# Patient Record
Sex: FEMALE | Race: WHITE | NOT HISPANIC OR LATINO
[De-identification: names, ages, dates, MRNs, and addresses within clinical notes are randomized per-mention and may not be internally consistent; named-entity substitution may affect disease eponyms.]

---

## 2018-01-18 ENCOUNTER — RX RENEWAL (OUTPATIENT)
Age: 78
End: 2018-01-18

## 2018-01-18 ENCOUNTER — MOBILE ON CALL (OUTPATIENT)
Age: 78
End: 2018-01-18

## 2018-01-23 ENCOUNTER — RX RENEWAL (OUTPATIENT)
Age: 78
End: 2018-01-23

## 2018-02-16 ENCOUNTER — APPOINTMENT (OUTPATIENT)
Dept: PULMONOLOGY | Facility: CLINIC | Age: 78
End: 2018-02-16
Payer: COMMERCIAL

## 2018-02-16 VITALS
OXYGEN SATURATION: 95 % | DIASTOLIC BLOOD PRESSURE: 70 MMHG | HEART RATE: 58 BPM | SYSTOLIC BLOOD PRESSURE: 130 MMHG | HEIGHT: 66 IN | TEMPERATURE: 99.1 F

## 2018-02-16 VITALS — WEIGHT: 94 LBS | BODY MASS INDEX: 15.17 KG/M2

## 2018-02-16 PROCEDURE — 99214 OFFICE O/P EST MOD 30 MIN: CPT | Mod: 25

## 2018-02-16 PROCEDURE — 94060 EVALUATION OF WHEEZING: CPT

## 2018-02-16 PROCEDURE — 94729 DIFFUSING CAPACITY: CPT

## 2018-02-16 PROCEDURE — 71046 X-RAY EXAM CHEST 2 VIEWS: CPT

## 2018-02-16 PROCEDURE — 94727 GAS DIL/WSHOT DETER LNG VOL: CPT

## 2018-03-05 ENCOUNTER — TRANSCRIPTION ENCOUNTER (OUTPATIENT)
Age: 78
End: 2018-03-05

## 2018-03-08 ENCOUNTER — RX RENEWAL (OUTPATIENT)
Age: 78
End: 2018-03-08

## 2019-01-11 ENCOUNTER — APPOINTMENT (OUTPATIENT)
Dept: PULMONOLOGY | Facility: CLINIC | Age: 79
End: 2019-01-11
Payer: COMMERCIAL

## 2019-01-11 VITALS
OXYGEN SATURATION: 99 % | SYSTOLIC BLOOD PRESSURE: 100 MMHG | HEART RATE: 83 BPM | DIASTOLIC BLOOD PRESSURE: 70 MMHG | HEIGHT: 66 IN | TEMPERATURE: 98.3 F

## 2019-01-11 PROCEDURE — 99214 OFFICE O/P EST MOD 30 MIN: CPT | Mod: 25

## 2019-01-11 PROCEDURE — 94010 BREATHING CAPACITY TEST: CPT

## 2019-01-11 PROCEDURE — 71046 X-RAY EXAM CHEST 2 VIEWS: CPT

## 2019-01-13 NOTE — PHYSICAL EXAM
[General Appearance - Well Developed] : well developed [Normal Appearance] : normal appearance [Well Groomed] : well groomed [General Appearance - Well Nourished] : well nourished [No Deformities] : no deformities [General Appearance - In No Acute Distress] : no acute distress [Normal Conjunctiva] : the conjunctiva exhibited no abnormalities [Eyelids - No Xanthelasma] : the eyelids demonstrated no xanthelasmas [Normal Oropharynx] : normal oropharynx [Neck Appearance] : the appearance of the neck was normal [Neck Cervical Mass (___cm)] : no neck mass was observed [Jugular Venous Distention Increased] : there was no jugular-venous distention [Thyroid Diffuse Enlargement] : the thyroid was not enlarged [Thyroid Nodule] : there were no palpable thyroid nodules [Heart Rate And Rhythm] : heart rate and rhythm were normal [Heart Sounds] : normal S1 and S2 [Murmurs] : no murmurs present [FreeTextEntry1] : few scattered rhonchi but relatively clear [Abdomen Soft] : soft [Abdomen Tenderness] : non-tender [Abdomen Mass (___ Cm)] : no abdominal mass palpated [Abnormal Walk] : normal gait [Gait - Sufficient For Exercise Testing] : the gait was sufficient for exercise testing [Nail Clubbing] : no clubbing of the fingernails [Cyanosis, Localized] : no localized cyanosis [Petechial Hemorrhages (___cm)] : no petechial hemorrhages [Skin Color & Pigmentation] : normal skin color and pigmentation [] : no rash [No Venous Stasis] : no venous stasis [Skin Lesions] : no skin lesions [No Skin Ulcers] : no skin ulcer [No Xanthoma] : no  xanthoma was observed

## 2019-01-13 NOTE — PROCEDURE
[FreeTextEntry1] : spirometry is more obstructed than previously\par \par chest film is unchanged from before with inflammatory changes. bilaterally

## 2019-01-13 NOTE — REVIEW OF SYSTEMS
[Recent Wt Loss (___ Lbs)] : recent [unfilled] ~Ulb weight loss [Cough] : cough [Dyspnea] : dyspnea [Negative] : Pulmonary Hypertension [FreeTextEntry8] : increased cough and dyspnea

## 2019-01-13 NOTE — DISCUSSION/SUMMARY
[FreeTextEntry1] : i need to figure out why she's worsened.  pseudomonas, jocelyn or other bacterial\par \par she gave me a psutum sample but she thinks this may have been nasal.\par \par i will see what the culture reveals and give her a course of prednisone and antibiotics and see if she improves.

## 2019-01-13 NOTE — HISTORY OF PRESENT ILLNESS
[FreeTextEntry1] : this year as opposed to other years has been more cough full, tired and also has lost weight. no other medical , sputum is green and clear up with suri  also with antibitoics allergies that have  been problematic.

## 2019-02-18 LAB — BACTERIA SPT CULT: ABNORMAL

## 2019-03-04 LAB — ACID FAST STN SPT: NORMAL

## 2019-03-24 ENCOUNTER — RX RENEWAL (OUTPATIENT)
Age: 79
End: 2019-03-24

## 2019-05-03 ENCOUNTER — TRANSCRIPTION ENCOUNTER (OUTPATIENT)
Age: 79
End: 2019-05-03

## 2019-05-14 ENCOUNTER — APPOINTMENT (OUTPATIENT)
Dept: PULMONOLOGY | Facility: CLINIC | Age: 79
End: 2019-05-14
Payer: COMMERCIAL

## 2019-05-14 VITALS
HEIGHT: 66 IN | OXYGEN SATURATION: 58 % | TEMPERATURE: 98.8 F | HEART RATE: 58 BPM | SYSTOLIC BLOOD PRESSURE: 112 MMHG | DIASTOLIC BLOOD PRESSURE: 70 MMHG

## 2019-05-14 PROCEDURE — 71046 X-RAY EXAM CHEST 2 VIEWS: CPT

## 2019-05-14 PROCEDURE — 94010 BREATHING CAPACITY TEST: CPT

## 2019-05-14 PROCEDURE — 99214 OFFICE O/P EST MOD 30 MIN: CPT | Mod: 25

## 2019-05-21 ENCOUNTER — RX RENEWAL (OUTPATIENT)
Age: 79
End: 2019-05-21

## 2019-06-11 ENCOUNTER — RX RENEWAL (OUTPATIENT)
Age: 79
End: 2019-06-11

## 2019-11-02 LAB — ACID FAST STN SPT: NORMAL

## 2020-01-07 ENCOUNTER — RX RENEWAL (OUTPATIENT)
Age: 80
End: 2020-01-07

## 2020-01-11 ENCOUNTER — RX RENEWAL (OUTPATIENT)
Age: 80
End: 2020-01-11

## 2020-01-15 ENCOUNTER — RX RENEWAL (OUTPATIENT)
Age: 80
End: 2020-01-15

## 2020-02-07 NOTE — HISTORY OF PRESENT ILLNESS
[FreeTextEntry1] : slowly improved after the levoquin and then slipped back, but not clear how much is allergies and how much is pseudomonas.  usually gains the weight in summer.  is on inhaled tobramycin which is the only thing that prevents her from being on repeated courses of quinolones, or iv antibiotics.

## 2020-02-07 NOTE — PROCEDURE
[FreeTextEntry1] : spirometruy is mildly obstructed/ restrictive\par \par chest film is unchanged with bilateral scattered inflammatoroy findings

## 2020-02-07 NOTE — REVIEW OF SYSTEMS
[Recent Wt Loss (___ Lbs)] : recent [unfilled] ~Ulb weight loss [Cough] : cough [Dyspnea] : dyspnea [Negative] : Endocrine [FreeTextEntry8] : has returned to increased sputum, after peroids after levoquin that rendered her cough free for aperiod of time

## 2020-02-07 NOTE — DISCUSSION/SUMMARY
[FreeTextEntry1] : prescribed another course of levoquin for treatment of her bronchiectasis that last time had stenotrophomonas and psedumononas on it.  she is on suri as well,\par \par jocelyn did not grow out of the last specifimen however,

## 2020-02-07 NOTE — PHYSICAL EXAM
[General Appearance - Well Developed] : well developed [Normal Appearance] : normal appearance [No Deformities] : no deformities [General Appearance - Well Nourished] : well nourished [Well Groomed] : well groomed [General Appearance - In No Acute Distress] : no acute distress [Normal Conjunctiva] : the conjunctiva exhibited no abnormalities [Normal Oropharynx] : normal oropharynx [Neck Appearance] : the appearance of the neck was normal [Eyelids - No Xanthelasma] : the eyelids demonstrated no xanthelasmas [Neck Cervical Mass (___cm)] : no neck mass was observed [Thyroid Diffuse Enlargement] : the thyroid was not enlarged [Jugular Venous Distention Increased] : there was no jugular-venous distention [Murmurs] : no murmurs present [Heart Rate And Rhythm] : heart rate and rhythm were normal [Heart Sounds] : normal S1 and S2 [Thyroid Nodule] : there were no palpable thyroid nodules [Abdomen Soft] : soft [Abdomen Tenderness] : non-tender [Abdomen Mass (___ Cm)] : no abdominal mass palpated [Nail Clubbing] : no clubbing of the fingernails [Gait - Sufficient For Exercise Testing] : the gait was sufficient for exercise testing [Abnormal Walk] : normal gait [Cyanosis, Localized] : no localized cyanosis [Petechial Hemorrhages (___cm)] : no petechial hemorrhages [Skin Color & Pigmentation] : normal skin color and pigmentation [No Venous Stasis] : no venous stasis [Skin Lesions] : no skin lesions [] : no rash [No Skin Ulcers] : no skin ulcer [No Xanthoma] : no  xanthoma was observed [FreeTextEntry1] : few scattered rhonchi but relatively clear

## 2020-02-07 NOTE — REASON FOR VISIT
[Follow-Up] : a follow-up visit [FreeTextEntry1] : did much better when boosted for stenatrophomonas  mal and pseudo with po levoquin, felt better for a month

## 2020-02-28 ENCOUNTER — APPOINTMENT (OUTPATIENT)
Dept: PULMONOLOGY | Facility: CLINIC | Age: 80
End: 2020-02-28

## 2020-04-16 ENCOUNTER — RX RENEWAL (OUTPATIENT)
Age: 80
End: 2020-04-16

## 2022-03-02 ENCOUNTER — RX RENEWAL (OUTPATIENT)
Age: 82
End: 2022-03-02

## 2022-06-21 ENCOUNTER — RX RENEWAL (OUTPATIENT)
Age: 82
End: 2022-06-21

## 2022-06-26 ENCOUNTER — NON-APPOINTMENT (OUTPATIENT)
Age: 82
End: 2022-06-26

## 2022-06-28 ENCOUNTER — APPOINTMENT (OUTPATIENT)
Dept: PULMONOLOGY | Facility: CLINIC | Age: 82
End: 2022-06-28

## 2022-06-28 VITALS
HEART RATE: 62 BPM | OXYGEN SATURATION: 98 % | HEIGHT: 66 IN | BODY MASS INDEX: 15.11 KG/M2 | DIASTOLIC BLOOD PRESSURE: 66 MMHG | WEIGHT: 94 LBS | TEMPERATURE: 98 F | SYSTOLIC BLOOD PRESSURE: 119 MMHG

## 2022-06-28 PROCEDURE — 71046 X-RAY EXAM CHEST 2 VIEWS: CPT

## 2022-06-28 PROCEDURE — 94010 BREATHING CAPACITY TEST: CPT

## 2022-06-28 PROCEDURE — 99215 OFFICE O/P EST HI 40 MIN: CPT | Mod: 25

## 2022-06-30 NOTE — DISCUSSION/SUMMARY
[FreeTextEntry1] : first thing is to get a sputum culture\par \par she claims she cannot get sputum up\par \par gave her aerobika and watched films on its use

## 2022-06-30 NOTE — PHYSICAL EXAM
[Normal Oropharynx] : normal oropharynx [Normal Appearance] : normal appearance [Normal Rate/Rhythm] : normal rate/rhythm [No Resp Distress] : no resp distress [Normal Gait] : normal gait [No Focal Deficits] : no focal deficits [Oriented x3] : oriented x3 [TextBox_2] : very thing [TextBox_68] : quiet lungs

## 2022-06-30 NOTE — PROCEDURE
[FreeTextEntry1] : diffuse but modest inflammatory findigns on tehc chest film\par \par siproemtry with severe obstruction much worse than in the past

## 2022-06-30 NOTE — HISTORY OF PRESENT ILLNESS
[TextBox_4] : patient is rarely seen due to distance\par \par has been taking suri and this has helped prevent exacerbation of bronchiectasis\par \par but no culture has been taken for year and perhaps the sushil has changed\par \par she had sten troph in the past as well that would not be affected by the suri

## 2023-02-14 ENCOUNTER — RX RENEWAL (OUTPATIENT)
Age: 83
End: 2023-02-14

## 2023-02-20 ENCOUNTER — RX RENEWAL (OUTPATIENT)
Age: 83
End: 2023-02-20

## 2023-06-16 ENCOUNTER — RX RENEWAL (OUTPATIENT)
Age: 83
End: 2023-06-16

## 2023-08-04 ENCOUNTER — APPOINTMENT (OUTPATIENT)
Dept: PULMONOLOGY | Facility: CLINIC | Age: 83
End: 2023-08-04
Payer: COMMERCIAL

## 2023-08-04 VITALS
TEMPERATURE: 97.1 F | BODY MASS INDEX: 15.11 KG/M2 | WEIGHT: 94 LBS | SYSTOLIC BLOOD PRESSURE: 120 MMHG | HEART RATE: 66 BPM | OXYGEN SATURATION: 93 % | HEIGHT: 66 IN | DIASTOLIC BLOOD PRESSURE: 78 MMHG

## 2023-08-04 PROCEDURE — 94010 BREATHING CAPACITY TEST: CPT

## 2023-08-05 NOTE — PHYSICAL EXAM
[Normal Oropharynx] : normal oropharynx [Normal Appearance] : normal appearance [Normal Rate/Rhythm] : normal rate/rhythm [TextBox_68] : rhonchi congested [TextBox_2] : emaiated [TextBox_99] : wheelchair boung [TextBox_132] : generalize infirmity [TextBox_140] : judgement and memory is poor

## 2023-08-05 NOTE — REASON FOR VISIT
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

## 2023-08-05 NOTE — HISTORY OF PRESENT ILLNESS
[TextBox_4] : had press in the hospital and exacerbation.  she is here in wheelchair,  emaciated, and with impaired judgement and memory daughter tell me she is sob .,  her cough has liley improved after beiing given cipro for UTI but her dyspnea continues which she denies her bs are usually quiet , but today rhonchus and congestied she is on suri for chronic psedumonas has harbored jocelyn in the past but not seen on previous cultures

## 2023-08-05 NOTE — DISCUSSION/SUMMARY
[FreeTextEntry1] : she refused prednisone vehmently.  took 15 minutes to talk her into one dose IV of 120 medrol will give her levfloxacin.  not clear how this will turn out  will communiiate with her daugther

## 2023-08-05 NOTE — PROCEDURE
[FreeTextEntry1] : spirometry, which had been diminishing is even worse her lungs have been imaged several times lately

## 2023-08-05 NOTE — REVIEW OF SYSTEMS
[Cough] : cough [Dyspnea] : dyspnea [Urgency] : urgency [Memory Loss] : memory loss [Negative] : Endocrine [TextBox_3] : thin [TextBox_94] : wheel chair bound, reason general weekness [TextBox_122] : generalized weakness and balance

## 2023-08-16 ENCOUNTER — INPATIENT (INPATIENT)
Facility: HOSPITAL | Age: 83
LOS: 2 days | Discharge: HOME HEALTH | DRG: 189 | End: 2023-08-19
Attending: STUDENT IN AN ORGANIZED HEALTH CARE EDUCATION/TRAINING PROGRAM | Admitting: STUDENT IN AN ORGANIZED HEALTH CARE EDUCATION/TRAINING PROGRAM
Payer: MEDICARE

## 2023-08-16 VITALS
SYSTOLIC BLOOD PRESSURE: 122 MMHG | RESPIRATION RATE: 20 BRPM | WEIGHT: 76.94 LBS | DIASTOLIC BLOOD PRESSURE: 64 MMHG | HEIGHT: 66 IN | TEMPERATURE: 98 F | HEART RATE: 72 BPM | OXYGEN SATURATION: 90 %

## 2023-08-16 DIAGNOSIS — I10 ESSENTIAL (PRIMARY) HYPERTENSION: ICD-10-CM

## 2023-08-16 DIAGNOSIS — J47.1 BRONCHIECTASIS WITH (ACUTE) EXACERBATION: ICD-10-CM

## 2023-08-16 DIAGNOSIS — I48.91 UNSPECIFIED ATRIAL FIBRILLATION: ICD-10-CM

## 2023-08-16 DIAGNOSIS — Z29.9 ENCOUNTER FOR PROPHYLACTIC MEASURES, UNSPECIFIED: ICD-10-CM

## 2023-08-16 DIAGNOSIS — J96.01 ACUTE RESPIRATORY FAILURE WITH HYPOXIA: ICD-10-CM

## 2023-08-16 LAB
ANION GAP SERPL CALC-SCNC: 5 MMOL/L — SIGNIFICANT CHANGE UP (ref 5–17)
APTT BLD: 26.7 SEC — SIGNIFICANT CHANGE UP (ref 24.5–35.6)
BASE EXCESS BLDV CALC-SCNC: 3.9 MMOL/L — HIGH (ref -2–3)
BASOPHILS # BLD AUTO: 0.06 K/UL — SIGNIFICANT CHANGE UP (ref 0–0.2)
BASOPHILS NFR BLD AUTO: 0.9 % — SIGNIFICANT CHANGE UP (ref 0–2)
BUN SERPL-MCNC: 16 MG/DL — SIGNIFICANT CHANGE UP (ref 7–23)
CALCIUM SERPL-MCNC: 8.9 MG/DL — SIGNIFICANT CHANGE UP (ref 8.4–10.5)
CHLORIDE SERPL-SCNC: 96 MMOL/L — SIGNIFICANT CHANGE UP (ref 96–108)
CO2 BLDV-SCNC: 31.8 MMOL/L — HIGH (ref 22–26)
CO2 SERPL-SCNC: 31 MMOL/L — SIGNIFICANT CHANGE UP (ref 22–31)
CREAT SERPL-MCNC: 0.69 MG/DL — SIGNIFICANT CHANGE UP (ref 0.5–1.3)
EGFR: 88 ML/MIN/1.73M2 — SIGNIFICANT CHANGE UP
EOSINOPHIL # BLD AUTO: 0.16 K/UL — SIGNIFICANT CHANGE UP (ref 0–0.5)
EOSINOPHIL NFR BLD AUTO: 2.5 % — SIGNIFICANT CHANGE UP (ref 0–6)
FLUAV AG NPH QL: SIGNIFICANT CHANGE UP
FLUBV AG NPH QL: SIGNIFICANT CHANGE UP
GLUCOSE SERPL-MCNC: 101 MG/DL — HIGH (ref 70–99)
HCO3 BLDV-SCNC: 30 MMOL/L — HIGH (ref 22–29)
HCT VFR BLD CALC: 36.8 % — SIGNIFICANT CHANGE UP (ref 34.5–45)
HGB BLD-MCNC: 12.7 G/DL — SIGNIFICANT CHANGE UP (ref 11.5–15.5)
IMM GRANULOCYTES NFR BLD AUTO: 0.2 % — SIGNIFICANT CHANGE UP (ref 0–0.9)
INR BLD: 1.11 — SIGNIFICANT CHANGE UP (ref 0.85–1.18)
LACTATE SERPL-SCNC: 0.9 MMOL/L — SIGNIFICANT CHANGE UP (ref 0.5–2)
LYMPHOCYTES # BLD AUTO: 1.21 K/UL — SIGNIFICANT CHANGE UP (ref 1–3.3)
LYMPHOCYTES # BLD AUTO: 19.1 % — SIGNIFICANT CHANGE UP (ref 13–44)
MCHC RBC-ENTMCNC: 34.5 GM/DL — SIGNIFICANT CHANGE UP (ref 32–36)
MCHC RBC-ENTMCNC: 35.2 PG — HIGH (ref 27–34)
MCV RBC AUTO: 101.9 FL — HIGH (ref 80–100)
MONOCYTES # BLD AUTO: 0.6 K/UL — SIGNIFICANT CHANGE UP (ref 0–0.9)
MONOCYTES NFR BLD AUTO: 9.4 % — SIGNIFICANT CHANGE UP (ref 2–14)
NEUTROPHILS # BLD AUTO: 4.31 K/UL — SIGNIFICANT CHANGE UP (ref 1.8–7.4)
NEUTROPHILS NFR BLD AUTO: 67.9 % — SIGNIFICANT CHANGE UP (ref 43–77)
NRBC # BLD: 0 /100 WBCS — SIGNIFICANT CHANGE UP (ref 0–0)
PCO2 BLDV: 51 MMHG — HIGH (ref 39–42)
PH BLDV: 7.38 — SIGNIFICANT CHANGE UP (ref 7.32–7.43)
PLATELET # BLD AUTO: 216 K/UL — SIGNIFICANT CHANGE UP (ref 150–400)
PO2 BLDV: 45 MMHG — SIGNIFICANT CHANGE UP (ref 25–45)
POTASSIUM SERPL-MCNC: 4.2 MMOL/L — SIGNIFICANT CHANGE UP (ref 3.5–5.3)
POTASSIUM SERPL-SCNC: 4.2 MMOL/L — SIGNIFICANT CHANGE UP (ref 3.5–5.3)
PROTHROM AB SERPL-ACNC: 12.6 SEC — SIGNIFICANT CHANGE UP (ref 9.5–13)
RBC # BLD: 3.61 M/UL — LOW (ref 3.8–5.2)
RBC # FLD: 12 % — SIGNIFICANT CHANGE UP (ref 10.3–14.5)
RSV RNA NPH QL NAA+NON-PROBE: SIGNIFICANT CHANGE UP
SAO2 % BLDV: 75.1 % — SIGNIFICANT CHANGE UP (ref 67–88)
SARS-COV-2 RNA SPEC QL NAA+PROBE: SIGNIFICANT CHANGE UP
SODIUM SERPL-SCNC: 132 MMOL/L — LOW (ref 135–145)
WBC # BLD: 6.35 K/UL — SIGNIFICANT CHANGE UP (ref 3.8–10.5)
WBC # FLD AUTO: 6.35 K/UL — SIGNIFICANT CHANGE UP (ref 3.8–10.5)

## 2023-08-16 PROCEDURE — 99285 EMERGENCY DEPT VISIT HI MDM: CPT

## 2023-08-16 PROCEDURE — 99223 1ST HOSP IP/OBS HIGH 75: CPT | Mod: GC

## 2023-08-16 PROCEDURE — 71045 X-RAY EXAM CHEST 1 VIEW: CPT | Mod: 26

## 2023-08-16 RX ORDER — ACETAMINOPHEN 500 MG
650 TABLET ORAL EVERY 6 HOURS
Refills: 0 | Status: DISCONTINUED | OUTPATIENT
Start: 2023-08-16 | End: 2023-08-19

## 2023-08-16 RX ORDER — ENOXAPARIN SODIUM 100 MG/ML
40 INJECTION SUBCUTANEOUS EVERY 24 HOURS
Refills: 0 | Status: DISCONTINUED | OUTPATIENT
Start: 2023-08-16 | End: 2023-08-16

## 2023-08-16 RX ORDER — METOPROLOL TARTRATE 50 MG
25 TABLET ORAL EVERY 12 HOURS
Refills: 0 | Status: DISCONTINUED | OUTPATIENT
Start: 2023-08-16 | End: 2023-08-16

## 2023-08-16 RX ORDER — PIPERACILLIN AND TAZOBACTAM 4; .5 G/20ML; G/20ML
4.5 INJECTION, POWDER, LYOPHILIZED, FOR SOLUTION INTRAVENOUS ONCE
Refills: 0 | Status: COMPLETED | OUTPATIENT
Start: 2023-08-16 | End: 2023-08-16

## 2023-08-16 RX ORDER — MEROPENEM 1 G/30ML
1000 INJECTION INTRAVENOUS ONCE
Refills: 0 | Status: COMPLETED | OUTPATIENT
Start: 2023-08-16 | End: 2023-08-16

## 2023-08-16 RX ORDER — SODIUM CHLORIDE 9 MG/ML
4 INJECTION INTRAMUSCULAR; INTRAVENOUS; SUBCUTANEOUS EVERY 8 HOURS
Refills: 0 | Status: DISCONTINUED | OUTPATIENT
Start: 2023-08-16 | End: 2023-08-19

## 2023-08-16 RX ORDER — METOPROLOL TARTRATE 50 MG
50 TABLET ORAL
Refills: 0 | Status: DISCONTINUED | OUTPATIENT
Start: 2023-08-16 | End: 2023-08-19

## 2023-08-16 RX ORDER — MONTELUKAST 4 MG/1
10 TABLET, CHEWABLE ORAL EVERY 24 HOURS
Refills: 0 | Status: DISCONTINUED | OUTPATIENT
Start: 2023-08-17 | End: 2023-08-19

## 2023-08-16 RX ORDER — CIPROFLOXACIN LACTATE 400MG/40ML
500 VIAL (ML) INTRAVENOUS EVERY 12 HOURS
Refills: 0 | Status: DISCONTINUED | OUTPATIENT
Start: 2023-08-16 | End: 2023-08-19

## 2023-08-16 RX ORDER — ENOXAPARIN SODIUM 100 MG/ML
30 INJECTION SUBCUTANEOUS EVERY 24 HOURS
Refills: 0 | Status: DISCONTINUED | OUTPATIENT
Start: 2023-08-16 | End: 2023-08-19

## 2023-08-16 RX ORDER — IPRATROPIUM/ALBUTEROL SULFATE 18-103MCG
3 AEROSOL WITH ADAPTER (GRAM) INHALATION EVERY 8 HOURS
Refills: 0 | Status: DISCONTINUED | OUTPATIENT
Start: 2023-08-16 | End: 2023-08-19

## 2023-08-16 RX ORDER — PIPERACILLIN AND TAZOBACTAM 4; .5 G/20ML; G/20ML
4.5 INJECTION, POWDER, LYOPHILIZED, FOR SOLUTION INTRAVENOUS EVERY 8 HOURS
Refills: 0 | Status: DISCONTINUED | OUTPATIENT
Start: 2023-08-17 | End: 2023-08-18

## 2023-08-16 RX ORDER — PIPERACILLIN AND TAZOBACTAM 4; .5 G/20ML; G/20ML
4.5 INJECTION, POWDER, LYOPHILIZED, FOR SOLUTION INTRAVENOUS ONCE
Refills: 0 | Status: COMPLETED | OUTPATIENT
Start: 2023-08-17 | End: 2023-08-16

## 2023-08-16 RX ORDER — MEROPENEM 1 G/30ML
1000 INJECTION INTRAVENOUS ONCE
Refills: 0 | Status: DISCONTINUED | OUTPATIENT
Start: 2023-08-16 | End: 2023-08-16

## 2023-08-16 RX ORDER — CIPROFLOXACIN LACTATE 400MG/40ML
400 VIAL (ML) INTRAVENOUS ONCE
Refills: 0 | Status: DISCONTINUED | OUTPATIENT
Start: 2023-08-16 | End: 2023-08-16

## 2023-08-16 RX ADMIN — PIPERACILLIN AND TAZOBACTAM 200 GRAM(S): 4; .5 INJECTION, POWDER, LYOPHILIZED, FOR SOLUTION INTRAVENOUS at 20:30

## 2023-08-16 RX ADMIN — Medication 500 MILLIGRAM(S): at 20:29

## 2023-08-16 RX ADMIN — Medication 125 MILLIGRAM(S): at 16:29

## 2023-08-16 RX ADMIN — PIPERACILLIN AND TAZOBACTAM 25 GRAM(S): 4; .5 INJECTION, POWDER, LYOPHILIZED, FOR SOLUTION INTRAVENOUS at 23:32

## 2023-08-16 RX ADMIN — SODIUM CHLORIDE 4 MILLILITER(S): 9 INJECTION INTRAMUSCULAR; INTRAVENOUS; SUBCUTANEOUS at 22:24

## 2023-08-16 RX ADMIN — Medication 50 MILLIGRAM(S): at 23:25

## 2023-08-16 RX ADMIN — ENOXAPARIN SODIUM 30 MILLIGRAM(S): 100 INJECTION SUBCUTANEOUS at 22:23

## 2023-08-16 RX ADMIN — Medication 3 MILLILITER(S): at 22:23

## 2023-08-16 NOTE — ED ADULT NURSE NOTE - OBJECTIVE STATEMENT
pt w PMH Afib, TIA, encephalopathy s/p right hip replacement/ right knee injury while in rehab presents to the ED due to increased SOB/cough. Pt reports having cough since being discharged from rehab at the end of June. Pt was initially placed Levaquin pt w PMH Afib, TIA, encephalopathy s/p right hip replacement/ right knee injury while in rehab presents to the ED due to increased SOB/cough. Pt reports having cough since being discharged from rehab at the end of June. Pt was initially placed Levaquin, however it was switched to cipro on 8/7 for lung infection. pt reports coming to the ED due to worsening symptoms and severe SOB.

## 2023-08-16 NOTE — ED ADULT NURSE REASSESSMENT NOTE - NS ED NURSE REASSESS COMMENT FT1
pt/daughter refuses abxs because " the doctor said it was cipro no meropenem." Daughter educated about the need to change med/resistant. Daughter requesting to speak w MD. MD Jeremy barnes

## 2023-08-16 NOTE — ED PROVIDER NOTE - NS ED ROS FT
Constitutional: No fever or chills  Eyes: No discharge or drainage  Ears, Nose, Mouth, Throat: No nasal discharge, no sore throat  Cardiovascular: No chest pain, no palpitations  Respiratory: + shortness of breath, + cough  Gastrointestinal: No nausea or vomiting, no abdominal pain, no diarrhea or constipation  Musculoskeletal: No joint pain, no swelling  Skin: No rashes or lesions  Neurological: No numbness, weakness, tingling, no headache

## 2023-08-16 NOTE — H&P ADULT - ATTENDING COMMENTS
#bronchiectasis exacerbation: c/f exacerbation in setting of pna. CXR w/o acute infiltrate. RVP neg. Per pulm c/w zosyn/Cipro for psa coverage, add bactrim for steno coverage and airway clearance: Duonebs followed by 7% hypertonic saline followed by aerobika. Monitor resp symptoms.

## 2023-08-16 NOTE — H&P ADULT - NSHPPHYSICALEXAM_GEN_ALL_CORE
Constitutional: frail, cachectic elderly female  Head: NC/AT  EENT: PERRL, anicteric sclera; oropharynx clear, MMM  Neck: supple, no appreciable JVD  Respiratory: breathing comfortably on 2LPM; bilateral crackles on auscultation  Cardiovascular: +S1/S2, RRR  Gastrointestinal: soft, NT/ND  Extremities: WWP; no edema, clubbing or cyanosis  Vascular: 2+ radial pulses B/L  Neurological: awake and alert; KAHN

## 2023-08-16 NOTE — ED ADULT NURSE NOTE - NSFALLHARMRISKINTERV_ED_ALL_ED
Assistance OOB with selected safe patient handling equipment if applicable/Communicate risk of Fall with Harm to all staff, patient, and family/Monitor gait and stability/Provide patient with walking aids/Provide visual cue: red socks, yellow wristband, yellow gown, etc/Reinforce activity limits and safety measures with patient and family/Bed in lowest position, wheels locked, appropriate side rails in place/Call bell, personal items and telephone in reach/Instruct patient to call for assistance before getting out of bed/chair/stretcher/Non-slip footwear applied when patient is off stretcher/[place] to call system/Physically safe environment - no spills, clutter or unnecessary equipment/Purposeful Proactive Rounding/Room/bathroom lighting operational, light cord in reach

## 2023-08-16 NOTE — H&P ADULT - ASSESSMENT
Sandra Funes is an 82 yF with past history of chronic bronchiectasis with multiple pseudomonal PNAs presenting with bronchiectasis exacerbation with failure of outpatient therapy.

## 2023-08-16 NOTE — ED PROVIDER NOTE - OBJECTIVE STATEMENT
83 yo F with history of chronic pseudomonas pulmonary infections, bronchiectasis not on home 02, HTN, HLD, recent admission at OSH for PRESS c/b afib not on AC as pt fall risk presenting with sob X 1 week. Pt with sob, cough productive of green sputum with associated rhinorrhea. Has had prior pseudomonas pnas in past, pulm Dr. Foster. Pt was started on cipro 1 week prior (allergic reaction to levaquin). States symptoms are not improving, more productive cough with some associated sob. No fever, chills. No cp, no recent travel, no history of dvt or pe, no leg pain or leg swelling. ROS as above.

## 2023-08-16 NOTE — CONSULT NOTE ADULT - SUBJECTIVE AND OBJECTIVE BOX
Patient arrived ambulatory for exam with Dr. Miller  Reports cervical pain which radiates to rt. Arm; Rates #6/10 currently  X-rays done.  Allergies reviewed;vitals obtained.  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 Pt with sob, cough productive of green sputum with associated rhinorrhea. Has had prior pseudomonas pnas in past, pulm Dr. Foster. Pt was started on cipro 1 week prior (allergic reaction to levaquin). States symptoms are not improving, more productive cough with some associated sob. No fever, chills. No cp, no recent travel, no history of dvt or pe, no leg pain or leg swelling. Does not enjoy using inhalers and admits to nonadherence. Does not use aerobika device.      REVIEW OF SYSTEMS:  A 12 point ROS was negative other than noted in HPI above.    PAST MEDICAL & SURGICAL HISTORY:      FAMILY HISTORY:      SOCIAL HISTORY:  Smoking Status: [ ] Current, [ ] Former, [x ] Never  Pack Years:0    MEDICATIONS:  Pulmonary:    Antimicrobials:    Anticoagulants:    Onc:    GI/:    Endocrine:    Cardiac:    Other Medications:      Allergies    Levaquin (Unknown)  Ceclor (Unknown)    Intolerances        Vital Signs Last 24 Hrs  T(C): 36.9 (16 Aug 2023 17:05), Max: 36.9 (16 Aug 2023 14:23)  T(F): 98.5 (16 Aug 2023 17:05), Max: 98.5 (16 Aug 2023 17:05)  HR: 85 (16 Aug 2023 17:05) (72 - 85)  BP: 126/74 (16 Aug 2023 17:05) (122/64 - 126/74)  BP(mean): --  RR: 20 (16 Aug 2023 17:05) (20 - 24)  SpO2: 100% (16 Aug 2023 17:05) (87% - 100%)    Parameters below as of 16 Aug 2023 17:05  Patient On (Oxygen Delivery Method): nasal cannula  O2 Flow (L/min): 4          PHYSICAL EXAM:  Constitutional: frail, cachectic elderly female  Head: NC/AT  EENT: PERRL, anicteric sclera; oropharynx clear, MMM  Neck: supple, no appreciable JVD  Respiratory: breathing comfortably on 2LPM; bilateral crackles on auscultation  Cardiovascular: +S1/S2, RRR  Gastrointestinal: soft, NT/ND  Extremities: WWP; no edema, clubbing or cyanosis  Vascular: 2+ radial pulses B/L  Neurological: awake and alert; KAHN      LABS:      CBC Full  -  ( 16 Aug 2023 15:25 )  WBC Count : 6.35 K/uL  RBC Count : 3.61 M/uL  Hemoglobin : 12.7 g/dL  Hematocrit : 36.8 %  Platelet Count - Automated : 216 K/uL  Mean Cell Volume : 101.9 fl  Mean Cell Hemoglobin : 35.2 pg  Mean Cell Hemoglobin Concentration : 34.5 gm/dL  Auto Neutrophil # : 4.31 K/uL  Auto Lymphocyte # : 1.21 K/uL  Auto Monocyte # : 0.60 K/uL  Auto Eosinophil # : 0.16 K/uL  Auto Basophil # : 0.06 K/uL  Auto Neutrophil % : 67.9 %  Auto Lymphocyte % : 19.1 %  Auto Monocyte % : 9.4 %  Auto Eosinophil % : 2.5 %  Auto Basophil % : 0.9 %    08-16    132<L>  |  96  |  16  ----------------------------<  101<H>  4.2   |  31  |  0.69    Ca    8.9      16 Aug 2023 15:25      PT/INR - ( 16 Aug 2023 15:25 )   PT: 12.6 sec;   INR: 1.11          PTT - ( 16 Aug 2023 15:25 )  PTT:26.7 sec      Urinalysis Basic - ( 16 Aug 2023 15:25 )    Color: x / Appearance: x / SG: x / pH: x  Gluc: 101 mg/dL / Ketone: x  / Bili: x / Urobili: x   Blood: x / Protein: x / Nitrite: x   Leuk Esterase: x / RBC: x / WBC x   Sq Epi: x / Non Sq Epi: x / Bacteria: x                RADIOLOGY & ADDITIONAL STUDIES: PULMONARY SERVICE INITIAL CONSULT NOTE    HPI:  83 yo F with history of CF carrier with bronchiectasis (not on home 02), diagnosed with asthma in adulthood, chronic pseudomonas pulmonary infections, HTN, HLD, recent admission at OSH for PRESS c/b afib not on AC as pt fall risk presenting with sob X 1 week. Pt with sob, cough productive of green sputum with associated rhinorrhea. Has had prior pseudomonas pnas in past. Sees pulm, Dr. Foster. Pt was started on cipro 1 week prior (allergic reaction to levaquin- couldn't specify exact reaction on further questioning). States symptoms are not improving, more productive cough with some associated sob. No fever, chills. No cp, no recent travel, no history of dvt or pe, no leg pain or leg swelling. Never smoker. Does not enjoy using inhalers and admits to nonadherence. Does not use aerobika device.      REVIEW OF SYSTEMS:  A 12 point ROS was negative other than noted in HPI above.    PAST MEDICAL & SURGICAL HISTORY:      FAMILY HISTORY:      SOCIAL HISTORY:  Smoking Status: [ ] Current, [ ] Former, [x ] Never  Pack Years: 0    MEDICATIONS:  Pulmonary:    Antimicrobials:    Anticoagulants:    Onc:    GI/:    Endocrine:    Cardiac:    Other Medications:      Allergies    Levaquin (Unknown)  Ceclor (Unknown)    Intolerances        Vital Signs Last 24 Hrs  T(C): 36.9 (16 Aug 2023 17:05), Max: 36.9 (16 Aug 2023 14:23)  T(F): 98.5 (16 Aug 2023 17:05), Max: 98.5 (16 Aug 2023 17:05)  HR: 85 (16 Aug 2023 17:05) (72 - 85)  BP: 126/74 (16 Aug 2023 17:05) (122/64 - 126/74)  BP(mean): --  RR: 20 (16 Aug 2023 17:05) (20 - 24)  SpO2: 100% (16 Aug 2023 17:05) (87% - 100%)    Parameters below as of 16 Aug 2023 17:05  Patient On (Oxygen Delivery Method): nasal cannula  O2 Flow (L/min): 4          PHYSICAL EXAM:  Constitutional: frail, cachectic elderly female  Head: NC/AT  EENT: PERRL, anicteric sclera; oropharynx clear, MMM  Neck: supple, no appreciable JVD  Respiratory: breathing comfortably on 2LPM; bilateral crackles on auscultation  Cardiovascular: +S1/S2, RRR  Gastrointestinal: soft, NT/ND  Extremities: WWP; no edema, clubbing or cyanosis  Vascular: 2+ radial pulses B/L  Neurological: awake and alert; KAHN      LABS:      CBC Full  -  ( 16 Aug 2023 15:25 )  WBC Count : 6.35 K/uL  RBC Count : 3.61 M/uL  Hemoglobin : 12.7 g/dL  Hematocrit : 36.8 %  Platelet Count - Automated : 216 K/uL  Mean Cell Volume : 101.9 fl  Mean Cell Hemoglobin : 35.2 pg  Mean Cell Hemoglobin Concentration : 34.5 gm/dL  Auto Neutrophil # : 4.31 K/uL  Auto Lymphocyte # : 1.21 K/uL  Auto Monocyte # : 0.60 K/uL  Auto Eosinophil # : 0.16 K/uL  Auto Basophil # : 0.06 K/uL  Auto Neutrophil % : 67.9 %  Auto Lymphocyte % : 19.1 %  Auto Monocyte % : 9.4 %  Auto Eosinophil % : 2.5 %  Auto Basophil % : 0.9 %    08-16    132<L>  |  96  |  16  ----------------------------<  101<H>  4.2   |  31  |  0.69    Ca    8.9      16 Aug 2023 15:25      PT/INR - ( 16 Aug 2023 15:25 )   PT: 12.6 sec;   INR: 1.11          PTT - ( 16 Aug 2023 15:25 )  PTT:26.7 sec      Urinalysis Basic - ( 16 Aug 2023 15:25 )    Color: x / Appearance: x / SG: x / pH: x  Gluc: 101 mg/dL / Ketone: x  / Bili: x / Urobili: x   Blood: x / Protein: x / Nitrite: x   Leuk Esterase: x / RBC: x / WBC x   Sq Epi: x / Non Sq Epi: x / Bacteria: x                RADIOLOGY & ADDITIONAL STUDIES:   PULMONARY SERVICE INITIAL CONSULT NOTE    HPI:  83 yo F with history of CF carrier with bronchiectasis (not on home 02), diagnosed with asthma in adulthood, chronic pseudomonas pulmonary infections, HTN, HLD, recent admission at OSH for PRESS c/b afib not on AC as pt fall risk presenting with sob X 1 week. Pt with sob, cough productive of green sputum with associated rhinorrhea. Has had prior pseudomonas pneumonia in past. Sees pulm, Dr. Foster. Pt was started on cipro 1 week prior (allergic reaction to Levaquin facial swelling). States symptoms are not improving, more productive cough with some associated sob. No fever, chills. No cp, no recent travel, no history of dvt or pe, no leg pain or leg swelling. Never smoker. Does not enjoy using inhalers and admits to nonadherence. Does not use aerobika device.      REVIEW OF SYSTEMS:  A 12 point ROS was negative other than noted in HPI above.    PAST MEDICAL & SURGICAL HISTORY:      FAMILY HISTORY:      SOCIAL HISTORY:  Smoking Status: [ ] Current, [ ] Former, [x ] Never  Pack Years: 0    MEDICATIONS:  Pulmonary:    Antimicrobials:    Anticoagulants:    Onc:    GI/:    Endocrine:    Cardiac:    Other Medications:      Allergies    Levaquin (Unknown)  Ceclor (Unknown)    Intolerances        Vital Signs Last 24 Hrs  T(C): 36.9 (16 Aug 2023 17:05), Max: 36.9 (16 Aug 2023 14:23)  T(F): 98.5 (16 Aug 2023 17:05), Max: 98.5 (16 Aug 2023 17:05)  HR: 85 (16 Aug 2023 17:05) (72 - 85)  BP: 126/74 (16 Aug 2023 17:05) (122/64 - 126/74)  BP(mean): --  RR: 20 (16 Aug 2023 17:05) (20 - 24)  SpO2: 100% (16 Aug 2023 17:05) (87% - 100%)    Parameters below as of 16 Aug 2023 17:05  Patient On (Oxygen Delivery Method): nasal cannula  O2 Flow (L/min): 4          PHYSICAL EXAM:  Constitutional: frail, cachectic elderly female  Head: NC/AT  EENT: PERRL, anicteric sclera; oropharynx clear, MMM  Neck: supple, no appreciable JVD  Respiratory: breathing comfortably on 2LPM; bilateral crackles on auscultation  Cardiovascular: +S1/S2, RRR  Gastrointestinal: soft, NT/ND  Extremities: WWP; no edema, clubbing or cyanosis  Vascular: 2+ radial pulses B/L  Neurological: awake and alert; KAHN      LABS:      CBC Full  -  ( 16 Aug 2023 15:25 )  WBC Count : 6.35 K/uL  RBC Count : 3.61 M/uL  Hemoglobin : 12.7 g/dL  Hematocrit : 36.8 %  Platelet Count - Automated : 216 K/uL  Mean Cell Volume : 101.9 fl  Mean Cell Hemoglobin : 35.2 pg  Mean Cell Hemoglobin Concentration : 34.5 gm/dL  Auto Neutrophil # : 4.31 K/uL  Auto Lymphocyte # : 1.21 K/uL  Auto Monocyte # : 0.60 K/uL  Auto Eosinophil # : 0.16 K/uL  Auto Basophil # : 0.06 K/uL  Auto Neutrophil % : 67.9 %  Auto Lymphocyte % : 19.1 %  Auto Monocyte % : 9.4 %  Auto Eosinophil % : 2.5 %  Auto Basophil % : 0.9 %    08-16    132<L>  |  96  |  16  ----------------------------<  101<H>  4.2   |  31  |  0.69    Ca    8.9      16 Aug 2023 15:25      PT/INR - ( 16 Aug 2023 15:25 )   PT: 12.6 sec;   INR: 1.11          PTT - ( 16 Aug 2023 15:25 )  PTT:26.7 sec      Urinalysis Basic - ( 16 Aug 2023 15:25 )    Color: x / Appearance: x / SG: x / pH: x  Gluc: 101 mg/dL / Ketone: x  / Bili: x / Urobili: x   Blood: x / Protein: x / Nitrite: x   Leuk Esterase: x / RBC: x / WBC x   Sq Epi: x / Non Sq Epi: x / Bacteria: x                RADIOLOGY & ADDITIONAL STUDIES:

## 2023-08-16 NOTE — ED PROVIDER NOTE - CLINICAL SUMMARY MEDICAL DECISION MAKING FREE TEXT BOX
83 yo F presenting with likely pulmonary infection, failed po cipro. discussed with pt's pulmonologist dr. rogers. will give iv solumedrol, cefepime for pseudomonas coverage, labs, cxr. will admit.

## 2023-08-16 NOTE — CONSULT NOTE ADULT - PROBLEM SELECTOR RECOMMENDATION 2
- Bactrim for steno; Zosyn and cipro for pseudomonas  - Duonebs followed by 7%hypertonic saline followed by aerobika  - Sputum culture, legionella and strep ur ags  - Recommend inhaled [place] in the outpatient setting as the patient has resistance to tobramycin - Bactrim for steno; Double pseudomonal coverage with Zosyn and cipro  - Duonebs followed by 7% hypertonic saline followed by aerobika  - Sputum culture, sputum AFB smear with culture, legionella and strep ur ags, RVP and COVID-19 PCR  - Recommend inhaled [place] in the outpatient setting as the patient has resistance to tobramycin

## 2023-08-16 NOTE — H&P ADULT - NSHPLABSRESULTS_GEN_ALL_CORE
LABS:                          12.7   6.35  )-----------( 216      ( 16 Aug 2023 15:25 )             36.8     08-16    132<L>  |  96  |  16  ----------------------------<  101<H>  4.2   |  31  |  0.69    Ca    8.9      16 Aug 2023 15:25        PT/INR - ( 16 Aug 2023 15:25 )   PT: 12.6 sec;   INR: 1.11          PTT - ( 16 Aug 2023 15:25 )  PTT:26.7 sec  Urinalysis Basic - ( 16 Aug 2023 15:25 )    Color: x / Appearance: x / SG: x / pH: x  Gluc: 101 mg/dL / Ketone: x  / Bili: x / Urobili: x   Blood: x / Protein: x / Nitrite: x   Leuk Esterase: x / RBC: x / WBC x   Sq Epi: x / Non Sq Epi: x / Bacteria: x

## 2023-08-16 NOTE — CONSULT NOTE ADULT - ASSESSMENT
Pseudomonas           aeruginosa                                      MDIL        MINT                                    ________    ________       Ceftazidime                  4           S       Levofloxacin                 <=1         S       Amikacin                     >32         R       Aztreonam                    <=4         S       Cefepime                     16          I       Ciprofloxacin                <=0.5       S       Gentamicin                   >8          R       Imipenem                     <=1         S       Meropenem                    <=1         S       Piperacillin/Tazobactam      <=8         S       Tobramycin                   >8          R                                            Stenotrophomonas                                    maltophilia                                    #2                                      MDIL        MINT                                    ________    ________       Ceftazidime                  8           S       Levofloxacin                 <=1         S       Trimethoprim/Sulfa           <=0.5/9.5   S     83 yo F with 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 Pt presented with sob, cough productive of green sputum with associated rhinorrhea. Pulm consulted for bronchiectasis exacerbation.    2019 sputum culture:   Pseudomonas           aeruginosa                                      SHOBHA SCHULTZ                                    ________    ________       Ceftazidime                  4           S       Levofloxacin                 <=1         S       Amikacin                     >32         R       Aztreonam                    <=4         S       Cefepime                     16          I       Ciprofloxacin                <=0.5       S       Gentamicin                   >8          R       Imipenem                     <=1         S       Meropenem                    <=1         S       Piperacillin/Tazobactam      <=8         S       Tobramycin                   >8          R                                            Stenotrophomonas                                    maltophilia                                    #2                                      SHOBHA SCHULTZ                                    ________    ________       Ceftazidime                  8           S       Levofloxacin                 <=1         S       Trimethoprim/Sulfa           <=0.5/9.5   S

## 2023-08-16 NOTE — ED ADULT TRIAGE NOTE - CHIEF COMPLAINT QUOTE
shortness of breath especially on exertion increasing in the last few weeks, "currently on cipro for lung infection," not on home oxygen. O2 sat 90% in triage though speaking in full sentences. Denies CP at this time. EKG in progress.

## 2023-08-16 NOTE — H&P ADULT - PROBLEM SELECTOR PLAN 1
Patient with chronic bronchiectasis. Seen outpatient by Dr. Foster. Pulm   - f/u pulm recs  - Bactrim for stenotrophomonas;   - Zosyn and cipro for pseudomonas  - Duonebs followed by 7%hypertonic saline followed by aerobika  - f/u Sputum culture, legionella and strep ur ags  - Recommend inhaled cayston in the outpatient setting as the patient has resistance to tobramycin. #ARF with hypoxia  Patient with chronic bronchiectasis. Seen outpatient by Dr. Foster. Pulm   Satting 87% on RA on arrival. Satting 100% on 4LNC  - f/u pulm recs  - Bactrim for stenotrophomonas;   - Zosyn and cipro for pseudomonas  - Duonebs followed by 7%hypertonic saline followed by aerobika  - f/u Sputum culture, legionella and strep ur ags  - Pulm recs inhaled cayston in the outpatient setting as the patient has resistance to tobramycin.  - Wean O2 #ARF with hypoxia  Patient with chronic bronchiectasis. Seen outpatient by Dr. Foster. Pulm.   Outpt cultures 2019 with steno and PsA sensitive to bactrim and zosyn.  Satting 87% on RA on arrival. Satting 100% on 4LNC  - f/u pulm recs  - Bactrim for stenotrophomonas;   - Zosyn and cipro for pseudomonas  - Duonebs followed by 7%hypertonic saline followed by aerobika  - f/u Sputum culture, legionella and strep ur ags  - Pulm recs inhaled cayston in the outpatient setting as the patient has resistance to tobramycin.  - Wean O2

## 2023-08-16 NOTE — H&P ADULT - PROBLEM SELECTOR PLAN 4
F: PO  E: replete   N: Regular diet  DVT: heparin subq F: PO  E: replete   N: Regular diet  DVT: lovenox subq

## 2023-08-16 NOTE — ED PROVIDER NOTE - PROGRESS NOTE DETAILS
pt with allergies to penicillins, fluoroquinolones, cephalosporins in past, unsure whether anaphlyaxis but states she did have difficulty breathing for one abx in past. Will give dose of merrem for pseudomonal coverage. failed PO cipro.

## 2023-08-16 NOTE — H&P ADULT - PROBLEM SELECTOR PLAN 3
Kevin with new onset afib during recent hospitalization. No longer in afib on presentation. On rate control but no AC.  - Continue lopressor 25 mg BID Patient with new onset afib during recent hospitalization. No longer in afib on presentation. On rate control but no AC; due to fall risk?   - collateral in am   - Continue lopressor 25 mg BID

## 2023-08-16 NOTE — CONSULT NOTE ADULT - PROBLEM SELECTOR RECOMMENDATION 9
-SpO2 goal >90%  -Micro- strep and legionella ur ag, MRSA nares, RVP and COVID-19 PCR, sputum culture and hold for fungal elements, sputum AFB, blood cultures x2, LDH, BDG, GMN  -Pulm hygiene- OOBTC, PT/OT, IS

## 2023-08-16 NOTE — H&P ADULT - HISTORY OF PRESENT ILLNESS
Sandra Funes is an 82 yF with past history of chronic bronchiectasis with multiple pseudomonal PNAs; HTN; and  recent hospitalization "[facility]" for PRES c/b new onset afib, CVA with residual memory defecit, and R hip fracture s/p pinning. After being discharged from her hospitalization for PRES, she developed a cough and shortness breath while at rehab at the end of June. The cough and SOB continued but became significantly worse three weeks ago and productive of thick, frothy green sputum. On August 4 her outpatient pulmonologist put her on a 10 day course of levofloxacin, which was changed to ciprofloxacin. She completed the antibiotic course without improvement of symptoms, so she came into the hispital. chills, or sick contacts. She recently moved in with her daughter, who has a dog and 2 cats, but her symptoms began before the move, and her daughter reports that her pets are "hypoallergenic." She reports no recent URI symptoms, fevers, hemoptysis, changes in mental status, mold, or pest exposure,           In the ED,   T 98.4 (oral), HR 72, /64, RR 20 87% on RA > 100% on 4LNC  Labs sig for: CBC wnl, CMP wnl, neg RVP  CXR: mild blunting L costophrenic angle  EKG:   Interventions: pt refused meropenem x 1, methylprednisolone 125mg x 1 Sandra Funes is an 82 yF with past history of chronic bronchiectasis with multiple pseudomonal PNAs; HTN; and  recent hospitalization "[facility]" for PRES c/b new onset afib, CVA with residual memory defecit, and R hip fracture s/p pinning. After being discharged from her hospitalization for PRES, she developed a cough and shortness breath while at rehab at the end of June. The cough and SOB continued but became significantly worse three weeks ago and productive of thick, frothy green sputum. On August 4 her outpatient pulmonologist put her on a 10 day course of levofloxacin, which was changed to ciprofloxacin after an allergic reaction. She completed the antibiotic course without improvement of symptoms, so she came into the hospital.  She recently moved in with her daughter, who has a dog and 2 cats, but her symptoms began before the move, and her daughter reports that her pets are "hypoallergenic." She reports no recent URI symptoms, fevers, chills, hemoptysis, changes in mental status, mold, or pest exposure, No history of dvt or pe, no leg pain or leg swelling.    In the ED,   T 98.4 (oral), HR 72, /64, RR 20 87% on RA > 100% on 4LNC  Labs sig for: CBC wnl, CMP wnl, neg RVP  CXR: mild blunting L costophrenic angle  EKG: Interventions: pt refused meropenem x 1 Sandra Funes is an 82 yF with past history of chronic bronchiectasis with multiple pseudomonal PNAs; HTN; and  recent hospitalization "[facility]" for PRES c/b new onset afib, CVA with residual memory defecit, and R hip fracture s/p pinning. After being discharged from her hospitalization for PRES, she developed a cough and shortness breath while at rehab at the end of June. The cough and SOB continued but became significantly worse three weeks ago and productive of thick, frothy green sputum. On August 4 her outpatient pulmonologist put her on a 10 day course of levofloxacin, which was changed to ciprofloxacin after an allergic reaction (facial swelling). She completed the antibiotic course without improvement of symptoms, so she came into the hospital.  She recently moved in with her daughter, who has a dog and 2 cats, but her symptoms began before the move, and her daughter reports that her pets are "hypoallergenic." She reports no recent URI symptoms, fevers, chills, hemoptysis, changes in mental status, mold, or pest exposure, No history of dvt or pe, no leg pain or leg swelling.    In the ED,   T 98.4 (oral), HR 72, /64, RR 20 87% on RA > 100% on 4LNC  Labs sig for: CBC wnl, CMP wnl, neg RVP  CXR: mild blunting L costophrenic angle  EKG: Interventions: pt refused meropenem x 1

## 2023-08-16 NOTE — ED PROVIDER NOTE - PHYSICAL EXAMINATION
general: Well appearing, in no acute distress  HEENT: Normocephalic, atraumatic, extraocular movements intact  CV: Regular rate  Pulm: Mild tachypnea  Abd: Flat, no gross distension  Ext: warm and well perfused, no le edema  Skin: No gross rashes or lesions  Neuro: Alert and oriented, moving all extremities

## 2023-08-16 NOTE — H&P ADULT - NSICDXPASTMEDICALHX_GEN_ALL_CORE_FT
PAST MEDICAL HISTORY:  Bronchiectasis     History of Pseudomonas pneumonia     HTN (hypertension)

## 2023-08-17 LAB
ANION GAP SERPL CALC-SCNC: 5 MMOL/L — SIGNIFICANT CHANGE UP (ref 5–17)
BASOPHILS # BLD AUTO: 0.01 K/UL — SIGNIFICANT CHANGE UP (ref 0–0.2)
BASOPHILS NFR BLD AUTO: 0.2 % — SIGNIFICANT CHANGE UP (ref 0–2)
BUN SERPL-MCNC: 22 MG/DL — SIGNIFICANT CHANGE UP (ref 7–23)
CALCIUM SERPL-MCNC: 8.7 MG/DL — SIGNIFICANT CHANGE UP (ref 8.4–10.5)
CHLORIDE SERPL-SCNC: 98 MMOL/L — SIGNIFICANT CHANGE UP (ref 96–108)
CO2 SERPL-SCNC: 31 MMOL/L — SIGNIFICANT CHANGE UP (ref 22–31)
CREAT SERPL-MCNC: 0.74 MG/DL — SIGNIFICANT CHANGE UP (ref 0.5–1.3)
EGFR: 82 ML/MIN/1.73M2 — SIGNIFICANT CHANGE UP
EOSINOPHIL # BLD AUTO: 0 K/UL — SIGNIFICANT CHANGE UP (ref 0–0.5)
EOSINOPHIL NFR BLD AUTO: 0 % — SIGNIFICANT CHANGE UP (ref 0–6)
GLUCOSE SERPL-MCNC: 192 MG/DL — HIGH (ref 70–99)
HCT VFR BLD CALC: 32 % — LOW (ref 34.5–45)
HGB BLD-MCNC: 10.8 G/DL — LOW (ref 11.5–15.5)
IMM GRANULOCYTES NFR BLD AUTO: 0.4 % — SIGNIFICANT CHANGE UP (ref 0–0.9)
LYMPHOCYTES # BLD AUTO: 0.67 K/UL — LOW (ref 1–3.3)
LYMPHOCYTES # BLD AUTO: 13.7 % — SIGNIFICANT CHANGE UP (ref 13–44)
MAGNESIUM SERPL-MCNC: 1.9 MG/DL — SIGNIFICANT CHANGE UP (ref 1.6–2.6)
MCHC RBC-ENTMCNC: 33.8 GM/DL — SIGNIFICANT CHANGE UP (ref 32–36)
MCHC RBC-ENTMCNC: 34.6 PG — HIGH (ref 27–34)
MCV RBC AUTO: 102.6 FL — HIGH (ref 80–100)
MONOCYTES # BLD AUTO: 0.17 K/UL — SIGNIFICANT CHANGE UP (ref 0–0.9)
MONOCYTES NFR BLD AUTO: 3.5 % — SIGNIFICANT CHANGE UP (ref 2–14)
NEUTROPHILS # BLD AUTO: 4.01 K/UL — SIGNIFICANT CHANGE UP (ref 1.8–7.4)
NEUTROPHILS NFR BLD AUTO: 82.2 % — HIGH (ref 43–77)
NRBC # BLD: 0 /100 WBCS — SIGNIFICANT CHANGE UP (ref 0–0)
PHOSPHATE SERPL-MCNC: 3.5 MG/DL — SIGNIFICANT CHANGE UP (ref 2.5–4.5)
PLATELET # BLD AUTO: 182 K/UL — SIGNIFICANT CHANGE UP (ref 150–400)
POTASSIUM SERPL-MCNC: 4.1 MMOL/L — SIGNIFICANT CHANGE UP (ref 3.5–5.3)
POTASSIUM SERPL-SCNC: 4.1 MMOL/L — SIGNIFICANT CHANGE UP (ref 3.5–5.3)
RBC # BLD: 3.12 M/UL — LOW (ref 3.8–5.2)
RBC # FLD: 12 % — SIGNIFICANT CHANGE UP (ref 10.3–14.5)
SODIUM SERPL-SCNC: 134 MMOL/L — LOW (ref 135–145)
WBC # BLD: 4.88 K/UL — SIGNIFICANT CHANGE UP (ref 3.8–10.5)
WBC # FLD AUTO: 4.88 K/UL — SIGNIFICANT CHANGE UP (ref 3.8–10.5)

## 2023-08-17 PROCEDURE — 99233 SBSQ HOSP IP/OBS HIGH 50: CPT | Mod: GC

## 2023-08-17 PROCEDURE — 99223 1ST HOSP IP/OBS HIGH 75: CPT | Mod: GC

## 2023-08-17 RX ADMIN — MONTELUKAST 10 MILLIGRAM(S): 4 TABLET, CHEWABLE ORAL at 09:24

## 2023-08-17 RX ADMIN — SODIUM CHLORIDE 4 MILLILITER(S): 9 INJECTION INTRAMUSCULAR; INTRAVENOUS; SUBCUTANEOUS at 06:32

## 2023-08-17 RX ADMIN — PIPERACILLIN AND TAZOBACTAM 25 GRAM(S): 4; .5 INJECTION, POWDER, LYOPHILIZED, FOR SOLUTION INTRAVENOUS at 06:32

## 2023-08-17 RX ADMIN — Medication 500 MILLIGRAM(S): at 09:25

## 2023-08-17 RX ADMIN — Medication 1 TABLET(S): at 22:58

## 2023-08-17 RX ADMIN — Medication 650 MILLIGRAM(S): at 23:57

## 2023-08-17 RX ADMIN — Medication 650 MILLIGRAM(S): at 22:57

## 2023-08-17 RX ADMIN — SODIUM CHLORIDE 4 MILLILITER(S): 9 INJECTION INTRAMUSCULAR; INTRAVENOUS; SUBCUTANEOUS at 14:20

## 2023-08-17 RX ADMIN — Medication 3 MILLILITER(S): at 06:32

## 2023-08-17 RX ADMIN — Medication 50 MILLIGRAM(S): at 22:57

## 2023-08-17 RX ADMIN — PIPERACILLIN AND TAZOBACTAM 25 GRAM(S): 4; .5 INJECTION, POWDER, LYOPHILIZED, FOR SOLUTION INTRAVENOUS at 14:20

## 2023-08-17 RX ADMIN — Medication 3 MILLILITER(S): at 22:57

## 2023-08-17 RX ADMIN — SODIUM CHLORIDE 4 MILLILITER(S): 9 INJECTION INTRAMUSCULAR; INTRAVENOUS; SUBCUTANEOUS at 22:57

## 2023-08-17 RX ADMIN — PIPERACILLIN AND TAZOBACTAM 25 GRAM(S): 4; .5 INJECTION, POWDER, LYOPHILIZED, FOR SOLUTION INTRAVENOUS at 23:04

## 2023-08-17 RX ADMIN — Medication 500 MILLIGRAM(S): at 22:58

## 2023-08-17 RX ADMIN — Medication 3 MILLILITER(S): at 14:20

## 2023-08-17 RX ADMIN — Medication 1 TABLET(S): at 09:24

## 2023-08-17 NOTE — SWALLOW BEDSIDE ASSESSMENT ADULT - SWALLOW EVAL: DIAGNOSIS
Although there were no clinical symptoms of airway protection deficits during bedside swallow evaluation, formal swallow assessment is recommended given chronic dysphagic risk factors (including recent CVA and ?hx of dysphagia tx) and subjective dysphagic complaints. Given presumed chronicity of dysphagia and currently stable respiratory status, pt does appear safe to continue oral diet prior to instrumental swallow study. However, would benefit from a solid diet modification to minced and moist solids given specified food textures which exacerbate symptoms.

## 2023-08-17 NOTE — SWALLOW BEDSIDE ASSESSMENT ADULT - SLP PERTINENT HISTORY OF CURRENT PROBLEM
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 Pt presented with sob, cough productive of green sputum with associated rhinorrhea. Pulm following for bronchiectasis exacerbation. Most recent CXR, "Increased reticular markings with no focal opacification. Trace left basilar atelectasis/effusion."

## 2023-08-17 NOTE — PHYSICAL THERAPY INITIAL EVALUATION ADULT - PRECAUTIONS/LIMITATIONS, REHAB EVAL
S/w ANGEL Murray, called to report pt has increase swelling BLL and open wound draining RLE  Denies chest pain, SOB, weight gain  Due to insurance pt is unable to increase torsemide   LOV 7/6/18      Please Advise    Currently taking   Torsemide 40mg AM, 20mg PM  Prazosin 2mg AM, 5mg PM  Amlodipine 10mg QD  Imdur 30mg BID  Amiodarone 200mg QD  Ramipril 10mg BID  Klor-Con 20MEQ QD  Eliquis 5mg BID  Carvelilol 3 125 BID fall precautions

## 2023-08-17 NOTE — PHYSICAL THERAPY INITIAL EVALUATION ADULT - PERTINENT HX OF CURRENT PROBLEM, REHAB EVAL
Sandra Funes is an 82 yF with past history of chronic bronchiectasis with multiple pseudomonal PNAs; HTN; and  recent hospitalization "[facility]" for PRES c/b new onset afib, CVA with residual memory defecit, and R hip fracture s/p pinning. After being discharged from her hospitalization for PRES, she developed a cough and shortness breath while at rehab at the end of June. The cough and SOB continued but became significantly worse three weeks ago and productive of thick, frothy green sputum. On August 4 her outpatient pulmonologist put her on a 10 day course of levofloxacin, which was changed to ciprofloxacin after an allergic reaction (facial swelling). She completed the antibiotic course without improvement of symptoms, so she came into the hospital.  She recently moved in with her daughter, who has a dog and 2 cats, but her symptoms began before the move, and her daughter reports that her pets are "hypoallergenic." She reports no recent URI symptoms, fevers, chills, hemoptysis, changes in mental status, mold, or pest exposure, No history of dvt or pe, no leg pain or leg swelling.

## 2023-08-17 NOTE — DIETITIAN INITIAL EVALUATION ADULT - PERTINENT LABORATORY DATA
08-17    134<L>  |  98  |  22  ----------------------------<  192<H>  4.1   |  31  |  0.74    Ca    8.7      17 Aug 2023 05:30  Phos  3.5     08-17  Mg     1.9     08-17

## 2023-08-17 NOTE — PHYSICAL THERAPY INITIAL EVALUATION ADULT - ADDITIONAL COMMENTS
Pt reports to be living with daughter, son-in law and grandchildren in a private home. Pt reports to ambulate with RW in the home and mostly w/cmobile in the community. Pt has be mostly indpt with all ADLs and IADLs prior to admission.

## 2023-08-17 NOTE — PROGRESS NOTE ADULT - SUBJECTIVE AND OBJECTIVE BOX
OVERNIGHT EVENTS: ALENA    Brief HPI: Sandra Funes is an 82 yF with past history of chronic bronchiectasis with multiple pseudomonal PNAs presenting with bronchiectasis exacerbation with failure of outpatient therapy.    SUBJECTIVE / INTERVAL HPI: Patient seen and examined at bedside.   8/17/23 - The patient was encountered at her baseline AAOx3, with a pleasant demeanor. She reported recent changes to her memory, which she feels to be a significant change from her baseline, and which she attributes to her previous CVA. She also reports right eye pain and irritation. She denies SOB, chest pain, and palpitations. She also denies n/v/d. Vitals stable.  ROS: negative unless otherwise stated above.    Notes:  - Arabica device ordered (pulm)  - RT --> Volera device (pulm)  - Speech/swallow assessment ordered (per pulm)  - PT ordered  - Nutrition consult ordered (aspiration risk) (per pulm)    VITAL SIGNS:  Vital Signs Last 24 Hrs  T(C): 36.2 (17 Aug 2023 09:20), Max: 36.9 (16 Aug 2023 17:05)  T(F): 97.1 (17 Aug 2023 09:20), Max: 98.5 (16 Aug 2023 17:05)  HR: 64 (17 Aug 2023 09:20) (64 - 85)  BP: 94/57 (17 Aug 2023 09:20) (94/57 - 126/74)  BP(mean): --  RR: 18 (17 Aug 2023 09:20) (18 - 20)  SpO2: 100% (17 Aug 2023 09:20) (98% - 100%)    Parameters below as of 17 Aug 2023 09:20  Patient On (Oxygen Delivery Method): nasal cannula  O2 Flow (L/min): 4      PHYSICAL EXAM:    General: In no apparent distress  HEENT: NC/AT; PERRL, anicteric sclera; MMM  Neck: supple  Cardiovascular: +S1/S2; RRR  Respiratory: Mild crackling in right lower lobe; no retractions or rhonchi  Gastrointestinal: soft, NT/ND; +BSx4  Extremities: WWP; no edema, clubbing or cyanosis  Vascular: 2+ radial, DP/PT pulses B/L  Neurological: AAOx3; no focal deficits    MEDICATIONS:  MEDICATIONS  (STANDING):  albuterol/ipratropium for Nebulization 3 milliLiter(s) Nebulizer every 8 hours  ciprofloxacin     Tablet 500 milliGRAM(s) Oral every 12 hours  enoxaparin Injectable 30 milliGRAM(s) SubCutaneous every 24 hours  metoprolol tartrate 50 milliGRAM(s) Oral two times a day  montelukast 10 milliGRAM(s) Oral every 24 hours  piperacillin/tazobactam IVPB.. 4.5 Gram(s) IV Intermittent every 8 hours  sodium chloride 7% Inhalation 4 milliLiter(s) Inhalation every 8 hours  trimethoprim  160 mG/sulfamethoxazole 800 mG 1 Tablet(s) Oral every 12 hours    MEDICATIONS  (PRN):  acetaminophen     Tablet .. 650 milliGRAM(s) Oral every 6 hours PRN Temp greater or equal to 38C (100.4F), Mild Pain (1 - 3)      ALLERGIES:  Allergies    Augmentin (Short breath; Rash)  Levaquin (Swelling)  Ceclor (Rash)    Intolerances        LABS:                        10.8   4.88  )-----------( 182      ( 17 Aug 2023 05:30 )             32.0     08-17    134<L>  |  98  |  22  ----------------------------<  192<H>  4.1   |  31  |  0.74    Ca    8.7      17 Aug 2023 05:30  Phos  3.5     08-17  Mg     1.9     08-17      PT/INR - ( 16 Aug 2023 15:25 )   PT: 12.6 sec;   INR: 1.11          PTT - ( 16 Aug 2023 15:25 )  PTT:26.7 sec  Urinalysis Basic - ( 17 Aug 2023 05:30 )    Color: x / Appearance: x / SG: x / pH: x  Gluc: 192 mg/dL / Ketone: x  / Bili: x / Urobili: x   Blood: x / Protein: x / Nitrite: x   Leuk Esterase: x / RBC: x / WBC x   Sq Epi: x / Non Sq Epi: x / Bacteria: x      CAPILLARY BLOOD GLUCOSE          RADIOLOGY & ADDITIONAL TESTS: Reviewed.

## 2023-08-17 NOTE — PATIENT PROFILE ADULT - NSTRANSFERBELONGINGSRESP_GEN_A_NUR
yes Secondary Intention Text (Leave Blank If You Do Not Want): The defect will heal with secondary intention.

## 2023-08-17 NOTE — DIETITIAN INITIAL EVALUATION ADULT - PERTINENT MEDS FT
MEDICATIONS  (STANDING):  albuterol/ipratropium for Nebulization 3 milliLiter(s) Nebulizer every 8 hours  ciprofloxacin     Tablet 500 milliGRAM(s) Oral every 12 hours  enoxaparin Injectable 30 milliGRAM(s) SubCutaneous every 24 hours  metoprolol tartrate 50 milliGRAM(s) Oral two times a day  montelukast 10 milliGRAM(s) Oral every 24 hours  piperacillin/tazobactam IVPB.. 4.5 Gram(s) IV Intermittent every 8 hours  sodium chloride 7% Inhalation 4 milliLiter(s) Inhalation every 8 hours  trimethoprim  160 mG/sulfamethoxazole 800 mG 1 Tablet(s) Oral every 12 hours    MEDICATIONS  (PRN):  acetaminophen     Tablet .. 650 milliGRAM(s) Oral every 6 hours PRN Temp greater or equal to 38C (100.4F), Mild Pain (1 - 3)

## 2023-08-17 NOTE — PROGRESS NOTE ADULT - PROBLEM SELECTOR PLAN 2
- Bactrim for steno; Double pseudomonal coverage with Zosyn and cipro  - Duonebs followed by 7% hypertonic saline followed by aerobika  - Sputum culture, sputum AFB smear with culture, legionella and strep ur ags, RVP and COVID-19 PCR  - Recommend inhaled cayston in the outpatient setting as the patient has resistance to tobramycin. - Bactrim for steno; Double pseudomonal coverage with Zosyn and cipro  - Duonebs followed by 7% hypertonic saline followed chest vest and aerobika (given to patient)  - F/u sputum culture, sputum AFB smear with culture, legionella and strep ur ags, RVP and COVID-19 PCR (both were negative)  - Recommend inhaled cayston in the outpatient setting as the patient has resistance to tobramycin.

## 2023-08-17 NOTE — PHYSICAL THERAPY INITIAL EVALUATION ADULT - IMPAIRMENTS FOUND, PT EVAL
aerobic capacity/endurance/arousal, attention, and cognition/gait, locomotion, and balance/muscle strength/poor safety awareness/posture/ROM/ventilation and respiration/gas exchange

## 2023-08-17 NOTE — PROGRESS NOTE ADULT - PROBLEM SELECTOR PLAN 1
-Micro- strep and legionella ur ag, MRSA nares, RVP and COVID-19 PCR, sputum culture and hold for fungal elements, sputum AFB, blood cultures x2, LDH, BDG, GMN  -Pulm hygiene- OOBTC, PT/OT, IS. -Pulm hygiene- OOBTC, PT/OT, IS.

## 2023-08-17 NOTE — DIETITIAN INITIAL EVALUATION ADULT - PROBLEM SELECTOR PLAN 1
#ARF with hypoxia  Patient with chronic bronchiectasis. Seen outpatient by Dr. Foster. Pulm.   Outpt cultures 2019 with steno and PsA sensitive to bactrim and zosyn.  Satting 87% on RA on arrival. Satting 100% on 4LNC  - f/u pulm recs  - Bactrim for stenotrophomonas;   - Zosyn and cipro for pseudomonas  - Duonebs followed by 7%hypertonic saline followed by aerobika  - f/u Sputum culture, legionella and strep ur ags  - Pulm recs inhaled cayston in the outpatient setting as the patient has resistance to tobramycin.  - Wean O2

## 2023-08-17 NOTE — DIETITIAN INITIAL EVALUATION ADULT - OTHER INFO
82 yF with past history of chronic bronchiectasis with multiple pseudomonal PNAs; HTN; and  recent hospitalization "[facility]" for PRES c/b new onset afib, CVA with residual memory defecit, and R hip fracture s/p pinning. After being discharged from her hospitalization for PRES, she developed a cough and shortness breath while at rehab at the end of June. The cough and SOB continued but became significantly worse three weeks ago and productive of thick, frothy green sputum. On August 4 her outpatient pulmonologist put her on a 10 day course of levofloxacin, which was changed to ciprofloxacin after an allergic reaction (facial swelling). She completed the antibiotic course without improvement of symptoms, so she came into the hospital.  She recently moved in with her daughter, who has a dog and 2 cats, but her symptoms began before the move, and her daughter reports that her pets are "hypoallergenic."     Patient seen at bedside for nutrition consult. Current diet order: regular. No known food allergies. No difficulty chewing/swallowing reported. Reports good appetite. Reports that all her life, she has had trouble gaining weight and has always been slim. Says she lost 10# after prolonged hospital stay earlier this year and has been unable to gain the weight back despite eating well. Pt consumed 100 % of breakfast which included pancakes and a muffin. Reports that with age, she began eating less meat and fish, but reports she gets most of her protein from dairy, eggs, chicken salad, yogurt, peanut butter. Encouraged patient to order these foods with  staff, however will add yogurt to lunch trays. Reports she ordered a burger for lunch and that she is more amendable to eating meat when she does not have to prepare it herself. Refused oral nutrition supplements. Provided extensive nutrition education discussing importance of adequate protein, high calorie/high protein diet, food sources of protein, healthy fats. Patient verbalized understanding. Dosing weight: 77 pounds, BMI 12.4, reports UBW of 87 pounds x6 months ago. 10lb/11.5% weight loss x6 months - clinically significant. No pressure injuries documented. No edema documented. Denies N/V/D/C. Blood sugar high. Meds: antibiotics. Observed with severe muscle wasting to temples and dorsal hands. Based on ASPEN guidelines, pt meets criteria for severe malnutrition. See nutrition recommendations below.

## 2023-08-17 NOTE — PROGRESS NOTE ADULT - ASSESSMENT
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 Pt presented with sob, cough productive of green sputum with associated rhinorrhea. Pulm consulted for bronchiectasis exacerbation.    2019 sputum culture:   Pseudomonas           aeruginosa                                      SHOBHA SCHULTZ                                    ________    ________       Ceftazidime                  4           S       Levofloxacin                 <=1         S       Amikacin                     >32         R       Aztreonam                    <=4         S       Cefepime                     16          I       Ciprofloxacin                <=0.5       S       Gentamicin                   >8          R       Imipenem                     <=1         S       Meropenem                    <=1         S       Piperacillin/Tazobactam      <=8         S       Tobramycin                   >8          R                                            Stenotrophomonas                                    maltophilia                                    #2                                      SHOBHA SCHULTZ                                    ________    ________       Ceftazidime                  8           S       Levofloxacin                 <=1         S       Trimethoprim/Sulfa           <=0.5/9.5   S

## 2023-08-17 NOTE — DIETITIAN INITIAL EVALUATION ADULT - OTHER CALCULATIONS
Based on Standards of Care pt below % IBW (59%) thus ideal body weight used for all calculations (130#). Needs adjusted for advanced age and malnutrition.

## 2023-08-17 NOTE — DIETITIAN NUTRITION RISK NOTIFICATION - ADDITIONAL COMMENTS/DIETITIAN RECOMMENDATIONS
10lb/11.5% weight loss x6 months - clinically significant.    Continue with regular diet. Will add yogurt to all lunch trays. Provided nutrition education. Add MVI.

## 2023-08-17 NOTE — PATIENT PROFILE ADULT - FALL HARM RISK - HARM RISK INTERVENTIONS

## 2023-08-17 NOTE — PROGRESS NOTE ADULT - PROBLEM SELECTOR PLAN 3
Patient with new onset afib during recent hospitalization. No longer in afib on presentation. On rate control but no AC; due to fall risk?   - collateral in am   - Continue lopressor 25 mg BID

## 2023-08-17 NOTE — SWALLOW BEDSIDE ASSESSMENT ADULT - NS SPL SWALLOW CLINIC TRIAL FT
Oral phase was relatively unremarkable with adequate bolus containment, processing, and clearance. Laryngeal movement palpated. No clinical overt s/s of aspiration appreciated. Vocal quality remained dry. Pt denied globus sensation.

## 2023-08-17 NOTE — SWALLOW BEDSIDE ASSESSMENT ADULT - SLP GENERAL OBSERVATIONS
Pt received awake, in bed, A&Ox3, breathing RA, dtr at bedside. Pt and dtr endorsed hx of solid dysphagia. Dysphagic complaints characterized by occasional choking episodes, usually when swallowing ground meat, which occur as a result of food sticking in phlegm. According to pt, symptoms have been present for "quite a few years" and symptoms "come and go with my lung disease." Most recent choking episode occurred today while eating a burger. However, please note that pt was in a semi-reclined position while eating with HOB upright at only 25 degree angle  Reportedly received dysphagia tx in hospital (mid May-June). Pt and dtr do not believe an instrumental swallow study was completed. Oral diet consists of thin liquids and regular solids. However, pt avoids ground meats. She has lost ~10 lbs in ~6 weeks.

## 2023-08-18 ENCOUNTER — TRANSCRIPTION ENCOUNTER (OUTPATIENT)
Age: 83
End: 2023-08-18

## 2023-08-18 PROBLEM — I10 ESSENTIAL (PRIMARY) HYPERTENSION: Chronic | Status: ACTIVE | Noted: 2023-08-16

## 2023-08-18 PROBLEM — Z87.01 PERSONAL HISTORY OF PNEUMONIA (RECURRENT): Chronic | Status: ACTIVE | Noted: 2023-08-16

## 2023-08-18 PROBLEM — J47.9 BRONCHIECTASIS, UNCOMPLICATED: Chronic | Status: ACTIVE | Noted: 2023-08-16

## 2023-08-18 LAB
ALBUMIN SERPL ELPH-MCNC: 2.9 G/DL — LOW (ref 3.3–5)
ALP SERPL-CCNC: 99 U/L — SIGNIFICANT CHANGE UP (ref 40–120)
ALT FLD-CCNC: 38 U/L — SIGNIFICANT CHANGE UP (ref 10–45)
ANION GAP SERPL CALC-SCNC: 8 MMOL/L — SIGNIFICANT CHANGE UP (ref 5–17)
ANISOCYTOSIS BLD QL: SIGNIFICANT CHANGE UP
AST SERPL-CCNC: 47 U/L — HIGH (ref 10–40)
BASOPHILS # BLD AUTO: 0 K/UL — SIGNIFICANT CHANGE UP (ref 0–0.2)
BASOPHILS NFR BLD AUTO: 0 % — SIGNIFICANT CHANGE UP (ref 0–2)
BILIRUB SERPL-MCNC: 0.2 MG/DL — SIGNIFICANT CHANGE UP (ref 0.2–1.2)
BUN SERPL-MCNC: 28 MG/DL — HIGH (ref 7–23)
CALCIUM SERPL-MCNC: 8.5 MG/DL — SIGNIFICANT CHANGE UP (ref 8.4–10.5)
CHLORIDE SERPL-SCNC: 103 MMOL/L — SIGNIFICANT CHANGE UP (ref 96–108)
CO2 SERPL-SCNC: 27 MMOL/L — SIGNIFICANT CHANGE UP (ref 22–31)
CREAT SERPL-MCNC: 1.01 MG/DL — SIGNIFICANT CHANGE UP (ref 0.5–1.3)
DACRYOCYTES BLD QL SMEAR: SLIGHT — SIGNIFICANT CHANGE UP
EGFR: 56 ML/MIN/1.73M2 — LOW
EOSINOPHIL # BLD AUTO: 0.16 K/UL — SIGNIFICANT CHANGE UP (ref 0–0.5)
EOSINOPHIL NFR BLD AUTO: 1.7 % — SIGNIFICANT CHANGE UP (ref 0–6)
GIANT PLATELETS BLD QL SMEAR: PRESENT — SIGNIFICANT CHANGE UP
GLUCOSE SERPL-MCNC: 89 MG/DL — SIGNIFICANT CHANGE UP (ref 70–99)
HCT VFR BLD CALC: 32.1 % — LOW (ref 34.5–45)
HGB BLD-MCNC: 10.6 G/DL — LOW (ref 11.5–15.5)
LEGIONELLA AG UR QL: NEGATIVE — SIGNIFICANT CHANGE UP
LYMPHOCYTES # BLD AUTO: 1.5 K/UL — SIGNIFICANT CHANGE UP (ref 1–3.3)
LYMPHOCYTES # BLD AUTO: 15.7 % — SIGNIFICANT CHANGE UP (ref 13–44)
MACROCYTES BLD QL: SIGNIFICANT CHANGE UP
MAGNESIUM SERPL-MCNC: 1.8 MG/DL — SIGNIFICANT CHANGE UP (ref 1.6–2.6)
MANUAL SMEAR VERIFICATION: SIGNIFICANT CHANGE UP
MCHC RBC-ENTMCNC: 33 GM/DL — SIGNIFICANT CHANGE UP (ref 32–36)
MCHC RBC-ENTMCNC: 34.8 PG — HIGH (ref 27–34)
MCV RBC AUTO: 105.2 FL — HIGH (ref 80–100)
MICROCYTES BLD QL: SLIGHT — SIGNIFICANT CHANGE UP
MONOCYTES # BLD AUTO: 0.25 K/UL — SIGNIFICANT CHANGE UP (ref 0–0.9)
MONOCYTES NFR BLD AUTO: 2.6 % — SIGNIFICANT CHANGE UP (ref 2–14)
NEUTROPHILS # BLD AUTO: 7.62 K/UL — HIGH (ref 1.8–7.4)
NEUTROPHILS NFR BLD AUTO: 80 % — HIGH (ref 43–77)
OVALOCYTES BLD QL SMEAR: SLIGHT — SIGNIFICANT CHANGE UP
PHOSPHATE SERPL-MCNC: 3.1 MG/DL — SIGNIFICANT CHANGE UP (ref 2.5–4.5)
PLAT MORPH BLD: ABNORMAL
PLATELET # BLD AUTO: 207 K/UL — SIGNIFICANT CHANGE UP (ref 150–400)
POIKILOCYTOSIS BLD QL AUTO: SLIGHT — SIGNIFICANT CHANGE UP
POTASSIUM SERPL-MCNC: 4.1 MMOL/L — SIGNIFICANT CHANGE UP (ref 3.5–5.3)
POTASSIUM SERPL-SCNC: 4.1 MMOL/L — SIGNIFICANT CHANGE UP (ref 3.5–5.3)
PROT SERPL-MCNC: 6.1 G/DL — SIGNIFICANT CHANGE UP (ref 6–8.3)
RBC # BLD: 3.05 M/UL — LOW (ref 3.8–5.2)
RBC # FLD: 12.4 % — SIGNIFICANT CHANGE UP (ref 10.3–14.5)
RBC BLD AUTO: ABNORMAL
SODIUM SERPL-SCNC: 138 MMOL/L — SIGNIFICANT CHANGE UP (ref 135–145)
WBC # BLD: 9.53 K/UL — SIGNIFICANT CHANGE UP (ref 3.8–10.5)
WBC # FLD AUTO: 9.53 K/UL — SIGNIFICANT CHANGE UP (ref 3.8–10.5)

## 2023-08-18 PROCEDURE — 99232 SBSQ HOSP IP/OBS MODERATE 35: CPT | Mod: GC

## 2023-08-18 PROCEDURE — 99233 SBSQ HOSP IP/OBS HIGH 50: CPT | Mod: GC

## 2023-08-18 PROCEDURE — 74230 X-RAY XM SWLNG FUNCJ C+: CPT | Mod: 26

## 2023-08-18 RX ORDER — CIPROFLOXACIN LACTATE 400MG/40ML
1 VIAL (ML) INTRAVENOUS
Qty: 0 | Refills: 0 | DISCHARGE
Start: 2023-08-18

## 2023-08-18 RX ORDER — CIPROFLOXACIN LACTATE 400MG/40ML
1 VIAL (ML) INTRAVENOUS
Qty: 28 | Refills: 0
Start: 2023-08-18 | End: 2023-08-31

## 2023-08-18 RX ORDER — AZTREONAM 2 G
75 VIAL (EA) INJECTION
Qty: 1 | Refills: 0
Start: 2023-08-18 | End: 2023-09-16

## 2023-08-18 RX ORDER — LEVOFLOXACIN 5 MG/ML
1 INJECTION, SOLUTION INTRAVENOUS
Refills: 0 | DISCHARGE

## 2023-08-18 RX ADMIN — MONTELUKAST 10 MILLIGRAM(S): 4 TABLET, CHEWABLE ORAL at 10:05

## 2023-08-18 RX ADMIN — Medication 50 MILLIGRAM(S): at 10:05

## 2023-08-18 RX ADMIN — Medication 500 MILLIGRAM(S): at 10:05

## 2023-08-18 RX ADMIN — Medication 3 MILLILITER(S): at 21:20

## 2023-08-18 RX ADMIN — Medication 1 TABLET(S): at 21:20

## 2023-08-18 RX ADMIN — Medication 650 MILLIGRAM(S): at 18:04

## 2023-08-18 RX ADMIN — Medication 650 MILLIGRAM(S): at 23:57

## 2023-08-18 RX ADMIN — Medication 500 MILLIGRAM(S): at 21:21

## 2023-08-18 RX ADMIN — SODIUM CHLORIDE 4 MILLILITER(S): 9 INJECTION INTRAMUSCULAR; INTRAVENOUS; SUBCUTANEOUS at 11:30

## 2023-08-18 RX ADMIN — ENOXAPARIN SODIUM 30 MILLIGRAM(S): 100 INJECTION SUBCUTANEOUS at 21:20

## 2023-08-18 RX ADMIN — SODIUM CHLORIDE 4 MILLILITER(S): 9 INJECTION INTRAMUSCULAR; INTRAVENOUS; SUBCUTANEOUS at 21:20

## 2023-08-18 RX ADMIN — Medication 3 MILLILITER(S): at 11:30

## 2023-08-18 RX ADMIN — Medication 50 MILLIGRAM(S): at 21:20

## 2023-08-18 RX ADMIN — Medication 1 TABLET(S): at 10:05

## 2023-08-18 RX ADMIN — PIPERACILLIN AND TAZOBACTAM 25 GRAM(S): 4; .5 INJECTION, POWDER, LYOPHILIZED, FOR SOLUTION INTRAVENOUS at 15:30

## 2023-08-18 RX ADMIN — Medication 650 MILLIGRAM(S): at 17:09

## 2023-08-18 NOTE — OCCUPATIONAL THERAPY INITIAL EVALUATION ADULT - MODALITIES TREATMENT COMMENTS
Pt able to ambulate in room/hallway with supervision using RW, demo impaired balance and decreased R step length with impaired ability to weight shift 2/2 impaired R knee strength.

## 2023-08-18 NOTE — SWALLOW VFSS/MBS ASSESSMENT ADULT - ADDITIONAL RECOMMENDATIONS
Dysphagia goals:  Pt will improve pharyngeal wall contraction via Effortful Swallow x50/tx.  Pt will improve tongue base retraction via Linda Maneuver x25/tx

## 2023-08-18 NOTE — PROGRESS NOTE ADULT - NUTRITIONAL ASSESSMENT
This patient has been assessed with a concern for Malnutrition and has been determined to have a diagnosis/diagnoses of Severe protein-calorie malnutrition and Underweight (BMI < 19).    This patient is being managed with:   Diet Regular-  Entered: Aug 16 2023  7:06PM  
This patient has been assessed with a concern for Malnutrition and has been determined to have a diagnosis/diagnoses of Severe protein-calorie malnutrition and Underweight (BMI < 19).    This patient is being managed with:   Diet Regular-  Entered: Aug 16 2023  7:06PM

## 2023-08-18 NOTE — OCCUPATIONAL THERAPY INITIAL EVALUATION ADULT - LIGHT TOUCH SENSATION, LUE, REHAB EVAL
within normal limits
Continue Regimen: Pt is seeing  Dr. Corazon Bazan for further treatment. Spoke with Dr Bazan and we are to continue his Accutane at this point.
Detail Level: Zone

## 2023-08-18 NOTE — PROGRESS NOTE ADULT - PROBLEM/PLAN-4
DISPLAY PLAN FREE TEXT
DISPLAY PLAN FREE TEXT
Isotretinoin Counseling: Patient should get monthly blood tests, not donate blood, not drive at night if vision affected, not share medication, and not undergo elective surgery for 6 months after tx completed. Side effects reviewed, pt to contact office should one occur.

## 2023-08-18 NOTE — SWALLOW VFSS/MBS ASSESSMENT ADULT - DIAGNOSTIC IMPRESSIONS
MBS revealed mild pharyngeal dysphagia with primary impact on efficiency. Post-deglutitive residue increased with denser boli but was reduced with the use of a liquid wash. Airway safety was preserved; no episodes of penetration or aspiration appreciated.     Dysphagia is likely chronic r/t prior CVA. May benefit from short-term exercise-based swallow rehabilitation to improve efficiency.     Of note, In the lateral view, there was prominence of the cricopharyngeus consistent with a (non-obstructive) cricopharyngeus bar. Please see radiologist's report for confirmation.

## 2023-08-18 NOTE — PROGRESS NOTE ADULT - SUBJECTIVE AND OBJECTIVE BOX
PULMONARY CONSULT SERVICE FOLLOW-UP NOTE    INTERVAL HPI:  Reviewed chart and overnight events; patient seen and examined at bedside. No new respiratory complaints today. Still needs to collect sputum culture.    MEDICATIONS:  Pulmonary:  albuterol/ipratropium for Nebulization 3 milliLiter(s) Nebulizer every 8 hours  montelukast 10 milliGRAM(s) Oral every 24 hours  sodium chloride 7% Inhalation 4 milliLiter(s) Inhalation every 8 hours    Antimicrobials:  ciprofloxacin     Tablet 500 milliGRAM(s) Oral every 12 hours  piperacillin/tazobactam IVPB.. 4.5 Gram(s) IV Intermittent every 8 hours  trimethoprim  160 mG/sulfamethoxazole 800 mG 1 Tablet(s) Oral every 12 hours    Anticoagulants:  enoxaparin Injectable 30 milliGRAM(s) SubCutaneous every 24 hours    Cardiac:  metoprolol tartrate 50 milliGRAM(s) Oral two times a day      Allergies    Augmentin (Short breath; Rash)  Levaquin (Swelling)  Ceclor (Rash)    Intolerances        Vital Signs Last 24 Hrs  T(C): 36.5 (18 Aug 2023 05:11), Max: 36.9 (17 Aug 2023 20:45)  T(F): 97.7 (18 Aug 2023 05:11), Max: 98.4 (17 Aug 2023 20:45)  HR: 68 (18 Aug 2023 05:11) (68 - 92)  BP: 114/66 (18 Aug 2023 05:11) (101/64 - 140/61)  BP(mean): --  RR: 18 (18 Aug 2023 05:11) (16 - 18)  SpO2: 91% (18 Aug 2023 05:11) (91% - 96%)    Parameters below as of 18 Aug 2023 05:11  Patient On (Oxygen Delivery Method): room air            PHYSICAL EXAM:  Constitutional: pleasant cachectic female in NAD  HEENT: NC/AT; PERRL, anicteric sclera; MMM  Neck: supple  Cardiovascular: +S1/S2, RRR  Respiratory: mild rhonchi at bases bilaterally; breathing comfortably on RA  Gastrointestinal: soft, NT/ND; +BSx4  Extremities: WWP; no edema, clubbing or cyanosis  Vascular: 2+ radial, DP/PT pulses B/L  Neurological: AAOx3; no focal deficits    LABS:      CBC Full  -  ( 18 Aug 2023 07:26 )  WBC Count : 9.53 K/uL  RBC Count : 3.05 M/uL  Hemoglobin : 10.6 g/dL  Hematocrit : 32.1 %  Platelet Count - Automated : 207 K/uL  Mean Cell Volume : 105.2 fl  Mean Cell Hemoglobin : 34.8 pg  Mean Cell Hemoglobin Concentration : 33.0 gm/dL  Auto Neutrophil # : 7.62 K/uL  Auto Lymphocyte # : 1.50 K/uL  Auto Monocyte # : 0.25 K/uL  Auto Eosinophil # : 0.16 K/uL  Auto Basophil # : 0.00 K/uL  Auto Neutrophil % : 80.0 %  Auto Lymphocyte % : 15.7 %  Auto Monocyte % : 2.6 %  Auto Eosinophil % : 1.7 %  Auto Basophil % : 0.0 %    08-18    138  |  103  |  28<H>  ----------------------------<  89  4.1   |  27  |  1.01    Ca    8.5      18 Aug 2023 07:26  Phos  3.1     08-18  Mg     1.8     08-18    TPro  6.1  /  Alb  2.9<L>  /  TBili  0.2  /  DBili  x   /  AST  47<H>  /  ALT  38  /  AlkPhos  99  08-18    PT/INR - ( 16 Aug 2023 15:25 )   PT: 12.6 sec;   INR: 1.11          PTT - ( 16 Aug 2023 15:25 )  PTT:26.7 sec      Urinalysis Basic - ( 18 Aug 2023 07:26 )    Color: x / Appearance: x / SG: x / pH: x  Gluc: 89 mg/dL / Ketone: x  / Bili: x / Urobili: x   Blood: x / Protein: x / Nitrite: x   Leuk Esterase: x / RBC: x / WBC x   Sq Epi: x / Non Sq Epi: x / Bacteria: x                RADIOLOGY & ADDITIONAL STUDIES: PULMONARY CONSULT SERVICE FOLLOW-UP NOTE    INTERVAL HPI:  Reviewed chart and overnight events; patient seen and examined at bedside. No new respiratory complaints today. Still needs to collect sputum culture.    MEDICATIONS:  Pulmonary:  albuterol/ipratropium for Nebulization 3 milliLiter(s) Nebulizer every 8 hours  montelukast 10 milliGRAM(s) Oral every 24 hours  sodium chloride 7% Inhalation 4 milliLiter(s) Inhalation every 8 hours    Antimicrobials:  ciprofloxacin     Tablet 500 milliGRAM(s) Oral every 12 hours  piperacillin/tazobactam IVPB.. 4.5 Gram(s) IV Intermittent every 8 hours  trimethoprim  160 mG/sulfamethoxazole 800 mG 1 Tablet(s) Oral every 12 hours    Anticoagulants:  enoxaparin Injectable 30 milliGRAM(s) SubCutaneous every 24 hours    Cardiac:  metoprolol tartrate 50 milliGRAM(s) Oral two times a day      Allergies    Augmentin (Short breath; Rash)  Levaquin (Swelling)  Ceclor (Rash)    Intolerances        Vital Signs Last 24 Hrs  T(C): 36.5 (18 Aug 2023 05:11), Max: 36.9 (17 Aug 2023 20:45)  T(F): 97.7 (18 Aug 2023 05:11), Max: 98.4 (17 Aug 2023 20:45)  HR: 68 (18 Aug 2023 05:11) (68 - 92)  BP: 114/66 (18 Aug 2023 05:11) (101/64 - 140/61)  BP(mean): --  RR: 18 (18 Aug 2023 05:11) (16 - 18)  SpO2: 91% (18 Aug 2023 05:11) (91% - 96%)    Parameters below as of 18 Aug 2023 05:11  Patient On (Oxygen Delivery Method): room air            PHYSICAL EXAM:  Constitutional: pleasant cachectic female in NAD  HEENT: NC/AT; PERRL, anicteric sclera; MMM  Neck: supple  Cardiovascular: +S1/S2, RRR  Respiratory: mild rhonchi at bases bilaterally; breathing comfortably on RA  Gastrointestinal: soft, NT/ND; +BSx4  Extremities: WWP; no edema, clubbing or cyanosis  Vascular: 2+ radial, DP/PT pulses B/L  Neurological: AAOx3; no focal deficits    LABS:      CBC Full  -  ( 18 Aug 2023 07:26 )  WBC Count : 9.53 K/uL  RBC Count : 3.05 M/uL  Hemoglobin : 10.6 g/dL  Hematocrit : 32.1 %  Platelet Count - Automated : 207 K/uL  Mean Cell Volume : 105.2 fl  Mean Cell Hemoglobin : 34.8 pg  Mean Cell Hemoglobin Concentration : 33.0 gm/dL  Auto Neutrophil # : 7.62 K/uL  Auto Lymphocyte # : 1.50 K/uL  Auto Monocyte # : 0.25 K/uL  Auto Eosinophil # : 0.16 K/uL  Auto Basophil # : 0.00 K/uL  Auto Neutrophil % : 80.0 %  Auto Lymphocyte % : 15.7 %  Auto Monocyte % : 2.6 %  Auto Eosinophil % : 1.7 %  Auto Basophil % : 0.0 %    08-18    138  |  103  |  28<H>  ----------------------------<  89  4.1   |  27  |  1.01    Ca    8.5      18 Aug 2023 07:26  Phos  3.1     08-18  Mg     1.8     08-18    TPro  6.1  /  Alb  2.9<L>  /  TBili  0.2  /  DBili  x   /  AST  47<H>  /  ALT  38  /  AlkPhos  99  08-18    PT/INR - ( 16 Aug 2023 15:25 )   PT: 12.6 sec;   INR: 1.11          PTT - ( 16 Aug 2023 15:25 )  PTT:26.7 sec      Urinalysis Basic - ( 18 Aug 2023 07:26 )    Color: x / Appearance: x / SG: x / pH: x  Gluc: 89 mg/dL / Ketone: x  / Bili: x / Urobili: x   Blood: x / Protein: x / Nitrite: x   Leuk Esterase: x / RBC: x / WBC x   Sq Epi: x / Non Sq Epi: x / Bacteria: x                RADIOLOGY & ADDITIONAL STUDIES:  2019 sputum culture:   Pseudomonas           aeruginosa                                      SHOBHA SCHULTZ                                    ________    ________       Ceftazidime                  4           S       Levofloxacin                 <=1         S       Amikacin                     >32         R       Aztreonam                    <=4         S       Cefepime                     16          I       Ciprofloxacin                <=0.5       S       Gentamicin                   >8          R       Imipenem                     <=1         S       Meropenem                    <=1         S       Piperacillin/Tazobactam      <=8         S       Tobramycin                   >8          R                                            Stenotrophomonas                                    maltophilia                                    #2                                      MDIL        MINT                                    ________    ________       Ceftazidime                  8           S       Levofloxacin                 <=1         S       Trimethoprim/Sulfa           <=0.5/9.5   S

## 2023-08-18 NOTE — DISCHARGE NOTE PROVIDER - HOSPITAL COURSE
#Discharge: do not delete    Sandra Funes is an 81y/o F with a past history of chronic bronchiectasis with multiple pseudomonal PNAs presenting with bronchiectasis exacerbation with failure of outpatient therapy.    Hospital course (by problem):     #Acute exacerbation of bronchiectasis.   #Acute respiratory failure with hypoxia  Patient with chronic bronchiectasis. Seen outpatient by Dr. Foster. Pulm.   Saturating at 87% on RA on arrival; saturating at 100% on 4LNC  Plan:  - Bactrim for stenotrophomonas;   - Zosyn and cipro for pseudomonas  - Duonebs followed by 7%hypertonic saline followed by aerobika  - f/u Sputum culture, legionella and strep urine antigens  - Pulm recs: inhaled cayston in the outpatient setting as the patient has resistance to tobramycin.    #HTN (hypertension).   Patient on lopressor 25 BID. Had recent hospitalization for PRES.  -continue home meds, Metoprolol 50mg PO BID    #Atrial fibrillation.   Patient with new onset afib during recent hospitalization. No longer in afib on presentation. On rate control but no AC; due to fall risk?   - collateral in am   - Continue lopressor 25 mg BID.    Patient was discharged to: Home    New medications: Bactrim, Zosyn, and ciprofloxacin  Changes to old medications: None  Medications that were stopped: None    Items to follow up as outpatient: F/u with pulmonologist       #Discharge: do not delete    Sandra Funes is an 83y/o F with a past history of chronic bronchiectasis with multiple pseudomonal PNAs presenting with bronchiectasis exacerbation with failure of outpatient therapy.    Hospital course (by problem):     #Acute exacerbation of bronchiectasis.   #Acute respiratory failure with hypoxia  Patient with chronic bronchiectasis. Seen outpatient by Dr. Foster. Pulm.   Saturating at 87% on RA on arrival; saturating at 100% on 4LNC  Plan:  - Bactrim for stenotrophomonas;   - Zosyn and cipro for pseudomonas  - Duonebs followed by 7%hypertonic saline followed by aerobika  - Pulm recs: inhaled cayston in the outpatient setting as the patient has resistance to tobramycin.    #HTN (hypertension).   Patient on lopressor 25 BID. Had recent hospitalization for PRES.  -continue home meds, Metoprolol 50mg PO BID    #Atrial fibrillation.   Patient with new onset afib during recent hospitalization. No longer in afib on presentation. On rate control but no AC; due to fall risk?   - collateral in am   - Continue lopressor 25 mg BID.    Patient was discharged to: Home    New medications: Bactrim, Zosyn, and ciprofloxacin  Changes to old medications: None  Medications that were stopped: None    Items to follow up as outpatient: F/u with pulmonologist

## 2023-08-18 NOTE — PROGRESS NOTE ADULT - PROBLEM SELECTOR PLAN 4
F: PO  E: replete   N: Regular diet  DVT: lovenox subq
F: PO  E: replete   N: Regular diet  DVT: lovenox subq

## 2023-08-18 NOTE — SWALLOW VFSS/MBS ASSESSMENT ADULT - PHARYNGEAL PHASE COMMENTS
Pharyngeal swallow trigger was timely, initiating with the bolus head at the posterior angle of the ramus. Reduced tongue base to posterior pharyngeal wall contact, impaired pharyngeal contraction, specifically of the superior pharyngeal constrictor, and inconsistent epiglottic retroflexion resulted in bolus retention. There was mild residue along the base of tongue, within the vallecula and along the superior posterior pharyngeal wall with purees and solids. Residue accumulated with sequential trials but was reduced with the use of a liquid wash. Airway protection was preserved across the study with no episodes of penetration or aspiration.

## 2023-08-18 NOTE — OCCUPATIONAL THERAPY INITIAL EVALUATION ADULT - GENERAL OBSERVATIONS, REHAB EVAL
OT IE completed. Orders received, chart reviewed, pt cleared for OT by SIVAKUMAR Garcia. Pt received semi supine in bed, NAD, +heplock. Pt A&Ox4, agreeable to OT, and tolerated session well.

## 2023-08-18 NOTE — PROGRESS NOTE ADULT - TIME BILLING
Patient seen and examined with house-staff during bedside rounds.  Resident note read, including vitals, physical findings, laboratory data, and radiological reports.   Revisions included below.  Direct personal management at bed side and extensive interpretation of the data.  Plan was outlined and discussed in details with the housestaff.  Decision making of high complexity  Action taken for acute disease activity to reflect the level of care provided:  - medication reconciliation  - review laboratory data  stable and provide the patikaitlin collier chair
Patient seen and examined with house-staff during bedside rounds.  Resident note read, including vitals, physical findings, laboratory data, and radiological reports.   Revisions included below.  Direct personal management at bed side and extensive interpretation of the data.  Plan was outlined and discussed in details with the housestaff.  Decision making of high complexity  Action taken for acute disease activity to reflect the level of care provided:  - medication reconciliation  - review laboratory data  The patient is admitted with acute exacerbation of bronchiectasis.  The patient's CT scan was reviewed.  Discussed the case with the fellow and her pulmonologist.  Aggressive pulmonary toilet.  Sputum culture.  Continue nebulizer treatment.  Aggressive pulmonary toilet out of bed in chair.  No indication for bronchoscopy at this point.

## 2023-08-18 NOTE — OCCUPATIONAL THERAPY INITIAL EVALUATION ADULT - PLANNED THERAPY INTERVENTIONS, OT EVAL
ADL retraining/balance training/bed mobility training/cognitive, visual perceptual/motor coordination training/neuromuscular re-education/strengthening/stretching/transfer training

## 2023-08-18 NOTE — DISCHARGE NOTE PROVIDER - NSDCMRMEDTOKEN_GEN_ALL_CORE_FT
azelastine 205.5 mcg/inh (0.15%) nasal spray: 2 spray(s) intranasally 2 times a day  ciprofloxacin 500 mg oral tablet: 1 tab(s) orally every 12 hours  Lopressor 50 mg oral tablet: 1 orally 2 times a day  montelukast 10 mg oral tablet: 0.5 tab(s) orally 2 times a day  sulfamethoxazole-trimethoprim 800 mg-160 mg oral tablet: 1 tab(s) orally every 12 hours   azelastine 205.5 mcg/inh (0.15%) nasal spray: 2 spray(s) intranasally 2 times a day  Cayston 75 mg inhalation powder for reconstitution: 75 milligram(s) by hand-bulb nebulizer 3 times a day  ciprofloxacin 500 mg oral tablet: 1 tab(s) orally every 12 hours  Lopressor 50 mg oral tablet: 1 orally 2 times a day  montelukast 10 mg oral tablet: 0.5 tab(s) orally 2 times a day  sulfamethoxazole-trimethoprim 800 mg-160 mg oral tablet: 1 tab(s) orally every 12 hours

## 2023-08-18 NOTE — PROGRESS NOTE ADULT - PROBLEM SELECTOR PLAN 3
Patient with new onset afib during recent hospitalization. No longer in afib on presentation. On rate control but no AC; due to fall risk?   - collateral in am   - Continue lopressor 25 mg BID Patient with new onset afib during recent hospitalization. No longer in afib on presentation. On rate control but no AC; due to fall risk?   - collateral in am   - Start lopressor 50 mg BID

## 2023-08-18 NOTE — PROGRESS NOTE ADULT - ASSESSMENT
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 Pt presented with sob, cough productive of green sputum with associated rhinorrhea. Pulm consulted for bronchiectasis exacerbation.    2019 sputum culture:   Pseudomonas           aeruginosa                                      SHOBHA SCHULTZ                                    ________    ________       Ceftazidime                  4           S       Levofloxacin                 <=1         S       Amikacin                     >32         R       Aztreonam                    <=4         S       Cefepime                     16          I       Ciprofloxacin                <=0.5       S       Gentamicin                   >8          R       Imipenem                     <=1         S       Meropenem                    <=1         S       Piperacillin/Tazobactam      <=8         S       Tobramycin                   >8          R                                            Stenotrophomonas                                    maltophilia                                    #2                                      SHOBHA SCHULTZ                                    ________    ________       Ceftazidime                  8           S       Levofloxacin                 <=1         S       Trimethoprim/Sulfa           <=0.5/9.5   S     81 yo F with 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 Pt presented with sob, cough productive of green sputum with associated rhinorrhea. Pulm consulted for bronchiectasis exacerbation.    #AHRF, resolved  #Bronchiectasis exacerbation, improved      Pulm will sign off.  Patient s/e/d with Dr. French.

## 2023-08-18 NOTE — PROGRESS NOTE ADULT - PROBLEM SELECTOR PROBLEM 1
Acute exacerbation of bronchiectasis
Acute respiratory failure with hypoxia
Acute respiratory failure with hypoxia
Acute exacerbation of bronchiectasis

## 2023-08-18 NOTE — SWALLOW VFSS/MBS ASSESSMENT ADULT - SLP PERTINENT HISTORY OF CURRENT PROBLEM
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 Pt presented with sob, cough productive of green sputum with associated rhinorrhea. Pulm following for bronchiectasis exacerbation.

## 2023-08-18 NOTE — DISCHARGE NOTE PROVIDER - DETAILS OF MALNUTRITION DIAGNOSIS/DIAGNOSES
This patient has been assessed with a concern for Malnutrition and was treated during this hospitalization for the following Nutrition diagnosis/diagnoses:     -  08/17/2023: Severe protein-calorie malnutrition   -  08/17/2023: Underweight (BMI < 19)

## 2023-08-18 NOTE — OCCUPATIONAL THERAPY INITIAL EVALUATION ADULT - DIAGNOSIS, OT EVAL
Pt presenting with impaired cognition, strength, balance, and functional activity tolerance, impacting ability to perform functional mobility/ADLs.

## 2023-08-18 NOTE — SWALLOW VFSS/MBS ASSESSMENT ADULT - RECOMMENDED CONSISTENCY
Continue with thin liquids and minced and moist solids (pt preference given reported food textures which trigger dysphagic complaints)

## 2023-08-18 NOTE — PROGRESS NOTE ADULT - PROBLEM SELECTOR PROBLEM 2
Acute exacerbation of bronchiectasis
HTN (hypertension)
Acute exacerbation of bronchiectasis
HTN (hypertension)

## 2023-08-18 NOTE — DISCHARGE NOTE PROVIDER - PROVIDER TOKENS
FREE:[LAST:[Cristian],FIRST:[Ramón],PHONE:[phone]],FAX:[(   [phone]    -],ADDRESS:[PLASE NOTE TEMPORARY ADDRESS FOR YOUR APPT ON 8/25:  [address] [place] [address] floor.],SCHEDULEDAPPT:[08/25/2023],SCHEDULEDAPPTTIME:[03:00 PM]]

## 2023-08-18 NOTE — DISCHARGE NOTE PROVIDER - CARE PROVIDER_API CALL
Ramón Foster NOTE TEMPORARY ADDRESS FOR YOUR APPT ON 8/25:  [address] [place] [address].  Phone: [phone]  Fax: (   )    -  Scheduled Appointment: 08/25/2023 03:00 PM

## 2023-08-18 NOTE — DISCHARGE NOTE PROVIDER - NSDCCPCAREPLAN_GEN_ALL_CORE_FT
PRINCIPAL DISCHARGE DIAGNOSIS  Diagnosis: PNA (pneumonia)  Assessment and Plan of Treatment: You were diagnosed with pneumonia. Pneumonia is an infection that affects one or both lungs. It causes the air sacs, or alveoli, of the lungs to fill up with fluid or pus. Bacteria, viruses, or fungi may cause pneumonia. You are being discharged on two different antibiotics to be taken by mouth: ciprofloxacin 500mg twice a day nd sulfamethoxazole-trimethoprim 800mg-160mg twice a day. Please take these medications for the full course as outlined by your care provider in the attached documents, and do not end the antibiotics early even if you are feeling better. It is important to take the full course for complete resolution of your illness.      SECONDARY DISCHARGE DIAGNOSES  Diagnosis: Acute exacerbation of bronchiectasis  Assessment and Plan of Treatment: You were diagnosed with an exacerbation of bronchiectasis. Bronchiectasis exacerbations are defined by an increase in daily respiratory symptoms such as cough, sputum production, malaise, fatigue and breathlessness. Symptoms accumulate over several days and can take weeks to resolve, with many patients never fully returning to baseline after therapy.   You have been prescribed the following to resolve your exacerbation: azelastine 205.5 mcg/inh nasal spray, two sprays twice per day; montelukast 10mg oral tablet, 1/2 tablet twice per day; Cayston 75mg inhalation powder, 75mg    Diagnosis: HTN (hypertension)  Assessment and Plan of Treatment:      PRINCIPAL DISCHARGE DIAGNOSIS  Diagnosis: PNA (pneumonia)  Assessment and Plan of Treatment: You were diagnosed with pneumonia. Pneumonia is an infection that affects one or both lungs. It causes the air sacs, or alveoli, of the lungs to fill up with fluid or pus. Bacteria, viruses, or fungi may cause pneumonia. You are being discharged on two different antibiotics to be taken by mouth: ciprofloxacin 500mg twice a day and sulfamethoxazole-trimethoprim 800mg-160mg twice a day. Please take these medications for the full course as outlined by your care provider in the attached documents, and do not end the antibiotics early even if you are feeling better. It is important to take the full course for complete resolution of your illness.      SECONDARY DISCHARGE DIAGNOSES  Diagnosis: Acute exacerbation of bronchiectasis  Assessment and Plan of Treatment: You were diagnosed with an exacerbation of bronchiectasis. Bronchiectasis exacerbations are defined by an increase in daily respiratory symptoms such as cough, sputum production, malaise, fatigue and breathlessness. Symptoms accumulate over several days and can take weeks to resolve, with many patients never fully returning to baseline after therapy.   You have been prescribed the following to resolve your exacerbation: azelastine 205.5 mcg/inh nasal spray, two sprays twice per day; montelukast 10mg oral tablet, 1/2 tablet twice per day; Cayston 75mg inhalation powder, 75mg by nebulizer 3 times daily.    Diagnosis: HTN (hypertension)  Assessment and Plan of Treatment: You were diagnosed with high blood pressure. Hypertension (high blood pressure) is when the pressure in your blood vessels is too high (140/90 mmHg or higher). It is common but can be serious if not treated. People with high blood pressure may not feel symptoms. The only way to know is to get your blood pressure checked.  Take your Lopressor 50mg twice a day, as prescribed, and check your pressures regularly at home. Follow-up with your PCP outpatient provider per your regular schedule.

## 2023-08-18 NOTE — OCCUPATIONAL THERAPY INITIAL EVALUATION ADULT - ADDITIONAL COMMENTS
Prior to admission, pt performing all ADLs independently, with use of RW for ambulation, as well as wheelchair for ambulation 2/2 fear of falling.

## 2023-08-18 NOTE — PROGRESS NOTE ADULT - PROBLEM SELECTOR PLAN 2
- STILL NEED TO COLLECT SPUTUM CULTURE (apparently per patient was collected in the ED, but does not appear this way)  - Bactrim for steno; Double pseudomonal coverage with Zosyn and cipro  - Would need about a 14 day course  - Duonebs followed by 7% hypertonic saline followed chest vest and aerobika (given to patient)  - F/u sputum culture, sputum AFB smear with culture, legionella and strep ur ags, RVP and COVID-19 PCR (both were negative)  - Recommend inhaled cayston in the outpatient setting as the patient has resistance to tobramycin. - Bactrim for steno; Double pseudomonal coverage with Zosyn and cipro  - Would need about a 14 day course total of antibiotics with bactrim and cipro at time of discharge and follow up with Dr. Foster. Recommend inhaled ethelston in the outpatient setting as the patient has resistance to tobramycin.

## 2023-08-18 NOTE — PROGRESS NOTE ADULT - SUBJECTIVE AND OBJECTIVE BOX
OVERNIGHT EVENTS:    Brief HPI: Sandra Funes is an 82 yF with past history of chronic bronchiectasis with multiple pseudomonal PNAs presenting with bronchiectasis exacerbation with failure of outpatient therapy.    SUBJECTIVE / INTERVAL HPI: Patient seen and examined at bedside.   8/18/23 -     ROS: negative unless otherwise stated above.    VITAL SIGNS:  Vital Signs Last 24 Hrs  T(C): 36.5 (18 Aug 2023 05:11), Max: 36.9 (17 Aug 2023 20:45)  T(F): 97.7 (18 Aug 2023 05:11), Max: 98.4 (17 Aug 2023 20:45)  HR: 68 (18 Aug 2023 05:11) (64 - 92)  BP: 114/66 (18 Aug 2023 05:11) (94/57 - 140/61)  BP(mean): --  RR: 18 (18 Aug 2023 05:11) (16 - 19)  SpO2: 91% (18 Aug 2023 05:11) (91% - 100%)    Parameters below as of 18 Aug 2023 05:11  Patient On (Oxygen Delivery Method): room air        PHYSICAL EXAM:    General: In no apparent distress  HEENT: NC/AT; PERRL, anicteric sclera; MMM  Neck: supple  Cardiovascular: +S1/S2; RRR  Respiratory: CTA B/L; no W/R/R  Gastrointestinal: soft, NT/ND; +BSx4  Extremities: WWP; no edema, clubbing or cyanosis  Vascular: 2+ radial, DP/PT pulses B/L  Neurological: AAOx3; no focal deficits    MEDICATIONS:  MEDICATIONS  (STANDING):  albuterol/ipratropium for Nebulization 3 milliLiter(s) Nebulizer every 8 hours  ciprofloxacin     Tablet 500 milliGRAM(s) Oral every 12 hours  enoxaparin Injectable 30 milliGRAM(s) SubCutaneous every 24 hours  metoprolol tartrate 50 milliGRAM(s) Oral two times a day  montelukast 10 milliGRAM(s) Oral every 24 hours  piperacillin/tazobactam IVPB.. 4.5 Gram(s) IV Intermittent every 8 hours  sodium chloride 7% Inhalation 4 milliLiter(s) Inhalation every 8 hours  trimethoprim  160 mG/sulfamethoxazole 800 mG 1 Tablet(s) Oral every 12 hours    MEDICATIONS  (PRN):  acetaminophen     Tablet .. 650 milliGRAM(s) Oral every 6 hours PRN Temp greater or equal to 38C (100.4F), Mild Pain (1 - 3)      ALLERGIES:  Allergies    Augmentin (Short breath; Rash)  Levaquin (Swelling)  Ceclor (Rash)    Intolerances        LABS:                        10.8   4.88  )-----------( 182      ( 17 Aug 2023 05:30 )             32.0     08-17    134<L>  |  98  |  22  ----------------------------<  192<H>  4.1   |  31  |  0.74    Ca    8.7      17 Aug 2023 05:30  Phos  3.5     08-17  Mg     1.9     08-17      PT/INR - ( 16 Aug 2023 15:25 )   PT: 12.6 sec;   INR: 1.11          PTT - ( 16 Aug 2023 15:25 )  PTT:26.7 sec  Urinalysis Basic - ( 17 Aug 2023 05:30 )    Color: x / Appearance: x / SG: x / pH: x  Gluc: 192 mg/dL / Ketone: x  / Bili: x / Urobili: x   Blood: x / Protein: x / Nitrite: x   Leuk Esterase: x / RBC: x / WBC x   Sq Epi: x / Non Sq Epi: x / Bacteria: x      CAPILLARY BLOOD GLUCOSE          RADIOLOGY & ADDITIONAL TESTS: Reviewed. OVERNIGHT EVENTS: ALENA    Brief HPI: Sandra Funes is an 82 yF with past history of chronic bronchiectasis with multiple pseudomonal PNAs presenting with bronchiectasis exacerbation with failure of outpatient therapy.    SUBJECTIVE / INTERVAL HPI: Patient seen and examined at bedside.   8/18/23 - The patient was sitting up in her chair, AAOx3. She reported the desire to go home, and stated that she didn't need to take any more medications. She denied n/v/d, and she denied chest pain and SOB.  ROS: negative unless otherwise stated above.    VITAL SIGNS:  Vital Signs Last 24 Hrs  T(C): 36.5 (18 Aug 2023 05:11), Max: 36.9 (17 Aug 2023 20:45)  T(F): 97.7 (18 Aug 2023 05:11), Max: 98.4 (17 Aug 2023 20:45)  HR: 68 (18 Aug 2023 05:11) (64 - 92)  BP: 114/66 (18 Aug 2023 05:11) (94/57 - 140/61)  BP(mean): --  RR: 18 (18 Aug 2023 05:11) (16 - 19)  SpO2: 91% (18 Aug 2023 05:11) (91% - 100%)    Parameters below as of 18 Aug 2023 05:11  Patient On (Oxygen Delivery Method): room air        PHYSICAL EXAM:    General: In no apparent distress  HEENT: NC/AT; PERRL, anicteric sclera; MMM  Neck: supple  Cardiovascular: +S1/S2; RRR  Respiratory: rhonchi at b/l lung bases. Saturating in the 90s on RA  Gastrointestinal: soft, NT/ND; +BSx4  Extremities: WWP; no edema, clubbing or cyanosis  Vascular: 2+ radial, DP/PT pulses B/L  Neurological: AAOx3; no focal deficits    MEDICATIONS:  MEDICATIONS  (STANDING):  albuterol/ipratropium for Nebulization 3 milliLiter(s) Nebulizer every 8 hours  ciprofloxacin     Tablet 500 milliGRAM(s) Oral every 12 hours  enoxaparin Injectable 30 milliGRAM(s) SubCutaneous every 24 hours  metoprolol tartrate 50 milliGRAM(s) Oral two times a day  montelukast 10 milliGRAM(s) Oral every 24 hours  piperacillin/tazobactam IVPB.. 4.5 Gram(s) IV Intermittent every 8 hours  sodium chloride 7% Inhalation 4 milliLiter(s) Inhalation every 8 hours  trimethoprim  160 mG/sulfamethoxazole 800 mG 1 Tablet(s) Oral every 12 hours    MEDICATIONS  (PRN):  acetaminophen     Tablet .. 650 milliGRAM(s) Oral every 6 hours PRN Temp greater or equal to 38C (100.4F), Mild Pain (1 - 3)      ALLERGIES:  Allergies    Augmentin (Short breath; Rash)  Levaquin (Swelling)  Ceclor (Rash)    Intolerances        LABS:                        10.8   4.88  )-----------( 182      ( 17 Aug 2023 05:30 )             32.0     08-17    134<L>  |  98  |  22  ----------------------------<  192<H>  4.1   |  31  |  0.74    Ca    8.7      17 Aug 2023 05:30  Phos  3.5     08-17  Mg     1.9     08-17      PT/INR - ( 16 Aug 2023 15:25 )   PT: 12.6 sec;   INR: 1.11          PTT - ( 16 Aug 2023 15:25 )  PTT:26.7 sec  Urinalysis Basic - ( 17 Aug 2023 05:30 )    Color: x / Appearance: x / SG: x / pH: x  Gluc: 192 mg/dL / Ketone: x  / Bili: x / Urobili: x   Blood: x / Protein: x / Nitrite: x   Leuk Esterase: x / RBC: x / WBC x   Sq Epi: x / Non Sq Epi: x / Bacteria: x      CAPILLARY BLOOD GLUCOSE          RADIOLOGY & ADDITIONAL TESTS: Reviewed.

## 2023-08-18 NOTE — SWALLOW VFSS/MBS ASSESSMENT ADULT - ESOPHAGEAL STAGE
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

## 2023-08-19 ENCOUNTER — TRANSCRIPTION ENCOUNTER (OUTPATIENT)
Age: 83
End: 2023-08-19

## 2023-08-19 VITALS
HEART RATE: 83 BPM | TEMPERATURE: 98 F | OXYGEN SATURATION: 94 % | SYSTOLIC BLOOD PRESSURE: 116 MMHG | RESPIRATION RATE: 16 BRPM | DIASTOLIC BLOOD PRESSURE: 78 MMHG

## 2023-08-19 LAB
ALBUMIN SERPL ELPH-MCNC: 3.4 G/DL — SIGNIFICANT CHANGE UP (ref 3.3–5)
ALP SERPL-CCNC: 109 U/L — SIGNIFICANT CHANGE UP (ref 40–120)
ALT FLD-CCNC: 37 U/L — SIGNIFICANT CHANGE UP (ref 10–45)
ANION GAP SERPL CALC-SCNC: 10 MMOL/L — SIGNIFICANT CHANGE UP (ref 5–17)
AST SERPL-CCNC: 35 U/L — SIGNIFICANT CHANGE UP (ref 10–40)
BASOPHILS # BLD AUTO: 0.04 K/UL — SIGNIFICANT CHANGE UP (ref 0–0.2)
BASOPHILS NFR BLD AUTO: 0.6 % — SIGNIFICANT CHANGE UP (ref 0–2)
BILIRUB SERPL-MCNC: 0.3 MG/DL — SIGNIFICANT CHANGE UP (ref 0.2–1.2)
BUN SERPL-MCNC: 16 MG/DL — SIGNIFICANT CHANGE UP (ref 7–23)
CALCIUM SERPL-MCNC: 9 MG/DL — SIGNIFICANT CHANGE UP (ref 8.4–10.5)
CHLORIDE SERPL-SCNC: 98 MMOL/L — SIGNIFICANT CHANGE UP (ref 96–108)
CO2 SERPL-SCNC: 27 MMOL/L — SIGNIFICANT CHANGE UP (ref 22–31)
CREAT SERPL-MCNC: 0.82 MG/DL — SIGNIFICANT CHANGE UP (ref 0.5–1.3)
EGFR: 72 ML/MIN/1.73M2 — SIGNIFICANT CHANGE UP
EOSINOPHIL # BLD AUTO: 0.2 K/UL — SIGNIFICANT CHANGE UP (ref 0–0.5)
EOSINOPHIL NFR BLD AUTO: 2.9 % — SIGNIFICANT CHANGE UP (ref 0–6)
FOLATE SERPL-MCNC: 10.2 NG/ML — SIGNIFICANT CHANGE UP
GLUCOSE SERPL-MCNC: 86 MG/DL — SIGNIFICANT CHANGE UP (ref 70–99)
HCT VFR BLD CALC: 36.8 % — SIGNIFICANT CHANGE UP (ref 34.5–45)
HGB BLD-MCNC: 12.2 G/DL — SIGNIFICANT CHANGE UP (ref 11.5–15.5)
IMM GRANULOCYTES NFR BLD AUTO: 0.4 % — SIGNIFICANT CHANGE UP (ref 0–0.9)
LYMPHOCYTES # BLD AUTO: 1.65 K/UL — SIGNIFICANT CHANGE UP (ref 1–3.3)
LYMPHOCYTES # BLD AUTO: 23.8 % — SIGNIFICANT CHANGE UP (ref 13–44)
MAGNESIUM SERPL-MCNC: 1.9 MG/DL — SIGNIFICANT CHANGE UP (ref 1.6–2.6)
MCHC RBC-ENTMCNC: 33.2 GM/DL — SIGNIFICANT CHANGE UP (ref 32–36)
MCHC RBC-ENTMCNC: 34.6 PG — HIGH (ref 27–34)
MCV RBC AUTO: 104.2 FL — HIGH (ref 80–100)
MONOCYTES # BLD AUTO: 0.62 K/UL — SIGNIFICANT CHANGE UP (ref 0–0.9)
MONOCYTES NFR BLD AUTO: 9 % — SIGNIFICANT CHANGE UP (ref 2–14)
NEUTROPHILS # BLD AUTO: 4.38 K/UL — SIGNIFICANT CHANGE UP (ref 1.8–7.4)
NEUTROPHILS NFR BLD AUTO: 63.3 % — SIGNIFICANT CHANGE UP (ref 43–77)
NRBC # BLD: 0 /100 WBCS — SIGNIFICANT CHANGE UP (ref 0–0)
PHOSPHATE SERPL-MCNC: 3.1 MG/DL — SIGNIFICANT CHANGE UP (ref 2.5–4.5)
PLATELET # BLD AUTO: 235 K/UL — SIGNIFICANT CHANGE UP (ref 150–400)
POTASSIUM SERPL-MCNC: 4 MMOL/L — SIGNIFICANT CHANGE UP (ref 3.5–5.3)
POTASSIUM SERPL-SCNC: 4 MMOL/L — SIGNIFICANT CHANGE UP (ref 3.5–5.3)
PROT SERPL-MCNC: 7 G/DL — SIGNIFICANT CHANGE UP (ref 6–8.3)
RBC # BLD: 3.53 M/UL — LOW (ref 3.8–5.2)
RBC # FLD: 12.3 % — SIGNIFICANT CHANGE UP (ref 10.3–14.5)
SODIUM SERPL-SCNC: 135 MMOL/L — SIGNIFICANT CHANGE UP (ref 135–145)
VIT B12 SERPL-MCNC: 605 PG/ML — SIGNIFICANT CHANGE UP (ref 232–1245)
WBC # BLD: 6.92 K/UL — SIGNIFICANT CHANGE UP (ref 3.8–10.5)
WBC # FLD AUTO: 6.92 K/UL — SIGNIFICANT CHANGE UP (ref 3.8–10.5)

## 2023-08-19 PROCEDURE — 99239 HOSP IP/OBS DSCHRG MGMT >30: CPT | Mod: GC

## 2023-08-19 RX ADMIN — Medication 1 TABLET(S): at 09:13

## 2023-08-19 RX ADMIN — MONTELUKAST 10 MILLIGRAM(S): 4 TABLET, CHEWABLE ORAL at 09:13

## 2023-08-19 RX ADMIN — Medication 650 MILLIGRAM(S): at 00:10

## 2023-08-19 RX ADMIN — Medication 500 MILLIGRAM(S): at 09:13

## 2023-08-19 RX ADMIN — SODIUM CHLORIDE 4 MILLILITER(S): 9 INJECTION INTRAMUSCULAR; INTRAVENOUS; SUBCUTANEOUS at 06:18

## 2023-08-19 RX ADMIN — Medication 50 MILLIGRAM(S): at 09:13

## 2023-08-19 RX ADMIN — Medication 3 MILLILITER(S): at 06:18

## 2023-08-19 NOTE — DISCHARGE NOTE NURSING/CASE MANAGEMENT/SOCIAL WORK - NSDCPEFALRISK_GEN_ALL_CORE
For information on Fall & Injury Prevention, visit: [email or web address] OR  [email or web address] OR  https://www.cdc.gov/steadi/patient.html

## 2023-08-19 NOTE — DISCHARGE NOTE NURSING/CASE MANAGEMENT/SOCIAL WORK - NSTRANSFERBELONGINGSRESP_GEN_A_NUR
yes [Mother] : mother [Toothpaste] : Primary Fluoride Source: Toothpaste [Yes] : Patient goes to dentist yearly [Sleep Concerns] : no sleep concerns [Grade: ____] : Grade: [unfilled] [Up to date] : Up to date [Exposure to tobacco] : no exposure to tobacco [Uses tobacco] : does not use tobacco [Uses electronic nicotine delivery system] : does not use electronic nicotine delivery system [Drinks alcohol] : does not drink alcohol [Uses drugs] : does not use drugs  [No] : Patient has not had sexual intercourse [Has thought about hurting self or considered suicide] : has not thought about hurting self or considered suicide [Has problems with sleep] : does not have problems with sleep [Gets depressed, anxious, or irritable/has mood swings] : does not get depressed, anxious, or irritable/has mood swings [With Teen] : teen [FreeTextEntry7] : 16 year well visit. [de-identified] : dx with flu 4 days ago  [de-identified] : Gets , extra help, no longer has an aide [de-identified] : mostly carbs, some protein, no fuits/veggies [de-identified] : bike rides often, stays active, no organized activities [FreeTextEntry1] : Diagnosed with flu 4 days ago.  Has been fever free for > 48 hours.  Cough and congestion still, but getting better  No chest pain/SOB.  Staying well hydrated.  \par \par No asthma symptoms in > 2 years

## 2023-08-19 NOTE — DISCHARGE NOTE NURSING/CASE MANAGEMENT/SOCIAL WORK - PATIENT PORTAL LINK FT
You can access the FollowMyHealth Patient Portal offered by [facility] by registering at the following website: [email or web address]. By joining [email or web address] FollowMyHealth portal, you will also be able to view your health information using other applications (apps) compatible with our system.

## 2023-08-21 ENCOUNTER — NON-APPOINTMENT (OUTPATIENT)
Age: 83
End: 2023-08-21

## 2023-08-21 LAB
CULTURE RESULTS: SIGNIFICANT CHANGE UP
CULTURE RESULTS: SIGNIFICANT CHANGE UP
SPECIMEN SOURCE: SIGNIFICANT CHANGE UP
SPECIMEN SOURCE: SIGNIFICANT CHANGE UP

## 2023-08-31 DIAGNOSIS — Z96.7 PRESENCE OF OTHER BONE AND TENDON IMPLANTS: ICD-10-CM

## 2023-08-31 DIAGNOSIS — Z91.128 PATIENT'S INTENTIONAL UNDERDOSING OF MEDICATION REGIMEN FOR OTHER REASON: ICD-10-CM

## 2023-08-31 DIAGNOSIS — Z88.1 ALLERGY STATUS TO OTHER ANTIBIOTIC AGENTS STATUS: ICD-10-CM

## 2023-08-31 DIAGNOSIS — Z99.3 DEPENDENCE ON WHEELCHAIR: ICD-10-CM

## 2023-08-31 DIAGNOSIS — J47.1 BRONCHIECTASIS WITH (ACUTE) EXACERBATION: ICD-10-CM

## 2023-08-31 DIAGNOSIS — Z88.0 ALLERGY STATUS TO PENICILLIN: ICD-10-CM

## 2023-08-31 DIAGNOSIS — I48.91 UNSPECIFIED ATRIAL FIBRILLATION: ICD-10-CM

## 2023-08-31 DIAGNOSIS — I69.311 MEMORY DEFICIT FOLLOWING CEREBRAL INFARCTION: ICD-10-CM

## 2023-08-31 DIAGNOSIS — E43 UNSPECIFIED SEVERE PROTEIN-CALORIE MALNUTRITION: ICD-10-CM

## 2023-08-31 DIAGNOSIS — I10 ESSENTIAL (PRIMARY) HYPERTENSION: ICD-10-CM

## 2023-08-31 DIAGNOSIS — H57.11 OCULAR PAIN, RIGHT EYE: ICD-10-CM

## 2023-08-31 DIAGNOSIS — Z91.148 PATIENT'S OTHER NONCOMPLIANCE WITH MEDICATION REGIMEN FOR OTHER REASON: ICD-10-CM

## 2023-08-31 DIAGNOSIS — J96.01 ACUTE RESPIRATORY FAILURE WITH HYPOXIA: ICD-10-CM

## 2023-08-31 DIAGNOSIS — Z14.1 CYSTIC FIBROSIS CARRIER: ICD-10-CM

## 2023-08-31 DIAGNOSIS — J45.909 UNSPECIFIED ASTHMA, UNCOMPLICATED: ICD-10-CM

## 2023-08-31 DIAGNOSIS — E78.5 HYPERLIPIDEMIA, UNSPECIFIED: ICD-10-CM

## 2023-08-31 DIAGNOSIS — T50.906A UNDERDOSING OF UNSPECIFIED DRUGS, MEDICAMENTS AND BIOLOGICAL SUBSTANCES, INITIAL ENCOUNTER: ICD-10-CM

## 2023-09-05 ENCOUNTER — APPOINTMENT (OUTPATIENT)
Dept: PULMONOLOGY | Facility: CLINIC | Age: 83
End: 2023-09-05
Payer: COMMERCIAL

## 2023-09-05 VITALS
SYSTOLIC BLOOD PRESSURE: 116 MMHG | DIASTOLIC BLOOD PRESSURE: 58 MMHG | BODY MASS INDEX: 15.11 KG/M2 | OXYGEN SATURATION: 92 % | HEART RATE: 73 BPM | TEMPERATURE: 97.1 F | HEIGHT: 66 IN | WEIGHT: 94 LBS

## 2023-09-05 PROCEDURE — 99214 OFFICE O/P EST MOD 30 MIN: CPT

## 2023-09-07 NOTE — HISTORY OF PRESENT ILLNESS
[TextBox_4] : patient with briefly admitted to hospital and discharged after two days instead of getting a protracted course of antibitoics as i had hoped she is better, but not as good as she could be i'd like to set up with a pic line two weeks of IV antibioitcs  she remains of tobramycin inhalation as she has for years. lives in [place] so treatment is somewhat difficult

## 2023-09-07 NOTE — REVIEW OF SYSTEMS
[Cough] : cough [Sputum] : no sputum [Dyspnea] : dyspnea [Urgency] : urgency [Memory Loss] : memory loss [Negative] : Endocrine [TextBox_3] : thin [TextBox_30] : [facility] [TextBox_94] : wheel chair bound, reason general weekness [TextBox_122] : generalized weakness and balance

## 2023-09-07 NOTE — DISCUSSION/SUMMARY
[FreeTextEntry1] : her daughter will find who the provider for home IV therapy is near her home i';ve drawn blood to see if her electrolytes are ok will try to get her stool checked for c diff will start on flagy empirically  i think this is antibiotics asociated diarrhea

## 2023-09-07 NOTE — PHYSICAL EXAM
[Normal Oropharynx] : normal oropharynx [Normal Appearance] : normal appearance [Normal Rate/Rhythm] : normal rate/rhythm [TextBox_2] : emaiated [TextBox_68] : rhonchi congested is lavinia  [TextBox_99] : wheelchair boung [TextBox_132] : generalize infirmity [TextBox_140] : judgement and memory is poor

## 2023-09-13 ENCOUNTER — RESULT REVIEW (OUTPATIENT)
Age: 83
End: 2023-09-13

## 2023-09-13 ENCOUNTER — OUTPATIENT (OUTPATIENT)
Dept: OUTPATIENT SERVICES | Facility: HOSPITAL | Age: 83
LOS: 1 days | End: 2023-09-13
Payer: COMMERCIAL

## 2023-09-13 ENCOUNTER — APPOINTMENT (OUTPATIENT)
Dept: INTERVENTIONAL RADIOLOGY/VASCULAR | Facility: HOSPITAL | Age: 83
End: 2023-09-13

## 2023-09-13 PROCEDURE — 36573 INSJ PICC RS&I 5 YR+: CPT

## 2023-09-13 PROCEDURE — C1751: CPT

## 2023-09-19 ENCOUNTER — INPATIENT (INPATIENT)
Facility: HOSPITAL | Age: 83
LOS: 20 days | Discharge: NURSING, REHAB OR LONG-TERM CARE | DRG: 64 | End: 2023-10-10
Attending: PSYCHIATRY & NEUROLOGY | Admitting: HOSPITALIST
Payer: MEDICARE

## 2023-09-19 VITALS
TEMPERATURE: 98 F | DIASTOLIC BLOOD PRESSURE: 86 MMHG | OXYGEN SATURATION: 90 % | RESPIRATION RATE: 20 BRPM | HEART RATE: 80 BPM | SYSTOLIC BLOOD PRESSURE: 168 MMHG | HEIGHT: 66 IN

## 2023-09-19 DIAGNOSIS — J47.1 BRONCHIECTASIS WITH (ACUTE) EXACERBATION: ICD-10-CM

## 2023-09-19 DIAGNOSIS — J96.01 ACUTE RESPIRATORY FAILURE WITH HYPOXIA: ICD-10-CM

## 2023-09-19 LAB
ALBUMIN SERPL ELPH-MCNC: 3.9 G/DL — SIGNIFICANT CHANGE UP (ref 3.3–5)
ALP SERPL-CCNC: 137 U/L — HIGH (ref 40–120)
ALT FLD-CCNC: 31 U/L — SIGNIFICANT CHANGE UP (ref 10–45)
ANION GAP SERPL CALC-SCNC: 8 MMOL/L — SIGNIFICANT CHANGE UP (ref 5–17)
APPEARANCE UR: CLEAR — SIGNIFICANT CHANGE UP
APTT BLD: 27 SEC — SIGNIFICANT CHANGE UP (ref 24.5–35.6)
AST SERPL-CCNC: 23 U/L — SIGNIFICANT CHANGE UP (ref 10–40)
BACTERIA # UR AUTO: PRESENT /HPF
BASE EXCESS BLDV CALC-SCNC: 6.4 MMOL/L — HIGH (ref -2–3)
BASOPHILS # BLD AUTO: 0.05 K/UL — SIGNIFICANT CHANGE UP (ref 0–0.2)
BASOPHILS NFR BLD AUTO: 0.7 % — SIGNIFICANT CHANGE UP (ref 0–2)
BILIRUB SERPL-MCNC: 0.3 MG/DL — SIGNIFICANT CHANGE UP (ref 0.2–1.2)
BILIRUB UR-MCNC: NEGATIVE — SIGNIFICANT CHANGE UP
BUN SERPL-MCNC: 16 MG/DL — SIGNIFICANT CHANGE UP (ref 7–23)
CALCIUM SERPL-MCNC: 9.4 MG/DL — SIGNIFICANT CHANGE UP (ref 8.4–10.5)
CHLORIDE SERPL-SCNC: 98 MMOL/L — SIGNIFICANT CHANGE UP (ref 96–108)
CO2 BLDV-SCNC: 33.4 MMOL/L — HIGH (ref 22–26)
CO2 SERPL-SCNC: 29 MMOL/L — SIGNIFICANT CHANGE UP (ref 22–31)
COLOR SPEC: YELLOW — SIGNIFICANT CHANGE UP
COMMENT - URINE: SIGNIFICANT CHANGE UP
CREAT SERPL-MCNC: 0.64 MG/DL — SIGNIFICANT CHANGE UP (ref 0.5–1.3)
DIFF PNL FLD: NEGATIVE — SIGNIFICANT CHANGE UP
EGFR: 89 ML/MIN/1.73M2 — SIGNIFICANT CHANGE UP
EOSINOPHIL # BLD AUTO: 0.22 K/UL — SIGNIFICANT CHANGE UP (ref 0–0.5)
EOSINOPHIL NFR BLD AUTO: 3 % — SIGNIFICANT CHANGE UP (ref 0–6)
EPI CELLS # UR: SIGNIFICANT CHANGE UP /HPF (ref 0–5)
GAS PNL BLDV: SIGNIFICANT CHANGE UP
GLUCOSE SERPL-MCNC: 103 MG/DL — HIGH (ref 70–99)
GLUCOSE UR QL: NEGATIVE — SIGNIFICANT CHANGE UP
HCO3 BLDV-SCNC: 32 MMOL/L — HIGH (ref 22–29)
HCT VFR BLD CALC: 37.8 % — SIGNIFICANT CHANGE UP (ref 34.5–45)
HGB BLD-MCNC: 12.8 G/DL — SIGNIFICANT CHANGE UP (ref 11.5–15.5)
IMM GRANULOCYTES NFR BLD AUTO: 0.4 % — SIGNIFICANT CHANGE UP (ref 0–0.9)
INR BLD: 0.99 — SIGNIFICANT CHANGE UP (ref 0.85–1.18)
KETONES UR-MCNC: NEGATIVE — SIGNIFICANT CHANGE UP
LEUKOCYTE ESTERASE UR-ACNC: NEGATIVE — SIGNIFICANT CHANGE UP
LYMPHOCYTES # BLD AUTO: 2.1 K/UL — SIGNIFICANT CHANGE UP (ref 1–3.3)
LYMPHOCYTES # BLD AUTO: 28.2 % — SIGNIFICANT CHANGE UP (ref 13–44)
MCHC RBC-ENTMCNC: 33.4 PG — SIGNIFICANT CHANGE UP (ref 27–34)
MCHC RBC-ENTMCNC: 33.9 GM/DL — SIGNIFICANT CHANGE UP (ref 32–36)
MCV RBC AUTO: 98.7 FL — SIGNIFICANT CHANGE UP (ref 80–100)
MONOCYTES # BLD AUTO: 0.61 K/UL — SIGNIFICANT CHANGE UP (ref 0–0.9)
MONOCYTES NFR BLD AUTO: 8.2 % — SIGNIFICANT CHANGE UP (ref 2–14)
MRSA PCR RESULT.: NEGATIVE — SIGNIFICANT CHANGE UP
NEUTROPHILS # BLD AUTO: 4.44 K/UL — SIGNIFICANT CHANGE UP (ref 1.8–7.4)
NEUTROPHILS NFR BLD AUTO: 59.5 % — SIGNIFICANT CHANGE UP (ref 43–77)
NITRITE UR-MCNC: NEGATIVE — SIGNIFICANT CHANGE UP
NRBC # BLD: 0 /100 WBCS — SIGNIFICANT CHANGE UP (ref 0–0)
PCO2 BLDV: 48 MMHG — HIGH (ref 39–42)
PH BLDV: 7.43 — SIGNIFICANT CHANGE UP (ref 7.32–7.43)
PH UR: 7 — SIGNIFICANT CHANGE UP (ref 5–8)
PLATELET # BLD AUTO: 230 K/UL — SIGNIFICANT CHANGE UP (ref 150–400)
PO2 BLDV: 52 MMHG — HIGH (ref 25–45)
POTASSIUM SERPL-MCNC: 4.1 MMOL/L — SIGNIFICANT CHANGE UP (ref 3.5–5.3)
POTASSIUM SERPL-SCNC: 4.1 MMOL/L — SIGNIFICANT CHANGE UP (ref 3.5–5.3)
PROT SERPL-MCNC: 7.6 G/DL — SIGNIFICANT CHANGE UP (ref 6–8.3)
PROT UR-MCNC: 30 MG/DL
PROTHROM AB SERPL-ACNC: 11.3 SEC — SIGNIFICANT CHANGE UP (ref 9.5–13)
RAPID RVP RESULT: SIGNIFICANT CHANGE UP
RBC # BLD: 3.83 M/UL — SIGNIFICANT CHANGE UP (ref 3.8–5.2)
RBC # FLD: 12 % — SIGNIFICANT CHANGE UP (ref 10.3–14.5)
RBC CASTS # UR COMP ASSIST: < 5 /HPF — SIGNIFICANT CHANGE UP
S AUREUS DNA NOSE QL NAA+PROBE: NEGATIVE — SIGNIFICANT CHANGE UP
SAO2 % BLDV: 81.5 % — SIGNIFICANT CHANGE UP (ref 67–88)
SARS-COV-2 RNA SPEC QL NAA+PROBE: SIGNIFICANT CHANGE UP
SODIUM SERPL-SCNC: 135 MMOL/L — SIGNIFICANT CHANGE UP (ref 135–145)
SP GR SPEC: 1.01 — SIGNIFICANT CHANGE UP (ref 1–1.03)
TROPONIN T, HIGH SENSITIVITY RESULT: 16 NG/L — SIGNIFICANT CHANGE UP (ref 0–51)
UROBILINOGEN FLD QL: 0.2 E.U./DL — SIGNIFICANT CHANGE UP
WBC # BLD: 7.45 K/UL — SIGNIFICANT CHANGE UP (ref 3.8–10.5)
WBC # FLD AUTO: 7.45 K/UL — SIGNIFICANT CHANGE UP (ref 3.8–10.5)
WBC UR QL: < 5 /HPF — SIGNIFICANT CHANGE UP

## 2023-09-19 PROCEDURE — 70450 CT HEAD/BRAIN W/O DYE: CPT | Mod: 26,MA

## 2023-09-19 PROCEDURE — 99291 CRITICAL CARE FIRST HOUR: CPT

## 2023-09-19 PROCEDURE — 71250 CT THORAX DX C-: CPT | Mod: 26,MA

## 2023-09-19 PROCEDURE — 99232 SBSQ HOSP IP/OBS MODERATE 35: CPT

## 2023-09-19 PROCEDURE — 99222 1ST HOSP IP/OBS MODERATE 55: CPT

## 2023-09-19 PROCEDURE — 99233 SBSQ HOSP IP/OBS HIGH 50: CPT | Mod: GC

## 2023-09-19 PROCEDURE — 93010 ELECTROCARDIOGRAM REPORT: CPT

## 2023-09-19 PROCEDURE — 99221 1ST HOSP IP/OBS SF/LOW 40: CPT

## 2023-09-19 PROCEDURE — 99223 1ST HOSP IP/OBS HIGH 75: CPT

## 2023-09-19 PROCEDURE — 93306 TTE W/DOPPLER COMPLETE: CPT | Mod: 26

## 2023-09-19 RX ORDER — IPRATROPIUM/ALBUTEROL SULFATE 18-103MCG
3 AEROSOL WITH ADAPTER (GRAM) INHALATION EVERY 6 HOURS
Refills: 0 | Status: DISCONTINUED | OUTPATIENT
Start: 2023-09-19 | End: 2023-09-23

## 2023-09-19 RX ORDER — SODIUM CHLORIDE 9 MG/ML
10 INJECTION INTRAMUSCULAR; INTRAVENOUS; SUBCUTANEOUS
Refills: 0 | Status: DISCONTINUED | OUTPATIENT
Start: 2023-09-19 | End: 2023-09-26

## 2023-09-19 RX ORDER — METOPROLOL TARTRATE 50 MG
50 TABLET ORAL
Refills: 0 | Status: DISCONTINUED | OUTPATIENT
Start: 2023-09-19 | End: 2023-09-19

## 2023-09-19 RX ORDER — SODIUM CHLORIDE 9 MG/ML
4 INJECTION INTRAMUSCULAR; INTRAVENOUS; SUBCUTANEOUS EVERY 6 HOURS
Refills: 0 | Status: DISCONTINUED | OUTPATIENT
Start: 2023-09-19 | End: 2023-09-23

## 2023-09-19 RX ORDER — MONTELUKAST 4 MG/1
10 TABLET, CHEWABLE ORAL DAILY
Refills: 0 | Status: DISCONTINUED | OUTPATIENT
Start: 2023-09-19 | End: 2023-10-10

## 2023-09-19 RX ORDER — CHLORHEXIDINE GLUCONATE 213 G/1000ML
1 SOLUTION TOPICAL
Refills: 0 | Status: DISCONTINUED | OUTPATIENT
Start: 2023-09-19 | End: 2023-09-26

## 2023-09-19 RX ORDER — METOPROLOL TARTRATE 50 MG
50 TABLET ORAL
Refills: 0 | Status: DISCONTINUED | OUTPATIENT
Start: 2023-09-19 | End: 2023-09-21

## 2023-09-19 RX ORDER — APIXABAN 2.5 MG/1
2.5 TABLET, FILM COATED ORAL EVERY 12 HOURS
Refills: 0 | Status: DISCONTINUED | OUTPATIENT
Start: 2023-09-19 | End: 2023-09-20

## 2023-09-19 RX ORDER — LANOLIN ALCOHOL/MO/W.PET/CERES
3 CREAM (GRAM) TOPICAL AT BEDTIME
Refills: 0 | Status: DISCONTINUED | OUTPATIENT
Start: 2023-09-19 | End: 2023-09-20

## 2023-09-19 RX ADMIN — Medication 50 MILLIGRAM(S): at 18:10

## 2023-09-19 RX ADMIN — APIXABAN 2.5 MILLIGRAM(S): 2.5 TABLET, FILM COATED ORAL at 08:13

## 2023-09-19 RX ADMIN — Medication 50 MILLIGRAM(S): at 07:04

## 2023-09-19 RX ADMIN — MONTELUKAST 10 MILLIGRAM(S): 4 TABLET, CHEWABLE ORAL at 12:02

## 2023-09-19 RX ADMIN — APIXABAN 2.5 MILLIGRAM(S): 2.5 TABLET, FILM COATED ORAL at 18:10

## 2023-09-19 NOTE — CHART NOTE - NSCHARTNOTEFT_GEN_A_CORE
Called to bedside around 11:15PM at patient agitated and difficult to redirect. Early in the evening when daughter was at bedside, daughter concerned that patient was becoming more confused and agitated, similar to prior hospital presentation with PRES. Daughter noted that confusion would improve at times when patient was on nasal cannula which patient has intermittently taken off. Cause of patient's confusion likely multifactorial possible infarct, O2 needs and hospital acquired delirium. Patient redirected and encouraged to maintain appropriate sleep-wake cycle to lessen hospital acquired delirium. Patient in agreement. However, later in the night, patient refused duonebs and melatonin. When daughter left for the night, patient attempted to climb out of bed, refused to wear hospital gown and tele leads. Patient difficult to redirected, but eventually convinced to return to bed (however, without gown and without being on tele). Reiterated plan for MRI and if nothing significant, possible discharge home which patient is eager to do.     Plan:  - enhanced observation  - will remain off tele leads for the night to lessen agitation and prevent getting out of bed/falls (patient with known afib, so far rate controlled)  - MRI during the day to increase compliance with testing and to avoid sundowning  - maintain appropriate sleep-wake cycle as much as possible  - consider klonopin 0.25mg x1 if patient becomes extremely agitated and not redirectable (has worked previously in prior hospital stay per daughter)  - if MRI imaging unrevealing for new findings, consider ddx Lewy Body Dementia as patient seems to worsen while in hospital, has vivid dreams at home (although patient and daughter does not know if patient acts them out), prior visual hallucinations

## 2023-09-19 NOTE — CONSULT NOTE ADULT - PROBLEM SELECTOR RECOMMENDATION 2
Pt admitted to [facility] in 8/2023 for acute bronchiectasis exacerbation, completed zosyn, ciprofloxacin, and bactrim course. Started on IV cefepime by outpatient pulmonologist, s/p PICC placement on 9/13. Would not recommend cefepime at this time based on 2019 susceptibilities. History of levofloxacin allergy.  - Hold off on abx for now (last sputum cx is from 2019), will restart pending current sputum cx   - Duonebs q6hr STANDING followed by 7% hypertonic saline followed by aerobika  - Chest PT twice daily  - Inhaled calyston not available inpatient, request daughter to bring from home   - Sputum culture, legionella and strep ur ags, MRSA nares, RVP and COVID-19 PCR Pt admitted to [facility] in 8/2023 for acute bronchiectasis exacerbation, completed zosyn, ciprofloxacin, and bactrim course. Started on IV cefepime by outpatient pulmonologist, s/p PICC placement on 9/13.   - Can continue IV abx per outpatient pulm recs  - Duonebs q6hr STANDING followed by 7% hypertonic saline followed by aerobika  - Chest PT twice daily (can dc aerobika if chest vest is available)  - F/u sputum culture, legionella and strep ur ags, MRSA nares, RVP and COVID-19 PCR  - c/w inhaled [facility] outpatient  - encourage patient to use duonebs and chest vest at home

## 2023-09-19 NOTE — H&P ADULT - NSHPSOURCEINFORD_GEN_ALL_CORE
----- Message from Lexi Mitchell MD sent at 6/15/2017 11:27 AM CDT -----  Please inform patient that results are ok/stable cpm keep the fu   Chart(s)/Patient/Child

## 2023-09-19 NOTE — H&P ADULT - ASSESSMENT
82y Female with PMHx of HTN, afib (not on AC due to fall risk), recent hospitalization for PRES (5-6/2023), recently discharged from [facility] 8/2023 with acute exacerbation of severe bronchiectasis and respiratory failure presents with several days of AMS and new diplopia 9/18. NIHSS 1 for bitemporal visual deficit. CTH with no significant findings. CTA and perfusion deferred due to contrast allergy. Admit for MRI r/o infarct     Neuro  #CVA r/o  #hx PRES  - start eliquis BID (patient and daughter agrees to eliquis while in hospital, but would like to re-discuss risk/benefit upon discharge due to fall risk)  - start atorvastatin pending LDL  - q4hr stroke neuro checks and vitals  - obtain MRI Brain without contrast  - Stroke Code HCT Results: chronic b/l BG infarcts  - CTA deferred due to contrast allergy   - Stroke education    Cards  #HTN  - permissive hypertension, Goal -180  - c/w home lopressor 50mg BID  - Stroke Code EKG Results: NSR    #HLD  - LDL results:     #afib  Not on anticoagulation at home due to fall risk (hx of falls at rehab and at home)  - start eliquis  - c/w lopressor 50mg BID     Pulm  #chronic bronchiectasis  Recently discharged 8/2023 with PO abx for PNA, but switched to IV cefepime by outpatient pulmonologist Dr. Foster  - c/w singular 10mg daily  - c/w cefepime 2g BID  - call provider if SPO2 < 94%    GI  #Nutrition/Fluids/Electrolytes   - replete K<4 and Mg <2  - Diet: NPO for now, pending swallow eval      Endocrine  - A1C results:  - TSH results:    DVT Prophylaxis  - lovenox sq for DVT prophylaxis   - SCDs for DVT prophylaxis       IDR Goals: Goals reviewed at interdisciplinary rounds with case management, social work, physical therapy, occupational therapy, and speech language pathology.   Please see specific therapy  notes for in depth goals.  Dispo: pending PT/OT eval     Discussed daily hospital plans and goals with patient and family at bedside.     Discussed with Neurology Attending Dr. Shah   82y Female with PMHx of HTN, afib (not on AC due to fall risk), recent hospitalization for PRES (5-6/2023), recently discharged from [facility] 8/2023 with acute exacerbation of severe bronchiectasis and respiratory failure presents with several days of AMS and new diplopia 9/18. NIHSS 1 for bitemporal visual deficit. CTH with no significant findings. CTA and perfusion deferred due to contrast allergy. Admit for MRI r/o infarct     Neuro  #CVA r/o  #hx PRES  - start eliquis BID (patient and daughter agrees to eliquis while in hospital, but would like to re-discuss risk/benefit upon discharge due to fall risk)  - start atorvastatin pending LDL  - q4hr stroke neuro checks and vitals  - obtain MRI Brain without contrast  - Stroke Code HCT Results: chronic b/l BG infarcts  - CTA deferred due to contrast allergy   - Stroke education    Cards  #HTN  - permissive hypertension, Goal -180  - c/w home lopressor 50mg BID  - Stroke Code EKG Results: NSR    #HLD  - LDL results:     #afib  Not on anticoagulation at home due to fall risk (hx of falls at rehab and at home)  - start eliquis  - c/w lopressor 50mg BID     Pulm  #chronic bronchiectasis  Recently discharged 8/2023 with PO abx for PNA (Bactrim for stenotrophomonas, Zosyn and cipro for pseudomonas), but switched to IV cefepime (cefepime 2g BID x 14 days) by outpatient pulmonologist Dr. Foster  - c/w singular 10mg daily  - pulm aware of patient, f/u recs. If patient is continued on cefepime, will need ID approval  - call provider if SPO2 < 94%    GI  #Nutrition/Fluids/Electrolytes   - replete K<4 and Mg <2  - Diet: Regular    Endocrine  - A1C results:  - TSH results:    DVT Prophylaxis  - eliquis for DVT prophylaxis   - SCDs for DVT prophylaxis       IDR Goals: Goals reviewed at interdisciplinary rounds with case management, social work, physical therapy, occupational therapy, and speech language pathology.   Please see specific therapy  notes for in depth goals.  Dispo: pending PT/OT eval     Discussed daily hospital plans and goals with patient and family at bedside.     Discussed with Neurology Attending Dr. Shah

## 2023-09-19 NOTE — OCCUPATIONAL THERAPY INITIAL EVALUATION ADULT - MODALITIES TREATMENT COMMENTS
Cranial Nerves II - XII: II: PERRLA; visual IMPAIRMENTS NOTED III, IV, VI: EOMI, no nystagmus appreciated V: facial sensation intact to light touch V1-V3 b/l VII: no ptosis, no facial droop, symmetric eyebrow raise and closure VIII: hearing intact to finger rub b/l  XI: head turning and shoulder shrug intact b/l XII: tongue protrusion midline

## 2023-09-19 NOTE — ED ADULT NURSE NOTE - AS PAIN REST
Sore throat- 3 days. . Pt vomited  X 1 today. Pt states she had diarrhea 2 days ago.   Fever- temp 101 last night
0 (no pain/absence of nonverbal indicators of pain)

## 2023-09-19 NOTE — CHART NOTE - NSCHARTNOTEFT_GEN_A_CORE
Attestation of CVC Checklist review      Type of CVC: PICC line  Location of CVC: LUE    This patient was admitted with the above CVC.  I reviewed the CVC checklist and determined that the CVC can be maintained and OK to use*.     *If BCx was obtained upon admission and it becomes positive, then the CVC should be removed promptly.

## 2023-09-19 NOTE — ED PROVIDER NOTE - PHYSICAL EXAMINATION
General: Awake, alert and oriented. No acute distress. Well developed, hydrated and nourished. Appears stated age.  Skin: Skin in warm, dry and intact without rashes or lesions. Appropriate color for ethnicity  HENMT: head normocephalic and atraumatic; bilateral external ears without swelling. no nasal discharge. moist oral mucosa. supple neck, trachea midline  EYES: Conjunctiva clear. nonicteric sclera. EOM intact, Eyelids are normal in appearance without swelling or lesions, no nystagmus, double vision on finger counting   Cardiac: well perfused, s1, s2, rrr  Respiratory: breathing comfortably on room air. no audible wheezing or stridor  Abdominal: nondistended  MSK: Neck and back are without deformity, visible external skin changes, or signs of trauma. Curvature of the cervical, thoracic, and lumbar spine are within normal limits. no external signs of trauma. no apparent deficits in ROM of any extremity  Neurological: The patient is awake, alert and oriented to person, place, and time with normal speech. CN 2-12 intact. following commands, 5/5 strength and intact and equal sensation in all 4 extremities. no obvious focal deficits. occasional struggle with short term memory   Psychiatric: Appropriate mood and affect. Good judgement and insight. No visual or auditory hallucinations.

## 2023-09-19 NOTE — H&P ADULT - HISTORY OF PRESENT ILLNESS
**STROKE HPI***    HPI: 82y Female with PMHx of HTN, afib (not on AC due to fall risk), recent hospitalization for PRES (5-6/2023), recently discharged from [facility] 8/2023 with acute exacerbation of severe bronchiectasis and respiratory failure presents with several days of AMS and new diplopia 9/18.     Daughter noticed since 9/15 patient has been more tired and exhausted with some agitation. It has progressed over the past few days to generalized confusion (being "out of it", not know where certain rooms are in the house, dropping items, not knowing common everyday information) similar to how she initially presented with PRES. Patient told daughter she was seeing double of items in the house around 10pm.     ED: /86. SpO2 84% RA, requiring 3L NC with improvement to 90%. C/o of shortness of breath and difficulty breathing while on supplemental oxygen. Neuro exam significant for decreased peripheral vision bitemporal, but no clear visual field cut, also inconsistent vision exam. Otherwise nonfocal. CT with no acute hemorrhage, with chronic b/l lentiform nuclei and right caudate infarcts. CT angio and perfusion deferred due to contrast allergy received contrast during angiogram in the past with intense flushing of head).    On repeat visual exam, patient notes that she sees double of items, but unable to tell if horizontal or vertical. She is unable to discern at time whether truly double vs blurry, nor transient or consistent diplopia since onset. At times, when testing visual fields, patient struggles to count fingers peripherally and centrally b/l (unclear whether from true field cut vs complete loss of vision vs diplopia causing difficulty counting). Patient also sees items moving around when they are actually stationary. Daughter states that since PRES, patient's vision has not returned to baseline and has been blurry. They have tried to obtain glasses, however on vision testing, Rx was never consistent so was advised to re-test vision after it has "settled".         **STROKE HPI***    HPI: 82y Female with PMHx of HTN, afib (not on AC due to fall risk), recent hospitalization for PRES (5-6/2023), recently discharged from [facility] 8/2023 with acute exacerbation of severe bronchiectasis and respiratory failure presents with several days of AMS and new diplopia 9/18.     Daughter noticed since 9/15 after starting IV abx, patient has been more tired and exhausted with some agitation. It has progressed over the past few days to generalized confusion (being "out of it", not know where certain rooms are in the house, dropping items, not knowing common everyday information) similar to how she initially presented with PRES. Patient told daughter she was seeing double of items in the house around 10pm.     ED: /86. SpO2 84% RA, requiring 3L NC with improvement to 90%. C/o of shortness of breath and difficulty breathing while on supplemental oxygen. Neuro exam significant for decreased peripheral vision bitemporal, but no clear visual field cut, also inconsistent vision exam. Otherwise nonfocal. CT with no acute hemorrhage, with chronic b/l lentiform nuclei and right caudate infarcts. CT angio and perfusion deferred due to contrast allergy received contrast during angiogram in the past with intense flushing of head).    On repeat visual exam, patient notes that she sees double of items, but unable to tell if horizontal or vertical. She is unable to discern at time whether truly double vs blurry, nor transient or consistent diplopia since onset. At times, when testing visual fields, patient struggles to count fingers peripherally and centrally b/l (unclear whether from true field cut vs complete loss of vision vs diplopia causing difficulty counting). Patient also sees items moving around when they are actually stationary. Daughter states that since PRES, patient's vision has not returned to baseline and has been blurry. They have tried to obtain glasses, however on vision testing, Rx was never consistent so was advised to re-test vision after it has "settled".

## 2023-09-19 NOTE — CONSULT NOTE ADULT - SUBJECTIVE AND OBJECTIVE BOX
**STROKE CODE CONSULT NOTE**    Last known well time/Time of onset of symptoms: 9/15/23    HPI: 82y Female with PMHx of HTN, afib (not on AC due to fall risk), recent hospitalization for PRES (5-6/2023), recently discharged from [facility] 8/2023 with acute exacerbation of severe bronchiectasis and respiratory failure presents with several days of AMS and new diplopia 9/18.     Daughter noticed since 9/15 patient has been more tired and exhausted with some agitation. It has progressed over the past few days to generalized confusion (being "out of it", not know where certain rooms are in the house, dropping items, not knowing common everyday information) similar to how she initially presented with PRES. Patient told daughter she was seeing double of items in the house around 10pm.     ED: /86. SpO2 84% RA, requiring 3L NC with improvement to 90%. C/o of shortness of breath and difficulty breathing while on supplemental oxygen. Neuro exam significant for decreased peripheral vision bitemporal, but no clear visual field cut, also inconsistent vision exam. Otherwise nonfocal. CT with no acute hemorrhage, with chronic b/l lentiform nuclei and right caudate infarcts. CT angio and perfusion deferred due to contrast allergy received contrast during angiogram in the past with intense flushing of head).    On repeat visual exam, patient notes that she sees double of items, but unable to tell if horizontal or vertical. She is unable to discern at time whether truly double vs blurry, nor transient or consistent diplopia since onset. At times, when testing visual fields, patient struggles to count fingers peripherally and centrally b/l (unclear whether from true field cut vs complete loss of vision vs diplopia causing difficulty counting). Daughter states that since PRES, patient's vision has not returned to baseline and has been blurry. They have tried to obtain glasses, however on vision testing, Rx was never consistent so was advised to re-test vision after it has "settled".     T(C): 36.4 (09-19-23 @ 02:56), Max: 36.4 (09-19-23 @ 02:56)  HR: 80 (09-19-23 @ 02:56) (80 - 80)  BP: 168/86 (09-19-23 @ 02:56) (168/86 - 168/86)  RR: 18 (09-19-23 @ 03:00) (18 - 20)  SpO2: 95% (09-19-23 @ 03:00) (90% - 95%)    PAST MEDICAL & SURGICAL HISTORY:  Bronchiectasis      History of Pseudomonas pneumonia      HTN (hypertension)      No significant past surgical history          FAMILY HISTORY:      SOCIAL HISTORY:   Patient lives with daughter    ROS:  Constitutional: No fever, weight loss or fatigue  Eyes: diplopia   ENMT:  No difficulty hearing, tinnitus; No sinus or throat pain  Neck: No pain or stiffness  Respiratory: +cough, +shortness of breath  Cardiovascular: No chest pain, palpitations, shortness of breath, or leg swelling  Neurological: As per HPI      MEDICATIONS  (STANDING):    MEDICATIONS  (PRN):    Allergies    Ceclor (Rash)  IV Contrast (Unknown)  Levaquin (Swelling)  Augmentin (Short breath; Rash)    Intolerances      Vital Signs Last 24 Hrs  T(C): 36.4 (19 Sep 2023 02:56), Max: 36.4 (19 Sep 2023 02:56)  T(F): 97.6 (19 Sep 2023 02:56), Max: 97.6 (19 Sep 2023 02:56)  HR: 80 (19 Sep 2023 02:56) (80 - 80)  BP: 168/86 (19 Sep 2023 02:56) (168/86 - 168/86)  BP(mean): --  RR: 18 (19 Sep 2023 03:00) (18 - 20)  SpO2: 95% (19 Sep 2023 03:00) (90% - 95%)    Parameters below as of 19 Sep 2023 03:00  Patient On (Oxygen Delivery Method): nasal cannula  O2 Flow (L/min): 3      Physical exam:  Constitutional: No acute distress, conversant  Eyes: Anicteric sclerae, moist conjunctivae, see below for CNs  Neck: trachea midline, FROM, supple, no thyromegaly or lymphadenopathy  Extremities: no edema    Neurologic:  -Mental status: Awake, alert, oriented to person, place, and time (had to be given choices for year, able to pick correct one). Speech is fluent with intact naming, repetition, and comprehension, no dysarthria. Follows commands.   -Cranial nerves:   II: Decreased peripheral vision bitemporally, however inconsistent. At times, with visual deficit centrally too.   III, IV, VI: Extraocular movements are intact without nystagmus. Pupils equally round and reactive to light  V:  Facial sensation V1-V3 equal and intact   VII: Face is symmetric with normal eye closure and smile  VIII: Hearing is bilaterally intact   Motor: Cachetic. No pronator drift. Strength bilateral upper extremity 5/5, bilateral lower extremities 4/5.  Sensation: Intact to light touch bilaterally. No neglect or extinction on double simultaneous testing.  Coordination: No dysmetria on finger-to-nose bilaterally    NIHSS: 1    Fingerstick Blood Glucose: CAPILLARY BLOOD GLUCOSE      POCT Blood Glucose.: 90 mg/dL (19 Sep 2023 03:23)    LABS:                        12.8   7.45  )-----------( 230      ( 19 Sep 2023 03:25 )             37.8     09-19    135  |  98  |  16  ----------------------------<  103<H>  4.1   |  29  |  0.64    Ca    9.4      19 Sep 2023 03:25    TPro  7.6  /  Alb  3.9  /  TBili  0.3  /  DBili  x   /  AST  23  /  ALT  31  /  AlkPhos  137<H>  09-19    PT/INR - ( 19 Sep 2023 03:25 )   PT: 11.3 sec;   INR: 0.99          PTT - ( 19 Sep 2023 03:25 )  PTT:27.0 sec      Urinalysis Basic - ( 19 Sep 2023 03:25 )    Color: x / Appearance: x / SG: x / pH: x  Gluc: 103 mg/dL / Ketone: x  / Bili: x / Urobili: x   Blood: x / Protein: x / Nitrite: x   Leuk Esterase: x / RBC: x / WBC x   Sq Epi: x / Non Sq Epi: x / Bacteria: x        RADIOLOGY & ADDITIONAL STUDIES:  < from: CT Brain Stroke Protocol (09.19.23 @ 03:42) >  IMPRESSION:    No large transcortical infarct or definite acute intracranial hemorrhage.    < end of copied text >      -----------------------------------------------------------------------------------------------------------------  IV-tenectaplase (Y/N):  no                           Bolus time:    Tenectaplase Dose Verification w/ RN:  Reason IV-tenectaplase not given: out of window

## 2023-09-19 NOTE — H&P ADULT - NS ATTEND AMEND GEN_ALL_CORE FT
The patient is an 82 year old female with HTN, A fib (not on A/C due to frequent falls), bronchiectasis (on abx for recent exacerbation), and PRES in 05/2023 admitted for evaluation after a several day history of worsened confusion (possible started w cefepime use?) and new-onset visual complaints (details vague, unclear).  Plan for MRI/A, TTE; continue Eliquis for a fib- can consider watchman in future. Will check UA and discuss antibiotic plan with pulm.

## 2023-09-19 NOTE — ED PROVIDER NOTE - CLINICAL SUMMARY MEDICAL DECISION MAKING FREE TEXT BOX
diplopia present. stroke code called. patient hypoxic t 90%, eill evlauate for worsening infiltrate/consolidation. diplopia present. stroke code called. patient hypoxic t 90%, eill evlauate for worsening infiltrate/consolidation. possible encephalopathy in setting of known infection.

## 2023-09-19 NOTE — CHART NOTE - NSCHARTNOTEFT_GEN_A_CORE
Collateral obtained from medical notes from [facility] (provided by daughter).     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 Baseline before hospitalization, was independent of ADLs, able to garden, living independently. Daughter feels that symptoms were similar when patient had Lyme disease, but this presentation was more severe. Denies HAs (hx of migraines), vomiting, fevers. Neurological exam significant only for not knowing the place and year, no focal deficits on ED presentation. Labs significant for Na 127. UCx 6/2 with yeast species. CXR with COPD chronic changes. She was started on doxycycline and ceftriaxone empirically to cover for Lyme, which was eventually discontinued per ID. Admitted to medicine where several consulting teams were involved in patient's care. Multiple modalities of imaging done to assess for neurologic vs infectious etiology of AMS. MRI x2, with possible PRES, with multiple areas of infarcts. S/p LP x2 and serum infectious w/u sent which were generally unremarkable. Course complicated by unwitnessed fall with right intertrochanteric hip fx s/p ORIF with gamma nail, new onset afib/aflutter (controlled with toprolol). Ultimately, it appears that possible etiologies of AMS was from combination of metabolic derangements, stroke, and PRES.     Consults:  Palliative consulted: MOLST completed by RICK Chatterjee. DNR/DNI.  Pulmonology consulted: no acute intervention. To continue on tobra neb tx.   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 Likely MGUS.   ID consulted for low grade fevers with persistent AMS: Tc 99 WBC scan.   Neuro consulted: metabolic encephalopathy, r/o lyme  Nephrology: Tea and toast hyponatremia    Pertinent Labs, Imaging:  B/l LE duplex: negative  Tickborne illness PCR including Babesia: negative  Lyme IgG positive, Lyme IgM negative, blood parasite smear negative ; consistent with past infection.   HIV neg, PRP neg, EBV neg,  CMV IgG+, CMV IgM neg,   BCx negative  LP (5/9): +WBC (6), +protein (100), HSV1/2 neg, Lyme neg, few WBC but no organism on gram stain, VDRL neg, elevated IgG index and synthesis rate, meningitis/encephalitis panel neg,  LP (5/18): +protein, autoimmune encephalopathy panel neg, meningitis/encephalitis panel negative,   JCV antibodies positive but no clear sign of PML at that time  HSV IgG+, past exposure  Toxoplasma IgG +, past exposure  CTH w/o 5/29: atrophy, small vessel ischemia. No hemorrhage, acute infarct, or edema.  MRI w/o (5/9): No intracranial hemorrhage, no acute infarct. Mild chronic MVD. PRES should be considered in appropriate clinical setting.   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 B/l cerebellar lacunar acute/subacute infarct. B/l parieto-occipital cortical/subcortical restricted diffusion with right precentral gyrus DWI hyperintense focus reflecting multicentric acute/subacute ischemia.   TTE 5/17: LV normal, normal wall motion. EF 60-65%.   EEG 5/6: artificat due to agitation. Abnormal EEG by excessive amount of theta activity occurring in diffuse manner suggestive of diffuse cerebral dysfunction.   NM Tc99 WBC scan 5/10: abnormal pooling inferior to the inferior border of the right liver can be secondary to colitis. Pooling in the right upper chest posteriorly differential include pulmonary infection vs cellulitis.

## 2023-09-19 NOTE — ED PROVIDER NOTE - OBJECTIVE STATEMENT
81y/o F with a past history of chronic bronchiectasis with multiple pseudomonal PNAs, htn. for the past 2-3 days has been more confused, tonight at 10pm notived double vision. history of PRESS in the past. afib in the past, non anticoagulated. was on oral abx for her pseudomonal pna, started on cefepime via picc line 4 days ago. has continued to have shortness of breath. no fever.

## 2023-09-19 NOTE — H&P ADULT - NSHPLABSRESULTS_GEN_ALL_CORE
Fingerstick Blood Glucose: CAPILLARY BLOOD GLUCOSE      POCT Blood Glucose.: 90 mg/dL (19 Sep 2023 03:23)    LABS:                        12.8   7.45  )-----------( 230      ( 19 Sep 2023 03:25 )             37.8     09-19    135  |  98  |  16  ----------------------------<  103<H>  4.1   |  29  |  0.64    Ca    9.4      19 Sep 2023 03:25    TPro  7.6  /  Alb  3.9  /  TBili  0.3  /  DBili  x   /  AST  23  /  ALT  31  /  AlkPhos  137<H>  09-19    PT/INR - ( 19 Sep 2023 03:25 )   PT: 11.3 sec;   INR: 0.99          PTT - ( 19 Sep 2023 03:25 )  PTT:27.0 sec      Urinalysis Basic - ( 19 Sep 2023 03:25 )    Color: x / Appearance: x / SG: x / pH: x  Gluc: 103 mg/dL / Ketone: x  / Bili: x / Urobili: x   Blood: x / Protein: x / Nitrite: x   Leuk Esterase: x / RBC: x / WBC x   Sq Epi: x / Non Sq Epi: x / Bacteria: x        RADIOLOGY:  < from: CT Brain Stroke Protocol (09.19.23 @ 03:42) >  IMPRESSION:    No large transcortical infarct or definite acute intracranial hemorrhage.    < end of copied text >    < from: CT Chest No Cont (09.19.23 @ 03:46) >    IMPRESSION:  1.  Diffuse bilateral bronchiectasis with multiple areas of distal mucoid   impaction and scattered tree-in-bud opacities. Findings suggest   nonspecific small airways disease.  2.  Small left pleural effusion.  3.  No consolidation.    < end of copied text >

## 2023-09-19 NOTE — H&P ADULT - NSHPPHYSICALEXAM_GEN_ALL_CORE
Constitutional: No acute distress, conversant  Eyes: Anicteric sclerae, moist conjunctivae, see below for CNs  Neck: trachea midline, FROM, supple, no thyromegaly or lymphadenopathy  Extremities: no edema    Neurologic:  -Mental status: Awake, alert, oriented to person, place, and time (had to be given choices for year, able to pick correct one). Speech is fluent with intact naming, repetition, and comprehension, no dysarthria. Follows commands.   -Cranial nerves:   II: Decreased peripheral vision bitemporally, however inconsistent. At times, with visual deficit centrally too.   III, IV, VI: Extraocular movements are intact without nystagmus. Pupils equally round and reactive to light  V:  Facial sensation V1-V3 equal and intact   VII: Face is symmetric with normal eye closure and smile  VIII: Hearing is bilaterally intact   Motor: Cachetic. No pronator drift. Strength bilateral upper extremity 5/5, bilateral lower extremities 4/5.  Sensation: Intact to light touch bilaterally. No neglect or extinction on double simultaneous testing.  Coordination: No dysmetria on finger-to-nose bilaterally    NIHSS: 1

## 2023-09-19 NOTE — OCCUPATIONAL THERAPY INITIAL EVALUATION ADULT - PERTINENT HX OF CURRENT PROBLEM, REHAB EVAL
82y Female with PMHx of HTN, afib (not on AC due to fall risk), recent hospitalization for PRES (5-6/2023), recently discharged from [facility] 8/2023 with acute exacerbation of severe bronchiectasis and respiratory failure presents with several days of AMS and new diplopia 9/18. NIHSS 1 for bitemporal visual deficit. CTH with no significant findings. CTA and perfusion deferred due to contrast allergy. Admit for MRI r/o infarct

## 2023-09-19 NOTE — PHYSICAL THERAPY INITIAL EVALUATION ADULT - MODALITIES TREATMENT COMMENTS
Cranial Nerves II - XII: II: PERRLA; visual fields are full to confrontation III, IV, VI: EOMI, no nystagmus appreciated V: facial sensation intact to light touch V1-V3 b/l VII: no ptosis, no facial droop, symmetric eyebrow raise and closure VIII: hearing intact to finger rub b/l  XI: head turning and shoulder shrug intact b/l XII: tongue protrusion midline  Vision: pt complaining of blurry vision, prior double vision, pt with difficulty with peripheral vision at this time.

## 2023-09-19 NOTE — ED PROVIDER NOTE - PROGRESS NOTE DETAILS
CT negative, will allow for permissive hypertension d/w stroke team, recommending admission for further eval. case d/w pulmonology as well. will follow inpatient.

## 2023-09-19 NOTE — ED ADULT NURSE NOTE - OBJECTIVE STATEMENT
82y presents to ED for AMS and new onset of double vision. As per daughter (at bedside), pt started to have new onset double vision around 2300 and states that pt woke up at 1100 on 9/18 with more confusion (pt daughter states confusion started a few days ago around the same time she began her antibiotic treatment). Pt is alert to self and location but states the year is 2003. Recent knee injury, is able to walk by self but uses wheelchair at the moment. 82y presents to ED for AMS and new onset of double vision. As per daughter (at bedside), pt started to have new onset double vision around 2300 and states that pt woke up at 1100 on 9/18 with more confusion (pt daughter states confusion started a few days ago, 9/15, around the same time she began her antibiotic treatment). Pt is alert to self and location but states the year is 2003. Recent knee injury, is able to walk by self but uses wheelchair at the moment.

## 2023-09-19 NOTE — H&P ADULT - NSHPREVIEWOFSYSTEMS_GEN_ALL_CORE
Constitutional: No fever, weight loss or fatigue  Eyes: diplopia   ENMT:  No difficulty hearing, tinnitus; No sinus or throat pain  Neck: No pain or stiffness  Respiratory: +cough, +shortness of breath  Cardiovascular: No chest pain, palpitations, shortness of breath, or leg swelling  Neurological: As per HPI

## 2023-09-19 NOTE — PHYSICAL THERAPY INITIAL EVALUATION ADULT - PERTINENT HX OF CURRENT PROBLEM, REHAB EVAL
82y Female with PMHx of HTN, afib (not on AC due to fall risk), recent hospitalization for PRES (5-6/2023), recently discharged from [facility] 8/2023 with acute exacerbation of severe bronchiectasis and respiratory failure presents with several days of AMS and new diplopia 9/18.     Daughter noticed since 9/15 after starting IV abx, patient has been more tired and exhausted with some agitation. It has progressed over the past few days to generalized confusion (being "out of it", not know where certain rooms are in the house, dropping items, not knowing common everyday information) similar to how she initially presented with PRES. Patient told daughter she was seeing double of items in the house around 10pm.     ED: /86. SpO2 84% RA, requiring 3L NC with improvement to 90%. C/o of shortness of breath and difficulty breathing while on supplemental oxygen. Neuro exam significant for decreased peripheral vision bitemporal, but no clear visual field cut, also inconsistent vision exam. Otherwise nonfocal. CT with no acute hemorrhage, with chronic b/l lentiform nuclei and right caudate infarcts. CT angio and perfusion deferred due to contrast allergy received contrast during angiogram in the past with intense flushing of head).    On repeat visual exam, patient notes that she sees double of items, but unable to tell if horizontal or vertical. She is unable to discern at time whether truly double vs blurry, nor transient or consistent diplopia since onset. At times, when testing visual fields, patient struggles to count fingers peripherally and centrally b/l (unclear whether from true field cut vs complete loss of vision vs diplopia causing difficulty counting). Patient also sees items moving around when they are actually stationary. Daughter states that since PRES, patient's vision has not returned to baseline and has been blurry. They have tried to obtain glasses, however on vision testing, Rx was never consistent so was advised to re-test vision after it has "settled".

## 2023-09-19 NOTE — CONSULT NOTE ADULT - PROBLEM SELECTOR RECOMMENDATION 9
Pt desatting to 86% in ED, started on 2L NC. Complaining of worsening dyspnea at rest and on exertion x1 week and pogressively worsening nonproductive cough. Now resting comfortably on RA, however per pt had improved breathing on oxygen. Not on O2 at home.  - SpO2 goal >90%  - Please complete infectious workup: strep and legionella urine ag, MRSA nares, RVP and COVID-19 PCR, sputum culture and hold for fungal elements,   - Pulm hygiene: OOBTC, PT/OT, incentive spirometry, chest PT twice daily, airway clearance  - Patient recently started on chest PT at home, need to continue in patient. Will ask RT to start chest vest inpatient depending on availability or ask daughter to bring from home Pt desatting to 86% in ED, started on 2L NC. Complaining of worsening dyspnea at rest and on exertion x1 week and pogressively worsening nonproductive cough. Now resting comfortably on RA, however per pt had improved breathing on oxygen. Not on O2 at home.  - SpO2 goal >90%  - Please complete infectious workup: strep and legionella urine ag, MRSA nares, RVP and COVID-19 PCR, sputum culture  - Pulm hygiene: OOBTC, PT/OT, incentive spirometry, chest PT twice daily, airway clearance  - Patient recently started on chest PT at home, need to continue in patient. RT can start chest vest inpatient depending on availability, can do aerobika instead if chest vest not available

## 2023-09-19 NOTE — OCCUPATIONAL THERAPY INITIAL EVALUATION ADULT - LIVES WITH, PROFILE
Pt lives with her daughter and grandchildren. Pt is poor historian, reports ind for ADLs and uses a RW with recent fall without assistance to get up. As per SW, pt's daughter/ family with her 24/7; unsure if assistance is provided.

## 2023-09-19 NOTE — CONSULT NOTE ADULT - ASSESSMENT
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 8/2023 for acute bronchiectasis exacerbation now presenting with AMS and admitted to stroke service.     2019 sputum culture:   Pseudomonas           aeruginosa                                      SHOBHA        MINGHISLAINE                                    ________    ________       Ceftazidime                  4           S       Levofloxacin                 <=1         S       Amikacin                     >32         R       Aztreonam                    <=4         S       Cefepime                     16          I       Ciprofloxacin                <=0.5       S       Gentamicin                   >8          R       Imipenem                     <=1         S       Meropenem                    <=1         S       Piperacillin/Tazobactam      <=8         S       Tobramycin                   >8          R                                            Stenotrophomonas                                    maltophilia                                    #2                                      SHOBHA        MINGHISLAINE                                    ________    ________       Ceftazidime                  8           S       Levofloxacin                 <=1         S       Trimethoprim/Sulfa           <=0.5/9.5   S   82F PMH 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 8/2023 for acute bronchiectasis exacerbation now presenting with AMS and admitted to stroke service. Pulm consulted for management of possible bronchiectasis exacerbation.    2019 sputum culture:   Pseudomonas           aeruginosa                                      SHOBHA        MINGHISLAINE                                    ________    ________       Ceftazidime                  4           S       Levofloxacin                 <=1         S       Amikacin                     >32         R       Aztreonam                    <=4         S       Cefepime                     16          I       Ciprofloxacin                <=0.5       S       Gentamicin                   >8          R       Imipenem                     <=1         S       Meropenem                    <=1         S       Piperacillin/Tazobactam      <=8         S       Tobramycin                   >8          R                                            Stenotrophomonas                                    maltophilia                                    #2                                      SHOBHA        MINGHISLAINE                                    ________    ________       Ceftazidime                  8           S       Levofloxacin                 <=1         S       Trimethoprim/Sulfa           <=0.5/9.5   S

## 2023-09-19 NOTE — PATIENT PROFILE ADULT - NSPROMEDSADMININFO_GEN_A_NUR
pt states she sometimes takes her pills crushed/crush pills for administration/difficulty swallowing pills

## 2023-09-19 NOTE — ED ADULT NURSE REASSESSMENT NOTE - NS ED NURSE REASSESS COMMENT FT1
Stroke code called at 0315. Pt transported to CT on cardiac monitor with stroke team and RN. PIV placed and labs sent. BGL completed, please see documentation.

## 2023-09-19 NOTE — CONSULT NOTE ADULT - SUBJECTIVE AND OBJECTIVE BOX
HPI  "82y Female with PMHx of HTN, afib (not on AC due to fall risk), recent hospitalization for PRES (5-6/2023), recently discharged from [facility] 8/2023 with acute exacerbation of severe bronchiectasis and respiratory failure presents with several days of AMS and new diplopia 9/18.     Daughter noticed since 9/15 after starting IV abx, patient has been more tired and exhausted with some agitation. It has progressed over the past few days to generalized confusion (being "out of it", not know where certain rooms are in the house, dropping items, not knowing common everyday information) similar to how she initially presented with PRES. Patient told daughter she was seeing double of items in the house around 10pm.     ED: /86. SpO2 84% RA, requiring 3L NC with improvement to 90%. C/o of shortness of breath and difficulty breathing while on supplemental oxygen. Neuro exam significant for decreased peripheral vision bitemporal, but no clear visual field cut, also inconsistent vision exam. Otherwise nonfocal. CT with no acute hemorrhage, with chronic b/l lentiform nuclei and right caudate infarcts. CT angio and perfusion deferred due to contrast allergy received contrast during angiogram in the past with intense flushing of head).    On repeat visual exam, patient notes that she sees double of items, but unable to tell if horizontal or vertical. She is unable to discern at time whether truly double vs blurry, nor transient or consistent diplopia since onset. At times, when testing visual fields, patient struggles to count fingers peripherally and centrally b/l (unclear whether from true field cut vs complete loss of vision vs diplopia causing difficulty counting). Patient also sees items moving around when they are actually stationary. Daughter states that since PRES, patient's vision has not returned to baseline and has been blurry. They have tried to obtain glasses, however on vision testing, Rx was never consistent so was advised to re-test vision after it has "settled".    (19 Sep 2023 05:59)"    82F w HTN, AF not on AC d/t fall risk, hospitalized for PRES 05-06/2023, recent DC 8/2023 for acute hypoxemic respiratory failure - to home on 4L NC w follow-up w Dr. Foster for exacerbation of bronchiectasis, recently started on 2wk course of IV cefepime outpatient by Dr. Foster - unclear timing of initiation, p/w diplopia, encephalopathy    #AFib - lopressor 50 BID  #Asthma - on singular 5mg BID  #Bronchiectasis - seen by pulm. Need to ascertain current regimen of IV Abx and how far along she is in course. Was previously dc w PO bactrim and cipro in 8/202  #HTN      PAST MEDICAL & SURGICAL HISTORY:  Bronchiectasis      History of Pseudomonas pneumonia      HTN (hypertension)      Posterior reversible encephalopathy syndrome      Atrial fibrillation      No significant past surgical history          Home Meds: Home Medications:  azelastine 205.5 mcg/inh (0.15%) nasal spray: 2 spray(s) intranasally 2 times a day (19 Sep 2023 05:56)  cefepime 2 g injection: 2 gram(s) by continuous intravenous infusion 2 times a day (19 Sep 2023 06:02)  Lopressor 50 mg oral tablet: 1 orally 2 times a day (19 Sep 2023 05:56)  montelukast 10 mg oral tablet: 0.5 tab(s) orally 2 times a day (19 Sep 2023 05:56)    Allergies: Allergies    Ceclor (Rash)  IV Contrast (Unknown)  Levaquin (Swelling)  Augmentin (Short breath; Rash)    Intolerances      Soc:   Advanced Directives: Presumed Full Code     CURRENT MEDICATIONS:   --------------------------------------------------------------------------------------  Neurologic Medications    Respiratory Medications  montelukast 10 milliGRAM(s) Oral daily    Cardiovascular Medications  metoprolol tartrate 50 milliGRAM(s) Oral two times a day    Gastrointestinal Medications    Genitourinary Medications    Hematologic/Oncologic Medications  apixaban 2.5 milliGRAM(s) Oral every 12 hours    Antimicrobial/Immunologic Medications    Endocrine/Metabolic Medications    Topical/Other Medications    --------------------------------------------------------------------------------------    VITAL SIGNS, INS/OUTS (last 24 hours):  --------------------------------------------------------------------------------------  ICU Vital Signs Last 24 Hrs  T(C): 36.4 (19 Sep 2023 02:56), Max: 36.4 (19 Sep 2023 02:56)  T(F): 97.6 (19 Sep 2023 02:56), Max: 97.6 (19 Sep 2023 02:56)  HR: 60 (19 Sep 2023 08:20) (60 - 80)  BP: 157/80 (19 Sep 2023 08:20) (156/88 - 168/86)  BP(mean): 111 (19 Sep 2023 08:20) (111 - 115)  ABP: --  ABP(mean): --  RR: 20 (19 Sep 2023 08:20) (18 - 20)  SpO2: 97% (19 Sep 2023 08:20) (90% - 97%)    O2 Parameters below as of 19 Sep 2023 08:20  Patient On (Oxygen Delivery Method): nasal cannula  O2 Flow (L/min): 2        I&O's Summary    --------------------------------------------------------------------------------------    EXAM:      LABS  --------------------------------------------------------------------------------------  Labs:  CAPILLARY BLOOD GLUCOSE      POCT Blood Glucose.: 90 mg/dL (19 Sep 2023 03:23)                          12.8   7.45  )-----------( 230      ( 19 Sep 2023 03:25 )             37.8       Auto Neutrophil %: 59.5 % (09-19-23 @ 03:25)  Auto Immature Granulocyte %: 0.4 % (09-19-23 @ 03:25)    09-19    135  |  98  |  16  ----------------------------<  103<H>  4.1   |  29  |  0.64      Calcium: 9.4 mg/dL (09-19-23 @ 03:25)      LFTs:             7.6  | 0.3  | 23       ------------------[137     ( 19 Sep 2023 03:25 )  3.9  | x    | 31          Lipase:x      Amylase:x             Coags:     11.3   ----< 0.99    ( 19 Sep 2023 03:25 )     27.0                Urinalysis Basic - ( 19 Sep 2023 03:25 )    Color: x / Appearance: x / SG: x / pH: x  Gluc: 103 mg/dL / Ketone: x  / Bili: x / Urobili: x   Blood: x / Protein: x / Nitrite: x   Leuk Esterase: x / RBC: x / WBC x   Sq Epi: x / Non Sq Epi: x / Bacteria: x          --------------------------------------------------------------------------------------    OTHER LABS    IMAGING RESULTS  ****************     HPI  "82y Female with PMHx of HTN, afib (not on AC due to fall risk), recent hospitalization for PRES (5-6/2023), recently discharged from [facility] 8/2023 with acute exacerbation of severe bronchiectasis and respiratory failure presents with several days of AMS and new diplopia 9/18.     Daughter noticed since 9/15 after starting IV abx, patient has been more tired and exhausted with some agitation. It has progressed over the past few days to generalized confusion (being "out of it", not know where certain rooms are in the house, dropping items, not knowing common everyday information) similar to how she initially presented with PRES. Patient told daughter she was seeing double of items in the house around 10pm.     ED: /86. SpO2 84% RA, requiring 3L NC with improvement to 90%. C/o of shortness of breath and difficulty breathing while on supplemental oxygen. Neuro exam significant for decreased peripheral vision bitemporal, but no clear visual field cut, also inconsistent vision exam. Otherwise nonfocal. CT with no acute hemorrhage, with chronic b/l lentiform nuclei and right caudate infarcts. CT angio and perfusion deferred due to contrast allergy received contrast during angiogram in the past with intense flushing of head).    On repeat visual exam, patient notes that she sees double of items, but unable to tell if horizontal or vertical. She is unable to discern at time whether truly double vs blurry, nor transient or consistent diplopia since onset. At times, when testing visual fields, patient struggles to count fingers peripherally and centrally b/l (unclear whether from true field cut vs complete loss of vision vs diplopia causing difficulty counting). Patient also sees items moving around when they are actually stationary. Daughter states that since PRES, patient's vision has not returned to baseline and has been blurry. They have tried to obtain glasses, however on vision testing, Rx was never consistent so was advised to re-test vision after it has "settled".    (19 Sep 2023 05:59)"    82F w HTN, AF not on AC d/t fall risk, hospitalized for PRES 05-06/2023, recent DC 8/2023 for acute hypoxemic respiratory failure - to home on 4L NC w follow-up w Dr. Foster for exacerbation of bronchiectasis, recently started on 2wk course of IV cefepime outpatient by Dr. Foster, p/w diplopia, encephalopathy    Pt p/w recent vision changes but pt unable to specify further specific changes. Sitting upright in bed - reports chronic cough and some chest wall/rib discomfort worsening w cough. No fever, chills, sweats. Satting 98% on Room air. Eating wo N/V/Abd pain.     ROS: 12 point ROS reviewed and otherwise negative as per PHI  FH: No DVT/PE  SH: Non smoker      PAST MEDICAL & SURGICAL HISTORY:  Bronchiectasis      History of Pseudomonas pneumonia      HTN (hypertension)      Posterior reversible encephalopathy syndrome      Atrial fibrillation      No significant past surgical history          Home Meds: Home Medications:  azelastine 205.5 mcg/inh (0.15%) nasal spray: 2 spray(s) intranasally 2 times a day (19 Sep 2023 05:56)  cefepime 2 g injection: 2 gram(s) by continuous intravenous infusion 2 times a day (19 Sep 2023 06:02)  Lopressor 50 mg oral tablet: 1 orally 2 times a day (19 Sep 2023 05:56)  montelukast 10 mg oral tablet: 0.5 tab(s) orally 2 times a day (19 Sep 2023 05:56)    Allergies: Allergies    Ceclor (Rash)  IV Contrast (Unknown)  Levaquin (Swelling)  Augmentin (Short breath; Rash)    Intolerances      Soc:   Advanced Directives: Presumed Full Code     CURRENT MEDICATIONS:   --------------------------------------------------------------------------------------  Neurologic Medications    Respiratory Medications  montelukast 10 milliGRAM(s) Oral daily    Cardiovascular Medications  metoprolol tartrate 50 milliGRAM(s) Oral two times a day    Gastrointestinal Medications    Genitourinary Medications    Hematologic/Oncologic Medications  apixaban 2.5 milliGRAM(s) Oral every 12 hours    Antimicrobial/Immunologic Medications    Endocrine/Metabolic Medications    Topical/Other Medications    --------------------------------------------------------------------------------------    VITAL SIGNS, INS/OUTS (last 24 hours):  --------------------------------------------------------------------------------------  ICU Vital Signs Last 24 Hrs  T(C): 36.4 (19 Sep 2023 02:56), Max: 36.4 (19 Sep 2023 02:56)  T(F): 97.6 (19 Sep 2023 02:56), Max: 97.6 (19 Sep 2023 02:56)  HR: 60 (19 Sep 2023 08:20) (60 - 80)  BP: 157/80 (19 Sep 2023 08:20) (156/88 - 168/86)  BP(mean): 111 (19 Sep 2023 08:20) (111 - 115)  ABP: --  ABP(mean): --  RR: 20 (19 Sep 2023 08:20) (18 - 20)  SpO2: 97% (19 Sep 2023 08:20) (90% - 97%)    O2 Parameters below as of 19 Sep 2023 08:20  Patient On (Oxygen Delivery Method): nasal cannula  O2 Flow (L/min): 2        I&O's Summary    --------------------------------------------------------------------------------------    EXAM:  GEN: thin female in NAD on RA  HEENT: NC/AT, MMM  CV: RRR, nml S1S2, no murmurs  PULM: nml effort, there is some dry rales in R mid-lower lung fields wo wheezing or rhonchi  ABD: Soft, non-distended, NABS, non-tender  NEURO  A/O x self, LHH, but not date.   No Droop, EOMI, 5/5 in BUE and 4+/5 in BLE. Sensation intact  PSYCH: Appropriate      LABS  --------------------------------------------------------------------------------------  Labs:  CAPILLARY BLOOD GLUCOSE      POCT Blood Glucose.: 90 mg/dL (19 Sep 2023 03:23)                          12.8   7.45  )-----------( 230      ( 19 Sep 2023 03:25 )             37.8       Auto Neutrophil %: 59.5 % (09-19-23 @ 03:25)  Auto Immature Granulocyte %: 0.4 % (09-19-23 @ 03:25)    09-19    135  |  98  |  16  ----------------------------<  103<H>  4.1   |  29  |  0.64      Calcium: 9.4 mg/dL (09-19-23 @ 03:25)      LFTs:             7.6  | 0.3  | 23       ------------------[137     ( 19 Sep 2023 03:25 )  3.9  | x    | 31          Lipase:x      Amylase:x             Coags:     11.3   ----< 0.99    ( 19 Sep 2023 03:25 )     27.0                Urinalysis Basic - ( 19 Sep 2023 03:25 )    Color: x / Appearance: x / SG: x / pH: x  Gluc: 103 mg/dL / Ketone: x  / Bili: x / Urobili: x   Blood: x / Protein: x / Nitrite: x   Leuk Esterase: x / RBC: x / WBC x   Sq Epi: x / Non Sq Epi: x / Bacteria: x          --------------------------------------------------------------------------------------    OTHER LABS    IMAGING RESULTS  ****************

## 2023-09-19 NOTE — H&P ADULT - NSICDXPASTMEDICALHX_GEN_ALL_CORE_FT
PAST MEDICAL HISTORY:  Atrial fibrillation     Bronchiectasis     History of Pseudomonas pneumonia     HTN (hypertension)     Posterior reversible encephalopathy syndrome

## 2023-09-19 NOTE — ED ADULT NURSE NOTE - NSFALLHARMRISKINTERV_ED_ALL_ED
Assistance OOB with selected safe patient handling equipment if applicable/Communicate risk of Fall with Harm to all staff, patient, and family/Monitor gait and stability/Provide patient with walking aids/Provide visual cue: red socks, yellow wristband, yellow gown, etc/Reinforce activity limits and safety measures with patient and family/Bed in lowest position, wheels locked, appropriate side rails in place/Call bell, personal items and telephone in reach/Instruct patient to call for assistance before getting out of bed/chair/stretcher/Non-slip footwear applied when patient is off stretcher/[place] to call system/Physically safe environment - no spills, clutter or unnecessary equipment/Purposeful Proactive Rounding/Room/bathroom lighting operational, light cord in reach

## 2023-09-19 NOTE — STROKE CODE NOTE - PATIENT LAST KNOWN
Known Opioid Counseling: I discussed with the patient the potential side effects of opioids including but not limited to addiction, altered mental status, and depression. I stressed avoiding alcohol, benzodiazepines, muscle relaxants and sleep aids unless specifically okayed by a physician. The patient verbalized understanding of the proper use and possible adverse effects of opioids. All of the patient's questions and concerns were addressed. They were instructed to flush the remaining pills down the toilet if they did not need them for pain.

## 2023-09-19 NOTE — ED ADULT NURSE NOTE - NS PRO PASSIVE SMOKE EXP
Unknown normal... Well appearing, well nourished, awake, alert, oriented to person, place, time/situation and in no apparent distress.

## 2023-09-19 NOTE — CONSULT NOTE ADULT - SUBJECTIVE AND OBJECTIVE BOX
*INCOMPLETE     PULMONARY SERVICE INITIAL CONSULT NOTE    HPI:            REVIEW OF SYSTEMS:  All additional ROS negative.    PAST MEDICAL & SURGICAL HISTORY:  Bronchiectasis      History of Pseudomonas pneumonia      HTN (hypertension)      Posterior reversible encephalopathy syndrome      Atrial fibrillation      No significant past surgical history          FAMILY HISTORY:      SOCIAL HISTORY:  Smoking Status: [ ] Current, [ ] Former, [ ] Never  Pack Years:    MEDICATIONS:  Pulmonary:  montelukast 10 milliGRAM(s) Oral daily    Antimicrobials:    Anticoagulants:  apixaban 2.5 milliGRAM(s) Oral every 12 hours    Onc:    GI/:    Endocrine:    Cardiac:  metoprolol tartrate 50 milliGRAM(s) Oral two times a day    Other Medications:      Allergies    Ceclor (Rash)  IV Contrast (Unknown)  Levaquin (Swelling)  Augmentin (Short breath; Rash)    Intolerances        Vital Signs Last 24 Hrs  T(C): 36.9 (19 Sep 2023 09:55), Max: 36.9 (19 Sep 2023 09:55)  T(F): 98.4 (19 Sep 2023 09:55), Max: 98.4 (19 Sep 2023 09:55)  HR: 60 (19 Sep 2023 08:20) (60 - 80)  BP: 157/80 (19 Sep 2023 08:20) (156/88 - 168/86)  BP(mean): 111 (19 Sep 2023 08:20) (111 - 115)  RR: 20 (19 Sep 2023 08:20) (18 - 20)  SpO2: 97% (19 Sep 2023 08:20) (90% - 97%)    Parameters below as of 19 Sep 2023 08:20  Patient On (Oxygen Delivery Method): nasal cannula  O2 Flow (L/min): 2          PHYSICAL EXAM:    Head: NC/AT  EENT: PERRL, anicteric sclera; oropharynx clear, MMM  Neck: supple, no appreciable JVD  Respiratory: CTA B/L; no W/R/R  Cardiovascular: +S1/S2, RRR  Gastrointestinal: soft, NT/ND  Extremities: WWP; no edema, clubbing or cyanosis  Vascular: 2+ radial pulses B/L  Neurological: awake and alert; KAHN    LABS:      CBC Full  -  ( 19 Sep 2023 03:25 )  WBC Count : 7.45 K/uL  RBC Count : 3.83 M/uL  Hemoglobin : 12.8 g/dL  Hematocrit : 37.8 %  Platelet Count - Automated : 230 K/uL  Mean Cell Volume : 98.7 fl  Mean Cell Hemoglobin : 33.4 pg  Mean Cell Hemoglobin Concentration : 33.9 gm/dL  Auto Neutrophil # : 4.44 K/uL  Auto Lymphocyte # : 2.10 K/uL  Auto Monocyte # : 0.61 K/uL  Auto Eosinophil # : 0.22 K/uL  Auto Basophil # : 0.05 K/uL  Auto Neutrophil % : 59.5 %  Auto Lymphocyte % : 28.2 %  Auto Monocyte % : 8.2 %  Auto Eosinophil % : 3.0 %  Auto Basophil % : 0.7 %    09-19    135  |  98  |  16  ----------------------------<  103<H>  4.1   |  29  |  0.64    Ca    9.4      19 Sep 2023 03:25    TPro  7.6  /  Alb  3.9  /  TBili  0.3  /  DBili  x   /  AST  23  /  ALT  31  /  AlkPhos  137<H>  09-19    PT/INR - ( 19 Sep 2023 03:25 )   PT: 11.3 sec;   INR: 0.99          PTT - ( 19 Sep 2023 03:25 )  PTT:27.0 sec      Urinalysis Basic - ( 19 Sep 2023 03:25 )    Color: x / Appearance: x / SG: x / pH: x  Gluc: 103 mg/dL / Ketone: x  / Bili: x / Urobili: x   Blood: x / Protein: x / Nitrite: x   Leuk Esterase: x / RBC: x / WBC x   Sq Epi: x / Non Sq Epi: x / Bacteria: x                RADIOLOGY & ADDITIONAL STUDIES: INCOMPLETE     PULMONARY SERVICE INITIAL CONSULT NOTE    HPI:    82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 Daughter noticed since 9/15 patient has been more tired and exhausted with some agitation. It has progressed over the past few days to generalized confusion similar to how she initially presented with PRES. Now admitted to neurology service to r/o stroke. Pt also reports dyspnea at rest and with exertion and worsening nonproductive cough. After last admission, pt completed PO abx course: bactrim DS for stenotrophomonas and ciprofloxacin 500mg q12 for pseudomonas. Pt also started on inhaled cayston and discharged with airway clearance device. Seen by Dr Foster on 9/5/23, recommended IV cefepime due to persistent symptoms despite completing PO abx. PICC placed on 9/13/23. No fever, chills. No cp, no recent travel, no history of dvt or pe, no leg pain or leg swelling. Never smoker. Does not use inhalers at home.     ED Course  VS HR 80 /86. SpO2 84% RA, requiring 3L NC with improvement to 90%  Labs CBC, CMP unremarkable. HCO3 29. VBG: pH 7.43, pCO2 48, HCO3 32  Imaging CT Chest noncon: diffuse b/l bronchiectasis w/ distal mucoid impaction and scattered tree-in-bud opacities. Small L pleural effusion. CTH: no large transcortical infarct or definite acute intracranial hemorrhage.        REVIEW OF SYSTEMS:  All additional ROS negative.    PAST MEDICAL & SURGICAL HISTORY:  Bronchiectasis      History of Pseudomonas pneumonia      HTN (hypertension)      Posterior reversible encephalopathy syndrome      Atrial fibrillation      No significant past surgical history          FAMILY HISTORY:      SOCIAL HISTORY:  Smoking Status: [ ] Current, [ ] Former, [x] Never  Pack Years:    MEDICATIONS:  Pulmonary:  montelukast 10 milliGRAM(s) Oral daily    Antimicrobials:    Anticoagulants:  apixaban 2.5 milliGRAM(s) Oral every 12 hours    Onc:    GI/:    Endocrine:    Cardiac:  metoprolol tartrate 50 milliGRAM(s) Oral two times a day    Other Medications:      Allergies    Ceclor (Rash)  IV Contrast (Unknown)  Levaquin (Swelling)  Augmentin (Short breath; Rash)    Intolerances    Vital Signs Last 24 Hrs  T(C): 36.9 (19 Sep 2023 09:55), Max: 36.9 (19 Sep 2023 09:55)  T(F): 98.4 (19 Sep 2023 09:55), Max: 98.4 (19 Sep 2023 09:55)  HR: 60 (19 Sep 2023 08:20) (60 - 80)  BP: 157/80 (19 Sep 2023 08:20) (156/88 - 168/86)  BP(mean): 111 (19 Sep 2023 08:20) (111 - 115)  RR: 20 (19 Sep 2023 08:20) (18 - 20)  SpO2: 97% (19 Sep 2023 08:20) (90% - 97%)    Parameters below as of 19 Sep 2023 08:20  Patient On (Oxygen Delivery Method): nasal cannula  O2 Flow (L/min): 2      PHYSICAL EXAM:    Head: NC/AT  EENT: Pupils reactive, anicteric sclera  Neck: supple, no appreciable JVD  Respiratory: b/l crackles  Cardiovascular: +S1/S2, RRR  Gastrointestinal: soft, NT/ND  Extremities: WWP  Vascular: 2+ radial pulses B/L  Neurological: awake and alert    LABS:      CBC Full  -  ( 19 Sep 2023 03:25 )  WBC Count : 7.45 K/uL  RBC Count : 3.83 M/uL  Hemoglobin : 12.8 g/dL  Hematocrit : 37.8 %  Platelet Count - Automated : 230 K/uL  Mean Cell Volume : 98.7 fl  Mean Cell Hemoglobin : 33.4 pg  Mean Cell Hemoglobin Concentration : 33.9 gm/dL  Auto Neutrophil # : 4.44 K/uL  Auto Lymphocyte # : 2.10 K/uL  Auto Monocyte # : 0.61 K/uL  Auto Eosinophil # : 0.22 K/uL  Auto Basophil # : 0.05 K/uL  Auto Neutrophil % : 59.5 %  Auto Lymphocyte % : 28.2 %  Auto Monocyte % : 8.2 %  Auto Eosinophil % : 3.0 %  Auto Basophil % : 0.7 %    09-19    135  |  98  |  16  ----------------------------<  103<H>  4.1   |  29  |  0.64    Ca    9.4      19 Sep 2023 03:25    TPro  7.6  /  Alb  3.9  /  TBili  0.3  /  DBili  x   /  AST  23  /  ALT  31  /  AlkPhos  137<H>  09-19    PT/INR - ( 19 Sep 2023 03:25 )   PT: 11.3 sec;   INR: 0.99          PTT - ( 19 Sep 2023 03:25 )  PTT:27.0 sec      Urinalysis Basic - ( 19 Sep 2023 03:25 )    Color: x / Appearance: x / SG: x / pH: x  Gluc: 103 mg/dL / Ketone: x  / Bili: x / Urobili: x   Blood: x / Protein: x / Nitrite: x   Leuk Esterase: x / RBC: x / WBC x   Sq Epi: x / Non Sq Epi: x / Bacteria: x                RADIOLOGY & ADDITIONAL STUDIES: INCOMPLETE     PULMONARY SERVICE INITIAL CONSULT NOTE    HPI:    82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 Daughter noticed since 9/15 patient has been more tired and exhausted with some agitation. It has progressed over the past few days to generalized confusion similar to how she initially presented with PRES. Now admitted to neurology service to r/o stroke. Pt also reports dyspnea at rest and with exertion and worsening nonproductive cough. Pt discharged from [facility] with inhaled cayston, airway clearance device, and PO abx course: bactrim 800-160mg  for stenotrophomonas and ciprofloxacin 500mg q12 for pseudomonas. Seen by Dr Foster on 9/5/23, recommended IV cefepime due to persistent symptoms despite completing PO abx. PICC placed on 9/13/23. No fever, chills,. No cp, no recent travel, no history of dvt or pe, no leg pain or leg swelling. Never smoker. Does not use inhalers at home.     ED Course  VS HR 80 /86. SpO2 84% RA, requiring 3L NC with improvement to 90%  Labs CBC, CMP unremarkable. HCO3 29. VBG: pH 7.43, pCO2 48, HCO3 32  Imaging CT Chest noncon: diffuse b/l bronchiectasis w/ distal mucoid impaction and scattered tree-in-bud opacities. Small L pleural effusion. CTH: no large transcortical infarct or definite acute intracranial hemorrhage.        REVIEW OF SYSTEMS:  All additional ROS negative.    PAST MEDICAL & SURGICAL HISTORY:  Bronchiectasis      History of Pseudomonas pneumonia      HTN (hypertension)      Posterior reversible encephalopathy syndrome      Atrial fibrillation      No significant past surgical history          FAMILY HISTORY:      SOCIAL HISTORY:  Smoking Status: [ ] Current, [ ] Former, [x] Never  Pack Years:    MEDICATIONS:  Pulmonary:  montelukast 10 milliGRAM(s) Oral daily    Antimicrobials:    Anticoagulants:  apixaban 2.5 milliGRAM(s) Oral every 12 hours    Onc:    GI/:    Endocrine:    Cardiac:  metoprolol tartrate 50 milliGRAM(s) Oral two times a day    Other Medications:      Allergies    Ceclor (Rash)  IV Contrast (Unknown)  Levaquin (Swelling)  Augmentin (Short breath; Rash)    Intolerances    Vital Signs Last 24 Hrs  T(C): 36.9 (19 Sep 2023 09:55), Max: 36.9 (19 Sep 2023 09:55)  T(F): 98.4 (19 Sep 2023 09:55), Max: 98.4 (19 Sep 2023 09:55)  HR: 60 (19 Sep 2023 08:20) (60 - 80)  BP: 157/80 (19 Sep 2023 08:20) (156/88 - 168/86)  BP(mean): 111 (19 Sep 2023 08:20) (111 - 115)  RR: 20 (19 Sep 2023 08:20) (18 - 20)  SpO2: 97% (19 Sep 2023 08:20) (90% - 97%)    Parameters below as of 19 Sep 2023 08:20  Patient On (Oxygen Delivery Method): nasal cannula  O2 Flow (L/min): 2      PHYSICAL EXAM:    Head: NC/AT  EENT: Pupils reactive, anicteric sclera  Neck: supple, no appreciable JVD  Respiratory: b/l crackles  Cardiovascular: +S1/S2, RRR  Gastrointestinal: soft, NT/ND  Extremities: WWP  Vascular: 2+ radial pulses B/L  Neurological: awake and alert    LABS:      CBC Full  -  ( 19 Sep 2023 03:25 )  WBC Count : 7.45 K/uL  RBC Count : 3.83 M/uL  Hemoglobin : 12.8 g/dL  Hematocrit : 37.8 %  Platelet Count - Automated : 230 K/uL  Mean Cell Volume : 98.7 fl  Mean Cell Hemoglobin : 33.4 pg  Mean Cell Hemoglobin Concentration : 33.9 gm/dL  Auto Neutrophil # : 4.44 K/uL  Auto Lymphocyte # : 2.10 K/uL  Auto Monocyte # : 0.61 K/uL  Auto Eosinophil # : 0.22 K/uL  Auto Basophil # : 0.05 K/uL  Auto Neutrophil % : 59.5 %  Auto Lymphocyte % : 28.2 %  Auto Monocyte % : 8.2 %  Auto Eosinophil % : 3.0 %  Auto Basophil % : 0.7 %    09-19    135  |  98  |  16  ----------------------------<  103<H>  4.1   |  29  |  0.64    Ca    9.4      19 Sep 2023 03:25    TPro  7.6  /  Alb  3.9  /  TBili  0.3  /  DBili  x   /  AST  23  /  ALT  31  /  AlkPhos  137<H>  09-19    PT/INR - ( 19 Sep 2023 03:25 )   PT: 11.3 sec;   INR: 0.99          PTT - ( 19 Sep 2023 03:25 )  PTT:27.0 sec      Urinalysis Basic - ( 19 Sep 2023 03:25 )    Color: x / Appearance: x / SG: x / pH: x  Gluc: 103 mg/dL / Ketone: x  / Bili: x / Urobili: x   Blood: x / Protein: x / Nitrite: x   Leuk Esterase: x / RBC: x / WBC x   Sq Epi: x / Non Sq Epi: x / Bacteria: x                RADIOLOGY & ADDITIONAL STUDIES: HPI:    82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 8/2023 with acute exacerbation of severe bronchiectasis and respiratory failure presents with several days of AMS and new diplopia 9/18. Daughter noticed since 9/15 patient has been more tired and exhausted with some agitation. It has progressed over the past few days to generalized confusion similar to how she initially presented with PRES. Now admitted to neurology service to r/o stroke. Pt also reports dyspnea at rest and with exertion and worsening nonproductive cough. Pt discharged from [facility] with inhaled cayston, airway clearance device, and PO abx course: bactrim 800-160mg  for stenotrophomonas and ciprofloxacin 500mg q12 for pseudomonas. Seen by Dr Foster on 9/5/23, recommended IV cefepime due to persistent symptoms despite completing PO abx. PICC placed on 9/13/23. No fever, chills,. No cp, no recent travel, no history of dvt or pe, no leg pain or leg swelling. Never smoker. Does not use inhalers at home.     ED Course  VS HR 80 /86. SpO2 84% RA, requiring 3L NC with improvement to 90%  Labs CBC, CMP unremarkable. HCO3 29. VBG: pH 7.43, pCO2 48, HCO3 32  Imaging CT Chest noncon: diffuse b/l bronchiectasis w/ distal mucoid impaction and scattered tree-in-bud opacities. Small L pleural effusion. CTH: no large transcortical infarct or definite acute intracranial hemorrhage.    REVIEW OF SYSTEMS:  All additional ROS negative.    PAST MEDICAL & SURGICAL HISTORY:  Bronchiectasis      History of Pseudomonas pneumonia      HTN (hypertension)      Posterior reversible encephalopathy syndrome      Atrial fibrillation      No significant past surgical history          FAMILY HISTORY:      SOCIAL HISTORY:  Smoking Status: [ ] Current, [ ] Former, [x] Never  Pack Years:    MEDICATIONS:  Pulmonary:  montelukast 10 milliGRAM(s) Oral daily    Antimicrobials:    Anticoagulants:  apixaban 2.5 milliGRAM(s) Oral every 12 hours    Onc:    GI/:    Endocrine:    Cardiac:  metoprolol tartrate 50 milliGRAM(s) Oral two times a day    Other Medications:      Allergies    Ceclor (Rash)  IV Contrast (Unknown)  Levaquin (Swelling)  Augmentin (Short breath; Rash)    Intolerances    Vital Signs Last 24 Hrs  T(C): 36.9 (19 Sep 2023 09:55), Max: 36.9 (19 Sep 2023 09:55)  T(F): 98.4 (19 Sep 2023 09:55), Max: 98.4 (19 Sep 2023 09:55)  HR: 60 (19 Sep 2023 08:20) (60 - 80)  BP: 157/80 (19 Sep 2023 08:20) (156/88 - 168/86)  BP(mean): 111 (19 Sep 2023 08:20) (111 - 115)  RR: 20 (19 Sep 2023 08:20) (18 - 20)  SpO2: 97% (19 Sep 2023 08:20) (90% - 97%)    Parameters below as of 19 Sep 2023 08:20  Patient On (Oxygen Delivery Method): nasal cannula  O2 Flow (L/min): 2      PHYSICAL EXAM:    Head: NC/AT  EENT: Pupils reactive, anicteric sclera  Neck: supple, no appreciable JVD  Respiratory: b/l crackles  Cardiovascular: +S1/S2, RRR  Gastrointestinal: soft, NT/ND  Extremities: WWP  Vascular: 2+ radial pulses B/L  Neurological: awake and alert    LABS:      CBC Full  -  ( 19 Sep 2023 03:25 )  WBC Count : 7.45 K/uL  RBC Count : 3.83 M/uL  Hemoglobin : 12.8 g/dL  Hematocrit : 37.8 %  Platelet Count - Automated : 230 K/uL  Mean Cell Volume : 98.7 fl  Mean Cell Hemoglobin : 33.4 pg  Mean Cell Hemoglobin Concentration : 33.9 gm/dL  Auto Neutrophil # : 4.44 K/uL  Auto Lymphocyte # : 2.10 K/uL  Auto Monocyte # : 0.61 K/uL  Auto Eosinophil # : 0.22 K/uL  Auto Basophil # : 0.05 K/uL  Auto Neutrophil % : 59.5 %  Auto Lymphocyte % : 28.2 %  Auto Monocyte % : 8.2 %  Auto Eosinophil % : 3.0 %  Auto Basophil % : 0.7 %    09-19    135  |  98  |  16  ----------------------------<  103<H>  4.1   |  29  |  0.64    Ca    9.4      19 Sep 2023 03:25    TPro  7.6  /  Alb  3.9  /  TBili  0.3  /  DBili  x   /  AST  23  /  ALT  31  /  AlkPhos  137<H>  09-19    PT/INR - ( 19 Sep 2023 03:25 )   PT: 11.3 sec;   INR: 0.99          PTT - ( 19 Sep 2023 03:25 )  PTT:27.0 sec      Urinalysis Basic - ( 19 Sep 2023 03:25 )    Color: x / Appearance: x / SG: x / pH: x  Gluc: 103 mg/dL / Ketone: x  / Bili: x / Urobili: x   Blood: x / Protein: x / Nitrite: x   Leuk Esterase: x / RBC: x / WBC x   Sq Epi: x / Non Sq Epi: x / Bacteria: x                RADIOLOGY & ADDITIONAL STUDIES: HPI:    82F 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 Daughter noticed since 9/15 patient has been more tired and exhausted with some agitation. It has progressed over the past few days to generalized confusion similar to how she initially presented with PRES. Now admitted to neurology service to r/o stroke. Pt also reports dyspnea at rest and with exertion and chronic nonproductive cough. Pt discharged from [facility] with inhaled cayston, airway clearance device, and PO abx course: bactrim 800-160mg  for stenotrophomonas and ciprofloxacin 500mg q12 for pseudomonas. Seen by Dr Foster on 9/5/23, recommended IV cefepime due to persistent symptoms despite completing PO abx. PICC placed on 9/13/23. No fever, chills, cp, hemoptysis, productive cough. Never smoker. Takes tobramycin inhaler at home. Reports noncompliance to duonebs inhaler.    ED Course  VS HR 80 /86. SpO2 84% RA, requiring 3L NC with improvement to 90%  Labs CBC, CMP unremarkable. HCO3 29. VBG: pH 7.43, pCO2 48, HCO3 32  Imaging CT Chest noncon: diffuse b/l bronchiectasis w/ distal mucoid impaction and scattered tree-in-bud opacities. Small L pleural effusion. CTH: no large transcortical infarct or definite acute intracranial hemorrhage.    REVIEW OF SYSTEMS:  All additional ROS negative.    PAST MEDICAL & SURGICAL HISTORY:  Bronchiectasis      History of Pseudomonas pneumonia      HTN (hypertension)      Posterior reversible encephalopathy syndrome      Atrial fibrillation      No significant past surgical history          FAMILY HISTORY:      SOCIAL HISTORY:  Smoking Status: [ ] Current, [ ] Former, [x] Never  Pack Years:    MEDICATIONS:  Pulmonary:  montelukast 10 milliGRAM(s) Oral daily    Antimicrobials:    Anticoagulants:  apixaban 2.5 milliGRAM(s) Oral every 12 hours    Onc:    GI/:    Endocrine:    Cardiac:  metoprolol tartrate 50 milliGRAM(s) Oral two times a day    Other Medications:      Allergies    Ceclor (Rash)  IV Contrast (Unknown)  Levaquin (Swelling)  Augmentin (Short breath; Rash)    Intolerances    Vital Signs Last 24 Hrs  T(C): 36.9 (19 Sep 2023 09:55), Max: 36.9 (19 Sep 2023 09:55)  T(F): 98.4 (19 Sep 2023 09:55), Max: 98.4 (19 Sep 2023 09:55)  HR: 60 (19 Sep 2023 08:20) (60 - 80)  BP: 157/80 (19 Sep 2023 08:20) (156/88 - 168/86)  BP(mean): 111 (19 Sep 2023 08:20) (111 - 115)  RR: 20 (19 Sep 2023 08:20) (18 - 20)  SpO2: 97% (19 Sep 2023 08:20) (90% - 97%)    Parameters below as of 19 Sep 2023 08:20  Patient On (Oxygen Delivery Method): nasal cannula  O2 Flow (L/min): 2      PHYSICAL EXAM:    Head: NC/AT  EENT: Pupils reactive, anicteric sclera  Neck: supple, no appreciable JVD  Respiratory: b/l crackles  Cardiovascular: +S1/S2, RRR  Gastrointestinal: soft, NT/ND  Extremities: WWP  Vascular: 2+ radial pulses B/L  Neurological: awake and alert    LABS:      CBC Full  -  ( 19 Sep 2023 03:25 )  WBC Count : 7.45 K/uL  RBC Count : 3.83 M/uL  Hemoglobin : 12.8 g/dL  Hematocrit : 37.8 %  Platelet Count - Automated : 230 K/uL  Mean Cell Volume : 98.7 fl  Mean Cell Hemoglobin : 33.4 pg  Mean Cell Hemoglobin Concentration : 33.9 gm/dL  Auto Neutrophil # : 4.44 K/uL  Auto Lymphocyte # : 2.10 K/uL  Auto Monocyte # : 0.61 K/uL  Auto Eosinophil # : 0.22 K/uL  Auto Basophil # : 0.05 K/uL  Auto Neutrophil % : 59.5 %  Auto Lymphocyte % : 28.2 %  Auto Monocyte % : 8.2 %  Auto Eosinophil % : 3.0 %  Auto Basophil % : 0.7 %    09-19    135  |  98  |  16  ----------------------------<  103<H>  4.1   |  29  |  0.64    Ca    9.4      19 Sep 2023 03:25    TPro  7.6  /  Alb  3.9  /  TBili  0.3  /  DBili  x   /  AST  23  /  ALT  31  /  AlkPhos  137<H>  09-19    PT/INR - ( 19 Sep 2023 03:25 )   PT: 11.3 sec;   INR: 0.99          PTT - ( 19 Sep 2023 03:25 )  PTT:27.0 sec      Urinalysis Basic - ( 19 Sep 2023 03:25 )    Color: x / Appearance: x / SG: x / pH: x  Gluc: 103 mg/dL / Ketone: x  / Bili: x / Urobili: x   Blood: x / Protein: x / Nitrite: x   Leuk Esterase: x / RBC: x / WBC x   Sq Epi: x / Non Sq Epi: x / Bacteria: x                RADIOLOGY & ADDITIONAL STUDIES:

## 2023-09-19 NOTE — OCCUPATIONAL THERAPY INITIAL EVALUATION ADULT - DIAGNOSIS, OT EVAL
Pt p/w confusion (reports happens at baseline) now with new onset of double vision/blurry vision affecting ADLs and IADLs

## 2023-09-19 NOTE — CONSULT NOTE ADULT - ASSESSMENT
82y Female with PMHx of HTN, afib (not on AC due to fall risk), recent hospitalization for PRES (5-6/2023), recently discharged from [facility] 8/2023 with acute exacerbation of severe bronchiectasis and respiratory failure presents with several days of AMS and new diplopia 9/18.  82y Female with PMHx of HTN, afib (not on AC due to fall risk), recent hospitalization for PRES (5-6/2023), recently discharged from [facility] 8/2023 with acute exacerbation of severe bronchiectasis and respiratory failure presents with several days of AMS and new diplopia 9/18. NIHSS 1 for bitemporal visual deficit. CTH with no significant findings. CTA and perfusion deferred due to contrast allergy.     - admit to stroke neurology for stroke r/o i/s/o afib not on AC  - will start eliquis (patient and daughter agrees with AC while patient in the hospital. Will want to readdress during discharge due to fall risk at home)  - MRI    Case discussed with Neurology Attending Dr. Shah

## 2023-09-20 LAB
A1C WITH ESTIMATED AVERAGE GLUCOSE RESULT: 5.5 % — SIGNIFICANT CHANGE UP (ref 4–5.6)
ANION GAP SERPL CALC-SCNC: 7 MMOL/L — SIGNIFICANT CHANGE UP (ref 5–17)
APTT BLD: 30.8 SEC — SIGNIFICANT CHANGE UP (ref 24.5–35.6)
BASE EXCESS BLDA CALC-SCNC: 4.8 MMOL/L — HIGH (ref -2–3)
BASOPHILS # BLD AUTO: 0.07 K/UL — SIGNIFICANT CHANGE UP (ref 0–0.2)
BASOPHILS NFR BLD AUTO: 1.3 % — SIGNIFICANT CHANGE UP (ref 0–2)
BUN SERPL-MCNC: 17 MG/DL — SIGNIFICANT CHANGE UP (ref 7–23)
CALCIUM SERPL-MCNC: 8.7 MG/DL — SIGNIFICANT CHANGE UP (ref 8.4–10.5)
CHLORIDE SERPL-SCNC: 102 MMOL/L — SIGNIFICANT CHANGE UP (ref 96–108)
CHOLEST SERPL-MCNC: 209 MG/DL — HIGH
CO2 BLDA-SCNC: 32 MMOL/L — HIGH (ref 19–24)
CO2 SERPL-SCNC: 29 MMOL/L — SIGNIFICANT CHANGE UP (ref 22–31)
CREAT SERPL-MCNC: 0.64 MG/DL — SIGNIFICANT CHANGE UP (ref 0.5–1.3)
EGFR: 89 ML/MIN/1.73M2 — SIGNIFICANT CHANGE UP
EOSINOPHIL # BLD AUTO: 0.11 K/UL — SIGNIFICANT CHANGE UP (ref 0–0.5)
EOSINOPHIL NFR BLD AUTO: 2.1 % — SIGNIFICANT CHANGE UP (ref 0–6)
ESTIMATED AVERAGE GLUCOSE: 111 MG/DL — SIGNIFICANT CHANGE UP (ref 68–114)
FOLATE SERPL-MCNC: 10.3 NG/ML — SIGNIFICANT CHANGE UP
GLUCOSE SERPL-MCNC: 98 MG/DL — SIGNIFICANT CHANGE UP (ref 70–99)
HCO3 BLDA-SCNC: 30 MMOL/L — HIGH (ref 21–28)
HCT VFR BLD CALC: 36.1 % — SIGNIFICANT CHANGE UP (ref 34.5–45)
HDLC SERPL-MCNC: 66 MG/DL — SIGNIFICANT CHANGE UP
HGB BLD-MCNC: 12 G/DL — SIGNIFICANT CHANGE UP (ref 11.5–15.5)
IMM GRANULOCYTES NFR BLD AUTO: 0.2 % — SIGNIFICANT CHANGE UP (ref 0–0.9)
INR BLD: 1.06 — SIGNIFICANT CHANGE UP (ref 0.85–1.18)
LEGIONELLA AG UR QL: NEGATIVE — SIGNIFICANT CHANGE UP
LIPID PNL WITH DIRECT LDL SERPL: 129 MG/DL — HIGH
LYMPHOCYTES # BLD AUTO: 1.38 K/UL — SIGNIFICANT CHANGE UP (ref 1–3.3)
LYMPHOCYTES # BLD AUTO: 25.7 % — SIGNIFICANT CHANGE UP (ref 13–44)
MAGNESIUM SERPL-MCNC: 1.8 MG/DL — SIGNIFICANT CHANGE UP (ref 1.6–2.6)
MCHC RBC-ENTMCNC: 32.8 PG — SIGNIFICANT CHANGE UP (ref 27–34)
MCHC RBC-ENTMCNC: 33.2 GM/DL — SIGNIFICANT CHANGE UP (ref 32–36)
MCV RBC AUTO: 98.6 FL — SIGNIFICANT CHANGE UP (ref 80–100)
MONOCYTES # BLD AUTO: 0.49 K/UL — SIGNIFICANT CHANGE UP (ref 0–0.9)
MONOCYTES NFR BLD AUTO: 9.1 % — SIGNIFICANT CHANGE UP (ref 2–14)
NEUTROPHILS # BLD AUTO: 3.3 K/UL — SIGNIFICANT CHANGE UP (ref 1.8–7.4)
NEUTROPHILS NFR BLD AUTO: 61.6 % — SIGNIFICANT CHANGE UP (ref 43–77)
NON HDL CHOLESTEROL: 143 MG/DL — HIGH
NRBC # BLD: 0 /100 WBCS — SIGNIFICANT CHANGE UP (ref 0–0)
PCO2 BLDA: 48 MMHG — HIGH (ref 32–45)
PH BLDA: 7.41 — SIGNIFICANT CHANGE UP (ref 7.35–7.45)
PHOSPHATE SERPL-MCNC: 3.2 MG/DL — SIGNIFICANT CHANGE UP (ref 2.5–4.5)
PLATELET # BLD AUTO: 204 K/UL — SIGNIFICANT CHANGE UP (ref 150–400)
PO2 BLDA: 89 MMHG — SIGNIFICANT CHANGE UP (ref 83–108)
POTASSIUM SERPL-MCNC: 4 MMOL/L — SIGNIFICANT CHANGE UP (ref 3.5–5.3)
POTASSIUM SERPL-SCNC: 4 MMOL/L — SIGNIFICANT CHANGE UP (ref 3.5–5.3)
PROTHROM AB SERPL-ACNC: 12.1 SEC — SIGNIFICANT CHANGE UP (ref 9.5–13)
RBC # BLD: 3.66 M/UL — LOW (ref 3.8–5.2)
RBC # FLD: 11.9 % — SIGNIFICANT CHANGE UP (ref 10.3–14.5)
S PNEUM AG UR QL: NEGATIVE — SIGNIFICANT CHANGE UP
SAO2 % BLDA: 98 % — SIGNIFICANT CHANGE UP (ref 94–98)
SODIUM SERPL-SCNC: 138 MMOL/L — SIGNIFICANT CHANGE UP (ref 135–145)
TRIGL SERPL-MCNC: 71 MG/DL — SIGNIFICANT CHANGE UP
TSH SERPL-MCNC: 3.87 UIU/ML — SIGNIFICANT CHANGE UP (ref 0.27–4.2)
VIT B12 SERPL-MCNC: 623 PG/ML — SIGNIFICANT CHANGE UP (ref 232–1245)
WBC # BLD: 5.36 K/UL — SIGNIFICANT CHANGE UP (ref 3.8–10.5)
WBC # FLD AUTO: 5.36 K/UL — SIGNIFICANT CHANGE UP (ref 3.8–10.5)

## 2023-09-20 PROCEDURE — 70450 CT HEAD/BRAIN W/O DYE: CPT | Mod: 26

## 2023-09-20 PROCEDURE — 70551 MRI BRAIN STEM W/O DYE: CPT | Mod: 26

## 2023-09-20 PROCEDURE — 99233 SBSQ HOSP IP/OBS HIGH 50: CPT

## 2023-09-20 PROCEDURE — 99232 SBSQ HOSP IP/OBS MODERATE 35: CPT | Mod: GC

## 2023-09-20 RX ORDER — LABETALOL HCL 100 MG
5 TABLET ORAL ONCE
Refills: 0 | Status: COMPLETED | OUTPATIENT
Start: 2023-09-20 | End: 2023-09-20

## 2023-09-20 RX ORDER — CEFEPIME 1 G/1
INJECTION, POWDER, FOR SOLUTION INTRAMUSCULAR; INTRAVENOUS
Refills: 0 | Status: DISCONTINUED | OUTPATIENT
Start: 2023-09-20 | End: 2023-09-20

## 2023-09-20 RX ORDER — CEFEPIME 1 G/1
2000 INJECTION, POWDER, FOR SOLUTION INTRAMUSCULAR; INTRAVENOUS ONCE
Refills: 0 | Status: DISCONTINUED | OUTPATIENT
Start: 2023-09-20 | End: 2023-09-20

## 2023-09-20 RX ORDER — MAGNESIUM SULFATE 500 MG/ML
1 VIAL (ML) INJECTION ONCE
Refills: 0 | Status: COMPLETED | OUTPATIENT
Start: 2023-09-20 | End: 2023-09-20

## 2023-09-20 RX ORDER — CLONAZEPAM 1 MG
0.25 TABLET ORAL ONCE
Refills: 0 | Status: DISCONTINUED | OUTPATIENT
Start: 2023-09-20 | End: 2023-09-20

## 2023-09-20 RX ORDER — ACETAMINOPHEN 500 MG
650 TABLET ORAL ONCE
Refills: 0 | Status: COMPLETED | OUTPATIENT
Start: 2023-09-20 | End: 2023-09-20

## 2023-09-20 RX ORDER — LANOLIN ALCOHOL/MO/W.PET/CERES
5 CREAM (GRAM) TOPICAL AT BEDTIME
Refills: 0 | Status: ACTIVE | OUTPATIENT
Start: 2023-09-20 | End: 2024-08-18

## 2023-09-20 RX ORDER — CEFEPIME 1 G/1
2000 INJECTION, POWDER, FOR SOLUTION INTRAMUSCULAR; INTRAVENOUS EVERY 12 HOURS
Refills: 0 | Status: DISCONTINUED | OUTPATIENT
Start: 2023-09-20 | End: 2023-09-22

## 2023-09-20 RX ORDER — ATORVASTATIN CALCIUM 80 MG/1
40 TABLET, FILM COATED ORAL AT BEDTIME
Refills: 0 | Status: DISCONTINUED | OUTPATIENT
Start: 2023-09-20 | End: 2023-10-10

## 2023-09-20 RX ORDER — THIAMINE MONONITRATE (VIT B1) 100 MG
100 TABLET ORAL DAILY
Refills: 0 | Status: DISCONTINUED | OUTPATIENT
Start: 2023-09-20 | End: 2023-10-10

## 2023-09-20 RX ADMIN — Medication 0.25 MILLIGRAM(S): at 22:22

## 2023-09-20 RX ADMIN — Medication 5 MILLIGRAM(S): at 23:30

## 2023-09-20 RX ADMIN — SODIUM CHLORIDE 4 MILLILITER(S): 9 INJECTION INTRAMUSCULAR; INTRAVENOUS; SUBCUTANEOUS at 22:23

## 2023-09-20 RX ADMIN — Medication 100 MILLIGRAM(S): at 19:50

## 2023-09-20 RX ADMIN — Medication 50 MILLIGRAM(S): at 10:07

## 2023-09-20 RX ADMIN — Medication 650 MILLIGRAM(S): at 19:50

## 2023-09-20 RX ADMIN — Medication 650 MILLIGRAM(S): at 20:05

## 2023-09-20 RX ADMIN — Medication 5 MILLIGRAM(S): at 22:22

## 2023-09-20 RX ADMIN — Medication 50 MILLIGRAM(S): at 22:22

## 2023-09-20 RX ADMIN — APIXABAN 2.5 MILLIGRAM(S): 2.5 TABLET, FILM COATED ORAL at 10:07

## 2023-09-20 RX ADMIN — Medication 0.25 MILLIGRAM(S): at 09:43

## 2023-09-20 RX ADMIN — ATORVASTATIN CALCIUM 40 MILLIGRAM(S): 80 TABLET, FILM COATED ORAL at 22:21

## 2023-09-20 RX ADMIN — CEFEPIME 100 MILLIGRAM(S): 1 INJECTION, POWDER, FOR SOLUTION INTRAMUSCULAR; INTRAVENOUS at 18:34

## 2023-09-20 RX ADMIN — MONTELUKAST 10 MILLIGRAM(S): 4 TABLET, CHEWABLE ORAL at 12:03

## 2023-09-20 RX ADMIN — Medication 100 GRAM(S): at 11:59

## 2023-09-20 NOTE — DIETITIAN INITIAL EVALUATION ADULT - OTHER CALCULATIONS
Current body wt [32.5kg] used for calorie calculations as pt <100% IBW; IBW of 56.6kg used for protein calculations. Needs estimated for age and adjusted for current clinical status, increased needs for repletion.

## 2023-09-20 NOTE — CONSULT NOTE ADULT - SUBJECTIVE AND OBJECTIVE BOX
Patient is a 82y old  Female who presents with a chief complaint of diplopia (19 Sep 2023 10:18)      HPI:   **STROKE HPI***    HPI: 82y Female with PMHx of HTN, afib (not on AC due to fall risk), recent hospitalization for PRES (5-6/2023), recently discharged from [facility] 8/2023 with acute exacerbation of severe bronchiectasis and respiratory failure presents with several days of AMS and new diplopia 9/18.     Daughter noticed since 9/15 after starting IV abx, patient has been more tired and exhausted with some agitation. It has progressed over the past few days to generalized confusion (being "out of it", not know where certain rooms are in the house, dropping items, not knowing common everyday information) similar to how she initially presented with PRES. Patient told daughter she was seeing double of items in the house around 10pm.     ED: /86. SpO2 84% RA, requiring 3L NC with improvement to 90%. C/o of shortness of breath and difficulty breathing while on supplemental oxygen. Neuro exam significant for decreased peripheral vision bitemporal, but no clear visual field cut, also inconsistent vision exam. Otherwise nonfocal. CT with no acute hemorrhage, with chronic b/l lentiform nuclei and right caudate infarcts. CT angio and perfusion deferred due to contrast allergy received contrast during angiogram in the past with intense flushing of head).    On repeat visual exam, patient notes that she sees double of items, but unable to tell if horizontal or vertical. She is unable to discern at time whether truly double vs blurry, nor transient or consistent diplopia since onset. At times, when testing visual fields, patient struggles to count fingers peripherally and centrally b/l (unclear whether from true field cut vs complete loss of vision vs diplopia causing difficulty counting). Patient also sees items moving around when they are actually stationary. Daughter states that since PRES, patient's vision has not returned to baseline and has been blurry. They have tried to obtain glasses, however on vision testing, Rx was never consistent so was advised to re-test vision after it has "settled".        (19 Sep 2023 05:59)    PAST MEDICAL & SURGICAL HISTORY:  Bronchiectasis      History of Pseudomonas pneumonia      HTN (hypertension)      Posterior reversible encephalopathy syndrome      Atrial fibrillation      No significant past surgical history        MEDICATIONS  (STANDING):  albuterol/ipratropium for Nebulization 3 milliLiter(s) Nebulizer every 6 hours  apixaban 2.5 milliGRAM(s) Oral every 12 hours  chlorhexidine 4% Liquid 1 Application(s) Topical <User Schedule>  magnesium sulfate  IVPB 1 Gram(s) IV Intermittent once  metoprolol tartrate 50 milliGRAM(s) Oral two times a day  montelukast 10 milliGRAM(s) Oral daily  sodium chloride 7% Inhalation 4 milliLiter(s) Inhalation every 6 hours    MEDICATIONS  (PRN):  melatonin 3 milliGRAM(s) Oral at bedtime PRN Insomnia  sodium chloride 0.9% lock flush 10 milliLiter(s) IV Push every 1 hour PRN Pre/post blood products, medications, blood draw, and to maintain line patency            FAMILY HISTORY:      CBC Full  -  ( 20 Sep 2023 06:09 )  WBC Count : 5.36 K/uL  RBC Count : 3.66 M/uL  Hemoglobin : 12.0 g/dL  Hematocrit : 36.1 %  Platelet Count - Automated : 204 K/uL  Mean Cell Volume : 98.6 fl  Mean Cell Hemoglobin : 32.8 pg  Mean Cell Hemoglobin Concentration : 33.2 gm/dL  Auto Neutrophil # : 3.30 K/uL  Auto Lymphocyte # : 1.38 K/uL  Auto Monocyte # : 0.49 K/uL  Auto Eosinophil # : 0.11 K/uL  Auto Basophil # : 0.07 K/uL  Auto Neutrophil % : 61.6 %  Auto Lymphocyte % : 25.7 %  Auto Monocyte % : 9.1 %  Auto Eosinophil % : 2.1 %  Auto Basophil % : 1.3 %      09-20    138  |  102  |  17  ----------------------------<  98  4.0   |  29  |  0.64    Ca    8.7      20 Sep 2023 06:09  Phos  3.2     09-20  Mg     1.8     09-20    TPro  7.6  /  Alb  3.9  /  TBili  0.3  /  DBili  x   /  AST  23  /  ALT  31  /  AlkPhos  137<H>  09-19      Urinalysis Basic - ( 20 Sep 2023 06:09 )    Color: x / Appearance: x / SG: x / pH: x  Gluc: 98 mg/dL / Ketone: x  / Bili: x / Urobili: x   Blood: x / Protein: x / Nitrite: x   Leuk Esterase: x / RBC: x / WBC x   Sq Epi: x / Non Sq Epi: x / Bacteria: x        Radiology :     < from: CT Brain Stroke Protocol (09.19.23 @ 03:42) >  ACC: [id number] EXAM:  CT BRAIN STROKE PROTOCOL   ORDERED BY: AYOKARINE LEOS     PROCEDURE DATE:  09/19/2023          INTERPRETATION:  INDICATIONS: Diplopia. Stroke code.    TECHNIQUE: Serial axial images were obtained from the skull base to the   vertex without the use of intravenous contrast. Sagittal and coronal   images were reformatted.  RAPID artificial intelligence was used for preliminary evaluation of   intracranial hemorrhage.    COMPARISON EXAMINATION: None.    FINDINGS:  Limited evaluation of the posterior fossa and occipital lobes due to   streak artifact from dental hardware.  VENTRICLES AND SULCI: Parenchymal volume is consistent with patient age.  INTRA-AXIAL: Apparent gyral hyperdensities within the right occipital   lobe are likely artifactual. No intracranial mass, acute hemorrhage or   acute transcortical infarct is seen. Chronic lacunar infarcts within the   bilateral lentiform nuclei and right caudate nucleus. Patchy   periventricular hypodensities, which may be nonspecific, but may   represent chronic microvascular ischemic changes.  EXTRA-AXIAL: No fluid collection is present.  VISUALIZED SINUSES: No air-fluid levels are identified.  VISUALIZED MASTOIDS: Clear.  CALVARIUM: No fracture.  MISCELLANEOUS: Right native lens replacement.    IMPRESSION:    No large transcortical infarct or definite acute intracranial hemorrhage.    < from: CT Chest No Cont (09.19.23 @ 03:46) >  ACC: [id number] EXAM:  CT CHEST   ORDERED BY: AYO LEOS     PROCEDURE DATE:  09/19/2023          INTERPRETATION:  CLINICAL INFORMATION: Hypoxia    COMPARISON: None.    CONTRAST/COMPLICATIONS:  IV Contrast: None  Oral Contrast: None  Complications: N/A    PROCEDURE:  CT of the Chest was performed.  Sagittal and coronal reformats were performed.    FINDINGS:    LUNGS AND AIRWAYS: Diffuse bilateral cylindrical/varicose bronchiectasis   with multiple areas of distal mucoid impaction. Scattered tree-in-bud   opacities. Mild bilateral lung mosaic attenuation. Scattered parenchymal   scarring, greater in the apices. Nodular atelectasis versus scarring in   the anterior left lung base. Several subcentimeter subcentimeter   calcifications in the right upper lobe may reflect calcified granulomas.   Trace mucus debris within the dependent trachea.  PLEURA: Small left pleural effusion.  MEDIASTINUM AND YUNIEL: Evaluation for thoracic lymphadenopathy is limited   without IV contrast. Within these limitations, no hilar/mediastinal   lymphadenopathy.  VESSELS: No aortic aneurysm. Atherosclerotic changes.  HEART: Heart size is normal. Coronary calcifications. No pericardial   effusion.  CHEST WALL AND LOWER NECK: Left PICC with tip terminating within the   lower SVC.  VISUALIZED UPPER ABDOMEN: Within normal limits.  BONES: Degenerative changes.    IMPRESSION:  1.  Diffuse bilateral bronchiectasis with multiple areas of distal mucoid   impaction and scattered tree-in-bud opacities. Findings suggest   nonspecific small airways disease.  2.  Small left pleural effusion.  3.  No consolidation.         Review of Systems : per HPI         Vital Signs Last 24 Hrs  T(C): 37.2 (19 Sep 2023 22:10), Max: 37.2 (19 Sep 2023 22:10)  T(F): 99 (19 Sep 2023 22:10), Max: 99 (19 Sep 2023 22:10)  HR: 65 (20 Sep 2023 06:10) (60 - 75)  BP: 174/91 (20 Sep 2023 06:10) (157/80 - 179/86)  BP(mean): 126 (20 Sep 2023 06:10) (111 - 126)  RR: 20 (20 Sep 2023 06:10) (20 - 20)  SpO2: 94% (20 Sep 2023 06:10) (93% - 99%)    Parameters below as of 20 Sep 2023 06:10  Patient On (Oxygen Delivery Method): room air            Physical Exam :   cachectic 82 y o woman  lying comfortably in semi Parra's position , awake , alert , no acute complaints     Head : normocephalic , atraumatic    Eyes : PERRLA , EOMI , no nystagmus , sclera anicteric    ENT / FACE : neg nasal discharge , uvula midline , no oropharyngeal erythema / exudate    Neck : supple , negative JVD , negative carotid bruits , no thyromegaly    Chest : CTA bilaterally , neg wheeze / rhonchi / rales / crackles / egophany    Cardiovascular : regular rate and rhythm , neg murmurs / rubs / gallops    Abdomen : soft , non distended , non tender to palpation in all 4 quadrants ,  normal bowel sounds     Extremities : WWP , neg cyanosis /clubbing / edema     Neurologic Exam :     Alert and oriented to person , place ,  speech fluent w/o dysarthria , follows commands , recent and remote memory intact , repetition intact , comprehension intact ,  attention/concentration intact , fund of knowledge appropriate    Cranial Nerves:     II :                         pupils equal , round and reactive to light , no clear filed cuts , c/o dec peripheral vision   III/ IV/VI :              extraocular movements intact , neg nystagmus , neg ptosis  V :                        facial sensation intact , V1-3 normal  VII :                      face symmetric , no droop , normal eye closure and smile  VIII :                     hearing intact to finger rub bilaterally  IX and X :             no hoarseness , gag intact , palate/ uvula rise symmetrically  XI :                       SCM / trapezius strength intact bilateral  XII :                      no tongue deviation    Motor Exam:          Right UE:               no focal weakness ,  > 3+/5 throughout  , no pronator drift     Left UE:                 no focal weakness ,  > 3+/5 throughout  , no pronator drift         Right LE:    no focal weakness ,  > 3+/5 throughout        Left LE:    no focal weakness ,  > 3+/5 throughout         Sensation :         intact to light touch x 4 extremities                            no neglect or extinction on double simultaneous testing      DTR :     biceps/brachioradialis : equal                      patella/ankle : equal     neg Babinski       Coordination :      Finger to Nose :  neg dysmetria bilaterally       Gait :  not tested          PM&R Impression :     admitted for AMS and new diplopia 9/18    - no acute pathology on CT brain imaging , MRI pending           Recommendations / Plan :       1) Physical / Occupational therapy focusing on therapeutic exercises , equipment evaluation , bed mobility/transfer out of bed evaluation , progressive ambulation with assistive devices prn .    2) Current disposition plan recommendation  :    d/c home with home physical and occupational therapy , 24/7 home family supervision

## 2023-09-20 NOTE — DIETITIAN INITIAL EVALUATION ADULT - OTHER INFO
82y Female with PMHx of HTN, afib (not on AC due to fall risk), recent hospitalization for PRES (5-6/2023), recently discharged from [facility] 8/2023 with acute exacerbation of severe bronchiectasis and respiratory failure presents with several days of AMS and new diplopia 9/18. NIHSS 1 for bitemporal visual deficit. CTH with no significant findings. CTA and perfusion deferred due to contrast allergy. Admitted for further stroke w/u.    Chart reviewed. Pt seen at bedside on 5 LA, agitated at time of assessment- will reattempt at later time. Currently ordered for Regular PO diet. MBS yesterday- revealed mild pharyngeal dysphagia with primary impact on efficiency per SLP. Airway safety preserved, no episodes of penetration or aspiration appreciated per SLP. Recommendations made for minced and moist diet/thin liquids with aspiration precautions & additional safe swallow strategies. NFPE notable for severe muscle wasting & subcutaneous losses. BMI 11.9. Noted weight loss of 2.4kg / 6.9% x 1 month per EMR, significant. Pt meets criteria for severe malnutrition per ASPEN guidelines. NKFA. Labs: cholesterol 209 <H>,  <H>. Meds reviewed. Pt may benefit from oral nutrition supplement to assist in meeting needs, curb further weight loss. RDN will continue to monitor, reassess, and intervene as appropriate.     Pain: no pain/discomfort noted  Skin: blanchable redness to sacrum. Nasim score 15  GI: BM noted 9/19

## 2023-09-20 NOTE — CONSULT NOTE ADULT - ASSESSMENT
Neurology    82 y o Female with PMHx of HTN, afib (not on AC due to fall risk), recent hospitalization for PRES (5-6/2023), recently discharged from [facility] 8/2023 with acute exacerbation of severe bronchiectasis and respiratory failure presents with several days of AMS and new diplopia 9/18. NIHSS 1 for bitemporal visual deficit. CTH with no significant findings. CTA and perfusion deferred due to contrast allergy.     - admit to stroke neurology for stroke r/o i/s/o afib not on AC  - will start eliquis (patient and daughter agrees with AC while patient in the hospital. Will want to readdress during discharge due to fall risk at home)  - MRI

## 2023-09-20 NOTE — DIETITIAN INITIAL EVALUATION ADULT - PERTINENT MEDS FT
MEDICATIONS  (STANDING):  albuterol/ipratropium for Nebulization 3 milliLiter(s) Nebulizer every 6 hours  apixaban 2.5 milliGRAM(s) Oral every 12 hours  atorvastatin 40 milliGRAM(s) Oral at bedtime  cefepime   IVPB      chlorhexidine 4% Liquid 1 Application(s) Topical <User Schedule>  melatonin 5 milliGRAM(s) Oral at bedtime  metoprolol tartrate 50 milliGRAM(s) Oral two times a day  montelukast 10 milliGRAM(s) Oral daily  sodium chloride 7% Inhalation 4 milliLiter(s) Inhalation every 6 hours    MEDICATIONS  (PRN):  sodium chloride 0.9% lock flush 10 milliLiter(s) IV Push every 1 hour PRN Pre/post blood products, medications, blood draw, and to maintain line patency

## 2023-09-20 NOTE — CHART NOTE - NSCHARTNOTEFT_GEN_A_CORE
Called to bedside at approx 1700 as patient appeared to be increasingly more altered with decreased fluency in her speech. Daughter at bedside states that she had a similar presentation during her last hospital admission but had improvement in her mental status when she was placed on O2. Placed back on 2 L/min NC and ABG obtained. Unable to obtain STAT MRI therefore repeat CTH performed which showed evidence of "curvilinear densities within the right parietal and occipital sulci suspicious for subarachnoid hemorrhage. No mass effect or midline shift." Daughter states that the patient's first fall was when she was in the hospital. Per outpatient medical records out of [facility] on 5/21/23 patient had an unwitnessed fall as she got out of bed unsupervised and hit her head on the wall. CTH was negative for any hemorrhage at that time. Daughter states that patient had an additional fall approx 1.5-2 weeks ago while she was at home. Reports that she was getting up from the chair when she tripped, lowered herself to the ground. States that she did not hit her head at that time.     Updated plan as below:   - Eliquis discontinued   - Check coags   - SBP < 160, continue home Metoprolol   - MRI brain w/ and w/o contrast, MRA H/N, and MRV ordered   - EEG after MRI   - Curbside neurosurgery to see if any further intervention is required Called to bedside at approx 1700 as patient appeared to be increasingly more altered with decreased fluency in her speech. Daughter at bedside states that she had a similar presentation during her last hospital admission but had improvement in her mental status when she was placed on O2. Placed back on 2 L/min NC and ABG obtained. Unable to obtain STAT MRI therefore repeat CTH performed which showed evidence of "curvilinear densities within the right parietal and occipital sulci suspicious for subarachnoid hemorrhage. No mass effect or midline shift." Daughter states that the patient's first fall was when she was in the hospital. Per outpatient medical records out of [facility] on 5/21/23 patient had an unwitnessed fall as she got out of bed unsupervised and hit her head on the wall. CTH was negative for any hemorrhage at that time. Daughter states that patient had an additional fall approx 1.5-2 weeks ago while she was at home. Reports that she was getting up from the chair when she tripped, lowered herself to the ground. States that she did not hit her head at that time.     Updated plan as below:   - Eliquis discontinued   - Check coags   - SBP < 160, continue home Metoprolol   - MRI brain with GRE and SWI (unable to get contrast 2/2 to allergy), MRA H/N, and MRV ordered   - EEG after MRI   - Curbside neurosurgery to see if any further intervention is required

## 2023-09-20 NOTE — DIETITIAN INITIAL EVALUATION ADULT - PERTINENT LABORATORY DATA
09-20    138  |  102  |  17  ----------------------------<  98  4.0   |  29  |  0.64    Ca    8.7      20 Sep 2023 06:09  Phos  3.2     09-20  Mg     1.8     09-20    TPro  7.6  /  Alb  3.9  /  TBili  0.3  /  DBili  x   /  AST  23  /  ALT  31  /  AlkPhos  137<H>  09-19  A1C with Estimated Average Glucose Result: 5.5 % (09-20-23 @ 06:09)

## 2023-09-20 NOTE — DIETITIAN INITIAL EVALUATION ADULT - ADD RECOMMEND
1. Recommend Minced and moist diet  - appreciate SLP recs  - maintain aspiration precautions  - honor food preferences as able  - monitor PO intake, consider supplemental EN if unable to meet needs via PO due to severe malnutrition  2. Recommend Ensure Max BID; provides 150 kcals, 30g protein each  3. Recommend MVI, thiamine  4. Monitor lytes & replenish prn  - potential for refeeding risk given severe malnutrition  5. Hydration per team  6. Monitor chemistry, GI function, skin integrity  7. Pain & bowel regimen per team

## 2023-09-20 NOTE — PROGRESS NOTE ADULT - ASSESSMENT
82y Female with PMHx of HTN, afib (not on AC due to fall risk), recent hospitalization for PRES (5-6/2023), recently discharged from [facility] 8/2023 with acute exacerbation of severe bronchiectasis and respiratory failure presents with several days of AMS and new diplopia 9/18. NIHSS 1 for bitemporal visual deficit. CTH with no significant findings. CTA and perfusion deferred due to contrast allergy. Admitted for further stroke w/u.    Neuro  #CVA r/o  - continue Eliquis BID (patient and daughter agrees to Eliquis while in hospital, but would like to re-discuss risk/benefit upon discharge due to fall risk)  - start Atorvastatin 40 mg QD   - q4hr stroke neuro checks and vitals  - obtain MRI Brain and MRA H/N without contrast  - PET scan ordered i/s/o decline in mental status, agitation, and hx of visual hallucinations during prior admission  - f/u B12 and folate  - Stroke Code HCT Results: chronic b/l BG infarcts  - CTA deferred due to contrast allergy   - Stroke education    #hx PRES   - admitted from 05-06/2023 after presenting with AMS and word finding difficulties, found to have low grade temps of unknown origin  - had full metabolic, infectious, and neurologic w/u including the following:   ·	lyme titers consistent with previous infection   ·	HIV neg, PRP neg, EBV neg  ·	CMV IgG+, CMV IgM neg  ·	BCx negative  ·	JCV antibodies positive but no clear sign of PML at that time  ·	HSV IgG+, past exposure  ·	Toxoplasma IgG +, past exposure  ·	LP (5/9): +WBC (6), +protein (100), HSV1/2 neg, Lyme neg, few WBC but no organism on gram stain, VDRL neg, elevated IgG index and synthesis rate, meningitis/encephalitis panel neg,  ·	LP (5/18): +protein, autoimmune encephalopathy panel neg, meningitis/encephalitis panel negative,   ·	MRI w/o (5/9): No intracranial hemorrhage, no acute infarct. Mild chronic MVD. PRES should be considered in appropriate clinical setting.   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 B/l cerebellar lacunar acute/subacute infarct. B/l parieto-occipital cortical/subcortical restricted diffusion with right precentral gyrus DWI hyperintense focus reflecting multicentric acute/subacute ischemia.  ·	NM Tc99 WBC scan 5/10: abnormal pooling inferior to the inferior border of the right liver can be secondary to colitis. Pooling in the right upper chest posteriorly differential include pulmonary infection vs cellulitis.     #agitation vs hospital acquired delirium   - QTc on admission 579, repeat _  -     Cards  #HTN  - permissive hypertension, Goal -180  - c/w home lopressor 50mg BID  - Stroke Code EKG Results: NSR    #HLD  - statin as above  - LDL results: 129    #afib  Not on anticoagulation at home due to fall risk (hx of falls at rehab and at home)  - start eliquis  - c/w lopressor 50mg BID     Pulm  #chronic bronchiectasis  Recently discharged 8/2023 with PO abx for PNA (Bactrim for Stenotrophomonas Zosyn and cipro for pseudomonas), but switched to IV cefepime (cefepime 2g BID x 14 days) by outpatient pulmonologist Dr. Foster  - c/w singular 10mg daily  - legionella and streptococcus urine, RVP, COVID, MRSA/MSSA PCR negative   - f/u sputum cx  - plan to continue outpatient IV Cefepime, pending ID clearance (7 doses thus far - first dose 9/22 AM, last dose 9/25 AM)  - pulm hygiene: OOBTC, incentive spirometry, chest PT twice daily   - appreciate pulm recs  - call provider if SPO2 < 94%    GI  #Nutrition/Fluids/Electrolytes   - replete K<4 and Mg <2  - Diet: Regular  - nutrition consulted for low BMI, f/u recs    Endocrine  - A1C results: 5.5   - TSH results: 3.87    DVT Prophylaxis  - Eliquis for DVT prophylaxis     Dispo: Home PT if patient with 24/7 care     Discussed daily hospital plans and goals with patient and family at bedside.     Discussed with Neurology Attending, Dr. Vallecillo   82y Female with PMHx of HTN, afib (not on AC due to fall risk), recent hospitalization for PRES (5-6/2023), recently discharged from [facility] 8/2023 with acute exacerbation of severe bronchiectasis and respiratory failure presents with several days of AMS and new diplopia 9/18. NIHSS 1 for bitemporal visual deficit. CTH with no significant findings. CTA and perfusion deferred due to contrast allergy. Admitted for further stroke w/u.    Neuro  #CVA r/o  - continue Eliquis BID (patient and daughter agrees to Eliquis while in hospital, but would like to re-discuss risk/benefit upon discharge due to fall risk)  - start Atorvastatin 40 mg QD   - q4hr stroke neuro checks and vitals  - obtain MRI Brain and MRA H/N without contrast  - PET scan ordered i/s/o decline in mental status, agitation, and hx of visual hallucinations during prior admission  - f/u B12 and folate  - Stroke Code HCT Results: chronic b/l BG infarcts  - CTA deferred due to contrast allergy   - Stroke education    #hx PRES   - admitted from 05-06/2023 after presenting with AMS and word finding difficulties, found to have low grade temps of unknown origin  - had full metabolic, infectious, and neurologic w/u including the following:   ·	lyme titers consistent with previous infection   ·	HIV neg, PRP neg, EBV neg  ·	CMV IgG+, CMV IgM neg  ·	BCx negative  ·	JCV antibodies positive but no clear sign of PML at that time  ·	HSV IgG+, past exposure  ·	Toxoplasma IgG +, past exposure  ·	LP (5/9): +WBC (6), +protein (100), HSV1/2 neg, Lyme neg, few WBC but no organism on gram stain, VDRL neg, elevated IgG index and synthesis rate, meningitis/encephalitis panel neg,  ·	LP (5/18): +protein, autoimmune encephalopathy panel neg, meningitis/encephalitis panel negative,   ·	MRI w/o (5/9): No intracranial hemorrhage, no acute infarct. Mild chronic MVD. PRES should be considered in appropriate clinical setting.   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 B/l cerebellar lacunar acute/subacute infarct. B/l parieto-occipital cortical/subcortical restricted diffusion with right precentral gyrus DWI hyperintense focus reflecting multicentric acute/subacute ischemia.  ·	NM Tc99 WBC scan 5/10: abnormal pooling inferior to the inferior border of the right liver can be secondary to colitis. Pooling in the right upper chest posteriorly differential include pulmonary infection vs cellulitis.     #agitation vs hospital acquired delirium   - QTc on admission 579, repeat _  -     Cards  #HTN  - permissive hypertension, Goal -180  - c/w home lopressor 50mg BID  - Stroke Code EKG Results: NSR    #HLD  - statin as above  - LDL results: 129    #afib  Not on anticoagulation at home due to fall risk (hx of falls at rehab and at home)  - start eliquis  - c/w lopressor 50mg BID     Pulm  #chronic bronchiectasis  Recently discharged 8/2023 with PO abx for PNA (Bactrim for Stenotrophomonas Zosyn and cipro for pseudomonas), but switched to IV cefepime (cefepime 2g BID x 14 days) by outpatient pulmonologist Dr. Foster  - c/w singular 10mg daily  - legionella and streptococcus urine, RVP, COVID, MRSA/MSSA PCR negative   - f/u sputum cx  - plan to continue outpatient IV Cefepime, pending ID clearance (7 doses thus far - first dose 9/15 AM, last dose 9/18 AM)  - pulm hygiene: OOBTC, incentive spirometry, chest PT twice daily   - appreciate pulm recs  - call provider if SPO2 < 94%    GI  #Nutrition/Fluids/Electrolytes   - replete K<4 and Mg <2  - Diet: Regular  - nutrition consulted for low BMI, f/u recs    Endocrine  - A1C results: 5.5   - TSH results: 3.87    DVT Prophylaxis  - Eliquis for DVT prophylaxis     Dispo: Home PT if patient with 24/7 care     Discussed daily hospital plans and goals with patient and family at bedside.     Discussed with Neurology Attending, Dr. Vallecillo   82y Female with PMHx of HTN, afib (not on AC due to fall risk), recent hospitalization for PRES (5-6/2023), recently discharged from [facility] 8/2023 with acute exacerbation of severe bronchiectasis and respiratory failure presents with several days of AMS and new diplopia 9/18. NIHSS 1 for bitemporal visual deficit. CTH with no significant findings. CTA and perfusion deferred due to contrast allergy. Admitted for further stroke w/u.    Neuro  #CVA r/o  - continue Eliquis BID (patient and daughter agrees to Eliquis while in hospital, but would like to re-discuss risk/benefit upon discharge due to fall risk)  - start Atorvastatin 40 mg QD   - q4hr stroke neuro checks and vitals  - obtain MRI Brain and MRA H/N without contrast  - PET scan ordered i/s/o decline in mental status, agitation, and hx of visual hallucinations during prior admission  - f/u B12 and folate  - Stroke Code HCT Results: chronic b/l BG infarcts  - CTA deferred due to contrast allergy   - Stroke education    #hx PRES   - admitted from 05-06/2023 after presenting with AMS and word finding difficulties, found to have low grade temps of unknown origin  - had full metabolic, infectious, and neurologic w/u including the following:   lyme titers consistent with previous infection   HIV neg, PRP neg, EBV neg  CMV IgG+, CMV IgM neg  BCx negative  JCV antibodies positive but no clear sign of PML at that time  HSV IgG+, past exposure  Toxoplasma IgG +, past exposure  LP (5/9): +WBC (6), +protein (100), HSV1/2 neg, Lyme neg, few WBC but no organism on gram stain, VDRL neg, elevated IgG index and synthesis rate, meningitis/encephalitis panel neg,  LP (5/18): +protein, autoimmune encephalopathy panel neg, meningitis/encephalitis panel negative,   MRI w/o (5/9): No intracranial hemorrhage, no acute infarct. Mild chronic MVD. PRES should be considered in appropriate clinical setting.   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 B/l cerebellar lacunar acute/subacute infarct. B/l parieto-occipital cortical/subcortical restricted diffusion with right precentral gyrus DWI hyperintense focus reflecting multicentric acute/subacute ischemia.  NM Tc99 WBC scan 5/10: abnormal pooling inferior to the inferior border of the right liver can be secondary to colitis. Pooling in the right upper chest posteriorly differential include pulmonary infection vs cellulitis.     #agitation vs hospital acquired delirium   - QTc on admission 579, repeat 458  - if redirection fails and patient remains agitated, can given 12.5 mg Seroquel  - continue to monitor QTc    Cards  #HTN  - permissive hypertension, Goal -180  - c/w home lopressor 50mg BID  - Stroke Code EKG Results: NSR    #HLD  - statin as above  - LDL results: 129    #afib  Not on anticoagulation at home due to fall risk (hx of falls at rehab and at home)  - start eliquis  - c/w lopressor 50mg BID   - continue tele monitoring    Pulm  #chronic bronchiectasis  Recently discharged 8/2023 with PO abx for PNA (Bactrim for Stenotrophomonas Zosyn and cipro for pseudomonas), but switched to IV cefepime (cefepime 2g BID x 14 days) by outpatient pulmonologist Dr. Foster  - c/w singular 10mg daily  - legionella and streptococcus urine, RVP, COVID, MRSA/MSSA PCR negative   - f/u sputum cx  - DuoNebs and hypertonic saline 7% Q6hrs per pulm  - ID approval for outpatient Cefepime obtained, restarted @ renally dosed rate on 9/19 (7 doses thus far - first dose 9/15 AM, last dose 9/18 AM)  - pulm hygiene: OOBTC, incentive spirometry, chest PT twice daily   - appreciate pulm recs  - call provider if SPO2 < 94%    GI  #Nutrition/Fluids/Electrolytes   - replete K<4 and Mg <2  - Diet: Regular  - nutrition consulted for low BMI, f/u recs    Endocrine  - A1C results: 5.5   - TSH results: 3.87    DVT Prophylaxis  - Eliquis for DVT prophylaxis     Dispo: Home PT if patient with 24/7 care     Discussed daily hospital plans and goals with patient and family at bedside.     Discussed with Neurology Attending, Dr. Vallecillo   82y Female with PMHx of HTN, afib (not on AC due to fall risk), recent hospitalization for PRES (5-6/2023), recently discharged from [facility] 8/2023 with acute exacerbation of severe bronchiectasis and respiratory failure presents with several days of AMS and new diplopia 9/18. NIHSS 1 for bitemporal visual deficit. CTH with no significant findings. CTA and perfusion deferred due to contrast allergy. Admitted for further stroke w/u.    Neuro  #CVA r/o  - continue Eliquis BID (patient and daughter agrees to Eliquis while in hospital, but would like to re-discuss risk/benefit upon discharge due to fall risk)  - start Atorvastatin 40 mg QD   - q4hr stroke neuro checks and vitals  - obtain MRI Brain and MRA H/N without contrast  - PET scan ordered i/s/o decline in mental status, agitation, and hx of visual hallucinations during prior admission  - f/u B12 and folate  - Stroke Code HCT Results: chronic b/l BG infarcts  - CTA deferred due to contrast allergy   - Stroke education    #hx PRES   - admitted from 05-06/2023 after presenting with AMS and word finding difficulties, found to have low grade temps of unknown origin  - had full metabolic, infectious, and neurologic w/u including the following:   lyme titers consistent with previous infection   HIV neg, PRP neg, EBV neg  CMV IgG+, CMV IgM neg  BCx negative  JCV antibodies positive but no clear sign of PML at that time  HSV IgG+, past exposure  Toxoplasma IgG +, past exposure  LP (5/9): +WBC (6), +protein (100), HSV1/2 neg, Lyme neg, few WBC but no organism on gram stain, VDRL neg, elevated IgG index and synthesis rate, meningitis/encephalitis panel neg,  LP (5/18): +protein, autoimmune encephalopathy panel neg, meningitis/encephalitis panel negative,   MRI w/o (5/9): No intracranial hemorrhage, no acute infarct. Mild chronic MVD. PRES should be considered in appropriate clinical setting.   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 B/l cerebellar lacunar acute/subacute infarct. B/l parieto-occipital cortical/subcortical restricted diffusion with right precentral gyrus DWI hyperintense focus reflecting multicentric acute/subacute ischemia.  NM Tc99 WBC scan 5/10: abnormal pooling inferior to the inferior border of the right liver can be secondary to colitis. Pooling in the right upper chest posteriorly differential include pulmonary infection vs cellulitis.     #agitation vs hospital acquired delirium   - QTc on admission 579, repeat 458  - if redirection fails and patient remains agitated, can given 12.5 mg Seroquel  - continue to monitor QTc    Cards  #HTN  - permissive hypertension, Goal -180  - c/w home lopressor 50mg BID  - Stroke Code EKG Results: NSR    #HLD  - statin as above  - LDL results: 129    #afib  Not on anticoagulation at home due to fall risk (hx of falls at rehab and at home)  - start eliquis  - c/w lopressor 50mg BID   - continue tele monitoring    Pulm  #chronic bronchiectasis  Recently discharged 8/2023 with PO abx for PNA (Bactrim for Stenotrophomonas Zosyn and cipro for pseudomonas), but switched to IV cefepime (cefepime 2g BID x 14 days) by outpatient pulmonologist Dr. Foster  - c/w singular 10mg daily  - legionella and streptococcus urine, RVP, COVID, MRSA/MSSA PCR negative   - f/u sputum cx  - DuoNebs and hypertonic saline 7% Q6hrs per pulm  - ID approval for outpatient Cefepime obtained, restarted on 9/19 (7 doses thus far - first dose 9/15 AM, last dose 9/18 AM)  - pulm hygiene: OOBTC, incentive spirometry, chest PT twice daily   - appreciate pulm recs  - call provider if SPO2 < 94%    GI  #Nutrition/Fluids/Electrolytes   - replete K<4 and Mg <2  - Diet: changed to minced and moist per speech   - started ensure max twice daily, multivitamin and thiamine per nutrition recs    Endocrine  - A1C results: 5.5   - TSH results: 3.87    DVT Prophylaxis  - Eliquis for DVT prophylaxis     Dispo: Home PT if patient with 24/7 care     Discussed daily hospital plans and goals with patient and family at bedside.     Discussed with Neurology Attending, Dr. Vallecillo   82y Female with PMHx of HTN, afib (not on AC due to fall risk), recent hospitalization for PRES (5-6/2023), recently discharged from [facility] 8/2023 with acute exacerbation of severe bronchiectasis and respiratory failure presents with several days of AMS and new diplopia 9/18. NIHSS 1 for bitemporal visual deficit. CTH with no significant findings. CTA and perfusion deferred due to contrast allergy. Admitted for further stroke w/u.    Neuro  #CVA r/o  - continue Eliquis BID (patient and daughter agrees to Eliquis while in hospital, but would like to re-discuss risk/benefit upon discharge due to fall risk)  - start Atorvastatin 40 mg QD   - q4hr stroke neuro checks and vitals  - obtain MRI Brain and MRA H/N without contrast  - PET scan ordered i/s/o decline in mental status, agitation, and hx of visual hallucinations during prior admission  - f/u B12 and folate  - Stroke Code HCT Results: chronic b/l BG infarcts  - CTA deferred due to contrast allergy   - Stroke education    #hx PRES   - admitted from 05-06/2023 after presenting with AMS and word finding difficulties, found to have low grade temps of unknown origin  - had full metabolic, infectious, and neurologic w/u including the following:   lyme titers consistent with previous infection   HIV neg, PRP neg, EBV neg  CMV IgG+, CMV IgM neg  BCx negative  JCV antibodies positive but no clear sign of PML at that time  HSV IgG+, past exposure  Toxoplasma IgG +, past exposure  LP (5/9): +WBC (6), +protein (100), HSV1/2 neg, Lyme neg, few WBC but no organism on gram stain, VDRL neg, elevated IgG index and synthesis rate, meningitis/encephalitis panel neg,  LP (5/18): +protein, autoimmune encephalopathy panel neg, meningitis/encephalitis panel negative,   MRI w/o (5/9): No intracranial hemorrhage, no acute infarct. Mild chronic MVD. PRES should be considered in appropriate clinical setting.   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 B/l cerebellar lacunar acute/subacute infarct. B/l parieto-occipital cortical/subcortical restricted diffusion with right precentral gyrus DWI hyperintense focus reflecting multicentric acute/subacute ischemia.  NM Tc99 WBC scan 5/10: abnormal pooling inferior to the inferior border of the right liver can be secondary to colitis. Pooling in the right upper chest posteriorly differential include pulmonary infection vs cellulitis.     #agitation vs hospital acquired delirium   - QTc on admission 579, repeat 458  - if redirection fails and patient remains agitated, can given Klonopin 0.25 (tolerated during last hospital admission)  - continue to monitor QTc    Cards  #HTN  - permissive hypertension, Goal -180  - c/w home lopressor 50mg BID  - Stroke Code EKG Results: NSR    #HLD  - statin as above  - LDL results: 129    #afib  Not on anticoagulation at home due to fall risk (hx of falls at rehab and at home)  - start eliquis  - c/w lopressor 50mg BID   - continue tele monitoring    Pulm  #chronic bronchiectasis  Recently discharged 8/2023 with PO abx for PNA (Bactrim for Stenotrophomonas Zosyn and cipro for pseudomonas), but switched to IV cefepime (cefepime 2g BID x 14 days) by outpatient pulmonologist Dr. Foster  - c/w singular 10mg daily  - legionella and streptococcus urine, RVP, COVID, MRSA/MSSA PCR negative   - f/u sputum cx  - DuoNebs and hypertonic saline 7% Q6hrs per pulm  - ID approval for outpatient Cefepime obtained, restarted on 9/19 (7 doses thus far - first dose 9/15 AM, last dose 9/18 AM)  - pulm hygiene: OOBTC, incentive spirometry, chest PT twice daily   - appreciate pulm recs  - call provider if SPO2 < 94%    GI  #Nutrition/Fluids/Electrolytes   - replete K<4 and Mg <2  - Diet: changed to minced and moist per speech   - started ensure max twice daily, multivitamin and thiamine per nutrition recs    Endocrine  - A1C results: 5.5   - TSH results: 3.87    DVT Prophylaxis  - Eliquis for DVT prophylaxis     Dispo: Home PT if patient with 24/7 care     Discussed daily hospital plans and goals with patient and family at bedside.     Discussed with Neurology Attending, Dr. Vallecillo

## 2023-09-20 NOTE — DIETITIAN NUTRITION RISK NOTIFICATION - ADDITIONAL COMMENTS/DIETITIAN RECOMMENDATIONS
1. Recommend Minced and moist diet  - appreciate SLP recs  - maintain aspiration precautions  - honor food preferences as able  - monitor PO intake, consider supplemental EN if unable to meet needs via PO due to severe malnutrition  2. Recommend Ensure Max BID; provides 150 kcals, 30g protein each  3. Recommend MVI, thiamine  4. Monitor lytes & replenish prn  - potential for refeeding risk given severe malnutrition  5. Hydration per team  6. Monitor chemistry, GI function, skin integrity  7. Pain & bowel regimen per team    High Nutrition risk. RDN to follow per nutrition protocol, reconsult prn.

## 2023-09-20 NOTE — PROGRESS NOTE ADULT - ASSESSMENT
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 8/2023 for acute bronchiectasis exacerbation now presenting with AMS and admitted to stroke service. Pulm consulted for management of possible bronchiectasis exacerbation.    2019 sputum culture:   Pseudomonas           aeruginosa                                      MDIL        MINT                                    ________    ________       Ceftazidime                  4           S       Levofloxacin                 <=1         S       Amikacin                     >32         R       Aztreonam                    <=4         S       Cefepime                     16          I       Ciprofloxacin                <=0.5       S       Gentamicin                   >8          R       Imipenem                     <=1         S       Meropenem                    <=1         S       Piperacillin/Tazobactam      <=8         S       Tobramycin                   >8          R                                            Stenotrophomonas                                    maltophilia                                    #2                                      MDIL        MINT                                    ________    ________       Ceftazidime                  8           S       Levofloxacin                 <=1         S       Trimethoprim/Sulfa           <=0.5/9.5   S    #Acute Respiratory Failure with Hypoxia    Recommendation: Pt desatting to 86% in ED, started on 2L NC. Complaining of worsening dyspnea at rest and on exertion x1 week and pogressively worsening nonproductive cough. Now resting comfortably on RA, however per pt had improved breathing on oxygen. Not on O2 at home.  - SpO2 goal >90%  - Please complete infectious workup: strep and legionella urine ag, MRSA nares, RVP and COVID-19 PCR, sputum culture  - Pulm hygiene: OOBTC, PT/OT, incentive spirometry, chest PT twice daily, airway clearance  - Patient recently started on chest PT at home, need to continue in patient. RT can start chest vest inpatient depending on availability, can do aerobika instead if chest vest not available.    #Acute Exacerbation of Bronchiectasis     Recommendation: Pt admitted to [facility] in 8/2023 for acute bronchiectasis exacerbation, completed zosyn, ciprofloxacin, and bactrim course. Started on IV cefepime by outpatient pulmonologist, s/p PICC placement on 9/13.   - Can continue IV abx per outpatient pulm recs  - Duonebs q6hr STANDING followed by 7% hypertonic saline followed by aerobika  - Chest PT twice daily (can dc aerobika if chest vest is available)  - F/u sputum culture, legionella and strep ur ags, MRSA nares, RVP and COVID-19 PCR  - c/w inhaled [facility] outpatient  - encourage patient to use duonebs and chest vest at home.         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 Pulm consulted for management of possible bronchiectasis exacerbation.    2019 sputum culture:   Pseudomonas           aeruginosa                                      MDIL        MINT                                    ________    ________       Ceftazidime                  4           S       Levofloxacin                 <=1         S       Amikacin                     >32         R       Aztreonam                    <=4         S       Cefepime                     16          I       Ciprofloxacin                <=0.5       S       Gentamicin                   >8          R       Imipenem                     <=1         S       Meropenem                    <=1         S       Piperacillin/Tazobactam      <=8         S       Tobramycin                   >8          R                                            Stenotrophomonas                                    maltophilia                                    #2                                      MDIL        MINT                                    ________    ________       Ceftazidime                  8           S       Levofloxacin                 <=1         S       Trimethoprim/Sulfa           <=0.5/9.5   S    #Acute Respiratory Failure with Hypoxia    Recommendation: Pt desatting to 86% in ED, started on 2L NC. Complaining of worsening dyspnea at rest and on exertion x1 week and pogressively worsening nonproductive cough. Now resting comfortably on RA, however per pt had improved breathing on oxygen. Not on O2 at home.  - SpO2 goal >90%  - Please complete infectious workup: strep and legionella urine ag, MRSA nares, RVP and COVID-19 PCR, sputum culture  - Pulm hygiene: OOBTC, PT/OT, incentive spirometry, chest PT twice daily, airway clearance  - Patient recently started on chest PT at home, need to continue in patient. RT can start chest vest inpatient depending on availability, can do aerobika instead if chest vest not available.    #Acute Exacerbation of Bronchiectasis     Recommendation: Pt admitted to [facility] in 8/2023 for acute bronchiectasis exacerbation, completed zosyn, ciprofloxacin, and bactrim course. Started on IV cefepime by outpatient pulmonologist, s/p PICC placement on 9/13.   - Can continue IV abx per outpatient pulm recs  - Please continue Cefepime per her outpatient regimen from Dr. Cristian Jordan q6hr STANDING followed by 7% hypertonic saline followed by aerobika  - Chest PT twice daily (can dc aerobika if chest vest is available, PLEASE call RT to have this for patient as she states she has not been getting it done  - F/u sputum culture  - RVP, COVID, MRSA nares, and legionella negative   - c/w inhaled [place] outpatient  - encourage patient to use duonebs and chest vest at home.    Case s/e/d with Dr. 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 Pulm consulted for management of possible bronchiectasis exacerbation.    2019 sputum culture:   Pseudomonas           aeruginosa                                      MDIL        MINT                                    ________    ________       Ceftazidime                  4           S       Levofloxacin                 <=1         S       Amikacin                     >32         R       Aztreonam                    <=4         S       Cefepime                     16          I       Ciprofloxacin                <=0.5       S       Gentamicin                   >8          R       Imipenem                     <=1         S       Meropenem                    <=1         S       Piperacillin/Tazobactam      <=8         S       Tobramycin                   >8          R                                            Stenotrophomonas                                    maltophilia                                    #2                                      MDIL        MINT                                    ________    ________       Ceftazidime                  8           S       Levofloxacin                 <=1         S       Trimethoprim/Sulfa           <=0.5/9.5   S    #Acute Respiratory Failure with Hypoxia    Recommendation: Pt desatting to 86% in ED, started on 2L NC. Complaining of worsening dyspnea at rest and on exertion x1 week and pogressively worsening nonproductive cough. Now resting comfortably on RA, however per pt had improved breathing on oxygen. Not on O2 at home.  - SpO2 goal >90%  - Please complete infectious workup: strep and legionella urine ag, MRSA nares, RVP and COVID-19 PCR, sputum culture  - Pulm hygiene: OOBTC, PT/OT, incentive spirometry, chest PT twice daily, airway clearance  - Patient recently started on chest PT at home, need to continue in patient. RT can start chest vest inpatient depending on availability, can do aerobika instead if chest vest not available.    #Acute Exacerbation of Bronchiectasis     Recommendation: Pt admitted to [facility] in 8/2023 for acute bronchiectasis exacerbation, completed zosyn, ciprofloxacin, and bactrim course. Started on IV cefepime by outpatient pulmonologist, s/p PICC placement on 9/13.   - Can continue IV abx per outpatient pulm recs  - Please continue Cefepime per her outpatient regimen from Dr. Cristian Jordan q6hr STANDING followed by 7% hypertonic saline followed by aerobika  - Chest PT twice daily (can dc aerobika if chest vest is available, PLEASE call RT to have this for patient as she states she has not been getting it done  - F/u sputum culture  - RVP, COVID, MRSA nares, and legionella negative   - c/w inhaled [place] outpatient  - encourage patient to use duonebs and chest vest at home.    Case s/e/d with Dr. Lo

## 2023-09-20 NOTE — PROGRESS NOTE ADULT - SUBJECTIVE AND OBJECTIVE BOX
Neurology Stroke Progress Note    INTERVAL HPI/OVERNIGHT EVENTS:  Patient seen and examined. O/N patient agitated, attempting to get out of bed, refusing standing DuoNebs, melatonin, and tele leads. Was able to be redirected back to bed. Attempted to see patient early this AM however patient was asleep, telling provider to go away, was noncooperative with exam. Reevaluated patient shortly after and noted to be awake, attempting to climb out of bed once again, screaming. Multiple attempts made by provider and RNs to redirect patient however were unsuccessful, given a low dose of Ativan. Patient is now sitting up in bed, eating breakfast, answering all questions appropriately.     MEDICATIONS  (STANDING):  albuterol/ipratropium for Nebulization 3 milliLiter(s) Nebulizer every 6 hours  apixaban 2.5 milliGRAM(s) Oral every 12 hours  atorvastatin 40 milliGRAM(s) Oral at bedtime  chlorhexidine 4% Liquid 1 Application(s) Topical <User Schedule>  melatonin 5 milliGRAM(s) Oral at bedtime  metoprolol tartrate 50 milliGRAM(s) Oral two times a day  montelukast 10 milliGRAM(s) Oral daily  sodium chloride 7% Inhalation 4 milliLiter(s) Inhalation every 6 hours    MEDICATIONS  (PRN):  sodium chloride 0.9% lock flush 10 milliLiter(s) IV Push every 1 hour PRN Pre/post blood products, medications, blood draw, and to maintain line patency      Allergies    Ceclor (Rash)  IV Contrast (Unknown)  Levaquin (Swelling)  Augmentin (Short breath; Rash)    Intolerances        Vital Signs Last 24 Hrs  T(C): 36.6 (20 Sep 2023 13:50), Max: 37.2 (19 Sep 2023 22:10)  T(F): 97.9 (20 Sep 2023 13:50), Max: 99 (19 Sep 2023 22:10)  HR: 59 (20 Sep 2023 12:00) (59 - 75)  BP: 143/70 (20 Sep 2023 12:00) (143/70 - 178/88)  BP(mean): 100 (20 Sep 2023 12:00) (100 - 126)  RR: 20 (20 Sep 2023 12:00) (20 - 20)  SpO2: 94% (20 Sep 2023 12:00) (93% - 94%)    Parameters below as of 20 Sep 2023 12:00  Patient On (Oxygen Delivery Method): room air        Physical exam:  General: Cachetic appearing sitting up and eating breakfast, in no apparent distress  Eyes: Anicteric sclerae, moist conjunctivae, see below for CNs  Extremities: No edema    Neurologic:  -Mental status: Awake, alert, oriented to person, place, and time. Noted to have tangential speech, difficulty with responding to directly to questions however speech is fluent. Intact naming and comprehension, no dysarthria. Follows simple commands. Remote memory diminished, difficulty with remembering why she came to the hospital. Attention/concentration intact. Fund of knowledge appropriate.  -Cranial nerves:   II: Poor visual fields exam, appears to have decreased visual acuity bitemporally. Is able to see provider's fingers wiggling however will intermittently answer incorrectly to number of fingers provider is holding up.   III, IV, VI: Extraocular movements are intact without nystagmus. Pupils equally round and reactive to light  V:  Facial sensation V1-V3 equal and intact   VII: Face is symmetric with normal eye closure and smile  XII: Tongue protrudes midline  Motor: Diminished bulk throughout. Normal tone. B/l UEs at least a 4/5 without drift (deltoids/biceps/ 4+/5, triceps 4-/5). B/l LEs at least a 4/5 without drift.   Sensation: Intact to light touch bilaterally. No neglect or extinction on double simultaneous testing.  Coordination: No dysmetria on finger-to-nose and heel-to-shin bilaterally  Reflexes: Downgoing toes bilaterally     LABS:                        12.0   5.36  )-----------( 204      ( 20 Sep 2023 06:09 )             36.1     09-20    138  |  102  |  17  ----------------------------<  98  4.0   |  29  |  0.64    Ca    8.7      20 Sep 2023 06:09  Phos  3.2     09-20  Mg     1.8     09-20    TPro  7.6  /  Alb  3.9  /  TBili  0.3  /  DBili  x   /  AST  23  /  ALT  31  /  AlkPhos  137<H>  09-19    PT/INR - ( 19 Sep 2023 03:25 )   PT: 11.3 sec;   INR: 0.99          PTT - ( 19 Sep 2023 03:25 )  PTT:27.0 sec  Urinalysis Basic - ( 20 Sep 2023 06:09 )    Color: x / Appearance: x / SG: x / pH: x  Gluc: 98 mg/dL / Ketone: x  / Bili: x / Urobili: x   Blood: x / Protein: x / Nitrite: x   Leuk Esterase: x / RBC: x / WBC x   Sq Epi: x / Non Sq Epi: x / Bacteria: x        RADIOLOGY & ADDITIONAL TESTS:    < from: Echocardiogram w/ Bubble and Doppler (09.19.23 @ 13:18) >  -----  CONCLUSIONS:     1. Technically difficult study.   2. Normal left ventricular size and systolic function.   3. Mildly dilated right ventricular size. Normal right ventricular   systolic function.   4. Dilated right atrium.   5. Aortic sclerosis without significant stenosis.   6. Mild-to-moderate aortic regurgitation.   7. Mild mitral regurgitation.   8. Moderate-to-severe tricuspid regurgitation.   9. Severe pulmonary hypertension present, pulmonary artery systolic   pressure is 73 mmHg.  10. No pericardial effusion.  11. No prior echo is available for comparison.    < end of copied text >

## 2023-09-20 NOTE — CHART NOTE - NSCHARTNOTEFT_GEN_A_CORE
Infectious Diseases Anti-infective Approval Note    Medication: cefepime  Dose*: 2g  Route: IV  Frequency: q12h  Duration**: 7d    *Dose may be adjusted as needed for alterations in renal function.    **Reflects duration of approval and not necessarily duration of therapy     THIS IS NOT AN INFECTIOUS DISEASES CONSULTATION

## 2023-09-20 NOTE — PROGRESS NOTE ADULT - NS ATTEND AMEND GEN_ALL_CORE FT
The patient is an 82 year old female with HTN, A fib (not on A/C due to frequent falls), bronchiectasis (on abx for recent exacerbation), and PRES in 05/2023 admitted for evaluation after a several day history of worsened confusion and visual complaints (details vague, unclear).  Collateral information reviewed in prior note. Plan for MRI/A. Can consider opthalmology eval. PET brain to eval for DLB. TTE unremarkable apart from TR/pulm HTN. Will continue Eliquis for a fib- can consider watchman in future. Delirium precautions. Melatonin, PRN Zyprexa (low-dose benzo also ok if needed given prior tolerance/improvement) at night.

## 2023-09-20 NOTE — PROGRESS NOTE ADULT - SUBJECTIVE AND OBJECTIVE BOX
PULMONARY CONSULT SERVICE FOLLOW-UP NOTE    INTERVAL HPI:  Reviewed chart and overnight events; patient seen and examined at bedside.    MEDICATIONS:  Pulmonary:  albuterol/ipratropium for Nebulization 3 milliLiter(s) Nebulizer every 6 hours  montelukast 10 milliGRAM(s) Oral daily  sodium chloride 7% Inhalation 4 milliLiter(s) Inhalation every 6 hours    Antimicrobials:    Anticoagulants:  apixaban 2.5 milliGRAM(s) Oral every 12 hours    Cardiac:  metoprolol tartrate 50 milliGRAM(s) Oral two times a day      Allergies    Ceclor (Rash)  IV Contrast (Unknown)  Levaquin (Swelling)  Augmentin (Short breath; Rash)    Intolerances        Vital Signs Last 24 Hrs  T(C): 37.2 (19 Sep 2023 22:10), Max: 37.2 (19 Sep 2023 22:10)  T(F): 99 (19 Sep 2023 22:10), Max: 99 (19 Sep 2023 22:10)  HR: 68 (20 Sep 2023 09:08) (62 - 75)  BP: 178/88 (20 Sep 2023 09:08) (174/83 - 179/86)  BP(mean): 126 (20 Sep 2023 09:08) (114 - 126)  RR: 20 (20 Sep 2023 09:08) (20 - 20)  SpO2: 94% (20 Sep 2023 09:08) (93% - 99%)    Parameters below as of 20 Sep 2023 09:08  Patient On (Oxygen Delivery Method): room air        09-19 @ 07:01  -  09-20 @ 07:00  --------------------------------------------------------  IN: 0 mL / OUT: 1 mL / NET: -1 mL          PHYSICAL EXAM:  Constitutional: WD  HEENT: NC/AT; PERRL, anicteric sclera; MMM  Neck: supple  Lymph: No supraclavical LAD or cervical chain LAD  Cardiovascular: +S1/S2, RRR  Respiratory: CTA B/L; no W/R/R  Gastrointestinal: soft, NT/ND  Extremities: WWP; no edema, clubbing or cyanosis  Vascular: 2+ radial and pedal pulses  Neurological: AAOx3; no focal deficits    LABS:      CBC Full  -  ( 20 Sep 2023 06:09 )  WBC Count : 5.36 K/uL  RBC Count : 3.66 M/uL  Hemoglobin : 12.0 g/dL  Hematocrit : 36.1 %  Platelet Count - Automated : 204 K/uL  Mean Cell Volume : 98.6 fl  Mean Cell Hemoglobin : 32.8 pg  Mean Cell Hemoglobin Concentration : 33.2 gm/dL  Auto Neutrophil # : 3.30 K/uL  Auto Lymphocyte # : 1.38 K/uL  Auto Monocyte # : 0.49 K/uL  Auto Eosinophil # : 0.11 K/uL  Auto Basophil # : 0.07 K/uL  Auto Neutrophil % : 61.6 %  Auto Lymphocyte % : 25.7 %  Auto Monocyte % : 9.1 %  Auto Eosinophil % : 2.1 %  Auto Basophil % : 1.3 %    09-20    138  |  102  |  17  ----------------------------<  98  4.0   |  29  |  0.64    Ca    8.7      20 Sep 2023 06:09  Phos  3.2     09-20  Mg     1.8     09-20    TPro  7.6  /  Alb  3.9  /  TBili  0.3  /  DBili  x   /  AST  23  /  ALT  31  /  AlkPhos  137<H>  09-19    PT/INR - ( 19 Sep 2023 03:25 )   PT: 11.3 sec;   INR: 0.99          PTT - ( 19 Sep 2023 03:25 )  PTT:27.0 sec      Urinalysis Basic - ( 20 Sep 2023 06:09 )    Color: x / Appearance: x / SG: x / pH: x  Gluc: 98 mg/dL / Ketone: x  / Bili: x / Urobili: x   Blood: x / Protein: x / Nitrite: x   Leuk Esterase: x / RBC: x / WBC x   Sq Epi: x / Non Sq Epi: x / Bacteria: x                RADIOLOGY & ADDITIONAL STUDIES: PULMONARY CONSULT SERVICE FOLLOW-UP NOTE    INTERVAL HPI:  Reviewed chart and overnight events; patient seen and examined at bedside. She feels well this morning. She feels like she can finally bring up sputum.     MEDICATIONS:  Pulmonary:  albuterol/ipratropium for Nebulization 3 milliLiter(s) Nebulizer every 6 hours  montelukast 10 milliGRAM(s) Oral daily  sodium chloride 7% Inhalation 4 milliLiter(s) Inhalation every 6 hours    Antimicrobials:    Anticoagulants:  apixaban 2.5 milliGRAM(s) Oral every 12 hours    Cardiac:  metoprolol tartrate 50 milliGRAM(s) Oral two times a day      Allergies    Ceclor (Rash)  IV Contrast (Unknown)  Levaquin (Swelling)  Augmentin (Short breath; Rash)    Intolerances        Vital Signs Last 24 Hrs  T(C): 37.2 (19 Sep 2023 22:10), Max: 37.2 (19 Sep 2023 22:10)  T(F): 99 (19 Sep 2023 22:10), Max: 99 (19 Sep 2023 22:10)  HR: 68 (20 Sep 2023 09:08) (62 - 75)  BP: 178/88 (20 Sep 2023 09:08) (174/83 - 179/86)  BP(mean): 126 (20 Sep 2023 09:08) (114 - 126)  RR: 20 (20 Sep 2023 09:08) (20 - 20)  SpO2: 94% (20 Sep 2023 09:08) (93% - 99%)    Parameters below as of 20 Sep 2023 09:08  Patient On (Oxygen Delivery Method): room air        09-19 @ 07:01  -  09-20 @ 07:00  --------------------------------------------------------  IN: 0 mL / OUT: 1 mL / NET: -1 mL          PHYSICAL EXAM:  Constitutional: WD  HEENT: NC/AT; PERRL, anicteric sclera; MMM  Neck: supple  Lymph: No supraclavical LAD or cervical chain LAD  Cardiovascular: +S1/S2, RRR  Respiratory: CTA B/L; no W/R/R  Gastrointestinal: soft, NT/ND  Extremities: WWP; no edema, clubbing or cyanosis  Vascular: 2+ radial and pedal pulses  Neurological: AAOx3; no focal deficits    LABS:      CBC Full  -  ( 20 Sep 2023 06:09 )  WBC Count : 5.36 K/uL  RBC Count : 3.66 M/uL  Hemoglobin : 12.0 g/dL  Hematocrit : 36.1 %  Platelet Count - Automated : 204 K/uL  Mean Cell Volume : 98.6 fl  Mean Cell Hemoglobin : 32.8 pg  Mean Cell Hemoglobin Concentration : 33.2 gm/dL  Auto Neutrophil # : 3.30 K/uL  Auto Lymphocyte # : 1.38 K/uL  Auto Monocyte # : 0.49 K/uL  Auto Eosinophil # : 0.11 K/uL  Auto Basophil # : 0.07 K/uL  Auto Neutrophil % : 61.6 %  Auto Lymphocyte % : 25.7 %  Auto Monocyte % : 9.1 %  Auto Eosinophil % : 2.1 %  Auto Basophil % : 1.3 %    09-20    138  |  102  |  17  ----------------------------<  98  4.0   |  29  |  0.64    Ca    8.7      20 Sep 2023 06:09  Phos  3.2     09-20  Mg     1.8     09-20    TPro  7.6  /  Alb  3.9  /  TBili  0.3  /  DBili  x   /  AST  23  /  ALT  31  /  AlkPhos  137<H>  09-19    PT/INR - ( 19 Sep 2023 03:25 )   PT: 11.3 sec;   INR: 0.99          PTT - ( 19 Sep 2023 03:25 )  PTT:27.0 sec      Urinalysis Basic - ( 20 Sep 2023 06:09 )    Color: x / Appearance: x / SG: x / pH: x  Gluc: 98 mg/dL / Ketone: x  / Bili: x / Urobili: x   Blood: x / Protein: x / Nitrite: x   Leuk Esterase: x / RBC: x / WBC x   Sq Epi: x / Non Sq Epi: x / Bacteria: x    RADIOLOGY & ADDITIONAL STUDIES:    < from: CT Chest No Cont (09.19.23 @ 03:46) >    ACC: [id number] EXAM:  CT CHEST   ORDERED BY: AYO LEOS     PROCEDURE DATE:  09/19/2023          INTERPRETATION:  CLINICAL INFORMATION: Hypoxia    COMPARISON: None.    CONTRAST/COMPLICATIONS:  IV Contrast: None  Oral Contrast: None  Complications: N/A    PROCEDURE:  CT of the Chest was performed.  Sagittal and coronal reformats were performed.    FINDINGS:    LUNGS AND AIRWAYS: Diffuse bilateral cylindrical/varicose bronchiectasis   with multiple areas of distal mucoid impaction. Scattered tree-in-bud   opacities. Mild bilateral lung mosaic attenuation. Scattered parenchymal   scarring, greater in the apices. Nodular atelectasis versus scarring in   the anterior left lung base. Several subcentimeter subcentimeter   calcifications in the right upper lobe may reflect calcified granulomas.   Trace mucus debris within the dependent trachea.  PLEURA: Small left pleural effusion.  MEDIASTINUM AND YUNIEL: Evaluation for thoracic lymphadenopathy is limited   without IV contrast. Within these limitations, no hilar/mediastinal   lymphadenopathy.  VESSELS: No aortic aneurysm. Atherosclerotic changes.  HEART: Heart size is normal. Coronary calcifications. No pericardial   effusion.  CHEST WALL AND LOWER NECK: Left PICC with tip terminating within the   lower SVC.  VISUALIZED UPPER ABDOMEN: Within normal limits.  BONES: Degenerative changes.    IMPRESSION:  1.  Diffuse bilateral bronchiectasis with multiple areas of distal mucoid   impaction and scattered tree-in-bud opacities. Findings suggest   nonspecific small airways disease.  2.  Small left pleural effusion.  3.  No consolidation.    --- End of Report ---          < end of copied text >

## 2023-09-21 LAB
ALBUMIN SERPL ELPH-MCNC: 3.3 G/DL — SIGNIFICANT CHANGE UP (ref 3.3–5)
ALP SERPL-CCNC: 116 U/L — SIGNIFICANT CHANGE UP (ref 40–120)
ALT FLD-CCNC: 27 U/L — SIGNIFICANT CHANGE UP (ref 10–45)
AMYLASE P1 CFR SERPL: 73 U/L — SIGNIFICANT CHANGE UP (ref 25–125)
ANION GAP SERPL CALC-SCNC: 7 MMOL/L — SIGNIFICANT CHANGE UP (ref 5–17)
ANION GAP SERPL CALC-SCNC: 8 MMOL/L — SIGNIFICANT CHANGE UP (ref 5–17)
APTT BLD: 31.3 SEC — SIGNIFICANT CHANGE UP (ref 24.5–35.6)
AST SERPL-CCNC: 23 U/L — SIGNIFICANT CHANGE UP (ref 10–40)
BASOPHILS # BLD AUTO: 0.04 K/UL — SIGNIFICANT CHANGE UP (ref 0–0.2)
BASOPHILS NFR BLD AUTO: 0.4 % — SIGNIFICANT CHANGE UP (ref 0–2)
BILIRUB SERPL-MCNC: 0.5 MG/DL — SIGNIFICANT CHANGE UP (ref 0.2–1.2)
BUN SERPL-MCNC: 22 MG/DL — SIGNIFICANT CHANGE UP (ref 7–23)
BUN SERPL-MCNC: 22 MG/DL — SIGNIFICANT CHANGE UP (ref 7–23)
CALCIUM SERPL-MCNC: 8.4 MG/DL — SIGNIFICANT CHANGE UP (ref 8.4–10.5)
CALCIUM SERPL-MCNC: 8.7 MG/DL — SIGNIFICANT CHANGE UP (ref 8.4–10.5)
CHLORIDE SERPL-SCNC: 101 MMOL/L — SIGNIFICANT CHANGE UP (ref 96–108)
CHLORIDE SERPL-SCNC: 102 MMOL/L — SIGNIFICANT CHANGE UP (ref 96–108)
CO2 SERPL-SCNC: 25 MMOL/L — SIGNIFICANT CHANGE UP (ref 22–31)
CO2 SERPL-SCNC: 27 MMOL/L — SIGNIFICANT CHANGE UP (ref 22–31)
CREAT SERPL-MCNC: 0.73 MG/DL — SIGNIFICANT CHANGE UP (ref 0.5–1.3)
CREAT SERPL-MCNC: 0.73 MG/DL — SIGNIFICANT CHANGE UP (ref 0.5–1.3)
EGFR: 83 ML/MIN/1.73M2 — SIGNIFICANT CHANGE UP
EGFR: 83 ML/MIN/1.73M2 — SIGNIFICANT CHANGE UP
EOSINOPHIL # BLD AUTO: 0.03 K/UL — SIGNIFICANT CHANGE UP (ref 0–0.5)
EOSINOPHIL NFR BLD AUTO: 0.3 % — SIGNIFICANT CHANGE UP (ref 0–6)
GLUCOSE BLDC GLUCOMTR-MCNC: 118 MG/DL — HIGH (ref 70–99)
GLUCOSE BLDC GLUCOMTR-MCNC: 127 MG/DL — HIGH (ref 70–99)
GLUCOSE BLDC GLUCOMTR-MCNC: 132 MG/DL — HIGH (ref 70–99)
GLUCOSE BLDC GLUCOMTR-MCNC: 132 MG/DL — HIGH (ref 70–99)
GLUCOSE BLDC GLUCOMTR-MCNC: 140 MG/DL — HIGH (ref 70–99)
GLUCOSE BLDC GLUCOMTR-MCNC: 176 MG/DL — HIGH (ref 70–99)
GLUCOSE BLDC GLUCOMTR-MCNC: 187 MG/DL — HIGH (ref 70–99)
GLUCOSE BLDC GLUCOMTR-MCNC: 229 MG/DL — HIGH (ref 70–99)
GLUCOSE BLDC GLUCOMTR-MCNC: 258 MG/DL — HIGH (ref 70–99)
GLUCOSE BLDC GLUCOMTR-MCNC: 267 MG/DL — HIGH (ref 70–99)
GLUCOSE BLDC GLUCOMTR-MCNC: 27 MG/DL — CRITICAL LOW (ref 70–99)
GLUCOSE BLDC GLUCOMTR-MCNC: 32 MG/DL — CRITICAL LOW (ref 70–99)
GLUCOSE BLDC GLUCOMTR-MCNC: 33 MG/DL — CRITICAL LOW (ref 70–99)
GLUCOSE BLDC GLUCOMTR-MCNC: 35 MG/DL — CRITICAL LOW (ref 70–99)
GLUCOSE BLDC GLUCOMTR-MCNC: 391 MG/DL — HIGH (ref 70–99)
GLUCOSE BLDC GLUCOMTR-MCNC: 401 MG/DL — HIGH (ref 70–99)
GLUCOSE BLDC GLUCOMTR-MCNC: 53 MG/DL — CRITICAL LOW (ref 70–99)
GLUCOSE BLDC GLUCOMTR-MCNC: 54 MG/DL — CRITICAL LOW (ref 70–99)
GLUCOSE BLDC GLUCOMTR-MCNC: 82 MG/DL — SIGNIFICANT CHANGE UP (ref 70–99)
GLUCOSE BLDC GLUCOMTR-MCNC: 92 MG/DL — SIGNIFICANT CHANGE UP (ref 70–99)
GLUCOSE SERPL-MCNC: 102 MG/DL — HIGH (ref 70–99)
GLUCOSE SERPL-MCNC: 285 MG/DL — HIGH (ref 70–99)
HCT VFR BLD CALC: 33.8 % — LOW (ref 34.5–45)
HCT VFR BLD CALC: 34.8 % — SIGNIFICANT CHANGE UP (ref 34.5–45)
HGB BLD-MCNC: 11.3 G/DL — LOW (ref 11.5–15.5)
HGB BLD-MCNC: 11.6 G/DL — SIGNIFICANT CHANGE UP (ref 11.5–15.5)
IMM GRANULOCYTES NFR BLD AUTO: 0.4 % — SIGNIFICANT CHANGE UP (ref 0–0.9)
INR BLD: 1.03 — SIGNIFICANT CHANGE UP (ref 0.85–1.18)
LACTATE SERPL-SCNC: 2.9 MMOL/L — HIGH (ref 0.5–2)
LYMPHOCYTES # BLD AUTO: 1.9 K/UL — SIGNIFICANT CHANGE UP (ref 1–3.3)
LYMPHOCYTES # BLD AUTO: 16.7 % — SIGNIFICANT CHANGE UP (ref 13–44)
MAGNESIUM SERPL-MCNC: 1.9 MG/DL — SIGNIFICANT CHANGE UP (ref 1.6–2.6)
MAGNESIUM SERPL-MCNC: 2.1 MG/DL — SIGNIFICANT CHANGE UP (ref 1.6–2.6)
MCHC RBC-ENTMCNC: 32.9 PG — SIGNIFICANT CHANGE UP (ref 27–34)
MCHC RBC-ENTMCNC: 33 PG — SIGNIFICANT CHANGE UP (ref 27–34)
MCHC RBC-ENTMCNC: 33.3 GM/DL — SIGNIFICANT CHANGE UP (ref 32–36)
MCHC RBC-ENTMCNC: 33.4 GM/DL — SIGNIFICANT CHANGE UP (ref 32–36)
MCV RBC AUTO: 98.6 FL — SIGNIFICANT CHANGE UP (ref 80–100)
MCV RBC AUTO: 98.8 FL — SIGNIFICANT CHANGE UP (ref 80–100)
MONOCYTES # BLD AUTO: 0.55 K/UL — SIGNIFICANT CHANGE UP (ref 0–0.9)
MONOCYTES NFR BLD AUTO: 4.8 % — SIGNIFICANT CHANGE UP (ref 2–14)
NEUTROPHILS # BLD AUTO: 8.81 K/UL — HIGH (ref 1.8–7.4)
NEUTROPHILS NFR BLD AUTO: 77.4 % — HIGH (ref 43–77)
NRBC # BLD: 0 /100 WBCS — SIGNIFICANT CHANGE UP (ref 0–0)
NRBC # BLD: 0 /100 WBCS — SIGNIFICANT CHANGE UP (ref 0–0)
PHOSPHATE SERPL-MCNC: 2.4 MG/DL — LOW (ref 2.5–4.5)
PHOSPHATE SERPL-MCNC: 3.9 MG/DL — SIGNIFICANT CHANGE UP (ref 2.5–4.5)
PLATELET # BLD AUTO: 208 K/UL — SIGNIFICANT CHANGE UP (ref 150–400)
PLATELET # BLD AUTO: 216 K/UL — SIGNIFICANT CHANGE UP (ref 150–400)
POTASSIUM SERPL-MCNC: 3.3 MMOL/L — LOW (ref 3.5–5.3)
POTASSIUM SERPL-MCNC: 3.6 MMOL/L — SIGNIFICANT CHANGE UP (ref 3.5–5.3)
POTASSIUM SERPL-SCNC: 3.3 MMOL/L — LOW (ref 3.5–5.3)
POTASSIUM SERPL-SCNC: 3.6 MMOL/L — SIGNIFICANT CHANGE UP (ref 3.5–5.3)
PROCALCITONIN SERPL-MCNC: 0.06 NG/ML — SIGNIFICANT CHANGE UP (ref 0.02–0.1)
PROT SERPL-MCNC: 6.8 G/DL — SIGNIFICANT CHANGE UP (ref 6–8.3)
PROTHROM AB SERPL-ACNC: 11.7 SEC — SIGNIFICANT CHANGE UP (ref 9.5–13)
RAPID RVP RESULT: SIGNIFICANT CHANGE UP
RBC # BLD: 3.42 M/UL — LOW (ref 3.8–5.2)
RBC # BLD: 3.53 M/UL — LOW (ref 3.8–5.2)
RBC # FLD: 12 % — SIGNIFICANT CHANGE UP (ref 10.3–14.5)
RBC # FLD: 12.1 % — SIGNIFICANT CHANGE UP (ref 10.3–14.5)
SARS-COV-2 RNA SPEC QL NAA+PROBE: SIGNIFICANT CHANGE UP
SODIUM SERPL-SCNC: 134 MMOL/L — LOW (ref 135–145)
SODIUM SERPL-SCNC: 136 MMOL/L — SIGNIFICANT CHANGE UP (ref 135–145)
T4 AB SER-ACNC: 6.47 UG/DL — SIGNIFICANT CHANGE UP (ref 4.5–11.7)
T4 FREE SERPL-MCNC: 0.86 NG/DL — LOW (ref 0.93–1.7)
WBC # BLD: 11.41 K/UL — HIGH (ref 3.8–10.5)
WBC # BLD: 5.86 K/UL — SIGNIFICANT CHANGE UP (ref 3.8–10.5)
WBC # FLD AUTO: 11.41 K/UL — HIGH (ref 3.8–10.5)
WBC # FLD AUTO: 5.86 K/UL — SIGNIFICANT CHANGE UP (ref 3.8–10.5)

## 2023-09-21 PROCEDURE — 99233 SBSQ HOSP IP/OBS HIGH 50: CPT

## 2023-09-21 PROCEDURE — 95718 EEG PHYS/QHP 2-12 HR W/VEEG: CPT

## 2023-09-21 PROCEDURE — 99222 1ST HOSP IP/OBS MODERATE 55: CPT

## 2023-09-21 PROCEDURE — 71045 X-RAY EXAM CHEST 1 VIEW: CPT | Mod: 26

## 2023-09-21 PROCEDURE — 99232 SBSQ HOSP IP/OBS MODERATE 35: CPT | Mod: GC

## 2023-09-21 RX ORDER — DEXTROSE 50 % IN WATER 50 %
50 SYRINGE (ML) INTRAVENOUS ONCE
Refills: 0 | Status: COMPLETED | OUTPATIENT
Start: 2023-09-21 | End: 2023-09-21

## 2023-09-21 RX ORDER — POTASSIUM PHOSPHATE, MONOBASIC POTASSIUM PHOSPHATE, DIBASIC 236; 224 MG/ML; MG/ML
15 INJECTION, SOLUTION INTRAVENOUS ONCE
Refills: 0 | Status: COMPLETED | OUTPATIENT
Start: 2023-09-21 | End: 2023-09-22

## 2023-09-21 RX ORDER — SODIUM CHLORIDE 9 MG/ML
1000 INJECTION, SOLUTION INTRAVENOUS
Refills: 0 | Status: DISCONTINUED | OUTPATIENT
Start: 2023-09-21 | End: 2023-09-21

## 2023-09-21 RX ORDER — OLANZAPINE 15 MG/1
7.5 TABLET, FILM COATED ORAL ONCE
Refills: 0 | Status: COMPLETED | OUTPATIENT
Start: 2023-09-21 | End: 2023-09-21

## 2023-09-21 RX ORDER — POTASSIUM CHLORIDE 20 MEQ
10 PACKET (EA) ORAL
Refills: 0 | Status: COMPLETED | OUTPATIENT
Start: 2023-09-21 | End: 2023-09-21

## 2023-09-21 RX ORDER — POTASSIUM CHLORIDE 20 MEQ
40 PACKET (EA) ORAL ONCE
Refills: 0 | Status: COMPLETED | OUTPATIENT
Start: 2023-09-21 | End: 2023-09-21

## 2023-09-21 RX ORDER — DEXMEDETOMIDINE HYDROCHLORIDE IN 0.9% SODIUM CHLORIDE 4 UG/ML
0.2 INJECTION INTRAVENOUS
Qty: 400 | Refills: 0 | Status: DISCONTINUED | OUTPATIENT
Start: 2023-09-21 | End: 2023-09-23

## 2023-09-21 RX ORDER — CHLORHEXIDINE GLUCONATE 213 G/1000ML
1 SOLUTION TOPICAL
Refills: 0 | Status: DISCONTINUED | OUTPATIENT
Start: 2023-09-21 | End: 2023-09-26

## 2023-09-21 RX ORDER — VANCOMYCIN HCL 1 G
500 VIAL (EA) INTRAVENOUS ONCE
Refills: 0 | Status: COMPLETED | OUTPATIENT
Start: 2023-09-21 | End: 2023-09-21

## 2023-09-21 RX ORDER — ONDANSETRON 8 MG/1
0.4 TABLET, FILM COATED ORAL ONCE
Refills: 0 | Status: DISCONTINUED | OUTPATIENT
Start: 2023-09-21 | End: 2023-09-21

## 2023-09-21 RX ORDER — DEXTROSE 10 % IN WATER 10 %
1000 INTRAVENOUS SOLUTION INTRAVENOUS
Refills: 0 | Status: DISCONTINUED | OUTPATIENT
Start: 2023-09-21 | End: 2023-09-22

## 2023-09-21 RX ORDER — HALOPERIDOL DECANOATE 100 MG/ML
2 INJECTION INTRAMUSCULAR ONCE
Refills: 0 | Status: COMPLETED | OUTPATIENT
Start: 2023-09-21 | End: 2023-09-22

## 2023-09-21 RX ORDER — ONDANSETRON 8 MG/1
4 TABLET, FILM COATED ORAL ONCE
Refills: 0 | Status: COMPLETED | OUTPATIENT
Start: 2023-09-21 | End: 2023-09-21

## 2023-09-21 RX ORDER — DEXTROSE 10 % IN WATER 10 %
1000 INTRAVENOUS SOLUTION INTRAVENOUS
Refills: 0 | Status: DISCONTINUED | OUTPATIENT
Start: 2023-09-21 | End: 2023-09-21

## 2023-09-21 RX ORDER — CARVEDILOL PHOSPHATE 80 MG/1
3.12 CAPSULE, EXTENDED RELEASE ORAL EVERY 12 HOURS
Refills: 0 | Status: DISCONTINUED | OUTPATIENT
Start: 2023-09-21 | End: 2023-09-21

## 2023-09-21 RX ORDER — HYDROCORTISONE 20 MG
40 TABLET ORAL ONCE
Refills: 0 | Status: COMPLETED | OUTPATIENT
Start: 2023-09-21 | End: 2023-09-21

## 2023-09-21 RX ADMIN — Medication 50 MILLILITER(S): at 17:39

## 2023-09-21 RX ADMIN — Medication 50 MILLILITER(S): at 14:07

## 2023-09-21 RX ADMIN — SODIUM CHLORIDE 4 MILLILITER(S): 9 INJECTION INTRAMUSCULAR; INTRAVENOUS; SUBCUTANEOUS at 02:55

## 2023-09-21 RX ADMIN — Medication 50 MILLIGRAM(S): at 06:53

## 2023-09-21 RX ADMIN — CEFEPIME 100 MILLIGRAM(S): 1 INJECTION, POWDER, FOR SOLUTION INTRAMUSCULAR; INTRAVENOUS at 19:05

## 2023-09-21 RX ADMIN — SODIUM CHLORIDE 4 MILLILITER(S): 9 INJECTION INTRAMUSCULAR; INTRAVENOUS; SUBCUTANEOUS at 22:11

## 2023-09-21 RX ADMIN — Medication 100 MILLIGRAM(S): at 12:57

## 2023-09-21 RX ADMIN — Medication 50 MILLILITER(S): at 23:00

## 2023-09-21 RX ADMIN — Medication 3 MILLILITER(S): at 22:11

## 2023-09-21 RX ADMIN — CEFEPIME 100 MILLIGRAM(S): 1 INJECTION, POWDER, FOR SOLUTION INTRAMUSCULAR; INTRAVENOUS at 06:53

## 2023-09-21 RX ADMIN — Medication 50 MILLILITER(S): at 16:18

## 2023-09-21 RX ADMIN — Medication 3 MILLILITER(S): at 02:55

## 2023-09-21 RX ADMIN — Medication 100 MILLIEQUIVALENT(S): at 20:03

## 2023-09-21 RX ADMIN — OLANZAPINE 7.5 MILLIGRAM(S): 15 TABLET, FILM COATED ORAL at 20:56

## 2023-09-21 RX ADMIN — Medication 100 MILLIEQUIVALENT(S): at 22:05

## 2023-09-21 RX ADMIN — Medication 80 MILLILITER(S): at 19:06

## 2023-09-21 RX ADMIN — Medication 50 MILLILITER(S): at 20:45

## 2023-09-21 RX ADMIN — Medication 100 MILLIGRAM(S): at 19:05

## 2023-09-21 RX ADMIN — Medication 1 TABLET(S): at 12:56

## 2023-09-21 RX ADMIN — CHLORHEXIDINE GLUCONATE 1 APPLICATION(S): 213 SOLUTION TOPICAL at 06:48

## 2023-09-21 NOTE — CHART NOTE - NSCHARTNOTEFT_GEN_A_CORE
***Rapid Response Clinical Impact Nurse Practitioner Note***    Patient   **STROKE HPI***    HPI: 82y Female with PMHx of HTN, afib (not on AC due to fall risk), recent hospitalization for PRES (5-6/2023), recently discharged from [facility] 8/2023 with acute exacerbation of severe bronchiectasis and respiratory failure who was admitted on 9/19 iso AMS and diplopia, found to have SAH within R pariatel and occipital sulci  Daughter noticed since 9/15 after starting IV abx, patient has been more tired and exhausted with some agitation. It has progressed over the past few days to generalized confusion (being "out of it", not know where certain rooms are in the house, dropping items, not knowing common everyday information) similar to how she initially presented with PRES. Patient told daughter she was seeing double of items in the house around 10pm.   ED: /86. SpO2 84% RA, requiring 3L NC with improvement to 90%. C/o of shortness of breath and difficulty breathing while on supplemental oxygen. Neuro exam significant for decreased peripheral vision bitemporal, but no clear visual field cut, also inconsistent vision exam. Otherwise nonfocal. CT with no acute hemorrhage, with chronic b/l lentiform nuclei and right caudate infarcts. CT angio and perfusion deferred due to contrast allergy received contrast during angiogram in the past with intense flushing of head).  On repeat visual exam, patient notes that she sees double of items, but unable to tell if horizontal or vertical. She is unable to discern at time whether truly double vs blurry, nor transient or consistent diplopia since onset. At times, when testing visual fields, patient struggles to count fingers peripherally and centrally b/l (unclear whether from true field cut vs complete loss of vision vs diplopia causing difficulty counting). Patient also sees items moving around when they are actually stationary. Daughter states that since PRES, patient's vision has not returned to baseline and has been blurry. They have tried to obtain glasses, however on vision testing, Rx was never consistent so was advised to re-test vision after it has "settled".        (19 Sep 2023 05:59)    On 9/21/23 at 4 pm, rapid response was called iso hypoglycemia and altered mental status. As per Neuro fellow, blood glucose level 27 in which patient received 1 amp; patient has not received insulin during hospitalization, did not have breakfast, however had lunch, unsure of what she ate. According to family member (daughter), patient with limited diet at home. Patient was alert but minimally responsive to questioning. Patients hands were warm prior to FSG level  FSG 3:50 pm 27> 2 amps glucose> 4:04 pm 391>4:24 pm 285>4:39 pm 229> 5 pm 140 d10 at 50 cc per hr started  VS: /61, hr 69, rr: 24, rectal temp 93 (bernardo hugger placed)  Labs: K: 3.3 (10 mEq ordered), lactate 2.9  CXR: stable from prior  EKG: stable from prior  After IV dextrose, patient returned to baseline mental status  At 6:04 pm while on D10 at 50 cc/hr, patients FSG 35- prior to 1 amp dextrose, c-peptide, insulin and pro insulin levels were drawn    Vital Signs Last 24 Hrs  T(C): 36.8 (21 Sep 2023 14:36), Max: 36.9 (21 Sep 2023 09:10)  T(F): 98.2 (21 Sep 2023 14:36), Max: 98.4 (21 Sep 2023 09:10)  HR: 63 (21 Sep 2023 12:02) (63 - 81)  BP: 124/76 (21 Sep 2023 12:02) (124/76 - 170/84)  BP(mean): 92 (21 Sep 2023 12:02) (92 - 120)  RR: 20 (21 Sep 2023 12:02) (17 - 20)  SpO2: 98% (21 Sep 2023 12:02) (93% - 100%)    Parameters below as of 21 Sep 2023 12:02  Patient On (Oxygen Delivery Method): room air      09-20 @ 07:01  -  09-21 @ 07:00  --------------------------------------------------------  IN: 240 mL / OUT: 0 mL / NET: 240 mL    09-21 @ 07:01  -  09-21 @ 18:26  --------------------------------------------------------  IN: 520 mL / OUT: 0 mL / NET: 520 mL                              11.3   11.41 )-----------( 208      ( 21 Sep 2023 16:24 )             33.8     09-21    134<L>  |  101  |  22  ----------------------------<  285<H>  3.3<L>   |  25  |  0.73    Ca    8.4      21 Sep 2023 16:24  Phos  2.4     09-21  Mg     1.9     09-21    TPro  6.8  /  Alb  3.3  /  TBili  0.5  /  DBili  x   /  AST  23  /  ALT  27  /  AlkPhos  116  09-21    ABG - ( 20 Sep 2023 18:12 )  pH, Arterial: 7.41  pH, Blood: x     /  pCO2: 48    /  pO2: 89    / HCO3: 30    / Base Excess: 4.8   /  SaO2: 98.0                 LIVER FUNCTIONS - ( 21 Sep 2023 16:24 )  Alb: 3.3 g/dL / Pro: 6.8 g/dL / ALK PHOS: 116 U/L / ALT: 27 U/L / AST: 23 U/L / GGT: x         Urinalysis Basic - ( 21 Sep 2023 16:24 )    Color: x / Appearance: x / SG: x / pH: x  Gluc: 285 mg/dL / Ketone: x  / Bili: x / Urobili: x   Blood: x / Protein: x / Nitrite: x   Leuk Esterase: x / RBC: x / WBC x   Sq Epi: x / Non Sq Epi: x / Bacteria: x         PT/INR - ( 21 Sep 2023 16:56 )   PT: 11.7 sec;   INR: 1.03          PTT - ( 21 Sep 2023 16:56 )  PTT:31.3 sec    MEDICATIONS  (STANDING):  albuterol/ipratropium for Nebulization 3 milliLiter(s) Nebulizer every 6 hours  atorvastatin 40 milliGRAM(s) Oral at bedtime  carvedilol 3.125 milliGRAM(s) Oral every 12 hours  cefepime   IVPB 2000 milliGRAM(s) IV Intermittent every 12 hours  chlorhexidine 4% Liquid 1 Application(s) Topical <User Schedule>  dextrose 10%. 1000 milliLiter(s) (80 mL/Hr) IV Continuous <Continuous>  dextrose 50% Injectable 50 milliLiter(s) IV Push once  melatonin 5 milliGRAM(s) Oral at bedtime  montelukast 10 milliGRAM(s) Oral daily  multivitamin  Chewable 1 Tablet(s) Oral daily  ondansetron Injectable 4 milliGRAM(s) IV Push once  potassium chloride  10 mEq/100 mL IVPB 10 milliEquivalent(s) IV Intermittent every 1 hour  potassium phosphate IVPB 15 milliMole(s) IV Intermittent once  sodium chloride 7% Inhalation 4 milliLiter(s) Inhalation every 6 hours  thiamine 100 milliGRAM(s) Oral daily  vancomycin  IVPB 500 milliGRAM(s) IV Intermittent once    MEDICATIONS  (PRN):  sodium chloride 0.9% lock flush 10 milliLiter(s) IV Push every 1 hour PRN Pre/post blood products, medications, blood draw, and to maintain line patency       82y Female with PMHx of HTN, afib (not on AC due to fall risk), recent hospitalization for PRES (5-6/2023), recently discharged from [facility] 8/2023 with acute exacerbation of severe bronchiectasis and respiratory failure who was admitted on 9/19 iso AMS and diplopia, found to have SAH within R parietal and occipital sulci  Rapid response called iso hypoglycemia     Plan    #hypoglycemia  FSG 3:50 pm 27> 2 amps glucose> 4:04 pm 391>4:24 pm 285>4:39 pm 229> 5 pm 140 d10 at 50 cc per hr started  At 6:04 pm while on D10 at 50 cc/hr, patients FSG 35- prior to 1 amp dextrose, c-peptide, insulin and pro insulin levels were drawn  Patient with BMI 11.9. According to daughter, was not present during mealtime today. According to nursing team, patient did not have breakfast, ate lunch, however not able to comment on what she ate/how much  Patient not on antidiabetic medications, no cancer diagnoses to suspect insulinoma, or DM2  Differential at this time includes high metabolic demand iso sepsis and body habitus not able to compensate (BMI 11.9) vs insulinoma vs falsely low 2/2 poor perfusion iso cold extremities vs adrenal insufficiency (daughter mentioned she has gotten steroids in past, not recent; no hypotension and electrolytes wnl)  Endocrinology made aware of patient    Plan  - c/w D10 at 80 cc/hr  - FSG q1 hrs  - f/up c-peptide, proinsulin, insulin level, and sulfonylurea  - if hypoglycemic episode occurs again, BMP to correlate  - ensure eating at meal times  - f/up Endo recs  - repeat CTH to ensure SAH is not expanding  - vEEG iso worsening mental status compared to baseline as per neuro team    #severe sepsis   Patient meeting 2/4 SIRS criteria (hypothermic and tachypneic) w possible source of bacteremia from PICC vs pna (currently receiving Cefepime) w lactate 2.9. Patient with lactate, however BPs have been stable, most likely 2/2 to starvation ketosis  Pt discharged from [facility] on PO abx: bactrim 800-160mg  for stenotrophomonas and ciprofloxacin 500mg q12 for pseudomonas. Seen by Dr Foster on 9/5/23, recommended IV cefepime due to persistent symptoms despite completing PO abx. PICC placed on 9/13/23 and started on Cefepime on 9/15.   Differential includes pna vs bacteremia from PICC line infection  CXR without focal consolidations  Blood cultures drawn 9/21 AM  RVP and COVID negative, procal 9/21 wnl    Plan  - remove PICC once access for d10 is obtained  - f/up UA (assess for ketones)  - continue Cefepime + start vancomycin  - f/up vanc level in AM- to be dosed by level iso decreased CrCl  - Monitor WBC  - Monitor VS  - ensure patient is eating meals appropriately  - f/up lactate  - f/u BCx ***Rapid Response Clinical Impact Nurse Practitioner Note***    Patient   **STROKE HPI***    HPI: 82y Female with PMHx of HTN, afib (not on AC due to fall risk), recent hospitalization for PRES (5-6/2023), recently discharged from [facility] 8/2023 with acute exacerbation of severe bronchiectasis and respiratory failure who was admitted on 9/19 iso AMS and diplopia, found to have SAH within R parietal and occipital sulci  Daughter noticed since 9/15 after starting IV abx, patient has been more tired and exhausted with some agitation. It has progressed over the past few days to generalized confusion (being "out of it", not know where certain rooms are in the house, dropping items, not knowing common everyday information) similar to how she initially presented with PRES. Patient told daughter she was seeing double of items in the house around 10pm.   ED: /86. SpO2 84% RA, requiring 3L NC with improvement to 90%. C/o of shortness of breath and difficulty breathing while on supplemental oxygen. Neuro exam significant for decreased peripheral vision bitemporal, but no clear visual field cut, also inconsistent vision exam. Otherwise nonfocal. CT with no acute hemorrhage, with chronic b/l lentiform nuclei and right caudate infarcts. CT angio and perfusion deferred due to contrast allergy received contrast during angiogram in the past with intense flushing of head).  On repeat visual exam, patient notes that she sees double of items, but unable to tell if horizontal or vertical. She is unable to discern at time whether truly double vs blurry, nor transient or consistent diplopia since onset. At times, when testing visual fields, patient struggles to count fingers peripherally and centrally b/l (unclear whether from true field cut vs complete loss of vision vs diplopia causing difficulty counting). Patient also sees items moving around when they are actually stationary. Daughter states that since PRES, patient's vision has not returned to baseline and has been blurry. They have tried to obtain glasses, however on vision testing, Rx was never consistent so was advised to re-test vision after it has "settled".        (19 Sep 2023 05:59)    On 9/21/23 at 4 pm, rapid response was called iso hypoglycemia and altered mental status. As per Neuro fellow, blood glucose level 27 in which patient received 1 amp; patient has not received insulin during hospitalization, did not have breakfast, however had lunch, unsure of what she ate. According to family member (daughter), patient with limited diet at home. Patient was alert but minimally responsive to questioning. Patients hands were warm prior to FSG level  FSG 3:50 pm 27> 2 amps glucose> 4:04 pm 391>4:24 pm 285>4:39 pm 229> 5 pm 140 d10 at 50 cc per hr started  VS: /61, hr 69, rr: 24, rectal temp 93 (bernardo hugger placed)  Labs: K: 3.3 (10 mEq ordered), lactate 2.9  CXR: stable from prior  EKG: stable from prior  After IV dextrose, patient returned to baseline mental status  At 6:04 pm while on D10 at 50 cc/hr, patients FSG 35- prior to 1 amp dextrose, c-peptide, insulin and pro insulin levels were drawn    Vital Signs Last 24 Hrs  T(C): 36.8 (21 Sep 2023 14:36), Max: 36.9 (21 Sep 2023 09:10)  T(F): 98.2 (21 Sep 2023 14:36), Max: 98.4 (21 Sep 2023 09:10)  HR: 63 (21 Sep 2023 12:02) (63 - 81)  BP: 124/76 (21 Sep 2023 12:02) (124/76 - 170/84)  BP(mean): 92 (21 Sep 2023 12:02) (92 - 120)  RR: 20 (21 Sep 2023 12:02) (17 - 20)  SpO2: 98% (21 Sep 2023 12:02) (93% - 100%)    Parameters below as of 21 Sep 2023 12:02  Patient On (Oxygen Delivery Method): room air      09-20 @ 07:01  -  09-21 @ 07:00  --------------------------------------------------------  IN: 240 mL / OUT: 0 mL / NET: 240 mL    09-21 @ 07:01  -  09-21 @ 18:26  --------------------------------------------------------  IN: 520 mL / OUT: 0 mL / NET: 520 mL                              11.3   11.41 )-----------( 208      ( 21 Sep 2023 16:24 )             33.8     09-21    134<L>  |  101  |  22  ----------------------------<  285<H>  3.3<L>   |  25  |  0.73    Ca    8.4      21 Sep 2023 16:24  Phos  2.4     09-21  Mg     1.9     09-21    TPro  6.8  /  Alb  3.3  /  TBili  0.5  /  DBili  x   /  AST  23  /  ALT  27  /  AlkPhos  116  09-21    ABG - ( 20 Sep 2023 18:12 )  pH, Arterial: 7.41  pH, Blood: x     /  pCO2: 48    /  pO2: 89    / HCO3: 30    / Base Excess: 4.8   /  SaO2: 98.0                 LIVER FUNCTIONS - ( 21 Sep 2023 16:24 )  Alb: 3.3 g/dL / Pro: 6.8 g/dL / ALK PHOS: 116 U/L / ALT: 27 U/L / AST: 23 U/L / GGT: x         Urinalysis Basic - ( 21 Sep 2023 16:24 )    Color: x / Appearance: x / SG: x / pH: x  Gluc: 285 mg/dL / Ketone: x  / Bili: x / Urobili: x   Blood: x / Protein: x / Nitrite: x   Leuk Esterase: x / RBC: x / WBC x   Sq Epi: x / Non Sq Epi: x / Bacteria: x         PT/INR - ( 21 Sep 2023 16:56 )   PT: 11.7 sec;   INR: 1.03          PTT - ( 21 Sep 2023 16:56 )  PTT:31.3 sec    MEDICATIONS  (STANDING):  albuterol/ipratropium for Nebulization 3 milliLiter(s) Nebulizer every 6 hours  atorvastatin 40 milliGRAM(s) Oral at bedtime  carvedilol 3.125 milliGRAM(s) Oral every 12 hours  cefepime   IVPB 2000 milliGRAM(s) IV Intermittent every 12 hours  chlorhexidine 4% Liquid 1 Application(s) Topical <User Schedule>  dextrose 10%. 1000 milliLiter(s) (80 mL/Hr) IV Continuous <Continuous>  dextrose 50% Injectable 50 milliLiter(s) IV Push once  melatonin 5 milliGRAM(s) Oral at bedtime  montelukast 10 milliGRAM(s) Oral daily  multivitamin  Chewable 1 Tablet(s) Oral daily  ondansetron Injectable 4 milliGRAM(s) IV Push once  potassium chloride  10 mEq/100 mL IVPB 10 milliEquivalent(s) IV Intermittent every 1 hour  potassium phosphate IVPB 15 milliMole(s) IV Intermittent once  sodium chloride 7% Inhalation 4 milliLiter(s) Inhalation every 6 hours  thiamine 100 milliGRAM(s) Oral daily  vancomycin  IVPB 500 milliGRAM(s) IV Intermittent once    MEDICATIONS  (PRN):  sodium chloride 0.9% lock flush 10 milliLiter(s) IV Push every 1 hour PRN Pre/post blood products, medications, blood draw, and to maintain line patency       82y Female with PMHx of HTN, afib (not on AC due to fall risk), recent hospitalization for PRES (5-6/2023), recently discharged from [facility] 8/2023 with acute exacerbation of severe bronchiectasis and respiratory failure who was admitted on 9/19 iso AMS and diplopia, found to have SAH within R parietal and occipital sulci  Rapid response called iso hypoglycemia     Plan    #hypoglycemia  FSG 3:50 pm 27> 2 amps glucose> 4:04 pm 391>4:24 pm 285>4:39 pm 229> 5 pm 140 d10 at 50 cc per hr started  At 6:04 pm while on D10 at 50 cc/hr, patients FSG 35- prior to 1 amp dextrose, c-peptide, insulin and pro insulin levels were drawn  Patient with BMI 11.9. According to daughter, was not present during mealtime today. According to nursing team, patient did not have breakfast, ate lunch, however not able to comment on what she ate/how much  Patient not on antidiabetic medications, no cancer diagnoses to suspect insulinoma, or DM2  Differential at this time includes high metabolic demand iso sepsis and body habitus not able to compensate (BMI 11.9) vs insulinoma vs falsely low 2/2 poor perfusion iso cold extremities vs adrenal insufficiency (daughter mentioned she has gotten steroids in past, not recent; no hypotension and electrolytes wnl)  Endocrinology made aware of patient    Plan  - c/w D10 at 80 cc/hr  - FSG q1 hrs  - f/up c-peptide, proinsulin, insulin level, and sulfonylurea  - AM cortisol  - if hypoglycemic episode occurs again, BMP to correlate  - ensure eating at meal times  - f/up Endo recs  - repeat CTH to ensure SAH is not expanding  - vEEG iso worsening mental status compared to baseline as per neuro team    #severe sepsis   Patient meeting 2/4 SIRS criteria (hypothermic and tachypneic) w possible source of bacteremia from PICC vs pna (currently receiving Cefepime) w lactate 2.9. Patient with lactate, however BPs have been stable, most likely 2/2 to starvation ketosis  Pt discharged from [facility] on PO abx: bactrim 800-160mg  for stenotrophomonas and ciprofloxacin 500mg q12 for pseudomonas. Seen by Dr Foster on 9/5/23, recommended IV cefepime due to persistent symptoms despite completing PO abx. PICC placed on 9/13/23 and started on Cefepime on 9/15.   Differential includes pna vs bacteremia from PICC line infection  CXR without focal consolidations  Blood cultures drawn 9/21 AM  RVP and COVID negative, procal 9/21 wnl    Plan  - remove PICC once access for d10 is obtained  - f/up UA (assess for ketones)  - continue Cefepime + start vancomycin  - f/up vanc level in AM- to be dosed by level iso decreased CrCl  - Monitor WBC  - Monitor VS  - ensure patient is eating meals appropriately  - f/up lactate  - f/u BCx ***Rapid Response Clinical Impact micu consult resident note***    HPI: 82y Female with PMHx of HTN, afib (not on AC due to fall risk), recent hospitalization for PRES (5-6/2023), recently discharged from [facility] 8/2023 with acute exacerbation of severe bronchiectasis and respiratory failure who was admitted on 9/19 iso AMS and diplopia, found to have SAH within R parietal and occipital sulci  Daughter noticed since 9/15 after starting IV abx, patient has been more tired and exhausted with some agitation. It has progressed over the past few days to generalized confusion (being "out of it", not know where certain rooms are in the house, dropping items, not knowing common everyday information) similar to how she initially presented with PRES. Patient told daughter she was seeing double of items in the house around 10pm.   ED: /86. SpO2 84% RA, requiring 3L NC with improvement to 90%. C/o of shortness of breath and difficulty breathing while on supplemental oxygen. Neuro exam significant for decreased peripheral vision bitemporal, but no clear visual field cut, also inconsistent vision exam. Otherwise nonfocal. CT with no acute hemorrhage, with chronic b/l lentiform nuclei and right caudate infarcts. CT angio and perfusion deferred due to contrast allergy received contrast during angiogram in the past with intense flushing of head).  On repeat visual exam, patient notes that she sees double of items, but unable to tell if horizontal or vertical. She is unable to discern at time whether truly double vs blurry, nor transient or consistent diplopia since onset. At times, when testing visual fields, patient struggles to count fingers peripherally and centrally b/l (unclear whether from true field cut vs complete loss of vision vs diplopia causing difficulty counting). Patient also sees items moving around when they are actually stationary. Daughter states that since PRES, patient's vision has not returned to baseline and has been blurry. They have tried to obtain glasses, however on vision testing, Rx was never consistent so was advised to re-test vision after it has "settled".        (19 Sep 2023 05:59)    On 9/21/23 at 4 pm, rapid response was called iso hypoglycemia and altered mental status. As per Neuro fellow, blood glucose level 27 in which patient received 1 amp; patient has not received insulin during hospitalization, did not have breakfast, however had lunch, unsure of what she ate. According to family member (daughter), patient with limited diet at home. Patient was alert but minimally responsive to questioning. Patients hands were warm prior to FSG level  FSG 3:50 pm 27> 2 amps glucose> 4:04 pm 391>4:24 pm 285>4:39 pm 229> 5 pm 140 d10 at 50 cc per hr started  VS: /61, hr 69, rr: 24, rectal temp 93 (bernardo hugger placed)  Labs: K: 3.3 (10 mEq ordered), lactate 2.9  CXR: stable from prior  EKG: stable from prior  After IV dextrose, patient returned to baseline mental status  At 6:04 pm while on D10 at 50 cc/hr, patients FSG 35- prior to 1 amp dextrose, c-peptide, insulin and pro insulin levels were drawn    Vital Signs Last 24 Hrs  T(C): 36.8 (21 Sep 2023 14:36), Max: 36.9 (21 Sep 2023 09:10)  T(F): 98.2 (21 Sep 2023 14:36), Max: 98.4 (21 Sep 2023 09:10)  HR: 63 (21 Sep 2023 12:02) (63 - 81)  BP: 124/76 (21 Sep 2023 12:02) (124/76 - 170/84)  BP(mean): 92 (21 Sep 2023 12:02) (92 - 120)  RR: 20 (21 Sep 2023 12:02) (17 - 20)  SpO2: 98% (21 Sep 2023 12:02) (93% - 100%)    Parameters below as of 21 Sep 2023 12:02  Patient On (Oxygen Delivery Method): room air      09-20 @ 07:01  -  09-21 @ 07:00  --------------------------------------------------------  IN: 240 mL / OUT: 0 mL / NET: 240 mL    09-21 @ 07:01  -  09-21 @ 18:26  --------------------------------------------------------  IN: 520 mL / OUT: 0 mL / NET: 520 mL                              11.3   11.41 )-----------( 208      ( 21 Sep 2023 16:24 )             33.8     09-21    134<L>  |  101  |  22  ----------------------------<  285<H>  3.3<L>   |  25  |  0.73    Ca    8.4      21 Sep 2023 16:24  Phos  2.4     09-21  Mg     1.9     09-21    TPro  6.8  /  Alb  3.3  /  TBili  0.5  /  DBili  x   /  AST  23  /  ALT  27  /  AlkPhos  116  09-21    ABG - ( 20 Sep 2023 18:12 )  pH, Arterial: 7.41  pH, Blood: x     /  pCO2: 48    /  pO2: 89    / HCO3: 30    / Base Excess: 4.8   /  SaO2: 98.0                 LIVER FUNCTIONS - ( 21 Sep 2023 16:24 )  Alb: 3.3 g/dL / Pro: 6.8 g/dL / ALK PHOS: 116 U/L / ALT: 27 U/L / AST: 23 U/L / GGT: x         Urinalysis Basic - ( 21 Sep 2023 16:24 )    Color: x / Appearance: x / SG: x / pH: x  Gluc: 285 mg/dL / Ketone: x  / Bili: x / Urobili: x   Blood: x / Protein: x / Nitrite: x   Leuk Esterase: x / RBC: x / WBC x   Sq Epi: x / Non Sq Epi: x / Bacteria: x         PT/INR - ( 21 Sep 2023 16:56 )   PT: 11.7 sec;   INR: 1.03          PTT - ( 21 Sep 2023 16:56 )  PTT:31.3 sec    MEDICATIONS  (STANDING):  albuterol/ipratropium for Nebulization 3 milliLiter(s) Nebulizer every 6 hours  atorvastatin 40 milliGRAM(s) Oral at bedtime  carvedilol 3.125 milliGRAM(s) Oral every 12 hours  cefepime   IVPB 2000 milliGRAM(s) IV Intermittent every 12 hours  chlorhexidine 4% Liquid 1 Application(s) Topical <User Schedule>  dextrose 10%. 1000 milliLiter(s) (80 mL/Hr) IV Continuous <Continuous>  dextrose 50% Injectable 50 milliLiter(s) IV Push once  melatonin 5 milliGRAM(s) Oral at bedtime  montelukast 10 milliGRAM(s) Oral daily  multivitamin  Chewable 1 Tablet(s) Oral daily  ondansetron Injectable 4 milliGRAM(s) IV Push once  potassium chloride  10 mEq/100 mL IVPB 10 milliEquivalent(s) IV Intermittent every 1 hour  potassium phosphate IVPB 15 milliMole(s) IV Intermittent once  sodium chloride 7% Inhalation 4 milliLiter(s) Inhalation every 6 hours  thiamine 100 milliGRAM(s) Oral daily  vancomycin  IVPB 500 milliGRAM(s) IV Intermittent once    MEDICATIONS  (PRN):  sodium chloride 0.9% lock flush 10 milliLiter(s) IV Push every 1 hour PRN Pre/post blood products, medications, blood draw, and to maintain line patency       82y Female with PMHx of HTN, afib (not on AC due to fall risk), recent hospitalization for PRES (5-6/2023), recently discharged from [facility] 8/2023 with acute exacerbation of severe bronchiectasis and respiratory failure who was admitted on 9/19 iso AMS and diplopia, found to have SAH within R parietal and occipital sulci  Rapid response called iso hypoglycemia     Plan    #hypoglycemia  FSG 3:50 pm 27> 2 amps glucose> 4:04 pm 391>4:24 pm 285>4:39 pm 229> 5 pm 140 d10 at 50 cc per hr started  At 6:04 pm while on D10 at 50 cc/hr, patients FSG 35- prior to 1 amp dextrose, c-peptide, insulin and pro insulin levels were drawn  Patient with BMI 11.9. According to daughter, was not present during mealtime today. According to nursing team, patient did not have breakfast, ate lunch, however not able to comment on what she ate/how much  Patient not on antidiabetic medications, no cancer diagnoses to suspect insulinoma, or DM2  Differential at this time includes high metabolic demand iso sepsis and body habitus not able to compensate (BMI 11.9) vs insulinoma vs falsely low 2/2 poor perfusion iso cold extremities vs adrenal insufficiency (daughter mentioned she has gotten steroids in past, not recent; no hypotension and electrolytes wnl)  Endocrinology made aware of patient    Plan  - c/w D10 at 80 cc/hr  - FSG q1 hrs  - f/up c-peptide, proinsulin, insulin level, and sulfonylurea  - AM cortisol  - if hypoglycemic episode occurs again, BMP to correlate  - ensure eating at meal times  - f/up Endo recs  - repeat CTH to ensure SAH is not expanding  - vEEG iso worsening mental status compared to baseline as per neuro team    #severe sepsis   Patient meeting 2/4 SIRS criteria (hypothermic and tachypneic) w possible source of bacteremia from PICC vs pna (currently receiving Cefepime) w lactate 2.9. Patient with lactate, however BPs have been stable, most likely 2/2 to starvation ketosis  Pt discharged from [facility] on PO abx: bactrim 800-160mg  for stenotrophomonas and ciprofloxacin 500mg q12 for pseudomonas. Seen by Dr Foster on 9/5/23, recommended IV cefepime due to persistent symptoms despite completing PO abx. PICC placed on 9/13/23 and started on Cefepime on 9/15.   Differential includes pna vs bacteremia from PICC line infection  CXR without focal consolidations  Blood cultures drawn 9/21 AM  RVP and COVID negative, procal 9/21 wnl    Plan  - remove PICC once access for d10 is obtained  - f/up UA (assess for ketones)  - continue Cefepime + start vancomycin  - f/up vanc level in AM- to be dosed by level iso decreased CrCl  - Monitor WBC  - Monitor VS  - ensure patient is eating meals appropriately  - f/up lactate  - f/u BCx

## 2023-09-21 NOTE — PROGRESS NOTE ADULT - SUBJECTIVE AND OBJECTIVE BOX
INTERVAL HPI/OVERNIGHT EVENTS: steph o/n    SUBJECTIVE: Patient seen and examined at bedside.   pt seated in chair - 1:1 at bedside. States she is not sure if she is living or dead. Acknowledges she is in the hospital. Denies any pain, dyspnea. Does report intermittent chest wall and rib pain. No N/V/abd pain.     OBJECTIVE:    VITAL SIGNS:  ICU Vital Signs Last 24 Hrs  T(C): 36.8 (21 Sep 2023 14:36), Max: 36.9 (21 Sep 2023 09:10)  T(F): 98.2 (21 Sep 2023 14:36), Max: 98.4 (21 Sep 2023 09:10)  HR: 63 (21 Sep 2023 12:02) (63 - 81)  BP: 124/76 (21 Sep 2023 12:02) (124/76 - 170/84)  BP(mean): 92 (21 Sep 2023 12:02) (92 - 120)  ABP: --  ABP(mean): --  RR: 20 (21 Sep 2023 12:02) (17 - 20)  SpO2: 98% (21 Sep 2023 12:02) (93% - 100%)    O2 Parameters below as of 21 Sep 2023 12:02  Patient On (Oxygen Delivery Method): room air              09-20 @ 07:01  -  09-21 @ 07:00  --------------------------------------------------------  IN: 240 mL / OUT: 0 mL / NET: 240 mL    09-21 @ 07:01  -  09-21 @ 17:59  --------------------------------------------------------  IN: 520 mL / OUT: 0 mL / NET: 520 mL      CAPILLARY BLOOD GLUCOSE      POCT Blood Glucose.: 92 mg/dL (21 Sep 2023 17:23)      PHYSICAL EXAM:  GEN: thin female in NAD on RA  HEENT: NC/AT, MMM  CV: RRR, nml S1S2, no murmurs  PULM: nml effort, dry rales on R mid and lower fields wo wheezing or rhonchi.   ABD: Soft, non-distended, NABS, non-tender  NEURO  A/O x self, hospital, but not time. moving all extremities, Sensation intact  PSYCH: Appropriate      MEDICATIONS:  MEDICATIONS  (STANDING):  albuterol/ipratropium for Nebulization 3 milliLiter(s) Nebulizer every 6 hours  atorvastatin 40 milliGRAM(s) Oral at bedtime  carvedilol 3.125 milliGRAM(s) Oral every 12 hours  cefepime   IVPB 2000 milliGRAM(s) IV Intermittent every 12 hours  chlorhexidine 4% Liquid 1 Application(s) Topical <User Schedule>  dextrose 10% + sodium chloride 0.9%. 1000 milliLiter(s) (50 mL/Hr) IV Continuous <Continuous>  melatonin 5 milliGRAM(s) Oral at bedtime  montelukast 10 milliGRAM(s) Oral daily  multivitamin  Chewable 1 Tablet(s) Oral daily  ondansetron Injectable 4 milliGRAM(s) IV Push once  potassium chloride  10 mEq/100 mL IVPB 10 milliEquivalent(s) IV Intermittent every 1 hour  potassium phosphate IVPB 15 milliMole(s) IV Intermittent once  sodium chloride 7% Inhalation 4 milliLiter(s) Inhalation every 6 hours  thiamine 100 milliGRAM(s) Oral daily    MEDICATIONS  (PRN):  sodium chloride 0.9% lock flush 10 milliLiter(s) IV Push every 1 hour PRN Pre/post blood products, medications, blood draw, and to maintain line patency      ALLERGIES:  Allergies    Ceclor (Rash)  IV Contrast (Unknown)  Levaquin (Swelling)  Augmentin (Short breath; Rash)    Intolerances        LABS:                        11.3   11.41 )-----------( 208      ( 21 Sep 2023 16:24 )             33.8     09-21    134<L>  |  101  |  22  ----------------------------<  285<H>  3.3<L>   |  25  |  0.73    Ca    8.4      21 Sep 2023 16:24  Phos  2.4     09-21  Mg     1.9     09-21    TPro  6.8  /  Alb  3.3  /  TBili  0.5  /  DBili  x   /  AST  23  /  ALT  27  /  AlkPhos  116  09-21    PT/INR - ( 21 Sep 2023 16:56 )   PT: 11.7 sec;   INR: 1.03          PTT - ( 21 Sep 2023 16:56 )  PTT:31.3 sec  Urinalysis Basic - ( 21 Sep 2023 16:24 )    Color: x / Appearance: x / SG: x / pH: x  Gluc: 285 mg/dL / Ketone: x  / Bili: x / Urobili: x   Blood: x / Protein: x / Nitrite: x   Leuk Esterase: x / RBC: x / WBC x   Sq Epi: x / Non Sq Epi: x / Bacteria: x        RADIOLOGY & ADDITIONAL TESTS: Reviewed.

## 2023-09-21 NOTE — PROGRESS NOTE ADULT - ASSESSMENT
82F, PMH of HTN, Afib (not on AC d/t fall risk), recent hospitalization for PRES (not at [facility], May-June '23), recent discharge for acute exacerbation of severe bronchiectasis (at [facility], Aug '23), presented w/ several days of AMS & new-onset diplopia since 9/15.   Pt started IV abx on 9/15 (2wk course of cefepime 2g IV BID for bronchiectasis exacerbation after concern from Dr. Foster that pt failed PO abx, - was dc'd in Aug on bactrim and cipro- got outpt PICC placement on 9/13, 1st dose 9/15, got 7 doses total before presentation to hospital).    NEURO  #PRES  #Seizure  Pt hospitalized 05-06/2023 for PRES. At the time presented similar symptoms: confusion, AMS, forgetfulness, dropping things. Now presented w/ diplopia and bitemporal visual field anomalies, all findings consistent w/ PRES. Now with tangential, nonsensical speech, as well as some seizure-like activity. Twitching/seizures possibly 2/2 hypoglycemia  - consider repeat CT head  - repeat vEEG (initial no epileptiform activity, however pt ripped off after 8hrs)  - q1hr neuro checks  - monitor fingersticks q1hr    #R subarachnoid hemorrhage  #L ischemic occipital stroke  Pt w/ bitemporal visual field deficits, worsened mental status throughout clinical course.       CARDIO    PULM    ENDO    GI    RENAL/     82F, PMH of HTN, Afib (not on AC d/t fall risk), recent hospitalization for PRES (not at [facility], May-June '23), recent discharge for acute exacerbation of severe bronchiectasis (at [facility], Aug '23), presented w/ several days of AMS & new-onset diplopia since 9/15.   Pt started IV abx on 9/15 (2wk course of cefepime 2g IV BID for bronchiectasis exacerbation after concern from Dr. Foster that pt failed PO abx, - was dc'd in Aug on bactrim and cipro- got outpt PICC placement on 9/13, 1st dose 9/15, got 7 doses total before presentation to hospital).    NEURO  #PRES  #Seizure  Pt hospitalized 05-06/2023 for PRES. At the time presented similar symptoms: confusion, AMS, forgetfulness, dropping things. Now presented w/ diplopia and bitemporal visual field anomalies, all findings consistent w/ PRES. Now with tangential, nonsensical speech, as well as some seizure-like activity. Twitching/seizures possibly 2/2 hypoglycemia  - consider repeat CT head  - repeat vEEG (initial no epileptiform activity, however pt ripped off after 8hrs)  - q1hr neuro checks  - delirum precautions  - monitor fingersticks q1hr    #R subarachnoid hemorrhage  #L ischemic occipital stroke  Pt w/ bitemporal visual field deficits, worsened mental status throughout clinical course. Initially w/o findings on CT head, later found to have SAH, and then found to have Ischemic stroke on MRI brain.   - repeat CT head  - consider MRV (r/o sinus thrombosis)  - per neuro, also consider MRA head & neck  - q1hr neuro checks    CARDIO  #Afib  Hx of Afib. Not on a/c chronically d/t fall risk. Started on Eliquis presentation after discussion w/ family about 2ndary stroke prevention per neuro. Eliquis now dc'd i/s/o findings of SAH.  - hold all AC  - not in Afib currently  - frequent EKGs  - consider TFT panel     #HTN  Hx of HTN. Home med = Lopressor 50 BID. Goal SBP per neuro <160.   - dc'd coreg i/s/o BPs occassionally in the 90s systolic  - hold home med.  - ctm.     PULM  #Bronchiectasis   Chronic bronchiectasis. Recent admission for it in Aug, was dc'd on Bactrim & Cipro PO, however recently started on Cefepime 2g IV BID after pt thought to have failed PO.   - consult ID/Pulm about switch cefepime to another abx i/s/o severe AMS    ENDO  #Hypoglycemia  Pt with recalcitrant hypoglycemia, unresponsive to multiple amps of dextrose, and repeat episodes of hypoglycemia to 20s and 30s. After given D10 pt Gluc went to 410, and dropped to 140 in less than 1hr.   - f/u cortisol levels in AM  - f/u Sulfonylurea panel  - f/u Proinsulin  - c/w dextrose 10 drip  - c/w q1hr fingersticks  - consider Endo consult    GI  BEN.    RENAL/  BEN.     Prophylactic Measures  DVT Prophylaxis: SCDs  GI Prophylaxis: Start PPIs     82F, PMH of HTN, Afib (not on AC d/t fall risk), recent hospitalization for PRES (not at [facility], May-June '23), recent discharge for acute exacerbation of severe bronchiectasis (at [facility], Aug '23), presented w/ several days of AMS & new-onset diplopia since 9/15.   Pt started IV abx on 9/15 (2wk course of cefepime 2g IV BID for bronchiectasis exacerbation after concern from Dr. Foster that pt failed PO abx, - was dc'd in Aug on bactrim and cipro- got outpt PICC placement on 9/13, 1st dose 9/15, got 7 doses total before presentation to hospital).    NEURO  #PRES  #Seizure  Pt hospitalized 05-06/2023 for PRES. At the time presented similar symptoms: confusion, AMS, forgetfulness, dropping things. Now presented w/ diplopia and bitemporal visual field anomalies, all findings consistent w/ PRES. Now with tangential, nonsensical speech, as well as some seizure-like activity. Twitching/seizures possibly 2/2 hypoglycemia  - consider repeat CT head  - repeat vEEG (initial no epileptiform activity, however pt ripped off after 8hrs)  - q1hr neuro checks  - c/w thiamine 100mg qd  - delirum precautions  - monitor fingersticks q1hr    #R subarachnoid hemorrhage  #L ischemic occipital stroke  Pt w/ bitemporal visual field deficits, worsened mental status throughout clinical course. Initially w/o findings on CT head, later found to have SAH, and then found to have Ischemic stroke on MRI brain.   - repeat CT head  - consider MRV (r/o sinus thrombosis)  - per neuro, also consider MRA head & neck  - q1hr neuro checks    CARDIO  #Afib  Hx of Afib. Not on a/c chronically d/t fall risk. Started on Eliquis presentation after discussion w/ family about 2ndary stroke prevention per neuro. Eliquis now dc'd i/s/o findings of SAH.  - hold all AC  - not in Afib currently  - frequent EKGs  - consider TFT panel     #HTN  Hx of HTN. Home med = Lopressor 50 BID. Goal SBP per neuro <160.   - dc'd coreg i/s/o BPs occasionally in the 90s systolic  - hold home med.  - ctm     PULM  #Bronchiectasis   Chronic bronchiectasis. Recent admission for it in Aug, was dc'd on Bactrim & Cipro PO, however recently started on Cefepime 2g IV BID after pt thought to have failed PO.   - consult ID/Pulm about switch cefepime to another abx i/s/o severe AMS    ENDO  #Hypoglycemia  Pt with recalcitrant hypoglycemia, unresponsive to multiple amps of dextrose, and repeat episodes of hypoglycemia to 20s and 30s. After given D10 pt Gluc went to 410, and dropped to 140 in less than 1hr.   - f/u cortisol levels in AM  - f/u Sulfonylurea panel  - f/u Proinsulin  - c/w dextrose 10 drip  - c/w q1hr fingersticks  - consider Endo consult    GI  BEN.    RENAL/  BEN.     Prophylactic Measures  Diet: Minced and Moist  DVT Prophylaxis: SCDs  GI Prophylaxis: Start PPIs

## 2023-09-21 NOTE — PROGRESS NOTE ADULT - SUBJECTIVE AND OBJECTIVE BOX
PULMONARY CONSULT SERVICE FOLLOW-UP NOTE    INTERVAL HPI:  Reviewed chart and overnight events; patient seen and examined at bedside. She has no respiratory issues. She is upset as she feels like she can't remember things anymore like she used to.     MEDICATIONS:  Pulmonary:  albuterol/ipratropium for Nebulization 3 milliLiter(s) Nebulizer every 6 hours  montelukast 10 milliGRAM(s) Oral daily  sodium chloride 7% Inhalation 4 milliLiter(s) Inhalation every 6 hours    Antimicrobials:  cefepime   IVPB 2000 milliGRAM(s) IV Intermittent every 12 hours    Anticoagulants:    Cardiac:  carvedilol 3.125 milliGRAM(s) Oral every 12 hours      Allergies    Ceclor (Rash)  IV Contrast (Unknown)  Levaquin (Swelling)  Augmentin (Short breath; Rash)    Intolerances        Vital Signs Last 24 Hrs  T(C): 36.9 (21 Sep 2023 09:10), Max: 36.9 (21 Sep 2023 09:10)  T(F): 98.4 (21 Sep 2023 09:10), Max: 98.4 (21 Sep 2023 09:10)  HR: 63 (21 Sep 2023 12:02) (63 - 81)  BP: 124/76 (21 Sep 2023 12:02) (124/76 - 170/84)  BP(mean): 92 (21 Sep 2023 12:02) (92 - 120)  RR: 20 (21 Sep 2023 12:02) (17 - 20)  SpO2: 98% (21 Sep 2023 12:02) (92% - 100%)    Parameters below as of 21 Sep 2023 12:02  Patient On (Oxygen Delivery Method): room air        09-20 @ 07:01  -  09-21 @ 07:00  --------------------------------------------------------  IN: 240 mL / OUT: 0 mL / NET: 240 mL          PHYSICAL EXAM:  Constitutional: WD  HEENT: NC/AT; PERRL, anicteric sclera; MMM  Neck: supple  Lymph: No supraclavical LAD or cervical chain LAD  Cardiovascular: +S1/S2, RRR  Respiratory: CTA B/L; no W/R/R  Gastrointestinal: soft, NT/ND  Extremities: WWP; no edema, clubbing or cyanosis  Vascular: 2+ radial and pedal pulses  Neurological: AAOx3; no focal deficits    LABS:  ABG - ( 20 Sep 2023 18:12 )  pH, Arterial: 7.41  pH, Blood: x     /  pCO2: 48    /  pO2: 89    / HCO3: 30    / Base Excess: 4.8   /  SaO2: 98.0          CBC Full  -  ( 21 Sep 2023 06:07 )  WBC Count : 5.86 K/uL  RBC Count : 3.53 M/uL  Hemoglobin : 11.6 g/dL  Hematocrit : 34.8 %  Platelet Count - Automated : 216 K/uL  Mean Cell Volume : 98.6 fl  Mean Cell Hemoglobin : 32.9 pg  Mean Cell Hemoglobin Concentration : 33.3 gm/dL  Auto Neutrophil # : x  Auto Lymphocyte # : x  Auto Monocyte # : x  Auto Eosinophil # : x  Auto Basophil # : x  Auto Neutrophil % : x  Auto Lymphocyte % : x  Auto Monocyte % : x  Auto Eosinophil % : x  Auto Basophil % : x    09-21    136  |  102  |  22  ----------------------------<  102<H>  3.6   |  27  |  0.73    Ca    8.7      21 Sep 2023 06:07  Phos  3.9     09-21  Mg     2.1     09-21      PT/INR - ( 20 Sep 2023 18:17 )   PT: 12.1 sec;   INR: 1.06          PTT - ( 20 Sep 2023 18:17 )  PTT:30.8 sec      Urinalysis Basic - ( 21 Sep 2023 06:07 )    Color: x / Appearance: x / SG: x / pH: x  Gluc: 102 mg/dL / Ketone: x  / Bili: x / Urobili: x   Blood: x / Protein: x / Nitrite: x   Leuk Esterase: x / RBC: x / WBC x   Sq Epi: x / Non Sq Epi: x / Bacteria: x    RADIOLOGY & ADDITIONAL STUDIES:    < from: CT Chest No Cont (09.19.23 @ 03:46) >    ACC: [id number] EXAM:  CT CHEST   ORDERED BY: AYO LEOS     PROCEDURE DATE:  09/19/2023          INTERPRETATION:  CLINICAL INFORMATION: Hypoxia    COMPARISON: None.    CONTRAST/COMPLICATIONS:  IV Contrast: None  Oral Contrast: None  Complications: N/A    PROCEDURE:  CT of the Chest was performed.  Sagittal and coronal reformats were performed.    FINDINGS:    LUNGS AND AIRWAYS: Diffuse bilateral cylindrical/varicose bronchiectasis   with multiple areas of distal mucoid impaction. Scattered tree-in-bud   opacities. Mild bilateral lung mosaic attenuation. Scattered parenchymal   scarring, greater in the apices. Nodular atelectasis versus scarring in   the anterior left lung base. Several subcentimeter subcentimeter   calcifications in the right upper lobe may reflect calcified granulomas.   Trace mucus debris within the dependent trachea.  PLEURA: Small left pleural effusion.  MEDIASTINUM AND YUNIEL: Evaluation for thoracic lymphadenopathy is limited   without IV contrast. Within these limitations, no hilar/mediastinal   lymphadenopathy.  VESSELS: No aortic aneurysm. Atherosclerotic changes.  HEART: Heart size is normal. Coronary calcifications. No pericardial   effusion.  CHEST WALL AND LOWER NECK: Left PICC with tip terminating within the   lower SVC.  VISUALIZED UPPER ABDOMEN: Within normal limits.  BONES: Degenerative changes.    IMPRESSION:  1.  Diffuse bilateral bronchiectasis with multiple areas of distal mucoid   impaction and scattered tree-in-bud opacities. Findings suggest   nonspecific small airways disease.  2.  Small left pleural effusion.  3.  No consolidation.    --- End of Report ---      < end of copied text >

## 2023-09-21 NOTE — PROGRESS NOTE ADULT - SUBJECTIVE AND OBJECTIVE BOX
***TRANSFER FROM STROKE NEURO TO ICU****  Hospital Course:  82y Female with PMHx of HTN, afib (not on AC due to fall risk), recent hospitalization for PRES (5-6/2023), recently discharged from [facility] 8/2023 with acute exacerbation of severe bronchiectasis and respiratory failure presents with several days of AMS and new diplopia 9/18. Per outpatient records, patient was started on IV antibiotics by her outpatient pulmonologist, Dr. Foster, as there was a concern for failure of PO antibiotics (discharged on Bactrim and Cipro in 08/2023). PICC was placed on 09/13, first dose of antibiotics was AM of 09/15. Daughter noticed that after the first dose of her antibiotics she became altered. Did not know where certain rooms are in the house, dropping items, not knowing common everyday information) similar to how she initially presented with PRES. Prior to PRES diagnosis, patient completely independent, living on her own. Daughter notes that after her discharge in 06/2023, she moved in with her and had a gradual decline in memory. On 09/19, patient started to complain of new onset diplopia, prompting daughter to bring the patient to the ED for further evaluation. Daughter notes that patient had intermittent blurry vision since PRES diagnosis. Stroke code was called in the ED, NIHSS of 1 for bitemporal peripheral visual field disturbances. 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 CTA H/N and CTP not obtained as patient with allergy to contrast (anaphylaxis). CT chest also obtained as patient was hypoxic in the ED to 84%, just with evidence of bronchiectasis and mucus plugging, no PNA. Was admitted to stroke for r/o stroke vs possible exacerbation of PRES. Started on Eliquis for secondary stroke prevention. Pulmonology consulted, recommended continuation of IV antibiotics and diagnostic testing including legionella and streptococcus urine, MRSA/MSSA swab, RVP, and COVID, all of which have been negative thus far. Pending sputum culture. On 9/20, patient with an acute change in mental status while with daughter. Noted to have ? aphasia - making paraphrasic errors however with intermittent periods of fluency. Repeat CTH obtained which showed evidence of new R parietal and occipital SAH. Eliquis discontinued, coags WNL. Neurosurgery consulted, no acute intervention. Went for MRI brain and patient was only able to tolerate a portion of the study 2/2 to agitation, found to have a new L occipital ischemic infarct. SAH was stable in size. VEEG placed with no evidence of epileptiform activity. Received call from RN that patient had worsening mental status. Evaluated patient at bedside. Speech was nonsensical and was moderately dysarthric. Noted to have intermittent LUE/LLE jerking movements with 3-4 seconds of stereotypic features along with R gaze preference concerning for possible seizure. FS was checked, noted to be 34. Repeat 33. Given 1 AMP with improvement to 187. Approx 1 hour later patient sating 85% on RA and tachypneic. Started on NC with improvement of O2 saturation, repeat fingerstick noted to be 27. Rapid response called. 2 additional AMPs given, started on D10. Rectal temp 94 degrees F, bear hugger placed. Noted to have an acute drop in glucose in < 1 hr from 401 to 140, switched to D5. PICC line removed due to concern of possible infection. Patient transferred to ICU for further management. Of note, patient with tentative plan for MRA H/N, MRV, and MR brain portion with contrast tomorrow with anesthesia @ 1530.    Neurology Stroke Progress Note    INTERVAL HPI/OVERNIGHT EVENTS:  Patient seen and examined. O/N - went for MRI, only tolerated portion of the study as she became severely agitated. Received 0.25 of Klonopin. Placed on VEEG however only tolerated for a couple of hours. During AM rounds, patient sitting up in the chair, eating breakfast. Still with nonsensical/tangenital speech however otherwise nonfocal exam. Noted to have significantly impaired memory, did not remember getting any studies yesterday and continually asking for her daughter.     MEDICATIONS  (STANDING):  albuterol/ipratropium for Nebulization 3 milliLiter(s) Nebulizer every 6 hours  atorvastatin 40 milliGRAM(s) Oral at bedtime  carvedilol 3.125 milliGRAM(s) Oral every 12 hours  cefepime   IVPB 2000 milliGRAM(s) IV Intermittent every 12 hours  chlorhexidine 4% Liquid 1 Application(s) Topical <User Schedule>  dextrose 10% + sodium chloride 0.9%. 1000 milliLiter(s) (50 mL/Hr) IV Continuous <Continuous>  dextrose 50% Injectable 50 milliLiter(s) IV Push once  melatonin 5 milliGRAM(s) Oral at bedtime  montelukast 10 milliGRAM(s) Oral daily  multivitamin  Chewable 1 Tablet(s) Oral daily  ondansetron Injectable 4 milliGRAM(s) IV Push once  potassium chloride  10 mEq/100 mL IVPB 10 milliEquivalent(s) IV Intermittent every 1 hour  potassium phosphate IVPB 15 milliMole(s) IV Intermittent once  sodium chloride 7% Inhalation 4 milliLiter(s) Inhalation every 6 hours  thiamine 100 milliGRAM(s) Oral daily  vancomycin  IVPB 500 milliGRAM(s) IV Intermittent once    MEDICATIONS  (PRN):  sodium chloride 0.9% lock flush 10 milliLiter(s) IV Push every 1 hour PRN Pre/post blood products, medications, blood draw, and to maintain line patency      Allergies    Ceclor (Rash)  IV Contrast (Unknown)  Levaquin (Swelling)  Augmentin (Short breath; Rash)    Intolerances        Vital Signs Last 24 Hrs  T(C): 36.8 (21 Sep 2023 14:36), Max: 36.9 (21 Sep 2023 09:10)  T(F): 98.2 (21 Sep 2023 14:36), Max: 98.4 (21 Sep 2023 09:10)  HR: 63 (21 Sep 2023 12:02) (63 - 81)  BP: 124/76 (21 Sep 2023 12:02) (124/76 - 170/84)  BP(mean): 92 (21 Sep 2023 12:02) (92 - 120)  RR: 20 (21 Sep 2023 12:02) (17 - 20)  SpO2: 98% (21 Sep 2023 12:02) (93% - 100%)    Parameters below as of 21 Sep 2023 12:02  Patient On (Oxygen Delivery Method): room air        Physical exam (DURING AM ROUNDS):  General: Sitting comfortably in the chair, eating eggs, in no apparent distress.   Eyes: Anicteric sclerae, moist conjunctivae, see below for CNs  Extremities: No edema    Neurologic:  Mental status: Awake, alert, oriented to person, place, and time. Noted to have disorganized speech with intermittent periods of fluency, difficulty with describing her symptoms. Intact naming and comprehension, no dysarthria. Follows simple commands. Remote memory severely diminished, unable to remember what testing she got done yesterday or why she came to the hospital. Attention/concentration intact. Fund of knowledge appropriate.  -Cranial nerves:   II: Poor visual fields exam, appears to have decreased visual acuity bitemporally. Appears to be guessing number of fingers provider is holding up at times.  III, IV, VI: Extraocular movements are intact without nystagmus. Pupils equally round and reactive to light  V:  Facial sensation V1-V3 equal and intact   VII: Face is symmetric with normal eye closure and smile  XII: Tongue protrudes midline  Motor: Diminished bulk throughout. Normal tone. B/l UEs at least a 4/5 without drift (deltoids/biceps/ 4+/5, triceps 4-/5). B/l LEs at least a 4/5 without drift.   Sensation: Intact to light touch bilaterally. No neglect or extinction on double simultaneous testing.  Coordination: No dysmetria on finger-to-nose       LABS:                        11.3   11.41 )-----------( 208      ( 21 Sep 2023 16:24 )             33.8     09-21    134<L>  |  101  |  22  ----------------------------<  285<H>  3.3<L>   |  25  |  0.73    Ca    8.4      21 Sep 2023 16:24  Phos  2.4     09-21  Mg     1.9     09-21    TPro  6.8  /  Alb  3.3  /  TBili  0.5  /  DBili  x   /  AST  23  /  ALT  27  /  AlkPhos  116  09-21    PT/INR - ( 21 Sep 2023 16:56 )   PT: 11.7 sec;   INR: 1.03          PTT - ( 21 Sep 2023 16:56 )  PTT:31.3 sec  Urinalysis Basic - ( 21 Sep 2023 16:24 )    Color: x / Appearance: x / SG: x / pH: x  Gluc: 285 mg/dL / Ketone: x  / Bili: x / Urobili: x   Blood: x / Protein: x / Nitrite: x   Leuk Esterase: x / RBC: x / WBC x   Sq Epi: x / Non Sq Epi: x / Bacteria: x        RADIOLOGY & ADDITIONAL TESTS:     ***TRANSFER FROM STROKE NEURO TO ICU****  Hospital Course:  82y Female with PMHx of HTN, afib (not on AC due to fall risk), recent hospitalization for PRES (5-6/2023), recently discharged from [facility] 8/2023 with acute exacerbation of severe bronchiectasis and respiratory failure presents with several days of AMS and new diplopia 9/18. Per outpatient records, patient was started on IV antibiotics by her outpatient pulmonologist, Dr. Foster, as there was a concern for failure of PO antibiotics (discharged on Bactrim and Cipro in 08/2023). PICC was placed on 09/13, first dose of antibiotics was AM of 09/15. Daughter noticed that after the first dose of her antibiotics she became altered. Did not know where certain rooms are in the house, dropping items, not knowing common everyday information) similar to how she initially presented with PRES. Prior to PRES diagnosis, patient completely independent, living on her own. Daughter notes that after her discharge in 06/2023, she moved in with her and had a gradual decline in memory. On 09/19, patient started to complain of new onset diplopia, prompting daughter to bring the patient to the ED for further evaluation. Daughter notes that patient had intermittent blurry vision since PRES diagnosis. Stroke code was called in the ED, NIHSS of 1 for bitemporal peripheral visual field disturbances. 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 CTA H/N and CTP not obtained as patient with allergy to contrast (anaphylaxis). CT chest also obtained as patient was hypoxic in the ED to 84%, just with evidence of bronchiectasis and mucus plugging, no PNA. Was admitted to stroke for r/o stroke vs possible exacerbation of PRES. Started on Eliquis for secondary stroke prevention. Pulmonology consulted, recommended continuation of IV antibiotics and diagnostic testing including legionella and streptococcus urine, MRSA/MSSA swab, RVP, and COVID, all of which have been negative thus far. Pending sputum culture. Started on standing DuoNebs and 7% hypertonic saline. On 9/20, patient with an acute change in mental status while with daughter. Noted to have ? aphasia - making paraphrasic errors however with intermittent periods of fluency. Repeat CTH obtained which showed evidence of new R parietal and occipital SAH. Eliquis discontinued, coags WNL. Neurosurgery consulted, no acute intervention. Went for MRI brain and patient was only able to tolerate a portion of the study 2/2 to agitation, found to have a new L occipital ischemic infarct. SAH was stable in size. VEEG placed with no evidence of epileptiform activity. Received call from RN that patient had worsening mental status. Evaluated patient at bedside. Speech was nonsensical and was moderately dysarthric. Noted to have intermittent LUE/LLE jerking movements with 3-4 seconds of stereotypic features along with R gaze preference concerning for possible seizure. FS was checked, noted to be 34. Repeat 33. Given 1 AMP with improvement to 187. Approx 1 hour later patient sating 85% on RA and tachypneic. Started on NC with improvement of O2 saturation, repeat fingerstick noted to be 27. Rapid response called. 2 additional AMPs given, started on D10. Rectal temp 94 degrees F, bear hugger placed. Noted to have an acute drop in glucose in < 1 hr from 401 to 140, switched to D5. PICC line removed due to concern of possible infection. Patient transferred to ICU for further management. Of note, patient with tentative plan for MRA H/N, MRV, and MR brain portion with contrast tomorrow with anesthesia @ 1530.    Neurology Stroke Progress Note    INTERVAL HPI/OVERNIGHT EVENTS:  Patient seen and examined. O/N - went for MRI, only tolerated portion of the study as she became severely agitated. Received 0.25 of Klonopin. Placed on VEEG however only tolerated for a couple of hours. During AM rounds, patient sitting up in the chair, eating breakfast. Still with nonsensical/tangenital speech however otherwise nonfocal exam. Noted to have significantly impaired memory, did not remember getting any studies yesterday and continually asking for her daughter.     MEDICATIONS  (STANDING):  albuterol/ipratropium for Nebulization 3 milliLiter(s) Nebulizer every 6 hours  atorvastatin 40 milliGRAM(s) Oral at bedtime  carvedilol 3.125 milliGRAM(s) Oral every 12 hours  cefepime   IVPB 2000 milliGRAM(s) IV Intermittent every 12 hours  chlorhexidine 4% Liquid 1 Application(s) Topical <User Schedule>  dextrose 10% + sodium chloride 0.9%. 1000 milliLiter(s) (50 mL/Hr) IV Continuous <Continuous>  dextrose 50% Injectable 50 milliLiter(s) IV Push once  melatonin 5 milliGRAM(s) Oral at bedtime  montelukast 10 milliGRAM(s) Oral daily  multivitamin  Chewable 1 Tablet(s) Oral daily  ondansetron Injectable 4 milliGRAM(s) IV Push once  potassium chloride  10 mEq/100 mL IVPB 10 milliEquivalent(s) IV Intermittent every 1 hour  potassium phosphate IVPB 15 milliMole(s) IV Intermittent once  sodium chloride 7% Inhalation 4 milliLiter(s) Inhalation every 6 hours  thiamine 100 milliGRAM(s) Oral daily  vancomycin  IVPB 500 milliGRAM(s) IV Intermittent once    MEDICATIONS  (PRN):  sodium chloride 0.9% lock flush 10 milliLiter(s) IV Push every 1 hour PRN Pre/post blood products, medications, blood draw, and to maintain line patency      Allergies    Ceclor (Rash)  IV Contrast (Unknown)  Levaquin (Swelling)  Augmentin (Short breath; Rash)    Intolerances        Vital Signs Last 24 Hrs  T(C): 36.8 (21 Sep 2023 14:36), Max: 36.9 (21 Sep 2023 09:10)  T(F): 98.2 (21 Sep 2023 14:36), Max: 98.4 (21 Sep 2023 09:10)  HR: 63 (21 Sep 2023 12:02) (63 - 81)  BP: 124/76 (21 Sep 2023 12:02) (124/76 - 170/84)  BP(mean): 92 (21 Sep 2023 12:02) (92 - 120)  RR: 20 (21 Sep 2023 12:02) (17 - 20)  SpO2: 98% (21 Sep 2023 12:02) (93% - 100%)    Parameters below as of 21 Sep 2023 12:02  Patient On (Oxygen Delivery Method): room air        Physical exam (DURING AM ROUNDS):  General: Sitting comfortably in the chair, eating eggs, in no apparent distress.   Eyes: Anicteric sclerae, moist conjunctivae, see below for CNs  Extremities: No edema    Neurologic:  Mental status: Awake, alert, oriented to person, place, and time. Noted to have disorganized speech with intermittent periods of fluency, difficulty with describing her symptoms. Intact naming and comprehension, no dysarthria. Follows simple commands. Remote memory severely diminished, unable to remember what testing she got done yesterday or why she came to the hospital. Attention/concentration intact. Fund of knowledge appropriate.  -Cranial nerves:   II: Poor visual fields exam, appears to have decreased visual acuity bitemporally. Appears to be guessing number of fingers provider is holding up at times.  III, IV, VI: Extraocular movements are intact without nystagmus. Pupils equally round and reactive to light  V:  Facial sensation V1-V3 equal and intact   VII: Face is symmetric with normal eye closure and smile  XII: Tongue protrudes midline  Motor: Diminished bulk throughout. Normal tone. B/l UEs at least a 4/5 without drift. B/l LEs at least a 3/5 without drift.   Sensation: Intact to light touch bilaterally. No neglect or extinction on double simultaneous testing.  Coordination: No dysmetria on finger-to-nose       LABS:                        11.3   11.41 )-----------( 208      ( 21 Sep 2023 16:24 )             33.8     09-21    134<L>  |  101  |  22  ----------------------------<  285<H>  3.3<L>   |  25  |  0.73    Ca    8.4      21 Sep 2023 16:24  Phos  2.4     09-21  Mg     1.9     09-21    TPro  6.8  /  Alb  3.3  /  TBili  0.5  /  DBili  x   /  AST  23  /  ALT  27  /  AlkPhos  116  09-21    PT/INR - ( 21 Sep 2023 16:56 )   PT: 11.7 sec;   INR: 1.03          PTT - ( 21 Sep 2023 16:56 )  PTT:31.3 sec  Urinalysis Basic - ( 21 Sep 2023 16:24 )    Color: x / Appearance: x / SG: x / pH: x  Gluc: 285 mg/dL / Ketone: x  / Bili: x / Urobili: x   Blood: x / Protein: x / Nitrite: x   Leuk Esterase: x / RBC: x / WBC x   Sq Epi: x / Non Sq Epi: x / Bacteria: x        RADIOLOGY & ADDITIONAL TESTS:    < from: MR Head No Cont (09.20.23 @ 21:52) >  IMPRESSION:  Incomplete and limited exam    Stable small subarachnoid hemorrhage in the right parietal-occipital   region given differences in technique.    Subcentimeter acute/subacute infarct in the left occipital lobe.    < end of copied text >      EEG:  Daily Summary:    1)	There was mild excess intermittent slow wave activity diffuse and non-lateralized.  2)	There were no findings of active epilepsy, however this alone does not rule out the diagnosis.

## 2023-09-21 NOTE — CONSULT NOTE ADULT - SUBJECTIVE AND OBJECTIVE BOX
NEUROSURGERY CONSULT NOTE    HISTORY OF PRESENT ILLNESS:   Neurology HPI:   82y Female with PMHx of HTN, afib (not on AC due to fall risk), recent hospitalization for PRES (5-6/2023), recently discharged from [facility] 8/2023 with acute exacerbation of severe bronchiectasis and respiratory failure presents with several days of AMS and new diplopia 9/18.     Daughter noticed since 9/15 after starting IV abx, patient has been more tired and exhausted with some agitation. It has progressed over the past few days to generalized confusion (being "out of it", not know where certain rooms are in the house, dropping items, not knowing common everyday information) similar to how she initially presented with PRES. Patient told daughter she was seeing double of items in the house around 10pm.     ED: /86. SpO2 84% RA, requiring 3L NC with improvement to 90%. C/o of shortness of breath and difficulty breathing while on supplemental oxygen. Neuro exam significant for decreased peripheral vision bitemporal, but no clear visual field cut, also inconsistent vision exam. Otherwise nonfocal. CT with no acute hemorrhage, with chronic b/l lentiform nuclei and right caudate infarcts. CT angio and perfusion deferred due to contrast allergy received contrast during angiogram in the past with intense flushing of head).    On repeat visual exam, patient notes that she sees double of items, but unable to tell if horizontal or vertical. She is unable to discern at time whether truly double vs blurry, nor transient or consistent diplopia since onset. At times, when testing visual fields, patient struggles to count fingers peripherally and centrally b/l (unclear whether from true field cut vs complete loss of vision vs diplopia causing difficulty counting). Patient also sees items moving around when they are actually stationary. Daughter states that since PRES, patient's vision has not returned to baseline and has been blurry. They have tried to obtain glasses, however on vision testing, Rx was never consistent so was advised to re-test vision after it has "settled".       Neurosurgery consulted for small SAH, R parietal/occipital sulci    PAST MEDICAL & SURGICAL HISTORY:  Bronchiectasis      History of Pseudomonas pneumonia      HTN (hypertension)      Posterior reversible encephalopathy syndrome      Atrial fibrillation      No significant past surgical history        FAMILY HISTORY:      SOCIAL HISTORY:  Tobacco Use:  EtOH use:   Substance:    Allergies    Ceclor (Rash)  IV Contrast (Unknown)  Levaquin (Swelling)  Augmentin (Short breath; Rash)    Intolerances        REVIEW OF SYSTEMS  General:	  Skin/Breast:  Ophthalmologic: +double vision  ENMT:	  Respiratory and Thorax:  Cardiovascular:	  Gastrointestinal:	  Genitourinary:	  Musculoskeletal:	  Neurological:	  Psychiatric:	  Hematology/Lymphatics:	  Endocrine:	  Allergic/Immunologic:	      MEDICATIONS:  Antibiotics:  cefepime   IVPB 2000 milliGRAM(s) IV Intermittent every 12 hours    Neuro:  melatonin 5 milliGRAM(s) Oral at bedtime    Anticoagulation:    OTHER:  albuterol/ipratropium for Nebulization 3 milliLiter(s) Nebulizer every 6 hours  atorvastatin 40 milliGRAM(s) Oral at bedtime  carvedilol 3.125 milliGRAM(s) Oral every 12 hours  chlorhexidine 4% Liquid 1 Application(s) Topical <User Schedule>  montelukast 10 milliGRAM(s) Oral daily  sodium chloride 7% Inhalation 4 milliLiter(s) Inhalation every 6 hours    IVF:  multivitamin  Chewable 1 Tablet(s) Oral daily  sodium chloride 0.9% lock flush 10 milliLiter(s) IV Push every 1 hour PRN  thiamine 100 milliGRAM(s) Oral daily      Vital Signs Last 24 Hrs  T(C): 36.9 (21 Sep 2023 09:10), Max: 36.9 (21 Sep 2023 09:10)  T(F): 98.4 (21 Sep 2023 09:10), Max: 98.4 (21 Sep 2023 09:10)  HR: 81 (21 Sep 2023 09:40) (59 - 81)  BP: 169/81 (21 Sep 2023 09:40) (143/70 - 170/84)  BP(mean): 116 (21 Sep 2023 09:40) (98 - 120)  RR: 18 (21 Sep 2023 09:40) (17 - 20)  SpO2: 98% (21 Sep 2023 09:40) (92% - 100%)    Parameters below as of 21 Sep 2023 09:40  Patient On (Oxygen Delivery Method): room air        PHYSICAL EXAM:  Constitutional: NAD, well groomed, well nourished, conversive  Neurological:  AAOX2 (name and place, not year - says "1963". Face symmetric, Verbal function intact, speech clear,   VA decreased peripheral bi-temporal region, PERRL   Motor: 5/5 power in b/l upper extremities 4+/5 b/l LE  Sensation: intact to light touch in all extremities  Pronator Drift: negative  Dysmetria: negative  Extremities: distal pulses 2+ x4      LABS:                        11.6   5.86  )-----------( 216      ( 21 Sep 2023 06:07 )             34.8     09-21    136  |  102  |  22  ----------------------------<  102<H>  3.6   |  27  |  0.73    Ca    8.7      21 Sep 2023 06:07  Phos  3.9     09-21  Mg     2.1     09-21      PT/INR - ( 20 Sep 2023 18:17 )   PT: 12.1 sec;   INR: 1.06          PTT - ( 20 Sep 2023 18:17 )  PTT:30.8 sec  Urinalysis Basic - ( 21 Sep 2023 06:07 )    Color: x / Appearance: x / SG: x / pH: x  Gluc: 102 mg/dL / Ketone: x  / Bili: x / Urobili: x   Blood: x / Protein: x / Nitrite: x   Leuk Esterase: x / RBC: x / WBC x   Sq Epi: x / Non Sq Epi: x / Bacteria: x      CULTURES:      RADIOLOGY & ADDITIONAL STUDIES:  < from: CT Head No Cont (09.20.23 @ 17:06) >  Impression:    Curvilinear densities within the right parietal and occipital sulci   suspicious for subarachnoid hemorrhage. No mass effect or midline shift.   No hydrocephalus.    No interval demarcated territorial infarction.    Findings were discussed with BENITO Mcfarlane by Dr. Ankit Hill on   9/20/2023 5:14 PM    --- End of Report ---    < end of copied text >    < from: MR Head No Cont (09.20.23 @ 21:52) >  IMPRESSION:  Incomplete and limited exam    Stable small subarachnoid hemorrhage in the right parietal-occipital   region given differences in technique.    Subcentimeter acute/subacute infarct in the left occipital lobe.    --- End of Report ---    EEG:  Findings:  Day 1:  9/21/2023, 1:31:30 AM to same day at 07:00 AM   Background:  continuous, with predominantly theta > alpha frequencies.  Generalized Slowing:  Intermittent frequent sporadic polymorhpic delta  Symmetry/Focality: No persistent asymmetries of voltage or frequency.     Voltage:  Normal (20+ uV)  Organization:  Rudimentary  Posterior Dominant Rhythm:  8-8.5 Hz symmetric, well-organized, and well-modulated  Sleep:  Symmetric, synchronous spindles and K complexes.  Variability:   Yes		Reactivity:  Yes    Spontaneous Activity:  No epileptiform discharges   Events:  1)	No electrographic seizures or significant clinical events occurred during this study.  Provocations:  •	Hyperventilation: was not performed.  •	Photic stimulation: was not performed.  Daily Summary:    1)	There was mild excess intermittent slow wave activity diffuse and non-lateralized.  2)	There were no findings of active epilepsy, however this alone does not rule out the diagnosis.       Cyrus Winston MD  Attending Neurologist, [facility]      PLAN:  -No acute neurosurgical intervention  -Hold Eliquis   -Repeat Non-con CT Head in 1 week    d/w Dr. Lio MD

## 2023-09-21 NOTE — EEG REPORT - NS EEG TEXT BOX
[facility] Department of Neurology  Inpatient Continuous video-Electroencephalogram      Patient Name:	SAIDA BATES    :	1940  MRN:	[id number]    Study Start Date/Time:	2023, 1:31:30 AM  Study End Date/Time:    Referred by:  Janet Vallecillo MD    Brief Clinical History:  SAIDA BATES is a 82 year old Female; study performed to investigate for seizures or markers of epilepsy.   Technologist notes: -  Diagnosis Code:  R56.9 convulsions/seizure    Pertinent Medication:  n/a    Acquisition Details:  Electroencephalography was acquired using a minimum of 21 channels on an [employer] system v 9.3.1 with electrode placement according to the standard International 10-20 system following ACNS (American Clinical Neurophysiology Society) guidelines.  Anterior temporal T1 and T2 electrodes were utilized whenever possible.  The [employer] automated spike & seizure detections were all reviewed in detail, in addition to the entire raw EEG.    Findings:  Day 1:  2023, 1:31:30 AM to same day at 07:00 AM   Background:  continuous, with predominantly theta > alpha frequencies.  Generalized Slowing:  Intermittent frequent sporadic polymorhpic delta  Symmetry/Focality: No persistent asymmetries of voltage or frequency.     Voltage:  Normal (20+ uV)  Organization:  Rudimentary  Posterior Dominant Rhythm:  8-8.5 Hz symmetric, well-organized, and well-modulated  Sleep:  Symmetric, synchronous spindles and K complexes.  Variability:   Yes		Reactivity:  Yes    Spontaneous Activity:  No epileptiform discharges   Events:  1)	No electrographic seizures or significant clinical events occurred during this study.  Provocations:  •	Hyperventilation: was not performed.  •	Photic stimulation: was not performed.  Daily Summary:    1)	There was mild excess intermittent slow wave activity diffuse and non-lateralized.  2)	There were no findings of active epilepsy, however this alone does not rule out the diagnosis.       Cyrus Winston MD  Attending Neurologist, [facility]

## 2023-09-21 NOTE — PROGRESS NOTE ADULT - SUBJECTIVE AND OBJECTIVE BOX
***TRANSFER ACCEPTED FROM [facility] TO MICU***  HOSPITAL COURSE:  82F, PMH: HTN, Afib (not on AC d/t fall risk), recent hospitalization for PRES (not at [facility], May-June '23), recent discharge for acute exacerbation of severe bronchiectasis (at [facility], Aug '23), presented w/ several days of AMS & new-onset diplopia since 9/15.   Pt started IV abx on 9/15 (2wk course cefepime for bronchiectasis exacerbation) and demonstrated progressive exhaustion and generalized confusion w/ some agitation (similar to when she initially had PRES). She forgot rooms in her house, confused common everyday information, and dropped items randomly. Pt also reported seeing double. Pt was admitted to neuro for r/o CVA     INTERVAL HPI/OVERNIGHT EVENTS:   No overnight events   Afebrile, hemodynamically stable     ICU Vital Signs Last 24 Hrs  T(C): 36.8 (21 Sep 2023 14:36), Max: 36.9 (21 Sep 2023 09:10)  T(F): 98.2 (21 Sep 2023 14:36), Max: 98.4 (21 Sep 2023 09:10)  HR: 63 (21 Sep 2023 12:02) (63 - 81)  BP: 124/76 (21 Sep 2023 12:02) (124/76 - 170/84)  BP(mean): 92 (21 Sep 2023 12:02) (92 - 120)  ABP: --  ABP(mean): --  RR: 20 (21 Sep 2023 12:02) (17 - 20)  SpO2: 98% (21 Sep 2023 12:02) (93% - 100%)    O2 Parameters below as of 21 Sep 2023 12:02  Patient On (Oxygen Delivery Method): room air          I&O's Summary    20 Sep 2023 07:01  -  21 Sep 2023 07:00  --------------------------------------------------------  IN: 240 mL / OUT: 0 mL / NET: 240 mL    21 Sep 2023 07:01  -  21 Sep 2023 18:33  --------------------------------------------------------  IN: 520 mL / OUT: 0 mL / NET: 520 mL          LABS:                        11.3   11.41 )-----------( 208      ( 21 Sep 2023 16:24 )             33.8     09-21    134<L>  |  101  |  22  ----------------------------<  285<H>  3.3<L>   |  25  |  0.73    Ca    8.4      21 Sep 2023 16:24  Phos  2.4     09-21  Mg     1.9     09-21    TPro  6.8  /  Alb  3.3  /  TBili  0.5  /  DBili  x   /  AST  23  /  ALT  27  /  AlkPhos  116  09-21    PT/INR - ( 21 Sep 2023 16:56 )   PT: 11.7 sec;   INR: 1.03          PTT - ( 21 Sep 2023 16:56 )  PTT:31.3 sec  Urinalysis Basic - ( 21 Sep 2023 16:24 )    Color: x / Appearance: x / SG: x / pH: x  Gluc: 285 mg/dL / Ketone: x  / Bili: x / Urobili: x   Blood: x / Protein: x / Nitrite: x   Leuk Esterase: x / RBC: x / WBC x   Sq Epi: x / Non Sq Epi: x / Bacteria: x      CAPILLARY BLOOD GLUCOSE      POCT Blood Glucose.: 267 mg/dL (21 Sep 2023 18:20)  POCT Blood Glucose.: 35 mg/dL (21 Sep 2023 18:04)  POCT Blood Glucose.: 92 mg/dL (21 Sep 2023 17:23)  POCT Blood Glucose.: 140 mg/dL (21 Sep 2023 17:01)  POCT Blood Glucose.: 229 mg/dL (21 Sep 2023 16:39)  POCT Blood Glucose.: 258 mg/dL (21 Sep 2023 16:27)  POCT Blood Glucose.: 391 mg/dL (21 Sep 2023 16:04)  POCT Blood Glucose.: 401 mg/dL (21 Sep 2023 16:01)  POCT Blood Glucose.: 27 mg/dL (21 Sep 2023 15:52)  POCT Blood Glucose.: 187 mg/dL (21 Sep 2023 14:16)  POCT Blood Glucose.: 33 mg/dL (21 Sep 2023 13:53)  POCT Blood Glucose.: 32 mg/dL (21 Sep 2023 13:50)    ABG - ( 20 Sep 2023 18:12 )  pH, Arterial: 7.41  pH, Blood: x     /  pCO2: 48    /  pO2: 89    / HCO3: 30    / Base Excess: 4.8   /  SaO2: 98.0                RADIOLOGY & ADDITIONAL TESTS:    Consultant(s) Notes Reviewed:  [x ] YES  [ ] NO    MEDICATIONS  (STANDING):  albuterol/ipratropium for Nebulization 3 milliLiter(s) Nebulizer every 6 hours  atorvastatin 40 milliGRAM(s) Oral at bedtime  carvedilol 3.125 milliGRAM(s) Oral every 12 hours  cefepime   IVPB 2000 milliGRAM(s) IV Intermittent every 12 hours  chlorhexidine 4% Liquid 1 Application(s) Topical <User Schedule>  dextrose 10%. 1000 milliLiter(s) (80 mL/Hr) IV Continuous <Continuous>  dextrose 50% Injectable 50 milliLiter(s) IV Push once  melatonin 5 milliGRAM(s) Oral at bedtime  montelukast 10 milliGRAM(s) Oral daily  multivitamin  Chewable 1 Tablet(s) Oral daily  ondansetron Injectable 4 milliGRAM(s) IV Push once  potassium chloride  10 mEq/100 mL IVPB 10 milliEquivalent(s) IV Intermittent every 1 hour  potassium phosphate IVPB 15 milliMole(s) IV Intermittent once  sodium chloride 7% Inhalation 4 milliLiter(s) Inhalation every 6 hours  thiamine 100 milliGRAM(s) Oral daily  vancomycin  IVPB 500 milliGRAM(s) IV Intermittent once    MEDICATIONS  (PRN):  sodium chloride 0.9% lock flush 10 milliLiter(s) IV Push every 1 hour PRN Pre/post blood products, medications, blood draw, and to maintain line patency      PHYSICAL EXAM:  GENERAL:   HEAD:  Atraumatic, Normocephalic  EYES: EOMI, PERRLA, conjunctiva and sclera clear  NECK: Supple, No JVD, Normal thyroid, no enlarged nodes  NERVOUS SYSTEM:  Alert & Awake.   CHEST/LUNG: B/L good air entry; No rales, rhonchi, or wheezing  HEART: S1S2 normal, no S3, Regular rate and rhythm; No murmurs  ABDOMEN: Soft, Nontender, Nondistended; Bowel sounds present  EXTREMITIES:  2+ Peripheral Pulses, No clubbing, cyanosis, or edema  LYMPH: No lymphadenopathy noted  SKIN: No rashes or lesions    Care Discussed with Consultants/Other Providers [ x] YES  [ ] NO ***TRANSFER ACCEPTED FROM [facility] TO MICU***  HOSPITAL COURSE:  82F, PMH: HTN, Afib (not on AC d/t fall risk), recent hospitalization for PRES (not at [facility], May-June '23), recent discharge for acute exacerbation of severe bronchiectasis (at [facility], Aug '23), presented w/ several days of AMS & new-onset diplopia since 9/15.   Pt started IV abx on 9/15 (2wk course of cefepime 2g IV BID for bronchiectasis exacerbation after concern from Dr. Foster that pt failed PO abx - was dc'd in Aug on bactrim and cipro) and per daughter, after starting abx demonstrated progressive exhaustion and generalized confusion w/ some agitation (similar to when she presented w/ PRES). Pt forgot rooms in her house, confused common everyday information, and dropped items randomly. Pt also reported seeing double. Pt came to ED, where she became hypoxic, was started on 3L NC. CT chest showed findings concurrent w/ bronchiectasis. Pulm saw pt in ED, and full respi workup negative for any acute infectious findings. CT head in ED significant only for chronic basal ganglia infarct, and neuro deficits notable only for bitemporal peripheral field disturbance. Pt was admitted to neuro for r/o CVA vs recurrence of PRES, and pts cefepime             INTERVAL HPI/OVERNIGHT EVENTS:   No overnight events   Afebrile, hemodynamically stable     ICU Vital Signs Last 24 Hrs  T(C): 36.8 (21 Sep 2023 14:36), Max: 36.9 (21 Sep 2023 09:10)  T(F): 98.2 (21 Sep 2023 14:36), Max: 98.4 (21 Sep 2023 09:10)  HR: 63 (21 Sep 2023 12:02) (63 - 81)  BP: 124/76 (21 Sep 2023 12:02) (124/76 - 170/84)  BP(mean): 92 (21 Sep 2023 12:02) (92 - 120)  ABP: --  ABP(mean): --  RR: 20 (21 Sep 2023 12:02) (17 - 20)  SpO2: 98% (21 Sep 2023 12:02) (93% - 100%)    O2 Parameters below as of 21 Sep 2023 12:02  Patient On (Oxygen Delivery Method): room air          I&O's Summary    20 Sep 2023 07:01  -  21 Sep 2023 07:00  --------------------------------------------------------  IN: 240 mL / OUT: 0 mL / NET: 240 mL    21 Sep 2023 07:01  -  21 Sep 2023 18:33  --------------------------------------------------------  IN: 520 mL / OUT: 0 mL / NET: 520 mL          LABS:                        11.3   11.41 )-----------( 208      ( 21 Sep 2023 16:24 )             33.8     09-21    134<L>  |  101  |  22  ----------------------------<  285<H>  3.3<L>   |  25  |  0.73    Ca    8.4      21 Sep 2023 16:24  Phos  2.4     09-21  Mg     1.9     09-21    TPro  6.8  /  Alb  3.3  /  TBili  0.5  /  DBili  x   /  AST  23  /  ALT  27  /  AlkPhos  116  09-21    PT/INR - ( 21 Sep 2023 16:56 )   PT: 11.7 sec;   INR: 1.03          PTT - ( 21 Sep 2023 16:56 )  PTT:31.3 sec  Urinalysis Basic - ( 21 Sep 2023 16:24 )    Color: x / Appearance: x / SG: x / pH: x  Gluc: 285 mg/dL / Ketone: x  / Bili: x / Urobili: x   Blood: x / Protein: x / Nitrite: x   Leuk Esterase: x / RBC: x / WBC x   Sq Epi: x / Non Sq Epi: x / Bacteria: x      CAPILLARY BLOOD GLUCOSE      POCT Blood Glucose.: 267 mg/dL (21 Sep 2023 18:20)  POCT Blood Glucose.: 35 mg/dL (21 Sep 2023 18:04)  POCT Blood Glucose.: 92 mg/dL (21 Sep 2023 17:23)  POCT Blood Glucose.: 140 mg/dL (21 Sep 2023 17:01)  POCT Blood Glucose.: 229 mg/dL (21 Sep 2023 16:39)  POCT Blood Glucose.: 258 mg/dL (21 Sep 2023 16:27)  POCT Blood Glucose.: 391 mg/dL (21 Sep 2023 16:04)  POCT Blood Glucose.: 401 mg/dL (21 Sep 2023 16:01)  POCT Blood Glucose.: 27 mg/dL (21 Sep 2023 15:52)  POCT Blood Glucose.: 187 mg/dL (21 Sep 2023 14:16)  POCT Blood Glucose.: 33 mg/dL (21 Sep 2023 13:53)  POCT Blood Glucose.: 32 mg/dL (21 Sep 2023 13:50)    ABG - ( 20 Sep 2023 18:12 )  pH, Arterial: 7.41  pH, Blood: x     /  pCO2: 48    /  pO2: 89    / HCO3: 30    / Base Excess: 4.8   /  SaO2: 98.0                RADIOLOGY & ADDITIONAL TESTS:    Consultant(s) Notes Reviewed:  [x ] YES  [ ] NO    MEDICATIONS  (STANDING):  albuterol/ipratropium for Nebulization 3 milliLiter(s) Nebulizer every 6 hours  atorvastatin 40 milliGRAM(s) Oral at bedtime  carvedilol 3.125 milliGRAM(s) Oral every 12 hours  cefepime   IVPB 2000 milliGRAM(s) IV Intermittent every 12 hours  chlorhexidine 4% Liquid 1 Application(s) Topical <User Schedule>  dextrose 10%. 1000 milliLiter(s) (80 mL/Hr) IV Continuous <Continuous>  dextrose 50% Injectable 50 milliLiter(s) IV Push once  melatonin 5 milliGRAM(s) Oral at bedtime  montelukast 10 milliGRAM(s) Oral daily  multivitamin  Chewable 1 Tablet(s) Oral daily  ondansetron Injectable 4 milliGRAM(s) IV Push once  potassium chloride  10 mEq/100 mL IVPB 10 milliEquivalent(s) IV Intermittent every 1 hour  potassium phosphate IVPB 15 milliMole(s) IV Intermittent once  sodium chloride 7% Inhalation 4 milliLiter(s) Inhalation every 6 hours  thiamine 100 milliGRAM(s) Oral daily  vancomycin  IVPB 500 milliGRAM(s) IV Intermittent once    MEDICATIONS  (PRN):  sodium chloride 0.9% lock flush 10 milliLiter(s) IV Push every 1 hour PRN Pre/post blood products, medications, blood draw, and to maintain line patency      PHYSICAL EXAM:  GENERAL:   HEAD:  Atraumatic, Normocephalic  EYES: EOMI, PERRLA, conjunctiva and sclera clear  NECK: Supple, No JVD, Normal thyroid, no enlarged nodes  NERVOUS SYSTEM:  Alert & Awake.   CHEST/LUNG: B/L good air entry; No rales, rhonchi, or wheezing  HEART: S1S2 normal, no S3, Regular rate and rhythm; No murmurs  ABDOMEN: Soft, Nontender, Nondistended; Bowel sounds present  EXTREMITIES:  2+ Peripheral Pulses, No clubbing, cyanosis, or edema  LYMPH: No lymphadenopathy noted  SKIN: No rashes or lesions    Care Discussed with Consultants/Other Providers [ x] YES  [ ] NO ***TRANSFER ACCEPTED FROM [facility] TO MICU***  HOSPITAL COURSE:  82F, PMH: HTN, Afib (not on AC d/t fall risk), recent hospitalization for PRES (not at [facility], May-June '23), recent discharge for acute exacerbation of severe bronchiectasis (at [facility], Aug '23), presented w/ several days of AMS & new-onset diplopia since 9/15.   Pt started IV abx on 9/15 (2wk course of cefepime 2g IV BID for bronchiectasis exacerbation after concern from Dr. Foster that pt failed PO abx, - was dc'd in Aug on bactrim and cipro- got outpt PICC placement on 9/13, 1st dose 9/15, got 7 doses total before presentation to hospital) and per daughter, after starting abx demonstrated progressive exhaustion and generalized confusion w/ some agitation (similar to when she presented w/ PRES). Pt forgot rooms in her house, confused common everyday information, and dropped items randomly. Pt also reported seeing double. Pt came to ED, where she became hypoxic, was started on 3L NC. CT chest showed findings concurrent w/ bronchiectasis. Pulm saw pt in ED, and full respi workup negative for any acute infectious findings. CT head in ED significant only for chronic basal ganglia infarct, and neuro deficits notable only for bitemporal peripheral field disturbance. Pt was admitted to neuro for r/o CVA vs recurrence of PRES, and pt's abx restarted per pulm.     Subsequently, pt had notable change in mental status; specifically nonsensical tangential speech. No focal deficits on neuro exam, however paraphrasic errors were concerning so pt taken for repeat CT head -->showed R subarachnoid hemorrhage in parietal lobe. Pt had been started on Eliquis as secondary stroke prevention, this was dc'd after SAH findings. Neurosurgery consulted --> BEN   Pt then had MRI of brain ---> showed new ischemic L occipital stroke. Pt was put on EEG d/t AMS and CVAs, but did not tolerate and ripped off after ~8hrs. No epileptiform activity was seen.     Today (9/21) pt manifested new aberrant behavior: Jerking movements, even more agitation, mental status further reduced. Speech remained nonsensical and tangential. Pt demonstrated intermittent L upper and L lower extremity jerking movements for about 3-4 secs, w/ gaze preference. Found to be hypoglycemic to the 30s, got an amp of dextrose and agitation resolved.    About 1hr later, pt was tachypneic. Repeat finger stick 27. Pt was AMS and hypoglycemia not relieved by multiple amps of dextrose. Pt was started on D10 drip, rectal temp 94, w/ multiple electrolyte disturbances. Pt was put on Damaris Hugger. PICC was removed given concern for possible infxn. Sugar 401, less than 1hr dropped to 140. switched to D5. Now           INTERVAL HPI/OVERNIGHT EVENTS:   No overnight events   Afebrile, hemodynamically stable     ICU Vital Signs Last 24 Hrs  T(C): 36.8 (21 Sep 2023 14:36), Max: 36.9 (21 Sep 2023 09:10)  T(F): 98.2 (21 Sep 2023 14:36), Max: 98.4 (21 Sep 2023 09:10)  HR: 63 (21 Sep 2023 12:02) (63 - 81)  BP: 124/76 (21 Sep 2023 12:02) (124/76 - 170/84)  BP(mean): 92 (21 Sep 2023 12:02) (92 - 120)  ABP: --  ABP(mean): --  RR: 20 (21 Sep 2023 12:02) (17 - 20)  SpO2: 98% (21 Sep 2023 12:02) (93% - 100%)    O2 Parameters below as of 21 Sep 2023 12:02  Patient On (Oxygen Delivery Method): room air          I&O's Summary    20 Sep 2023 07:01  -  21 Sep 2023 07:00  --------------------------------------------------------  IN: 240 mL / OUT: 0 mL / NET: 240 mL    21 Sep 2023 07:01  -  21 Sep 2023 18:33  --------------------------------------------------------  IN: 520 mL / OUT: 0 mL / NET: 520 mL          LABS:                        11.3   11.41 )-----------( 208      ( 21 Sep 2023 16:24 )             33.8     09-21    134<L>  |  101  |  22  ----------------------------<  285<H>  3.3<L>   |  25  |  0.73    Ca    8.4      21 Sep 2023 16:24  Phos  2.4     09-21  Mg     1.9     09-21    TPro  6.8  /  Alb  3.3  /  TBili  0.5  /  DBili  x   /  AST  23  /  ALT  27  /  AlkPhos  116  09-21    PT/INR - ( 21 Sep 2023 16:56 )   PT: 11.7 sec;   INR: 1.03          PTT - ( 21 Sep 2023 16:56 )  PTT:31.3 sec  Urinalysis Basic - ( 21 Sep 2023 16:24 )    Color: x / Appearance: x / SG: x / pH: x  Gluc: 285 mg/dL / Ketone: x  / Bili: x / Urobili: x   Blood: x / Protein: x / Nitrite: x   Leuk Esterase: x / RBC: x / WBC x   Sq Epi: x / Non Sq Epi: x / Bacteria: x      CAPILLARY BLOOD GLUCOSE      POCT Blood Glucose.: 267 mg/dL (21 Sep 2023 18:20)  POCT Blood Glucose.: 35 mg/dL (21 Sep 2023 18:04)  POCT Blood Glucose.: 92 mg/dL (21 Sep 2023 17:23)  POCT Blood Glucose.: 140 mg/dL (21 Sep 2023 17:01)  POCT Blood Glucose.: 229 mg/dL (21 Sep 2023 16:39)  POCT Blood Glucose.: 258 mg/dL (21 Sep 2023 16:27)  POCT Blood Glucose.: 391 mg/dL (21 Sep 2023 16:04)  POCT Blood Glucose.: 401 mg/dL (21 Sep 2023 16:01)  POCT Blood Glucose.: 27 mg/dL (21 Sep 2023 15:52)  POCT Blood Glucose.: 187 mg/dL (21 Sep 2023 14:16)  POCT Blood Glucose.: 33 mg/dL (21 Sep 2023 13:53)  POCT Blood Glucose.: 32 mg/dL (21 Sep 2023 13:50)    ABG - ( 20 Sep 2023 18:12 )  pH, Arterial: 7.41  pH, Blood: x     /  pCO2: 48    /  pO2: 89    / HCO3: 30    / Base Excess: 4.8   /  SaO2: 98.0                RADIOLOGY & ADDITIONAL TESTS:    Consultant(s) Notes Reviewed:  [x ] YES  [ ] NO    MEDICATIONS  (STANDING):  albuterol/ipratropium for Nebulization 3 milliLiter(s) Nebulizer every 6 hours  atorvastatin 40 milliGRAM(s) Oral at bedtime  carvedilol 3.125 milliGRAM(s) Oral every 12 hours  cefepime   IVPB 2000 milliGRAM(s) IV Intermittent every 12 hours  chlorhexidine 4% Liquid 1 Application(s) Topical <User Schedule>  dextrose 10%. 1000 milliLiter(s) (80 mL/Hr) IV Continuous <Continuous>  dextrose 50% Injectable 50 milliLiter(s) IV Push once  melatonin 5 milliGRAM(s) Oral at bedtime  montelukast 10 milliGRAM(s) Oral daily  multivitamin  Chewable 1 Tablet(s) Oral daily  ondansetron Injectable 4 milliGRAM(s) IV Push once  potassium chloride  10 mEq/100 mL IVPB 10 milliEquivalent(s) IV Intermittent every 1 hour  potassium phosphate IVPB 15 milliMole(s) IV Intermittent once  sodium chloride 7% Inhalation 4 milliLiter(s) Inhalation every 6 hours  thiamine 100 milliGRAM(s) Oral daily  vancomycin  IVPB 500 milliGRAM(s) IV Intermittent once    MEDICATIONS  (PRN):  sodium chloride 0.9% lock flush 10 milliLiter(s) IV Push every 1 hour PRN Pre/post blood products, medications, blood draw, and to maintain line patency      PHYSICAL EXAM:  GENERAL:   HEAD:  Atraumatic, Normocephalic  EYES: EOMI, PERRLA, conjunctiva and sclera clear  NECK: Supple, No JVD, Normal thyroid, no enlarged nodes  NERVOUS SYSTEM:  Alert & Awake.   CHEST/LUNG: B/L good air entry; No rales, rhonchi, or wheezing  HEART: S1S2 normal, no S3, Regular rate and rhythm; No murmurs  ABDOMEN: Soft, Nontender, Nondistended; Bowel sounds present  EXTREMITIES:  2+ Peripheral Pulses, No clubbing, cyanosis, or edema  LYMPH: No lymphadenopathy noted  SKIN: No rashes or lesions    Care Discussed with Consultants/Other Providers [ x] YES  [ ] NO ***TRANSFER ACCEPTED FROM [facility] TO MICU***  HOSPITAL COURSE:  82F, PMH: HTN, Afib (not on AC d/t fall risk), recent hospitalization for PRES (not at [facility], May-June '23), recent discharge for acute exacerbation of severe bronchiectasis (at [facility], Aug '23), presented w/ several days of AMS & new-onset diplopia since 9/15.   Pt started IV abx on 9/15 (2wk course of cefepime 2g IV BID for bronchiectasis exacerbation after concern from Dr. Foster that pt failed PO abx, - was dc'd in Aug on bactrim and cipro- got outpt PICC placement on 9/13, 1st dose 9/15, got 7 doses total before presentation to hospital) and per daughter, after starting abx demonstrated progressive exhaustion and generalized confusion w/ some agitation (similar to when she presented w/ PRES). Pt forgot rooms in her house, confused common everyday information, and dropped items randomly. Pt also reported seeing double. Pt came to ED, where she became hypoxic, was started on 3L NC. CT chest showed findings concurrent w/ bronchiectasis. Pulm saw pt in ED, and full respi workup negative for any acute infectious findings. CT head in ED significant only for chronic basal ganglia infarct, and neuro deficits notable only for bitemporal peripheral field disturbance. Pt was admitted to neuro for r/o CVA vs recurrence of PRES, and pt's abx restarted per pulm.     Subsequently, pt had notable change in mental status; specifically nonsensical tangential speech. No focal deficits on neuro exam, however paraphrasic errors were concerning so pt taken for repeat CT head -->showed R subarachnoid hemorrhage in parietal lobe. Pt had been started on Eliquis as secondary stroke prevention, this was dc'd after SAH findings. Neurosurgery consulted --> BEN   Pt then had MRI of brain ---> showed new ischemic L occipital stroke. Pt was put on EEG d/t AMS and CVAs, but did not tolerate and ripped off after ~8hrs. No epileptiform activity was seen.     Today (9/21) pt manifested new aberrant behavior: Jerking movements, even more agitation, mental status further reduced. Speech remained nonsensical and tangential. Pt demonstrated intermittent L upper and L lower extremity jerking movements for about 3-4 secs, w/ gaze preference. Found to be hypoglycemic to the 30s, got an amp of dextrose and agitation resolved.    About 1hr later, pt was tachypneic. Repeat finger stick 27. Pt was AMS and hypoglycemia not relieved by multiple amps of dextrose. Pt was started on D10 drip, f/t/h rectal temp 94, w/ multiple electrolyte disturbances. Pt was put on Damaris Hugger and PICC was removed given concern for possible infxn. After d10 drip, pt blood gluc went to 401, less than 1hr blood glucose dropped to 140. Pt was switched to D5.     INTERVAL HPI/OVERNIGHT EVENTS:   Pt switched back to D10. Got 1x dose vanc 500mg IV.     ICU Vital Signs Last 24 Hrs  T(C): 36.8 (21 Sep 2023 14:36), Max: 36.9 (21 Sep 2023 09:10)  T(F): 98.2 (21 Sep 2023 14:36), Max: 98.4 (21 Sep 2023 09:10)  HR: 63 (21 Sep 2023 12:02) (63 - 81)  BP: 124/76 (21 Sep 2023 12:02) (124/76 - 170/84)  BP(mean): 92 (21 Sep 2023 12:02) (92 - 120)    RR: 20 (21 Sep 2023 12:02) (17 - 20)  SpO2: 98% (21 Sep 2023 12:02) (93% - 100%)    O2 Parameters below as of 21 Sep 2023 12:02  Patient On (Oxygen Delivery Method): room air    I&O's Summary    20 Sep 2023 07:01  -  21 Sep 2023 07:00  --------------------------------------------------------  IN: 240 mL / OUT: 0 mL / NET: 240 mL    21 Sep 2023 07:01  -  21 Sep 2023 18:33  --------------------------------------------------------  IN: 520 mL / OUT: 0 mL / NET: 520 mL      LABS:                        11.3   11.41 )-----------( 208      ( 21 Sep 2023 16:24 )             33.8     09-21    134<L>  |  101  |  22  ----------------------------<  285<H>  3.3<L>   |  25  |  0.73    Ca    8.4      21 Sep 2023 16:24  Phos  2.4     09-21  Mg     1.9     09-21    TPro  6.8  /  Alb  3.3  /  TBili  0.5  /  DBili  x   /  AST  23  /  ALT  27  /  AlkPhos  116  09-21    PT/INR - ( 21 Sep 2023 16:56 )   PT: 11.7 sec;   INR: 1.03          PTT - ( 21 Sep 2023 16:56 )  PTT:31.3 sec    Urinalysis Basic - ( 21 Sep 2023 16:24 )  Color: x / Appearance: x / SG: x / pH: x  Gluc: 285 mg/dL / Ketone: x  / Bili: x / Urobili: x   Blood: x / Protein: x / Nitrite: x   Leuk Esterase: x / RBC: x / WBC x   Sq Epi: x / Non Sq Epi: x / Bacteria: x      CAPILLARY BLOOD GLUCOSE  POCT Blood Glucose.: 267 mg/dL (21 Sep 2023 18:20)  POCT Blood Glucose.: 35 mg/dL (21 Sep 2023 18:04)  POCT Blood Glucose.: 92 mg/dL (21 Sep 2023 17:23)  POCT Blood Glucose.: 140 mg/dL (21 Sep 2023 17:01)  POCT Blood Glucose.: 229 mg/dL (21 Sep 2023 16:39)  POCT Blood Glucose.: 258 mg/dL (21 Sep 2023 16:27)  POCT Blood Glucose.: 391 mg/dL (21 Sep 2023 16:04)  POCT Blood Glucose.: 401 mg/dL (21 Sep 2023 16:01)  POCT Blood Glucose.: 27 mg/dL (21 Sep 2023 15:52)  POCT Blood Glucose.: 187 mg/dL (21 Sep 2023 14:16)  POCT Blood Glucose.: 33 mg/dL (21 Sep 2023 13:53)  POCT Blood Glucose.: 32 mg/dL (21 Sep 2023 13:50)    ABG - ( 20 Sep 2023 18:12 )  pH, Arterial: 7.41  pH, Blood: x     /  pCO2: 48    /  pO2: 89    / HCO3: 30    / Base Excess: 4.8   /  SaO2: 98.0            RADIOLOGY & ADDITIONAL TESTS:  Consultant(s) Notes Reviewed:  [x ] YES  [ ] NO    MEDICATIONS  (STANDING):  albuterol/ipratropium for Nebulization 3 milliLiter(s) Nebulizer every 6 hours  atorvastatin 40 milliGRAM(s) Oral at bedtime  carvedilol 3.125 milliGRAM(s) Oral every 12 hours  cefepime   IVPB 2000 milliGRAM(s) IV Intermittent every 12 hours  chlorhexidine 4% Liquid 1 Application(s) Topical <User Schedule>  dextrose 10%. 1000 milliLiter(s) (80 mL/Hr) IV Continuous <Continuous>  dextrose 50% Injectable 50 milliLiter(s) IV Push once  melatonin 5 milliGRAM(s) Oral at bedtime  montelukast 10 milliGRAM(s) Oral daily  multivitamin  Chewable 1 Tablet(s) Oral daily  ondansetron Injectable 4 milliGRAM(s) IV Push once  potassium chloride  10 mEq/100 mL IVPB 10 milliEquivalent(s) IV Intermittent every 1 hour  potassium phosphate IVPB 15 milliMole(s) IV Intermittent once  sodium chloride 7% Inhalation 4 milliLiter(s) Inhalation every 6 hours  thiamine 100 milliGRAM(s) Oral daily  vancomycin  IVPB 500 milliGRAM(s) IV Intermittent once    MEDICATIONS  (PRN):  sodium chloride 0.9% lock flush 10 milliLiter(s) IV Push every 1 hour PRN Pre/post blood products, medications, blood draw, and to maintain line patency      PHYSICAL EXAM:  GENERAL:   HEAD:  Atraumatic, Normocephalic  EYES: EOMI, PERRLA, conjunctiva and sclera clear  NECK: Supple, No JVD, Normal thyroid, no enlarged nodes  NERVOUS SYSTEM:  Alert & Awake.   CHEST/LUNG: B/L good air entry; No rales, rhonchi, or wheezing  HEART: S1S2 normal, no S3, Regular rate and rhythm; No murmurs  ABDOMEN: Soft, Nontender, Nondistended; Bowel sounds present  EXTREMITIES:  2+ Peripheral Pulses, No clubbing, cyanosis, or edema  LYMPH: No lymphadenopathy noted  SKIN: No rashes or lesions    Care Discussed with Consultants/Other Providers [ x] YES  [ ] NO ***TRANSFER ACCEPTED FROM [facility] TO MICU***  HOSPITAL COURSE:  82F, PMH: HTN, Afib (not on AC d/t fall risk), recent hospitalization for PRES (not at [facility], May-June '23), recent discharge for acute exacerbation of severe bronchiectasis (at [facility], Aug '23), presented w/ several days of AMS & new-onset diplopia since 9/15.   Pt started IV abx on 9/15 (2wk course of cefepime 2g IV BID for bronchiectasis exacerbation after concern from Dr. Foster that pt failed PO abx, - was dc'd in Aug on bactrim and cipro- got outpt PICC placement on 9/13, 1st dose 9/15, got 7 doses total before presentation to hospital) and per daughter, after starting abx demonstrated progressive exhaustion and generalized confusion w/ some agitation (similar to when she presented w/ PRES). Pt forgot rooms in her house, confused common everyday information, and dropped items randomly. Pt also reported seeing double. Pt came to ED, where she became hypoxic, was started on 3L NC. CT chest showed findings concurrent w/ bronchiectasis. Pulm saw pt in ED, and full respi workup negative for any acute infectious findings. CT head in ED significant only for chronic basal ganglia infarct, and neuro deficits notable only for bitemporal peripheral field disturbance. Pt was admitted to neuro for r/o CVA vs recurrence of PRES, and pt's abx restarted per pulm.     Subsequently, pt had notable change in mental status; specifically nonsensical tangential speech. No focal deficits on neuro exam, however paraphrasic errors were concerning so pt taken for repeat CT head -->showed R subarachnoid hemorrhage in parietal lobe. Pt had been started on Eliquis as secondary stroke prevention, this was dc'd after SAH findings. Neurosurgery consulted --> BEN. Pt was started on coreg given hemorrhagic stroke and BPs above goal.   Pt then had MRI of brain ---> showed new ischemic L occipital stroke. Pt was put on EEG d/t AMS and CVAs, but did not tolerate and ripped off after ~8hrs. No epileptiform activity was seen.     Today (9/21) pt manifested new aberrant behavior: Jerking movements, even more agitation, mental status further reduced. Speech remained nonsensical and tangential. Pt demonstrated intermittent L upper and L lower extremity jerking movements for about 3-4 secs, w/ gaze preference. Found to be hypoglycemic to the 30s, got an amp of dextrose and agitation resolved.    About 1hr later, pt was tachypneic. Repeat finger stick 27. Pt was AMS and hypoglycemia not relieved by multiple amps of dextrose. Pt was started on D10 drip, f/t/h rectal temp 94, w/ multiple electrolyte disturbances. Pt was put on Damaris Hugger and PICC was removed given concern for possible infxn. After d10 drip, pt blood gluc went to 401, less than 1hr blood glucose dropped to 140. Pt was switched to D5.     INTERVAL HPI/OVERNIGHT EVENTS:   Pt switched back to D10. Got 1x dose vanc 500mg IV. Coreg stopped    ICU Vital Signs Last 24 Hrs  T(C): 36.8 (21 Sep 2023 14:36), Max: 36.9 (21 Sep 2023 09:10)  T(F): 98.2 (21 Sep 2023 14:36), Max: 98.4 (21 Sep 2023 09:10)  HR: 63 (21 Sep 2023 12:02) (63 - 81)  BP: 124/76 (21 Sep 2023 12:02) (124/76 - 170/84)  BP(mean): 92 (21 Sep 2023 12:02) (92 - 120)    RR: 20 (21 Sep 2023 12:02) (17 - 20)  SpO2: 98% (21 Sep 2023 12:02) (93% - 100%)    O2 Parameters below as of 21 Sep 2023 12:02  Patient On (Oxygen Delivery Method): room air    I&O's Summary    20 Sep 2023 07:01  -  21 Sep 2023 07:00  --------------------------------------------------------  IN: 240 mL / OUT: 0 mL / NET: 240 mL    21 Sep 2023 07:01  -  21 Sep 2023 18:33  --------------------------------------------------------  IN: 520 mL / OUT: 0 mL / NET: 520 mL      LABS:                        11.3   11.41 )-----------( 208      ( 21 Sep 2023 16:24 )             33.8     09-21    134<L>  |  101  |  22  ----------------------------<  285<H>  3.3<L>   |  25  |  0.73    Ca    8.4      21 Sep 2023 16:24  Phos  2.4     09-21  Mg     1.9     09-21    TPro  6.8  /  Alb  3.3  /  TBili  0.5  /  DBili  x   /  AST  23  /  ALT  27  /  AlkPhos  116  09-21    PT/INR - ( 21 Sep 2023 16:56 )   PT: 11.7 sec;   INR: 1.03          PTT - ( 21 Sep 2023 16:56 )  PTT:31.3 sec    Urinalysis Basic - ( 21 Sep 2023 16:24 )  Color: x / Appearance: x / SG: x / pH: x  Gluc: 285 mg/dL / Ketone: x  / Bili: x / Urobili: x   Blood: x / Protein: x / Nitrite: x   Leuk Esterase: x / RBC: x / WBC x   Sq Epi: x / Non Sq Epi: x / Bacteria: x      CAPILLARY BLOOD GLUCOSE  POCT Blood Glucose.: 267 mg/dL (21 Sep 2023 18:20)  POCT Blood Glucose.: 35 mg/dL (21 Sep 2023 18:04)  POCT Blood Glucose.: 92 mg/dL (21 Sep 2023 17:23)  POCT Blood Glucose.: 140 mg/dL (21 Sep 2023 17:01)  POCT Blood Glucose.: 229 mg/dL (21 Sep 2023 16:39)  POCT Blood Glucose.: 258 mg/dL (21 Sep 2023 16:27)  POCT Blood Glucose.: 391 mg/dL (21 Sep 2023 16:04)  POCT Blood Glucose.: 401 mg/dL (21 Sep 2023 16:01)  POCT Blood Glucose.: 27 mg/dL (21 Sep 2023 15:52)  POCT Blood Glucose.: 187 mg/dL (21 Sep 2023 14:16)  POCT Blood Glucose.: 33 mg/dL (21 Sep 2023 13:53)  POCT Blood Glucose.: 32 mg/dL (21 Sep 2023 13:50)    ABG - ( 20 Sep 2023 18:12 )  pH, Arterial: 7.41  pH, Blood: x     /  pCO2: 48    /  pO2: 89    / HCO3: 30    / Base Excess: 4.8   /  SaO2: 98.0            RADIOLOGY & ADDITIONAL TESTS:  Consultant(s) Notes Reviewed:  [x ] YES  [ ] NO    MEDICATIONS  (STANDING):  albuterol/ipratropium for Nebulization 3 milliLiter(s) Nebulizer every 6 hours  atorvastatin 40 milliGRAM(s) Oral at bedtime  carvedilol 3.125 milliGRAM(s) Oral every 12 hours  cefepime   IVPB 2000 milliGRAM(s) IV Intermittent every 12 hours  chlorhexidine 4% Liquid 1 Application(s) Topical <User Schedule>  dextrose 10%. 1000 milliLiter(s) (80 mL/Hr) IV Continuous <Continuous>  dextrose 50% Injectable 50 milliLiter(s) IV Push once  melatonin 5 milliGRAM(s) Oral at bedtime  montelukast 10 milliGRAM(s) Oral daily  multivitamin  Chewable 1 Tablet(s) Oral daily  ondansetron Injectable 4 milliGRAM(s) IV Push once  potassium chloride  10 mEq/100 mL IVPB 10 milliEquivalent(s) IV Intermittent every 1 hour  potassium phosphate IVPB 15 milliMole(s) IV Intermittent once  sodium chloride 7% Inhalation 4 milliLiter(s) Inhalation every 6 hours  thiamine 100 milliGRAM(s) Oral daily  vancomycin  IVPB 500 milliGRAM(s) IV Intermittent once    MEDICATIONS  (PRN):  sodium chloride 0.9% lock flush 10 milliLiter(s) IV Push every 1 hour PRN Pre/post blood products, medications, blood draw, and to maintain line patency      PHYSICAL EXAM:  GENERAL:   HEAD:  Atraumatic, Normocephalic  EYES: EOMI, PERRLA, conjunctiva and sclera clear  NECK: Supple, No JVD, Normal thyroid, no enlarged nodes  NERVOUS SYSTEM:  Alert & Awake.   CHEST/LUNG: B/L good air entry; No rales, rhonchi, or wheezing  HEART: S1S2 normal, no S3, Regular rate and rhythm; No murmurs  ABDOMEN: Soft, Nontender, Nondistended; Bowel sounds present  EXTREMITIES:  2+ Peripheral Pulses, No clubbing, cyanosis, or edema  LYMPH: No lymphadenopathy noted  SKIN: No rashes or lesions    Care Discussed with Consultants/Other Providers [ x] YES  [ ] NO ***TRANSFER ACCEPTED FROM [facility] TO MICU***  HOSPITAL COURSE:  82F, PMH: HTN, Afib (not on AC d/t fall risk), recent hospitalization for PRES (not at [facility], May-June '23), recent discharge for acute exacerbation of severe bronchiectasis (at [facility], Aug '23), presented w/ several days of AMS & new-onset diplopia since 9/15.   Pt started IV abx on 9/15 (2wk course of cefepime 2g IV BID for bronchiectasis exacerbation after concern from Dr. Foster that pt failed PO abx, - was dc'd in Aug on bactrim and cipro- got outpt PICC placement on 9/13, 1st dose 9/15, got 7 doses total before presentation to hospital).    Per pt daughter, after starting abx demonstrated progressive exhaustion and generalized confusion w/ some agitation (similar to when she presented w/ PRES). Pt forgot rooms in her house, confused common everyday information, and dropped items randomly. Pt also reported seeing double. Pt came to ED, where she became hypoxic, was started on 3L NC. CT chest showed findings consistent w/ bronchiectasis. Pulmonology saw pt in ED, and respi workup negative for any acute infectious findings. CT head in ED significant only for chronic basal ganglia infarct, and neuro deficits notable only for bitemporal peripheral field disturbance. Pt was admitted to neuro for r/o CVA vs recurrence of PRES, and pt's abx restarted per pulm.     Subsequently, pt had notable change in mental status - nonsensical tangential speech. No focal deficits on neuro exam, however paraphrasic errors were concerning so pt taken for repeat CT head -->showed R subarachnoid hemorrhage in parietal lobe. Pt had been started on Eliquis as secondary stroke prevention, this was dc'd after SAH findings. Neurosurgery consulted --> BEN. Pt was started on coreg given hemorrhagic stroke and BPs above goal.   Pt then had MRI of brain ---> showed new ischemic L occipital stroke. Pt was put on EEG d/t AMS and CVAs, but did not tolerate and ripped off after ~8hrs. No epileptiform activity was seen.     Today (9/21) new aberrant behavior: Jerking movements, even more agitation, mental status further reduced. Speech remained nonsensical and tangential. Pt demonstrated intermittent L upper and L lower extremity jerking movements for about 3-4 secs, w/ gaze preference. Found to be hypoglycemic to the 30s, got an amp of dextrose and agitation resolved.    About 1hr later, pt was tachypneic. Repeat finger stick 27. Pt was AMS and hypoglycemia not relieved by multiple amps of dextrose. Pt was started on D10 drip, f/t/h rectal temp 94, w/ multiple electrolyte disturbances. Pt was put on Damaris Hugger and PICC was removed given concern for possible infxn. After D10 drip, pt blood gluc went to 401, less than 1hr later blood glucose dropped to 140. Pt was switched to D5.     INTERVAL HPI/OVERNIGHT EVENTS:   Pt switched back to D10. Got 1x dose vanc 500mg IV. Coreg stopped. Attempting repeat vEEG.     ICU Vital Signs Last 24 Hrs  T(C): 36.8 (21 Sep 2023 14:36), Max: 36.9 (21 Sep 2023 09:10)  T(F): 98.2 (21 Sep 2023 14:36), Max: 98.4 (21 Sep 2023 09:10)  HR: 63 (21 Sep 2023 12:02) (63 - 81)  BP: 124/76 (21 Sep 2023 12:02) (124/76 - 170/84)  BP(mean): 92 (21 Sep 2023 12:02) (92 - 120)    RR: 20 (21 Sep 2023 12:02) (17 - 20)  SpO2: 98% (21 Sep 2023 12:02) (93% - 100%)    O2 Parameters below as of 21 Sep 2023 12:02  Patient On (Oxygen Delivery Method): room air    I&O's Summary    20 Sep 2023 07:01  -  21 Sep 2023 07:00  --------------------------------------------------------  IN: 240 mL / OUT: 0 mL / NET: 240 mL    21 Sep 2023 07:01  -  21 Sep 2023 18:33  --------------------------------------------------------  IN: 520 mL / OUT: 0 mL / NET: 520 mL      LABS:                        11.3   11.41 )-----------( 208      ( 21 Sep 2023 16:24 )             33.8     09-21    134<L>  |  101  |  22  ----------------------------<  285<H>  3.3<L>   |  25  |  0.73    Ca    8.4      21 Sep 2023 16:24  Phos  2.4     09-21  Mg     1.9     09-21    TPro  6.8  /  Alb  3.3  /  TBili  0.5  /  DBili  x   /  AST  23  /  ALT  27  /  AlkPhos  116  09-21    PT/INR - ( 21 Sep 2023 16:56 )   PT: 11.7 sec;   INR: 1.03     PTT - ( 21 Sep 2023 16:56 )  PTT:31.3 sec    Urinalysis Basic - ( 21 Sep 2023 16:24 )  Color: x / Appearance: x / SG: x / pH: x  Gluc: 285 mg/dL / Ketone: x  / Bili: x / Urobili: x   Blood: x / Protein: x / Nitrite: x   Leuk Esterase: x / RBC: x / WBC x   Sq Epi: x / Non Sq Epi: x / Bacteria: x      CAPILLARY BLOOD GLUCOSE  POCT Blood Glucose.: 267 mg/dL (21 Sep 2023 18:20)  POCT Blood Glucose.: 35 mg/dL (21 Sep 2023 18:04)  POCT Blood Glucose.: 92 mg/dL (21 Sep 2023 17:23)  POCT Blood Glucose.: 140 mg/dL (21 Sep 2023 17:01)  POCT Blood Glucose.: 229 mg/dL (21 Sep 2023 16:39)  POCT Blood Glucose.: 258 mg/dL (21 Sep 2023 16:27)  POCT Blood Glucose.: 391 mg/dL (21 Sep 2023 16:04)  POCT Blood Glucose.: 401 mg/dL (21 Sep 2023 16:01)  POCT Blood Glucose.: 27 mg/dL (21 Sep 2023 15:52)  POCT Blood Glucose.: 187 mg/dL (21 Sep 2023 14:16)  POCT Blood Glucose.: 33 mg/dL (21 Sep 2023 13:53)  POCT Blood Glucose.: 32 mg/dL (21 Sep 2023 13:50)    ABG - ( 20 Sep 2023 18:12 )  pH, Arterial: 7.41  pH, Blood: x     /  pCO2: 48    /  pO2: 89    / HCO3: 30    / Base Excess: 4.8   /  SaO2: 98.0            RADIOLOGY & ADDITIONAL TESTS:  Consultant(s) Notes Reviewed:  [x ] YES  [ ] NO    MEDICATIONS  (STANDING):  albuterol/ipratropium for Nebulization 3 milliLiter(s) Nebulizer every 6 hours  atorvastatin 40 milliGRAM(s) Oral at bedtime  carvedilol 3.125 milliGRAM(s) Oral every 12 hours  cefepime   IVPB 2000 milliGRAM(s) IV Intermittent every 12 hours  chlorhexidine 4% Liquid 1 Application(s) Topical <User Schedule>  dextrose 10%. 1000 milliLiter(s) (80 mL/Hr) IV Continuous <Continuous>  dextrose 50% Injectable 50 milliLiter(s) IV Push once  melatonin 5 milliGRAM(s) Oral at bedtime  montelukast 10 milliGRAM(s) Oral daily  multivitamin  Chewable 1 Tablet(s) Oral daily  ondansetron Injectable 4 milliGRAM(s) IV Push once  potassium chloride  10 mEq/100 mL IVPB 10 milliEquivalent(s) IV Intermittent every 1 hour  potassium phosphate IVPB 15 milliMole(s) IV Intermittent once  sodium chloride 7% Inhalation 4 milliLiter(s) Inhalation every 6 hours  thiamine 100 milliGRAM(s) Oral daily  vancomycin  IVPB 500 milliGRAM(s) IV Intermittent once    MEDICATIONS  (PRN):  sodium chloride 0.9% lock flush 10 milliLiter(s) IV Push every 1 hour PRN Pre/post blood products, medications, blood draw, and to maintain line patency      PHYSICAL EXAM:  GENERAL:   HEAD:  Atraumatic, Normocephalic  EYES: EOMI, PERRLA, conjunctiva and sclera clear  NECK: Supple, No JVD, Normal thyroid, no enlarged nodes  NERVOUS SYSTEM:  Alert & Awake.   CHEST/LUNG: B/L good air entry; No rales, rhonchi, or wheezing  HEART: S1S2 normal, no S3, Regular rate and rhythm; No murmurs  ABDOMEN: Soft, Nontender, Nondistended; Bowel sounds present  EXTREMITIES:  2+ Peripheral Pulses, No clubbing, cyanosis, or edema  LYMPH: No lymphadenopathy noted  SKIN: No rashes or lesions    Care Discussed with Consultants/Other Providers [ x] YES  [ ] NO ***TRANSFER ACCEPTED FROM [facility] TO MICU***  HOSPITAL COURSE:  82F, PMH: HTN, Afib (not on AC d/t fall risk), recent hospitalization for PRES (not at [facility], May-June '23), recent discharge for acute exacerbation of severe bronchiectasis (at [facility], Aug '23), presented w/ several days of AMS & new-onset diplopia since 9/15.   Pt started IV abx on 9/15 (2wk course of cefepime 2g IV BID for bronchiectasis exacerbation after concern from Dr. Foster that pt failed PO abx, - was dc'd in Aug on bactrim and cipro- got outpt PICC placement on 9/13, 1st dose 9/15, got 7 doses total before presentation to hospital).    Per pt daughter, after starting abx demonstrated progressive exhaustion and generalized confusion w/ some agitation (similar to when she presented w/ PRES). Pt forgot rooms in her house, confused common everyday information, and dropped items randomly. Pt also reported seeing double. Pt came to ED, where she became hypoxic, was started on 3L NC. CT chest showed findings consistent w/ bronchiectasis. Pulmonology saw pt in ED, and respi workup negative for any acute infectious findings. CT head in ED significant only for chronic basal ganglia infarct, and neuro deficits notable only for bitemporal peripheral field disturbance. Pt was admitted to neuro for r/o CVA vs recurrence of PRES, and pt's abx restarted per pulm.     Subsequently, pt had notable change in mental status - nonsensical tangential speech. No focal deficits on neuro exam, however paraphrasic errors were concerning so pt taken for repeat CT head -->showed R subarachnoid hemorrhage in parietal lobe. Pt had been started on Eliquis as secondary stroke prevention, this was dc'd after SAH findings. Neurosurgery consulted --> BEN. Pt was started on coreg given hemorrhagic stroke and BPs above goal.   Pt then had MRI of brain ---> showed new ischemic L occipital stroke. Pt was put on EEG d/t AMS and CVAs, but did not tolerate and ripped off after ~8hrs. No epileptiform activity was seen.     Today (9/21) new aberrant behavior: Jerking movements, even more agitation, mental status further reduced. Speech remained nonsensical and tangential. Pt demonstrated intermittent L upper and L lower extremity jerking movements for about 3-4 secs, w/ gaze preference. Found to be hypoglycemic to the 30s, got an amp of dextrose and agitation resolved.    About 1hr later, pt was tachypneic. Repeat finger stick 27. Pt was AMS and hypoglycemia not relieved by multiple amps of dextrose. Pt was started on D10 drip, f/t/h rectal temp 94, w/ multiple electrolyte disturbances. Pt was put on Damaris Hugger and PICC was removed given concern for possible infxn. After D10 drip, pt blood gluc went to 401, less than 1hr later blood glucose dropped to 140. Pt was switched to D5.     INTERVAL HPI/OVERNIGHT EVENTS:   Pt switched back to D10. Got 1x dose vanc 500mg IV. Coreg stopped. Attempting repeat vEEG.     ICU Vital Signs Last 24 Hrs  T(C): 36.8 (21 Sep 2023 14:36), Max: 36.9 (21 Sep 2023 09:10)  T(F): 98.2 (21 Sep 2023 14:36), Max: 98.4 (21 Sep 2023 09:10)  HR: 63 (21 Sep 2023 12:02) (63 - 81)  BP: 124/76 (21 Sep 2023 12:02) (124/76 - 170/84)  BP(mean): 92 (21 Sep 2023 12:02) (92 - 120)    RR: 20 (21 Sep 2023 12:02) (17 - 20)  SpO2: 98% (21 Sep 2023 12:02) (93% - 100%)    O2 Parameters below as of 21 Sep 2023 12:02  Patient On (Oxygen Delivery Method): room air    I&O's Summary    20 Sep 2023 07:01  -  21 Sep 2023 07:00  --------------------------------------------------------  IN: 240 mL / OUT: 0 mL / NET: 240 mL    21 Sep 2023 07:01  -  21 Sep 2023 18:33  --------------------------------------------------------  IN: 520 mL / OUT: 0 mL / NET: 520 mL      LABS:                        11.3   11.41 )-----------( 208      ( 21 Sep 2023 16:24 )             33.8     09-21    134<L>  |  101  |  22  ----------------------------<  285<H>  3.3<L>   |  25  |  0.73    Ca    8.4      21 Sep 2023 16:24  Phos  2.4     09-21  Mg     1.9     09-21    TPro  6.8  /  Alb  3.3  /  TBili  0.5  /  DBili  x   /  AST  23  /  ALT  27  /  AlkPhos  116  09-21    PT/INR - ( 21 Sep 2023 16:56 )   PT: 11.7 sec;   INR: 1.03     PTT - ( 21 Sep 2023 16:56 )  PTT:31.3 sec    Urinalysis Basic - ( 21 Sep 2023 16:24 )  Color: x / Appearance: x / SG: x / pH: x  Gluc: 285 mg/dL / Ketone: x  / Bili: x / Urobili: x   Blood: x / Protein: x / Nitrite: x   Leuk Esterase: x / RBC: x / WBC x   Sq Epi: x / Non Sq Epi: x / Bacteria: x      CAPILLARY BLOOD GLUCOSE  POCT Blood Glucose.: 267 mg/dL (21 Sep 2023 18:20)  POCT Blood Glucose.: 35 mg/dL (21 Sep 2023 18:04)  POCT Blood Glucose.: 92 mg/dL (21 Sep 2023 17:23)  POCT Blood Glucose.: 140 mg/dL (21 Sep 2023 17:01)  POCT Blood Glucose.: 229 mg/dL (21 Sep 2023 16:39)  POCT Blood Glucose.: 258 mg/dL (21 Sep 2023 16:27)  POCT Blood Glucose.: 391 mg/dL (21 Sep 2023 16:04)  POCT Blood Glucose.: 401 mg/dL (21 Sep 2023 16:01)  POCT Blood Glucose.: 27 mg/dL (21 Sep 2023 15:52)  POCT Blood Glucose.: 187 mg/dL (21 Sep 2023 14:16)  POCT Blood Glucose.: 33 mg/dL (21 Sep 2023 13:53)  POCT Blood Glucose.: 32 mg/dL (21 Sep 2023 13:50)    ABG - ( 20 Sep 2023 18:12 )  pH, Arterial: 7.41  pH, Blood: x     /  pCO2: 48    /  pO2: 89    / HCO3: 30    / Base Excess: 4.8   /  SaO2: 98.0          RADIOLOGY & ADDITIONAL TESTS:  Consultant(s) Notes Reviewed:  [x ] YES  [ ] NO    MEDICATIONS  (STANDING):  albuterol/ipratropium for Nebulization 3 milliLiter(s) Nebulizer every 6 hours  atorvastatin 40 milliGRAM(s) Oral at bedtime  carvedilol 3.125 milliGRAM(s) Oral every 12 hours  cefepime   IVPB 2000 milliGRAM(s) IV Intermittent every 12 hours  chlorhexidine 4% Liquid 1 Application(s) Topical <User Schedule>  dextrose 10%. 1000 milliLiter(s) (80 mL/Hr) IV Continuous <Continuous>  dextrose 50% Injectable 50 milliLiter(s) IV Push once  melatonin 5 milliGRAM(s) Oral at bedtime  montelukast 10 milliGRAM(s) Oral daily  multivitamin  Chewable 1 Tablet(s) Oral daily  ondansetron Injectable 4 milliGRAM(s) IV Push once  potassium chloride  10 mEq/100 mL IVPB 10 milliEquivalent(s) IV Intermittent every 1 hour  potassium phosphate IVPB 15 milliMole(s) IV Intermittent once  sodium chloride 7% Inhalation 4 milliLiter(s) Inhalation every 6 hours  thiamine 100 milliGRAM(s) Oral daily  vancomycin  IVPB 500 milliGRAM(s) IV Intermittent once    MEDICATIONS  (PRN):  sodium chloride 0.9% lock flush 10 milliLiter(s) IV Push every 1 hour PRN Pre/post blood products, medications, blood draw, and to maintain line patency      PHYSICAL EXAM  GENERAL: cachetic, elderly woman, curled up and largely unresponsive  HEAD:  Atraumatic, Normocephalic  EYES: EOMI, PERRLA, conjunctiva and sclera clear  NECK: Supple, No JVD, Normal thyroid, no enlarged nodes  NERVOUS SYSTEM:  Alert & Awake.   CHEST/LUNG: B/L good air entry; No rales, rhonchi, or wheezing  HEART: S1S2 normal, no S3, Regular rate and rhythm; No murmurs  ABDOMEN: Soft, Nontender, Nondistended; Bowel sounds present  EXTREMITIES:  2+ Peripheral Pulses, No clubbing, cyanosis, or edema  LYMPH: No lymphadenopathy noted  SKIN: No rashes or lesions    Care Discussed with Consultants/Other Providers [ x] YES  [ ] NO

## 2023-09-21 NOTE — PROGRESS NOTE ADULT - NUTRITIONAL ASSESSMENT
This patient has been assessed with a concern for Malnutrition and has been determined to have a diagnosis/diagnoses of Severe protein-calorie malnutrition and Underweight (BMI < 19).    This patient is being managed with:   Diet Minced and Moist-  Supplement Feeding Modality:  Oral  Ensure Max Cans or Servings Per Day:  1       Frequency:  Two Times a day  Entered: Sep 20 2023  4:15PM

## 2023-09-21 NOTE — PROGRESS NOTE ADULT - ASSESSMENT
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 Pulm consulted for management of possible bronchiectasis exacerbation.    2019 sputum culture:   Pseudomonas           aeruginosa                                      MDIL        MINT                                    ________    ________       Ceftazidime                  4           S       Levofloxacin                 <=1         S       Amikacin                     >32         R       Aztreonam                    <=4         S       Cefepime                     16          I       Ciprofloxacin                <=0.5       S       Gentamicin                   >8          R       Imipenem                     <=1         S       Meropenem                    <=1         S       Piperacillin/Tazobactam      <=8         S       Tobramycin                   >8          R                                            Stenotrophomonas                                    maltophilia                                    #2                                      MDIL        MINT                                    ________    ________       Ceftazidime                  8           S       Levofloxacin                 <=1         S       Trimethoprim/Sulfa           <=0.5/9.5   S    #Acute Respiratory Failure with Hypoxia    Recommendation: Pt desatting to 86% in ED, started on 2L NC. Complaining of worsening dyspnea at rest and on exertion x1 week and pogressively worsening nonproductive cough. Now resting comfortably on RA, however per pt had improved breathing on oxygen. Not on O2 at home.  - SpO2 goal >90%  - Pulm hygiene: OOBTC, PT/OT, incentive spirometry, chest PT twice daily, airway clearance  - Patient recently started on chest PT at home, need to continue in patient. RT can start chest vest inpatient depending on availability, can do aerobika instead if chest vest not available.    #Acute Exacerbation of Bronchiectasis     Recommendation: Pt admitted to [facility] in 8/2023 for acute bronchiectasis exacerbation, completed zosyn, ciprofloxacin, and bactrim course. Started on IV cefepime by outpatient pulmonologist, s/p PICC placement on 9/13.   - Can continue IV abx per outpatient pulm recs, this is not a barrier to discharge  - Please continue Cefepime per her outpatient regimen from Dr. Cristian Jordan q6hr STANDING followed by 7% hypertonic saline followed by aerobika  - Chest PT twice daily (can dc aerobika if chest vest is available, PLEASE call RT to have this for patient as she states she has not been getting it done  - RVP, COVID, MRSA nares, and legionella negative   - c/w inhaled claston outpatient  - encourage patient to use duonebs and chest vest at home.  - She is back at her baseline from a respiratory standpoint  - Please ensure patient has follow up with Dr. Foster as outpatient prior to discharge  - Please call back with any questions    Case s/e/d with Dr. Lo

## 2023-09-21 NOTE — EEG REPORT - NS EEG TEXT BOX
[facility] Department of Neurology  Inpatient Continuous video-Electroencephalogram      Patient Name:	SAIDA BATES    :	1940  MRN:	[id number]    Study Start Date/Time:	2023, 1:31:30 AM  Study End Date/Time:	2023, 10:41 AM    Referred by:  Janet Vallecillo MD    Brief Clinical History:  SAIDA BATES is a 82 year old Female; study performed to investigate for seizures or markers of epilepsy.   Technologist notes: -  Diagnosis Code:  R56.9 convulsions/seizure    Pertinent Medication:  n/a    Acquisition Details:  Electroencephalography was acquired using a minimum of 21 channels on an [employer] system v 9.3.1 with electrode placement according to the standard International 10-20 system following ACNS (American Clinical Neurophysiology Society) guidelines.  Anterior temporal T1 and T2 electrodes were utilized whenever possible.  The [employer] automated spike & seizure detections were all reviewed in detail, in addition to the entire raw EEG.    Findings:  Day 1:  2023, 1:31:30 AM to 10:41 AM   Background:  continuous, with predominantly theta > alpha frequencies.  Generalized Slowing:  Intermittent frequent sporadic polymorhpic delta  Symmetry/Focality: No persistent asymmetries of voltage or frequency.     Voltage:  Normal (20+ uV)  Organization:  Rudimentary  Posterior Dominant Rhythm:  8-8.5 Hz symmetric, well-organized, and well-modulated  Sleep:  Symmetric, synchronous spindles and K complexes.  Variability:   Yes		Reactivity:  Yes    Spontaneous Activity:  No epileptiform discharges   Events:  1)	No electrographic seizures or significant clinical events occurred during this study.  Provocations:  •	Hyperventilation: was not performed.  •	Photic stimulation: was not performed.  FINAL Impression:  Abnormal Continuous/long-term video-EEG  1)	There was mild excess intermittent slow wave activity diffuse and non-lateralized.  2)	There were no findings of active epilepsy, however this alone does not rule out the diagnosis.     Final Clinical Correlation:  1)	There were indicators of mild diffuse or multi-focal cerebral dysfunction  2)	There were no findings of active epilepsy, however this alone does not rule out the diagnosis.      Cyrus Winston MD  Attending Neurologist, [facility]

## 2023-09-21 NOTE — PROGRESS NOTE ADULT - ASSESSMENT
82F w HTN, AF not on AC d/t fall risk, hospitalized for PRES 05-06/2023, recent DC 8/2023 for acute hypoxemic respiratory failure - to home on 4L NC w follow-up w Dr. Foster for exacerbation of bronchiectasis, recently started on 2wk course of IV cefepime outpatient by Dr. Foster - starting timing of initiation, p/w diplopia, encephalopathy    #CVA - SAH  #Encephalopathy    #Bronchiectasis - restarted on IV cefepime per pulmonary. Currently satting well on RA. On sinulair 10  #AFib - on lopressor 50 bid.   #Elevated BP    Plan  Overall, concern remains for Lewy Body dementia. Worsened paranoia today. Per pt's daughter Patience - visual changes and worsening paranoia were symptoms that prompted her to present. Pt would likely need sedation/anesthesia in order to tolerate study i/s/o mental status changes.  - in setting of elevated BPs - can switch from lopressor 50 BID to coreg 3.125 bid for more anti-hypertensive effect    Update: hypoglycemic this afternoon - BG in 30ss accompanied by worsening mental status changes. Rapid called later this afternoon when pt unresponsive and BG again was low. Found to have rectal temp 93F and shivering. Damaris hugger placed. HR in 60s and SBP 130s. CXR obtained - unchanged per my read. Lactate elevated 2. WBC increased 11.5. Seen and discussed w stroke team, RRT team, Dr. Saavedra - overall suspect possible underlying infection leading to hypoglycemia, leukocytosis vs decreased PO intake in setting of poor nutrition reserve leading to presentation. pt to be transferred to medicine telemetry for closer monitoring. If persistent fingerstick hypoglycemia noted, would obtain glucose by serum and consider insulin and c peptide testing

## 2023-09-21 NOTE — PROGRESS NOTE ADULT - ASSESSMENT
82y Female with PMHx of HTN, afib (not on AC due to fall risk), recent hospitalization for PRES (5-6/2023), recently discharged from [facility] 8/2023 with acute exacerbation of severe bronchiectasis and respiratory failure presents with several days of AMS and new diplopia 9/18. NIHSS 1 for bitemporal visual deficit. CTH with no significant findings. CTA and perfusion deferred due to contrast allergy. Admitted for further stroke w/u, started on Eliquis for secondary stroke prevention. Pulm following for patient's extensive respiratory history. Repeat CTH obtained for episode of worsening AMS on 09/20, found to have new R parietal and occipital SAH. Eliquis discontinued. Only tolerated noncontrast portion of MRI, found to have new L occipital ischemic stroke. Placed on VEEG for a short period of time, no epileptiform activity seen. Rapid response called on 9/21 for persistent hypoglycemic events despite adequate treatment and hypoxia. Found to be hypothermic, concern for active infection. Transferred to MICU for further management.    1)Secondary stroke prevention  - holding AC i/s/o SAH  - continue Atorvastatin 40 mg QD    2) Stroke risk factors  - A1C: 5.5  - LDL: 129   - TSH: 3.87   - SAH  - HTN  - history of PRES     3) Further management  - Recommend repeat CTH i/s/o declining mental status to r/o bleed vs stroke expansion when medically stable   - Recommend repeat VEEG as patient with episode concerning for possible seizure  - Highly consider VANNESSA if patient can tolerate to r/o possible endocarditis   - tentative plan (pending clinical stability) for patient to get MRA H/N, MRI brain w/ contrast, and MRV tomorrow with anesthesia at 1530  - MRI brain without: Stable small subarachnoid hemorrhage in the right parietal-occipital region. Subcentimeter acute/subacute infarct in the left occipital lobe.  - Initial VEEG: no epileptiform activity  - recommend SBP goal <160   - recommend q1hr stroke neuro checks  - may need outpt neurology follow up  - provide stroke education    DVT prophylaxis   - SCDs    Discussed with Neurology Fellow, Dr. Shi, and Neurology Attending, Dr. Vallecillo

## 2023-09-21 NOTE — PROGRESS NOTE ADULT - NS ATTEND AMEND GEN_ALL_CORE FT
The patient is an 82 year old female with HTN, A fib (not on A/C due to frequent falls), bronchiectasis (on abx for recent exacerbation, on IV cefepime), and PRES in 05/2023 admitted for evaluation after a several day history of worsened confusion and visual complaints (details vague/unclear). Repeat HCT/MRI with small R occipital SAH and small L ischemic stroke. Acutely declined this PM in s/o hypothermia and recurrent hypoglycemia. ?Infectious vs metabolic cause vs other.  Transferring to MICU. Will remove PICC line and broaden abx. Blood cultures pending. Appreciate MICU team.  From new standpoint, needs VANNESSA and thorough eval with MRA/MRV, MRI+C (would need w anesthesia). Can repeat HCT in interim but expansion of SAH/stroke unlikely cause of hypoglycemia/hypothermia. Can repeat EEG though EEG initially negative for cause of AMS which was in part related to imaging findings but suspect additional confounders. All a/c held given SAH; can consider aspirin pending medical stability. Consider PET brain to eval for DLB when stable.

## 2023-09-21 NOTE — CHART NOTE - NSCHARTNOTEFT_GEN_A_CORE
Received a call from RN at approx 1330 that patient appeared to be more altered then baseline with shaking movements, blinking more frequently, trying to get out of bed. Evaluated patient at bedside, noted to be agitated. Speech was nonsensical and was moderately dysarthric. Noted to have intermittent LUE/LLE jerking movements with 3-4 seconds of stereotypic features along with R gaze preference concerning for possible seizure. FS was checked, noted to be 34. Repeat 33. Given 1 AMP with improvement to 187. After AMP was given, patient became more relaxed, sleeping. Q2 hr finger sticks ordered. Vital signs remained stable throughout entire event, SBP in the 130s, HR in the 70s. Decision was made to hold off on AEDs at this time as patient with negative EEG overnight. Plan to repeat VEEG. Received additional call from RN that patient is hypoxic, sating 85% on RA, and tachypneic. Placed on 2 L/min NC with improvement to the mid 90s. Reevaluated patient at bedside with stroke fellow, Dr. Shi, and repeat fingerstick noted to be 27. Patient lethargic, difficult to arouse however appeared to have no focal findings on neurologic assessment. Noted to have an acute change compared to this morning as patient was sitting in the chair, eating her breakfast, responding to questions despite speech being tangential at times. Decision was made to call a rapid in the setting of persistent hypoglycemia, hypoxia, and worsening mental status. Rapid response team came to bedside. Additional 2 AMPs of D50 given with repeat FS to 401. Started on D10 @ 50 cc/hr. Repeat labs drawn. Became bradycardic to the 50s, EKG obtained. BP remained stable in the 120-130s. Noted to have an acute drop in glucose in < 1 hr from 401 to 140. D10 switched Received a call from RN at approx 1330 that patient appeared to be more altered then baseline with shaking movements, blinking more frequently, trying to get out of bed. Evaluated patient at bedside, noted to be agitated. Speech was nonsensical and was moderately dysarthric. Noted to have intermittent LUE/LLE jerking movements with 3-4 seconds of stereotypic features along with R gaze preference concerning for possible seizure. FS was checked, noted to be 34. Repeat 33. Given 1 AMP with improvement to 187. After AMP was given, patient became more relaxed, sleeping. Q2 hr finger sticks ordered. Vital signs remained stable throughout entire event, SBP in the 130s, HR in the 70s. Decision was made to hold off on AEDs at this time as patient with negative EEG overnight. Plan to repeat VEEG. Received additional call from RN that patient is hypoxic, sating 85% on RA, and tachypneic. Placed on 2 L/min NC with improvement to the mid 90s. Reevaluated patient at bedside with stroke fellow, Dr. Shi, and repeat fingerstick noted to be 27. Patient lethargic, difficult to arouse however appeared to have no focal findings on neurologic assessment. Noted to have an acute change compared to this morning as patient was sitting in the chair, eating her breakfast, responding to questions despite speech being tangential at times. Decision was made to call a rapid in the setting of persistent hypoglycemia, hypoxia, and worsening mental status. Rapid response team came to bedside. Additional 2 AMPs of D50 given with repeat FS to 401. Started on D10 @ 50 cc/hr. Repeat labs drawn. Became bradycardic to the 50s, EKG obtained. BP remained stable in the 120-130s. Rectal temperature noted to be 94 F, bear hugger placed. Noted to have an acute drop in glucose in < 1 hr from 401 to 140. D10 switched to D5. Labs significant for leukocytosis, elevated lactate, and electrolyte disturbances. CXR with no obvious consolidation. Pending urine and blood cultures. PICC line removed due to concern of possible infection. Patient transferred to [facility] for further management. Stroke will continue to follow.

## 2023-09-22 LAB
ALBUMIN SERPL ELPH-MCNC: 3.4 G/DL — SIGNIFICANT CHANGE UP (ref 3.3–5)
ALP SERPL-CCNC: 114 U/L — SIGNIFICANT CHANGE UP (ref 40–120)
ALT FLD-CCNC: 28 U/L — SIGNIFICANT CHANGE UP (ref 10–45)
ANION GAP SERPL CALC-SCNC: 6 MMOL/L — SIGNIFICANT CHANGE UP (ref 5–17)
ANION GAP SERPL CALC-SCNC: 7 MMOL/L — SIGNIFICANT CHANGE UP (ref 5–17)
ANION GAP SERPL CALC-SCNC: 7 MMOL/L — SIGNIFICANT CHANGE UP (ref 5–17)
ANION GAP SERPL CALC-SCNC: 8 MMOL/L — SIGNIFICANT CHANGE UP (ref 5–17)
AST SERPL-CCNC: 26 U/L — SIGNIFICANT CHANGE UP (ref 10–40)
B-OH-BUTYR SERPL-SCNC: 0 MMOL/L — SIGNIFICANT CHANGE UP
BASOPHILS # BLD AUTO: 0.02 K/UL — SIGNIFICANT CHANGE UP (ref 0–0.2)
BASOPHILS NFR BLD AUTO: 0.2 % — SIGNIFICANT CHANGE UP (ref 0–2)
BILIRUB SERPL-MCNC: 0.7 MG/DL — SIGNIFICANT CHANGE UP (ref 0.2–1.2)
BUN SERPL-MCNC: 10 MG/DL — SIGNIFICANT CHANGE UP (ref 7–23)
BUN SERPL-MCNC: 12 MG/DL — SIGNIFICANT CHANGE UP (ref 7–23)
BUN SERPL-MCNC: 17 MG/DL — SIGNIFICANT CHANGE UP (ref 7–23)
BUN SERPL-MCNC: 8 MG/DL — SIGNIFICANT CHANGE UP (ref 7–23)
C PEPTIDE SERPL-MCNC: 0.9 NG/ML — LOW (ref 1.1–4.4)
CALCIUM SERPL-MCNC: 8.7 MG/DL — SIGNIFICANT CHANGE UP (ref 8.4–10.5)
CALCIUM SERPL-MCNC: 8.7 MG/DL — SIGNIFICANT CHANGE UP (ref 8.4–10.5)
CALCIUM SERPL-MCNC: 8.9 MG/DL — SIGNIFICANT CHANGE UP (ref 8.4–10.5)
CALCIUM SERPL-MCNC: 9 MG/DL — SIGNIFICANT CHANGE UP (ref 8.4–10.5)
CHLORIDE SERPL-SCNC: 101 MMOL/L — SIGNIFICANT CHANGE UP (ref 96–108)
CHLORIDE SERPL-SCNC: 98 MMOL/L — SIGNIFICANT CHANGE UP (ref 96–108)
CO2 SERPL-SCNC: 25 MMOL/L — SIGNIFICANT CHANGE UP (ref 22–31)
CO2 SERPL-SCNC: 26 MMOL/L — SIGNIFICANT CHANGE UP (ref 22–31)
CO2 SERPL-SCNC: 26 MMOL/L — SIGNIFICANT CHANGE UP (ref 22–31)
CO2 SERPL-SCNC: 27 MMOL/L — SIGNIFICANT CHANGE UP (ref 22–31)
CREAT SERPL-MCNC: 0.57 MG/DL — SIGNIFICANT CHANGE UP (ref 0.5–1.3)
CREAT SERPL-MCNC: 0.59 MG/DL — SIGNIFICANT CHANGE UP (ref 0.5–1.3)
CREAT SERPL-MCNC: 0.6 MG/DL — SIGNIFICANT CHANGE UP (ref 0.5–1.3)
CREAT SERPL-MCNC: 0.68 MG/DL — SIGNIFICANT CHANGE UP (ref 0.5–1.3)
EGFR: 88 ML/MIN/1.73M2 — SIGNIFICANT CHANGE UP
EGFR: 91 ML/MIN/1.73M2 — SIGNIFICANT CHANGE UP
EGFR: 91 ML/MIN/1.73M2 — SIGNIFICANT CHANGE UP
EGFR: 92 ML/MIN/1.73M2 — SIGNIFICANT CHANGE UP
EOSINOPHIL # BLD AUTO: 0 K/UL — SIGNIFICANT CHANGE UP (ref 0–0.5)
EOSINOPHIL NFR BLD AUTO: 0 % — SIGNIFICANT CHANGE UP (ref 0–6)
GAS PNL BLDA: SIGNIFICANT CHANGE UP
GLUCOSE BLDC GLUCOMTR-MCNC: 104 MG/DL — HIGH (ref 70–99)
GLUCOSE BLDC GLUCOMTR-MCNC: 122 MG/DL — HIGH (ref 70–99)
GLUCOSE BLDC GLUCOMTR-MCNC: 122 MG/DL — HIGH (ref 70–99)
GLUCOSE BLDC GLUCOMTR-MCNC: 138 MG/DL — HIGH (ref 70–99)
GLUCOSE BLDC GLUCOMTR-MCNC: 139 MG/DL — HIGH (ref 70–99)
GLUCOSE BLDC GLUCOMTR-MCNC: 145 MG/DL — HIGH (ref 70–99)
GLUCOSE BLDC GLUCOMTR-MCNC: 149 MG/DL — HIGH (ref 70–99)
GLUCOSE BLDC GLUCOMTR-MCNC: 153 MG/DL — HIGH (ref 70–99)
GLUCOSE BLDC GLUCOMTR-MCNC: 158 MG/DL — HIGH (ref 70–99)
GLUCOSE BLDC GLUCOMTR-MCNC: 165 MG/DL — HIGH (ref 70–99)
GLUCOSE BLDC GLUCOMTR-MCNC: 408 MG/DL — HIGH (ref 70–99)
GLUCOSE SERPL-MCNC: 126 MG/DL — HIGH (ref 70–99)
GLUCOSE SERPL-MCNC: 144 MG/DL — HIGH (ref 70–99)
GLUCOSE SERPL-MCNC: 148 MG/DL — HIGH (ref 70–99)
GLUCOSE SERPL-MCNC: 174 MG/DL — HIGH (ref 70–99)
HCT VFR BLD CALC: 33.2 % — LOW (ref 34.5–45)
HGB BLD-MCNC: 11.3 G/DL — LOW (ref 11.5–15.5)
IMM GRANULOCYTES NFR BLD AUTO: 0.2 % — SIGNIFICANT CHANGE UP (ref 0–0.9)
INSULIN SERPL-MCNC: 989 UU/ML — HIGH (ref 2.6–24.9)
LACTATE SERPL-SCNC: 2.2 MMOL/L — HIGH (ref 0.5–2)
LIDOCAIN IGE QN: 16 U/L — SIGNIFICANT CHANGE UP (ref 7–60)
LYMPHOCYTES # BLD AUTO: 0.53 K/UL — LOW (ref 1–3.3)
LYMPHOCYTES # BLD AUTO: 4.1 % — LOW (ref 13–44)
MAGNESIUM SERPL-MCNC: 1.7 MG/DL — SIGNIFICANT CHANGE UP (ref 1.6–2.6)
MCHC RBC-ENTMCNC: 33.5 PG — SIGNIFICANT CHANGE UP (ref 27–34)
MCHC RBC-ENTMCNC: 34 GM/DL — SIGNIFICANT CHANGE UP (ref 32–36)
MCV RBC AUTO: 98.5 FL — SIGNIFICANT CHANGE UP (ref 80–100)
MONOCYTES # BLD AUTO: 0.38 K/UL — SIGNIFICANT CHANGE UP (ref 0–0.9)
MONOCYTES NFR BLD AUTO: 2.9 % — SIGNIFICANT CHANGE UP (ref 2–14)
NEUTROPHILS # BLD AUTO: 12.02 K/UL — HIGH (ref 1.8–7.4)
NEUTROPHILS NFR BLD AUTO: 92.6 % — HIGH (ref 43–77)
NRBC # BLD: 0 /100 WBCS — SIGNIFICANT CHANGE UP (ref 0–0)
PHOSPHATE SERPL-MCNC: 3.8 MG/DL — SIGNIFICANT CHANGE UP (ref 2.5–4.5)
PLATELET # BLD AUTO: 196 K/UL — SIGNIFICANT CHANGE UP (ref 150–400)
POTASSIUM SERPL-MCNC: 3.9 MMOL/L — SIGNIFICANT CHANGE UP (ref 3.5–5.3)
POTASSIUM SERPL-MCNC: 3.9 MMOL/L — SIGNIFICANT CHANGE UP (ref 3.5–5.3)
POTASSIUM SERPL-MCNC: 4 MMOL/L — SIGNIFICANT CHANGE UP (ref 3.5–5.3)
POTASSIUM SERPL-MCNC: 4 MMOL/L — SIGNIFICANT CHANGE UP (ref 3.5–5.3)
POTASSIUM SERPL-SCNC: 3.9 MMOL/L — SIGNIFICANT CHANGE UP (ref 3.5–5.3)
POTASSIUM SERPL-SCNC: 3.9 MMOL/L — SIGNIFICANT CHANGE UP (ref 3.5–5.3)
POTASSIUM SERPL-SCNC: 4 MMOL/L — SIGNIFICANT CHANGE UP (ref 3.5–5.3)
POTASSIUM SERPL-SCNC: 4 MMOL/L — SIGNIFICANT CHANGE UP (ref 3.5–5.3)
PROT SERPL-MCNC: 6.9 G/DL — SIGNIFICANT CHANGE UP (ref 6–8.3)
RBC # BLD: 3.37 M/UL — LOW (ref 3.8–5.2)
RBC # FLD: 12 % — SIGNIFICANT CHANGE UP (ref 10.3–14.5)
SODIUM SERPL-SCNC: 130 MMOL/L — LOW (ref 135–145)
SODIUM SERPL-SCNC: 131 MMOL/L — LOW (ref 135–145)
SODIUM SERPL-SCNC: 132 MMOL/L — LOW (ref 135–145)
SODIUM SERPL-SCNC: 134 MMOL/L — LOW (ref 135–145)
T3 SERPL-MCNC: 140 NG/DL — SIGNIFICANT CHANGE UP (ref 80–200)
WBC # BLD: 12.98 K/UL — HIGH (ref 3.8–10.5)
WBC # FLD AUTO: 12.98 K/UL — HIGH (ref 3.8–10.5)

## 2023-09-22 PROCEDURE — 70545 MR ANGIOGRAPHY HEAD W/DYE: CPT | Mod: 26

## 2023-09-22 PROCEDURE — 99497 ADVNCD CARE PLAN 30 MIN: CPT | Mod: 25

## 2023-09-22 PROCEDURE — 99232 SBSQ HOSP IP/OBS MODERATE 35: CPT | Mod: GC

## 2023-09-22 PROCEDURE — 99233 SBSQ HOSP IP/OBS HIGH 50: CPT

## 2023-09-22 PROCEDURE — 99223 1ST HOSP IP/OBS HIGH 75: CPT

## 2023-09-22 PROCEDURE — 70544 MR ANGIOGRAPHY HEAD W/O DYE: CPT | Mod: 26,59

## 2023-09-22 PROCEDURE — 70547 MR ANGIOGRAPHY NECK W/O DYE: CPT | Mod: 26

## 2023-09-22 PROCEDURE — 99222 1ST HOSP IP/OBS MODERATE 55: CPT | Mod: GC

## 2023-09-22 RX ORDER — PIPERACILLIN AND TAZOBACTAM 4; .5 G/20ML; G/20ML
4.5 INJECTION, POWDER, LYOPHILIZED, FOR SOLUTION INTRAVENOUS ONCE
Refills: 0 | Status: COMPLETED | OUTPATIENT
Start: 2023-09-22 | End: 2023-09-23

## 2023-09-22 RX ORDER — SODIUM CHLORIDE 9 MG/ML
1000 INJECTION, SOLUTION INTRAVENOUS
Refills: 0 | Status: DISCONTINUED | OUTPATIENT
Start: 2023-09-22 | End: 2023-09-22

## 2023-09-22 RX ORDER — ONDANSETRON 8 MG/1
4 TABLET, FILM COATED ORAL ONCE
Refills: 0 | Status: COMPLETED | OUTPATIENT
Start: 2023-09-22 | End: 2023-09-22

## 2023-09-22 RX ORDER — MAGNESIUM SULFATE 500 MG/ML
2 VIAL (ML) INJECTION ONCE
Refills: 0 | Status: COMPLETED | OUTPATIENT
Start: 2023-09-22 | End: 2023-09-22

## 2023-09-22 RX ORDER — SODIUM CHLORIDE 9 MG/ML
1000 INJECTION, SOLUTION INTRAVENOUS
Refills: 0 | Status: DISCONTINUED | OUTPATIENT
Start: 2023-09-22 | End: 2023-09-23

## 2023-09-22 RX ORDER — DEXTROSE 10 % IN WATER 10 %
1000 INTRAVENOUS SOLUTION INTRAVENOUS
Refills: 0 | Status: DISCONTINUED | OUTPATIENT
Start: 2023-09-22 | End: 2023-09-22

## 2023-09-22 RX ORDER — PIPERACILLIN AND TAZOBACTAM 4; .5 G/20ML; G/20ML
4.5 INJECTION, POWDER, LYOPHILIZED, FOR SOLUTION INTRAVENOUS ONCE
Refills: 0 | Status: COMPLETED | OUTPATIENT
Start: 2023-09-22 | End: 2023-09-22

## 2023-09-22 RX ORDER — HALOPERIDOL DECANOATE 100 MG/ML
2 INJECTION INTRAMUSCULAR ONCE
Refills: 0 | Status: COMPLETED | OUTPATIENT
Start: 2023-09-22 | End: 2023-09-22

## 2023-09-22 RX ORDER — PIPERACILLIN AND TAZOBACTAM 4; .5 G/20ML; G/20ML
4.5 INJECTION, POWDER, LYOPHILIZED, FOR SOLUTION INTRAVENOUS EVERY 8 HOURS
Refills: 0 | Status: DISCONTINUED | OUTPATIENT
Start: 2023-09-22 | End: 2023-09-29

## 2023-09-22 RX ORDER — POTASSIUM CHLORIDE 20 MEQ
40 PACKET (EA) ORAL
Refills: 0 | Status: DISCONTINUED | OUTPATIENT
Start: 2023-09-22 | End: 2023-09-22

## 2023-09-22 RX ORDER — HALOPERIDOL DECANOATE 100 MG/ML
2 INJECTION INTRAMUSCULAR ONCE
Refills: 0 | Status: DISCONTINUED | OUTPATIENT
Start: 2023-09-22 | End: 2023-09-23

## 2023-09-22 RX ORDER — GLUCAGON INJECTION, SOLUTION 0.5 MG/.1ML
1 INJECTION, SOLUTION SUBCUTANEOUS ONCE
Refills: 0 | Status: COMPLETED | OUTPATIENT
Start: 2023-09-22 | End: 2023-09-22

## 2023-09-22 RX ORDER — VANCOMYCIN HCL 1 G
500 VIAL (EA) INTRAVENOUS EVERY 24 HOURS
Refills: 0 | Status: DISCONTINUED | OUTPATIENT
Start: 2023-09-22 | End: 2023-09-23

## 2023-09-22 RX ADMIN — ONDANSETRON 4 MILLIGRAM(S): 8 TABLET, FILM COATED ORAL at 02:33

## 2023-09-22 RX ADMIN — GLUCAGON INJECTION, SOLUTION 1 MILLIGRAM(S): 0.5 INJECTION, SOLUTION SUBCUTANEOUS at 02:24

## 2023-09-22 RX ADMIN — Medication 3 MILLILITER(S): at 21:21

## 2023-09-22 RX ADMIN — SODIUM CHLORIDE 90 MILLILITER(S): 9 INJECTION, SOLUTION INTRAVENOUS at 19:47

## 2023-09-22 RX ADMIN — Medication 50 MILLILITER(S): at 03:28

## 2023-09-22 RX ADMIN — HALOPERIDOL DECANOATE 2 MILLIGRAM(S): 100 INJECTION INTRAMUSCULAR at 23:45

## 2023-09-22 RX ADMIN — Medication 25 GRAM(S): at 06:32

## 2023-09-22 RX ADMIN — Medication 100 MILLIEQUIVALENT(S): at 00:39

## 2023-09-22 RX ADMIN — PIPERACILLIN AND TAZOBACTAM 200 GRAM(S): 4; .5 INJECTION, POWDER, LYOPHILIZED, FOR SOLUTION INTRAVENOUS at 18:35

## 2023-09-22 RX ADMIN — SODIUM CHLORIDE 4 MILLILITER(S): 9 INJECTION INTRAMUSCULAR; INTRAVENOUS; SUBCUTANEOUS at 21:21

## 2023-09-22 RX ADMIN — Medication 40 MILLIGRAM(S): at 02:15

## 2023-09-22 RX ADMIN — CHLORHEXIDINE GLUCONATE 1 APPLICATION(S): 213 SOLUTION TOPICAL at 06:23

## 2023-09-22 RX ADMIN — HALOPERIDOL DECANOATE 2 MILLIGRAM(S): 100 INJECTION INTRAMUSCULAR at 00:16

## 2023-09-22 RX ADMIN — Medication 3 MILLILITER(S): at 10:00

## 2023-09-22 RX ADMIN — Medication 100 MILLIGRAM(S): at 19:47

## 2023-09-22 RX ADMIN — CEFEPIME 100 MILLIGRAM(S): 1 INJECTION, POWDER, FOR SOLUTION INTRAMUSCULAR; INTRAVENOUS at 06:24

## 2023-09-22 RX ADMIN — SODIUM CHLORIDE 4 MILLILITER(S): 9 INJECTION INTRAMUSCULAR; INTRAVENOUS; SUBCUTANEOUS at 10:00

## 2023-09-22 RX ADMIN — POTASSIUM PHOSPHATE, MONOBASIC POTASSIUM PHOSPHATE, DIBASIC 62.5 MILLIMOLE(S): 236; 224 INJECTION, SOLUTION INTRAVENOUS at 02:50

## 2023-09-22 NOTE — CONSULT NOTE ADULT - ASSESSMENT
·	introduced role of Flower/Pall for symptom management and advanced care planning  ·	family reaffirms DNR/DNI, accepting of trial of current level of care to see if patient will recover  ·	if patient is unable to recover neurologically in a few days, daughter will be open to discussing transition to symptom-directed care  ·	depending on clinical context, daughter may be amenable to trial of NGT but would not want long-term feeding tube if indicated  ·	if patient remains encephalopathic and bedbound, then she will be eligible for hospice 83yo F with PMH of HTN, AFib, PRES, and CVA p/w encephalopathy and found to have new CVA. Palliative consulted for complex medical decision making in the setting of metabolic/neurologic encephalopathy.    ·	introduced role of Flower/Pall for symptom management and advanced care planning  ·	family reaffirms DNR/DNI, accepting of trial of current level of care to see if patient will recover  ·	if patient is unable to recover neurologically in a few days, daughter will be open to discussing transition to symptom-directed care  ·	depending on clinical context, daughter may be amenable to trial of NGT but would not want long-term feeding tube if indicated  ·	if patient remains encephalopathic and bedbound, then she will be eligible for hospice

## 2023-09-22 NOTE — PROGRESS NOTE ADULT - ASSESSMENT
82F, PMH of HTN, Afib (not on AC d/t fall risk), recent hospitalization for PRES (not at [facility], May-June '23), recent discharge for acute exacerbation of severe bronchiectasis (at [facility], Aug '23), presented w/ several days of AMS & new-onset diplopia since 9/15.   Pt started IV abx on 9/15 (2wk course of cefepime 2g IV BID for bronchiectasis exacerbation after concern from Dr. Foster that pt failed PO abx, - was dc'd in Aug on bactrim and cipro, got bactrim, cipro and zosyn inpatient- got outpt PICC placement on 9/13, 1st dose 9/15, got 7 doses total before presentation to hospital).    NEURO  #PRES  #Seizure  Pt hospitalized 05-06/2023 for PRES. At the time presented similar symptoms: confusion, AMS, forgetfulness, dropping things. Now presented w/ diplopia and bitemporal visual field anomalies, all findings consistent w/ PRES. Now with tangential, nonsensical speech, as well as some seizure-like activity. Twitching/seizures possibly 2/2 hypoglycemia  - consider repeat CT head  - repeat vEEG (initial no epileptiform activity, however pt ripped off after 8hrs)  - q1hr neuro checks  - c/w thiamine 100mg qd  - delirum precautions  - monitor fingersticks q1hr    #R subarachnoid hemorrhage  #L ischemic occipital stroke  Pt w/ bitemporal visual field deficits, worsened mental status throughout clinical course. Initially w/o findings on CT head, later found to have SAH, and then found to have Ischemic stroke on MRI brain. CAUTION: pt allergic to CT contrast, has received MRI w/ contrast w/o issues thus far.   - repeat CT head   - f/u w/ anesthesiology regarding pt sedation (will be required for imaging as pt cannot tolerate in this state of agitation)  - consider MRV (r/o sinus thrombosis)  - per neuro, also consider MRA head & neck  - q1hr neuro checks    CARDIO  #Afib  Hx of Afib. Not on a/c chronically d/t fall risk. Started on Eliquis presentation after discussion w/ family about 2ndary stroke prevention per neuro. Eliquis now dc'd i/s/o findings of SAH.  - hold all AC  - not in Afib currently  - frequent EKGs  - consider TFT panel     #HTN  Hx of HTN. Home med = Lopressor 50 BID. Goal SBP per neuro <160.   - dc'd coreg i/s/o BPs occasionally in the 90s systolic  - hold home med.  - ctm     PULM  #Bronchiectasis   Chronic bronchiectasis. Recent admission for it in Aug, was dc'd on Bactrim & Cipro PO, however recently started on Cefepime 2g IV BID after pt thought to have failed PO.   - dc'd cefepime i/s/o AMS  -     ENDO  #Hypoglycemia  Pt with recalcitrant hypoglycemia, unresponsive to multiple amps of dextrose, and repeat episodes of hypoglycemia to 20s and 30s. After given D10 pt Gluc went to 410, and dropped to 140 in less than 1hr.   - f/u cortisol levels in AM  - f/u Sulfonylurea panel  - f/u Proinsulin  - f/u on BHB, per Endo  - c/w dextrose 10 drip  - c/w q1hr fingersticks  - Endo consulted, f/u recs    GI  BEN.    RENAL/  BEN.     Prophylactic Measures  Diet: Minced and Moist  DVT Prophylaxis: SCDs  GI Prophylaxis: Start PPIs     82F, PMH of HTN, Afib (not on AC d/t fall risk), recent hospitalization for PRES (not at [facility], May-June '23), recent discharge for acute exacerbation of severe bronchiectasis (at [facility], Aug '23), presented w/ several days of AMS & new-onset diplopia since 9/15.   Pt started IV abx on 9/15 (2wk course of cefepime 2g IV BID for bronchiectasis exacerbation after concern from Dr. Foster that pt failed PO abx, - was dc'd in Aug on bactrim and cipro, got bactrim, cipro and zosyn inpatient- got outpt PICC placement on 9/13, 1st dose 9/15, got 7 doses total before presentation to hospital).    NEURO  #PRES  #Seizure  Pt hospitalized 05-06/2023 for PRES. At the time presented similar symptoms: confusion, AMS, forgetfulness, dropping things. Now presented w/ diplopia and bitemporal visual field anomalies, all findings consistent w/ PRES. Now with tangential, nonsensical speech, as well as some seizure-like activity. Twitching/seizures possibly 2/2 hypoglycemia  - consider repeat CT head  - repeat vEEG (initial no epileptiform activity, however pt ripped off after 8hrs)  - q1hr neuro checks  - c/w thiamine 100mg qd  - delirium precautions  - monitor fingersticks q1hr    #R subarachnoid hemorrhage  #L ischemic occipital stroke  Pt w/ bitemporal visual field deficits, worsened mental status throughout clinical course. Initially w/o findings on CT head, later found to have SAH, and then found to have Ischemic stroke on MRI brain. CAUTION: pt allergic to CT contrast, has received MRI w/ contrast w/o issues thus far.   - repeat CT head   - f/u w/ anesthesiology regarding pt sedation (will be required for imaging as pt cannot tolerate in this state of agitation)  - consider MRV (r/o sinus thrombosis)  - per neuro, also consider MRA head & neck  - q1hr neuro checks    CARDIO  #Afib  Hx of Afib. Not on a/c chronically d/t fall risk. Started on Eliquis presentation after discussion w/ family about 2ndary stroke prevention per neuro. Eliquis now dc'd i/s/o findings of SAH.  - hold all AC  - not in Afib currently  - frequent EKGs  - consider TFT panel     #HTN  Hx of HTN. Home med = Lopressor 50 BID. Goal SBP per neuro <160.   - dc'd coreg i/s/o BPs occasionally in the 90s systolic  - hold home med.  - ctm     PULM  #Bronchiectasis   Chronic bronchiectasis. Recent admission for it in Aug, was dc'd on Bactrim & Cipro PO, however recently started on Cefepime 2g IV BID after pt thought to have failed PO.   - dc'd cefepime i/s/o AMS and unclear improvement on cefepime  - c/w vanc 500mg qd (today is D2 of abx)  - c/w zosyn 4.5g TID    ENDO  #Hypoglycemia  Pt with recalcitrant hypoglycemia, unresponsive to multiple amps of dextrose, and repeat episodes of hypoglycemia to 20s and 30s. After given D10 pt Gluc went to 410, and dropped to 140 in less than 1hr. Per Endo: pt goal Glucose is >90.   - f/u cortisol levels in AM  - f/u Sulfonylurea panel  - f/u Proinsulin  - f/u on BHB, per Endo  - can go down on D10/NS rate every 4hrs as long as glucose >90, per Endo  - switching drip to D10/NS   - c/w BMP q4hrs   - c/w q1hr fingersticks  - Endo consulted, f/u recs    GI  BEN.    RENAL/  BEN.     Prophylactic Measures  Diet: Minced and Moist  DVT Prophylaxis: SCDs  GI Prophylaxis: Start PPIs     82F, PMH of HTN, Afib (not on AC d/t fall risk), recent hospitalization for PRES (not at [facility], May-June '23), recent discharge for acute exacerbation of severe bronchiectasis (at [facility], Aug '23), presented w/ several days of AMS & new-onset diplopia since 9/15.   Pt started IV abx on 9/15 (2wk course of cefepime 2g IV BID for bronchiectasis exacerbation after concern from Dr. Foster that pt failed PO abx, - was dc'd in Aug on bactrim and cipro, got bactrim, cipro and zosyn inpatient- got outpt PICC placement on 9/13, 1st dose 9/15, got 7 doses total before presentation to hospital).    NEURO  #PRES  #Seizure  Pt hospitalized 05-06/2023 for PRES. At the time presented similar symptoms: confusion, AMS, forgetfulness, dropping things. Now presented w/ diplopia and bitemporal visual field anomalies, all findings consistent w/ PRES. Now with tangential, nonsensical speech, as well as some seizure-like activity. Twitching/seizures possibly 2/2 hypoglycemia  - repeat CT head  - repeat vEEG (initial no epileptiform activity, however pt ripped off after 8hrs)  - q1hr neuro checks  - c/w thiamine 100mg qd  - delirium precautions  - monitor fingersticks q1hr    #R subarachnoid hemorrhage  #L ischemic occipital stroke  Pt w/ bitemporal visual field deficits, worsened mental status throughout clinical course. Initially w/o findings on CT head, later found to have SAH, and then found to have Ischemic stroke on MRI brain. CAUTION: pt allergic to CT contrast, has received MRI w/ contrast w/o issues thus far.   - repeat CT head   - f/u w/ anesthesiology regarding pt sedation (will be required for imaging as pt cannot tolerate in this state of agitation)  - consider MRV (r/o sinus thrombosis)  - per neuro, also consider MRA head & neck  - q1hr neuro checks    CARDIO  #Afib  Hx of Afib. Not on a/c chronically d/t fall risk. Started on Eliquis presentation after discussion w/ family about 2ndary stroke prevention per neuro. Eliquis now dc'd i/s/o findings of SAH.  - hold all AC  - not in Afib currently  - frequent EKGs  - consider TFT panel     #HTN  Hx of HTN. Home med = Lopressor 50 BID. Goal SBP per neuro <160.   - dc'd coreg i/s/o BPs occasionally in the 90s systolic  - hold home med.  - ctm     PULM  #Bronchiectasis   Chronic bronchiectasis. Recent admission for it in Aug, was dc'd on Bactrim & Cipro PO, however recently started on Cefepime 2g IV BID after pt thought to have failed PO.   - dc'd cefepime i/s/o AMS and unclear improvement on cefepime  - c/w vanc 500mg qd (today is D2 of abx)  - c/w zosyn 4.5g TID    ENDO  #Hypoglycemia  Pt with recalcitrant hypoglycemia, unresponsive to multiple amps of dextrose, and repeat episodes of hypoglycemia to 20s and 30s. After given D10 pt Gluc went to 410, and dropped to 140 in less than 1hr. Per Endo: pt goal Glucose is >90.   - f/u cortisol levels in AM  - f/u Sulfonylurea panel  - f/u Proinsulin  - f/u on BHB, per Endo  - switch from D10 to D10/NS, needs isotonic fluid otherwise goes hyponatremic  - Per Endo, can go down on D10/NS rate every 4hrs as long as glucose >90  - switching drip to D10/NS   - c/w BMP q4hrs   - c/w q1hr fingersticks  - Endo consulted, f/u recs    GI  BEN.    RENAL/  BEN.     Prophylactic Measures  Diet: Minced and Moist  DVT Prophylaxis: SCDs  GI Prophylaxis: Start PPIs     82F, PMH of HTN, Afib (not on AC d/t fall risk), recent hospitalization for PRES (not at [facility], May-June '23), recent discharge for acute exacerbation of severe bronchiectasis (at [facility], Aug '23), presented w/ several days of AMS & new-onset diplopia since 9/15.   Pt started IV abx on 9/15 (2wk course of cefepime 2g IV BID for bronchiectasis exacerbation after concern from Dr. Foster that pt failed PO abx, - was dc'd in Aug on bactrim and cipro, got bactrim, cipro and zosyn inpatient- got outpt PICC placement on 9/13, 1st dose 9/15, got 7 doses total before presentation to hospital).    NEURO  #PRES  #Seizure  Pt hospitalized 05-06/2023 for PRES. At the time presented similar symptoms: confusion, AMS, forgetfulness, dropping things. Now presented w/ diplopia and bitemporal visual field anomalies, all findings consistent w/ PRES. Now with tangential, nonsensical speech, as well as some seizure-like activity. Twitching/seizures possibly 2/2 hypoglycemia  - repeat CT head  - repeat vEEG (initial no epileptiform activity, however pt ripped off after 8hrs)  - q1hr neuro checks  - c/w thiamine 100mg qd  - delirium precautions  - monitor fingersticks q1hr    #R subarachnoid hemorrhage  #L ischemic occipital stroke  Pt w/ bitemporal visual field deficits, worsened mental status throughout clinical course. Initially w/o findings on CT head, later found to have SAH, and then found to have Ischemic stroke on MRI brain. CAUTION: pt reports allergy to contrast, (anaphylaxis vs red face). Has not received any contrast thus far at this hospitalization   - repeat CT head   - f/u w/ anesthesiology regarding pt sedation (will be required for imaging as pt cannot tolerate in this state of agitation)  - consider MRV (r/o sinus thrombosis)  - per neuro, also consider MRA head & neck  - need to premedicate before any contrast imaging, also obtain collateral as to previous experiences w/ contrast in the past from note & pt daughter  - q1hr neuro checks    CARDIO  #Afib  Hx of Afib. Not on a/c chronically d/t fall risk. Started on Eliquis presentation after discussion w/ family about 2ndary stroke prevention per neuro. Eliquis now dc'd i/s/o findings of SAH.  - hold all AC  - not in Afib currently  - frequent EKGs  - consider TFT panel     #HTN  Hx of HTN. Home med = Lopressor 50 BID. Goal SBP per neuro <160.   - dc'd coreg i/s/o BPs occasionally in the 90s systolic  - hold home med.  - ctm     PULM  #Bronchiectasis   Chronic bronchiectasis. Recent admission for it in Aug, was dc'd on Bactrim & Cipro PO, however recently started on Cefepime 2g IV BID after pt thought to have failed PO.   - dc'd cefepime i/s/o AMS and unclear improvement on cefepime  - c/w vanc 500mg qd (today is D2 of abx)  - c/w zosyn 4.5g TID    ENDO  #Hypoglycemia  Pt with recalcitrant hypoglycemia, unresponsive to multiple amps of dextrose, and repeat episodes of hypoglycemia to 20s and 30s. After given D10 pt Gluc went to 410, and dropped to 140 in less than 1hr. Per Endo: pt goal Glucose is >90.   - f/u cortisol levels in AM  - f/u Sulfonylurea panel  - f/u Proinsulin  - f/u on BHB, per Endo  - switch from D10 to D10/NS, needs isotonic fluid otherwise goes hyponatremic  - Per Endo, can go down on D10/NS rate every 4hrs as long as glucose >90  - switching drip to D10/NS   - c/w BMP q4hrs   - c/w q1hr fingersticks  - Endo consulted, f/u recs    GI  BEN.    RENAL/  BEN.     Prophylactic Measures  Diet: Minced and Moist  DVT Prophylaxis: SCDs  GI Prophylaxis: Start PPIs

## 2023-09-22 NOTE — CHART NOTE - NSCHARTNOTEFT_GEN_A_CORE
spoke to patients daughter alex williamson in regards to patients worsening hypercarbia and ongoing mental status worsening  confirmed DNR DNI status with her daughter as previously discussed today with palliative team  patient on one to one - no n/v, will trial bipap at this time with risk of aspiration discussed and monitor for improvement

## 2023-09-22 NOTE — PROGRESS NOTE ADULT - ASSESSMENT
82y Female with PMHx of HTN, afib (not on AC due to fall risk), recent hospitalization for PRES (5-6/2023), recently discharged from [facility] 8/2023 with acute exacerbation of severe bronchiectasis and respiratory failure presents with several days of AMS and new diplopia 9/18. NIHSS 1 for bitemporal visual deficit. CTH with no significant findings. CTA and perfusion deferred due to contrast allergy. Admitted for further stroke w/u, started on Eliquis for secondary stroke prevention. Pulm following for patient's extensive respiratory history. Repeat CTH obtained for episode of worsening AMS on 09/20, found to have new R parietal and occipital SAH. Eliquis discontinued. Only tolerated noncontrast portion of MRI, found to have new L occipital ischemic stroke. Placed on VEEG for a short period of time, no epileptiform activity seen. Rapid response called on 9/21 for persistent hypoglycemic events despite adequate treatment and hypoxia. Found to be hypothermic, concern for active infection. Transferred to MICU for further management.    1)Secondary stroke prevention  - holding AC i/s/o SAH  - continue Atorvastatin 40 mg QD    2) Stroke risk factors  - A1C: 5.5  - LDL: 129   - TSH: 3.87   - SAH  - HTN  - history of PRES     3) Further management  - Recommend repeat CTH i/s/o declining mental status to r/o bleed vs stroke expansion when medically stable   - Recommend repeat VEEG as patient with episode concerning for possible seizure after MRI scans completed  - Highly consider VANNESSA if patient can tolerate to r/o possible endocarditis   - tentative plan (pending clinical stability) for patient to get MRA H/N, MRI brain w/ contrast, and MRV today with anesthesia at 1530; would reach out to confirm with anesthesia team  - MRI brain without: Stable small subarachnoid hemorrhage in the right parietal-occipital region. Subcentimeter acute/subacute infarct in the left occipital lobe.  - Initial VEEG: no epileptiform activity  - recommend SBP goal <160   - recommend q1hr stroke neuro checks  - may need outpt neurology follow up  - provide stroke education    DVT prophylaxis   - SCDs    Discussed with Neurology Attending, Dr. Vallecillo

## 2023-09-22 NOTE — CONSULT NOTE ADULT - PROBLEM SELECTOR RECOMMENDATION 4
.  Pending further work-up, h/o PRES  -difficult to prognosticate, patient previously recovered from a similar presentation  -pending further characterization with MRI, EEG, +/- LP or PET  -if patient remains encephalopathic and bedbound, then she will be eligible for hospice

## 2023-09-22 NOTE — CONSULT NOTE ADULT - SUBJECTIVE AND OBJECTIVE BOX
[facility] and Palliative Care  Abad Buenrostro, Palliative Care Attending  Contact Info: Call [phone] (HEAL Line) or message on Microsoft Teams (Abad Buenrostro)    HPI:  82y Female with PMHx of HTN, afib (not on AC due to fall risk), recent hospitalization for PRES (5-6/2023), recently discharged from [facility] 8/2023 with acute exacerbation of severe bronchiectasis and respiratory failure presents with several days of AMS and new diplopia 9/18.   Daughter noticed since 9/15 after starting IV abx, patient has been more tired and exhausted with some agitation. It has progressed over the past few days to generalized confusion (being "out of it", not know where certain rooms are in the house, dropping items, not knowing common everyday information) similar to how she initially presented with PRES. Patient told daughter she was seeing double of items in the house around 10pm.   ED: /86. SpO2 84% RA, requiring 3L NC with improvement to 90%. C/o of shortness of breath and difficulty breathing while on supplemental oxygen. Neuro exam significant for decreased peripheral vision bitemporal, but no clear visual field cut, also inconsistent vision exam. Otherwise nonfocal. CT with no acute hemorrhage, with chronic b/l lentiform nuclei and right caudate infarcts. CT angio and perfusion deferred due to contrast allergy received contrast during angiogram in the past with intense flushing of head).  On repeat visual exam, patient notes that she sees double of items, but unable to tell if horizontal or vertical. She is unable to discern at time whether truly double vs blurry, nor transient or consistent diplopia since onset. At times, when testing visual fields, patient struggles to count fingers peripherally and centrally b/l (unclear whether from true field cut vs complete loss of vision vs diplopia causing difficulty counting). Patient also sees items moving around when they are actually stationary. Daughter states that since PRES, patient's vision has not returned to baseline and has been blurry. They have tried to obtain glasses, however on vision testing, Rx was never consistent so was advised to re-test vision after it has "settled".     Patient seen and examined at bedside. Appears overtly comfortable. Denies any significant complaint. Comprehensive symptom assessment and GOC exploration as noted below. Further collateral obtained from primary team, chart, and family.    PERTINENT PM/SXH:   Bronchiectasis  History of Pseudomonas pneumonia  HTN (hypertension)  Posterior reversible encephalopathy syndrome  Atrial fibrillation  No significant past surgical history    FAMILY HISTORY:  No history of CVA in first degree relatives  Unable to obtain due to encephalopathy    ITEMS NOT CHECKED ARE NOT PRESENT  SOCIAL HISTORY:   Significant other/partner:  []  Children:  []  Substance hx:  []   Tobacco hx:  []   Alcohol hx: []   Home Opioid hx:  [] I-Stop Reference No:  - no active Rx's / see chart note  Living Situation: []Home  []Long term care  []Rehab []Other  Yarsani/Spiritual practice: ; Role of organized Yarsani [] important [] some [] unable to assess  Coping: [] well [] with difficulty [] poor coping [] unable to assess  Support system: [] strong [] adequate [] inadequate    ADVANCE DIRECTIVES:    []MOLST  []Living Will  DECISION MAKER(s):  [] Health Care Proxy(s)  [] Surrogate(s)  [] Guardian           Name(s)/Phone Number(s):     BASELINE (I)ADLs (prior to admission):  [place]: []Total  [] Moderate []Dependent    ALLERGIES:  Ceclor (Rash)  IV Contrast (Unknown)  Levaquin (Swelling)  Augmentin (Short breath; Rash)    MEDICATIONS  (STANDING):  albuterol/ipratropium for Nebulization 3 milliLiter(s) Nebulizer every 6 hours  atorvastatin 40 milliGRAM(s) Oral at bedtime  chlorhexidine 2% Cloths 1 Application(s) Topical <User Schedule>  chlorhexidine 4% Liquid 1 Application(s) Topical <User Schedule>  dexMEDEtomidine Infusion 0.2 MICROgram(s)/kG/Hr (1.63 mL/Hr) IV Continuous <Continuous>  dextrose 10%. 1000 milliLiter(s) (120 mL/Hr) IV Continuous <Continuous>  melatonin 5 milliGRAM(s) Oral at bedtime  montelukast 10 milliGRAM(s) Oral daily  multivitamin  Chewable 1 Tablet(s) Oral daily  piperacillin/tazobactam IVPB. 4.5 Gram(s) IV Intermittent once  piperacillin/tazobactam IVPB.- 4.5 Gram(s) IV Intermittent once  piperacillin/tazobactam IVPB.. 4.5 Gram(s) IV Intermittent every 8 hours  sodium chloride 7% Inhalation 4 milliLiter(s) Inhalation every 6 hours  thiamine 100 milliGRAM(s) Oral daily  vancomycin  IVPB 500 milliGRAM(s) IV Intermittent every 24 hours    MEDICATIONS  (PRN):  sodium chloride 0.9% lock flush 10 milliLiter(s) IV Push every 1 hour PRN Pre/post blood products, medications, blood draw, and to maintain line patency    Analgesic Use (Scheduled and PRNs) for past 24 hours:  haloperidol    Injectable   2 milliGRAM(s) IntraMuscular (09-22-23 @ 00:16)  OLANZapine Injectable   7.5 milliGRAM(s) IntraMuscular (09-21-23 @ 20:56)  ondansetron Injectable   4 milliGRAM(s) IV Push (09-22-23 @ 02:33)    PRESENT SYMPTOMS: []Unable to obtain due to poor mentation/encephalopathy  Source if other than patient:  []Family   []Team     Pain: [] yes [] no  QOL impact -   Location -                    Aggravating Factors -  Quality -  Radiation -  Timing -  Severity (0-10 scale) -   Minimal Acceptable Level (0-10 scale) -    CPOT Score:  (Critical Care Pain Assessment)    PAIN AD Score:  (Nonverbal Pain Assessment)    Dyspnea:                           []Mild  []Moderate []Severe  Anxiety:                             []Mild []Moderate []Severe  Fatigue:                             []Mild []Moderate []Severe  Nausea:                             []Mild []Moderate []Severe  Loss of Appetite:              []Mild []Moderate []Severe  Constipation:                    []Mild []Moderate []Severe    Other Symptoms:  [x]All other review of systems negative     Palliative Performance Status Version 2:  %  (Functional Assessment Tool)    GENERAL:  [] NAD []Alert []Lethargic  []Cachexia  []Unarousable  []Verbal  []Non-Verbal  BEHAVIORAL:   []Anxiety  []Delirium []Agitation []Cooperative []Oriented x  HEENT:  []Normal  [] Moist Mucous Membranes []Dry mouth   []ET Tube/Trach  []Oral lesions  PULMONARY:   []Clear []Tachypnea  []Audible excessive secretions  []Normal Work of Breathing []Labored Breathing  []Rhonchi []Crackles []Wheezing  CARDIOVASCULAR:    []Regular Rate []Regular Rhythm []Irregular []Tachy  []Hemanth  GASTROINTESTINAL:  []Soft  []Distended   []+BS  []Non tender []Tender  []PEG []OGT/ NGT  Last BM:  GENITOURINARY:  []Normal [] Incontinent   []Oliguria/Anuria   []Richards  MUSCULOSKELETAL:   []Normal Extremities  []Weakness  []Bed/Wheelchair bound []Edema  NEUROLOGIC:   []No focal deficits  []Cognitive impairment  []Dysphagia []Dysarthria []Paresis []Encephalopathic  SKIN:   []Normal   []Pressure ulcer(s)  []Rash    Vital Signs Last 24 Hrs  T(C): 36.5 (22 Sep 2023 10:05), Max: 36.8 (21 Sep 2023 14:36)  T(F): 97.7 (22 Sep 2023 10:05), Max: 98.2 (21 Sep 2023 14:36)  HR: 90 (22 Sep 2023 11:00) (58 - 145)  BP: 140/98 (22 Sep 2023 11:00) (91/48 - 185/97)  BP(mean): 114 (22 Sep 2023 11:00) (66 - 133)  RR: 18 (22 Sep 2023 11:00) (10 - 73)  SpO2: 100% (22 Sep 2023 11:00) (87% - 100%)    Parameters below as of 22 Sep 2023 11:00  Patient On (Oxygen Delivery Method): nasal cannula  O2 Flow (L/min): 2     LABS: Personally reviewed and interpreted                      11.3   12.98 )-----------( 196      ( 22 Sep 2023 04:54 )             33.2   09-22    132<L>  |  98  |  12  ----------------------------<  174<H>  3.9   |  27  |  0.57    Ca    9.0      22 Sep 2023 12:26  Phos  3.8     09-22  Mg     1.7     09-22  TPro  6.9  /  Alb  3.4  /  TBili  0.7  /  DBili  x   /  AST  26  /  ALT  28  /  AlkPhos  114  09-22    RADIOLOGY & ADDITIONAL STUDIES: Personally reviewed and interpreted  < from: MR Head No Cont (09.20.23 @ 21:52) >  There are T2 prolongation signal abnormalities in the periventricular subcortical white matter likely related to severe chronic microvascular ischemic changes. There is no mass effect or midline shift. There is no hydrocephalus. Subcentimeter acute/subacute infarct noted in the left occipital lobe. Tiny chronic infarcts bilateral cerebellum noted. Few scatteredgradient susceptibility affects noted most compatible with chronic microhemorrhages. There is additional gradient susceptibility affects in the right parieto-occipital region with sulcal high T2 FLAIR signal. This is compatible with known small subarachnoid hemorrhage seen on the prior CT exam.    IMPRESSION:  Stable small subarachnoid hemorrhage in the right parietal-occipital region given differences in technique.  Subcentimeter acute/subacute infarct in the left occipital lobe.    REFERRALS:  [x]Social Work/Case management []PT/OT []Chaplaincy  []Hospice  []Patient/Family Support []Massage Therapy []Music Therapy []Holistic RN []Ethics    DISCUSSION OF CASE: Family - to provide updates and emotional support; Primary Team/RN - to discuss plan of care [facility] and Palliative [facility]  Abad Buenrostro, Palliative Care Attending  Contact Info: Call [phone] (HEAL Line) or message on Microsoft Teams (Abad Buenrostro)    HPI:  82y Female with PMHx of HTN, afib (not on AC due to fall risk), recent hospitalization for PRES (5-6/2023), recently discharged from [facility] 8/2023 with acute exacerbation of severe bronchiectasis and respiratory failure presents with several days of AMS and new diplopia 9/18.   Daughter noticed since 9/15 after starting IV abx, patient has been more tired and exhausted with some agitation. It has progressed over the past few days to generalized confusion (being "out of it", not know where certain rooms are in the house, dropping items, not knowing common everyday information) similar to how she initially presented with PRES. Patient told daughter she was seeing double of items in the house around 10pm.   ED: /86. SpO2 84% RA, requiring 3L NC with improvement to 90%. C/o of shortness of breath and difficulty breathing while on supplemental oxygen. Neuro exam significant for decreased peripheral vision bitemporal, but no clear visual field cut, also inconsistent vision exam. Otherwise nonfocal. CT with no acute hemorrhage, with chronic b/l lentiform nuclei and right caudate infarcts. CT angio and perfusion deferred due to contrast allergy received contrast during angiogram in the past with intense flushing of head).  On repeat visual exam, patient notes that she sees double of items, but unable to tell if horizontal or vertical. She is unable to discern at time whether truly double vs blurry, nor transient or consistent diplopia since onset. At times, when testing visual fields, patient struggles to count fingers peripherally and centrally b/l (unclear whether from true field cut vs complete loss of vision vs diplopia causing difficulty counting). Patient also sees items moving around when they are actually stationary. Daughter states that since PRES, patient's vision has not returned to baseline and has been blurry. They have tried to obtain glasses, however on vision testing, Rx was never consistent so was advised to re-test vision after it has "settled".     Patient seen and examined at bedside. Unable to participate in interview. Comfortable at rest but agitated with routine nursing care. Comprehensive symptom assessment and GOC exploration as noted below. Further collateral obtained from primary team, chart, and family.    PERTINENT PM/SXH:   Bronchiectasis  History of Pseudomonas pneumonia  HTN (hypertension)  Posterior reversible encephalopathy syndrome  Atrial fibrillation  No significant past surgical history    FAMILY HISTORY:  No history of CVA in first degree relatives  Unable to obtain due to encephalopathy    ITEMS NOT CHECKED ARE NOT PRESENT  SOCIAL HISTORY:   Significant other/partner:  []  Children:  [x]  Substance hx:  []   Tobacco hx:  []   Alcohol hx: []   Home Opioid hx:  [] I-Stop Reference No:  - no active Rx's  Living Situation: [x]Home  []Long term care  []Rehab []Other  Latter-day/Spiritual practice: ; Role of organized Tenriism [] important [] some [] unable to assess  Coping: [] well [] with difficulty [] poor coping [x] unable to assess  Support system: [] strong [x] adequate [] inadequate    ADVANCE DIRECTIVES:    [x]MOLST  []Living Will  DECISION MAKER(s):  [] Health Care Proxy(s)  [x] Surrogate(s)  [] Guardian           Name(s)/Phone Number(s): Segundo Funes, [phone]    BASELINE (I)ADLs (prior to admission):  [place]: []Total  [x] Moderate []Dependent    ALLERGIES:  Ceclor (Rash)  IV Contrast (Unknown)  Levaquin (Swelling)  Augmentin (Short breath; Rash)    MEDICATIONS  (STANDING):  albuterol/ipratropium for Nebulization 3 milliLiter(s) Nebulizer every 6 hours  atorvastatin 40 milliGRAM(s) Oral at bedtime  chlorhexidine 2% Cloths 1 Application(s) Topical <User Schedule>  chlorhexidine 4% Liquid 1 Application(s) Topical <User Schedule>  dexMEDEtomidine Infusion 0.2 MICROgram(s)/kG/Hr (1.63 mL/Hr) IV Continuous <Continuous>  dextrose 10%. 1000 milliLiter(s) (120 mL/Hr) IV Continuous <Continuous>  melatonin 5 milliGRAM(s) Oral at bedtime  montelukast 10 milliGRAM(s) Oral daily  multivitamin  Chewable 1 Tablet(s) Oral daily  piperacillin/tazobactam IVPB. 4.5 Gram(s) IV Intermittent once  piperacillin/tazobactam IVPB.- 4.5 Gram(s) IV Intermittent once  piperacillin/tazobactam IVPB.. 4.5 Gram(s) IV Intermittent every 8 hours  sodium chloride 7% Inhalation 4 milliLiter(s) Inhalation every 6 hours  thiamine 100 milliGRAM(s) Oral daily  vancomycin  IVPB 500 milliGRAM(s) IV Intermittent every 24 hours    MEDICATIONS  (PRN):  sodium chloride 0.9% lock flush 10 milliLiter(s) IV Push every 1 hour PRN Pre/post blood products, medications, blood draw, and to maintain line patency    Analgesic Use (Scheduled and PRNs) for past 24 hours:  haloperidol    Injectable   2 milliGRAM(s) IntraMuscular (09-22-23 @ 00:16)  OLANZapine Injectable   7.5 milliGRAM(s) IntraMuscular (09-21-23 @ 20:56)  ondansetron Injectable   4 milliGRAM(s) IV Push (09-22-23 @ 02:33)    PRESENT SYMPTOMS: [x]Unable to obtain due to poor mentation/encephalopathy  Source if other than patient:  [x]Family   []Team     Pain: [] yes [x] no  QOL impact -   Location -                    Aggravating Factors -  Quality -  Radiation -  Timing -  Severity (0-10 scale) -   Minimal Acceptable Level (0-10 scale) -    CPOT Score: 1  (Critical Care Pain Assessment)    PAIN AD Score: 3  (Nonverbal Pain Assessment)    Dyspnea:                           []Mild  []Moderate []Severe  Anxiety:                             []Mild [x]Moderate []Severe  Fatigue:                             []Mild []Moderate []Severe  Nausea:                             []Mild []Moderate []Severe  Loss of Appetite:              []Mild []Moderate []Severe  Constipation:                    []Mild []Moderate []Severe    Other Symptoms:  [x]All other review of systems negative     Palliative Performance Status Version 2:  40%  (Functional Assessment Tool)    GENERAL:  [] NAD []Alert [x]Lethargic  []Cachexia  []Unarousable  [x]Minimally Verbal  []Non-Verbal  BEHAVIORAL:   []Anxiety  [x]Delirium [x]Agitation []Cooperative []Oriented x  HEENT:  [x]Normal  [x] Moist Mucous Membranes []Dry mouth   []ET Tube/Trach  []Oral lesions  PULMONARY:   [x]Clear []Tachypnea  []Audible excessive secretions  [x]Normal Work of Breathing []Labored Breathing  []Rhonchi []Crackles []Wheezing  CARDIOVASCULAR:    [x]Regular Rate [x]Regular Rhythm []Irregular []Tachy  []Hemanth  GASTROINTESTINAL:  [x]Soft  []Distended   []+BS  [x]Non tender []Tender  []PEG []OGT/ NGT  Last BM:  GENITOURINARY:  []Normal [x] Incontinent   []Oliguria/Anuria   []Richards  MUSCULOSKELETAL:   []Normal Extremities  [x]Weakness  [x]Bed/Wheelchair bound []Edema  NEUROLOGIC:   []No focal deficits  []Cognitive impairment  [x]Dysphagia []Dysarthria []Paresis [x]Encephalopathic  SKIN:   [x]Normal   []Pressure ulcer(s)  []Rash    Vital Signs Last 24 Hrs  T(C): 36.5 (22 Sep 2023 10:05), Max: 36.8 (21 Sep 2023 14:36)  T(F): 97.7 (22 Sep 2023 10:05), Max: 98.2 (21 Sep 2023 14:36)  HR: 90 (22 Sep 2023 11:00) (58 - 145)  BP: 140/98 (22 Sep 2023 11:00) (91/48 - 185/97)  BP(mean): 114 (22 Sep 2023 11:00) (66 - 133)  RR: 18 (22 Sep 2023 11:00) (10 - 73)  SpO2: 100% (22 Sep 2023 11:00) (87% - 100%)    Parameters below as of 22 Sep 2023 11:00  Patient On (Oxygen Delivery Method): nasal cannula  O2 Flow (L/min): 2     LABS: Personally reviewed and interpreted                      11.3   12.98 )-----------( 196      ( 22 Sep 2023 04:54 )             33.2   09-22    132<L>  |  98  |  12  ----------------------------<  174<H>  3.9   |  27  |  0.57    Ca    9.0      22 Sep 2023 12:26  Phos  3.8     09-22  Mg     1.7     09-22  TPro  6.9  /  Alb  3.4  /  TBili  0.7  /  DBili  x   /  AST  26  /  ALT  28  /  AlkPhos  114  09-22    RADIOLOGY & ADDITIONAL STUDIES: Personally reviewed and interpreted  < from: MR Head No Cont (09.20.23 @ 21:52) >  There are T2 prolongation signal abnormalities in the periventricular subcortical white matter likely related to severe chronic microvascular ischemic changes. There is no mass effect or midline shift. There is no hydrocephalus. Subcentimeter acute/subacute infarct noted in the left occipital lobe. Tiny chronic infarcts bilateral cerebellum noted. Few scatteredgradient susceptibility affects noted most compatible with chronic microhemorrhages. There is additional gradient susceptibility affects in the right parieto-occipital region with sulcal high T2 FLAIR signal. This is compatible with known small subarachnoid hemorrhage seen on the prior CT exam.  IMPRESSION:  Stable small subarachnoid hemorrhage in the right parietal-occipital region given differences in technique.  Subcentimeter acute/subacute infarct in the left occipital lobe.    REFERRALS:  [x]Social Work/Case management [x]PT/OT []Chaplaincy  []Hospice  []Patient/Family Support []Massage Therapy []Music Therapy []Holistic RN []Ethics    DISCUSSION OF CASE: Daughter - to provide updates and emotional support; Primary Team/RN - to discuss plan of care

## 2023-09-22 NOTE — CONSULT NOTE ADULT - PROBLEM SELECTOR RECOMMENDATION 5
.  Patient is DNR/DNI, MOLST in chart  -family reaffirms DNR/DNI, accepting of trial of current level of care to see if patient will recover  -if patient is unable to recover neurologically in a few days, daughter will be open to discussing transition to symptom-directed care  -depending on clinical context, daughter may be amenable to trial of NGT but would not want long-term feeding tube if indicated  -will follow-up on Monday for further discussion pending clinical course    In addition to the E/M visit, an advance care planning meeting was performed. Start time: 2:00PM; End time: 2:16PM; Total time: 16min. A face to face meeting to discuss advance care planning was held today regarding: SAIDA BATES. Primary decision maker: Patient is unable to participate in decision making; Alternate/surrogate: Daughter. Discussed advance directives including, but not limited to, healthcare proxy and code status. Decision regarding code status: DNR/DNI; Documentation completed today: [facility] note

## 2023-09-22 NOTE — CONSULT NOTE ADULT - PROBLEM SELECTOR RECOMMENDATION 6
.  Complex medical decision making / symptom management in the setting of critical illness.    Will continue to follow for ongoing GOC discussion / titration of palliative regimen. Emotional support provided, questions answered.  Active Psychosocial Referrals:  [x]Social Work/Case management []PT/OT [x]Chaplaincy []Hospice  []Patient/Family Support []Holistic RN []Massage Therapy []Music Therapy []Ethics  Coping: [] well [] with difficulty [] poor coping [x] unable to assess  Support system: [] strong [x] adequate [] inadequate    For new or uncontrolled symptoms, please call Palliative Care at [phone]). The service is available 24/7 (including nights & weekends) to provide symptom management recommendations over the phone as appropriate

## 2023-09-22 NOTE — CONSULT NOTE ADULT - ATTENDING COMMENTS
Pt seen on rounds this afternoon.  82-yo woman with a history of HTN, AF (not on Eliquis) and bronchiectasis who was admitted for altered MS and visual disturbances (mostly diplopia).  CTH negative for obvious infarct.  Was continuing to undergo work-up when developed acute hypoglycemia late yesterday afternoon.  Was given D50, but had recurrence of hypoglycemia an hour later in association with acute hypoxemia, tachypnea and hypothermia.  Was started on D10W, now up to 120 cc/hr with glucoses in the mid-100 range.    Pt is somnolent, not arousable to verbal stimuli, and cannot give a history  Daughter was at the bedside and was also apparently in the room at the time of the initial hypoglycemia.  Described what appeared to her to be seizure-like activity in terms of the pt's extremities "shaking," but also describes pt's teeth chattering.  Unfortunately, no venous blood for glucose, insulin or C-peptide levels were drawn before dextrose was given  --Although the daughter's observation of the pt's "shaking" might have indicated seizure activity (certainly possible from hypoglycemia or her CNS abnormality), the teeth chattering strongly suggests that she was having rigors.  Given this, plus the acute hypoxemia and tachypnea noted with the recurrence of hypoglycemia after the initial dextrose was given, strongly suggest that the hypoglycemia was due to sepsis--either from the PICC line (which was then removed) or from PNA (given her bronchiectasis).  It does not appear that blood cultures were drawn.  The hypothermia would also be consistent with sepsis, but could also have been caused by the hypoglycemia.    --At this point, would continue the D10 but begin to taper.  Also, given the serum Na of 131 need to change from plain D10W to D10NS.  Would decrease to 90 cc/hr now, then taper by 10-20 cc/hr every 4 hours as long as fingersticks are above 85-90 mg%  --There are no diagnostic studies relevant to the hypoglycemia which would be useful at this point
81 yo F with h/o bronchiectasis, severe obstructive lung disease, admitted for ams undergoing cva work up. she is on cefepime as initiated by Dr. Foster outpatient for pseudomonas and recent bronchiectasis along with inhaled tobramycin. would c/w cefepime, however if unknown cause of ams and delirium may reconsider alternative antibiotic after discussion with ID. she needs hypertonic saline, duonebs and chest PT Q8H. would send sputum cultures for bacterial, afb and fungal.

## 2023-09-22 NOTE — PROGRESS NOTE ADULT - SUBJECTIVE AND OBJECTIVE BOX
HISTORY OF PRESENT ILLNESS  SAIDA BATES is a 82y Female with a past medical history of     Endocrinology has been consulted for Hypoglycemia.        FAMILY HISTORY  - Diabetes:  - Thyroid:  - Autoimmune:  - Other:    SOCIAL HISTORY  - Work:  - Alcohol:  - Smoking:  - Recreational Drugs:    ALLERGIES  Ceclor (Rash)  IV Contrast (Unknown)  Levaquin (Swelling)  Augmentin (Short breath; Rash)      CURRENT MEDICATIONS  albuterol/ipratropium for Nebulization 3 milliLiter(s) Nebulizer every 6 hours  atorvastatin 40 milliGRAM(s) Oral at bedtime  cefepime   IVPB 2000 milliGRAM(s) IV Intermittent every 12 hours  chlorhexidine 2% Cloths 1 Application(s) Topical <User Schedule>  chlorhexidine 4% Liquid 1 Application(s) Topical <User Schedule>  dexMEDEtomidine Infusion 0.2 MICROgram(s)/kG/Hr IV Continuous <Continuous>  dextrose 10%. 1000 milliLiter(s) IV Continuous <Continuous>  melatonin 5 milliGRAM(s) Oral at bedtime  montelukast 10 milliGRAM(s) Oral daily  multivitamin  Chewable 1 Tablet(s) Oral daily  sodium chloride 0.9% lock flush 10 milliLiter(s) IV Push every 1 hour PRN  sodium chloride 7% Inhalation 4 milliLiter(s) Inhalation every 6 hours  thiamine 100 milliGRAM(s) Oral daily      REVIEW OF SYSTEMS  Constitutional:  Negative fever, chills or loss of appetite.  Eyes:  Negative blurry vision or double vision.  Cardiovascular:  Negative for chest pain or palpitations.  Respiratory:  Negative for cough, wheezing, or shortness of breath.   Gastrointestinal:  Negative for nausea, vomiting, diarrhea, constipation, or abdominal pain.  Genitourinary:  Negative frequency, urgency or dysuria.  Neurologic:  No headache, confusion, dizziness, lightheadedness.    PHYSICAL EXAM  Vital Signs Last 24 Hrs  T(C): 36.5 (22 Sep 2023 10:05), Max: 36.8 (21 Sep 2023 14:36)  T(F): 97.7 (22 Sep 2023 10:05), Max: 98.2 (21 Sep 2023 14:36)  HR: 93 (22 Sep 2023 10:00) (58 - 145)  BP: 153/73 (22 Sep 2023 10:00) (91/48 - 185/97)  BP(mean): 105 (22 Sep 2023 10:00) (66 - 133)  RR: 10 (22 Sep 2023 10:00) (10 - 73)  SpO2: 100% (22 Sep 2023 10:00) (87% - 100%)    Parameters below as of 22 Sep 2023 10:00  Patient On (Oxygen Delivery Method): nasal cannula  O2 Flow (L/min): 2  Constitutional: Awake, alert, in no acute distress.   HEENT: Normocephalic, atraumatic, SANDRA, no proptosis or lid retraction.   Neck: supple, no acanthosis, no thyromegaly or palpable thyroid nodules.  Respiratory: Lungs clear to ausculation bilaterally.   Cardiovascular: regular rhythm, normal S1 and S2, no audible murmurs.   GI: soft, non-tender, non-distended, bowel sounds present, no masses appreciated.  Extremities: No lower extremity edema, peripheral pulses present.   Skin: no rashes.   Psychiatric: AAO x 3. Normal affect/mood.     LABS  CBC - WBC/HGB/HTC/PLT: 12.98/11.3/33.2/196 (09-22-23)  BMP: Na/K/Cl/Bicarb/BUN/Cr/Gluc: 134/3.9/101/25/17/0.59/144 (09-22-23)  Anion Gap: 8 (09-22-23)  eGFR: 91 (09-22-23)  Calcium: 8.9 (09-22-23)  Phosphorus: 3.8 (09-22-23)  Magnesium: 1.7 (09-22-23)  LFT - Alb/Tprot/Tbili/Dbili/AlkPhos/ALT/AST: 3.4/--/0.7/--/114/28/26 (09-22-23)  PT/aPTT/INR: 11.7/31.3/1.03 (09-21-23)  Thyroid Stimulating Hormone, Serum: 3.870 (09-20-23)  Total T4/Free T4: 6.47/0.856 (09-21-23)  CBC - WBC/HGB/HTC/PLT: 12.98/11.3/33.2/196 (09-22-23)BMP: Na/K/Cl/Bicarb/BUN/Cr/Gluc: 134/3.9/101/25/17/0.59/144 (09-22-23)  Anion Gap: 8 (09-22-23)  eGFR: 91 (09-22-23)  Calcium: 8.9 (09-22-23)  Phosphorus: 3.8 (09-22-23)  Magnesium: 1.7 (09-22-23)    CAPILLARY BLOOD GLUCOSE & INSULIN RECEIVED  32 mg/dL (09-21 @ 13:50)  33 mg/dL (09-21 @ 13:53)  187 mg/dL (09-21 @ 14:16)  27 mg/dL (09-21 @ 15:52)  401 mg/dL (09-21 @ 16:01)  391 mg/dL (09-21 @ 16:04)  258 mg/dL (09-21 @ 16:27)  229 mg/dL (09-21 @ 16:39)  140 mg/dL (09-21 @ 17:01)  92 mg/dL (09-21 @ 17:23)  35 mg/dL (09-21 @ 18:04)  267 mg/dL (09-21 @ 18:20)  127 mg/dL (09-21 @ 19:08)  82 mg/dL (09-21 @ 19:58)  53 mg/dL (09-21 @ 20:37)  176 mg/dL (09-21 @ 21:07)  132 mg/dL (09-21 @ 21:42)  118 mg/dL (09-21 @ 22:29)  54 mg/dL (09-21 @ 23:20)  132 mg/dL (09-21 @ 23:42)  408 mg/dL (09-22 @ 00:09)  158 mg/dL (09-22 @ 01:04)  138 mg/dL (09-22 @ 01:33)  145 mg/dL (09-22 @ 02:29)  122 mg/dL (09-22 @ 06:16)  153 mg/dL (09-22 @ 07:58)  165 mg/dL (09-22 @ 09:40)        09-21-23 @ 07:01  -  09-22-23 @ 07:00  --------------------------------------------------------  IN: 1880 mL / OUT: 150 mL / NET: 1730 mL    09-22-23 @ 07:01  -  09-22-23 @ 10:14  --------------------------------------------------------  IN: 120 mL / OUT: 0 mL / NET: 120 mL        ASSESSMENT / RECOMMENDATIONS    A1C: 5.5 %  BUN: 17  Creatinine: 0.59  GFR: 91  Weight: 32.5  BMI: 11.9  EF:     # Hypoglycemia    - Continue lispro *** dose sliding scale before meals and at bedtime.  - Patient's fingerstick glucose goal is 100-180 mg/dL.    - Discharge recommendations to be discussed.   - Patient can follow up at discharge with [facility] by calling [phone] to make an appointment.      Case discussed with Dr. Hill. Primary team updated.       Michell Borja  Endocrinology Fellow    Service Pager: [phone]

## 2023-09-22 NOTE — PROGRESS NOTE ADULT - NS ATTEND AMEND GEN_ALL_CORE FT
The patient is an 82 year old female with HTN, A fib (not on A/C due to frequent falls), bronchiectasis (on abx for recent exacerbation, IV cefepime), and outside diagnosis of PRES in 05/2023 admitted for evaluation after a several day history of worsened confusion and visual complaints (details vague/unclear). Repeat HCT/MRI with small R occipital SAH and small L ischemic stroke. Since admission, she has worsened neurologically in terms of her mental status which be in part related to ongoing medical issues (?infection, hypoglycemia, hypothermia).    Etiology of SAH/ischemic stroke and overall neurological presentation is unclear. Initial ? DLB + acute decompensation from imaging findings and hospitalization. Seizures in s/o imaging findings possible. In addition, endocarditis with active systemic infection may also be a cause of imaging findings/presentation. Cannot r/o less common causes like vasculitis/encephalitis or PRES though initial MRI is entirely classic for this.      From neuro perspective, will need more objective data to better understand presentation/etiology of imaging findings. Rec VANNESSA, repeat MRI w contrast and MRA/V (will need anesthesia), prolonged EEG, and +/- LP may be warranted pending clinical course. Can consider PET brain when able.  Continue hold a/c given SAH; can consider starting ASA.  Appreciate MICU assistance with complex medical issues.

## 2023-09-22 NOTE — CONSULT NOTE ADULT - PROBLEM SELECTOR RECOMMENDATION 9
.  Unclear etiology: CVA, ICU Delirium, PRES, etc  -frequent reorientation, minimize unnecessary interventions/interruptions  -Precedex for agitaiton

## 2023-09-22 NOTE — PROGRESS NOTE ADULT - SUBJECTIVE AND OBJECTIVE BOX
Neurology Stroke Progress Note    INTERVAL HPI/OVERNIGHT EVENTS:  Patient seen and examined. Required zyprexa overnight for agitation.     MEDICATIONS  (STANDING):  albuterol/ipratropium for Nebulization 3 milliLiter(s) Nebulizer every 6 hours  atorvastatin 40 milliGRAM(s) Oral at bedtime  chlorhexidine 2% Cloths 1 Application(s) Topical <User Schedule>  chlorhexidine 4% Liquid 1 Application(s) Topical <User Schedule>  dexMEDEtomidine Infusion 0.2 MICROgram(s)/kG/Hr (1.63 mL/Hr) IV Continuous <Continuous>  dextrose 10%. 1000 milliLiter(s) (120 mL/Hr) IV Continuous <Continuous>  melatonin 5 milliGRAM(s) Oral at bedtime  montelukast 10 milliGRAM(s) Oral daily  multivitamin  Chewable 1 Tablet(s) Oral daily  piperacillin/tazobactam IVPB. 4.5 Gram(s) IV Intermittent once  piperacillin/tazobactam IVPB.- 4.5 Gram(s) IV Intermittent once  piperacillin/tazobactam IVPB.. 4.5 Gram(s) IV Intermittent every 8 hours  sodium chloride 7% Inhalation 4 milliLiter(s) Inhalation every 6 hours  thiamine 100 milliGRAM(s) Oral daily  vancomycin  IVPB 500 milliGRAM(s) IV Intermittent every 24 hours    MEDICATIONS  (PRN):  sodium chloride 0.9% lock flush 10 milliLiter(s) IV Push every 1 hour PRN Pre/post blood products, medications, blood draw, and to maintain line patency      Allergies    Ceclor (Rash)  IV Contrast (Unknown)  Levaquin (Swelling)  Augmentin (Short breath; Rash)    Intolerances        Vital Signs Last 24 Hrs  T(C): 36.5 (22 Sep 2023 10:05), Max: 36.8 (21 Sep 2023 14:36)  T(F): 97.7 (22 Sep 2023 10:05), Max: 98.2 (21 Sep 2023 14:36)  HR: 90 (22 Sep 2023 11:00) (58 - 145)  BP: 140/98 (22 Sep 2023 11:00) (91/48 - 185/97)  BP(mean): 114 (22 Sep 2023 11:00) (66 - 133)  RR: 18 (22 Sep 2023 11:00) (10 - 73)  SpO2: 100% (22 Sep 2023 11:00) (87% - 100%)    Parameters below as of 22 Sep 2023 11:00  Patient On (Oxygen Delivery Method): nasal cannula  O2 Flow (L/min): 2      General: Sedated, restless while laying in bed   Eyes: Anicteric sclerae, moist conjunctivae, see below for CNs  Extremities: No edema    Neurologic:  Mental status: Sedated but able to occasionally say "what" and will shake head no when asked if she is in pain. Cannot follow commands at this time  -Cranial nerves:   II: Difficult to test given AMS and inability to follow commands  III, IV, VI: Pupils equally round and reactive to light  VII: Face appears symmetrical  XII: Tongue protrudes midline  Motor: Bilateral upper extremities at least 3/5, bilateral lower extremities at least 2/5   Sensation: grossly intact     LABS:                        11.3   12.98 )-----------( 196      ( 22 Sep 2023 04:54 )             33.2     09-22    134<L>  |  101  |  17  ----------------------------<  144<H>  3.9   |  25  |  0.59    Ca    8.9      22 Sep 2023 04:54  Phos  3.8     09-22  Mg     1.7     09-22    TPro  6.9  /  Alb  3.4  /  TBili  0.7  /  DBili  x   /  AST  26  /  ALT  28  /  AlkPhos  114  09-22    PT/INR - ( 21 Sep 2023 16:56 )   PT: 11.7 sec;   INR: 1.03          PTT - ( 21 Sep 2023 16:56 )  PTT:31.3 sec  Urinalysis Basic - ( 22 Sep 2023 04:54 )    Color: x / Appearance: x / SG: x / pH: x  Gluc: 144 mg/dL / Ketone: x  / Bili: x / Urobili: x   Blood: x / Protein: x / Nitrite: x   Leuk Esterase: x / RBC: x / WBC x   Sq Epi: x / Non Sq Epi: x / Bacteria: x        RADIOLOGY & ADDITIONAL TESTS:

## 2023-09-22 NOTE — PROGRESS NOTE ADULT - SUBJECTIVE AND OBJECTIVE BOX
Patient is a 82y old  Female who presents with a chief complaint of diplopia (22 Sep 2023 13:10)      INTERVAL HPI/OVERNIGHT EVENTS:   Overnight pt mentation did not improve. Discontinuing cefepime in [facility]     ICU Vital Signs Last 24 Hrs  T(C): 36.5 (22 Sep 2023 14:15), Max: 36.8 (21 Sep 2023 14:36)  T(F): 97.7 (22 Sep 2023 14:15), Max: 98.2 (21 Sep 2023 14:36)  HR: 90 (22 Sep 2023 11:00) (58 - 145)  BP: 140/98 (22 Sep 2023 11:00) (91/48 - 185/97)  BP(mean): 114 (22 Sep 2023 11:00) (66 - 133)  ABP: --  ABP(mean): --  RR: 18 (22 Sep 2023 11:00) (10 - 73)  SpO2: 100% (22 Sep 2023 11:00) (87% - 100%)    O2 Parameters below as of 22 Sep 2023 11:00  Patient On (Oxygen Delivery Method): nasal cannula  O2 Flow (L/min): 2        I&O's Summary    21 Sep 2023 07:01  -  22 Sep 2023 07:00  --------------------------------------------------------  IN: 1880 mL / OUT: 150 mL / NET: 1730 mL    22 Sep 2023 07:01  -  22 Sep 2023 14:25  --------------------------------------------------------  IN: 480 mL / OUT: 0 mL / NET: 480 mL          LABS:                        11.3   12.98 )-----------( 196      ( 22 Sep 2023 04:54 )             33.2     09-22    132<L>  |  98  |  12  ----------------------------<  174<H>  3.9   |  27  |  0.57    Ca    9.0      22 Sep 2023 12:26  Phos  3.8     09-22  Mg     1.7     09-22    TPro  6.9  /  Alb  3.4  /  TBili  0.7  /  DBili  x   /  AST  26  /  ALT  28  /  AlkPhos  114  09-22    PT/INR - ( 21 Sep 2023 16:56 )   PT: 11.7 sec;   INR: 1.03     PTT - ( 21 Sep 2023 16:56 )  PTT:31.3 sec    Urinalysis Basic - ( 22 Sep 2023 12:26 )  Color: x / Appearance: x / SG: x / pH: x  Gluc: 174 mg/dL / Ketone: x  / Bili: x / Urobili: x   Blood: x / Protein: x / Nitrite: x   Leuk Esterase: x / RBC: x / WBC x   Sq Epi: x / Non Sq Epi: x / Bacteria: x      CAPILLARY BLOOD GLUCOSE  POCT Blood Glucose.: 165 mg/dL (22 Sep 2023 09:40)  POCT Blood Glucose.: 153 mg/dL (22 Sep 2023 07:58)  POCT Blood Glucose.: 122 mg/dL (22 Sep 2023 06:16)  POCT Blood Glucose.: 145 mg/dL (22 Sep 2023 02:29)  POCT Blood Glucose.: 138 mg/dL (22 Sep 2023 01:33)  POCT Blood Glucose.: 158 mg/dL (22 Sep 2023 01:04)  POCT Blood Glucose.: 408 mg/dL (22 Sep 2023 00:09)  POCT Blood Glucose.: 132 mg/dL (21 Sep 2023 23:42)  POCT Blood Glucose.: 54 mg/dL (21 Sep 2023 23:20)  POCT Blood Glucose.: 118 mg/dL (21 Sep 2023 22:29)  POCT Blood Glucose.: 132 mg/dL (21 Sep 2023 21:42)  POCT Blood Glucose.: 176 mg/dL (21 Sep 2023 21:07)  POCT Blood Glucose.: 53 mg/dL (21 Sep 2023 20:37)  POCT Blood Glucose.: 82 mg/dL (21 Sep 2023 19:58)  POCT Blood Glucose.: 127 mg/dL (21 Sep 2023 19:08)  POCT Blood Glucose.: 267 mg/dL (21 Sep 2023 18:20)  POCT Blood Glucose.: 35 mg/dL (21 Sep 2023 18:04)  POCT Blood Glucose.: 92 mg/dL (21 Sep 2023 17:23)  POCT Blood Glucose.: 140 mg/dL (21 Sep 2023 17:01)  POCT Blood Glucose.: 229 mg/dL (21 Sep 2023 16:39)  POCT Blood Glucose.: 258 mg/dL (21 Sep 2023 16:27)  POCT Blood Glucose.: 391 mg/dL (21 Sep 2023 16:04)  POCT Blood Glucose.: 401 mg/dL (21 Sep 2023 16:01)  POCT Blood Glucose.: 27 mg/dL (21 Sep 2023 15:52)    ABG - ( 20 Sep 2023 18:12 )  pH, Arterial: 7.41  pH, Blood: x     /  pCO2: 48    /  pO2: 89    / HCO3: 30    / Base Excess: 4.8   /  SaO2: 98.0                RADIOLOGY & ADDITIONAL TESTS:  Consultant(s) Notes Reviewed:  [x ] YES  [ ] NO    MEDICATIONS  (STANDING):  albuterol/ipratropium for Nebulization 3 milliLiter(s) Nebulizer every 6 hours  atorvastatin 40 milliGRAM(s) Oral at bedtime  chlorhexidine 2% Cloths 1 Application(s) Topical <User Schedule>  chlorhexidine 4% Liquid 1 Application(s) Topical <User Schedule>  dexMEDEtomidine Infusion 0.2 MICROgram(s)/kG/Hr (1.63 mL/Hr) IV Continuous <Continuous>  dextrose 10%. 1000 milliLiter(s) (120 mL/Hr) IV Continuous <Continuous>  melatonin 5 milliGRAM(s) Oral at bedtime  montelukast 10 milliGRAM(s) Oral daily  multivitamin  Chewable 1 Tablet(s) Oral daily  piperacillin/tazobactam IVPB. 4.5 Gram(s) IV Intermittent once  piperacillin/tazobactam IVPB.- 4.5 Gram(s) IV Intermittent once  piperacillin/tazobactam IVPB.. 4.5 Gram(s) IV Intermittent every 8 hours  sodium chloride 7% Inhalation 4 milliLiter(s) Inhalation every 6 hours  thiamine 100 milliGRAM(s) Oral daily  vancomycin  IVPB 500 milliGRAM(s) IV Intermittent every 24 hours    MEDICATIONS  (PRN):  sodium chloride 0.9% lock flush 10 milliLiter(s) IV Push every 1 hour PRN Pre/post blood products, medications, blood draw, and to maintain line patency      PHYSICAL EXAM:  GENERAL:   HEAD:  Atraumatic, Normocephalic  EYES: EOMI, PERRLA, conjunctiva and sclera clear  NECK: Supple, No JVD, Normal thyroid, no enlarged nodes  NERVOUS SYSTEM:  Alert & Awake.   CHEST/LUNG: B/L good air entry; No rales, rhonchi, or wheezing  HEART: S1S2 normal, no S3, Regular rate and rhythm; No murmurs  ABDOMEN: Soft, Nontender, Nondistended; Bowel sounds present  EXTREMITIES:  2+ Peripheral Pulses, No clubbing, cyanosis, or edema  LYMPH: No lymphadenopathy noted  SKIN: No rashes or lesions    Care Discussed with Consultants/Other Providers [ x] YES  [ ] NO Patient is a 82y old  Female who presents with a chief complaint of diplopia (22 Sep 2023 13:10)      INTERVAL HPI/OVERNIGHT EVENTS:   Overnight pt mentation did not improve. Discontinuing cefepime in setting of AMS, also per pulm note is not most sensitive antibiotic.     ICU Vital Signs Last 24 Hrs  T(C): 36.5 (22 Sep 2023 14:15), Max: 36.8 (21 Sep 2023 14:36)  T(F): 97.7 (22 Sep 2023 14:15), Max: 98.2 (21 Sep 2023 14:36)  HR: 90 (22 Sep 2023 11:00) (58 - 145)  BP: 140/98 (22 Sep 2023 11:00) (91/48 - 185/97)  BP(mean): 114 (22 Sep 2023 11:00) (66 - 133)    RR: 18 (22 Sep 2023 11:00) (10 - 73)  SpO2: 100% (22 Sep 2023 11:00) (87% - 100%)    O2 Parameters below as of 22 Sep 2023 11:00  Patient On (Oxygen Delivery Method): nasal cannula  O2 Flow (L/min): 2      I&O's Summary    21 Sep 2023 07:01  -  22 Sep 2023 07:00  --------------------------------------------------------  IN: 1880 mL / OUT: 150 mL / NET: 1730 mL    22 Sep 2023 07:01  -  22 Sep 2023 14:25  --------------------------------------------------------  IN: 480 mL / OUT: 0 mL / NET: 480 mL        LABS:                        11.3   12.98 )-----------( 196      ( 22 Sep 2023 04:54 )             33.2     09-22    132<L>  |  98  |  12  ----------------------------<  174<H>  3.9   |  27  |  0.57    Ca    9.0      22 Sep 2023 12:26  Phos  3.8     09-22  Mg     1.7     09-22    TPro  6.9  /  Alb  3.4  /  TBili  0.7  /  DBili  x   /  AST  26  /  ALT  28  /  AlkPhos  114  09-22    PT/INR - ( 21 Sep 2023 16:56 )   PT: 11.7 sec;   INR: 1.03     PTT - ( 21 Sep 2023 16:56 )  PTT:31.3 sec    Urinalysis Basic - ( 22 Sep 2023 12:26 )  Color: x / Appearance: x / SG: x / pH: x  Gluc: 174 mg/dL / Ketone: x  / Bili: x / Urobili: x   Blood: x / Protein: x / Nitrite: x   Leuk Esterase: x / RBC: x / WBC x   Sq Epi: x / Non Sq Epi: x / Bacteria: x      CAPILLARY BLOOD GLUCOSE  POCT Blood Glucose.: 165 mg/dL (22 Sep 2023 09:40)  POCT Blood Glucose.: 153 mg/dL (22 Sep 2023 07:58)  POCT Blood Glucose.: 122 mg/dL (22 Sep 2023 06:16)  POCT Blood Glucose.: 145 mg/dL (22 Sep 2023 02:29)  POCT Blood Glucose.: 138 mg/dL (22 Sep 2023 01:33)  POCT Blood Glucose.: 158 mg/dL (22 Sep 2023 01:04)  POCT Blood Glucose.: 408 mg/dL (22 Sep 2023 00:09)  POCT Blood Glucose.: 132 mg/dL (21 Sep 2023 23:42)  POCT Blood Glucose.: 54 mg/dL (21 Sep 2023 23:20)  POCT Blood Glucose.: 118 mg/dL (21 Sep 2023 22:29)  POCT Blood Glucose.: 132 mg/dL (21 Sep 2023 21:42)  POCT Blood Glucose.: 176 mg/dL (21 Sep 2023 21:07)  POCT Blood Glucose.: 53 mg/dL (21 Sep 2023 20:37)  POCT Blood Glucose.: 82 mg/dL (21 Sep 2023 19:58)  POCT Blood Glucose.: 127 mg/dL (21 Sep 2023 19:08)  POCT Blood Glucose.: 267 mg/dL (21 Sep 2023 18:20)  POCT Blood Glucose.: 35 mg/dL (21 Sep 2023 18:04)  POCT Blood Glucose.: 92 mg/dL (21 Sep 2023 17:23)  POCT Blood Glucose.: 140 mg/dL (21 Sep 2023 17:01)  POCT Blood Glucose.: 229 mg/dL (21 Sep 2023 16:39)  POCT Blood Glucose.: 258 mg/dL (21 Sep 2023 16:27)  POCT Blood Glucose.: 391 mg/dL (21 Sep 2023 16:04)  POCT Blood Glucose.: 401 mg/dL (21 Sep 2023 16:01)  POCT Blood Glucose.: 27 mg/dL (21 Sep 2023 15:52)    ABG - ( 20 Sep 2023 18:12 )  pH, Arterial: 7.41  pH, Blood: x     /  pCO2: 48    /  pO2: 89    / HCO3: 30    / Base Excess: 4.8   /  SaO2: 98.0          RADIOLOGY & ADDITIONAL TESTS:  Consultant(s) Notes Reviewed:  [x ] YES  [ ] NO    MEDICATIONS  (STANDING):  albuterol/ipratropium for Nebulization 3 milliLiter(s) Nebulizer every 6 hours  atorvastatin 40 milliGRAM(s) Oral at bedtime  chlorhexidine 2% Cloths 1 Application(s) Topical <User Schedule>  chlorhexidine 4% Liquid 1 Application(s) Topical <User Schedule>  dexMEDEtomidine Infusion 0.2 MICROgram(s)/kG/Hr (1.63 mL/Hr) IV Continuous <Continuous>  dextrose 10%. 1000 milliLiter(s) (120 mL/Hr) IV Continuous <Continuous>  melatonin 5 milliGRAM(s) Oral at bedtime  montelukast 10 milliGRAM(s) Oral daily  multivitamin  Chewable 1 Tablet(s) Oral daily  piperacillin/tazobactam IVPB. 4.5 Gram(s) IV Intermittent once  piperacillin/tazobactam IVPB.- 4.5 Gram(s) IV Intermittent once  piperacillin/tazobactam IVPB.. 4.5 Gram(s) IV Intermittent every 8 hours  sodium chloride 7% Inhalation 4 milliLiter(s) Inhalation every 6 hours  thiamine 100 milliGRAM(s) Oral daily  vancomycin  IVPB 500 milliGRAM(s) IV Intermittent every 24 hours    MEDICATIONS  (PRN):  sodium chloride 0.9% lock flush 10 milliLiter(s) IV Push every 1 hour PRN Pre/post blood products, medications, blood draw, and to maintain line patency      PHYSICAL EXAM:  exam limited by patient agitation and refusal to cooperate  GENERAL: cachetic, elderly woman, agitated and moving around  HEAD: normocephalic  EYES: eyes closed, would not allow examination of pupils  NERVOUS SYSTEM: A&Ox0  CHEST/LUNG: crackles in BL lungs, some rhonchi present  HEART: +S1/S2, holosystolic murmur present  ABDOMEN: unable to assess  EXTREMITIES: +1 dorsal pulses, cool LE, no edema      Care Discussed with Consultants/Other Providers [ x] YES  [ ] NO Patient is a 82y old  Female who presents with a chief complaint of diplopia (22 Sep 2023 13:10)      INTERVAL HPI/OVERNIGHT EVENTS:   Overnight pt mentation did not improve. Discontinuing cefepime in setting of AMS, also per pulm note is not most sensitive antibiotic. Patient recurrently hypoglycemic so     ICU Vital Signs Last 24 Hrs  T(C): 36.5 (22 Sep 2023 14:15), Max: 36.8 (21 Sep 2023 14:36)  T(F): 97.7 (22 Sep 2023 14:15), Max: 98.2 (21 Sep 2023 14:36)  HR: 90 (22 Sep 2023 11:00) (58 - 145)  BP: 140/98 (22 Sep 2023 11:00) (91/48 - 185/97)  BP(mean): 114 (22 Sep 2023 11:00) (66 - 133)    RR: 18 (22 Sep 2023 11:00) (10 - 73)  SpO2: 100% (22 Sep 2023 11:00) (87% - 100%)    O2 Parameters below as of 22 Sep 2023 11:00  Patient On (Oxygen Delivery Method): nasal cannula  O2 Flow (L/min): 2      I&O's Summary    21 Sep 2023 07:01  -  22 Sep 2023 07:00  --------------------------------------------------------  IN: 1880 mL / OUT: 150 mL / NET: 1730 mL    22 Sep 2023 07:01  -  22 Sep 2023 14:25  --------------------------------------------------------  IN: 480 mL / OUT: 0 mL / NET: 480 mL        LABS:                        11.3   12.98 )-----------( 196      ( 22 Sep 2023 04:54 )             33.2     09-22    132<L>  |  98  |  12  ----------------------------<  174<H>  3.9   |  27  |  0.57    Ca    9.0      22 Sep 2023 12:26  Phos  3.8     09-22  Mg     1.7     09-22    TPro  6.9  /  Alb  3.4  /  TBili  0.7  /  DBili  x   /  AST  26  /  ALT  28  /  AlkPhos  114  09-22    PT/INR - ( 21 Sep 2023 16:56 )   PT: 11.7 sec;   INR: 1.03     PTT - ( 21 Sep 2023 16:56 )  PTT:31.3 sec    Urinalysis Basic - ( 22 Sep 2023 12:26 )  Color: x / Appearance: x / SG: x / pH: x  Gluc: 174 mg/dL / Ketone: x  / Bili: x / Urobili: x   Blood: x / Protein: x / Nitrite: x   Leuk Esterase: x / RBC: x / WBC x   Sq Epi: x / Non Sq Epi: x / Bacteria: x      CAPILLARY BLOOD GLUCOSE  POCT Blood Glucose.: 165 mg/dL (22 Sep 2023 09:40)  POCT Blood Glucose.: 153 mg/dL (22 Sep 2023 07:58)  POCT Blood Glucose.: 122 mg/dL (22 Sep 2023 06:16)  POCT Blood Glucose.: 145 mg/dL (22 Sep 2023 02:29)  POCT Blood Glucose.: 138 mg/dL (22 Sep 2023 01:33)  POCT Blood Glucose.: 158 mg/dL (22 Sep 2023 01:04)  POCT Blood Glucose.: 408 mg/dL (22 Sep 2023 00:09)  POCT Blood Glucose.: 132 mg/dL (21 Sep 2023 23:42)  POCT Blood Glucose.: 54 mg/dL (21 Sep 2023 23:20)  POCT Blood Glucose.: 118 mg/dL (21 Sep 2023 22:29)  POCT Blood Glucose.: 132 mg/dL (21 Sep 2023 21:42)  POCT Blood Glucose.: 176 mg/dL (21 Sep 2023 21:07)  POCT Blood Glucose.: 53 mg/dL (21 Sep 2023 20:37)  POCT Blood Glucose.: 82 mg/dL (21 Sep 2023 19:58)  POCT Blood Glucose.: 127 mg/dL (21 Sep 2023 19:08)  POCT Blood Glucose.: 267 mg/dL (21 Sep 2023 18:20)  POCT Blood Glucose.: 35 mg/dL (21 Sep 2023 18:04)  POCT Blood Glucose.: 92 mg/dL (21 Sep 2023 17:23)  POCT Blood Glucose.: 140 mg/dL (21 Sep 2023 17:01)  POCT Blood Glucose.: 229 mg/dL (21 Sep 2023 16:39)  POCT Blood Glucose.: 258 mg/dL (21 Sep 2023 16:27)  POCT Blood Glucose.: 391 mg/dL (21 Sep 2023 16:04)  POCT Blood Glucose.: 401 mg/dL (21 Sep 2023 16:01)  POCT Blood Glucose.: 27 mg/dL (21 Sep 2023 15:52)    ABG - ( 20 Sep 2023 18:12 )  pH, Arterial: 7.41  pH, Blood: x     /  pCO2: 48    /  pO2: 89    / HCO3: 30    / Base Excess: 4.8   /  SaO2: 98.0          RADIOLOGY & ADDITIONAL TESTS:  Consultant(s) Notes Reviewed:  [x ] YES  [ ] NO    MEDICATIONS  (STANDING):  albuterol/ipratropium for Nebulization 3 milliLiter(s) Nebulizer every 6 hours  atorvastatin 40 milliGRAM(s) Oral at bedtime  chlorhexidine 2% Cloths 1 Application(s) Topical <User Schedule>  chlorhexidine 4% Liquid 1 Application(s) Topical <User Schedule>  dexMEDEtomidine Infusion 0.2 MICROgram(s)/kG/Hr (1.63 mL/Hr) IV Continuous <Continuous>  dextrose 10%. 1000 milliLiter(s) (120 mL/Hr) IV Continuous <Continuous>  melatonin 5 milliGRAM(s) Oral at bedtime  montelukast 10 milliGRAM(s) Oral daily  multivitamin  Chewable 1 Tablet(s) Oral daily  piperacillin/tazobactam IVPB. 4.5 Gram(s) IV Intermittent once  piperacillin/tazobactam IVPB.- 4.5 Gram(s) IV Intermittent once  piperacillin/tazobactam IVPB.. 4.5 Gram(s) IV Intermittent every 8 hours  sodium chloride 7% Inhalation 4 milliLiter(s) Inhalation every 6 hours  thiamine 100 milliGRAM(s) Oral daily  vancomycin  IVPB 500 milliGRAM(s) IV Intermittent every 24 hours    MEDICATIONS  (PRN):  sodium chloride 0.9% lock flush 10 milliLiter(s) IV Push every 1 hour PRN Pre/post blood products, medications, blood draw, and to maintain line patency      PHYSICAL EXAM:  exam limited by patient agitation and refusal to cooperate  GENERAL: cachetic, elderly woman, agitated and moving around  HEAD: normocephalic  EYES: eyes closed, would not allow examination of pupils  NERVOUS SYSTEM: A&Ox0  CHEST/LUNG: crackles in BL lungs, some rhonchi present  HEART: +S1/S2, holosystolic murmur present  ABDOMEN: unable to assess  EXTREMITIES: +1 dorsal pulses, cool LE, no edema      Care Discussed with Consultants/Other Providers [ x] YES  [ ] NO Patient is a 82y old  Female who presents with a chief complaint of diplopia (22 Sep 2023 13:10)      INTERVAL HPI/OVERNIGHT EVENTS:   Mentation did not improve. Discontinuing cefepime in setting of AMS, also per pulm note is not most sensitive antibiotic.   Patient recurrently hypoglycemic so D10 increased to 125cc/hr overnight. Patient also given steroid (hydrocortisone 40mg) and glucagon, after which sugars stabilized in 100s.     ICU Vital Signs Last 24 Hrs  T(C): 36.5 (22 Sep 2023 14:15), Max: 36.8 (21 Sep 2023 14:36)  T(F): 97.7 (22 Sep 2023 14:15), Max: 98.2 (21 Sep 2023 14:36)  HR: 90 (22 Sep 2023 11:00) (58 - 145)  BP: 140/98 (22 Sep 2023 11:00) (91/48 - 185/97)  BP(mean): 114 (22 Sep 2023 11:00) (66 - 133)    RR: 18 (22 Sep 2023 11:00) (10 - 73)  SpO2: 100% (22 Sep 2023 11:00) (87% - 100%)    O2 Parameters below as of 22 Sep 2023 11:00  Patient On (Oxygen Delivery Method): nasal cannula  O2 Flow (L/min): 2      I&O's Summary    21 Sep 2023 07:01  -  22 Sep 2023 07:00  --------------------------------------------------------  IN: 1880 mL / OUT: 150 mL / NET: 1730 mL    22 Sep 2023 07:01  -  22 Sep 2023 14:25  --------------------------------------------------------  IN: 480 mL / OUT: 0 mL / NET: 480 mL        LABS:                        11.3   12.98 )-----------( 196      ( 22 Sep 2023 04:54 )             33.2     09-22    132<L>  |  98  |  12  ----------------------------<  174<H>  3.9   |  27  |  0.57    Ca    9.0      22 Sep 2023 12:26  Phos  3.8     09-22  Mg     1.7     09-22    TPro  6.9  /  Alb  3.4  /  TBili  0.7  /  DBili  x   /  AST  26  /  ALT  28  /  AlkPhos  114  09-22    PT/INR - ( 21 Sep 2023 16:56 )   PT: 11.7 sec;   INR: 1.03     PTT - ( 21 Sep 2023 16:56 )  PTT:31.3 sec    Urinalysis Basic - ( 22 Sep 2023 12:26 )  Color: x / Appearance: x / SG: x / pH: x  Gluc: 174 mg/dL / Ketone: x  / Bili: x / Urobili: x   Blood: x / Protein: x / Nitrite: x   Leuk Esterase: x / RBC: x / WBC x   Sq Epi: x / Non Sq Epi: x / Bacteria: x      CAPILLARY BLOOD GLUCOSE  POCT Blood Glucose.: 165 mg/dL (22 Sep 2023 09:40)  POCT Blood Glucose.: 153 mg/dL (22 Sep 2023 07:58)  POCT Blood Glucose.: 122 mg/dL (22 Sep 2023 06:16)  POCT Blood Glucose.: 145 mg/dL (22 Sep 2023 02:29)  POCT Blood Glucose.: 138 mg/dL (22 Sep 2023 01:33)  POCT Blood Glucose.: 158 mg/dL (22 Sep 2023 01:04)  POCT Blood Glucose.: 408 mg/dL (22 Sep 2023 00:09)  POCT Blood Glucose.: 132 mg/dL (21 Sep 2023 23:42)  POCT Blood Glucose.: 54 mg/dL (21 Sep 2023 23:20)  POCT Blood Glucose.: 118 mg/dL (21 Sep 2023 22:29)  POCT Blood Glucose.: 132 mg/dL (21 Sep 2023 21:42)  POCT Blood Glucose.: 176 mg/dL (21 Sep 2023 21:07)  POCT Blood Glucose.: 53 mg/dL (21 Sep 2023 20:37)  POCT Blood Glucose.: 82 mg/dL (21 Sep 2023 19:58)  POCT Blood Glucose.: 127 mg/dL (21 Sep 2023 19:08)  POCT Blood Glucose.: 267 mg/dL (21 Sep 2023 18:20)  POCT Blood Glucose.: 35 mg/dL (21 Sep 2023 18:04)  POCT Blood Glucose.: 92 mg/dL (21 Sep 2023 17:23)  POCT Blood Glucose.: 140 mg/dL (21 Sep 2023 17:01)  POCT Blood Glucose.: 229 mg/dL (21 Sep 2023 16:39)  POCT Blood Glucose.: 258 mg/dL (21 Sep 2023 16:27)  POCT Blood Glucose.: 391 mg/dL (21 Sep 2023 16:04)  POCT Blood Glucose.: 401 mg/dL (21 Sep 2023 16:01)  POCT Blood Glucose.: 27 mg/dL (21 Sep 2023 15:52)    ABG - ( 20 Sep 2023 18:12 )  pH, Arterial: 7.41  pH, Blood: x     /  pCO2: 48    /  pO2: 89    / HCO3: 30    / Base Excess: 4.8   /  SaO2: 98.0          RADIOLOGY & ADDITIONAL TESTS:  Consultant(s) Notes Reviewed:  [x ] YES  [ ] NO    MEDICATIONS  (STANDING):  albuterol/ipratropium for Nebulization 3 milliLiter(s) Nebulizer every 6 hours  atorvastatin 40 milliGRAM(s) Oral at bedtime  chlorhexidine 2% Cloths 1 Application(s) Topical <User Schedule>  chlorhexidine 4% Liquid 1 Application(s) Topical <User Schedule>  dexMEDEtomidine Infusion 0.2 MICROgram(s)/kG/Hr (1.63 mL/Hr) IV Continuous <Continuous>  dextrose 10%. 1000 milliLiter(s) (120 mL/Hr) IV Continuous <Continuous>  melatonin 5 milliGRAM(s) Oral at bedtime  montelukast 10 milliGRAM(s) Oral daily  multivitamin  Chewable 1 Tablet(s) Oral daily  piperacillin/tazobactam IVPB. 4.5 Gram(s) IV Intermittent once  piperacillin/tazobactam IVPB.- 4.5 Gram(s) IV Intermittent once  piperacillin/tazobactam IVPB.. 4.5 Gram(s) IV Intermittent every 8 hours  sodium chloride 7% Inhalation 4 milliLiter(s) Inhalation every 6 hours  thiamine 100 milliGRAM(s) Oral daily  vancomycin  IVPB 500 milliGRAM(s) IV Intermittent every 24 hours    MEDICATIONS  (PRN):  sodium chloride 0.9% lock flush 10 milliLiter(s) IV Push every 1 hour PRN Pre/post blood products, medications, blood draw, and to maintain line patency      PHYSICAL EXAM:  exam limited by patient agitation and refusal to cooperate  GENERAL: cachetic, elderly woman, agitated and moving around  HEAD: normocephalic  EYES: eyes closed, would not allow examination of pupils  NERVOUS SYSTEM: A&Ox0  CHEST/LUNG: crackles in BL lungs, some rhonchi present  HEART: +S1/S2, holosystolic murmur present  ABDOMEN: unable to assess  EXTREMITIES: +1 dorsal pulses, cool LE, no edema      Care Discussed with Consultants/Other Providers [ x] YES  [ ] NO

## 2023-09-22 NOTE — CONSULT NOTE ADULT - NS ATTEST RISK PROBLEM GEN_ALL_CORE FT
Severe, acute hypoglycdmia
Abrupt Change In Neurological Status  Chronic Illness With Severe Exacerbation/Progression  Decision Made To Not Resuscitate (DNR)

## 2023-09-22 NOTE — CONSULT NOTE ADULT - PROBLEM SELECTOR RECOMMENDATION 3
.  History of recurrent respiratory failure  -no acute exacerbation currently  -would not want intubation if indicated

## 2023-09-22 NOTE — CONSULT NOTE ADULT - SUBJECTIVE AND OBJECTIVE BOX
HISTORY OF PRESENT ILLNESS  SAIDA BATES is a 82y Female with a past medical history of     Endocrinology has been consulted for Hypoglycemia.          FAMILY HISTORY  - Diabetes:  - Thyroid:  - Autoimmune:  - Other:    SOCIAL HISTORY  - Work:  - Alcohol:  - Smoking:  - Recreational Drugs:    ALLERGIES  Ceclor (Rash)  IV Contrast (Unknown)  Levaquin (Swelling)  Augmentin (Short breath; Rash)      CURRENT MEDICATIONS  albuterol/ipratropium for Nebulization 3 milliLiter(s) Nebulizer every 6 hours  atorvastatin 40 milliGRAM(s) Oral at bedtime  cefepime   IVPB 2000 milliGRAM(s) IV Intermittent every 12 hours  chlorhexidine 2% Cloths 1 Application(s) Topical <User Schedule>  chlorhexidine 4% Liquid 1 Application(s) Topical <User Schedule>  dexMEDEtomidine Infusion 0.2 MICROgram(s)/kG/Hr IV Continuous <Continuous>  dextrose 10%. 1000 milliLiter(s) IV Continuous <Continuous>  melatonin 5 milliGRAM(s) Oral at bedtime  montelukast 10 milliGRAM(s) Oral daily  multivitamin  Chewable 1 Tablet(s) Oral daily  sodium chloride 0.9% lock flush 10 milliLiter(s) IV Push every 1 hour PRN  sodium chloride 7% Inhalation 4 milliLiter(s) Inhalation every 6 hours  thiamine 100 milliGRAM(s) Oral daily      REVIEW OF SYSTEMS  Constitutional:  Negative fever, chills or loss of appetite.  Eyes:  Negative blurry vision or double vision.  Cardiovascular:  Negative for chest pain or palpitations.  Respiratory:  Negative for cough, wheezing, or shortness of breath.   Gastrointestinal:  Negative for nausea, vomiting, diarrhea, constipation, or abdominal pain.  Genitourinary:  Negative frequency, urgency or dysuria.  Neurologic:  No headache, confusion, dizziness, lightheadedness.    PHYSICAL EXAM  Vital Signs Last 24 Hrs  T(C): 36.5 (22 Sep 2023 10:05), Max: 36.8 (21 Sep 2023 14:36)  T(F): 97.7 (22 Sep 2023 10:05), Max: 98.2 (21 Sep 2023 14:36)  HR: 93 (22 Sep 2023 10:00) (58 - 145)  BP: 153/73 (22 Sep 2023 10:00) (91/48 - 185/97)  BP(mean): 105 (22 Sep 2023 10:00) (66 - 133)  RR: 10 (22 Sep 2023 10:00) (10 - 73)  SpO2: 100% (22 Sep 2023 10:00) (87% - 100%)    Parameters below as of 22 Sep 2023 10:00  Patient On (Oxygen Delivery Method): nasal cannula  O2 Flow (L/min): 2  Constitutional: Awake, alert, in no acute distress.   HEENT: Normocephalic, atraumatic, SANDRA, no proptosis or lid retraction.   Neck: supple, no acanthosis, no thyromegaly or palpable thyroid nodules.  Respiratory: Lungs clear to ausculation bilaterally.   Cardiovascular: regular rhythm, normal S1 and S2, no audible murmurs.   GI: soft, non-tender, non-distended, bowel sounds present, no masses appreciated.  Extremities: No lower extremity edema, peripheral pulses present.   Skin: no rashes.   Psychiatric: AAO x 3. Normal affect/mood.     LABS  CBC - WBC/HGB/HTC/PLT: 12.98/11.3/33.2/196 (09-22-23)  BMP: Na/K/Cl/Bicarb/BUN/Cr/Gluc: 134/3.9/101/25/17/0.59/144 (09-22-23)  Anion Gap: 8 (09-22-23)  eGFR: 91 (09-22-23)  Calcium: 8.9 (09-22-23)  Phosphorus: 3.8 (09-22-23)  Magnesium: 1.7 (09-22-23)  LFT - Alb/Tprot/Tbili/Dbili/AlkPhos/ALT/AST: 3.4/--/0.7/--/114/28/26 (09-22-23)  PT/aPTT/INR: 11.7/31.3/1.03 (09-21-23)  Thyroid Stimulating Hormone, Serum: 3.870 (09-20-23)  Total T4/Free T4: 6.47/0.856 (09-21-23)  CBC - WBC/HGB/HTC/PLT: 12.98/11.3/33.2/196 (09-22-23)BMP: Na/K/Cl/Bicarb/BUN/Cr/Gluc: 134/3.9/101/25/17/0.59/144 (09-22-23)  Anion Gap: 8 (09-22-23)  eGFR: 91 (09-22-23)  Calcium: 8.9 (09-22-23)  Phosphorus: 3.8 (09-22-23)  Magnesium: 1.7 (09-22-23)    CAPILLARY BLOOD GLUCOSE & INSULIN RECEIVED  32 mg/dL (09-21 @ 13:50)  33 mg/dL (09-21 @ 13:53)  187 mg/dL (09-21 @ 14:16)  27 mg/dL (09-21 @ 15:52)  401 mg/dL (09-21 @ 16:01)  391 mg/dL (09-21 @ 16:04)  258 mg/dL (09-21 @ 16:27)  229 mg/dL (09-21 @ 16:39)  140 mg/dL (09-21 @ 17:01)  92 mg/dL (09-21 @ 17:23)  35 mg/dL (09-21 @ 18:04)  267 mg/dL (09-21 @ 18:20)  127 mg/dL (09-21 @ 19:08)  82 mg/dL (09-21 @ 19:58)  53 mg/dL (09-21 @ 20:37)  176 mg/dL (09-21 @ 21:07)  132 mg/dL (09-21 @ 21:42)  118 mg/dL (09-21 @ 22:29)  54 mg/dL (09-21 @ 23:20)  132 mg/dL (09-21 @ 23:42)  408 mg/dL (09-22 @ 00:09)  158 mg/dL (09-22 @ 01:04)  138 mg/dL (09-22 @ 01:33)  145 mg/dL (09-22 @ 02:29)  122 mg/dL (09-22 @ 06:16)  153 mg/dL (09-22 @ 07:58)  165 mg/dL (09-22 @ 09:40)        09-21-23 @ 07:01  -  09-22-23 @ 07:00  --------------------------------------------------------  IN: 1880 mL / OUT: 150 mL / NET: 1730 mL    09-22-23 @ 07:01  -  09-22-23 @ 10:16  --------------------------------------------------------  IN: 120 mL / OUT: 0 mL / NET: 120 mL        ASSESSMENT / RECOMMENDATIONS    A1C: 5.5 %  BUN: 17  Creatinine: 0.59  GFR: 91  Weight: 32.5  BMI: 11.9  EF:     # Hypoglycemia    - Patient's fingerstick glucose goal is 100-180 mg/dL.    - Discharge recommendations to be discussed.   - Patient can follow up at discharge with [facility] by calling [phone] to make an appointment.      Case discussed with Dr. Hill. Primary team updated.       Michell Borja  Endocrinology Fellow    Service Pager: [phone]  HISTORY OF PRESENT ILLNESS  SAIDA BATES is a 82y Female with a past medical history of HTN, atrial fibrillation, chronic bronchiectasis, recent hospitalization for PRES (5-6/2023), recent hospitalization in 08/23 acute bronchiectasis exacerbation came to [facility] on 9/19 with altered menmtal status and diplopia.  Upon presentation, patient was hypoxic    During 08/23 hospital course, pt discharged from [facility] with inhaled cayston, airway clearance device, and PO abx course: bactrim 800-160mg  for stenotrophomonas and ciprofloxacin 500mg q12 for pseudomonas. Seen by Dr Foster on 9/5/23, recommended IV cefepime due to persistent symptoms despite completing PO abx. PICC placed on 9/13/23.     Imaging CT Chest noncon: diffuse b/l bronchiectasis w/ distal mucoid impaction and scattered tree-in-bud opacities. Small L pleural effusion. CTH: no large transcortical infarct or definite acute intracranial hemorrhage.      Endocrinology has been consulted for Hypoglycemia.    Neurosurgery evaluated no aucte intervention  monitored with VEEG    Rapid response on 9/21     agitated. Speech was nonsensical and was moderately dysarthric. Noted to have intermittent LUE/LLE jerking movements with 3-4 seconds of stereotypic features along with R gaze preference concerning for possible seizure. FS was checked, noted to be 34. Repeat 33. Given 1 AMP with improvement to 187. After AMP was given, patient became more relaxed, sleeping. Q2 hr finger sticks ordered.   Vital signs remained stable throughout entire event, SBP in the 130s, HR in the 70s. Decision was made to hold off on AEDs at this time as patient with negative EEG overnight. Plan to repeat VEEG. Received additional call from RN that patient is hypoxic, sating 85% on RA, and tachypneic. Placed on 2 L/min NC with improvement to the mid 90s. Reevaluated patient at bedside with stroke fellow, Dr. Shi, and repeat fingerstick noted to be 27. Patient lethargic, difficult to arouse however appeared to have no focal findings on neurologic assessment. Noted to have an acute change compared to this morning as patient was sitting in the chair, eating her breakfast, responding to questions despite speech being tangential at times. Decision was made to call a rapid in the setting of persistent hypoglycemia, hypoxia, and worsening mental status. Rapid response team came to bedside. Additional 2 AMPs of D50 given with repeat FS to 401. Started on D10 @ 50 cc/hr. Repeat labs drawn. Became bradycardic to the 50s, EKG obtained. BP remained stable in the 120-130s. Rectal temperature noted to be 94 F, bear hugger placed. Noted to have an acute drop in glucose in < 1 hr from 401 to 140. D10 switched to D5. Labs significant for leukocytosis, elevated lactate, and electrolyte disturbances. CXR with no obvious consolidation. Pending urine and blood cultures. PICC line removed due to concern of possible infection. Patient transferred to medicine [facility] for further management. Stroke will continue to follow.    MRI pending    FAMILY HISTORY  - Diabetes:  - Thyroid:  - Autoimmune:  - Other:    SOCIAL HISTORY  - Work:  - Alcohol:  - Smoking:  - Recreational Drugs:    ALLERGIES  Ceclor (Rash)  IV Contrast (Unknown)  Levaquin (Swelling)  Augmentin (Short breath; Rash)      CURRENT MEDICATIONS  albuterol/ipratropium for Nebulization 3 milliLiter(s) Nebulizer every 6 hours  atorvastatin 40 milliGRAM(s) Oral at bedtime  cefepime   IVPB 2000 milliGRAM(s) IV Intermittent every 12 hours  chlorhexidine 2% Cloths 1 Application(s) Topical <User Schedule>  chlorhexidine 4% Liquid 1 Application(s) Topical <User Schedule>  dexMEDEtomidine Infusion 0.2 MICROgram(s)/kG/Hr IV Continuous <Continuous>  dextrose 10%. 1000 milliLiter(s) IV Continuous <Continuous>  melatonin 5 milliGRAM(s) Oral at bedtime  montelukast 10 milliGRAM(s) Oral daily  multivitamin  Chewable 1 Tablet(s) Oral daily  sodium chloride 0.9% lock flush 10 milliLiter(s) IV Push every 1 hour PRN  sodium chloride 7% Inhalation 4 milliLiter(s) Inhalation every 6 hours  thiamine 100 milliGRAM(s) Oral daily      REVIEW OF SYSTEMS  Constitutional:  Negative fever, chills or loss of appetite.  Eyes:  Negative blurry vision or double vision.  Cardiovascular:  Negative for chest pain or palpitations.  Respiratory:  Negative for cough, wheezing, or shortness of breath.   Gastrointestinal:  Negative for nausea, vomiting, diarrhea, constipation, or abdominal pain.  Genitourinary:  Negative frequency, urgency or dysuria.  Neurologic:  No headache, confusion, dizziness, lightheadedness.    PHYSICAL EXAM  Vital Signs Last 24 Hrs  T(C): 36.5 (22 Sep 2023 10:05), Max: 36.8 (21 Sep 2023 14:36)  T(F): 97.7 (22 Sep 2023 10:05), Max: 98.2 (21 Sep 2023 14:36)  HR: 93 (22 Sep 2023 10:00) (58 - 145)  BP: 153/73 (22 Sep 2023 10:00) (91/48 - 185/97)  BP(mean): 105 (22 Sep 2023 10:00) (66 - 133)  RR: 10 (22 Sep 2023 10:00) (10 - 73)  SpO2: 100% (22 Sep 2023 10:00) (87% - 100%)    Parameters below as of 22 Sep 2023 10:00  Patient On (Oxygen Delivery Method): nasal cannula  O2 Flow (L/min): 2  Constitutional: Awake, alert, in no acute distress.   HEENT: Normocephalic, atraumatic, SANDRA, no proptosis or lid retraction.   Neck: supple, no acanthosis, no thyromegaly or palpable thyroid nodules.  Respiratory: Lungs clear to ausculation bilaterally.   Cardiovascular: regular rhythm, normal S1 and S2, no audible murmurs.   GI: soft, non-tender, non-distended, bowel sounds present, no masses appreciated.  Extremities: No lower extremity edema, peripheral pulses present.   Skin: no rashes.   Psychiatric: AAO x 3. Normal affect/mood.     LABS  CBC - WBC/HGB/HTC/PLT: 12.98/11.3/33.2/196 (09-22-23)  BMP: Na/K/Cl/Bicarb/BUN/Cr/Gluc: 134/3.9/101/25/17/0.59/144 (09-22-23)  Anion Gap: 8 (09-22-23)  eGFR: 91 (09-22-23)  Calcium: 8.9 (09-22-23)  Phosphorus: 3.8 (09-22-23)  Magnesium: 1.7 (09-22-23)  LFT - Alb/Tprot/Tbili/Dbili/AlkPhos/ALT/AST: 3.4/--/0.7/--/114/28/26 (09-22-23)  PT/aPTT/INR: 11.7/31.3/1.03 (09-21-23)  Thyroid Stimulating Hormone, Serum: 3.870 (09-20-23)  Total T4/Free T4: 6.47/0.856 (09-21-23)  CBC - WBC/HGB/HTC/PLT: 12.98/11.3/33.2/196 (09-22-23)BMP: Na/K/Cl/Bicarb/BUN/Cr/Gluc: 134/3.9/101/25/17/0.59/144 (09-22-23)  Anion Gap: 8 (09-22-23)  eGFR: 91 (09-22-23)  Calcium: 8.9 (09-22-23)  Phosphorus: 3.8 (09-22-23)  Magnesium: 1.7 (09-22-23)    CAPILLARY BLOOD GLUCOSE & INSULIN RECEIVED  32 mg/dL (09-21 @ 13:50)  33 mg/dL (09-21 @ 13:53)  187 mg/dL (09-21 @ 14:16)  27 mg/dL (09-21 @ 15:52)  401 mg/dL (09-21 @ 16:01)  391 mg/dL (09-21 @ 16:04)  258 mg/dL (09-21 @ 16:27)  229 mg/dL (09-21 @ 16:39)  140 mg/dL (09-21 @ 17:01)  92 mg/dL (09-21 @ 17:23)  35 mg/dL (09-21 @ 18:04)  267 mg/dL (09-21 @ 18:20)  127 mg/dL (09-21 @ 19:08)  82 mg/dL (09-21 @ 19:58)  53 mg/dL (09-21 @ 20:37)  176 mg/dL (09-21 @ 21:07)  132 mg/dL (09-21 @ 21:42)  118 mg/dL (09-21 @ 22:29)  54 mg/dL (09-21 @ 23:20)  132 mg/dL (09-21 @ 23:42)  408 mg/dL (09-22 @ 00:09)  158 mg/dL (09-22 @ 01:04)  138 mg/dL (09-22 @ 01:33)  145 mg/dL (09-22 @ 02:29)  122 mg/dL (09-22 @ 06:16)  153 mg/dL (09-22 @ 07:58)  165 mg/dL (09-22 @ 09:40)        09-21-23 @ 07:01  -  09-22-23 @ 07:00  --------------------------------------------------------  IN: 1880 mL / OUT: 150 mL / NET: 1730 mL    09-22-23 @ 07:01  -  09-22-23 @ 10:16  --------------------------------------------------------  IN: 120 mL / OUT: 0 mL / NET: 120 mL        ASSESSMENT / RECOMMENDATIONS    A1C: 5.5 %  BUN: 17  Creatinine: 0.59  GFR: 91  Weight: 32.5  BMI: 11.9  EF:     # Hypoglycemia    - Patient's fingerstick glucose goal is 100-180 mg/dL.    - Discharge recommendations to be discussed.   - Patient can follow up at discharge with [facility] by calling [phone] to make an appointment.      Case discussed with Dr. Hill. Primary team updated.       Michell Borja  Endocrinology Fellow    Service Pager: [phone]  HISTORY OF PRESENT ILLNESS  SAIDA BATES is a 82y Female with a past medical history of HTN, atrial fibrillation, chronic bronchiectasis, recent hospitalization for PRES (5-6/2023), recent hospitalization in 08/23 acute bronchiectasis exacerbation came to [facility] on 9/19 with altered mental status, visual disturbances and diplopia.  Pt was initially admitted under neuro service for encephalopathy and r/o stroke.  CT head unremarkable for large transcortical infarct or definite acute intracranial hemorrhage.   CT chest shows diffuse bilateral bronchiectasis with multiple areas of distal mucoid impaction and scattered tree-in-bud opacities.  On 9/21, pt had a rapid response for nonsensical and dysarthric speech and noted to have intermitted LE jerking movements.  Pt was found to have glucose of 34. improved with one amp of glucose.  Pt later become hypoxic, tachypeneic and dyspneic and desaturated.  Repeat glucose at that time was 27, pt remained lethargic and received additional 2 amps of glucose which climbed to 401 and D10 was initiated and pt was subsequently transferred to ICU.  Endocrinology has been consulted for Hypoglycemia.    Note from rapid response:  FSG 3:50 pm 27> 2 amps glucose> 4:04 pm 391>4:24 pm 285>4:39 pm 229> 5 pm 140 d10 at 50 cc per hr started    During rapid response, labs are notable for WBC 11.4, Na 134, K 3.3, lactate 2.9, Free T4 0.856, TSH 3.87, Cr 0.59, AST 26, ALT 28.  Pt remained in normotensive to hypertensive ranges.    Of note, during 08/23 hospital course, pt discharged from [facility] with inhaled cayston, airway clearance device, and PO abx course: bactrim 800-160mg  for stenotrophomonas and ciprofloxacin 500mg q12 for pseudomonas. Seen by Dr Foster on 9/5/23, recommended IV cefepime due to persistent symptoms despite completing PO abx. PICC placed on 9/13/23.         ALLERGIES  Ceclor (Rash)  IV Contrast (Unknown)  Levaquin (Swelling)  Augmentin (Short breath; Rash)      CURRENT MEDICATIONS  albuterol/ipratropium for Nebulization 3 milliLiter(s) Nebulizer every 6 hours  atorvastatin 40 milliGRAM(s) Oral at bedtime  cefepime   IVPB 2000 milliGRAM(s) IV Intermittent every 12 hours  chlorhexidine 2% Cloths 1 Application(s) Topical <User Schedule>  chlorhexidine 4% Liquid 1 Application(s) Topical <User Schedule>  dexMEDEtomidine Infusion 0.2 MICROgram(s)/kG/Hr IV Continuous <Continuous>  dextrose 10%. 1000 milliLiter(s) IV Continuous <Continuous>  melatonin 5 milliGRAM(s) Oral at bedtime  montelukast 10 milliGRAM(s) Oral daily  multivitamin  Chewable 1 Tablet(s) Oral daily  sodium chloride 0.9% lock flush 10 milliLiter(s) IV Push every 1 hour PRN  sodium chloride 7% Inhalation 4 milliLiter(s) Inhalation every 6 hours  thiamine 100 milliGRAM(s) Oral daily      REVIEW OF SYSTEMS  Constitutional:  Negative fever, chills or loss of appetite.  Eyes:  Negative blurry vision or double vision.  Cardiovascular:  Negative for chest pain or palpitations.  Respiratory:  Negative for cough, wheezing, or shortness of breath.   Gastrointestinal:  Negative for nausea, vomiting, diarrhea, constipation, or abdominal pain.  Genitourinary:  Negative frequency, urgency or dysuria.  Neurologic:  No headache, confusion, dizziness, lightheadedness.    PHYSICAL EXAM  Vital Signs Last 24 Hrs  T(C): 36.5 (22 Sep 2023 10:05), Max: 36.8 (21 Sep 2023 14:36)  T(F): 97.7 (22 Sep 2023 10:05), Max: 98.2 (21 Sep 2023 14:36)  HR: 93 (22 Sep 2023 10:00) (58 - 145)  BP: 153/73 (22 Sep 2023 10:00) (91/48 - 185/97)  BP(mean): 105 (22 Sep 2023 10:00) (66 - 133)  RR: 10 (22 Sep 2023 10:00) (10 - 73)  SpO2: 100% (22 Sep 2023 10:00) (87% - 100%)    Parameters below as of 22 Sep 2023 10:00  Patient On (Oxygen Delivery Method): nasal cannula  O2 Flow (L/min): 2  Constitutional: Awake, alert, in no acute distress.   HEENT: Normocephalic, atraumatic, SANDRA, no proptosis or lid retraction.   Neck: supple, no acanthosis, no thyromegaly or palpable thyroid nodules.  Respiratory: Lungs clear to ausculation bilaterally.   Cardiovascular: regular rhythm, normal S1 and S2, no audible murmurs.   GI: soft, non-tender, non-distended, bowel sounds present, no masses appreciated.  Extremities: No lower extremity edema, peripheral pulses present.   Skin: no rashes.   Psychiatric: AAO x 3. Normal affect/mood.     LABS  CBC - WBC/HGB/HTC/PLT: 12.98/11.3/33.2/196 (09-22-23)  BMP: Na/K/Cl/Bicarb/BUN/Cr/Gluc: 134/3.9/101/25/17/0.59/144 (09-22-23)  Anion Gap: 8 (09-22-23)  eGFR: 91 (09-22-23)  Calcium: 8.9 (09-22-23)  Phosphorus: 3.8 (09-22-23)  Magnesium: 1.7 (09-22-23)  LFT - Alb/Tprot/Tbili/Dbili/AlkPhos/ALT/AST: 3.4/--/0.7/--/114/28/26 (09-22-23)  PT/aPTT/INR: 11.7/31.3/1.03 (09-21-23)  Thyroid Stimulating Hormone, Serum: 3.870 (09-20-23)  Total T4/Free T4: 6.47/0.856 (09-21-23)  CBC - WBC/HGB/HTC/PLT: 12.98/11.3/33.2/196 (09-22-23)BMP: Na/K/Cl/Bicarb/BUN/Cr/Gluc: 134/3.9/101/25/17/0.59/144 (09-22-23)  Anion Gap: 8 (09-22-23)  eGFR: 91 (09-22-23)  Calcium: 8.9 (09-22-23)  Phosphorus: 3.8 (09-22-23)  Magnesium: 1.7 (09-22-23)    CAPILLARY BLOOD GLUCOSE & INSULIN RECEIVED  32 mg/dL (09-21 @ 13:50)  33 mg/dL (09-21 @ 13:53)  187 mg/dL (09-21 @ 14:16)  27 mg/dL (09-21 @ 15:52)  401 mg/dL (09-21 @ 16:01)  391 mg/dL (09-21 @ 16:04)  258 mg/dL (09-21 @ 16:27)  229 mg/dL (09-21 @ 16:39)  140 mg/dL (09-21 @ 17:01)  92 mg/dL (09-21 @ 17:23)  35 mg/dL (09-21 @ 18:04)  267 mg/dL (09-21 @ 18:20)  127 mg/dL (09-21 @ 19:08)  82 mg/dL (09-21 @ 19:58)  53 mg/dL (09-21 @ 20:37)  176 mg/dL (09-21 @ 21:07)  132 mg/dL (09-21 @ 21:42)  118 mg/dL (09-21 @ 22:29)  54 mg/dL (09-21 @ 23:20)  132 mg/dL (09-21 @ 23:42)  408 mg/dL (09-22 @ 00:09)  158 mg/dL (09-22 @ 01:04)  138 mg/dL (09-22 @ 01:33)  145 mg/dL (09-22 @ 02:29)  122 mg/dL (09-22 @ 06:16)  153 mg/dL (09-22 @ 07:58)  165 mg/dL (09-22 @ 09:40)        09-21-23 @ 07:01  -  09-22-23 @ 07:00  --------------------------------------------------------  IN: 1880 mL / OUT: 150 mL / NET: 1730 mL    09-22-23 @ 07:01  -  09-22-23 @ 10:16  --------------------------------------------------------  IN: 120 mL / OUT: 0 mL / NET: 120 mL        ASSESSMENT / RECOMMENDATIONS   82y Female with a past medical history of HTN, atrial fibrillation, chronic bronchiectasis, recent hospitalization for PRES (5-6/2023), recent hospitalization in 08/23 acute bronchiectasis exacerbation came to [facility] on 9/19 with altered mental status, visual disturbances and diplopia.  Pt was initially admitted under neuro service for encephalopathy and r/o stroke.  CT head unremarkable for large transcortical infarct or definite acute intracranial hemorrhage.   CT chest shows diffuse bilateral bronchiectasis with multiple areas of distal mucoid impaction and scattered tree-in-bud opacities.  On 9/21, pt had a rapid response for nonsensical and dysarthric speech and noted to have intermitted LE jerking movements.  Pt was found to have glucose of 34. improved with one amp of glucose.  Pt later become hypoxic, tachypeneic and dyspneic and desaturated.  Repeat glucose at that time was 27, pt remained lethargic and received additional 2 amps of glucose which climbed to 401 and D10 was initiated and pt was subsequently transferred to ICU.  Endocrinology has been consulted for Hypoglycemia.      A1C: 5.5 %  BUN: 17  Creatinine: 0.59  GFR: 91  Weight: 32.5  BMI: 11.9      # Hypoglycemia  - pt did not receive any insulin products or agents that have high evidence to induce hypoglycemia during hospital course  - Pt was on Bactrim, ciprofloxacin and cefepime earlier in the month for outpatient abx, there are some low quality evidence of association between certain class of abx causing hypoglycemia - however these drugs should be cleared by now  - some possible etiologies: 2/2 sepsis vs hormone deficiency vs endogenous production of insulin  - next time patient become hypoglycemic, please send the following labs right away: c-peptide, proinsulin, beta-hydroxybutyrate and BMP  - Free T4 0.856, TSH 3.87,        Case discussed with Dr. Hill. Primary team updated.       Michell Borja  Endocrinology Fellow    Service Pager: [phone]  HISTORY OF PRESENT ILLNESS  SAIDA BATES is a 82y Female with a past medical history of HTN, atrial fibrillation, chronic bronchiectasis, recent hospitalization for PRES (5-6/2023) at hospital in [place], [place], recent hospitalization in 08/23 acute bronchiectasis exacerbation in [facility] came to [facility] on 9/19 with altered mental status, confusion, visual disturbances and diplopia.  Pt was initially admitted under neuro service for encephalopathy and r/o stroke.  CT head unremarkable for large transcortical infarct or definite acute intracranial hemorrhage.   CT chest shows diffuse bilateral bronchiectasis with multiple areas of distal mucoid impaction and scattered tree-in-bud opacities.  On 9/21, pt had a rapid response for nonsensical and dysarthric speech and noted to have intermitted LE jerking movements.  Pt was found to have glucose of 34. improved with one amp of glucose.  Pt later become hypoxic, tachypeneic and dyspneic and desaturated.  Repeat glucose at that time was 27, pt remained lethargic and received additional 2 amps of glucose which climbed to 401 and D10 was initiated and pt was subsequently transferred to ICU.  Endocrinology has been consulted for Hypoglycemia.    Note from rapid response:  FSG 3:50 pm 27> 2 amps glucose> 4:04 pm 391>4:24 pm 285>4:39 pm 229> 5 pm 140 d10 at 50 cc per hr started. (received total 5 amps of glucose)    During rapid response, labs are notable for WBC 11.4, Na 134, K 3.3, lactate 2.9, Free T4 0.856, TSH 3.87, Cr 0.59, AST 26, ALT 28.  Pt remained in normotensive to hypertensive ranges.    Of note, daughter endorses pt was hospitalized with encephalopathy in [facility] in May for 4 weeks, the final dx was PRES.  Pt was discharged to rehab.   Prior to 05/23 hospital course, pt was alert and oriented with minimal confusion.   After discharge, pt was regaining the cognitive function back.   During 08/23 hospital course, pt discharged from [facility] with inhaled cayston, airway clearance device, and PO abx course: bactrim 800-160mg  for stenotrophomonas and ciprofloxacin 500mg q12 for pseudomonas. Seen by Dr Foster on 9/5/23, recommended IV cefepime due to persistent symptoms despite completing PO abx. PICC placed on 9/13/23.   Daughter states pt eats regular diet at home.  Endorses weight loss over the few years, attributing to underlying bronchiectasis.  Denies history of prior hypoglycemic events and history of diabetes.            ALLERGIES  Ceclor (Rash)  IV Contrast (Unknown)  Levaquin (Swelling)  Augmentin (Short breath; Rash)      CURRENT MEDICATIONS  albuterol/ipratropium for Nebulization 3 milliLiter(s) Nebulizer every 6 hours  atorvastatin 40 milliGRAM(s) Oral at bedtime  cefepime   IVPB 2000 milliGRAM(s) IV Intermittent every 12 hours  chlorhexidine 2% Cloths 1 Application(s) Topical <User Schedule>  chlorhexidine 4% Liquid 1 Application(s) Topical <User Schedule>  dexMEDEtomidine Infusion 0.2 MICROgram(s)/kG/Hr IV Continuous <Continuous>  dextrose 10%. 1000 milliLiter(s) IV Continuous <Continuous>  melatonin 5 milliGRAM(s) Oral at bedtime  montelukast 10 milliGRAM(s) Oral daily  multivitamin  Chewable 1 Tablet(s) Oral daily  sodium chloride 0.9% lock flush 10 milliLiter(s) IV Push every 1 hour PRN  sodium chloride 7% Inhalation 4 milliLiter(s) Inhalation every 6 hours  thiamine 100 milliGRAM(s) Oral daily      REVIEW OF SYSTEMS  Constitutional:  Negative fever, chills or loss of appetite.  Eyes:  Negative blurry vision or double vision.  Cardiovascular:  Negative for chest pain or palpitations.  Respiratory:  Negative for cough, wheezing, or shortness of breath.   Gastrointestinal:  Negative for nausea, vomiting, diarrhea, constipation, or abdominal pain.  Genitourinary:  Negative frequency, urgency or dysuria.  Neurologic:  No headache, confusion, dizziness, lightheadedness.    PHYSICAL EXAM  Vital Signs Last 24 Hrs  T(C): 36.5 (22 Sep 2023 10:05), Max: 36.8 (21 Sep 2023 14:36)  T(F): 97.7 (22 Sep 2023 10:05), Max: 98.2 (21 Sep 2023 14:36)  HR: 93 (22 Sep 2023 10:00) (58 - 145)  BP: 153/73 (22 Sep 2023 10:00) (91/48 - 185/97)  BP(mean): 105 (22 Sep 2023 10:00) (66 - 133)  RR: 10 (22 Sep 2023 10:00) (10 - 73)  SpO2: 100% (22 Sep 2023 10:00) (87% - 100%)    Parameters below as of 22 Sep 2023 10:00  Patient On (Oxygen Delivery Method): nasal cannula  O2 Flow (L/min): 2  Constitutional: Awake, alert, in no acute distress.   HEENT: Normocephalic, atraumatic, SANDRA, no proptosis or lid retraction.   Neck: supple, no acanthosis, no thyromegaly or palpable thyroid nodules.  Respiratory: Lungs clear to ausculation bilaterally.   Cardiovascular: regular rhythm, normal S1 and S2, no audible murmurs.   GI: soft, non-tender, non-distended, bowel sounds present, no masses appreciated.  Extremities: No lower extremity edema, peripheral pulses present.   Skin: no rashes.   Psychiatric: AAO x 3. Normal affect/mood.     LABS  CBC - WBC/HGB/HTC/PLT: 12.98/11.3/33.2/196 (09-22-23)  BMP: Na/K/Cl/Bicarb/BUN/Cr/Gluc: 134/3.9/101/25/17/0.59/144 (09-22-23)  Anion Gap: 8 (09-22-23)  eGFR: 91 (09-22-23)  Calcium: 8.9 (09-22-23)  Phosphorus: 3.8 (09-22-23)  Magnesium: 1.7 (09-22-23)  LFT - Alb/Tprot/Tbili/Dbili/AlkPhos/ALT/AST: 3.4/--/0.7/--/114/28/26 (09-22-23)  PT/aPTT/INR: 11.7/31.3/1.03 (09-21-23)  Thyroid Stimulating Hormone, Serum: 3.870 (09-20-23)  Total T4/Free T4: 6.47/0.856 (09-21-23)  CBC - WBC/HGB/HTC/PLT: 12.98/11.3/33.2/196 (09-22-23)BMP: Na/K/Cl/Bicarb/BUN/Cr/Gluc: 134/3.9/101/25/17/0.59/144 (09-22-23)  Anion Gap: 8 (09-22-23)  eGFR: 91 (09-22-23)  Calcium: 8.9 (09-22-23)  Phosphorus: 3.8 (09-22-23)  Magnesium: 1.7 (09-22-23)    CAPILLARY BLOOD GLUCOSE & INSULIN RECEIVED  32 mg/dL (09-21 @ 13:50)  33 mg/dL (09-21 @ 13:53)  187 mg/dL (09-21 @ 14:16)  27 mg/dL (09-21 @ 15:52)  401 mg/dL (09-21 @ 16:01)  391 mg/dL (09-21 @ 16:04)  258 mg/dL (09-21 @ 16:27)  229 mg/dL (09-21 @ 16:39)  140 mg/dL (09-21 @ 17:01)  92 mg/dL (09-21 @ 17:23)  35 mg/dL (09-21 @ 18:04)  267 mg/dL (09-21 @ 18:20)  127 mg/dL (09-21 @ 19:08)  82 mg/dL (09-21 @ 19:58)  53 mg/dL (09-21 @ 20:37)  176 mg/dL (09-21 @ 21:07)  132 mg/dL (09-21 @ 21:42)  118 mg/dL (09-21 @ 22:29)  54 mg/dL (09-21 @ 23:20) - received 1mg of glucagon   132 mg/dL (09-21 @ 23:42)  408 mg/dL (09-22 @ 00:09)  158 mg/dL (09-22 @ 01:04)  138 mg/dL (09-22 @ 01:33)  145 mg/dL (09-22 @ 02:29)  122 mg/dL (09-22 @ 06:16)  153 mg/dL (09-22 @ 07:58)  165 mg/dL (09-22 @ 09:40)    cpeptide 09/21: 0.9  pending proinsulin      09-21-23 @ 07:01  -  09-22-23 @ 07:00  --------------------------------------------------------  IN: 1880 mL / OUT: 150 mL / NET: 1730 mL    09-22-23 @ 07:01  -  09-22-23 @ 10:16  --------------------------------------------------------  IN: 120 mL / OUT: 0 mL / NET: 120 mL        ASSESSMENT / RECOMMENDATIONS   82y Female with a past medical history of HTN, atrial fibrillation, chronic bronchiectasis, recent hospitalization for PRES (5-6/2023), recent hospitalization in 08/23 acute bronchiectasis exacerbation came to [facility] on 9/19 with altered mental status, visual disturbances and diplopia.  Pt was initially admitted under neuro service for encephalopathy and r/o stroke.  CT head unremarkable for large transcortical infarct or definite acute intracranial hemorrhage.   CT chest shows diffuse bilateral bronchiectasis with multiple areas of distal mucoid impaction and scattered tree-in-bud opacities.  On 9/21, pt had a rapid response for nonsensical and dysarthric speech and noted to have intermitted LE jerking movements.  Pt was found to have glucose of 34. improved with one amp of glucose.  Pt later become hypoxic, tachypeneic and dyspneic and desaturated.  Repeat glucose at that time was 27, pt remained lethargic and received additional 2 amps of glucose which climbed to 401 and D10 was initiated and pt was subsequently transferred to ICU.  Endocrinology has been consulted for Hypoglycemia.      A1C: 5.5 %  BUN: 17  Creatinine: 0.59  GFR: 91  Weight: 32.5  BMI: 11.9      # Hypoglycemia  - pt did not receive any insulin products or agents that have high evidence to induce hypoglycemia during hospital course  - did receive last lopressor at 6am on 9/19  - Pt was on Bactrim, ciprofloxacin and cefepime earlier in the month for outpatient abx, there are some low quality evidence of association between certain class of abx causing hypoglycemia - however these drugs should be cleared by now  - some possible etiologies: 2/2 sepsis vs hormone deficiency vs endogenous production of insulin  - next time patient become hypoglycemic, please send the following labs right away: c-peptide, proinsulin, beta-hydroxybutyrate and BMP  - Free T4 0.856, TSH 3.87,        Case discussed with Dr. Hill. Primary team updated.       Michell Borja  Endocrinology Fellow    Service Pager: [phone]  HISTORY OF PRESENT ILLNESS  SAIDA BATES is a 82y Female with a past medical history of HTN, atrial fibrillation, chronic bronchiectasis, recent hospitalization for PRES (5-6/2023) at hospital in [place], [place], recent hospitalization in 08/23 acute bronchiectasis exacerbation in [facility] came to [facility] on 9/19 with altered mental status, confusion, visual disturbances and diplopia.  Pt was initially admitted under neuro service for encephalopathy and r/o stroke.  CT head unremarkable for large transcortical infarct or definite acute intracranial hemorrhage.   CT chest shows diffuse bilateral bronchiectasis with multiple areas of distal mucoid impaction and scattered tree-in-bud opacities.  On 9/21, pt had a rapid response for nonsensical and dysarthric speech and noted to have intermitted LE jerking movements.  Pt was found to have glucose of 34. improved with one amp of glucose.  Pt later become hypoxic, tachypeneic and dyspneic and desaturated.  Repeat glucose at that time was 27, pt remained lethargic and received additional 2 amps of glucose which climbed to 401 and D10 was initiated and pt was subsequently transferred to ICU.  Endocrinology has been consulted for Hypoglycemia.    Note from rapid response:  FSG 3:50 pm 27> 2 amps glucose> 4:04 pm 391>4:24 pm 285>4:39 pm 229> 5 pm 140 d10 at 50 cc per hr started. (received total 5 amps of glucose)    During rapid response, labs are notable for WBC 11.4, Na 134, K 3.3, lactate 2.9, Free T4 0.856, TSH 3.87, Cr 0.59, AST 26, ALT 28.  Pt remained in normotensive to hypertensive ranges.    Of note, daughter endorses pt was hospitalized with encephalopathy in [facility] in May for 4 weeks, the final dx was PRES.  Pt was discharged to rehab.   Prior to 05/23 hospital course, pt was alert and oriented with minimal confusion.   After discharge, pt was regaining the cognitive function back.   During 08/23 hospital course, pt discharged from [facility] with inhaled cayston, airway clearance device, and PO abx course: bactrim 800-160mg  for stenotrophomonas and ciprofloxacin 500mg q12 for pseudomonas. Seen by Dr Foster on 9/5/23, recommended IV cefepime due to persistent symptoms despite completing PO abx. PICC placed on 9/13/23.   Daughter states pt eats regular diet at home.  Endorses weight loss over the few years, attributing to underlying bronchiectasis.  Denies history of prior hypoglycemic events and history of diabetes.      ALLERGIES  Ceclor (Rash)  IV Contrast (Unknown)  Levaquin (Swelling)  Augmentin (Short breath; Rash)      CURRENT MEDICATIONS  albuterol/ipratropium for Nebulization 3 milliLiter(s) Nebulizer every 6 hours  atorvastatin 40 milliGRAM(s) Oral at bedtime  cefepime   IVPB 2000 milliGRAM(s) IV Intermittent every 12 hours  chlorhexidine 2% Cloths 1 Application(s) Topical <User Schedule>  chlorhexidine 4% Liquid 1 Application(s) Topical <User Schedule>  dexMEDEtomidine Infusion 0.2 MICROgram(s)/kG/Hr IV Continuous <Continuous>  dextrose 10%. 1000 milliLiter(s) IV Continuous <Continuous>  melatonin 5 milliGRAM(s) Oral at bedtime  montelukast 10 milliGRAM(s) Oral daily  multivitamin  Chewable 1 Tablet(s) Oral daily  sodium chloride 0.9% lock flush 10 milliLiter(s) IV Push every 1 hour PRN  sodium chloride 7% Inhalation 4 milliLiter(s) Inhalation every 6 hours  thiamine 100 milliGRAM(s) Oral daily      REVIEW OF SYSTEMS  Constitutional:  Negative fever, chills or loss of appetite.  Eyes:  Negative blurry vision or double vision.  Cardiovascular:  Negative for chest pain or palpitations.  Respiratory:  Negative for cough, wheezing, or shortness of breath.   Gastrointestinal:  Negative for nausea, vomiting, diarrhea, constipation, or abdominal pain.  Genitourinary:  Negative frequency, urgency or dysuria.  Neurologic:  No headache, confusion, dizziness, lightheadedness.    PHYSICAL EXAM  Vital Signs Last 24 Hrs  T(C): 36.5 (22 Sep 2023 10:05), Max: 36.8 (21 Sep 2023 14:36)  T(F): 97.7 (22 Sep 2023 10:05), Max: 98.2 (21 Sep 2023 14:36)  HR: 93 (22 Sep 2023 10:00) (58 - 145)  BP: 153/73 (22 Sep 2023 10:00) (91/48 - 185/97)  BP(mean): 105 (22 Sep 2023 10:00) (66 - 133)  RR: 10 (22 Sep 2023 10:00) (10 - 73)  SpO2: 100% (22 Sep 2023 10:00) (87% - 100%)    Parameters below as of 22 Sep 2023 10:00  Patient On (Oxygen Delivery Method): nasal cannula  O2 Flow (L/min): 2  Constitutional: Awake, alert, in no acute distress.   HEENT: Normocephalic, atraumatic, SANDRA, no proptosis or lid retraction.   Neck: supple, no acanthosis, no thyromegaly or palpable thyroid nodules.  Respiratory: Lungs clear to ausculation bilaterally.   Cardiovascular: regular rhythm, normal S1 and S2, no audible murmurs.   GI: soft, non-tender, non-distended, bowel sounds present, no masses appreciated.  Extremities: No lower extremity edema, peripheral pulses present.   Skin: no rashes.   Psychiatric: AAO x 3. Normal affect/mood.     LABS  CBC - WBC/HGB/HTC/PLT: 12.98/11.3/33.2/196 (09-22-23)  BMP: Na/K/Cl/Bicarb/BUN/Cr/Gluc: 134/3.9/101/25/17/0.59/144 (09-22-23)  Anion Gap: 8 (09-22-23)  eGFR: 91 (09-22-23)  Calcium: 8.9 (09-22-23)  Phosphorus: 3.8 (09-22-23)  Magnesium: 1.7 (09-22-23)  LFT - Alb/Tprot/Tbili/Dbili/AlkPhos/ALT/AST: 3.4/--/0.7/--/114/28/26 (09-22-23)  PT/aPTT/INR: 11.7/31.3/1.03 (09-21-23)  Thyroid Stimulating Hormone, Serum: 3.870 (09-20-23)  Total T4/Free T4: 6.47/0.856 (09-21-23)  CBC - WBC/HGB/HTC/PLT: 12.98/11.3/33.2/196 (09-22-23)BMP: Na/K/Cl/Bicarb/BUN/Cr/Gluc: 134/3.9/101/25/17/0.59/144 (09-22-23)  Anion Gap: 8 (09-22-23)  eGFR: 91 (09-22-23)  Calcium: 8.9 (09-22-23)  Phosphorus: 3.8 (09-22-23)  Magnesium: 1.7 (09-22-23)    CAPILLARY BLOOD GLUCOSE & INSULIN RECEIVED  32 mg/dL (09-21 @ 13:50)  33 mg/dL (09-21 @ 13:53)  187 mg/dL (09-21 @ 14:16)  27 mg/dL (09-21 @ 15:52)  401 mg/dL (09-21 @ 16:01)  391 mg/dL (09-21 @ 16:04)  258 mg/dL (09-21 @ 16:27)  229 mg/dL (09-21 @ 16:39)  140 mg/dL (09-21 @ 17:01)  92 mg/dL (09-21 @ 17:23)  35 mg/dL (09-21 @ 18:04)  267 mg/dL (09-21 @ 18:20)  127 mg/dL (09-21 @ 19:08)  82 mg/dL (09-21 @ 19:58)  53 mg/dL (09-21 @ 20:37)  176 mg/dL (09-21 @ 21:07)  132 mg/dL (09-21 @ 21:42)  118 mg/dL (09-21 @ 22:29)  54 mg/dL (09-21 @ 23:20) - received 1mg of glucagon   132 mg/dL (09-21 @ 23:42)  408 mg/dL (09-22 @ 00:09)  158 mg/dL (09-22 @ 01:04)  138 mg/dL (09-22 @ 01:33)  145 mg/dL (09-22 @ 02:29)  122 mg/dL (09-22 @ 06:16)  153 mg/dL (09-22 @ 07:58)  165 mg/dL (09-22 @ 09:40)        09-21-23 @ 07:01  -  09-22-23 @ 07:00  --------------------------------------------------------  IN: 1880 mL / OUT: 150 mL / NET: 1730 mL    09-22-23 @ 07:01  -  09-22-23 @ 10:16  --------------------------------------------------------  IN: 120 mL / OUT: 0 mL / NET: 120 mL        ASSESSMENT / RECOMMENDATIONS   82y Female with a past medical history of HTN, atrial fibrillation, chronic bronchiectasis, recent hospitalization for PRES (5-6/2023), recent hospitalization in 08/23 acute bronchiectasis exacerbation came to [facility] on 9/19 with altered mental status, visual disturbances and diplopia.  Pt was initially admitted under neuro service for encephalopathy and r/o stroke.  CT head unremarkable for large transcortical infarct or definite acute intracranial hemorrhage.   CT chest shows diffuse bilateral bronchiectasis with multiple areas of distal mucoid impaction and scattered tree-in-bud opacities.  On 9/21, pt had a rapid response for nonsensical and dysarthric speech and noted to have intermitted LE jerking movements.  Pt was found to have glucose of 34. improved with one amp of glucose.  Pt later become hypoxic, tachypeneic and dyspneic and desaturated.  Repeat glucose at that time was 27, pt remained lethargic and received additional 2 amps of glucose which climbed to 401 and D10 was initiated and pt was subsequently transferred to ICU.  Endocrinology has been consulted for Hypoglycemia.      A1C: 5.5 %  BUN: 17  Creatinine: 0.59  GFR: 91  Weight: 32.5  BMI: 11.9      # Hypoglycemia  - cpeptide 09/21: 0.9, pending proinsulin, serum insulin level, betahydroxybutyrate  - pt did not receive any insulin products or agents that have high evidence to induce hypoglycemia during hospital course  - did receive last lopressor at 6am on 9/19  - Pt was on Bactrim, ciprofloxacin and cefepime earlier in the month for outpatient abx, there are some low quality evidence of association between certain class of abx causing hypoglycemia - however these drugs should be cleared by now  - some possible etiologies: 2/2 sepsis vs hormone deficiency vs endogenous production of insulin  - next time patient become hypoglycemic, please send the following labs right away: c-peptide, proinsulin, beta-hydroxybutyrate and BMP  - Free T4 0.856, TSH 3.87,        Case discussed with Dr. Hill. Primary team updated.       Michell Borja  Endocrinology Fellow    Service Pager: [phone]  HISTORY OF PRESENT ILLNESS  SAIDA BATES is a 82y Female with a past medical history of HTN, atrial fibrillation, chronic bronchiectasis, recent hospitalization for PRES (5-6/2023) at hospital in [place], [place], recent hospitalization in 08/23 acute bronchiectasis exacerbation in [facility] came to [facility] on 9/19 with altered mental status, confusion, visual disturbances and diplopia.  Pt was initially admitted under neuro service for encephalopathy and r/o stroke.  CT head unremarkable for large transcortical infarct or definite acute intracranial hemorrhage.   CT chest shows diffuse bilateral bronchiectasis with multiple areas of distal mucoid impaction and scattered tree-in-bud opacities.  On 9/21, pt had a rapid response for nonsensical and dysarthric speech and noted to have intermitted LE jerking movements.  Pt was found to have glucose of 34. improved with one amp of glucose.  Pt later become hypoxic, tachypeneic, hypothermic and dyspneic and desaturated.  Pt was also noted to have teeth clenching.  Repeat glucose at that time was 27, pt remained lethargic and received additional 2 amps of glucose which climbed to 401 and D10 was initiated and pt was subsequently transferred to ICU.  Endocrinology has been consulted for Hypoglycemia.    Note from rapid response:  FSG 3:50 pm 27> 2 amps glucose> 4:04 pm 391>4:24 pm 285>4:39 pm 229> 5 pm 140 d10 at 50 cc per hr started. (received total 5 amps of glucose)    During rapid response, labs are notable for WBC 11.4, Na 134, K 3.3, lactate 2.9, Free T4 0.856, TSH 3.87, Cr 0.59, AST 26, ALT 28.  Pt remained in normotensive to hypertensive ranges.    Of note, daughter endorses pt was hospitalized with encephalopathy in [facility] in May for 4 weeks, the final dx was PRES.  Pt was discharged to rehab.   Prior to 05/23 hospital course, pt was alert and oriented with minimal confusion.   After discharge, pt was regaining the cognitive function back.   During 08/23 hospital course, pt discharged from [facility] with inhaled cayston, airway clearance device, and PO abx course: bactrim 800-160mg  for stenotrophomonas and ciprofloxacin 500mg q12 for pseudomonas. Seen by Dr Foster on 9/5/23, recommended IV cefepime due to persistent symptoms despite completing PO abx. PICC placed on 9/13/23.   Daughter states pt eats regular diet at home.  Endorses weight loss over the few years, attributing to underlying bronchiectasis.  Denies history of prior hypoglycemic events and history of diabetes.      ALLERGIES  Ceclor (Rash)  IV Contrast (Unknown)  Levaquin (Swelling)  Augmentin (Short breath; Rash)      CURRENT MEDICATIONS  albuterol/ipratropium for Nebulization 3 milliLiter(s) Nebulizer every 6 hours  atorvastatin 40 milliGRAM(s) Oral at bedtime  cefepime   IVPB 2000 milliGRAM(s) IV Intermittent every 12 hours  chlorhexidine 2% Cloths 1 Application(s) Topical <User Schedule>  chlorhexidine 4% Liquid 1 Application(s) Topical <User Schedule>  dexMEDEtomidine Infusion 0.2 MICROgram(s)/kG/Hr IV Continuous <Continuous>  dextrose 10%. 1000 milliLiter(s) IV Continuous <Continuous>  melatonin 5 milliGRAM(s) Oral at bedtime  montelukast 10 milliGRAM(s) Oral daily  multivitamin  Chewable 1 Tablet(s) Oral daily  sodium chloride 0.9% lock flush 10 milliLiter(s) IV Push every 1 hour PRN  sodium chloride 7% Inhalation 4 milliLiter(s) Inhalation every 6 hours  thiamine 100 milliGRAM(s) Oral daily      REVIEW OF SYSTEMS  Constitutional:  Negative fever, chills or loss of appetite.  Eyes:  Negative blurry vision or double vision.  Cardiovascular:  Negative for chest pain or palpitations.  Respiratory:  Negative for cough, wheezing, or shortness of breath.   Gastrointestinal:  Negative for nausea, vomiting, diarrhea, constipation, or abdominal pain.  Genitourinary:  Negative frequency, urgency or dysuria.  Neurologic:  No headache, confusion, dizziness, lightheadedness.    PHYSICAL EXAM  Vital Signs Last 24 Hrs  T(C): 36.5 (22 Sep 2023 10:05), Max: 36.8 (21 Sep 2023 14:36)  T(F): 97.7 (22 Sep 2023 10:05), Max: 98.2 (21 Sep 2023 14:36)  HR: 93 (22 Sep 2023 10:00) (58 - 145)  BP: 153/73 (22 Sep 2023 10:00) (91/48 - 185/97)  BP(mean): 105 (22 Sep 2023 10:00) (66 - 133)  RR: 10 (22 Sep 2023 10:00) (10 - 73)  SpO2: 100% (22 Sep 2023 10:00) (87% - 100%)    Parameters below as of 22 Sep 2023 10:00  Patient On (Oxygen Delivery Method): nasal cannula  O2 Flow (L/min): 2  Constitutional: Awake, alert, in no acute distress.   HEENT: Normocephalic, atraumatic, SANDRA, no proptosis or lid retraction.   Neck: supple, no acanthosis, no thyromegaly or palpable thyroid nodules.  Respiratory: Lungs clear to ausculation bilaterally.   Cardiovascular: regular rhythm, normal S1 and S2, no audible murmurs.   GI: soft, non-tender, non-distended, bowel sounds present, no masses appreciated.  Extremities: No lower extremity edema, peripheral pulses present.   Skin: no rashes.   Psychiatric: AAO x 3. Normal affect/mood.     LABS  CBC - WBC/HGB/HTC/PLT: 12.98/11.3/33.2/196 (09-22-23)  BMP: Na/K/Cl/Bicarb/BUN/Cr/Gluc: 134/3.9/101/25/17/0.59/144 (09-22-23)  Anion Gap: 8 (09-22-23)  eGFR: 91 (09-22-23)  Calcium: 8.9 (09-22-23)  Phosphorus: 3.8 (09-22-23)  Magnesium: 1.7 (09-22-23)  LFT - Alb/Tprot/Tbili/Dbili/AlkPhos/ALT/AST: 3.4/--/0.7/--/114/28/26 (09-22-23)  PT/aPTT/INR: 11.7/31.3/1.03 (09-21-23)  Thyroid Stimulating Hormone, Serum: 3.870 (09-20-23)  Total T4/Free T4: 6.47/0.856 (09-21-23)  CBC - WBC/HGB/HTC/PLT: 12.98/11.3/33.2/196 (09-22-23)BMP: Na/K/Cl/Bicarb/BUN/Cr/Gluc: 134/3.9/101/25/17/0.59/144 (09-22-23)  Anion Gap: 8 (09-22-23)  eGFR: 91 (09-22-23)  Calcium: 8.9 (09-22-23)  Phosphorus: 3.8 (09-22-23)  Magnesium: 1.7 (09-22-23)    CAPILLARY BLOOD GLUCOSE & INSULIN RECEIVED  32 mg/dL (09-21 @ 13:50)  33 mg/dL (09-21 @ 13:53)  187 mg/dL (09-21 @ 14:16)  27 mg/dL (09-21 @ 15:52)  401 mg/dL (09-21 @ 16:01)  391 mg/dL (09-21 @ 16:04)  258 mg/dL (09-21 @ 16:27)  229 mg/dL (09-21 @ 16:39)  140 mg/dL (09-21 @ 17:01)  92 mg/dL (09-21 @ 17:23)  35 mg/dL (09-21 @ 18:04)  267 mg/dL (09-21 @ 18:20)  127 mg/dL (09-21 @ 19:08)  82 mg/dL (09-21 @ 19:58)  53 mg/dL (09-21 @ 20:37)  176 mg/dL (09-21 @ 21:07)  132 mg/dL (09-21 @ 21:42)  118 mg/dL (09-21 @ 22:29)  54 mg/dL (09-21 @ 23:20) - received 1mg of glucagon   132 mg/dL (09-21 @ 23:42)  408 mg/dL (09-22 @ 00:09)  158 mg/dL (09-22 @ 01:04)  138 mg/dL (09-22 @ 01:33)  145 mg/dL (09-22 @ 02:29)  122 mg/dL (09-22 @ 06:16)  153 mg/dL (09-22 @ 07:58)  165 mg/dL (09-22 @ 09:40)        09-21-23 @ 07:01  -  09-22-23 @ 07:00  --------------------------------------------------------  IN: 1880 mL / OUT: 150 mL / NET: 1730 mL    09-22-23 @ 07:01  -  09-22-23 @ 10:16  --------------------------------------------------------  IN: 120 mL / OUT: 0 mL / NET: 120 mL        ASSESSMENT / RECOMMENDATIONS   82y Female with a past medical history of HTN, atrial fibrillation, chronic bronchiectasis, recent hospitalization for PRES (5-6/2023), recent hospitalization in 08/23 acute bronchiectasis exacerbation came to [facility] on 9/19 with altered mental status, visual disturbances and diplopia.  Pt was initially admitted under neuro service for encephalopathy and r/o stroke.  CT head unremarkable for large transcortical infarct or definite acute intracranial hemorrhage.   CT chest shows diffuse bilateral bronchiectasis with multiple areas of distal mucoid impaction and scattered tree-in-bud opacities.  On 9/21, pt had a rapid response for nonsensical and dysarthric speech and noted to have intermitted LE jerking movements.  Pt was found to have glucose of 34. improved with one amp of glucose.  Pt later become hypoxic, tachypeneic and dyspneic and desaturated.  Repeat glucose at that time was 27, pt remained lethargic and received additional 2 amps of glucose which climbed to 401 and D10 was initiated and pt was subsequently transferred to ICU.  Endocrinology has been consulted for Hypoglycemia.      A1C: 5.5 %  BUN: 17  Creatinine: 0.59  GFR: 91  Weight: 32.5  BMI: 11.9  Free T4 0.856, TSH 3.87,      # Hypoglycemia  - cpeptide 09/21: 0.9, pending proinsulin, serum insulin level, betahydroxybutyrate  - pt did not receive any insulin products or agents that have high evidence to induce hypoglycemia during hospital course  - did receive last lopressor at 6am on 9/19  - Pt was on Bactrim, ciprofloxacin and cefepime earlier in the month for outpatient abx, there are some low quality evidence of association between certain class of abx causing hypoglycemia - however these drugs should be cleared by now  - some possible etiologies: 2/2 sepsis vs hormone deficiency vs endogenous production of insulin  - next time patient become hypoglycemic, please send the following labs right away: serum cortisol, c-peptide, proinsulin, beta-hydroxybutyrate and BMP  - pt currently on D10 @ 120ml/hr, sodium has decreased to 132, please change fluids to D10NS @ 90ml/hr, check bmp q4, every 4 hours if the glucose remains above 90, can decrease the fluids by 10ml/hr        Case discussed with Dr. Hill. Primary team updated.       Michell Borja  Endocrinology Fellow    Service Pager: [phone]

## 2023-09-22 NOTE — CONSULT NOTE ADULT - CONVERSATION DETAILS
Reviewed patient's hospital course and interval developments. Discussed advanced directives including code status, HCP completion, and hospice eligibility. Daughter is struggling to see patient with her current debility and encephalopathy. Patient had previously stated that she would want to be given an opportunity to recover but intact mental status was a priority. Reaffirmed that CPR and Intubation would not be in line with patient's wishes for good quality of life. Discussed that a trial of critical care for the weekend seemed appropriate but if patient is not neurologically recovering in a few days then daughter would be open to a discussion regarding transition to symptom-directed care.

## 2023-09-23 DIAGNOSIS — Z71.89 OTHER SPECIFIED COUNSELING: ICD-10-CM

## 2023-09-23 DIAGNOSIS — R53.81 OTHER MALAISE: ICD-10-CM

## 2023-09-23 DIAGNOSIS — I63.9 CEREBRAL INFARCTION, UNSPECIFIED: ICD-10-CM

## 2023-09-23 DIAGNOSIS — Z51.5 ENCOUNTER FOR PALLIATIVE CARE: ICD-10-CM

## 2023-09-23 DIAGNOSIS — J47.9 BRONCHIECTASIS, UNCOMPLICATED: ICD-10-CM

## 2023-09-23 DIAGNOSIS — G93.40 ENCEPHALOPATHY, UNSPECIFIED: ICD-10-CM

## 2023-09-23 LAB
ALBUMIN SERPL ELPH-MCNC: 3.3 G/DL — SIGNIFICANT CHANGE UP (ref 3.3–5)
ALP SERPL-CCNC: 97 U/L — SIGNIFICANT CHANGE UP (ref 40–120)
ALT FLD-CCNC: 21 U/L — SIGNIFICANT CHANGE UP (ref 10–45)
ANION GAP SERPL CALC-SCNC: 4 MMOL/L — LOW (ref 5–17)
ANION GAP SERPL CALC-SCNC: 5 MMOL/L — SIGNIFICANT CHANGE UP (ref 5–17)
ANION GAP SERPL CALC-SCNC: 5 MMOL/L — SIGNIFICANT CHANGE UP (ref 5–17)
ANION GAP SERPL CALC-SCNC: 7 MMOL/L — SIGNIFICANT CHANGE UP (ref 5–17)
AST SERPL-CCNC: 21 U/L — SIGNIFICANT CHANGE UP (ref 10–40)
BASOPHILS # BLD AUTO: 0.05 K/UL — SIGNIFICANT CHANGE UP (ref 0–0.2)
BASOPHILS NFR BLD AUTO: 0.5 % — SIGNIFICANT CHANGE UP (ref 0–2)
BILIRUB SERPL-MCNC: 0.5 MG/DL — SIGNIFICANT CHANGE UP (ref 0.2–1.2)
BUN SERPL-MCNC: 6 MG/DL — LOW (ref 7–23)
BUN SERPL-MCNC: 6 MG/DL — LOW (ref 7–23)
BUN SERPL-MCNC: 7 MG/DL — SIGNIFICANT CHANGE UP (ref 7–23)
BUN SERPL-MCNC: 8 MG/DL — SIGNIFICANT CHANGE UP (ref 7–23)
CALCIUM SERPL-MCNC: 8.4 MG/DL — SIGNIFICANT CHANGE UP (ref 8.4–10.5)
CALCIUM SERPL-MCNC: 8.4 MG/DL — SIGNIFICANT CHANGE UP (ref 8.4–10.5)
CALCIUM SERPL-MCNC: 8.7 MG/DL — SIGNIFICANT CHANGE UP (ref 8.4–10.5)
CALCIUM SERPL-MCNC: 8.8 MG/DL — SIGNIFICANT CHANGE UP (ref 8.4–10.5)
CHLORIDE SERPL-SCNC: 100 MMOL/L — SIGNIFICANT CHANGE UP (ref 96–108)
CHLORIDE SERPL-SCNC: 101 MMOL/L — SIGNIFICANT CHANGE UP (ref 96–108)
CHLORIDE SERPL-SCNC: 103 MMOL/L — SIGNIFICANT CHANGE UP (ref 96–108)
CHLORIDE SERPL-SCNC: 103 MMOL/L — SIGNIFICANT CHANGE UP (ref 96–108)
CO2 SERPL-SCNC: 27 MMOL/L — SIGNIFICANT CHANGE UP (ref 22–31)
CO2 SERPL-SCNC: 28 MMOL/L — SIGNIFICANT CHANGE UP (ref 22–31)
CO2 SERPL-SCNC: 28 MMOL/L — SIGNIFICANT CHANGE UP (ref 22–31)
CO2 SERPL-SCNC: 29 MMOL/L — SIGNIFICANT CHANGE UP (ref 22–31)
CORTIS AM PEAK SERPL-MCNC: 20.86 UG/DL — HIGH (ref 6.02–18.4)
CREAT SERPL-MCNC: 0.7 MG/DL — SIGNIFICANT CHANGE UP (ref 0.5–1.3)
CREAT SERPL-MCNC: 0.71 MG/DL — SIGNIFICANT CHANGE UP (ref 0.5–1.3)
CREAT SERPL-MCNC: 0.72 MG/DL — SIGNIFICANT CHANGE UP (ref 0.5–1.3)
CREAT SERPL-MCNC: 0.74 MG/DL — SIGNIFICANT CHANGE UP (ref 0.5–1.3)
EGFR: 82 ML/MIN/1.73M2 — SIGNIFICANT CHANGE UP
EGFR: 84 ML/MIN/1.73M2 — SIGNIFICANT CHANGE UP
EGFR: 86 ML/MIN/1.73M2 — SIGNIFICANT CHANGE UP
EGFR: 87 ML/MIN/1.73M2 — SIGNIFICANT CHANGE UP
EOSINOPHIL # BLD AUTO: 0.04 K/UL — SIGNIFICANT CHANGE UP (ref 0–0.5)
EOSINOPHIL NFR BLD AUTO: 0.4 % — SIGNIFICANT CHANGE UP (ref 0–6)
GLUCOSE BLDC GLUCOMTR-MCNC: 108 MG/DL — HIGH (ref 70–99)
GLUCOSE BLDC GLUCOMTR-MCNC: 117 MG/DL — HIGH (ref 70–99)
GLUCOSE BLDC GLUCOMTR-MCNC: 147 MG/DL — HIGH (ref 70–99)
GLUCOSE SERPL-MCNC: 112 MG/DL — HIGH (ref 70–99)
GLUCOSE SERPL-MCNC: 113 MG/DL — HIGH (ref 70–99)
GLUCOSE SERPL-MCNC: 124 MG/DL — HIGH (ref 70–99)
GLUCOSE SERPL-MCNC: 149 MG/DL — HIGH (ref 70–99)
HCT VFR BLD CALC: 30.8 % — LOW (ref 34.5–45)
HGB BLD-MCNC: 10.3 G/DL — LOW (ref 11.5–15.5)
IMM GRANULOCYTES NFR BLD AUTO: 0.2 % — SIGNIFICANT CHANGE UP (ref 0–0.9)
LACTATE SERPL-SCNC: 1.6 MMOL/L — SIGNIFICANT CHANGE UP (ref 0.5–2)
LYMPHOCYTES # BLD AUTO: 1.15 K/UL — SIGNIFICANT CHANGE UP (ref 1–3.3)
LYMPHOCYTES # BLD AUTO: 12.5 % — LOW (ref 13–44)
MAGNESIUM SERPL-MCNC: 1.7 MG/DL — SIGNIFICANT CHANGE UP (ref 1.6–2.6)
MAGNESIUM SERPL-MCNC: 1.8 MG/DL — SIGNIFICANT CHANGE UP (ref 1.6–2.6)
MCHC RBC-ENTMCNC: 32.9 PG — SIGNIFICANT CHANGE UP (ref 27–34)
MCHC RBC-ENTMCNC: 33.4 GM/DL — SIGNIFICANT CHANGE UP (ref 32–36)
MCV RBC AUTO: 98.4 FL — SIGNIFICANT CHANGE UP (ref 80–100)
MONOCYTES # BLD AUTO: 0.8 K/UL — SIGNIFICANT CHANGE UP (ref 0–0.9)
MONOCYTES NFR BLD AUTO: 8.7 % — SIGNIFICANT CHANGE UP (ref 2–14)
MRSA PCR RESULT.: NEGATIVE — SIGNIFICANT CHANGE UP
NEUTROPHILS # BLD AUTO: 7.12 K/UL — SIGNIFICANT CHANGE UP (ref 1.8–7.4)
NEUTROPHILS NFR BLD AUTO: 77.7 % — HIGH (ref 43–77)
NRBC # BLD: 0 /100 WBCS — SIGNIFICANT CHANGE UP (ref 0–0)
PHOSPHATE SERPL-MCNC: 2.9 MG/DL — SIGNIFICANT CHANGE UP (ref 2.5–4.5)
PHOSPHATE SERPL-MCNC: 3.4 MG/DL — SIGNIFICANT CHANGE UP (ref 2.5–4.5)
PLATELET # BLD AUTO: 184 K/UL — SIGNIFICANT CHANGE UP (ref 150–400)
POTASSIUM SERPL-MCNC: 3.7 MMOL/L — SIGNIFICANT CHANGE UP (ref 3.5–5.3)
POTASSIUM SERPL-MCNC: 3.8 MMOL/L — SIGNIFICANT CHANGE UP (ref 3.5–5.3)
POTASSIUM SERPL-MCNC: 3.8 MMOL/L — SIGNIFICANT CHANGE UP (ref 3.5–5.3)
POTASSIUM SERPL-MCNC: 3.9 MMOL/L — SIGNIFICANT CHANGE UP (ref 3.5–5.3)
POTASSIUM SERPL-SCNC: 3.7 MMOL/L — SIGNIFICANT CHANGE UP (ref 3.5–5.3)
POTASSIUM SERPL-SCNC: 3.8 MMOL/L — SIGNIFICANT CHANGE UP (ref 3.5–5.3)
POTASSIUM SERPL-SCNC: 3.8 MMOL/L — SIGNIFICANT CHANGE UP (ref 3.5–5.3)
POTASSIUM SERPL-SCNC: 3.9 MMOL/L — SIGNIFICANT CHANGE UP (ref 3.5–5.3)
PROT SERPL-MCNC: 6.3 G/DL — SIGNIFICANT CHANGE UP (ref 6–8.3)
RBC # BLD: 3.13 M/UL — LOW (ref 3.8–5.2)
RBC # FLD: 12.3 % — SIGNIFICANT CHANGE UP (ref 10.3–14.5)
S AUREUS DNA NOSE QL NAA+PROBE: NEGATIVE — SIGNIFICANT CHANGE UP
SODIUM SERPL-SCNC: 133 MMOL/L — LOW (ref 135–145)
SODIUM SERPL-SCNC: 135 MMOL/L — SIGNIFICANT CHANGE UP (ref 135–145)
SODIUM SERPL-SCNC: 135 MMOL/L — SIGNIFICANT CHANGE UP (ref 135–145)
SODIUM SERPL-SCNC: 137 MMOL/L — SIGNIFICANT CHANGE UP (ref 135–145)
WBC # BLD: 9.18 K/UL — SIGNIFICANT CHANGE UP (ref 3.8–10.5)
WBC # FLD AUTO: 9.18 K/UL — SIGNIFICANT CHANGE UP (ref 3.8–10.5)

## 2023-09-23 PROCEDURE — 99233 SBSQ HOSP IP/OBS HIGH 50: CPT

## 2023-09-23 PROCEDURE — 99232 SBSQ HOSP IP/OBS MODERATE 35: CPT | Mod: GC

## 2023-09-23 RX ORDER — ENOXAPARIN SODIUM 100 MG/ML
30 INJECTION SUBCUTANEOUS EVERY 24 HOURS
Refills: 0 | Status: DISCONTINUED | OUTPATIENT
Start: 2023-09-23 | End: 2023-10-10

## 2023-09-23 RX ORDER — QUETIAPINE FUMARATE 200 MG/1
12.5 TABLET, FILM COATED ORAL ONCE
Refills: 0 | Status: COMPLETED | OUTPATIENT
Start: 2023-09-23 | End: 2023-09-26

## 2023-09-23 RX ORDER — DEXMEDETOMIDINE HYDROCHLORIDE IN 0.9% SODIUM CHLORIDE 4 UG/ML
0.2 INJECTION INTRAVENOUS
Qty: 400 | Refills: 0 | Status: DISCONTINUED | OUTPATIENT
Start: 2023-09-23 | End: 2023-09-25

## 2023-09-23 RX ORDER — HALOPERIDOL DECANOATE 100 MG/ML
4 INJECTION INTRAMUSCULAR ONCE
Refills: 0 | Status: DISCONTINUED | OUTPATIENT
Start: 2023-09-23 | End: 2023-09-23

## 2023-09-23 RX ORDER — QUETIAPINE FUMARATE 200 MG/1
12.5 TABLET, FILM COATED ORAL ONCE
Refills: 0 | Status: DISCONTINUED | OUTPATIENT
Start: 2023-09-23 | End: 2023-09-23

## 2023-09-23 RX ORDER — MAGNESIUM SULFATE 500 MG/ML
2 VIAL (ML) INJECTION ONCE
Refills: 0 | Status: DISCONTINUED | OUTPATIENT
Start: 2023-09-23 | End: 2023-09-23

## 2023-09-23 RX ORDER — SODIUM CHLORIDE 9 MG/ML
1000 INJECTION, SOLUTION INTRAVENOUS
Refills: 0 | Status: DISCONTINUED | OUTPATIENT
Start: 2023-09-23 | End: 2023-09-23

## 2023-09-23 RX ORDER — HALOPERIDOL DECANOATE 100 MG/ML
2 INJECTION INTRAMUSCULAR ONCE
Refills: 0 | Status: COMPLETED | OUTPATIENT
Start: 2023-09-23 | End: 2023-09-23

## 2023-09-23 RX ORDER — ACETAMINOPHEN 500 MG
650 TABLET ORAL ONCE
Refills: 0 | Status: COMPLETED | OUTPATIENT
Start: 2023-09-23 | End: 2023-09-23

## 2023-09-23 RX ORDER — MAGNESIUM SULFATE 500 MG/ML
2 VIAL (ML) INJECTION ONCE
Refills: 0 | Status: COMPLETED | OUTPATIENT
Start: 2023-09-23 | End: 2023-09-23

## 2023-09-23 RX ORDER — METOPROLOL TARTRATE 50 MG
2.5 TABLET ORAL EVERY 6 HOURS
Refills: 0 | Status: DISCONTINUED | OUTPATIENT
Start: 2023-09-23 | End: 2023-09-24

## 2023-09-23 RX ORDER — SODIUM CHLORIDE 9 MG/ML
1000 INJECTION, SOLUTION INTRAVENOUS
Refills: 0 | Status: DISCONTINUED | OUTPATIENT
Start: 2023-09-23 | End: 2023-09-24

## 2023-09-23 RX ORDER — HALOPERIDOL DECANOATE 100 MG/ML
4 INJECTION INTRAMUSCULAR ONCE
Refills: 0 | Status: COMPLETED | OUTPATIENT
Start: 2023-09-23 | End: 2023-09-23

## 2023-09-23 RX ADMIN — Medication 2.5 MILLIGRAM(S): at 16:34

## 2023-09-23 RX ADMIN — Medication 2.5 MILLIGRAM(S): at 22:11

## 2023-09-23 RX ADMIN — ENOXAPARIN SODIUM 30 MILLIGRAM(S): 100 INJECTION SUBCUTANEOUS at 16:36

## 2023-09-23 RX ADMIN — PIPERACILLIN AND TAZOBACTAM 25 GRAM(S): 4; .5 INJECTION, POWDER, LYOPHILIZED, FOR SOLUTION INTRAVENOUS at 07:48

## 2023-09-23 RX ADMIN — PIPERACILLIN AND TAZOBACTAM 25 GRAM(S): 4; .5 INJECTION, POWDER, LYOPHILIZED, FOR SOLUTION INTRAVENOUS at 16:34

## 2023-09-23 RX ADMIN — SODIUM CHLORIDE 4 MILLILITER(S): 9 INJECTION INTRAMUSCULAR; INTRAVENOUS; SUBCUTANEOUS at 09:57

## 2023-09-23 RX ADMIN — CHLORHEXIDINE GLUCONATE 1 APPLICATION(S): 213 SOLUTION TOPICAL at 06:00

## 2023-09-23 RX ADMIN — PIPERACILLIN AND TAZOBACTAM 25 GRAM(S): 4; .5 INJECTION, POWDER, LYOPHILIZED, FOR SOLUTION INTRAVENOUS at 00:00

## 2023-09-23 RX ADMIN — HALOPERIDOL DECANOATE 2 MILLIGRAM(S): 100 INJECTION INTRAMUSCULAR at 00:15

## 2023-09-23 RX ADMIN — Medication 3 MILLILITER(S): at 03:02

## 2023-09-23 RX ADMIN — Medication 650 MILLIGRAM(S): at 17:51

## 2023-09-23 RX ADMIN — Medication 650 MILLIGRAM(S): at 18:16

## 2023-09-23 RX ADMIN — Medication 25 GRAM(S): at 21:20

## 2023-09-23 RX ADMIN — HALOPERIDOL DECANOATE 4 MILLIGRAM(S): 100 INJECTION INTRAMUSCULAR at 19:28

## 2023-09-23 RX ADMIN — DEXMEDETOMIDINE HYDROCHLORIDE IN 0.9% SODIUM CHLORIDE 1.63 MICROGRAM(S)/KG/HR: 4 INJECTION INTRAVENOUS at 11:15

## 2023-09-23 RX ADMIN — SODIUM CHLORIDE 4 MILLILITER(S): 9 INJECTION INTRAMUSCULAR; INTRAVENOUS; SUBCUTANEOUS at 03:02

## 2023-09-23 RX ADMIN — Medication 2.5 MILLIGRAM(S): at 10:15

## 2023-09-23 NOTE — PROGRESS NOTE ADULT - SUBJECTIVE AND OBJECTIVE BOX
Neurology Stroke Progress Note    INTERVAL HPI/OVERNIGHT EVENTS:  Patient seen and examined.  number ______ used.    MEDICATIONS  (STANDING):  albuterol/ipratropium for Nebulization 3 milliLiter(s) Nebulizer every 6 hours  atorvastatin 40 milliGRAM(s) Oral at bedtime  chlorhexidine 2% Cloths 1 Application(s) Topical <User Schedule>  chlorhexidine 4% Liquid 1 Application(s) Topical <User Schedule>  dexMEDEtomidine Infusion 0.2 MICROgram(s)/kG/Hr (1.63 mL/Hr) IV Continuous <Continuous>  dextrose 10% + sodium chloride 0.9%. 1000 milliLiter(s) (60 mL/Hr) IV Continuous <Continuous>  enoxaparin Injectable 40 milliGRAM(s) SubCutaneous every 24 hours  melatonin 5 milliGRAM(s) Oral at bedtime  metoprolol tartrate Injectable 2.5 milliGRAM(s) IV Push every 6 hours  montelukast 10 milliGRAM(s) Oral daily  multivitamin  Chewable 1 Tablet(s) Oral daily  piperacillin/tazobactam IVPB.. 4.5 Gram(s) IV Intermittent every 8 hours  sodium chloride 7% Inhalation 4 milliLiter(s) Inhalation every 6 hours  thiamine 100 milliGRAM(s) Oral daily  vancomycin  IVPB 500 milliGRAM(s) IV Intermittent every 24 hours    MEDICATIONS  (PRN):  sodium chloride 0.9% lock flush 10 milliLiter(s) IV Push every 1 hour PRN Pre/post blood products, medications, blood draw, and to maintain line patency      Allergies    Ceclor (Rash)  IV Contrast (Unknown)  Levaquin (Swelling)  Augmentin (Short breath; Rash)    Intolerances        Vital Signs Last 24 Hrs  T(C): 37.5 (23 Sep 2023 01:01), Max: 37.5 (23 Sep 2023 01:01)  T(F): 99.5 (23 Sep 2023 01:01), Max: 99.5 (23 Sep 2023 01:01)  HR: 79 (23 Sep 2023 12:00) (73 - 140)  BP: 124/61 (23 Sep 2023 12:00) (61/31 - 190/116)  BP(mean): 83 (23 Sep 2023 12:00) (41 - 143)  RR: 32 (23 Sep 2023 12:00) (14 - 76)  SpO2: 95% (23 Sep 2023 12:00) (91% - 100%)    Parameters below as of 23 Sep 2023 12:00  Patient On (Oxygen Delivery Method): room air        Physical exam:  General: No acute distress, awake and alert  Eyes: Anicteric sclerae, moist conjunctivae, see below for CNs  Neck: trachea midline, FROM, supple, no thyromegaly or lymphadenopathy  Cardiovascular: Regular rate and rhythm, no murmurs, rubs, or gallops. No carotid bruits.   Pulmonary: Anterior breath sounds clear bilaterally, no crackles or wheezing. No use of accessory muscles  GI: Abdomen soft, non-distended, non-tender  Extremities: Noted with R well-demarcated erythematous     Neurologic:  -Mental status: Awake, alert, oriented to person but not place ( states flower pot), and time. Noted to have disorganized speech with intermittent periods of fluency. Speech is fluent with intact naming, repetition, and comprehension, no dysarthria. Able to follow simple commands with repetition with some errors.  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 Pupils equally round and reactive to light  V:  Facial sensation V1-V3 equal and intact   VII: Face is symmetric with normal eye closure and smile  VIII: Hearing is bilaterally intact to voice  XII: Tongue protrudes midline  Motor: Motor: Diminished bulk throughout. Normal tone. B/l UEs at least a 4/5 without drift. B/l LEs at least a 3/5 without drift.   Sensation: Intact to light touch bilaterally  Coordination: No dysmetria on finger-to-nose and heel-to-shin bilaterally  Reflexes: Downgoing toes bilaterally   Gait: Narrow gait and steady    LABS:                        10.3   9.18  )-----------( 184      ( 23 Sep 2023 05:56 )             30.8     09-23    137  |  103  |  6<L>  ----------------------------<  113<H>  3.9   |  29  |  0.72    Ca    8.7      23 Sep 2023 11:59  Phos  3.4     09-23  Mg     1.8     09-23    TPro  6.3  /  Alb  3.3  /  TBili  0.5  /  DBili  x   /  AST  21  /  ALT  21  /  AlkPhos  97  09-23    PT/INR - ( 21 Sep 2023 16:56 )   PT: 11.7 sec;   INR: 1.03          PTT - ( 21 Sep 2023 16:56 )  PTT:31.3 sec  Urinalysis Basic - ( 23 Sep 2023 11:59 )    Color: x / Appearance: x / SG: x / pH: x  Gluc: 113 mg/dL / Ketone: x  / Bili: x / Urobili: x   Blood: x / Protein: x / Nitrite: x   Leuk Esterase: x / RBC: x / WBC x   Sq Epi: x / Non Sq Epi: x / Bacteria: x        RADIOLOGY & ADDITIONAL TESTS:     Neurology Stroke Progress Note    INTERVAL HPI/OVERNIGHT EVENTS:  Patient seen and examined at bedside. Pt with agitation overnight, given zyprexa. Sitting calmly in bed now without agitation. On vancomycin and zosyn. BG stable.     MEDICATIONS  (STANDING):  albuterol/ipratropium for Nebulization 3 milliLiter(s) Nebulizer every 6 hours  atorvastatin 40 milliGRAM(s) Oral at bedtime  chlorhexidine 2% Cloths 1 Application(s) Topical <User Schedule>  chlorhexidine 4% Liquid 1 Application(s) Topical <User Schedule>  dexMEDEtomidine Infusion 0.2 MICROgram(s)/kG/Hr (1.63 mL/Hr) IV Continuous <Continuous>  dextrose 10% + sodium chloride 0.9%. 1000 milliLiter(s) (60 mL/Hr) IV Continuous <Continuous>  enoxaparin Injectable 40 milliGRAM(s) SubCutaneous every 24 hours  melatonin 5 milliGRAM(s) Oral at bedtime  metoprolol tartrate Injectable 2.5 milliGRAM(s) IV Push every 6 hours  montelukast 10 milliGRAM(s) Oral daily  multivitamin  Chewable 1 Tablet(s) Oral daily  piperacillin/tazobactam IVPB.. 4.5 Gram(s) IV Intermittent every 8 hours  sodium chloride 7% Inhalation 4 milliLiter(s) Inhalation every 6 hours  thiamine 100 milliGRAM(s) Oral daily  vancomycin  IVPB 500 milliGRAM(s) IV Intermittent every 24 hours    MEDICATIONS  (PRN):  sodium chloride 0.9% lock flush 10 milliLiter(s) IV Push every 1 hour PRN Pre/post blood products, medications, blood draw, and to maintain line patency      Allergies    Ceclor (Rash)  IV Contrast (Unknown)  Levaquin (Swelling)  Augmentin (Short breath; Rash)    Intolerances        Vital Signs Last 24 Hrs  T(C): 37.5 (23 Sep 2023 01:01), Max: 37.5 (23 Sep 2023 01:01)  T(F): 99.5 (23 Sep 2023 01:01), Max: 99.5 (23 Sep 2023 01:01)  HR: 79 (23 Sep 2023 12:00) (73 - 140)  BP: 124/61 (23 Sep 2023 12:00) (61/31 - 190/116)  BP(mean): 83 (23 Sep 2023 12:00) (41 - 143)  RR: 32 (23 Sep 2023 12:00) (14 - 76)  SpO2: 95% (23 Sep 2023 12:00) (91% - 100%)    Parameters below as of 23 Sep 2023 12:00  Patient On (Oxygen Delivery Method): room air        Physical exam:  General: No acute distress, awake and alert sitting up in bed with eyes wide and mouth open.   Eyes: Anicteric sclerae, moist conjunctivae, see below for CNs  Neck: trachea midline  Cardiovascular: Regular rate and rhythm on monitor  Pulmonary: No use of accessory muscles  Extremities: Noted R arm is noted to be dark pink (well-demarcated from right above R elbow to hand), warm to touch     Neurologic:  -Mental status: Awake, alert, oriented to person but not place ( states flower pot), and time. Noted to have disorganized speech with intermittent periods of fluency. Speech is fluent with intact naming, repetition, and comprehension, no dysarthria. Able to follow simple commands with repetition with some errors.  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 Pupils equally round and reactive to light  V:  Facial sensation V1-V3 equal and intact   VII: Face is symmetric with normal eye closure and smile  VIII: Hearing is bilaterally intact to voice  XII: Tongue protrudes midline  Motor: Motor: Diminished bulk throughout. Normal tone. B/l UEs at least a 4/5 without drift. B/l LEs at least a 3/5 without drift.   Sensation: Intact to light touch bilaterally  Gait: deferred    LABS:                        10.3   9.18  )-----------( 184      ( 23 Sep 2023 05:56 )             30.8     09-23    137  |  103  |  6<L>  ----------------------------<  113<H>  3.9   |  29  |  0.72    Ca    8.7      23 Sep 2023 11:59  Phos  3.4     09-23  Mg     1.8     09-23    TPro  6.3  /  Alb  3.3  /  TBili  0.5  /  DBili  x   /  AST  21  /  ALT  21  /  AlkPhos  97  09-23    PT/INR - ( 21 Sep 2023 16:56 )   PT: 11.7 sec;   INR: 1.03          PTT - ( 21 Sep 2023 16:56 )  PTT:31.3 sec  Urinalysis Basic - ( 23 Sep 2023 11:59 )    Color: x / Appearance: x / SG: x / pH: x  Gluc: 113 mg/dL / Ketone: x  / Bili: x / Urobili: x   Blood: x / Protein: x / Nitrite: x   Leuk Esterase: x / RBC: x / WBC x   Sq Epi: x / Non Sq Epi: x / Bacteria: x        RADIOLOGY & ADDITIONAL TESTS:    CT Head No Cont (09.20.23 @ 17:06) >  Impression:    Curvilinear densities within the right parietal and occipital sulci   suspicious for subarachnoid hemorrhage. No mass effect or midline shift.   No hydrocephalus.        MR Head No Cont (09.20.23 @ 21:52) >  IMPRESSION:  Incomplete and limited exam    Stable small subarachnoid hemorrhage in the right parietal-occipital   region given differences in technique.    Subcentimeter acute/subacute infarct in the left occipital lobe.        MR Angio Head No Cont (09.22.23 @ 18:53) >    MRA brain is negative for steno-occlusive disease, aneurysm or high flow   vascular malformation. No new/interval infarct on DWI since 9/20/23,   though mild cerebral edema has developed. Query amyloid related disease.    MRA neck is unremarkable allowing for mild motion artifact.    MRV brain is negative for thrombosis.

## 2023-09-23 NOTE — PROGRESS NOTE ADULT - ASSESSMENT
82y Female with PMHx of HTN, afib (not on AC due to fall risk), recent hospitalization for PRES (5-6/2023), recently discharged from [facility] 8/2023 with acute exacerbation of severe bronchiectasis and respiratory failure presents with several days of AMS and new diplopia 9/18. NIHSS 1 for bitemporal visual deficit. CTH with no significant findings. CTA and perfusion deferred due to contrast allergy. Admitted for further stroke w/u, started on Eliquis for secondary stroke prevention. Pulm following for patient's extensive respiratory history. Repeat CTH obtained for episode of worsening AMS on 09/20, found to have new R parietal and occipital SAH. Eliquis discontinued. Only tolerated noncontrast portion of MRI, found to have new L occipital ischemic stroke. Placed on VEEG for a short period of time, no epileptiform activity seen. Rapid response called on 9/21 for persistent hypoglycemic events despite adequate treatment and hypoxia. Found to be hypothermic, concern for active infection. Transferred to MICU for further management. Pt with worsening AMS yesterday with inability to follow simple commands, exam with much improvement today. Pt can states her name and follow all commands, still with some nonsensical dialogue. Pt had MRA head/neck and MRV done yesterday with anesthesia, unable to get MRI with contrast due to miscommunication of needing premedication for MRI contrast, pt with iodine contrast allergy.  MRA H/N: negative for steno-occlusive disease, aneurysm or high flow vascular malformation. No new/interval infarct on DWI since 9/20/23, though mild cerebral edema has developed. Query amyloid related disease. MRV: negative for thrombus. Plan to get MRI with contrast today, EEG placement and then VANNESSA when able. Possible LP may be warranted pending clinical course.    1)Secondary stroke prevention  - holding AC i/s/o SAH  - continue Atorvastatin 40 mg QD    2) Stroke risk factors  - A1C: 5.5  - LDL: 129   - TSH: 3.87   - SAH  - HTN  - history of PRES     3) Further management  - Please obtain MRI brain w/ contrast today  - Recommend prolonged VEEG as patient with episode concerning for possible seizure, also with waxing and waning; to be placed after MRI scan completed  - Recommend repeat CTH i/s/o declining mental status to r/o bleed vs stroke expansion when medically stable   - VANNESSA if patient can tolerate to r/o possible endocarditis   - recommend SBP goal <160   - recommend q1hr stroke neuro checks  - may need outpt neurology follow up  - provide stroke education    DVT prophylaxis   - SCDs    Discussed with Neurology Fellow Dr. Shi and Attending, Dr. Vallecillo   82y Female with PMHx of HTN, afib (not on AC due to fall risk), recent hospitalization for PRES (5-6/2023), recently discharged from [facility] 8/2023 with acute exacerbation of severe bronchiectasis and respiratory failure presents with several days of AMS and new diplopia 9/18. NIHSS 1 for bitemporal visual deficit. CTH with no significant findings. CTA and perfusion deferred due to contrast allergy. Admitted for further stroke w/u, started on Eliquis for secondary stroke prevention. Pulm following for patient's extensive respiratory history. Repeat CTH obtained for episode of worsening AMS on 09/20, found to have new R parietal and occipital SAH. Eliquis discontinued. Only tolerated noncontrast portion of MRI, found to have new L occipital ischemic stroke. Placed on VEEG for a short period of time, no epileptiform activity seen.   9/21-22 Rapid response called for persistent hypoglycemic events despite adequate treatment and hypoxia. Found to be hypothermic, concern for active infection. Transferred to MICU for further management. worsening AMS  9/23 Much improved exam  today. Pt can states her name and follow all commands, still with some nonsensical dialogue. Pt had MRA head/neck and MRV done yesterday with anesthesia, unable to get MRI with contrast due to miscommunication of needing premedication for MRI contrast. Pt has CT iodine contrast allergy.  MRA H/N: negative for steno-occlusive disease, aneurysm or high flow vascular malformation. No new/interval infarct on DWI since 9/20/23, though mild cerebral edema has developed. Query amyloid related disease. MRV: negative for thrombus. Plan to get MRI with contrast today, EEG placement and then VANNESSA when able. Possible LP may be warranted pending clinical course.    1)Secondary stroke prevention  - holding AC i/s/o SAH  - continue Atorvastatin 40 mg QD    2) Stroke risk factors  - A1C: 5.5  - LDL: 129   - TSH: 3.87   - SAH  - HTN  - history of PRES     3) Further management  - Please obtain MRI brain w/ contrast today  - Recommend prolonged VEEG as patient with episode concerning for possible seizure, also with waxing and waning; to be placed after MRI scan completed  - Recommend repeat CTH i/s/o declining mental status to r/o bleed vs stroke expansion when medically stable   - VANNESSA if patient can tolerate to r/o possible endocarditis   - recommend SBP goal <160   - recommend q2hr stroke neuro checks  - may need outpt neurology follow up  - provide stroke education    DVT prophylaxis   - SCDs    Discussed with Neurology Fellow Dr. Shi and Attending, Dr. Vallecillo  ---------  9/23 Improved mentation, returning to the recent baseline of confusion, demented, but conversational, intact motorx4, neuro exam, on Abx vanc/zosyn. Pmproved BG stability. Pending MRI w/ w/o C and PET brain for possible LBD (ok putpt).     Recommendation see above: reviewed and edited as needed.    Wan Shi MD PHD  Vascular neurology fellow

## 2023-09-23 NOTE — PROGRESS NOTE ADULT - NS ATTEND AMEND GEN_ALL_CORE FT
The patient is an 82 year old female with HTN, A fib (not on A/C due to frequent falls), bronchiectasis (on abx for recent exacerbation, previously on IV cefepime), and outside diagnosis of PRES in 05/2023 admitted for evaluation after a several day history of worsened confusion and visual complaints (details vague/unclear). Repeat HCT/MRI with small R occipital SAH and small L ischemic stroke. Acute worsening in mental status appears to be improving since the correction of hypoglycemia and/or treatment with broad spectrum abx. However, patient is not still at baseline.    Etiology of SAH/ischemic stroke and overall neurological presentation is unclear. Initial ? DLB + acute decompensation from imaging findings and hospitalization. Endocarditis with active systemic infection may also be a cause of imaging findings/presentation; blood cultures NTD. Repeat MRI does not show [place] evidence of large vessel vasculitis or CVST; cannot r/o inflammatory causes like ISMA/vasculitis/encephalitis.    Unfortunately, MRI brain w, w/o contrast not performed with vessel imaging. Will attempt to repeat again to r/o potential causes like ISMA. Rec VANNESSA and LP may be warranted pending clinical course. Can consider PET brain when able to eval for underlying DLB.  Resume DVT PPx; can consider aspirin.

## 2023-09-23 NOTE — PROGRESS NOTE ADULT - ASSESSMENT
82F, PMH of HTN, Afib (not on AC d/t fall risk), recent hospitalization for PRES (not at [facility], May-June '23), recent discharge for acute exacerbation of severe bronchiectasis (at [facility], Aug '23), presented w/ several days of AMS & new-onset diplopia since 9/15.   Pt started IV abx on 9/15 (2wk course of cefepime 2g IV BID for bronchiectasis exacerbation after concern from Dr. Foster that pt failed PO abx, - was dc'd in Aug on bactrim and cipro, got bactrim, cipro and zosyn inpatient- got outpt PICC placement on 9/13, 1st dose 9/15, got 7 doses total before presentation to hospital).    NEURO  #PRES  #Seizure  Pt hospitalized 05-06/2023 for PRES. At the time presented similar symptoms: confusion, AMS, forgetfulness, dropping things. Now presented w/ diplopia and bitemporal visual field anomalies, all findings consistent w/ PRES. Now with tangential, nonsensical speech, as well as some seizure-like activity. Twitching/seizures possibly 2/2 hypoglycemia  - repeat CT head  - repeat vEEG (initial no epileptiform activity, however pt ripped off after 8hrs)  - q1hr neuro checks  - c/w thiamine 100mg qd  - delirium precautions  - monitor fingersticks q1hr    #R subarachnoid hemorrhage  #L ischemic occipital stroke  Pt w/ bitemporal visual field deficits, worsened mental status throughout clinical course. Initially w/o findings on CT head, later found to have SAH, and then found to have Ischemic stroke on MRI brain.   - repeat CT head   - f/u w/ anesthesiology regarding pt sedation (will be required for imaging as pt cannot tolerate in this state of agitation)  - no findings on 9/22 MRA & MRV  - plan for MRI head w/ contrast today (per neuro pt allergic to CT contrast)   - q1hr neuro checks    CARDIO  #Afib  Hx of Afib. Not on a/c chronically d/t fall risk. Started on Eliquis presentation after discussion w/ family about 2ndary stroke prevention per neuro. Eliquis now dc'd i/s/o findings of SAH.  - hold all AC  - not in Afib currently  - frequent EKGs  - consider TFT panel     #HTN  Hx of HTN. Home med = Lopressor 50 BID. Goal SBP per neuro <160. BPs varied, soft when hypoglycemic. After blood glucose stable on D10, more hypertensive  - not tolerating PO, starting lopressor 2.5 IV BID  - hold home med.    PULM  #Bronchiectasis   Chronic bronchiectasis. Recent admission for it in Aug, was dc'd on Bactrim & Cipro PO, however recently started on Cefepime 2g IV BID after pt thought to have failed PO.   - dc'd cefepime i/s/o AMS and unclear improvement on cefepime  - c/w vanc 500mg qd (today is D2 of abx)  - c/w zosyn 4.5g TID    ENDO  #Hypoglycemia  Pt with recalcitrant hypoglycemia, unresponsive to multiple amps of dextrose, and repeat episodes of hypoglycemia to 20s and 30s. After given D10 pt Gluc went to 410, and dropped to 140 in less than 1hr. Per Endo: pt goal Glucose is >90.   - f/u cortisol levels in AM  - f/u Sulfonylurea panel  - f/u Proinsulin  - f/u on BHB, per Endo  - switch from D10 to D10/NS, needs isotonic fluid otherwise goes hyponatremic  - Per Endo, can go down on D10/NS rate every 4hrs as long as glucose >90  - switching drip to D10/NS   - c/w BMP q4hrs   - consider CT body i/s/o very high insulin levels (however low C-peptide so insulinoma less likely)  - c/w q1hr fingersticks  - Endo consulted, f/u recs    GI  BEN.    RENAL/  BEN.     Prophylactic Measures  Diet: Minced and Moist  DVT Prophylaxis: SCDs  GI Prophylaxis: Start PPIs     82F, PMH of HTN, Afib (not on AC d/t fall risk), recent hospitalization for PRES (not at [facility], May-June '23), recent discharge for acute exacerbation of severe bronchiectasis (at [facility], Aug '23), presented w/ several days of AMS & new-onset diplopia since 9/15.   Pt started IV abx on 9/15 (2wk course of cefepime 2g IV BID for bronchiectasis exacerbation after concern from Dr. Foster that pt failed PO abx, - was dc'd in Aug on bactrim and cipro, got bactrim, cipro and zosyn inpatient- got outpt PICC placement on 9/13, 1st dose 9/15, got 7 doses total before presentation to hospital).    NEURO  #PRES  #Seizure  Pt hospitalized 05-06/2023 for PRES. At the time presented similar symptoms: confusion, AMS, forgetfulness, dropping things. Now presented w/ diplopia and bitemporal visual field anomalies, all findings consistent w/ PRES. Now with tangential, nonsensical speech, as well as some seizure-like activity. Twitching/seizures possibly 2/2 hypoglycemia  - repeat CT head  - repeat vEEG (initial no epileptiform activity, however pt ripped off after 8hrs)  - q1hr neuro checks  - c/w thiamine 100mg qd  - delirium precautions  - monitor fingersticks q1hr    #R subarachnoid hemorrhage  #L ischemic occipital stroke  Pt w/ bitemporal visual field deficits, worsened mental status throughout clinical course. Initially w/o findings on CT head, later found to have SAH, and then found to have Ischemic stroke on MRI brain.   - repeat CT head   - f/u w/ anesthesiology regarding pt sedation (will be required for imaging as pt cannot tolerate in this state of agitation)  - no findings on 9/22 MRA & MRV  - plan for MRI head w/ contrast today (per neuro pt allergic to CT contrast)   - q1hr neuro checks    CARDIO  #Afib  Hx of Afib. Not on a/c chronically d/t fall risk. Started on Eliquis presentation after discussion w/ family about 2ndary stroke prevention per neuro. Eliquis now dc'd i/s/o findings of SAH.  - hold all AC  - not in Afib currently  - frequent EKGs  - consider TFT panel     #HTN  Hx of HTN. Home med = Lopressor 50 BID. Goal SBP per neuro <160. BPs varied, soft when hypoglycemic. After blood glucose stable on D10, more hypertensive  - not tolerating PO, starting lopressor 2.5 IV BID  - hold home med.    PULM  #Bronchiectasis   Chronic bronchiectasis. Recent admission for it in Aug, was dc'd on Bactrim & Cipro PO, however recently started on Cefepime 2g IV BID after pt thought to have failed PO.   - dc'd cefepime i/s/o AMS and unclear improvement on cefepime  - c/w vanc 500mg qd  - c/w zosyn 4.5g TID  - wean supplemental oxygen as tolerated    ENDO  #Hypoglycemia  Pt with recalcitrant hypoglycemia, unresponsive to multiple amps of dextrose, and repeat episodes of hypoglycemia to 20s and 30s. After given D10 pt Gluc went to 410, and dropped to 140 in less than 1hr. Per Endo: pt goal Glucose is >90.   - cortisol levels in AM --> elevated  - Proinsulin --> elevated >900  - BHB --> not elevated  - f/u Sulfonylurea panel  - c/w D10/NS, needs isotonic fluid otherwise goes hyponatremic  - Per Endo, can go down on D10/NS rate every 4hrs as long as glucose >90  - switching drip to D10/NS   - c/w BMP q4hrs   - consider CT body i/s/o very high insulin levels (however low C-peptide so insulinoma less likely)  - c/w q1hr fingersticks  - Endo consulted, f/u recs    GI  BEN.    RENAL/  BEN.     Prophylactic Measures  Diet: Minced and Moist  DVT Prophylaxis: SCDs  GI Prophylaxis: Start PPIs     82F, PMH of HTN, Afib (not on AC d/t fall risk), recent hospitalization for PRES (not at [facility], May-June '23), recent discharge for acute exacerbation of severe bronchiectasis (at [facility], Aug '23), presented w/ several days of AMS & new-onset diplopia since 9/15.   Pt started IV abx on 9/15 (2wk course of cefepime 2g IV BID for bronchiectasis exacerbation after concern from Dr. Foster that pt failed PO abx, - was dc'd in Aug on bactrim and cipro, got bactrim, cipro and zosyn inpatient- got outpt PICC placement on 9/13, 1st dose 9/15, got 7 doses total before presentation to hospital).    NEURO  #PRES  #Seizure  Pt hospitalized 05-06/2023 for PRES. At the time presented similar symptoms: confusion, AMS, forgetfulness, dropping things. Now presented w/ diplopia and bitemporal visual field anomalies, all findings consistent w/ PRES. Now with tangential, nonsensical speech, as well as some seizure-like activity. Twitching/seizures possibly 2/2 hypoglycemia  - repeat CT head  - repeat vEEG (initial no epileptiform activity, however pt ripped off after 8hrs)  - q1hr neuro checks  - c/w thiamine 100mg qd  - delirium precautions  - monitor fingersticks q1hr    #R subarachnoid hemorrhage  #L ischemic occipital stroke  Pt w/ bitemporal visual field deficits, worsened mental status throughout clinical course. Initially w/o findings on CT head, later found to have SAH, and then found to have Ischemic stroke on MRI brain.   - repeat CT head   - plan for MRI head w/ contrast today (per neuro pt allergic to CT contrast)   - s/p MRA & MRV 9/22---> overall unremarkable, mild cerebral edema, correlate w/ amyloid disease per radiology  - q1hr neuro checks    CARDIO  #Afib  Hx of Afib. Not on a/c chronically d/t fall risk. Started on Eliquis presentation after discussion w/ family about 2ndary stroke prevention per neuro. Eliquis now dc'd i/s/o findings of SAH.  - hold all AC  - not in Afib currently  - frequent EKGs  - consider TFT panel     #HTN  Hx of HTN. Home med = Lopressor 50 BID. Goal SBP per neuro <160. BPs varied, soft when hypoglycemic. After blood glucose stable on D10, more hypertensive  - not tolerating PO, starting lopressor 2.5 IV BID  - hold home med.    PULM  #Bronchiectasis   Chronic bronchiectasis. Recent admission for it in Aug, was dc'd on Bactrim & Cipro PO, however recently started on Cefepime 2g IV BID after pt thought to have failed PO.   - dc'd cefepime i/s/o AMS and unclear improvement on cefepime  - c/w vanc 500mg qd  - c/w zosyn 4.5g TID  - wean supplemental oxygen as tolerated    ENDO  #Hypoglycemia  Pt with recalcitrant hypoglycemia, unresponsive to multiple amps of dextrose, and repeat episodes of hypoglycemia to 20s and 30s. After given D10 pt Gluc went to 410, and dropped to 140 in less than 1hr. Per Endo: pt goal Glucose is >90.   - cortisol levels in AM --> elevated  - Proinsulin --> elevated >900  - BHB --> not elevated  - f/u Sulfonylurea panel  - c/w D10/NS, needs isotonic fluid otherwise goes hyponatremic  - Per Endo, can go down on D10/NS rate every 4hrs as long as glucose >90  - switching drip to D10/NS   - c/w frequent BMP  - consider CT body i/s/o very high insulin levels (however low C-peptide so insulinoma less likely)  - c/w frequent fingersticks  - Endo consulted, f/u recs    GI  BEN.    RENAL/  BEN.     Prophylactic Measures  Diet: Minced and Moist  DVT Prophylaxis: SCDs  GI Prophylaxis: Start PPIs     82F, PMH of HTN, Afib (not on AC d/t fall risk), recent hospitalization for PRES (not at [facility], May-June '23), recent discharge for acute exacerbation of severe bronchiectasis (at [facility], Aug '23), presented w/ several days of AMS & new-onset diplopia since 9/15.   Pt started IV abx on 9/15 (2wk course of cefepime 2g IV BID for bronchiectasis exacerbation after concern from Dr. Foster that pt failed PO abx, - was dc'd in Aug on bactrim and cipro, got bactrim, cipro and zosyn inpatient- got outpt PICC placement on 9/13, 1st dose 9/15, got 7 doses total before presentation to hospital).    NEURO  #PRES  #Seizure  Pt hospitalized 05-06/2023 for PRES. At the time presented similar symptoms: confusion, AMS, forgetfulness, dropping things. Now presented w/ diplopia and bitemporal visual field anomalies, all findings consistent w/ PRES. Now with tangential, nonsensical speech, as well as some seizure-like activity. Twitching/seizures possibly 2/2 hypoglycemia  - repeat CT head  - repeat vEEG (initial no epileptiform activity, however pt ripped off after 8hrs)  - q1hr neuro checks  - c/w thiamine 100mg qd  - delirium precautions  - monitor fingersticks q1hr    #R subarachnoid hemorrhage  #L ischemic occipital stroke  Pt w/ bitemporal visual field deficits, worsened mental status throughout clinical course. Initially w/o findings on CT head, later found to have SAH, and then found to have Ischemic stroke on MRI brain.   - repeat CT head   - plan for MRI head w/ contrast today (per neuro pt allergic to CT contrast)   - s/p MRA & MRV 9/22---> overall unremarkable, mild cerebral edema, correlate w/ amyloid disease per radiology  - q1hr neuro checks    CARDIO  #Afib  Hx of Afib. Not on a/c chronically d/t fall risk. Started on Eliquis presentation after discussion w/ family about 2ndary stroke prevention per neuro. Eliquis now dc'd i/s/o findings of SAH.  - hold all AC  - not in Afib currently  - frequent EKGs  - consider TFT panel     #HTN  Hx of HTN. Home med = Lopressor 50 BID. Goal SBP per neuro <160. BPs varied, soft when hypoglycemic. After blood glucose stable on D10, more hypertensive  - not tolerating PO, starting lopressor 2.5 IV BID  - hold home med.    PULM  #Bronchiectasis   Chronic bronchiectasis. Recent admission for it in Aug, was dc'd on Bactrim & Cipro PO, however recently started on Cefepime 2g IV BID after pt thought to have failed PO.   - dc'd cefepime i/s/o AMS and unclear improvement on cefepime  - c/w vanc 500mg qd  - c/w zosyn 4.5g TID  - wean supplemental oxygen as tolerated    ENDO  #Hypoglycemia  Pt with recalcitrant hypoglycemia, unresponsive to multiple amps of dextrose, and repeat episodes of hypoglycemia to 20s and 30s. After given D10 pt Gluc went to 410, and dropped to 140 in less than 1hr. Per Endo: pt goal Glucose is >90.   - cortisol levels in AM --> elevated  - Proinsulin --> elevated >900  - BHB --> not elevated  - f/u Sulfonylurea panel  - c/w D10/NS, needs isotonic fluid otherwise goes hyponatremic  - Per Endo, can go down on D10/NS rate every 4hrs as long as glucose >90  - switching drip to D10/NS   - c/w frequent BMP  - consider CT body i/s/o very high insulin levels (however low C-peptide so insulinoma less likely)  - c/w frequent fingersticks  - Endo consulted, f/u recs    GI  BEN.    RENAL/  BEN.     Prophylactic Measures  Diet: Minced and Moist  DVT Prophylaxis: SCDs  GI Prophylaxis: Start PPIs

## 2023-09-23 NOTE — PROGRESS NOTE ADULT - SUBJECTIVE AND OBJECTIVE BOX
Patient is a 82y old Female who presents with a chief complaint of diplopia (22 Sep 2023 14:25)    INTERVAL HPI/OVERNIGHT EVENTS:   Yesterday pt went for MRA & MRV w/o contrast, no acute findings from either. Pt received 50 of fentanyl for sedation during the scans as she could not tolerate d/t prominent agitation.     SUBJECTIVE/AT BEDSIDE:   At bedside pt is still very agitated, removing her HFNC. She is able to be put back on High flow, and subsequently relaxes somewhat.   ICU Vital Signs Last 24 Hrs  T(C): 37.5 (23 Sep 2023 01:01), Max: 37.5 (23 Sep 2023 01:01)  T(F): 99.5 (23 Sep 2023 01:01), Max: 99.5 (23 Sep 2023 01:01)  HR: 115 (23 Sep 2023 08:00) (73 - 140)  BP: 157/103 (23 Sep 2023 08:00) (61/31 - 190/116)  BP(mean): 125 (23 Sep 2023 08:00) (41 - 143)  ABP: --  ABP(mean): --  RR: 36 (23 Sep 2023 08:00) (10 - 76)  SpO2: 95% (23 Sep 2023 08:00) (91% - 100%)    O2 Parameters below as of 23 Sep 2023 08:00  Patient On (Oxygen Delivery Method): nasal cannula, high flow  O2 Flow (L/min): 50  O2 Concentration (%): 50      I&O's Summary    22 Sep 2023 07:01  -  23 Sep 2023 07:00  --------------------------------------------------------  IN: 1250 mL / OUT: 0 mL / NET: 1250 mL    23 Sep 2023 07:01  -  23 Sep 2023 09:22  --------------------------------------------------------  IN: 160 mL / OUT: 0 mL / NET: 160 mL          LABS:                        10.3   9.18  )-----------( 184      ( 23 Sep 2023 05:56 )             30.8     09-23    135  |  101  |  7   ----------------------------<  124<H>  3.8   |  27  |  0.74    Ca    8.4      23 Sep 2023 05:56  Phos  3.4     09-23  Mg     1.8     09-23    TPro  6.3  /  Alb  3.3  /  TBili  0.5  /  DBili  x   /  AST  21  /  ALT  21  /  AlkPhos  97  09-23    PT/INR - ( 21 Sep 2023 16:56 )   PT: 11.7 sec;   INR: 1.03          PTT - ( 21 Sep 2023 16:56 )  PTT:31.3 sec  Urinalysis Basic - ( 23 Sep 2023 05:56 )    Color: x / Appearance: x / SG: x / pH: x  Gluc: 124 mg/dL / Ketone: x  / Bili: x / Urobili: x   Blood: x / Protein: x / Nitrite: x   Leuk Esterase: x / RBC: x / WBC x   Sq Epi: x / Non Sq Epi: x / Bacteria: x      CAPILLARY BLOOD GLUCOSE      POCT Blood Glucose.: 117 mg/dL (23 Sep 2023 05:55)  POCT Blood Glucose.: 147 mg/dL (23 Sep 2023 02:11)  POCT Blood Glucose.: 122 mg/dL (22 Sep 2023 23:23)  POCT Blood Glucose.: 104 mg/dL (22 Sep 2023 22:08)  POCT Blood Glucose.: 139 mg/dL (22 Sep 2023 16:34)  POCT Blood Glucose.: 149 mg/dL (22 Sep 2023 14:37)  POCT Blood Glucose.: 165 mg/dL (22 Sep 2023 09:40)    ABG - ( 22 Sep 2023 21:46 )  pH, Arterial: 7.24  pH, Blood: x     /  pCO2: 75    /  pO2: 52    / HCO3: 32    / Base Excess: 2.4   /  SaO2: 69.8                RADIOLOGY & ADDITIONAL TESTS:    Consultant(s) Notes Reviewed:  [x ] YES  [ ] NO    MEDICATIONS  (STANDING):  albuterol/ipratropium for Nebulization 3 milliLiter(s) Nebulizer every 6 hours  atorvastatin 40 milliGRAM(s) Oral at bedtime  chlorhexidine 2% Cloths 1 Application(s) Topical <User Schedule>  chlorhexidine 4% Liquid 1 Application(s) Topical <User Schedule>  dexMEDEtomidine Infusion 0.2 MICROgram(s)/kG/Hr (1.63 mL/Hr) IV Continuous <Continuous>  dextrose 10% + sodium chloride 0.9%. 1000 milliLiter(s) (60 mL/Hr) IV Continuous <Continuous>  melatonin 5 milliGRAM(s) Oral at bedtime  montelukast 10 milliGRAM(s) Oral daily  multivitamin  Chewable 1 Tablet(s) Oral daily  piperacillin/tazobactam IVPB.. 4.5 Gram(s) IV Intermittent every 8 hours  sodium chloride 7% Inhalation 4 milliLiter(s) Inhalation every 6 hours  thiamine 100 milliGRAM(s) Oral daily  vancomycin  IVPB 500 milliGRAM(s) IV Intermittent every 24 hours    MEDICATIONS  (PRN):  sodium chloride 0.9% lock flush 10 milliLiter(s) IV Push every 1 hour PRN Pre/post blood products, medications, blood draw, and to maintain line patency      PHYSICAL EXAM:  GENERAL:   HEAD:  Atraumatic, Normocephalic  EYES: EOMI, PERRLA, conjunctiva and sclera clear  NECK: Supple, No JVD, Normal thyroid, no enlarged nodes  NERVOUS SYSTEM:  Alert & Awake.   CHEST/LUNG: B/L good air entry; No rales, rhonchi, or wheezing  HEART: S1S2 normal, no S3, Regular rate and rhythm; No murmurs  ABDOMEN: Soft, Nontender, Nondistended; Bowel sounds present  EXTREMITIES:  2+ Peripheral Pulses, No clubbing, cyanosis, or edema  LYMPH: No lymphadenopathy noted  SKIN: No rashes or lesions    Care Discussed with Consultants/Other Providers [ x] YES  [ ] NO Patient is a 82y old Female who presents with a chief complaint of diplopia (22 Sep 2023 14:25)    INTERVAL HPI/OVERNIGHT EVENTS:   Yesterday pt went for MRA & MRV w/o contrast, no acute findings from either. Pt received 50 of fentanyl and 1.5 of midazolam for sedation during the scans as she could not tolerate d/t prominent agitation. O/n pt weaned off the sedation subsequently became agitated and thus received 2mg Haldol x2. Pt required HFNC.     SUBJECTIVE/AT BEDSIDE:   At bedside pt is still very agitated, removing her HFNC. She is saturating well w/o HF, plan for it to be removed.    ICU Vital Signs Last 24 Hrs  T(C): 37.5 (23 Sep 2023 01:01), Max: 37.5 (23 Sep 2023 01:01)  T(F): 99.5 (23 Sep 2023 01:01), Max: 99.5 (23 Sep 2023 01:01)  HR: 115 (23 Sep 2023 08:00) (73 - 140)  BP: 157/103 (23 Sep 2023 08:00) (61/31 - 190/116)  BP(mean): 125 (23 Sep 2023 08:00) (41 - 143)  ABP: --  ABP(mean): --  RR: 36 (23 Sep 2023 08:00) (10 - 76)  SpO2: 95% (23 Sep 2023 08:00) (91% - 100%)    O2 Parameters below as of 23 Sep 2023 08:00  Patient On (Oxygen Delivery Method): nasal cannula, high flow  O2 Flow (L/min): 50  O2 Concentration (%): 50      I&O's Summary    22 Sep 2023 07:01  -  23 Sep 2023 07:00  --------------------------------------------------------  IN: 1250 mL / OUT: 0 mL / NET: 1250 mL    23 Sep 2023 07:01  -  23 Sep 2023 09:22  --------------------------------------------------------  IN: 160 mL / OUT: 0 mL / NET: 160 mL          LABS:                        10.3   9.18  )-----------( 184      ( 23 Sep 2023 05:56 )             30.8     09-23    135  |  101  |  7   ----------------------------<  124<H>  3.8   |  27  |  0.74    Ca    8.4      23 Sep 2023 05:56  Phos  3.4     09-23  Mg     1.8     09-23    TPro  6.3  /  Alb  3.3  /  TBili  0.5  /  DBili  x   /  AST  21  /  ALT  21  /  AlkPhos  97  09-23    PT/INR - ( 21 Sep 2023 16:56 )   PT: 11.7 sec;   INR: 1.03          PTT - ( 21 Sep 2023 16:56 )  PTT:31.3 sec  Urinalysis Basic - ( 23 Sep 2023 05:56 )    Color: x / Appearance: x / SG: x / pH: x  Gluc: 124 mg/dL / Ketone: x  / Bili: x / Urobili: x   Blood: x / Protein: x / Nitrite: x   Leuk Esterase: x / RBC: x / WBC x   Sq Epi: x / Non Sq Epi: x / Bacteria: x      CAPILLARY BLOOD GLUCOSE      POCT Blood Glucose.: 117 mg/dL (23 Sep 2023 05:55)  POCT Blood Glucose.: 147 mg/dL (23 Sep 2023 02:11)  POCT Blood Glucose.: 122 mg/dL (22 Sep 2023 23:23)  POCT Blood Glucose.: 104 mg/dL (22 Sep 2023 22:08)  POCT Blood Glucose.: 139 mg/dL (22 Sep 2023 16:34)  POCT Blood Glucose.: 149 mg/dL (22 Sep 2023 14:37)  POCT Blood Glucose.: 165 mg/dL (22 Sep 2023 09:40)    ABG - ( 22 Sep 2023 21:46 )  pH, Arterial: 7.24  pH, Blood: x     /  pCO2: 75    /  pO2: 52    / HCO3: 32    / Base Excess: 2.4   /  SaO2: 69.8                RADIOLOGY & ADDITIONAL TESTS:    Consultant(s) Notes Reviewed:  [x ] YES  [ ] NO    MEDICATIONS  (STANDING):  albuterol/ipratropium for Nebulization 3 milliLiter(s) Nebulizer every 6 hours  atorvastatin 40 milliGRAM(s) Oral at bedtime  chlorhexidine 2% Cloths 1 Application(s) Topical <User Schedule>  chlorhexidine 4% Liquid 1 Application(s) Topical <User Schedule>  dexMEDEtomidine Infusion 0.2 MICROgram(s)/kG/Hr (1.63 mL/Hr) IV Continuous <Continuous>  dextrose 10% + sodium chloride 0.9%. 1000 milliLiter(s) (60 mL/Hr) IV Continuous <Continuous>  melatonin 5 milliGRAM(s) Oral at bedtime  montelukast 10 milliGRAM(s) Oral daily  multivitamin  Chewable 1 Tablet(s) Oral daily  piperacillin/tazobactam IVPB.. 4.5 Gram(s) IV Intermittent every 8 hours  sodium chloride 7% Inhalation 4 milliLiter(s) Inhalation every 6 hours  thiamine 100 milliGRAM(s) Oral daily  vancomycin  IVPB 500 milliGRAM(s) IV Intermittent every 24 hours    MEDICATIONS  (PRN):  sodium chloride 0.9% lock flush 10 milliLiter(s) IV Push every 1 hour PRN Pre/post blood products, medications, blood draw, and to maintain line patency      PHYSICAL EXAM:  GENERAL:   HEAD:  Atraumatic, Normocephalic  EYES: EOMI, PERRLA, conjunctiva and sclera clear  NECK: Supple, No JVD, Normal thyroid, no enlarged nodes  NERVOUS SYSTEM:  Alert & Awake.   CHEST/LUNG: B/L good air entry; No rales, rhonchi, or wheezing  HEART: S1S2 normal, no S3, Regular rate and rhythm; No murmurs  ABDOMEN: Soft, Nontender, Nondistended; Bowel sounds present  EXTREMITIES:  2+ Peripheral Pulses, No clubbing, cyanosis, or edema  LYMPH: No lymphadenopathy noted  SKIN: No rashes or lesions    Care Discussed with Consultants/Other Providers [ x] YES  [ ] NO Patient is a 82y old Female who presents with a chief complaint of diplopia (22 Sep 2023 14:25)    INTERVAL HPI/OVERNIGHT EVENTS:   Yesterday pt went for MRA & MRV w/o contrast, no acute findings from either. Pt received 50 of fentanyl and 1.5 of midazolam for sedation during the scans as she could not tolerate d/t prominent agitation. O/n pt weaned off the sedation subsequently became agitated and thus received 2mg Haldol x2. Pt required HFNC.     SUBJECTIVE/AT BEDSIDE:   At bedside pt is still very agitated, removing her HFNC. She is saturating well w/o HF, plan for it to be removed.    ICU Vital Signs Last 24 Hrs  T(C): 37.5 (23 Sep 2023 01:01), Max: 37.5 (23 Sep 2023 01:01)  T(F): 99.5 (23 Sep 2023 01:01), Max: 99.5 (23 Sep 2023 01:01)  HR: 115 (23 Sep 2023 08:00) (73 - 140)  BP: 157/103 (23 Sep 2023 08:00) (61/31 - 190/116)  BP(mean): 125 (23 Sep 2023 08:00) (41 - 143)  ABP: --  ABP(mean): --  RR: 36 (23 Sep 2023 08:00) (10 - 76)  SpO2: 95% (23 Sep 2023 08:00) (91% - 100%)    O2 Parameters below as of 23 Sep 2023 08:00  Patient On (Oxygen Delivery Method): nasal cannula, high flow  O2 Flow (L/min): 50  O2 Concentration (%): 50      I&O's Summary    22 Sep 2023 07:01  -  23 Sep 2023 07:00  --------------------------------------------------------  IN: 1250 mL / OUT: 0 mL / NET: 1250 mL    23 Sep 2023 07:01  -  23 Sep 2023 09:22  --------------------------------------------------------  IN: 160 mL / OUT: 0 mL / NET: 160 mL          LABS:                        10.3   9.18  )-----------( 184      ( 23 Sep 2023 05:56 )             30.8     09-23    135  |  101  |  7   ----------------------------<  124<H>  3.8   |  27  |  0.74    Ca    8.4      23 Sep 2023 05:56  Phos  3.4     09-23  Mg     1.8     09-23    TPro  6.3  /  Alb  3.3  /  TBili  0.5  /  DBili  x   /  AST  21  /  ALT  21  /  AlkPhos  97  09-23    PT/INR - ( 21 Sep 2023 16:56 )   PT: 11.7 sec;   INR: 1.03          PTT - ( 21 Sep 2023 16:56 )  PTT:31.3 sec  Urinalysis Basic - ( 23 Sep 2023 05:56 )    Color: x / Appearance: x / SG: x / pH: x  Gluc: 124 mg/dL / Ketone: x  / Bili: x / Urobili: x   Blood: x / Protein: x / Nitrite: x   Leuk Esterase: x / RBC: x / WBC x   Sq Epi: x / Non Sq Epi: x / Bacteria: x      CAPILLARY BLOOD GLUCOSE      POCT Blood Glucose.: 117 mg/dL (23 Sep 2023 05:55)  POCT Blood Glucose.: 147 mg/dL (23 Sep 2023 02:11)  POCT Blood Glucose.: 122 mg/dL (22 Sep 2023 23:23)  POCT Blood Glucose.: 104 mg/dL (22 Sep 2023 22:08)  POCT Blood Glucose.: 139 mg/dL (22 Sep 2023 16:34)  POCT Blood Glucose.: 149 mg/dL (22 Sep 2023 14:37)  POCT Blood Glucose.: 165 mg/dL (22 Sep 2023 09:40)    ABG - ( 22 Sep 2023 21:46 )  pH, Arterial: 7.24  pH, Blood: x     /  pCO2: 75    /  pO2: 52    / HCO3: 32    / Base Excess: 2.4   /  SaO2: 69.8                RADIOLOGY & ADDITIONAL TESTS:    Consultant(s) Notes Reviewed:  [x ] YES  [ ] NO    MEDICATIONS  (STANDING):  albuterol/ipratropium for Nebulization 3 milliLiter(s) Nebulizer every 6 hours  atorvastatin 40 milliGRAM(s) Oral at bedtime  chlorhexidine 2% Cloths 1 Application(s) Topical <User Schedule>  chlorhexidine 4% Liquid 1 Application(s) Topical <User Schedule>  dexMEDEtomidine Infusion 0.2 MICROgram(s)/kG/Hr (1.63 mL/Hr) IV Continuous <Continuous>  dextrose 10% + sodium chloride 0.9%. 1000 milliLiter(s) (60 mL/Hr) IV Continuous <Continuous>  melatonin 5 milliGRAM(s) Oral at bedtime  montelukast 10 milliGRAM(s) Oral daily  multivitamin  Chewable 1 Tablet(s) Oral daily  piperacillin/tazobactam IVPB.. 4.5 Gram(s) IV Intermittent every 8 hours  sodium chloride 7% Inhalation 4 milliLiter(s) Inhalation every 6 hours  thiamine 100 milliGRAM(s) Oral daily  vancomycin  IVPB 500 milliGRAM(s) IV Intermittent every 24 hours    MEDICATIONS  (PRN):  sodium chloride 0.9% lock flush 10 milliLiter(s) IV Push every 1 hour PRN Pre/post blood products, medications, blood draw, and to maintain line patency      PHYSICAL EXAM:  exam limited by patient agitation   GENERAL: cachetic, elderly woman, agitated and moving around  HEAD: normocephalic  EYES: eyes closed, would not allow examination of pupils  NERVOUS SYSTEM: A&Ox0  CHEST/LUNG: crackles in BL lungs, some rhonchi present  HEART: +S1/S2, holosystolic murmur present  ABDOMEN: unable to assess  EXTREMITIES: +1 dorsal pulses, cool LE, no edema    Care Discussed with Consultants/Other Providers [ x] YES  [ ] NO Patient is a 82y old Female who presents with a chief complaint of diplopia (22 Sep 2023 14:25)    INTERVAL HPI/OVERNIGHT EVENTS:   Yesterday pt went for MRA & MRV w/o contrast, no acute findings from either. Pt received 50 of fentanyl and 1.5 of midazolam for sedation during the scans as she could not tolerate d/t prominent agitation. O/n pt weaned off the sedation subsequently became agitated and thus received 2mg Haldol x2. Pt required HFNC.     SUBJECTIVE/AT BEDSIDE:   At bedside pt is still very agitated, removing her HFNC. She is saturating well w/o HF, plan for it to be removed.    ICU Vital Signs Last 24 Hrs  T(C): 37.5 (23 Sep 2023 01:01), Max: 37.5 (23 Sep 2023 01:01)  T(F): 99.5 (23 Sep 2023 01:01), Max: 99.5 (23 Sep 2023 01:01)  HR: 115 (23 Sep 2023 08:00) (73 - 140)  BP: 157/103 (23 Sep 2023 08:00) (61/31 - 190/116)  BP(mean): 125 (23 Sep 2023 08:00) (41 - 143)  ABP: --  ABP(mean): --  RR: 36 (23 Sep 2023 08:00) (10 - 76)  SpO2: 95% (23 Sep 2023 08:00) (91% - 100%)    O2 Parameters below as of 23 Sep 2023 08:00  Patient On (Oxygen Delivery Method): nasal cannula, high flow  O2 Flow (L/min): 50  O2 Concentration (%): 50      I&O's Summary    22 Sep 2023 07:01  -  23 Sep 2023 07:00  --------------------------------------------------------  IN: 1250 mL / OUT: 0 mL / NET: 1250 mL    23 Sep 2023 07:01  -  23 Sep 2023 09:22  --------------------------------------------------------  IN: 160 mL / OUT: 0 mL / NET: 160 mL          LABS:                        10.3   9.18  )-----------( 184      ( 23 Sep 2023 05:56 )             30.8     09-23    135  |  101  |  7   ----------------------------<  124<H>  3.8   |  27  |  0.74    Ca    8.4      23 Sep 2023 05:56  Phos  3.4     09-23  Mg     1.8     09-23    TPro  6.3  /  Alb  3.3  /  TBili  0.5  /  DBili  x   /  AST  21  /  ALT  21  /  AlkPhos  97  09-23    PT/INR - ( 21 Sep 2023 16:56 )   PT: 11.7 sec;   INR: 1.03          PTT - ( 21 Sep 2023 16:56 )  PTT:31.3 sec  Urinalysis Basic - ( 23 Sep 2023 05:56 )    Color: x / Appearance: x / SG: x / pH: x  Gluc: 124 mg/dL / Ketone: x  / Bili: x / Urobili: x   Blood: x / Protein: x / Nitrite: x   Leuk Esterase: x / RBC: x / WBC x   Sq Epi: x / Non Sq Epi: x / Bacteria: x      CAPILLARY BLOOD GLUCOSE      POCT Blood Glucose.: 117 mg/dL (23 Sep 2023 05:55)  POCT Blood Glucose.: 147 mg/dL (23 Sep 2023 02:11)  POCT Blood Glucose.: 122 mg/dL (22 Sep 2023 23:23)  POCT Blood Glucose.: 104 mg/dL (22 Sep 2023 22:08)  POCT Blood Glucose.: 139 mg/dL (22 Sep 2023 16:34)  POCT Blood Glucose.: 149 mg/dL (22 Sep 2023 14:37)  POCT Blood Glucose.: 165 mg/dL (22 Sep 2023 09:40)    ABG - ( 22 Sep 2023 21:46 )  pH, Arterial: 7.24  pH, Blood: x     /  pCO2: 75    /  pO2: 52    / HCO3: 32    / Base Excess: 2.4   /  SaO2: 69.8          RADIOLOGY & ADDITIONAL TESTS:  Consultant(s) Notes Reviewed:  [x ] YES  [ ] NO    MEDICATIONS  (STANDING):  albuterol/ipratropium for Nebulization 3 milliLiter(s) Nebulizer every 6 hours  atorvastatin 40 milliGRAM(s) Oral at bedtime  chlorhexidine 2% Cloths 1 Application(s) Topical <User Schedule>  chlorhexidine 4% Liquid 1 Application(s) Topical <User Schedule>  dexMEDEtomidine Infusion 0.2 MICROgram(s)/kG/Hr (1.63 mL/Hr) IV Continuous <Continuous>  dextrose 10% + sodium chloride 0.9%. 1000 milliLiter(s) (60 mL/Hr) IV Continuous <Continuous>  melatonin 5 milliGRAM(s) Oral at bedtime  montelukast 10 milliGRAM(s) Oral daily  multivitamin  Chewable 1 Tablet(s) Oral daily  piperacillin/tazobactam IVPB.. 4.5 Gram(s) IV Intermittent every 8 hours  sodium chloride 7% Inhalation 4 milliLiter(s) Inhalation every 6 hours  thiamine 100 milliGRAM(s) Oral daily  vancomycin  IVPB 500 milliGRAM(s) IV Intermittent every 24 hours    MEDICATIONS  (PRN):  sodium chloride 0.9% lock flush 10 milliLiter(s) IV Push every 1 hour PRN Pre/post blood products, medications, blood draw, and to maintain line patency      PHYSICAL EXAM:  exam limited by patient agitation   GENERAL: cachetic, elderly woman, agitated and moving around  HEAD: normocephalic  EYES: would not allow examination of pupils  NERVOUS SYSTEM: A&Ox0 but hyperalert, fidgeting in bed  CHEST/LUNG: crackles in BL lungs, some rhonchi present  HEART: +S1/S2, holosystolic murmur present  ABDOMEN: ND, unable to appreciate tenderness  EXTREMITIES: +1 dorsal pedal pulses, cool LE, no edema    Care Discussed with Consultants/Other Providers [ x] YES  [ ] NO

## 2023-09-24 LAB
ALBUMIN SERPL ELPH-MCNC: 3.5 G/DL — SIGNIFICANT CHANGE UP (ref 3.3–5)
ALBUMIN SERPL ELPH-MCNC: SIGNIFICANT CHANGE UP (ref 3.3–5)
ALP SERPL-CCNC: 108 U/L — SIGNIFICANT CHANGE UP (ref 40–120)
ALP SERPL-CCNC: SIGNIFICANT CHANGE UP (ref 40–120)
ALT FLD-CCNC: 24 U/L — SIGNIFICANT CHANGE UP (ref 10–45)
ALT FLD-CCNC: SIGNIFICANT CHANGE UP (ref 10–45)
ANION GAP SERPL CALC-SCNC: 3 MMOL/L — LOW (ref 5–17)
ANION GAP SERPL CALC-SCNC: 4 MMOL/L — LOW (ref 5–17)
ANION GAP SERPL CALC-SCNC: 6 MMOL/L — SIGNIFICANT CHANGE UP (ref 5–17)
ANION GAP SERPL CALC-SCNC: SIGNIFICANT CHANGE UP MMOL/L (ref 5–17)
AST SERPL-CCNC: 26 U/L — SIGNIFICANT CHANGE UP (ref 10–40)
AST SERPL-CCNC: SIGNIFICANT CHANGE UP (ref 10–40)
BASOPHILS # BLD AUTO: 0.07 K/UL — SIGNIFICANT CHANGE UP (ref 0–0.2)
BASOPHILS NFR BLD AUTO: 1 % — SIGNIFICANT CHANGE UP (ref 0–2)
BILIRUB SERPL-MCNC: 0.7 MG/DL — SIGNIFICANT CHANGE UP (ref 0.2–1.2)
BILIRUB SERPL-MCNC: SIGNIFICANT CHANGE UP (ref 0.2–1.2)
BUN SERPL-MCNC: 10 MG/DL — SIGNIFICANT CHANGE UP (ref 7–23)
BUN SERPL-MCNC: 8 MG/DL — SIGNIFICANT CHANGE UP (ref 7–23)
BUN SERPL-MCNC: 8 MG/DL — SIGNIFICANT CHANGE UP (ref 7–23)
BUN SERPL-MCNC: SIGNIFICANT CHANGE UP (ref 7–23)
CALCIUM SERPL-MCNC: 8.3 MG/DL — LOW (ref 8.4–10.5)
CALCIUM SERPL-MCNC: 9 MG/DL — SIGNIFICANT CHANGE UP (ref 8.4–10.5)
CALCIUM SERPL-MCNC: 9.1 MG/DL — SIGNIFICANT CHANGE UP (ref 8.4–10.5)
CALCIUM SERPL-MCNC: SIGNIFICANT CHANGE UP (ref 8.4–10.5)
CHLORIDE SERPL-SCNC: 100 MMOL/L — SIGNIFICANT CHANGE UP (ref 96–108)
CHLORIDE SERPL-SCNC: 101 MMOL/L — SIGNIFICANT CHANGE UP (ref 96–108)
CHLORIDE SERPL-SCNC: 102 MMOL/L — SIGNIFICANT CHANGE UP (ref 96–108)
CHLORIDE SERPL-SCNC: 107 MMOL/L — SIGNIFICANT CHANGE UP (ref 96–108)
CO2 SERPL-SCNC: 29 MMOL/L — SIGNIFICANT CHANGE UP (ref 22–31)
CO2 SERPL-SCNC: SIGNIFICANT CHANGE UP (ref 22–31)
CREAT SERPL-MCNC: 0.72 MG/DL — SIGNIFICANT CHANGE UP (ref 0.5–1.3)
CREAT SERPL-MCNC: 0.73 MG/DL — SIGNIFICANT CHANGE UP (ref 0.5–1.3)
CREAT SERPL-MCNC: 0.77 MG/DL — SIGNIFICANT CHANGE UP (ref 0.5–1.3)
CREAT SERPL-MCNC: SIGNIFICANT CHANGE UP MG/DL (ref 0.5–1.3)
EGFR: 78 ML/MIN/1.73M2 — SIGNIFICANT CHANGE UP
EGFR: 83 ML/MIN/1.73M2 — SIGNIFICANT CHANGE UP
EGFR: 84 ML/MIN/1.73M2 — SIGNIFICANT CHANGE UP
EOSINOPHIL # BLD AUTO: 0.11 K/UL — SIGNIFICANT CHANGE UP (ref 0–0.5)
EOSINOPHIL NFR BLD AUTO: 1.5 % — SIGNIFICANT CHANGE UP (ref 0–6)
GLUCOSE BLDC GLUCOMTR-MCNC: 103 MG/DL — HIGH (ref 70–99)
GLUCOSE BLDC GLUCOMTR-MCNC: 87 MG/DL — SIGNIFICANT CHANGE UP (ref 70–99)
GLUCOSE BLDC GLUCOMTR-MCNC: 89 MG/DL — SIGNIFICANT CHANGE UP (ref 70–99)
GLUCOSE BLDC GLUCOMTR-MCNC: 96 MG/DL — SIGNIFICANT CHANGE UP (ref 70–99)
GLUCOSE BLDC GLUCOMTR-MCNC: 97 MG/DL — SIGNIFICANT CHANGE UP (ref 70–99)
GLUCOSE SERPL-MCNC: 101 MG/DL — HIGH (ref 70–99)
GLUCOSE SERPL-MCNC: 115 MG/DL — HIGH (ref 70–99)
GLUCOSE SERPL-MCNC: 140 MG/DL — HIGH (ref 70–99)
GLUCOSE SERPL-MCNC: SIGNIFICANT CHANGE UP (ref 70–99)
HCT VFR BLD CALC: 30.4 % — LOW (ref 34.5–45)
HCT VFR BLD CALC: 31.1 % — LOW (ref 34.5–45)
HGB BLD-MCNC: 10.3 G/DL — LOW (ref 11.5–15.5)
HGB BLD-MCNC: 10.4 G/DL — LOW (ref 11.5–15.5)
IMM GRANULOCYTES NFR BLD AUTO: 0.4 % — SIGNIFICANT CHANGE UP (ref 0–0.9)
LYMPHOCYTES # BLD AUTO: 0.91 K/UL — LOW (ref 1–3.3)
LYMPHOCYTES # BLD AUTO: 12.5 % — LOW (ref 13–44)
MAGNESIUM SERPL-MCNC: 1.9 MG/DL — SIGNIFICANT CHANGE UP (ref 1.6–2.6)
MAGNESIUM SERPL-MCNC: 2 MG/DL — SIGNIFICANT CHANGE UP (ref 1.6–2.6)
MCHC RBC-ENTMCNC: 32.8 PG — SIGNIFICANT CHANGE UP (ref 27–34)
MCHC RBC-ENTMCNC: 33 PG — SIGNIFICANT CHANGE UP (ref 27–34)
MCHC RBC-ENTMCNC: 33.1 GM/DL — SIGNIFICANT CHANGE UP (ref 32–36)
MCHC RBC-ENTMCNC: 34.2 GM/DL — SIGNIFICANT CHANGE UP (ref 32–36)
MCV RBC AUTO: 96.5 FL — SIGNIFICANT CHANGE UP (ref 80–100)
MCV RBC AUTO: 99 FL — SIGNIFICANT CHANGE UP (ref 80–100)
MONOCYTES # BLD AUTO: 0.61 K/UL — SIGNIFICANT CHANGE UP (ref 0–0.9)
MONOCYTES NFR BLD AUTO: 8.4 % — SIGNIFICANT CHANGE UP (ref 2–14)
NEUTROPHILS # BLD AUTO: 5.54 K/UL — SIGNIFICANT CHANGE UP (ref 1.8–7.4)
NEUTROPHILS NFR BLD AUTO: 76.2 % — SIGNIFICANT CHANGE UP (ref 43–77)
NRBC # BLD: 0 /100 WBCS — SIGNIFICANT CHANGE UP (ref 0–0)
NRBC # BLD: 0 /100 WBCS — SIGNIFICANT CHANGE UP (ref 0–0)
PHOSPHATE SERPL-MCNC: 2.6 MG/DL — SIGNIFICANT CHANGE UP (ref 2.5–4.5)
PLATELET # BLD AUTO: 174 K/UL — SIGNIFICANT CHANGE UP (ref 150–400)
PLATELET # BLD AUTO: 174 K/UL — SIGNIFICANT CHANGE UP (ref 150–400)
POTASSIUM SERPL-MCNC: 3.5 MMOL/L — SIGNIFICANT CHANGE UP (ref 3.5–5.3)
POTASSIUM SERPL-MCNC: 3.8 MMOL/L — SIGNIFICANT CHANGE UP (ref 3.5–5.3)
POTASSIUM SERPL-MCNC: 3.9 MMOL/L — SIGNIFICANT CHANGE UP (ref 3.5–5.3)
POTASSIUM SERPL-MCNC: 4.3 MMOL/L — SIGNIFICANT CHANGE UP (ref 3.5–5.3)
POTASSIUM SERPL-SCNC: 3.5 MMOL/L — SIGNIFICANT CHANGE UP (ref 3.5–5.3)
POTASSIUM SERPL-SCNC: 3.8 MMOL/L — SIGNIFICANT CHANGE UP (ref 3.5–5.3)
POTASSIUM SERPL-SCNC: 3.9 MMOL/L — SIGNIFICANT CHANGE UP (ref 3.5–5.3)
POTASSIUM SERPL-SCNC: 4.3 MMOL/L — SIGNIFICANT CHANGE UP (ref 3.5–5.3)
PROT SERPL-MCNC: 7.2 G/DL — SIGNIFICANT CHANGE UP (ref 6–8.3)
PROT SERPL-MCNC: SIGNIFICANT CHANGE UP (ref 6–8.3)
RBC # BLD: 3.14 M/UL — LOW (ref 3.8–5.2)
RBC # BLD: 3.15 M/UL — LOW (ref 3.8–5.2)
RBC # FLD: 12.1 % — SIGNIFICANT CHANGE UP (ref 10.3–14.5)
RBC # FLD: 12.4 % — SIGNIFICANT CHANGE UP (ref 10.3–14.5)
SODIUM SERPL-SCNC: 134 MMOL/L — LOW (ref 135–145)
SODIUM SERPL-SCNC: 134 MMOL/L — LOW (ref 135–145)
SODIUM SERPL-SCNC: 135 MMOL/L — SIGNIFICANT CHANGE UP (ref 135–145)
SODIUM SERPL-SCNC: 136 MMOL/L — SIGNIFICANT CHANGE UP (ref 135–145)
WBC # BLD: 7.27 K/UL — SIGNIFICANT CHANGE UP (ref 3.8–10.5)
WBC # BLD: 7.63 K/UL — SIGNIFICANT CHANGE UP (ref 3.8–10.5)
WBC # FLD AUTO: 7.27 K/UL — SIGNIFICANT CHANGE UP (ref 3.8–10.5)
WBC # FLD AUTO: 7.63 K/UL — SIGNIFICANT CHANGE UP (ref 3.8–10.5)

## 2023-09-24 PROCEDURE — 99232 SBSQ HOSP IP/OBS MODERATE 35: CPT

## 2023-09-24 PROCEDURE — 99233 SBSQ HOSP IP/OBS HIGH 50: CPT

## 2023-09-24 PROCEDURE — 99232 SBSQ HOSP IP/OBS MODERATE 35: CPT | Mod: GC

## 2023-09-24 RX ORDER — POTASSIUM CHLORIDE 20 MEQ
10 PACKET (EA) ORAL
Refills: 0 | Status: COMPLETED | OUTPATIENT
Start: 2023-09-24 | End: 2023-09-24

## 2023-09-24 RX ORDER — MAGNESIUM SULFATE 500 MG/ML
2 VIAL (ML) INJECTION ONCE
Refills: 0 | Status: COMPLETED | OUTPATIENT
Start: 2023-09-24 | End: 2023-09-24

## 2023-09-24 RX ORDER — METOPROLOL TARTRATE 50 MG
5 TABLET ORAL EVERY 6 HOURS
Refills: 0 | Status: DISCONTINUED | OUTPATIENT
Start: 2023-09-24 | End: 2023-09-25

## 2023-09-24 RX ORDER — METOPROLOL TARTRATE 50 MG
2.5 TABLET ORAL ONCE
Refills: 0 | Status: COMPLETED | OUTPATIENT
Start: 2023-09-24 | End: 2023-09-24

## 2023-09-24 RX ORDER — SODIUM CHLORIDE 9 MG/ML
1000 INJECTION, SOLUTION INTRAVENOUS
Refills: 0 | Status: DISCONTINUED | OUTPATIENT
Start: 2023-09-24 | End: 2023-09-24

## 2023-09-24 RX ORDER — LABETALOL HCL 100 MG
10 TABLET ORAL ONCE
Refills: 0 | Status: COMPLETED | OUTPATIENT
Start: 2023-09-24 | End: 2023-09-24

## 2023-09-24 RX ORDER — LABETALOL HCL 100 MG
10 TABLET ORAL ONCE
Refills: 0 | Status: DISCONTINUED | OUTPATIENT
Start: 2023-09-24 | End: 2023-09-24

## 2023-09-24 RX ORDER — DEXTROSE 10 % IN WATER 10 %
1000 INTRAVENOUS SOLUTION INTRAVENOUS
Refills: 0 | Status: DISCONTINUED | OUTPATIENT
Start: 2023-09-24 | End: 2023-09-25

## 2023-09-24 RX ADMIN — Medication 2.5 MILLIGRAM(S): at 11:09

## 2023-09-24 RX ADMIN — PIPERACILLIN AND TAZOBACTAM 25 GRAM(S): 4; .5 INJECTION, POWDER, LYOPHILIZED, FOR SOLUTION INTRAVENOUS at 07:43

## 2023-09-24 RX ADMIN — Medication 100 MILLIGRAM(S): at 11:50

## 2023-09-24 RX ADMIN — Medication 1 TABLET(S): at 11:49

## 2023-09-24 RX ADMIN — Medication 2.5 MILLIGRAM(S): at 15:30

## 2023-09-24 RX ADMIN — ENOXAPARIN SODIUM 30 MILLIGRAM(S): 100 INJECTION SUBCUTANEOUS at 15:30

## 2023-09-24 RX ADMIN — DEXMEDETOMIDINE HYDROCHLORIDE IN 0.9% SODIUM CHLORIDE 1.63 MICROGRAM(S)/KG/HR: 4 INJECTION INTRAVENOUS at 00:03

## 2023-09-24 RX ADMIN — PIPERACILLIN AND TAZOBACTAM 25 GRAM(S): 4; .5 INJECTION, POWDER, LYOPHILIZED, FOR SOLUTION INTRAVENOUS at 15:30

## 2023-09-24 RX ADMIN — Medication 100 MILLIEQUIVALENT(S): at 09:16

## 2023-09-24 RX ADMIN — Medication 2.5 MILLIGRAM(S): at 19:13

## 2023-09-24 RX ADMIN — Medication 5 MILLIGRAM(S): at 17:56

## 2023-09-24 RX ADMIN — Medication 100 MILLIEQUIVALENT(S): at 07:25

## 2023-09-24 RX ADMIN — Medication 10 MILLIGRAM(S): at 21:16

## 2023-09-24 RX ADMIN — PIPERACILLIN AND TAZOBACTAM 25 GRAM(S): 4; .5 INJECTION, POWDER, LYOPHILIZED, FOR SOLUTION INTRAVENOUS at 00:02

## 2023-09-24 RX ADMIN — CHLORHEXIDINE GLUCONATE 1 APPLICATION(S): 213 SOLUTION TOPICAL at 06:33

## 2023-09-24 RX ADMIN — Medication 80 MILLILITER(S): at 20:31

## 2023-09-24 RX ADMIN — Medication 5 MILLIGRAM(S): at 21:51

## 2023-09-24 RX ADMIN — ATORVASTATIN CALCIUM 40 MILLIGRAM(S): 80 TABLET, FILM COATED ORAL at 21:51

## 2023-09-24 RX ADMIN — MONTELUKAST 10 MILLIGRAM(S): 4 TABLET, CHEWABLE ORAL at 11:50

## 2023-09-24 NOTE — PROGRESS NOTE ADULT - ASSESSMENT
82y Female with PMHx of HTN, afib (not on AC due to fall risk), recent hospitalization for PRES (5-6/2023), recently discharged from [facility] 8/2023 with acute exacerbation of severe bronchiectasis and respiratory failure presents with several days of AMS and new diplopia 9/18. NIHSS 1 on admission for bitemporal visual deficit. CTH with no significant findings. CTA and perfusion deferred due to contrast allergy. Admitted for further stroke w/u, started on Eliquis for secondary stroke prevention. Repeat HCT/MRI with small R occipital SAH and small L ischemic stroke. Acute worsening in mental status appears to be improving since the correction of hypoglycemia and/or treatment with broad spectrum abx. However, patient is not still at baseline. MRA H/N: negative for steno-occlusive disease, aneurysm or high flow vascular malformation. No new/interval infarct on DWI since 9/20/23, though mild cerebral edema has developed. Query amyloid related disease. MRV: negative for thrombus. Repeat MRI does not show [place] evidence of large vessel vasculitis or CVST; cannot r/o inflammatory causes like ISMA/vasculitis/encephalitis. Unfortunately, MRI brain w, w/o contrast not performed with vessel imaging. Will attempt to repeat again to r/o potential causes like ISMA. Etiology of SAH/ischemic stroke and overall neurological presentation is unclear. Initial ? DLB + acute decompensation from imaging findings and hospitalization.     1)Secondary stroke prevention  - holding AC i/s/o SAH  - can consider aspirin pending repeat HCT or MRI  - continue Atorvastatin 40 mg QD    2) Stroke risk factors  - A1C: 5.5  - LDL: 129   - TSH: 3.87   - SAH  - HTN  - history of PRES     3) Further management  - Please obtain MRI brain w/ contrast tomorrow with anesthesia   - Recommend prolonged VEEG as patient with episode concerning for possible seizure, also with waxing and waning; to be placed after MRI scan completed  - Recommend repeat CTH i/s/o declining mental status to r/o bleed vs stroke expansion when medically stable   - VANNESSA if patient can tolerate to r/o possible endocarditis   - LP may be warranted pending clinical course  - Can consider PET brain when able to eval for underlying DLB  - recommend SBP goal <160   - recommend q2hr stroke neuro checks  - may need outpt neurology follow up  - provide stroke education    DVT prophylaxis   - Lovenox   - SCDs    Discussed with Neurology Fellow Dr. Shi and Attending, Dr. Vallecillo

## 2023-09-24 NOTE — PROGRESS NOTE ADULT - ASSESSMENT
82F, PMH of HTN, Afib (not on AC d/t fall risk), recent hospitalization for PRES (not at [facility], May-June '23), recent discharge for acute exacerbation of severe bronchiectasis (at [facility], Aug '23), presented w/ several days of AMS & new-onset diplopia since 9/15.   Pt started IV abx on 9/15 (2wk course of cefepime 2g IV BID for bronchiectasis exacerbation after concern from Dr. Foster that pt failed PO abx, - was dc'd in Aug on bactrim and cipro, got bactrim, cipro and zosyn inpatient- got outpt PICC placement on 9/13, 1st dose 9/15, got 7 doses total before presentation to hospital).    NEURO  #PRES  #Seizure  Pt hospitalized 05-06/2023 for PRES. At the time presented similar symptoms: confusion, AMS, forgetfulness, dropping things. Now presented w/ diplopia and bitemporal visual field anomalies, all findings consistent w/ PRES. Now with tangential, nonsensical speech, as well as some seizure-like activity. Twitching/seizures possibly 2/2 hypoglycemia  - repeat CT head  - repeat vEEG (initial no epileptiform activity, however pt ripped off after 8hrs)  - q1hr neuro checks  - c/w thiamine 100mg qd  - delirium precautions  - monitor fingersticks q1hr    #R subarachnoid hemorrhage  #L ischemic occipital stroke  Pt w/ bitemporal visual field deficits, worsened mental status throughout clinical course. Initially w/o findings on CT head, later found to have SAH, and then found to have Ischemic stroke on MRI brain.   - repeat CT head   - plan for MRI head w/ contrast today (per neuro pt allergic to CT contrast)   - s/p MRA & MRV 9/22---> overall unremarkable, mild cerebral edema, correlate w/ amyloid disease per radiology  - q1hr neuro checks    CARDIO  #Afib  Hx of Afib. Not on a/c chronically d/t fall risk. Started on Eliquis presentation after discussion w/ family about 2ndary stroke prevention per neuro. Eliquis now dc'd i/s/o findings of SAH.  - hold all AC  - not in Afib currently  - frequent EKGs  - consider TFT panel     #HTN  Hx of HTN. Home med = Lopressor 50 BID. Goal SBP per neuro <160. BPs varied, soft when hypoglycemic. After blood glucose stable on D10, more hypertensive  - not tolerating PO, starting lopressor 2.5 IV BID  - hold home med.    PULM  #Bronchiectasis   Chronic bronchiectasis. Recent admission for it in Aug, was dc'd on Bactrim & Cipro PO, however recently started on Cefepime 2g IV BID after pt thought to have failed PO.   - dc'd cefepime i/s/o AMS and unclear improvement on cefepime  - c/w vanc 500mg qd  - c/w zosyn 4.5g TID  - wean supplemental oxygen as tolerated    ENDO  #Hypoglycemia  Pt with recalcitrant hypoglycemia, unresponsive to multiple amps of dextrose, and repeat episodes of hypoglycemia to 20s and 30s. After given D10 pt Gluc went to 410, and dropped to 140 in less than 1hr. Per Endo: pt goal Glucose is >90.   - cortisol levels in AM --> elevated  - Proinsulin --> elevated >900  - BHB --> not elevated  - f/u Sulfonylurea panel  - c/w D10/NS, needs isotonic fluid otherwise goes hyponatremic  - Per Endo, can go down on D10/NS rate every 4hrs as long as glucose >90  - switching drip to D10/NS   - c/w frequent BMP  - consider CT body i/s/o very high insulin levels (however low C-peptide so insulinoma less likely)  - c/w frequent fingersticks  - Endo consulted, f/u recs    GI  BEN.    RENAL/  BEN.     Prophylactic Measures  Diet: Minced and Moist  DVT Prophylaxis: SCDs  GI Prophylaxis: Start PPIs     82F, PMH of HTN, Afib (not on AC d/t fall risk), recent hospitalization for PRES (not at [facility], May-June '23), recent discharge for acute exacerbation of severe bronchiectasis (at [facility], Aug '23), presented w/ several days of AMS & new-onset diplopia since 9/15.   Pt started IV abx on 9/15 (2wk course of cefepime 2g IV BID for bronchiectasis exacerbation after concern from Dr. Foster that pt failed PO abx, - was dc'd in Aug on bactrim and cipro, got bactrim, cipro and zosyn inpatient- got outpt PICC placement on 9/13, 1st dose 9/15, got 7 doses total before presentation to hospital).    NEURO  #PRES  #Seizure  Pt hospitalized 05-06/2023 for PRES. At the time presented similar symptoms: confusion, AMS, forgetfulness, dropping things. Now presented w/ diplopia and bitemporal visual field anomalies, all findings consistent w/ PRES. Now with tangential, nonsensical speech, as well as some seizure-like activity. Twitching/seizures possibly 2/2 hypoglycemia  - Plan for MRI Brain +contrast on Monday- will require pretreatment 12 hrs before   - repeat CT head  - repeat vEEG (initial no epileptiform activity, however pt ripped off after 8hrs) after MRI obtained   - q1hr neuro checks  - c/w thiamine 100mg qd  - delirium precautions  - monitor fingersticks q1hr      #R subarachnoid hemorrhage  #L ischemic occipital stroke  Pt w/ bitemporal visual field deficits, worsened mental status throughout clinical course. Initially w/o findings on CT head, later found to have SAH, and then found to have Ischemic stroke on MRI brain. S/p MRA & MRV 9/22, overall unremarkable, mild cerebral edema, correlate w/amyloid disease per radiology.   - repeat CT head   - plan for MRI head w/ contrast on Monday 09/25 (per neuro pt allergic to CT contrast)   - q1hr neuro checks    CARDIO  #Afib  Hx of Afib. Not on a/c chronically d/t fall risk. Started on Eliquis presentation after discussion w/ family about 2ndary stroke prevention per neuro. Eliquis now dc'd i/s/o findings of SAH.  - hold all AC  - not in Afib currently  - frequent EKGs  - consider TFT panel     #HTN  Hx of HTN. Home med = Lopressor 50 BID. Goal SBP per neuro <160. BPs varied, soft when hypoglycemic. After blood glucose stable on D10, more hypertensive  - not tolerating PO, starting lopressor 2.5 IV BID  - hold home med.    PULM  #Bronchiectasis   Chronic bronchiectasis. Recent admission for it in Aug, was dc'd on Bactrim & Cipro PO, however recently started on Cefepime 2g IV BID after pt thought to have failed PO. MRSA negative.   - dc'd cefepime i/s/o AMS and unclear improvement on cefepime  - Discontinue vancomycin  - Continue zosyn 4.5g TID  - wean supplemental oxygen as tolerated    ENDO  #Hypoglycemia  Pt with recalcitrant hypoglycemia, unresponsive to multiple amps of dextrose, and repeat episodes of hypoglycemia to 20s and 30s. After given D10 pt Gluc went to 410, and dropped to 140 in less than 1hr. Per Endo: pt goal Glucose is >90. AM cortisol elevated. Proinsulin 900. BHB WNL.   - f/u Sulfonylurea panel  - Discontinue D10 fluids  - Monitor FS q1h  - Continue frequent BMP  - Consider CT body i/s/o very high insulin levels (however low C-peptide so insulinoma less likely)  - Follow endocrine recs     GI  - Obtain speech & swallow evaluation to evaluate diet status     RENAL/  BEN.     Prophylactic Measures  Diet: Minced and Moist  DVT Prophylaxis: SCDs  GI Prophylaxis: Start PPIs

## 2023-09-24 NOTE — SWALLOW BEDSIDE ASSESSMENT ADULT - PHARYNGEAL PHASE
Laryngeal movement appeared severely sluggish/uncoordinated per palpation. AUDIBLE swallow with thin and mildly thick liquids. Coughing with thin liquids, indicative of aspiration. Fluctuating O2 during PO trials, dropping as low as 83%, with quick return to ~96 %. Increased WOB during mastication of chewable solids and post swallow across consistencies. Laryngeal movement appeared severely sluggish/uncoordinated per palpation. AUDIBLE swallow with thin and mildly thick liquids. Coughing with thin liquids, indicative of aspiration. Fluctuating O2 during PO trials, dropping as low as 83%, with quick return to ~96 %.

## 2023-09-24 NOTE — PROGRESS NOTE ADULT - SUBJECTIVE AND OBJECTIVE BOX
Patient is a 82y old Female who presents with a chief complaint of diplopia (22 Sep 2023 14:25)    INTERVAL HPI/OVERNIGHT EVENTS:   Yesterday pt went for MRA & MRV w/o contrast, no acute findings from either. Pt received 50 of fentanyl and 1.5 of midazolam for sedation during the scans as she could not tolerate d/t prominent agitation. O/n pt weaned off the sedation subsequently became agitated and thus received 2mg Haldol x2. Pt required HFNC.     SUBJECTIVE/AT BEDSIDE:   At bedside pt is still very agitated, removing her HFNC. She is saturating well w/o HF, plan for it to be removed.    ICU Vital Signs Last 24 Hrs  T(C): 37.5 (23 Sep 2023 01:01), Max: 37.5 (23 Sep 2023 01:01)  T(F): 99.5 (23 Sep 2023 01:01), Max: 99.5 (23 Sep 2023 01:01)  HR: 115 (23 Sep 2023 08:00) (73 - 140)  BP: 157/103 (23 Sep 2023 08:00) (61/31 - 190/116)  BP(mean): 125 (23 Sep 2023 08:00) (41 - 143)  ABP: --  ABP(mean): --  RR: 36 (23 Sep 2023 08:00) (10 - 76)  SpO2: 95% (23 Sep 2023 08:00) (91% - 100%)    O2 Parameters below as of 23 Sep 2023 08:00  Patient On (Oxygen Delivery Method): nasal cannula, high flow  O2 Flow (L/min): 50  O2 Concentration (%): 50      I&O's Summary    22 Sep 2023 07:01  -  23 Sep 2023 07:00  --------------------------------------------------------  IN: 1250 mL / OUT: 0 mL / NET: 1250 mL    23 Sep 2023 07:01  -  23 Sep 2023 09:22  --------------------------------------------------------  IN: 160 mL / OUT: 0 mL / NET: 160 mL          LABS:                        10.3   9.18  )-----------( 184      ( 23 Sep 2023 05:56 )             30.8     09-23    135  |  101  |  7   ----------------------------<  124<H>  3.8   |  27  |  0.74    Ca    8.4      23 Sep 2023 05:56  Phos  3.4     09-23  Mg     1.8     09-23    TPro  6.3  /  Alb  3.3  /  TBili  0.5  /  DBili  x   /  AST  21  /  ALT  21  /  AlkPhos  97  09-23    PT/INR - ( 21 Sep 2023 16:56 )   PT: 11.7 sec;   INR: 1.03          PTT - ( 21 Sep 2023 16:56 )  PTT:31.3 sec  Urinalysis Basic - ( 23 Sep 2023 05:56 )    Color: x / Appearance: x / SG: x / pH: x  Gluc: 124 mg/dL / Ketone: x  / Bili: x / Urobili: x   Blood: x / Protein: x / Nitrite: x   Leuk Esterase: x / RBC: x / WBC x   Sq Epi: x / Non Sq Epi: x / Bacteria: x      CAPILLARY BLOOD GLUCOSE      POCT Blood Glucose.: 117 mg/dL (23 Sep 2023 05:55)  POCT Blood Glucose.: 147 mg/dL (23 Sep 2023 02:11)  POCT Blood Glucose.: 122 mg/dL (22 Sep 2023 23:23)  POCT Blood Glucose.: 104 mg/dL (22 Sep 2023 22:08)  POCT Blood Glucose.: 139 mg/dL (22 Sep 2023 16:34)  POCT Blood Glucose.: 149 mg/dL (22 Sep 2023 14:37)  POCT Blood Glucose.: 165 mg/dL (22 Sep 2023 09:40)    ABG - ( 22 Sep 2023 21:46 )  pH, Arterial: 7.24  pH, Blood: x     /  pCO2: 75    /  pO2: 52    / HCO3: 32    / Base Excess: 2.4   /  SaO2: 69.8          RADIOLOGY & ADDITIONAL TESTS:  Consultant(s) Notes Reviewed:  [x ] YES  [ ] NO    MEDICATIONS  (STANDING):  albuterol/ipratropium for Nebulization 3 milliLiter(s) Nebulizer every 6 hours  atorvastatin 40 milliGRAM(s) Oral at bedtime  chlorhexidine 2% Cloths 1 Application(s) Topical <User Schedule>  chlorhexidine 4% Liquid 1 Application(s) Topical <User Schedule>  dexMEDEtomidine Infusion 0.2 MICROgram(s)/kG/Hr (1.63 mL/Hr) IV Continuous <Continuous>  dextrose 10% + sodium chloride 0.9%. 1000 milliLiter(s) (60 mL/Hr) IV Continuous <Continuous>  melatonin 5 milliGRAM(s) Oral at bedtime  montelukast 10 milliGRAM(s) Oral daily  multivitamin  Chewable 1 Tablet(s) Oral daily  piperacillin/tazobactam IVPB.. 4.5 Gram(s) IV Intermittent every 8 hours  sodium chloride 7% Inhalation 4 milliLiter(s) Inhalation every 6 hours  thiamine 100 milliGRAM(s) Oral daily  vancomycin  IVPB 500 milliGRAM(s) IV Intermittent every 24 hours    MEDICATIONS  (PRN):  sodium chloride 0.9% lock flush 10 milliLiter(s) IV Push every 1 hour PRN Pre/post blood products, medications, blood draw, and to maintain line patency      PHYSICAL EXAM:  exam limited by patient agitation   GENERAL: cachetic, elderly woman, agitated and moving around  HEAD: normocephalic  EYES: would not allow examination of pupils  NERVOUS SYSTEM: A&Ox0 but hyperalert, fidgeting in bed  CHEST/LUNG: crackles in BL lungs, some rhonchi present  HEART: +S1/S2, holosystolic murmur present  ABDOMEN: ND, unable to appreciate tenderness  EXTREMITIES: +1 dorsal pedal pulses, cool LE, no edema    Care Discussed with Consultants/Other Providers [ x] YES  [ ] NO Patient is a 82y old Female who presents with a chief complaint of diplopia (22 Sep 2023 14:25)    INTERVAL HPI/OVERNIGHT EVENTS:   Mg repleted overnight. @10:11 pm, pt received lopressor dose. @10:20, had an episode of bradycardia to the 40s, remaining vital signs were stable. Received seroquel 12.5mg x1 (recent EKG QTc is 432 ms). Bladder scan revealed 101 cc. D10 rate decreased to 40 cc/hr.     SUBJECTIVE/AT BEDSIDE:   Patient seen and examined at bedside. Drowsy, did not want to engage in interview.     ICU Vital Signs Last 24 Hrs  T(C): 36.2 (24 Sep 2023 13:02), Max: 37.1 (23 Sep 2023 17:26)  T(F): 97.2 (24 Sep 2023 13:02), Max: 98.7 (23 Sep 2023 17:26)  HR: 95 (24 Sep 2023 15:00) (48 - 95)  BP: 180/78 (24 Sep 2023 15:00) (99/54 - 180/78)  BP(mean): 118 (24 Sep 2023 15:00) (74 - 118)  ABP: --  ABP(mean): --  RR: 26 (24 Sep 2023 15:00) (15 - 35)  SpO2: 96% (24 Sep 2023 15:00) (91% - 100%)    O2 Parameters below as of 24 Sep 2023 15:00  Patient On (Oxygen Delivery Method): room air      I&O's Summary    22 Sep 2023 07:01  -  23 Sep 2023 07:00  --------------------------------------------------------  IN: 1250 mL / OUT: 0 mL / NET: 1250 mL    23 Sep 2023 07:01  -  23 Sep 2023 09:22  --------------------------------------------------------  IN: 160 mL / OUT: 0 mL / NET: 160 mL          LABS:                        10.3   9.18  )-----------( 184      ( 23 Sep 2023 05:56 )             30.8     09-23    135  |  101  |  7   ----------------------------<  124<H>  3.8   |  27  |  0.74    Ca    8.4      23 Sep 2023 05:56  Phos  3.4     09-23  Mg     1.8     09-23    TPro  6.3  /  Alb  3.3  /  TBili  0.5  /  DBili  x   /  AST  21  /  ALT  21  /  AlkPhos  97  09-23    PT/INR - ( 21 Sep 2023 16:56 )   PT: 11.7 sec;   INR: 1.03          PTT - ( 21 Sep 2023 16:56 )  PTT:31.3 sec  Urinalysis Basic - ( 23 Sep 2023 05:56 )    Color: x / Appearance: x / SG: x / pH: x  Gluc: 124 mg/dL / Ketone: x  / Bili: x / Urobili: x   Blood: x / Protein: x / Nitrite: x   Leuk Esterase: x / RBC: x / WBC x   Sq Epi: x / Non Sq Epi: x / Bacteria: x      CAPILLARY BLOOD GLUCOSE      POCT Blood Glucose.: 117 mg/dL (23 Sep 2023 05:55)  POCT Blood Glucose.: 147 mg/dL (23 Sep 2023 02:11)  POCT Blood Glucose.: 122 mg/dL (22 Sep 2023 23:23)  POCT Blood Glucose.: 104 mg/dL (22 Sep 2023 22:08)  POCT Blood Glucose.: 139 mg/dL (22 Sep 2023 16:34)  POCT Blood Glucose.: 149 mg/dL (22 Sep 2023 14:37)  POCT Blood Glucose.: 165 mg/dL (22 Sep 2023 09:40)    ABG - ( 22 Sep 2023 21:46 )  pH, Arterial: 7.24  pH, Blood: x     /  pCO2: 75    /  pO2: 52    / HCO3: 32    / Base Excess: 2.4   /  SaO2: 69.8          RADIOLOGY & ADDITIONAL TESTS:  Consultant(s) Notes Reviewed:  [x ] YES  [ ] NO    MEDICATIONS  (STANDING):  albuterol/ipratropium for Nebulization 3 milliLiter(s) Nebulizer every 6 hours  atorvastatin 40 milliGRAM(s) Oral at bedtime  chlorhexidine 2% Cloths 1 Application(s) Topical <User Schedule>  chlorhexidine 4% Liquid 1 Application(s) Topical <User Schedule>  dexMEDEtomidine Infusion 0.2 MICROgram(s)/kG/Hr (1.63 mL/Hr) IV Continuous <Continuous>  dextrose 10% + sodium chloride 0.9%. 1000 milliLiter(s) (60 mL/Hr) IV Continuous <Continuous>  melatonin 5 milliGRAM(s) Oral at bedtime  montelukast 10 milliGRAM(s) Oral daily  multivitamin  Chewable 1 Tablet(s) Oral daily  piperacillin/tazobactam IVPB.. 4.5 Gram(s) IV Intermittent every 8 hours  sodium chloride 7% Inhalation 4 milliLiter(s) Inhalation every 6 hours  thiamine 100 milliGRAM(s) Oral daily  vancomycin  IVPB 500 milliGRAM(s) IV Intermittent every 24 hours    MEDICATIONS  (PRN):  sodium chloride 0.9% lock flush 10 milliLiter(s) IV Push every 1 hour PRN Pre/post blood products, medications, blood draw, and to maintain line patency      PHYSICAL EXAM:  exam limited by patient agitation   GENERAL: cachetic, elderly woman, agitated and moving around  HEAD: normocephalic  EYES: would not allow examination of pupils  NERVOUS SYSTEM: A&Ox0 but hyperalert, fidgeting in bed  CHEST/LUNG: crackles in BL lungs, some rhonchi present  HEART: +S1/S2, holosystolic murmur present  ABDOMEN: ND, unable to appreciate tenderness  EXTREMITIES: +1 dorsal pedal pulses, cool LE, no edema    Care Discussed with Consultants/Other Providers [ x] YES  [ ] NO Patient is a 82y old Female who presents with a chief complaint of diplopia (22 Sep 2023 14:25)    INTERVAL HPI/OVERNIGHT EVENTS:   Mg repleted overnight. @10:11 pm, pt received lopressor dose. @10:20, had an episode of bradycardia to the 40s, remaining vital signs were stable. Received seroquel 12.5mg x1 (recent EKG QTc is 432 ms). Bladder scan revealed 101 cc. D10 rate decreased to 40 cc/hr.     SUBJECTIVE/AT BEDSIDE:   Patient seen and examined at bedside. Drowsy, did not want to engage in interview.     ICU Vital Signs Last 24 Hrs  T(C): 36.2 (24 Sep 2023 13:02), Max: 37.1 (23 Sep 2023 17:26)  T(F): 97.2 (24 Sep 2023 13:02), Max: 98.7 (23 Sep 2023 17:26)  HR: 95 (24 Sep 2023 15:00) (48 - 95)  BP: 180/78 (24 Sep 2023 15:00) (99/54 - 180/78)  BP(mean): 118 (24 Sep 2023 15:00) (74 - 118)  ABP: --  ABP(mean): --  RR: 26 (24 Sep 2023 15:00) (15 - 35)  SpO2: 96% (24 Sep 2023 15:00) (91% - 100%)    O2 Parameters below as of 24 Sep 2023 15:00  Patient On (Oxygen Delivery Method): room air    I&O's Detail    23 Sep 2023 07:01  -  24 Sep 2023 07:00  --------------------------------------------------------  IN:    Dexmedetomidine: 124.9 mL    dextrose 10% + sodium chloride 0.9%: 420 mL    dextrose 10% + sodium chloride 0.9%: 240 mL    dextrose 10% + sodium chloride 0.9%: 240 mL    dextrose 10% + sodium chloride 0.9%: 180 mL    IV PiggyBack: 400 mL  Total IN: 1604.9 mL    OUT:    Intermittent Catheterization - Urethral (mL): 1170 mL  Total OUT: 1170 mL    Total NET: 434.9 mL      24 Sep 2023 07:01  -  24 Sep 2023 16:48  --------------------------------------------------------  IN:    Dexmedetomidine: 3 mL    dextrose 10% + sodium chloride 0.9%: 60 mL  Total IN: 63 mL    OUT:    Intermittent Catheterization - Urethral (mL): 600 mL  Total OUT: 600 mL    Total NET: -537 mL    PHYSICAL EXAM:  exam limited by patient agitation   GENERAL: cachetic, elderly woman, agitated and moving around  HEAD: normocephalic  EYES: equal and reactive to light bilaterally   NERVOUS SYSTEM: A&Ox0 but arousable, fidgeting in bed  CHEST/LUNG: crackles in BL lungs, some rhonchi present  HEART: +S1/S2, holosystolic murmur present  ABDOMEN: ND, unable to appreciate tenderness  EXTREMITIES: +1 dorsal pedal pulses, cool LE, no edema    Care Discussed with Consultants/Other Providers [ x] YES  [ ] NO    LABS:                         10.4   7.63  )-----------( 174      ( 24 Sep 2023 09:02 )             30.4     09-24    134<L>  |  102  |  8   ----------------------------<  115<H>  3.9   |  29  |  0.72    Ca    8.3<L>      24 Sep 2023 09:02  Phos  2.9     09-23  Mg     See Note     09-24    TPro  See Note  /  Alb  See Note  /  TBili  See Note  /  DBili  x   /  AST  See Note  /  ALT  See Note  /  AlkPhos  See Note  09-24      Urinalysis Basic - ( 24 Sep 2023 09:02 )    Color: x / Appearance: x / SG: x / pH: x  Gluc: 115 mg/dL / Ketone: x  / Bili: x / Urobili: x   Blood: x / Protein: x / Nitrite: x   Leuk Esterase: x / RBC: x / WBC x   Sq Epi: x / Non Sq Epi: x / Bacteria: x      RADIOLOGY, EKG & ADDITIONAL TESTS: Reviewed.         MEDICATIONS  (STANDING):  albuterol/ipratropium for Nebulization 3 milliLiter(s) Nebulizer every 6 hours  atorvastatin 40 milliGRAM(s) Oral at bedtime  chlorhexidine 2% Cloths 1 Application(s) Topical <User Schedule>  chlorhexidine 4% Liquid 1 Application(s) Topical <User Schedule>  dexMEDEtomidine Infusion 0.2 MICROgram(s)/kG/Hr (1.63 mL/Hr) IV Continuous <Continuous>  dextrose 10% + sodium chloride 0.9%. 1000 milliLiter(s) (60 mL/Hr) IV Continuous <Continuous>  melatonin 5 milliGRAM(s) Oral at bedtime  montelukast 10 milliGRAM(s) Oral daily  multivitamin  Chewable 1 Tablet(s) Oral daily  piperacillin/tazobactam IVPB.. 4.5 Gram(s) IV Intermittent every 8 hours  sodium chloride 7% Inhalation 4 milliLiter(s) Inhalation every 6 hours  thiamine 100 milliGRAM(s) Oral daily  vancomycin  IVPB 500 milliGRAM(s) IV Intermittent every 24 hours    MEDICATIONS  (PRN):  sodium chloride 0.9% lock flush 10 milliLiter(s) IV Push every 1 hour PRN Pre/post blood products, medications, blood draw, and to maintain line patency

## 2023-09-24 NOTE — PROGRESS NOTE ADULT - SUBJECTIVE AND OBJECTIVE BOX
Neurology Stroke Progress Note    INTERVAL HPI/OVERNIGHT EVENTS:  Patient seen and examined at bedside. On precedex. No acute events overnight. Pt evaluated by SLP today and made NPO, alternative nutrition route needed.    MEDICATIONS  (STANDING):  atorvastatin 40 milliGRAM(s) Oral at bedtime  chlorhexidine 2% Cloths 1 Application(s) Topical <User Schedule>  chlorhexidine 4% Liquid 1 Application(s) Topical <User Schedule>  dexMEDEtomidine Infusion 0.2 MICROgram(s)/kG/Hr (1.63 mL/Hr) IV Continuous <Continuous>  dextrose 10%. 1000 milliLiter(s) (80 mL/Hr) IV Continuous <Continuous>  enoxaparin Injectable 30 milliGRAM(s) SubCutaneous every 24 hours  melatonin 5 milliGRAM(s) Oral at bedtime  metoprolol tartrate Injectable 5 milliGRAM(s) IV Push every 6 hours  montelukast 10 milliGRAM(s) Oral daily  multivitamin  Chewable 1 Tablet(s) Oral daily  piperacillin/tazobactam IVPB.. 4.5 Gram(s) IV Intermittent every 8 hours  thiamine 100 milliGRAM(s) Oral daily    MEDICATIONS  (PRN):  QUEtiapine 12.5 milliGRAM(s) Oral once PRN agitation  sodium chloride 0.9% lock flush 10 milliLiter(s) IV Push every 1 hour PRN Pre/post blood products, medications, blood draw, and to maintain line patency      Allergies    Ceclor (Rash)  IV Contrast (Unknown)  Levaquin (Swelling)  Augmentin (Short breath; Rash)    Intolerances        Vital Signs Last 24 Hrs  T(C): 37.9 (24 Sep 2023 17:47), Max: 37.9 (24 Sep 2023 17:47)  T(F): 100.2 (24 Sep 2023 17:47), Max: 100.2 (24 Sep 2023 17:47)  HR: 85 (24 Sep 2023 21:00) (48 - 100)  BP: 178/74 (24 Sep 2023 21:00) (99/54 - 196/94)  BP(mean): 106 (24 Sep 2023 21:00) (74 - 139)  RR: 33 (24 Sep 2023 21:00) (15 - 44)  SpO2: 93% (24 Sep 2023 21:00) (91% - 100%)    Parameters below as of 24 Sep 2023 21:00  Patient On (Oxygen Delivery Method): room air        Physical exam: examined on precedex, still alert and awake with minimal verbal stimulation to engage in exam  General: No acute distress, resting with eyes closed and mouth open.   Eyes: Anicteric sclerae, moist conjunctivae, see below for CNs  Neck: trachea midline  Cardiovascular: Regular rate and rhythm on monitor  Pulmonary: No use of accessory muscles  Extremities: Noted L arm ecchymoses with skin tear    Neurologic:  -Mental status: Pt resting with eyes closed and mouth open, awakes with minimal verbal stimulation to engage in exam. Oriented to person and place but not time. not place Speech is fluent with intact naming. No dysarthria. Able to follow simple commands with repetition with some errors.  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 Pupils equally round and reactive to light  V:  Facial sensation V1-V3 equal and intact   VII: Face is symmetric with normal eye closure and smile  VIII: Hearing is bilaterally intact to voice  XII: Tongue protrudes midline  Motor: Motor: Diminished bulk throughout. Normal tone. B/l UEs at least a 4/5 without drift. B/l LEs at least a 3/5 without drift.   Sensation: Intact to light touch bilaterally  Gait: deferred    LABS:                        10.4   7.63  )-----------( 174      ( 24 Sep 2023 09:02 )             30.4     09-24    134<L>  |  102  |  8   ----------------------------<  115<H>  3.9   |  29  |  0.72    Ca    8.3<L>      24 Sep 2023 09:02  Phos  2.9     09-23  Mg     See Note     09-24    TPro  See Note  /  Alb  See Note  /  TBili  See Note  /  DBili  x   /  AST  See Note  /  ALT  See Note  /  AlkPhos  See Note  09-24      Urinalysis Basic - ( 24 Sep 2023 09:02 )    Color: x / Appearance: x / SG: x / pH: x  Gluc: 115 mg/dL / Ketone: x  / Bili: x / Urobili: x   Blood: x / Protein: x / Nitrite: x   Leuk Esterase: x / RBC: x / WBC x   Sq Epi: x / Non Sq Epi: x / Bacteria: x        RADIOLOGY & ADDITIONAL TESTS:    CT Head No Cont (09.20.23 @ 17:06) >  Impression:    Curvilinear densities within the right parietal and occipital sulci   suspicious for subarachnoid hemorrhage. No mass effect or midline shift.   No hydrocephalus.        MR Head No Cont (09.20.23 @ 21:52) >  IMPRESSION:  Incomplete and limited exam    Stable small subarachnoid hemorrhage in the right parietal-occipital   region given differences in technique.    Subcentimeter acute/subacute infarct in the left occipital lobe.    MR Angio Head No Cont (09.22.23 @ 18:53) >    MRA brain is negative for steno-occlusive disease, aneurysm or high flow   vascular malformation. No new/interval infarct on DWI since 9/20/23,   though mild cerebral edema has developed. Query amyloid related disease.    MRA neck is unremarkable allowing for mild motion artifact.    MRV brain is negative for thrombosis.

## 2023-09-24 NOTE — PROGRESS NOTE ADULT - NS ATTEND AMEND GEN_ALL_CORE FT
The patient is an 82 year old female with HTN, A fib (not on A/C due to frequent falls), bronchiectasis (on abx for recent exacerbation, previously on IV cefepime), and outside diagnosis of PRES in 05/2023 admitted for evaluation after a several day history of worsened confusion and visual complaints (details vague/unclear). Repeat HCT/MRI with small R occipital SAH and small L ischemic stroke. Acute worsening in mental status appears to be improving since the correction of hypoglycemia and/or treatment with broad spectrum abx. However, patient is not still at baseline.    Etiology of SAH/ischemic stroke and overall neurological presentation is unclear. Initial ? DLB + acute decompensation from imaging findings and hospitalization. Endocarditis with active systemic infection may also be a cause of imaging findings/presentation; blood cultures NTD. Repeat MRI does not show phoenix evidence of large vessel vasculitis or CVST; cannot r/o inflammatory causes like ISMA/vasculitis/encephalitis.    Unfortunately, MRI brain w, w/o contrast not performed with vessel imaging. Will attempt to repeat again to r/o potential causes like ISMA. Rec to correlate with sedation needed for VANNESSA. LP may be warranted pending clinical course. Can consider PET brain when able to eval for underlying DLB.  Can consider aspirin pending repeat HCT

## 2023-09-24 NOTE — PROGRESS NOTE ADULT - SUBJECTIVE AND OBJECTIVE BOX
Weekend PN  Overnight w. sedation after MRI, weaned off and subsequently developed agitated state. No recurrence of hypoglycemia over the weekend.    CAPILLARY BLOOD GLUCOSE      POCT Blood Glucose.: 97 mg/dL (24 Sep 2023 13:42)  POCT Blood Glucose.: 96 mg/dL (24 Sep 2023 12:23)  POCT Blood Glucose.: 89 mg/dL (24 Sep 2023 10:53)        ALLERGIES  Ceclor (Rash)  IV Contrast (Unknown)  Levaquin (Swelling)  Augmentin (Short breath; Rash)      MEDICATIONS  (STANDING):  atorvastatin 40 milliGRAM(s) Oral at bedtime  chlorhexidine 2% Cloths 1 Application(s) Topical <User Schedule>  chlorhexidine 4% Liquid 1 Application(s) Topical <User Schedule>  dexMEDEtomidine Infusion 0.2 MICROgram(s)/kG/Hr (1.63 mL/Hr) IV Continuous <Continuous>  enoxaparin Injectable 30 milliGRAM(s) SubCutaneous every 24 hours  melatonin 5 milliGRAM(s) Oral at bedtime  metoprolol tartrate Injectable 2.5 milliGRAM(s) IV Push every 6 hours  montelukast 10 milliGRAM(s) Oral daily  multivitamin  Chewable 1 Tablet(s) Oral daily  piperacillin/tazobactam IVPB.. 4.5 Gram(s) IV Intermittent every 8 hours  thiamine 100 milliGRAM(s) Oral daily        REVIEW OF SYSTEMS  Constitutional:  Negative fever, chills or loss of appetite.  Eyes:  Negative blurry vision or double vision.  Cardiovascular:  Negative for chest pain or palpitations.  Respiratory:  Negative for cough, wheezing, or shortness of breath.   Gastrointestinal:  Negative for nausea, vomiting, diarrhea, constipation, or abdominal pain.  Genitourinary:  Negative frequency, urgency or dysuria.  Neurologic:  No headache, confusion, dizziness, lightheadedness.    PHYSICAL EXAM  Vital Signs Last 24 Hrs  T(C): 36.2 (24 Sep 2023 13:02), Max: 37.1 (23 Sep 2023 17:26)  T(F): 97.2 (24 Sep 2023 13:02), Max: 98.7 (23 Sep 2023 17:26)  HR: 89 (24 Sep 2023 13:00) (48 - 91)  BP: 165/74 (24 Sep 2023 13:00) (99/54 - 180/74)  BP(mean): 107 (24 Sep 2023 13:00) (74 - 115)  RR: 22 (24 Sep 2023 13:00) (15 - 43)  SpO2: 93% (24 Sep 2023 13:00) (91% - 100%)    Parameters below as of 24 Sep 2023 13:00  Patient On (Oxygen Delivery Method): room air        Parameters below as of 22 Sep 2023 10:00  Patient On (Oxygen Delivery Method): nasal cannula  O2 Flow (L/min): 2  Constitutional: Awake, alert, in no acute distress.   HEENT: Normocephalic, atraumatic, SANDRA, no proptosis or lid retraction.   Neck: supple, no acanthosis, no thyromegaly or palpable thyroid nodules.  Respiratory: Lungs clear to ausculation bilaterally.   Cardiovascular: regular rhythm, normal S1 and S2, no audible murmurs.   GI: soft, non-tender, non-distended, bowel sounds present, no masses appreciated.  Extremities: No lower extremity edema, peripheral pulses present.   Skin: no rashes.   Psychiatric: AAO x 3. Normal affect/mood.     LABS  CBC - WBC/HGB/HTC/PLT: 12.98/11.3/33.2/196 (09-22-23)  BMP: Na/K/Cl/Bicarb/BUN/Cr/Gluc: 134/3.9/101/25/17/0.59/144 (09-22-23)  Anion Gap: 8 (09-22-23)  eGFR: 91 (09-22-23)  Calcium: 8.9 (09-22-23)  Phosphorus: 3.8 (09-22-23)  Magnesium: 1.7 (09-22-23)  LFT - Alb/Tprot/Tbili/Dbili/AlkPhos/ALT/AST: 3.4/--/0.7/--/114/28/26 (09-22-23)  PT/aPTT/INR: 11.7/31.3/1.03 (09-21-23)  Thyroid Stimulating Hormone, Serum: 3.870 (09-20-23)  Total T4/Free T4: 6.47/0.856 (09-21-23)  CBC - WBC/HGB/HTC/PLT: 12.98/11.3/33.2/196 (09-22-23)BMP: Na/K/Cl/Bicarb/BUN/Cr/Gluc: 134/3.9/101/25/17/0.59/144 (09-22-23)  Anion Gap: 8 (09-22-23)  eGFR: 91 (09-22-23)  Calcium: 8.9 (09-22-23)  Phosphorus: 3.8 (09-22-23)  Magnesium: 1.7 (09-22-23)    CAPILLARY BLOOD GLUCOSE & INSULIN RECEIVED  32 mg/dL (09-21 @ 13:50)  33 mg/dL (09-21 @ 13:53)  187 mg/dL (09-21 @ 14:16)  27 mg/dL (09-21 @ 15:52)  401 mg/dL (09-21 @ 16:01)  391 mg/dL (09-21 @ 16:04)  258 mg/dL (09-21 @ 16:27)  229 mg/dL (09-21 @ 16:39)  140 mg/dL (09-21 @ 17:01)  92 mg/dL (09-21 @ 17:23)  35 mg/dL (09-21 @ 18:04)  267 mg/dL (09-21 @ 18:20)  127 mg/dL (09-21 @ 19:08)  82 mg/dL (09-21 @ 19:58)  53 mg/dL (09-21 @ 20:37)  176 mg/dL (09-21 @ 21:07)  132 mg/dL (09-21 @ 21:42)  118 mg/dL (09-21 @ 22:29)  54 mg/dL (09-21 @ 23:20) - received 1mg of glucagon   132 mg/dL (09-21 @ 23:42)  408 mg/dL (09-22 @ 00:09)  158 mg/dL (09-22 @ 01:04)  138 mg/dL (09-22 @ 01:33)  145 mg/dL (09-22 @ 02:29)  122 mg/dL (09-22 @ 06:16)  153 mg/dL (09-22 @ 07:58)  165 mg/dL (09-22 @ 09:40)        09-21-23 @ 07:01  -  09-22-23 @ 07:00  --------------------------------------------------------  IN: 1880 mL / OUT: 150 mL / NET: 1730 mL    09-22-23 @ 07:01  -  09-22-23 @ 10:16  --------------------------------------------------------  IN: 120 mL / OUT: 0 mL / NET: 120 mL        ASSESSMENT / RECOMMENDATIONS   82y Female with a past medical history of HTN, atrial fibrillation, chronic bronchiectasis, recent hospitalization for PRES (5-6/2023), recent hospitalization in 08/23 acute bronchiectasis exacerbation came to [facility] on 9/19 with altered mental status, visual disturbances and diplopia.  Pt was initially admitted under neuro service for encephalopathy and r/o stroke.  CT head unremarkable for large transcortical infarct or definite acute intracranial hemorrhage.   CT chest shows diffuse bilateral bronchiectasis with multiple areas of distal mucoid impaction and scattered tree-in-bud opacities.  On 9/21, pt had a rapid response for nonsensical and dysarthric speech and noted to have intermitted LE jerking movements.  Pt was found to have glucose of 34. improved with one amp of glucose.  Pt later become hypoxic, tachypeneic and dyspneic and desaturated.  Repeat glucose at that time was 27, pt remained lethargic and received additional 2 amps of glucose which climbed to 401 and D10 was initiated and pt was subsequently transferred to ICU.  Endocrinology has been consulted for Hypoglycemia.      A1C: 5.5 %  BUN: 17  Creatinine: 0.59  GFR: 91  Weight: 32.5  BMI: 11.9  Free T4 0.856, TSH 3.87,      # Hypoglycemia  - cpeptide 09/21: 0.9, pending proinsulin, serum insulin level, betahydroxybutyrate  - pt did not receive any insulin products or agents that have high evidence to induce hypoglycemia during hospital course  - did receive last lopressor at 6am on 9/19  - Pt was on Bactrim, ciprofloxacin and cefepime earlier in the month for outpatient abx, there are some low quality evidence of association between certain class of abx causing hypoglycemia - however these drugs should be cleared by now  - some possible etiologies: 2/2 sepsis vs hormone deficiency vs endogenous production of insulin  - next time patient become hypoglycemic, please send the following labs right away: serum cortisol, c-peptide, proinsulin, beta-hydroxybutyrate and BMP  - pt currently off of D10NS

## 2023-09-25 DIAGNOSIS — R13.10 DYSPHAGIA, UNSPECIFIED: ICD-10-CM

## 2023-09-25 LAB
ALBUMIN SERPL ELPH-MCNC: 3.1 G/DL — LOW (ref 3.3–5)
ALP SERPL-CCNC: 102 U/L — SIGNIFICANT CHANGE UP (ref 40–120)
ALT FLD-CCNC: 39 U/L — SIGNIFICANT CHANGE UP (ref 10–45)
ANION GAP SERPL CALC-SCNC: 7 MMOL/L — SIGNIFICANT CHANGE UP (ref 5–17)
ANION GAP SERPL CALC-SCNC: 7 MMOL/L — SIGNIFICANT CHANGE UP (ref 5–17)
AST SERPL-CCNC: 35 U/L — SIGNIFICANT CHANGE UP (ref 10–40)
BASOPHILS # BLD AUTO: 0.05 K/UL — SIGNIFICANT CHANGE UP (ref 0–0.2)
BASOPHILS NFR BLD AUTO: 0.6 % — SIGNIFICANT CHANGE UP (ref 0–2)
BILIRUB SERPL-MCNC: 0.9 MG/DL — SIGNIFICANT CHANGE UP (ref 0.2–1.2)
BUN SERPL-MCNC: 6 MG/DL — LOW (ref 7–23)
BUN SERPL-MCNC: 8 MG/DL — SIGNIFICANT CHANGE UP (ref 7–23)
CALCIUM SERPL-MCNC: 7.9 MG/DL — LOW (ref 8.4–10.5)
CALCIUM SERPL-MCNC: 8.5 MG/DL — SIGNIFICANT CHANGE UP (ref 8.4–10.5)
CHLORIDE SERPL-SCNC: 93 MMOL/L — LOW (ref 96–108)
CHLORIDE SERPL-SCNC: 97 MMOL/L — SIGNIFICANT CHANGE UP (ref 96–108)
CO2 SERPL-SCNC: 28 MMOL/L — SIGNIFICANT CHANGE UP (ref 22–31)
CO2 SERPL-SCNC: 30 MMOL/L — SIGNIFICANT CHANGE UP (ref 22–31)
CREAT SERPL-MCNC: 0.63 MG/DL — SIGNIFICANT CHANGE UP (ref 0.5–1.3)
CREAT SERPL-MCNC: 0.71 MG/DL — SIGNIFICANT CHANGE UP (ref 0.5–1.3)
EGFR: 86 ML/MIN/1.73M2 — SIGNIFICANT CHANGE UP
EGFR: 90 ML/MIN/1.73M2 — SIGNIFICANT CHANGE UP
EOSINOPHIL # BLD AUTO: 0.05 K/UL — SIGNIFICANT CHANGE UP (ref 0–0.5)
EOSINOPHIL NFR BLD AUTO: 0.6 % — SIGNIFICANT CHANGE UP (ref 0–6)
GLUCOSE BLDC GLUCOMTR-MCNC: 102 MG/DL — HIGH (ref 70–99)
GLUCOSE BLDC GLUCOMTR-MCNC: 107 MG/DL — HIGH (ref 70–99)
GLUCOSE BLDC GLUCOMTR-MCNC: 113 MG/DL — HIGH (ref 70–99)
GLUCOSE BLDC GLUCOMTR-MCNC: 115 MG/DL — HIGH (ref 70–99)
GLUCOSE BLDC GLUCOMTR-MCNC: 139 MG/DL — HIGH (ref 70–99)
GLUCOSE BLDC GLUCOMTR-MCNC: 160 MG/DL — HIGH (ref 70–99)
GLUCOSE SERPL-MCNC: 116 MG/DL — HIGH (ref 70–99)
GLUCOSE SERPL-MCNC: 143 MG/DL — HIGH (ref 70–99)
HCT VFR BLD CALC: 29.6 % — LOW (ref 34.5–45)
HGB BLD-MCNC: 10.4 G/DL — LOW (ref 11.5–15.5)
IMM GRANULOCYTES NFR BLD AUTO: 0.4 % — SIGNIFICANT CHANGE UP (ref 0–0.9)
LYMPHOCYTES # BLD AUTO: 0.92 K/UL — LOW (ref 1–3.3)
LYMPHOCYTES # BLD AUTO: 10.9 % — LOW (ref 13–44)
MAGNESIUM SERPL-MCNC: 2.3 MG/DL — SIGNIFICANT CHANGE UP (ref 1.6–2.6)
MCHC RBC-ENTMCNC: 33.3 PG — SIGNIFICANT CHANGE UP (ref 27–34)
MCHC RBC-ENTMCNC: 35.1 GM/DL — SIGNIFICANT CHANGE UP (ref 32–36)
MCV RBC AUTO: 94.9 FL — SIGNIFICANT CHANGE UP (ref 80–100)
MONOCYTES # BLD AUTO: 0.61 K/UL — SIGNIFICANT CHANGE UP (ref 0–0.9)
MONOCYTES NFR BLD AUTO: 7.2 % — SIGNIFICANT CHANGE UP (ref 2–14)
NEUTROPHILS # BLD AUTO: 6.8 K/UL — SIGNIFICANT CHANGE UP (ref 1.8–7.4)
NEUTROPHILS NFR BLD AUTO: 80.3 % — HIGH (ref 43–77)
NRBC # BLD: 0 /100 WBCS — SIGNIFICANT CHANGE UP (ref 0–0)
PHOSPHATE SERPL-MCNC: 4.1 MG/DL — SIGNIFICANT CHANGE UP (ref 2.5–4.5)
PLATELET # BLD AUTO: 176 K/UL — SIGNIFICANT CHANGE UP (ref 150–400)
POTASSIUM SERPL-MCNC: 3.2 MMOL/L — LOW (ref 3.5–5.3)
POTASSIUM SERPL-MCNC: 3.6 MMOL/L — SIGNIFICANT CHANGE UP (ref 3.5–5.3)
POTASSIUM SERPL-SCNC: 3.2 MMOL/L — LOW (ref 3.5–5.3)
POTASSIUM SERPL-SCNC: 3.6 MMOL/L — SIGNIFICANT CHANGE UP (ref 3.5–5.3)
PROT SERPL-MCNC: 6.2 G/DL — SIGNIFICANT CHANGE UP (ref 6–8.3)
RBC # BLD: 3.12 M/UL — LOW (ref 3.8–5.2)
RBC # FLD: 12.3 % — SIGNIFICANT CHANGE UP (ref 10.3–14.5)
SODIUM SERPL-SCNC: 130 MMOL/L — LOW (ref 135–145)
SODIUM SERPL-SCNC: 132 MMOL/L — LOW (ref 135–145)
WBC # BLD: 8.46 K/UL — SIGNIFICANT CHANGE UP (ref 3.8–10.5)
WBC # FLD AUTO: 8.46 K/UL — SIGNIFICANT CHANGE UP (ref 3.8–10.5)

## 2023-09-25 PROCEDURE — 70553 MRI BRAIN STEM W/O & W/DYE: CPT | Mod: 26

## 2023-09-25 PROCEDURE — 99232 SBSQ HOSP IP/OBS MODERATE 35: CPT | Mod: GC

## 2023-09-25 PROCEDURE — 99233 SBSQ HOSP IP/OBS HIGH 50: CPT

## 2023-09-25 PROCEDURE — 99232 SBSQ HOSP IP/OBS MODERATE 35: CPT

## 2023-09-25 RX ORDER — SODIUM CHLORIDE 9 MG/ML
1000 INJECTION, SOLUTION INTRAVENOUS
Refills: 0 | Status: DISCONTINUED | OUTPATIENT
Start: 2023-09-25 | End: 2023-09-26

## 2023-09-25 RX ORDER — HALOPERIDOL DECANOATE 100 MG/ML
1 INJECTION INTRAMUSCULAR ONCE
Refills: 0 | Status: DISCONTINUED | OUTPATIENT
Start: 2023-09-25 | End: 2023-09-25

## 2023-09-25 RX ORDER — SODIUM CHLORIDE 9 MG/ML
1000 INJECTION, SOLUTION INTRAVENOUS
Refills: 0 | Status: DISCONTINUED | OUTPATIENT
Start: 2023-09-25 | End: 2023-09-25

## 2023-09-25 RX ORDER — HALOPERIDOL DECANOATE 100 MG/ML
1 INJECTION INTRAMUSCULAR ONCE
Refills: 0 | Status: COMPLETED | OUTPATIENT
Start: 2023-09-25 | End: 2023-09-25

## 2023-09-25 RX ORDER — METOPROLOL TARTRATE 50 MG
2.5 TABLET ORAL EVERY 6 HOURS
Refills: 0 | Status: DISCONTINUED | OUTPATIENT
Start: 2023-09-25 | End: 2023-09-26

## 2023-09-25 RX ORDER — POTASSIUM CHLORIDE 20 MEQ
40 PACKET (EA) ORAL ONCE
Refills: 0 | Status: COMPLETED | OUTPATIENT
Start: 2023-09-25 | End: 2023-09-25

## 2023-09-25 RX ORDER — IPRATROPIUM/ALBUTEROL SULFATE 18-103MCG
3 AEROSOL WITH ADAPTER (GRAM) INHALATION EVERY 6 HOURS
Refills: 0 | Status: DISCONTINUED | OUTPATIENT
Start: 2023-09-25 | End: 2023-09-29

## 2023-09-25 RX ORDER — LABETALOL HCL 100 MG
10 TABLET ORAL ONCE
Refills: 0 | Status: COMPLETED | OUTPATIENT
Start: 2023-09-25 | End: 2023-09-25

## 2023-09-25 RX ORDER — POTASSIUM CHLORIDE 20 MEQ
10 PACKET (EA) ORAL
Refills: 0 | Status: COMPLETED | OUTPATIENT
Start: 2023-09-25 | End: 2023-09-25

## 2023-09-25 RX ADMIN — Medication 2.5 MILLIGRAM(S): at 12:59

## 2023-09-25 RX ADMIN — Medication 100 MILLIEQUIVALENT(S): at 09:08

## 2023-09-25 RX ADMIN — Medication 1 TABLET(S): at 15:17

## 2023-09-25 RX ADMIN — HALOPERIDOL DECANOATE 1 MILLIGRAM(S): 100 INJECTION INTRAMUSCULAR at 21:26

## 2023-09-25 RX ADMIN — ATORVASTATIN CALCIUM 40 MILLIGRAM(S): 80 TABLET, FILM COATED ORAL at 21:26

## 2023-09-25 RX ADMIN — Medication 100 MILLIEQUIVALENT(S): at 11:07

## 2023-09-25 RX ADMIN — Medication 3 MILLILITER(S): at 17:52

## 2023-09-25 RX ADMIN — Medication 100 MILLIEQUIVALENT(S): at 07:19

## 2023-09-25 RX ADMIN — Medication 25 GRAM(S): at 00:09

## 2023-09-25 RX ADMIN — Medication 2.5 MILLIGRAM(S): at 18:09

## 2023-09-25 RX ADMIN — Medication 3 MILLILITER(S): at 22:29

## 2023-09-25 RX ADMIN — HALOPERIDOL DECANOATE 1 MILLIGRAM(S): 100 INJECTION INTRAMUSCULAR at 22:49

## 2023-09-25 RX ADMIN — PIPERACILLIN AND TAZOBACTAM 25 GRAM(S): 4; .5 INJECTION, POWDER, LYOPHILIZED, FOR SOLUTION INTRAVENOUS at 07:19

## 2023-09-25 RX ADMIN — DEXMEDETOMIDINE HYDROCHLORIDE IN 0.9% SODIUM CHLORIDE 1.63 MICROGRAM(S)/KG/HR: 4 INJECTION INTRAVENOUS at 01:31

## 2023-09-25 RX ADMIN — ENOXAPARIN SODIUM 30 MILLIGRAM(S): 100 INJECTION SUBCUTANEOUS at 15:17

## 2023-09-25 RX ADMIN — SODIUM CHLORIDE 50 MILLILITER(S): 9 INJECTION, SOLUTION INTRAVENOUS at 11:21

## 2023-09-25 RX ADMIN — Medication 5 MILLIGRAM(S): at 21:26

## 2023-09-25 RX ADMIN — Medication 10 MILLIGRAM(S): at 17:42

## 2023-09-25 RX ADMIN — Medication 62.5 MILLIMOLE(S): at 01:21

## 2023-09-25 RX ADMIN — Medication 40 MILLIEQUIVALENT(S): at 21:45

## 2023-09-25 RX ADMIN — MONTELUKAST 10 MILLIGRAM(S): 4 TABLET, CHEWABLE ORAL at 15:17

## 2023-09-25 RX ADMIN — PIPERACILLIN AND TAZOBACTAM 25 GRAM(S): 4; .5 INJECTION, POWDER, LYOPHILIZED, FOR SOLUTION INTRAVENOUS at 17:52

## 2023-09-25 RX ADMIN — PIPERACILLIN AND TAZOBACTAM 25 GRAM(S): 4; .5 INJECTION, POWDER, LYOPHILIZED, FOR SOLUTION INTRAVENOUS at 01:45

## 2023-09-25 RX ADMIN — Medication 10 MILLIGRAM(S): at 22:29

## 2023-09-25 RX ADMIN — Medication 100 MILLIGRAM(S): at 15:17

## 2023-09-25 NOTE — PROGRESS NOTE ADULT - NS ATTEND AMEND GEN_ALL_CORE FT
82 year old woman w/ PMH of Afib (not on AC due to frequent falls), HTN, bronchiectasis, recent diagnosis of PRES at outside hospital presented w/ several day history of confusion and visual complaints. Mental status with slight improvement after correction of hypoglycemia and antibiotics.     On exam, patient oriented to self, not oriented to place, oriented to September 2023, believes [employer]am[employer] Knowles president; Fluent; Following simple but not complex crossed commands; Moving all extremeties at least antigravity.     CTH 9.19.23 negative  MR brain 9.20.23 demonstrates small diffusion restricting lesion in the L. Occipital lobe w/ ADC and FLAIR correlate; +SWI in the R. Parietooccipital region w/ sulcal T2 flair signal.   MRV negative  MRA h/n w/ repeat MRI brain w/o contrast 9.22.23 demonstrates no stenoocclusive disease; No new DWI abnormalities.   Blood cultures NGTD  EEG neg    Impression: Acute versus ?subacute onset confusion and visual complaints with radiographic evidence of possible L. 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 Etiology remains uncertain, possibilities include CNS inflammatory process (i.e amyloid-beta related angitis, CNS angitis, etc), PRES/RCVS, CNS lymphoma, underlying neurodegenerative process with superimposed ischemic stroke and convexal SAH, endocarditis (although cultures negative).     Plan:   Repeat MR brain w/w/o contrast  May need PET to evaluate for neurodegenerative process  May need LP (to evaluate for CNS inflammatory or neoplastic process) if in line with GOC   IF amyloid-beta related angitis is suspected, cerebral biopsy gold standard, however, may consider empiric treatment with steroids if in line with GOC   Continue holding Eliquis given unclear etiology and possibility of amyloid   Continue Lovenox for DVT ppx    50 minutes spent on total encounter. The necessity of the time spent during the encounter on this date of service was due to:     Review of imaging and chart; obtaining history; examination of pt; discussion and coordination of care, and discussion of lifestyle modification and risk factor control.

## 2023-09-25 NOTE — CHART NOTE - NSCHARTNOTEFT_GEN_A_CORE
Admitting Diagnosis:   Patient is a 82y old  Female who presents with a chief complaint of diplopia (25 Sep 2023 10:17)      PAST MEDICAL & SURGICAL HISTORY:  Bronchiectasis      History of Pseudomonas pneumonia      HTN (hypertension)      Posterior reversible encephalopathy syndrome      Atrial fibrillation      No significant past surgical history    Current Nutrition Order:   Diet, NPO:   Except Medications  With Ice Chips/Sips of Water (09-25-23 @ 11:00)  Diet, NPO:   Except Medications (09-24-23 @ 15:51)    PO Intake: N/A    GI Issues: Abdomen non-distended/non-tender, +BS x,4 last bowel movement 9/24 -diarrhea     Pain: 0 per chart review     Skin Integrity: Warm/Dry/Intact, no edema noted     Labs:   09-25    130<L>  |  93<L>  |  8   ----------------------------<  143<H>  3.2<L>   |  30  |  0.71    Ca    7.9<L>      25 Sep 2023 06:21  Phos  4.1     09-25  Mg     2.3     09-25    TPro  6.2  /  Alb  3.1<L>  /  TBili  0.9  /  DBili  x   /  AST  35  /  ALT  39  /  AlkPhos  102  09-25    CAPILLARY BLOOD GLUCOSE      POCT Blood Glucose.: 113 mg/dL (25 Sep 2023 11:49)  POCT Blood Glucose.: 139 mg/dL (25 Sep 2023 09:12)  POCT Blood Glucose.: 160 mg/dL (25 Sep 2023 02:09)  POCT Blood Glucose.: 87 mg/dL (24 Sep 2023 17:25)  POCT Blood Glucose.: 103 mg/dL (24 Sep 2023 15:20)  POCT Blood Glucose.: 97 mg/dL (24 Sep 2023 13:42)      Medications:  MEDICATIONS  (STANDING):  albuterol/ipratropium for Nebulization 3 milliLiter(s) Nebulizer every 6 hours  atorvastatin 40 milliGRAM(s) Oral at bedtime  chlorhexidine 2% Cloths 1 Application(s) Topical <User Schedule>  chlorhexidine 4% Liquid 1 Application(s) Topical <User Schedule>  dextrose 10% + sodium chloride 0.9%. 1000 milliLiter(s) (25 mL/Hr) IV Continuous <Continuous>  enoxaparin Injectable 30 milliGRAM(s) SubCutaneous every 24 hours  melatonin 5 milliGRAM(s) Oral at bedtime  metoprolol tartrate Injectable 2.5 milliGRAM(s) IV Push every 6 hours  montelukast 10 milliGRAM(s) Oral daily  multivitamin  Chewable 1 Tablet(s) Oral daily  piperacillin/tazobactam IVPB.. 4.5 Gram(s) IV Intermittent every 8 hours  thiamine 100 milliGRAM(s) Oral daily    MEDICATIONS  (PRN):  QUEtiapine 12.5 milliGRAM(s) Oral once PRN agitation  sodium chloride 0.9% lock flush 10 milliLiter(s) IV Push every 1 hour PRN Pre/post blood products, medications, blood draw, and to maintain line patency    Height for BMI (FEET)	5 Feet  Height for BMI (INCHES)	5 Inch(s)  Height for BMI (CENTIMETERS)	165.1 Centimeter(s)  Weight for BMI (lbs)	71.7 lb  Weight for BMI (kg)	32.5 kg  Body Mass Index	11.9    Weight Change: No new wt since admit.     Nutrition Focused Physical Exam: See Dietitian Nutrition Risk Notification on 9/20.    Estimated energy needs: Needs updated 9/25 to reflect protein-calorie malnutrition using current body weight 32.5 kg.   EEN: 975-1137 calories (30-35 kcal/kg)  EPN: 39-65 gProt. (1.2-2.0 gProt./kg)  EFN: 975-1137 ml (30-35 ml/kg)    Subjective: 82y Female with PMHx of HTN, afib (not on AC due to fall risk), recent hospitalization for PRES (5-6/2023), recently discharged from [facility] 8/2023 with acute exacerbation of severe bronchiectasis and respiratory failure presents with several days of AMS and new diplopia 9/18. NIHSS 1 for bitemporal visual deficit. CTH with no significant findings. CTA and perfusion deferred due to contrast allergy. Admitted for further stroke w/u.     Pt care discussed in interdisciplinary care team rounds. Rx and labs reviewed. Pt previously on a minced/moist diet with Ensure Max BID. Pt with reportedly limited PO intake. Further SLP evaluation on 9/24 who recommends NPO with alternative means of nutrition; see recs below. SLP continues to follow; monitor for ability to tolerate PO diet. No other reports GI distress or further nutritional concerns at this time. RDN will continue to reassess, intervene, and monitor as appropriate.     Nutrition Diagnosis: Malnutrition... Severe inadequate PO intake Significant weight loss, NFPE findings     Active [ x ]  Resolved [   ]    Goal: Pt will meet 75% or more of protein/energy needs via most appropriate route for nutrition and reduce/resolve s/sx protein-calorie malnutrition.     Recommendations:   -If unable to advance to PO diet, recommend establish route and initiate enteral nutrition support    *Recommend Jevity 1.2 @42 ml/hr with LPS x1/day from [id number] to provide 756 ml tube feed, 1007 calories, 57 gProt., and 610 ml free water. This is 24 nonprotein calories and 1.75 gProt. per kg current body weight 32.5 kg.   -If able to tolerate PO, recommend regular diet with appropriate textures/consistencies per SLP    *Consider addition of Ensure Plus High Protein TID   -Encourage good PO intake as appropriate   -Monitor phos, K, and Mg for s/sx refeeding syndrome  -Align nutrition with goals of care at all times  -Monitor chemistry, GI function, and skin integrity     Risk Level: High [ x ] Moderate [   ] Low [   ]

## 2023-09-25 NOTE — PROGRESS NOTE ADULT - ASSESSMENT
83yo F with PMH of HTN, AFib, PRES, and CVA p/w encephalopathy and found to have new CVA. Palliative consulted for complex medical decision making in the setting of metabolic/neurologic encephalopathy.

## 2023-09-25 NOTE — PROGRESS NOTE ADULT - SUBJECTIVE AND OBJECTIVE BOX
incomplete Neurology Stroke Progress Note    INTERVAL HPI/OVERNIGHT EVENTS:  Patient seen and examined at bedside. Pt made NPO yesterday and reevaluated today and continued NPO with need for alternative nutrition source. Went to MRI w/wo with anesthesia today pending read.    MEDICATIONS  (STANDING):  albuterol/ipratropium for Nebulization 3 milliLiter(s) Nebulizer every 6 hours  atorvastatin 40 milliGRAM(s) Oral at bedtime  chlorhexidine 2% Cloths 1 Application(s) Topical <User Schedule>  chlorhexidine 4% Liquid 1 Application(s) Topical <User Schedule>  dextrose 10% + sodium chloride 0.9%. 1000 milliLiter(s) (25 mL/Hr) IV Continuous <Continuous>  enoxaparin Injectable 30 milliGRAM(s) SubCutaneous every 24 hours  melatonin 5 milliGRAM(s) Oral at bedtime  metoprolol tartrate Injectable 2.5 milliGRAM(s) IV Push every 6 hours  montelukast 10 milliGRAM(s) Oral daily  multivitamin  Chewable 1 Tablet(s) Oral daily  piperacillin/tazobactam IVPB.. 4.5 Gram(s) IV Intermittent every 8 hours  thiamine 100 milliGRAM(s) Oral daily    MEDICATIONS  (PRN):  QUEtiapine 12.5 milliGRAM(s) Oral once PRN agitation  sodium chloride 0.9% lock flush 10 milliLiter(s) IV Push every 1 hour PRN Pre/post blood products, medications, blood draw, and to maintain line patency      Allergies    Ceclor (Rash)  IV Contrast (Unknown)  Levaquin (Swelling)  Augmentin (Short breath; Rash)    Intolerances        Vital Signs Last 24 Hrs  T(C): 36.6 (25 Sep 2023 17:45), Max: 37.6 (24 Sep 2023 21:57)  T(F): 97.9 (25 Sep 2023 17:45), Max: 99.7 (24 Sep 2023 21:57)  HR: 83 (25 Sep 2023 15:00) (53 - 98)  BP: 156/80 (25 Sep 2023 15:00) (116/58 - 196/94)  BP(mean): 111 (25 Sep 2023 15:00) (83 - 139)  RR: 20 (25 Sep 2023 15:00) (20 - 44)  SpO2: 99% (25 Sep 2023 15:00) (90% - 100%)    Parameters below as of 25 Sep 2023 15:00  Patient On (Oxygen Delivery Method): room air    Physical exam: examined on precedex, still alert and awake with minimal verbal stimulation to engage in exam  General: No acute distress, resting with eyes closed and mouth open.   Eyes: Anicteric sclerae, moist conjunctivae, see below for CNs  Neck: trachea midline  Cardiovascular: Regular rate and rhythm on monitor  Pulmonary: No use of accessory muscles  Extremities: Noted L arm ecchymoses with skin tear    Neurologic:  -Mental status: Pt resting with eyes closed and mouth open, awakes with minimal verbal stimulation to engage in exam. Oriented to person, place and time. Speech is fluent with intact naming. No dysarthria. Able to follow simple commands with repetition with some errors.  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 Pupils equally round and reactive to light  V:  Facial sensation V1-V3 equal and intact   VII: Face is symmetric with normal eye closure and smile  VIII: Hearing is bilaterally intact to voice  XII: Tongue protrudes midline  Motor: Motor: Diminished bulk throughout. Normal tone. B/l UEs at least a 4/5 without drift. B/l LEs at least a 3/5 without drift.   Sensation: Intact to light touch bilaterally  Gait: deferred      LABS:                        10.4   8.46  )-----------( 176      ( 25 Sep 2023 06:21 )             29.6     09-25    130<L>  |  93<L>  |  8   ----------------------------<  143<H>  3.2<L>   |  30  |  0.71    Ca    7.9<L>      25 Sep 2023 06:21  Phos  4.1     09-25  Mg     2.3     09-25    TPro  6.2  /  Alb  3.1<L>  /  TBili  0.9  /  DBili  x   /  AST  35  /  ALT  39  /  AlkPhos  102  09-25      Urinalysis Basic - ( 25 Sep 2023 06:21 )    Color: x / Appearance: x / SG: x / pH: x  Gluc: 143 mg/dL / Ketone: x  / Bili: x / Urobili: x   Blood: x / Protein: x / Nitrite: x   Leuk Esterase: x / RBC: x / WBC x   Sq Epi: x / Non Sq Epi: x / Bacteria: x        RADIOLOGY & ADDITIONAL TESTS:    CT Head No Cont (09.20.23 @ 17:06) >  Impression:    Curvilinear densities within the right parietal and occipital sulci   suspicious for subarachnoid hemorrhage. No mass effect or midline shift.   No hydrocephalus.        MR Head No Cont (09.20.23 @ 21:52) >  IMPRESSION:  Incomplete and limited exam    Stable small subarachnoid hemorrhage in the right parietal-occipital   region given differences in technique.    Subcentimeter acute/subacute infarct in the left occipital lobe.    MR Angio Head No Cont (09.22.23 @ 18:53) >    MRA brain is negative for steno-occlusive disease, aneurysm or high flow   vascular malformation. No new/interval infarct on DWI since 9/20/23,   though mild cerebral edema has developed. Query amyloid related disease.    MRA neck is unremarkable allowing for mild motion artifact.    MRV brain is negative for thrombosis.

## 2023-09-25 NOTE — PROGRESS NOTE ADULT - SUBJECTIVE AND OBJECTIVE BOX
Patient is a 82y old Female who presents with a chief complaint of diplopia (22 Sep 2023 14:25)    INTERVAL HPI/OVERNIGHT EVENTS:   Mg repleted overnight. @10:11 pm, pt received lopressor dose. @10:20, had an episode of bradycardia to the 40s, remaining vital signs were stable. Received seroquel 12.5mg x1 (recent EKG QTc is 432 ms). Bladder scan revealed 101 cc. D10 rate decreased to 40 cc/hr.     SUBJECTIVE/AT BEDSIDE:   Patient seen and examined at bedside. Drowsy, did not want to engage in interview.     PHYSICAL EXAM:  exam limited by patient agitation   GENERAL: cachetic, elderly woman, agitated and moving around  HEAD: normocephalic  EYES: equal and reactive to light bilaterally   NERVOUS SYSTEM: A&Ox0 but arousable, fidgeting in bed  CHEST/LUNG: crackles in BL lungs, some rhonchi present  HEART: +S1/S2, holosystolic murmur present  ABDOMEN: ND, unable to appreciate tenderness  EXTREMITIES: +1 dorsal pedal pulses, cool LE, no edema      ICU Vital Signs Last 24 Hrs  T(C): 36.4 (25 Sep 2023 01:03), Max: 37.9 (24 Sep 2023 17:47)  T(F): 97.6 (25 Sep 2023 01:03), Max: 100.2 (24 Sep 2023 17:47)  HR: 57 (25 Sep 2023 06:00) (54 - 100)  BP: 129/60 (25 Sep 2023 06:00) (116/58 - 196/94)  BP(mean): 86 (25 Sep 2023 06:00) (83 - 139)  ABP: --  ABP(mean): --  RR: 25 (25 Sep 2023 06:00) (17 - 44)  SpO2: 100% (25 Sep 2023 06:00) (90% - 100%)    O2 Parameters below as of 25 Sep 2023 06:00  Patient On (Oxygen Delivery Method): room air        I&O's Summary    23 Sep 2023 07:01  -  24 Sep 2023 07:00  --------------------------------------------------------  IN: 1604.9 mL / OUT: 1170 mL / NET: 434.9 mL    24 Sep 2023 07:01  -  25 Sep 2023 06:39  --------------------------------------------------------  IN: 1346.2 mL / OUT: 1415 mL / NET: -68.8 mL        LABS:                        10.4   8.46  )-----------( 176      ( 25 Sep 2023 06:21 )             29.6     09-24    135  |  100  |  10  ----------------------------<  140<H>  3.8   |  29  |  0.73    Ca    9.0      24 Sep 2023 21:46  Phos  2.6     09-24  Mg     1.9     09-24    TPro  See Note  /  Alb  See Note  /  TBili  See Note  /  DBili  x   /  AST  See Note  /  ALT  See Note  /  AlkPhos  See Note  09-24      Urinalysis Basic - ( 24 Sep 2023 21:46 )  Color: x / Appearance: x / SG: x / pH: x  Gluc: 140 mg/dL / Ketone: x  / Bili: x / Urobili: x   Blood: x / Protein: x / Nitrite: x   Leuk Esterase: x / RBC: x / WBC x   Sq Epi: x / Non Sq Epi: x / Bacteria: x      CAPILLARY BLOOD GLUCOSE  POCT Blood Glucose.: 160 mg/dL (25 Sep 2023 02:09)  POCT Blood Glucose.: 87 mg/dL (24 Sep 2023 17:25)  POCT Blood Glucose.: 103 mg/dL (24 Sep 2023 15:20)  POCT Blood Glucose.: 97 mg/dL (24 Sep 2023 13:42)  POCT Blood Glucose.: 96 mg/dL (24 Sep 2023 12:23)  POCT Blood Glucose.: 89 mg/dL (24 Sep 2023 10:53)      MEDICATIONS  (STANDING):  atorvastatin 40 milliGRAM(s) Oral at bedtime  chlorhexidine 2% Cloths 1 Application(s) Topical <User Schedule>  chlorhexidine 4% Liquid 1 Application(s) Topical <User Schedule>  dexMEDEtomidine Infusion 0.2 MICROgram(s)/kG/Hr (1.63 mL/Hr) IV Continuous <Continuous>  dextrose 10%. 1000 milliLiter(s) (80 mL/Hr) IV Continuous <Continuous>  enoxaparin Injectable 30 milliGRAM(s) SubCutaneous every 24 hours  melatonin 5 milliGRAM(s) Oral at bedtime  metoprolol tartrate Injectable 5 milliGRAM(s) IV Push every 6 hours  montelukast 10 milliGRAM(s) Oral daily  multivitamin  Chewable 1 Tablet(s) Oral daily  piperacillin/tazobactam IVPB.. 4.5 Gram(s) IV Intermittent every 8 hours  thiamine 100 milliGRAM(s) Oral daily    MEDICATIONS  (PRN):  QUEtiapine 12.5 milliGRAM(s) Oral once PRN agitation  sodium chloride 0.9% lock flush 10 milliLiter(s) IV Push every 1 hour PRN Pre/post blood products, medications, blood draw, and to maintain line patency      RADIOLOGY & IMAGING: REVIEWED.  Care Discussed with Consultants/Other Providers [ x] YES  [ ] NO   Patient is a 82y old Female who presents with a chief complaint of diplopia (22 Sep 2023 14:25)    INTERVAL HPI/OVERNIGHT EVENTS:   Had episode of bradycardia overnight but had received labetalol earlier in the day.    SUBJECTIVE/AT BEDSIDE:   Patient seen and examined at bedside. Sleeping, with mitts on, unresponsive to her name.    PHYSICAL EXAM:  exam limited by patient agitation   GENERAL: cachetic, elderly woman, agitated and moving around  HEAD: normocephalic  EYES: equal and reactive to light bilaterally   NERVOUS SYSTEM: A&Ox0 but arousable, fidgeting in bed  CHEST/LUNG: crackles in BL lungs, some rhonchi present  HEART: +S1/S2, holosystolic murmur present  ABDOMEN: ND, unable to appreciate tenderness  EXTREMITIES: +1 dorsal pedal pulses, cool LE, no edema      ICU Vital Signs Last 24 Hrs  T(C): 36.4 (25 Sep 2023 01:03), Max: 37.9 (24 Sep 2023 17:47)  T(F): 97.6 (25 Sep 2023 01:03), Max: 100.2 (24 Sep 2023 17:47)  HR: 57 (25 Sep 2023 06:00) (54 - 100)  BP: 129/60 (25 Sep 2023 06:00) (116/58 - 196/94)  BP(mean): 86 (25 Sep 2023 06:00) (83 - 139)  ABP: --  ABP(mean): --  RR: 25 (25 Sep 2023 06:00) (17 - 44)  SpO2: 100% (25 Sep 2023 06:00) (90% - 100%)    O2 Parameters below as of 25 Sep 2023 06:00  Patient On (Oxygen Delivery Method): room air        I&O's Summary    23 Sep 2023 07:01  -  24 Sep 2023 07:00  --------------------------------------------------------  IN: 1604.9 mL / OUT: 1170 mL / NET: 434.9 mL    24 Sep 2023 07:01  -  25 Sep 2023 06:39  --------------------------------------------------------  IN: 1346.2 mL / OUT: 1415 mL / NET: -68.8 mL        LABS:                        10.4   8.46  )-----------( 176      ( 25 Sep 2023 06:21 )             29.6     09-24    135  |  100  |  10  ----------------------------<  140<H>  3.8   |  29  |  0.73    Ca    9.0      24 Sep 2023 21:46  Phos  2.6     09-24  Mg     1.9     09-24    TPro  See Note  /  Alb  See Note  /  TBili  See Note  /  DBili  x   /  AST  See Note  /  ALT  See Note  /  AlkPhos  See Note  09-24      Urinalysis Basic - ( 24 Sep 2023 21:46 )  Color: x / Appearance: x / SG: x / pH: x  Gluc: 140 mg/dL / Ketone: x  / Bili: x / Urobili: x   Blood: x / Protein: x / Nitrite: x   Leuk Esterase: x / RBC: x / WBC x   Sq Epi: x / Non Sq Epi: x / Bacteria: x      CAPILLARY BLOOD GLUCOSE  POCT Blood Glucose.: 160 mg/dL (25 Sep 2023 02:09)  POCT Blood Glucose.: 87 mg/dL (24 Sep 2023 17:25)  POCT Blood Glucose.: 103 mg/dL (24 Sep 2023 15:20)  POCT Blood Glucose.: 97 mg/dL (24 Sep 2023 13:42)  POCT Blood Glucose.: 96 mg/dL (24 Sep 2023 12:23)  POCT Blood Glucose.: 89 mg/dL (24 Sep 2023 10:53)      MEDICATIONS  (STANDING):  atorvastatin 40 milliGRAM(s) Oral at bedtime  chlorhexidine 2% Cloths 1 Application(s) Topical <User Schedule>  chlorhexidine 4% Liquid 1 Application(s) Topical <User Schedule>  dexMEDEtomidine Infusion 0.2 MICROgram(s)/kG/Hr (1.63 mL/Hr) IV Continuous <Continuous>  dextrose 10%. 1000 milliLiter(s) (80 mL/Hr) IV Continuous <Continuous>  enoxaparin Injectable 30 milliGRAM(s) SubCutaneous every 24 hours  melatonin 5 milliGRAM(s) Oral at bedtime  metoprolol tartrate Injectable 5 milliGRAM(s) IV Push every 6 hours  montelukast 10 milliGRAM(s) Oral daily  multivitamin  Chewable 1 Tablet(s) Oral daily  piperacillin/tazobactam IVPB.. 4.5 Gram(s) IV Intermittent every 8 hours  thiamine 100 milliGRAM(s) Oral daily    MEDICATIONS  (PRN):  QUEtiapine 12.5 milliGRAM(s) Oral once PRN agitation  sodium chloride 0.9% lock flush 10 milliLiter(s) IV Push every 1 hour PRN Pre/post blood products, medications, blood draw, and to maintain line patency      RADIOLOGY & IMAGING: REVIEWED.  Care Discussed with Consultants/Other Providers [ x] YES  [ ] NO   Patient is a 82y old Female who presents with a chief complaint of diplopia (22 Sep 2023 14:25)    INTERVAL HPI/OVERNIGHT EVENTS:   Bradycardic overnight to the 50s, but had received labetalol earlier in the day. HR recovered to 60s-70s.     SUBJECTIVE/AT BEDSIDE:   Patient seen and examined at bedside. Sleeping, with mitts on, unresponsive to her name.    PHYSICAL EXAM:  exam limited by patient sedation  GENERAL: cachetic, elderly woman, sleeping in bed  HEAD: normocephalic  EYES: pupils equal, round, and reactive to light bilaterally   NERVOUS SYSTEM: A&Ox0 but arousable, lying in bed  CHEST/LUNG: crackles in BL lungs, some rhonchi present  HEART: +S1/S2, irregularly irreg, holosystolic murmur present  ABDOMEN: ND, unable to appreciate tenderness  EXTREMITIES: +1 dorsal pedal pulses, cool LE, no edema      ICU Vital Signs Last 24 Hrs  T(C): 36.4 (25 Sep 2023 01:03), Max: 37.9 (24 Sep 2023 17:47)  T(F): 97.6 (25 Sep 2023 01:03), Max: 100.2 (24 Sep 2023 17:47)  HR: 57 (25 Sep 2023 06:00) (54 - 100)  BP: 129/60 (25 Sep 2023 06:00) (116/58 - 196/94)  BP(mean): 86 (25 Sep 2023 06:00) (83 - 139)  ABP: --  ABP(mean): --  RR: 25 (25 Sep 2023 06:00) (17 - 44)  SpO2: 100% (25 Sep 2023 06:00) (90% - 100%)    O2 Parameters below as of 25 Sep 2023 06:00  Patient On (Oxygen Delivery Method): room air        I&O's Summary    23 Sep 2023 07:01  -  24 Sep 2023 07:00  --------------------------------------------------------  IN: 1604.9 mL / OUT: 1170 mL / NET: 434.9 mL    24 Sep 2023 07:01  -  25 Sep 2023 06:39  --------------------------------------------------------  IN: 1346.2 mL / OUT: 1415 mL / NET: -68.8 mL        LABS:                        10.4   8.46  )-----------( 176      ( 25 Sep 2023 06:21 )             29.6     09-24    135  |  100  |  10  ----------------------------<  140<H>  3.8   |  29  |  0.73    Ca    9.0      24 Sep 2023 21:46  Phos  2.6     09-24  Mg     1.9     09-24    TPro  See Note  /  Alb  See Note  /  TBili  See Note  /  DBili  x   /  AST  See Note  /  ALT  See Note  /  AlkPhos  See Note  09-24      Urinalysis Basic - ( 24 Sep 2023 21:46 )  Color: x / Appearance: x / SG: x / pH: x  Gluc: 140 mg/dL / Ketone: x  / Bili: x / Urobili: x   Blood: x / Protein: x / Nitrite: x   Leuk Esterase: x / RBC: x / WBC x   Sq Epi: x / Non Sq Epi: x / Bacteria: x      CAPILLARY BLOOD GLUCOSE  POCT Blood Glucose.: 160 mg/dL (25 Sep 2023 02:09)  POCT Blood Glucose.: 87 mg/dL (24 Sep 2023 17:25)  POCT Blood Glucose.: 103 mg/dL (24 Sep 2023 15:20)  POCT Blood Glucose.: 97 mg/dL (24 Sep 2023 13:42)  POCT Blood Glucose.: 96 mg/dL (24 Sep 2023 12:23)  POCT Blood Glucose.: 89 mg/dL (24 Sep 2023 10:53)      MEDICATIONS  (STANDING):  atorvastatin 40 milliGRAM(s) Oral at bedtime  chlorhexidine 2% Cloths 1 Application(s) Topical <User Schedule>  chlorhexidine 4% Liquid 1 Application(s) Topical <User Schedule>  dexMEDEtomidine Infusion 0.2 MICROgram(s)/kG/Hr (1.63 mL/Hr) IV Continuous <Continuous>  dextrose 10%. 1000 milliLiter(s) (80 mL/Hr) IV Continuous <Continuous>  enoxaparin Injectable 30 milliGRAM(s) SubCutaneous every 24 hours  melatonin 5 milliGRAM(s) Oral at bedtime  metoprolol tartrate Injectable 5 milliGRAM(s) IV Push every 6 hours  montelukast 10 milliGRAM(s) Oral daily  multivitamin  Chewable 1 Tablet(s) Oral daily  piperacillin/tazobactam IVPB.. 4.5 Gram(s) IV Intermittent every 8 hours  thiamine 100 milliGRAM(s) Oral daily    MEDICATIONS  (PRN):  QUEtiapine 12.5 milliGRAM(s) Oral once PRN agitation  sodium chloride 0.9% lock flush 10 milliLiter(s) IV Push every 1 hour PRN Pre/post blood products, medications, blood draw, and to maintain line patency      RADIOLOGY & IMAGING: REVIEWED.  Care Discussed with Consultants/Other Providers [ x] YES  [ ] NO   Patient is a 82y old Female who presents with a chief complaint of diplopia (22 Sep 2023 14:25)    INTERVAL HPI/OVERNIGHT EVENTS:   Bradycardic overnight to the 50s, but had received labetalol earlier in the day. Pt also received precedex overnight which per nursing causes her to be bradycardic as well.     SUBJECTIVE/AT BEDSIDE:   Patient seen and examined at bedside. Sleeping, with mitts on, unresponsive to her name. HR in high 50s.     PHYSICAL EXAM:  exam limited by patient sedation  GENERAL: cachetic, elderly woman, sleeping in bed  HEAD: normocephalic  EYES: pupils equal, round, and reactive to light bilaterally   NERVOUS SYSTEM: A&Ox0 but arousable, lying in bed  CHEST/LUNG: crackles in BL lungs, some rhonchi present  HEART: +S1/S2, irregularly irreg, holosystolic murmur present  ABDOMEN: ND, unable to appreciate tenderness  EXTREMITIES: +1 dorsal pedal pulses, cool LE, no edema      ICU Vital Signs Last 24 Hrs  T(C): 36.4 (25 Sep 2023 01:03), Max: 37.9 (24 Sep 2023 17:47)  T(F): 97.6 (25 Sep 2023 01:03), Max: 100.2 (24 Sep 2023 17:47)  HR: 57 (25 Sep 2023 06:00) (54 - 100)  BP: 129/60 (25 Sep 2023 06:00) (116/58 - 196/94)  BP(mean): 86 (25 Sep 2023 06:00) (83 - 139)  ABP: --  ABP(mean): --  RR: 25 (25 Sep 2023 06:00) (17 - 44)  SpO2: 100% (25 Sep 2023 06:00) (90% - 100%)    O2 Parameters below as of 25 Sep 2023 06:00  Patient On (Oxygen Delivery Method): room air        I&O's Summary    23 Sep 2023 07:01  -  24 Sep 2023 07:00  --------------------------------------------------------  IN: 1604.9 mL / OUT: 1170 mL / NET: 434.9 mL    24 Sep 2023 07:01  -  25 Sep 2023 06:39  --------------------------------------------------------  IN: 1346.2 mL / OUT: 1415 mL / NET: -68.8 mL        LABS:                        10.4   8.46  )-----------( 176      ( 25 Sep 2023 06:21 )             29.6     09-24    135  |  100  |  10  ----------------------------<  140<H>  3.8   |  29  |  0.73    Ca    9.0      24 Sep 2023 21:46  Phos  2.6     09-24  Mg     1.9     09-24    TPro  See Note  /  Alb  See Note  /  TBili  See Note  /  DBili  x   /  AST  See Note  /  ALT  See Note  /  AlkPhos  See Note  09-24      Urinalysis Basic - ( 24 Sep 2023 21:46 )  Color: x / Appearance: x / SG: x / pH: x  Gluc: 140 mg/dL / Ketone: x  / Bili: x / Urobili: x   Blood: x / Protein: x / Nitrite: x   Leuk Esterase: x / RBC: x / WBC x   Sq Epi: x / Non Sq Epi: x / Bacteria: x      CAPILLARY BLOOD GLUCOSE  POCT Blood Glucose.: 160 mg/dL (25 Sep 2023 02:09)  POCT Blood Glucose.: 87 mg/dL (24 Sep 2023 17:25)  POCT Blood Glucose.: 103 mg/dL (24 Sep 2023 15:20)  POCT Blood Glucose.: 97 mg/dL (24 Sep 2023 13:42)  POCT Blood Glucose.: 96 mg/dL (24 Sep 2023 12:23)  POCT Blood Glucose.: 89 mg/dL (24 Sep 2023 10:53)      MEDICATIONS  (STANDING):  atorvastatin 40 milliGRAM(s) Oral at bedtime  chlorhexidine 2% Cloths 1 Application(s) Topical <User Schedule>  chlorhexidine 4% Liquid 1 Application(s) Topical <User Schedule>  dexMEDEtomidine Infusion 0.2 MICROgram(s)/kG/Hr (1.63 mL/Hr) IV Continuous <Continuous>  dextrose 10%. 1000 milliLiter(s) (80 mL/Hr) IV Continuous <Continuous>  enoxaparin Injectable 30 milliGRAM(s) SubCutaneous every 24 hours  melatonin 5 milliGRAM(s) Oral at bedtime  metoprolol tartrate Injectable 5 milliGRAM(s) IV Push every 6 hours  montelukast 10 milliGRAM(s) Oral daily  multivitamin  Chewable 1 Tablet(s) Oral daily  piperacillin/tazobactam IVPB.. 4.5 Gram(s) IV Intermittent every 8 hours  thiamine 100 milliGRAM(s) Oral daily    MEDICATIONS  (PRN):  QUEtiapine 12.5 milliGRAM(s) Oral once PRN agitation  sodium chloride 0.9% lock flush 10 milliLiter(s) IV Push every 1 hour PRN Pre/post blood products, medications, blood draw, and to maintain line patency      RADIOLOGY & IMAGING: REVIEWED.  Care Discussed with Consultants/Other Providers [ x] YES  [ ] NO   Patient is a 82y old Female who presents with a chief complaint of diplopia (22 Sep 2023 14:25)    HOSPITAL COURSE:  82F, PMH: HTN, Afib (not on AC d/t fall risk), recent hospitalization for PRES (not at [facility], May-June '23), recent discharge for acute exacerbation of severe bronchiectasis (at [facility], Aug '23), presented w/ several days of AMS & new-onset diplopia since 9/15.   Pt started IV abx on 9/15 (2wk course of cefepime 2g IV BID for bronchiectasis exacerbation after concern from Dr. Foster that pt failed PO abx, - was dc'd in Aug on bactrim and cipro- got outpt PICC placement on 9/13, 1st dose 9/15, got 7 doses total before presentation to hospital).  Per pt daughter, after starting abx demonstrated progressive exhaustion and generalized confusion w/ some agitation (similar to when she presented w/ PRES). Pt forgot rooms in her house, confused common everyday information, and dropped items randomly. Pt also reported seeing double. Pt came to ED, where she became hypoxic, was started on 3L NC. CT chest showed findings consistent w/ bronchiectasis. Pulmonology saw pt in ED, and respi workup negative for any acute infectious findings. CT head in ED significant only for chronic basal ganglia infarct, and neuro deficits notable only for bitemporal peripheral field disturbance. Pt was admitted to neuro for r/o CVA vs recurrence of PRES, and pt's abx restarted per pulm.   Subsequently, pt had notable change in mental status - nonsensical tangential speech. No focal deficits on neuro exam, however paraphrasic errors were concerning so pt taken for repeat CT head -->showed R subarachnoid hemorrhage in parietal lobe. Pt had been started on Eliquis as secondary stroke prevention, this was dc'd after SAH findings. Neurosurgery consulted --> BEN. Pt was started on coreg given hemorrhagic stroke and BPs above goal.   Pt then had MRI of brain ---> showed new ischemic L occipital stroke. Pt was put on EEG d/t AMS and CVAs, but did not tolerate and ripped off after ~8hrs. No epileptiform activity was seen.   On 9/21 new aberrant behavior: Jerking movements, even more agitation, mental status further reduced. Speech remained nonsensical and tangential. Pt demonstrated intermittent L upper and L lower extremity jerking movements for about 3-4 sec., w/ gaze preference. Found to be hypoglycemic to the 30s, got an amp of dextrose and agitation resolved.  About 1hr later, pt was tachypneic. Repeat finger stick 27. Pt was AMS and hypoglycemia not relieved by multiple amps of dextrose. Pt was started on D10 drip, f/t/h rectal temp 94, w/ multiple electrolyte disturbances. Pt was put on Damaris Hugger and PICC was removed given concern for possible infxn. After D10 drip, pt blood gluc went to 401, less than 1hr later blood glucose dropped to 140. Pt transferred to the MICU for recalcitrant symptomatic hypoglycemia.     In MICU pt managed w/ q1hr fingersticks and frequent BMPs. Blood sugar managed w/D10 drip and then D10-NS after to avoid hyponatremia. An extensive hypoglycemia work up was ordered (A1c 5.5, Beta-hydroxybutyrate low, C-peptide low, Insulin high - 989, with sulfonylurea labs ordered but not resulted). Endocrinology was consulted, and D10/NS drip was weaned off on 9/24. However, restarted D10/NS for patient NPO who has not passed speech & swallow (NPO except meds & icechips as of 9/25).     Pt went for MR-A & MR-V non-con with no acute findings, and went for MRI brain w/contrast on 9/25. Pts blood glucose no longer labile, Blood glucose >100, pt stable for stepdown to Neuro.     INTERVAL HPI/OVERNIGHT EVENTS:   Bradycardic overnight to the 50s, but had received labetalol earlier in the day. Pt also received precedex overnight which per nursing causes her to be bradycardic as well.     SUBJECTIVE/AT BEDSIDE:   Patient seen and examined at bedside. Sleeping, with mitts on, unresponsive to her name. HR in high 50s.     PHYSICAL EXAM:  exam limited by patient sedation  GENERAL: cachetic, elderly woman, sleeping in bed  HEAD: normocephalic  EYES: pupils equal, round, and reactive to light bilaterally   NERVOUS SYSTEM: A&Ox0 but arousable, lying in bed  CHEST/LUNG: crackles in BL lungs, some rhonchi present  HEART: +S1/S2, irregularly irreg, holosystolic murmur present  ABDOMEN: ND, unable to appreciate tenderness  EXTREMITIES: +1 dorsal pedal pulses, cool LE, no edema      ICU Vital Signs Last 24 Hrs  T(C): 36.4 (25 Sep 2023 01:03), Max: 37.9 (24 Sep 2023 17:47)  T(F): 97.6 (25 Sep 2023 01:03), Max: 100.2 (24 Sep 2023 17:47)  HR: 57 (25 Sep 2023 06:00) (54 - 100)  BP: 129/60 (25 Sep 2023 06:00) (116/58 - 196/94)  BP(mean): 86 (25 Sep 2023 06:00) (83 - 139)  ABP: --  ABP(mean): --  RR: 25 (25 Sep 2023 06:00) (17 - 44)  SpO2: 100% (25 Sep 2023 06:00) (90% - 100%)    O2 Parameters below as of 25 Sep 2023 06:00  Patient On (Oxygen Delivery Method): room air        I&O's Summary    23 Sep 2023 07:01  -  24 Sep 2023 07:00  --------------------------------------------------------  IN: 1604.9 mL / OUT: 1170 mL / NET: 434.9 mL    24 Sep 2023 07:01  -  25 Sep 2023 06:39  --------------------------------------------------------  IN: 1346.2 mL / OUT: 1415 mL / NET: -68.8 mL        LABS:                        10.4   8.46  )-----------( 176      ( 25 Sep 2023 06:21 )             29.6     09-24    135  |  100  |  10  ----------------------------<  140<H>  3.8   |  29  |  0.73    Ca    9.0      24 Sep 2023 21:46  Phos  2.6     09-24  Mg     1.9     09-24    TPro  See Note  /  Alb  See Note  /  TBili  See Note  /  DBili  x   /  AST  See Note  /  ALT  See Note  /  AlkPhos  See Note  09-24      Urinalysis Basic - ( 24 Sep 2023 21:46 )  Color: x / Appearance: x / SG: x / pH: x  Gluc: 140 mg/dL / Ketone: x  / Bili: x / Urobili: x   Blood: x / Protein: x / Nitrite: x   Leuk Esterase: x / RBC: x / WBC x   Sq Epi: x / Non Sq Epi: x / Bacteria: x      CAPILLARY BLOOD GLUCOSE  POCT Blood Glucose.: 160 mg/dL (25 Sep 2023 02:09)  POCT Blood Glucose.: 87 mg/dL (24 Sep 2023 17:25)  POCT Blood Glucose.: 103 mg/dL (24 Sep 2023 15:20)  POCT Blood Glucose.: 97 mg/dL (24 Sep 2023 13:42)  POCT Blood Glucose.: 96 mg/dL (24 Sep 2023 12:23)  POCT Blood Glucose.: 89 mg/dL (24 Sep 2023 10:53)      MEDICATIONS  (STANDING):  atorvastatin 40 milliGRAM(s) Oral at bedtime  chlorhexidine 2% Cloths 1 Application(s) Topical <User Schedule>  chlorhexidine 4% Liquid 1 Application(s) Topical <User Schedule>  dexMEDEtomidine Infusion 0.2 MICROgram(s)/kG/Hr (1.63 mL/Hr) IV Continuous <Continuous>  dextrose 10%. 1000 milliLiter(s) (80 mL/Hr) IV Continuous <Continuous>  enoxaparin Injectable 30 milliGRAM(s) SubCutaneous every 24 hours  melatonin 5 milliGRAM(s) Oral at bedtime  metoprolol tartrate Injectable 5 milliGRAM(s) IV Push every 6 hours  montelukast 10 milliGRAM(s) Oral daily  multivitamin  Chewable 1 Tablet(s) Oral daily  piperacillin/tazobactam IVPB.. 4.5 Gram(s) IV Intermittent every 8 hours  thiamine 100 milliGRAM(s) Oral daily    MEDICATIONS  (PRN):  QUEtiapine 12.5 milliGRAM(s) Oral once PRN agitation  sodium chloride 0.9% lock flush 10 milliLiter(s) IV Push every 1 hour PRN Pre/post blood products, medications, blood draw, and to maintain line patency      RADIOLOGY & IMAGING: REVIEWED.  Care Discussed with Consultants/Other Providers [ x] YES  [ ] NO   ***TRANSFER FROM MICU TO NEURO***  Patient is a 82y old Female who presents with a chief complaint of diplopia (22 Sep 2023 14:25)    HOSPITAL COURSE:  82F, PMH: HTN, Afib (not on AC d/t fall risk), recent hospitalization for PRES (not at [facility], May-June '23), recent discharge for acute exacerbation of severe bronchiectasis (at [facility], Aug '23), presented w/ several days of AMS & new-onset diplopia since 9/15.   Pt started IV abx on 9/15 (2wk course of cefepime 2g IV BID for bronchiectasis exacerbation after concern from Dr. Foster that pt failed PO abx, - was dc'd in Aug on bactrim and cipro- got outpt PICC placement on 9/13, 1st dose 9/15, got 7 doses total before presentation to hospital).  Per pt daughter, after starting abx demonstrated progressive exhaustion and generalized confusion w/ some agitation (similar to when she presented w/ PRES). Pt forgot rooms in her house, confused common everyday information, and dropped items randomly. Pt also reported seeing double. Pt came to ED, where she became hypoxic, was started on 3L NC. CT chest showed findings consistent w/ bronchiectasis. Pulmonology saw pt in ED, and respi workup negative for any acute infectious findings. CT head in ED significant only for chronic basal ganglia infarct, and neuro deficits notable only for bitemporal peripheral field disturbance. Pt was admitted to neuro for r/o CVA vs recurrence of PRES, and pt's abx restarted per pulm.   Subsequently, pt had notable change in mental status - nonsensical tangential speech. No focal deficits on neuro exam, however paraphrasic errors were concerning so pt taken for repeat CT head -->showed R subarachnoid hemorrhage in parietal lobe. Pt had been started on Eliquis as secondary stroke prevention, this was dc'd after SAH findings. Neurosurgery consulted --> BEN. Pt was started on coreg given hemorrhagic stroke and BPs above goal.   Pt then had MRI of brain ---> showed new ischemic L occipital stroke. Pt was put on EEG d/t AMS and CVAs, but did not tolerate and ripped off after ~8hrs. No epileptiform activity was seen.   On 9/21 new aberrant behavior: Jerking movements, even more agitation, mental status further reduced. Speech remained nonsensical and tangential. Pt demonstrated intermittent L upper and L lower extremity jerking movements for about 3-4 sec., w/ gaze preference. Found to be hypoglycemic to the 30s, got an amp of dextrose and agitation resolved.  About 1hr later, pt was tachypneic. Repeat finger stick 27. Pt was AMS and hypoglycemia not relieved by multiple amps of dextrose. Pt was started on D10 drip, f/t/h rectal temp 94, w/ multiple electrolyte disturbances. Pt was put on Damaris Hugger and PICC was removed given concern for possible infxn. After D10 drip, pt blood gluc went to 401, less than 1hr later blood glucose dropped to 140. Pt transferred to the MICU for recalcitrant symptomatic hypoglycemia.     In MICU pt managed w/ q1hr fingersticks and frequent BMPs. Blood sugar managed w/D10 drip and then D10-NS after to avoid hyponatremia. An extensive hypoglycemia work up was ordered (A1c 5.5, Beta-hydroxybutyrate low, C-peptide low, Insulin high - 989, with sulfonylurea labs ordered but not resulted). Endocrinology was consulted, and D10/NS drip was weaned off on 9/24. However, restarted D10/NS for patient NPO who has not passed speech & swallow (NPO except meds & icechips as of 9/25).     Pt went for MR-A & MR-V non-con with no acute findings, and went for MRI brain w/contrast on 9/25. Pts blood glucose no longer labile, Blood glucose >100, pt stable for stepdown to Neuro.     INTERVAL HPI/OVERNIGHT EVENTS:   Bradycardic overnight to the 50s, but had received labetalol earlier in the day. Pt also received precedex overnight which per nursing causes her to be bradycardic as well.     SUBJECTIVE/AT BEDSIDE:   Patient seen and examined at bedside. Sleeping, with mitts on, unresponsive to her name. HR in high 50s.     PHYSICAL EXAM:  exam limited by patient sedation  GENERAL: cachetic, elderly woman, sleeping in bed  HEAD: normocephalic  EYES: pupils equal, round, and reactive to light bilaterally   NERVOUS SYSTEM: A&Ox0 but arousable, lying in bed  CHEST/LUNG: crackles in BL lungs, some rhonchi present  HEART: +S1/S2, irregularly irreg, holosystolic murmur present  ABDOMEN: ND, unable to appreciate tenderness  EXTREMITIES: +1 dorsal pedal pulses, cool LE, no edema      ICU Vital Signs Last 24 Hrs  T(C): 36.4 (25 Sep 2023 01:03), Max: 37.9 (24 Sep 2023 17:47)  T(F): 97.6 (25 Sep 2023 01:03), Max: 100.2 (24 Sep 2023 17:47)  HR: 57 (25 Sep 2023 06:00) (54 - 100)  BP: 129/60 (25 Sep 2023 06:00) (116/58 - 196/94)  BP(mean): 86 (25 Sep 2023 06:00) (83 - 139)  ABP: --  ABP(mean): --  RR: 25 (25 Sep 2023 06:00) (17 - 44)  SpO2: 100% (25 Sep 2023 06:00) (90% - 100%)    O2 Parameters below as of 25 Sep 2023 06:00  Patient On (Oxygen Delivery Method): room air        I&O's Summary    23 Sep 2023 07:01  -  24 Sep 2023 07:00  --------------------------------------------------------  IN: 1604.9 mL / OUT: 1170 mL / NET: 434.9 mL    24 Sep 2023 07:01  -  25 Sep 2023 06:39  --------------------------------------------------------  IN: 1346.2 mL / OUT: 1415 mL / NET: -68.8 mL        LABS:                        10.4   8.46  )-----------( 176      ( 25 Sep 2023 06:21 )             29.6     09-24    135  |  100  |  10  ----------------------------<  140<H>  3.8   |  29  |  0.73    Ca    9.0      24 Sep 2023 21:46  Phos  2.6     09-24  Mg     1.9     09-24    TPro  See Note  /  Alb  See Note  /  TBili  See Note  /  DBili  x   /  AST  See Note  /  ALT  See Note  /  AlkPhos  See Note  09-24      Urinalysis Basic - ( 24 Sep 2023 21:46 )  Color: x / Appearance: x / SG: x / pH: x  Gluc: 140 mg/dL / Ketone: x  / Bili: x / Urobili: x   Blood: x / Protein: x / Nitrite: x   Leuk Esterase: x / RBC: x / WBC x   Sq Epi: x / Non Sq Epi: x / Bacteria: x      CAPILLARY BLOOD GLUCOSE  POCT Blood Glucose.: 160 mg/dL (25 Sep 2023 02:09)  POCT Blood Glucose.: 87 mg/dL (24 Sep 2023 17:25)  POCT Blood Glucose.: 103 mg/dL (24 Sep 2023 15:20)  POCT Blood Glucose.: 97 mg/dL (24 Sep 2023 13:42)  POCT Blood Glucose.: 96 mg/dL (24 Sep 2023 12:23)  POCT Blood Glucose.: 89 mg/dL (24 Sep 2023 10:53)      MEDICATIONS  (STANDING):  atorvastatin 40 milliGRAM(s) Oral at bedtime  chlorhexidine 2% Cloths 1 Application(s) Topical <User Schedule>  chlorhexidine 4% Liquid 1 Application(s) Topical <User Schedule>  dexMEDEtomidine Infusion 0.2 MICROgram(s)/kG/Hr (1.63 mL/Hr) IV Continuous <Continuous>  dextrose 10%. 1000 milliLiter(s) (80 mL/Hr) IV Continuous <Continuous>  enoxaparin Injectable 30 milliGRAM(s) SubCutaneous every 24 hours  melatonin 5 milliGRAM(s) Oral at bedtime  metoprolol tartrate Injectable 5 milliGRAM(s) IV Push every 6 hours  montelukast 10 milliGRAM(s) Oral daily  multivitamin  Chewable 1 Tablet(s) Oral daily  piperacillin/tazobactam IVPB.. 4.5 Gram(s) IV Intermittent every 8 hours  thiamine 100 milliGRAM(s) Oral daily    MEDICATIONS  (PRN):  QUEtiapine 12.5 milliGRAM(s) Oral once PRN agitation  sodium chloride 0.9% lock flush 10 milliLiter(s) IV Push every 1 hour PRN Pre/post blood products, medications, blood draw, and to maintain line patency      RADIOLOGY & IMAGING: REVIEWED.  Care Discussed with Consultants/Other Providers [ x] YES  [ ] NO     Patient is a 82y old Female who presents with a chief complaint of diplopia (22 Sep 2023 14:25)    INTERVAL HPI/OVERNIGHT EVENTS:   Bradycardic overnight to the 50s, but had received labetalol earlier in the day. Pt also received precedex overnight which per nursing causes her to be bradycardic as well.     SUBJECTIVE/AT BEDSIDE:   Patient seen and examined at bedside. Sleeping, with mitts on, unresponsive to her name. HR in high 50s.     PHYSICAL EXAM:  exam limited by patient sedation  GENERAL: cachetic, elderly woman, sleeping in bed  HEAD: normocephalic  EYES: pupils equal, round, and reactive to light bilaterally   NERVOUS SYSTEM: A&Ox0 but arousable, lying in bed  CHEST/LUNG: crackles in BL lungs, some rhonchi present  HEART: +S1/S2, irregularly irreg, holosystolic murmur present  ABDOMEN: ND, unable to appreciate tenderness  EXTREMITIES: +1 dorsal pedal pulses, cool LE, no edema      ICU Vital Signs Last 24 Hrs  T(C): 36.4 (25 Sep 2023 01:03), Max: 37.9 (24 Sep 2023 17:47)  T(F): 97.6 (25 Sep 2023 01:03), Max: 100.2 (24 Sep 2023 17:47)  HR: 57 (25 Sep 2023 06:00) (54 - 100)  BP: 129/60 (25 Sep 2023 06:00) (116/58 - 196/94)  BP(mean): 86 (25 Sep 2023 06:00) (83 - 139)  ABP: --  ABP(mean): --  RR: 25 (25 Sep 2023 06:00) (17 - 44)  SpO2: 100% (25 Sep 2023 06:00) (90% - 100%)    O2 Parameters below as of 25 Sep 2023 06:00  Patient On (Oxygen Delivery Method): room air        I&O's Summary    23 Sep 2023 07:01  -  24 Sep 2023 07:00  --------------------------------------------------------  IN: 1604.9 mL / OUT: 1170 mL / NET: 434.9 mL    24 Sep 2023 07:01  -  25 Sep 2023 06:39  --------------------------------------------------------  IN: 1346.2 mL / OUT: 1415 mL / NET: -68.8 mL        LABS:                        10.4   8.46  )-----------( 176      ( 25 Sep 2023 06:21 )             29.6     09-24    135  |  100  |  10  ----------------------------<  140<H>  3.8   |  29  |  0.73    Ca    9.0      24 Sep 2023 21:46  Phos  2.6     09-24  Mg     1.9     09-24    TPro  See Note  /  Alb  See Note  /  TBili  See Note  /  DBili  x   /  AST  See Note  /  ALT  See Note  /  AlkPhos  See Note  09-24      Urinalysis Basic - ( 24 Sep 2023 21:46 )  Color: x / Appearance: x / SG: x / pH: x  Gluc: 140 mg/dL / Ketone: x  / Bili: x / Urobili: x   Blood: x / Protein: x / Nitrite: x   Leuk Esterase: x / RBC: x / WBC x   Sq Epi: x / Non Sq Epi: x / Bacteria: x      CAPILLARY BLOOD GLUCOSE  POCT Blood Glucose.: 160 mg/dL (25 Sep 2023 02:09)  POCT Blood Glucose.: 87 mg/dL (24 Sep 2023 17:25)  POCT Blood Glucose.: 103 mg/dL (24 Sep 2023 15:20)  POCT Blood Glucose.: 97 mg/dL (24 Sep 2023 13:42)  POCT Blood Glucose.: 96 mg/dL (24 Sep 2023 12:23)  POCT Blood Glucose.: 89 mg/dL (24 Sep 2023 10:53)      MEDICATIONS  (STANDING):  atorvastatin 40 milliGRAM(s) Oral at bedtime  chlorhexidine 2% Cloths 1 Application(s) Topical <User Schedule>  chlorhexidine 4% Liquid 1 Application(s) Topical <User Schedule>  dexMEDEtomidine Infusion 0.2 MICROgram(s)/kG/Hr (1.63 mL/Hr) IV Continuous <Continuous>  dextrose 10%. 1000 milliLiter(s) (80 mL/Hr) IV Continuous <Continuous>  enoxaparin Injectable 30 milliGRAM(s) SubCutaneous every 24 hours  melatonin 5 milliGRAM(s) Oral at bedtime  metoprolol tartrate Injectable 5 milliGRAM(s) IV Push every 6 hours  montelukast 10 milliGRAM(s) Oral daily  multivitamin  Chewable 1 Tablet(s) Oral daily  piperacillin/tazobactam IVPB.. 4.5 Gram(s) IV Intermittent every 8 hours  thiamine 100 milliGRAM(s) Oral daily    MEDICATIONS  (PRN):  QUEtiapine 12.5 milliGRAM(s) Oral once PRN agitation  sodium chloride 0.9% lock flush 10 milliLiter(s) IV Push every 1 hour PRN Pre/post blood products, medications, blood draw, and to maintain line patency      RADIOLOGY & IMAGING: REVIEWED.  Care Discussed with Consultants/Other Providers [ x] YES  [ ] NO

## 2023-09-25 NOTE — PROGRESS NOTE ADULT - SUBJECTIVE AND OBJECTIVE BOX
SUBJECTIVE / INTERVAL HPI: Patient was seen and examined this morning.     Overnight events:    Endocrine course  9/21: transfer to ICU on D10  9/22: Na 132, advised to switched to D10 NS @ 90ml/hr - to be decreased by 10ml/hr if the glucose remains above 90 q4  9/23-9/24: remained on D10NS @ 50ml/hr    CAPILLARY BLOOD GLUCOSE & INSULIN RECEIVED  89 mg/dL (09-24 @ 10:53)  96 mg/dL (09-24 @ 12:23)  97 mg/dL (09-24 @ 13:42)  103 mg/dL (09-24 @ 15:20)  87 mg/dL (09-24 @ 17:25)  160 mg/dL (09-25 @ 02:09)  139 mg/dL (09-25 @ 09:12)      REVIEW OF SYSTEMS  Constitutional:  Negative fever, chills or loss of appetite.  Eyes:  Negative blurry vision or double vision.  Cardiovascular:  Negative for chest pain or palpitations.  Respiratory:  Negative for cough, wheezing, or shortness of breath.   Gastrointestinal:  Negative for nausea, vomiting, diarrhea, constipation, or abdominal pain.  Genitourinary:  Negative frequency, urgency or dysuria.  Neurologic:  No headache, confusion, dizziness, lightheadedness.    PHYSICAL EXAM  Vital Signs Last 24 Hrs  T(C): 36.3 (25 Sep 2023 06:55), Max: 37.9 (24 Sep 2023 17:47)  T(F): 97.4 (25 Sep 2023 06:55), Max: 100.2 (24 Sep 2023 17:47)  HR: 54 (25 Sep 2023 09:00) (53 - 100)  BP: 136/65 (25 Sep 2023 09:00) (116/58 - 196/94)  BP(mean): 93 (25 Sep 2023 09:00) (83 - 139)  RR: 21 (25 Sep 2023 09:00) (20 - 44)  SpO2: 98% (25 Sep 2023 09:00) (90% - 100%)    Parameters below as of 25 Sep 2023 09:00  Patient On (Oxygen Delivery Method): room air      HEENT: Normocephalic, atraumatic, SANDRA, no proptosis or lid retraction.   Neck: supple, no acanthosis, no thyromegaly or palpable thyroid nodules.  Respiratory: Lungs clear to ausculation bilaterally.   Cardiovascular: regular rhythm, normal S1 and S2, no audible murmurs.   GI: soft, non-tender, non-distended, bowel sounds present, no masses appreciated.  Extremities: No lower extremity edema, peripheral pulses present.   Skin: no rashes.   Psychiatric: AAO x 3. Normal affect/mood.       LABS  CBC - WBC/HGB/HTC/PLT: 8.46/10.4/29.6/176 (09-25-23)  BMP - Na/K/Cl/Bicarb/BUN/Cr/Gluc/AG/eGFR: 130/3.2/93/30/8/0.71/143/7/86 (09-25-23)  Ca - 7.9 (09-25-23)  Phos - 4.1 (09-25-23)  Mg - 2.3 (09-25-23)  LFT - Alb/Tprot/Tbili/Dbili/AlkPhos/ALT/AST: 3.1/--/0.9/--/102/39/35 (09-25-23)  PT/aPTT/INR: 11.7/31.3/1.03 (09-21-23)   Thyroid Stimulating Hormone, Serum: 3.870 (09-20-23)  Total T4/Free T4: 6.47/0.856 (09-21-23)      09-24-23 @ 07:01  -  09-25-23 @ 07:00  --------------------------------------------------------  IN: 1430.2 mL / OUT: 1525 mL / NET: -94.8 mL    09-25-23 @ 07:01  -  09-25-23 @ 10:18  --------------------------------------------------------  IN: 285.6 mL / OUT: 30 mL / NET: 255.6 mL        MEDICATIONS  MEDICATIONS  (STANDING):  atorvastatin 40 milliGRAM(s) Oral at bedtime  chlorhexidine 2% Cloths 1 Application(s) Topical <User Schedule>  chlorhexidine 4% Liquid 1 Application(s) Topical <User Schedule>  dexMEDEtomidine Infusion 0.2 MICROgram(s)/kG/Hr (1.63 mL/Hr) IV Continuous <Continuous>  dextrose 10% + sodium chloride 0.9%. 1000 milliLiter(s) (50 mL/Hr) IV Continuous <Continuous>  enoxaparin Injectable 30 milliGRAM(s) SubCutaneous every 24 hours  melatonin 5 milliGRAM(s) Oral at bedtime  metoprolol tartrate Injectable 5 milliGRAM(s) IV Push every 6 hours  montelukast 10 milliGRAM(s) Oral daily  multivitamin  Chewable 1 Tablet(s) Oral daily  piperacillin/tazobactam IVPB.. 4.5 Gram(s) IV Intermittent every 8 hours  potassium chloride  10 mEq/100 mL IVPB 10 milliEquivalent(s) IV Intermittent every 1 hour  thiamine 100 milliGRAM(s) Oral daily    MEDICATIONS  (PRN):  QUEtiapine 12.5 milliGRAM(s) Oral once PRN agitation  sodium chloride 0.9% lock flush 10 milliLiter(s) IV Push every 1 hour PRN Pre/post blood products, medications, blood draw, and to maintain line patency    ASSESSMENT / RECOMMENDATIONS    82y Female with a past medical history of HTN, atrial fibrillation, chronic bronchiectasis, recent hospitalization for PRES (5-6/2023), recent hospitalization in 08/23 acute bronchiectasis exacerbation came to [facility] on 9/19 with altered mental status, visual disturbances and diplopia.  Pt was initially admitted under neuro service for encephalopathy and r/o stroke.  CT head unremarkable for large transcortical infarct or definite acute intracranial hemorrhage.   CT chest shows diffuse bilateral bronchiectasis with multiple areas of distal mucoid impaction and scattered tree-in-bud opacities.  On 9/21, pt had a rapid response for nonsensical and dysarthric speech and noted to have intermitted LE jerking movements.  Pt was found to have glucose of 34. improved with one amp of glucose.  Pt later become hypoxic, tachypeneic and dyspneic and desaturated.  Repeat glucose at that time was 27, pt remained lethargic and received additional 2 amps of glucose which climbed to 401 and D10 was initiated and pt was subsequently transferred to ICU.  Endocrinology has been consulted for Hypoglycemia.    A1C: 5.5 %  BUN: 8  Creatinine: 0.71  GFR: 86  Weight: 32.5  BMI: 11.9  Free T4 0.856, TSH 3.87    # Hypoglycemia  - cpeptide 09/21: 0.9, serum insulin 989, beta-hydroxybutyrate 0.0  - remained on D10NS    - pt did not receive any insulin products or agents that have high evidence to induce hypoglycemia during hospital course  - did receive last lopressor at 6am on 9/19  - Pt was on Bactrim, ciprofloxacin and cefepime earlier in the month for outpatient abx, there are some low quality evidence of association between certain class of abx causing hypoglycemia - however these drugs should be cleared by now  - some possible etiologies: 2/2 sepsis vs hormone deficiency vs endogenous production of insulin  - next time patient become hypoglycemic, please send the following labs right away: serum cortisol, c-peptide, proinsulin, beta-hydroxybutyrate and BMP  - pt currently off of D10NS      Case discussed with Dr. Hill. Primary team updated.       Michell Borja  Endocrinology Fellow    Service Pager: [phone]    SUBJECTIVE / INTERVAL HPI: Patient was seen and examined this morning.     Overnight events:  pt appeared confused  remained on D10 NS with NPO    Endocrine course  9/21: transfer to ICU on D10  9/22: Na 132, advised to switched to D10 NS @ 90ml/hr - to be decreased by 10ml/hr if the glucose remains above 90 q4  9/23-9/24: remained on D10NS @ 50ml/hr  9/25: D10 NS @ 75ml/hr    CAPILLARY BLOOD GLUCOSE & INSULIN RECEIVED  89 mg/dL (09-24 @ 10:53)  96 mg/dL (09-24 @ 12:23)  97 mg/dL (09-24 @ 13:42)  103 mg/dL (09-24 @ 15:20)  87 mg/dL (09-24 @ 17:25)  160 mg/dL (09-25 @ 02:09)  139 mg/dL (09-25 @ 09:12)      REVIEW OF SYSTEMS  altered mental status    PHYSICAL EXAM  Vital Signs Last 24 Hrs  T(C): 36.3 (25 Sep 2023 06:55), Max: 37.9 (24 Sep 2023 17:47)  T(F): 97.4 (25 Sep 2023 06:55), Max: 100.2 (24 Sep 2023 17:47)  HR: 54 (25 Sep 2023 09:00) (53 - 100)  BP: 136/65 (25 Sep 2023 09:00) (116/58 - 196/94)  BP(mean): 93 (25 Sep 2023 09:00) (83 - 139)  RR: 21 (25 Sep 2023 09:00) (20 - 44)  SpO2: 98% (25 Sep 2023 09:00) (90% - 100%)    Parameters below as of 25 Sep 2023 09:00  Patient On (Oxygen Delivery Method): room air      HEENT: Normocephalic, atraumatic, SANDRA, no proptosis or lid retraction.   Neck: supple, no acanthosis, no thyromegaly or palpable thyroid nodules.  Respiratory: Lungs clear to ausculation bilaterally.   Cardiovascular: regular rhythm, normal S1 and S2, no audible murmurs.   GI: soft, non-tender, non-distended, bowel sounds present, no masses appreciated.  Extremities: No lower extremity edema, peripheral pulses present.       LABS  CBC - WBC/HGB/HTC/PLT: 8.46/10.4/29.6/176 (09-25-23)  BMP - Na/K/Cl/Bicarb/BUN/Cr/Gluc/AG/eGFR: 130/3.2/93/30/8/0.71/143/7/86 (09-25-23)  Ca - 7.9 (09-25-23)  Phos - 4.1 (09-25-23)  Mg - 2.3 (09-25-23)  LFT - Alb/Tprot/Tbili/Dbili/AlkPhos/ALT/AST: 3.1/--/0.9/--/102/39/35 (09-25-23)  PT/aPTT/INR: 11.7/31.3/1.03 (09-21-23)   Thyroid Stimulating Hormone, Serum: 3.870 (09-20-23)  Total T4/Free T4: 6.47/0.856 (09-21-23)      09-24-23 @ 07:01  -  09-25-23 @ 07:00  --------------------------------------------------------  IN: 1430.2 mL / OUT: 1525 mL / NET: -94.8 mL    09-25-23 @ 07:01  -  09-25-23 @ 10:18  --------------------------------------------------------  IN: 285.6 mL / OUT: 30 mL / NET: 255.6 mL        MEDICATIONS  MEDICATIONS  (STANDING):  atorvastatin 40 milliGRAM(s) Oral at bedtime  chlorhexidine 2% Cloths 1 Application(s) Topical <User Schedule>  chlorhexidine 4% Liquid 1 Application(s) Topical <User Schedule>  dexMEDEtomidine Infusion 0.2 MICROgram(s)/kG/Hr (1.63 mL/Hr) IV Continuous <Continuous>  dextrose 10% + sodium chloride 0.9%. 1000 milliLiter(s) (50 mL/Hr) IV Continuous <Continuous>  enoxaparin Injectable 30 milliGRAM(s) SubCutaneous every 24 hours  melatonin 5 milliGRAM(s) Oral at bedtime  metoprolol tartrate Injectable 5 milliGRAM(s) IV Push every 6 hours  montelukast 10 milliGRAM(s) Oral daily  multivitamin  Chewable 1 Tablet(s) Oral daily  piperacillin/tazobactam IVPB.. 4.5 Gram(s) IV Intermittent every 8 hours  potassium chloride  10 mEq/100 mL IVPB 10 milliEquivalent(s) IV Intermittent every 1 hour  thiamine 100 milliGRAM(s) Oral daily    MEDICATIONS  (PRN):  QUEtiapine 12.5 milliGRAM(s) Oral once PRN agitation  sodium chloride 0.9% lock flush 10 milliLiter(s) IV Push every 1 hour PRN Pre/post blood products, medications, blood draw, and to maintain line patency    ASSESSMENT / RECOMMENDATIONS    82y Female with a past medical history of HTN, atrial fibrillation, chronic bronchiectasis, recent hospitalization for PRES (5-6/2023), recent hospitalization in 08/23 acute bronchiectasis exacerbation came [facility] on 9/19 with altered mental status, visual disturbances and diplopia.  Pt was initially admitted under neuro service for encephalopathy and r/o stroke.  CT head unremarkable for large transcortical infarct or definite acute intracranial hemorrhage.   CT chest shows diffuse bilateral bronchiectasis with multiple areas of distal mucoid impaction and scattered tree-in-bud opacities.  On 9/21, pt had a rapid response for nonsensical and dysarthric speech and noted to have intermitted LE jerking movements.  Pt was found to have glucose of 34. improved with one amp of glucose.  Pt later become hypoxic, tachypeneic and dyspneic and desaturated.  Repeat glucose at that time was 27, pt remained lethargic and received additional 2 amps of glucose which climbed to 401 and D10 was initiated and pt was subsequently transferred to ICU.  Endocrinology has been consulted for Hypoglycemia.    A1C: 5.5 %  BUN: 8  Creatinine: 0.71  GFR: 86  Weight: 32.5  BMI: 11.9  Free T4 0.856, TSH 3.87    # Hypoglycemia  - c-peptide 09/21: 0.9, serum insulin 989, beta-hydroxybutyrate 0.0  - remained on D10NS    - pt did not receive any insulin products or agents that have high evidence to induce hypoglycemia during hospital course  - did receive last lopressor at 6am on 9/19  - Pt was on Bactrim, ciprofloxacin and cefepime earlier in the month for outpatient abx, there are some low quality evidence of association between certain class of abx causing hypoglycemia - however these drugs should be cleared by now  - some possible etiologies: 2/2 sepsis vs hormone deficiency vs endogenous production of insulin  - next time patient become hypoglycemic, please send the following labs right away: serum cortisol, c-peptide, proinsulin, beta-hydroxybutyrate and BMP    Case discussed with Dr. Hill. Primary team updated.       Michell Borja  Endocrinology Fellow    Service Pager: [phone]    SUBJECTIVE / INTERVAL HPI: Patient was seen and examined this morning.     Overnight events:  pt appeared confused  remained on D10 NS with NPO    Endocrine course  9/21: transfer to ICU on D10  9/22: Na 132, advised to switched to D10 NS @ 90ml/hr - to be decreased by 10ml/hr if the glucose remains above 90 q4  9/23-9/24: remained on D10NS @ 50ml/hr  9/25: D10 NS @ 75ml/hr    CAPILLARY BLOOD GLUCOSE & INSULIN RECEIVED  89 mg/dL (09-24 @ 10:53)  96 mg/dL (09-24 @ 12:23)  97 mg/dL (09-24 @ 13:42)  103 mg/dL (09-24 @ 15:20)  87 mg/dL (09-24 @ 17:25)  160 mg/dL (09-25 @ 02:09)  139 mg/dL (09-25 @ 09:12)      REVIEW OF SYSTEMS  altered mental status    PHYSICAL EXAM  Vital Signs Last 24 Hrs  T(C): 36.3 (25 Sep 2023 06:55), Max: 37.9 (24 Sep 2023 17:47)  T(F): 97.4 (25 Sep 2023 06:55), Max: 100.2 (24 Sep 2023 17:47)  HR: 54 (25 Sep 2023 09:00) (53 - 100)  BP: 136/65 (25 Sep 2023 09:00) (116/58 - 196/94)  BP(mean): 93 (25 Sep 2023 09:00) (83 - 139)  RR: 21 (25 Sep 2023 09:00) (20 - 44)  SpO2: 98% (25 Sep 2023 09:00) (90% - 100%)    Parameters below as of 25 Sep 2023 09:00  Patient On (Oxygen Delivery Method): room air      HEENT: Normocephalic, atraumatic, SANDRA, no proptosis or lid retraction.   Neck: supple, no acanthosis, no thyromegaly or palpable thyroid nodules.  Respiratory: Lungs clear to ausculation bilaterally.   Cardiovascular: regular rhythm, normal S1 and S2, no audible murmurs.   GI: soft, non-tender, non-distended, bowel sounds present, no masses appreciated.  Extremities: No lower extremity edema, peripheral pulses present.       LABS  CBC - WBC/HGB/HTC/PLT: 8.46/10.4/29.6/176 (09-25-23)  BMP - Na/K/Cl/Bicarb/BUN/Cr/Gluc/AG/eGFR: 130/3.2/93/30/8/0.71/143/7/86 (09-25-23)  Ca - 7.9 (09-25-23)  Phos - 4.1 (09-25-23)  Mg - 2.3 (09-25-23)  LFT - Alb/Tprot/Tbili/Dbili/AlkPhos/ALT/AST: 3.1/--/0.9/--/102/39/35 (09-25-23)  PT/aPTT/INR: 11.7/31.3/1.03 (09-21-23)   Thyroid Stimulating Hormone, Serum: 3.870 (09-20-23)  Total T4/Free T4: 6.47/0.856 (09-21-23)      09-24-23 @ 07:01  -  09-25-23 @ 07:00  --------------------------------------------------------  IN: 1430.2 mL / OUT: 1525 mL / NET: -94.8 mL    09-25-23 @ 07:01  -  09-25-23 @ 10:18  --------------------------------------------------------  IN: 285.6 mL / OUT: 30 mL / NET: 255.6 mL        MEDICATIONS  MEDICATIONS  (STANDING):  atorvastatin 40 milliGRAM(s) Oral at bedtime  chlorhexidine 2% Cloths 1 Application(s) Topical <User Schedule>  chlorhexidine 4% Liquid 1 Application(s) Topical <User Schedule>  dexMEDEtomidine Infusion 0.2 MICROgram(s)/kG/Hr (1.63 mL/Hr) IV Continuous <Continuous>  dextrose 10% + sodium chloride 0.9%. 1000 milliLiter(s) (50 mL/Hr) IV Continuous <Continuous>  enoxaparin Injectable 30 milliGRAM(s) SubCutaneous every 24 hours  melatonin 5 milliGRAM(s) Oral at bedtime  metoprolol tartrate Injectable 5 milliGRAM(s) IV Push every 6 hours  montelukast 10 milliGRAM(s) Oral daily  multivitamin  Chewable 1 Tablet(s) Oral daily  piperacillin/tazobactam IVPB.. 4.5 Gram(s) IV Intermittent every 8 hours  potassium chloride  10 mEq/100 mL IVPB 10 milliEquivalent(s) IV Intermittent every 1 hour  thiamine 100 milliGRAM(s) Oral daily    MEDICATIONS  (PRN):  QUEtiapine 12.5 milliGRAM(s) Oral once PRN agitation  sodium chloride 0.9% lock flush 10 milliLiter(s) IV Push every 1 hour PRN Pre/post blood products, medications, blood draw, and to maintain line patency    ASSESSMENT / RECOMMENDATIONS    82y Female with a past medical history of HTN, atrial fibrillation, chronic bronchiectasis, recent hospitalization for PRES (5-6/2023), recent hospitalization in 08/23 acute bronchiectasis exacerbation came to [facility] on 9/19 with altered mental status, visual disturbances and diplopia.  Pt was initially admitted under neuro service for encephalopathy and r/o stroke.  CT head unremarkable for large transcortical infarct or definite acute intracranial hemorrhage.   CT chest shows diffuse bilateral bronchiectasis with multiple areas of distal mucoid impaction and scattered tree-in-bud opacities.  On 9/21, pt had a rapid response for nonsensical and dysarthric speech and noted to have intermitted LE jerking movements.  Pt was found to have glucose of 34. improved with one amp of glucose.  Pt later become hypoxic, tachypeneic and dyspneic and desaturated.  Repeat glucose at that time was 27, pt remained lethargic and received additional 2 amps of glucose which climbed to 401 and D10 was initiated and pt was subsequently transferred to ICU.  Endocrinology has been consulted for Hypoglycemia.    A1C: 5.5 %  BUN: 8  Creatinine: 0.71  GFR: 86  Weight: 32.5  BMI: 11.9  Free T4 0.856, TSH 3.87    # Hypoglycemia  - c-peptide 09/21: 0.9, serum insulin 989, beta-hydroxybutyrate 0.0  - remained on D10NS @ 25ml/hr  - elevated serum insulin level raises suspicion for endogenous production in response to dextrose vs exogenous adminstration, doubt it is insulinoma due to the rapid fashion of drop in glucose vs autoimmune causes - ie presence of insulin antibodies  - from the record, pt did not receive any insulin products or agents that have high evidence to induce hypoglycemia during hospital course  - did receive last lopressor at 6am on 9/19  - Pt was on Bactrim, ciprofloxacin and cefepime earlier in the month for outpatient abx, there are some low quality evidence of association between certain class of abx causing hypoglycemia - however these drugs should be cleared by now  - please check the following labs now and whenever patient becomes hypoglycemic: bmp, c-peptide, insulin level, beta-hydroxybutyrate, insulin antibody and BMP    Case discussed with Dr. Hill. Primary team updated.       Michell Borja  Endocrinology Fellow    Service Pager: [phone]

## 2023-09-25 NOTE — PROGRESS NOTE ADULT - ASSESSMENT
82F, PMH of HTN, Afib (not on AC d/t fall risk), recent hospitalization for PRES (not at [facility], May-June '23), recent discharge for acute exacerbation of severe bronchiectasis (at [facility], Aug '23), presented w/ several days of AMS & new-onset diplopia since 9/15.   Pt started IV abx on 9/15 (2wk course of cefepime 2g IV BID for bronchiectasis exacerbation after concern from Dr. Foster that pt failed PO abx, - was dc'd in Aug on bactrim and cipro, got bactrim, cipro and zosyn inpatient- got outpt PICC placement on 9/13, 1st dose 9/15, got 7 doses total before presentation to hospital).    NEURO  #PRES  #Seizure  Pt hospitalized 05-06/2023 for PRES. At the time presented similar symptoms: confusion, AMS, forgetfulness, dropping things. Now presented w/ diplopia and bitemporal visual field anomalies, all findings consistent w/ PRES. Now with tangential, nonsensical speech, as well as some seizure-like activity. Twitching/seizures possibly 2/2 hypoglycemia  - Plan for MRI Brain +contrast on Monday- will require pretreatment 12 hrs before   - repeat CT head  - repeat vEEG (initial no epileptiform activity, however pt ripped off after 8hrs) after MRI obtained   - q1hr neuro checks  - c/w thiamine 100mg qd  - delirium precautions  - monitor fingersticks q1hr      #R subarachnoid hemorrhage  #L ischemic occipital stroke  Pt w/ bitemporal visual field deficits, worsened mental status throughout clinical course. Initially w/o findings on CT head, later found to have SAH, and then found to have Ischemic stroke on MRI brain. S/p MRA & MRV 9/22, overall unremarkable, mild cerebral edema, correlate w/amyloid disease per radiology.   - repeat CT head   - plan for MRI head w/ contrast on Monday 09/25 (per neuro pt allergic to CT contrast)   - q1hr neuro checks    CARDIO  #Afib  Hx of Afib. Not on a/c chronically d/t fall risk. Started on Eliquis presentation after discussion w/ family about 2ndary stroke prevention per neuro. Eliquis now dc'd i/s/o findings of SAH.  - hold all AC  - not in Afib currently  - frequent EKGs  - consider TFT panel     #HTN  Hx of HTN. Home med = Lopressor 50 BID. Goal SBP per neuro <160. BPs varied, soft when hypoglycemic. After blood glucose stable on D10, more hypertensive  - not tolerating PO, starting lopressor 2.5 IV BID  - hold home med.    PULM  #Bronchiectasis   Chronic bronchiectasis. Recent admission for it in Aug, was dc'd on Bactrim & Cipro PO, however recently started on Cefepime 2g IV BID after pt thought to have failed PO. MRSA negative.   - dc'd cefepime i/s/o AMS and unclear improvement on cefepime  - Discontinue vancomycin  - Continue zosyn 4.5g TID  - wean supplemental oxygen as tolerated    ENDO  #Hypoglycemia  Pt with recalcitrant hypoglycemia, unresponsive to multiple amps of dextrose, and repeat episodes of hypoglycemia to 20s and 30s. After given D10 pt Gluc went to 410, and dropped to 140 in less than 1hr. Per Endo: pt goal Glucose is >90. AM cortisol elevated. Proinsulin 900. BHB WNL.   - f/u Sulfonylurea panel  - Discontinue D10 fluids  - Monitor FS q1h  - Continue frequent BMP  - Consider CT body i/s/o very high insulin levels (however low C-peptide so insulinoma less likely)  - Follow endocrine recs     GI  - Obtain speech & swallow evaluation to evaluate diet status     RENAL/  BEN.     Prophylactic Measures  Diet: Minced and Moist  DVT Prophylaxis: SCDs  GI Prophylaxis: Start PPIs     82F, PMH of HTN, Afib (not on AC d/t fall risk), recent hospitalization for PRES (not at [facility], May-June '23), recent discharge for acute exacerbation of severe bronchiectasis (at [facility], Aug '23), presented w/ several days of AMS & new-onset diplopia since 9/15.   Pt started IV abx on 9/15 (2wk course of cefepime 2g IV BID for bronchiectasis exacerbation after concern from Dr. Foster that pt failed PO abx, - was dc'd in Aug on bactrim and cipro, got bactrim, cipro and zosyn inpatient- got outpt PICC placement on 9/13, 1st dose 9/15, got 7 doses total before presentation to hospital).    NEURO  #PRES  #Seizure  Pt hospitalized 05-06/2023 for PRES. At the time presented similar symptoms: confusion, AMS, forgetfulness, dropping things. Now presented w/ diplopia and bitemporal visual field anomalies, all findings consistent w/ PRES. Now with tangential, nonsensical speech, as well as some seizure-like activity. Twitching/seizures possibly 2/2 hypoglycemia  - Plan for MRI Brain +contrast on Monday- will require pretreatment 12 hrs before   - repeat CT head  - repeat vEEG (initial no epileptiform activity, however pt ripped off after 8hrs) after MRI obtained   - q1hr neuro checks  - c/w thiamine 100mg qd  - delirium precautions  - monitor fingersticks q1hr      #R subarachnoid hemorrhage  #L ischemic occipital stroke  Pt w/ bitemporal visual field deficits, worsened mental status throughout clinical course. Initially w/o findings on CT head, later found to have SAH, and then found to have Ischemic stroke on MRI brain. S/p MRA & MRV 9/22, overall unremarkable, mild cerebral edema, correlate w/amyloid disease per radiology.   - repeat CT head   - plan for MRI head w/ contrast on Monday 09/25 (per neuro pt allergic to CT contrast)   - q1hr neuro checks    CARDIO  #Afib  Hx of Afib. Not on a/c chronically d/t fall risk. Started on Eliquis presentation after discussion w/ family about 2ndary stroke prevention per neuro. Eliquis now dc'd i/s/o findings of SAH.  - hold all AC  - not in Afib currently  - frequent EKGs  - consider TFT panel     #HTN  Hx of HTN. Home med = Lopressor 50 BID. Goal SBP per neuro <160. BPs varied, soft when hypoglycemic. After blood glucose stable on D10, more hypertensive  - not tolerating PO, starting lopressor 2.5 IV BID  - can give labetalol for HTNsive urgency/emergency, but caution as pt easily bradycardic  - hold home med.    PULM  #Bronchiectasis   Chronic bronchiectasis. Recent admission for it in Aug, was dc'd on Bactrim & Cipro PO, however recently started on Cefepime 2g IV BID after pt thought to have failed PO. MRSA swab negative so vanc dc'd.    - dc'd cefepime i/s/o AMS and unclear improvement on cefepime  - Discontinue vancomycin  - Continue zosyn 4.5g TID, f/u on date to complete course  - wean supplemental oxygen as tolerated    ENDO  #Hypoglycemia  Pt with recalcitrant hypoglycemia, unresponsive to multiple amps of dextrose, and repeat episodes of hypoglycemia to 20s and 30s. After given D10 pt Gluc went to 410, and dropped to 140 in less than 1hr. Per Endo: pt goal Glucose is >90. AM cortisol elevated. Proinsulin 900. BHB WNL.   - f/u Sulfonylurea panel  - D10/NS continued, at 80  - Monitor FS q1h  - Continue frequent BMP  - Consider CT body i/s/o very high insulin levels (however low C-peptide so insulinoma less likely)  - Follow endocrine recs     GI  - Obtain speech & swallow evaluation to evaluate diet status     RENAL/  BEN.     Prophylactic Measures  Diet: Minced and Moist  DVT Prophylaxis: SCDs  GI Prophylaxis: Start PPIs     82F, PMH of HTN, Afib (not on AC d/t fall risk), recent hospitalization for PRES (not at [facility], May-June '23), recent discharge for acute exacerbation of severe bronchiectasis (at [facility], Aug '23), presented w/ several days of AMS & new-onset diplopia since 9/15.   Pt started IV abx on 9/15 (2wk course of cefepime 2g IV BID for bronchiectasis exacerbation after concern from Dr. Foster that pt failed PO abx, - was dc'd in Aug on bactrim and cipro, got bactrim, cipro and zosyn inpatient- got outpt PICC placement on 9/13, 1st dose 9/15, got 7 doses total before presentation to hospital).    NEURO  #PRES  #Seizure  Pt hospitalized 05-06/2023 for PRES. At the time presented similar symptoms: confusion, AMS, forgetfulness, dropping things. Now presented w/ diplopia and bitemporal visual field anomalies, all findings consistent w/ PRES. Now with tangential, nonsensical speech, as well as some seizure-like activity. Twitching/seizures possibly 2/2 hypoglycemia  - Plan for MRI Brain +contrast on Monday- will require pretreatment 12 hrs before   - repeat CT head  - repeat vEEG (initial no epileptiform activity, however pt ripped off after 8hrs) after MRI obtained   - q1hr neuro checks  - c/w thiamine 100mg qd  - delirium precautions  - monitor fingersticks q1hr      #R subarachnoid hemorrhage  #L ischemic occipital stroke  Pt w/ bitemporal visual field deficits, worsened mental status throughout clinical course. Initially w/o findings on CT head, later found to have SAH, and then found to have Ischemic stroke on MRI brain. S/p MRA & MRV 9/22, overall unremarkable, mild cerebral edema, correlate w/amyloid disease per radiology.   - repeat CT head   - plan for MRI head w/ contrast on Monday 09/25 (per neuro pt allergic to CT contrast)   - q1hr neuro checks    CARDIO  #Afib  Hx of Afib. Not on a/c chronically d/t fall risk. Started on Eliquis presentation after discussion w/ family about 2ndary stroke prevention per neuro. Eliquis now dc'd i/s/o findings of SAH.  - hold all AC  - not in Afib currently  - frequent EKGs  - consider TFT panel     #HTN  Hx of HTN. Home med = Lopressor 50 BID. Goal SBP per neuro <160. BPs varied, soft when hypoglycemic. After blood glucose stable on D10, more hypertensive  - lopressor dose up to 5mg IV q6  - can give labetalol for HTNsive urgency/emergency, but caution w/ two beta blockers as pt easily bradycardic  - hold home med.    PULM  #Bronchiectasis   Chronic bronchiectasis. Recent admission for it in Aug, was dc'd on Bactrim & Cipro PO, however recently started on Cefepime 2g IV BID after pt thought to have failed PO. MRSA swab negative so vanc dc'd.    - dc'd cefepime i/s/o AMS and unclear improvement on cefepime  - dc'd vancomycin i/s/o MRSA negative  - Continue zosyn 4.5g TID, f/u on date to complete course  - wean supplemental oxygen as tolerated    ENDO  #Hypoglycemia  Pt with recalcitrant hypoglycemia, unresponsive to multiple amps of dextrose, and repeat episodes of hypoglycemia to 20s and 30s. After given D10 pt Gluc went to 410, and dropped to 140 in less than 1hr. Per Endo: pt goal Glucose is >90. AM cortisol elevated. Proinsulin 900. BHB WNL.   - f/u Sulfonylurea panel  - switch D10 back to D10/NS (pt hyponatremic), currently at 80mL/hr, downtitrate as tolerated  - Monitor FS q1h  - Continue frequent BMP  - Consider CT body i/s/o very high insulin levels (however low C-peptide so insulinoma less likely)  - Follow endocrine recs     GI  - f/u speech & swallow evaluation (9/24) to evaluate diet status     RENAL/  BEN.     Prophylactic Measures  Diet: NPO until dysphagia screen passed  DVT Prophylaxis: SCDs  GI Prophylaxis: Start PPIs     82F, PMH of HTN, Afib (not on AC d/t fall risk), recent hospitalization for PRES (not at [facility], May-June '23), recent discharge for acute exacerbation of severe bronchiectasis (at [facility], Aug '23), presented w/ several days of AMS & new-onset diplopia since 9/15.   Pt started IV abx on 9/15 (2wk course of cefepime 2g IV BID for bronchiectasis exacerbation after concern from Dr. Foster that pt failed PO abx, - was dc'd in Aug on bactrim and cipro, got bactrim, cipro and zosyn inpatient- got outpt PICC placement on 9/13, 1st dose 9/15, got 7 doses total before presentation to hospital).    NEURO  #PRES  #Seizure  Pt hospitalized 05-06/2023 for PRES. At the time presented similar symptoms: confusion, AMS, forgetfulness, dropping things. Now presented w/ diplopia and bitemporal visual field anomalies, all findings consistent w/ PRES. Now with tangential, nonsensical speech, as well as some seizure-like activity. Twitching/seizures possibly 2/2 hypoglycemia  - Plan for MRI Brain +contrast on Monday  - repeat CT head  - repeat vEEG (initial no epileptiform activity, however pt ripped off after 8hrs) after MRI obtained   - q1hr neuro checks  - c/w thiamine 100mg qd  - delirium precautions  - monitor fingersticks q1hr      #R subarachnoid hemorrhage  #L ischemic occipital stroke  Pt w/ bitemporal visual field deficits, worsened mental status throughout clinical course. Initially w/o findings on CT head, later found to have SAH, and then found to have Ischemic stroke on MRI brain. S/p MRA & MRV 9/22, overall unremarkable, mild cerebral edema, correlate w/amyloid disease per radiology.   - repeat CT head   - plan for MRI head w/ contrast on Monday 09/25   - q1hr neuro checks    CARDIO  #Afib  Hx of Afib. Not on a/c chronically d/t fall risk. Started on Eliquis presentation after discussion w/ family about 2ndary stroke prevention per neuro. Eliquis now dc'd i/s/o findings of SAH.  - hold all AC  - not in Afib currently  - frequent EKGs  - consider TFT panel     #HTN  Hx of HTN. Home med = Lopressor 50 BID. Goal SBP per neuro <160. BPs varied, soft when hypoglycemic. After blood glucose stable on D10, more hypertensive  - passed speech and swallow, can tolerate PO meds --> start Lopressor 2.5 PO q6  - can give labetalol for HTNsive urgency/emergency, but caution w/ two beta blockers as pt easily bradycardic  - hold home med.    PULM  #Bronchiectasis   Chronic bronchiectasis. Recent admission for it in Aug, was dc'd on Bactrim & Cipro PO, however recently started on Cefepime 2g IV BID after pt thought to have failed PO. MRSA swab negative so vanc dc'd.    - dc'd cefepime i/s/o AMS and unclear improvement on cefepime  - dc'd vancomycin i/s/o MRSA negative  - Continue zosyn 4.5g TID, f/u on date to complete course (started on 9/22, s/p cefepime from 9/15 - 9/22)  - wean supplemental oxygen as tolerated    ENDO  #Hypoglycemia  Pt with recalcitrant hypoglycemia, unresponsive to multiple amps of dextrose, and repeat episodes of hypoglycemia to 20s and 30s. After given D10 pt Gluc went to 410, and dropped to 140 in less than 1hr. Per Endo: pt goal Glucose is >90. AM cortisol elevated. Proinsulin 900. BHB WNL.   - f/u Sulfonylurea panel  - plan to wean then discontinue D10/NS, currently on 25mL/hr  - Monitor FS q4hrs  - Consider CT body i/s/o very high insulin levels (however low C-peptide so insulinoma less likely)  - Follow endocrine recs     GI  - f/u speech & swallow evaluation (9/24) to evaluate diet status --> NPO w/ ice chips and can have apple sauce with meds  - f/u repeat speech & swallow eval (9/26) for potential to liberalize diet    RENAL/  BEN.     Prophylactic Measures  Diet: NPO until dysphagia screen passed  DVT Prophylaxis: SCDs  GI Prophylaxis: Start PPIs     82F, PMH of HTN, Afib (not on AC d/t fall risk), recent hospitalization for PRES (not at [facility], May-June '23), recent discharge for acute exacerbation of severe bronchiectasis (at [facility], Aug '23), presented w/ several days of AMS & new-onset diplopia since 9/15.   Pt started IV abx on 9/15 (2wk course of cefepime 2g IV BID for bronchiectasis exacerbation after concern from Dr. Foster that pt failed PO abx, - was dc'd in Aug on bactrim and cipro, got bactrim, cipro and zosyn inpatient- got outpt PICC placement on 9/13, 1st dose 9/15, got 7 doses total before presentation to hospital).    NEURO  #PRES  #Seizure  Pt hospitalized 05-06/2023 for PRES. At the time presented similar symptoms: confusion, AMS, forgetfulness, dropping things. Now presented w/ diplopia and bitemporal visual field anomalies, all findings consistent w/ PRES. Now with tangential, nonsensical speech, as well as some seizure-like activity. Twitching/seizures possibly 2/2 hypoglycemia  - Plan for MRI Brain w&w/o contrast today  - repeat CT head  - repeat vEEG (initial no epileptiform activity, however pt ripped off after 8hrs) after MRI obtained   - q1hr neuro checks  - c/w thiamine 100mg qd  - delirium precautions  - monitor fingersticks q1hr      #R subarachnoid hemorrhage  #L ischemic occipital stroke  Pt w/ bitemporal visual field deficits, worsened mental status throughout clinical course. Initially w/o findings on CT head, later found to have SAH, and then found to have Ischemic stroke on MRI brain. S/p MRA & MRV 9/22, overall unremarkable, mild cerebral edema, correlate w/amyloid disease per radiology.   - repeat CT head   - plan for MRI head w/ contrast on 09/25   - q1hr neuro checks    CARDIO  #Afib  Hx of Afib. Not on a/c chronically d/t fall risk. Started on Eliquis presentation after discussion w/ family about 2ndary stroke prevention per neuro. Eliquis now dc'd i/s/o findings of SAH.  - hold all AC  - in Afib but not RVR currently  - frequent EKGs  - c/w lopressor 2.5 PO q6hrs     #HTN  Hx of HTN. Home med = Lopressor 50 BID. Goal SBP per neuro <160. BPs varied, soft when hypoglycemic. After blood glucose stable on D10, more hypertensive  - passed speech and swallow, can tolerate PO meds --> start Lopressor 2.5 PO q6  - can give labetalol for HTNsive urgency/emergency, but caution w/ two beta blockers as pt easily bradycardic  - hold home med.    PULM  #Bronchiectasis   Chronic bronchiectasis. Recent admission for it in Aug, was dc'd on Bactrim & Cipro PO, however recently started on Cefepime 2g IV BID after pt thought to have failed PO. MRSA swab negative so vanc dc'd.    - dc'd cefepime i/s/o AMS and unclear improvement on cefepime  - dc'd vancomycin i/s/o MRSA negative  - Continue zosyn 4.5g TID, f/u on date to complete course (started on 9/22, s/p cefepime from 9/15 - 9/22)  - wean supplemental oxygen as tolerated    ENDO  #Hypoglycemia  Pt with recalcitrant hypoglycemia, unresponsive to multiple amps of dextrose, and repeat episodes of hypoglycemia to 20s and 30s. After given D10 pt Gluc went to 410, and dropped to 140 in less than 1hr. Per Endo: pt goal Glucose is >90. AM cortisol elevated. Insulin 989.0. BHB WNL.   - f/u Sulfonylurea panel  - plan to wean then discontinue D10/NS, currently on 25mL/hr  - Workup findings: f/u sulfonylurea results, Insulin 990, Betahydroxybutyrate WNL,   - Monitor FS q4hrs  - Consider CT body i/s/o very high insulin levels (however low C-peptide so insulinoma less likely)  - Follow endocrine recs     GI  - f/u speech & swallow evaluation (9/24) to evaluate diet status --> NPO w/ ice chips and can have apple sauce with meds  - f/u repeat speech & swallow eval (9/26) for potential to liberalize diet    RENAL/  BEN.     Prophylactic Measures  Diet: NPO until dysphagia screen passed  DVT Prophylaxis: SCDs  GI Prophylaxis: Start PPIs     82F, PMH of HTN, Afib (not on AC d/t fall risk), recent hospitalization for PRES (not at [facility], May-June '23), recent discharge for acute exacerbation of severe bronchiectasis (at [facility], Aug '23), presented w/ several days of AMS & new-onset diplopia since 9/15.   Pt started IV abx on 9/15 (2wk course of cefepime 2g IV BID for bronchiectasis exacerbation after concern from Dr. Foster that pt failed PO abx, - was dc'd in Aug on bactrim and cipro, got bactrim, cipro and zosyn inpatient- got outpt PICC placement on 9/13, 1st dose 9/15, got 7 doses total before presentation to hospital).    NEURO  #PRES  #Seizure  Pt hospitalized 05-06/2023 for PRES. At the time presented similar symptoms: confusion, AMS, forgetfulness, dropping things. Now presented w/ diplopia and bitemporal visual field anomalies, all findings consistent w/ PRES. Now with tangential, nonsensical speech, as well as some seizure-like activity. Twitching/seizures possibly 2/2 hypoglycemia  - Plan for MRI Brain w&w/o contrast today  - repeat CT head  - repeat vEEG (initial no epileptiform activity, however pt ripped off after 8hrs) after MRI obtained   - q1hr neuro checks  - c/w thiamine 100mg qd  - delirium precautions  - monitor fingersticks q1hr      #R subarachnoid hemorrhage  #L ischemic occipital stroke  Pt w/ bitemporal visual field deficits, worsened mental status throughout clinical course. Initially w/o findings on CT head, later found to have SAH, and then found to have Ischemic stroke on MRI brain. S/p MRA & MRV 9/22, overall unremarkable, mild cerebral edema, correlate w/amyloid disease per radiology.   - repeat CT head   - plan for MRI head w/ contrast on 09/25   - q1hr neuro checks    CARDIO  #Afib  Hx of Afib. Not on a/c chronically d/t fall risk. Started on Eliquis presentation after discussion w/ family about 2ndary stroke prevention per neuro. Eliquis now dc'd i/s/o findings of SAH.  - hold all AC  - in Afib but not RVR currently  - frequent EKGs  - c/w lopressor 2.5 PO q6hrs     #HTN  Hx of HTN. Home med = Lopressor 50 BID. Goal SBP per neuro <160. BPs varied, soft when hypoglycemic. After blood glucose stable on D10, more hypertensive  - passed speech and swallow, can tolerate PO meds --> start Lopressor 2.5 PO q6  - can give labetalol for HTNsive urgency/emergency, but caution w/ two beta blockers as pt easily bradycardic  - hold home med.    PULM  #Bronchiectasis   Chronic bronchiectasis. Recent admission for it in Aug, was dc'd on Bactrim & Cipro PO, however recently started on Cefepime 2g IV BID after pt thought to have failed PO. MRSA swab negative so vanc dc'd.    - dc'd cefepime i/s/o AMS and unclear improvement on cefepime  - dc'd vancomycin i/s/o MRSA negative  - Continue zosyn 4.5g TID, f/u on date to complete course (started on 9/22, s/p cefepime from 9/15 - 9/22)  - wean supplemental oxygen as tolerated    ENDO  #Hypoglycemia  Pt with recalcitrant hypoglycemia, unresponsive to multiple amps of dextrose, and repeat episodes of hypoglycemia to 20s and 30s. After given D10 pt Gluc went to 410, and dropped to 140 in less than 1hr. Per Endo: pt goal Glucose is >90. AM cortisol elevated. Insulin 989.0. BHB WNL.   - f/u Sulfonylurea panel  - plan to wean then discontinue D10/NS, currently on 25mL/hr  - Workup findings: f/u sulfonylurea results, Insulin 990, Betahydroxybutyrate WNL, C peptide 0.9 (low)  - Monitor FS q4hrs  - Consider CT body i/s/o very high insulin levels (however low C-peptide so insulinoma less likely)  - Follow endocrine recs     GI  - f/u speech & swallow evaluation (9/24) to evaluate diet status --> NPO w/ ice chips and can have apple sauce with meds  - f/u repeat speech & swallow eval (9/26) for potential to liberalize diet    RENAL/  BEN.     Prophylactic Measures  Diet: NPO until dysphagia screen passed  DVT Prophylaxis: SCDs  GI Prophylaxis: Start PPIs     82F, PMH of HTN, Afib (not on AC d/t fall risk), recent hospitalization for PRES (not at [facility], May-June '23), recent discharge for acute exacerbation of severe bronchiectasis (at [facility], Aug '23), presented w/ several days of AMS & new-onset diplopia since 9/15.   Pt started IV abx on 9/15 (2wk course of cefepime 2g IV BID for bronchiectasis exacerbation after concern from Dr. Foster that pt failed PO abx, - was dc'd in Aug on bactrim and cipro, got bactrim, cipro and zosyn inpatient- got outpt PICC placement on 9/13, 1st dose 9/15, got 7 doses total before presentation to hospital).    NEURO  #PRES  #Seizure  Pt hospitalized 05-06/2023 for PRES. At the time presented similar symptoms: confusion, AMS, forgetfulness, dropping things. Now presented w/ diplopia and bitemporal visual field anomalies, all findings consistent w/ PRES. Now with tangential, nonsensical speech, as well as some seizure-like activity. Twitching/seizures possibly 2/2 hypoglycemia  - Plan for MRI Brain w&w/o contrast today  - repeat CT head  - repeat vEEG (initial no epileptiform activity, however pt ripped off after 8hrs) after MRI obtained   - q1hr neuro checks  - c/w thiamine 100mg qd  - delirium precautions  - monitor fingersticks q1hr      #R subarachnoid hemorrhage  #L ischemic occipital stroke  Pt w/ bitemporal visual field deficits, worsened mental status throughout clinical course. Initially w/o findings on CT head, later found to have SAH, and then found to have Ischemic stroke on MRI brain. S/p MRA & MRV 9/22, overall unremarkable, mild cerebral edema, correlate w/amyloid disease per radiology.   - repeat CT head   - plan for MRI head w/ contrast on 09/25   - q1hr neuro checks    CARDIO  #Afib  Hx of Afib. Not on a/c chronically d/t fall risk. Started on Eliquis presentation after discussion w/ family about 2ndary stroke prevention per neuro. Eliquis now dc'd i/s/o findings of SAH.  - hold all AC  - in Afib but not RVR currently  - frequent EKGs  - c/w lopressor 2.5 PO q6hrs     #HTN  Hx of HTN. Home med = Lopressor 50 BID. Goal SBP per neuro <160. BPs varied, soft when hypoglycemic. After blood glucose stable on D10, more hypertensive  - passed speech and swallow, can tolerate PO meds --> start Lopressor 2.5 PO q6  - can give labetalol for HTNsive urgency/emergency, but caution w/ two beta blockers as pt easily bradycardic  - hold home med.    PULM  #Bronchiectasis   Chronic bronchiectasis. Recent admission for it in Aug, was dc'd on Bactrim & Cipro PO, however recently started on Cefepime 2g IV BID after pt thought to have failed PO. MRSA swab negative so vanc dc'd.    - dc'd cefepime i/s/o AMS and unclear improvement on cefepime  - dc'd vancomycin i/s/o MRSA negative  - Continue zosyn 4.5g TID, f/u on date to complete course (started on 9/22, s/p cefepime from 9/15 - 9/22)  - wean supplemental oxygen as tolerated    ENDO  #Hypoglycemia  Pt with recalcitrant hypoglycemia, unresponsive to multiple amps of dextrose, and repeat episodes of hypoglycemia to 20s and 30s. After given D10 pt Gluc went to 410, and dropped to 140 in less than 1hr. Per Endo: pt goal Glucose is >90. AM cortisol elevated. Insulin 989.0. BHB WNL.   - f/u Sulfonylurea panel  - patient on D10/NS i/s/o NPO, not because of hypoglycemia, although can cover both  - Workup findings: f/u sulfonylurea results, Insulin 990, Betahydroxybutyrate WNL, C peptide 0.9 (low)  - Monitor FS q6hrs  - Consider CT body i/s/o very high insulin levels (however low C-peptide so insulinoma less likely)  - Follow endocrine recs     GI  - f/u speech & swallow evaluation (9/24) to evaluate diet status --> NPO w/ ice chips and can have apple sauce with meds  - D10/NS at 25mL/hr, wean as tolerated, especially when pt cleared for diet by speech & swallow  - f/u repeat speech & swallow eval (9/26) for potential to liberalize diet    RENAL/  BEN.     Prophylactic Measures  Diet: NPO until dysphagia screen passed  DVT Prophylaxis: SCDs  GI Prophylaxis: Start PPIs

## 2023-09-25 NOTE — PROGRESS NOTE ADULT - ASSESSMENT
incomplete 82y Female with PMHx of HTN, afib (not on AC due to fall risk), recent hospitalization for PRES (5-6/2023), recently discharged from [facility] 8/2023 with acute exacerbation of severe bronchiectasis and respiratory failure presents with several days of AMS and new diplopia 9/18. NIHSS 1 on admission for bitemporal visual deficit. CTH with no significant findings. CTA and perfusion deferred due to contrast allergy. Admitted for further stroke w/u, started on Eliquis for secondary stroke prevention. Repeat HCT/MRI with small R occipital SAH and small L ischemic stroke. Acute worsening in mental status appears to be improving since the correction of hypoglycemia and/or treatment with broad spectrum abx. However, patient is not still at baseline. MRA H/N: negative for steno-occlusive disease, aneurysm or high flow vascular malformation. No new/interval infarct on DWI since 9/20/23, though mild cerebral edema has developed. Query amyloid related disease. MRV: negative for thrombus. Repeat MRI does not show phoenix evidence of large vessel vasculitis or CVST; cannot r/o inflammatory causes like ISMA/vasculitis/encephalitis. Unfortunately, MRI brain w, w/o contrast not performed with vessel imaging. Will attempt to repeat again to r/o potential causes like ISMA. Etiology of SAH/ischemic stroke and overall neurological presentation is unclear. Initial ? DLB + acute decompensation from imaging findings and hospitalization. Pt made NPO yesterday and reevaluated today and continued NPO with need for alternative nutrition source. Went to MRI w/wo with anesthesia today pending read. Pt to stay in MICU overnight given anesthesia and precedex given for the past 3 days. Possible step-down to telemetry stroke team if pt able to IV medication for agitation.     1)Secondary stroke prevention  - holding AC i/s/o SAH  - can consider aspirin pending repeat HCT or MRI  - continue Atorvastatin 40 mg QD    2) Stroke risk factors  - A1C: 5.5  - LDL: 129   - TSH: 3.87   - SAH  - HTN  - history of PRES     3) Further management  - Pending read of MRI brain w/ contrast tomorrow with anesthesia   - Recommend prolonged VEEG as patient with episode concerning for possible seizure, also with waxing and waning now that MRI has been done  - Recommend repeat CTH i/s/o declining mental status to r/o bleed vs stroke expansion when medically stable   - VANNESSA if patient can tolerate to r/o possible endocarditis   - LP may be warranted pending clinical course  - Can consider PET brain when able to eval for underlying DLB  - recommend SBP goal <160   - recommend q2hr stroke neuro checks  - may need outpt neurology follow up  - provide stroke education    DVT prophylaxis   - Lovenox   - SCDs    Discussed with Attending Dr. Garcia

## 2023-09-25 NOTE — SWALLOW BEDSIDE ASSESSMENT ADULT - PHARYNGEAL PHASE
Hyolaryngeal excursion palpated during swallow trigger, suspect delay and/or mistimed airway closure, and pharyngeal clearance deficits AEB +cough on cup sip thin suggestive of aspiration, as well as multiple swallows per sip.

## 2023-09-25 NOTE — PROGRESS NOTE ADULT - SUBJECTIVE AND OBJECTIVE BOX
[facility]  Abad Buenrostro, Palliative Care Attending  Contact Info: Call [phone] (HEAL Line) or message on [employer] Teams (Abad Buenrostro)    SUBJECTIVE AND OBJECTIVE:  INTERVAL HPI/OVERNIGHT EVENTS: Interval events noted. More awake and calm, offered no complaints. See patient's PRN use for the past 24hrs noted below. Comprehensive symptom assessment and GOC exploration as noted below. Extensive time spent discussing plan of care with daughter.    ALLERGIES:  Ceclor (Rash)  IV Contrast (Unknown)  Levaquin (Swelling)  Augmentin (Short breath; Rash)    MEDICATIONS  (STANDING):  albuterol/ipratropium for Nebulization 3 milliLiter(s) Nebulizer every 6 hours  atorvastatin 40 milliGRAM(s) Oral at bedtime  chlorhexidine 2% Cloths 1 Application(s) Topical <User Schedule>  chlorhexidine 4% Liquid 1 Application(s) Topical <User Schedule>  dextrose 10% + sodium chloride 0.9%. 1000 milliLiter(s) (25 mL/Hr) IV Continuous <Continuous>  enoxaparin Injectable 30 milliGRAM(s) SubCutaneous every 24 hours  melatonin 5 milliGRAM(s) Oral at bedtime  metoprolol tartrate Injectable 2.5 milliGRAM(s) IV Push every 6 hours  montelukast 10 milliGRAM(s) Oral daily  multivitamin  Chewable 1 Tablet(s) Oral daily  piperacillin/tazobactam IVPB.. 4.5 Gram(s) IV Intermittent every 8 hours  thiamine 100 milliGRAM(s) Oral daily    MEDICATIONS  (PRN):  QUEtiapine 12.5 milliGRAM(s) Oral once PRN agitation  sodium chloride 0.9% lock flush 10 milliLiter(s) IV Push every 1 hour PRN Pre/post blood products, medications, blood draw, and to maintain line patency    Analgesic Use (Scheduled and PRNs) for past 24 hours:  melatonin   5 milliGRAM(s) Oral (09-24-23 @ 21:51)    ITEMS UNCHECKED ARE NOT PRESENT  PRESENT SYMPTOMS/REVIEW OF SYSTEMS: []Unable to obtain due to poor mentation   Source if other than patient:  []Family   []Team     Pain: [] yes [x] no  QOL impact -  Location -  Aggravating factors -  Quality -  Radiation -  Timing -  Severity (0-10 scale) -  Minimal acceptable level (0-10 scale) -    PAINAD Score: 0  CPOT Score: 0    Dyspnea:                           []Mild  []Moderate []Severe  Anxiety:                             []Mild []Moderate []Severe  Fatigue:                             []Mild []Moderate []Severe  Nausea:                             []Mild []Moderate []Severe  Loss of appetite:              []Mild []Moderate []Severe  Constipation:                    []Mild []Moderate []Severe    Other Symptoms:  [x]All other review of systems negative     Palliative Performance Status Version 2: 40%    Vital Signs Last 24 Hrs  T(C): 36.8 (25 Sep 2023 11:42), Max: 37.9 (24 Sep 2023 17:47)  T(F): 98.2 (25 Sep 2023 11:42), Max: 100.2 (24 Sep 2023 17:47)  HR: 60 (25 Sep 2023 14:00) (53 - 100)  BP: 156/77 (25 Sep 2023 14:00) (116/58 - 196/94)  BP(mean): 110 (25 Sep 2023 14:00) (83 - 139)  RR: 35 (25 Sep 2023 14:00) (20 - 44)  SpO2: 98% (25 Sep 2023 14:00) (90% - 100%)    Parameters below as of 25 Sep 2023 14:00  Patient On (Oxygen Delivery Method): room air    LABS: Personally reviewed and interpreted                      10.4   8.46  )-----------( 176      ( 25 Sep 2023 06:21 )             29.6   130<L>  |  93<L>  |  8   ----------------------------<  143<H>  3.2<L>   |  30  |  0.71    Ca    7.9<L>      25 Sep 2023 06:21  Phos  4.1     09-25  Mg     2.3     09-25  TPro  6.2  /  Alb  3.1<L>  /  TBili  0.9  /  DBili  x   /  AST  35  /  ALT  39  /  AlkPhos  102  09-25    RADIOLOGY & ADDITIONAL STUDIES: None new    DISCUSSION OF CASE: Daughter - to provide updates and emotional support; Primary Team/RN - to discuss plan of care [facility]  Abad Buenrostro, Palliative Care Attending  Contact Info: Call [phone] (HEAL Line) or message on Microsoft Teams (Abad Buenrostro)    SUBJECTIVE AND OBJECTIVE:  INTERVAL HPI/OVERNIGHT EVENTS: Interval events noted. More awake and calm, offered no complaints. See patient's PRN use for the past 24hrs noted below. Comprehensive symptom assessment and GOC exploration as noted below. Extensive time spent discussing plan of care with daughter.    ALLERGIES:  Ceclor (Rash)  IV Contrast (Unknown)  Levaquin (Swelling)  Augmentin (Short breath; Rash)    MEDICATIONS  (STANDING):  albuterol/ipratropium for Nebulization 3 milliLiter(s) Nebulizer every 6 hours  atorvastatin 40 milliGRAM(s) Oral at bedtime  chlorhexidine 2% Cloths 1 Application(s) Topical <User Schedule>  chlorhexidine 4% Liquid 1 Application(s) Topical <User Schedule>  dextrose 10% + sodium chloride 0.9%. 1000 milliLiter(s) (25 mL/Hr) IV Continuous <Continuous>  enoxaparin Injectable 30 milliGRAM(s) SubCutaneous every 24 hours  melatonin 5 milliGRAM(s) Oral at bedtime  metoprolol tartrate Injectable 2.5 milliGRAM(s) IV Push every 6 hours  montelukast 10 milliGRAM(s) Oral daily  multivitamin  Chewable 1 Tablet(s) Oral daily  piperacillin/tazobactam IVPB.. 4.5 Gram(s) IV Intermittent every 8 hours  thiamine 100 milliGRAM(s) Oral daily    MEDICATIONS  (PRN):  QUEtiapine 12.5 milliGRAM(s) Oral once PRN agitation  sodium chloride 0.9% lock flush 10 milliLiter(s) IV Push every 1 hour PRN Pre/post blood products, medications, blood draw, and to maintain line patency    Analgesic Use (Scheduled and PRNs) for past 24 hours:  melatonin   5 milliGRAM(s) Oral (09-24-23 @ 21:51)    ITEMS UNCHECKED ARE NOT PRESENT  PRESENT SYMPTOMS/REVIEW OF SYSTEMS: []Unable to obtain due to poor mentation   Source if other than patient:  []Family   []Team     Pain: [] yes [x] no  QOL impact -  Location -  Aggravating factors -  Quality -  Radiation -  Timing -  Severity (0-10 scale) -  Minimal acceptable level (0-10 scale) -    PAINAD Score: 0  CPOT Score: 0    Dyspnea:                           []Mild  []Moderate []Severe  Anxiety:                             []Mild []Moderate []Severe  Fatigue:                             []Mild []Moderate []Severe  Nausea:                             []Mild []Moderate []Severe  Loss of appetite:              []Mild []Moderate []Severe  Constipation:                    []Mild []Moderate []Severe    Other Symptoms:  [x]All other review of systems negative     Palliative Performance Status Version 2: 40%    GENERAL:  [x] NAD [x]Alert []Lethargic  []Cachexia  []Unarousable  [x]Verbal  []Non-Verbal  BEHAVIORAL:   []Anxiety  []Delirium []Agitation [x]Cooperative [x]Oriented x3  HEENT:  [x]Normal  [x] Moist Mucous Membranes []Dry mouth   []ET Tube/Trach  []Oral lesions  PULMONARY:   [x]Clear []Tachypnea  []Audible excessive secretions  [x]Normal Work of Breathing []Labored Breathing  []Rhonchi []Crackles []Wheezing  CARDIOVASCULAR:    [x]Regular Rate [x]Regular Rhythm []Irregular []Tachy  []Hemanth  GASTROINTESTINAL:  [x]Soft  []Distended   []+BS  [x]Non tender []Tender  []PEG []OGT/ NGT  Last BM:  GENITOURINARY:  []Normal [x] Incontinent   []Oliguria/Anuria   []Richards  MUSCULOSKELETAL:   [x]Normal Extremities  [x]Weakness  [x]Bed/Wheelchair bound []Edema  NEUROLOGIC:   [x]No focal deficits  []Cognitive impairment  []Dysphagia []Dysarthria []Paresis []Encephalopathic  SKIN:   [x]Normal   []Pressure ulcer(s)  []Rash    Vital Signs Last 24 Hrs  T(C): 36.8 (25 Sep 2023 11:42), Max: 37.9 (24 Sep 2023 17:47)  T(F): 98.2 (25 Sep 2023 11:42), Max: 100.2 (24 Sep 2023 17:47)  HR: 60 (25 Sep 2023 14:00) (53 - 100)  BP: 156/77 (25 Sep 2023 14:00) (116/58 - 196/94)  BP(mean): 110 (25 Sep 2023 14:00) (83 - 139)  RR: 35 (25 Sep 2023 14:00) (20 - 44)  SpO2: 98% (25 Sep 2023 14:00) (90% - 100%)    Parameters below as of 25 Sep 2023 14:00  Patient On (Oxygen Delivery Method): room air    LABS: Personally reviewed and interpreted                      10.4   8.46  )-----------( 176      ( 25 Sep 2023 06:21 )             29.6   130<L>  |  93<L>  |  8   ----------------------------<  143<H>  3.2<L>   |  30  |  0.71    Ca    7.9<L>      25 Sep 2023 06:21  Phos  4.1     09-25  Mg     2.3     09-25  TPro  6.2  /  Alb  3.1<L>  /  TBili  0.9  /  DBili  x   /  AST  35  /  ALT  39  /  AlkPhos  102  09-25    RADIOLOGY & ADDITIONAL STUDIES: None new    DISCUSSION OF CASE: Daughter - to provide updates and emotional support; Primary Team/RN - to discuss plan of care

## 2023-09-25 NOTE — PROGRESS NOTE ADULT - NUTRITIONAL ASSESSMENT
This patient has been assessed with a concern for Malnutrition and has been determined to have a diagnosis/diagnoses of Underweight (BMI < 19) and Severe protein-calorie malnutrition.    This patient is being managed with:   Diet NPO-  Except Medications  Entered: Sep 24 2023  3:51PM

## 2023-09-26 LAB
ALBUMIN SERPL ELPH-MCNC: 3 G/DL — LOW (ref 3.3–5)
ALP SERPL-CCNC: 97 U/L — SIGNIFICANT CHANGE UP (ref 40–120)
ALT FLD-CCNC: 30 U/L — SIGNIFICANT CHANGE UP (ref 10–45)
ANION GAP SERPL CALC-SCNC: 6 MMOL/L — SIGNIFICANT CHANGE UP (ref 5–17)
AST SERPL-CCNC: 22 U/L — SIGNIFICANT CHANGE UP (ref 10–40)
BILIRUB SERPL-MCNC: 0.8 MG/DL — SIGNIFICANT CHANGE UP (ref 0.2–1.2)
BLD GP AB SCN SERPL QL: NEGATIVE — SIGNIFICANT CHANGE UP
BLD GP AB SCN SERPL QL: NEGATIVE — SIGNIFICANT CHANGE UP
BUN SERPL-MCNC: 5 MG/DL — LOW (ref 7–23)
C PEPTIDE SERPL-MCNC: 2.3 NG/ML — SIGNIFICANT CHANGE UP (ref 1.1–4.4)
CALCIUM SERPL-MCNC: 8.4 MG/DL — SIGNIFICANT CHANGE UP (ref 8.4–10.5)
CHLORIDE SERPL-SCNC: 99 MMOL/L — SIGNIFICANT CHANGE UP (ref 96–108)
CO2 SERPL-SCNC: 29 MMOL/L — SIGNIFICANT CHANGE UP (ref 22–31)
CREAT SERPL-MCNC: 0.69 MG/DL — SIGNIFICANT CHANGE UP (ref 0.5–1.3)
CULTURE RESULTS: SIGNIFICANT CHANGE UP
CULTURE RESULTS: SIGNIFICANT CHANGE UP
EGFR: 88 ML/MIN/1.73M2 — SIGNIFICANT CHANGE UP
GLUCOSE BLDC GLUCOMTR-MCNC: 114 MG/DL — HIGH (ref 70–99)
GLUCOSE BLDC GLUCOMTR-MCNC: 116 MG/DL — HIGH (ref 70–99)
GLUCOSE BLDC GLUCOMTR-MCNC: 120 MG/DL — HIGH (ref 70–99)
GLUCOSE BLDC GLUCOMTR-MCNC: 127 MG/DL — HIGH (ref 70–99)
GLUCOSE BLDC GLUCOMTR-MCNC: 93 MG/DL — SIGNIFICANT CHANGE UP (ref 70–99)
GLUCOSE SERPL-MCNC: 99 MG/DL — SIGNIFICANT CHANGE UP (ref 70–99)
HCT VFR BLD CALC: 30.8 % — LOW (ref 34.5–45)
HGB BLD-MCNC: 10.6 G/DL — LOW (ref 11.5–15.5)
INSULIN SERPL-MCNC: 3.4 UU/ML — SIGNIFICANT CHANGE UP (ref 2.6–24.9)
MAGNESIUM SERPL-MCNC: 2 MG/DL — SIGNIFICANT CHANGE UP (ref 1.6–2.6)
MCHC RBC-ENTMCNC: 32.8 PG — SIGNIFICANT CHANGE UP (ref 27–34)
MCHC RBC-ENTMCNC: 34.4 GM/DL — SIGNIFICANT CHANGE UP (ref 32–36)
MCV RBC AUTO: 95.4 FL — SIGNIFICANT CHANGE UP (ref 80–100)
NRBC # BLD: 0 /100 WBCS — SIGNIFICANT CHANGE UP (ref 0–0)
PHOSPHATE SERPL-MCNC: 2.8 MG/DL — SIGNIFICANT CHANGE UP (ref 2.5–4.5)
PLATELET # BLD AUTO: 196 K/UL — SIGNIFICANT CHANGE UP (ref 150–400)
POTASSIUM SERPL-MCNC: 3.6 MMOL/L — SIGNIFICANT CHANGE UP (ref 3.5–5.3)
POTASSIUM SERPL-SCNC: 3.6 MMOL/L — SIGNIFICANT CHANGE UP (ref 3.5–5.3)
PROT SERPL-MCNC: 6.5 G/DL — SIGNIFICANT CHANGE UP (ref 6–8.3)
RBC # BLD: 3.23 M/UL — LOW (ref 3.8–5.2)
RBC # FLD: 12.4 % — SIGNIFICANT CHANGE UP (ref 10.3–14.5)
RH IG SCN BLD-IMP: POSITIVE — SIGNIFICANT CHANGE UP
RH IG SCN BLD-IMP: POSITIVE — SIGNIFICANT CHANGE UP
SODIUM SERPL-SCNC: 134 MMOL/L — LOW (ref 135–145)
SPECIMEN SOURCE: SIGNIFICANT CHANGE UP
SPECIMEN SOURCE: SIGNIFICANT CHANGE UP
VIT B1 SERPL-MCNC: 109.4 NMOL/L — SIGNIFICANT CHANGE UP (ref 66.5–200)
WBC # BLD: 7.69 K/UL — SIGNIFICANT CHANGE UP (ref 3.8–10.5)
WBC # FLD AUTO: 7.69 K/UL — SIGNIFICANT CHANGE UP (ref 3.8–10.5)

## 2023-09-26 PROCEDURE — 99232 SBSQ HOSP IP/OBS MODERATE 35: CPT | Mod: GC

## 2023-09-26 PROCEDURE — 99233 SBSQ HOSP IP/OBS HIGH 50: CPT

## 2023-09-26 PROCEDURE — 99232 SBSQ HOSP IP/OBS MODERATE 35: CPT

## 2023-09-26 RX ORDER — HALOPERIDOL DECANOATE 100 MG/ML
1 INJECTION INTRAMUSCULAR ONCE
Refills: 0 | Status: COMPLETED | OUTPATIENT
Start: 2023-09-26 | End: 2023-09-26

## 2023-09-26 RX ORDER — POTASSIUM CHLORIDE 20 MEQ
20 PACKET (EA) ORAL ONCE
Refills: 0 | Status: COMPLETED | OUTPATIENT
Start: 2023-09-26 | End: 2023-09-26

## 2023-09-26 RX ORDER — METOPROLOL TARTRATE 50 MG
25 TABLET ORAL
Refills: 0 | Status: DISCONTINUED | OUTPATIENT
Start: 2023-09-26 | End: 2023-09-30

## 2023-09-26 RX ORDER — ASPIRIN/CALCIUM CARB/MAGNESIUM 324 MG
81 TABLET ORAL DAILY
Refills: 0 | Status: DISCONTINUED | OUTPATIENT
Start: 2023-09-26 | End: 2023-10-10

## 2023-09-26 RX ORDER — LABETALOL HCL 100 MG
10 TABLET ORAL ONCE
Refills: 0 | Status: COMPLETED | OUTPATIENT
Start: 2023-09-26 | End: 2023-09-26

## 2023-09-26 RX ORDER — AMLODIPINE BESYLATE 2.5 MG/1
5 TABLET ORAL DAILY
Refills: 0 | Status: DISCONTINUED | OUTPATIENT
Start: 2023-09-26 | End: 2023-09-30

## 2023-09-26 RX ORDER — METOPROLOL TARTRATE 50 MG
5 TABLET ORAL ONCE
Refills: 0 | Status: COMPLETED | OUTPATIENT
Start: 2023-09-26 | End: 2023-09-26

## 2023-09-26 RX ORDER — ACETAMINOPHEN 500 MG
650 TABLET ORAL ONCE
Refills: 0 | Status: COMPLETED | OUTPATIENT
Start: 2023-09-26 | End: 2023-09-26

## 2023-09-26 RX ORDER — SODIUM CHLORIDE 9 MG/ML
1000 INJECTION, SOLUTION INTRAVENOUS
Refills: 0 | Status: DISCONTINUED | OUTPATIENT
Start: 2023-09-26 | End: 2023-09-26

## 2023-09-26 RX ORDER — NIFEDIPINE 30 MG
30 TABLET, EXTENDED RELEASE 24 HR ORAL DAILY
Refills: 0 | Status: DISCONTINUED | OUTPATIENT
Start: 2023-09-26 | End: 2023-09-26

## 2023-09-26 RX ADMIN — Medication 650 MILLIGRAM(S): at 16:00

## 2023-09-26 RX ADMIN — QUETIAPINE FUMARATE 12.5 MILLIGRAM(S): 200 TABLET, FILM COATED ORAL at 01:22

## 2023-09-26 RX ADMIN — Medication 2.5 MILLIGRAM(S): at 11:21

## 2023-09-26 RX ADMIN — ATORVASTATIN CALCIUM 40 MILLIGRAM(S): 80 TABLET, FILM COATED ORAL at 22:18

## 2023-09-26 RX ADMIN — Medication 650 MILLIGRAM(S): at 15:13

## 2023-09-26 RX ADMIN — Medication 5 MILLIGRAM(S): at 13:23

## 2023-09-26 RX ADMIN — Medication 5 MILLIGRAM(S): at 22:18

## 2023-09-26 RX ADMIN — ENOXAPARIN SODIUM 30 MILLIGRAM(S): 100 INJECTION SUBCUTANEOUS at 15:13

## 2023-09-26 RX ADMIN — MONTELUKAST 10 MILLIGRAM(S): 4 TABLET, CHEWABLE ORAL at 11:21

## 2023-09-26 RX ADMIN — Medication 100 MILLIGRAM(S): at 11:21

## 2023-09-26 RX ADMIN — Medication 3 MILLILITER(S): at 23:18

## 2023-09-26 RX ADMIN — PIPERACILLIN AND TAZOBACTAM 25 GRAM(S): 4; .5 INJECTION, POWDER, LYOPHILIZED, FOR SOLUTION INTRAVENOUS at 07:44

## 2023-09-26 RX ADMIN — Medication 3 MILLILITER(S): at 05:18

## 2023-09-26 RX ADMIN — Medication 10 MILLIGRAM(S): at 22:49

## 2023-09-26 RX ADMIN — Medication 5 MILLIGRAM(S): at 15:13

## 2023-09-26 RX ADMIN — Medication 10 MILLIGRAM(S): at 09:19

## 2023-09-26 RX ADMIN — Medication 2.5 MILLIGRAM(S): at 05:03

## 2023-09-26 RX ADMIN — Medication 81 MILLIGRAM(S): at 14:53

## 2023-09-26 RX ADMIN — HALOPERIDOL DECANOATE 1 MILLIGRAM(S): 100 INJECTION INTRAMUSCULAR at 21:38

## 2023-09-26 RX ADMIN — Medication 50 MILLIEQUIVALENT(S): at 07:44

## 2023-09-26 RX ADMIN — CHLORHEXIDINE GLUCONATE 1 APPLICATION(S): 213 SOLUTION TOPICAL at 07:45

## 2023-09-26 RX ADMIN — Medication 25 MILLIGRAM(S): at 14:53

## 2023-09-26 RX ADMIN — Medication 2.5 MILLIGRAM(S): at 00:02

## 2023-09-26 RX ADMIN — AMLODIPINE BESYLATE 5 MILLIGRAM(S): 2.5 TABLET ORAL at 09:53

## 2023-09-26 RX ADMIN — PIPERACILLIN AND TAZOBACTAM 25 GRAM(S): 4; .5 INJECTION, POWDER, LYOPHILIZED, FOR SOLUTION INTRAVENOUS at 15:13

## 2023-09-26 RX ADMIN — PIPERACILLIN AND TAZOBACTAM 25 GRAM(S): 4; .5 INJECTION, POWDER, LYOPHILIZED, FOR SOLUTION INTRAVENOUS at 00:02

## 2023-09-26 RX ADMIN — HALOPERIDOL DECANOATE 1 MILLIGRAM(S): 100 INJECTION INTRAMUSCULAR at 03:02

## 2023-09-26 RX ADMIN — Medication 1 TABLET(S): at 11:22

## 2023-09-26 NOTE — SWALLOW FEES ASSESSMENT ADULT - COMMENTS
Soft tissue fullness which caused narrowing of pharyngeal space.   Baseline pooling in the pyriform sinuses and penetration of white, creamy secretions. Risks/benefits/alternatives of FEES were explained to the patient and medical team who provided verbal consent to participate. She tolerated the passing and presence of the scope without difficulty. This exam was performed in coordination with Itz Jackson, LETICIA, who provided feeding assistance. Soft tissue fullness of the posterior pharyngeal wall which caused narrowing of pharyngeal space.   Baseline pooling in the pyriform sinuses and penetration of white, creamy secretions which reduced with cued throat clear/reswallow.  Left TVF mildly edematous compared to right TVF.  TVF mobility bilaterally intact

## 2023-09-26 NOTE — SWALLOW FEES ASSESSMENT ADULT - SLP GENERAL OBSERVATIONS
Pt awake and alert on RA. Pt confused, raheem by irrelevant and tangential comments, however able to follow directions and participate in simple conversation. Pt awake and alert on RA. Pt confused, raheem by irrelevant and tangential comments, however able to follow directions and participate in simple conversation. Intermittent strained and wet vocal quality noted at baseline.

## 2023-09-26 NOTE — PROGRESS NOTE ADULT - SUBJECTIVE AND OBJECTIVE BOX
SUBJECTIVE / INTERVAL HPI: Patient was seen and examined this morning.     Overnight events:  Remain on D10 NS @ 25 ml/hr    CAPILLARY BLOOD GLUCOSE & INSULIN RECEIVED  160 mg/dL (09-25 @ 02:09)  139 mg/dL (09-25 @ 09:12)  113 mg/dL (09-25 @ 11:49)  102 mg/dL (09-25 @ 13:46)  107 mg/dL (09-25 @ 17:51)  115 mg/dL (09-25 @ 23:41)  93 mg/dL (09-26 @ 06:11)      REVIEW OF SYSTEMS  altered    PHYSICAL EXAM  Vital Signs Last 24 Hrs  T(C): 37.1 (26 Sep 2023 09:02), Max: 37.1 (26 Sep 2023 09:02)  T(F): 98.7 (26 Sep 2023 09:02), Max: 98.7 (26 Sep 2023 09:02)  HR: 72 (26 Sep 2023 09:00) (58 - 88)  BP: 181/81 (26 Sep 2023 09:00) (135/71 - 204/86)  BP(mean): 116 (26 Sep 2023 09:00) (97 - 139)  RR: 24 (26 Sep 2023 09:00) (19 - 45)  SpO2: 97% (26 Sep 2023 09:00) (94% - 100%)    Parameters below as of 26 Sep 2023 09:00  Patient On (Oxygen Delivery Method): room air        Constitutional: Awake, alert, in no acute distress.   HEENT: Normocephalic, atraumatic, SANDRA.  Respiratory: Lungs clear to ausculation bilaterally.   Cardiovascular: regular rhythm, normal S1 and S2, no audible murmurs.   GI: soft, non-tender, non-distended, bowel sounds present.  Extremities: No lower extremity edema.  Psychiatric: AAO x 3. Normal affect/mood.     LABS  CBC - WBC/HGB/HTC/PLT: 7.69/10.6/30.8/196 (09-26-23)  BMP - Na/K/Cl/Bicarb/BUN/Cr/Gluc/AG/eGFR: 134/3.6/99/29/5/0.69/99/6/88 (09-26-23)  Ca - 8.4 (09-26-23)  Phos - 2.8 (09-26-23)  Mg - 2.0 (09-26-23)  LFT - Alb/Tprot/Tbili/Dbili/AlkPhos/ALT/AST: 3.0/--/0.8/--/97/30/22 (09-26-23)  PT/aPTT/INR: 11.7/31.3/1.03 (09-21-23)   Thyroid Stimulating Hormone, Serum: 3.870 (09-20-23)  Total T4/Free T4: 6.47/0.856 (09-21-23)      09-25-23 @ 07:01  -  09-26-23 @ 07:00  --------------------------------------------------------  IN: 1363.8 mL / OUT: 1965 mL / NET: -601.2 mL    09-26-23 @ 07:01  -  09-26-23 @ 11:14  --------------------------------------------------------  IN: 100 mL / OUT: 225 mL / NET: -125 mL        MEDICATIONS  MEDICATIONS  (STANDING):  albuterol/ipratropium for Nebulization 3 milliLiter(s) Nebulizer every 6 hours  amLODIPine   Tablet 5 milliGRAM(s) Oral daily  aspirin  chewable 81 milliGRAM(s) Oral daily  atorvastatin 40 milliGRAM(s) Oral at bedtime  dextrose 10% + sodium chloride 0.9%. 1000 milliLiter(s) (25 mL/Hr) IV Continuous <Continuous>  enoxaparin Injectable 30 milliGRAM(s) SubCutaneous every 24 hours  melatonin 5 milliGRAM(s) Oral at bedtime  metoprolol tartrate Injectable 2.5 milliGRAM(s) IV Push every 6 hours  montelukast 10 milliGRAM(s) Oral daily  multivitamin  Chewable 1 Tablet(s) Oral daily  piperacillin/tazobactam IVPB.. 4.5 Gram(s) IV Intermittent every 8 hours  thiamine 100 milliGRAM(s) Oral daily    MEDICATIONS  (PRN):    ASSESSMENT / RECOMMENDATIONS    82y Female with a past medical history of HTN, atrial fibrillation, chronic bronchiectasis, recent hospitalization for PRES (5-6/2023), recent hospitalization in 08/23 acute bronchiectasis exacerbation came to [facility] on 9/19 with altered mental status, visual disturbances and diplopia.  Pt was initially admitted under neuro service for encephalopathy and r/o stroke.  CT head unremarkable for large transcortical infarct or definite acute intracranial hemorrhage.   CT chest shows diffuse bilateral bronchiectasis with multiple areas of distal mucoid impaction and scattered tree-in-bud opacities.  On 9/21, pt had a rapid response for nonsensical and dysarthric speech and noted to have intermitted LE jerking movements.  Pt was found to have glucose of 34. improved with one amp of glucose.  Pt later become hypoxic, tachypeneic and dyspneic and desaturated.  Repeat glucose at that time was 27, pt remained lethargic and received additional 2 amps of glucose which climbed to 401 and D10 was initiated and pt was subsequently transferred to ICU.  Endocrinology has been consulted for Hypoglycemia.    A1C: 5.5 %  BUN: 5  Creatinine: 0.69  GFR: 88  Weight: 32.5  BMI: 11.9  Free T4 0.856, TSH 3.87    # Hypoglycemia  - c-peptide 09/21: 0.9, serum insulin 989, beta-hydroxybutyrate 0.0  - remained on D10NS @ 25ml/hr  - elevated serum insulin level raises suspicion for endogenous production in response to dextrose vs exogenous adminstration, doubt it is insulinoma due to the rapid fashion of drop in glucose vs autoimmune causes - ie presence of insulin antibodies  - pending repeat serum insulin level, insulin antibodies and c-peptide level  - from the record, pt did not receive any insulin products or agents that have high evidence to induce hypoglycemia during hospital course  - did receive last lopressor at 6am on 9/19  - Pt was on Bactrim, ciprofloxacin and cefepime earlier in the month for outpatient abx, there are some low quality evidence of association between certain class of abx causing hypoglycemia - however these drugs should be cleared by now  - please check the following labs whenever patient becomes hypoglycemic: bmp, c-peptide, insulin level, beta-hydroxybutyrate and pro-insulin       Case discussed with Dr. Hill. Primary team updated.       Michell Borja  Endocrinology Fellow    Service Pager: [phone]    SUBJECTIVE / INTERVAL HPI: Patient was seen and examined this morning.     Overnight events:  Remain on D10 NS @ 25 ml/hr  Pt's mental status is improving  pt tolerated diet well  pt was enjoying the music, therese played for her    Endocrine course  9/21: transfer to ICU on D10  9/22: Na 132, advised to switched to D10 NS @ 90ml/hr - to be decreased by 10ml/hr if the glucose remains above 90 q4  9/23-9/24: remained on D10NS @ 50ml/hr  9/25: D10 NS @ 75ml/hr    CAPILLARY BLOOD GLUCOSE & INSULIN RECEIVED  160 mg/dL (09-25 @ 02:09)  139 mg/dL (09-25 @ 09:12)  113 mg/dL (09-25 @ 11:49)  102 mg/dL (09-25 @ 13:46)  107 mg/dL (09-25 @ 17:51)  115 mg/dL (09-25 @ 23:41)  93 mg/dL (09-26 @ 06:11)          PHYSICAL EXAM  Vital Signs Last 24 Hrs  T(C): 37.1 (26 Sep 2023 09:02), Max: 37.1 (26 Sep 2023 09:02)  T(F): 98.7 (26 Sep 2023 09:02), Max: 98.7 (26 Sep 2023 09:02)  HR: 72 (26 Sep 2023 09:00) (58 - 88)  BP: 181/81 (26 Sep 2023 09:00) (135/71 - 204/86)  BP(mean): 116 (26 Sep 2023 09:00) (97 - 139)  RR: 24 (26 Sep 2023 09:00) (19 - 45)  SpO2: 97% (26 Sep 2023 09:00) (94% - 100%)    Parameters below as of 26 Sep 2023 09:00  Patient On (Oxygen Delivery Method): room air        Constitutional: Awake, alert, in no acute distress.   HEENT: Normocephalic, atraumatic, SANDRA.  Respiratory: Lungs clear to ausculation bilaterally.   Cardiovascular: regular rhythm, normal S1 and S2, no audible murmurs.   GI: soft, non-tender, non-distended, bowel sounds present.  Extremities: No lower extremity edema.  Psychiatric: AAO x 3. Normal affect/mood.     LABS  CBC - WBC/HGB/HTC/PLT: 7.69/10.6/30.8/196 (09-26-23)  BMP - Na/K/Cl/Bicarb/BUN/Cr/Gluc/AG/eGFR: 134/3.6/99/29/5/0.69/99/6/88 (09-26-23)  Ca - 8.4 (09-26-23)  Phos - 2.8 (09-26-23)  Mg - 2.0 (09-26-23)  LFT - Alb/Tprot/Tbili/Dbili/AlkPhos/ALT/AST: 3.0/--/0.8/--/97/30/22 (09-26-23)  PT/aPTT/INR: 11.7/31.3/1.03 (09-21-23)   Thyroid Stimulating Hormone, Serum: 3.870 (09-20-23)  Total T4/Free T4: 6.47/0.856 (09-21-23)      09-25-23 @ 07:01  -  09-26-23 @ 07:00  --------------------------------------------------------  IN: 1363.8 mL / OUT: 1965 mL / NET: -601.2 mL    09-26-23 @ 07:01  -  09-26-23 @ 11:14  --------------------------------------------------------  IN: 100 mL / OUT: 225 mL / NET: -125 mL        MEDICATIONS  MEDICATIONS  (STANDING):  albuterol/ipratropium for Nebulization 3 milliLiter(s) Nebulizer every 6 hours  amLODIPine   Tablet 5 milliGRAM(s) Oral daily  aspirin  chewable 81 milliGRAM(s) Oral daily  atorvastatin 40 milliGRAM(s) Oral at bedtime  dextrose 10% + sodium chloride 0.9%. 1000 milliLiter(s) (25 mL/Hr) IV Continuous <Continuous>  enoxaparin Injectable 30 milliGRAM(s) SubCutaneous every 24 hours  melatonin 5 milliGRAM(s) Oral at bedtime  metoprolol tartrate Injectable 2.5 milliGRAM(s) IV Push every 6 hours  montelukast 10 milliGRAM(s) Oral daily  multivitamin  Chewable 1 Tablet(s) Oral daily  piperacillin/tazobactam IVPB.. 4.5 Gram(s) IV Intermittent every 8 hours  thiamine 100 milliGRAM(s) Oral daily    MEDICATIONS  (PRN):    ASSESSMENT / RECOMMENDATIONS    82y Female with a past medical history of HTN, atrial fibrillation, chronic bronchiectasis, recent hospitalization for PRES (5-6/2023), recent hospitalization in 08/23 acute bronchiectasis exacerbation came to [facility] on 9/19 with altered mental status, visual disturbances and diplopia.  Pt was initially admitted under neuro service for encephalopathy and r/o stroke.  CT head unremarkable for large transcortical infarct or definite acute intracranial hemorrhage.   CT chest shows diffuse bilateral bronchiectasis with multiple areas of distal mucoid impaction and scattered tree-in-bud opacities.  On 9/21, pt had a rapid response for nonsensical and dysarthric speech and noted to have intermitted LE jerking movements.  Pt was found to have glucose of 34. improved with one amp of glucose.  Pt later become hypoxic, tachypeneic and dyspneic and desaturated.  Repeat glucose at that time was 27, pt remained lethargic and received additional 2 amps of glucose which climbed to 401 and D10 was initiated and pt was subsequently transferred to ICU.  Endocrinology has been consulted for Hypoglycemia.    A1C: 5.5 %  BUN: 5  Creatinine: 0.69  GFR: 88  Weight: 32.5  BMI: 11.9  Free T4 0.856, TSH 3.87    # Hypoglycemia  - c-peptide 09/21: 0.9, serum insulin 989, beta-hydroxybutyrate 0.0  - remained on D10NS @ 25ml/hr  - elevated serum insulin level raises suspicion for endogenous production in response to dextrose vs exogenous adminstration, doubt it is insulinoma due to the rapid fashion of drop in glucose vs autoimmune causes - ie presence of insulin antibodies  - pending repeat serum insulin level, insulin antibodies and c-peptide level  - from the record, pt did not receive any insulin products or agents that have high evidence to induce hypoglycemia during hospital course  - did receive last lopressor at 6am on 9/19  - Pt was on Bactrim, ciprofloxacin and cefepime earlier in the month for outpatient abx, there are some low quality evidence of association between certain class of abx causing hypoglycemia - however these drugs should be cleared by now  - please check the following labs whenever patient becomes hypoglycemic: bmp, c-peptide, insulin level, beta-hydroxybutyrate and pro-insulin       Case discussed with Dr. Hill. Primary team updated.       Michell Borja  Endocrinology Fellow    Service Pager: [phone]    SUBJECTIVE / INTERVAL HPI: Patient was seen and examined this morning.     Overnight events:  Remain on D10 NS @ 25 ml/hr  Pt's mental status is improving  pt tolerated diet well  pt was enjoying the music, therese played for her    Endocrine course  9/21: transfer to ICU on D10  9/22: Na 132, advised to switched to D10 NS @ 90ml/hr - to be decreased by 10ml/hr if the glucose remains above 90 q4  9/23-9/24: remained on D10NS @ 50ml/hr  9/25: D10 NS @ 75ml/hr    CAPILLARY BLOOD GLUCOSE & INSULIN RECEIVED  160 mg/dL (09-25 @ 02:09)  139 mg/dL (09-25 @ 09:12)  113 mg/dL (09-25 @ 11:49)  102 mg/dL (09-25 @ 13:46)  107 mg/dL (09-25 @ 17:51)  115 mg/dL (09-25 @ 23:41)  93 mg/dL (09-26 @ 06:11)          PHYSICAL EXAM  Vital Signs Last 24 Hrs  T(C): 37.1 (26 Sep 2023 09:02), Max: 37.1 (26 Sep 2023 09:02)  T(F): 98.7 (26 Sep 2023 09:02), Max: 98.7 (26 Sep 2023 09:02)  HR: 72 (26 Sep 2023 09:00) (58 - 88)  BP: 181/81 (26 Sep 2023 09:00) (135/71 - 204/86)  BP(mean): 116 (26 Sep 2023 09:00) (97 - 139)  RR: 24 (26 Sep 2023 09:00) (19 - 45)  SpO2: 97% (26 Sep 2023 09:00) (94% - 100%)    Parameters below as of 26 Sep 2023 09:00  Patient On (Oxygen Delivery Method): room air        Constitutional: Awake, alert, in no acute distress.   HEENT: Normocephalic, atraumatic, SANDRA.  Respiratory: Lungs clear to ausculation bilaterally.   Cardiovascular: regular rhythm, normal S1 and S2, no audible murmurs.   GI: soft, non-tender, non-distended, bowel sounds present.  Extremities: No lower extremity edema.  Psychiatric: AAO x 3. Normal affect/mood.     LABS  CBC - WBC/HGB/HTC/PLT: 7.69/10.6/30.8/196 (09-26-23)  BMP - Na/K/Cl/Bicarb/BUN/Cr/Gluc/AG/eGFR: 134/3.6/99/29/5/0.69/99/6/88 (09-26-23)  Ca - 8.4 (09-26-23)  Phos - 2.8 (09-26-23)  Mg - 2.0 (09-26-23)  LFT - Alb/Tprot/Tbili/Dbili/AlkPhos/ALT/AST: 3.0/--/0.8/--/97/30/22 (09-26-23)  PT/aPTT/INR: 11.7/31.3/1.03 (09-21-23)   Thyroid Stimulating Hormone, Serum: 3.870 (09-20-23)  Total T4/Free T4: 6.47/0.856 (09-21-23)      09-25-23 @ 07:01  -  09-26-23 @ 07:00  --------------------------------------------------------  IN: 1363.8 mL / OUT: 1965 mL / NET: -601.2 mL    09-26-23 @ 07:01  -  09-26-23 @ 11:14  --------------------------------------------------------  IN: 100 mL / OUT: 225 mL / NET: -125 mL        MEDICATIONS  MEDICATIONS  (STANDING):  albuterol/ipratropium for Nebulization 3 milliLiter(s) Nebulizer every 6 hours  amLODIPine   Tablet 5 milliGRAM(s) Oral daily  aspirin  chewable 81 milliGRAM(s) Oral daily  atorvastatin 40 milliGRAM(s) Oral at bedtime  dextrose 10% + sodium chloride 0.9%. 1000 milliLiter(s) (25 mL/Hr) IV Continuous <Continuous>  enoxaparin Injectable 30 milliGRAM(s) SubCutaneous every 24 hours  melatonin 5 milliGRAM(s) Oral at bedtime  metoprolol tartrate Injectable 2.5 milliGRAM(s) IV Push every 6 hours  montelukast 10 milliGRAM(s) Oral daily  multivitamin  Chewable 1 Tablet(s) Oral daily  piperacillin/tazobactam IVPB.. 4.5 Gram(s) IV Intermittent every 8 hours  thiamine 100 milliGRAM(s) Oral daily    MEDICATIONS  (PRN):    ASSESSMENT / RECOMMENDATIONS    82y Female with a past medical history of HTN, atrial fibrillation, chronic bronchiectasis, recent hospitalization for PRES (5-6/2023), recent hospitalization in 08/23 acute bronchiectasis exacerbation came to [facility] on 9/19 with altered mental status, visual disturbances and diplopia.  Pt was initially admitted under neuro service for encephalopathy and r/o stroke.  CT head unremarkable for large transcortical infarct or definite acute intracranial hemorrhage.   CT chest shows diffuse bilateral bronchiectasis with multiple areas of distal mucoid impaction and scattered tree-in-bud opacities.  On 9/21, pt had a rapid response for nonsensical and dysarthric speech and noted to have intermitted LE jerking movements.  Pt was found to have glucose of 34. improved with one amp of glucose.  Pt later become hypoxic, tachypeneic and dyspneic and desaturated.  Repeat glucose at that time was 27, pt remained lethargic and received additional 2 amps of glucose which climbed to 401 and D10 was initiated and pt was subsequently transferred to ICU.  Endocrinology has been consulted for Hypoglycemia.    A1C: 5.5 %  BUN: 5  Creatinine: 0.69  GFR: 88  Weight: 32.5  BMI: 11.9  Free T4 0.856, TSH 3.87    # Hypoglycemia  - c-peptide 09/21: 0.9, serum insulin 989, beta-hydroxybutyrate 0.0  - pt tolerating diet now, please discontinue fluids  - elevated serum insulin level raises suspicion for endogenous production in response to dextrose vs exogenous adminstration, doubt it is insulinoma due to the rapid fashion of drop in glucose vs autoimmune causes - ie presence of insulin antibodies  - pending repeat serum insulin level, insulin antibodies and c-peptide level from 9/25  - from the record, pt did not receive any insulin products or agents that have high evidence to induce hypoglycemia during hospital course  - did receive last lopressor at 6am on 9/19  - Pt was on Bactrim, ciprofloxacin and cefepime earlier in the month for outpatient abx, there are some low quality evidence of association between certain class of abx causing hypoglycemia - however these drugs should be cleared by now  - please check the following labs whenever patient becomes hypoglycemic: bmp, c-peptide, insulin level, beta-hydroxybutyrate and pro-insulin       Case discussed with Dr. Hill. Primary team updated.       Michell Borja  Endocrinology Fellow    Service Pager: [phone]

## 2023-09-26 NOTE — PROGRESS NOTE ADULT - NS ATTEND AMEND GEN_ALL_CORE FT
82 year old woman w/ PMH of Afib (not on AC due to frequent falls), HTN, bronchiectasis, recent diagnosis of PRES at outside hospital presented w/ several day history of confusion and visual complaints. Mental status with slight improvement after correction of hypoglycemia and antibiotics.     On exam, patient awake, alert, and tracks examiner. Oriented to self, hospital, September 2024, president susie renee, [place]. Serial 7 x1. Fluent. Following crossed commands. ?L. superior quadrantopia. EOMI; Face symmetric. Moving all extremities spontaneously at least antigravity.     CTH 9.19.23 negative  MR brain 9.20.23 demonstrates small diffusion restricting lesion in the L. Occipital lobe w/ ADC and FLAIR correlate; +SWI in the R. Parietooccipital region w/ sulcal T2 flair signal.   MRV negative  MRA h/n w/ repeat MRI brain w/o contrast 9.22.23 demonstrates no stenoocclusive disease; No new DWI abnormalities.   Blood cultures NGTD  EEG neg  Repeat MR brain 9.25.23 w/w/o contrast demonstrates questionable, vague, L. pontine diffusion restriction with ADC correlate. No contrast enhancement.     9/26 - significant improvement in examination.     Impression: Acute versus ?subacute onset confusion and visual complaints with radiographic evidence of possible L. 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 Etiology remains uncertain, possibilities include ischemic stroke due to atrial fibrillation, CNS inflammatory process (i.e amyloid-beta related angitis, CNS angitis, etc), PRES/RCVS, CNS lymphoma, underlying neurodegenerative process with superimposed ischemic stroke and convexal SAH, endocarditis (although cultures negative). The significant clinical improvement in her neurological status appears to be inconsistent with the natural history of a CNS inflammatory of vasculitic process and the diagnosis of such would require invasive testing, such as a lumbar puncture, and a cerebral biopsy (to evaluate for CNS angitis or ISMA). Given the significant clinical improvement, will hold off invasive testing and steroid treatment. Will evaluate for underlying neurodegenerative process, such as a LBD, although this should not hold up her discharge.     Plan:   PET to evaluate for neurodegenerative process   Hold off LP and cerebral biopsy for now given clinical improvement.   Continue ASA 81mg; Will hold off AC given possibility of amyloid angiopathy.   Continue Lovenox for DVT ppx  f/u with neurovascular clinical (Janet Vallecillo) at discharge  F/u with dementia clinic at discharge   PT/OT     50 minutes spent on total encounter. The necessity of the time spent during the encounter on this date of service was due to:     Review of imaging and chart; obtaining history; examination of pt; discussion and coordination of care, and discussion of lifestyle modification and risk factor control.

## 2023-09-26 NOTE — SWALLOW FEES ASSESSMENT ADULT - PHARYNGEAL PHASE COMMENTS
Initiation of the pharyngeal swallow imaged with the bolus head in the valleculae with thin liquids and puree and in the lateral channels with solids. Inferred intact hyolaryngeal complex with complete epiglottic inversion. Mildly delayed/incomplete supraglottic closure resulted in mild, shallow penetration with purees and solids during the swallow. Residue cleared with a cued secondary swallow and liquid wash. No aspiration was visualized during exam. Mildly reduced base of tongue retraction and pharyngeal squeeze resulted in trace-mild vallecular and pyriform sinus residue with thin liquids, and mild diffuse pharyngeal residue with purees, which reduced with secondary swallows and a liquid wash. Initiation of the pharyngeal swallow imaged with the bolus head in the valleculae with thin liquids and puree and in the lateral channels with solids. Mildly delayed/incomplete laryngeal vestibule closure resulted in mild, shallow penetration with purees and solids during the swallow. Residue cleared with a cued secondary swallow and liquid wash. No aspiration was visualized during exam. Pharyngeal swallow efficiency was reduced resulting in trace-mild vallecular and pyriform sinus residue with thin liquids, and mild diffuse pharyngeal residue with purees and solids, which reduced with secondary swallows and a liquid wash.

## 2023-09-26 NOTE — PROGRESS NOTE ADULT - ASSESSMENT
82F, PMH of HTN, Afib (not on AC d/t fall risk), recent hospitalization for PRES (not at [facility], May-June '23), recent discharge for acute exacerbation of severe bronchiectasis (at [facility], Aug '23), presented w/ several days of AMS & new-onset diplopia since 9/15.   Pt started IV abx on 9/15 (2wk course of cefepime 2g IV BID for bronchiectasis exacerbation after concern from Dr. Foster that pt failed PO abx, - was dc'd in Aug on bactrim and cipro, got bactrim, cipro and zosyn inpatient- got outpt PICC placement on 9/13, 1st dose 9/15, got 7 doses total before presentation to hospital).    NEURO  #PRES  #Seizure  Pt hospitalized 05-06/2023 for PRES. At the time presented similar symptoms: confusion, AMS, forgetfulness, dropping things. Now presented w/ diplopia and bitemporal visual field anomalies, all findings consistent w/ PRES. Now with tangential, nonsensical speech, as well as some seizure-like activity. Twitching/seizures possibly 2/2 hypoglycemia  - Plan for MRI Brain w&w/o contrast today  - repeat CT head  - repeat vEEG (initial no epileptiform activity, however pt ripped off after 8hrs) after MRI obtained   - q1hr neuro checks  - c/w thiamine 100mg qd  - delirium precautions  - monitor fingersticks q1hr      #R subarachnoid hemorrhage  #L ischemic occipital stroke  Pt w/ bitemporal visual field deficits, worsened mental status throughout clinical course. Initially w/o findings on CT head, later found to have SAH, and then found to have Ischemic stroke on MRI brain. S/p MRA & MRV 9/22, overall unremarkable, mild cerebral edema, correlate w/amyloid disease per radiology.   - repeat CT head   - plan for MRI head w/ contrast on 09/25   - q1hr neuro checks    CARDIO  #Afib  Hx of Afib. Not on a/c chronically d/t fall risk. Started on Eliquis presentation after discussion w/ family about 2ndary stroke prevention per neuro. Eliquis now dc'd i/s/o findings of SAH.  - hold all AC  - in Afib but not RVR currently  - frequent EKGs  - c/w lopressor 2.5 PO q6hrs     #HTN  Hx of HTN. Home med = Lopressor 50 BID. Goal SBP per neuro <160. BPs varied, soft when hypoglycemic. After blood glucose stable on D10, more hypertensive  - passed speech and swallow, can tolerate PO meds --> start Lopressor 2.5 PO q6  - can give labetalol for HTNsive urgency/emergency, but caution w/ two beta blockers as pt easily bradycardic  - hold home med.    PULM  #Bronchiectasis   Chronic bronchiectasis. Recent admission for it in Aug, was dc'd on Bactrim & Cipro PO, however recently started on Cefepime 2g IV BID after pt thought to have failed PO. MRSA swab negative so vanc dc'd.    - dc'd cefepime i/s/o AMS and unclear improvement on cefepime  - dc'd vancomycin i/s/o MRSA negative  - Continue zosyn 4.5g TID, f/u on date to complete course (started on 9/22, s/p cefepime from 9/15 - 9/22)  - wean supplemental oxygen as tolerated    ENDO  #Hypoglycemia  Pt with recalcitrant hypoglycemia, unresponsive to multiple amps of dextrose, and repeat episodes of hypoglycemia to 20s and 30s. After given D10 pt Gluc went to 410, and dropped to 140 in less than 1hr. Per Endo: pt goal Glucose is >90. AM cortisol elevated. Insulin 989.0. BHB WNL.   - f/u Sulfonylurea panel  - patient on D10/NS i/s/o NPO, not because of hypoglycemia, although can cover both  - Workup findings: f/u sulfonylurea results, Insulin 990, Betahydroxybutyrate WNL, C peptide 0.9 (low)  - Monitor FS q6hrs  - Consider CT body i/s/o very high insulin levels (however low C-peptide so insulinoma less likely)  - Follow endocrine recs     GI  - f/u speech & swallow evaluation (9/24) to evaluate diet status --> NPO w/ ice chips and can have apple sauce with meds  - D10/NS at 25mL/hr, wean as tolerated, especially when pt cleared for diet by speech & swallow  - f/u repeat speech & swallow eval (9/26) for potential to liberalize diet    RENAL/  BEN.     Prophylactic Measures  Diet: NPO until dysphagia screen passed  DVT Prophylaxis: SCDs  GI Prophylaxis: Start PPIs     82F, PMH of HTN, Afib (not on AC d/t fall risk), recent hospitalization for PRES (not at [facility], May-June '23), recent discharge for acute exacerbation of severe bronchiectasis (at [facility], Aug '23), presented w/ several days of AMS & new-onset diplopia since 9/15.   Pt started IV abx on 9/15 (2wk course of cefepime 2g IV BID for bronchiectasis exacerbation after concern from Dr. Foster that pt failed PO abx, - was dc'd in Aug on bactrim and cipro, got bactrim, cipro and zosyn inpatient- got outpt PICC placement on 9/13, 1st dose 9/15, got 7 doses total before presentation to hospital).    NEURO  #PRES  #Seizure  Pt hospitalized 05-06/2023 for PRES. At the time presented similar symptoms: confusion, AMS, forgetfulness, dropping things. Now presented w/ diplopia and bitemporal visual field anomalies, all findings consistent w/ PRES. Now with tangential, nonsensical speech, as well as some seizure-like activity. Twitching/seizures possibly 2/2 hypoglycemia  - s/p MRI Brain (9/25) w&w/o contrast --> two new pontine infarcts found  - consider repeat CT head  - consider repeat vEEG (initial no epileptiform activity, however pt ripped off after 8hrs) after MRI obtained   - q2-4hr neuro checks  - c/w thiamine 100mg qd  - delirium precautions  - pt responds well to haldol for confusion/delirium at night.      #R subarachnoid hemorrhage  #L ischemic occipital stroke  #L pontine infarcts  Pt w/ bitemporal visual field deficits, worsened mental status throughout clinical course. Initially w/o findings on CT head, later found to have SAH, and then found to have Ischemic stroke on MRI brain. S/p MRA & MRV 9/22, overall unremarkable, mild cerebral edema, correlate w/amyloid disease per radiology.   - repeat CT head   - s/p MRI head w/ contrast on 09/25   - q2-4hr neuro checks  - starting aspirin 81mg qd, per neuro    CARDIO  #Afib  Hx of Afib. Not on a/c chronically d/t fall risk. Started on Eliquis presentation after discussion w/ family about 2ndary stroke prevention per neuro. Eliquis now dc'd i/s/o findings of SAH. Home dose Lopressor 50 BID (both for HTN & Afib)  - hold all AC  - in Afib but not RVR currently  - frequent EKGs  - c/w lopressor 2.5 PO q6hrs     #HTN  Hx of HTN. Home med = Lopressor 50 BID. Goal SBP per neuro <160. BPs varied, soft when hypoglycemic. After blood glucose stable on D10, more frequently hypertensive.   - passed speech and swallow, can tolerate PO meds --> c/w Lopressor 2.5 PO q6  - can give labetalol for HTNsive urgency/emergency, but caution w/ two beta blockers as pt easily bradycardic  - starting amlodipine 5mg qd (prefer nifedipine 30, however pt cannot tolerate whole pills and nifedipine cannot be crushed per nursing)  - hold home dose, uptitrate as indicated    PULM  #Bronchiectasis   Chronic bronchiectasis. Recent admission for it in Aug, was dc'd on Bactrim & Cipro PO, however recently started on Cefepime 2g IV BID after pt thought to have failed PO. MRSA swab negative so vanc dc'd.    - dc'd cefepime i/s/o AMS and unclear improvement on cefepime  - dc'd vancomycin i/s/o MRSA negative  - Continue zosyn 4.5g TID, f/u on date to complete course (started on 9/22, s/p cefepime from 9/15 - 9/22)  - Pulm continuing to follow after stepdown d/t chronic bronchiectasis now on Zosyn    ENDO  #Hypoglycemia  Pt with recalcitrant hypoglycemia, unresponsive to multiple amps of dextrose, and repeat episodes of hypoglycemia to 20s and 30s. After given D10 pt Gluc went to 410, and dropped to 140 in less than 1hr. Per Endo: pt goal Glucose is >90. AM cortisol elevated. Insulin 989.0. BHB WNL.   - f/u Sulfonylurea panel  - patient on D10/NS i/s/o NPO, not because of hypoglycemia, although can cover both  - Workup findings: f/u sulfonylurea results, Insulin 990, Betahydroxybutyrate WNL, C peptide 0.9 (low)  - Per Endo; repeat the following labs: BMP, C-Peptide, Insulin level, BetaHydroxyButyrate, Insulin antibody both now (specimen received) and when the pt is hypoglycemic.  - Monitor FS q6hrs  - Consider CT body i/s/o very high insulin levels (however low C-peptide so insulinoma less likely)  - Endocrine following, appreciate recs    GI  - f/u speech & swallow evaluation (9/24) to evaluate diet status --> NPO w/ ice chips and can have apple sauce with meds  - D10/NS at 25mL/hr, wean as tolerated, especially when pt cleared for diet by speech & swallow  - f/u repeat speech & swallow FEES eval (9/26) for potential to liberalize diet    RENAL/  BEN.     Prophylactic Measures  Diet: NPO until dysphagia screen passed  DVT Prophylaxis: SCDs  GI Prophylaxis: Start PPIs     82F, PMH of HTN, Afib (not on AC d/t fall risk), recent hospitalization for PRES (not at [facility], May-June '23), recent discharge for acute exacerbation of severe bronchiectasis (at [facility], Aug '23), presented w/ several days of AMS & new-onset diplopia since 9/15.   Pt started IV abx on 9/15 (2wk course of cefepime 2g IV BID for bronchiectasis exacerbation after concern from Dr. Foster that pt failed PO abx, - was dc'd in Aug on bactrim and cipro, got bactrim, cipro and zosyn inpatient- got outpt PICC placement on 9/13, 1st dose 9/15, got 7 doses total before presentation to hospital).    NEURO  #PRES  #Seizure  Pt hospitalized 05-06/2023 for PRES. At the time presented similar symptoms: confusion, AMS, forgetfulness, dropping things. Now presented w/ diplopia and bitemporal visual field anomalies, all findings consistent w/ PRES. Now with tangential, nonsensical speech, as well as some seizure-like activity. Twitching/seizures possibly 2/2 hypoglycemia  - s/p MRI Brain (9/25) w&w/o contrast --> two new pontine infarcts found  - consider repeat CT head  - consider repeat vEEG (initial no epileptiform activity, however pt ripped off after 8hrs) after MRI obtained   - q2-4hr neuro checks  - c/w thiamine 100mg qd  - delirium precautions  - pt responds well to haldol for confusion/delirium at night.      #R subarachnoid hemorrhage  #L ischemic occipital stroke  #L pontine infarcts  Pt w/ bitemporal visual field deficits, worsened mental status throughout clinical course. Initially w/o findings on CT head, later found to have SAH, and then found to have Ischemic stroke on MRI brain. S/p MRA & MRV 9/22, overall unremarkable, mild cerebral edema, correlate w/amyloid disease per radiology.   - repeat CT head   - s/p MRI head w/ contrast on 09/25   - q2-4hr neuro checks  - starting aspirin 81mg qd, per neuro    CARDIO  #Afib  Hx of Afib. Not on a/c chronically d/t fall risk. Started on Eliquis presentation after discussion w/ family about 2ndary stroke prevention per neuro. Eliquis now dc'd i/s/o findings of SAH. Home dose Lopressor 50 BID (both for HTN & Afib)  - hold all AC  - in Afib but not RVR currently  - frequent EKGs  - c/w lopressor 2.5 PO q6hrs     #HTN  Hx of HTN. Home med = Lopressor 50 BID. Goal SBP per neuro <160. BPs varied, soft when hypoglycemic. After blood glucose stable on D10, more frequently hypertensive.   - passed speech and swallow, can tolerate PO meds --> c/w Lopressor 2.5 PO q6  - can give labetalol for HTNsive urgency/emergency, but caution w/ two beta blockers as pt easily bradycardic  - starting amlodipine 5mg qd (prefer nifedipine 30, however pt cannot tolerate whole pills and nifedipine cannot be crushed per nursing)  - restarting metoprolol 25mg BID  - hold home dose, uptitrate as indicated    PULM  #Bronchiectasis   Chronic bronchiectasis. Recent admission for it in Aug, was dc'd on Bactrim & Cipro PO, however recently started on Cefepime 2g IV BID after pt thought to have failed PO. MRSA swab negative so vanc dc'd.    - dc'd cefepime i/s/o AMS and unclear improvement on cefepime  - dc'd vancomycin i/s/o MRSA negative  - Continue zosyn 4.5g TID, f/u on date to complete course (started on 9/22, s/p cefepime from 9/15 - 9/22)  - Pulm continuing to follow after stepdown d/t chronic bronchiectasis now on Zosyn    ENDO  #Hypoglycemia  Pt with recalcitrant hypoglycemia, unresponsive to multiple amps of dextrose, and repeat episodes of hypoglycemia to 20s and 30s. After given D10 pt Gluc went to 410, and dropped to 140 in less than 1hr. Per Endo: pt goal Glucose is >90. AM cortisol elevated. Insulin 989.0. BHB WNL.   - f/u Sulfonylurea panel  - patient on D10/NS i/s/o NPO, not because of hypoglycemia, although can cover both  - Workup findings: f/u sulfonylurea results, Insulin 990, Betahydroxybutyrate WNL, C peptide 0.9 (low)  - Per Endo; repeat the following labs: BMP, C-Peptide, Insulin level, BetaHydroxyButyrate, Insulin antibody both now (specimen received) and when the pt is hypoglycemic  - Monitor FS q6hrs  - Consider CT body i/s/o very high insulin levels (however low C-peptide so insulinoma less likely)  - Endocrine following, appreciate recs    GI  - f/u speech & swallow evaluation (9/24) to evaluate diet status --> NPO w/ ice chips and can have apple sauce with meds  - D10/NS at 25mL/hr, wean as tolerated, especially when pt cleared for diet by speech & swallow  - f/u repeat speech & swallow FEES eval (9/26) for potential to liberalize diet    RENAL/  BEN.     Prophylactic Measures  Diet: Minced and moist  DVT Prophylaxis: SCDs  GI Prophylaxis: Start PPIs

## 2023-09-26 NOTE — PROGRESS NOTE ADULT - ASSESSMENT
81yo F with PMH of HTN, AFib, PRES, and CVA p/w encephalopathy and found to have new CVA. Palliative consulted for complex medical decision making in the setting of metabolic/neurologic encephalopathy.

## 2023-09-26 NOTE — PROGRESS NOTE ADULT - SUBJECTIVE AND OBJECTIVE BOX
---------------------TRANSFER RECEIVING NOTE FROM MICU TO STROKE/TELE---------------------  HPI: 82y Female with PMHx of HTN, afib (not on AC due to fall risk), recent hospitalization for PRES (5-6/2023), recently discharged from [facility] 8/2023 with acute exacerbation of severe bronchiectasis and respiratory failure presents with several days of AMS and new diplopia 9/18. Per outpatient records, patient was started on IV antibiotics by her outpatient pulmonologist, Dr. Foster, as there was a concern for failure of PO antibiotics (discharged on Bactrim and Cipro in 08/2023). PICC was placed on 09/13, first dose of antibiotics was AM of 09/15. Daughter noticed that after the first dose of her antibiotics she became altered. Did not know where certain rooms are in the house, dropping items, not knowing common everyday information) similar to how she initially presented with PRES. Prior to PRES diagnosis, patient completely independent, living on her own. Daughter notes that after her discharge in 06/2023, she moved in with her and had a gradual decline in memory. On 09/19, patient started to complain of new onset diplopia, prompting daughter to bring the patient to the ED for further evaluation. Daughter notes that patient had intermittent blurry vision since PRES diagnosis. Stroke code was called in the ED, NIHSS of 1 for bitemporal peripheral visual field disturbances. 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 CTA H/N and CTP not obtained as patient with allergy to contrast (anaphylaxis). CT chest also obtained as patient was hypoxic in the ED to 84%, just with evidence of bronchiectasis and mucus plugging, no PNA. Was admitted to stroke for r/o stroke vs possible exacerbation of PRES. Started on Eliquis for secondary stroke prevention. Pulmonology consulted, recommended continuation of IV antibiotics and diagnostic testing including legionella and streptococcus urine, MRSA/MSSA swab, RVP, and COVID, all of which have been negative thus far. Pending sputum culture. Started on standing DuoNebs and 7% hypertonic saline. On 9/20, patient with an acute change in mental status while with daughter. Noted to have ? aphasia - making paraphrasic errors however with intermittent periods of fluency. Repeat CTH obtained which showed evidence of new R parietal and occipital SAH. Eliquis discontinued, coags WNL. Neurosurgery consulted, no acute intervention. Went for MRI brain and patient was only able to tolerate a portion of the study 2/2 to agitation, found to have a new L occipital ischemic infarct. SAH was stable in size. VEEG placed with no evidence of epileptiform activity. Received call from RN that patient had worsening mental status. Evaluated patient at bedside. Speech was nonsensical and was moderately dysarthric. Noted to have intermittent LUE/LLE jerking movements with 3-4 seconds of stereotypic features along with R gaze preference concerning for possible seizure. FS was checked, noted to be 34. Repeat 33. Given 1 AMP with improvement to 187. Approx 1 hour later patient sating 85% on RA and tachypneic. Started on NC with improvement of O2 saturation, repeat fingerstick noted to be 27. Rapid response called. 2 additional AMPs given, started on D10. Rectal temp 94 degrees F, bear hugger placed. Noted to have an acute drop in glucose in < 1 hr from 401 to 140, switched to D5. PICC line removed due to concern of possible infection. Endocrinology is on board in regards to her persistently low fingersticks despite administration of dextrose.   Patient transferred to ICU for further management. MRA head and neck as well as MRV obtained with anesthesia on 9/22, unremarkable. MRI brain with and without contrast completed on 9/25 and demonstrated new left pontine punctate acute infarctions and improved SAH, now started back on ASA 81mg daily. Patient was requiring precedex drip for agitation throughout her ICU stay, however, her mental status has continued to improve throughout the week and she is now stable to be stepped down to stroke/tele for further work up.   Neurology Stroke Progress Note    INTERVAL HPI/OVERNIGHT EVENTS:  Patient seen and examined.   MEDICATIONS  (STANDING):  albuterol/ipratropium for Nebulization 3 milliLiter(s) Nebulizer every 6 hours  amLODIPine   Tablet 5 milliGRAM(s) Oral daily  aspirin  chewable 81 milliGRAM(s) Oral daily  atorvastatin 40 milliGRAM(s) Oral at bedtime  dextrose 10% + sodium chloride 0.9%. 1000 milliLiter(s) (25 mL/Hr) IV Continuous <Continuous>  enoxaparin Injectable 30 milliGRAM(s) SubCutaneous every 24 hours  melatonin 5 milliGRAM(s) Oral at bedtime  metoprolol tartrate Injectable 2.5 milliGRAM(s) IV Push every 6 hours  montelukast 10 milliGRAM(s) Oral daily  multivitamin  Chewable 1 Tablet(s) Oral daily  piperacillin/tazobactam IVPB.. 4.5 Gram(s) IV Intermittent every 8 hours  thiamine 100 milliGRAM(s) Oral daily    MEDICATIONS  (PRN):      Allergies    Ceclor (Rash)  IV Contrast (Unknown)  Levaquin (Swelling)  Augmentin (Short breath; Rash)    Intolerances        Vital Signs Last 24 Hrs  T(C): 37.1 (26 Sep 2023 09:02), Max: 37.1 (26 Sep 2023 09:02)  T(F): 98.7 (26 Sep 2023 09:02), Max: 98.7 (26 Sep 2023 09:02)  HR: 72 (26 Sep 2023 09:00) (55 - 88)  BP: 181/81 (26 Sep 2023 09:00) (132/60 - 204/86)  BP(mean): 116 (26 Sep 2023 09:00) (87 - 139)  RR: 24 (26 Sep 2023 09:00) (19 - 45)  SpO2: 97% (26 Sep 2023 09:00) (94% - 100%)    Parameters below as of 26 Sep 2023 09:00  Patient On (Oxygen Delivery Method): room air      Physical exam: examined on precedex, still alert and awake with minimal verbal stimulation to engage in exam  General: No acute distress  Eyes: Anicteric sclerae, moist conjunctivae, see below for CNs  Neck: trachea midline  Cardiovascular: Regular rate on monitor  Pulmonary: No use of accessory muscles  Extremities: Noted L arm ecchymoses with skin tear    Neurologic:  -Mental status: Resting comfortably in bed, able to state her full name, that she is in [facility]. Initially states the year is 2003 but when given choices, states it is 2023. Follows simple commands but has some periods of inattention.   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 Pupils equally round and reactive to light  V:  Facial sensation V1-V3 equal and intact   VII: Left facial droop  VIII: Hearing is bilaterally intact to voice  XII: Tongue protrudes midline  Motor: Motor: Diminished bulk throughout. Normal tone. B/l UEs at least a 4/5 without drift. B/l LEs at least a 3/5 without drift.   Sensation: Intact to light touch bilaterally  Gait: deferred    LABS:                        10.6   7.69  )-----------( 196      ( 26 Sep 2023 06:21 )             30.8     09-26    134<L>  |  99  |  5<L>  ----------------------------<  99  3.6   |  29  |  0.69    Ca    8.4      26 Sep 2023 05:54  Phos  2.8     09-26  Mg     2.0     09-26    TPro  6.5  /  Alb  3.0<L>  /  TBili  0.8  /  DBili  x   /  AST  22  /  ALT  30  /  AlkPhos  97  09-26      Urinalysis Basic - ( 26 Sep 2023 05:54 )    Color: x / Appearance: x / SG: x / pH: x  Gluc: 99 mg/dL / Ketone: x  / Bili: x / Urobili: x   Blood: x / Protein: x / Nitrite: x   Leuk Esterase: x / RBC: x / WBC x   Sq Epi: x / Non Sq Epi: x / Bacteria: x        RADIOLOGY & ADDITIONAL TESTS:  < from: MR Head w/wo IV Cont (09.25.23 @ 17:30) >  IMPRESSION:    1. Left pontine punctate acute infarctions have developed since 9/20/2023    2. Subarachnoid hemorrhage has diminished since 9/20/2023    3. ischemic white matter disease, atrophy typical for age and remote   petechial hemorrhages are stable findings since 9/20/2023      Findings discussed with Dr. Pauline LANIERU resident on 9/25/2023 at 7:44PM.    < end of copied text >

## 2023-09-26 NOTE — SWALLOW FEES ASSESSMENT ADULT - SPECIFY REASON(S)
Instrumental eval indicated due to overt s/s of aspiration per bedside eval Instrumental eval indicated due to overt s/s of aspiration per bedside evals 9/24-9/25

## 2023-09-26 NOTE — SWALLOW FEES ASSESSMENT ADULT - ORAL PHASE COMMENTS
Adequate labial seal and bolus containment. Mild prolonged but effective mastication of regular cracker. Mild oral residue with puree and solids, requiring two swallows to clear oral cavity. Adequate labial seal and bolus containment. Mildly prolonged mastication of regular cracker. Inferred mild oral residue with puree and solids, with patient exhibiting double swallows per bolus.

## 2023-09-26 NOTE — SWALLOW FEES ASSESSMENT ADULT - DIAGNOSTIC IMPRESSIONS
Pt presents with a mild oropharyngeal dysphagia with impact to efficiency. Swallow safety is intact with no aspiration across consistencies. Oral deficits result in mild oral residue requiring multiple swallows to clear bolus. Mildly delayed and incomplete laryngeal vestibule closure resulted in trace, high penetration with puree/solids. Reduced pharyngeal strength resulted in trace-mild pharyngeal retention with liquids and mild with puree and solids. A secondary swallow and liquid wash were effective in reducing residue.  Compared to MBSS completed last month, swallow function appears close to baseline. Suspect that acute onset of dysphagia was r/t AMS i/s/o hypoglycemia, however improvement is c/w management of condition.  Patient is safe to initiate PO diet. Recommend thin liquids and minced/moist solids, per patient preference. Should pt prefer advanced solids, recommend allow soft and bite sized. Maintain aspiration precautions.   D/C diet and reconsult this svc should mental status decline. Pt presents with a mild oropharyngeal dysphagia with impact to efficiency. Swallow safety is intact with no aspiration across consistencies. Oral deficits resulted in (inferred) mild oral residue requiring multiple swallows per bolus. Mildly delayed and incomplete laryngeal vestibule closure resulted in trace, high penetration with puree/solids. Partially reduced pharyngeal swallow efficiency resulted in trace-mild retention with liquids and mild retention with puree and solids. A secondary swallow and liquid wash were effective in reducing residue.  Acute exacerbation of dysphagia appears multiple factorial related to oropharyngeal dryness, acute neurological events, and AMS. Today's presentation was similar to the results of the MBSS completed 8/2023.   Patient is safe to initiate PO diet. Recommend thin liquids and minced/moist solids, per patient preference. Should pt prefer advanced solids, recommend allow soft and bite sized. Maintain aspiration precautions.   D/C diet and reconsult this svc should mental status decline.

## 2023-09-26 NOTE — SWALLOW FEES ASSESSMENT ADULT - SLP PERTINENT HISTORY OF CURRENT PROBLEM
81 yo W recently d/c from [facility] in Aug 2023, known to this svc from that adm for MBS on 8/18 with rec for minced & moist/thin liq diet, now readm w new Rparietal & occipital SAH and new L occipital ischemic stroke.

## 2023-09-26 NOTE — PROGRESS NOTE ADULT - SUBJECTIVE AND OBJECTIVE BOX
[facility]  Abad Buenrostro, Palliative Care Attending  Contact Info: Call [phone] (HEAL Line) or message on Microsoft Teams (Abad Buenrostro)    SUBJECTIVE AND OBJECTIVE:  INTERVAL HPI/OVERNIGHT EVENTS: Interval events noted. See patient's PRN use for the past 24hrs noted below. Comprehensive symptom assessment and GOC exploration as noted below. Extensive time spent discussing plan of care with patient/family.     ALLERGIES:  Ceclor (Rash)  IV Contrast (Unknown)  Levaquin (Swelling)  Augmentin (Short breath; Rash)    MEDICATIONS  (STANDING):  albuterol/ipratropium for Nebulization 3 milliLiter(s) Nebulizer every 6 hours  amLODIPine   Tablet 5 milliGRAM(s) Oral daily  aspirin  chewable 81 milliGRAM(s) Oral daily  atorvastatin 40 milliGRAM(s) Oral at bedtime  dextrose 10% + sodium chloride 0.9%. 1000 milliLiter(s) (25 mL/Hr) IV Continuous <Continuous>  enoxaparin Injectable 30 milliGRAM(s) SubCutaneous every 24 hours  melatonin 5 milliGRAM(s) Oral at bedtime  metoprolol tartrate 25 milliGRAM(s) Oral two times a day  montelukast 10 milliGRAM(s) Oral daily  multivitamin  Chewable 1 Tablet(s) Oral daily  piperacillin/tazobactam IVPB.. 4.5 Gram(s) IV Intermittent every 8 hours  thiamine 100 milliGRAM(s) Oral daily    MEDICATIONS  (PRN):      Analgesic Use (Scheduled and PRNs) for past 24 hours:    haloperidol    Injectable   1 milliGRAM(s) IV Push (09-25-23 @ 22:49)    haloperidol    Injectable   1 milliGRAM(s) IV Push (09-26-23 @ 03:02)    haloperidol    Injectable   1 milliGRAM(s) IV Push (09-25-23 @ 21:26)    melatonin   5 milliGRAM(s) Oral (09-25-23 @ 21:26)    QUEtiapine   12.5 milliGRAM(s) Oral (09-26-23 @ 01:22)      ITEMS UNCHECKED ARE NOT PRESENT  PRESENT SYMPTOMS/REVIEW OF SYSTEMS: []Unable to obtain due to poor mentation   Source if other than patient:  []Family   []Team         Vital Signs Last 24 Hrs  T(C): 37.1 (26 Sep 2023 09:02), Max: 37.1 (26 Sep 2023 09:02)  T(F): 98.7 (26 Sep 2023 09:02), Max: 98.7 (26 Sep 2023 09:02)  HR: 65 (26 Sep 2023 13:30) (63 - 135)  BP: 148/104 (26 Sep 2023 13:30) (135/71 - 204/86)  BP(mean): 117 (26 Sep 2023 13:30) (97 - 139)  RR: 17 (26 Sep 2023 13:30) (17 - 45)  SpO2: 93% (26 Sep 2023 13:30) (92% - 100%)    Parameters below as of 26 Sep 2023 13:30  Patient On (Oxygen Delivery Method): room air        LABS: Personally reviewed and interpreted                        10.6   7.69  )-----------( 196      ( 26 Sep 2023 06:21 )             30.8   09-26    134<L>  |  99  |  5<L>  ----------------------------<  99  3.6   |  29  |  0.69    Ca    8.4      26 Sep 2023 05:54  Phos  2.8     09-26  Mg     2.0     09-26    TPro  6.5  /  Alb  3.0<L>  /  TBili  0.8  /  DBili  x   /  AST  22  /  ALT  30  /  AlkPhos  97  09-26      RADIOLOGY & ADDITIONAL STUDIES: None new    DISCUSSION OF CASE: Family - to provide updates and emotional support; Primary Team/RN - to discuss plan of care [facility]  Abad Buenrostro, Palliative Care Attending  Contact Info: Call [phone] (HEAL Line) or message on Microsoft Teams (Abad Buenrostro)    SUBJECTIVE AND OBJECTIVE:  INTERVAL HPI/OVERNIGHT EVENTS: Interval events noted. See patient's PRN use for the past 24hrs noted below. Comprehensive symptom assessment and GOC exploration as noted below. Extensive time spent discussing plan of care with patient/family. Mental status improving but still prone to episodes of delirium. Denies any pain or SOB.    ALLERGIES:  Ceclor (Rash)  IV Contrast (Unknown)  Levaquin (Swelling)  Augmentin (Short breath; Rash)    MEDICATIONS  (STANDING):  albuterol/ipratropium for Nebulization 3 milliLiter(s) Nebulizer every 6 hours  amLODIPine   Tablet 5 milliGRAM(s) Oral daily  aspirin  chewable 81 milliGRAM(s) Oral daily  atorvastatin 40 milliGRAM(s) Oral at bedtime  dextrose 10% + sodium chloride 0.9%. 1000 milliLiter(s) (25 mL/Hr) IV Continuous <Continuous>  enoxaparin Injectable 30 milliGRAM(s) SubCutaneous every 24 hours  melatonin 5 milliGRAM(s) Oral at bedtime  metoprolol tartrate 25 milliGRAM(s) Oral two times a day  montelukast 10 milliGRAM(s) Oral daily  multivitamin  Chewable 1 Tablet(s) Oral daily  piperacillin/tazobactam IVPB.. 4.5 Gram(s) IV Intermittent every 8 hours  thiamine 100 milliGRAM(s) Oral daily    MEDICATIONS  (PRN):    Analgesic Use (Scheduled and PRNs) for past 24 hours:  haloperidol    Injectable   1 milliGRAM(s) IV Push (09-25-23 @ 22:49)   1 milliGRAM(s) IV Push (09-26-23 @ 03:02)   1 milliGRAM(s) IV Push (09-25-23 @ 21:26)  melatonin   5 milliGRAM(s) Oral (09-25-23 @ 21:26)  QUEtiapine   12.5 milliGRAM(s) Oral (09-26-23 @ 01:22)    ITEMS UNCHECKED ARE NOT PRESENT  PRESENT SYMPTOMS/REVIEW OF SYSTEMS: []Unable to obtain due to poor mentation   Source if other than patient:  []Family   []Team     Pain: [] yes [x] no  QOL impact -  Location -  Aggravating factors -  Quality -  Radiation -  Timing -  Severity (0-10 scale) -  Minimal acceptable level (0-10 scale) -    PAINAD Score: 0  CPOT Score: 0    Dyspnea:                           []Mild  []Moderate []Severe  Anxiety:                             []Mild []Moderate []Severe  Fatigue:                             []Mild []Moderate []Severe  Nausea:                             []Mild []Moderate []Severe  Loss of appetite:              []Mild []Moderate []Severe  Constipation:                    []Mild []Moderate []Severe    Other Symptoms:  [x]All other review of systems negative     Palliative Performance Status Version 2: 40%    GENERAL:  [x] NAD [x]Alert []Lethargic  []Cachexia  []Unarousable  [x]Verbal  []Non-Verbal  BEHAVIORAL:   []Anxiety  []Delirium []Agitation [x]Cooperative [x]Oriented x2  HEENT:  [x]Normal  [x] Moist Mucous Membranes []Dry mouth   []ET Tube/Trach  []Oral lesions  PULMONARY:   [x]Clear []Tachypnea  []Audible excessive secretions  [x]Normal Work of Breathing []Labored Breathing  []Rhonchi []Crackles []Wheezing  CARDIOVASCULAR:    [x]Regular Rate [x]Regular Rhythm []Irregular []Tachy  []Hemanth  GASTROINTESTINAL:  [x]Soft  []Distended   []+BS  [x]Non tender []Tender  []PEG []OGT/ NGT  Last BM:  GENITOURINARY:  []Normal [x] Incontinent   []Oliguria/Anuria   []Richards  MUSCULOSKELETAL:   [x]Normal Extremities  [x]Weakness  [x]Bed/Wheelchair bound []Edema  NEUROLOGIC:   [x]No focal deficits  []Cognitive impairment  []Dysphagia []Dysarthria []Paresis []Encephalopathic  SKIN:   [x]Normal   []Pressure ulcer(s)  []Rash    Vital Signs Last 24 Hrs  T(C): 37.1 (26 Sep 2023 09:02), Max: 37.1 (26 Sep 2023 09:02)  T(F): 98.7 (26 Sep 2023 09:02), Max: 98.7 (26 Sep 2023 09:02)  HR: 65 (26 Sep 2023 13:30) (63 - 135)  BP: 148/104 (26 Sep 2023 13:30) (135/71 - 204/86)  BP(mean): 117 (26 Sep 2023 13:30) (97 - 139)  RR: 17 (26 Sep 2023 13:30) (17 - 45)  SpO2: 93% (26 Sep 2023 13:30) (92% - 100%)    Parameters below as of 26 Sep 2023 13:30  Patient On (Oxygen Delivery Method): room air    LABS: Personally reviewed and interpreted                      10.6   7.69  )-----------( 196      ( 26 Sep 2023 06:21 )             30.8   09-26    134<L>  |  99  |  5<L>  ----------------------------<  99  3.6   |  29  |  0.69    Ca    8.4      26 Sep 2023 05:54  Phos  2.8     09-26  Mg     2.0     09-26  TPro  6.5  /  Alb  3.0<L>  /  TBili  0.8  /  DBili  x   /  AST  22  /  ALT  30  /  AlkPhos  97  09-26    RADIOLOGY & ADDITIONAL STUDIES:  < from: MR Head w/wo IV Cont (09.25.23 @ 17:30) >  1. Left pontine punctate acute infarctions have developed since 9/20/2023  2. Subarachnoid hemorrhage has diminished since 9/20/2023  3. ischemic white matter disease, atrophy typical for age and remote petechial hemorrhages are stable findings since 9/20/2023    DISCUSSION OF CASE: Daughter - to provide updates and emotional support; Primary Team/RN - to discuss plan of care

## 2023-09-26 NOTE — PROGRESS NOTE ADULT - NUTRITIONAL ASSESSMENT
This patient has been assessed with a concern for Malnutrition and has been determined to have a diagnosis/diagnoses of Severe protein-calorie malnutrition and Underweight (BMI < 19).    This patient is being managed with:   Diet NPO-  Except Medications  With Ice Chips/Sips of Water  Entered: Sep 25 2023 11:00AM   This patient has been assessed with a concern for Malnutrition and has been determined to have a diagnosis/diagnoses of Severe protein-calorie malnutrition and Underweight (BMI < 19).    This patient is being managed with:   Diet Minced & Moist  Entered: Sep 26 2023 16:15PM

## 2023-09-26 NOTE — PROGRESS NOTE ADULT - ASSESSMENT
82y Female with PMHx of HTN, afib (not on AC due to fall risk), recent hospitalization for PRES (5-6/2023), recently discharged from [facility] 8/2023 with acute exacerbation of severe bronchiectasis and respiratory failure presents with several days of AMS and new diplopia 9/18. NIHSS 1 on admission for bitemporal visual deficit. Course complicated by agitation and severe hypoglycemia, requiring ICU admission. Brain imaging significant for L pontine and left occipital infarct as well as SAH of the right parieto-occipital region. Currently, cannot r/o inflammatory causes like ISMA/vasculitis/encephalitis. Awaiting PET brain.     Neuro  #CVA workup  - started on ASA 81mg daily today; holding AC i/s/o SAH and possible CAA  - continue atorvastatin 40mg daily  - q4hr stroke neuro checks and vitals  - PET brain ordered  - LP may be warranted pending clinical course  - MRI brain with and without contrast from 9/25: Left pontine punctate acute infarctions have developed since 9/20/2023. Subarachnoid hemorrhage has diminished since 9/20/2023. Ischemic white matter disease, atrophy typical for age and remote petechial hemorrhages are stable findings since 9/20/2023  - MRA brain 9/22: negative for steno-occlusive disease, aneurysm or high flow   vascular malformation. No new/interval infarct on DWI since 9/20/23,   though mild cerebral edema has developed. Query amyloid related disease.  - MRA neck: negative  - MRV brain: negative for thrombus  - Stroke education    Cards  #HTN  - permissive hypertension, Goal -160  - TTE from 9/19: mildly dilated RV, dilated RA, severe pulmonary hypertension  - VANNESSA if patient can tolerate to r/o possible endocarditis give infarcts as well as SAH      #HLD  - high dose statin as above in CVA  - LDL results: 129    Pulm  - call provider if SPO2 < 94%    #bronchiectasis  - pulmonology was consulted  - chest PT twice daily  - continue duonebs  - continue singular  - will need to follow up with Dr. Foster as outpatient      GI  #Nutrition/Fluids/Electrolytes   - replete K<4 and Mg <2  - Diet: NPO, pending FEES  - IVF: D10    Renal  - daily BMP    Infectious Disease  - holding Cefepime  - continue Zosyn, 9/22- ?     Endocrine  - A1C results: 5.5%    - TSH results: 3.870    DVT Prophylaxis  - lovenox sq for DVT prophylaxis   - SCDs for DVT prophylaxis     Dispo: pending clinical course     Discussed daily hospital plans and goals with patient.    Discussed with Dr. Garcia

## 2023-09-26 NOTE — PROGRESS NOTE ADULT - SUBJECTIVE AND OBJECTIVE BOX
Patient is a 82y old Female who presents with a chief complaint of diplopia (22 Sep 2023 14:25)    INTERVAL HPI/OVERNIGHT EVENTS:   Yesterday pt completed MRI brain w/ contrast; was f/t/h two new L pontine infarcts     SUBJECTIVE/AT BEDSIDE:   Patient seen and examined at bedside. Sleeping, with mitts on, unresponsive to her name. HR in high 50s.     PHYSICAL EXAM:  exam limited by patient sedation  GENERAL: cachetic, elderly woman, sleeping in bed  HEAD: normocephalic  EYES: pupils equal, round, and reactive to light bilaterally   NERVOUS SYSTEM: A&Ox0 but arousable, lying in bed  CHEST/LUNG: crackles in BL lungs, some rhonchi present  HEART: +S1/S2, irregularly irreg, holosystolic murmur present  ABDOMEN: ND, unable to appreciate tenderness  EXTREMITIES: +1 dorsal pedal pulses, cool LE, no edema      ICU Vital Signs Last 24 Hrs  T(C): 36.4 (25 Sep 2023 01:03), Max: 37.9 (24 Sep 2023 17:47)  T(F): 97.6 (25 Sep 2023 01:03), Max: 100.2 (24 Sep 2023 17:47)  HR: 57 (25 Sep 2023 06:00) (54 - 100)  BP: 129/60 (25 Sep 2023 06:00) (116/58 - 196/94)  BP(mean): 86 (25 Sep 2023 06:00) (83 - 139)  ABP: --  ABP(mean): --  RR: 25 (25 Sep 2023 06:00) (17 - 44)  SpO2: 100% (25 Sep 2023 06:00) (90% - 100%)    O2 Parameters below as of 25 Sep 2023 06:00  Patient On (Oxygen Delivery Method): room air        I&O's Summary    23 Sep 2023 07:01  -  24 Sep 2023 07:00  --------------------------------------------------------  IN: 1604.9 mL / OUT: 1170 mL / NET: 434.9 mL    24 Sep 2023 07:01  -  25 Sep 2023 06:39  --------------------------------------------------------  IN: 1346.2 mL / OUT: 1415 mL / NET: -68.8 mL        LABS:                        10.4   8.46  )-----------( 176      ( 25 Sep 2023 06:21 )             29.6     09-24    135  |  100  |  10  ----------------------------<  140<H>  3.8   |  29  |  0.73    Ca    9.0      24 Sep 2023 21:46  Phos  2.6     09-24  Mg     1.9     09-24    TPro  See Note  /  Alb  See Note  /  TBili  See Note  /  DBili  x   /  AST  See Note  /  ALT  See Note  /  AlkPhos  See Note  09-24      Urinalysis Basic - ( 24 Sep 2023 21:46 )  Color: x / Appearance: x / SG: x / pH: x  Gluc: 140 mg/dL / Ketone: x  / Bili: x / Urobili: x   Blood: x / Protein: x / Nitrite: x   Leuk Esterase: x / RBC: x / WBC x   Sq Epi: x / Non Sq Epi: x / Bacteria: x      CAPILLARY BLOOD GLUCOSE  POCT Blood Glucose.: 160 mg/dL (25 Sep 2023 02:09)  POCT Blood Glucose.: 87 mg/dL (24 Sep 2023 17:25)  POCT Blood Glucose.: 103 mg/dL (24 Sep 2023 15:20)  POCT Blood Glucose.: 97 mg/dL (24 Sep 2023 13:42)  POCT Blood Glucose.: 96 mg/dL (24 Sep 2023 12:23)  POCT Blood Glucose.: 89 mg/dL (24 Sep 2023 10:53)      MEDICATIONS  (STANDING):  atorvastatin 40 milliGRAM(s) Oral at bedtime  chlorhexidine 2% Cloths 1 Application(s) Topical <User Schedule>  chlorhexidine 4% Liquid 1 Application(s) Topical <User Schedule>  dexMEDEtomidine Infusion 0.2 MICROgram(s)/kG/Hr (1.63 mL/Hr) IV Continuous <Continuous>  dextrose 10%. 1000 milliLiter(s) (80 mL/Hr) IV Continuous <Continuous>  enoxaparin Injectable 30 milliGRAM(s) SubCutaneous every 24 hours  melatonin 5 milliGRAM(s) Oral at bedtime  metoprolol tartrate Injectable 5 milliGRAM(s) IV Push every 6 hours  montelukast 10 milliGRAM(s) Oral daily  multivitamin  Chewable 1 Tablet(s) Oral daily  piperacillin/tazobactam IVPB.. 4.5 Gram(s) IV Intermittent every 8 hours  thiamine 100 milliGRAM(s) Oral daily    MEDICATIONS  (PRN):  QUEtiapine 12.5 milliGRAM(s) Oral once PRN agitation  sodium chloride 0.9% lock flush 10 milliLiter(s) IV Push every 1 hour PRN Pre/post blood products, medications, blood draw, and to maintain line patency      RADIOLOGY & IMAGING: REVIEWED.  Care Discussed with Consultants/Other Providers [ x] YES  [ ] NO   Patient is a 82y old Female who presents with a chief complaint of diplopia (22 Sep 2023 14:25)    INTERVAL HPI/OVERNIGHT EVENTS:   Yesterday pt completed MRI brain w/ contrast; was f/t/h two new L pontine infarcts     SUBJECTIVE/AT BEDSIDE:   Patient seen and examined at bedside. Sleeping, with mitts on, unresponsive to her name. HR in high 50s.     ICU Vital Signs Last 24 Hrs  T(C): 37.1 (26 Sep 2023 09:02), Max: 37.1 (26 Sep 2023 09:02)  T(F): 98.7 (26 Sep 2023 09:02), Max: 98.7 (26 Sep 2023 09:02)  HR: 72 (26 Sep 2023 09:00) (55 - 88)  BP: 181/81 (26 Sep 2023 09:00) (132/60 - 204/86)  BP(mean): 116 (26 Sep 2023 09:00) (87 - 139)  ABP: --  ABP(mean): --  RR: 24 (26 Sep 2023 09:00) (19 - 45)  SpO2: 97% (26 Sep 2023 09:00) (94% - 100%)    O2 Parameters below as of 26 Sep 2023 09:00  Patient On (Oxygen Delivery Method): room air      I&O's Summary    25 Sep 2023 07:01  -  26 Sep 2023 07:00  --------------------------------------------------------  IN: 1363.8 mL / OUT: 1965 mL / NET: -601.2 mL    26 Sep 2023 07:01  -  26 Sep 2023 09:49  --------------------------------------------------------  IN: 100 mL / OUT: 225 mL / NET: -125 mL      PHYSICAL EXAM:  GENERAL: very thin, elderly woman, sleeping in bed  HEAD: normocephalic  EYES: pupils equal, round, and reactive to light bilaterally   NERVOUS SYSTEM: A&Ox2-3, lying in bed  CHEST/LUNG: crackles in BL lung, occasional productive cough, some rhonchi present  HEART: +S1/S2, irregularly irreg, holosystolic murmur present  ABDOMEN: NTND  EXTREMITIES: +1 dorsal pedal pulses, no edema    LABS:                        10.6   7.69  )-----------( 196      ( 26 Sep 2023 06:21 )             30.8     09-26    134<L>  |  99  |  5<L>  ----------------------------<  99  3.6   |  29  |  0.69    Ca    8.4      26 Sep 2023 05:54  Phos  2.8     09-26  Mg     2.0     09-26    TPro  6.5  /  Alb  3.0<L>  /  TBili  0.8  /  DBili  x   /  AST  22  /  ALT  30  /  AlkPhos  97  09-26      Urinalysis Basic - ( 26 Sep 2023 05:54 )    Color: x / Appearance: x / SG: x / pH: x  Gluc: 99 mg/dL / Ketone: x  / Bili: x / Urobili: x   Blood: x / Protein: x / Nitrite: x   Leuk Esterase: x / RBC: x / WBC x   Sq Epi: x / Non Sq Epi: x / Bacteria: x      CAPILLARY BLOOD GLUCOSE  POCT Blood Glucose.: 93 mg/dL (26 Sep 2023 06:11)  POCT Blood Glucose.: 115 mg/dL (25 Sep 2023 23:41)  POCT Blood Glucose.: 107 mg/dL (25 Sep 2023 17:51)  POCT Blood Glucose.: 102 mg/dL (25 Sep 2023 13:46)  POCT Blood Glucose.: 113 mg/dL (25 Sep 2023 11:49)    MEDICATIONS  (STANDING):  albuterol/ipratropium for Nebulization 3 milliLiter(s) Nebulizer every 6 hours  amLODIPine   Tablet 5 milliGRAM(s) Oral daily  aspirin  chewable 81 milliGRAM(s) Oral daily  atorvastatin 40 milliGRAM(s) Oral at bedtime  chlorhexidine 2% Cloths 1 Application(s) Topical <User Schedule>  chlorhexidine 4% Liquid 1 Application(s) Topical <User Schedule>  dextrose 10% + sodium chloride 0.9%. 1000 milliLiter(s) (25 mL/Hr) IV Continuous <Continuous>  enoxaparin Injectable 30 milliGRAM(s) SubCutaneous every 24 hours  melatonin 5 milliGRAM(s) Oral at bedtime  metoprolol tartrate Injectable 2.5 milliGRAM(s) IV Push every 6 hours  montelukast 10 milliGRAM(s) Oral daily  multivitamin  Chewable 1 Tablet(s) Oral daily  piperacillin/tazobactam IVPB.. 4.5 Gram(s) IV Intermittent every 8 hours  thiamine 100 milliGRAM(s) Oral daily    MEDICATIONS  (PRN):  sodium chloride 0.9% lock flush 10 milliLiter(s) IV Push every 1 hour PRN Pre/post blood products, medications, blood draw, and to maintain line patency    RADIOLOGY & ADDITIONAL TESTS:  Consultant(s) Notes Reviewed:  [x ] YES  [ ] NO         Patient is a 82y old Female who presents with a chief complaint of diplopia (22 Sep 2023 14:25)    INTERVAL HPI/OVERNIGHT EVENTS:   Yesterday pt completed MRI brain w/ contrast; was f/t/h two new L pontine infarcts. Mentation continues to improve.    SUBJECTIVE/AT BEDSIDE:   Patient seen and examined at bedside. Awake, talking in full and comprehensible sentences.    ICU Vital Signs Last 24 Hrs  T(C): 37.1 (26 Sep 2023 09:02), Max: 37.1 (26 Sep 2023 09:02)  T(F): 98.7 (26 Sep 2023 09:02), Max: 98.7 (26 Sep 2023 09:02)  HR: 72 (26 Sep 2023 09:00) (55 - 88)  BP: 181/81 (26 Sep 2023 09:00) (132/60 - 204/86)  BP(mean): 116 (26 Sep 2023 09:00) (87 - 139)  ABP: --  ABP(mean): --  RR: 24 (26 Sep 2023 09:00) (19 - 45)  SpO2: 97% (26 Sep 2023 09:00) (94% - 100%)    O2 Parameters below as of 26 Sep 2023 09:00  Patient On (Oxygen Delivery Method): room air      I&O's Summary    25 Sep 2023 07:01  -  26 Sep 2023 07:00  --------------------------------------------------------  IN: 1363.8 mL / OUT: 1965 mL / NET: -601.2 mL    26 Sep 2023 07:01  -  26 Sep 2023 09:49  --------------------------------------------------------  IN: 100 mL / OUT: 225 mL / NET: -125 mL      PHYSICAL EXAM:  GENERAL: very thin, elderly woman, sleeping in bed  HEAD: normocephalic  EYES: pupils equal, round, and reactive to light bilaterally   NERVOUS SYSTEM: A&Ox2-3, lying in bed  CHEST/LUNG: crackles in BL lung, occasional productive cough, some rhonchi present  HEART: +S1/S2, irregularly irreg, holosystolic murmur present  ABDOMEN: NTND  EXTREMITIES: +1 dorsal pedal pulses, no edema    LABS:                        10.6   7.69  )-----------( 196      ( 26 Sep 2023 06:21 )             30.8     09-26    134<L>  |  99  |  5<L>  ----------------------------<  99  3.6   |  29  |  0.69    Ca    8.4      26 Sep 2023 05:54  Phos  2.8     09-26  Mg     2.0     09-26    TPro  6.5  /  Alb  3.0<L>  /  TBili  0.8  /  DBili  x   /  AST  22  /  ALT  30  /  AlkPhos  97  09-26      Urinalysis Basic - ( 26 Sep 2023 05:54 )    Color: x / Appearance: x / SG: x / pH: x  Gluc: 99 mg/dL / Ketone: x  / Bili: x / Urobili: x   Blood: x / Protein: x / Nitrite: x   Leuk Esterase: x / RBC: x / WBC x   Sq Epi: x / Non Sq Epi: x / Bacteria: x      CAPILLARY BLOOD GLUCOSE  POCT Blood Glucose.: 93 mg/dL (26 Sep 2023 06:11)  POCT Blood Glucose.: 115 mg/dL (25 Sep 2023 23:41)  POCT Blood Glucose.: 107 mg/dL (25 Sep 2023 17:51)  POCT Blood Glucose.: 102 mg/dL (25 Sep 2023 13:46)  POCT Blood Glucose.: 113 mg/dL (25 Sep 2023 11:49)    MEDICATIONS  (STANDING):  albuterol/ipratropium for Nebulization 3 milliLiter(s) Nebulizer every 6 hours  amLODIPine   Tablet 5 milliGRAM(s) Oral daily  aspirin  chewable 81 milliGRAM(s) Oral daily  atorvastatin 40 milliGRAM(s) Oral at bedtime  chlorhexidine 2% Cloths 1 Application(s) Topical <User Schedule>  chlorhexidine 4% Liquid 1 Application(s) Topical <User Schedule>  dextrose 10% + sodium chloride 0.9%. 1000 milliLiter(s) (25 mL/Hr) IV Continuous <Continuous>  enoxaparin Injectable 30 milliGRAM(s) SubCutaneous every 24 hours  melatonin 5 milliGRAM(s) Oral at bedtime  metoprolol tartrate Injectable 2.5 milliGRAM(s) IV Push every 6 hours  montelukast 10 milliGRAM(s) Oral daily  multivitamin  Chewable 1 Tablet(s) Oral daily  piperacillin/tazobactam IVPB.. 4.5 Gram(s) IV Intermittent every 8 hours  thiamine 100 milliGRAM(s) Oral daily    MEDICATIONS  (PRN):  sodium chloride 0.9% lock flush 10 milliLiter(s) IV Push every 1 hour PRN Pre/post blood products, medications, blood draw, and to maintain line patency    RADIOLOGY & ADDITIONAL TESTS:  Consultant(s) Notes Reviewed:  [x ] YES  [ ] NO         Patient is a 82y old Female who presents with a chief complaint of diplopia (22 Sep 2023 14:25)    HOSPITAL COURSE:  82F, PMH: HTN, Afib (not on AC d/t fall risk), recent hospitalization for PRES (not at [facility], May-June '23), recent discharge for acute exacerbation of severe bronchiectasis (at [facility], Aug '23), presented w/ several days of AMS & new-onset diplopia since 9/15.   Pt started IV abx on 9/15 (2wk course of cefepime 2g IV BID for bronchiectasis exacerbation after concern from Dr. Foster that pt failed PO abx, - was dc'd in Aug on bactrim and cipro- got outpt PICC placement on 9/13, 1st dose 9/15, got 7 doses total before presentation to hospital).  Per pt daughter, after starting abx demonstrated progressive exhaustion and generalized confusion w/ some agitation (similar to when she presented w/ PRES). Pt forgot rooms in her house, confused common everyday information, and dropped items randomly. Pt also reported seeing double. Pt came to ED, where she became hypoxic, was started on 3L NC. CT chest showed findings consistent w/ bronchiectasis. Pulmonology saw pt in ED, and respi workup negative for any acute infectious findings. CT head in ED significant only for chronic basal ganglia infarct, and neuro deficits notable only for bitemporal peripheral field disturbance. Pt was admitted to neuro for r/o CVA vs recurrence of PRES, and pt's abx restarted per pulm.   Subsequently, pt had notable change in mental status - nonsensical tangential speech. No focal deficits on neuro exam, however paraphrasic errors were concerning so pt taken for repeat CT head -->showed R subarachnoid hemorrhage in parietal lobe. Pt had been started on Eliquis as secondary stroke prevention, this was dc'd after SAH findings. Neurosurgery consulted --> BEN. Pt was started on coreg given hemorrhagic stroke and BPs above goal.   Pt then had MRI of brain ---> showed new ischemic L occipital stroke. Pt was put on EEG d/t AMS and CVAs, but did not tolerate and ripped off after ~8hrs. No epileptiform activity was seen.   On 9/21 new aberrant behavior: Jerking movements, even more agitation, mental status further reduced. Speech remained nonsensical and tangential. Pt demonstrated intermittent L upper and L lower extremity jerking movements for about 3-4 sec., w/ gaze preference. Found to be hypoglycemic to the 30s, got an amp of dextrose and agitation resolved.  About 1hr later, pt was tachypneic. Repeat finger stick 27. Pt was AMS and hypoglycemia not relieved by multiple amps of dextrose. Pt was started on D10 drip, f/t/h rectal temp 94, w/ multiple electrolyte disturbances. Pt was put on Damaris Hugger and PICC was removed given concern for possible infxn. After D10 drip, pt blood gluc went to 401, less than 1hr later blood glucose dropped to 140. Pt transferred to the MICU for recalcitrant symptomatic hypoglycemia.     In MICU pt managed w/ q1hr fingersticks and frequent BMPs. Blood sugar managed w/D10 drip and then D10-NS after to avoid hyponatremia. An extensive hypoglycemia work up was ordered (A1c 5.5, Beta-hydroxybutyrate low, C-peptide low, Insulin high - 989, with sulfonylurea labs ordered but not resulted). Endocrinology was consulted, and D10/NS drip was weaned off on 9/24. However, restarted D10/NS for patient NPO who has not passed speech & swallow (NPO except meds & icechips as of 9/25).     Pt went for MR-A & MR-V non-con with no acute findings, and went for MRI brain w/contrast on 9/25. Findings from MRI brain w/ contrast were two, likely new pontine infarcts. Pt started on aspirin per neuro recs. Pts blood glucose no longer labile, Blood glucose >90 (per endo recs), pt stable for stepdown to Neuro.       INTERVAL HPI/OVERNIGHT EVENTS:   Yesterday pt completed MRI brain w/ contrast; was f/t/h two new L pontine infarcts. Mentation continues to improve.    SUBJECTIVE/AT BEDSIDE:   Patient seen and examined at bedside. Awake, talking in full and comprehensible sentences.    ICU Vital Signs Last 24 Hrs  T(C): 37.1 (26 Sep 2023 09:02), Max: 37.1 (26 Sep 2023 09:02)  T(F): 98.7 (26 Sep 2023 09:02), Max: 98.7 (26 Sep 2023 09:02)  HR: 72 (26 Sep 2023 09:00) (55 - 88)  BP: 181/81 (26 Sep 2023 09:00) (132/60 - 204/86)  BP(mean): 116 (26 Sep 2023 09:00) (87 - 139)  ABP: --  ABP(mean): --  RR: 24 (26 Sep 2023 09:00) (19 - 45)  SpO2: 97% (26 Sep 2023 09:00) (94% - 100%)    O2 Parameters below as of 26 Sep 2023 09:00  Patient On (Oxygen Delivery Method): room air      I&O's Summary    25 Sep 2023 07:01  -  26 Sep 2023 07:00  --------------------------------------------------------  IN: 1363.8 mL / OUT: 1965 mL / NET: -601.2 mL    26 Sep 2023 07:01  -  26 Sep 2023 09:49  --------------------------------------------------------  IN: 100 mL / OUT: 225 mL / NET: -125 mL      PHYSICAL EXAM:  GENERAL: very thin, elderly woman, sleeping in bed  HEAD: normocephalic  EYES: pupils equal, round, and reactive to light bilaterally   NERVOUS SYSTEM: A&Ox2-3, lying in bed  CHEST/LUNG: crackles in BL lung, occasional productive cough, some rhonchi present  HEART: +S1/S2, irregularly irreg, holosystolic murmur present  ABDOMEN: NTND  EXTREMITIES: +1 dorsal pedal pulses, no edema    LABS:                        10.6   7.69  )-----------( 196      ( 26 Sep 2023 06:21 )             30.8     09-26    134<L>  |  99  |  5<L>  ----------------------------<  99  3.6   |  29  |  0.69    Ca    8.4      26 Sep 2023 05:54  Phos  2.8     09-26  Mg     2.0     09-26    TPro  6.5  /  Alb  3.0<L>  /  TBili  0.8  /  DBili  x   /  AST  22  /  ALT  30  /  AlkPhos  97  09-26      Urinalysis Basic - ( 26 Sep 2023 05:54 )    Color: x / Appearance: x / SG: x / pH: x  Gluc: 99 mg/dL / Ketone: x  / Bili: x / Urobili: x   Blood: x / Protein: x / Nitrite: x   Leuk Esterase: x / RBC: x / WBC x   Sq Epi: x / Non Sq Epi: x / Bacteria: x      CAPILLARY BLOOD GLUCOSE  POCT Blood Glucose.: 93 mg/dL (26 Sep 2023 06:11)  POCT Blood Glucose.: 115 mg/dL (25 Sep 2023 23:41)  POCT Blood Glucose.: 107 mg/dL (25 Sep 2023 17:51)  POCT Blood Glucose.: 102 mg/dL (25 Sep 2023 13:46)  POCT Blood Glucose.: 113 mg/dL (25 Sep 2023 11:49)    MEDICATIONS  (STANDING):  albuterol/ipratropium for Nebulization 3 milliLiter(s) Nebulizer every 6 hours  amLODIPine   Tablet 5 milliGRAM(s) Oral daily  aspirin  chewable 81 milliGRAM(s) Oral daily  atorvastatin 40 milliGRAM(s) Oral at bedtime  chlorhexidine 2% Cloths 1 Application(s) Topical <User Schedule>  chlorhexidine 4% Liquid 1 Application(s) Topical <User Schedule>  dextrose 10% + sodium chloride 0.9%. 1000 milliLiter(s) (25 mL/Hr) IV Continuous <Continuous>  enoxaparin Injectable 30 milliGRAM(s) SubCutaneous every 24 hours  melatonin 5 milliGRAM(s) Oral at bedtime  metoprolol tartrate Injectable 2.5 milliGRAM(s) IV Push every 6 hours  montelukast 10 milliGRAM(s) Oral daily  multivitamin  Chewable 1 Tablet(s) Oral daily  piperacillin/tazobactam IVPB.. 4.5 Gram(s) IV Intermittent every 8 hours  thiamine 100 milliGRAM(s) Oral daily    MEDICATIONS  (PRN):  sodium chloride 0.9% lock flush 10 milliLiter(s) IV Push every 1 hour PRN Pre/post blood products, medications, blood draw, and to maintain line patency    RADIOLOGY & ADDITIONAL TESTS:  Consultant(s) Notes Reviewed:  [x ] YES  [ ] NO

## 2023-09-26 NOTE — SWALLOW FEES ASSESSMENT ADULT - CONSISTENCIES ADMINISTERED
Thin liquid via tsp x1, cup sip x5, puree x2, regular cracker x1/thin liquid/pureed/regular solid Thin liquid via tsp x1, self fed cup sip x5, puree x2, regular cracker x1/thin liquid/pureed/regular solid

## 2023-09-27 LAB
ANION GAP SERPL CALC-SCNC: 6 MMOL/L — SIGNIFICANT CHANGE UP (ref 5–17)
BUN SERPL-MCNC: 11 MG/DL — SIGNIFICANT CHANGE UP (ref 7–23)
CALCIUM SERPL-MCNC: 8.4 MG/DL — SIGNIFICANT CHANGE UP (ref 8.4–10.5)
CHLORIDE SERPL-SCNC: 100 MMOL/L — SIGNIFICANT CHANGE UP (ref 96–108)
CO2 SERPL-SCNC: 26 MMOL/L — SIGNIFICANT CHANGE UP (ref 22–31)
CREAT SERPL-MCNC: 0.7 MG/DL — SIGNIFICANT CHANGE UP (ref 0.5–1.3)
EGFR: 87 ML/MIN/1.73M2 — SIGNIFICANT CHANGE UP
GLUCOSE BLDC GLUCOMTR-MCNC: 101 MG/DL — HIGH (ref 70–99)
GLUCOSE BLDC GLUCOMTR-MCNC: 235 MG/DL — HIGH (ref 70–99)
GLUCOSE BLDC GLUCOMTR-MCNC: 82 MG/DL — SIGNIFICANT CHANGE UP (ref 70–99)
GLUCOSE BLDC GLUCOMTR-MCNC: 87 MG/DL — SIGNIFICANT CHANGE UP (ref 70–99)
GLUCOSE SERPL-MCNC: 94 MG/DL — SIGNIFICANT CHANGE UP (ref 70–99)
HCT VFR BLD CALC: 31.4 % — LOW (ref 34.5–45)
HGB BLD-MCNC: 10.4 G/DL — LOW (ref 11.5–15.5)
MAGNESIUM SERPL-MCNC: 1.8 MG/DL — SIGNIFICANT CHANGE UP (ref 1.6–2.6)
MCHC RBC-ENTMCNC: 32.9 PG — SIGNIFICANT CHANGE UP (ref 27–34)
MCHC RBC-ENTMCNC: 33.1 GM/DL — SIGNIFICANT CHANGE UP (ref 32–36)
MCV RBC AUTO: 99.4 FL — SIGNIFICANT CHANGE UP (ref 80–100)
NRBC # BLD: 0 /100 WBCS — SIGNIFICANT CHANGE UP (ref 0–0)
PHOSPHATE SERPL-MCNC: 3.1 MG/DL — SIGNIFICANT CHANGE UP (ref 2.5–4.5)
PLATELET # BLD AUTO: 194 K/UL — SIGNIFICANT CHANGE UP (ref 150–400)
POTASSIUM SERPL-MCNC: 3.7 MMOL/L — SIGNIFICANT CHANGE UP (ref 3.5–5.3)
POTASSIUM SERPL-SCNC: 3.7 MMOL/L — SIGNIFICANT CHANGE UP (ref 3.5–5.3)
PROINSULIN SERPL-MCNC: 3.9 PMOL/L — SIGNIFICANT CHANGE UP (ref 0–10)
RBC # BLD: 3.16 M/UL — LOW (ref 3.8–5.2)
RBC # FLD: 12.3 % — SIGNIFICANT CHANGE UP (ref 10.3–14.5)
SODIUM SERPL-SCNC: 132 MMOL/L — LOW (ref 135–145)
WBC # BLD: 6.17 K/UL — SIGNIFICANT CHANGE UP (ref 3.8–10.5)
WBC # FLD AUTO: 6.17 K/UL — SIGNIFICANT CHANGE UP (ref 3.8–10.5)

## 2023-09-27 PROCEDURE — 99231 SBSQ HOSP IP/OBS SF/LOW 25: CPT | Mod: GC

## 2023-09-27 PROCEDURE — 78999 UNLISTED MISC PX DX NUC MED: CPT | Mod: 26

## 2023-09-27 PROCEDURE — 78608 BRAIN IMAGING (PET): CPT | Mod: 26

## 2023-09-27 PROCEDURE — 99232 SBSQ HOSP IP/OBS MODERATE 35: CPT

## 2023-09-27 PROCEDURE — 99233 SBSQ HOSP IP/OBS HIGH 50: CPT

## 2023-09-27 RX ADMIN — PIPERACILLIN AND TAZOBACTAM 25 GRAM(S): 4; .5 INJECTION, POWDER, LYOPHILIZED, FOR SOLUTION INTRAVENOUS at 17:42

## 2023-09-27 RX ADMIN — AMLODIPINE BESYLATE 5 MILLIGRAM(S): 2.5 TABLET ORAL at 05:55

## 2023-09-27 RX ADMIN — PIPERACILLIN AND TAZOBACTAM 25 GRAM(S): 4; .5 INJECTION, POWDER, LYOPHILIZED, FOR SOLUTION INTRAVENOUS at 00:43

## 2023-09-27 RX ADMIN — Medication 3 MILLILITER(S): at 09:59

## 2023-09-27 RX ADMIN — Medication 25 MILLIGRAM(S): at 05:55

## 2023-09-27 RX ADMIN — Medication 25 MILLIGRAM(S): at 17:43

## 2023-09-27 RX ADMIN — PIPERACILLIN AND TAZOBACTAM 25 GRAM(S): 4; .5 INJECTION, POWDER, LYOPHILIZED, FOR SOLUTION INTRAVENOUS at 07:52

## 2023-09-27 RX ADMIN — Medication 81 MILLIGRAM(S): at 11:34

## 2023-09-27 RX ADMIN — Medication 100 MILLIGRAM(S): at 11:33

## 2023-09-27 RX ADMIN — Medication 3 MILLILITER(S): at 17:43

## 2023-09-27 RX ADMIN — ENOXAPARIN SODIUM 30 MILLIGRAM(S): 100 INJECTION SUBCUTANEOUS at 17:43

## 2023-09-27 RX ADMIN — Medication 3 MILLILITER(S): at 03:30

## 2023-09-27 RX ADMIN — Medication 1 TABLET(S): at 13:16

## 2023-09-27 RX ADMIN — MONTELUKAST 10 MILLIGRAM(S): 4 TABLET, CHEWABLE ORAL at 11:34

## 2023-09-27 NOTE — PROGRESS NOTE ADULT - ATTENDING COMMENTS
Events of the weekend reviewed.  She remains on D10/NS, with the rate now tapered down to 25 cc/hr.  Glucoses have remained > 90, with today's values actually up to 139/102  No meaningful improvement in mental status  Of note is the extremely elevated insulin level of 989 uU/ml, with extremely low C-peptide of 0.9 ng/ml.  These were not drawn prior to pt's treatment with IV dextrose, but I do not believe that she could have mounted an insulin response of this magnitude in response to the dextrose, and would of course have also mounted a C-peptide response  This would therefore leave only two possibilities:  --She has endogenous insulin-binding antibodies, which can give very high levels because the circulating insulin is protein-bound and inactive--BUT, in these spontaneously occurring syndromes, pts can become acutely hypoglycemic from sudden release of bound insulin.  --The insulin level in this range would also be consistent with exogenous insulin--i.e. insulin mistakenly given or surreptitiously given.  At this point would draw a repeat BMP, insulin, C-peptide level and insulin-binding antibodies.  If the insulin level is now markedly lower, and especially if the antibodies are negative, would argue for exogenous insulin.  If the insulin level is still extremely elevated, with normal blood glucoses on minimal IV dextrose, argues for insulin antibodies with bound insulin.  In the latter case, she would be at risk for recurrence of hypoglycemia.
ct head and MRI done yesterday showed subarachanoid hemorrhage. management per neurology re: same. pulmonary status stable. patient distressed with memory loss and confusion. rest as above.
no acute respiratory complains. plan as above. please have rt bring chest vest at bedside.
respiratory status at [facility]. c/w cefepime. hospital coure complicated by delirium. she is producing more phelgm, encourage chest PT/chest vest therapy. please end sputum culture for fungal, mycobacterial and bacterial cultures.
FSGs have been stable  Currently on stable/decreasing rate of D10 due to being NPO  Off precedex  Stable for downgrade from MICU.
MRSA negative, DC vanco  Plan for MRI brain Monday  Wean D10 and precedex, if off D10 and precedex and stable FSG can downgrade to neuro service
82 year old female with a hx of AF not on prior AC, prior admission elsewhere for AMS attributed to PRES, bronchiectasis on regimen of cefepime admitted here with AMS and vision changes currently undergoing neurologic workup. During admission found to have right parietal and occipital SAH and left occipital ischemic stroke.     Had an episode of hypoglycemia requiring D10 gtt and hypothermia. Sending septic work up. Broadened abx to vanco/zosyn. Sent C-peptide, pro-insulin etc. consulted endocrine. Planned for MRI brain today with anesthesia.
Elevated insulin.  Weaning D10.  Send MRSA swab
Pt seen on rounds this afternoon.  Glucoses have been stable in the low 100 range with the pt still on D10/NS at 25 cc/hr  She is more alert today and somewhat communicative, but disoriented and responding inappropriately to questions.    On exam has "Velcro"-type crackles at both bases, worse on the right  Results of MRI noted--acute left pontine infarcts (two lesions) plus significant small vessel ischemic disease  Repeat C-peptide level normal but higher at 2.3 ng/ml.  Insulin level and insulin-binding antibodies still pending  --Pt will almost certainly be able to maintain normoglycemia without the IV dextrose, and can discontinue this.  (Need for maintenance IV fluids if PO intake is insufficient would be a separate issues)  --Although antibody result will not likely be back quickly, the C-peptide results suggests appropriate endogenous insulin secretion in response to the IV dextrose.  If the insulin level has decreased to the normal range, it would argue against a large amount of antibody-bound insulin (since this would not change acutely) and point toward exogenous insulin administration  (I have not discussed this with the family)
82 year old female with a hx of AF not on prior AC, prior admission elsewhere for AMS attributed to PRES, bronchiectasis on regimen of cefepime admitted here with AMS and vision changes currently undergoing neurologic workup. During admission found to have right parietal and occipital SAH and left occipital ischemic stroke.     Had an episode of hypoglycemia requiring D10 gtt and hypothermia. admitted to MICU. Will continue with D10 and monitor FSG. Sending septic work up. Broadened abx to vanco/zosyn. Sent C-peptide, pro-insulin etc. consulted endocrine.
FSGs have been stable  Currently on stable/decreasing rate of D10 due to being NPO  Off precedex for agitation  Stable for downgrade from MICU

## 2023-09-27 NOTE — PROGRESS NOTE ADULT - ASSESSMENT
82y Female with PMHx of HTN, afib (not on AC due to fall risk), recent hospitalization for PRES (5-6/2023), recently discharged from [facility] 8/2023 with acute exacerbation of severe bronchiectasis and respiratory failure presents with several days of AMS and new diplopia 9/18. NIHSS 1 on admission for bitemporal visual deficit. Course complicated by agitation and severe hypoglycemia, requiring ICU admission. Brain imaging significant for L pontine and left occipital infarct as well as SAH of the right parieto-occipital region. Currently, cannot r/o inflammatory causes like ISMA/vasculitis/encephalitis. PET brain obtained, will f/u results     Neuro  #CVA workup  - continue ASA 81mg daily today; holding AC i/s/o SAH and possible CAA  - continue atorvastatin 40mg daily  - q4hr stroke neuro checks and vitals  - f/u PET brain results  - holding off LP due to clinical improvement   - MRI brain with and without contrast from 9/25: Left pontine punctate acute infarctions have developed since 9/20/2023. Subarachnoid hemorrhage has diminished since 9/20/2023. Ischemic white matter disease, atrophy typical for age and remote petechial hemorrhages are stable findings since 9/20/2023  - MRA brain 9/22: negative for steno-occlusive disease, aneurysm or high flow   vascular malformation. No new/interval infarct on DWI since 9/20/23,   though mild cerebral edema has developed. Query amyloid related disease.  - MRA neck: negative  - MRV brain: negative for thrombus  - Stroke education    Cards  #HTN  - Goal -160  - TTE from 9/19: mildly dilated RV, dilated RA, severe pulmonary hypertension    #HLD  - high dose statin as above in CVA  - LDL results: 129    Pulm  - call provider if SPO2 < 94%    #bronchiectasis  - pulmonology consulted, appreciate recs   - was started on cefepime outpatient, switched to Zosyn inpatient to r/o cefepime induced AMS. Discussed with outpatient pulm Dr. Foster whom recommends patient continue IV abx for 1 more week.  - obtain PICC line prior to discharge   - chest PT twice daily  - continue duonebs  - continue singular  - will need to follow up with Dr. Foster as outpatient    GI  #Nutrition/Fluids/Electrolytes   - replete K<4 and Mg <2  - Diet: minced and moist diet with ensure protein plus   - IVF: none    Renal  - daily BMP    #urinary retention  - indwelling catheter placed  - dc'ed saravia 9/27 for TOV today   - q6h bladder scans     Infectious Disease  - continue Zosyn x 1 week as per above     Endocrine  - A1C results: 5.5%    - TSH results: 3.870    Psych  #concern for SI - was placed on CO  - patient denies SI while alert and oriented, CO dc'ed  - with sitter due to fall risk     DVT Prophylaxis  - lovenox sq for DVT prophylaxis   - SCDs for DVT prophylaxis     Dispo: rec'ed for AR.      Discussed daily hospital plans and goals with patient.    Discussed with Dr. Garcia

## 2023-09-27 NOTE — PROGRESS NOTE ADULT - ASSESSMENT
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 8/2023 for acute bronchiectasis exacerbation now presenting with AMS and admitted to stroke service. Pulm consulted for management of possible bronchiectasis exacerbation.    2019 sputum culture:   Pseudomonas           aeruginosa                                      MDIL        MINT                                    ________    ________       Ceftazidime                  4           S       Levofloxacin                 <=1         S       Amikacin                     >32         R       Aztreonam                    <=4         S       Cefepime                     16          I       Ciprofloxacin                <=0.5       S       Gentamicin                   >8          R       Imipenem                     <=1         S       Meropenem                    <=1         S       Piperacillin/Tazobactam      <=8         S       Tobramycin                   >8          R                                            Stenotrophomonas                                    maltophilia                                    #2                                      MDIL        MINT                                    ________    ________       Ceftazidime                  8           S       Levofloxacin                 <=1         S       Trimethoprim/Sulfa           <=0.5/9.5   S    #Acute Respiratory Failure with Hypoxia    Recommendation: Pt desatting to 86% in ED, started on 2L NC. Complaining of worsening dyspnea at rest and on exertion x1 week and pogressively worsening nonproductive cough. Now resting comfortably on RA, however per pt had improved breathing on oxygen. Not on O2 at home.  - SpO2 goal >90%  - Pulm hygiene: OOBTC, PT/OT, incentive spirometry, chest PT twice daily, airway clearance  - Patient recently started on chest PT at home, need to continue in patient. RT can start chest vest inpatient depending on availability, can do aerobika instead if chest vest not available.    #Acute Exacerbation of Bronchiectasis     Recommendation: Pt admitted to [facility] in 8/2023 for acute bronchiectasis exacerbation, completed zosyn, ciprofloxacin, and bactrim course. Started on IV cefepime by outpatient pulmonologist, s/p PICC placement on 9/13.   - Can continue IV abx per outpatient pulm recs, this is not a barrier to discharge  - Please continue Cefepime per her outpatient regimen from Dr. Cristian Jordan q6hr STANDING followed by 7% hypertonic saline followed by aerobika  - Chest PT twice daily (can dc aerobika if chest vest is available, PLEASE call RT to have this for patient as she states she has not been getting it done at home)  - c/w inhaled kayston outpatient  - encourage patient to use duonebs and chest vest at home  - She is back at her baseline from a respiratory standpoint  - Please ensure patient has follow up with Dr. Foster as outpatient prior to discharge  - Please call back with any questions    Case s/e/d with Dr. Lo

## 2023-09-27 NOTE — PROGRESS NOTE ADULT - NUTRITIONAL ASSESSMENT
This patient has been assessed with a concern for Malnutrition and has been determined to have a diagnosis/diagnoses of Severe protein-calorie malnutrition and Underweight (BMI < 19).    This patient is being managed with:   Diet NPO-  NPO for Procedure/Test     NPO Start Date: 27-Sep-2023   NPO Start Time: 11:04  Entered: Sep 27 2023 11:04AM    Diet Minced and Moist-  Entered: Sep 26 2023 11:03AM

## 2023-09-27 NOTE — PROGRESS NOTE ADULT - SUBJECTIVE AND OBJECTIVE BOX
INTERVAL HPI/OVERNIGHT EVENTS: steph o/n    SUBJECTIVE: Patient seen and examined at bedside.   Sitter at bedside. Pt satting well 96% on room air. Denies any chest pain, dyspnea, numbness, nausea. States she still has intermittent cough. Knows she is in [facility] and daughter's name but not much else regarding rest of admission.   Per sitter - pt eating wo issues this morning.     OBJECTIVE:    VITAL SIGNS:  ICU Vital Signs Last 24 Hrs  T(C): 36.9 (27 Sep 2023 14:00), Max: 36.9 (27 Sep 2023 14:00)  T(F): 98.4 (27 Sep 2023 14:00), Max: 98.4 (27 Sep 2023 14:00)  HR: 82 (27 Sep 2023 17:41) (60 - 83)  BP: 152/77 (27 Sep 2023 17:41) (123/59 - 216/159)  BP(mean): 109 (27 Sep 2023 17:41) (85 - 178)  ABP: --  ABP(mean): --  RR: 16 (27 Sep 2023 17:41) (16 - 25)  SpO2: 95% (27 Sep 2023 17:41) (94% - 98%)    O2 Parameters below as of 27 Sep 2023 17:41  Patient On (Oxygen Delivery Method): room air              09-26 @ 07:01  -  09-27 @ 07:00  --------------------------------------------------------  IN: 425 mL / OUT: 1920 mL / NET: -1495 mL    09-27 @ 07:01  -  09-27 @ 18:06  --------------------------------------------------------  IN: 0 mL / OUT: 200 mL / NET: -200 mL      CAPILLARY BLOOD GLUCOSE      POCT Blood Glucose.: 82 mg/dL (27 Sep 2023 17:01)      PHYSICAL EXAM:  GEN: thin female in NAD on RA  HEENT: NC/AT, MMM  CV: RRR, nml S1S2, no murmurs  PULM: nml effort, dry rales on R mid and lower fields wo wheezing or rhonchi.   ABD: Soft, non-distended, NABS, non-tender  NEURO  A/O x self, hospital, but not time. moving all extremities, Sensation intact  PSYCH: Appropriate    MEDICATIONS:  MEDICATIONS  (STANDING):  albuterol/ipratropium for Nebulization 3 milliLiter(s) Nebulizer every 6 hours  amLODIPine   Tablet 5 milliGRAM(s) Oral daily  aspirin  chewable 81 milliGRAM(s) Oral daily  atorvastatin 40 milliGRAM(s) Oral at bedtime  enoxaparin Injectable 30 milliGRAM(s) SubCutaneous every 24 hours  melatonin 5 milliGRAM(s) Oral at bedtime  metoprolol tartrate 25 milliGRAM(s) Oral two times a day  montelukast 10 milliGRAM(s) Oral daily  multivitamin  Chewable 1 Tablet(s) Oral daily  piperacillin/tazobactam IVPB.. 4.5 Gram(s) IV Intermittent every 8 hours  thiamine 100 milliGRAM(s) Oral daily    MEDICATIONS  (PRN):      ALLERGIES:  Allergies    Ceclor (Rash)  IV Contrast (Unknown)  Levaquin (Swelling)  Augmentin (Short breath; Rash)    Intolerances        LABS:                        10.4   6.17  )-----------( 194      ( 27 Sep 2023 06:12 )             31.4     09-27    132<L>  |  100  |  11  ----------------------------<  94  3.7   |  26  |  0.70    Ca    8.4      27 Sep 2023 06:12  Phos  3.1     09-27  Mg     1.8     09-27    TPro  6.5  /  Alb  3.0<L>  /  TBili  0.8  /  DBili  x   /  AST  22  /  ALT  30  /  AlkPhos  97  09-26      Urinalysis Basic - ( 27 Sep 2023 06:12 )    Color: x / Appearance: x / SG: x / pH: x  Gluc: 94 mg/dL / Ketone: x  / Bili: x / Urobili: x   Blood: x / Protein: x / Nitrite: x   Leuk Esterase: x / RBC: x / WBC x   Sq Epi: x / Non Sq Epi: x / Bacteria: x        RADIOLOGY & ADDITIONAL TESTS: Reviewed.

## 2023-09-27 NOTE — PROGRESS NOTE ADULT - SUBJECTIVE AND OBJECTIVE BOX
Neurology Stroke Progress Note    INTERVAL HPI/OVERNIGHT EVENTS:  Patient downgraded from MICU to stroke tele overnight. Received 1mg haldol overnight for agitated. This AM patient resting comfortably in bed. Received PET brain, will follow up results. Denies headache, dizziness, new weakness/numbness of extremities.    MEDICATIONS  (STANDING):  albuterol/ipratropium for Nebulization 3 milliLiter(s) Nebulizer every 6 hours  amLODIPine   Tablet 5 milliGRAM(s) Oral daily  aspirin  chewable 81 milliGRAM(s) Oral daily  atorvastatin 40 milliGRAM(s) Oral at bedtime  enoxaparin Injectable 30 milliGRAM(s) SubCutaneous every 24 hours  melatonin 5 milliGRAM(s) Oral at bedtime  metoprolol tartrate 25 milliGRAM(s) Oral two times a day  montelukast 10 milliGRAM(s) Oral daily  multivitamin  Chewable 1 Tablet(s) Oral daily  piperacillin/tazobactam IVPB.. 4.5 Gram(s) IV Intermittent every 8 hours  thiamine 100 milliGRAM(s) Oral daily    MEDICATIONS  (PRN):      Allergies    Ceclor (Rash)  IV Contrast (Unknown)  Levaquin (Swelling)  Augmentin (Short breath; Rash)    Intolerances        Vital Signs Last 24 Hrs  T(C): 36.9 (27 Sep 2023 14:00), Max: 36.9 (27 Sep 2023 14:00)  T(F): 98.4 (27 Sep 2023 14:00), Max: 98.4 (27 Sep 2023 14:00)  HR: 70 (27 Sep 2023 20:15) (60 - 83)  BP: 135/58 (27 Sep 2023 20:15) (123/59 - 216/159)  BP(mean): 84 (27 Sep 2023 20:15) (84 - 178)  RR: 16 (27 Sep 2023 20:15) (16 - 24)  SpO2: 93% (27 Sep 2023 20:15) (93% - 98%)    Parameters below as of 27 Sep 2023 20:15  Patient On (Oxygen Delivery Method): room air        Physical exam:  General: No acute distress, awake and alert  Eyes: Anicteric sclerae, moist conjunctivae, see below for CNs  Neck: trachea midline, FROM, supple, no thyromegaly or lymphadenopathy  Cardiovascular: Regular rate and rhythm, no murmurs, rubs, or gallops. No carotid bruits.   Pulmonary: Anterior breath sounds clear bilaterally, no crackles or wheezing. No use of accessory muscles  GI: Abdomen soft, non-distended, non-tender  Extremities: + left arm ecchymosis with skin tears     Neurologic:  -Mental status: Awake, eyes open, oriented to person, place (hospital, believes she is at [facility]), and to year. Follows simple commands but has some periods of inattention. Repetition intact. Concentration intact   -Cranial nerves:   II: Poor visual fields exam, appears to have bitemporally quadrantopia with BTT however exam varies   III, IV, VI: Extraocular movements are intact without nystagmus. Pupils equally round and reactive to light  V:  Facial sensation V1-V3 equal and intact   VII: Face appears symmetric today   VIII: Hearing is bilaterally intact to voice  XII: Tongue protrudes midline  Motor: Motor: Diminished bulk throughout. Normal tone. B/l UEs at least a 4/5 without drift. B/l LEs at least a 3/5 without drift.   Sensation: Intact to light touch bilaterally  Gait: deferred    LABS:                        10.4   6.17  )-----------( 194      ( 27 Sep 2023 06:12 )             31.4     09-27    132<L>  |  100  |  11  ----------------------------<  94  3.7   |  26  |  0.70    Ca    8.4      27 Sep 2023 06:12  Phos  3.1     09-27  Mg     1.8     09-27    TPro  6.5  /  Alb  3.0<L>  /  TBili  0.8  /  DBili  x   /  AST  22  /  ALT  30  /  AlkPhos  97  09-26      Urinalysis Basic - ( 27 Sep 2023 06:12 )    Color: x / Appearance: x / SG: x / pH: x  Gluc: 94 mg/dL / Ketone: x  / Bili: x / Urobili: x   Blood: x / Protein: x / Nitrite: x   Leuk Esterase: x / RBC: x / WBC x   Sq Epi: x / Non Sq Epi: x / Bacteria: x    RADIOLOGY & ADDITIONAL TESTS:  reviewed

## 2023-09-27 NOTE — PROGRESS NOTE ADULT - SUBJECTIVE AND OBJECTIVE BOX
PULMONARY CONSULT SERVICE FOLLOW-UP NOTE    INTERVAL HPI:  Reviewed chart and overnight events; patient seen and examined at bedside.    MEDICATIONS:  Pulmonary:  albuterol/ipratropium for Nebulization 3 milliLiter(s) Nebulizer every 6 hours  montelukast 10 milliGRAM(s) Oral daily    Antimicrobials:  piperacillin/tazobactam IVPB.. 4.5 Gram(s) IV Intermittent every 8 hours    Anticoagulants:  aspirin  chewable 81 milliGRAM(s) Oral daily  enoxaparin Injectable 30 milliGRAM(s) SubCutaneous every 24 hours    Cardiac:  amLODIPine   Tablet 5 milliGRAM(s) Oral daily  metoprolol tartrate 25 milliGRAM(s) Oral two times a day      Allergies    Ceclor (Rash)  IV Contrast (Unknown)  Levaquin (Swelling)  Augmentin (Short breath; Rash)    Intolerances        Vital Signs Last 24 Hrs  T(C): 36.9 (27 Sep 2023 14:00), Max: 36.9 (27 Sep 2023 14:00)  T(F): 98.4 (27 Sep 2023 14:00), Max: 98.4 (27 Sep 2023 14:00)  HR: 66 (27 Sep 2023 11:43) (60 - 131)  BP: 137/74 (27 Sep 2023 11:43) (123/59 - 216/159)  BP(mean): 97 (27 Sep 2023 11:43) (85 - 178)  RR: 16 (27 Sep 2023 11:43) (12 - 27)  SpO2: 95% (27 Sep 2023 10:32) (94% - 98%)    Parameters below as of 27 Sep 2023 10:32  Patient On (Oxygen Delivery Method): room air        09-26 @ 07:01 - 09-27 @ 07:00  --------------------------------------------------------  IN: 425 mL / OUT: 1920 mL / NET: -1495 mL    09-27 @ 07:01 - 09-27 @ 14:54  --------------------------------------------------------  IN: 0 mL / OUT: 200 mL / NET: -200 mL      PHYSICAL EXAM:  Constitutional: WD  HEENT: NC/AT; PERRL, anicteric sclera; MMM  Neck: supple  Lymph: No supraclavical LAD or cervical chain LAD  Cardiovascular: +S1/S2, RRR  Respiratory: CTA B/L; no W/R/R  Gastrointestinal: soft, NT/ND  Extremities: WWP; no edema, clubbing or cyanosis  Vascular: 2+ radial and pedal pulses  Neurological: AAOx3; no focal deficits    LABS:      CBC Full  -  ( 27 Sep 2023 06:12 )  WBC Count : 6.17 K/uL  RBC Count : 3.16 M/uL  Hemoglobin : 10.4 g/dL  Hematocrit : 31.4 %  Platelet Count - Automated : 194 K/uL  Mean Cell Volume : 99.4 fl  Mean Cell Hemoglobin : 32.9 pg  Mean Cell Hemoglobin Concentration : 33.1 gm/dL  Auto Neutrophil # : x  Auto Lymphocyte # : x  Auto Monocyte # : x  Auto Eosinophil # : x  Auto Basophil # : x  Auto Neutrophil % : x  Auto Lymphocyte % : x  Auto Monocyte % : x  Auto Eosinophil % : x  Auto Basophil % : x    09-27    132<L>  |  100  |  11  ----------------------------<  94  3.7   |  26  |  0.70    Ca    8.4      27 Sep 2023 06:12  Phos  3.1     09-27  Mg     1.8     09-27    TPro  6.5  /  Alb  3.0<L>  /  TBili  0.8  /  DBili  x   /  AST  22  /  ALT  30  /  AlkPhos  97  09-26          Urinalysis Basic - ( 27 Sep 2023 06:12 )    Color: x / Appearance: x / SG: x / pH: x  Gluc: 94 mg/dL / Ketone: x  / Bili: x / Urobili: x   Blood: x / Protein: x / Nitrite: x   Leuk Esterase: x / RBC: x / WBC x   Sq Epi: x / Non Sq Epi: x / Bacteria: x    RADIOLOGY & ADDITIONAL STUDIES:    < from: Xray Chest 1 View- PORTABLE-Urgent (Xray Chest 1 View- PORTABLE-Urgent .) (09.21.23 @ 17:10) >  ACC: [id number] EXAM:  XR CHEST PORTABLE URGENT 1V   ORDERED BY: JEFF ANSARI     PROCEDURE DATE:  09/21/2023          INTERPRETATION:  HISTORY: AMS, r/o consolidation    TECHNIQUE: One view of the chest    COMPARISON: Chest one view 8/16/2023.    FINDINGS: Patient rotated to the right.    Lines/devices: Overlying ECG wires.. Left-sided PICC, with tip overlying   the SVC.    Lungs/pleura: No pneumothorax. No pleural effusion. No focal   consolidation. The lungs appear hyperinflated. Scattered small right lung   calcified granulomata. Chronic volume loss right upper lobe. Possible   mild large airways disease/bronchiectasis in the right upper lobe. No   lung consolidation.    Cardiomediastinal silhouette: Within normal limits.    Other: Levocurvature of the spine. The bones appear demineralized.    IMPRESSION:  No lung consolidation.    --- End of Report ---    < end of copied text >

## 2023-09-27 NOTE — PROGRESS NOTE ADULT - SUBJECTIVE AND OBJECTIVE BOX
SUBJECTIVE / INTERVAL HPI: Patient was seen and examined this morning.     Overnight events:    CAPILLARY BLOOD GLUCOSE & INSULIN RECEIVED  115 mg/dL (09-25 @ 23:41)  93 mg/dL (09-26 @ 06:11)  114 mg/dL (09-26 @ 11:43)  120 mg/dL (09-26 @ 14:33)  116 mg/dL (09-26 @ 16:21)  127 mg/dL (09-26 @ 19:26)  235 mg/dL (09-27 @ 00:30)  87 mg/dL (09-27 @ 06:21)      REVIEW OF SYSTEMS  Constitutional:  Negative fever, chills or loss of appetite.  Eyes:  Negative blurry vision or double vision.  Cardiovascular:  Negative for chest pain or palpitations.  Respiratory:  Negative for cough, wheezing, or shortness of breath.    Gastrointestinal:  Negative for nausea, vomiting, diarrhea, constipation, or abdominal pain.  Genitourinary:  Negative frequency, urgency or dysuria.  Neurologic:  No headache, confusion, dizziness, lightheadedness.    PHYSICAL EXAM  Vital Signs Last 24 Hrs  T(C): 36.8 (27 Sep 2023 01:00), Max: 38.2 (26 Sep 2023 14:00)  T(F): 98.3 (27 Sep 2023 01:00), Max: 100.7 (26 Sep 2023 14:00)  HR: 78 (27 Sep 2023 08:27) (60 - 137)  BP: 160/68 (27 Sep 2023 08:27) (126/67 - 216/159)  BP(mean): 98 (27 Sep 2023 08:27) (88 - 178)  RR: 16 (27 Sep 2023 08:27) (12 - 41)  SpO2: 96% (27 Sep 2023 08:27) (93% - 99%)    Parameters below as of 27 Sep 2023 08:27  Patient On (Oxygen Delivery Method): room air        Constitutional: Awake, alert, in no acute distress.   HEENT: Normocephalic, atraumatic, SANDRA.  Respiratory: Lungs clear to ausculation bilaterally.   Cardiovascular: regular rhythm, normal S1 and S2, no audible murmurs.   GI: soft, non-tender, non-distended, bowel sounds present.  Extremities: No lower extremity edema.  Psychiatric: AAO x 3. Normal affect/mood.     LABS  CBC - WBC/HGB/HTC/PLT: 6.17/10.4/31.4/194 (09-27-23)  BMP - Na/K/Cl/Bicarb/BUN/Cr/Gluc/AG/eGFR: 132/3.7/100/26/11/0.70/94/6/87 (09-27-23)  Ca - 8.4 (09-27-23)  Phos - 3.1 (09-27-23)  Mg - 1.8 (09-27-23)  LFT - Alb/Tprot/Tbili/Dbili/AlkPhos/ALT/AST: 3.0/--/0.8/--/97/30/22 (09-26-23)  PT/aPTT/INR: 11.7/31.3/1.03 (09-21-23)   Thyroid Stimulating Hormone, Serum: 3.870 (09-20-23)  Total T4/Free T4: 6.47/0.856 (09-21-23)      09-26-23 @ 07:01  -  09-27-23 @ 07:00  --------------------------------------------------------  IN: 425 mL / OUT: 1920 mL / NET: -1495 mL        MEDICATIONS  MEDICATIONS  (STANDING):  albuterol/ipratropium for Nebulization 3 milliLiter(s) Nebulizer every 6 hours  amLODIPine   Tablet 5 milliGRAM(s) Oral daily  aspirin  chewable 81 milliGRAM(s) Oral daily  atorvastatin 40 milliGRAM(s) Oral at bedtime  enoxaparin Injectable 30 milliGRAM(s) SubCutaneous every 24 hours  melatonin 5 milliGRAM(s) Oral at bedtime  metoprolol tartrate 25 milliGRAM(s) Oral two times a day  montelukast 10 milliGRAM(s) Oral daily  multivitamin  Chewable 1 Tablet(s) Oral daily  piperacillin/tazobactam IVPB.. 4.5 Gram(s) IV Intermittent every 8 hours  thiamine 100 milliGRAM(s) Oral daily    MEDICATIONS  (PRN):    ASSESSMENT / RECOMMENDATIONS    82y Female with a past medical history of HTN, atrial fibrillation, chronic bronchiectasis, recent hospitalization for PRES (5-6/2023), recent hospitalization in 08/23 acute bronchiectasis exacerbation came to [facility] on 9/19 with altered mental status, visual disturbances and diplopia.  Pt was initially admitted under neuro service for encephalopathy and r/o stroke.  CT head unremarkable for large transcortical infarct or definite acute intracranial hemorrhage.   CT chest shows diffuse bilateral bronchiectasis with multiple areas of distal mucoid impaction and scattered tree-in-bud opacities.  On 9/21, pt had a rapid response for nonsensical and dysarthric speech and noted to have intermitted LE jerking movements.  Pt was found to have glucose of 34. improved with one amp of glucose.  Pt later become hypoxic, tachypeneic and dyspneic and desaturated.  Repeat glucose at that time was 27, pt remained lethargic and received additional 2 amps of glucose which climbed to 401 and D10 was initiated and pt was subsequently transferred to ICU.  Endocrinology has been consulted for Hypoglycemia.    A1C: 5.5 %  BUN: 11  Creatinine: 0.70  GFR: 87  Weight: 32.5  BMI: 11.9  Free T4 0.856, TSH 3.87  cortison AM 09/23: 20.86    # Hypoglycemia  - c-peptide 09/21: 0.9, serum insulin 989, beta-hydroxybutyrate 0.0  - c-peptide 09/25: 2.3, serum insulin 3.4, pending insulin antibodies  - pt tolerating diet now with excellent mental status  - transient elevated serum insulin level on 09/21 raises suspicion for endogenous production in response to IV dextrose (although should not have been elevated to the magnitude of insulin level > 900 vs exogenous insulin administration)  - Doubt it is insulinoma due to the rapid fashion of drop in glucose and normalization of the level and it is autoimmune causes - although insulin antibodies are still pending  - from the record, pt did not receive any insulin products or agents that have high evidence to induce hypoglycemia during hospital course  - please check the following labs whenever patient becomes hypoglycemic: bmp, c-peptide, insulin level, beta-hydroxybutyrate and pro-insulin      Case discussed with Dr. Hill. Primary team updated.       Micehll Borja  Endocrinology Fellow    Service Pager: [phone]    SUBJECTIVE / INTERVAL HPI: Patient was seen and examined this morning.     Overnight events:    CAPILLARY BLOOD GLUCOSE & INSULIN RECEIVED  115 mg/dL (09-25 @ 23:41)  93 mg/dL (09-26 @ 06:11)  114 mg/dL (09-26 @ 11:43)  120 mg/dL (09-26 @ 14:33)  116 mg/dL (09-26 @ 16:21)  127 mg/dL (09-26 @ 19:26)  235 mg/dL (09-27 @ 00:30)  87 mg/dL (09-27 @ 06:21)      REVIEW OF SYSTEMS  Constitutional:  Negative fever, chills or loss of appetite.  Eyes:  Negative blurry vision or double vision.  Cardiovascular:  Negative for chest pain or palpitations.  Respiratory:  Negative for cough, wheezing, or shortness of breath.    Gastrointestinal:  Negative for nausea, vomiting, diarrhea, constipation, or abdominal pain.  Genitourinary:  Negative frequency, urgency or dysuria.  Neurologic:  No headache, confusion, dizziness, lightheadedness.    PHYSICAL EXAM  Vital Signs Last 24 Hrs  T(C): 36.8 (27 Sep 2023 01:00), Max: 38.2 (26 Sep 2023 14:00)  T(F): 98.3 (27 Sep 2023 01:00), Max: 100.7 (26 Sep 2023 14:00)  HR: 78 (27 Sep 2023 08:27) (60 - 137)  BP: 160/68 (27 Sep 2023 08:27) (126/67 - 216/159)  BP(mean): 98 (27 Sep 2023 08:27) (88 - 178)  RR: 16 (27 Sep 2023 08:27) (12 - 41)  SpO2: 96% (27 Sep 2023 08:27) (93% - 99%)    Parameters below as of 27 Sep 2023 08:27  Patient On (Oxygen Delivery Method): room air        Constitutional: Awake, alert, in no acute distress.   HEENT: Normocephalic, atraumatic, SANDRA.  Respiratory: Lungs clear to ausculation bilaterally.   Cardiovascular: regular rhythm, normal S1 and S2, no audible murmurs.   GI: soft, non-tender, non-distended, bowel sounds present.  Extremities: No lower extremity edema.  Psychiatric: AAO x 3. Normal affect/mood.     LABS  CBC - WBC/HGB/HTC/PLT: 6.17/10.4/31.4/194 (09-27-23)  BMP - Na/K/Cl/Bicarb/BUN/Cr/Gluc/AG/eGFR: 132/3.7/100/26/11/0.70/94/6/87 (09-27-23)  Ca - 8.4 (09-27-23)  Phos - 3.1 (09-27-23)  Mg - 1.8 (09-27-23)  LFT - Alb/Tprot/Tbili/Dbili/AlkPhos/ALT/AST: 3.0/--/0.8/--/97/30/22 (09-26-23)  PT/aPTT/INR: 11.7/31.3/1.03 (09-21-23)   Thyroid Stimulating Hormone, Serum: 3.870 (09-20-23)  Total T4/Free T4: 6.47/0.856 (09-21-23)      09-26-23 @ 07:01  -  09-27-23 @ 07:00  --------------------------------------------------------  IN: 425 mL / OUT: 1920 mL / NET: -1495 mL        MEDICATIONS  MEDICATIONS  (STANDING):  albuterol/ipratropium for Nebulization 3 milliLiter(s) Nebulizer every 6 hours  amLODIPine   Tablet 5 milliGRAM(s) Oral daily  aspirin  chewable 81 milliGRAM(s) Oral daily  atorvastatin 40 milliGRAM(s) Oral at bedtime  enoxaparin Injectable 30 milliGRAM(s) SubCutaneous every 24 hours  melatonin 5 milliGRAM(s) Oral at bedtime  metoprolol tartrate 25 milliGRAM(s) Oral two times a day  montelukast 10 milliGRAM(s) Oral daily  multivitamin  Chewable 1 Tablet(s) Oral daily  piperacillin/tazobactam IVPB.. 4.5 Gram(s) IV Intermittent every 8 hours  thiamine 100 milliGRAM(s) Oral daily    MEDICATIONS  (PRN):    ASSESSMENT / RECOMMENDATIONS    82y Female with a past medical history of HTN, atrial fibrillation, chronic bronchiectasis, recent hospitalization for PRES (5-6/2023), recent hospitalization in 08/23 acute bronchiectasis exacerbation came to [facility] on 9/19 with altered mental status, visual disturbances and diplopia.  Pt was initially admitted under neuro service for encephalopathy and r/o stroke.  CT head unremarkable for large transcortical infarct or definite acute intracranial hemorrhage.   CT chest shows diffuse bilateral bronchiectasis with multiple areas of distal mucoid impaction and scattered tree-in-bud opacities.  On 9/21, pt had a rapid response for nonsensical and dysarthric speech and noted to have intermitted LE jerking movements.  Pt was found to have glucose of 34. improved with one amp of glucose.  Pt later become hypoxic, tachypeneic and dyspneic and desaturated.  Repeat glucose at that time was 27, pt remained lethargic and received additional 2 amps of glucose which climbed to 401 and D10 was initiated and pt was subsequently transferred to ICU.  Endocrinology has been consulted for Hypoglycemia.    A1C: 5.5 %  BUN: 11  Creatinine: 0.70  GFR: 87  Weight: 32.5  BMI: 11.9  Free T4 0.856, TSH 3.87  cortison AM 09/23: 20.86    # Hypoglycemia  - c-peptide 09/21: 0.9, serum insulin 989, beta-hydroxybutyrate 0.0  - c-peptide 09/25: 2.3, serum insulin 3.4, pending insulin antibodies  - pt tolerating diet now   - transient elevated serum insulin level on 09/21 raises suspicion for endogenous production in response to IV dextrose (although should not have been elevated to the magnitude of insulin level > 900 vs exogenous insulin administration)  - Doubt it is insulinoma due to the rapid fashion of drop in glucose and normalization of the level and it is autoimmune causes - although insulin antibodies are still pending  - from the record, pt did not receive any insulin products or agents that have high evidence to induce hypoglycemia during hospital course  - please check the following labs whenever patient becomes hypoglycemic: bmp, c-peptide, insulin level, beta-hydroxybutyrate and pro-insulin      Case discussed with Dr. Hill. Primary team updated.       Michell Borja  Endocrinology Fellow    Service Pager: [phone]    SUBJECTIVE / INTERVAL HPI: Patient was seen and examined this morning.     Overnight events:  Pt reports feeling well  denies confusion, headaches and dizzines  tolerating PO diet well  pt states she ate eggs and oatmeal in the morning    CAPILLARY BLOOD GLUCOSE & INSULIN RECEIVED  115 mg/dL (09-25 @ 23:41)  93 mg/dL (09-26 @ 06:11)  114 mg/dL (09-26 @ 11:43)  120 mg/dL (09-26 @ 14:33)  116 mg/dL (09-26 @ 16:21)  127 mg/dL (09-26 @ 19:26)  235 mg/dL (09-27 @ 00:30)  87 mg/dL (09-27 @ 06:21)      REVIEW OF SYSTEMS  Constitutional:  Negative fever, chills or loss of appetite.  Eyes:  Negative blurry vision or double vision.  Cardiovascular:  Negative for chest pain or palpitations.  Respiratory:  Negative for cough, wheezing, or shortness of breath.    Gastrointestinal:  Negative for nausea, vomiting, diarrhea, constipation, or abdominal pain.  Genitourinary:  Negative frequency, urgency or dysuria.  Neurologic:  No headache, confusion, dizziness, lightheadedness.    PHYSICAL EXAM  Vital Signs Last 24 Hrs  T(C): 36.8 (27 Sep 2023 01:00), Max: 38.2 (26 Sep 2023 14:00)  T(F): 98.3 (27 Sep 2023 01:00), Max: 100.7 (26 Sep 2023 14:00)  HR: 78 (27 Sep 2023 08:27) (60 - 137)  BP: 160/68 (27 Sep 2023 08:27) (126/67 - 216/159)  BP(mean): 98 (27 Sep 2023 08:27) (88 - 178)  RR: 16 (27 Sep 2023 08:27) (12 - 41)  SpO2: 96% (27 Sep 2023 08:27) (93% - 99%)    Parameters below as of 27 Sep 2023 08:27  Patient On (Oxygen Delivery Method): room air        Constitutional: Awake, alert, in no acute distress.   HEENT: Normocephalic, atraumatic, SANDRA.  Respiratory: Lungs clear to ausculation bilaterally.   Cardiovascular: regular rhythm, normal S1 and S2, no audible murmurs.   GI: soft, non-tender, non-distended, bowel sounds present.  Extremities: No lower extremity edema.  Psychiatric: AAO x 3. Normal affect/mood.     LABS  CBC - WBC/HGB/HTC/PLT: 6.17/10.4/31.4/194 (09-27-23)  BMP - Na/K/Cl/Bicarb/BUN/Cr/Gluc/AG/eGFR: 132/3.7/100/26/11/0.70/94/6/87 (09-27-23)  Ca - 8.4 (09-27-23)  Phos - 3.1 (09-27-23)  Mg - 1.8 (09-27-23)  LFT - Alb/Tprot/Tbili/Dbili/AlkPhos/ALT/AST: 3.0/--/0.8/--/97/30/22 (09-26-23)  PT/aPTT/INR: 11.7/31.3/1.03 (09-21-23)   Thyroid Stimulating Hormone, Serum: 3.870 (09-20-23)  Total T4/Free T4: 6.47/0.856 (09-21-23)      09-26-23 @ 07:01  -  09-27-23 @ 07:00  --------------------------------------------------------  IN: 425 mL / OUT: 1920 mL / NET: -1495 mL        MEDICATIONS  MEDICATIONS  (STANDING):  albuterol/ipratropium for Nebulization 3 milliLiter(s) Nebulizer every 6 hours  amLODIPine   Tablet 5 milliGRAM(s) Oral daily  aspirin  chewable 81 milliGRAM(s) Oral daily  atorvastatin 40 milliGRAM(s) Oral at bedtime  enoxaparin Injectable 30 milliGRAM(s) SubCutaneous every 24 hours  melatonin 5 milliGRAM(s) Oral at bedtime  metoprolol tartrate 25 milliGRAM(s) Oral two times a day  montelukast 10 milliGRAM(s) Oral daily  multivitamin  Chewable 1 Tablet(s) Oral daily  piperacillin/tazobactam IVPB.. 4.5 Gram(s) IV Intermittent every 8 hours  thiamine 100 milliGRAM(s) Oral daily    MEDICATIONS  (PRN):    ASSESSMENT / RECOMMENDATIONS    82y Female with a past medical history of HTN, atrial fibrillation, chronic bronchiectasis, recent hospitalization for PRES (5-6/2023), recent hospitalization in 08/23 acute bronchiectasis exacerbation came to [facility] on 9/19 with altered mental status, visual disturbances and diplopia.  Pt was initially admitted under neuro service for encephalopathy and r/o stroke.  CT head unremarkable for large transcortical infarct or definite acute intracranial hemorrhage.   CT chest shows diffuse bilateral bronchiectasis with multiple areas of distal mucoid impaction and scattered tree-in-bud opacities.  On 9/21, pt had a rapid response for nonsensical and dysarthric speech and noted to have intermitted LE jerking movements.  Pt was found to have glucose of 34. improved with one amp of glucose.  Pt later become hypoxic, tachypeneic and dyspneic and desaturated.  Repeat glucose at that time was 27, pt remained lethargic and received additional 2 amps of glucose which climbed to 401 and D10 was initiated and pt was subsequently transferred to ICU.  Endocrinology has been consulted for Hypoglycemia.    A1C: 5.5 %  BUN: 11  Creatinine: 0.70  GFR: 87  Weight: 32.5  BMI: 11.9  Free T4 0.856, TSH 3.87  cortison AM 09/23: 20.86    # Hypoglycemia  - c-peptide 09/21: 0.9, serum insulin 989, beta-hydroxybutyrate 0.0  - c-peptide 09/25: 2.3, serum insulin 3.4, pending insulin antibodies  - pt tolerating diet now   - transient elevated serum insulin level on 09/21 raises suspicion for endogenous production in response to IV dextrose (although should not have been elevated to the magnitude of insulin level > 900 vs exogenous insulin administration)  - Doubt it is insulinoma due to the rapid fashion of drop in glucose and normalization of the level and it is autoimmune causes - although insulin antibodies are still pending  - from the record, pt did not receive any insulin products or agents that have high evidence to induce hypoglycemia during hospital course  - please check the following labs whenever patient becomes hypoglycemic: bmp, c-peptide, insulin level, beta-hydroxybutyrate and pro-insulin      Case discussed with Dr. Hill. Primary team updated.       Michell Borja  Endocrinology Fellow    Service Pager: [phone]    SUBJECTIVE / INTERVAL HPI: Patient was seen and examined this morning.     Overnight events:  Pt reports feeling well  denies confusion, headaches and dizzines  tolerating PO diet well  pt states she ate eggs and oatmeal in the morning    CAPILLARY BLOOD GLUCOSE & INSULIN RECEIVED  115 mg/dL (09-25 @ 23:41)  93 mg/dL (09-26 @ 06:11)  114 mg/dL (09-26 @ 11:43)  120 mg/dL (09-26 @ 14:33)  116 mg/dL (09-26 @ 16:21)  127 mg/dL (09-26 @ 19:26)  235 mg/dL (09-27 @ 00:30)  87 mg/dL (09-27 @ 06:21)      REVIEW OF SYSTEMS  Constitutional:  Negative fever, chills or loss of appetite.  Eyes:  Negative blurry vision or double vision.  Cardiovascular:  Negative for chest pain or palpitations.  Respiratory:  Negative for cough, wheezing, or shortness of breath.    Gastrointestinal:  Negative for nausea, vomiting, diarrhea, constipation, or abdominal pain.  Genitourinary:  Negative frequency, urgency or dysuria.  Neurologic:  No headache, confusion, dizziness, lightheadedness.    PHYSICAL EXAM  Vital Signs Last 24 Hrs  T(C): 36.8 (27 Sep 2023 01:00), Max: 38.2 (26 Sep 2023 14:00)  T(F): 98.3 (27 Sep 2023 01:00), Max: 100.7 (26 Sep 2023 14:00)  HR: 78 (27 Sep 2023 08:27) (60 - 137)  BP: 160/68 (27 Sep 2023 08:27) (126/67 - 216/159)  BP(mean): 98 (27 Sep 2023 08:27) (88 - 178)  RR: 16 (27 Sep 2023 08:27) (12 - 41)  SpO2: 96% (27 Sep 2023 08:27) (93% - 99%)    Parameters below as of 27 Sep 2023 08:27  Patient On (Oxygen Delivery Method): room air        Constitutional: Awake, alert, in no acute distress.   HEENT: Normocephalic, atraumatic, SANDRA.  Respiratory: Lungs clear to ausculation bilaterally.   Cardiovascular: regular rhythm, normal S1 and S2, no audible murmurs.   GI: soft, non-tender, non-distended, bowel sounds present.  Extremities: No lower extremity edema.  Psychiatric: AAO x 3. Normal affect/mood.     LABS  CBC - WBC/HGB/HTC/PLT: 6.17/10.4/31.4/194 (09-27-23)  BMP - Na/K/Cl/Bicarb/BUN/Cr/Gluc/AG/eGFR: 132/3.7/100/26/11/0.70/94/6/87 (09-27-23)  Ca - 8.4 (09-27-23)  Phos - 3.1 (09-27-23)  Mg - 1.8 (09-27-23)  LFT - Alb/Tprot/Tbili/Dbili/AlkPhos/ALT/AST: 3.0/--/0.8/--/97/30/22 (09-26-23)  PT/aPTT/INR: 11.7/31.3/1.03 (09-21-23)   Thyroid Stimulating Hormone, Serum: 3.870 (09-20-23)  Total T4/Free T4: 6.47/0.856 (09-21-23)      09-26-23 @ 07:01  -  09-27-23 @ 07:00  --------------------------------------------------------  IN: 425 mL / OUT: 1920 mL / NET: -1495 mL        MEDICATIONS  MEDICATIONS  (STANDING):  albuterol/ipratropium for Nebulization 3 milliLiter(s) Nebulizer every 6 hours  amLODIPine   Tablet 5 milliGRAM(s) Oral daily  aspirin  chewable 81 milliGRAM(s) Oral daily  atorvastatin 40 milliGRAM(s) Oral at bedtime  enoxaparin Injectable 30 milliGRAM(s) SubCutaneous every 24 hours  melatonin 5 milliGRAM(s) Oral at bedtime  metoprolol tartrate 25 milliGRAM(s) Oral two times a day  montelukast 10 milliGRAM(s) Oral daily  multivitamin  Chewable 1 Tablet(s) Oral daily  piperacillin/tazobactam IVPB.. 4.5 Gram(s) IV Intermittent every 8 hours  thiamine 100 milliGRAM(s) Oral daily    MEDICATIONS  (PRN):    ASSESSMENT / RECOMMENDATIONS    82y Female with a past medical history of HTN, atrial fibrillation, chronic bronchiectasis, recent hospitalization for PRES (5-6/2023), recent hospitalization in 08/23 acute bronchiectasis exacerbation came to [facility] on 9/19 with altered mental status, visual disturbances and diplopia.  Pt was initially admitted under neuro service for encephalopathy and r/o stroke.  CT head unremarkable for large transcortical infarct or definite acute intracranial hemorrhage.   CT chest shows diffuse bilateral bronchiectasis with multiple areas of distal mucoid impaction and scattered tree-in-bud opacities.  On 9/21, pt had a rapid response for nonsensical and dysarthric speech and noted to have intermitted LE jerking movements.  Pt was found to have glucose of 34. improved with one amp of glucose.  Pt later become hypoxic, tachypeneic and dyspneic and desaturated.  Repeat glucose at that time was 27, pt remained lethargic and received additional 2 amps of glucose which climbed to 401 and D10 was initiated and pt was subsequently transferred to ICU.  Endocrinology has been consulted for Hypoglycemia.    A1C: 5.5 %  BUN: 11  Creatinine: 0.70  GFR: 87  Weight: 32.5  BMI: 11.9  Free T4 0.856, TSH 3.87  cortison AM 09/23: 20.86    # Hypoglycemia  - c-peptide 09/21: 0.9, serum insulin 989, beta-hydroxybutyrate 0.0  - c-peptide 09/25: 2.3, serum insulin 3.4, pending insulin antibodies  -  - from the record, pt did not receive any insulin products or agents that have high evidence to induce hypoglycemia during hospital course  - please check the following labs whenever patient becomes hypoglycemic: bmp, c-peptide, insulin level, beta-hydroxybutyrate and pro-insulin      Case discussed with Dr. Hill. Primary team updated.       Michell Borja  Endocrinology Fellow    Service Pager: [phone]

## 2023-09-27 NOTE — PROGRESS NOTE ADULT - ASSESSMENT
Pulm: Dr. Foster  82F w HTN, AF not on AC d/t fall risk, hospitalized for PRES 05-06/2023, recent DC 8/2023 for acute hypoxemic respiratory failure - to home on 4L NC w follow-up w Dr. Foster for exacerbation of bronchiectasis, recently started course of IV cefepime outpatient by Dr. Foster 9/13, p/w diplopia, encephalopathy admitted for stroke work-up, cefepime continued inpatient, repeat head CT found small SAH, c/b hypoglycemia, encephalopathy - BG 30 and temp 93F during RRT on 9/21, transferred to MICU for persistent hypoglycemia needing D10W >> D10 NS, slow improvement w nml insulin and C peptide, stepped down to 7L and stroke service, now on IV Zosyn for bronchiectasis    #CVA - SAH  #Encephalopathy - improving. still w concern for Lewy Body Dementia pending PET   - ASA 81 daily, Atorva 40    #Bronchiectasis - On IV Cefepime from 9/13. Has been on IV Zosyn in MICU per pulmonary. Currently satting well on RA. On singulair 10  #AFib - on lopressor 25 bid.   #HTN - on amlodipine 5    #Normocytic anemia - 10.4 today.  #Hyponatremia - 132 from 134. Suspect d/t poor PO intake    Plan  Would d/w Pulm regarding end date of IV Zosyn  Nutrition consult; encourage PO/Fluid intake  f/u PET for LBD eval    Pt is DNR/DNI  DISPO: Acute rehab - baseline lives w daughter

## 2023-09-27 NOTE — PROGRESS NOTE ADULT - NS ATTEND AMEND GEN_ALL_CORE FT
82 year old woman w/ PMH of Afib (not on AC due to frequent falls), HTN, bronchiectasis, recent diagnosis of PRES at outside hospital presented w/ several day history of confusion and visual complaints. Mental status with slight improvement after correction of hypoglycemia and antibiotics.     On exam, patient awake, alert, and tracks examiner. Oriented to self, hospital, September 2023, president susie renee[place] Serial 7 x1. Fluent. Following crossed commands. ?L. superior quadrantopia. EOMI; Face symmetric. Moving all extremities spontaneously at least antigravity.     CTH 9.19.23 negative  MR brain 9.20.23 demonstrates small diffusion restricting lesion in the L. Occipital lobe w/ ADC and FLAIR correlate; +SWI in the R. Parietooccipital region w/ sulcal T2 flair signal.   MRV negative  MRA h/n w/ repeat MRI brain w/o contrast 9.22.23 demonstrates no stenoocclusive disease; No new DWI abnormalities.   Blood cultures NGTD  EEG neg  Repeat MR brain 9.25.23 w/w/o contrast demonstrates questionable, vague, L. pontine diffusion restriction with ADC correlate. No contrast enhancement.     9/26 - significant improvement in examination.     Impression: Acute versus ?subacute onset confusion and visual complaints with radiographic evidence of possible L. 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 Etiology remains uncertain, possibilities include ischemic stroke due to atrial fibrillation, CNS inflammatory process (i.e amyloid-beta related angitis, CNS angitis, etc), PRES/RCVS, CNS lymphoma, underlying neurodegenerative process with superimposed ischemic stroke and convexal SAH, endocarditis (although cultures negative). The significant clinical improvement in her neurological status appears to be inconsistent with the natural history of a CNS inflammatory of vasculitic process and the diagnosis of such would require invasive testing, such as a lumbar puncture, and a cerebral biopsy (to evaluate for CNS angitis or ISMA). Given the significant clinical improvement, will hold off invasive testing and steroid treatment. Will evaluate for underlying neurodegenerative process, such as a LBD, although this should not hold up her discharge.     Plan:   PET to evaluate for neurodegenerative process   Hold off LP and cerebral biopsy for now given clinical improvement.   Continue ASA 81mg; Will hold off AC given possibility of amyloid angiopathy.   Continue Lovenox for DVT ppx  f/u with neurovascular clinical (Janet Vallecillo) at discharge  F/u with dementia clinic at discharge   PT/OT     40 minutes spent on total encounter. The necessity of the time spent during the encounter on this date of service was due to:     Review of imaging and chart; obtaining history; examination of pt; discussion and coordination of care, and discussion of lifestyle modification and risk factor control.

## 2023-09-27 NOTE — PROGRESS NOTE ADULT - SUBJECTIVE AND OBJECTIVE BOX
[facility]  Abad Buenrostro, Palliative Care Attending  Contact Info: Call [phone] (HEAL Line) or message on Microsoft Teams (Abad Buenrostro)    SUBJECTIVE AND OBJECTIVE:  INTERVAL HPI/OVERNIGHT EVENTS: Interval events noted. See patient's PRN use for the past 24hrs noted below. Comprehensive symptom assessment and GOC exploration as noted below. Extensive time spent discussing plan of care with patient/family.    ALLERGIES:  Ceclor (Rash)  IV Contrast (Unknown)  Levaquin (Swelling)  Augmentin (Short breath; Rash)    MEDICATIONS  (STANDING):  albuterol/ipratropium for Nebulization 3 milliLiter(s) Nebulizer every 6 hours  amLODIPine   Tablet 5 milliGRAM(s) Oral daily  aspirin  chewable 81 milliGRAM(s) Oral daily  atorvastatin 40 milliGRAM(s) Oral at bedtime  enoxaparin Injectable 30 milliGRAM(s) SubCutaneous every 24 hours  melatonin 5 milliGRAM(s) Oral at bedtime  metoprolol tartrate 25 milliGRAM(s) Oral two times a day  montelukast 10 milliGRAM(s) Oral daily  multivitamin  Chewable 1 Tablet(s) Oral daily  piperacillin/tazobactam IVPB.. 4.5 Gram(s) IV Intermittent every 8 hours  thiamine 100 milliGRAM(s) Oral daily    MEDICATIONS  (PRN):    Analgesic Use (Scheduled and PRNs) for past 24 hours:  haloperidol    Injectable   1 milliGRAM(s) IV Push (09-26-23 @ 21:38)  melatonin   5 milliGRAM(s) Oral (09-26-23 @ 22:18)    ITEMS UNCHECKED ARE NOT PRESENT  PRESENT SYMPTOMS/REVIEW OF SYSTEMS: []Unable to obtain due to poor mentation   Source if other than patient:  []Family   []Team     Pain: [] yes [x] no  QOL impact -  Location -  Aggravating factors -  Quality -  Radiation -  Timing -  Severity (0-10 scale) -  Minimal acceptable level (0-10 scale) -    PAINAD Score: 0  CPOT Score: 0    Dyspnea:                           []Mild  []Moderate []Severe  Anxiety:                             []Mild []Moderate []Severe  Fatigue:                             []Mild []Moderate []Severe  Nausea:                             []Mild []Moderate []Severe  Loss of appetite:              []Mild []Moderate []Severe  Constipation:                    []Mild []Moderate []Severe    Other Symptoms:  [x]All other review of systems negative     Palliative Performance Status Version 2: 40%    GENERAL:  [x] NAD [x]Alert []Lethargic  []Cachexia  []Unarousable  [x]Verbal  []Non-Verbal  BEHAVIORAL:   []Anxiety  []Delirium []Agitation [x]Cooperative [x]Oriented x3  HEENT:  [x]Normal  [x] Moist Mucous Membranes []Dry mouth   []ET Tube/Trach  []Oral lesions  PULMONARY:   [x]Clear []Tachypnea  []Audible excessive secretions  [x]Normal Work of Breathing []Labored Breathing  []Rhonchi []Crackles []Wheezing  CARDIOVASCULAR:    [x]Regular Rate [x]Regular Rhythm []Irregular []Tachy  []Hemanth  GASTROINTESTINAL:  [x]Soft  []Distended   []+BS  [x]Non tender []Tender  []PEG []OGT/ NGT  Last BM:  GENITOURINARY:  []Normal [x] Incontinent   []Oliguria/Anuria   []Richards  MUSCULOSKELETAL:   [x]Normal Extremities  [x]Weakness  [x]Bed/Wheelchair bound []Edema  NEUROLOGIC:   [x]No focal deficits  []Cognitive impairment  []Dysphagia []Dysarthria []Paresis []Encephalopathic  SKIN:   [x]Normal   []Pressure ulcer(s)  []Rash    Vital Signs Last 24 Hrs  T(C): 36.9 (27 Sep 2023 14:00), Max: 36.9 (27 Sep 2023 14:00)  T(F): 98.4 (27 Sep 2023 14:00), Max: 98.4 (27 Sep 2023 14:00)  HR: 66 (27 Sep 2023 11:43) (60 - 83)  BP: 137/74 (27 Sep 2023 11:43) (123/59 - 216/159)  BP(mean): 97 (27 Sep 2023 11:43) (85 - 178)  RR: 16 (27 Sep 2023 11:43) (12 - 26)  SpO2: 95% (27 Sep 2023 10:32) (94% - 98%)    Parameters below as of 27 Sep 2023 10:32  Patient On (Oxygen Delivery Method): room air    LABS: Personally reviewed and interpreted                      10.4   6.17  )-----------( 194      ( 27 Sep 2023 06:12 )             31.4   09-27    132<L>  |  100  |  11  ----------------------------<  94  3.7   |  26  |  0.70    Ca    8.4      27 Sep 2023 06:12  Phos  3.1     09-27  Mg     1.8     09-27  TPro  6.5  /  Alb  3.0<L>  /  TBili  0.8  /  DBili  x   /  AST  22  /  ALT  30  /  AlkPhos  97  09-26    RADIOLOGY & ADDITIONAL STUDIES: None new    DISCUSSION OF CASE: Daughter - to provide updates and emotional support; Primary Team/RN - to discuss plan of care [facility]  Abad Buenrostro, Palliative Care Attending  Contact Info: Call [phone] (HEAL Line) or message on Microsoft Teams (Abad Buenrostro)    SUBJECTIVE AND OBJECTIVE:  INTERVAL HPI/OVERNIGHT EVENTS: Interval events noted. See patient's PRN use for the past 24hrs noted below. Comprehensive symptom assessment and GOC exploration as noted below. Extensive time spent discussing plan of care with patient/family. Mental status continues to improve.    ALLERGIES:  Ceclor (Rash)  IV Contrast (Unknown)  Levaquin (Swelling)  Augmentin (Short breath; Rash)    MEDICATIONS  (STANDING):  albuterol/ipratropium for Nebulization 3 milliLiter(s) Nebulizer every 6 hours  amLODIPine   Tablet 5 milliGRAM(s) Oral daily  aspirin  chewable 81 milliGRAM(s) Oral daily  atorvastatin 40 milliGRAM(s) Oral at bedtime  enoxaparin Injectable 30 milliGRAM(s) SubCutaneous every 24 hours  melatonin 5 milliGRAM(s) Oral at bedtime  metoprolol tartrate 25 milliGRAM(s) Oral two times a day  montelukast 10 milliGRAM(s) Oral daily  multivitamin  Chewable 1 Tablet(s) Oral daily  piperacillin/tazobactam IVPB.. 4.5 Gram(s) IV Intermittent every 8 hours  thiamine 100 milliGRAM(s) Oral daily    MEDICATIONS  (PRN):    Analgesic Use (Scheduled and PRNs) for past 24 hours:  haloperidol    Injectable   1 milliGRAM(s) IV Push (09-26-23 @ 21:38)  melatonin   5 milliGRAM(s) Oral (09-26-23 @ 22:18)    ITEMS UNCHECKED ARE NOT PRESENT  PRESENT SYMPTOMS/REVIEW OF SYSTEMS: []Unable to obtain due to poor mentation   Source if other than patient:  []Family   []Team     Pain: [] yes [x] no  QOL impact -  Location -  Aggravating factors -  Quality -  Radiation -  Timing -  Severity (0-10 scale) -  Minimal acceptable level (0-10 scale) -    PAINAD Score: 0  CPOT Score: 0    Dyspnea:                           []Mild  []Moderate []Severe  Anxiety:                             []Mild []Moderate []Severe  Fatigue:                             []Mild []Moderate []Severe  Nausea:                             []Mild []Moderate []Severe  Loss of appetite:              []Mild []Moderate []Severe  Constipation:                    []Mild []Moderate []Severe    Other Symptoms:  [x]All other review of systems negative     Palliative Performance Status Version 2: 40%    GENERAL:  [x] NAD [x]Alert []Lethargic  []Cachexia  []Unarousable  [x]Verbal  []Non-Verbal  BEHAVIORAL:   []Anxiety  []Delirium []Agitation [x]Cooperative [x]Oriented x3  HEENT:  [x]Normal  [x] Moist Mucous Membranes []Dry mouth   []ET Tube/Trach  []Oral lesions  PULMONARY:   [x]Clear []Tachypnea  []Audible excessive secretions  [x]Normal Work of Breathing []Labored Breathing  []Rhonchi []Crackles []Wheezing  CARDIOVASCULAR:    [x]Regular Rate [x]Regular Rhythm []Irregular []Tachy  []Hemanth  GASTROINTESTINAL:  [x]Soft  []Distended   []+BS  [x]Non tender []Tender  []PEG []OGT/ NGT  Last BM:  GENITOURINARY:  []Normal [x] Incontinent   []Oliguria/Anuria   []Richards  MUSCULOSKELETAL:   [x]Normal Extremities  [x]Weakness  [x]Bed/Wheelchair bound []Edema  NEUROLOGIC:   [x]No focal deficits  []Cognitive impairment  []Dysphagia []Dysarthria []Paresis []Encephalopathic  SKIN:   [x]Normal   []Pressure ulcer(s)  []Rash    Vital Signs Last 24 Hrs  T(C): 36.9 (27 Sep 2023 14:00), Max: 36.9 (27 Sep 2023 14:00)  T(F): 98.4 (27 Sep 2023 14:00), Max: 98.4 (27 Sep 2023 14:00)  HR: 66 (27 Sep 2023 11:43) (60 - 83)  BP: 137/74 (27 Sep 2023 11:43) (123/59 - 216/159)  BP(mean): 97 (27 Sep 2023 11:43) (85 - 178)  RR: 16 (27 Sep 2023 11:43) (12 - 26)  SpO2: 95% (27 Sep 2023 10:32) (94% - 98%)    Parameters below as of 27 Sep 2023 10:32  Patient On (Oxygen Delivery Method): room air    LABS: Personally reviewed and interpreted                      10.4   6.17  )-----------( 194      ( 27 Sep 2023 06:12 )             31.4   09-27    132<L>  |  100  |  11  ----------------------------<  94  3.7   |  26  |  0.70    Ca    8.4      27 Sep 2023 06:12  Phos  3.1     09-27  Mg     1.8     09-27  TPro  6.5  /  Alb  3.0<L>  /  TBili  0.8  /  DBili  x   /  AST  22  /  ALT  30  /  AlkPhos  97  09-26    RADIOLOGY & ADDITIONAL STUDIES: None new    DISCUSSION OF CASE: Daughter - to provide updates and emotional support; Primary Team/RN - to discuss plan of care

## 2023-09-28 ENCOUNTER — TRANSCRIPTION ENCOUNTER (OUTPATIENT)
Age: 83
End: 2023-09-28

## 2023-09-28 LAB
ANION GAP SERPL CALC-SCNC: 9 MMOL/L — SIGNIFICANT CHANGE UP (ref 5–17)
BUN SERPL-MCNC: 16 MG/DL — SIGNIFICANT CHANGE UP (ref 7–23)
CALCIUM SERPL-MCNC: 8.4 MG/DL — SIGNIFICANT CHANGE UP (ref 8.4–10.5)
CHLORIDE SERPL-SCNC: 101 MMOL/L — SIGNIFICANT CHANGE UP (ref 96–108)
CO2 SERPL-SCNC: 25 MMOL/L — SIGNIFICANT CHANGE UP (ref 22–31)
CREAT SERPL-MCNC: 0.77 MG/DL — SIGNIFICANT CHANGE UP (ref 0.5–1.3)
EGFR: 78 ML/MIN/1.73M2 — SIGNIFICANT CHANGE UP
GLUCOSE BLDC GLUCOMTR-MCNC: 115 MG/DL — HIGH (ref 70–99)
GLUCOSE BLDC GLUCOMTR-MCNC: 139 MG/DL — HIGH (ref 70–99)
GLUCOSE SERPL-MCNC: 93 MG/DL — SIGNIFICANT CHANGE UP (ref 70–99)
HCT VFR BLD CALC: 30.4 % — LOW (ref 34.5–45)
HGB BLD-MCNC: 10.2 G/DL — LOW (ref 11.5–15.5)
MAGNESIUM SERPL-MCNC: 1.8 MG/DL — SIGNIFICANT CHANGE UP (ref 1.6–2.6)
MCHC RBC-ENTMCNC: 33.2 PG — SIGNIFICANT CHANGE UP (ref 27–34)
MCHC RBC-ENTMCNC: 33.6 GM/DL — SIGNIFICANT CHANGE UP (ref 32–36)
MCV RBC AUTO: 99 FL — SIGNIFICANT CHANGE UP (ref 80–100)
NRBC # BLD: 0 /100 WBCS — SIGNIFICANT CHANGE UP (ref 0–0)
PHOSPHATE SERPL-MCNC: 3 MG/DL — SIGNIFICANT CHANGE UP (ref 2.5–4.5)
PLATELET # BLD AUTO: 219 K/UL — SIGNIFICANT CHANGE UP (ref 150–400)
POTASSIUM SERPL-MCNC: 3.7 MMOL/L — SIGNIFICANT CHANGE UP (ref 3.5–5.3)
POTASSIUM SERPL-SCNC: 3.7 MMOL/L — SIGNIFICANT CHANGE UP (ref 3.5–5.3)
RBC # BLD: 3.07 M/UL — LOW (ref 3.8–5.2)
RBC # FLD: 12.5 % — SIGNIFICANT CHANGE UP (ref 10.3–14.5)
SODIUM SERPL-SCNC: 135 MMOL/L — SIGNIFICANT CHANGE UP (ref 135–145)
WBC # BLD: 5.92 K/UL — SIGNIFICANT CHANGE UP (ref 3.8–10.5)
WBC # FLD AUTO: 5.92 K/UL — SIGNIFICANT CHANGE UP (ref 3.8–10.5)

## 2023-09-28 PROCEDURE — 99233 SBSQ HOSP IP/OBS HIGH 50: CPT

## 2023-09-28 PROCEDURE — 99239 HOSP IP/OBS DSCHRG MGMT >30: CPT

## 2023-09-28 PROCEDURE — 99232 SBSQ HOSP IP/OBS MODERATE 35: CPT

## 2023-09-28 RX ORDER — AMLODIPINE BESYLATE 2.5 MG/1
5 TABLET ORAL ONCE
Refills: 0 | Status: COMPLETED | OUTPATIENT
Start: 2023-09-28 | End: 2023-09-28

## 2023-09-28 RX ORDER — MAGNESIUM SULFATE 500 MG/ML
2 VIAL (ML) INJECTION ONCE
Refills: 0 | Status: COMPLETED | OUTPATIENT
Start: 2023-09-28 | End: 2023-09-28

## 2023-09-28 RX ORDER — MIRTAZAPINE 45 MG/1
7.5 TABLET, ORALLY DISINTEGRATING ORAL ONCE
Refills: 0 | Status: COMPLETED | OUTPATIENT
Start: 2023-09-28 | End: 2023-09-29

## 2023-09-28 RX ORDER — SODIUM CHLORIDE 9 MG/ML
1000 INJECTION INTRAMUSCULAR; INTRAVENOUS; SUBCUTANEOUS
Refills: 0 | Status: DISCONTINUED | OUTPATIENT
Start: 2023-09-28 | End: 2023-09-29

## 2023-09-28 RX ORDER — CLONAZEPAM 1 MG
0.25 TABLET ORAL ONCE
Refills: 0 | Status: DISCONTINUED | OUTPATIENT
Start: 2023-09-28 | End: 2023-09-28

## 2023-09-28 RX ORDER — DONEPEZIL HYDROCHLORIDE 10 MG/1
5 TABLET, FILM COATED ORAL AT BEDTIME
Refills: 0 | Status: DISCONTINUED | OUTPATIENT
Start: 2023-09-28 | End: 2023-10-01

## 2023-09-28 RX ORDER — SODIUM CHLORIDE 9 MG/ML
4 INJECTION INTRAMUSCULAR; INTRAVENOUS; SUBCUTANEOUS EVERY 12 HOURS
Refills: 0 | Status: DISCONTINUED | OUTPATIENT
Start: 2023-09-28 | End: 2023-10-10

## 2023-09-28 RX ADMIN — MONTELUKAST 10 MILLIGRAM(S): 4 TABLET, CHEWABLE ORAL at 11:12

## 2023-09-28 RX ADMIN — Medication 100 MILLIGRAM(S): at 11:12

## 2023-09-28 RX ADMIN — Medication 25 MILLIGRAM(S): at 06:58

## 2023-09-28 RX ADMIN — Medication 1 TABLET(S): at 11:13

## 2023-09-28 RX ADMIN — ENOXAPARIN SODIUM 30 MILLIGRAM(S): 100 INJECTION SUBCUTANEOUS at 17:01

## 2023-09-28 RX ADMIN — Medication 25 GRAM(S): at 10:06

## 2023-09-28 RX ADMIN — AMLODIPINE BESYLATE 5 MILLIGRAM(S): 2.5 TABLET ORAL at 09:50

## 2023-09-28 RX ADMIN — SODIUM CHLORIDE 60 MILLILITER(S): 9 INJECTION INTRAMUSCULAR; INTRAVENOUS; SUBCUTANEOUS at 12:49

## 2023-09-28 RX ADMIN — Medication 25 MILLIGRAM(S): at 17:01

## 2023-09-28 RX ADMIN — Medication 0.25 MILLIGRAM(S): at 20:41

## 2023-09-28 RX ADMIN — DONEPEZIL HYDROCHLORIDE 5 MILLIGRAM(S): 10 TABLET, FILM COATED ORAL at 21:12

## 2023-09-28 RX ADMIN — Medication 81 MILLIGRAM(S): at 11:12

## 2023-09-28 RX ADMIN — PIPERACILLIN AND TAZOBACTAM 25 GRAM(S): 4; .5 INJECTION, POWDER, LYOPHILIZED, FOR SOLUTION INTRAVENOUS at 07:05

## 2023-09-28 RX ADMIN — PIPERACILLIN AND TAZOBACTAM 25 GRAM(S): 4; .5 INJECTION, POWDER, LYOPHILIZED, FOR SOLUTION INTRAVENOUS at 17:01

## 2023-09-28 RX ADMIN — PIPERACILLIN AND TAZOBACTAM 25 GRAM(S): 4; .5 INJECTION, POWDER, LYOPHILIZED, FOR SOLUTION INTRAVENOUS at 00:05

## 2023-09-28 RX ADMIN — AMLODIPINE BESYLATE 5 MILLIGRAM(S): 2.5 TABLET ORAL at 06:58

## 2023-09-28 NOTE — DISCHARGE NOTE PROVIDER - NSDCCPCAREPLAN_GEN_ALL_CORE_FT
PRINCIPAL DISCHARGE DIAGNOSIS  Diagnosis: CVA (cerebrovascular accident)  Assessment and Plan of Treatment: You were noted to have an infarction, also known as a cerebrovascular accident (CVA). This was found based on your symptom profile, and findings noted on computed tomographical imaging (CT imaging) of your brain. Please continue to take aspirin and statin as prescribed. Please follow-up with your primary care physician, and with your neurologist.  Please continue to work with physical therapy if needed. Symptom improvement varies greatly, varying from minimal improvement to even possibly returning back to baseline with rehabilitation therapy. Should you start to experience symptoms such as but not limited to: changes in vision, changes in hearing, severe weakness/dizziness, fainting spells, or difficulties moving your eyelids or mouth, please return to the emergency department immediately for interval evaluation.

## 2023-09-28 NOTE — PROGRESS NOTE ADULT - SUBJECTIVE AND OBJECTIVE BOX
Neurology Stroke Progress Note    INTERVAL HPI/OVERNIGHT EVENTS:  Patient seen and examined at bedside. No acute events overnight. PET scan results, indicative of Lewy Body dementia. MRI consistent with CAA. Discussed diagnoses with daughter, referral was placed with general neurology for more information about prognosis. They stated they would come by tomorrow to speak with daughter. Remeron 7.5 added for appetite and klonipin .25mg (home medication) prn for anxiety/aggitation added,     MEDICATIONS  (STANDING):  albuterol/ipratropium for Nebulization 3 milliLiter(s) Nebulizer every 6 hours  amLODIPine   Tablet 5 milliGRAM(s) Oral daily  aspirin  chewable 81 milliGRAM(s) Oral daily  atorvastatin 40 milliGRAM(s) Oral at bedtime  donepezil 5 milliGRAM(s) Oral at bedtime  enoxaparin Injectable 30 milliGRAM(s) SubCutaneous every 24 hours  melatonin 5 milliGRAM(s) Oral at bedtime  metoprolol tartrate 25 milliGRAM(s) Oral two times a day  mirtazapine 7.5 milliGRAM(s) Oral once  montelukast 10 milliGRAM(s) Oral daily  multivitamin  Chewable 1 Tablet(s) Oral daily  piperacillin/tazobactam IVPB.. 4.5 Gram(s) IV Intermittent every 8 hours  sodium chloride 0.9%. 1000 milliLiter(s) (60 mL/Hr) IV Continuous <Continuous>  sodium chloride 7% Inhalation 4 milliLiter(s) Inhalation every 12 hours  thiamine 100 milliGRAM(s) Oral daily    MEDICATIONS  (PRN):  clonazePAM  Tablet 0.25 milliGRAM(s) Oral once PRN anxiety, agitation      Allergies    Ceclor (Rash)  IV Contrast (Unknown)  Levaquin (Swelling)  Augmentin (Short breath; Rash)    Intolerances        Vital Signs Last 24 Hrs  T(C): 36.6 (28 Sep 2023 13:42), Max: 36.8 (27 Sep 2023 21:00)  T(F): 97.8 (28 Sep 2023 13:42), Max: 98.2 (27 Sep 2023 21:00)  HR: 80 (28 Sep 2023 15:50) (64 - 88)  BP: 139/71 (28 Sep 2023 15:50) (115/65 - 168/77)  BP(mean): 97 (28 Sep 2023 15:50) (84 - 121)  RR: 18 (28 Sep 2023 15:50) (16 - 18)  SpO2: 94% (28 Sep 2023 15:50) (91% - 96%)    Parameters below as of 28 Sep 2023 15:50  Patient On (Oxygen Delivery Method): room air        Physical exam:  General: No acute distress, awake and alert  Eyes: Anicteric sclerae, moist conjunctivae, see below for CNs  Neck: trachea midline, FROM, supple, no thyromegaly or lymphadenopathy  Cardiovascular: Regular rate and rhythm, no murmurs, rubs, or gallops. No carotid bruits.   Pulmonary: Anterior breath sounds clear bilaterally, no crackles or wheezing. No use of accessory muscles  GI: Abdomen soft, non-distended, non-tender  Extremities: + left arm ecchymosis with skin tears     Neurologic:  -Mental status: Awake, eyes open, oriented to person, place (hospital says "they tell me [place] but I don't believe them"), and to year. Follows simple commands but has some periods of inattention. Repetition intact. Concentration intact   -Cranial nerves:   II: Poor visual fields exam, appears to have bitemporally quadrantopia with BTT however exam varies   III, IV, VI: Extraocular movements are intact without nystagmus. Pupils equally round and reactive to light  V:  Facial sensation V1-V3 equal and intact   VII: Face appears symmetric today   VIII: Hearing is bilaterally intact to voice  XII: Tongue protrudes midline  Motor: Motor: Diminished bulk throughout. Normal tone. B/l UEs at least a 4/5 without drift. B/l LEs at least a 3/5 without drift.   Sensation: Intact to light touch bilaterally  Gait: Walks with walker and assistance  LABS:                        10.2   5.92  )-----------( 219      ( 28 Sep 2023 06:22 )             30.4     09-28    135  |  101  |  16  ----------------------------<  93  3.7   |  25  |  0.77    Ca    8.4      28 Sep 2023 06:22  Phos  3.0     09-28  Mg     1.8     09-28        Urinalysis Basic - ( 28 Sep 2023 06:22 )    Color: x / Appearance: x / SG: x / pH: x  Gluc: 93 mg/dL / Ketone: x  / Bili: x / Urobili: x   Blood: x / Protein: x / Nitrite: x   Leuk Esterase: x / RBC: x / WBC x   Sq Epi: x / Non Sq Epi: x / Bacteria: x        RADIOLOGY & ADDITIONAL TESTS:     Neurology Stroke Progress Note    INTERVAL HPI/OVERNIGHT EVENTS:  Patient seen and examined at bedside. No acute events overnight. PET scan results, indicative of Lewy Body dementia. MRI consistent with CAA. Discussed diagnoses with daughter, referral was placed with general neurology for more information about prognosis. They stated they would come by tomorrow to speak with daughter. Remeron 7.5 added for appetite and klonipin .25mg (home medication) prn for anxiety/aggitation added,     MEDICATIONS  (STANDING):  albuterol/ipratropium for Nebulization 3 milliLiter(s) Nebulizer every 6 hours  amLODIPine   Tablet 5 milliGRAM(s) Oral daily  aspirin  chewable 81 milliGRAM(s) Oral daily  atorvastatin 40 milliGRAM(s) Oral at bedtime  donepezil 5 milliGRAM(s) Oral at bedtime  enoxaparin Injectable 30 milliGRAM(s) SubCutaneous every 24 hours  melatonin 5 milliGRAM(s) Oral at bedtime  metoprolol tartrate 25 milliGRAM(s) Oral two times a day  mirtazapine 7.5 milliGRAM(s) Oral once  montelukast 10 milliGRAM(s) Oral daily  multivitamin  Chewable 1 Tablet(s) Oral daily  piperacillin/tazobactam IVPB.. 4.5 Gram(s) IV Intermittent every 8 hours  sodium chloride 0.9%. 1000 milliLiter(s) (60 mL/Hr) IV Continuous <Continuous>  sodium chloride 7% Inhalation 4 milliLiter(s) Inhalation every 12 hours  thiamine 100 milliGRAM(s) Oral daily    MEDICATIONS  (PRN):  clonazePAM  Tablet 0.25 milliGRAM(s) Oral once PRN anxiety, agitation      Allergies    Ceclor (Rash)  IV Contrast (Unknown)  Levaquin (Swelling)  Augmentin (Short breath; Rash)    Intolerances        Vital Signs Last 24 Hrs  T(C): 36.6 (28 Sep 2023 13:42), Max: 36.8 (27 Sep 2023 21:00)  T(F): 97.8 (28 Sep 2023 13:42), Max: 98.2 (27 Sep 2023 21:00)  HR: 80 (28 Sep 2023 15:50) (64 - 88)  BP: 139/71 (28 Sep 2023 15:50) (115/65 - 168/77)  BP(mean): 97 (28 Sep 2023 15:50) (84 - 121)  RR: 18 (28 Sep 2023 15:50) (16 - 18)  SpO2: 94% (28 Sep 2023 15:50) (91% - 96%)    Parameters below as of 28 Sep 2023 15:50  Patient On (Oxygen Delivery Method): room air        Physical exam:  General: No acute distress, awake and alert  Eyes: Anicteric sclerae, moist conjunctivae, see below for CNs  Neck: trachea midline, FROM, supple, no thyromegaly or lymphadenopathy  Cardiovascular: Regular rate and rhythm, no murmurs, rubs, or gallops. No carotid bruits.   Pulmonary: Anterior breath sounds clear bilaterally, no crackles or wheezing. No use of accessory muscles  GI: Abdomen soft, non-distended, non-tender  Extremities: + left arm ecchymosis with skin tears     Neurologic:  -Mental status: Awake, eyes open, oriented to person, place (hospital says "they tell me [place] but I don't believe them"), and to year. Follows simple commands but has some periods of inattention. Repetition intact. Concentration intact   -Cranial nerves:   II: Poor visual fields exam, appears to have bitemporally quadrantopia with BTT however exam varies   III, IV, VI: Extraocular movements are intact without nystagmus. Pupils equally round and reactive to light  V:  Facial sensation V1-V3 equal and intact   VII: Face appears symmetric today   VIII: Hearing is bilaterally intact to voice  XII: Tongue protrudes midline  Motor: Motor: Diminished bulk throughout. Normal tone. B/l UEs at least a 4/5 without drift. B/l LEs at least a 3/5 without drift.   Sensation: Intact to light touch bilaterally  Gait: Walks with walker and assistance  LABS:                        10.2   5.92  )-----------( 219      ( 28 Sep 2023 06:22 )             30.4     09-28    135  |  101  |  16  ----------------------------<  93  3.7   |  25  |  0.77    Ca    8.4      28 Sep 2023 06:22  Phos  3.0     09-28  Mg     1.8     09-28        Urinalysis Basic - ( 28 Sep 2023 06:22 )    Color: x / Appearance: x / SG: x / pH: x  Gluc: 93 mg/dL / Ketone: x  / Bili: x / Urobili: x   Blood: x / Protein: x / Nitrite: x   Leuk Esterase: x / RBC: x / WBC x   Sq Epi: x / Non Sq Epi: x / Bacteria: x        RADIOLOGY & ADDITIONAL TESTS:     NM PET Brain Study Alzheimer's vs Frontotemporal Dementia (FTD) Study (09.27.23 @ 17:59) >    IMPRESSION:    Abnormal hypometabolism particularly pronounced in the parieto-occipital   and temporal regions, right greater than left, with accompanying   hypometabolism in the right cuneus and primary visual cortex and   suggestion of associated cingulate island sign, findings consistent with   underlying neurodegenerative disease, pattern most suggestive of dementia   with Lewy bodies.    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    Correlation with clinical exam and neuropsychological assessment is   advised.

## 2023-09-28 NOTE — DISCHARGE NOTE PROVIDER - NSDCFUADDAPPT_GEN_ALL_CORE_FT
Please follow up with your provider Lilibeth Toribio scheduled for 10/23 at 9:45 am.     Please be sure to follow up with your Neurologist Dr. Lomax 1-2 weeks after discharge.  Located: [address] [place] [address], [address] [place] [address]  Phone: [phone]  Fax: [phone]

## 2023-09-28 NOTE — DISCHARGE NOTE PROVIDER - HOSPITAL COURSE
82y Female with PMH HTN, afib (not on AC due to fall risk), recent hospitalization for PRES (5-6/2023), recently discharged from [facility] 8/2023 with acute exacerbation of severe bronchiectasis and respiratory failure presents with several days of AMS and new diplopia 9/18. NIHSS 1 on admission for bitemporal visual deficit. Course complicated by agitation and severe hypoglycemia, requiring ICU admission. Brain imaging significant for L pontine and left occipital infarct as well as SAH of the right parieto-occipital region, possible cerebral amyloid angiopathy. Currently, cannot r/o inflammatory causes like ISMA/vasculitis/encephalitis. PET brain ****      During this hospital course, patient had an ischemic infarct in the left pontine and left occipital lobe, SAH in the parieto-occipital lobe, possible CAD as seen on MRI.  The stroke etiology is likely secondary to:  []atrial fibrillation  []small vessel disease from atherosclerotic risk factors  []other:  []etiology workup still in progress    Patient had the following workup done in house:  CT Head:   MR Head Non Contrast:  CT Angio Head:  CT Angio Neck:  Echo  Labs  other    Physical exam at discharge:  NIHSS at discharge:     New medications on discharge:  Labs to be followed up:  Imaging to be done as outpatient:  Further outpatient workup:   81 yo F w/ HTN, AF (not on AC due to fall risk and CAA), skin to bone, previously hospitalization for PRES (5-6/2023), dc from [facility] 8/2023 with acute exacerbation of severe bronchiectasis and respiratory failure, returned to [facility] with AMS and diplopia. NIHSS 1 on admission for bitemporal visual deficit. Course complicated by agitation and severe hypoglycemia, requiring ICU admission. Brain imaging significant for left pontine and left occipital infarct as well as SAH of the right parieto-occipital region. MRA head and Neck were negative for vascular malformation or significant stenosis. MRV was negative for thrombus. 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  Had urinary retention, now Richards independent. Had leg cramp, on Robaxin 500 TID, sxm improving. Afib rate previously 100-120, now rate controlled with metoprolol to 50-80. On ASA 81mg qDay, Lipitor 40mg qDay. Pt is medically stable to return to [place] with neurology f/u with Dr. Starkey for dementia. Structural heart consulted for watchman consult, case discussed with Dr. Rivas, will follow up outpatient. CT heart for LA structure and LA thrombus performed, no thrombus noted.  D/C to [place].      During this hospital course, patient had an ischemic infarct in the left pontine and left occipital infarct as well as SAH of the right parieto-occipital region as seen on MRI.  The stroke etiology is likely secondary to:  [x]atrial fibrillation      Patient had the following workup done in house:  CT Head: neg  MR Head Non Contrast: Left pontine punctate acute infarctions have developed since 9/20/2023. Subarachnoid hemorrhage has diminished since 9/20/2023. ischemic white matter disease, atrophy typical for age and remote petechial hemorrhages are stable findings since 9/20/2023  MR angio head and neck/MRV: MRA brain is negative for steno-occlusive disease, aneurysm or high flow vascular malformation. No new/interval infarct on DWI since 9/20/23, though mild cerebral edema has developed. Query amyloid related disease. MRA neck is unremarkable allowing for mild motion artifact. MRV brain is negative for thrombosis.  Echo:    1. Technically difficult study.   2. Normal left ventricular size and systolic function.   3. Mildly dilated right ventricular size. Normal right ventricular   systolic function.   4. Dilated right atrium.   5. Aortic sclerosis without significant stenosis.   6. Mild-to-moderate aortic regurgitation.   7. Mild mitral regurgitation.   8. Moderate-to-severe tricuspid regurgitation.   9. Severe pulmonary hypertension present, pulmonary artery systolic   pressure is 73 mmHg.  10. No pericardial effusion.  11. No prior echo is available for comparison.  CT heart for LA structure and LA thrombus performed, no thrombus noted   LE dopplers negative    a1c: 5.5  TSH: 3.870  LDL: 129      Physical exam at discharge:  Neurologic:  -Mental status: Awake, alert, oriented to person, place, and time. Speech is fluent with intact naming, repetition, and comprehension, no dysarthria. Recent and remote memory intact. Follows commands. Attention/concentration intact.   -Cranial nerves:   II:  bitemporally quadrantopia with BTT   III, IV, VI: Extraocular movements are intact without nystagmus. Pupils equally round and reactive to light  V:  Facial sensation V1-V3 equal and intact   VII: Face is symmetric with normal eye closure and smile  VIII: Hearing is bilaterally intact to finger rub  IX, X: Uvula is midline and soft palate rises symmetrically  XI: Head turning and shoulder shrug are intact.  XII: Tongue protrudes midline  Motor: Normal bulk and tone. No pronator drift. Strength bilateral upper extremity 5/5, bilateral lower extremities 5/5.  Sensation: Intact to light touch bilaterally  Coordination: No dysmetria on finger-to-nose   NIHSS at discharge: 2    New medications on discharge: donepezil 5mg, remeron 7.5mg HS, atorvastatin 40mg, amlodipine 5mg, aspirin 81mg  Labs to be followed up:  Imaging to be done as outpatient:  Further outpatient workup: f/u with Dr. Starkey for dementia. Structural heart consulted for watchman consult, case discussed with Dr. Rivas, will follow up outpatient.   82F w HTN, AF not on AC d/t fall risk, hospitalized for PRES 05-06/2023, recent DC 8/2023 for acute hypoxemic respiratory failure - to home on 4L NC w follow-up w Dr. Foster for exacerbation of bronchiectasis, recently started course of IV cefepime outpatient by Dr. Foster 9/13, p/w diplopia, encephalopathy admitted for stroke work-up, cefepime continued inpatient, repeat head CT found small SAH, c/b hypoglycemia, encephalopathy - BG 30 and temp 93F during RRT on 9/21, transferred to MICU for persistent hypoglycemia needing D10W >> D10 NS, slow improvement w nml insulin and C peptide, stepped down to 7L and stroke service, MR brain showing L pontine infarction, PET brain w signal c/f Dementia w Lewy Bodies, for acute rehab    #Acute urinary retention - resolved and passed TOV 10/2    #CVA - L pontine infarction on MR 9/25 that also showed SAH diminishing.  #Encephalopathy - improving. There is signal on PET c/f Dementia w Lewy Bodies  -c/w ASA 81 daily, Atorvastatin 40  -c/w Started on donepezil 5 HS    #Bronchiectasis - On IV Cefepime from 9/13 w PICC placed outpatient (PICC discontinued 9/21). Has been on IV Zosyn in MICU per pulmonary thru 9/29. Currently satting well on RA. On singulair 10    #AFib - on lopressor 50 bid. AC currently held d/t SAH  #HTN - on amlodipine 5    #macrocytic anemia - 9.8, B12/folate normal, outpt f/u  #Hyponatremia - resolved    #Anxiety  -Psych rec, Mirtazapine 7.5 PO qhs    Patient was discharged to: Home     Items to follow up as outpatient: Follow up with PCP    Physical exam at the time of discharge:     GEN: thin female in NAD on RA  HEENT: NC/AT, MMM  CV: increased rate - irregularly irregular, nml S1S2, no murmurs  PULM: nml effort, dry rales on R mid and lower fields wo wheezing or rhonchi.   ABD: Soft, non-distended, NABS, non-tender  NEURO  A/O x self, hospital, month and year but not date. moving all extremities, Sensation intact  PSYCH: Appropriate       82F w HTN, AF not on AC d/t fall risk, hospitalized for PRES 05-06/2023, recent DC 8/2023 for acute hypoxemic respiratory failure - to home on 4L NC w follow-up w Dr. Foster for exacerbation of bronchiectasis, recently started course of IV cefepime outpatient by Dr. Foster 9/13, p/w diplopia, encephalopathy admitted for stroke work-up, cefepime continued inpatient, repeat head CT found small SAH, c/b hypoglycemia, encephalopathy - BG 30 and temp 93F during RRT on 9/21, transferred to MICU for persistent hypoglycemia needing D10W >> D10 NS, slow improvement w nml insulin and C peptide, stepped down to 7L and stroke service, MR brain showing L pontine infarction, PET brain w signal c/f Dementia w Lewy Bodies, for acute rehab    Hospital Course (by problem list):     #Acute urinary retention - resolved and passed TOV 10/2    #CVA - L pontine infarction on MR 9/25 that also showed SAH diminishing.  #Encephalopathy - improving. There is signal on PET c/f Dementia w Lewy Bodies  -c/w ASA 81 daily, Atorvastatin 40  -c/w Started on donepezil 5 HS    #Bronchiectasis - On IV Cefepime from 9/13 w PICC placed outpatient (PICC discontinued 9/21). Has been on IV Zosyn in MICU per pulmonary thru 9/29. Currently satting well on RA. On singulair 10    #AFib - on lopressor 50 bid. AC currently held d/t SAH  #HTN - on amlodipine 5. c/w med    #macrocytic anemia - 9.8, B12/folate normal, outpt f/u  #Hyponatremia - resolved    #Anxiety  -Psych rec, Mirtazapine 7.5 PO qhs    Patient was discharged to: Home     Items to follow up as outpatient: Follow up with PCP    Physical exam at the time of discharge:     GEN: thin female in NAD on RA  HEENT: NC/AT, MMM  CV: increased rate - irregularly irregular, nml S1S2, no murmurs  PULM: nml effort, dry rales on R mid and lower fields wo wheezing or rhonchi.   ABD: Soft, non-distended, NABS, non-tender  NEURO  A/O x self, hospital, month and year but not date. moving all extremities, Sensation intact  PSYCH: Appropriate       82F w HTN, AF not on AC d/t fall risk, hospitalized for PRES 05-06/2023, recent DC 8/2023 for acute hypoxemic respiratory failure - to home on 4L NC w follow-up w Dr. Foster for exacerbation of bronchiectasis, recently started course of IV cefepime outpatient by Dr. Foster 9/13, p/w diplopia, encephalopathy admitted for stroke work-up, cefepime continued inpatient, repeat head CT found small SAH, c/b hypoglycemia, encephalopathy - BG 30 and temp 93F during RRT on 9/21, transferred to MICU for persistent hypoglycemia needing D10W >> D10 NS, slow improvement w nml insulin and C peptide, stepped down to 7L and stroke service. Brain imaging significant for left pontine and left occipital infarct as well as SAH of the right parieto-occipital region. MRA head and Neck were negative for vascular malformation or significant stenosis. MRV was negative for thrombus. PET brain obtained, findings consistent with underlying neurodegenerative disease, pattern most suggestive of dementia. 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          Hospital Course (by problem list):     #Acute urinary retention - resolved and passed TOV 10/2    #CVA - L pontine infarction on MR 9/25 that also showed SAH diminishing.  #Encephalopathy - improving. There is signal on PET c/f Dementia w Lewy Bodies  -c/w ASA 81 daily, Atorvastatin 40  -c/w Started on donepezil 5 HS    #Bronchiectasis - On IV Cefepime from 9/13 w PICC placed outpatient (PICC discontinued 9/21). Has been on IV Zosyn in MICU per pulmonary thru 9/29. Currently satting well on RA. On singulair 10    #AFib - on lopressor 50 bid. AC currently held d/t SAH  #HTN - on amlodipine 5. c/w med    #macrocytic anemia - 9.8, B12/folate normal, outpt f/u  #Hyponatremia - resolved    #Anxiety  -Psych rec, Mirtazapine 7.5 PO qhs    Patient was discharged to: Acute Rehab    Items to follow up as outpatient: Follow up with PCP, Stroke neurology, Gen Neuro    Physical exam at the time of discharge:     GEN: thin female in NAD on RA  HEENT: NC/AT, MMM  CV: increased rate - irregularly irregular, nml S1S2, no murmurs  PULM: nml effort, dry rales on R mid and lower fields wo wheezing or rhonchi.   ABD: Soft, non-distended, NABS, non-tender  NEURO  See below	  PSYCH: Appropriate    Neurologic:  -Mental status: Awake, alert, oriented to person, place, and time. Speech is fluent with intact naming, repetition, and comprehension, no dysarthria. Recent and remote memory intact. Follows commands. Attention/concentration intact. Fund of knowledge appropriate.  -Cranial nerves:   II:  bitemporally quadrantopia with BTT   III, IV, VI: Extraocular movements are intact without nystagmus. Pupils equally round and reactive to light  V:  Facial sensation V1-V3 equal and intact   VII: Face is symmetric with normal eye closure and smile  VIII: Hearing is bilaterally intact to finger rub  IX, X: Uvula is midline and soft palate rises symmetrically  XI: Head turning and shoulder shrug are intact.  XII: Tongue protrudes midline  Motor: Normal bulk and tone. No pronator drift. Strength bilateral upper extremity 5/5, bilateral lower extremities 5/5.  Sensation: Intact to light touch bilaterally  Coordination: No dysmetria on finger-to-nose   NIHSS: 1       82F w HTN, AF not on AC d/t fall risk, hospitalized for PRES 05-06/2023, recent DC 8/2023 for acute hypoxemic respiratory failure - to home on 4L NC w follow-up w Dr. Foster for exacerbation of bronchiectasis, recently started course of IV cefepime outpatient by Dr. Foster 9/13, p/w diplopia, encephalopathy admitted for stroke work-up, cefepime continued inpatient, repeat head CT found small SAH, c/b hypoglycemia, encephalopathy - BG 30 and temp 93F during RRT on 9/21, transferred to MICU for persistent hypoglycemia needing D10W >> D10 NS, slow improvement w nml insulin and C peptide, stepped down to 7L and stroke service. Brain imaging significant for left pontine and left occipital infarct as well as SAH of the right parieto-occipital region. MRA head and Neck were negative for vascular malformation or significant stenosis. MRV was negative for thrombus. PET brain obtained, findings consistent with underlying neurodegenerative disease, pattern most suggestive of dementia. 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 Plan to continue ASA 81mg and Atorvastatin 40mg for stroke prevention. OK to continue DVT ppx with subq lovenox while at inpatient rehab.           Hospital Course (by problem list):     #Acute urinary retention - resolved and passed TOV 10/2    #CVA - L pontine infarction on MR 9/25 that also showed SAH diminishing.  #Encephalopathy - improving. There is signal on PET c/f Dementia w Lewy Bodies  -c/w ASA 81 daily, Atorvastatin 40  -c/w Started on donepezil 5 HS    #Bronchiectasis - On IV Cefepime from 9/13 w PICC placed outpatient (PICC discontinued 9/21). Has been on IV Zosyn in MICU per pulmonary thru 9/29. Currently satting well on RA. On singulair 10    #AFib - on lopressor 50 bid. AC currently held d/t SAH  #HTN - on amlodipine 5. c/w med    #macrocytic anemia - 9.8, B12/folate normal, outpt f/u  #Hyponatremia - resolved    #Anxiety  -Psych rec, Mirtazapine 7.5 PO qhs    Patient was discharged to: Acute Rehab    Items to follow up as outpatient: Follow up with PCP, Stroke neurology, Gen Neuro    Physical exam at the time of discharge:     GEN: thin female in NAD on RA  HEENT: NC/AT, MMM  CV: increased rate - irregularly irregular, nml S1S2, no murmurs  PULM: nml effort, dry rales on R mid and lower fields wo wheezing or rhonchi.   ABD: Soft, non-distended, NABS, non-tender  NEURO  See below	  PSYCH: Appropriate    Neurologic:  -Mental status: Awake, alert, oriented to person, place, and time. Speech is fluent with intact naming, repetition, and comprehension, no dysarthria. Recent and remote memory intact. Follows commands. Attention/concentration intact. Fund of knowledge appropriate.  -Cranial nerves:   II:  bitemporally quadrantopia with BTT   III, IV, VI: Extraocular movements are intact without nystagmus. Pupils equally round and reactive to light  V:  Facial sensation V1-V3 equal and intact   VII: Face is symmetric with normal eye closure and smile  VIII: Hearing is bilaterally intact to finger rub  IX, X: Uvula is midline and soft palate rises symmetrically  XI: Head turning and shoulder shrug are intact.  XII: Tongue protrudes midline  Motor: Normal bulk and tone. No pronator drift. Strength bilateral upper extremity 5/5, bilateral lower extremities 5/5.  Sensation: Intact to light touch bilaterally  Coordination: No dysmetria on finger-to-nose   NIHSS: 1       82F w HTN, AF not on AC d/t fall risk, hospitalized for PRES 05-06/2023, recent DC 8/2023 for acute hypoxemic respiratory failure - to home on 4L NC w follow-up w Dr. Foster for exacerbation of bronchiectasis, recently started course of IV cefepime outpatient by Dr. Foster 9/13, p/w diplopia, encephalopathy admitted for stroke work-up, cefepime continued inpatient, repeat head CT found small SAH, c/b hypoglycemia, encephalopathy - BG 30 and temp 93F during RRT on 9/21, transferred to MICU for persistent hypoglycemia needing D10W >> D10 NS, slow improvement w nml insulin and C peptide, stepped down to 7L and stroke service. Brain imaging significant for left pontine and left occipital infarct as well as SAH of the right parieto-occipital region. MRA head and Neck were negative for vascular malformation or significant stenosis. MRV was negative for thrombus. PET brain obtained, findings consistent with underlying neurodegenerative disease, pattern most suggestive of dementia. 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 Plan to continue ASA 81mg and Atorvastatin 40mg for stroke prevention. OK to continue DVT ppx with subq lovenox while at inpatient rehab.       Hospital Course (by problem list):     #Acute urinary retention - resolved and passed TOV 10/2    #CVA - L pontine infarction on MR 9/25 that also showed SAH diminishing.  #Encephalopathy - improving. There is signal on PET c/f Dementia w Lewy Bodies  -c/w ASA 81 daily, Atorvastatin 40  -c/w Started on donepezil 5 HS    #Bronchiectasis - On IV Cefepime from 9/13 w PICC placed outpatient (PICC discontinued 9/21). Has been on IV Zosyn in MICU per pulmonary thru 9/29. Currently satting well on RA. On singulair 10    #AFib - on lopressor 50 bid. AC currently held d/t SAH  #HTN - on amlodipine 5. c/w med    #macrocytic anemia - 9.8, B12/folate normal, outpt f/u  #Hyponatremia - resolved    #Anxiety  -Psych rec, Mirtazapine 7.5 PO qhs    Patient was discharged to: Acute Rehab    Items to follow up as outpatient: Follow up with PCP, Stroke neurology, Gen Neuro    Physical exam at the time of discharge:     GEN: thin female in NAD on RA  HEENT: NC/AT, MMM  CV: increased rate - irregularly irregular, nml S1S2, no murmurs  PULM: nml effort, dry rales on R mid and lower fields wo wheezing or rhonchi.   ABD: Soft, non-distended, NABS, non-tender  NEURO  See below	  PSYCH: Appropriate    Neurologic:  -Mental status: Awake, alert, oriented to person, place, and time. Speech is fluent with intact naming, repetition, and comprehension, no dysarthria. Recent and remote memory intact. Follows commands. Attention/concentration intact. Fund of knowledge appropriate.  -Cranial nerves:   II:  bitemporally quadrantopia with BTT   III, IV, VI: Extraocular movements are intact without nystagmus. Pupils equally round and reactive to light  V:  Facial sensation V1-V3 equal and intact   VII: Face is symmetric with normal eye closure and smile  VIII: Hearing is bilaterally intact to finger rub  IX, X: Uvula is midline and soft palate rises symmetrically  XI: Head turning and shoulder shrug are intact.  XII: Tongue protrudes midline  Motor: Normal bulk and tone. No pronator drift. Strength bilateral upper extremity 5/5, bilateral lower extremities 5/5.  Sensation: Intact to light touch bilaterally  Coordination: No dysmetria on finger-to-nose   NIHSS: 1       82F w HTN, AF not on AC d/t fall risk, hospitalized for PRES 05-06/2023, recent DC 8/2023 for acute hypoxemic respiratory failure - to home on 4L NC w follow-up w Dr. Foster for exacerbation of bronchiectasis, recently started course of IV cefepime outpatient by Dr. Foster 9/13, p/w diplopia, encephalopathy admitted for stroke work-up, cefepime continued inpatient, repeat head CT found small SAH, c/b hypoglycemia, encephalopathy - BG 30 and temp 93F during RRT on 9/21, transferred to MICU for persistent hypoglycemia needing D10W >> D10 NS, slow improvement w nml insulin and C peptide, stepped down to 7L and stroke service. Brain imaging significant for left pontine and left occipital infarct as well as SAH of the right parieto-occipital region. MRA head and Neck were negative for vascular malformation or significant stenosis. MRV was negative for thrombus. PET brain obtained, findings consistent with underlying neurodegenerative disease, pattern most suggestive of dementia. 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 Plan to continue ASA 81mg and Atorvastatin 40mg for stroke prevention. OK to continue DVT ppx with subq lovenox while at inpatient rehab.     Hospital Course (by problem list):     #Acute urinary retention - resolved and passed TOV 10/2    #CVA - L pontine infarction on MR 9/25 that also showed SAH diminishing.  #Encephalopathy - improving. There is signal on PET c/f Dementia w Lewy Bodies  -c/w ASA 81 daily, Atorvastatin 40  -c/w Started on donepezil 5 HS    #Bronchiectasis - On IV Cefepime from 9/13 w PICC placed outpatient (PICC discontinued 9/21). Has been on IV Zosyn in MICU per pulmonary thru 9/29. Currently satting well on RA. On singulair 10    #AFib - on lopressor 50 bid. AC currently held d/t SAH  #HTN - on amlodipine 5. c/w med    #macrocytic anemia - 9.8, B12/folate normal, outpt f/u  #Hyponatremia - resolved    #Anxiety  -Psych rec, Mirtazapine 7.5 PO qhs    Patient was discharged to: Acute Rehab    Items to follow up as outpatient: Follow up with PCP, Stroke neurology, Gen Neuro    Physical exam at the time of discharge:     GEN: thin female in NAD on RA  HEENT: NC/AT, MMM  CV: increased rate - irregularly irregular, nml S1S2, no murmurs  PULM: nml effort, dry rales on R mid and lower fields wo wheezing or rhonchi.   ABD: Soft, non-distended, NABS, non-tender  NEURO  See below	  PSYCH: Appropriate    Neurologic:  -Mental status: Awake, alert, oriented to person, place, and time. Speech is fluent with intact naming, repetition, and comprehension, no dysarthria. Recent and remote memory intact. Follows commands. Attention/concentration intact. Fund of knowledge appropriate.  -Cranial nerves:   II:  bitemporally quadrantopia with BTT   III, IV, VI: Extraocular movements are intact without nystagmus. Pupils equally round and reactive to light  V:  Facial sensation V1-V3 equal and intact   VII: Face is symmetric with normal eye closure and smile  VIII: Hearing is bilaterally intact to finger rub  IX, X: Uvula is midline and soft palate rises symmetrically  XI: Head turning and shoulder shrug are intact.  XII: Tongue protrudes midline  Motor: Normal bulk and tone. No pronator drift. Strength bilateral upper extremity 5/5, bilateral lower extremities 5/5.  Sensation: Intact to light touch bilaterally  Coordination: No dysmetria on finger-to-nose   NIHSS: 1

## 2023-09-28 NOTE — CHART NOTE - NSCHARTNOTEFT_GEN_A_CORE
Have reviewed the data on pt's insulin and C-peptide levels.  The repeat insulin level on 9/25/23 was down to 3.4 uU/ml.  (C-peptide level was also normal and commented on earlier, and both the insulin and C-peptide would be appropriate for a glucose of 116 mg% on that specimen)  Although the insulin-binding antibodies are still pending, it is highly unlikely that a markedly elevated insulin level secondary to high titers of insulin-binding antibodies would decrease to normal in the space of a few days.  The data therefore points strongly to administration of exogenous insulin.  Will discuss this with administration.  Have not discussed this with pt's daughter.

## 2023-09-28 NOTE — PROGRESS NOTE ADULT - NS ATTEND AMEND GEN_ALL_CORE FT
82 year old woman w/ PMH of Afib (not on AC due to frequent falls), HTN, bronchiectasis, recent diagnosis of PRES at outside hospital presented w/ several day history of confusion and visual complaints. Mental status with slight improvement after correction of hypoglycemia and antibiotics.     On exam, patient awake, alert, and tracks examiner. Oriented to self, hospital, September 2023, president susie renee, [place]. Serial 7 x1. Fluent. Following crossed commands. ?L. superior quadrantopia. EOMI; Face symmetric. Moving all extremities spontaneously at least antigravity.     CTH 9.19.23 negative  MR brain 9.20.23 demonstrates small diffusion restricting lesion in the L. Occipital lobe w/ ADC and FLAIR correlate; +SWI in the R. Parietooccipital region w/ sulcal T2 flair signal.   MRV negative  MRA h/n w/ repeat MRI brain w/o contrast 9.22.23 demonstrates no stenoocclusive disease; No new DWI abnormalities.   Blood cultures NGTD  EEG neg  Repeat MR brain 9.25.23 w/w/o contrast demonstrates questionable, vague, L. pontine diffusion restriction with ADC correlate. No contrast enhancement.   PET abnormal hyopmetabolism in the parietooccipital and temporal regions, R > L, hypometabolism in the R. cuneus and primary visual cortex of assicated [place] sing, suggestive of LBD.     9/26 - significant improvement in examination.     Impression: Acute versus ?subacute onset confusion and visual complaints with radiographic evidence of possible L. 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 Etiology likely multifactorial, including ishcemic stroke due to Afib, CAA related SAH/superficial siderosis, and underlying neurodegenerative process (ie LBD). Lower suspicion for CNS vasculitis/ISMA.     Plan:   Hold off LP and cerebral biopsy for now given clinical improvement.   Continue ASA 81mg; Will hold off AC given possibility of amyloid angiopathy.   Structural heart consult for possible watchman   Continue Lovenox for DVT ppx  f/u with neurovascular clinical (Janet Vallecillo) at discharge  F/u with dementia/movement disorder clinic at discharge   PT/OT    40 minutes spent on total encounter. The necessity of the time spent during the encounter on this date of service was due to:     Review of imaging and chart; obtaining history; examination of pt; discussion and coordination of care, and discussion of lifestyle modification and risk factor control.

## 2023-09-28 NOTE — PROGRESS NOTE ADULT - ASSESSMENT
82y Female with PMHx of HTN, afib (not on AC due to fall risk), recent hospitalization for PRES (5-6/2023), recently discharged from [facility] 8/2023 with acute exacerbation of severe bronchiectasis and respiratory failure presents with several days of AMS and new diplopia 9/18. NIHSS 1 on admission for bitemporal visual deficit. Course complicated by agitation and severe hypoglycemia, requiring ICU admission. Brain imaging significant for L pontine and left occipital infarct as well as SAH of the right parieto-occipital region. Currently, cannot r/o inflammatory causes like ISMA/vasculitis/encephalitis. PET brain obtained,     Neuro  #CVA workup  - continue ASA 81mg daily today; holding AC i/s/o SAH and possible CAA  - continue atorvastatin 40mg daily  - q4hr stroke neuro checks and vitals  - f/u PET brain results  - holding off LP due to clinical improvement   - MRI brain with and without contrast from 9/25: Left pontine punctate acute infarctions have developed since 9/20/2023. Subarachnoid hemorrhage has diminished since 9/20/2023. Ischemic white matter disease, atrophy typical for age and remote petechial hemorrhages are stable findings since 9/20/2023  - MRA brain 9/22: negative for steno-occlusive disease, aneurysm or high flow   vascular malformation. No new/interval infarct on DWI since 9/20/23,   though mild cerebral edema has developed. Query amyloid related disease.  - MRA neck: negative  - MRV brain: negative for thrombus  - Stroke education    Cards  #HTN  - Goal -160  - TTE from 9/19: mildly dilated RV, dilated RA, severe pulmonary hypertension    #HLD  - high dose statin as above in CVA  - LDL results: 129    Pulm  - call provider if SPO2 < 94%    #bronchiectasis  - pulmonology consulted, appreciate recs   - was started on cefepime outpatient, switched to Zosyn inpatient to r/o cefepime induced AMS. Discussed with outpatient pulm Dr. Foster whom recommends patient continue IV abx for 1 more week.  - obtain PICC line prior to discharge   - chest PT twice daily  - continue duonebs  - continue hypertonic 7% inhalation saline   - continue singular  - will need to follow up with Dr. Foster as outpatient  - Chest PT twice daily (can dc aerobika if chest vest is available, PLEASE call RT to have this for patient as she states she has not been getting it done)    GI  #Nutrition/Fluids/Electrolytes   - replete K<4 and Mg <2  - Diet: minced and moist diet; (ensure d/c due to pt did not like it)  - IVF: 1L NS given today due to minimal urine made overnight/morning     Renal  - daily BMP    #urinary retention  - indwelling catheter placed  - dc'ed saravia 9/27 for TOV today   - q6h bladder scans; straight cath'd twice today 9/28    Infectious Disease  - continue Zosyn x 1 week as per above     Endocrine  - A1C results: 5.5%    - TSH results: 3.870    Psych  #concern for SI - was placed on CO  - patient denies SI while alert and oriented, CO dc'ed  - with sitter due to fall risk     DVT Prophylaxis  - lovenox sq for DVT prophylaxis   - SCDs for DVT prophylaxis     Dispo: AR      Discussed daily hospital plans and goals with patient and patient daughter at beside.    Discussed with Dr. Garcia     82y Female with PMHx of HTN, afib (not on AC due to fall risk), recent hospitalization for PRES (5-6/2023), recently discharged from [facility] 8/2023 with acute exacerbation of severe bronchiectasis and respiratory failure presents with several days of AMS and new diplopia 9/18. NIHSS 1 on admission for bitemporal visual deficit. Course complicated by agitation and severe hypoglycemia, requiring ICU admission. Brain imaging significant for L pontine and left occipital infarct as well as SAH of the right parieto-occipital region. Currently, cannot r/o inflammatory causes like ISMA/vasculitis/encephalitis. PET brain obtained, findings consistent with underlying neurodegenerative disease, pattern most suggestive of dementia with Lewy bodies. 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 Discussed with daughter.    Neuro  #CVA workup  - continue ASA 81mg daily today; holding AC i/s/o SAH and possible CAA  - continue atorvastatin 40mg daily  - q4hr stroke neuro checks and vitals  - f/u PET brain results  - holding off LP due to clinical improvement   - MRI brain with and without contrast from 9/25: Left pontine punctate acute infarctions have developed since 9/20/2023. Subarachnoid hemorrhage has diminished since 9/20/2023. Ischemic white matter disease, atrophy typical for age and remote petechial hemorrhages are stable findings since 9/20/2023  - MRA brain 9/22: negative for steno-occlusive disease, aneurysm or high flow   vascular malformation. No new/interval infarct on DWI since 9/20/23,   though mild cerebral edema has developed. Query amyloid related disease.  - MRA neck: negative  - MRV brain: negative for thrombus  - Stroke education    #Lewybodydementia  - Aricept 5mg daily started  - general neurology consulted for patient family education on prognosis  - will need neurocognitive outpatient set up     Cards  #HTN  - Goal -160  - Amlodipine increased from 5mg to 10mg  - TTE from 9/19: mildly dilated RV, dilated RA, severe pulmonary hypertension    #HLD  - high dose statin as above in CVA  - LDL results: 129    #Afib  - Structural heart needs ot be consulted for evaluation for a watchman device before 4pm tomorrow     Pulm  - call provider if SPO2 < 94%    #bronchiectasis  - pulmonology consulted, appreciate recs   - Ceftriaxone last day 9/29, reach out to pulm for abx renewal  - obtain PICC line prior to discharge   - chest PT twice daily  - continue duonebs  - continue hypertonic 7% inhalation saline   - continue singular  - will need to follow up with Dr. Foster as outpatient  - Chest PT twice daily (can dc aerobika if chest vest is available, PLEASE call RT to have this for patient as she states she has not been getting it done)    GI  #Nutrition/Fluids/Electrolytes   - replete K<4 and Mg <2  - Diet: minced and moist diet; (ensure d/c due to pt did not like it)  - IVF: 1L NS given today due to minimal urine made overnight/morning     Renal  - daily BMP    #urinary retention  - indwelling catheter placed  - dc'ed saravia 9/27 for TOV today   - q6h bladder scans; straight cath'd twice today 9/28    Infectious Disease  - continue Zosyn x 1 week as per above     Endocrine  - A1C results: 5.5%  - TSH results: 3.870    Psych  #concern for SI - was placed on CO  - patient denies SI while alert and oriented, CO dc'ed  - with sitter due to fall risk     #anxiety  - Pt home medication of .25mg klonipin added on per request of daughter    Palliative  - Pt daughter states that her mothers wishes were to "try for a little while to fix something if it was reversible but if my brain is gone, forget about it". Ongoing support given to daughter with diagnoses and prognosis. Pt was living semi-independent at family home prior to admission     DVT Prophylaxis  - lovenox sq for DVT prophylaxis   - SCDs for DVT prophylaxis     Dispo: AR      Discussed daily hospital plans and goals with patient and patient daughter at beside.    Discussed with Dr. Garcia     82y Female with PMHx of HTN, afib (not on AC due to fall risk), recent hospitalization for PRES (5-6/2023), recently discharged from [facility] 8/2023 with acute exacerbation of severe bronchiectasis and respiratory failure presents with several days of AMS and new diplopia 9/18. NIHSS 1 on admission for bitemporal visual deficit. Course complicated by agitation and severe hypoglycemia, requiring ICU admission. Brain imaging significant for L pontine and left occipital infarct as well as SAH of the right parieto-occipital region. Currently, cannot r/o inflammatory causes like ISMA/vasculitis/encephalitis. PET brain obtained, findings consistent with underlying neurodegenerative disease, pattern most suggestive of dementia with Lewy bodies. 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 Discussed with daughter.    Neuro  #CVA workup  - continue ASA 81mg daily today; holding AC i/s/o SAH and possible CAA  - continue atorvastatin 40mg daily  - q4hr stroke neuro checks and vitals  - f/u PET brain results  - holding off LP due to clinical improvement   - MRI brain with and without contrast from 9/25: Left pontine punctate acute infarctions have developed since 9/20/2023. Subarachnoid hemorrhage has diminished since 9/20/2023. Ischemic white matter disease, atrophy typical for age and remote petechial hemorrhages are stable findings since 9/20/2023  - MRA brain 9/22: negative for steno-occlusive disease, aneurysm or high flow   vascular malformation. No new/interval infarct on DWI since 9/20/23,   though mild cerebral edema has developed. Query amyloid related disease.  - MRA neck: negative  - MRV brain: negative for thrombus  - Stroke education    #Lewybodydementia  - Aricept 5mg daily started  - general neurology consulted for patient family education on prognosis  - will need neurocognitive outpatient set up     Cards  #HTN  - Goal -160  - Amlodipine increased from 5mg to 10mg  - TTE from 9/19: mildly dilated RV, dilated RA, severe pulmonary hypertension    #HLD  - high dose statin as above in CVA  - LDL results: 129    #Afib  - Structural heart needs ot be consulted for evaluation for a watchman device before 4pm tomorrow     Pulm  - call provider if SPO2 < 94%    #bronchiectasis  - pulmonology consulted, appreciate recs   - Ceftriaxone last day 9/29, reach out to pulm for abx renewal  - obtain PICC line prior to discharge   - chest PT twice daily  - continue duonebs  - continue hypertonic 7% inhalation saline   - continue singular  - will need to follow up with Dr. Foster as outpatient  - Chest PT twice daily (can dc aerobika if chest vest is available, PLEASE call RT to have this for patient as she states she has not been getting it done)    GI  #Nutrition/Fluids/Electrolytes   - replete K<4 and Mg <2  - Diet: minced and moist diet; (ensure d/c due to pt did not like it)  - IVF: 1L NS given today due to minimal urine made overnight/morning     #malnutrition  - Remeron 7.5mg  HS started   - Pt does not like ensure, d/c    Renal  - daily BMP    #urinary retention  - indwelling catheter placed  - dc'ed saravia 9/27 for TOV today   - q6h bladder scans; straight cath'd twice today 9/28    Infectious Disease  - continue Zosyn x 1 week as per above     Endocrine  - A1C results: 5.5%  - TSH results: 3.870    Psych  #concern for SI - was placed on CO  - patient denies SI while alert and oriented, CO dc'ed  - with sitter due to fall risk     #anxiety  - Pt home medication of .25mg klonipin added on per request of daughter    Palliative  - Pt daughter states that her mothers wishes were to "try for a little while to fix something if it was reversible but if my brain is gone, forget about it". Ongoing support given to daughter with diagnoses and prognosis. Pt was living semi-independent at family home prior to admission     DVT Prophylaxis  - lovenox sq for DVT prophylaxis   - SCDs for DVT prophylaxis     Dispo: AR      Discussed daily hospital plans and goals with patient and patient daughter at beside.    Discussed with Dr. Garcia

## 2023-09-28 NOTE — DISCHARGE NOTE PROVIDER - NSDCMRMEDTOKEN_GEN_ALL_CORE_FT
azelastine 205.5 mcg/inh (0.15%) nasal spray: 2 spray(s) intranasally 2 times a day  cefepime 2 g injection: 2 gram(s) by continuous intravenous infusion 2 times a day  Lopressor 50 mg oral tablet: 1 orally 2 times a day  montelukast 10 mg oral tablet: 0.5 tab(s) orally 2 times a day   azelastine 205.5 mcg/inh (0.15%) nasal spray: 2 spray(s) intranasally 2 times a day  cefepime 2 g injection: 2 gram(s) by continuous intravenous infusion 2 times a day  Lopressor 50 mg oral tablet: 1 orally 2 times a day  mirtazapine 7.5 mg oral tablet: 1 tab(s) orally once a day (at bedtime)  montelukast 10 mg oral tablet: 0.5 tab(s) orally 2 times a day   amLODIPine 5 mg oral tablet: 1 tab(s) orally once a day  aspirin 81 mg oral tablet, chewable: 1 tab(s) orally once a day  atorvastatin 40 mg oral tablet: 1 tab(s) orally once a day (at bedtime)  azelastine 205.5 mcg/inh (0.15%) nasal spray: 2 spray(s) intranasally 2 times a day  donepezil 5 mg oral tablet: 1 tab(s) orally every 24 hours  Lopressor 50 mg oral tablet: 1 orally 2 times a day  mirtazapine 7.5 mg oral tablet: 1 tab(s) orally once a day (at bedtime)  montelukast 10 mg oral tablet: 0.5 tab(s) orally 2 times a day  Multiple Vitamins oral tablet, chewable: 1 tab(s) orally once a day  sertraline 25 mg oral tablet: 0.5 tab(s) orally once a day  thiamine 100 mg oral tablet: 1 tab(s) orally once a day   amLODIPine 5 mg oral tablet: 1 tab(s) orally once a day  aspirin 81 mg oral tablet, chewable: 1 tab(s) orally once a day  atorvastatin 40 mg oral tablet: 1 tab(s) orally once a day (at bedtime)  azelastine 205.5 mcg/inh (0.15%) nasal spray: 2 spray(s) intranasally 2 times a day  donepezil 5 mg oral tablet: 1 tab(s) orally every 24 hours  Lopressor 50 mg oral tablet: 1 orally 2 times a day  mirtazapine 7.5 mg oral tablet: 1 tab(s) orally once a day (at bedtime)  montelukast 10 mg oral tablet: 0.5 tab(s) orally 2 times a day  Multiple Vitamins oral tablet, chewable: 1 tab(s) orally once a day  sertraline: 25 milligram(s) once a day Take one daily  sertraline 25 mg oral tablet: 0.5 tab(s) orally once a day  thiamine 100 mg oral tablet: 1 tab(s) orally once a day   amLODIPine 5 mg oral tablet: 1 tab(s) orally once a day  aspirin 81 mg oral tablet, chewable: 1 tab(s) orally once a day  atorvastatin 40 mg oral tablet: 1 tab(s) orally once a day (at bedtime)  azelastine 205.5 mcg/inh (0.15%) nasal spray: 2 spray(s) intranasally 2 times a day  donepezil 5 mg oral tablet: 1 tab(s) orally every 24 hours  Lopressor 50 mg oral tablet: 1 orally 2 times a day  melatonin 5 mg oral tablet: 1 tab(s) orally once a day (at bedtime) Please give at 2100 nightly  mirtazapine 7.5 mg oral tablet: 1 tab(s) orally once a day (at bedtime)  montelukast 10 mg oral tablet: 0.5 tab(s) orally 2 times a day  Multiple Vitamins oral tablet, chewable: 1 tab(s) orally once a day  sertraline: 12.5 milligram(s) once a day Take one daily  sertraline 25 mg oral tablet: 0.5 tab(s) orally once a day  thiamine 100 mg oral tablet: 1 tab(s) orally once a day   amLODIPine 5 mg oral tablet: 1 tab(s) orally once a day  aspirin 81 mg oral tablet, chewable: 1 tab(s) orally once a day  atorvastatin 40 mg oral tablet: 1 tab(s) orally once a day (at bedtime)  azelastine 205.5 mcg/inh (0.15%) nasal spray: 2 spray(s) intranasally 2 times a day  donepezil 5 mg oral tablet: 1 tab(s) orally every 24 hours  enoxaparin: 30 mg subQ every 24 hrs  Lopressor 50 mg oral tablet: 1 orally 2 times a day  melatonin 5 mg oral tablet: 1 tab(s) orally once a day (at bedtime) Please give at 2100 nightly  mirtazapine 7.5 mg oral tablet: 1 tab(s) orally once a day (at bedtime)  montelukast 10 mg oral tablet: 0.5 tab(s) orally 2 times a day  Multiple Vitamins oral tablet, chewable: 1 tab(s) orally once a day  sertraline: 12.5 milligram(s) once a day Take one daily  sertraline 25 mg oral tablet: 0.5 tab(s) orally once a day  thiamine 100 mg oral tablet: 1 tab(s) orally once a day   amLODIPine 5 mg oral tablet: 1 tab(s) orally once a day  aspirin 81 mg oral tablet, chewable: 1 tab(s) orally once a day  atorvastatin 40 mg oral tablet: 1 tab(s) orally once a day (at bedtime)  azelastine 205.5 mcg/inh (0.15%) nasal spray: 2 spray(s) intranasally 2 times a day  donepezil 5 mg oral tablet: 1 tab(s) orally every 24 hours  enoxaparin: 30 mg SubQ every 24 hrs  Lopressor 50 mg oral tablet: 1 orally 2 times a day  melatonin 5 mg oral tablet: 1 tab(s) orally once a day (at bedtime) Please give at 2100 nightly  mirtazapine 7.5 mg oral tablet: 1 tab(s) orally once a day (at bedtime)  montelukast 10 mg oral tablet: 0.5 tab(s) orally 2 times a day  Multiple Vitamins oral tablet, chewable: 1 tab(s) orally once a day  sertraline: 12.5 milligram(s) once a day Take one daily  sertraline 25 mg oral tablet: 0.5 tab(s) orally once a day  thiamine 100 mg oral tablet: 1 tab(s) orally once a day   amLODIPine 5 mg oral tablet: 1 tab(s) orally once a day  aspirin 81 mg oral tablet, chewable: 1 tab(s) orally once a day  atorvastatin 40 mg oral tablet: 1 tab(s) orally once a day (at bedtime)  azelastine 205.5 mcg/inh (0.15%) nasal spray: 2 spray(s) intranasally 2 times a day  donepezil 5 mg oral tablet: 1 tab(s) orally every 24 hours  enoxaparin: 30 mg SubQ every 24 hrs  Lopressor 50 mg oral tablet: 1 orally 2 times a day  melatonin 5 mg oral tablet: 1 tab(s) orally once a day (at bedtime) Please give at 2100 nightly  montelukast 10 mg oral tablet: 0.5 tab(s) orally 2 times a day  Multiple Vitamins oral tablet, chewable: 1 tab(s) orally once a day  sertraline: 12.5 milligram(s) once a day Take one daily  sodium chloride 7% inhalation solution: 4 milliliter(s) inhaled every 12 hours  thiamine 100 mg oral tablet: 1 tab(s) orally once a day   amLODIPine 5 mg oral tablet: 1 tab(s) orally once a day  aspirin 81 mg oral tablet, chewable: 1 tab(s) orally once a day  atorvastatin 40 mg oral tablet: 1 tab(s) orally once a day (at bedtime)  azelastine 205.5 mcg/inh (0.15%) nasal spray: 2 spray(s) intranasally 2 times a day  donepezil 5 mg oral tablet: 1 tab(s) orally every 24 hours  Lopressor 50 mg oral tablet: 1 orally 2 times a day  melatonin 5 mg oral tablet: 1 tab(s) orally once a day (at bedtime) Please give at 2100 nightly  montelukast 10 mg oral tablet: 0.5 tab(s) orally 2 times a day  Multiple Vitamins oral tablet, chewable: 1 tab(s) orally once a day  sertraline: 12.5 milligram(s) once a day Take one daily  sodium chloride 7% inhalation solution: 4 milliliter(s) inhaled every 12 hours  thiamine 100 mg oral tablet: 1 tab(s) orally once a day

## 2023-09-28 NOTE — DISCHARGE NOTE PROVIDER - CARE PROVIDER_API CALL
Lilibeth Toribio  NP in Family Health  [address]  [place] [address]  Phone: [phone]  Fax: [phone]  Follow Up Time:     Abel Lomax  Neurology  [address] [place] [address]  Phone: [phone]  Fax: [phone]  Follow Up Time: 2 weeks   Lilibeth Toribio  NP in Family Health  [address]  [place] [address]  Phone: [phone]  Fax: [phone]  Follow Up Time:     Abel Lomax  Neurology  [address] [place] [address]  Phone: [phone]  Fax: [phone]  Scheduled Appointment: 10/23/2023 09:45 AM

## 2023-09-28 NOTE — PROGRESS NOTE ADULT - SUBJECTIVE AND OBJECTIVE BOX
[facility] Geriatrics and Palliative Care  Abad Buenrostro, Palliative Care Attending  Contact Info: Call [phone] (HEAL Line) or message on Microsoft Teams (Abad Buenrostro)    SUBJECTIVE AND OBJECTIVE:  INTERVAL HPI/OVERNIGHT EVENTS: Interval events noted. See patient's PRN use for the past 24hrs noted below. Comprehensive symptom assessment and GOC exploration as noted below. Extensive time spent discussing plan of care with patient/family.     ALLERGIES:  Ceclor (Rash)  IV Contrast (Unknown)  Levaquin (Swelling)  Augmentin (Short breath; Rash)    MEDICATIONS  (STANDING):  albuterol/ipratropium for Nebulization 3 milliLiter(s) Nebulizer every 6 hours  amLODIPine   Tablet 5 milliGRAM(s) Oral daily  aspirin  chewable 81 milliGRAM(s) Oral daily  atorvastatin 40 milliGRAM(s) Oral at bedtime  enoxaparin Injectable 30 milliGRAM(s) SubCutaneous every 24 hours  melatonin 5 milliGRAM(s) Oral at bedtime  metoprolol tartrate 25 milliGRAM(s) Oral two times a day  montelukast 10 milliGRAM(s) Oral daily  multivitamin  Chewable 1 Tablet(s) Oral daily  piperacillin/tazobactam IVPB.. 4.5 Gram(s) IV Intermittent every 8 hours  sodium chloride 0.9%. 1000 milliLiter(s) (60 mL/Hr) IV Continuous <Continuous>  thiamine 100 milliGRAM(s) Oral daily    MEDICATIONS  (PRN):      Analgesic Use (Scheduled and PRNs) for past 24 hours:      ITEMS UNCHECKED ARE NOT PRESENT  PRESENT SYMPTOMS/REVIEW OF SYSTEMS: []Unable to obtain due to poor mentation   Source if other than patient:  []Family   []Team         Vital Signs Last 24 Hrs  T(C): 36.6 (28 Sep 2023 10:17), Max: 36.9 (27 Sep 2023 14:00)  T(F): 97.8 (28 Sep 2023 10:17), Max: 98.4 (27 Sep 2023 14:00)  HR: 80 (28 Sep 2023 12:06) (64 - 86)  BP: 145/66 (28 Sep 2023 12:06) (131/78 - 184/85)  BP(mean): 98 (28 Sep 2023 12:06) (84 - 122)  RR: 18 (28 Sep 2023 12:06) (16 - 18)  SpO2: 95% (28 Sep 2023 12:06) (91% - 96%)    Parameters below as of 28 Sep 2023 12:06  Patient On (Oxygen Delivery Method): room air        LABS: Personally reviewed and interpreted                        10.2   5.92  )-----------( 219      ( 28 Sep 2023 06:22 )             30.4   09-28    135  |  101  |  16  ----------------------------<  93  3.7   |  25  |  0.77    Ca    8.4      28 Sep 2023 06:22  Phos  3.0     09-28  Mg     1.8     09-28        RADIOLOGY & ADDITIONAL STUDIES: None new    DISCUSSION OF CASE: Family - to provide updates and emotional support; Primary Team/RN - to discuss plan of care [facility] Geriatrics and Palliative Care  Abad Buenrostro, Palliative Care Attending  Contact Info: Call [phone] (HEAL Line) or message on Microsoft Teams (Abad Buenrostro)    SUBJECTIVE AND OBJECTIVE:  INTERVAL HPI/OVERNIGHT EVENTS: Interval events noted. See patient's PRN use for the past 24hrs noted below. Comprehensive symptom assessment and GOC exploration as noted below. Extensive time spent discussing plan of care with patient/family.     ALLERGIES:  Ceclor (Rash)  IV Contrast (Unknown)  Levaquin (Swelling)  Augmentin (Short breath; Rash)    MEDICATIONS  (STANDING):  albuterol/ipratropium for Nebulization 3 milliLiter(s) Nebulizer every 6 hours  amLODIPine   Tablet 5 milliGRAM(s) Oral daily  aspirin  chewable 81 milliGRAM(s) Oral daily  atorvastatin 40 milliGRAM(s) Oral at bedtime  enoxaparin Injectable 30 milliGRAM(s) SubCutaneous every 24 hours  melatonin 5 milliGRAM(s) Oral at bedtime  metoprolol tartrate 25 milliGRAM(s) Oral two times a day  montelukast 10 milliGRAM(s) Oral daily  multivitamin  Chewable 1 Tablet(s) Oral daily  piperacillin/tazobactam IVPB.. 4.5 Gram(s) IV Intermittent every 8 hours  sodium chloride 0.9%. 1000 milliLiter(s) (60 mL/Hr) IV Continuous <Continuous>  thiamine 100 milliGRAM(s) Oral daily    MEDICATIONS  (PRN):      Analgesic Use (Scheduled and PRNs) for past 24 hours:  - none    ITEMS UNCHECKED ARE NOT PRESENT  PRESENT SYMPTOMS/REVIEW OF SYSTEMS: []Unable to obtain due to poor mentation   Source if other than patient:  []Family   []Team     Pain: [] yes [x] no  QOL impact -  Location -  Aggravating factors -  Quality -  Radiation -  Timing -  Severity (0-10 scale) -  Minimal acceptable level (0-10 scale) -    PAINAD Score: 0  CPOT Score: 0    Dyspnea:                           []Mild  []Moderate []Severe  Anxiety:                             []Mild []Moderate []Severe  Fatigue:                             []Mild []Moderate []Severe  Nausea:                             []Mild []Moderate []Severe  Loss of appetite:              []Mild []Moderate []Severe  Constipation:                    []Mild []Moderate []Severe    Other Symptoms:  [x]All other review of systems negative     Palliative Performance Status Version 2: 40%    GENERAL:  [x] NAD [x]Alert []Lethargic  []Cachexia  []Unarousable  [x]Verbal  []Non-Verbal  BEHAVIORAL:   []Anxiety  []Delirium []Agitation [x]Cooperative [x]Oriented x3  HEENT:  [x]Normal  [x] Moist Mucous Membranes []Dry mouth   []ET Tube/Trach  []Oral lesions  PULMONARY:   [x]Clear []Tachypnea  []Audible excessive secretions  [x]Normal Work of Breathing []Labored Breathing  []Rhonchi []Crackles []Wheezing  CARDIOVASCULAR:    [x]Regular Rate [x]Regular Rhythm []Irregular []Tachy  []Hemanth  GASTROINTESTINAL:  [x]Soft  []Distended   []+BS  [x]Non tender []Tender  []PEG []OGT/ NGT  Last BM:  GENITOURINARY:  []Normal [x] Incontinent   []Oliguria/Anuria   []Richards  MUSCULOSKELETAL:   [x]Normal Extremities  [x]Weakness  [x]Bed/Wheelchair bound []Edema  NEUROLOGIC:   [x]No focal deficits  []Cognitive impairment  []Dysphagia []Dysarthria []Paresis []Encephalopathic  SKIN:   [x]Normal   []Pressure ulcer(s)  []Rash    Vital Signs Last 24 Hrs  T(C): 36.6 (28 Sep 2023 10:17), Max: 36.9 (27 Sep 2023 14:00)  T(F): 97.8 (28 Sep 2023 10:17), Max: 98.4 (27 Sep 2023 14:00)  HR: 80 (28 Sep 2023 12:06) (64 - 86)  BP: 145/66 (28 Sep 2023 12:06) (131/78 - 184/85)  BP(mean): 98 (28 Sep 2023 12:06) (84 - 122)  RR: 18 (28 Sep 2023 12:06) (16 - 18)  SpO2: 95% (28 Sep 2023 12:06) (91% - 96%)    Parameters below as of 28 Sep 2023 12:06  Patient On (Oxygen Delivery Method): room air    LABS: Personally reviewed and interpreted                        10.2   5.92  )-----------( 219      ( 28 Sep 2023 06:22 )             30.4   09-28    135  |  101  |  16  ----------------------------<  93  3.7   |  25  |  0.77    Ca    8.4      28 Sep 2023 06:22  Phos  3.0     09-28  Mg     1.8     09-28    RADIOLOGY & ADDITIONAL STUDIES: None new    DISCUSSION OF CASE: Family - to provide updates and emotional support; Primary Team/RN - to discuss plan of care

## 2023-09-28 NOTE — CHART NOTE - NSCHARTNOTEFT_GEN_A_CORE
Admitting Diagnosis:   Patient is a 82y old  Female who presents with a chief complaint of diplopia (28 Sep 2023 13:13)      PAST MEDICAL & SURGICAL HISTORY:  Bronchiectasis      History of Pseudomonas pneumonia      HTN (hypertension)      Posterior reversible encephalopathy syndrome      Atrial fibrillation      No significant past surgical history    Current Nutrition Order: Minced/moist ; Ensure TID     PO Intake: Good (%) [ x ]  Fair (50-75%) [   ] Poor (<25%) [   ]    GI Issues: Abdomen non-distended/non-tender, +BS x4, last bowel movement 9/26    Pain: 0 per chart review     Skin Integrity: pressure injury stage I L lateral knee, +2 R arm     Labs:   09-28    135  |  101  |  16  ----------------------------<  93  3.7   |  25  |  0.77    Ca    8.4      28 Sep 2023 06:22  Phos  3.0     09-28  Mg     1.8     09-28      CAPILLARY BLOOD GLUCOSE      POCT Blood Glucose.: 115 mg/dL (28 Sep 2023 11:41)  POCT Blood Glucose.: 82 mg/dL (27 Sep 2023 17:01)      Medications:  MEDICATIONS  (STANDING):  albuterol/ipratropium for Nebulization 3 milliLiter(s) Nebulizer every 6 hours  amLODIPine   Tablet 5 milliGRAM(s) Oral daily  aspirin  chewable 81 milliGRAM(s) Oral daily  atorvastatin 40 milliGRAM(s) Oral at bedtime  donepezil 5 milliGRAM(s) Oral at bedtime  enoxaparin Injectable 30 milliGRAM(s) SubCutaneous every 24 hours  melatonin 5 milliGRAM(s) Oral at bedtime  metoprolol tartrate 25 milliGRAM(s) Oral two times a day  montelukast 10 milliGRAM(s) Oral daily  multivitamin  Chewable 1 Tablet(s) Oral daily  piperacillin/tazobactam IVPB.. 4.5 Gram(s) IV Intermittent every 8 hours  sodium chloride 0.9%. 1000 milliLiter(s) (60 mL/Hr) IV Continuous <Continuous>  sodium chloride 7% Inhalation 4 milliLiter(s) Inhalation every 12 hours  thiamine 100 milliGRAM(s) Oral daily    MEDICATIONS  (PRN):    Height for BMI (FEET)	5 Feet  Height for BMI (INCHES)	5 Inch(s)  Height for BMI (CENTIMETERS)	165.1 Centimeter(s)  Weight for BMI (lbs)	71.7 lb  Weight for BMI (kg)	32.5 kg  Body Mass Index	11.9    Weight Change: No new wt since admit.     Nutrition Focused Physical Exam: See Dietitian Nutrition Risk Notification on 9/20.    Estimated energy needs: Needs updated 9/25 to reflect protein-calorie malnutrition using current body weight 32.5 kg.   EEN: 975-1137 calories (30-35 kcal/kg)  EPN: 39-65 gProt. (1.2-2.0 gProt./kg)  EFN: 975-1137 ml (30-35 ml/kg)    Subjective: 82y Female with PMHx of HTN, afib (not on AC due to fall risk), recent hospitalization for PRES (5-6/2023), recently discharged from [facility] 8/2023 with acute exacerbation of severe bronchiectasis and respiratory failure presents with several days of AMS and new diplopia 9/18. NIHSS 1 for bitemporal visual deficit. CTH with no significant findings. CTA and perfusion deferred due to contrast allergy. Admitted for further stroke w/u. 9/27: Passed FEES.     Pt assessed at bedside on 7LA. Rx and labs reviewed. Spoke with pt and daughter at bedside observed eating snack. Pt reports a very good appetite; states she is eating well which is abnormal for her. Encouraged her to continue to eat well while appetite is strong to promote desired wt gain. Expressed great dislike for oral nutrition supplement regimen; agreeable to Magic Cup. Pt endorsed some abdominal pain secondary to abx therapy; encouraged adequate hydration symptom monitoring. Pt daughter inquiring about pro/prebiotic therapy; encouraged discussion with MD and reviewed foods/supplements available for pro/prebiotic therapy. No other reports GI distress or further nutritional concerns at this time. No complaints of difficult chew/swallow with current therapeutic textures/consistencies. RDN will continue to reassess, intervene, and monitor as appropriate.     Nutrition Diagnosis: Malnutrition... Severe inadequate PO intake Significant weight loss, NFPE findings     Active [ x ]  Resolved [   ]    Goal: Pt will meet 75% or more of protein/energy needs via most appropriate route for nutrition and reduce/resolve s/sx protein-calorie malnutrition.     Recommendations:  -Continue current diet with appropriate textures/consistencies    *D/c Ensure TID   *Add Magic Cup BIDLD   -Encourage good PO intake  -Honor food preferences as able  -Monitor chemistry, GI function, and skin integrity     **Communicated with Stroke ACP**    Risk Level: High [ x ] Moderate [   ] Low [   ]

## 2023-09-28 NOTE — PROGRESS NOTE ADULT - ASSESSMENT
Pulm: Dr. Foster  82F w HTN, AF not on AC d/t fall risk, hospitalized for PRES 05-06/2023, recent DC 8/2023 for acute hypoxemic respiratory failure - to home on 4L NC w follow-up w Dr. Foster for exacerbation of bronchiectasis, recently started course of IV cefepime outpatient by Dr. Foster 9/13, p/w diplopia, encephalopathy admitted for stroke work-up, cefepime continued inpatient, repeat head CT found small SAH, c/b hypoglycemia, encephalopathy - BG 30 and temp 93F during RRT on 9/21, transferred to MICU for persistent hypoglycemia needing D10W >> D10 NS, slow improvement w nml insulin and C peptide, stepped down to 7L and stroke service, MR brain showing L pontine infarction, PET brain w signal c/f Dementia w Lewy Bodies, for acute rehab    #Acute urinary retention - TOV started 9/27    #CVA - L pontine infarction on MR 9/25 that also showed SAH diminishing.  #Encephalopathy - improving. There is signal on PET c/f Dementia w Lewy Bodies   - ASA 81 daily, Atorva 40   - Can start remiron 7.5 daily, donepezil 5 HS    #Bronchiectasis - On IV Cefepime from 9/13 w PICC placed outpatient (PICC discontinued 9/21). Has been on IV Zosyn in MICU per pulmonary. Currently satting well on RA. On singulair 10  #AFib - on lopressor 25 bid.   #HTN - on amlodipine 5    #Normocytic anemia - 10.2 today.  #Hyponatremia - 135 today    Plan  Obtain UA - continue bladder scan q6h. Pt had small BM today.   Would d/w Pulm regarding end date of IV Zosyn - pt started cefepime on 9/13  Nutrition consult; encourage PO/Fluid intake    Pt is DNR/DNI  DISPO: Acute rehab - baseline lives w daughter

## 2023-09-28 NOTE — DISCHARGE NOTE PROVIDER - NSDCFUSCHEDAPPT_GEN_ALL_CORE_FT
Artur Rivas  [facility] Physician [facility]  CTS[facility] [address]  Scheduled Appointment: 11/03/2023

## 2023-09-28 NOTE — DISCHARGE NOTE PROVIDER - NSDCCPTREATMENT_GEN_ALL_CORE_FT
PRINCIPAL PROCEDURE  Procedure: MRI head  Findings and Treatment: 1. Left pontine punctate acute infarctions have developed since 9/20/2023  2. Subarachnoid hemorrhage has diminished since 9/20/2023  3. ischemic white matter disease, atrophy typical for age and remote   petechial hemorrhages are stable findings since 9/20/2023

## 2023-09-28 NOTE — DISCHARGE NOTE PROVIDER - PROVIDER TOKENS
PROVIDER:[TOKEN:[[id number]]],PROVIDER:[TOKEN:[[id number]],FOLLOWUP:[2 weeks]] PROVIDER:[TOKEN:[[id number]]],PROVIDER:[TOKEN:[[id number]],SCHEDULEDAPPT:[10/23/2023],SCHEDULEDAPPTTIME:[09:45 AM]]

## 2023-09-29 LAB
ANION GAP SERPL CALC-SCNC: 7 MMOL/L — SIGNIFICANT CHANGE UP (ref 5–17)
APPEARANCE UR: CLEAR — SIGNIFICANT CHANGE UP
BACTERIA # UR AUTO: PRESENT /HPF
BILIRUB UR-MCNC: NEGATIVE — SIGNIFICANT CHANGE UP
BUN SERPL-MCNC: 16 MG/DL — SIGNIFICANT CHANGE UP (ref 7–23)
CALCIUM SERPL-MCNC: 8.1 MG/DL — LOW (ref 8.4–10.5)
CHLORIDE SERPL-SCNC: 101 MMOL/L — SIGNIFICANT CHANGE UP (ref 96–108)
CO2 SERPL-SCNC: 26 MMOL/L — SIGNIFICANT CHANGE UP (ref 22–31)
COLOR SPEC: YELLOW — SIGNIFICANT CHANGE UP
COMMENT - URINE: SIGNIFICANT CHANGE UP
CREAT SERPL-MCNC: 0.76 MG/DL — SIGNIFICANT CHANGE UP (ref 0.5–1.3)
DIFF PNL FLD: ABNORMAL
EGFR: 79 ML/MIN/1.73M2 — SIGNIFICANT CHANGE UP
EPI CELLS # UR: SIGNIFICANT CHANGE UP /HPF (ref 0–5)
GLUCOSE SERPL-MCNC: 104 MG/DL — HIGH (ref 70–99)
GLUCOSE UR QL: NEGATIVE — SIGNIFICANT CHANGE UP
HCT VFR BLD CALC: 32.5 % — LOW (ref 34.5–45)
HGB BLD-MCNC: 10.9 G/DL — LOW (ref 11.5–15.5)
KETONES UR-MCNC: NEGATIVE — SIGNIFICANT CHANGE UP
LEUKOCYTE ESTERASE UR-ACNC: ABNORMAL
MAGNESIUM SERPL-MCNC: 1.9 MG/DL — SIGNIFICANT CHANGE UP (ref 1.6–2.6)
MCHC RBC-ENTMCNC: 32.9 PG — SIGNIFICANT CHANGE UP (ref 27–34)
MCHC RBC-ENTMCNC: 33.5 GM/DL — SIGNIFICANT CHANGE UP (ref 32–36)
MCV RBC AUTO: 98.2 FL — SIGNIFICANT CHANGE UP (ref 80–100)
NITRITE UR-MCNC: NEGATIVE — SIGNIFICANT CHANGE UP
NRBC # BLD: 0 /100 WBCS — SIGNIFICANT CHANGE UP (ref 0–0)
PH UR: 6.5 — SIGNIFICANT CHANGE UP (ref 5–8)
PHOSPHATE SERPL-MCNC: 2.6 MG/DL — SIGNIFICANT CHANGE UP (ref 2.5–4.5)
PLATELET # BLD AUTO: 236 K/UL — SIGNIFICANT CHANGE UP (ref 150–400)
POTASSIUM SERPL-MCNC: 3.7 MMOL/L — SIGNIFICANT CHANGE UP (ref 3.5–5.3)
POTASSIUM SERPL-SCNC: 3.7 MMOL/L — SIGNIFICANT CHANGE UP (ref 3.5–5.3)
PROT UR-MCNC: NEGATIVE MG/DL — SIGNIFICANT CHANGE UP
RBC # BLD: 3.31 M/UL — LOW (ref 3.8–5.2)
RBC # FLD: 12.5 % — SIGNIFICANT CHANGE UP (ref 10.3–14.5)
RBC CASTS # UR COMP ASSIST: ABNORMAL /HPF
SODIUM SERPL-SCNC: 134 MMOL/L — LOW (ref 135–145)
SP GR SPEC: 1.01 — SIGNIFICANT CHANGE UP (ref 1–1.03)
UROBILINOGEN FLD QL: 0.2 E.U./DL — SIGNIFICANT CHANGE UP
WBC # BLD: 4.83 K/UL — SIGNIFICANT CHANGE UP (ref 3.8–10.5)
WBC # FLD AUTO: 4.83 K/UL — SIGNIFICANT CHANGE UP (ref 3.8–10.5)
WBC UR QL: < 5 /HPF — SIGNIFICANT CHANGE UP

## 2023-09-29 PROCEDURE — 99232 SBSQ HOSP IP/OBS MODERATE 35: CPT

## 2023-09-29 PROCEDURE — 99222 1ST HOSP IP/OBS MODERATE 55: CPT

## 2023-09-29 PROCEDURE — 99233 SBSQ HOSP IP/OBS HIGH 50: CPT

## 2023-09-29 RX ORDER — CEFEPIME 1 G/1
1000 INJECTION, POWDER, FOR SOLUTION INTRAMUSCULAR; INTRAVENOUS EVERY 24 HOURS
Refills: 0 | Status: DISCONTINUED | OUTPATIENT
Start: 2023-09-29 | End: 2023-09-29

## 2023-09-29 RX ORDER — CEFEPIME 1 G/1
1000 INJECTION, POWDER, FOR SOLUTION INTRAMUSCULAR; INTRAVENOUS ONCE
Refills: 0 | Status: COMPLETED | OUTPATIENT
Start: 2023-09-29 | End: 2023-09-29

## 2023-09-29 RX ORDER — MIRTAZAPINE 45 MG/1
7.5 TABLET, ORALLY DISINTEGRATING ORAL ONCE
Refills: 0 | Status: DISCONTINUED | OUTPATIENT
Start: 2023-09-29 | End: 2023-10-06

## 2023-09-29 RX ORDER — ACETAMINOPHEN 500 MG
650 TABLET ORAL EVERY 6 HOURS
Refills: 0 | Status: DISCONTINUED | OUTPATIENT
Start: 2023-09-29 | End: 2023-10-10

## 2023-09-29 RX ORDER — ACETAMINOPHEN 500 MG
650 TABLET ORAL ONCE
Refills: 0 | Status: COMPLETED | OUTPATIENT
Start: 2023-09-29 | End: 2023-09-29

## 2023-09-29 RX ADMIN — Medication 650 MILLIGRAM(S): at 22:23

## 2023-09-29 RX ADMIN — Medication 25 MILLIGRAM(S): at 17:55

## 2023-09-29 RX ADMIN — CEFEPIME 100 MILLIGRAM(S): 1 INJECTION, POWDER, FOR SOLUTION INTRAMUSCULAR; INTRAVENOUS at 11:26

## 2023-09-29 RX ADMIN — PIPERACILLIN AND TAZOBACTAM 25 GRAM(S): 4; .5 INJECTION, POWDER, LYOPHILIZED, FOR SOLUTION INTRAVENOUS at 07:00

## 2023-09-29 RX ADMIN — MONTELUKAST 10 MILLIGRAM(S): 4 TABLET, CHEWABLE ORAL at 11:25

## 2023-09-29 RX ADMIN — Medication 5 MILLIGRAM(S): at 21:36

## 2023-09-29 RX ADMIN — PIPERACILLIN AND TAZOBACTAM 25 GRAM(S): 4; .5 INJECTION, POWDER, LYOPHILIZED, FOR SOLUTION INTRAVENOUS at 00:01

## 2023-09-29 RX ADMIN — Medication 1 TABLET(S): at 11:28

## 2023-09-29 RX ADMIN — Medication 650 MILLIGRAM(S): at 23:00

## 2023-09-29 RX ADMIN — AMLODIPINE BESYLATE 5 MILLIGRAM(S): 2.5 TABLET ORAL at 06:29

## 2023-09-29 RX ADMIN — SODIUM CHLORIDE 4 MILLILITER(S): 9 INJECTION INTRAMUSCULAR; INTRAVENOUS; SUBCUTANEOUS at 09:37

## 2023-09-29 RX ADMIN — Medication 25 MILLIGRAM(S): at 06:29

## 2023-09-29 RX ADMIN — Medication 650 MILLIGRAM(S): at 19:32

## 2023-09-29 RX ADMIN — Medication 81 MILLIGRAM(S): at 11:25

## 2023-09-29 RX ADMIN — Medication 3 MILLILITER(S): at 09:37

## 2023-09-29 RX ADMIN — Medication 100 MILLIGRAM(S): at 11:26

## 2023-09-29 RX ADMIN — Medication 650 MILLIGRAM(S): at 19:10

## 2023-09-29 NOTE — PROCEDURE NOTE - GENERAL PROCEDURE DETAILS
Patient placed in lithotomy position, then using sterile technique and sterilizing the  area, 14 fr saravia catheter placed.

## 2023-09-29 NOTE — CONSULT NOTE ADULT - ASSESSMENT
Patient is a 82F w/ sudden onset urinary retention patient was voiding prior to admission, and has not needed to follow up w/ a Urologist.  Patient has approx 700cc in bladder, she will need saravia catheter placement vs CIC since there was some urethral trauma w/ straight catheterization.     Recs:  -Saravia catheter placement  -Pending final recs. 81 y/o female with PMHx of HTN, afib (not on AC due to fall risk), recent hospitalization for PRES (5-6/2023), admitted to stroke for altered mental status and diplopia, found to have L pontine and left occipital infarct as well as SAH of the right parieto-occipital region. Urology was consulted for urinary retention. Patient has required straight caths x4 for bladder scans 300s-700. Saravia placed 9/29.     Recommendations:  -Continue saravia for at least 2-3 days for bladder rest  -Trial of void per primary team when patient is optimized from mobility/ambulatory perspective, with regular bowel movements  -Appreciate excellent care per primary team  -Discussed with attending

## 2023-09-29 NOTE — CONSULT NOTE ADULT - CONSULT REASON
Urinary retention
Pain/Symptom Management and Complex Medical Decision Making/GOC in the setting of Critical Illness
co-management
Bronchiectasis
diplopia
Atrial fibrillation
Hypoglycemia
PM&R consult
Small SAH, R parietal/occipital sulci

## 2023-09-29 NOTE — CONSULT NOTE ADULT - CONSULT REQUESTED DATE/TIME
19-Sep-2023
19-Sep-2023 10:06
21-Sep-2023 11:36
29-Sep-2023 12:52
19-Sep-2023 03:16
20-Sep-2023 07:35
29-Sep-2023 13:09
22-Sep-2023 10:15
22-Sep-2023 13:10

## 2023-09-29 NOTE — CONSULT NOTE ADULT - SUBJECTIVE AND OBJECTIVE BOX
Patient is a 82y old  Female who presents with a chief complaint of diplopia (29 Sep 2023 10:49)      HPI:   **STROKE HPI***    HPI: 82y Female with PMHx of HTN, afib (not on AC due to fall risk), recent hospitalization for PRES (5-6/2023), recently discharged from [facility] 8/2023 with acute exacerbation of severe bronchiectasis and respiratory failure presents with several days of AMS and new diplopia 9/18.     Daughter noticed since 9/15 after starting IV abx, patient has been more tired and exhausted with some agitation. It has progressed over the past few days to generalized confusion (being "out of it", not know where certain rooms are in the house, dropping items, not knowing common everyday information) similar to how she initially presented with PRES. Patient told daughter she was seeing double of items in the house around 10pm.     ED: /86. SpO2 84% RA, requiring 3L NC with improvement to 90%. C/o of shortness of breath and difficulty breathing while on supplemental oxygen. Neuro exam significant for decreased peripheral vision bitemporal, but no clear visual field cut, also inconsistent vision exam. Otherwise nonfocal. CT with no acute hemorrhage, with chronic b/l lentiform nuclei and right caudate infarcts. CT angio and perfusion deferred due to contrast allergy received contrast during angiogram in the past with intense flushing of head).    On repeat visual exam, patient notes that she sees double of items, but unable to tell if horizontal or vertical. She is unable to discern at time whether truly double vs blurry, nor transient or consistent diplopia since onset. At times, when testing visual fields, patient struggles to count fingers peripherally and centrally b/l (unclear whether from true field cut vs complete loss of vision vs diplopia causing difficulty counting). Patient also sees items moving around when they are actually stationary. Daughter states that since PRES, patient's vision has not returned to baseline and has been blurry. They have tried to obtain glasses, however on vision testing, Rx was never consistent so was advised to re-test vision after it has "settled".        (19 Sep 2023 05:59)     <<<<<< NOTE>>>>>     Patient is as stated above,  team consulted for urinary retention.  Patient states that she has not needed to follow up with a Urologist, since she voids well, and has not had any issues with he  tract.  Patient states prior to admission that she was ambulatory, and that she gardens and is always moving.  Patient at bedside states she has been unable to ambulate these past 3 months and that she has been in bed most of the time.  She is having fecal incontinence, but unable to void.  Patient had Bladder scan done at bedside resulting approx 700cc, though patient does not feel the urge to go or able to void.  Patient denies n/v/f/c, dysuria, hematuria, LUT's,  abdominal pain, chest pain, SOB.  Vital Signs Last 24 Hrs  T(C): 36.4 (29 Sep 2023 09:15), Max: 36.6 (28 Sep 2023 13:42)  T(F): 97.6 (29 Sep 2023 09:15), Max: 97.9 (28 Sep 2023 21:43)  HR: 78 (29 Sep 2023 12:25) (62 - 80)  BP: 134/65 (29 Sep 2023 12:25) (134/65 - 157/71)  BP(mean): 92 (29 Sep 2023 12:25) (92 - 115)  RR: 17 (29 Sep 2023 12:25) (14 - 18)  SpO2: 93% (29 Sep 2023 12:25) (93% - 100%)    Parameters below as of 29 Sep 2023 12:25  Patient On (Oxygen Delivery Method): room air      I&O's Summary    28 Sep 2023 07:01  -  29 Sep 2023 07:00  --------------------------------------------------------  IN: 1260 mL / OUT: 2165 mL / NET: -905 mL        PE:  Gen: NAD, alert and oriented   Abd: soft nt/nd. Negative CVAT B/L  : Negative SP tenderness, but feels bladder feels full.  Bladder scan by RN >600cc  MAY: Deferred.     LABS:                        10.9   4.83  )-----------( 236      ( 29 Sep 2023 07:47 )             32.5     09-29    134<L>  |  101  |  16  ----------------------------<  104<H>  3.7   |  26  |  0.76    Ca    8.1<L>      29 Sep 2023 07:47  Phos  2.6     09-29  Mg     1.9     09-29        Cultures      A/P

## 2023-09-29 NOTE — PROGRESS NOTE ADULT - SUBJECTIVE AND OBJECTIVE BOX
Neurology Stroke Progress Note    INTERVAL HPI/OVERNIGHT EVENTS:  Patient seen and examined at bedside. TOV failed with 4 straight caths, saravia catheter placed today by urology due to urethral trauma. Overnight pt required 1:1 for delirium.    MEDICATIONS  (STANDING):  albuterol/ipratropium for Nebulization 3 milliLiter(s) Nebulizer every 6 hours  amLODIPine   Tablet 5 milliGRAM(s) Oral daily  aspirin  chewable 81 milliGRAM(s) Oral daily  atorvastatin 40 milliGRAM(s) Oral at bedtime  cefepime   IVPB 1000 milliGRAM(s) IV Intermittent every 24 hours  donepezil 5 milliGRAM(s) Oral at bedtime  enoxaparin Injectable 30 milliGRAM(s) SubCutaneous every 24 hours  melatonin 5 milliGRAM(s) Oral at bedtime  metoprolol tartrate 25 milliGRAM(s) Oral two times a day  mirtazapine 7.5 milliGRAM(s) Oral once  montelukast 10 milliGRAM(s) Oral daily  multivitamin  Chewable 1 Tablet(s) Oral daily  piperacillin/tazobactam IVPB.. 4.5 Gram(s) IV Intermittent every 8 hours  sodium chloride 7% Inhalation 4 milliLiter(s) Inhalation every 12 hours  thiamine 100 milliGRAM(s) Oral daily    MEDICATIONS  (PRN):      Allergies    Ceclor (Rash)  IV Contrast (Unknown)  Levaquin (Swelling)  Augmentin (Short breath; Rash)    Intolerances        Vital Signs Last 24 Hrs  T(C): 36.4 (29 Sep 2023 09:15), Max: 36.6 (28 Sep 2023 21:43)  T(F): 97.6 (29 Sep 2023 09:15), Max: 97.9 (28 Sep 2023 21:43)  HR: 78 (29 Sep 2023 12:25) (62 - 80)  BP: 134/65 (29 Sep 2023 12:25) (134/65 - 157/71)  BP(mean): 92 (29 Sep 2023 12:25) (92 - 115)  RR: 17 (29 Sep 2023 12:25) (14 - 18)  SpO2: 93% (29 Sep 2023 12:25) (93% - 100%)    Parameters below as of 29 Sep 2023 12:25  Patient On (Oxygen Delivery Method): room air        Physical exam:  General: No acute distress, awake and alert  Eyes: Anicteric sclerae, moist conjunctivae, see below for CNs  Neck: trachea midline, FROM, supple, no thyromegaly or lymphadenopathy  Cardiovascular: Regular rate and rhythm, no murmurs, rubs, or gallops. No carotid bruits.   Pulmonary: Anterior breath sounds clear bilaterally, no crackles or wheezing. No use of accessory muscles  GI: Abdomen soft, non-distended, non-tender  Extremities: + left arm ecchymosis with skin tears     Neurologic:  -Mental status: Awake, eyes open, oriented to person, place, and to year. Follows simple commands but has some periods of inattention. Repetition intact. Concentration intact. Remote memory intact. Recent memory varies, able to recall some of hospital course but not events the day prior.   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 Pupils equally round and reactive to light  V:  Facial sensation V1-V3 equal and intact   VII: Face appears symmetric today   VIII: Hearing is bilaterally intact to voice  XII: Tongue protrudes midline  Motor: Motor: Diminished bulk throughout. Normal tone. B/l UEs at least a 4/5 without drift. B/l LEs at least a 3/5 without drift.   Sensation: Intact to light touch bilaterally  Gait: deferred    LABS:                        10.9   4.83  )-----------( 236      ( 29 Sep 2023 07:47 )             32.5     09-29    134<L>  |  101  |  16  ----------------------------<  104<H>  3.7   |  26  |  0.76    Ca    8.1<L>      29 Sep 2023 07:47  Phos  2.6     09-29  Mg     1.9     09-29        Urinalysis Basic - ( 29 Sep 2023 07:47 )    Color: x / Appearance: x / SG: x / pH: x  Gluc: 104 mg/dL / Ketone: x  / Bili: x / Urobili: x   Blood: x / Protein: x / Nitrite: x   Leuk Esterase: x / RBC: x / WBC x   Sq Epi: x / Non Sq Epi: x / Bacteria: x        RADIOLOGY & ADDITIONAL TESTS:

## 2023-09-29 NOTE — PROGRESS NOTE ADULT - ASSESSMENT
Pulm: Dr. Foster  82F w HTN, AF not on AC d/t fall risk, hospitalized for PRES 05-06/2023, recent DC 8/2023 for acute hypoxemic respiratory failure - to home on 4L NC w follow-up w Dr. Foster for exacerbation of bronchiectasis, recently started course of IV cefepime outpatient by Dr. Foster 9/13, p/w diplopia, encephalopathy admitted for stroke work-up, cefepime continued inpatient, repeat head CT found small SAH, c/b hypoglycemia, encephalopathy - BG 30 and temp 93F during RRT on 9/21, transferred to MICU for persistent hypoglycemia needing D10W >> D10 NS, slow improvement w nml insulin and C peptide, stepped down to 7L and stroke service, MR brain showing L pontine infarction, PET brain w signal c/f Dementia w Lewy Bodies, for acute rehab    #Acute urinary retention - TOV started 9/27    #CVA - L pontine infarction on MR 9/25 that also showed SAH diminishing.  #Encephalopathy - improving. There is signal on PET c/f Dementia w Lewy Bodies   - ASA 81 daily, Atorva 40   - Started on remiron 7.5 daily, donepezil 5 HS    #Bronchiectasis - On IV Cefepime from 9/13 w PICC placed outpatient (PICC discontinued 9/21). Has been on IV Zosyn in MICU per pulmonary. Currently satting well on RA. On singulair 10  #AFib - on lopressor 25 bid.   #HTN - on amlodipine 5    #Normocytic anemia - 10.9 today.  #Hyponatremia - 134 today    Plan  UA since still retaining urine. Pt having BMs. If need another straight cath - will need saravia replaced   - UA wo pyuria. Infection less likely.  Would d/w Pulm regarding end date of IV Zosyn - pt started cefepime on 9/13  Nutrition consult; encourage PO/Fluid intake    Pt is DNR/DNI  DISPO: Acute rehab - baseline lives w daughter

## 2023-09-29 NOTE — PROGRESS NOTE ADULT - ASSESSMENT
Neurology    82 y o Female with PMHx of HTN, afib (not on AC due to fall risk), recent hospitalization for PRES (5-6/2023), recently discharged from [facility] 8/2023 with acute exacerbation of severe bronchiectasis and respiratory failure presents with several days of AMS and new diplopia 9/18. NIHSS 1 on admission for bitemporal visual deficit. Course complicated by agitation and severe hypoglycemia, requiring ICU admission. Brain imaging significant for L pontine and left occipital infarct as well as SAH of the right parieto-occipital region. Currently, cannot r/o inflammatory causes like ISMA/vasculitis/encephalitis. PET brain obtained, findings consistent with underlying neurodegenerative disease, pattern most suggestive of dementia with Lewy bodies. 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 Discussed with daughter.    Neuro  #CVA workup  - continue ASA 81mg daily today; holding AC i/s/o SAH and possible CAA  - continue atorvastatin 40mg daily  - q4hr stroke neuro checks and vitals  - f/u PET brain results  - holding off LP due to clinical improvement   - MRI brain with and without contrast from 9/25: Left pontine punctate acute infarctions have developed since 9/20/2023. Subarachnoid hemorrhage has diminished since 9/20/2023. Ischemic white matter disease, atrophy typical for age and remote petechial hemorrhages are stable findings since 9/20/2023  - MRA brain 9/22: negative for steno-occlusive disease, aneurysm or high flow   vascular malformation. No new/interval infarct on DWI since 9/20/23,   though mild cerebral edema has developed. Query amyloid related disease.  - MRA neck: negative  - MRV brain: negative for thrombus  - Stroke education    #Lewybodydementia  - Aricept 5mg daily started  - general neurology consulted for patient family education on prognosis  - will need neurocognitive outpatient set up     Cards  #HTN  - Goal -160  - Amlodipine increased from 5mg to 10mg  - TTE from 9/19: mildly dilated RV, dilated RA, severe pulmonary hypertension    #HLD  - high dose statin as above in CVA  - LDL results: 129    #Afib  - Structural heart needs ot be consulted for evaluation for a watchman device before 4pm tomorrow     Pulm  - call provider if SPO2 < 94%    #bronchiectasis  - pulmonology consulted, appreciate recs   - Ceftriaxone last day 9/29, reach out to pulm for abx renewal  - obtain PICC line prior to discharge   - chest PT twice daily  - continue duonebs  - continue hypertonic 7% inhalation saline   - continue singular  - will need to follow up with Dr. Foster as outpatient  - Chest PT twice daily (can dc aerobika if chest vest is available, PLEASE call RT to have this for patient as she states she has not been getting it done)    GI  #Nutrition/Fluids/Electrolytes   - replete K<4 and Mg <2  - Diet: minced and moist diet; (ensure d/c due to pt did not like it)  - IVF: 1L NS given today due to minimal urine made overnight/morning     #malnutrition  - Remeron 7.5mg  HS started   - Pt does not like ensure, d/c    Renal  - daily BMP    #urinary retention  - indwelling catheter placed  - dc'ed saravia 9/27 for TOV today   - q6h bladder scans; straight cath'd twice today 9/28    Infectious Disease  - continue Zosyn x 1 week as per above     Endocrine  - A1C results: 5.5%  - TSH results: 3.870    Psych  #concern for SI - was placed on CO  - patient denies SI while alert and oriented, CO dc'ed  - with sitter due to fall risk     #anxiety  - Pt home medication of .25mg klonipin added on per request of daughter    Palliative  - Pt daughter states that her mothers wishes were to "try for a little while to fix something if it was reversible but if my brain is gone, forget about it". Ongoing support given to daughter with diagnoses and prognosis. Pt was living semi-independent at family home prior to admission     DVT Prophylaxis  - lovenox sq for DVT prophylaxis   - SCDs for DVT prophylaxis     Dispo: AR      Discussed daily hospital plans and goals with patient and patient daughter at beside.    Discussed with Dr. Garcia

## 2023-09-29 NOTE — PROGRESS NOTE ADULT - NS ATTEND AMEND GEN_ALL_CORE FT
82 year old woman w/ PMH of Afib (not on AC due to frequent falls), HTN, bronchiectasis, recent diagnosis of PRES at outside hospital presented w/ several day history of confusion and visual complaints. Mental status with slight improvement after correction of hypoglycemia and antibiotics.     On exam, patient awake, alert, and tracks examiner. Oriented to self, hospital, September 2023, president susie renee, states obLawley. Serial 7 x1. Fluent. Following crossed commands. ?L. superior quadrantopia. EOMI; Face symmetric. Moving all extremities spontaneously at least antigravity.     CTH 9.19.23 negative  MR brain 9.20.23 demonstrates small diffusion restricting lesion in the L. Occipital lobe w/ ADC and FLAIR correlate; +SWI in the R. Parietooccipital region w/ sulcal T2 flair signal.   MRV negative  MRA h/n w/ repeat MRI brain w/o contrast 9.22.23 demonstrates no stenoocclusive disease; No new DWI abnormalities.   Blood cultures NGTD  EEG neg  Repeat MR brain 9.25.23 w/w/o contrast demonstrates questionable, vague, L. pontine diffusion restriction with ADC correlate. No contrast enhancement.   PET abnormal hyopmetabolism in the parietooccipital and temporal regions, R > L, hypometabolism in the R. cuneus and primary visual cortex of assicated cingulate island sing, suggestive of LBD.     9/26 - significant improvement in examination.     Impression: Acute versus ?subacute onset confusion and visual complaints with radiographic evidence of possible L. 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 Etiology likely multifactorial, including ishcemic stroke due to Afib, CAA related SAH/superficial siderosis, and underlying neurodegenerative process (ie LBD). Lower suspicion for CNS vasculitis/ISMA.     9/29 - Had extensive discussion with daughter, Segundo, regarding LBD diagnosis and plan for possible watchman device in the future.     Plan:   Hold off LP and cerebral biopsy for now given clinical improvement.   Continue ASA 81mg; Will hold off AC given possibility of amyloid angiopathy.   Structural heart consult for possible watchman   Continue Lovenox for DVT ppx  Failed TOV - urology consult for possible saravia   f/u with neurovascular clinical (Janet Vallecillo) at discharge  F/u with dementia/movement disorder clinic (Rebecca) at discharge   PT - AR     40 minutes spent on total encounter. The necessity of the time spent during the encounter on this date of service was due to:     Review of imaging and chart; obtaining history; examination of pt; discussion and coordination of care, and discussion of lifestyle modification and risk factor control.

## 2023-09-29 NOTE — CONSULT NOTE ADULT - ASSESSMENT
82y Female with PMHx of HTN, afib (not on AC due to fall risk), recent hospitalization for PRES (5-6/2023), recently discharged from [facility] 8/2023 with acute exacerbation of severe bronchiectasis and respiratory failure presents with several days of AMS and new diplopia 9/18. NIHSS 1 on admission for bitemporal visual deficit. Course complicated by agitation and severe hypoglycemia, requiring ICU admission. Brain imaging significant for L pontine and left occipital infarct as well as SAH of the right parieto-occipital region. Currently, cannot r/o inflammatory causes like ISMA/vasculitis/encephalitis. PET brain obtained, findings consistent with underlying neurodegenerative disease, pattern most suggestive of dementia with Lewy bodies. 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 Structural heart consulted for possible MITZI occlusion device.       Plan:  Problem 1: Atrial fibrillation   - Presently in NSR; afib uncovered in May 2023  - Never on AC 2/2 frequent falls  - Continue metoprolol   - To review with Dr. Rivas     Problem 2: AMS  - Care per primary neurology team  - PET brain suggestive of lewy body dementia   - MRA brain consistent with CAA  - Palliative care following       Problem 3: Weakness/Falls  - Recommend PT consult      Problem 4: Bronchiectasis   - care per primary team     I have reviewed clinical labs tests and reports, radiology tests and reports, as well as old patient medical records, and discussed with the referring physician.     82y Female with PMHx of HTN, afib (not on AC due to fall risk), recent hospitalization for PRES (5-6/2023), recently discharged from [facility] 8/2023 with acute exacerbation of severe bronchiectasis and respiratory failure presents with several days of AMS and new diplopia 9/18. NIHSS 1 on admission for bitemporal visual deficit. Course complicated by agitation and severe hypoglycemia, requiring ICU admission. Brain imaging significant for L pontine and left occipital infarct as well as SAH of the right parieto-occipital region. Currently, cannot r/o inflammatory causes like ISMA/vasculitis/encephalitis. PET brain obtained, findings consistent with underlying neurodegenerative disease, pattern most suggestive of dementia with Lewy bodies. 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 Structural heart consulted for possible MITZI occlusion device.       Plan:  Problem 1: Paroxsymal Atrial fibrillation   - Presently in NSR; afib uncovered in May 2023  - Never on AC 2/2 frequent falls  - Continue metoprolol   - Case discussed wit Dr. Rivas: Recommend VANNESSA to evaluated for clot     Problem 2: AMS  - Care per primary neurology team  - PET brain suggestive of lewy body dementia   - MRA brain consistent with CAA  - Palliative care following       Problem 3: Weakness/Falls  - Recommend PT consult      Problem 4: Bronchiectasis   - care per primary team     I have reviewed clinical labs tests and reports, radiology tests and reports, as well as old patient medical records, and discussed with the referring physician.

## 2023-09-29 NOTE — CONSULT NOTE ADULT - PROVIDER SPECIALTY LIST ADULT
Endocrinology
Hospitalist
Palliative Care
Structural Heart
Neurology
Neurosurgery
Pulmonology
Urology
Rehab Medicine

## 2023-09-29 NOTE — PROGRESS NOTE ADULT - SUBJECTIVE AND OBJECTIVE BOX
INTERVAL HPI/OVERNIGHT EVENTS: steph o/n    SUBJECTIVE: Patient seen and examined at bedside.   Pt comfortable on room air. Denies any cough, dyspnea, chest pain.   No fever. Ate eggs this AM - no N/V/Abd pain.   Needed multiple straight caths overnight.    OBJECTIVE:    VITAL SIGNS:  ICU Vital Signs Last 24 Hrs  T(C): 36.6 (29 Sep 2023 14:00), Max: 36.6 (28 Sep 2023 21:43)  T(F): 97.8 (29 Sep 2023 14:00), Max: 97.9 (28 Sep 2023 21:43)  HR: 74 (29 Sep 2023 16:05) (62 - 78)  BP: 148/70 (29 Sep 2023 16:05) (134/65 - 157/71)  BP(mean): 100 (29 Sep 2023 16:05) (92 - 115)  ABP: --  ABP(mean): --  RR: 16 (29 Sep 2023 16:05) (14 - 18)  SpO2: 96% (29 Sep 2023 16:05) (93% - 100%)    O2 Parameters below as of 29 Sep 2023 16:05  Patient On (Oxygen Delivery Method): room air               @ 07:  -   @ 07:00  --------------------------------------------------------  IN: 1260 mL / OUT: 2165 mL / NET: -905 mL     @ 07:  -   @ 16:30  --------------------------------------------------------  IN: 0 mL / OUT: 800 mL / NET: -800 mL      CAPILLARY BLOOD GLUCOSE      POCT Blood Glucose.: 139 mg/dL (28 Sep 2023 18:02)      PHYSICAL EXAM:  GEN: thin female in NAD on RA  HEENT: NC/AT, MMM  CV: RRR, nml S1S2, no murmurs  PULM: nml effort, dry rales on R mid and lower fields wo wheezing or rhonchi.   ABD: Soft, non-distended, NABS, non-tender  NEURO  A/O x self, hospital, but not time. moving all extremities, Sensation intact  PSYCH: Appropriate    MEDICATIONS:  MEDICATIONS  (STANDING):  amLODIPine   Tablet 5 milliGRAM(s) Oral daily  aspirin  chewable 81 milliGRAM(s) Oral daily  atorvastatin 40 milliGRAM(s) Oral at bedtime  cefepime   IVPB 1000 milliGRAM(s) IV Intermittent every 24 hours  donepezil 5 milliGRAM(s) Oral at bedtime  enoxaparin Injectable 30 milliGRAM(s) SubCutaneous every 24 hours  melatonin 5 milliGRAM(s) Oral at bedtime  metoprolol tartrate 25 milliGRAM(s) Oral two times a day  mirtazapine 7.5 milliGRAM(s) Oral once  montelukast 10 milliGRAM(s) Oral daily  multivitamin  Chewable 1 Tablet(s) Oral daily  sodium chloride 7% Inhalation 4 milliLiter(s) Inhalation every 12 hours  thiamine 100 milliGRAM(s) Oral daily    MEDICATIONS  (PRN):      ALLERGIES:  Allergies    Ceclor (Rash)  IV Contrast (Unknown)  Levaquin (Swelling)  Augmentin (Short breath; Rash)    Intolerances        LABS:                        10.9   4.83  )-----------( 236      ( 29 Sep 2023 07:47 )             32.5     -    134<L>  |  101  |  16  ----------------------------<  104<H>  3.7   |  26  |  0.76    Ca    8.1<L>      29 Sep 2023 07:47  Phos  2.6       Mg     1.9             Urinalysis Basic - ( 29 Sep 2023 14:05 )    Color: Yellow / Appearance: Clear / S.010 / pH: x  Gluc: x / Ketone: NEGATIVE  / Bili: Negative / Urobili: 0.2 E.U./dL   Blood: x / Protein: NEGATIVE mg/dL / Nitrite: NEGATIVE   Leuk Esterase: Trace / RBC: Many /HPF / WBC < 5 /HPF   Sq Epi: x / Non Sq Epi: x / Bacteria: Present /HPF        RADIOLOGY & ADDITIONAL TESTS: Reviewed.

## 2023-09-29 NOTE — CONSULT NOTE ADULT - SUBJECTIVE AND OBJECTIVE BOX
Surgeon: Dr. Rivas    Requesting Physician: Dr. Garcia     HISTORY OF PRESENT ILLNESS:  82y Female with PMHx of HTN, afib (not on AC due to fall risk), recent hospitalization for PRES (-2023), recently discharged from [facility] 2023 with acute exacerbation of severe bronchiectasis and respiratory failure presents with several days of AMS and new diplopia . NIHSS 1 on admission for bitemporal visual deficit. Course complicated by agitation and severe hypoglycemia, requiring ICU admission. Brain imaging significant for L pontine and left occipital infarct as well as SAH of the right parieto-occipital region. Currently, cannot r/o inflammatory causes like ISMA/vasculitis/encephalitis. PET brain obtained, findings consistent with underlying neurodegenerative disease, pattern most suggestive of dementia with Lewy bodies. 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 Structural heart consulted for possible MITZI occlusion device.     PAST MEDICAL & SURGICAL HISTORY:  Bronchiectasis      History of Pseudomonas pneumonia      HTN (hypertension)      Posterior reversible encephalopathy syndrome      Atrial fibrillation      No significant past surgical history          MEDICATIONS  (STANDING):  amLODIPine   Tablet 5 milliGRAM(s) Oral daily  aspirin  chewable 81 milliGRAM(s) Oral daily  atorvastatin 40 milliGRAM(s) Oral at bedtime  cefepime   IVPB 1000 milliGRAM(s) IV Intermittent every 24 hours  donepezil 5 milliGRAM(s) Oral at bedtime  enoxaparin Injectable 30 milliGRAM(s) SubCutaneous every 24 hours  melatonin 5 milliGRAM(s) Oral at bedtime  metoprolol tartrate 25 milliGRAM(s) Oral two times a day  mirtazapine 7.5 milliGRAM(s) Oral once  montelukast 10 milliGRAM(s) Oral daily  multivitamin  Chewable 1 Tablet(s) Oral daily  sodium chloride 7% Inhalation 4 milliLiter(s) Inhalation every 12 hours  thiamine 100 milliGRAM(s) Oral daily    MEDICATIONS  (PRN):      Allergies    Ceclor (Rash)  IV Contrast (Unknown)  Levaquin (Swelling)  Augmentin (Short breath; Rash)    Intolerances        SOCIAL HISTORY:  Smoker:  No  ETOH use:  No  Ilicit Drug use: No  Assisted device use - walker  Live with: chris      Review of Systems:  CONSTITUTIONAL: + weight loss + fatigue. Denies fevers / chills, sweats,  weight gain                                       NEURO: + confusion + forgetfulness  Denies parathesias, seizures                                                                                  EYES:  Denies blurry vision, discharge, pain, loss of vision                                                                                    ENMT:  Denies difficulty hearing, vertigo, dysphagia, epistaxis, recent dental work                                       CV:  Denies chest pain, palpitations, REID, orthopnea                                                                                           RESPIRATORY:  Denies Wheezing, SOB, cough / sputum, hemoptysis                                                               GI:  Denies nausea, vomiting, diarrhea, constipation, melena                                                                          : Denies hematuria, dysuria, urgency, incontinence                                                                                          MUSKULOSKELETAL:  + knee pain + weakness                                                          SKIN/BREAST:  Denies rash, itching, hair loss, masses                                                                                              PSYCH:  Denies depression, anxiety, suicidal ideation                                                                                                HEME/LYMPH:  Denies bruises easily, enlarged lymph nodes, tender lymph nodes                                          ENDOCRINE:  Denies cold intolerance, heat intolerance, polydipsia                                                                      Vital Signs Last 24 Hrs  T(C): 36.6 (29 Sep 2023 14:00), Max: 36.6 (28 Sep 2023 21:43)  T(F): 97.8 (29 Sep 2023 14:00), Max: 97.9 (28 Sep 2023 21:43)  HR: 78 (29 Sep 2023 12:25) (62 - 78)  BP: 134/65 (29 Sep 2023 12:25) (134/65 - 157/71)  BP(mean): 92 (29 Sep 2023 12:25) (92 - 115)  RR: 17 (29 Sep 2023 12:25) (14 - 18)  SpO2: 93% (29 Sep 2023 12:25) (93% - 100%)    Parameters below as of 29 Sep 2023 12:25  Patient On (Oxygen Delivery Method): room air        Physical Exam:  CONSTITUTIONAL: no acute distress.   NEURO: AAOx3, no AMS or focal deficits.                       EYES: EOMI b/l, PEERLA b/l. Exhibits appropriate visual acuity.   ENMT: Hearing in tact.  MMM.  No palpable neck masses or hoarseness.   CV: RRR, S1/S2, no m/r/g.   RESPIRATORY:  No acute distress.  CTA b/l, no w/r/r.   GI: ND, NBS, non-TTP.  : + saravia   MUSKULOSKELETAL: No obvious malformations.   SKIN / BREAST:  No obvious lesions/abrasions noted.  Normal skin color and turgor.                                                           LABS:                        10.9   4.83  )-----------( 236      ( 29 Sep 2023 07:47 )             32.5         134<L>  |  101  |  16  ----------------------------<  104<H>  3.7   |  26  |  0.76    Ca    8.1<L>      29 Sep 2023 07:47  Phos  2.6       Mg     1.9             Urinalysis Basic - ( 29 Sep 2023 14:05 )    Color: Yellow / Appearance: Clear / S.010 / pH: x  Gluc: x / Ketone: NEGATIVE  / Bili: Negative / Urobili: 0.2 E.U./dL   Blood: x / Protein: NEGATIVE mg/dL / Nitrite: NEGATIVE   Leuk Esterase: Trace / RBC: Many /HPF / WBC < 5 /HPF   Sq Epi: x / Non Sq Epi: x / Bacteria: Present /HPF              RADIOLOGY & ADDITIONAL STUDIES:  < from: NM Brain Fusion w/ MR (23 @ 17:59) >    IMPRESSION:    Abnormal hypometabolism particularly pronounced in the parieto-occipital   and temporal regions, right greater than left, with accompanying   hypometabolism in the right cuneus and primary visual cortex and   suggestion of associated cingulate island sign, findings consistent with   underlying neurodegenerative disease, pattern most suggestive of dementia   with Lewy bodies.    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    Correlation with clinical exam and neuropsychological assessment is   advised.    < end of copied text >  < from: Echocardiogram w/ Bubble and Doppler (23 @ 13:18) >  CONCLUSIONS:     1. Technically difficult study.   2. Normal left ventricular size and systolic function.   3. Mildly dilated right ventricular size. Normal right ventricular   systolic function.   4. Dilated right atrium.   5. Aortic sclerosis without significant stenosis.   6. Mild-to-moderate aortic regurgitation.   7. Mild mitral regurgitation.   8. Moderate-to-severe tricuspid regurgitation.   9. Severe pulmonary hypertension present, pulmonary artery systolic   pressure is 73 mmHg.  10. No pericardial effusion.  11. No prior echo is available for comparison.    < end of copied text >

## 2023-09-29 NOTE — PROGRESS NOTE ADULT - ASSESSMENT
82y Female with PMHx of HTN, afib (not on AC due to fall risk), recent hospitalization for PRES (5-6/2023), recently discharged from [facility] 8/2023 with acute exacerbation of severe bronchiectasis and respiratory failure presents with several days of AMS and new diplopia 9/18. NIHSS 1 on admission for bitemporal visual deficit. Course complicated by agitation and severe hypoglycemia, requiring ICU admission. Brain imaging significant for L pontine and left occipital infarct as well as SAH of the right parieto-occipital region. Currently, cannot r/o inflammatory causes like ISMA/vasculitis/encephalitis. PET brain obtained, findings consistent with underlying neurodegenerative disease, pattern most suggestive of dementia with Lewy bodies. 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 Discussed with daughter.    Neuro  #CVA workup  - continue ASA 81mg daily today; holding AC i/s/o SAH and possible CAA  - continue atorvastatin 40mg daily  - q4hr stroke neuro checks and vitals  - f/u PET brain results  - holding off LP due to clinical improvement   - MRI brain with and without contrast from 9/25: Left pontine punctate acute infarctions have developed since 9/20/2023. Subarachnoid hemorrhage has diminished since 9/20/2023. Ischemic white matter disease, atrophy typical for age and remote petechial hemorrhages are stable findings since 9/20/2023  - MRA brain 9/22: negative for steno-occlusive disease, aneurysm or high flow   vascular malformation. No new/interval infarct on DWI since 9/20/23,   though mild cerebral edema has developed. Query amyloid related disease.  - MRA neck: negative  - MRV brain: negative for thrombus  - Stroke education    #Lewybodydementia  - Continue Aricept 5mg daily   - general neurology consulted for patient family education on prognosis, recommenced Dr. Lomax and ISAEL henao  - Will need neurocognitive outpatient set up; reached out to Dr. Lomax and ISAEL henao for appt and for patient daughter education on LBD prognosis.     Cards  #HTN  - Goal -160  - Amlodipine increased from 5mg to 10mg  - TTE from 9/19: mildly dilated RV, dilated RA, severe pulmonary hypertension    #HLD  - high dose statin as above in CVA  - LDL results: 129    #Afib  - Structural heart needs ot be consulted for evaluation for a watchman device before 4pm tomorrow     Pulm  - call provider if SPO2 < 94%    #bronchiectasis  - pulmonology consulted, appreciate recs   - Zosyn last day 9/29, no need to start cefepime or continue zosyn for 4 more days per ID and inpatient pulmonology  - chest PT twice daily  -  Duonebs discontinued; Pt daughter does not want pt to receive Duoneb as they make her mother jittery, this was discussed with palliative and determined within her GOC. If pt has SOB or desaturated, trial of O2 can be given  - continue hypertonic 7% inhalation saline   - continue singular  - will need to follow up with Dr. Foster as outpatient  - Chest PT twice daily    GI  #Nutrition/Fluids/Electrolytes   - replete K<4 and Mg <2  - Diet: minced and moist diet; (ensure d/c due to pt did not like it)  - IVF: none    #malnutrition  - Continue Remeron 7.5mg  - Pt does not like ensure, d/c    Renal  - daily BMP    #urinary retention  - Richards removed 9/27, failed TOV with 4 straight caths  - Richards placed by urology due to trauma at urethra    Infectious Disease  - Zosyn course ends today 9/29, no need to start cefepime or continue zosyn for 4 more days per ID and inpatient pulmonology  - Pt with 3 episodes of loose stool today, likely antibiotic associated diarrhea, c.diff culture not needed at this time. If pt continues to have multiple loose stools without abx, culture can be sent    Endocrine  - A1C results: 5.5%  - TSH results: 3.870    Psych  #concern for SI - was placed on CO  - patient denies SI while alert and oriented, CO dc'ed    #delirium- placed on CO  - with sitter due to fall risk     #anxiety  - Pt home medication of .25mg klonopin added on per request of daughter    Palliative  - Pt daughter states that her mothers wishes were to "try for a little while to fix something if it was reversible but if my brain is gone, forget about it". Ongoing support given to daughter with diagnoses and prognosis. Pt was living semi-independent at family home prior to admission  - Pt daughter does not want pt to receive Duoneb as they make her mother jittery, this was discussed with palliative and determined within her GOC. If pt has SOB or desaturated, trial of O2 can be given.    DVT Prophylaxis  - lovenox sq for DVT prophylaxis   - SCDs for DVT prophylaxis     Dispo: [place]      Discussed daily hospital plans and goals with patient and patient daughter at beside.    Discussed with Dr. Garcia   82y Female with PMHx of HTN, afib (not on AC due to fall risk), recent hospitalization for PRES (5-6/2023), recently discharged from [facility] 8/2023 with acute exacerbation of severe bronchiectasis and respiratory failure presents with several days of AMS and new diplopia 9/18. NIHSS 1 on admission for bitemporal visual deficit. Course complicated by agitation and severe hypoglycemia, requiring ICU admission. Brain imaging significant for L pontine and left occipital infarct as well as SAH of the right parieto-occipital region. Currently, cannot r/o inflammatory causes like ISMA/vasculitis/encephalitis. PET brain obtained, findings consistent with underlying neurodegenerative disease, pattern most suggestive of dementia with Lewy bodies. 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 Discussed with daughter.    Neuro  #CVA workup  - continue ASA 81mg daily today; holding AC i/s/o SAH and possible CAA  - continue atorvastatin 40mg daily  - q4hr stroke neuro checks and vitals  - f/u PET brain results  - holding off LP due to clinical improvement   - MRI brain with and without contrast from 9/25: Left pontine punctate acute infarctions have developed since 9/20/2023. Subarachnoid hemorrhage has diminished since 9/20/2023. Ischemic white matter disease, atrophy typical for age and remote petechial hemorrhages are stable findings since 9/20/2023  - MRA brain 9/22: negative for steno-occlusive disease, aneurysm or high flow   vascular malformation. No new/interval infarct on DWI since 9/20/23,   though mild cerebral edema has developed. Query amyloid related disease.  - MRA neck: negative  - MRV brain: negative for thrombus  - Stroke education    #Lewybodydementia  - Continue Aricept 5mg daily   - general neurology consulted for patient family education on prognosis, recommenced Dr. Lomax and ISAEL henao  - Will need neurocognitive outpatient set up; reached out to Dr. Lomax and ISAEL henao for appt and for patient daughter education on LBD prognosis.     Cards  #HTN  - Goal -160  - Amlodipine increased from 5mg to 10mg  - TTE from 9/19: mildly dilated RV, dilated RA, severe pulmonary hypertension    #HLD  - high dose statin as above in CVA  - LDL results: 129    #Afib  - Structural heart consulted for watchman consult, case to be discussed with Dr. Rivas      Pulm  - call provider if SPO2 < 94%    #bronchiectasis  - pulmonology consulted, appreciate recs   - Zosyn last day 9/29, no need to start cefepime or continue zosyn for 4 more days per ID and inpatient pulmonology  - chest PT twice daily  -  Duonebs discontinued; Pt daughter does not want pt to receive Duoneb as they make her mother jittery, this was discussed with palliative and determined within her GOC. If pt has SOB or desaturated, trial of O2 can be given  - continue hypertonic 7% inhalation saline   - continue singular  - will need to follow up with Dr. Foster as outpatient  - Chest PT twice daily    GI  #Nutrition/Fluids/Electrolytes   - replete K<4 and Mg <2  - Diet: minced and moist diet; (ensure d/c due to pt did not like it)  - IVF: none    #malnutrition  - Continue Remeron 7.5mg  - Pt does not like ensure, d/c    #diarrhea  - Pt with 3 episodes of loose stool today, likely antibiotic associated diarrhea, c.diff culture not needed at this time. If pt continues to have multiple loose stools without abx, culture can be sent    Renal  - daily BMP    #urinary retention  - Richards removed 9/27, failed TOV with 4 straight caths  - Richards placed by urology due to trauma at urethra    Infectious Disease  - Zosyn course ends today 9/29, no need to start cefepime or continue zosyn for 4 more days per ID and inpatient pulmonology  - Pt with 3 episodes of loose stool today, likely antibiotic associated diarrhea, c.diff culture not needed at this time. If pt continues to have multiple loose stools without abx, culture can be sent    Endocrine  - A1C results: 5.5%  - TSH results: 3.870    Psych  #concern for SI - was placed on CO  - patient denies SI while alert and oriented, CO dc'ed    #delirium- placed on CO  - with sitter due to fall risk     #anxiety  - Pt home medication of .25mg klonopin added on per request of daughter    Palliative  - Pt daughter states that her mothers wishes were to "try for a little while to fix something if it was reversible but if my brain is gone, forget about it". Ongoing support given to daughter with diagnoses and prognosis. Pt was living semi-independent at family home prior to admission  - Pt daughter does not want pt to receive Duoneb as they make her mother jittery, this was discussed with palliative and determined within her GOC. If pt has SOB or desaturated, trial of O2 can be given.    DVT Prophylaxis  - lovenox sq for DVT prophylaxis   - SCDs for DVT prophylaxis     Dispo: [place]      Discussed daily hospital plans and goals with patient and patient daughter at beside.    Discussed with Dr. Garcia

## 2023-09-29 NOTE — PROGRESS NOTE ADULT - ASSESSMENT
82y Female with PMHx of HTN, afib (not on AC due to fall risk), recent hospitalization for PRES (5-6/2023), recently discharged from [facility] 8/2023 with acute exacerbation of severe bronchiectasis and respiratory failure presents with several days of AMS and new diplopia 9/18. NIHSS 1 on admission for bitemporal visual deficit. Course complicated by agitation and severe hypoglycemia, requiring ICU admission. Brain imaging significant for L pontine and left occipital infarct as well as SAH of the right parieto-occipital region. Currently, cannot r/o inflammatory causes like ISMA/vasculitis/encephalitis. PET brain obtained, findings consistent with underlying neurodegenerative disease, pattern most suggestive of dementia with Lewy bodies. 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 Discussed with daughter.    Neuro  #CVA workup  - continue ASA 81mg daily today; holding AC i/s/o SAH and possible CAA  - continue atorvastatin 40mg daily  - q4hr stroke neuro checks and vitals  - f/u PET brain results  - holding off LP due to clinical improvement   - MRI brain with and without contrast from 9/25: Left pontine punctate acute infarctions have developed since 9/20/2023. Subarachnoid hemorrhage has diminished since 9/20/2023. Ischemic white matter disease, atrophy typical for age and remote petechial hemorrhages are stable findings since 9/20/2023  - MRA brain 9/22: negative for steno-occlusive disease, aneurysm or high flow   vascular malformation. No new/interval infarct on DWI since 9/20/23,   though mild cerebral edema has developed. Query amyloid related disease.  - MRA neck: negative  - MRV brain: negative for thrombus  - Stroke education    #Lewybodydementia  - Aricept 5mg daily started  - general neurology consulted for patient family education on prognosis  - will need neurocognitive outpatient set up     Cards  #HTN  - Goal -160  - Amlodipine increased from 5mg to 10mg  - TTE from 9/19: mildly dilated RV, dilated RA, severe pulmonary hypertension    #HLD  - high dose statin as above in CVA  - LDL results: 129    #Afib  - Structural heart needs ot be consulted for evaluation for a watchman device before 4pm tomorrow     Pulm  - call provider if SPO2 < 94%    #bronchiectasis  - pulmonology consulted, appreciate recs   - Ceftriaxone last day 9/29, reach out to pulm for abx renewal  - obtain PICC line prior to discharge   - chest PT twice daily  - continue duonebs  - continue hypertonic 7% inhalation saline   - continue singular  - will need to follow up with Dr. Foster as outpatient  - Chest PT twice daily (can dc aerobika if chest vest is available, PLEASE call RT to have this for patient as she states she has not been getting it done)    GI  #Nutrition/Fluids/Electrolytes   - replete K<4 and Mg <2  - Diet: minced and moist diet; (ensure d/c due to pt did not like it)  - IVF: none    #malnutrition  - Remeron 7.5mg  HS started   - Pt does not like ensure, d/c    Renal  - daily BMP    #urinary retention  - Richards d/c on 9/27, failed TOV, 4 straight caths  - Richards placed on 9/29 by urology due to urethral trauma noted on insertion by floor RN    Infectious Disease  - continue Zosyn x 1 week as per above     Endocrine  - A1C results: 5.5%  - TSH results: 3.870    Psych  #concern for SI - was placed on CO  - patient denies SI while alert and oriented, CO dc'ed  - with sitter due to fall risk     #anxiety  - Pt home medication of .25mg klonopin added on per request of daughter    Palliative  - Pt daughter states that her mothers wishes were to "try for a little while to fix something if it was reversible but if my brain is gone, forget about it". Ongoing support given to daughter with diagnoses and prognosis. Pt was living semi-independent at family home prior to admission.   - General neurology recommends consultation with Dr. Lomax or his NP Janet for prognosis conversation with daughter     DVT Prophylaxis  - lovenox sq for DVT prophylaxis   - SCDs for DVT prophylaxis     Dispo: [place]      Discussed daily hospital plans and goals with patient and patient daughter at beside.    Discussed with Dr. Garcia

## 2023-09-29 NOTE — PROGRESS NOTE ADULT - SUBJECTIVE AND OBJECTIVE BOX
Neurology Stroke Progress Note    INTERVAL HPI/OVERNIGHT EVENTS:  Patient seen and examined.     MEDICATIONS  (STANDING):  albuterol/ipratropium for Nebulization 3 milliLiter(s) Nebulizer every 6 hours  amLODIPine   Tablet 5 milliGRAM(s) Oral daily  aspirin  chewable 81 milliGRAM(s) Oral daily  atorvastatin 40 milliGRAM(s) Oral at bedtime  cefepime   IVPB 1000 milliGRAM(s) IV Intermittent every 24 hours  donepezil 5 milliGRAM(s) Oral at bedtime  enoxaparin Injectable 30 milliGRAM(s) SubCutaneous every 24 hours  melatonin 5 milliGRAM(s) Oral at bedtime  metoprolol tartrate 25 milliGRAM(s) Oral two times a day  mirtazapine 7.5 milliGRAM(s) Oral once  montelukast 10 milliGRAM(s) Oral daily  multivitamin  Chewable 1 Tablet(s) Oral daily  piperacillin/tazobactam IVPB.. 4.5 Gram(s) IV Intermittent every 8 hours  sodium chloride 7% Inhalation 4 milliLiter(s) Inhalation every 12 hours  thiamine 100 milliGRAM(s) Oral daily    MEDICATIONS  (PRN):      Allergies    Ceclor (Rash)  IV Contrast (Unknown)  Levaquin (Swelling)  Augmentin (Short breath; Rash)    Intolerances        Vital Signs Last 24 Hrs  T(C): 36.6 (29 Sep 2023 14:00), Max: 36.6 (28 Sep 2023 21:43)  T(F): 97.8 (29 Sep 2023 14:00), Max: 97.9 (28 Sep 2023 21:43)  HR: 78 (29 Sep 2023 12:25) (62 - 80)  BP: 134/65 (29 Sep 2023 12:25) (134/65 - 157/71)  BP(mean): 92 (29 Sep 2023 12:25) (92 - 115)  RR: 17 (29 Sep 2023 12:25) (14 - 18)  SpO2: 93% (29 Sep 2023 12:25) (93% - 100%)    Parameters below as of 29 Sep 2023 12:25  Patient On (Oxygen Delivery Method): room air      Physical exam:  General: No acute distress, awake and alert  Eyes: Anicteric sclerae, moist conjunctivae, see below for CNs  Neck: trachea midline, FROM, supple, no thyromegaly or lymphadenopathy  Cardiovascular: Regular rate and rhythm, no murmurs, rubs, or gallops. No carotid bruits.   Pulmonary: Anterior breath sounds clear bilaterally, no crackles or wheezing. No use of accessory muscles  GI: Abdomen soft, non-distended, non-tender  Extremities: + left arm ecchymosis with skin tears     Neurologic:  -Mental status: Awake, eyes open, oriented to person, place, and to year. Follows simple commands but has some periods of inattention. Repetition intact. Concentration intact Remote memory intact.  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 Pupils equally round and reactive to light  V:  Facial sensation V1-V3 equal and intact   VII: Face appears symmetric today   VIII: Hearing is bilaterally intact to voice  XII: Tongue protrudes midline  Motor: Motor: Diminished bulk throughout. Normal tone. B/l UEs at least a 4/5 without drift. B/l LEs at least a 3/5 without drift.   Sensation: Intact to light touch bilaterally  Gait: Deferred    LABS:                        10.9   4.83  )-----------( 236      ( 29 Sep 2023 07:47 )             32.5     -    134<L>  |  101  |  16  ----------------------------<  104<H>  3.7   |  26  |  0.76    Ca    8.1<L>      29 Sep 2023 07:47  Phos  2.6       Mg     1.9             Urinalysis Basic - ( 29 Sep 2023 14:05 )    Color: Yellow / Appearance: Clear / S.010 / pH: x  Gluc: x / Ketone: NEGATIVE  / Bili: Negative / Urobili: 0.2 E.U./dL   Blood: x / Protein: NEGATIVE mg/dL / Nitrite: NEGATIVE   Leuk Esterase: Trace / RBC: Many /HPF / WBC < 5 /HPF   Sq Epi: x / Non Sq Epi: x / Bacteria: Present /HPF        RADIOLOGY & ADDITIONAL TESTS:     Neurology Stroke Progress Note    INTERVAL HPI/OVERNIGHT EVENTS:  Patient seen and examined at bedside.     MEDICATIONS  (STANDING):  albuterol/ipratropium for Nebulization 3 milliLiter(s) Nebulizer every 6 hours  amLODIPine   Tablet 5 milliGRAM(s) Oral daily  aspirin  chewable 81 milliGRAM(s) Oral daily  atorvastatin 40 milliGRAM(s) Oral at bedtime  cefepime   IVPB 1000 milliGRAM(s) IV Intermittent every 24 hours  donepezil 5 milliGRAM(s) Oral at bedtime  enoxaparin Injectable 30 milliGRAM(s) SubCutaneous every 24 hours  melatonin 5 milliGRAM(s) Oral at bedtime  metoprolol tartrate 25 milliGRAM(s) Oral two times a day  mirtazapine 7.5 milliGRAM(s) Oral once  montelukast 10 milliGRAM(s) Oral daily  multivitamin  Chewable 1 Tablet(s) Oral daily  piperacillin/tazobactam IVPB.. 4.5 Gram(s) IV Intermittent every 8 hours  sodium chloride 7% Inhalation 4 milliLiter(s) Inhalation every 12 hours  thiamine 100 milliGRAM(s) Oral daily    MEDICATIONS  (PRN):      Allergies    Ceclor (Rash)  IV Contrast (Unknown)  Levaquin (Swelling)  Augmentin (Short breath; Rash)    Intolerances        Vital Signs Last 24 Hrs  T(C): 36.6 (29 Sep 2023 14:00), Max: 36.6 (28 Sep 2023 21:43)  T(F): 97.8 (29 Sep 2023 14:00), Max: 97.9 (28 Sep 2023 21:43)  HR: 78 (29 Sep 2023 12:25) (62 - 80)  BP: 134/65 (29 Sep 2023 12:25) (134/65 - 157/71)  BP(mean): 92 (29 Sep 2023 12:25) (92 - 115)  RR: 17 (29 Sep 2023 12:25) (14 - 18)  SpO2: 93% (29 Sep 2023 12:25) (93% - 100%)    Parameters below as of 29 Sep 2023 12:25  Patient On (Oxygen Delivery Method): room air      Physical exam:  General: No acute distress, awake and alert  Eyes: Anicteric sclerae, moist conjunctivae, see below for CNs  Neck: trachea midline, FROM, supple, no thyromegaly or lymphadenopathy  Cardiovascular: Regular rate and rhythm, no murmurs, rubs, or gallops. No carotid bruits.   Pulmonary: Anterior breath sounds clear bilaterally, no crackles or wheezing. No use of accessory muscles  GI: Abdomen soft, non-distended, non-tender  Extremities: + left arm ecchymosis with skin tears     Neurologic:  -Mental status: Awake, eyes open, oriented to person, place, and to year. Follows simple commands but has some periods of inattention. Repetition intact. Concentration intact Remote memory intact.  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 Pupils equally round and reactive to light  V:  Facial sensation V1-V3 equal and intact   VII: Face appears symmetric today   VIII: Hearing is bilaterally intact to voice  XII: Tongue protrudes midline  Motor: Motor: Diminished bulk throughout. Normal tone. B/l UEs at least a 4/5 without drift. B/l LEs at least a 3/5 without drift.   Sensation: Intact to light touch bilaterally  Gait: Deferred    LABS:                        10.9   4.83  )-----------( 236      ( 29 Sep 2023 07:47 )             32.5     09-    134<L>  |  101  |  16  ----------------------------<  104<H>  3.7   |  26  |  0.76    Ca    8.1<L>      29 Sep 2023 07:47  Phos  2.6       Mg     1.9             Urinalysis Basic - ( 29 Sep 2023 14:05 )    Color: Yellow / Appearance: Clear / S.010 / pH: x  Gluc: x / Ketone: NEGATIVE  / Bili: Negative / Urobili: 0.2 E.U./dL   Blood: x / Protein: NEGATIVE mg/dL / Nitrite: NEGATIVE   Leuk Esterase: Trace / RBC: Many /HPF / WBC < 5 /HPF   Sq Epi: x / Non Sq Epi: x / Bacteria: Present /HPF        RADIOLOGY & ADDITIONAL TESTS:     Neurology Stroke Progress Note    INTERVAL HPI/OVERNIGHT EVENTS:  Patient seen and examined at bedside. Required redirection and 1:1 overnight. Dr. Lomax and NP earlene reached out to for patient daughter education on LBD prognosis. Richards removed , failed TOV with 4 straight caths. Richards placed by urology due to trauma at urethra. Pt zosyn course ends today, no need to start cefepime or continue zosyn for 4 more days per ID and pulmonology. Pt with 3 episodes of loose stool today, likely antibiotic associated diarrhea.     MEDICATIONS  (STANDING):  albuterol/ipratropium for Nebulization 3 milliLiter(s) Nebulizer every 6 hours  amLODIPine   Tablet 5 milliGRAM(s) Oral daily  aspirin  chewable 81 milliGRAM(s) Oral daily  atorvastatin 40 milliGRAM(s) Oral at bedtime  donepezil 5 milliGRAM(s) Oral at bedtime  enoxaparin Injectable 30 milliGRAM(s) SubCutaneous every 24 hours  melatonin 5 milliGRAM(s) Oral at bedtime  metoprolol tartrate 25 milliGRAM(s) Oral two times a day  mirtazapine 7.5 milliGRAM(s) Oral once  montelukast 10 milliGRAM(s) Oral daily  multivitamin  Chewable 1 Tablet(s) Oral daily  piperacillin/tazobactam IVPB.. 4.5 Gram(s) IV Intermittent every 8 hours  sodium chloride 7% Inhalation 4 milliLiter(s) Inhalation every 12 hours  thiamine 100 milliGRAM(s) Oral daily    MEDICATIONS  (PRN):      Allergies    Ceclor (Rash)  IV Contrast (Unknown)  Levaquin (Swelling)  Augmentin (Short breath; Rash)    Intolerances        Vital Signs Last 24 Hrs  T(C): 36.6 (29 Sep 2023 14:00), Max: 36.6 (28 Sep 2023 21:43)  T(F): 97.8 (29 Sep 2023 14:00), Max: 97.9 (28 Sep 2023 21:43)  HR: 78 (29 Sep 2023 12:25) (62 - 80)  BP: 134/65 (29 Sep 2023 12:25) (134/65 - 157/71)  BP(mean): 92 (29 Sep 2023 12:25) (92 - 115)  RR: 17 (29 Sep 2023 12:25) (14 - 18)  SpO2: 93% (29 Sep 2023 12:25) (93% - 100%)    Parameters below as of 29 Sep 2023 12:25  Patient On (Oxygen Delivery Method): room air      Physical exam:  General: No acute distress, awake and alert  Eyes: Anicteric sclerae, moist conjunctivae, see below for CNs  Neck: trachea midline, FROM, supple, no thyromegaly or lymphadenopathy  Cardiovascular: Regular rate and rhythm, no murmurs, rubs, or gallops. No carotid bruits.   Pulmonary: Anterior breath sounds clear bilaterally, no crackles or wheezing. No use of accessory muscles  GI: Abdomen soft, non-distended, non-tender  Extremities: + left arm ecchymosis with skin tears     Neurologic:  -Mental status: Awake, eyes open, oriented to person, place, and to year. Follows simple commands but has some periods of inattention. Repetition intact. Concentration intact Remote memory intact. Recent memory varies, can recall certain parts of why she is here but not the day before or string sequential events in correct order.  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 Pupils equally round and reactive to light  V:  Facial sensation V1-V3 equal and intact   VII: Face appears symmetric today   VIII: Hearing is bilaterally intact to voice  XII: Tongue protrudes midline  Motor: Motor: Diminished bulk throughout. Normal tone. B/l UEs at least a 4/5 without drift. B/l LEs at least a 3/5 without drift.   Sensation: Intact to light touch bilaterally  Gait: Deferred    LABS:                        10.9   4.83  )-----------( 236      ( 29 Sep 2023 07:47 )             32.5     09-    134<L>  |  101  |  16  ----------------------------<  104<H>  3.7   |  26  |  0.76    Ca    8.1<L>      29 Sep 2023 07:47  Phos  2.6     -  Mg     1.9     -        Urinalysis Basic - ( 29 Sep 2023 14:05 )    Color: Yellow / Appearance: Clear / S.010 / pH: x  Gluc: x / Ketone: NEGATIVE  / Bili: Negative / Urobili: 0.2 E.U./dL   Blood: x / Protein: NEGATIVE mg/dL / Nitrite: NEGATIVE   Leuk Esterase: Trace / RBC: Many /HPF / WBC < 5 /HPF   Sq Epi: x / Non Sq Epi: x / Bacteria: Present /HPF        RADIOLOGY & ADDITIONAL TESTS:

## 2023-09-29 NOTE — CONSULT NOTE ADULT - CONSULT REQUESTED BY NAME
Dr. Garcia
[facility]
Stephanie
[facility]
Stroke/Neurology
Dr. Vallecillo
ED
Glenny Toribio P.A.
Dr. Garcia

## 2023-09-29 NOTE — PROGRESS NOTE ADULT - SUBJECTIVE AND OBJECTIVE BOX
Physical Medicine and Rehabilitation Progress Note :       Patient is a 82y old  Female who presents with a chief complaint of diplopia (28 Sep 2023 18:38)      HPI:   **STROKE HPI***    HPI: 82y Female with PMHx of HTN, afib (not on AC due to fall risk), recent hospitalization for PRES (5-6/2023), recently discharged from [facility] 8/2023 with acute exacerbation of severe bronchiectasis and respiratory failure presents with several days of AMS and new diplopia 9/18.     Daughter noticed since 9/15 after starting IV abx, patient has been more tired and exhausted with some agitation. It has progressed over the past few days to generalized confusion (being "out of it", not know where certain rooms are in the house, dropping items, not knowing common everyday information) similar to how she initially presented with PRES. Patient told daughter she was seeing double of items in the house around 10pm.     ED: /86. SpO2 84% RA, requiring 3L NC with improvement to 90%. C/o of shortness of breath and difficulty breathing while on supplemental oxygen. Neuro exam significant for decreased peripheral vision bitemporal, but no clear visual field cut, also inconsistent vision exam. Otherwise nonfocal. CT with no acute hemorrhage, with chronic b/l lentiform nuclei and right caudate infarcts. CT angio and perfusion deferred due to contrast allergy received contrast during angiogram in the past with intense flushing of head).    On repeat visual exam, patient notes that she sees double of items, but unable to tell if horizontal or vertical. She is unable to discern at time whether truly double vs blurry, nor transient or consistent diplopia since onset. At times, when testing visual fields, patient struggles to count fingers peripherally and centrally b/l (unclear whether from true field cut vs complete loss of vision vs diplopia causing difficulty counting). Patient also sees items moving around when they are actually stationary. Daughter states that since PRES, patient's vision has not returned to baseline and has been blurry. They have tried to obtain glasses, however on vision testing, Rx was never consistent so was advised to re-test vision after it has "settled".        (19 Sep 2023 05:59)                            10.9   4.83  )-----------( 236      ( 29 Sep 2023 07:47 )             32.5       09-29    134<L>  |  101  |  16  ----------------------------<  104<H>  3.7   |  26  |  0.76    Ca    8.1<L>      29 Sep 2023 07:47  Phos  2.6     09-29  Mg     1.9     09-29      Vital Signs Last 24 Hrs  T(C): 36.4 (29 Sep 2023 09:15), Max: 36.6 (28 Sep 2023 13:42)  T(F): 97.6 (29 Sep 2023 09:15), Max: 97.9 (28 Sep 2023 21:43)  HR: 62 (29 Sep 2023 08:45) (62 - 88)  BP: 155/88 (29 Sep 2023 08:45) (115/65 - 157/71)  BP(mean): 115 (29 Sep 2023 08:45) (95 - 115)  RR: 14 (29 Sep 2023 08:45) (14 - 18)  SpO2: 96% (29 Sep 2023 08:45) (94% - 100%)    Parameters below as of 29 Sep 2023 08:51  Patient On (Oxygen Delivery Method): room air        MEDICATIONS  (STANDING):  albuterol/ipratropium for Nebulization 3 milliLiter(s) Nebulizer every 6 hours  amLODIPine   Tablet 5 milliGRAM(s) Oral daily  aspirin  chewable 81 milliGRAM(s) Oral daily  atorvastatin 40 milliGRAM(s) Oral at bedtime  cefepime   IVPB 1000 milliGRAM(s) IV Intermittent every 24 hours  cefepime   IVPB 1000 milliGRAM(s) IV Intermittent once  donepezil 5 milliGRAM(s) Oral at bedtime  enoxaparin Injectable 30 milliGRAM(s) SubCutaneous every 24 hours  melatonin 5 milliGRAM(s) Oral at bedtime  metoprolol tartrate 25 milliGRAM(s) Oral two times a day  mirtazapine 7.5 milliGRAM(s) Oral once  montelukast 10 milliGRAM(s) Oral daily  multivitamin  Chewable 1 Tablet(s) Oral daily  piperacillin/tazobactam IVPB.. 4.5 Gram(s) IV Intermittent every 8 hours  sodium chloride 7% Inhalation 4 milliLiter(s) Inhalation every 12 hours  thiamine 100 milliGRAM(s) Oral daily    MEDICATIONS  (PRN):        9/28/2023  Functional Status Assessment :       Pain Assessment/Number Scale (0-10) Adult  Presence of Pain: denies pain/discomfort (Rating = 0)  Pain Rating (0-10): Rest: 0 (no pain/absence of nonverbal indicators of pain)  Pain Rating (0-10): Activity: 0 (no pain/absence of nonverbal indicators of pain)    Re-assessment (Number Scale)  Pain Rating: Rest (Reassessment) Pain Rating: Rest: 0 (no pain/absence of nonverbal indicators of pain)  Pain Rating: Activity (Reassessment) Pain Rating: Activity: 0 (no pain/absence of nonverbal indicators of pain)  Pain Response to Interventions: no change in pain;  during/post PT    Safety      AM-PAC Functional Assessment: Basic Mobility  Type of Assessment: Daily assessment  Turning from your back to your side while in a flat bed without using bedrails?: 4 = No assist / stand by assistance  Moving from lying on your back to sitting on the flat side of a flat bed without using bedrails?: 3 = A little assistance  Moving to and from a bed to a chair (including a wheelchair)?: 3 = A little assistance  Standing up from a chair using your arms (e.g. wheelchair or bedside chair)?: 3 = A little assistance  Walking in hospital room?: 2 = A lot of assistance  Climbing 3-5 steps with a railing?: 3-calculated by average   Score: 18   Row Comment: Ask the patient "How much help from another person do you currently need? (If the patient hasn't done an activity recently, how much help from another person do you think he/she needs if he/she tried?)    Coping/Psychosocial      Coping  Observed Emotional State: calm;  cooperative    Cognitive/Neuro      Cognitive/Neuro/Behavioral  Orientation: disoriented to;  situation;  partially oriented to time  Speech: clear;  spontaneous  Mood/Behavior: cooperative;  behavior appropriate to situation    Language Assistance  Preferred Language to Address Healthcare Preferred Language to Address Healthcare: English    Therapeutic Interventions      Bed Mobility  Bed Mobility Training Rehab Potential: good, to achieve stated therapy goals  Bed Mobility Training Rolling/Turning: contact guard;  verbal cues  Bed Mobility Training Sit-to-Supine: contact guard;  1 person assist;  verbal cues  Bed Mobility Training Supine-to-Sit: minimum assist (75% patient effort);  1 person assist;  verbal cues  Bed Mobility Training Limitations: decreased strength    Sit-Stand Transfer Training  Sit-to-Stand Transfer Training Rehab Potential: good, to achieve stated therapy goals  Transfer Training Sit-to-Stand Transfer: minimum assist (75% patient effort);  1 person + 1 person to manage equipment;  weight-bearing as tolerated   rolling walker  Transfer Training Stand-to-Sit Transfer: minimum assist (75% patient effort);  1 person + 1 person to manage equipment;  weight-bearing as tolerated   rolling walker  Sit-to-Stand Transfer Training Transfer Safety Analysis: decreased weight-shifting ability;  impaired balance;  decreased strength;  cognitive, decreased safety awareness    Gait Training  Gait Training Rehab Potential: good, to achieve stated therapy goals  Gait Training Symptoms Noted During/After Treatment: fatigue;  shortness of breath;  with ambulatory SpO2= 91-93% on RA  Gait Training: minimum assist (75% patient effort);  1 person + 1 person to manage equipment;  verbal cues;  weight-bearing as tolerated   rolling walker;  35'x2  Gait Analysis: increased time in double stance;  decreased karla;  narrow JESUS, mildly ataxuc gait, increased difficulty negotiating turns/obstacles;  impaired balance;  decreased strength;  impaired coordination  Type of Rest Type of Rest: standing  Duration of Rest Duration of Rest: 2min     Therapeutic Exercise  Therapeutic Exercise Detail: LAQ, hip flexion x10, IS x7 to 500cc       AM-PAC Functional Assessment: Daily Activity  Type of Assessment: Daily assessment  Putting on and taking off regular lower body clothing?: 3 = A little assistance  Bathing (including washing, rinsing, drying)?: 3 = A little assistance  Toileting, which includes using toilet, bedpan or urinal?: 3 = A little assistance  Putting on and taking off regular upper body clothing?: 4 = No assist / stand by assistance  Take care of personal grooming such as brushing teeth?: 4 = No assist / stand by assistance  Eating meals?: 4 = No assist / stand by assistance  Score: 21   Row Comment: Ask the patient "How much help from another person do you currently need? (If the patient hasn't done an activity recently, how much help from another person do you think he/she needs if he/she tried?)    Cognitive/Neuro      Cognitive/Neuro/Behavioral  Level of Consciousness: confused;  alert  Arousal Level: arouses to voice  Orientation: disoriented to;  situation  Speech: clear  Mood/Behavior: calm;  cooperative    Language Assistance  Preferred Language to Address Healthcare Preferred Language to Address Healthcare: English    Therapeutic Interventions      Bed Mobility  Bed Mobility Training Sit-to-Supine: supervsion  Bed Mobility Training Supine-to-Sit: contact guard  Bed Mobility Training Limitations: impaired balance;  decreased strength    Sit-Stand Transfer Training  Transfer Training Sit-to-Stand Transfer: minimum assist (75% patient effort);  1 person assist;  verbal cues;  rolling walker  Transfer Training Stand-to-Sit Transfer: minimum assist (75% patient effort);  1 person assist;  verbal cues;  rolling walker  Sit-to-Stand Transfer Training Transfer Safety Analysis: decreased balance;  decreased weight-shifting ability;  impaired balance;  decreased strength    Therapeutic Exercise  Therapeutic Exercise Detail: Pt tolerated sitting EOB ~10 miins for ADL tasks + functional mobility ~35ftx2 with Min Ax1 with 1-2 standing breaks and assist to navigate with RW     Lower Body Dressing Training  Lower Body Dressing Training Assistance: minimum assist (75% patient effort);  1 person assist;  verbal cues;  decreased ROM;  decreased flexibility    Grooming Training  Grooming Training Assistance: supervsion;  to perform hair care while sitting EOB              PM&R Impression : as above    Current disposition plan recommendation :     acute rehab placement

## 2023-09-30 LAB
ANION GAP SERPL CALC-SCNC: 8 MMOL/L — SIGNIFICANT CHANGE UP (ref 5–17)
BUN SERPL-MCNC: 18 MG/DL — SIGNIFICANT CHANGE UP (ref 7–23)
CALCIUM SERPL-MCNC: 8.4 MG/DL — SIGNIFICANT CHANGE UP (ref 8.4–10.5)
CHLORIDE SERPL-SCNC: 99 MMOL/L — SIGNIFICANT CHANGE UP (ref 96–108)
CO2 SERPL-SCNC: 25 MMOL/L — SIGNIFICANT CHANGE UP (ref 22–31)
CREAT SERPL-MCNC: 0.63 MG/DL — SIGNIFICANT CHANGE UP (ref 0.5–1.3)
EGFR: 90 ML/MIN/1.73M2 — SIGNIFICANT CHANGE UP
GLUCOSE BLDC GLUCOMTR-MCNC: 104 MG/DL — HIGH (ref 70–99)
GLUCOSE SERPL-MCNC: 106 MG/DL — HIGH (ref 70–99)
HCT VFR BLD CALC: 35.5 % — SIGNIFICANT CHANGE UP (ref 34.5–45)
HGB BLD-MCNC: 11.9 G/DL — SIGNIFICANT CHANGE UP (ref 11.5–15.5)
MCHC RBC-ENTMCNC: 32.7 PG — SIGNIFICANT CHANGE UP (ref 27–34)
MCHC RBC-ENTMCNC: 33.5 GM/DL — SIGNIFICANT CHANGE UP (ref 32–36)
MCV RBC AUTO: 97.5 FL — SIGNIFICANT CHANGE UP (ref 80–100)
NRBC # BLD: 0 /100 WBCS — SIGNIFICANT CHANGE UP (ref 0–0)
PLATELET # BLD AUTO: 305 K/UL — SIGNIFICANT CHANGE UP (ref 150–400)
POTASSIUM SERPL-MCNC: 3.9 MMOL/L — SIGNIFICANT CHANGE UP (ref 3.5–5.3)
POTASSIUM SERPL-SCNC: 3.9 MMOL/L — SIGNIFICANT CHANGE UP (ref 3.5–5.3)
RBC # BLD: 3.64 M/UL — LOW (ref 3.8–5.2)
RBC # FLD: 12.7 % — SIGNIFICANT CHANGE UP (ref 10.3–14.5)
SODIUM SERPL-SCNC: 132 MMOL/L — LOW (ref 135–145)
WBC # BLD: 6.01 K/UL — SIGNIFICANT CHANGE UP (ref 3.8–10.5)
WBC # FLD AUTO: 6.01 K/UL — SIGNIFICANT CHANGE UP (ref 3.8–10.5)

## 2023-09-30 PROCEDURE — 99232 SBSQ HOSP IP/OBS MODERATE 35: CPT

## 2023-09-30 RX ORDER — METOPROLOL TARTRATE 50 MG
50 TABLET ORAL
Refills: 0 | Status: DISCONTINUED | OUTPATIENT
Start: 2023-09-30 | End: 2023-10-10

## 2023-09-30 RX ORDER — AMLODIPINE BESYLATE 2.5 MG/1
5 TABLET ORAL DAILY
Refills: 0 | Status: DISCONTINUED | OUTPATIENT
Start: 2023-10-01 | End: 2023-10-10

## 2023-09-30 RX ORDER — METOPROLOL TARTRATE 50 MG
5 TABLET ORAL ONCE
Refills: 0 | Status: COMPLETED | OUTPATIENT
Start: 2023-09-30 | End: 2023-09-30

## 2023-09-30 RX ORDER — AMLODIPINE BESYLATE 2.5 MG/1
5 TABLET ORAL ONCE
Refills: 0 | Status: DISCONTINUED | OUTPATIENT
Start: 2023-09-30 | End: 2023-09-30

## 2023-09-30 RX ORDER — AZELASTINE 137 UG/1
1 SPRAY, METERED NASAL
Refills: 0 | Status: DISCONTINUED | OUTPATIENT
Start: 2023-09-30 | End: 2023-10-02

## 2023-09-30 RX ORDER — LIDOCAINE HCL 20 MG/ML
5 VIAL (ML) INJECTION ONCE
Refills: 0 | Status: DISCONTINUED | OUTPATIENT
Start: 2023-09-30 | End: 2023-10-10

## 2023-09-30 RX ADMIN — Medication 50 MILLIGRAM(S): at 16:41

## 2023-09-30 RX ADMIN — Medication 25 MILLIGRAM(S): at 07:42

## 2023-09-30 RX ADMIN — Medication 100 MILLIGRAM(S): at 12:38

## 2023-09-30 RX ADMIN — Medication 5 MILLIGRAM(S): at 22:41

## 2023-09-30 RX ADMIN — Medication 650 MILLIGRAM(S): at 17:22

## 2023-09-30 RX ADMIN — MONTELUKAST 10 MILLIGRAM(S): 4 TABLET, CHEWABLE ORAL at 12:38

## 2023-09-30 RX ADMIN — Medication 650 MILLIGRAM(S): at 17:53

## 2023-09-30 RX ADMIN — ENOXAPARIN SODIUM 30 MILLIGRAM(S): 100 INJECTION SUBCUTANEOUS at 16:40

## 2023-09-30 RX ADMIN — Medication 5 MILLIGRAM(S): at 01:15

## 2023-09-30 RX ADMIN — Medication 81 MILLIGRAM(S): at 12:38

## 2023-09-30 RX ADMIN — AMLODIPINE BESYLATE 5 MILLIGRAM(S): 2.5 TABLET ORAL at 07:43

## 2023-09-30 NOTE — PROVIDER CONTACT NOTE (OTHER) - ASSESSMENT
NIH 0, oriented to self, place and month. disoriented to situation.   Pt unable to verbalize year but is able to verbalize correct month & her age.   VSS: refer to flowsheet of vitals.
pt awake, A&O x 1, aggressive and pulling lines. pulled primafit off, BP cuff and cardiac monitor off. when asked where she is pt said I don't know. reoriented. does not believe she is in the hospital. constantly trying to get OOB saying she needs to get out of here.
NIH 1 for unable to verbalize correct month. no other changes on exam VSS.

## 2023-09-30 NOTE — PROGRESS NOTE ADULT - NS ATTEND AMEND GEN_ALL_CORE FT
82 year old woman w/ PMH of Afib (not on AC due to frequent falls), HTN, bronchiectasis, recent diagnosis of PRES at outside hospital presented w/ several day history of confusion and visual complaints. Mental status with slight improvement after correction of hypoglycemia and antibiotics.     On exam, patient awake, alert, and tracks examiner. Oriented to self, hospital, September 2023, president susie renee, states obCheraw. Serial 7 x1. Fluent. Following crossed commands. ?L. superior quadrantopia. EOMI; Face symmetric. Moving all extremities spontaneously at least antigravity.     CTH 9.19.23 negative  MR brain 9.20.23 demonstrates small diffusion restricting lesion in the L. Occipital lobe w/ ADC and FLAIR correlate; +SWI in the R. Parietooccipital region w/ sulcal T2 flair signal.   MRV negative  MRA h/n w/ repeat MRI brain w/o contrast 9.22.23 demonstrates no stenoocclusive disease; No new DWI abnormalities.   Blood cultures NGTD  EEG neg  Repeat MR brain 9.25.23 w/w/o contrast demonstrates questionable, vague, L. pontine diffusion restriction with ADC correlate. No contrast enhancement.   PET abnormal hyopmetabolism in the parietooccipital and temporal regions, R > L, hypometabolism in the R. cuneus and primary visual cortex of assicated cingulate island sing, suggestive of LBD.     9/26 - significant improvement in examination.     Impression: Acute versus ?subacute onset confusion and visual complaints with radiographic evidence of possible L. 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 Etiology likely multifactorial, including ishcemic stroke due to Afib, CAA related SAH/superficial siderosis, and underlying neurodegenerative process (ie LBD). Lower suspicion for CNS vasculitis/ISMA.     9/29 - Had extensive discussion with daughter, Segundo, regarding LBD diagnosis and plan for possible watchman device in the future.     Plan:   Hold off LP and cerebral biopsy for now given clinical improvement.   Continue ASA 81mg; Will hold off AC given possibility of amyloid angiopathy.   Structural heart consult for possible watchman - wants VANNESSA; Can be done as outpatient?   Continue Lovenox for DVT ppx  Failed TOV - urology consult for possible saravia   f/u with neurovascular clinical (Janet Vallecillo) at discharge  F/u with dementia/movement disorder clinic (DiRocco) at discharge   PT - [place]     40 minutes spent on total encounter. The necessity of the time spent during the encounter on this date of service was due to:     Review of imaging and chart; obtaining history; examination of pt; discussion and coordination of care, and discussion of lifestyle modification and risk factor control. 82 year old woman w/ PMH of Afib (not on AC due to frequent falls), HTN, bronchiectasis, recent diagnosis of PRES at outside hospital presented w/ several day history of confusion and visual complaints. Mental status with slight improvement after correction of hypoglycemia and antibiotics.     CTH 9.19.23 negative  MR brain 9.20.23 demonstrates small diffusion restricting lesion in the L. Occipital lobe w/ ADC and FLAIR correlate; +SWI in the R. Parietooccipital region w/ sulcal T2 flair signal.   MRV negative  MRA h/n w/ repeat MRI brain w/o contrast 9.22.23 demonstrates no stenoocclusive disease; No new DWI abnormalities.   Blood cultures NGTD  EEG neg  Repeat MR brain 9.25.23 w/w/o contrast demonstrates questionable, vague, L. pontine diffusion restriction with ADC correlate. No contrast enhancement.   PET abnormal hyopmetabolism in the parietooccipital and temporal regions, R > L, hypometabolism in the R. cuneus and primary visual cortex of assicated cingulate island sing, suggestive of LBD.     Impression: Acute versus ?subacute onset confusion and visual complaints with radiographic evidence of possible L. 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 Etiology likely multifactorial, including ishcemic stroke due to Afib, CAA related SAH/superficial siderosis, and underlying neurodegenerative process (ie LBD). Lower suspicion for CNS vasculitis/ISMA. On 9/26, patient with significant improvement in exam and has remained well.     9/29 - Had extensive discussion with daughter, Segundo, regarding LBD diagnosis and plan for possible watchman device in the future.     Plan:   Hold off LP and cerebral biopsy for now given clinical improvement.   Continue ASA 81mg; Will hold off AC given possibility of amyloid angiopathy.   Structural heart consult for possible watchman - wants VANNESSA; Can be done as outpatient?   Continue Lovenox for DVT ppx  Failed TOV - urology consult for possible saravia   f/u with neurovascular clinical (Janet Vallecillo) at discharge  F/u with dementia/movement disorder clinic (Rebecca) at discharge   PT - AR     40 minutes spent on total encounter. The necessity of the time spent during the encounter on this date of service was due to:     Review of imaging and chart; obtaining history; examination of pt; discussion and coordination of care, and discussion of lifestyle modification and risk factor control. 82 year old woman w/ PMH of Afib (not on AC due to frequent falls), HTN, bronchiectasis, recent diagnosis of PRES at outside hospital presented w/ several day history of confusion and visual complaints. Mental status with slight improvement after correction of hypoglycemia and antibiotics.     CTH 9.19.23 negative  MR brain 9.20.23 demonstrates small diffusion restricting lesion in the L. Occipital lobe w/ ADC and FLAIR correlate; +SWI in the R. Parietooccipital region w/ sulcal T2 flair signal.   MRV negative  MRA h/n w/ repeat MRI brain w/o contrast 9.22.23 demonstrates no stenoocclusive disease; No new DWI abnormalities.   Blood cultures NGTD  EEG neg  Repeat MR brain 9.25.23 w/w/o contrast demonstrates questionable, vague, L. pontine diffusion restriction with ADC correlate. No contrast enhancement.   PET abnormal hyopmetabolism in the parietooccipital and temporal regions, R > L, hypometabolism in the R. cuneus and primary visual cortex of assicated cingulate island sing, suggestive of LBD.     Impression: Acute versus ?subacute onset confusion and visual complaints with radiographic evidence of possible L. 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 Etiology likely multifactorial, including ishcemic stroke due to Afib, CAA related SAH/superficial siderosis, and underlying neurodegenerative process (ie LBD). Lower suspicion for CNS vasculitis/ISMA. On 9/26, patient with significant improvement in exam and has remained well.     Plan:   Hold off LP and cerebral biopsy for now given clinical improvement.   Continue ASA 81mg; Will hold off AC given possibility of amyloid angiopathy.   Structural heart consult for possible watchman - wants VANNESSA; Can be done as outpatient?   Continue Lovenox for DVT ppx  Failed TOV - urology following. Rec saravia for 2-3 days   f/u with neurovascular clinical (Janet Vallecillo) at discharge  F/u with dementia/movement disorder clinic (Rebecca) at discharge   PT - AR     40 minutes spent on total encounter. The necessity of the time spent during the encounter on this date of service was due to:     Review of imaging and chart; obtaining history; examination of pt; discussion and coordination of care, and discussion of lifestyle modification and risk factor control.

## 2023-09-30 NOTE — PROVIDER CONTACT NOTE (OTHER) - ACTION/TREATMENT ORDERED:
stroke ACP notified and will place orders for pain management.
Provider will assess the patient. No further interventions ordered at this moment.
Yohana at bedside. attempted to reorient pt. refused to put on tele leads. pt told RN and PA to get out of the room. refused to put gown back on. RN covered pt with blankets. placed on enhanced

## 2023-09-30 NOTE — PROGRESS NOTE ADULT - SUBJECTIVE AND OBJECTIVE BOX
Neurology Stroke Progress Note    INTERVAL HPI/OVERNIGHT EVENTS:  Patient seen and examined at bedside. Patient was confused at night. Afib with rate 110s today, BP stable. She is AO to person and place, pleasant and follows directions.    MEDICATIONS  (STANDING):  aspirin  chewable 81 milliGRAM(s) Oral daily  atorvastatin 40 milliGRAM(s) Oral at bedtime  donepezil 5 milliGRAM(s) Oral at bedtime  enoxaparin Injectable 30 milliGRAM(s) SubCutaneous every 24 hours  melatonin 5 milliGRAM(s) Oral at bedtime  metoprolol tartrate 50 milliGRAM(s) Oral two times a day  mirtazapine 7.5 milliGRAM(s) Oral once  montelukast 10 milliGRAM(s) Oral daily  multivitamin  Chewable 1 Tablet(s) Oral daily  sodium chloride 7% Inhalation 4 milliLiter(s) Inhalation every 12 hours  thiamine 100 milliGRAM(s) Oral daily    MEDICATIONS  (PRN):  acetaminophen     Tablet .. 650 milliGRAM(s) Oral every 6 hours PRN Temp greater or equal to 38C (100.4F), Mild Pain (1 - 3), Moderate Pain (4 - 6), Severe Pain (7 - 10)      Allergies    Ceclor (Rash)  IV Contrast (Unknown)  Levaquin (Swelling)  Augmentin (Short breath; Rash)    Intolerances      Vital Signs Last 24 Hrs  T(C): 36.9 (23 @ 10:00), Max: 36.9 (23 @ 10:00)  T(F): 98.4 (23 @ 10:00), Max: 98.4 (23 @ 10:00)  HR: 102 (23 @ 12:10) (74 - 118)  BP: 112/60 (23 @ 12:10) (112/60 - 163/73)  RR: 16 (23 @ 12:10) (16 - 20)  SpO2: 95% (23 @ 12:10) (93% - 96%)  Wt(kg): --      Physical exam:  General: No acute distress, awake and alert  Eyes: Anicteric sclerae, moist conjunctivae, see below for CNs  Neck: trachea midline, FROM, supple, no thyromegaly or lymphadenopathy  Cardiovascular: Regular rate and rhythm, no murmurs, rubs, or gallops. No carotid bruits.   Pulmonary: Anterior breath sounds clear bilaterally, no crackles or wheezing. No use of accessory muscles  GI: Abdomen soft, non-distended, non-tender  Extremities: + left arm ecchymosis with skin tears     Neurologic:  -Mental status: Awake, eyes open, oriented to person, place, and to year. Follows simple commands but has some periods of inattention. Repetition intact. Concentration intact Remote memory intact. Recent memory varies, knows the name of current president  -Cranial nerves:   II: fluctuating visual fields exam prior ( previously bitemporally quadrantopia), today VFF  III, IV, VI: Extraocular movements are intact without nystagmus. Pupils equally round and reactive to light  V:  Facial sensation V1-V3 equal and intact   VII: Face appears symmetric today   VIII: Hearing is bilaterally intact to voice  XII: Tongue protrudes midline  Motor: Motor: Diminished bulk throughout. Normal tone. B/l UE 5/5, B/l LEs at least a 4/5 without drift.   Sensation: Intact to light touch bilaterally  Gait: Not assessed due to fall risk      LABS:  cret                        10.9   4.83  )-----------( 236      ( 29 Sep 2023 07:47 )             32.5     -    134<L>  |  101  |  16  ----------------------------<  104<H>  3.7   |  26  |  0.76    Ca    8.1<L>      29 Sep 2023 07:47  Phos  2.6       Mg     1.9                 Urinalysis Basic - ( 29 Sep 2023 14:05 )    Color: Yellow / Appearance: Clear / S.010 / pH: x  Gluc: x / Ketone: NEGATIVE  / Bili: Negative / Urobili: 0.2 E.U./dL   Blood: x / Protein: NEGATIVE mg/dL / Nitrite: NEGATIVE   Leuk Esterase: Trace / RBC: Many /HPF / WBC < 5 /HPF   Sq Epi: x / Non Sq Epi: x / Bacteria: Present /HPF        RADIOLOGY & ADDITIONAL TESTS:    < from: CT Head No Cont (23 @ 17:06) >  Impression:    Curvilinear densities within the right parietal and occipital sulci   suspicious for subarachnoid hemorrhage. No mass effect or midline shift.   No hydrocephalus.    No interval demarcated territorial infarction.    < end of copied text >    < from: MR Head No Cont (23 @ 21:52) >  IMPRESSION:  Incomplete and limited exam    Stable small subarachnoid hemorrhage in the right parietal-occipital   region given differences in technique.    Subcentimeter acute/subacute infarct in the left occipital lobe.    < end of copied text >  < from: MR Head w/wo IV Cont (23 @ 17:30) >  IMPRESSION:    1. Left pontine punctate acute infarctions have developed since 2023    2. Subarachnoid hemorrhage has diminished since 2023    3. ischemic white matter disease, atrophy typical for age and remote   petechial hemorrhages are stable findings since 2023    < end of copied text >  < from: MR Angio Neck No Cont (23 @ 18:53) >  IMPRESSION:    MRA brain is negative for steno-occlusive disease, aneurysm or high flow   vascular malformation. No new/interval infarct on DWI since 23,   though mild cerebral edema has developed. Query amyloid related disease.    MRA neck is unremarkable allowing for mild motion artifact.    MRV brain is negative for thrombosis.    < end of copied text >    < from: NM Brain Fusion w/ MR (23 @ 17:59) >  IMPRESSION:    Abnormal hypometabolism particularly pronounced in the parieto-occipital   and temporal regions, right greater than left, with accompanying   hypometabolism in the right cuneus and primary visual cortex and   suggestion of associated cingulate island sign, findings consistent with   underlying neurodegenerative disease, pattern most suggestive of dementia   with Lewy bodies.    < end of copied text >

## 2023-09-30 NOTE — PROGRESS NOTE ADULT - SUBJECTIVE AND OBJECTIVE BOX
INTERVAL HPI/OVERNIGHT EVENTS: steph o/n    SUBJECTIVE: Patient seen and examined at bedside.   HR overnight 90-110s. In AFib on telemetry. Richards placed yesterday  Pt feels well. ate breakfast wo issues. Denies palpitations, LH/dizziness, chest pain, dyspnea. Denies fever.     OBJECTIVE:    VITAL SIGNS:  ICU Vital Signs Last 24 Hrs  T(C): 36.7 (30 Sep 2023 14:11), Max: 36.9 (30 Sep 2023 10:00)  T(F): 98.1 (30 Sep 2023 14:11), Max: 98.4 (30 Sep 2023 10:00)  HR: 116 (30 Sep 2023 16:00) (80 - 118)  BP: 145/95 (30 Sep 2023 16:00) (112/60 - 163/73)  BP(mean): 113 (30 Sep 2023 16:00) (79 - 113)  ABP: --  ABP(mean): --  RR: 18 (30 Sep 2023 16:00) (16 - 20)  SpO2: 95% (30 Sep 2023 16:00) (93% - 95%)    O2 Parameters below as of 30 Sep 2023 16:00  Patient On (Oxygen Delivery Method): room air              09-29 @ 07:01  -  09-30 @ 07:00  --------------------------------------------------------  IN: 50 mL / OUT: 1370 mL / NET: -1320 mL    09-30 @ 07:01  -  09-30 @ 17:29  --------------------------------------------------------  IN: 0 mL / OUT: 350 mL / NET: -350 mL      CAPILLARY BLOOD GLUCOSE      POCT Blood Glucose.: 104 mg/dL (30 Sep 2023 07:51)      PHYSICAL EXAM:  GEN: thin female in NAD on RA  HEENT: NC/AT, MMM  CV: increased rate - irregularly irregular, nml S1S2, no murmurs  PULM: nml effort, dry rales on R mid and lower fields wo wheezing or rhonchi.   ABD: Soft, non-distended, NABS, non-tender  NEURO  A/O x self, hospital, but not time. moving all extremities, Sensation intact  PSYCH: Appropriate    MEDICATIONS:  MEDICATIONS  (STANDING):  aspirin  chewable 81 milliGRAM(s) Oral daily  atorvastatin 40 milliGRAM(s) Oral at bedtime  donepezil 5 milliGRAM(s) Oral at bedtime  enoxaparin Injectable 30 milliGRAM(s) SubCutaneous every 24 hours  lidocaine 2% Jelly 5 milliLiter(s) IntraUrethral once  melatonin 5 milliGRAM(s) Oral at bedtime  metoprolol tartrate 50 milliGRAM(s) Oral two times a day  mirtazapine 7.5 milliGRAM(s) Oral once  montelukast 10 milliGRAM(s) Oral daily  multivitamin  Chewable 1 Tablet(s) Oral daily  sodium chloride 7% Inhalation 4 milliLiter(s) Inhalation every 12 hours  thiamine 100 milliGRAM(s) Oral daily    MEDICATIONS  (PRN):  acetaminophen     Tablet .. 650 milliGRAM(s) Oral every 6 hours PRN Temp greater or equal to 38C (100.4F), Mild Pain (1 - 3), Moderate Pain (4 - 6), Severe Pain (7 - 10)      ALLERGIES:  Allergies    Ceclor (Rash)  IV Contrast (Unknown)  Levaquin (Swelling)  Augmentin (Short breath; Rash)    Intolerances        LABS:                        11.9   6.01  )-----------( 305      ( 30 Sep 2023 14:07 )             35.5     09-30    132<L>  |  99  |  18  ----------------------------<  106<H>  3.9   |  25  |  0.63    Ca    8.4      30 Sep 2023 14:06  Phos  2.6     09-29  Mg     1.9     09-29        Urinalysis Basic - ( 30 Sep 2023 14:06 )    Color: x / Appearance: x / SG: x / pH: x  Gluc: 106 mg/dL / Ketone: x  / Bili: x / Urobili: x   Blood: x / Protein: x / Nitrite: x   Leuk Esterase: x / RBC: x / WBC x   Sq Epi: x / Non Sq Epi: x / Bacteria: x        RADIOLOGY & ADDITIONAL TESTS: Reviewed.

## 2023-09-30 NOTE — PROVIDER CONTACT NOTE (OTHER) - SITUATION
Pt c/o having horizontal double vision.
pt started getting OOB around 23:00, stating she wanted to pee. pt has primafit but did not want to use it. offered bedside commode. pt went to lie down after and got up again and became agrresive
C/O headache, rates 7to8 out of 10. states it feels hot and with pressure pointing at her temples.

## 2023-09-30 NOTE — PROGRESS NOTE ADULT - NUTRITIONAL ASSESSMENT
This patient has been assessed with a concern for Malnutrition and has been determined to have a diagnosis/diagnoses of Severe protein-calorie malnutrition and Underweight (BMI < 19).    This patient is being managed with:   Diet Minced and Moist-  Supplement Feeding Modality:  Oral  Ensure Plus High Protein Cans or Servings Per Day:  1       Frequency:  Three Times a day  Entered: Sep 27 2023  4:12PM

## 2023-09-30 NOTE — PROGRESS NOTE ADULT - ASSESSMENT
Pulm: Dr. Foster  82F w HTN, AF not on AC d/t fall risk, hospitalized for PRES 05-06/2023, recent DC 8/2023 for acute hypoxemic respiratory failure - to home on 4L NC w follow-up w Dr. Foster for exacerbation of bronchiectasis, recently started course of IV cefepime outpatient by Dr. Foster 9/13, p/w diplopia, encephalopathy admitted for stroke work-up, cefepime continued inpatient, repeat head CT found small SAH, c/b hypoglycemia, encephalopathy - BG 30 and temp 93F during RRT on 9/21, transferred to MICU for persistent hypoglycemia needing D10W >> D10 NS, slow improvement w nml insulin and C peptide, stepped down to 7L and stroke service, MR brain showing L pontine infarction, PET brain w signal c/f Dementia w Lewy Bodies, for acute rehab    #Acute urinary retention - saravia replaced 9/29    #CVA - L pontine infarction on MR 9/25 that also showed SAH diminishing.  #Encephalopathy - improving. There is signal on PET c/f Dementia w Lewy Bodies   - ASA 81 daily, Atorva 40   - Started on remiron 7.5 daily, donepezil 5 HS    #Bronchiectasis - On IV Cefepime from 9/13 w PICC placed outpatient (PICC discontinued 9/21). Has been on IV Zosyn in MICU per pulmonary thru 9/29. Currently satting well on RA. On singulair 10  #AFib - on lopressor 25 bid.   #HTN - on amlodipine 5    #Normocytic anemia - 11.9 today.  #Hyponatremia - 132 today    Plan  Would keep saravia in at this time.   EKG 12 lead showing AFib. Does not appear in pain or acutely infected. Can increase lopressor to 50mg bid (home dose) w hold parameters (SBP < 110; HR < 50)  Advise re-adding albuterol nebulizer q4h PRN for wheezing    Pt is DNR/DNI  DISPO: Acute rehab - baseline lives w daughter

## 2023-09-30 NOTE — PROGRESS NOTE ADULT - ASSESSMENT
82y Female with PMHx of HTN, afib (not on AC due to fall risk), recent hospitalization for PRES (5-6/2023), recently discharged from [facility] 8/2023 with acute exacerbation of severe bronchiectasis and respiratory failure presents with several days of AMS and new diplopia 9/18. NIHSS 1 on admission for bitemporal visual deficit. Course complicated by agitation and severe hypoglycemia, requiring ICU admission. Brain imaging significant for L pontine and left occipital infarct as well as SAH of the right parieto-occipital region. Currently, cannot r/o inflammatory causes like ISMA/vasculitis/encephalitis. PET brain obtained, findings consistent with underlying neurodegenerative disease, pattern most suggestive of dementia with Lewy bodies. 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 Discussed with daughter.    Neuro  #CVA workup  - continue ASA 81mg daily today; holding AC i/s/o SAH and possible CAA  - continue atorvastatin 40mg daily  - q4hr stroke neuro checks and vitals  - f/u PET brain results  - holding off LP due to clinical improvement   - MRI brain with and without contrast from 9/25: Left pontine punctate acute infarctions have developed since 9/20/2023. Subarachnoid hemorrhage has diminished since 9/20/2023. Ischemic white matter disease, atrophy typical for age and remote petechial hemorrhages are stable findings since 9/20/2023  - MRA brain 9/22: negative for steno-occlusive disease, aneurysm or high flow   vascular malformation. No new/interval infarct on DWI since 9/20/23,   though mild cerebral edema has developed. Query amyloid related disease.  - MRA neck: negative  - MRV brain: negative for thrombus  - Stroke education    #Lewybodydementia  - Continue Aricept 5mg daily   - general neurology consulted for patient family education on prognosis, recommenced Dr. Lomax and ISAEL henao  - Will need neurocognitive outpatient set up; reached out to Dr. Lomax and ISAEL henao for appt and for patient daughter education on LBD prognosis.     Cards  #HTN  - Goal -160  - Amlodipine 5mg daily  - TTE from 9/19: mildly dilated RV, dilated RA, severe pulmonary hypertension  - EKG 9/30: afib with rate 110-120s, restarted home medication metoprolol 50mg BID    #HLD  - high dose statin as above in CVA  - LDL results: 129    #Afib  - Structural heart consulted for watchman consult, silva discussed with Dr. Rivas: Recommend VANNESSA to evaluated for clot       Pulm  - call provider if SPO2 < 94%    #bronchiectasis  - pulmonology consulted, appreciate recs   - Zosyn last day 9/29, no need to start cefepime or continue zosyn for 4 more days per ID and inpatient pulmonology  - chest PT twice daily  -  Duonebs discontinued; Pt daughter does not want pt to receive Duoneb as they make her mother jittery, this was discussed with palliative and determined within her GOC. If pt has SOB or desaturated, trial of O2 can be given  - continue hypertonic 7% inhalation saline   - continue singular  - will need to follow up with Dr. Foster as outpatient  - Chest PT twice daily    GI  #Nutrition/Fluids/Electrolytes   - replete K<4 and Mg <2  - Diet: minced and moist diet; (ensure d/c due to pt did not like it)  - IVF: none    #malnutrition  - Continue Remeron 7.5mg  - Pt does not like ensure, d/c    #diarrhea  - Pt with 3 episodes of loose stool yesterday, likely antibiotic associated diarrhea, c.diff culture not needed at this time. No reported diarrhea today. If pt continues to have multiple loose stools without abx, culture can be sent    Renal  - daily BMP    #urinary retention  - Richards removed 9/27, failed TOV with 4 straight caths  - Richards placed by urology due to trauma at urethra    Infectious Disease  - Zosyn course finished on 9/29, no need to start cefepime or continue zosyn for 4 more days per ID and inpatient pulmonology  - Pt with 3 episodes of loose stool yesterday, likely antibiotic associated diarrhea, c.diff culture not needed at this time. If pt continues to have multiple loose stools without abx, culture can be sent    Endocrine  - A1C results: 5.5%  - TSH results: 3.870    Psych  #concern for SI - was placed on CO  - patient denies SI while alert and oriented, CO dc'ed    #delirium- placed on CO  - with sitter due to fall risk     #anxiety  - Pt home medication of .25mg klonopin added on per request of daughter    Palliative  - Pt daughter states that her mothers wishes were to "try for a little while to fix something if it was reversible but if my brain is gone, forget about it". Ongoing support given to daughter with diagnoses and prognosis. Pt was living semi-independent at family home prior to admission  - Pt daughter does not want pt to receive Duoneb as they make her mother jittery, this was discussed with palliative and determined within her GOC. If pt has SOB or desaturated, trial of O2 can be given.    DVT Prophylaxis  - lovenox sq for DVT prophylaxis   - SCDs for DVT prophylaxis     Dispo: AR      Discussed daily hospital plans and goals with patient and patient daughter at beside.    Discussed with Dr. Garcia

## 2023-10-01 LAB
ANION GAP SERPL CALC-SCNC: 7 MMOL/L — SIGNIFICANT CHANGE UP (ref 5–17)
BUN SERPL-MCNC: 24 MG/DL — HIGH (ref 7–23)
CALCIUM SERPL-MCNC: 8.4 MG/DL — SIGNIFICANT CHANGE UP (ref 8.4–10.5)
CHLORIDE SERPL-SCNC: 103 MMOL/L — SIGNIFICANT CHANGE UP (ref 96–108)
CO2 SERPL-SCNC: 23 MMOL/L — SIGNIFICANT CHANGE UP (ref 22–31)
CREAT SERPL-MCNC: 0.6 MG/DL — SIGNIFICANT CHANGE UP (ref 0.5–1.3)
EGFR: 91 ML/MIN/1.73M2 — SIGNIFICANT CHANGE UP
GLUCOSE SERPL-MCNC: 90 MG/DL — SIGNIFICANT CHANGE UP (ref 70–99)
HCT VFR BLD CALC: 33.7 % — LOW (ref 34.5–45)
HGB BLD-MCNC: 11.1 G/DL — LOW (ref 11.5–15.5)
MAGNESIUM SERPL-MCNC: 1.7 MG/DL — SIGNIFICANT CHANGE UP (ref 1.6–2.6)
MCHC RBC-ENTMCNC: 32.7 PG — SIGNIFICANT CHANGE UP (ref 27–34)
MCHC RBC-ENTMCNC: 32.9 GM/DL — SIGNIFICANT CHANGE UP (ref 32–36)
MCV RBC AUTO: 99.4 FL — SIGNIFICANT CHANGE UP (ref 80–100)
NRBC # BLD: 0 /100 WBCS — SIGNIFICANT CHANGE UP (ref 0–0)
PHOSPHATE SERPL-MCNC: 2.8 MG/DL — SIGNIFICANT CHANGE UP (ref 2.5–4.5)
PLATELET # BLD AUTO: 291 K/UL — SIGNIFICANT CHANGE UP (ref 150–400)
POTASSIUM SERPL-MCNC: 4.1 MMOL/L — SIGNIFICANT CHANGE UP (ref 3.5–5.3)
POTASSIUM SERPL-SCNC: 4.1 MMOL/L — SIGNIFICANT CHANGE UP (ref 3.5–5.3)
RBC # BLD: 3.39 M/UL — LOW (ref 3.8–5.2)
RBC # FLD: 12.9 % — SIGNIFICANT CHANGE UP (ref 10.3–14.5)
SODIUM SERPL-SCNC: 133 MMOL/L — LOW (ref 135–145)
WBC # BLD: 6.22 K/UL — SIGNIFICANT CHANGE UP (ref 3.8–10.5)
WBC # FLD AUTO: 6.22 K/UL — SIGNIFICANT CHANGE UP (ref 3.8–10.5)

## 2023-10-01 PROCEDURE — 99232 SBSQ HOSP IP/OBS MODERATE 35: CPT

## 2023-10-01 RX ORDER — CLONAZEPAM 1 MG
0.25 TABLET ORAL ONCE
Refills: 0 | Status: DISCONTINUED | OUTPATIENT
Start: 2023-10-01 | End: 2023-10-01

## 2023-10-01 RX ORDER — DONEPEZIL HYDROCHLORIDE 10 MG/1
5 TABLET, FILM COATED ORAL EVERY 24 HOURS
Refills: 0 | Status: DISCONTINUED | OUTPATIENT
Start: 2023-10-01 | End: 2023-10-10

## 2023-10-01 RX ADMIN — Medication 0.25 MILLIGRAM(S): at 20:56

## 2023-10-01 RX ADMIN — Medication 50 MILLIGRAM(S): at 05:01

## 2023-10-01 RX ADMIN — DONEPEZIL HYDROCHLORIDE 5 MILLIGRAM(S): 10 TABLET, FILM COATED ORAL at 18:52

## 2023-10-01 RX ADMIN — Medication 81 MILLIGRAM(S): at 11:46

## 2023-10-01 RX ADMIN — Medication 1 TABLET(S): at 11:47

## 2023-10-01 RX ADMIN — MONTELUKAST 10 MILLIGRAM(S): 4 TABLET, CHEWABLE ORAL at 11:47

## 2023-10-01 RX ADMIN — SODIUM CHLORIDE 4 MILLILITER(S): 9 INJECTION INTRAMUSCULAR; INTRAVENOUS; SUBCUTANEOUS at 21:15

## 2023-10-01 RX ADMIN — Medication 650 MILLIGRAM(S): at 05:04

## 2023-10-01 RX ADMIN — Medication 100 MILLIGRAM(S): at 11:46

## 2023-10-01 RX ADMIN — ENOXAPARIN SODIUM 30 MILLIGRAM(S): 100 INJECTION SUBCUTANEOUS at 18:52

## 2023-10-01 RX ADMIN — Medication 650 MILLIGRAM(S): at 06:08

## 2023-10-01 RX ADMIN — AMLODIPINE BESYLATE 5 MILLIGRAM(S): 2.5 TABLET ORAL at 05:01

## 2023-10-01 RX ADMIN — Medication 5 MILLIGRAM(S): at 22:24

## 2023-10-01 RX ADMIN — Medication 50 MILLIGRAM(S): at 18:52

## 2023-10-01 NOTE — PROGRESS NOTE ADULT - ASSESSMENT
82y Female with PMHx of HTN, afib (not on AC due to fall risk), recent hospitalization for PRES (5-6/2023), recently discharged from [facility] 8/2023 with acute exacerbation of severe bronchiectasis and respiratory failure presents with several days of AMS and new diplopia 9/18. NIHSS 1 on admission for bitemporal visual deficit. Course complicated by agitation and severe hypoglycemia, requiring ICU admission. Brain imaging significant for L pontine and left occipital infarct as well as SAH of the right parieto-occipital region. Currently, cannot r/o inflammatory causes like ISMA/vasculitis/encephalitis. PET brain obtained, findings consistent with underlying neurodegenerative disease, pattern most suggestive of dementia with Lewy bodies. 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     Neuro  #CVA workup  - continue ASA 81mg daily ; holding AC i/s/o SAH and possible CAA  - continue atorvastatin 40mg daily  - q4hr stroke neuro checks and vitals  - PET brain suggestive of lewy body dementia  - holding off LP due to clinical improvement   - MRI brain with and without contrast from 9/25: Left pontine punctate acute infarctions have developed since 9/20/2023. Subarachnoid hemorrhage has diminished since 9/20/2023. Ischemic white matter disease, atrophy typical for age and remote petechial hemorrhages are stable findings since 9/20/2023  - MRA brain 9/22: negative for steno-occlusive disease, aneurysm or high flow   vascular malformation. No new/interval infarct on DWI since 9/20/23,   though mild cerebral edema has developed. Query amyloid related disease.  - MRA neck: negative  - MRV brain: negative for thrombus  - Stroke education    #Lewybodydementia  - Continue Aricept 5mg daily -retimed to 7pm  - general neurology consulted for patient family education on prognosis, recommenced Dr. Lomax and ISAEL henao  - Will need neurocognitive outpatient set up; reached out to Dr. Lomax and ISAEL henao for appt and for patient daughter education on LBD prognosis.     Cards  #HTN  - Goal -160  - Amlodipine 5mg daily, metoprolol tartrate 50mg BID  - TTE from 9/19: mildly dilated RV, dilated RA, severe pulmonary hypertension  - EKG 9/30: afib with rate 110-120s, restarted home medication metoprolol 50mg BID    #HLD  - high dose statin as above in CVA  - LDL results: 129    #Afib  - Structural heart consulted for watchman consult, silva discussed with Dr. Rivas: Recommend VANNESSA to evaluated for clot     Pulm  - call provider if SPO2 < 94%    #bronchiectasis  - pulmonology consulted, appreciate recs   - Zosyn last day 9/29, no need to start cefepime or continue zosyn for 4 more days per ID and inpatient pulmonology  - chest PT twice daily  -  Duonebs discontinued; Pt daughter does not want pt to receive Duoneb as they make her mother jittery, this was discussed with palliative and determined within her GOC. If pt has SOB or desaturated, trial of O2 can be given  - continue hypertonic 7% inhalation saline   - continue singular  - will need to follow up with Dr. Foster as outpatient  - Chest PT twice daily    GI  #Nutrition/Fluids/Electrolytes   - replete K<4 and Mg <2  - Diet: minced and moist diet; (ensure d/c due to pt did not like it)  - IVF: none    #malnutrition  - Continue Remeron 7.5mg  - Pt does not like ensure, d/c    #diarrhea  - Pt with 3 episodes of loose stools, likely antibiotic associated diarrhea, now resolved    Renal  - daily BMP    #urinary retention  - Richards removed 9/27, failed TOV with 4 straight caths  - Richards placed by urology 9/29 due to trauma at urethra    Infectious Disease  - Zosyn course finished on 9/29, no need to start cefepime or continue zosyn for 4 more days per ID and inpatient pulmonology  - Pt with 3 episodes of loose stool yesterday, likely antibiotic associated diarrhea, c.diff culture not needed at this time. If pt continues to have multiple loose stools without abx, culture can be sent    Endocrine  - A1C results: 5.5%  - TSH results: 3.870    Psych  #concern for SI - was placed on CO  - patient denies SI while alert and oriented, CO dc'ed    #delirium- placed on CO  - with sitter due to fall risk     #anxiety  - Pt home medication of .25mg klonopin, not on for now    Palliative  - Pt daughter states that her mothers wishes were to "try for a little while to fix something if it was reversible but if my brain is gone, forget about it". Ongoing support given to daughter with diagnoses and prognosis. Pt was living semi-independent at family home prior to admission  - Pt daughter does not want pt to receive Duoneb as they make her mother jittery, this was discussed with palliative and determined within her GOC. If pt has SOB or desaturated, trial of O2 can be given.    DVT Prophylaxis  - lovenox sq for DVT prophylaxis   - SCDs for DVT prophylaxis     Dispo: AR      Discussed daily hospital plans and goals with patient    Discussed with Dr. Garcia

## 2023-10-01 NOTE — PROGRESS NOTE ADULT - SUBJECTIVE AND OBJECTIVE BOX
INTERVAL HPI/OVERNIGHT EVENTS: steph o/n    SUBJECTIVE: Patient seen and examined at bedside.   Pt resting comfortably in bed on RA. Finished some of lunch - denies N/V/Abd pain. Denies dyspnea. Feels she still has intermittent coughing and chest discomfort w that.     OBJECTIVE:    VITAL SIGNS:  ICU Vital Signs Last 24 Hrs  T(C): 36.1 (01 Oct 2023 10:00), Max: 36.9 (01 Oct 2023 05:54)  T(F): 97 (01 Oct 2023 10:00), Max: 98.4 (01 Oct 2023 05:54)  HR: 102 (01 Oct 2023 08:40) (100 - 128)  BP: 136/89 (01 Oct 2023 08:40) (134/75 - 145/95)  BP(mean): 106 (01 Oct 2023 08:40) (95 - 113)  ABP: --  ABP(mean): --  RR: 17 (01 Oct 2023 08:40) (16 - 18)  SpO2: 98% (01 Oct 2023 08:40) (89% - 98%)    O2 Parameters below as of 01 Oct 2023 08:40  Patient On (Oxygen Delivery Method): room air              09-30 @ 07:01  -  10-01 @ 07:00  --------------------------------------------------------  IN: 0 mL / OUT: 850 mL / NET: -850 mL    10-01 @ 07:01  -  10-01 @ 13:42  --------------------------------------------------------  IN: 0 mL / OUT: 275 mL / NET: -275 mL      CAPILLARY BLOOD GLUCOSE      POCT Blood Glucose.: 104 mg/dL (30 Sep 2023 07:51)      PHYSICAL EXAM:  GEN: thin female in NAD on RA  HEENT: NC/AT, MMM  CV: increased rate - irregularly irregular, nml S1S2, no murmurs  PULM: nml effort, dry rales on R mid and lower fields wo wheezing or rhonchi.   ABD: Soft, non-distended, NABS, non-tender  NEURO  A/O x self, hospital, but not time. moving all extremities, Sensation intact  PSYCH: Appropriate    MEDICATIONS:  MEDICATIONS  (STANDING):  amLODIPine   Tablet 5 milliGRAM(s) Oral daily  aspirin  chewable 81 milliGRAM(s) Oral daily  atorvastatin 40 milliGRAM(s) Oral at bedtime  donepezil 5 milliGRAM(s) Oral every 24 hours  enoxaparin Injectable 30 milliGRAM(s) SubCutaneous every 24 hours  lidocaine 2% Jelly 5 milliLiter(s) IntraUrethral once  melatonin 5 milliGRAM(s) Oral at bedtime  metoprolol tartrate 50 milliGRAM(s) Oral two times a day  mirtazapine 7.5 milliGRAM(s) Oral once  montelukast 10 milliGRAM(s) Oral daily  multivitamin  Chewable 1 Tablet(s) Oral daily  sodium chloride 7% Inhalation 4 milliLiter(s) Inhalation every 12 hours  thiamine 100 milliGRAM(s) Oral daily    MEDICATIONS  (PRN):  acetaminophen     Tablet .. 650 milliGRAM(s) Oral every 6 hours PRN Temp greater or equal to 38C (100.4F), Mild Pain (1 - 3), Moderate Pain (4 - 6), Severe Pain (7 - 10)  azelastine 0.15% Nasal Spray 1 Spray(s) Both Nostrils two times a day PRN nasal congestion      ALLERGIES:  Allergies    Ceclor (Rash)  IV Contrast (Unknown)  Levaquin (Swelling)  Augmentin (Short breath; Rash)    Intolerances        LABS:                        11.1   6.22  )-----------( 291      ( 01 Oct 2023 05:30 )             33.7     10-01    133<L>  |  103  |  24<H>  ----------------------------<  90  4.1   |  23  |  0.60    Ca    8.4      01 Oct 2023 05:30  Phos  2.8     10-01  Mg     1.7     10-01        Urinalysis Basic - ( 01 Oct 2023 05:30 )    Color: x / Appearance: x / SG: x / pH: x  Gluc: 90 mg/dL / Ketone: x  / Bili: x / Urobili: x   Blood: x / Protein: x / Nitrite: x   Leuk Esterase: x / RBC: x / WBC x   Sq Epi: x / Non Sq Epi: x / Bacteria: x        RADIOLOGY & ADDITIONAL TESTS: Reviewed.

## 2023-10-01 NOTE — PROGRESS NOTE ADULT - NS ATTEND AMEND GEN_ALL_CORE FT
82 year old woman w/ PMH of Afib (not on AC due to frequent falls), HTN, bronchiectasis, recent diagnosis of PRES at outside hospital presented w/ several day history of confusion and visual complaints. Mental status with slight improvement after correction of hypoglycemia and antibiotics.     CTH 9.19.23 negative  MR brain 9.20.23 demonstrates small diffusion restricting lesion in the L. Occipital lobe w/ ADC and FLAIR correlate; +SWI in the R. Parietooccipital region w/ sulcal T2 flair signal.   MRV negative  MRA h/n w/ repeat MRI brain w/o contrast 9.22.23 demonstrates no stenoocclusive disease; No new DWI abnormalities.   Blood cultures NGTD  EEG neg  Repeat MR brain 9.25.23 w/w/o contrast demonstrates questionable, vague, L. pontine diffusion restriction with ADC correlate. No contrast enhancement.   PET abnormal hyopmetabolism in the parietooccipital and temporal regions, R > L, hypometabolism in the R. cuneus and primary visual cortex of assicated cingulate [place] sing, suggestive of LBD.     Impression: Acute versus ?subacute onset confusion and visual complaints with radiographic evidence of possible L. 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 Etiology likely multifactorial, including ishcemic stroke due to Afib, CAA related SAH/superficial siderosis, and underlying neurodegenerative process (ie LBD). Lower suspicion for CNS vasculitis/ISMA. On 9/26, patient with significant improvement in exam and has remained well.     Plan:   Hold off LP and cerebral biopsy for now given clinical improvement.   Continue ASA 81mg; Will hold off AC given possibility of amyloid angiopathy.   Structural heart consult for possible watchman - wants VANNESSA; Can be done as outpatient?   Continue Lovenox for DVT ppx  Failed TOV - urology following. Rec saravia for 2-3 days   f/u with neurovascular clinical (Janet Vallecillo) at discharge  F/u with dementia/movement disorder clinic (Rebecca) at discharge   PT - [facility]     40 minutes spent on total encounter. The necessity of the time spent during the encounter on this date of service was due to:     Review of imaging and chart; obtaining history; examination of pt; discussion and coordination of care, and discussion of lifestyle modification and risk factor control.

## 2023-10-01 NOTE — PROGRESS NOTE ADULT - ASSESSMENT
Pulm: Dr. Foster  82F w HTN, AF not on AC d/t fall risk, hospitalized for PRES 05-06/2023, recent DC 8/2023 for acute hypoxemic respiratory failure - to home on 4L NC w follow-up w Dr. Foster for exacerbation of bronchiectasis, recently started course of IV cefepime outpatient by Dr. Foster 9/13, p/w diplopia, encephalopathy admitted for stroke work-up, cefepime continued inpatient, repeat head CT found small SAH, c/b hypoglycemia, encephalopathy - BG 30 and temp 93F during RRT on 9/21, transferred to MICU for persistent hypoglycemia needing D10W >> D10 NS, slow improvement w nml insulin and C peptide, stepped down to 7L and stroke service, MR brain showing L pontine infarction, PET brain w signal c/f Dementia w Lewy Bodies, for acute rehab    #Acute urinary retention - saravia replaced 9/29    #CVA - L pontine infarction on MR 9/25 that also showed SAH diminishing.  #Encephalopathy - improving. There is signal on PET c/f Dementia w Lewy Bodies   - ASA 81 daily, Atorva 40   - Started on remiron 7.5 daily, donepezil 5 HS    #Bronchiectasis - On IV Cefepime from 9/13 w PICC placed outpatient (PICC discontinued 9/21). Has been on IV Zosyn in MICU per pulmonary thru 9/29. Currently satting well on RA. On singulair 10  #AFib - on lopressor 50 bid.   #HTN - on amlodipine 5    #Normocytic anemia - 11.1 today.  #Hyponatremia - 133 today    Plan  Would keep saravia in at this time. Per urology consider bladder rest for 2-3d  Advise re-adding albuterol nebulizer q4h PRN for wheezing    Pt is DNR/DNI  DISPO: Acute rehab - baseline lives w daughter

## 2023-10-01 NOTE — PROGRESS NOTE ADULT - SUBJECTIVE AND OBJECTIVE BOX
Neurology Stroke Progress Note    INTERVAL HPI/OVERNIGHT EVENTS:  Overnight refused meds except melatonin.  Patient seen and examined. Pt eating breakfast, no acute complaints.  Notified that pt's family member who had been visiting her was just diagnosed with mono.    MEDICATIONS  (STANDING):  amLODIPine   Tablet 5 milliGRAM(s) Oral daily  aspirin  chewable 81 milliGRAM(s) Oral daily  atorvastatin 40 milliGRAM(s) Oral at bedtime  donepezil 5 milliGRAM(s) Oral every 24 hours  enoxaparin Injectable 30 milliGRAM(s) SubCutaneous every 24 hours  lidocaine 2% Jelly 5 milliLiter(s) IntraUrethral once  melatonin 5 milliGRAM(s) Oral at bedtime  metoprolol tartrate 50 milliGRAM(s) Oral two times a day  mirtazapine 7.5 milliGRAM(s) Oral once  montelukast 10 milliGRAM(s) Oral daily  multivitamin  Chewable 1 Tablet(s) Oral daily  sodium chloride 7% Inhalation 4 milliLiter(s) Inhalation every 12 hours  thiamine 100 milliGRAM(s) Oral daily    MEDICATIONS  (PRN):  acetaminophen     Tablet .. 650 milliGRAM(s) Oral every 6 hours PRN Temp greater or equal to 38C (100.4F), Mild Pain (1 - 3), Moderate Pain (4 - 6), Severe Pain (7 - 10)  azelastine 0.15% Nasal Spray 1 Spray(s) Both Nostrils two times a day PRN nasal congestion      Allergies    Ceclor (Rash)  IV Contrast (Unknown)  Levaquin (Swelling)  Augmentin (Short breath; Rash)    Intolerances        Vital Signs Last 24 Hrs  T(C): 36.7 (01 Oct 2023 14:00), Max: 36.9 (01 Oct 2023 05:54)  T(F): 98.1 (01 Oct 2023 14:00), Max: 98.4 (01 Oct 2023 05:54)  HR: 108 (01 Oct 2023 12:00) (100 - 128)  BP: 125/89 (01 Oct 2023 12:00) (125/89 - 145/95)  BP(mean): 99 (01 Oct 2023 12:00) (95 - 113)  RR: 17 (01 Oct 2023 12:00) (16 - 18)  SpO2: 96% (01 Oct 2023 12:00) (89% - 98%)    Parameters below as of 01 Oct 2023 12:00  Patient On (Oxygen Delivery Method): room air        Physical exam:  General: No acute distress, awake and alert  Eyes: Anicteric sclerae, moist conjunctivae, see below for CNs  Neck: trachea midline,  Cardiovascular: Irregularly irregular  Neurologic:  -Mental status: Awake, eyes open, oriented to person, place, and to year. Follows simple commands but has some periods of inattention. Repetition intact. Concentration intact Remote memory intact. Recent memory variable and does not want to answer many questions  -Cranial nerves:   II: Poor visual fields exam, appears to have bitemporally quadrantopia with BTT however exam varies   III, IV, VI: Extraocular movements are intact without nystagmus. Pupils equally round and reactive to light  V:  Facial sensation V1-V3 equal and intact   VII: Face appears symmetric   VIII: Hearing is bilaterally intact to voice  XII: Tongue protrudes midline  Motor: Motor: Diminished bulk throughout. Normal tone. B/l UEs and LE without drift.   Sensation: Intact to light touch bilaterally  Gait: Deferred      LABS:                        11.1   6.22  )-----------( 291      ( 01 Oct 2023 05:30 )             33.7     10-01    133<L>  |  103  |  24<H>  ----------------------------<  90  4.1   |  23  |  0.60    Ca    8.4      01 Oct 2023 05:30  Phos  2.8     10-01  Mg     1.7     10-01        Urinalysis Basic - ( 01 Oct 2023 05:30 )    Color: x / Appearance: x / SG: x / pH: x  Gluc: 90 mg/dL / Ketone: x  / Bili: x / Urobili: x   Blood: x / Protein: x / Nitrite: x   Leuk Esterase: x / RBC: x / WBC x   Sq Epi: x / Non Sq Epi: x / Bacteria: x        RADIOLOGY & ADDITIONAL TESTS:

## 2023-10-02 LAB
ANION GAP SERPL CALC-SCNC: 8 MMOL/L — SIGNIFICANT CHANGE UP (ref 5–17)
BUN SERPL-MCNC: 20 MG/DL — SIGNIFICANT CHANGE UP (ref 7–23)
CALCIUM SERPL-MCNC: 8.6 MG/DL — SIGNIFICANT CHANGE UP (ref 8.4–10.5)
CHLORIDE SERPL-SCNC: 101 MMOL/L — SIGNIFICANT CHANGE UP (ref 96–108)
CO2 SERPL-SCNC: 25 MMOL/L — SIGNIFICANT CHANGE UP (ref 22–31)
CREAT SERPL-MCNC: 0.61 MG/DL — SIGNIFICANT CHANGE UP (ref 0.5–1.3)
EGFR: 90 ML/MIN/1.73M2 — SIGNIFICANT CHANGE UP
GLUCOSE BLDC GLUCOMTR-MCNC: 122 MG/DL — HIGH (ref 70–99)
GLUCOSE SERPL-MCNC: 98 MG/DL — SIGNIFICANT CHANGE UP (ref 70–99)
HCT VFR BLD CALC: 33.1 % — LOW (ref 34.5–45)
HGB BLD-MCNC: 11.1 G/DL — LOW (ref 11.5–15.5)
MAGNESIUM SERPL-MCNC: 1.6 MG/DL — SIGNIFICANT CHANGE UP (ref 1.6–2.6)
MCHC RBC-ENTMCNC: 33.2 PG — SIGNIFICANT CHANGE UP (ref 27–34)
MCHC RBC-ENTMCNC: 33.5 GM/DL — SIGNIFICANT CHANGE UP (ref 32–36)
MCV RBC AUTO: 99.1 FL — SIGNIFICANT CHANGE UP (ref 80–100)
NRBC # BLD: 0 /100 WBCS — SIGNIFICANT CHANGE UP (ref 0–0)
PHOSPHATE SERPL-MCNC: 3 MG/DL — SIGNIFICANT CHANGE UP (ref 2.5–4.5)
PLATELET # BLD AUTO: 294 K/UL — SIGNIFICANT CHANGE UP (ref 150–400)
POTASSIUM SERPL-MCNC: 3.9 MMOL/L — SIGNIFICANT CHANGE UP (ref 3.5–5.3)
POTASSIUM SERPL-SCNC: 3.9 MMOL/L — SIGNIFICANT CHANGE UP (ref 3.5–5.3)
RBC # BLD: 3.34 M/UL — LOW (ref 3.8–5.2)
RBC # FLD: 12.9 % — SIGNIFICANT CHANGE UP (ref 10.3–14.5)
SODIUM SERPL-SCNC: 134 MMOL/L — LOW (ref 135–145)
WBC # BLD: 6.84 K/UL — SIGNIFICANT CHANGE UP (ref 3.8–10.5)
WBC # FLD AUTO: 6.84 K/UL — SIGNIFICANT CHANGE UP (ref 3.8–10.5)

## 2023-10-02 PROCEDURE — 99232 SBSQ HOSP IP/OBS MODERATE 35: CPT

## 2023-10-02 PROCEDURE — 99497 ADVNCD CARE PLAN 30 MIN: CPT | Mod: 25

## 2023-10-02 PROCEDURE — 99233 SBSQ HOSP IP/OBS HIGH 50: CPT

## 2023-10-02 RX ORDER — ALBUTEROL 90 UG/1
2 AEROSOL, METERED ORAL EVERY 6 HOURS
Refills: 0 | Status: DISCONTINUED | OUTPATIENT
Start: 2023-10-02 | End: 2023-10-02

## 2023-10-02 RX ADMIN — Medication 50 MILLIGRAM(S): at 18:00

## 2023-10-02 RX ADMIN — DONEPEZIL HYDROCHLORIDE 5 MILLIGRAM(S): 10 TABLET, FILM COATED ORAL at 18:01

## 2023-10-02 RX ADMIN — AMLODIPINE BESYLATE 5 MILLIGRAM(S): 2.5 TABLET ORAL at 10:03

## 2023-10-02 RX ADMIN — Medication 50 MILLIGRAM(S): at 10:03

## 2023-10-02 RX ADMIN — MONTELUKAST 10 MILLIGRAM(S): 4 TABLET, CHEWABLE ORAL at 11:09

## 2023-10-02 RX ADMIN — Medication 1 TABLET(S): at 11:09

## 2023-10-02 RX ADMIN — ENOXAPARIN SODIUM 30 MILLIGRAM(S): 100 INJECTION SUBCUTANEOUS at 17:59

## 2023-10-02 RX ADMIN — Medication 100 MILLIGRAM(S): at 11:09

## 2023-10-02 RX ADMIN — Medication 81 MILLIGRAM(S): at 11:09

## 2023-10-02 RX ADMIN — Medication 5 MILLIGRAM(S): at 22:20

## 2023-10-02 NOTE — PROGRESS NOTE ADULT - NUTRITIONAL ASSESSMENT
This patient has been assessed with a concern for Malnutrition and has been determined to have a diagnosis/diagnoses of Severe protein-calorie malnutrition and Underweight (BMI < 19).    This patient is being managed with:   Diet NPO after Midnight-     NPO Start Date: 02-Oct-2023   NPO Start Time: 23:59  Entered: Oct  2 2023 10:40AM    Diet Minced and Moist-  Supplement Feeding Modality:  Oral  Ensure Plus High Protein Cans or Servings Per Day:  1       Frequency:  Three Times a day  Entered: Sep 27 2023  4:12PM

## 2023-10-02 NOTE — PROGRESS NOTE ADULT - SUBJECTIVE AND OBJECTIVE BOX
[facility] Geriatrics and Palliative Care  Abad Buenrostro, Palliative Care Attending  Contact Info: Call [phone] (HEAL Line) or message on [employer] Teams (Abad Buenrostro)    SUBJECTIVE AND OBJECTIVE:  INTERVAL HPI/OVERNIGHT EVENTS: Interval events noted. See patient's PRN use for the past 24hrs noted below. Comprehensive symptom assessment and GOC exploration as noted below. Extensive time spent discussing plan of care with patient/family. No unexpected adverse effects of opiates noted: no sedation/dizziness/nausea.    ALLERGIES:  Ceclor (Rash)  IV Contrast (Unknown)  Levaquin (Swelling)  Augmentin (Short breath; Rash)    MEDICATIONS  (STANDING):  amLODIPine   Tablet 5 milliGRAM(s) Oral daily  aspirin  chewable 81 milliGRAM(s) Oral daily  atorvastatin 40 milliGRAM(s) Oral at bedtime  donepezil 5 milliGRAM(s) Oral every 24 hours  enoxaparin Injectable 30 milliGRAM(s) SubCutaneous every 24 hours  lidocaine 2% Jelly 5 milliLiter(s) IntraUrethral once  melatonin 5 milliGRAM(s) Oral at bedtime  metoprolol tartrate 50 milliGRAM(s) Oral two times a day  mirtazapine 7.5 milliGRAM(s) Oral once  montelukast 10 milliGRAM(s) Oral daily  multivitamin  Chewable 1 Tablet(s) Oral daily  sodium chloride 7% Inhalation 4 milliLiter(s) Inhalation every 12 hours  thiamine 100 milliGRAM(s) Oral daily    MEDICATIONS  (PRN):  acetaminophen     Tablet .. 650 milliGRAM(s) Oral every 6 hours PRN Temp greater or equal to 38C (100.4F), Mild Pain (1 - 3), Moderate Pain (4 - 6), Severe Pain (7 - 10)      Analgesic Use (Scheduled and PRNs) for past 24 hours:    clonazePAM  Tablet   0.25 milliGRAM(s) Oral (10-01-23 @ 20:56)    donepezil   5 milliGRAM(s) Oral (10-01-23 @ 18:52)    melatonin   5 milliGRAM(s) Oral (10-01-23 @ 22:24)      ITEMS UNCHECKED ARE NOT PRESENT  PRESENT SYMPTOMS/REVIEW OF SYSTEMS: []Unable to obtain due to poor mentation   Source if other than patient:  []Family   []Team         Vital Signs Last 24 Hrs  T(C): 36.7 (02 Oct 2023 14:09), Max: 36.9 (02 Oct 2023 10:22)  T(F): 98.1 (02 Oct 2023 14:09), Max: 98.5 (02 Oct 2023 10:22)  HR: 86 (02 Oct 2023 16:54) (86 - 116)  BP: 136/76 (02 Oct 2023 16:54) (128/58 - 152/78)  BP(mean): 95 (02 Oct 2023 16:54) (83 - 107)  RR: 18 (02 Oct 2023 16:54) (18 - 18)  SpO2: 96% (02 Oct 2023 16:54) (92% - 96%)    Parameters below as of 02 Oct 2023 16:54  Patient On (Oxygen Delivery Method): room air        LABS: Personally reviewed and interpreted                        11.1   6.84  )-----------( 294      ( 02 Oct 2023 06:59 )             33.1   10-02    134<L>  |  101  |  20  ----------------------------<  98  3.9   |  25  |  0.61    Ca    8.6      02 Oct 2023 06:59  Phos  3.0     10-02  Mg     1.6     10-02        RADIOLOGY & ADDITIONAL STUDIES: None new    DISCUSSION OF CASE: Family - to provide updates and emotional support; Primary Team/RN - to discuss plan of care [facility] Geriatrics and Palliative Care  Abad Buenrostro, Palliative Care Attending  Contact Info: Call [phone] (HEAL Line) or message on [employer] Teams (Abad Buenrostro)    SUBJECTIVE AND OBJECTIVE:  INTERVAL HPI/OVERNIGHT EVENTS: Interval events noted. See patient's PRN use for the past 24hrs noted below. Comprehensive symptom assessment and GOC exploration as noted below. Extensive time spent discussing plan of care with patient/family. No new complaints from patient. Trending towards baseline. Tolerating diet and working with PT.    ALLERGIES:  Ceclor (Rash)  IV Contrast (Unknown)  Levaquin (Swelling)  Augmentin (Short breath; Rash)    MEDICATIONS  (STANDING):  amLODIPine   Tablet 5 milliGRAM(s) Oral daily  aspirin  chewable 81 milliGRAM(s) Oral daily  atorvastatin 40 milliGRAM(s) Oral at bedtime  donepezil 5 milliGRAM(s) Oral every 24 hours  enoxaparin Injectable 30 milliGRAM(s) SubCutaneous every 24 hours  lidocaine 2% Jelly 5 milliLiter(s) IntraUrethral once  melatonin 5 milliGRAM(s) Oral at bedtime  metoprolol tartrate 50 milliGRAM(s) Oral two times a day  mirtazapine 7.5 milliGRAM(s) Oral once  montelukast 10 milliGRAM(s) Oral daily  multivitamin  Chewable 1 Tablet(s) Oral daily  sodium chloride 7% Inhalation 4 milliLiter(s) Inhalation every 12 hours  thiamine 100 milliGRAM(s) Oral daily    MEDICATIONS  (PRN):  acetaminophen     Tablet .. 650 milliGRAM(s) Oral every 6 hours PRN Temp greater or equal to 38C (100.4F), Mild Pain (1 - 3), Moderate Pain (4 - 6), Severe Pain (7 - 10)    Analgesic Use (Scheduled and PRNs) for past 24 hours:  clonazePAM  Tablet   0.25 milliGRAM(s) Oral (10-01-23 @ 20:56)  donepezil   5 milliGRAM(s) Oral (10-01-23 @ 18:52)  melatonin   5 milliGRAM(s) Oral (10-01-23 @ 22:24)    ITEMS UNCHECKED ARE NOT PRESENT  PRESENT SYMPTOMS/REVIEW OF SYSTEMS: []Unable to obtain due to poor mentation   Source if other than patient:  []Family   []Team     Pain: [] yes [x] no  QOL impact -  Location -  Aggravating factors -  Quality -  Radiation -  Timing -  Severity (0-10 scale) -  Minimal acceptable level (0-10 scale) -    PAINAD Score: 0  CPOT Score: 0    Dyspnea:                           []Mild  []Moderate []Severe  Anxiety:                             []Mild []Moderate []Severe  Fatigue:                             []Mild []Moderate []Severe  Nausea:                             []Mild []Moderate []Severe  Loss of appetite:              []Mild []Moderate []Severe  Constipation:                    []Mild []Moderate []Severe    Other Symptoms:  [x]All other review of systems negative     Palliative Performance Status Version 2: 40%    GENERAL:  [x] NAD [x]Alert []Lethargic  []Cachexia  []Unarousable  [x]Verbal  []Non-Verbal  BEHAVIORAL:   []Anxiety  []Delirium []Agitation [x]Cooperative [x]Oriented x3  HEENT:  [x]Normal  [x] Moist Mucous Membranes []Dry mouth   []ET Tube/Trach  []Oral lesions  PULMONARY:   [x]Clear []Tachypnea  []Audible excessive secretions  [x]Normal Work of Breathing []Labored Breathing  []Rhonchi []Crackles []Wheezing  CARDIOVASCULAR:    [x]Regular Rate [x]Regular Rhythm []Irregular []Tachy  []Hemanth  GASTROINTESTINAL:  [x]Soft  []Distended   []+BS  [x]Non tender []Tender  []PEG []OGT/ NGT  Last BM:  GENITOURINARY:  []Normal [x] Incontinent   []Oliguria/Anuria   []Richards  MUSCULOSKELETAL:   [x]Normal Extremities  [x]Weakness  [x]Bed/Wheelchair bound []Edema  NEUROLOGIC:   [x]No focal deficits  []Cognitive impairment  []Dysphagia []Dysarthria []Paresis []Encephalopathic  SKIN:   [x]Normal   []Pressure ulcer(s)  []Rash  Vital Signs Last 24 Hrs  T(C): 36.7 (02 Oct 2023 14:09), Max: 36.9 (02 Oct 2023 10:22)  T(F): 98.1 (02 Oct 2023 14:09), Max: 98.5 (02 Oct 2023 10:22)  HR: 86 (02 Oct 2023 16:54) (86 - 116)  BP: 136/76 (02 Oct 2023 16:54) (128/58 - 152/78)  BP(mean): 95 (02 Oct 2023 16:54) (83 - 107)  RR: 18 (02 Oct 2023 16:54) (18 - 18)  SpO2: 96% (02 Oct 2023 16:54) (92% - 96%)    Parameters below as of 02 Oct 2023 16:54  Patient On (Oxygen Delivery Method): room air    LABS: Personally reviewed and interpreted                      11.1   6.84  )-----------( 294      ( 02 Oct 2023 06:59 )             33.1   10-02    134<L>  |  101  |  20  ----------------------------<  98  3.9   |  25  |  0.61    Ca    8.6      02 Oct 2023 06:59  Phos  3.0     10-02  Mg     1.6     10-02    RADIOLOGY & ADDITIONAL STUDIES: None new    DISCUSSION OF CASE: Daughter - to provide updates and emotional support; Primary Team/RN - to discuss plan of care

## 2023-10-02 NOTE — PROGRESS NOTE ADULT - ASSESSMENT
Assesment:    82y Female with PMHx of HTN, afib (not on AC due to fall risk), recent hospitalization for PRES (5-6/2023), recently discharged from [facility] 8/2023 with acute exacerbation of severe bronchiectasis and respiratory failure presents with several days of AMS and new diplopia 9/18. NIHSS 1 on admission for bitemporal visual deficit. Course complicated by agitation and severe hypoglycemia, requiring ICU admission. Brain imaging significant for L pontine and left occipital infarct as well as SAH of the right parieto-occipital region. Currently, cannot r/o inflammatory causes like ISMA/vasculitis/encephalitis. PET brain obtained, findings consistent with underlying neurodegenerative disease, pattern most suggestive of dementia with Lewy bodies. 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 Structural heart consulted for possible MITZI occlusion device.     Plan:  Problem 1: Paroxsymal Atrial fibrillation (uncovered 5/2023)  - EKG 9/30: '120's   - No OAC 2/2 frequent falls  - Continue BB therapy   - pending VANNESSA to evaluate for thrombus, study likely tomorrow per primary team   - structural heart will continue to follow; likely outpatient f/up to discuss MITZI occlusion      Problem 2: CVA w/up   - neuro following   - PET brain suggestive of lewy body dementia   - MRA brain consistent with CAA  - Palliative care following     Problem 3: Weakness/Falls  - PT following     Problem 4: Bronchiectasis   - care per primary team     I have reviewed clinical labs tests and reports, radiology tests and reports, as well as old patient medical records, and discussed with the referring physician.

## 2023-10-02 NOTE — PROGRESS NOTE ADULT - SUBJECTIVE AND OBJECTIVE BOX
Neurology Stroke Progress Note    INTERVAL HPI/OVERNIGHT EVENTS:  No acute overnight events. Patient seen and examined, pt sitting in bed comfortably in no acute distress.     MEDICATIONS  (STANDING):  amLODIPine   Tablet 5 milliGRAM(s) Oral daily  aspirin  chewable 81 milliGRAM(s) Oral daily  atorvastatin 40 milliGRAM(s) Oral at bedtime  donepezil 5 milliGRAM(s) Oral every 24 hours  enoxaparin Injectable 30 milliGRAM(s) SubCutaneous every 24 hours  lidocaine 2% Jelly 5 milliLiter(s) IntraUrethral once  melatonin 5 milliGRAM(s) Oral at bedtime  metoprolol tartrate 50 milliGRAM(s) Oral two times a day  mirtazapine 7.5 milliGRAM(s) Oral once  montelukast 10 milliGRAM(s) Oral daily  multivitamin  Chewable 1 Tablet(s) Oral daily  sodium chloride 7% Inhalation 4 milliLiter(s) Inhalation every 12 hours  thiamine 100 milliGRAM(s) Oral daily    MEDICATIONS  (PRN):  acetaminophen     Tablet .. 650 milliGRAM(s) Oral every 6 hours PRN Temp greater or equal to 38C (100.4F), Mild Pain (1 - 3), Moderate Pain (4 - 6), Severe Pain (7 - 10)    Allergies    Ceclor (Rash)  IV Contrast (Unknown)  Levaquin (Swelling)  Augmentin (Short breath; Rash)    Intolerances    Vital Signs Last 24 Hrs  T(C): 36.7 (02 Oct 2023 18:00), Max: 36.9 (02 Oct 2023 10:22)  T(F): 98 (02 Oct 2023 18:00), Max: 98.5 (02 Oct 2023 10:22)  HR: 86 (02 Oct 2023 16:54) (86 - 116)  BP: 136/76 (02 Oct 2023 16:54) (128/58 - 152/78)  BP(mean): 95 (02 Oct 2023 16:54) (83 - 107)  RR: 18 (02 Oct 2023 16:54) (18 - 18)  SpO2: 96% (02 Oct 2023 16:54) (92% - 96%)    Parameters below as of 02 Oct 2023 16:54  Patient On (Oxygen Delivery Method): room air    Physical exam:  General: No acute distress, awake and alert  Eyes: Anicteric sclerae, moist conjunctivae, see below for CNs  Neck: trachea midline  Cardiovascular: Pt in afib on tele   Pulmonary: No use of accessory muscles    Neurologic:  -Mental status: Awake, eyes open, oriented to person, place, and to year. Follows simple commands but has some periods of inattention. Repetition intact. Recent memory variable and does not want to answer many questions  -Cranial nerves:   II: Poor visual fields exam, appears to have bitemporally quadrantopia with BTT however exam varies   III, IV, VI: Extraocular movements are intact without nystagmus. Pupils equally round and reactive to light  V:  Facial sensation V1-V3 equal and intact   VII: Face appears symmetric   VIII: Hearing is bilaterally intact to voice  XII: Tongue protrudes midline  Motor: Diminished bulk throughout. Normal tone. B/l UEs and LE without drift.   Sensation: Intact to light touch bilaterally  Gait: Deferred    LABS:                        11.1   6.84  )-----------( 294      ( 02 Oct 2023 06:59 )             33.1     10-02    134<L>  |  101  |  20  ----------------------------<  98  3.9   |  25  |  0.61    Ca    8.6      02 Oct 2023 06:59  Phos  3.0     10-02  Mg     1.6     10-02        Urinalysis Basic - ( 02 Oct 2023 06:59 )    Color: x / Appearance: x / SG: x / pH: x  Gluc: 98 mg/dL / Ketone: x  / Bili: x / Urobili: x   Blood: x / Protein: x / Nitrite: x   Leuk Esterase: x / RBC: x / WBC x   Sq Epi: x / Non Sq Epi: x / Bacteria: x    RADIOLOGY & ADDITIONAL TESTS:  reviewed

## 2023-10-02 NOTE — CHART NOTE - NSCHARTNOTEFT_GEN_A_CORE
Discussed with surrogate (daughter) Segundo Funes regarding code status in anticipation for VANNESSA via telephone call. Daughter rescinded DNR/DNI status for VANNESSA in the event procedure is complicated and requires further intervention.

## 2023-10-02 NOTE — PROGRESS NOTE ADULT - NS ATTEST RISK PROBLEM GEN_ALL_CORE FT
Analysis and treatment of severe hypoglycemia
Acute unexplained hypoglycemia
Abrupt Change In Neurological Status  Chronic Illness With Severe Exacerbation/Progression

## 2023-10-02 NOTE — PROGRESS NOTE ADULT - SUBJECTIVE AND OBJECTIVE BOX
INTERVAL HPI/OVERNIGHT EVENTS: steph o/n    SUBJECTIVE: Patient seen and examined at bedside.   Pt resting in bed. States she is coughing intermittently but denies any chest pain or dyspnea. Denies fever. Eating wo N/V/Abd pain. Last BM yesterday.  Maurice remains in place      OBJECTIVE:    VITAL SIGNS:  ICU Vital Signs Last 24 Hrs  T(C): 36.7 (02 Oct 2023 14:09), Max: 36.9 (02 Oct 2023 10:22)  T(F): 98.1 (02 Oct 2023 14:09), Max: 98.5 (02 Oct 2023 10:22)  HR: 102 (02 Oct 2023 12:00) (100 - 116)  BP: 136/82 (02 Oct 2023 12:00) (128/58 - 152/78)  BP(mean): 104 (02 Oct 2023 12:00) (83 - 107)  ABP: --  ABP(mean): --  RR: 18 (02 Oct 2023 12:00) (18 - 19)  SpO2: 92% (02 Oct 2023 12:00) (92% - 96%)    O2 Parameters below as of 02 Oct 2023 12:00  Patient On (Oxygen Delivery Method): room air              10-01 @ 07:01  -  10-02 @ 07:00  --------------------------------------------------------  IN: 0 mL / OUT: 1375 mL / NET: -1375 mL    10-02 @ 07:01  -  10-02 @ 14:22  --------------------------------------------------------  IN: 0 mL / OUT: 350 mL / NET: -350 mL      CAPILLARY BLOOD GLUCOSE          PHYSICAL EXAM:  GEN: thin female in NAD on RA  HEENT: NC/AT, MMM  CV: increased rate - irregularly irregular, nml S1S2, no murmurs  PULM: nml effort, dry rales on R mid and lower fields wo wheezing or rhonchi.   ABD: Soft, non-distended, NABS, non-tender  : Richards in place  NEURO  A/O x self, hospital, month and year but not date. moving all extremities, Sensation intact  PSYCH: Appropriate    MEDICATIONS:  MEDICATIONS  (STANDING):  amLODIPine   Tablet 5 milliGRAM(s) Oral daily  aspirin  chewable 81 milliGRAM(s) Oral daily  atorvastatin 40 milliGRAM(s) Oral at bedtime  donepezil 5 milliGRAM(s) Oral every 24 hours  enoxaparin Injectable 30 milliGRAM(s) SubCutaneous every 24 hours  lidocaine 2% Jelly 5 milliLiter(s) IntraUrethral once  melatonin 5 milliGRAM(s) Oral at bedtime  metoprolol tartrate 50 milliGRAM(s) Oral two times a day  mirtazapine 7.5 milliGRAM(s) Oral once  montelukast 10 milliGRAM(s) Oral daily  multivitamin  Chewable 1 Tablet(s) Oral daily  sodium chloride 7% Inhalation 4 milliLiter(s) Inhalation every 12 hours  thiamine 100 milliGRAM(s) Oral daily    MEDICATIONS  (PRN):  acetaminophen     Tablet .. 650 milliGRAM(s) Oral every 6 hours PRN Temp greater or equal to 38C (100.4F), Mild Pain (1 - 3), Moderate Pain (4 - 6), Severe Pain (7 - 10)  albuterol    90 MICROgram(s) HFA Inhaler 2 Puff(s) Inhalation every 6 hours PRN Shortness of Breath and/or Wheezing      ALLERGIES:  Allergies    Ceclor (Rash)  IV Contrast (Unknown)  Levaquin (Swelling)  Augmentin (Short breath; Rash)    Intolerances        LABS:                        11.1   6.84  )-----------( 294      ( 02 Oct 2023 06:59 )             33.1     10-02    134<L>  |  101  |  20  ----------------------------<  98  3.9   |  25  |  0.61    Ca    8.6      02 Oct 2023 06:59  Phos  3.0     10-02  Mg     1.6     10-02        Urinalysis Basic - ( 02 Oct 2023 06:59 )    Color: x / Appearance: x / SG: x / pH: x  Gluc: 98 mg/dL / Ketone: x  / Bili: x / Urobili: x   Blood: x / Protein: x / Nitrite: x   Leuk Esterase: x / RBC: x / WBC x   Sq Epi: x / Non Sq Epi: x / Bacteria: x        RADIOLOGY & ADDITIONAL TESTS: Reviewed.

## 2023-10-02 NOTE — PROGRESS NOTE ADULT - ASSESSMENT
81yo F with PMH of HTN, AFib, PRES, and CVA p/w encephalopathy and found to have new CVA. Palliative consulted for complex medical decision making in the setting of metabolic/neurologic encephalopathy.    ·	new MOLST completed with DNR and Trial of Intubation  ·	does not meet hospice criteria at this time    In addition to the E/M visit, an advance care planning meeting was performed. Start time: 3:45PM; End time: 4:01PM; Total time: 16min. A face to face meeting to discuss advance care planning was held today regarding: SAIDA BATES. Primary decision maker: Patient is unable to participate in decision making; Alternate/surrogate: Daughter. Discussed advance directives including, but not limited to, healthcare proxy and code status. Decision regarding code status: DNR; Documentation completed today: MOLST [facility] note

## 2023-10-02 NOTE — PROGRESS NOTE ADULT - CONVERSATION DETAILS
Reviewed patient's hospital course and interval developments. Discussed advanced directives including code status, HCP completion, and hospice eligibility. Clarified code status: DNR remains in place but requesting a trial of intubation if indicated. Patient does not meet hospice given functional improvement.

## 2023-10-02 NOTE — PROGRESS NOTE ADULT - SUBJECTIVE AND OBJECTIVE BOX
Patient discussed on morning rounds with       OPERATION & DATE:    SUBJECTIVE ASSESSMENT:    VITAL SIGNS:  Vital Signs Last 24 Hrs  T(C): 36.9 (02 Oct 2023 10:22), Max: 36.9 (02 Oct 2023 10:22)  T(F): 98.5 (02 Oct 2023 10:22), Max: 98.5 (02 Oct 2023 10:22)  HR: 102 (02 Oct 2023 12:00) (100 - 116)  BP: 136/82 (02 Oct 2023 12:00) (128/58 - 152/78)  BP(mean): 104 (02 Oct 2023 12:00) (83 - 107)  RR: 18 (02 Oct 2023 12:00) (18 - 19)  SpO2: 92% (02 Oct 2023 12:00) (92% - 96%)    Parameters below as of 02 Oct 2023 12:00  Patient On (Oxygen Delivery Method): room air      I&O's Detail    01 Oct 2023 07:01  -  02 Oct 2023 07:00  --------------------------------------------------------  IN:  Total IN: 0 mL    OUT:    Indwelling Catheter - Urethral (mL): 1375 mL  Total OUT: 1375 mL    Total NET: -1375 mL      02 Oct 2023 07:01  -  02 Oct 2023 13:35  --------------------------------------------------------  IN:  Total IN: 0 mL    OUT:    Indwelling Catheter - Urethral (mL): 350 mL  Total OUT: 350 mL    Total NET: -350 mL        CHEST TUBE:    ARTIS DRAIN:    EPICARDIAL WIRES:   STITCHES:  STAPLES:  DO:   CENTRAL LINE:  MIDLINE/PICC:  WOUND VAC:    PHYSICAL EXAM:  General:  HEENT:  Cardio:  Pulm:  GI:  Extremities:  Vascular:  Incisions:    LABS:                        11.1   6.84  )-----------( 294      ( 02 Oct 2023 06:59 )             33.1       10-02    134<L>  |  101  |  20  ----------------------------<  98  3.9   |  25  |  0.61    Ca    8.6      02 Oct 2023 06:59  Phos  3.0     10-02  Mg     1.6     10-02      Urinalysis Basic - ( 02 Oct 2023 06:59 )    Color: x / Appearance: x / SG: x / pH: x  Gluc: 98 mg/dL / Ketone: x  / Bili: x / Urobili: x   Blood: x / Protein: x / Nitrite: x   Leuk Esterase: x / RBC: x / WBC x   Sq Epi: x / Non Sq Epi: x / Bacteria: x      MEDICATIONS  (STANDING):  amLODIPine   Tablet 5 milliGRAM(s) Oral daily  aspirin  chewable 81 milliGRAM(s) Oral daily  atorvastatin 40 milliGRAM(s) Oral at bedtime  donepezil 5 milliGRAM(s) Oral every 24 hours  enoxaparin Injectable 30 milliGRAM(s) SubCutaneous every 24 hours  lidocaine 2% Jelly 5 milliLiter(s) IntraUrethral once  melatonin 5 milliGRAM(s) Oral at bedtime  metoprolol tartrate 50 milliGRAM(s) Oral two times a day  mirtazapine 7.5 milliGRAM(s) Oral once  montelukast 10 milliGRAM(s) Oral daily  multivitamin  Chewable 1 Tablet(s) Oral daily  sodium chloride 7% Inhalation 4 milliLiter(s) Inhalation every 12 hours  thiamine 100 milliGRAM(s) Oral daily    MEDICATIONS  (PRN):  acetaminophen     Tablet .. 650 milliGRAM(s) Oral every 6 hours PRN Temp greater or equal to 38C (100.4F), Mild Pain (1 - 3), Moderate Pain (4 - 6), Severe Pain (7 - 10)  albuterol    90 MICROgram(s) HFA Inhaler 2 Puff(s) Inhalation every 6 hours PRN Shortness of Breath and/or Wheezing    RADIOLOGY & ADDITIONAL TESTS:     Patient discussed with Dr. Rivas     SUBJECTIVE ASSESSMENT: Seen resting in bed in NAD. Pleasantly confused. Says she has memory issues and any discussions of care should go through her daughter.     VITAL SIGNS:  Vital Signs Last 24 Hrs  T(C): 36.9 (02 Oct 2023 10:22), Max: 36.9 (02 Oct 2023 10:22)  T(F): 98.5 (02 Oct 2023 10:22), Max: 98.5 (02 Oct 2023 10:22)  HR: 102 (02 Oct 2023 12:00) (100 - 116)  BP: 136/82 (02 Oct 2023 12:00) (128/58 - 152/78)  BP(mean): 104 (02 Oct 2023 12:00) (83 - 107)  RR: 18 (02 Oct 2023 12:00) (18 - 19)  SpO2: 92% (02 Oct 2023 12:00) (92% - 96%)    Parameters below as of 02 Oct 2023 12:00  Patient On (Oxygen Delivery Method): room air    I&O's Detail    01 Oct 2023 07:01  -  02 Oct 2023 07:00  --------------------------------------------------------  IN:  Total IN: 0 mL    OUT:    Indwelling Catheter - Urethral (mL): 1375 mL  Total OUT: 1375 mL    Total NET: -1375 mL      02 Oct 2023 07:01  -  02 Oct 2023 13:35  --------------------------------------------------------  IN:  Total IN: 0 mL    OUT:    Indwelling Catheter - Urethral (mL): 350 mL  Total OUT: 350 mL    Total NET: -350 mL    CHEST TUBE: n   ARTIS DRAIN:  n  EPICARDIAL WIRES: n  STITCHES:n  STAPLES:n  DO: n  CENTRAL LINE:n  MIDLINE/PICC:n  WOUND VAC:n    PHYSICAL EXAM:  General: NAD, resting in bed   Neurological: alert and oriented  Cardiovascular: irregularly irregular, Clear S1 and S2, no murmurs appreciated  Respiratory: chest expansion symmetrical, CTA b/l, no wheezing noted  Gastrointestinal: +BS, soft, NT, ND  Extremities: moving spontaneously, no calf tenderness or edema.  Vascular: warm, well perfused. DP/PT pulses palpable b/l.    LABS:                        11.1   6.84  )-----------( 294      ( 02 Oct 2023 06:59 )             33.1       10-02    134<L>  |  101  |  20  ----------------------------<  98  3.9   |  25  |  0.61    Ca    8.6      02 Oct 2023 06:59  Phos  3.0     10-02  Mg     1.6     10-02    Urinalysis Basic - ( 02 Oct 2023 06:59 )    Color: x / Appearance: x / SG: x / pH: x  Gluc: 98 mg/dL / Ketone: x  / Bili: x / Urobili: x   Blood: x / Protein: x / Nitrite: x   Leuk Esterase: x / RBC: x / WBC x   Sq Epi: x / Non Sq Epi: x / Bacteria: x    MEDICATIONS  (STANDING):  amLODIPine   Tablet 5 milliGRAM(s) Oral daily  aspirin  chewable 81 milliGRAM(s) Oral daily  atorvastatin 40 milliGRAM(s) Oral at bedtime  donepezil 5 milliGRAM(s) Oral every 24 hours  enoxaparin Injectable 30 milliGRAM(s) SubCutaneous every 24 hours  lidocaine 2% Jelly 5 milliLiter(s) IntraUrethral once  melatonin 5 milliGRAM(s) Oral at bedtime  metoprolol tartrate 50 milliGRAM(s) Oral two times a day  mirtazapine 7.5 milliGRAM(s) Oral once  montelukast 10 milliGRAM(s) Oral daily  multivitamin  Chewable 1 Tablet(s) Oral daily  sodium chloride 7% Inhalation 4 milliLiter(s) Inhalation every 12 hours  thiamine 100 milliGRAM(s) Oral daily    MEDICATIONS  (PRN):  acetaminophen     Tablet .. 650 milliGRAM(s) Oral every 6 hours PRN Temp greater or equal to 38C (100.4F), Mild Pain (1 - 3), Moderate Pain (4 - 6), Severe Pain (7 - 10)  albuterol    90 MICROgram(s) HFA Inhaler 2 Puff(s) Inhalation every 6 hours PRN Shortness of Breath and/or Wheezing    RADIOLOGY & ADDITIONAL TESTS:  < from: NM Brain Fusion w/ MR (09.27.23 @ 17:59) >    IMPRESSION:    Abnormal hypometabolism particularly pronounced in the parieto-occipital   and temporal regions, right greater than left, with accompanying   hypometabolism in the right cuneus and primary visual cortex and   suggestion of associated cingulate island sign, findings consistent with   underlying neurodegenerative disease, pattern most suggestive of dementia   with Lewy bodies.    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    Correlation with clinical exam and neuropsychological assessment is   advised.    < end of copied text >  < from: Echocardiogram w/ Bubble and Doppler (09.19.23 @ 13:18) >  CONCLUSIONS:     1. Technically difficult study.   2. Normal left ventricular size and systolic function.   3. Mildly dilated right ventricular size. Normal right ventricular   systolic function.   4. Dilated right atrium.   5. Aortic sclerosis without significant stenosis.   6. Mild-to-moderate aortic regurgitation.   7. Mild mitral regurgitation.   8. Moderate-to-severe tricuspid regurgitation.   9. Severe pulmonary hypertension present, pulmonary artery systolic   pressure is 73 mmHg.  10. No pericardial effusion.  11. No prior echo is available for comparison.

## 2023-10-02 NOTE — PROGRESS NOTE ADULT - ASSESSMENT
82y Female with PMHx of HTN, afib (not on AC due to fall risk), recent hospitalization for PRES (5-6/2023), recently discharged from [facility] 8/2023 with acute exacerbation of severe bronchiectasis and respiratory failure presents with several days of AMS and new diplopia 9/18. NIHSS 1 on admission for bitemporal visual deficit. Course complicated by agitation and severe hypoglycemia, requiring ICU admission. Brain imaging significant for L pontine and left occipital infarct as well as SAH of the right parieto-occipital region. Currently, cannot r/o inflammatory causes like ISMA/vasculitis/encephalitis. PET brain obtained, findings consistent with underlying neurodegenerative disease, pattern most suggestive of dementia with Lewy bodies. 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     Neuro  #CVA workup  - continue ASA 81mg daily ; holding AC i/s/o SAH and possible CAA  - continue atorvastatin 40mg daily  - q4hr stroke neuro checks and vitals  - PET brain suggestive of lewy body dementia  - holding off LP due to clinical improvement   - MRI brain with and without contrast from 9/25: Left pontine punctate acute infarctions have developed since 9/20/2023. Subarachnoid hemorrhage has diminished since 9/20/2023. Ischemic white matter disease, atrophy typical for age and remote petechial hemorrhages are stable findings since 9/20/2023  - MRA brain 9/22: negative for steno-occlusive disease, aneurysm or high flow   vascular malformation. No new/interval infarct on DWI since 9/20/23,   though mild cerebral edema has developed. Query amyloid related disease.  - MRA neck: negative  - MRV brain: negative for thrombus  - Stroke education    #Lewybodydementia  - Continue Aricept 5mg daily -retimed to 7pm  - general neurology consulted for patient family education on prognosis, recommenced Dr. Lomax and ISAEL henao  - Will need neurocognitive outpatient set up; reached out to Dr. Lomax and ISAEL henao for appt and for patient daughter education on LBD prognosis.     Cards  #HTN  - Goal -160  - Amlodipine 5mg daily, metoprolol tartrate 50mg BID  - TTE from 9/19: mildly dilated RV, dilated RA, severe pulmonary hypertension  - EKG 9/30: afib with rate 110-120s, restarted home medication metoprolol 50mg BID    #HLD  - high dose statin as above in CVA  - LDL results: 129    #Afib  - Structural heart consulted for watchman consult, silva discussed with Dr. Rivas: Plan for VANNESSA on 10/3    Pulm  - call provider if SPO2 < 94%    #bronchiectasis  - pulmonology consulted, appreciate recs   - Zosyn last day 9/29, no need to start cefepime or continue zosyn for 4 more days per ID and inpatient pulmonology  - chest PT twice daily  -  Duonebs discontinued; Pt daughter does not want pt to receive Duoneb as they make her mother jittery, this was discussed with palliative and determined within her GOC. If pt has SOB or desaturated, trial of O2 can be given  - continue hypertonic 7% inhalation saline   - continue singular  - will need to follow up with Dr. Foster as outpatient  - Chest PT twice daily    GI  #Nutrition/Fluids/Electrolytes   - replete K<4 and Mg <2  - Diet: minced and moist diet; (ensure d/c due to pt did not like it)  - IVF: none    #malnutrition  - Continue Remeron 7.5mg  - Pt does not like ensure, d/c    #diarrhea  - Pt with 3 episodes of loose stools, likely antibiotic associated diarrhea, now resolved    Renal  - daily BMP    #urinary retention  - Saravia removed 9/27, failed TOV with 4 straight caths  - Saravia placed by urology 9/29 due to trauma at urethra  - saravia dc'ed 10/2, will initiate TOV with q6h bladder scans     Infectious Disease  - Zosyn course finished on 9/29, no need to start cefepime or continue zosyn for 4 more days per ID and inpatient pulmonology  - Pt with 3 episodes of loose stool yesterday, likely antibiotic associated diarrhea, c.diff culture not needed at this time. If pt continues to have multiple loose stools without abx, culture can be sent    Endocrine  - A1C results: 5.5%  - TSH results: 3.870    Psych  #concern for SI - was placed on CO  - patient denies SI while alert and oriented, CO dc'ed    #delirium- placed on CO  - with sitter due to fall risk     #anxiety  - Pt home medication of .25mg klonopin, not on for now    Palliative  - Pt daughter states that her mothers wishes were to "try for a little while to fix something if it was reversible but if my brain is gone, forget about it". Ongoing support given to daughter with diagnoses and prognosis. Pt was living semi-independent at family home prior to admission  - Pt daughter does not want pt to receive Duoneb as they make her mother jittery, this was discussed with palliative and determined within her GOC. If pt has SOB or desaturated, trial of O2 can be given.    DVT Prophylaxis  - lovenox sq for DVT prophylaxis   - SCDs for DVT prophylaxis     Dispo: [place], patient able to tolerate 3 hours of intensive therapy      Discussed daily hospital plans and goals with patient    Discussed with Dr. Hansen

## 2023-10-02 NOTE — PROGRESS NOTE ADULT - ASSESSMENT
Pulm: Dr. Foster  82F w HTN, AF not on AC d/t fall risk, hospitalized for PRES 05-06/2023, recent DC 8/2023 for acute hypoxemic respiratory failure - to home on 4L NC w follow-up w Dr. Foster for exacerbation of bronchiectasis, recently started course of IV cefepime outpatient by Dr. Foster 9/13, p/w diplopia, encephalopathy admitted for stroke work-up, cefepime continued inpatient, repeat head CT found small SAH, c/b hypoglycemia, encephalopathy - BG 30 and temp 93F during RRT on 9/21, transferred to MICU for persistent hypoglycemia needing D10W >> D10 NS, slow improvement w nml insulin and C peptide, stepped down to 7L and stroke service, MR brain showing L pontine infarction, PET brain w signal c/f Dementia w Lewy Bodies, for acute rehab    #Acute urinary retention - saravia replaced 9/29    #CVA - L pontine infarction on MR 9/25 that also showed SAH diminishing.  #Encephalopathy - improving. There is signal on PET c/f Dementia w Lewy Bodies   - ASA 81 daily, Atorva 40   - Started on remeron 7.5 daily, donepezil 5 HS    #Bronchiectasis - On IV Cefepime from 9/13 w PICC placed outpatient (PICC discontinued 9/21). Has been on IV Zosyn in MICU per pulmonary thru 9/29. Currently satting well on RA. On singulair 10  #AFib - on lopressor 50 bid. AC currently held d/t SAH  #HTN - on amlodipine 5    #Normocytic anemia - 11.1 today.  #Hyponatremia - 134 today    Plan  Consider TOV today - saravia placed on 9/29. Pt passing BM. F/U Urology recs.   VANNESSA per structural heart to evaluate for Watchman procedure    Pt is DNR/DNI  DISPO: Acute rehab - baseline lives w daughter

## 2023-10-03 LAB
ANION GAP SERPL CALC-SCNC: 7 MMOL/L — SIGNIFICANT CHANGE UP (ref 5–17)
BUN SERPL-MCNC: 25 MG/DL — HIGH (ref 7–23)
CALCIUM SERPL-MCNC: 8.7 MG/DL — SIGNIFICANT CHANGE UP (ref 8.4–10.5)
CHLORIDE SERPL-SCNC: 101 MMOL/L — SIGNIFICANT CHANGE UP (ref 96–108)
CO2 SERPL-SCNC: 26 MMOL/L — SIGNIFICANT CHANGE UP (ref 22–31)
CREAT SERPL-MCNC: 0.61 MG/DL — SIGNIFICANT CHANGE UP (ref 0.5–1.3)
EGFR: 90 ML/MIN/1.73M2 — SIGNIFICANT CHANGE UP
GLUCOSE BLDC GLUCOMTR-MCNC: 94 MG/DL — SIGNIFICANT CHANGE UP (ref 70–99)
GLUCOSE SERPL-MCNC: 96 MG/DL — SIGNIFICANT CHANGE UP (ref 70–99)
HCT VFR BLD CALC: 34.6 % — SIGNIFICANT CHANGE UP (ref 34.5–45)
HGB BLD-MCNC: 11.2 G/DL — LOW (ref 11.5–15.5)
MAGNESIUM SERPL-MCNC: 1.8 MG/DL — SIGNIFICANT CHANGE UP (ref 1.6–2.6)
MCHC RBC-ENTMCNC: 32.4 GM/DL — SIGNIFICANT CHANGE UP (ref 32–36)
MCHC RBC-ENTMCNC: 32.9 PG — SIGNIFICANT CHANGE UP (ref 27–34)
MCV RBC AUTO: 101.8 FL — HIGH (ref 80–100)
NRBC # BLD: 0 /100 WBCS — SIGNIFICANT CHANGE UP (ref 0–0)
PHOSPHATE SERPL-MCNC: 3.1 MG/DL — SIGNIFICANT CHANGE UP (ref 2.5–4.5)
PLATELET # BLD AUTO: 279 K/UL — SIGNIFICANT CHANGE UP (ref 150–400)
POTASSIUM SERPL-MCNC: 4.1 MMOL/L — SIGNIFICANT CHANGE UP (ref 3.5–5.3)
POTASSIUM SERPL-SCNC: 4.1 MMOL/L — SIGNIFICANT CHANGE UP (ref 3.5–5.3)
RBC # BLD: 3.4 M/UL — LOW (ref 3.8–5.2)
RBC # FLD: 13 % — SIGNIFICANT CHANGE UP (ref 10.3–14.5)
SODIUM SERPL-SCNC: 134 MMOL/L — LOW (ref 135–145)
WBC # BLD: 6.21 K/UL — SIGNIFICANT CHANGE UP (ref 3.8–10.5)
WBC # FLD AUTO: 6.21 K/UL — SIGNIFICANT CHANGE UP (ref 3.8–10.5)

## 2023-10-03 PROCEDURE — 75572 CT HRT W/3D IMAGE: CPT | Mod: 26

## 2023-10-03 PROCEDURE — 70450 CT HEAD/BRAIN W/O DYE: CPT | Mod: 26

## 2023-10-03 PROCEDURE — 99232 SBSQ HOSP IP/OBS MODERATE 35: CPT

## 2023-10-03 RX ORDER — DIPHENHYDRAMINE HCL 50 MG
25 CAPSULE ORAL ONCE
Refills: 0 | Status: COMPLETED | OUTPATIENT
Start: 2023-10-03 | End: 2023-10-03

## 2023-10-03 RX ORDER — MAGNESIUM SULFATE 500 MG/ML
2 VIAL (ML) INJECTION ONCE
Refills: 0 | Status: COMPLETED | OUTPATIENT
Start: 2023-10-03 | End: 2023-10-03

## 2023-10-03 RX ORDER — CLONAZEPAM 1 MG
0.25 TABLET ORAL AT BEDTIME
Refills: 0 | Status: DISCONTINUED | OUTPATIENT
Start: 2023-10-03 | End: 2023-10-06

## 2023-10-03 RX ORDER — SODIUM CHLORIDE 9 MG/ML
250 INJECTION INTRAMUSCULAR; INTRAVENOUS; SUBCUTANEOUS ONCE
Refills: 0 | Status: COMPLETED | OUTPATIENT
Start: 2023-10-03 | End: 2023-10-03

## 2023-10-03 RX ORDER — AZELASTINE 137 UG/1
1 SPRAY, METERED NASAL
Refills: 0 | Status: ACTIVE | OUTPATIENT
Start: 2023-10-03 | End: 2024-08-31

## 2023-10-03 RX ADMIN — DONEPEZIL HYDROCHLORIDE 5 MILLIGRAM(S): 10 TABLET, FILM COATED ORAL at 18:00

## 2023-10-03 RX ADMIN — Medication 5 MILLIGRAM(S): at 21:54

## 2023-10-03 RX ADMIN — ATORVASTATIN CALCIUM 40 MILLIGRAM(S): 80 TABLET, FILM COATED ORAL at 21:54

## 2023-10-03 RX ADMIN — Medication 650 MILLIGRAM(S): at 22:01

## 2023-10-03 RX ADMIN — Medication 25 GRAM(S): at 09:13

## 2023-10-03 RX ADMIN — AMLODIPINE BESYLATE 5 MILLIGRAM(S): 2.5 TABLET ORAL at 07:57

## 2023-10-03 RX ADMIN — Medication 81 MILLIGRAM(S): at 11:52

## 2023-10-03 RX ADMIN — Medication 50 MILLIGRAM(S): at 18:00

## 2023-10-03 RX ADMIN — MONTELUKAST 10 MILLIGRAM(S): 4 TABLET, CHEWABLE ORAL at 11:52

## 2023-10-03 RX ADMIN — Medication 1 TABLET(S): at 11:52

## 2023-10-03 RX ADMIN — Medication 650 MILLIGRAM(S): at 09:00

## 2023-10-03 RX ADMIN — SODIUM CHLORIDE 4 MILLILITER(S): 9 INJECTION INTRAMUSCULAR; INTRAVENOUS; SUBCUTANEOUS at 09:15

## 2023-10-03 RX ADMIN — AZELASTINE 1 SPRAY(S): 137 SPRAY, METERED NASAL at 17:50

## 2023-10-03 RX ADMIN — Medication 50 MILLIGRAM(S): at 07:56

## 2023-10-03 RX ADMIN — SODIUM CHLORIDE 4 MILLILITER(S): 9 INJECTION INTRAMUSCULAR; INTRAVENOUS; SUBCUTANEOUS at 21:53

## 2023-10-03 RX ADMIN — SODIUM CHLORIDE 500 MILLILITER(S): 9 INJECTION INTRAMUSCULAR; INTRAVENOUS; SUBCUTANEOUS at 12:40

## 2023-10-03 RX ADMIN — Medication 25 MILLIGRAM(S): at 16:55

## 2023-10-03 RX ADMIN — Medication 0.25 MILLIGRAM(S): at 21:54

## 2023-10-03 RX ADMIN — Medication 100 MILLIGRAM(S): at 11:52

## 2023-10-03 RX ADMIN — Medication 650 MILLIGRAM(S): at 07:57

## 2023-10-03 RX ADMIN — ENOXAPARIN SODIUM 30 MILLIGRAM(S): 100 INJECTION SUBCUTANEOUS at 18:00

## 2023-10-03 NOTE — PROGRESS NOTE ADULT - NUTRITIONAL ASSESSMENT
This patient has been assessed with a concern for Malnutrition and has been determined to have a diagnosis/diagnoses of Severe protein-calorie malnutrition and Underweight (BMI < 19).    This patient is being managed with:   Diet NPO-  NPO for Procedure/Test     NPO Start Date: 03-Oct-2023   NPO Start Time: 12:00  Except Medications  Entered: Oct  3 2023 11:44AM    Diet Minced and Moist-  Supplement Feeding Modality:  Oral  Ensure Plus High Protein Cans or Servings Per Day:  1       Frequency:  Three Times a day  Entered: Sep 27 2023  4:12PM

## 2023-10-03 NOTE — PROGRESS NOTE ADULT - ASSESSMENT
Neurology    82 y o Female with PMHx of HTN, afib (not on AC due to fall risk), recent hospitalization for PRES (5-6/2023), recently discharged from [facility] 8/2023 with acute exacerbation of severe bronchiectasis and respiratory failure presents with several days of AMS and new diplopia 9/18. NIHSS 1 on admission for bitemporal visual deficit. Course complicated by agitation and severe hypoglycemia, requiring ICU admission. Brain imaging significant for L pontine and left occipital infarct as well as SAH of the right parieto-occipital region. Currently, cannot r/o inflammatory causes like ISMA/vasculitis/encephalitis. PET brain obtained, findings consistent with underlying neurodegenerative disease, pattern most suggestive of dementia with Lewy bodies. 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     Neuro  #CVA workup  - continue ASA 81mg daily ; holding AC i/s/o SAH and possible CAA  - continue atorvastatin 40mg daily  - q4hr stroke neuro checks and vitals  - PET brain suggestive of lewy body dementia  - holding off LP due to clinical improvement   - MRI brain with and without contrast from 9/25: Left pontine punctate acute infarctions have developed since 9/20/2023. Subarachnoid hemorrhage has diminished since 9/20/2023. Ischemic white matter disease, atrophy typical for age and remote petechial hemorrhages are stable findings since 9/20/2023  - MRA brain 9/22: negative for steno-occlusive disease, aneurysm or high flow   vascular malformation. No new/interval infarct on DWI since 9/20/23,   though mild cerebral edema has developed. Query amyloid related disease.  - MRA neck: negative  - MRV brain: negative for thrombus  - Stroke education    #Lewybodydementia  - Continue Aricept 5mg daily -retimed to 7pm  - general neurology consulted for patient family education on prognosis, recommenced Dr. Lomax and ISAEL henao  - Will need neurocognitive outpatient set up; reached out to Dr. Lomax and ISAEL henao for appt and for patient daughter education on LBD prognosis.     Cards  #HTN  - Goal -160  - Amlodipine 5mg daily, metoprolol tartrate 50mg BID  - TTE from 9/19: mildly dilated RV, dilated RA, severe pulmonary hypertension  - EKG 9/30: afib with rate 110-120s, restarted home medication metoprolol 50mg BID    #HLD  - high dose statin as above in CVA  - LDL results: 129    #Afib  - Structural heart consulted for watchman consult, silva discussed with Dr. Rivas: Plan for VANNESSA on 10/3    Pulm  - call provider if SPO2 < 94%    #bronchiectasis  - pulmonology consulted, appreciate recs   - Zosyn last day 9/29, no need to start cefepime or continue zosyn for 4 more days per ID and inpatient pulmonology  - chest PT twice daily  -  Duonebs discontinued; Pt daughter does not want pt to receive Duoneb as they make her mother jittery, this was discussed with palliative and determined within her GOC. If pt has SOB or desaturated, trial of O2 can be given  - continue hypertonic 7% inhalation saline   - continue singular  - will need to follow up with Dr. Foster as outpatient  - Chest PT twice daily    GI  #Nutrition/Fluids/Electrolytes   - replete K<4 and Mg <2  - Diet: minced and moist diet; (ensure d/c due to pt did not like it)  - IVF: none    #malnutrition  - Continue Remeron 7.5mg  - Pt does not like ensure, d/c    #diarrhea  - Pt with 3 episodes of loose stools, likely antibiotic associated diarrhea, now resolved    Renal  - daily BMP    #urinary retention  - Saravia removed 9/27, failed TOV with 4 straight caths  - Saravia placed by urology 9/29 due to trauma at urethra  - saravia dc'ed 10/2, will initiate TOV with q6h bladder scans     Infectious Disease  - Zosyn course finished on 9/29, no need to start cefepime or continue zosyn for 4 more days per ID and inpatient pulmonology  - Pt with 3 episodes of loose stool yesterday, likely antibiotic associated diarrhea, c.diff culture not needed at this time. If pt continues to have multiple loose stools without abx, culture can be sent    Endocrine  - A1C results: 5.5%  - TSH results: 3.870    Psych  #concern for SI - was placed on CO  - patient denies SI while alert and oriented, CO dc'ed    #delirium- placed on CO  - with sitter due to fall risk     #anxiety  - Pt home medication of .25mg klonopin, not on for now    Palliative  - Pt daughter states that her mothers wishes were to "try for a little while to fix something if it was reversible but if my brain is gone, forget about it". Ongoing support given to daughter with diagnoses and prognosis. Pt was living semi-independent at family home prior to admission  - Pt daughter does not want pt to receive Duoneb as they make her mother jittery, this was discussed with palliative and determined within her GOC. If pt has SOB or desaturated, trial of O2 can be given.    DVT Prophylaxis  - lovenox sq for DVT prophylaxis   - SCDs for DVT prophylaxis     Dispo: AR, patient able to tolerate 3 hours of intensive therapy

## 2023-10-03 NOTE — PROGRESS NOTE ADULT - ASSESSMENT
Assesment:    82y Female with PMHx of HTN, AF (not on AC due to fall risk), recent hospitalization for PRES (5-6/2023), recently discharged from [facility] 8/2023 with acute exacerbation of severe bronchiectasis and respiratory failure presents with several days of AMS and new diplopia 9/18. NIHSS 1 on admission for bitemporal visual deficit. Course complicated by agitation and severe hypoglycemia, requiring ICU admission. Brain imaging significant for L pontine and left occipital infarct as well as SAH of the right parieto-occipital region. PET brain obtained, findings consistent with underlying neurodegenerative disease, pattern most suggestive of dementia with Lewy bodies. 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 Structural heart consulted for possible MITZI occlusion device.     Plan:  Problem 1: Paroxsymal Atrial fibrillation (uncovered 5/2023)  - currently AF on telemetry    - No OAC 2/2 frequent falls  - Continue BB therapy   - spoke with primary team, will defer VANNESSA/sedation and obtain structural CT to evaluate for thrombus   - structural heart will continue to follow      Problem 2: CVA w/up   - MRA brain consistent with CAA  - continue ASA, statin  - defer mgmt to primary team      Problem 3: Lewy Body Dementia   - PET brain suggestive of lewy body dementia   - on aricept 5mg daily per neuro     Problem 4: Bronchiectasis   - care per primary team     I have reviewed clinical labs tests and reports, radiology tests and reports, as well as old patient medical records, and discussed with the referring physician.

## 2023-10-03 NOTE — PROGRESS NOTE ADULT - SUBJECTIVE AND OBJECTIVE BOX
Patient discussed on morning rounds with Dr. Rivas    SUBJECTIVE ASSESSMENT: Seen resting in bed in NAD. Says she is feeling a bit fatigued today because her sleeping schedule has been off. Denies chest pain or SOB. No significant events reported overnight.     VITAL SIGNS:  Vital Signs Last 24 Hrs  T(C): 35.8 (03 Oct 2023 10:18), Max: 36.7 (02 Oct 2023 14:09)  T(F): 96.5 (03 Oct 2023 10:18), Max: 98.1 (02 Oct 2023 14:09)  HR: 62 (03 Oct 2023 11:59) (56 - 86)  BP: 134/63 (03 Oct 2023 11:59) (118/56 - 146/65)  BP(mean): 91 (03 Oct 2023 11:59) (80 - 101)  RR: 16 (03 Oct 2023 11:59) (16 - 18)  SpO2: 94% (03 Oct 2023 11:59) (94% - 98%)    Parameters below as of 03 Oct 2023 11:59  Patient On (Oxygen Delivery Method): room air    I&O's Detail    02 Oct 2023 07:01  -  03 Oct 2023 07:00  --------------------------------------------------------  IN:  Total IN: 0 mL    OUT:    Indwelling Catheter - Urethral (mL): 1190 mL    Voided (mL): 500 mL  Total OUT: 1690 mL    Total NET: -1690 mL    03 Oct 2023 07:01  -  03 Oct 2023 12:50  --------------------------------------------------------  IN:  Total IN: 0 mL    OUT:    Voided (mL): 250 mL  Total OUT: 250 mL    Total NET: -250 mL    CHEST TUBE:n  ARTIS DRAIN:n    EPICARDIAL WIRES: n  STITCHES:n  STAPLES:n  DO: n  CENTRAL LINE:n  MIDLINE/PICC:n  WOUND VAC:n    PHYSICAL EXAM:  General: NAD, resting in bed   Neurological:  alert and oriented   Cardiovascular: irregularly irregular, Clear S1 and S2, no murmurs appreciated  Respiratory: chest expansion symmetrical, CTA b/l, no wheezing noted  Gastrointestinal: +BS, soft, NT, ND  Extremities: moving spontaneously, no calf tenderness or edema.  Vascular: warm, well perfused. DP/PT pulses palpable b/l.  Incisions: none     LABS:                        11.2   6.21  )-----------( 279      ( 03 Oct 2023 05:30 )             34.6       10-03    134<L>  |  101  |  25<H>  ----------------------------<  96  4.1   |  26  |  0.61    Ca    8.7      03 Oct 2023 05:30  Phos  3.1     10-03  Mg     1.8     10-03    Urinalysis Basic - ( 03 Oct 2023 05:30 )    Color: x / Appearance: x / SG: x / pH: x  Gluc: 96 mg/dL / Ketone: x  / Bili: x / Urobili: x   Blood: x / Protein: x / Nitrite: x   Leuk Esterase: x / RBC: x / WBC x   Sq Epi: x / Non Sq Epi: x / Bacteria: x    MEDICATIONS  (STANDING):  amLODIPine   Tablet 5 milliGRAM(s) Oral daily  aspirin  chewable 81 milliGRAM(s) Oral daily  atorvastatin 40 milliGRAM(s) Oral at bedtime  diphenhydrAMINE Injectable 25 milliGRAM(s) IV Push once  donepezil 5 milliGRAM(s) Oral every 24 hours  enoxaparin Injectable 30 milliGRAM(s) SubCutaneous every 24 hours  lidocaine 2% Jelly 5 milliLiter(s) IntraUrethral once  melatonin 5 milliGRAM(s) Oral at bedtime  metoprolol tartrate 50 milliGRAM(s) Oral two times a day  mirtazapine 7.5 milliGRAM(s) Oral once  montelukast 10 milliGRAM(s) Oral daily  multivitamin  Chewable 1 Tablet(s) Oral daily  sodium chloride 0.9% Bolus 250 milliLiter(s) IV Bolus once  sodium chloride 7% Inhalation 4 milliLiter(s) Inhalation every 12 hours  thiamine 100 milliGRAM(s) Oral daily    MEDICATIONS  (PRN):  acetaminophen     Tablet .. 650 milliGRAM(s) Oral every 6 hours PRN Temp greater or equal to 38C (100.4F), Mild Pain (1 - 3), Moderate Pain (4 - 6), Severe Pain (7 - 10)    RADIOLOGY & ADDITIONAL TESTS:  < from: NM Brain Fusion w/ MR (09.27.23 @ 17:59) >    IMPRESSION:    Abnormal hypometabolism particularly pronounced in the parieto-occipital   and temporal regions, right greater than left, with accompanying   hypometabolism in the right cuneus and primary visual cortex and   suggestion of associated cingulate island sign, findings consistent with   underlying neurodegenerative disease, pattern most suggestive of dementia   with Lewy bodies.    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    Correlation with clinical exam and neuropsychological assessment is   advised.    < end of copied text >  < from: Echocardiogram w/ Bubble and Doppler (09.19.23 @ 13:18) >  CONCLUSIONS:     1. Technically difficult study.   2. Normal left ventricular size and systolic function.   3. Mildly dilated right ventricular size. Normal right ventricular   systolic function.   4. Dilated right atrium.   5. Aortic sclerosis without significant stenosis.   6. Mild-to-moderate aortic regurgitation.   7. Mild mitral regurgitation.   8. Moderate-to-severe tricuspid regurgitation.   9. Severe pulmonary hypertension present, pulmonary artery systolic   pressure is 73 mmHg.  10. No pericardial effusion.  11. No prior echo is available for comparison.

## 2023-10-03 NOTE — PROGRESS NOTE ADULT - ASSESSMENT
Pulm: Dr. Foster  82F w HTN, AF not on AC d/t fall risk, hospitalized for PRES 05-06/2023, recent DC 8/2023 for acute hypoxemic respiratory failure - to home on 4L NC w follow-up w Dr. Foster for exacerbation of bronchiectasis, recently started course of IV cefepime outpatient by Dr. Foster 9/13, p/w diplopia, encephalopathy admitted for stroke work-up, cefepime continued inpatient, repeat head CT found small SAH, c/b hypoglycemia, encephalopathy - BG 30 and temp 93F during RRT on 9/21, transferred to MICU for persistent hypoglycemia needing D10W >> D10 NS, slow improvement w nml insulin and C peptide, stepped down to 7L and stroke service, MR brain showing L pontine infarction, PET brain w signal c/f Dementia w Lewy Bodies, for acute rehab    #Acute urinary retention - resolved and passed TOV 10/2    #CVA - L pontine infarction on MR 9/25 that also showed SAH diminishing.  #Encephalopathy - improving. There is signal on PET c/f Dementia w Lewy Bodies   - ASA 81 daily, Atorva 40   - Started on remeron 7.5 daily, donepezil 5 HS    #Bronchiectasis - On IV Cefepime from 9/13 w PICC placed outpatient (PICC discontinued 9/21). Has been on IV Zosyn in MICU per pulmonary thru 9/29. Currently satting well on RA. On singulair 10  #AFib - on lopressor 50 bid. AC currently held d/t SAH  #HTN - on amlodipine 5    #Normocytic anemia - 11.2 today.  #Hyponatremia - 134 today    Plan  VANNESSA vs cardiac CT to eval for watchman procedure per structural heart.   Pt reports flushing as response to IV Contrast - denies lip/throat/tongue swelling. Can give benadryl 25mg     Pt is DNR/DNI  DISPO: Acute rehab - baseline lives w daughter

## 2023-10-03 NOTE — PROGRESS NOTE ADULT - SUBJECTIVE AND OBJECTIVE BOX
Neurology Stroke Progress Note    INTERVAL HPI/OVERNIGHT EVENTS:  Patient seen and examined.  number ______ used.    MEDICATIONS  (STANDING):  amLODIPine   Tablet 5 milliGRAM(s) Oral daily  aspirin  chewable 81 milliGRAM(s) Oral daily  atorvastatin 40 milliGRAM(s) Oral at bedtime  azelastine 0.15% Nasal Spray 1 Spray(s) Both Nostrils two times a day  clonazePAM  Tablet 0.25 milliGRAM(s) Oral at bedtime  donepezil 5 milliGRAM(s) Oral every 24 hours  enoxaparin Injectable 30 milliGRAM(s) SubCutaneous every 24 hours  lidocaine 2% Jelly 5 milliLiter(s) IntraUrethral once  melatonin 5 milliGRAM(s) Oral at bedtime  metoprolol tartrate 50 milliGRAM(s) Oral two times a day  mirtazapine 7.5 milliGRAM(s) Oral once  montelukast 10 milliGRAM(s) Oral daily  multivitamin  Chewable 1 Tablet(s) Oral daily  sodium chloride 7% Inhalation 4 milliLiter(s) Inhalation every 12 hours  thiamine 100 milliGRAM(s) Oral daily    MEDICATIONS  (PRN):  acetaminophen     Tablet .. 650 milliGRAM(s) Oral every 6 hours PRN Temp greater or equal to 38C (100.4F), Mild Pain (1 - 3), Moderate Pain (4 - 6), Severe Pain (7 - 10)      Allergies    Ceclor (Rash)  IV Contrast (Unknown)  Levaquin (Swelling)  Augmentin (Short breath; Rash)    Intolerances        Vital Signs Last 24 Hrs  T(C): 36.4 (03 Oct 2023 18:00), Max: 36.6 (03 Oct 2023 01:11)  T(F): 97.5 (03 Oct 2023 18:00), Max: 97.9 (03 Oct 2023 01:11)  HR: 66 (03 Oct 2023 20:05) (56 - 78)  BP: 126/59 (03 Oct 2023 20:05) (124/65 - 146/66)  BP(mean): 85 (03 Oct 2023 20:05) (85 - 101)  RR: 18 (03 Oct 2023 20:05) (16 - 18)  SpO2: 95% (03 Oct 2023 17:51) (94% - 98%)    Parameters below as of 03 Oct 2023 20:05  Patient On (Oxygen Delivery Method): room air        Physical exam:  General: No acute distress, awake and alert  Eyes: Anicteric sclerae, moist conjunctivae, see below for CNs  Neck: trachea midline, FROM, supple, no thyromegaly or lymphadenopathy  Cardiovascular: Regular rate and rhythm, no murmurs, rubs, or gallops. No carotid bruits.   Pulmonary: Anterior breath sounds clear bilaterally, no crackles or wheezing. No use of accessory muscles  GI: Abdomen soft, non-distended, non-tender  Extremities: Radial and DP pulses +2, no edema    Neurologic:  -Mental status: Awake, alert, oriented to person, place, and time. Speech is fluent with intact naming, repetition, and comprehension, no dysarthria. Recent and remote memory intact. Follows commands. Attention/concentration intact. Fund of knowledge appropriate.  -Cranial nerves:   II: Visual fields are full to confrontation.  III, IV, VI: Extraocular movements are intact without nystagmus. Pupils equally round and reactive to light  V:  Facial sensation V1-V3 equal and intact   VII: Face is symmetric with normal eye closure and smile  VIII: Hearing is bilaterally intact to finger rub  IX, X: Uvula is midline and soft palate rises symmetrically  XI: Head turning and shoulder shrug are intact.  XII: Tongue protrudes midline  Motor: Normal bulk and tone. No pronator drift. Strength bilateral upper extremity 5/5, bilateral lower extremities 5/5.  Rapid alternating movements intact and symmetric  Sensation: Intact to light touch bilaterally. No neglect or extinction on double simultaneous testing.  Coordination: No dysmetria on finger-to-nose and heel-to-shin bilaterally  Reflexes: Downgoing toes bilaterally   Gait: Narrow gait and steady    LABS:                        11.2   6.21  )-----------( 279      ( 03 Oct 2023 05:30 )             34.6     10-03    134<L>  |  101  |  25<H>  ----------------------------<  96  4.1   |  26  |  0.61    Ca    8.7      03 Oct 2023 05:30  Phos  3.1     10-03  Mg     1.8     10-03        Urinalysis Basic - ( 03 Oct 2023 05:30 )    Color: x / Appearance: x / SG: x / pH: x  Gluc: 96 mg/dL / Ketone: x  / Bili: x / Urobili: x   Blood: x / Protein: x / Nitrite: x   Leuk Esterase: x / RBC: x / WBC x   Sq Epi: x / Non Sq Epi: x / Bacteria: x        RADIOLOGY & ADDITIONAL TESTS:     Neurology Stroke Progress Note    INTERVAL HPI/OVERNIGHT EVENTS:  Passed TOV overnight. Patient seen and examined. Pt c/o low grade headache and increased fatigue today. Plan to obtain repeat HCT to rule out bleed.     MEDICATIONS  (STANDING):  amLODIPine   Tablet 5 milliGRAM(s) Oral daily  aspirin  chewable 81 milliGRAM(s) Oral daily  atorvastatin 40 milliGRAM(s) Oral at bedtime  azelastine 0.15% Nasal Spray 1 Spray(s) Both Nostrils two times a day  clonazePAM  Tablet 0.25 milliGRAM(s) Oral at bedtime  donepezil 5 milliGRAM(s) Oral every 24 hours  enoxaparin Injectable 30 milliGRAM(s) SubCutaneous every 24 hours  lidocaine 2% Jelly 5 milliLiter(s) IntraUrethral once  melatonin 5 milliGRAM(s) Oral at bedtime  metoprolol tartrate 50 milliGRAM(s) Oral two times a day  mirtazapine 7.5 milliGRAM(s) Oral once  montelukast 10 milliGRAM(s) Oral daily  multivitamin  Chewable 1 Tablet(s) Oral daily  sodium chloride 7% Inhalation 4 milliLiter(s) Inhalation every 12 hours  thiamine 100 milliGRAM(s) Oral daily    MEDICATIONS  (PRN):  acetaminophen     Tablet .. 650 milliGRAM(s) Oral every 6 hours PRN Temp greater or equal to 38C (100.4F), Mild Pain (1 - 3), Moderate Pain (4 - 6), Severe Pain (7 - 10)      Allergies    Ceclor (Rash)  IV Contrast (Unknown)  Levaquin (Swelling)  Augmentin (Short breath; Rash)    Intolerances        Vital Signs Last 24 Hrs  T(C): 36.4 (03 Oct 2023 18:00), Max: 36.6 (03 Oct 2023 01:11)  T(F): 97.5 (03 Oct 2023 18:00), Max: 97.9 (03 Oct 2023 01:11)  HR: 66 (03 Oct 2023 20:05) (56 - 78)  BP: 126/59 (03 Oct 2023 20:05) (124/65 - 146/66)  BP(mean): 85 (03 Oct 2023 20:05) (85 - 101)  RR: 18 (03 Oct 2023 20:05) (16 - 18)  SpO2: 95% (03 Oct 2023 17:51) (94% - 98%)    Parameters below as of 03 Oct 2023 20:05  Patient On (Oxygen Delivery Method): room air      Physical exam:  General: No acute distress, awake and alert  Eyes: Anicteric sclerae, moist conjunctivae, see below for CNs  Neck: trachea midline  Cardiovascular: Pt in afib on tele   Pulmonary: No use of accessory muscles    Neurologic:  -Mental status: Awake, eyes open, oriented to person, place, and to year. Follows simple commands but has some periods of inattention. Repetition intact. Recent memory variable and does not want to answer many questions  -Cranial nerves:   II: Poor visual fields exam, appears to have bitemporally quadrantopia with BTT however exam varies   III, IV, VI: Extraocular movements are intact without nystagmus. Pupils equally round and reactive to light  V:  Facial sensation V1-V3 equal and intact   VII: Face appears symmetric   VIII: Hearing is bilaterally intact to voice  XII: Tongue protrudes midline  Motor: Diminished bulk throughout. Normal tone. B/l UEs and LE without drift.   Sensation: Intact to light touch bilaterally  Gait: Deferred    LABS:                        11.2   6.21  )-----------( 279      ( 03 Oct 2023 05:30 )             34.6     10-03    134<L>  |  101  |  25<H>  ----------------------------<  96  4.1   |  26  |  0.61    Ca    8.7      03 Oct 2023 05:30  Phos  3.1     10-03  Mg     1.8     10-03        Urinalysis Basic - ( 03 Oct 2023 05:30 )    Color: x / Appearance: x / SG: x / pH: x  Gluc: 96 mg/dL / Ketone: x  / Bili: x / Urobili: x   Blood: x / Protein: x / Nitrite: x   Leuk Esterase: x / RBC: x / WBC x   Sq Epi: x / Non Sq Epi: x / Bacteria: x    RADIOLOGY & ADDITIONAL TESTS:    < from: CT Heart without Coronaries w/ IV Cont (10.03.23 @ 18:33) >  IMPRESSION:    Cardiac:  1.  There are 4 pulmonary veins; 2 on the left and 3 on the right.  2.  Left atrial appendage measures 23.3 x 20.5 mm at the ostium  3.  Left atrial appendage measures 21 mm in depth from the ostium.  4.  No left atrial appendage or left atrial thrombus.    Non cardiac:    Lungs: Cystic bronchiectasis with bronchial wall thickening. Most severe   in the right upper lobe but present in all lobes with areas of mucus   plugging, indicating a chronic inflammatory process.. Bilateral apical   scarring.    < end of copied text >    < from: CT Head No Cont (10.03.23 @ 17:44) >  IMPRESSION:    No acute intracranial findings    < end of copied text >

## 2023-10-03 NOTE — PROGRESS NOTE ADULT - ASSESSMENT
82y Female with PMHx of HTN, afib (not on AC due to fall risk), recent hospitalization for PRES (5-6/2023), recently discharged from [facility] 8/2023 with acute exacerbation of severe bronchiectasis and respiratory failure presents with several days of AMS and new diplopia 9/18. NIHSS 1 on admission for bitemporal visual deficit. Course complicated by agitation and severe hypoglycemia, requiring ICU admission. Brain imaging significant for L pontine and left occipital infarct as well as SAH of the right parieto-occipital region. Currently, cannot r/o inflammatory causes like ISMA/vasculitis/encephalitis. PET brain obtained, findings consistent with underlying neurodegenerative disease, pattern most suggestive of dementia with Lewy bodies. 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     Neuro  #CVA workup  - continue ASA 81mg daily ; holding AC i/s/o SAH and possible CAA  - continue atorvastatin 40mg daily  - q4hr stroke neuro checks and vitals  - PET brain suggestive of lewy body dementia  - holding off LP due to clinical improvement   - repeat HCT today negative for acute hemorrhage   - MRI brain with and without contrast from 9/25: Left pontine punctate acute infarctions have developed since 9/20/2023. Subarachnoid hemorrhage has diminished since 9/20/2023. Ischemic white matter disease, atrophy typical for age and remote petechial hemorrhages are stable findings since 9/20/2023  - MRA brain 9/22: negative for steno-occlusive disease, aneurysm or high flow   vascular malformation. No new/interval infarct on DWI since 9/20/23,   though mild cerebral edema has developed. Query amyloid related disease.  - MRA neck: negative  - MRV brain: negative for thrombus  - Stroke education    #Lewybodydementia  - Continue Aricept 5mg daily -retimed to 7pm  - general neurology consulted for patient family education on prognosis, recommenced Dr. Lomax and ISAEL henao  - Will need neurocognitive outpatient set up; reached out to Dr. Lomax and ISAEL henao for appt and for patient daughter education on LBD prognosis.     Cards  #HTN  - Goal -160  - Amlodipine 5mg daily, metoprolol tartrate 50mg BID  - TTE from 9/19: mildly dilated RV, dilated RA, severe pulmonary hypertension  - EKG 9/30: afib with rate 110-120s, restarted home medication metoprolol 50mg BID    #HLD  - high dose statin as above in CVA  - LDL results: 129    #Afib  - Structural heart consulted for watchman consult, silva discussed with Dr. Rivas:  - CT heart for LA structure and LA thrombus performed, no thrombus noted     Pulm  - call provider if SPO2 < 94%    #bronchiectasis  - pulmonology consulted, appreciate recs   - Zosyn last day 9/29, no need to start cefepime or continue zosyn for 4 more days per ID and inpatient pulmonology  - chest PT twice daily  -  Duonebs discontinued; Pt daughter does not want pt to receive Duoneb as they make her mother jittery, this was discussed with palliative and determined within her GOC. If pt has SOB or desaturated, trial of O2 can be given  - continue hypertonic 7% inhalation saline   - continue singular  - will need to follow up with Dr. Foster as outpatient  - Chest PT twice daily    GI  #Nutrition/Fluids/Electrolytes   - replete K<4 and Mg <2  - Diet: minced and moist diet; (ensure d/c due to pt did not like it)  - IVF: none    #malnutrition  - Continue Remeron 7.5mg  - Pt does not like ensure, d/c    #diarrhea  - Pt with 3 episodes of loose stools, likely antibiotic associated diarrhea, now resolved    Renal  - daily BMP    #urinary retention  - Saravia removed 9/27, failed TOV with 4 straight caths  - Saravia placed by urology 9/29 due to trauma at urethra  - saravia dc'ed 10/2, passed TOV    Infectious Disease  - Zosyn course finished on 9/29, no need to start cefepime or continue zosyn for 4 more days per ID and inpatient pulmonology  - Pt with 3 episodes of loose stool yesterday, likely antibiotic associated diarrhea, c.diff culture not needed at this time. If pt continues to have multiple loose stools without abx, culture can be sent    Endocrine  - A1C results: 5.5%  - TSH results: 3.870    Psych  #concern for SI - was placed on CO  - patient denies SI while alert and oriented, CO dc'ed    #delirium- placed on CO  - with sitter due to fall risk     #anxiety  - Pt home medication of .25mg klonopin qhs     Palliative  - Pt daughter states that her mothers wishes were to "try for a little while to fix something if it was reversible but if my brain is gone, forget about it". Ongoing support given to daughter with diagnoses and prognosis. Pt was living semi-independent at family home prior to admission  - Pt daughter does not want pt to receive Duoneb as they make her mother jittery, this was discussed with palliative and determined within her GOC. If pt has SOB or desaturated, trial of O2 can be given.    DVT Prophylaxis  - lovenox sq for DVT prophylaxis   - SCDs for DVT prophylaxis     Dispo: AR, patient able to tolerate 3 hours of intensive therapy      Discussed daily hospital plans and goals with patient    Discussed with Dr. Hansen

## 2023-10-03 NOTE — CHART NOTE - NSCHARTNOTEFT_GEN_A_CORE
Admitting Diagnosis:   Patient is a 82y old  Female who presents with a chief complaint of diplopia (03 Oct 2023 13:15)      PAST MEDICAL & SURGICAL HISTORY:  Bronchiectasis      History of Pseudomonas pneumonia      HTN (hypertension)      Posterior reversible encephalopathy syndrome      Atrial fibrillation      No significant past surgical history    Current Nutrition Order:   Diet, NPO:   NPO for Procedure/Test     NPO Start Date: 03-Oct-2023,   NPO Start Time: 12:00  Except Medications (10-03-23 @ 11:45)    PO Intake: N/A ; previously eating well     GI Issues: Abdomen non-distended/non-tender, +BS x4, last bowel movement 10/2    Pain: 3/10, head    Skin Integrity: pressure injury d/c'd in RN flowsheets, +2 R arm     Labs:   10-03    134<L>  |  101  |  25<H>  ----------------------------<  96  4.1   |  26  |  0.61    Ca    8.7      03 Oct 2023 05:30  Phos  3.1     10-03  Mg     1.8     10-03      CAPILLARY BLOOD GLUCOSE      POCT Blood Glucose.: 94 mg/dL (03 Oct 2023 05:52)  POCT Blood Glucose.: 122 mg/dL (02 Oct 2023 23:56)      Medications:  MEDICATIONS  (STANDING):  amLODIPine   Tablet 5 milliGRAM(s) Oral daily  aspirin  chewable 81 milliGRAM(s) Oral daily  atorvastatin 40 milliGRAM(s) Oral at bedtime  diphenhydrAMINE Injectable 25 milliGRAM(s) IV Push once  donepezil 5 milliGRAM(s) Oral every 24 hours  enoxaparin Injectable 30 milliGRAM(s) SubCutaneous every 24 hours  lidocaine 2% Jelly 5 milliLiter(s) IntraUrethral once  melatonin 5 milliGRAM(s) Oral at bedtime  metoprolol tartrate 50 milliGRAM(s) Oral two times a day  mirtazapine 7.5 milliGRAM(s) Oral once  montelukast 10 milliGRAM(s) Oral daily  multivitamin  Chewable 1 Tablet(s) Oral daily  sodium chloride 7% Inhalation 4 milliLiter(s) Inhalation every 12 hours  thiamine 100 milliGRAM(s) Oral daily    MEDICATIONS  (PRN):  acetaminophen     Tablet .. 650 milliGRAM(s) Oral every 6 hours PRN Temp greater or equal to 38C (100.4F), Mild Pain (1 - 3), Moderate Pain (4 - 6), Severe Pain (7 - 10)    Height for BMI (FEET)	5 Feet  Height for BMI (INCHES)	5 Inch(s)  Height for BMI (CENTIMETERS)	165.1 Centimeter(s)  Weight for BMI (lbs)	71.7 lb  Weight for BMI (kg)	32.5 kg  Body Mass Index	11.9    Weight Change: No new wt since admit.     Nutrition Focused Physical Exam: See Dietitian Nutrition Risk Notification on 9/20.    Estimated energy needs: Needs updated 9/25 to reflect protein-calorie malnutrition using current body weight 32.5 kg.   EEN: 975-1137 calories (30-35 kcal/kg)  EPN: 39-65 gProt. (1.2-2.0 gProt./kg)  EFN: 975-1137 ml (30-35 ml/kg)    Subjective: 82y Female with PMHx of HTN, afib (not on AC due to fall risk), recent hospitalization for PRES (5-6/2023), recently discharged from [facility] 8/2023 with acute exacerbation of severe bronchiectasis and respiratory failure presents with several days of AMS and new diplopia 9/18. NIHSS 1 for bitemporal visual deficit. CTH with no significant findings. CTA and perfusion deferred due to contrast allergy. Admitted for further stroke w/u. 9/27: Passed FEES. 9/28: Started Remeron.    Pt assessed at bedside on 7LA. Rx and labs reviewed. Pt in with RN and reporting significant head pain; inappropriate for nutrition reassessment at this time. Pt reportedly eating well; dislikes Ensure. Spoke previously with stroke team, plan to d/c. Pt agreeable to Magic Cup with lunch and dinner; pt reports she enjoys them at last visit. NPO today for planned VANNESSA. No new complaints of nausea/vomiting/constipation/diarrhea or difficult chew/swallow. RDN will continue to reassess, intervene, and monitor as appropriate.     Nutrition Diagnosis: Malnutrition... Severe inadequate PO intake Significant weight loss, NFPE findings     Active [ x ]  Resolved [   ]    Goal: Pt will meet 75% or more of protein/energy needs via most appropriate route for nutrition and reduce/resolve s/sx protein-calorie malnutrition.     Recommendations:  -Continue current diet with appropriate textures/consistencies    *D/c Ensure TID   *Continue Magic Cup BIDLD   -Encourage good PO intake  -Honor food preferences as able  -Monitor chemistry, GI function, and skin integrity     **Communicated with Stroke ACP**    Risk Level: High [ x ] Moderate [   ] Low [   ].

## 2023-10-03 NOTE — PROGRESS NOTE ADULT - SUBJECTIVE AND OBJECTIVE BOX
Physical Medicine and Rehabilitation Progress Note :       Patient is a 82y old  Female who presents with a chief complaint of diplopia (03 Oct 2023 12:48)      HPI:   **STROKE HPI***    HPI: 82y Female with PMHx of HTN, afib (not on AC due to fall risk), recent hospitalization for PRES (5-6/2023), recently discharged from [facility] 8/2023 with acute exacerbation of severe bronchiectasis and respiratory failure presents with several days of AMS and new diplopia 9/18.     Daughter noticed since 9/15 after starting IV abx, patient has been more tired and exhausted with some agitation. It has progressed over the past few days to generalized confusion (being "out of it", not know where certain rooms are in the house, dropping items, not knowing common everyday information) similar to how she initially presented with PRES. Patient told daughter she was seeing double of items in the house around 10pm.     ED: /86. SpO2 84% RA, requiring 3L NC with improvement to 90%. C/o of shortness of breath and difficulty breathing while on supplemental oxygen. Neuro exam significant for decreased peripheral vision bitemporal, but no clear visual field cut, also inconsistent vision exam. Otherwise nonfocal. CT with no acute hemorrhage, with chronic b/l lentiform nuclei and right caudate infarcts. CT angio and perfusion deferred due to contrast allergy received contrast during angiogram in the past with intense flushing of head).    On repeat visual exam, patient notes that she sees double of items, but unable to tell if horizontal or vertical. She is unable to discern at time whether truly double vs blurry, nor transient or consistent diplopia since onset. At times, when testing visual fields, patient struggles to count fingers peripherally and centrally b/l (unclear whether from true field cut vs complete loss of vision vs diplopia causing difficulty counting). Patient also sees items moving around when they are actually stationary. Daughter states that since PRES, patient's vision has not returned to baseline and has been blurry. They have tried to obtain glasses, however on vision testing, Rx was never consistent so was advised to re-test vision after it has "settled".        (19 Sep 2023 05:59)                            11.2   6.21  )-----------( 279      ( 03 Oct 2023 05:30 )             34.6       10-03    134<L>  |  101  |  25<H>  ----------------------------<  96  4.1   |  26  |  0.61    Ca    8.7      03 Oct 2023 05:30  Phos  3.1     10-03  Mg     1.8     10-03      Vital Signs Last 24 Hrs  T(C): 35.8 (03 Oct 2023 10:18), Max: 36.7 (02 Oct 2023 14:09)  T(F): 96.5 (03 Oct 2023 10:18), Max: 98.1 (02 Oct 2023 14:09)  HR: 62 (03 Oct 2023 11:59) (56 - 86)  BP: 134/63 (03 Oct 2023 11:59) (118/56 - 146/65)  BP(mean): 91 (03 Oct 2023 11:59) (80 - 101)  RR: 16 (03 Oct 2023 11:59) (16 - 18)  SpO2: 94% (03 Oct 2023 11:59) (94% - 98%)    Parameters below as of 03 Oct 2023 11:59  Patient On (Oxygen Delivery Method): room air        MEDICATIONS  (STANDING):  amLODIPine   Tablet 5 milliGRAM(s) Oral daily  aspirin  chewable 81 milliGRAM(s) Oral daily  atorvastatin 40 milliGRAM(s) Oral at bedtime  diphenhydrAMINE Injectable 25 milliGRAM(s) IV Push once  donepezil 5 milliGRAM(s) Oral every 24 hours  enoxaparin Injectable 30 milliGRAM(s) SubCutaneous every 24 hours  lidocaine 2% Jelly 5 milliLiter(s) IntraUrethral once  melatonin 5 milliGRAM(s) Oral at bedtime  metoprolol tartrate 50 milliGRAM(s) Oral two times a day  mirtazapine 7.5 milliGRAM(s) Oral once  montelukast 10 milliGRAM(s) Oral daily  multivitamin  Chewable 1 Tablet(s) Oral daily  sodium chloride 7% Inhalation 4 milliLiter(s) Inhalation every 12 hours  thiamine 100 milliGRAM(s) Oral daily    MEDICATIONS  (PRN):  acetaminophen     Tablet .. 650 milliGRAM(s) Oral every 6 hours PRN Temp greater or equal to 38C (100.4F), Mild Pain (1 - 3), Moderate Pain (4 - 6), Severe Pain (7 - 10)        Functional Status Assessment :       Pain Assessment/Number Scale (0-10) Adult  Presence of Pain: denies pain/discomfort (Rating = 0)  Pain Rating (0-10): Rest: 2 (mild pain)  Pain Rating (0-10): Activity: 2 (mild pain)  Pain Precipitating/Aggravating Factors: general discomfort     Safety      AM-PAC Functional Assessment: Daily Activity  Type of Assessment: Daily assessment  Putting on and taking off regular lower body clothing?: 3 = A little assistance  Bathing (including washing, rinsing, drying)?: 3 = A little assistance  Toileting, which includes using toilet, bedpan or urinal?: 2 = A lot of assistance  Putting on and taking off regular upper body clothing?: 3 = A little assistance  Take care of personal grooming such as brushing teeth?: 4 = No assist / stand by assistance  Eating meals?: 4 = No assist / stand by assistance  Score: 19   Row Comment: Ask the patient "How much help from another person do you currently need? (If the patient hasn't done an activity recently, how much help from another person do you think he/she needs if he/she tried?)    Cognitive/Neuro      Cognitive/Neuro/Behavioral  Cognitive/Neuro/Behavioral [WDL Definition: Alert; opens eyes spontaneously; arouses to voice or touch; oriented x 4; follows commands; speech spontaneous, logical; purposeful motor response; behavior appropriate to situation]: WDL except  Level of Consciousness: alert  Arousal Level: arouses to voice  Orientation: disoriented to;  time  Speech: clear;  spontaneous  Mood/Behavior: calm;  cooperative;  lacks insight into deficits    Language Assistance  Preferred Language to Address Healthcare Preferred Language to Address Healthcare: English    Therapeutic Interventions      Bed Mobility  Bed Mobility Training Scooting: contact guard;  verbal cues;  nonverbal cues (demo/gestures)  Bed Mobility Training Sit-to-Supine: contact guard;  verbal cues;  nonverbal cues (demo/gestures)  Bed Mobility Training Limitations: impaired balance;  decreased strength    Sit-Stand Transfer Training  Transfer Training Sit-to-Stand Transfer: minimum assist (75% patient effort);  1 person assist;  nonverbal cues (demo/gestures);  verbal cues;  hand held  Transfer Training Stand-to-Sit Transfer: minimum assist (75% patient effort);  1 person assist;  nonverbal cues (demo/gestures);  verbal cues;  hand held   Sit-to-Stand Transfer Training Transfer Safety Analysis: decreased balance;  decreased weight-shifting ability;  decreased step length;  impaired postural control;  decreased strength;  impaired balance    Toilet Transfer Training  Transfer Training Toilet Transfer: minimum assist (75% patient effort);  1 person assist;  verbal cues;  nonverbal cues (demo/gestures);  grab bars  Toilet Transfer Training Transfer Safety Analysis: decreased weight-shifting ability;  decreased step length;  cognitive, decreased safety awareness;  impaired balance    Therapeutic Exercise  Therapeutic Exercise Charges: pt. performed functional mob from bed<->BR with Min Ax1 and hand held, noted unsteady however no large LOB     Lower Body Dressing Training  Lower Body Dressing Training Charges: don b/l socks at EOB   Lower Body Dressing Training Assistance: contact guard;  verbal cues;  impaired postural control;  decreased strength;  impaired balance    Grooming Training  Grooming Training Charges: standing hand hygiene   Grooming Training Assistance: contact guard;  verbal cues;  impaired postural control;  impaired balance    Toilet Training  Toilet Training Charges: standing pericare (+BM)   Toilet Training Assistance: moderate assist (50% patient effort);  1 person assist;  verbal cues;  nonverbal cues (demo/gestures);  decreased strength;  impaired balance;  cognitive, decreased safety awareness            PM&R Impression : as above    Current disposition plan recommendation :    acute rehab placement

## 2023-10-03 NOTE — PROGRESS NOTE ADULT - SUBJECTIVE AND OBJECTIVE BOX
INTERVAL HPI/OVERNIGHT EVENTS: steph o/n    SUBJECTIVE: Patient seen and examined at bedside.   Pt reports slight headache and feeling hungry (NPO this morning). Otherwise feels she's breathing ok w intermittent cough. No fever. No N/V/Abd pain.  Passed TOV    OBJECTIVE:    VITAL SIGNS:  ICU Vital Signs Last 24 Hrs  T(C): 36.5 (03 Oct 2023 14:35), Max: 36.7 (02 Oct 2023 18:00)  T(F): 97.7 (03 Oct 2023 14:35), Max: 98 (02 Oct 2023 18:00)  HR: 62 (03 Oct 2023 11:59) (56 - 86)  BP: 134/63 (03 Oct 2023 11:59) (118/56 - 146/65)  BP(mean): 91 (03 Oct 2023 11:59) (80 - 101)  ABP: --  ABP(mean): --  RR: 16 (03 Oct 2023 11:59) (16 - 18)  SpO2: 94% (03 Oct 2023 11:59) (94% - 98%)    O2 Parameters below as of 03 Oct 2023 11:59  Patient On (Oxygen Delivery Method): room air              10-02 @ 07:01  -  10-03 @ 07:00  --------------------------------------------------------  IN: 0 mL / OUT: 1690 mL / NET: -1690 mL    10-03 @ 07:01  -  10-03 @ 15:44  --------------------------------------------------------  IN: 250 mL / OUT: 250 mL / NET: 0 mL      CAPILLARY BLOOD GLUCOSE      POCT Blood Glucose.: 94 mg/dL (03 Oct 2023 05:52)      PHYSICAL EXAM:  GEN: thin female in NAD on RA  HEENT: NC/AT, MMM  CV: increased rate - irregularly irregular, nml S1S2, no murmurs  PULM: nml effort, dry rales on R mid and lower fields wo wheezing or rhonchi.   ABD: Soft, non-distended, NABS, non-tender  NEURO  A/O x self, hospital, month and year but not date. moving all extremities, Sensation intact  PSYCH: Appropriate      MEDICATIONS:  MEDICATIONS  (STANDING):  amLODIPine   Tablet 5 milliGRAM(s) Oral daily  aspirin  chewable 81 milliGRAM(s) Oral daily  atorvastatin 40 milliGRAM(s) Oral at bedtime  diphenhydrAMINE Injectable 25 milliGRAM(s) IV Push once  donepezil 5 milliGRAM(s) Oral every 24 hours  enoxaparin Injectable 30 milliGRAM(s) SubCutaneous every 24 hours  lidocaine 2% Jelly 5 milliLiter(s) IntraUrethral once  melatonin 5 milliGRAM(s) Oral at bedtime  metoprolol tartrate 50 milliGRAM(s) Oral two times a day  mirtazapine 7.5 milliGRAM(s) Oral once  montelukast 10 milliGRAM(s) Oral daily  multivitamin  Chewable 1 Tablet(s) Oral daily  sodium chloride 7% Inhalation 4 milliLiter(s) Inhalation every 12 hours  thiamine 100 milliGRAM(s) Oral daily    MEDICATIONS  (PRN):  acetaminophen     Tablet .. 650 milliGRAM(s) Oral every 6 hours PRN Temp greater or equal to 38C (100.4F), Mild Pain (1 - 3), Moderate Pain (4 - 6), Severe Pain (7 - 10)      ALLERGIES:  Allergies    Ceclor (Rash)  IV Contrast (Unknown)  Levaquin (Swelling)  Augmentin (Short breath; Rash)    Intolerances        LABS:                        11.2   6.21  )-----------( 279      ( 03 Oct 2023 05:30 )             34.6     10-03    134<L>  |  101  |  25<H>  ----------------------------<  96  4.1   |  26  |  0.61    Ca    8.7      03 Oct 2023 05:30  Phos  3.1     10-03  Mg     1.8     10-03        Urinalysis Basic - ( 03 Oct 2023 05:30 )    Color: x / Appearance: x / SG: x / pH: x  Gluc: 96 mg/dL / Ketone: x  / Bili: x / Urobili: x   Blood: x / Protein: x / Nitrite: x   Leuk Esterase: x / RBC: x / WBC x   Sq Epi: x / Non Sq Epi: x / Bacteria: x        RADIOLOGY & ADDITIONAL TESTS: Reviewed.

## 2023-10-04 LAB
ANION GAP SERPL CALC-SCNC: 5 MMOL/L — SIGNIFICANT CHANGE UP (ref 5–17)
BUN SERPL-MCNC: 22 MG/DL — SIGNIFICANT CHANGE UP (ref 7–23)
CALCIUM SERPL-MCNC: 8.5 MG/DL — SIGNIFICANT CHANGE UP (ref 8.4–10.5)
CHLORIDE SERPL-SCNC: 104 MMOL/L — SIGNIFICANT CHANGE UP (ref 96–108)
CHLORPROPAMIDE RESULT: SIGNIFICANT CHANGE UP MCG/ML
CO2 SERPL-SCNC: 28 MMOL/L — SIGNIFICANT CHANGE UP (ref 22–31)
CREAT SERPL-MCNC: 0.84 MG/DL — SIGNIFICANT CHANGE UP (ref 0.5–1.3)
EGFR: 70 ML/MIN/1.73M2 — SIGNIFICANT CHANGE UP
GLIMEPIRIDE RESULT: SIGNIFICANT CHANGE UP NG/ML
GLIPIZIDE RESULT: SIGNIFICANT CHANGE UP NG/ML
GLUCOSE SERPL-MCNC: 106 MG/DL — HIGH (ref 70–99)
GLYBURIDE RESULT: SIGNIFICANT CHANGE UP NG/ML
HCT VFR BLD CALC: 30 % — LOW (ref 34.5–45)
HGB BLD-MCNC: 9.7 G/DL — LOW (ref 11.5–15.5)
MAGNESIUM SERPL-MCNC: 1.9 MG/DL — SIGNIFICANT CHANGE UP (ref 1.6–2.6)
MCHC RBC-ENTMCNC: 32.3 GM/DL — SIGNIFICANT CHANGE UP (ref 32–36)
MCHC RBC-ENTMCNC: 33.3 PG — SIGNIFICANT CHANGE UP (ref 27–34)
MCV RBC AUTO: 103.1 FL — HIGH (ref 80–100)
NATEGLINIDE RESULT: SIGNIFICANT CHANGE UP MCG/ML
NRBC # BLD: 0 /100 WBCS — SIGNIFICANT CHANGE UP (ref 0–0)
PHOSPHATE SERPL-MCNC: 3.5 MG/DL — SIGNIFICANT CHANGE UP (ref 2.5–4.5)
PIOGLITAZONE RESULT: SIGNIFICANT CHANGE UP NG/ML
PLATELET # BLD AUTO: 259 K/UL — SIGNIFICANT CHANGE UP (ref 150–400)
POTASSIUM SERPL-MCNC: 4.1 MMOL/L — SIGNIFICANT CHANGE UP (ref 3.5–5.3)
POTASSIUM SERPL-SCNC: 4.1 MMOL/L — SIGNIFICANT CHANGE UP (ref 3.5–5.3)
RBC # BLD: 2.91 M/UL — LOW (ref 3.8–5.2)
RBC # FLD: 13.1 % — SIGNIFICANT CHANGE UP (ref 10.3–14.5)
REPAGLINIDE RESULT: SIGNIFICANT CHANGE UP NG/ML
ROSIGLITAZONE RESULT: SIGNIFICANT CHANGE UP NG/ML
SODIUM SERPL-SCNC: 137 MMOL/L — SIGNIFICANT CHANGE UP (ref 135–145)
TOLAZAMIDE RESULT: SIGNIFICANT CHANGE UP MCG/ML
TOLBUTAMIDE RESULT: SIGNIFICANT CHANGE UP MCG/ML
WBC # BLD: 5.66 K/UL — SIGNIFICANT CHANGE UP (ref 3.8–10.5)
WBC # FLD AUTO: 5.66 K/UL — SIGNIFICANT CHANGE UP (ref 3.8–10.5)

## 2023-10-04 PROCEDURE — 99233 SBSQ HOSP IP/OBS HIGH 50: CPT

## 2023-10-04 RX ORDER — SODIUM CHLORIDE 9 MG/ML
1000 INJECTION INTRAMUSCULAR; INTRAVENOUS; SUBCUTANEOUS
Refills: 0 | Status: DISCONTINUED | OUTPATIENT
Start: 2023-10-04 | End: 2023-10-05

## 2023-10-04 RX ORDER — METHOCARBAMOL 500 MG/1
500 TABLET, FILM COATED ORAL THREE TIMES A DAY
Refills: 0 | Status: DISCONTINUED | OUTPATIENT
Start: 2023-10-04 | End: 2023-10-06

## 2023-10-04 RX ORDER — LOPERAMIDE HCL 2 MG
2 TABLET ORAL ONCE
Refills: 0 | Status: COMPLETED | OUTPATIENT
Start: 2023-10-04 | End: 2023-10-04

## 2023-10-04 RX ORDER — CLONAZEPAM 1 MG
0.5 TABLET ORAL ONCE
Refills: 0 | Status: DISCONTINUED | OUTPATIENT
Start: 2023-10-04 | End: 2023-10-04

## 2023-10-04 RX ADMIN — AZELASTINE 1 SPRAY(S): 137 SPRAY, METERED NASAL at 06:57

## 2023-10-04 RX ADMIN — Medication 0.25 MILLIGRAM(S): at 22:15

## 2023-10-04 RX ADMIN — Medication 81 MILLIGRAM(S): at 12:05

## 2023-10-04 RX ADMIN — AMLODIPINE BESYLATE 5 MILLIGRAM(S): 2.5 TABLET ORAL at 06:31

## 2023-10-04 RX ADMIN — Medication 650 MILLIGRAM(S): at 00:01

## 2023-10-04 RX ADMIN — SODIUM CHLORIDE 70 MILLILITER(S): 9 INJECTION INTRAMUSCULAR; INTRAVENOUS; SUBCUTANEOUS at 12:45

## 2023-10-04 RX ADMIN — METHOCARBAMOL 500 MILLIGRAM(S): 500 TABLET, FILM COATED ORAL at 22:14

## 2023-10-04 RX ADMIN — ATORVASTATIN CALCIUM 40 MILLIGRAM(S): 80 TABLET, FILM COATED ORAL at 22:14

## 2023-10-04 RX ADMIN — Medication 5 MILLIGRAM(S): at 22:14

## 2023-10-04 RX ADMIN — MONTELUKAST 10 MILLIGRAM(S): 4 TABLET, CHEWABLE ORAL at 12:05

## 2023-10-04 RX ADMIN — AZELASTINE 1 SPRAY(S): 137 SPRAY, METERED NASAL at 18:26

## 2023-10-04 RX ADMIN — DONEPEZIL HYDROCHLORIDE 5 MILLIGRAM(S): 10 TABLET, FILM COATED ORAL at 18:25

## 2023-10-04 RX ADMIN — ENOXAPARIN SODIUM 30 MILLIGRAM(S): 100 INJECTION SUBCUTANEOUS at 16:57

## 2023-10-04 RX ADMIN — Medication 650 MILLIGRAM(S): at 09:35

## 2023-10-04 RX ADMIN — Medication 50 MILLIGRAM(S): at 06:31

## 2023-10-04 RX ADMIN — Medication 1 TABLET(S): at 12:05

## 2023-10-04 RX ADMIN — SODIUM CHLORIDE 4 MILLILITER(S): 9 INJECTION INTRAMUSCULAR; INTRAVENOUS; SUBCUTANEOUS at 22:33

## 2023-10-04 RX ADMIN — Medication 50 MILLIGRAM(S): at 18:25

## 2023-10-04 RX ADMIN — Medication 100 MILLIGRAM(S): at 12:06

## 2023-10-04 RX ADMIN — Medication 0.5 MILLIGRAM(S): at 16:56

## 2023-10-04 NOTE — PROGRESS NOTE ADULT - ASSESSMENT
82y Female with PMHx of HTN, afib (not on AC due to fall risk), recent hospitalization for PRES (5-6/2023), recently discharged from [facility] 8/2023 with acute exacerbation of severe bronchiectasis and respiratory failure presents with several days of AMS and new diplopia 9/18. NIHSS 1 on admission for bitemporal visual deficit. Course complicated by agitation and severe hypoglycemia, requiring ICU admission. Brain imaging significant for L pontine and left occipital infarct as well as SAH of the right parieto-occipital region. Currently, cannot r/o inflammatory causes like ISMA/vasculitis/encephalitis. PET brain obtained, findings consistent with underlying neurodegenerative disease, pattern most suggestive of dementia with Lewy bodies. 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     Neuro  #CVA workup  - continue ASA 81mg daily ; holding AC i/s/o SAH and possible CAA  - continue atorvastatin 40mg daily  - q4hr stroke neuro checks and vitals  - PET brain suggestive of lewy body dementia  - holding off LP due to clinical improvement   - repeat HCT today negative for acute hemorrhage   - MRI brain with and without contrast from 9/25: Left pontine punctate acute infarctions have developed since 9/20/2023. Subarachnoid hemorrhage has diminished since 9/20/2023. Ischemic white matter disease, atrophy typical for age and remote petechial hemorrhages are stable findings since 9/20/2023  - MRA brain 9/22: negative for steno-occlusive disease, aneurysm or high flow   vascular malformation. No new/interval infarct on DWI since 9/20/23,   though mild cerebral edema has developed. Query amyloid related disease.  - MRA neck: negative  - MRV brain: negative for thrombus  - Stroke education    #Lewy body dementia  - Continue Aricept 5mg daily -retimed to 7pm  - general neurology consulted for patient family education on prognosis, recommenced Dr. Lomax and ISAEL henao  - Will need neurocognitive outpatient set up; reached out to Dr. Lomax and ISAEL henao for appt and for patient daughter education on LBD prognosis.     Cards  #HTN  - Goal -160  - Amlodipine 5mg daily, metoprolol tartrate 50mg BID  - TTE from 9/19: mildly dilated RV, dilated RA, severe pulmonary hypertension  - EKG 9/30: afib with rate 110-120s, restarted home medication metoprolol 50mg BID    #HLD  - high dose statin as above in CVA  - LDL results: 129    #Afib  - Structural heart consulted for watchman consult, case discussed with Dr. Rivas, will follow up outpatient   - CT heart for LA structure and LA thrombus performed, no thrombus noted     Pulm  - call provider if SPO2 < 94%    #bronchiectasis  - pulmonology consulted, appreciate recs   - Zosyn last day 9/29, no need to start cefepime or continue zosyn for 4 more days per ID and inpatient pulmonology  - chest PT twice daily  - AVOID duonebs and albuterol inhaler as patient becomes jittery and does not tolerate medication well - this was discussed with palliative and determined within her GOC. If pt has SOB or desaturated, trial of O2 can be given  - continue hypertonic 7% inhalation saline   - continue singular  - will need to follow up with Dr. Foster as outpatient    - Chest PT twice daily    GI  #Nutrition/Fluids/Electrolytes   - replete K<4 and Mg <2  - Diet: minced and moist diet; (ensure d/c due to pt did not like it)  - IVF: none    #malnutrition  - Continue Remeron 7.5mg  - Pt does not like ensure, d/c    #diarrhea  - Pt with 3 episodes of loose stools, likely antibiotic associated diarrhea, now resolved    Renal  - daily BMP    #urinary retention  - Saravia removed 9/27, failed TOV with 4 straight caths  - Saravia placed by urology 9/29 due to trauma at urethra  - saravia dc'ed 10/2, passed TOV    Infectious Disease  - Zosyn course finished on 9/29, no need to start cefepime or continue zosyn for 4 more days per ID and inpatient pulmonology  - Pt with 3 episodes of loose stool yesterday, likely antibiotic associated diarrhea, c.diff culture not needed at this time. If pt continues to have multiple loose stools without abx, culture can be sent    Endocrine  - A1C results: 5.5%  - TSH results: 3.870    MSK  #leg cramps - started on robaxin 500mg TID    Psych  #concern for SI - was placed on CO  - patient denies SI while alert and oriented, CO dc'ed    #delirium- placed on CO  - with sitter due to fall risk     #anxiety  - Pt home medication of .25mg klonopin qhs PRN up to 1.5mg MDD     Palliative  - Pt daughter states that her mothers wishes were to "try for a little while to fix something if it was reversible but if my brain is gone, forget about it". Ongoing support given to daughter with diagnoses and prognosis. Pt was living semi-independent at family home prior to admission  - Pt daughter does not want pt to receive Duoneb as they make her mother jittery, this was discussed with palliative and determined within her GOC. If pt has SOB or desaturated, trial of O2 can be given.    DVT Prophylaxis  - lovenox sq for DVT prophylaxis   - SCDs for DVT prophylaxis     Dispo: AR, patient able to tolerate 3 hours of intensive therapy      Discussed daily hospital plans and goals with patient    Discussed with Dr. Hansen

## 2023-10-04 NOTE — PROGRESS NOTE ADULT - SUBJECTIVE AND OBJECTIVE BOX
***STEPDOWN NOTE FROM STROKE TELE TO REGIONAL***    82y Female with PMHx of HTN, afib (not on AC due to fall risk), recent hospitalization for PRES (5-6/2023), recently discharged from [facility] 8/2023 with acute exacerbation of severe bronchiectasis and respiratory failure presents with several days of AMS and new diplopia 9/18. NIHSS 1 on admission for bitemporal visual deficit. Course complicated by agitation and severe hypoglycemia, requiring ICU admission. Pt was on home cefepime for bronchiectasis per outpatient pulm Dr. Foster, was transitioned to Zosyn after AMS episode in MICU and continued, now completed course. Brain imaging significant for L pontine and left occipital infarct as well as SAH of the right parieto-occipital region. Currently, cannot r/o inflammatory causes like ISMA/vasculitis/encephalitis. PET brain obtained, findings consistent with underlying neurodegenerative disease, pattern most suggestive of dementia with Lewy bodies. 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 Patient had saravia for acute urinary retention, dc'ed on 10/2 with successful TOV. CT heart performed for Watchman procedure, will f/u with Dr. Rivas outpatient for further evaluation. Pt with intermittent anxiety, takes klonopin .25 PRN up to 1.5 MDD. C/o leg cramps, started on robaxin 500mg TID. Pt pending AR bed and auth for [facility]. Pt is stable for stepdown to regional floor.     Neurology Stroke Progress Note    INTERVAL HPI/OVERNIGHT EVENTS:  Pt with anxiety overnight, given home klonopin. Patient seen and examined. States she is feeling well, c/o b/l leg pain more behind calf, likely cramps/spasm.     MEDICATIONS  (STANDING):  amLODIPine   Tablet 5 milliGRAM(s) Oral daily  aspirin  chewable 81 milliGRAM(s) Oral daily  atorvastatin 40 milliGRAM(s) Oral at bedtime  azelastine 0.15% Nasal Spray 1 Spray(s) Both Nostrils two times a day  clonazePAM  Tablet 0.25 milliGRAM(s) Oral at bedtime  donepezil 5 milliGRAM(s) Oral every 24 hours  enoxaparin Injectable 30 milliGRAM(s) SubCutaneous every 24 hours  lidocaine 2% Jelly 5 milliLiter(s) IntraUrethral once  melatonin 5 milliGRAM(s) Oral at bedtime  methocarbamol 500 milliGRAM(s) Oral three times a day  metoprolol tartrate 50 milliGRAM(s) Oral two times a day  mirtazapine 7.5 milliGRAM(s) Oral once  montelukast 10 milliGRAM(s) Oral daily  multivitamin  Chewable 1 Tablet(s) Oral daily  sodium chloride 0.9%. 1000 milliLiter(s) (70 mL/Hr) IV Continuous <Continuous>  sodium chloride 7% Inhalation 4 milliLiter(s) Inhalation every 12 hours  thiamine 100 milliGRAM(s) Oral daily    MEDICATIONS  (PRN):  acetaminophen     Tablet .. 650 milliGRAM(s) Oral every 6 hours PRN Temp greater or equal to 38C (100.4F), Mild Pain (1 - 3), Moderate Pain (4 - 6), Severe Pain (7 - 10)      Allergies    Ceclor (Rash)  IV Contrast (Unknown)  Levaquin (Swelling)  Augmentin (Short breath; Rash)    Intolerances        Vital Signs Last 24 Hrs  T(C): 36.3 (04 Oct 2023 21:37), Max: 36.9 (04 Oct 2023 01:00)  T(F): 97.3 (04 Oct 2023 21:37), Max: 98.4 (04 Oct 2023 01:00)  HR: 64 (04 Oct 2023 20:15) (54 - 70)  BP: 129/63 (04 Oct 2023 20:15) (107/52 - 145/65)  BP(mean): 88 (04 Oct 2023 20:15) (75 - 97)  RR: 18 (04 Oct 2023 20:15) (16 - 19)  SpO2: 93% (04 Oct 2023 20:15) (93% - 96%)    Parameters below as of 04 Oct 2023 20:15  Patient On (Oxygen Delivery Method): room air    Physical exam:  General: No acute distress, awake and alert  Eyes: Anicteric sclerae, moist conjunctivae, see below for CNs  Neck: trachea midline  Cardiovascular: Pt in afib on tele   Pulmonary: No use of accessory muscles    Neurologic:  -Mental status: Awake, eyes open, oriented to person, place, and to year. Follows simple commands but has some periods of inattention. Repetition intact. Recent memory variable and does not want to answer many questions  -Cranial nerves:   II: Poor visual fields exam, appears to have bitemporally quadrantopia with BTT however exam varies   III, IV, VI: Extraocular movements are intact without nystagmus. Pupils equally round and reactive to light  V:  Facial sensation V1-V3 equal and intact   VII: Face appears symmetric   VIII: Hearing is bilaterally intact to voice  XII: Tongue protrudes midline  Motor: Diminished bulk throughout. Normal tone. B/l UEs and LE without drift.   Sensation: Intact to light touch bilaterally  Gait: Deferred    LABS:                        9.7    5.66  )-----------( 259      ( 04 Oct 2023 06:22 )             30.0     10-04    137  |  104  |  22  ----------------------------<  106<H>  4.1   |  28  |  0.84    Ca    8.5      04 Oct 2023 06:22  Phos  3.5     10-04  Mg     1.9     10-04        Urinalysis Basic - ( 04 Oct 2023 06:22 )    Color: x / Appearance: x / SG: x / pH: x  Gluc: 106 mg/dL / Ketone: x  / Bili: x / Urobili: x   Blood: x / Protein: x / Nitrite: x   Leuk Esterase: x / RBC: x / WBC x   Sq Epi: x / Non Sq Epi: x / Bacteria: x    RADIOLOGY & ADDITIONAL TESTS:    < from: CT Heart without Coronaries w/ IV Cont (10.03.23 @ 18:33) >  IMPRESSION:    Cardiac:  1.  There are 4 pulmonary veins; 2 on the left and 3 on the right.  2.  Left atrial appendage measures 23.3 x 20.5 mm at the ostium  3.  Left atrial appendage measures 21 mm in depth from the ostium.  4.  No left atrial appendage or left atrial thrombus.    Non cardiac:    Lungs: Cystic bronchiectasis with bronchial wall thickening. Most severe   in the right upper lobe but present in all lobes with areas of mucus   plugging, indicating a chronic inflammatory process.. Bilateral apical   scarring.      < end of copied text >

## 2023-10-04 NOTE — PROGRESS NOTE ADULT - SUBJECTIVE AND OBJECTIVE BOX
Patient is a 82y old  Female who presents with a chief complaint of diplopia (04 Oct 2023 13:53)    Subjective: complained of leg discomfort from being in bed    Medication:  acetaminophen     Tablet .. 650 milliGRAM(s) Oral every 6 hours PRN  amLODIPine   Tablet 5 milliGRAM(s) Oral daily  aspirin  chewable 81 milliGRAM(s) Oral daily  atorvastatin 40 milliGRAM(s) Oral at bedtime  azelastine 0.15% Nasal Spray 1 Spray(s) Both Nostrils two times a day  clonazePAM  Tablet 0.25 milliGRAM(s) Oral at bedtime  clonazePAM  Tablet 0.5 milliGRAM(s) Oral once  donepezil 5 milliGRAM(s) Oral every 24 hours  enoxaparin Injectable 30 milliGRAM(s) SubCutaneous every 24 hours  lidocaine 2% Jelly 5 milliLiter(s) IntraUrethral once  melatonin 5 milliGRAM(s) Oral at bedtime  methocarbamol 500 milliGRAM(s) Oral three times a day  metoprolol tartrate 50 milliGRAM(s) Oral two times a day  mirtazapine 7.5 milliGRAM(s) Oral once  montelukast 10 milliGRAM(s) Oral daily  multivitamin  Chewable 1 Tablet(s) Oral daily  sodium chloride 0.9%. 1000 milliLiter(s) IV Continuous <Continuous>  sodium chloride 7% Inhalation 4 milliLiter(s) Inhalation every 12 hours  thiamine 100 milliGRAM(s) Oral daily      T(C): 36.3; HR: 68; BP: 141/67; RR: 18; SpO2: 96%  PE: thin lady in no apparent distress, no peripheral edema noted    Labs:                        9.7    5.66  )-----------( 259      ( 04 Oct 2023 06:22 )             30.0     10-04    137  |  104  |  22  ----------------------------<  106<H>  4.1   |  28  |  0.84    Ca    8.5      04 Oct 2023 06:22  Phos  3.5     10-04  Mg     1.9     10-04

## 2023-10-04 NOTE — PROGRESS NOTE ADULT - ASSESSMENT
Assessment:  81 YO Female with PMHx of HTN, A-Fib (not on AC due to fall risk), recent hospitalization for PRES (5-6/2023), recently discharged from [facility] 8/2023 with acute exacerbation of severe bronchiectasis and respiratory failure who presented to [facility] with several days of AMS and new diplopia on 9/18. NIHSS 1 on admission for bitemporal visual deficit. Course c/b agitation and severe hypoglycemia, requiring ICU admission. Brain imaging significant for L pontine and left occipital infarct as well as SAH of the right parieto-occipital region. Currently, cannot r/o inflammatory causes like ISMA/vasculitis/encephalitis. PET brain obtained, findings consistent with underlying neurodegenerative disease, pattern most suggestive of dementia with Lewy bodies. 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 Structural heart consulted for possible MITZI occlusion device.     Plan:  Problem 1: Paroxsymal Atrial fibrillation (uncovered 5/2023)  -A-Fib noted on telemetry    -No OAC 2/2 frequent falls  -Continue BB   -Deferred VANNESSA/sedation, CT Heart completed 10/3, will review results with Dr. Rivas  -Structural heart will continue to follow      Problem 2: CVA w/up   -MRA brain consistent with CAA  -Continue ASA, statin  -Care per primary team     Problem 3: Lewy Body Dementia   -PET brain suggestive of lewy body dementia   -Cont Aricept 5mg daily per neuro     Problem 4: Bronchiectasis   -Care per primary team     I have reviewed clinical labs tests and reports, radiology tests and reports, as well as old patient medical records, and discussed with the refering physician.     Assessment:  83 YO Female with PMHx of HTN, A-Fib (not on AC due to fall risk), recent hospitalization for PRES (5-6/2023), recently discharged from [facility] 8/2023 with acute exacerbation of severe bronchiectasis and respiratory failure who presented to [facility] with several days of AMS and new diplopia on 9/18. NIHSS 1 on admission for bitemporal visual deficit. Course c/b agitation and severe hypoglycemia, requiring ICU admission. Brain imaging significant for L pontine and left occipital infarct as well as SAH of the right parieto-occipital region. Currently, cannot r/o inflammatory causes like ISMA/vasculitis/encephalitis. PET brain obtained, findings consistent with underlying neurodegenerative disease, pattern most suggestive of dementia with Lewy bodies. 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 Structural heart consulted for possible MITZI occlusion device.     Plan:  Problem 1: Paroxsymal Atrial fibrillation (uncovered 5/2023)  -A-Fib noted on telemetry    -No AC started given frequent falls  -Continue BB   -Deferred VANNESSA/sedation, CT Heart completed 10/3  -No indication for acute intervention at this time. Please have patient follow-up outpatient with Dr. Rivas ([phone]) in 1-2 weeks for Watchman planning  -Structural heart team will sign off for now, please re-consult as indicated    Problem 2: CVA w/up   -MRA brain consistent with CAA  -Continue ASA, statin  -Care per primary team     Problem 3: Lewy Body Dementia   -PET brain suggestive of lewy body dementia   -Cont Aricept 5mg daily per neuro     Problem 4: Bronchiectasis   -Care per primary team     I have reviewed clinical labs tests and reports, radiology tests and reports, as well as old patient medical records, and discussed with the refering physician.

## 2023-10-04 NOTE — PROGRESS NOTE ADULT - ASSESSMENT
Pulm: Dr. Foster  82F w HTN, AF not on AC d/t fall risk, hospitalized for PRES 05-06/2023, recent DC 8/2023 for acute hypoxemic respiratory failure - to home on 4L NC w follow-up w Dr. Foster for exacerbation of bronchiectasis, recently started course of IV cefepime outpatient by Dr. Foster 9/13, p/w diplopia, encephalopathy admitted for stroke work-up, cefepime continued inpatient, repeat head CT found small SAH, c/b hypoglycemia, encephalopathy - BG 30 and temp 93F during RRT on 9/21, transferred to MICU for persistent hypoglycemia needing D10W >> D10 NS, slow improvement w nml insulin and C peptide, stepped down to 7L and stroke service, MR brain showing L pontine infarction, PET brain w signal c/f Dementia w Lewy Bodies, for acute rehab    #Acute urinary retention - resolved and passed TOV 10/2    #CVA - L pontine infarction on MR 9/25 that also showed SAH diminishing.  #Encephalopathy - improving. There is signal on PET c/f Dementia w Lewy Bodies   - ASA 81 daily, Atorva 40   - Started on remeron 7.5 daily, donepezil 5 HS    #Bronchiectasis - On IV Cefepime from 9/13 w PICC placed outpatient (PICC discontinued 9/21). Has been on IV Zosyn in MICU per pulmonary thru 9/29. Currently satting well on RA. On singulair 10  #AFib - on lopressor 50 bid. AC currently held d/t SAH  #HTN - on amlodipine 5    #Normocytic anemia - 11.2 today.  #Hyponatremia - 134 today    Plan  VANNESSA vs cardiac CT to eval for watchman procedure per structural heart.   Pt reports flushing as response to IV Contrast - denies lip/throat/tongue swelling. Can give benadryl 25mg     ---for leg cramps pt will be offered increased PO hydration and muscle relaxants  Pt is DNR/DNI  DISPO: Acute rehab - baseline lives w daughter

## 2023-10-04 NOTE — PROGRESS NOTE ADULT - SUBJECTIVE AND OBJECTIVE BOX
INCOMPLETE NOTE    Patient discussed on morning rounds with       SUBJECTIVE ASSESSMENT:    Vital Signs Last 24 Hrs  T(C): 36.9 (04 Oct 2023 10:00), Max: 36.9 (04 Oct 2023 01:00)  T(F): 98.4 (04 Oct 2023 10:00), Max: 98.4 (04 Oct 2023 01:00)  HR: 62 (04 Oct 2023 12:00) (54 - 66)  BP: 122/58 (04 Oct 2023 12:00) (107/52 - 146/66)  BP(mean): 83 (04 Oct 2023 12:00) (75 - 95)  RR: 16 (04 Oct 2023 12:00) (16 - 19)  SpO2: 96% (04 Oct 2023 12:00) (94% - 96%)    Parameters below as of 04 Oct 2023 12:00  Patient On (Oxygen Delivery Method): room air    I&O's Detail    03 Oct 2023 07:01  -  04 Oct 2023 07:00  --------------------------------------------------------  IN:    Sodium Chloride 0.9% Bolus: 250 mL  Total IN: 250 mL    OUT:    Voided (mL): 1000 mL  Total OUT: 1000 mL    Total NET: -750 mL    CHEST TUBE:  None  ARTIS DRAIN:  None  EPICARDIAL WIRES: None  TIE DOWNS: None  DO: None    PHYSICAL EXAM:  *****    LABS:                        9.7    5.66  )-----------( 259      ( 04 Oct 2023 06:22 )             30.0     10-04    137  |  104  |  22  ----------------------------<  106<H>  4.1   |  28  |  0.84    Ca    8.5      04 Oct 2023 06:22  Phos  3.5     10-04  Mg     1.9     10-04    Urinalysis Basic - ( 04 Oct 2023 06:22 )    Color: x / Appearance: x / SG: x / pH: x  Gluc: 106 mg/dL / Ketone: x  / Bili: x / Urobili: x   Blood: x / Protein: x / Nitrite: x   Leuk Esterase: x / RBC: x / WBC x   Sq Epi: x / Non Sq Epi: x / Bacteria: x    MEDICATIONS  (STANDING):  amLODIPine   Tablet 5 milliGRAM(s) Oral daily  aspirin  chewable 81 milliGRAM(s) Oral daily  atorvastatin 40 milliGRAM(s) Oral at bedtime  azelastine 0.15% Nasal Spray 1 Spray(s) Both Nostrils two times a day  clonazePAM  Tablet 0.25 milliGRAM(s) Oral at bedtime  donepezil 5 milliGRAM(s) Oral every 24 hours  enoxaparin Injectable 30 milliGRAM(s) SubCutaneous every 24 hours  lidocaine 2% Jelly 5 milliLiter(s) IntraUrethral once  melatonin 5 milliGRAM(s) Oral at bedtime  metoprolol tartrate 50 milliGRAM(s) Oral two times a day  mirtazapine 7.5 milliGRAM(s) Oral once  montelukast 10 milliGRAM(s) Oral daily  multivitamin  Chewable 1 Tablet(s) Oral daily  sodium chloride 0.9%. 1000 milliLiter(s) (70 mL/Hr) IV Continuous <Continuous>  sodium chloride 7% Inhalation 4 milliLiter(s) Inhalation every 12 hours  thiamine 100 milliGRAM(s) Oral daily    MEDICATIONS  (PRN):  acetaminophen     Tablet .. 650 milliGRAM(s) Oral every 6 hours PRN Temp greater or equal to 38C (100.4F), Mild Pain (1 - 3), Moderate Pain (4 - 6), Severe Pain (7 - 10)    RADIOLOGY & ADDITIONAL TESTS:  < from: CT Heart without Coronaries w/ IV Cont (10.03.23 @ 18:33) >  VI. Results:    Pulmonary Veins:  Left Upper PV:  Max 11.3  mm and Min 9.5 mm  Left Lower PV:  Max 15.1  mm and Min 11.7  mm  Right Upper PV:  Max 16.2  mm and Min 15.4 mm  Right Lower :  Max 16 mm and Min 14.5 mm    Left atrial appendage measures 23.3 x 20.5 mm at the ostium  Left atrial appendage measures 21 mm in depth from the ostium.      Accessory Pulmonary Vein: N  Left atrial thrombus: N  Left atrial appendage thrombus: N  Normal atrial septum: Yes  Left atrium is dilated  Right atrium is dilated  Coronary atherosclerosis noted.      Non Cardiac Findings: Lungs: Cystic bronchiectasis with bronchial wall   thickening. Most severe in the right upper lobe but present in all lobes   with areas of mucus plugging, indicating a chronic inflammatory process..   Bilateral apical scarring.    IMPRESSION:    Cardiac:  1.  There are 4 pulmonary veins; 2 on the left and 3 on the right.  2.  Left atrial appendage measures 23.3 x 20.5 mm at the ostium  3.  Left atrial appendage measures 21 mm in depth from the ostium.  4.  No left atrial appendage or left atrial thrombus.    Non cardiac:    Lungs: Cystic bronchiectasis with bronchial wall thickening. Most severe   in the right upper lobe but present in all lobes with areas of mucus   plugging, indicating a chronic inflammatory process.. Bilateral apical   scarring.         Patient discussed on morning rounds with Dr. Elias    SUBJECTIVE ASSESSMENT:    Vital Signs Last 24 Hrs  T(C): 36.9 (04 Oct 2023 10:00), Max: 36.9 (04 Oct 2023 01:00)  T(F): 98.4 (04 Oct 2023 10:00), Max: 98.4 (04 Oct 2023 01:00)  HR: 62 (04 Oct 2023 12:00) (54 - 66)  BP: 122/58 (04 Oct 2023 12:00) (107/52 - 146/66)  BP(mean): 83 (04 Oct 2023 12:00) (75 - 95)  RR: 16 (04 Oct 2023 12:00) (16 - 19)  SpO2: 96% (04 Oct 2023 12:00) (94% - 96%)    Parameters below as of 04 Oct 2023 12:00  Patient On (Oxygen Delivery Method): room air    I&O's Detail    03 Oct 2023 07:01  -  04 Oct 2023 07:00  --------------------------------------------------------  IN:    Sodium Chloride 0.9% Bolus: 250 mL  Total IN: 250 mL    OUT:    Voided (mL): 1000 mL  Total OUT: 1000 mL    Total NET: -750 mL    CHEST TUBE:  None  ARTIS DRAIN:  None  EPICARDIAL WIRES: None  TIE DOWNS: None  DO: None    PHYSICAL EXAM:  *****    LABS:                        9.7    5.66  )-----------( 259      ( 04 Oct 2023 06:22 )             30.0     10-04    137  |  104  |  22  ----------------------------<  106<H>  4.1   |  28  |  0.84    Ca    8.5      04 Oct 2023 06:22  Phos  3.5     10-04  Mg     1.9     10-04    Urinalysis Basic - ( 04 Oct 2023 06:22 )    Color: x / Appearance: x / SG: x / pH: x  Gluc: 106 mg/dL / Ketone: x  / Bili: x / Urobili: x   Blood: x / Protein: x / Nitrite: x   Leuk Esterase: x / RBC: x / WBC x   Sq Epi: x / Non Sq Epi: x / Bacteria: x    MEDICATIONS  (STANDING):  amLODIPine   Tablet 5 milliGRAM(s) Oral daily  aspirin  chewable 81 milliGRAM(s) Oral daily  atorvastatin 40 milliGRAM(s) Oral at bedtime  azelastine 0.15% Nasal Spray 1 Spray(s) Both Nostrils two times a day  clonazePAM  Tablet 0.25 milliGRAM(s) Oral at bedtime  donepezil 5 milliGRAM(s) Oral every 24 hours  enoxaparin Injectable 30 milliGRAM(s) SubCutaneous every 24 hours  lidocaine 2% Jelly 5 milliLiter(s) IntraUrethral once  melatonin 5 milliGRAM(s) Oral at bedtime  metoprolol tartrate 50 milliGRAM(s) Oral two times a day  mirtazapine 7.5 milliGRAM(s) Oral once  montelukast 10 milliGRAM(s) Oral daily  multivitamin  Chewable 1 Tablet(s) Oral daily  sodium chloride 0.9%. 1000 milliLiter(s) (70 mL/Hr) IV Continuous <Continuous>  sodium chloride 7% Inhalation 4 milliLiter(s) Inhalation every 12 hours  thiamine 100 milliGRAM(s) Oral daily    MEDICATIONS  (PRN):  acetaminophen     Tablet .. 650 milliGRAM(s) Oral every 6 hours PRN Temp greater or equal to 38C (100.4F), Mild Pain (1 - 3), Moderate Pain (4 - 6), Severe Pain (7 - 10)    RADIOLOGY & ADDITIONAL TESTS:  < from: CT Heart without Coronaries w/ IV Cont (10.03.23 @ 18:33) >  VI. Results:    Pulmonary Veins:  Left Upper PV:  Max 11.3  mm and Min 9.5 mm  Left Lower PV:  Max 15.1  mm and Min 11.7  mm  Right Upper PV:  Max 16.2  mm and Min 15.4 mm  Right Lower :  Max 16 mm and Min 14.5 mm    Left atrial appendage measures 23.3 x 20.5 mm at the ostium  Left atrial appendage measures 21 mm in depth from the ostium.      Accessory Pulmonary Vein: N  Left atrial thrombus: N  Left atrial appendage thrombus: N  Normal atrial septum: Yes  Left atrium is dilated  Right atrium is dilated  Coronary atherosclerosis noted.      Non Cardiac Findings: Lungs: Cystic bronchiectasis with bronchial wall   thickening. Most severe in the right upper lobe but present in all lobes   with areas of mucus plugging, indicating a chronic inflammatory process..   Bilateral apical scarring.    IMPRESSION:    Cardiac:  1.  There are 4 pulmonary veins; 2 on the left and 3 on the right.  2.  Left atrial appendage measures 23.3 x 20.5 mm at the ostium  3.  Left atrial appendage measures 21 mm in depth from the ostium.  4.  No left atrial appendage or left atrial thrombus.    Non cardiac:    Lungs: Cystic bronchiectasis with bronchial wall thickening. Most severe   in the right upper lobe but present in all lobes with areas of mucus   plugging, indicating a chronic inflammatory process.. Bilateral apical   scarring.         Patient discussed on morning rounds with Dr. Elias    SUBJECTIVE ASSESSMENT: Patient seen and examined at bedside. Patient admits to some loose BMs and occasional SOB, denies chills, chest pain, palpitations     Vital Signs Last 24 Hrs  T(C): 36.9 (04 Oct 2023 10:00), Max: 36.9 (04 Oct 2023 01:00)  T(F): 98.4 (04 Oct 2023 10:00), Max: 98.4 (04 Oct 2023 01:00)  HR: 62 (04 Oct 2023 12:00) (54 - 66)  BP: 122/58 (04 Oct 2023 12:00) (107/52 - 146/66)  BP(mean): 83 (04 Oct 2023 12:00) (75 - 95)  RR: 16 (04 Oct 2023 12:00) (16 - 19)  SpO2: 96% (04 Oct 2023 12:00) (94% - 96%)    Parameters below as of 04 Oct 2023 12:00  Patient On (Oxygen Delivery Method): room air    I&O's Detail    03 Oct 2023 07:01  -  04 Oct 2023 07:00  --------------------------------------------------------  IN:    Sodium Chloride 0.9% Bolus: 250 mL  Total IN: 250 mL    OUT:    Voided (mL): 1000 mL  Total OUT: 1000 mL    Total NET: -750 mL    CHEST TUBE:  None  ARTIS DRAIN:  None  EPICARDIAL WIRES: None  TIE DOWNS: None  DO: None    PHYSICAL EXAM:  GENERAL: NAD, lying in bed   HEAD:  Atraumatic, Normocephalic  EYES: EOMI, PERRLA, conjunctiva and sclera clear  ENT: Moist mucous membranes  NECK: Supple, No JVD  CHEST/LUNG: CTAB  HEART: RRR  ABDOMEN: Soft, Nontender, Nondistended. No hepatomegally  EXTREMITIES:  2+ Peripheral Pulses, brisk capillary refill. No clubbing, cyanosis, or edema  NERVOUS SYSTEM:  Alert & Oriented X3, speech clear. No deficits     LABS:                        9.7    5.66  )-----------( 259      ( 04 Oct 2023 06:22 )             30.0     10-04    137  |  104  |  22  ----------------------------<  106<H>  4.1   |  28  |  0.84    Ca    8.5      04 Oct 2023 06:22  Phos  3.5     10-04  Mg     1.9     10-04    Urinalysis Basic - ( 04 Oct 2023 06:22 )    Color: x / Appearance: x / SG: x / pH: x  Gluc: 106 mg/dL / Ketone: x  / Bili: x / Urobili: x   Blood: x / Protein: x / Nitrite: x   Leuk Esterase: x / RBC: x / WBC x   Sq Epi: x / Non Sq Epi: x / Bacteria: x    MEDICATIONS  (STANDING):  amLODIPine   Tablet 5 milliGRAM(s) Oral daily  aspirin  chewable 81 milliGRAM(s) Oral daily  atorvastatin 40 milliGRAM(s) Oral at bedtime  azelastine 0.15% Nasal Spray 1 Spray(s) Both Nostrils two times a day  clonazePAM  Tablet 0.25 milliGRAM(s) Oral at bedtime  donepezil 5 milliGRAM(s) Oral every 24 hours  enoxaparin Injectable 30 milliGRAM(s) SubCutaneous every 24 hours  lidocaine 2% Jelly 5 milliLiter(s) IntraUrethral once  melatonin 5 milliGRAM(s) Oral at bedtime  metoprolol tartrate 50 milliGRAM(s) Oral two times a day  mirtazapine 7.5 milliGRAM(s) Oral once  montelukast 10 milliGRAM(s) Oral daily  multivitamin  Chewable 1 Tablet(s) Oral daily  sodium chloride 0.9%. 1000 milliLiter(s) (70 mL/Hr) IV Continuous <Continuous>  sodium chloride 7% Inhalation 4 milliLiter(s) Inhalation every 12 hours  thiamine 100 milliGRAM(s) Oral daily    MEDICATIONS  (PRN):  acetaminophen     Tablet .. 650 milliGRAM(s) Oral every 6 hours PRN Temp greater or equal to 38C (100.4F), Mild Pain (1 - 3), Moderate Pain (4 - 6), Severe Pain (7 - 10)    RADIOLOGY & ADDITIONAL TESTS:  < from: CT Heart without Coronaries w/ IV Cont (10.03.23 @ 18:33) >  VI. Results:    Pulmonary Veins:  Left Upper PV:  Max 11.3  mm and Min 9.5 mm  Left Lower PV:  Max 15.1  mm and Min 11.7  mm  Right Upper PV:  Max 16.2  mm and Min 15.4 mm  Right Lower :  Max 16 mm and Min 14.5 mm    Left atrial appendage measures 23.3 x 20.5 mm at the ostium  Left atrial appendage measures 21 mm in depth from the ostium.      Accessory Pulmonary Vein: N  Left atrial thrombus: N  Left atrial appendage thrombus: N  Normal atrial septum: Yes  Left atrium is dilated  Right atrium is dilated  Coronary atherosclerosis noted.      Non Cardiac Findings: Lungs: Cystic bronchiectasis with bronchial wall   thickening. Most severe in the right upper lobe but present in all lobes   with areas of mucus plugging, indicating a chronic inflammatory process..   Bilateral apical scarring.    IMPRESSION:    Cardiac:  1.  There are 4 pulmonary veins; 2 on the left and 3 on the right.  2.  Left atrial appendage measures 23.3 x 20.5 mm at the ostium  3.  Left atrial appendage measures 21 mm in depth from the ostium.  4.  No left atrial appendage or left atrial thrombus.    Non cardiac:    Lungs: Cystic bronchiectasis with bronchial wall thickening. Most severe   in the right upper lobe but present in all lobes with areas of mucus   plugging, indicating a chronic inflammatory process.. Bilateral apical   scarring.         yes

## 2023-10-05 LAB
ANION GAP SERPL CALC-SCNC: 11 MMOL/L — SIGNIFICANT CHANGE UP (ref 5–17)
BUN SERPL-MCNC: 22 MG/DL — SIGNIFICANT CHANGE UP (ref 7–23)
CALCIUM SERPL-MCNC: 8.9 MG/DL — SIGNIFICANT CHANGE UP (ref 8.4–10.5)
CHLORIDE SERPL-SCNC: 99 MMOL/L — SIGNIFICANT CHANGE UP (ref 96–108)
CO2 SERPL-SCNC: 25 MMOL/L — SIGNIFICANT CHANGE UP (ref 22–31)
CREAT SERPL-MCNC: 0.58 MG/DL — SIGNIFICANT CHANGE UP (ref 0.5–1.3)
EGFR: 91 ML/MIN/1.73M2 — SIGNIFICANT CHANGE UP
GLUCOSE SERPL-MCNC: 118 MG/DL — HIGH (ref 70–99)
HCT VFR BLD CALC: 33.2 % — LOW (ref 34.5–45)
HGB BLD-MCNC: 10.6 G/DL — LOW (ref 11.5–15.5)
INSULIN ANTIBODIES: 8.3 UU/ML — HIGH
MCHC RBC-ENTMCNC: 31.9 GM/DL — LOW (ref 32–36)
MCHC RBC-ENTMCNC: 32.7 PG — SIGNIFICANT CHANGE UP (ref 27–34)
MCV RBC AUTO: 102.5 FL — HIGH (ref 80–100)
NRBC # BLD: 0 /100 WBCS — SIGNIFICANT CHANGE UP (ref 0–0)
PLATELET # BLD AUTO: 270 K/UL — SIGNIFICANT CHANGE UP (ref 150–400)
POTASSIUM SERPL-MCNC: 4.3 MMOL/L — SIGNIFICANT CHANGE UP (ref 3.5–5.3)
POTASSIUM SERPL-SCNC: 4.3 MMOL/L — SIGNIFICANT CHANGE UP (ref 3.5–5.3)
RBC # BLD: 3.24 M/UL — LOW (ref 3.8–5.2)
RBC # FLD: 13.2 % — SIGNIFICANT CHANGE UP (ref 10.3–14.5)
SODIUM SERPL-SCNC: 135 MMOL/L — SIGNIFICANT CHANGE UP (ref 135–145)
WBC # BLD: 5.45 K/UL — SIGNIFICANT CHANGE UP (ref 3.8–10.5)
WBC # FLD AUTO: 5.45 K/UL — SIGNIFICANT CHANGE UP (ref 3.8–10.5)

## 2023-10-05 PROCEDURE — 93970 EXTREMITY STUDY: CPT | Mod: 26

## 2023-10-05 PROCEDURE — 99232 SBSQ HOSP IP/OBS MODERATE 35: CPT

## 2023-10-05 RX ADMIN — Medication 5 MILLIGRAM(S): at 21:32

## 2023-10-05 RX ADMIN — Medication 50 MILLIGRAM(S): at 06:54

## 2023-10-05 RX ADMIN — Medication 1 TABLET(S): at 12:51

## 2023-10-05 RX ADMIN — ENOXAPARIN SODIUM 30 MILLIGRAM(S): 100 INJECTION SUBCUTANEOUS at 17:16

## 2023-10-05 RX ADMIN — Medication 50 MILLIGRAM(S): at 17:16

## 2023-10-05 RX ADMIN — AMLODIPINE BESYLATE 5 MILLIGRAM(S): 2.5 TABLET ORAL at 06:55

## 2023-10-05 RX ADMIN — METHOCARBAMOL 500 MILLIGRAM(S): 500 TABLET, FILM COATED ORAL at 06:54

## 2023-10-05 RX ADMIN — METHOCARBAMOL 500 MILLIGRAM(S): 500 TABLET, FILM COATED ORAL at 21:32

## 2023-10-05 RX ADMIN — DONEPEZIL HYDROCHLORIDE 5 MILLIGRAM(S): 10 TABLET, FILM COATED ORAL at 19:06

## 2023-10-05 RX ADMIN — SODIUM CHLORIDE 4 MILLILITER(S): 9 INJECTION INTRAMUSCULAR; INTRAVENOUS; SUBCUTANEOUS at 21:32

## 2023-10-05 RX ADMIN — SODIUM CHLORIDE 4 MILLILITER(S): 9 INJECTION INTRAMUSCULAR; INTRAVENOUS; SUBCUTANEOUS at 17:16

## 2023-10-05 RX ADMIN — AZELASTINE 1 SPRAY(S): 137 SPRAY, METERED NASAL at 17:46

## 2023-10-05 RX ADMIN — AZELASTINE 1 SPRAY(S): 137 SPRAY, METERED NASAL at 06:55

## 2023-10-05 RX ADMIN — ATORVASTATIN CALCIUM 40 MILLIGRAM(S): 80 TABLET, FILM COATED ORAL at 21:32

## 2023-10-05 RX ADMIN — MONTELUKAST 10 MILLIGRAM(S): 4 TABLET, CHEWABLE ORAL at 12:52

## 2023-10-05 RX ADMIN — Medication 0.25 MILLIGRAM(S): at 21:32

## 2023-10-05 RX ADMIN — METHOCARBAMOL 500 MILLIGRAM(S): 500 TABLET, FILM COATED ORAL at 16:02

## 2023-10-05 RX ADMIN — Medication 100 MILLIGRAM(S): at 12:52

## 2023-10-05 RX ADMIN — Medication 81 MILLIGRAM(S): at 12:52

## 2023-10-05 NOTE — PROGRESS NOTE ADULT - SUBJECTIVE AND OBJECTIVE BOX
Neurology Stroke Progress Note    INTERVAL HPI/OVERNIGHT EVENTS:  Pt seen and eval at bedside. Pt denies any complaints initially, but later with presence of pts daughter, states she has leg cramps, but slpet well last night 2/2 meds that she received, has been eating well and voiding well. Has BM everyday. Pt and her daughter expressed interest to advance diet as she has limited choice with minced and moist, doesnt like the consistency. D/W S/S  and was told that pt preferred to be on minced and moist diet. Stated will re eval tomorrow, but pt can have regular diet if she prefers to. U/S B/L LE neg for DVT.    MEDICATIONS  (STANDING):  amLODIPine   Tablet 5 milliGRAM(s) Oral daily  aspirin  chewable 81 milliGRAM(s) Oral daily  atorvastatin 40 milliGRAM(s) Oral at bedtime  azelastine 0.15% Nasal Spray 1 Spray(s) Both Nostrils two times a day  clonazePAM  Tablet 0.25 milliGRAM(s) Oral at bedtime  donepezil 5 milliGRAM(s) Oral every 24 hours  enoxaparin Injectable 30 milliGRAM(s) SubCutaneous every 24 hours  lidocaine 2% Jelly 5 milliLiter(s) IntraUrethral once  melatonin 5 milliGRAM(s) Oral at bedtime  methocarbamol 500 milliGRAM(s) Oral three times a day  metoprolol tartrate 50 milliGRAM(s) Oral two times a day  mirtazapine 7.5 milliGRAM(s) Oral once  montelukast 10 milliGRAM(s) Oral daily  multivitamin  Chewable 1 Tablet(s) Oral daily  sodium chloride 7% Inhalation 4 milliLiter(s) Inhalation every 12 hours  thiamine 100 milliGRAM(s) Oral daily    MEDICATIONS  (PRN):  acetaminophen     Tablet .. 650 milliGRAM(s) Oral every 6 hours PRN Temp greater or equal to 38C (100.4F), Mild Pain (1 - 3), Moderate Pain (4 - 6), Severe Pain (7 - 10)        Allergies  Ceclor (Rash)  IV Contrast (Unknown)  Levaquin (Swelling)  Augmentin (Short breath; Rash)    Vital Signs Last 24 Hrs  T(C): 36 (10-05-23 @ 14:32), Max: 36.5 (10-04-23 @ 18:43)  T(F): 96.8 (10-05-23 @ 14:32), Max: 97.7 (10-04-23 @ 18:43)  HR: 58 (10-05-23 @ 12:18) (52 - 68)  BP: 146/65 (10-05-23 @ 12:18) (121/57 - 147/74)  BP(mean): 93 (10-05-23 @ 12:18) (82 - 104)  RR: 18 (10-05-23 @ 08:05) (16 - 18)  SpO2: 93% (10-05-23 @ 12:18) (91% - 96%)    Parameters below as of 04 Oct 2023 20:15  Patient On (Oxygen Delivery Method): room air    Physical exam:  General: No acute distress, awake and alert  Eyes: Anicteric sclerae, moist conjunctivae, see below for CNs  Neck: trachea midline  Cardiovascular: Pt in afib on tele   Pulmonary: No use of accessory muscles    Neurologic:  -Mental status: Awake, eyes open, oriented to person, place, and to year. Follows simple commands but has some periods of inattention. Repetition intact. Recent memory variable and does not want to answer many questions.     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  III, IV, VI: Extraocular movements are intact without nystagmus. Pupils equally round and reactive to light.  V:  Facial sensation V1-V3 equal and intact   VII: Face appears symmetric   VIII: Hearing is bilaterally intact to voice  XII: Tongue protrudes midline  Motor: Diminished bulk throughout. Normal tone. B/l UEs and LE without drift atleast antigravity, 4/5.  Sensation: Intact to light touch bilaterally  Gait: Deferred    LABS: pending                 RADIOLOGY & ADDITIONAL TESTS:    CT Heart without Coronaries w/ IV Cont (10.03.23 @ 18:33)   IMPRESSION:    Cardiac:  1.  There are 4 pulmonary veins; 2 on the left and 3 on the right.  2.  Left atrial appendage measures 23.3 x 20.5 mm at the ostium  3.  Left atrial appendage measures 21 mm in depth from the ostium.  4.  No left atrial appendage or left atrial thrombus.    Non cardiac:    Lungs: Cystic bronchiectasis with bronchial wall thickening. Most severe   in the right upper lobe but present in all lobes with areas of mucus   plugging, indicating a chronic inflammatory process.. Bilateral apical   scarring.    US Duplex Venous Lower Ext Complete, Bilateral (10.05.23 @ 12:09)     IMPRESSION:  No evidence of deep venous thrombosis in either lower extremity.         Neurology Stroke Progress Note    INTERVAL HPI/OVERNIGHT EVENTS:  Pt seen and eval at bedside. Pt denies any complaints initially, but later with presence of pts daughter, states she has leg cramps, but slpet well last night 2/2 meds that she received, has been eating well and voiding well. Has BM everyday. Pt and her daughter expressed interest to advance diet as she has limited choice with minced and moist, doesnt like the consistency. D/W S/S  and was told that pt preferred to be on minced and moist diet. Stated will re eval tomorrow, but pt can have regular diet if she prefers to. U/S B/L LE neg for DVT. C/O Diarrhea Vs loose stools. D/C'd IVF since pt has improved PO intake and improved leg cramps.    MEDICATIONS  (STANDING):  amLODIPine   Tablet 5 milliGRAM(s) Oral daily  aspirin  chewable 81 milliGRAM(s) Oral daily  atorvastatin 40 milliGRAM(s) Oral at bedtime  azelastine 0.15% Nasal Spray 1 Spray(s) Both Nostrils two times a day  clonazePAM  Tablet 0.25 milliGRAM(s) Oral at bedtime  donepezil 5 milliGRAM(s) Oral every 24 hours  enoxaparin Injectable 30 milliGRAM(s) SubCutaneous every 24 hours  lidocaine 2% Jelly 5 milliLiter(s) IntraUrethral once  melatonin 5 milliGRAM(s) Oral at bedtime  methocarbamol 500 milliGRAM(s) Oral three times a day  metoprolol tartrate 50 milliGRAM(s) Oral two times a day  mirtazapine 7.5 milliGRAM(s) Oral once  montelukast 10 milliGRAM(s) Oral daily  multivitamin  Chewable 1 Tablet(s) Oral daily  sodium chloride 7% Inhalation 4 milliLiter(s) Inhalation every 12 hours  thiamine 100 milliGRAM(s) Oral daily    MEDICATIONS  (PRN):  acetaminophen     Tablet .. 650 milliGRAM(s) Oral every 6 hours PRN Temp greater or equal to 38C (100.4F), Mild Pain (1 - 3), Moderate Pain (4 - 6), Severe Pain (7 - 10)        Allergies  Ceclor (Rash)  IV Contrast (Unknown)  Levaquin (Swelling)  Augmentin (Short breath; Rash)    Vital Signs Last 24 Hrs  T(C): 36 (10-05-23 @ 14:32), Max: 36.5 (10-04-23 @ 18:43)  T(F): 96.8 (10-05-23 @ 14:32), Max: 97.7 (10-04-23 @ 18:43)  HR: 58 (10-05-23 @ 12:18) (52 - 68)  BP: 146/65 (10-05-23 @ 12:18) (121/57 - 147/74)  BP(mean): 93 (10-05-23 @ 12:18) (82 - 104)  RR: 18 (10-05-23 @ 08:05) (16 - 18)  SpO2: 93% (10-05-23 @ 12:18) (91% - 96%)    Parameters below as of 04 Oct 2023 20:15  Patient On (Oxygen Delivery Method): room air    Physical exam:  General: No acute distress, awake and alert  Eyes: Anicteric sclerae, moist conjunctivae, see below for CNs  Neck: trachea midline  Cardiovascular: Pt in afib on tele   Pulmonary: No use of accessory muscles    Neurologic:  -Mental status: Awake, eyes open, oriented to person, place, and to year. Follows simple commands but has some periods of inattention. Repetition intact. Recent memory variable and does not want to answer many questions.     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  III, IV, VI: Extraocular movements are intact without nystagmus. Pupils equally round and reactive to light.  V:  Facial sensation V1-V3 equal and intact   VII: Face appears symmetric   VIII: Hearing is bilaterally intact to voice  XII: Tongue protrudes midline  Motor: Diminished bulk throughout. Normal tone. B/l UEs and LE without drift atleast antigravity, 4/5.  Sensation: Intact to light touch bilaterally  Gait: Deferred    LABS: pending                 RADIOLOGY & ADDITIONAL TESTS:    CT Heart without Coronaries w/ IV Cont (10.03.23 @ 18:33)   IMPRESSION:    Cardiac:  1.  There are 4 pulmonary veins; 2 on the left and 3 on the right.  2.  Left atrial appendage measures 23.3 x 20.5 mm at the ostium  3.  Left atrial appendage measures 21 mm in depth from the ostium.  4.  No left atrial appendage or left atrial thrombus.    Non cardiac:    Lungs: Cystic bronchiectasis with bronchial wall thickening. Most severe   in the right upper lobe but present in all lobes with areas of mucus   plugging, indicating a chronic inflammatory process.. Bilateral apical   scarring.    US Duplex Venous Lower Ext Complete, Bilateral (10.05.23 @ 12:09)     IMPRESSION:  No evidence of deep venous thrombosis in either lower extremity.

## 2023-10-05 NOTE — PROGRESS NOTE ADULT - SUBJECTIVE AND OBJECTIVE BOX
INTERVAL HPI/OVERNIGHT EVENTS: steph o/n    SUBJECTIVE: Patient seen and examined at bedside.   daughter at bedside  Pt reports feeling comfortable. Has intermittent cough leading to chest wall pain but feels it is her baseline. Denies dyspnea.  Eating wo N/V/Abd pain. No LH/dizziness, numbness, spasms    OBJECTIVE:    VITAL SIGNS:  ICU Vital Signs Last 24 Hrs  T(C): 36 (05 Oct 2023 14:32), Max: 36.5 (04 Oct 2023 18:43)  T(F): 96.8 (05 Oct 2023 14:32), Max: 97.7 (04 Oct 2023 18:43)  HR: 66 (05 Oct 2023 16:03) (52 - 66)  BP: 137/69 (05 Oct 2023 16:03) (121/57 - 147/74)  BP(mean): 96 (05 Oct 2023 16:03) (82 - 104)  ABP: --  ABP(mean): --  RR: 18 (05 Oct 2023 08:05) (16 - 18)  SpO2: 93% (05 Oct 2023 16:03) (91% - 95%)    O2 Parameters below as of 05 Oct 2023 16:03  Patient On (Oxygen Delivery Method): room air              10-04 @ 07:01  -  10-05 @ 07:00  --------------------------------------------------------  IN: 560 mL / OUT: 300 mL / NET: 260 mL      CAPILLARY BLOOD GLUCOSE          PHYSICAL EXAM:  GEN: thin female in NAD on RA  HEENT: NC/AT, MMM  CV: increased rate - irregularly irregular, nml S1S2, no murmurs  PULM: nml effort, dry rales on R mid and lower fields wo wheezing or rhonchi.   ABD: Soft, non-distended, NABS, non-tender  NEURO  A/O x self, hospital, month and year but not date. moving all extremities, Sensation intact  PSYCH: Appropriate    MEDICATIONS:  MEDICATIONS  (STANDING):  amLODIPine   Tablet 5 milliGRAM(s) Oral daily  aspirin  chewable 81 milliGRAM(s) Oral daily  atorvastatin 40 milliGRAM(s) Oral at bedtime  azelastine 0.15% Nasal Spray 1 Spray(s) Both Nostrils two times a day  clonazePAM  Tablet 0.25 milliGRAM(s) Oral at bedtime  donepezil 5 milliGRAM(s) Oral every 24 hours  enoxaparin Injectable 30 milliGRAM(s) SubCutaneous every 24 hours  lidocaine 2% Jelly 5 milliLiter(s) IntraUrethral once  melatonin 5 milliGRAM(s) Oral at bedtime  methocarbamol 500 milliGRAM(s) Oral three times a day  metoprolol tartrate 50 milliGRAM(s) Oral two times a day  mirtazapine 7.5 milliGRAM(s) Oral once  montelukast 10 milliGRAM(s) Oral daily  multivitamin  Chewable 1 Tablet(s) Oral daily  sodium chloride 7% Inhalation 4 milliLiter(s) Inhalation every 12 hours  thiamine 100 milliGRAM(s) Oral daily    MEDICATIONS  (PRN):  acetaminophen     Tablet .. 650 milliGRAM(s) Oral every 6 hours PRN Temp greater or equal to 38C (100.4F), Mild Pain (1 - 3), Moderate Pain (4 - 6), Severe Pain (7 - 10)      ALLERGIES:  Allergies    Ceclor (Rash)  IV Contrast (Unknown)  Levaquin (Swelling)  Augmentin (Short breath; Rash)    Intolerances        LABS:                        10.6   5.45  )-----------( 270      ( 05 Oct 2023 16:34 )             33.2     10-05    x   |  99  |  22  ----------------------------<  118<H>  4.3   |  25  |  0.58    Ca    8.9      05 Oct 2023 16:34  Phos  3.5     10-04  Mg     1.9     10-04        Urinalysis Basic - ( 05 Oct 2023 16:34 )    Color: x / Appearance: x / SG: x / pH: x  Gluc: 118 mg/dL / Ketone: x  / Bili: x / Urobili: x   Blood: x / Protein: x / Nitrite: x   Leuk Esterase: x / RBC: x / WBC x   Sq Epi: x / Non Sq Epi: x / Bacteria: x        RADIOLOGY & ADDITIONAL TESTS: Reviewed.

## 2023-10-05 NOTE — PROGRESS NOTE ADULT - ASSESSMENT
Neurology    82 y o Female with PMHx of HTN, afib (not on AC due to fall risk), recent hospitalization for PRES (5-6/2023), recently discharged from [facility] 8/2023 with acute exacerbation of severe bronchiectasis and respiratory failure presents with several days of AMS and new diplopia 9/18. NIHSS 1 on admission for bitemporal visual deficit. Course complicated by agitation and severe hypoglycemia, requiring ICU admission. Brain imaging significant for L pontine and left occipital infarct as well as SAH of the right parieto-occipital region. Currently, cannot r/o inflammatory causes like ISMA/vasculitis/encephalitis. PET brain obtained, findings consistent with underlying neurodegenerative disease, pattern most suggestive of dementia with Lewy bodies. 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     Neuro  #CVA workup  - continue ASA 81mg daily ; holding AC i/s/o SAH and possible CAA  - continue atorvastatin 40mg daily  - q4hr stroke neuro checks and vitals  - PET brain suggestive of lewy body dementia  - holding off LP due to clinical improvement   - repeat HCT today negative for acute hemorrhage   - MRI brain with and without contrast from 9/25: Left pontine punctate acute infarctions have developed since 9/20/2023. Subarachnoid hemorrhage has diminished since 9/20/2023. Ischemic white matter disease, atrophy typical for age and remote petechial hemorrhages are stable findings since 9/20/2023  - MRA brain 9/22: negative for steno-occlusive disease, aneurysm or high flow   vascular malformation. No new/interval infarct on DWI since 9/20/23,   though mild cerebral edema has developed. Query amyloid related disease.  - MRA neck: negative  - MRV brain: negative for thrombus  - Stroke education    #Lewy body dementia  - Continue Aricept 5mg daily -retimed to 7pm  - general neurology consulted for patient family education on prognosis, recommenced Dr. Lomax and ISAEL henao  - Will need neurocognitive outpatient set up; reached out to Dr. Lomax and ISAEL henao for appt and for patient daughter education on LBD prognosis.     Cards  #HTN  - Goal -160  - Amlodipine 5mg daily, metoprolol tartrate 50mg BID  - TTE from 9/19: mildly dilated RV, dilated RA, severe pulmonary hypertension  - EKG 9/30: afib with rate 110-120s, restarted home medication metoprolol 50mg BID    #HLD  - high dose statin as above in CVA  - LDL results: 129    #Afib  - Structural heart consulted for watchman consult, case discussed with Dr. Rivas, will follow up outpatient   - CT heart for LA structure and LA thrombus performed, no thrombus noted     Pulm  - call provider if SPO2 < 94%    #bronchiectasis  - pulmonology consulted, appreciate recs   - Zosyn last day 9/29, no need to start cefepime or continue zosyn for 4 more days per ID and inpatient pulmonology  - chest PT twice daily  - AVOID duonebs and albuterol inhaler as patient becomes jittery and does not tolerate medication well - this was discussed with palliative and determined within her GOC. If pt has SOB or desaturated, trial of O2 can be given  - continue hypertonic 7% inhalation saline   - continue singular  - will need to follow up with Dr. Foster as outpatient    - Chest PT twice daily    GI  #Nutrition/Fluids/Electrolytes   - replete K<4 and Mg <2  - Diet: minced and moist diet; (ensure d/c due to pt did not like it)  - IVF: none    #malnutrition  - Continue Remeron 7.5mg  - Pt does not like ensure, d/c    #diarrhea  - Pt with 3 episodes of loose stools, likely antibiotic associated diarrhea, now resolved    Renal  - daily BMP    #urinary retention  - Saravia removed 9/27, failed TOV with 4 straight caths  - Saravia placed by urology 9/29 due to trauma at urethra  - saravia dc'ed 10/2, passed TOV    Infectious Disease  - Zosyn course finished on 9/29, no need to start cefepime or continue zosyn for 4 more days per ID and inpatient pulmonology  - Pt with 3 episodes of loose stool yesterday, likely antibiotic associated diarrhea, c.diff culture not needed at this time. If pt continues to have multiple loose stools without abx, culture can be sent    Endocrine  - A1C results: 5.5%  - TSH results: 3.870    MSK  #leg cramps - started on robaxin 500mg TID    Psych  #concern for SI - was placed on CO  - patient denies SI while alert and oriented, CO dc'ed    #delirium- placed on CO  - with sitter due to fall risk     #anxiety  - Pt home medication of .25mg klonopin qhs PRN up to 1.5mg MDD     Palliative  - Pt daughter states that her mothers wishes were to "try for a little while to fix something if it was reversible but if my brain is gone, forget about it". Ongoing support given to daughter with diagnoses and prognosis. Pt was living semi-independent at family home prior to admission  - Pt daughter does not want pt to receive Duoneb as they make her mother jittery, this was discussed with palliative and determined within her GOC. If pt has SOB or desaturated, trial of O2 can be given.    DVT Prophylaxis  - lovenox sq for DVT prophylaxis   - SCDs for DVT prophylaxis     Dispo: AR, patient able to tolerate 3 hours of intensive therapy      Discussed daily hospital plans and goals with patient    Discussed with Dr. Hansen

## 2023-10-05 NOTE — PROGRESS NOTE ADULT - SUBJECTIVE AND OBJECTIVE BOX
Physical Medicine and Rehabilitation Progress Note :       Patient is a 82y old  Female who presents with a chief complaint of diplopia (04 Oct 2023 21:48)      HPI:   **STROKE HPI***    HPI: 82y Female with PMHx of HTN, afib (not on AC due to fall risk), recent hospitalization for PRES (5-6/2023), recently discharged from [facility] 8/2023 with acute exacerbation of severe bronchiectasis and respiratory failure presents with several days of AMS and new diplopia 9/18.     Daughter noticed since 9/15 after starting IV abx, patient has been more tired and exhausted with some agitation. It has progressed over the past few days to generalized confusion (being "out of it", not know where certain rooms are in the house, dropping items, not knowing common everyday information) similar to how she initially presented with PRES. Patient told daughter she was seeing double of items in the house around 10pm.     ED: /86. SpO2 84% RA, requiring 3L NC with improvement to 90%. C/o of shortness of breath and difficulty breathing while on supplemental oxygen. Neuro exam significant for decreased peripheral vision bitemporal, but no clear visual field cut, also inconsistent vision exam. Otherwise nonfocal. CT with no acute hemorrhage, with chronic b/l lentiform nuclei and right caudate infarcts. CT angio and perfusion deferred due to contrast allergy received contrast during angiogram in the past with intense flushing of head).    On repeat visual exam, patient notes that she sees double of items, but unable to tell if horizontal or vertical. She is unable to discern at time whether truly double vs blurry, nor transient or consistent diplopia since onset. At times, when testing visual fields, patient struggles to count fingers peripherally and centrally b/l (unclear whether from true field cut vs complete loss of vision vs diplopia causing difficulty counting). Patient also sees items moving around when they are actually stationary. Daughter states that since PRES, patient's vision has not returned to baseline and has been blurry. They have tried to obtain glasses, however on vision testing, Rx was never consistent so was advised to re-test vision after it has "settled".        (19 Sep 2023 05:59)                            9.7    5.66  )-----------( 259      ( 04 Oct 2023 06:22 )             30.0       10-04    137  |  104  |  22  ----------------------------<  106<H>  4.1   |  28  |  0.84    Ca    8.5      04 Oct 2023 06:22  Phos  3.5     10-04  Mg     1.9     10-04      Vital Signs Last 24 Hrs  T(C): 36.4 (05 Oct 2023 10:28), Max: 36.5 (04 Oct 2023 18:43)  T(F): 97.6 (05 Oct 2023 10:28), Max: 97.7 (04 Oct 2023 18:43)  HR: 52 (05 Oct 2023 10:27) (52 - 70)  BP: 147/74 (05 Oct 2023 10:27) (118/56 - 147/74)  BP(mean): 104 (05 Oct 2023 10:27) (81 - 104)  RR: 18 (05 Oct 2023 08:05) (16 - 18)  SpO2: 94% (05 Oct 2023 10:27) (91% - 96%)    Parameters below as of 05 Oct 2023 10:27  Patient On (Oxygen Delivery Method): room air        MEDICATIONS  (STANDING):  amLODIPine   Tablet 5 milliGRAM(s) Oral daily  aspirin  chewable 81 milliGRAM(s) Oral daily  atorvastatin 40 milliGRAM(s) Oral at bedtime  azelastine 0.15% Nasal Spray 1 Spray(s) Both Nostrils two times a day  clonazePAM  Tablet 0.25 milliGRAM(s) Oral at bedtime  donepezil 5 milliGRAM(s) Oral every 24 hours  enoxaparin Injectable 30 milliGRAM(s) SubCutaneous every 24 hours  lidocaine 2% Jelly 5 milliLiter(s) IntraUrethral once  melatonin 5 milliGRAM(s) Oral at bedtime  methocarbamol 500 milliGRAM(s) Oral three times a day  metoprolol tartrate 50 milliGRAM(s) Oral two times a day  mirtazapine 7.5 milliGRAM(s) Oral once  montelukast 10 milliGRAM(s) Oral daily  multivitamin  Chewable 1 Tablet(s) Oral daily  sodium chloride 0.9%. 1000 milliLiter(s) (70 mL/Hr) IV Continuous <Continuous>  sodium chloride 7% Inhalation 4 milliLiter(s) Inhalation every 12 hours  thiamine 100 milliGRAM(s) Oral daily    MEDICATIONS  (PRN):  acetaminophen     Tablet .. 650 milliGRAM(s) Oral every 6 hours PRN Temp greater or equal to 38C (100.4F), Mild Pain (1 - 3), Moderate Pain (4 - 6), Severe Pain (7 - 10)        Functional Status Assessment :         Pain Assessment/Number Scale (0-10) Adult  Presence of Pain: denies pain/discomfort (Rating = 0)  Pain Rating (0-10): Rest: 0 (no pain/absence of nonverbal indicators of pain)  Pain Rating (0-10): Activity: 0 (no pain/absence of nonverbal indicators of pain)    Safety      AM-PAC Functional Assessment: Daily Activity  Type of Assessment: Daily assessment  Putting on and taking off regular lower body clothing?: 3 = A little assistance  Bathing (including washing, rinsing, drying)?: 2 = A lot of assistance  Toileting, which includes using toilet, bedpan or urinal?: 2 = A lot of assistance  Putting on and taking off regular upper body clothing?: 3 = A little assistance  Take care of personal grooming such as brushing teeth?: 3 = A little assistance  Eating meals?: 4 = No assist / stand by assistance  Score: 17   Row Comment: Ask the patient "How much help from another person do you currently need? (If the patient hasn't done an activity recently, how much help from another person do you think he/she needs if he/she tried?)    Cognitive/Neuro      Cognitive/Neuro/Behavioral  Level of Consciousness: alert  Arousal Level: arouses to voice;  arouses to touch/gentle shaking;  opens eyes spontaneously  Orientation: disoriented to;  time  Speech: spontaneous  Mood/Behavior: cooperative    Language Assistance  Preferred Language to Address Healthcare Preferred Language to Address Healthcare: English    Therapeutic Interventions      Bed Mobility  Bed Mobility Training Rolling/Turning: supervsion;  supervision;  verbal cues;  nonverbal cues (demo/gestures)  Bed Mobility Training Scooting: supervsion;  supervision;  nonverbal cues (demo/gestures);  verbal cues  Bed Mobility Training Sit-to-Supine: supervsion;  supervision;  verbal cues;  nonverbal cues (demo/gestures)  Bed Mobility Training Supine-to-Sit: supervsion;  verbal cues;  nonverbal cues (demo/gestures);  supervision  Bed Mobility Training Limitations: decreased strength;  impaired balance;  impaired coordination;  cognitive, decreased safety awareness    Sit-Stand Transfer Training  Transfer Training Sit-to-Stand Transfer: contact guard;  verbal cues;  nonverbal cues (demo/gestures);  1 person assist;  rolling walker  Transfer Training Stand-to-Sit Transfer: contact guard;  nonverbal cues (demo/gestures);  verbal cues;  1 person assist;  rolling walker  Sit-to-Stand Transfer Training Transfer Safety Analysis: decreased balance;  decreased cognition    Therapeutic Exercise  Therapeutic Exercise Detail: Pt performed functional mobility in hallway approx 25ft x2 with RW and CGA, poor safety awareness noted. Pt with narrow base of support and scissoring gait. Pt also required mod-max verbal cues for environmental navigation in hallway for obstacles as pt actively walking through 7L Team who were rounding to the R side of the hallway, (pt had open space to the L side of hallway however decided to go straight through the 7L team instead).     Lower Body Dressing Training  Lower Body Dressing Training Assistance: supervsion;  supervision;  verbal cues;  nonverbal cues (demo/gestures);  geetha b/l socks seated EOB, however pt with poor awareness as pt attempting to geetha her L sock that was inside out;  impaired balance;  decreased strength;  impaired coordination    Upper Body Dressing Training  Upper Body Dressing Training Assistance: minimum assist (75% patient effort);  verbal cues;  nonverbal cues (demo/gestures);  1 person assist;  geetha gown around back seated EOB;  decreased strength;  impaired balance            PM&R Impression : as above    Current disposition plan recommendation :     acute rehab placement

## 2023-10-05 NOTE — PROGRESS NOTE ADULT - ASSESSMENT
82y Female with PMHx of HTN, afib (not on AC due to fall risk), recent hospitalization for PRES (5-6/2023), recently discharged from [facility] 8/2023 with acute exacerbation of severe bronchiectasis and respiratory failure presents with several days of AMS and new diplopia 9/18. NIHSS 1 on admission for bitemporal visual deficit. Course complicated by agitation and severe hypoglycemia, requiring ICU admission. Brain imaging significant for L pontine and left occipital infarct as well as SAH of the right parieto-occipital region. Currently, cannot r/o inflammatory causes like ISMA/vasculitis/encephalitis. PET brain obtained, findings consistent with underlying neurodegenerative disease, pattern most suggestive of dementia with Lewy bodies. 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     Neuro  #CVA workup  - continue ASA 81mg daily ; holding AC i/s/o SAH and possible CAA  - continue atorvastatin 40mg daily  - q4hr stroke neuro checks and vitals  - PET brain suggestive of lewy body dementia  - holding off LP due to clinical improvement   - repeat HCT today negative for acute hemorrhage   - MRI brain with and without contrast from 9/25: Left pontine punctate acute infarctions have developed since 9/20/2023. Subarachnoid hemorrhage has diminished since 9/20/2023. Ischemic white matter disease, atrophy typical for age and remote petechial hemorrhages are stable findings since 9/20/2023  - MRA brain 9/22: negative for steno-occlusive disease, aneurysm or high flow   vascular malformation. No new/interval infarct on DWI since 9/20/23,   though mild cerebral edema has developed. Query amyloid related disease.  - MRA neck: negative  - MRV brain: negative for thrombus  - Stroke education    #Lewy body dementia  - Continue Aricept 5mg daily -retimed to 7pm  - general neurology consulted for patient family education on prognosis, recommenced Dr. Lomax and ISAEL henao  - Will need neurocognitive outpatient set up; reached out to Dr. Lomax and ISAEL henao for appt and for patient daughter education on LBD prognosis.     Cards  #HTN  - Goal -160  - Amlodipine 5mg daily, metoprolol tartrate 50mg BID  - TTE from 9/19: mildly dilated RV, dilated RA, severe pulmonary hypertension  - EKG 9/30: afib with rate 110-120s, restarted home medication metoprolol 50mg BID    #HLD  - high dose statin as above in CVA  - LDL results: 129    #Afib  - Structural heart consulted for watchman consult, case discussed with Dr. Rivas, will follow up outpatient   - CT heart for LA structure and LA thrombus performed, no thrombus noted     Pulm  - call provider if SPO2 < 94%    #bronchiectasis  - pulmonology consulted, appreciate recs   - Zosyn last day 9/29, no need to start cefepime or continue zosyn for 4 more days per ID and inpatient pulmonology  - chest PT twice daily  - AVOID duonebs and albuterol inhaler as patient becomes jittery and does not tolerate medication well - this was discussed with palliative and determined within her GOC. If pt has SOB or desaturated, trial of O2 can be given  - continue hypertonic 7% inhalation saline   - continue singular  - will need to follow up with Dr. Foster as outpatient  - Chest PT twice daily    GI  #Nutrition/Fluids/Electrolytes   - replete K<4 and Mg <2  - Diet: minced and moist diet; (ensure d/c due to pt did not like it)  - IVF: none    #malnutrition  - Continue Remeron 7.5mg  - Pt does not like ensure, d/c    #diarrhea  - Pt with 3 episodes of loose stools, likely antibiotic associated diarrhea, now resolved    Renal  - daily BMP    #urinary retention  - Saravia removed 9/27, failed TOV with 4 straight caths  - Saravia placed by urology 9/29 due to trauma at urethra  - saravia dc'ed 10/2, passed TOV    Infectious Disease  - Zosyn course finished on 9/29, no need to start cefepime or continue zosyn for 4 more days per ID and inpatient pulmonology  - Pt with 3 episodes of loose stool yesterday, likely antibiotic associated diarrhea, c.diff culture not needed at this time. If pt continues to have multiple loose stools without abx, culture can be sent    Endocrine  - A1C results: 5.5%  - TSH results: 3.870    MSK  #leg cramps - started on robaxin 500mg TID    Psych  #concern for SI - was placed on CO  - patient denies SI while alert and oriented, CO dc'ed    #delirium- placed on CO  - with sitter due to fall risk     #anxiety  - Pt home medication of .25mg klonopin qhs PRN up to 1.5mg MDD     Palliative  - Pt daughter states that her mothers wishes were to "try for a little while to fix something if it was reversible but if my brain is gone, forget about it". Ongoing support given to daughter with diagnoses and prognosis. Pt was living semi-independent at family home prior to admission  - Pt daughter does not want pt to receive Duoneb as they make her mother jittery, this was discussed with palliative and determined within her GOC. If pt has SOB or desaturated, trial of O2 can be given.    DVT Prophylaxis  - lovenox sq for DVT prophylaxis   - SCDs for DVT prophylaxis     Dispo: AR, patient able to tolerate 3 hours of intensive therapy      Discussed daily hospital plans and goals with patient    Discussed with Dr. Hansen

## 2023-10-05 NOTE — PROGRESS NOTE ADULT - ASSESSMENT
Pulm: Dr. Foster  82F w HTN, AF not on AC d/t fall risk, hospitalized for PRES 05-06/2023, recent DC 8/2023 for acute hypoxemic respiratory failure - to home on 4L NC w follow-up w Dr. Foster for exacerbation of bronchiectasis, recently started course of IV cefepime outpatient by Dr. Foster 9/13, p/w diplopia, encephalopathy admitted for stroke work-up, cefepime continued inpatient, repeat head CT found small SAH, c/b hypoglycemia, encephalopathy - BG 30 and temp 93F during RRT on 9/21, transferred to MICU for persistent hypoglycemia needing D10W >> D10 NS, slow improvement w nml insulin and C peptide, stepped down to 7L and stroke service, MR brain showing L pontine infarction, PET brain w signal c/f Dementia w Lewy Bodies, for acute rehab    #Acute urinary retention - resolved and passed TOV 10/2    #CVA - L pontine infarction on MR 9/25 that also showed SAH diminishing.  #Encephalopathy - improving. There is signal on PET c/f Dementia w Lewy Bodies   - ASA 81 daily, Atorva 40   - Started on remeron 7.5 daily, donepezil 5 HS    #Bronchiectasis - On IV Cefepime from 9/13 w PICC placed outpatient (PICC discontinued 9/21). Has been on IV Zosyn in MICU per pulmonary thru 9/29. Currently satting well on RA. On singulair 10  #AFib - on lopressor 50 bid. AC currently held d/t SAH  #HTN - on amlodipine 5    #Normocytic anemia - 9.7 in AM - likely hemodilution.  #Hyponatremia - resolved    Plan  Can use lidocaine patch PRN for chest wall pain - pt is amenable.  BLE Doppler negative for DVT -  trial OFF robaxin as use is discouraged. DDx for muscle cramping may also be decreased po/fluid intake.     Pt is DNR/DNI  DISPO: Acute rehab - baseline lives w daughter

## 2023-10-06 DIAGNOSIS — I10 ESSENTIAL (PRIMARY) HYPERTENSION: ICD-10-CM

## 2023-10-06 DIAGNOSIS — Z29.9 ENCOUNTER FOR PROPHYLACTIC MEASURES, UNSPECIFIED: ICD-10-CM

## 2023-10-06 DIAGNOSIS — I48.91 UNSPECIFIED ATRIAL FIBRILLATION: ICD-10-CM

## 2023-10-06 DIAGNOSIS — R33.9 RETENTION OF URINE, UNSPECIFIED: ICD-10-CM

## 2023-10-06 DIAGNOSIS — G31.83 NEUROCOGNITIVE DISORDER WITH LEWY BODIES: ICD-10-CM

## 2023-10-06 PROBLEM — I67.83 POSTERIOR REVERSIBLE ENCEPHALOPATHY SYNDROME: Chronic | Status: ACTIVE | Noted: 2023-09-19

## 2023-10-06 LAB
ANION GAP SERPL CALC-SCNC: 7 MMOL/L — SIGNIFICANT CHANGE UP (ref 5–17)
BUN SERPL-MCNC: 18 MG/DL — SIGNIFICANT CHANGE UP (ref 7–23)
CALCIUM SERPL-MCNC: 8.6 MG/DL — SIGNIFICANT CHANGE UP (ref 8.4–10.5)
CHLORIDE SERPL-SCNC: 102 MMOL/L — SIGNIFICANT CHANGE UP (ref 96–108)
CHLORPROPAMIDE RESULT: SIGNIFICANT CHANGE UP MCG/ML
CO2 SERPL-SCNC: 26 MMOL/L — SIGNIFICANT CHANGE UP (ref 22–31)
CREAT SERPL-MCNC: 0.65 MG/DL — SIGNIFICANT CHANGE UP (ref 0.5–1.3)
EGFR: 89 ML/MIN/1.73M2 — SIGNIFICANT CHANGE UP
GLIMEPIRIDE RESULT: SIGNIFICANT CHANGE UP NG/ML
GLIPIZIDE RESULT: SIGNIFICANT CHANGE UP NG/ML
GLUCOSE SERPL-MCNC: 104 MG/DL — HIGH (ref 70–99)
GLYBURIDE RESULT: SIGNIFICANT CHANGE UP NG/ML
HCT VFR BLD CALC: 31.6 % — LOW (ref 34.5–45)
HGB BLD-MCNC: 10.5 G/DL — LOW (ref 11.5–15.5)
MAGNESIUM SERPL-MCNC: 1.7 MG/DL — SIGNIFICANT CHANGE UP (ref 1.6–2.6)
MCHC RBC-ENTMCNC: 33.1 PG — SIGNIFICANT CHANGE UP (ref 27–34)
MCHC RBC-ENTMCNC: 33.2 GM/DL — SIGNIFICANT CHANGE UP (ref 32–36)
MCV RBC AUTO: 99.7 FL — SIGNIFICANT CHANGE UP (ref 80–100)
NATEGLINIDE RESULT: SIGNIFICANT CHANGE UP MCG/ML
NRBC # BLD: 0 /100 WBCS — SIGNIFICANT CHANGE UP (ref 0–0)
PHOSPHATE SERPL-MCNC: 3.1 MG/DL — SIGNIFICANT CHANGE UP (ref 2.5–4.5)
PIOGLITAZONE RESULT: SIGNIFICANT CHANGE UP NG/ML
PLATELET # BLD AUTO: 287 K/UL — SIGNIFICANT CHANGE UP (ref 150–400)
POTASSIUM SERPL-MCNC: 4 MMOL/L — SIGNIFICANT CHANGE UP (ref 3.5–5.3)
POTASSIUM SERPL-SCNC: 4 MMOL/L — SIGNIFICANT CHANGE UP (ref 3.5–5.3)
RBC # BLD: 3.17 M/UL — LOW (ref 3.8–5.2)
RBC # FLD: 13.2 % — SIGNIFICANT CHANGE UP (ref 10.3–14.5)
REPAGLINIDE RESULT: SIGNIFICANT CHANGE UP NG/ML
ROSIGLITAZONE RESULT: SIGNIFICANT CHANGE UP NG/ML
SODIUM SERPL-SCNC: 135 MMOL/L — SIGNIFICANT CHANGE UP (ref 135–145)
TOLAZAMIDE RESULT: SIGNIFICANT CHANGE UP MCG/ML
TOLBUTAMIDE RESULT: SIGNIFICANT CHANGE UP MCG/ML
WBC # BLD: 5.29 K/UL — SIGNIFICANT CHANGE UP (ref 3.8–10.5)
WBC # FLD AUTO: 5.29 K/UL — SIGNIFICANT CHANGE UP (ref 3.8–10.5)

## 2023-10-06 PROCEDURE — 99232 SBSQ HOSP IP/OBS MODERATE 35: CPT

## 2023-10-06 PROCEDURE — 99222 1ST HOSP IP/OBS MODERATE 55: CPT

## 2023-10-06 RX ORDER — MAGNESIUM SULFATE 500 MG/ML
2 VIAL (ML) INJECTION ONCE
Refills: 0 | Status: COMPLETED | OUTPATIENT
Start: 2023-10-06 | End: 2023-10-06

## 2023-10-06 RX ORDER — MIRTAZAPINE 45 MG/1
7.5 TABLET, ORALLY DISINTEGRATING ORAL AT BEDTIME
Refills: 0 | Status: DISCONTINUED | OUTPATIENT
Start: 2023-10-06 | End: 2023-10-10

## 2023-10-06 RX ORDER — LIDOCAINE 4 G/100G
1 CREAM TOPICAL ONCE
Refills: 0 | Status: COMPLETED | OUTPATIENT
Start: 2023-10-06 | End: 2023-10-06

## 2023-10-06 RX ADMIN — AMLODIPINE BESYLATE 5 MILLIGRAM(S): 2.5 TABLET ORAL at 06:23

## 2023-10-06 RX ADMIN — SODIUM CHLORIDE 4 MILLILITER(S): 9 INJECTION INTRAMUSCULAR; INTRAVENOUS; SUBCUTANEOUS at 22:22

## 2023-10-06 RX ADMIN — MIRTAZAPINE 7.5 MILLIGRAM(S): 45 TABLET, ORALLY DISINTEGRATING ORAL at 22:22

## 2023-10-06 RX ADMIN — Medication 5 MILLIGRAM(S): at 22:22

## 2023-10-06 RX ADMIN — ENOXAPARIN SODIUM 30 MILLIGRAM(S): 100 INJECTION SUBCUTANEOUS at 17:39

## 2023-10-06 RX ADMIN — Medication 100 MILLIGRAM(S): at 11:23

## 2023-10-06 RX ADMIN — DONEPEZIL HYDROCHLORIDE 5 MILLIGRAM(S): 10 TABLET, FILM COATED ORAL at 18:02

## 2023-10-06 RX ADMIN — LIDOCAINE 1 PATCH: 4 CREAM TOPICAL at 17:58

## 2023-10-06 RX ADMIN — LIDOCAINE 1 PATCH: 4 CREAM TOPICAL at 17:40

## 2023-10-06 RX ADMIN — MONTELUKAST 10 MILLIGRAM(S): 4 TABLET, CHEWABLE ORAL at 11:23

## 2023-10-06 RX ADMIN — Medication 1 TABLET(S): at 11:22

## 2023-10-06 RX ADMIN — AZELASTINE 1 SPRAY(S): 137 SPRAY, METERED NASAL at 17:59

## 2023-10-06 RX ADMIN — Medication 50 MILLIGRAM(S): at 06:24

## 2023-10-06 RX ADMIN — Medication 25 GRAM(S): at 11:22

## 2023-10-06 RX ADMIN — SODIUM CHLORIDE 4 MILLILITER(S): 9 INJECTION INTRAMUSCULAR; INTRAVENOUS; SUBCUTANEOUS at 08:28

## 2023-10-06 RX ADMIN — LIDOCAINE 1 PATCH: 4 CREAM TOPICAL at 23:29

## 2023-10-06 RX ADMIN — ATORVASTATIN CALCIUM 40 MILLIGRAM(S): 80 TABLET, FILM COATED ORAL at 22:22

## 2023-10-06 RX ADMIN — Medication 81 MILLIGRAM(S): at 11:23

## 2023-10-06 RX ADMIN — METHOCARBAMOL 500 MILLIGRAM(S): 500 TABLET, FILM COATED ORAL at 06:24

## 2023-10-06 RX ADMIN — Medication 50 MILLIGRAM(S): at 17:40

## 2023-10-06 NOTE — BH CONSULTATION LIAISON ASSESSMENT NOTE - NSBHCONSULTFOLLOWAFTERCARE_PSY_A_CORE FT
Please let us know if you have further question or patient presenting with new/ worsening concerning symptoms of depression, psychosis, agitation, and/or anxiety.  Please let us know if you have further question or patient presenting with new/ worsening concerning symptoms of depression, psychosis, agitation, and/or anxiety.  Recommend pt receive outpatient follow up with PCP and neurologist as needed for further neurocognitive workup.   Psychiatry outpatient referrals:   •	[facility]  o	Telehealth visits  o	[address], [place] [address]  o	[phone]  o	Medicare, Medicaid, most private insurances  •	[facility]  o	[email or web address]  o	[address] [place] [address]   [phone]  o	Medicare, Medicaid, most private insurances  •	  o	Walk in services, Monday – Friday: 7:30am – 1pm   o	[address]   [place] [address]  o	[phone]  o	Medicare, Medicaid, and all private insurances  •	[facility] - *Virtual since Covid – Call first*  o	[address]   [facility] building, Ground Floor, [address]  [place] [address]  o	[phone]  •	[facility]  o	[email or web address]  o	Three locations  ?	[address] [place] [address]  Phone: [phone]  ?	[address] [facility] [address] [place] [address]  Phone: [phone]  ?	[address] [facility] [address] [place] [address]  Phone: [phone]  o	Medicare, Medicaid, most private insurance and sliding scale

## 2023-10-06 NOTE — SWALLOW BEDSIDE ASSESSMENT ADULT - SLP GENERAL OBSERVATIONS
Patient awake and alert in bed on RA. Notable AMS raheem by confusion, reduced orientation, and tangential/unrelated comments. Followed few directions with max cues and repetitions.
Friendly & cooperative
Friendly & cooperative. Voice is mod dysphonic and hypophonic at baseline.

## 2023-10-06 NOTE — BH CONSULTATION LIAISON ASSESSMENT NOTE - NSBHATTESTCOMMENTATTENDFT_PSY_A_CORE
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 Also, pt with prior PET scan concerning for Lewy Body Disease. Psychiatry consulted for anxiety. She has been receiving klonopin 0.25mg qHS in hospital.  On exam, pt overall calm; behavior somewhat disinhibited; does endorse recent visual hallucinations, MMSE score 25. Per collateral from daughter, pt has had prior panic episodes at home featuring shortness of breath; per daughter pt has only received klonopin 0.25mg 3 separate times in the past.  Given potential for benzo to worsen delirium and cause paradoxical agitation, would discontinue standing klonopin; can keep 0.25mg PRN po available in case of withdrawal sx or severe panic while in hospital, but would not continue as outpatient.  To help control sx of anxiety in setting of Lewy Body disease, would start mirtazapine 7.5mg qhs. Would avoid antipsychotics in Lewy Body disease unless severe agitation occurs. Once confirmed that pt's Qtc<500 on repeat EKG, could utilize Seroquel 12.5mg PO BID PRN for agitation.  Of note, pt's prior agitation during hospitalization likely due to delirium, which appears resolving; however, in Lewy Body Disease patients can often experience fluctuating alertness as part of the underlying disease process.

## 2023-10-06 NOTE — SWALLOW BEDSIDE ASSESSMENT ADULT - CONSISTENCIES ADMINISTERED
3 sips thin, 1 bite M&M, 1 dry cracker/thin liquid/minced & moist/regular solid
thin liquid/pureed
thin liquid/mildly thick/pureed/minced & moist

## 2023-10-06 NOTE — SWALLOW BEDSIDE ASSESSMENT ADULT - MODE OF PRESENTATION
7x tsp thin, 2x cup sip thin, 2x 1/4 tsp applesauce/cup/spoon/fed by clinician
Thin liquid via cup sip x2 (self admin), mildly thick via tsp x1 and cup sip x1 (self admin), puree x1, minced and moist x2/cup/spoon/self fed/fed by clinician
cup/self fed

## 2023-10-06 NOTE — BH CONSULTATION LIAISON ASSESSMENT NOTE - NSBHREFERDETAILS_PSY_A_CORE_FT
Patient with newly diagnosed Lewy body dementia and new onset of agitation and anxiety since 2-3 months ago.

## 2023-10-06 NOTE — SWALLOW BEDSIDE ASSESSMENT ADULT - SWALLOW EVAL: DIAGNOSIS
Persistent signs of pharyngeal dysphagia primarily characterized by +intermittent cough with thin liquids suggestive of aspiration
Suspected pharyngeal dysphagia i/s/o overt s/s of aspiration and desaturation during PO trials. Pt presents at increased risk for aspiration and its negative sequelae given hx of pulmonary disease, acute neurological infarcts, AMS, and reduced mobility. Given this, recommend NPO at this time. Patient may require instrumental eval, however timing dependent on mental status. This svc will follow closely to reassess swallow function and determine candidacy for PO.
Fnxl oropharyngeal swallow with no overt signs of aspiration on clinical exam. Pt with known mild pharyngeal inefficiency with solids from FEES on 9/26, but is safe to advance to soft & bite-sized diet.

## 2023-10-06 NOTE — BH CONSULTATION LIAISON ASSESSMENT NOTE - SUMMARY
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 Psychiatry was consulted for agitation and anxiety during the hospital course.     Upon evaluation, patient is alert, oriented, calm, cooperative, and in good behavioral control. Patient's MMSE score was 25, notable impairment in recall and some component in language. Patient's recent onset of anxiety and panic attack episode may be multifactorial: 1. patient's medical diagnosis and subsequent treatment may be both physically and emotionally burdening for her, causing her to be anxious 2. Patient has had medical problems that may mimic panic attacks, such as hypoglycemic episode and exacerbation of SOB in the setting of bronchiectasis, causing shortness of breath and/or trembling, palpitation, and irritability 3. May be in part manifestation of delirium in the setting of dementia 4. Patient's visual hallucinations and hypersensitivity to visual stimulation (small animals running around, textile patterns) causing her to be nervous and anxious. This clinical presentation and timing are more consistent with panic attacks and delirium, rather than mood disorders such as generalized anxiety disorder or major depressive disorder, or primary psychotic disorder, such as schizophrenia or brief psychotic disorder.     Considering that patient had paradoxical disinhibition from Ativan and Seroquel before (per daughter), we would recommend against using these agents in the future for anxiety or agitation. We would recommend melatonin 1mg qhs and Mirtazapine 7.5mg qhs to assist with sleep/wake cycle and recommend Klonopin 0.25mg PRN for severe anxiety.     Of note, patient does not currently endorse depressed mood or SI/HI/AVH, and is not an imminent danger to herself or others. This clinical presentation does not warrant inpatient psychiatric hospitalization. If patient develops new concerning symptoms, including but not limited to depression and suicidal ideation, please recall the psychiatry team to reassess safety.     RECOMMENDATION:  1. Start Melatonin 1mg PO qhs to assist with sleep/wake cycle and insomnia   2. Start Mirtazapine 7.5mg PO qhs to assist with sleep/wake cycle and insomnia; may also assist with panic disorder   3. Discontinue standing Klonipin 0.25mg PO at night to reduce fall risk.   4. Start Klonipin 0.25mg PO PRN for severe agitation/anxiety.  5. Strict delirium precautions such as regulating sleep wake cycle, constant reorientation by staff, opening blinds during the day, out of bed to chair as tolerated, avoiding medications that can worsen delirium such as anticholinergics, antihistamines, opioids.  6. Please update the daughter Ms. Segundo Funes ([phone]) about the final plan as she can assist with explaining about the new medications to the patient and help patient to be more receptive.    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 Psychiatry was consulted for agitation and anxiety during the hospital course.     Upon evaluation, patient is alert, oriented, calm, cooperative, and in good behavioral control. Patient's MMSE score was 25, notable impairment in recall and some component in language. Patient's recent onset of anxiety and panic attack episode may be multifactorial: 1. patient's medical diagnosis and subsequent treatment may be both physically and emotionally burdening for her, causing her to be anxious 2. Patient has had medical problems that may mimic panic attacks, such as hypoglycemic episode and exacerbation of SOB in the setting of bronchiectasis, causing shortness of breath and/or trembling, palpitation, and irritability 3. May be in part manifestation of delirium in the setting of dementia 4. Patient's visual hallucinations and hypersensitivity to visual stimulation (small animals running around, textile patterns) causing her to be nervous and anxious. This clinical presentation and timing are more consistent with panic attacks and delirium, rather than mood disorders such as generalized anxiety disorder or major depressive disorder, or primary psychotic disorder, such as schizophrenia or brief psychotic disorder.     Considering that patient had paradoxical disinhibition from Ativan and Seroquel before (per daughter), we would recommend against using these agents in the future for anxiety or agitation. We would recommend melatonin 1mg qhs and Mirtazapine 7.5mg qhs to assist with sleep/wake cycle and recommend Klonopin 0.25mg PRN for severe anxiety.     Of note, patient does not currently endorse depressed mood or SI/HI/paranoia, and is not an imminent danger to herself or others. This clinical presentation does not warrant inpatient psychiatric hospitalization. If patient develops new concerning symptoms, including but not limited to depression and suicidal ideation, please recall the psychiatry team to reassess safety.     RECOMMENDATION:  1. Start Melatonin 1mg PO qhs to assist with sleep/wake cycle and insomnia   2. Start Mirtazapine 7.5mg PO qhs to assist with sleep/wake cycle and insomnia; may also assist with panic disorder   3. Discontinue standing Klonipin 0.25mg PO at night to reduce fall risk.   4. Start Klonipin 0.25mg PO PRN for severe agitation/anxiety.  5. Strict delirium precautions such as regulating sleep wake cycle, constant reorientation by staff, opening blinds during the day, out of bed to chair as tolerated, avoiding medications that can worsen delirium such as anticholinergics, antihistamines, opioids.  6. Please update the daughter Ms. Segundo Funes ([phone]) about the final plan as she can assist with explaining about the new medications to the patient and help patient to be more receptive.    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 Psychiatry was consulted for agitation and anxiety during the hospital course.     Upon evaluation, patient is alert, oriented, calm, cooperative, and in good behavioral control. Patient's MMSE score was 25, notable impairment in recall and some component in language. Patient's recent onset of anxiety and panic attack episode may be multifactorial: 1. patient's medical diagnosis and subsequent treatment may be both physically and emotionally burdening for her, causing her to be anxious 2. Patient has had medical problems that may mimic panic attacks, such as hypoglycemic episode and exacerbation of SOB in the setting of bronchiectasis, causing shortness of breath and/or trembling, palpitation, and irritability 3. May be in part manifestation of delirium in the setting of dementia 4. Patient's visual hallucinations and hypersensitivity to visual stimulation (small animals running around, textile patterns) causing her to be nervous and anxious. This clinical presentation and timing are more consistent with panic attacks and delirium, rather than mood disorders such as generalized anxiety disorder or major depressive disorder, or primary psychotic disorder, such as schizophrenia or brief psychotic disorder.     Considering that patient had paradoxical disinhibition from Ativan and Seroquel before (per daughter), we would recommend against using these agents in the future for anxiety or agitation. We would recommend melatonin 1mg qhs and Mirtazapine 7.5mg qhs to assist with sleep/wake cycle and recommend Klonopin 0.25mg PRN for severe anxiety.     Of note, patient does not currently endorse depressed mood or SI/HI/paranoia, and is not an imminent danger to herself or others. This clinical presentation does not warrant inpatient psychiatric hospitalization. If patient develops new concerning symptoms, including but not limited to depression and suicidal ideation, please recall the psychiatry team to reassess safety.     RECOMMENDATION:  1. Continue with Melatonin 5mg PO qhs to assist with sleep/wake cycle and insomnia   2. Start Mirtazapine 7.5mg PO qhs to assist with sleep/wake cycle and insomnia; may also assist with panic disorder   3. Discontinue standing Klonipin 0.25mg PO at night to reduce fall risk.   4. Start Klonipin 0.25mg PO PRN for severe agitation/anxiety.  5. Strict delirium precautions such as regulating sleep wake cycle, constant reorientation by staff, opening blinds during the day, out of bed to chair as tolerated, avoiding medications that can worsen delirium such as anticholinergics, antihistamines, opioids.  6. Please update the daughter Ms. Segundo Funes ([phone]) about the final plan as she can assist with explaining about the new medications to the patient and help patient to be more receptive.    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 Psychiatry was consulted for agitation and anxiety during the hospital course.     Upon evaluation, patient is alert, oriented, calm, cooperative, and in good behavioral control. Patient's MMSE score was 25, notable impairment in recall and some component in language. Patient's recent onset of anxiety and panic attack episode may be multifactorial: 1. patient's medical diagnosis and subsequent treatment may be both physically and emotionally burdening for her, causing her to be anxious 2. Patient has had medical problems that may mimic panic attacks, such as hypoglycemic episode and exacerbation of SOB in the setting of bronchiectasis, causing shortness of breath and/or trembling, palpitation, and irritability 3. May be in part manifestation of delirium in the setting of dementia 4. Patient's visual hallucinations and hypersensitivity to visual stimulation (small animals running around, textile patterns) causing her to be nervous and anxious. This clinical presentation and timing are more consistent with panic attacks and delirium, rather than mood disorders such as generalized anxiety disorder or major depressive disorder, or primary psychotic disorder, such as schizophrenia or brief psychotic disorder.     Considering that patient had paradoxical disinhibition from Ativan and Seroquel before (per daughter), we would recommend against using these agents in the future for anxiety or agitation. We would recommend ordering standing melatonin 5mg qhs and Mirtazapine 7.5mg qhs.    Of note, patient does not currently endorse depressed mood or SI/HI/paranoia, and is not an imminent danger to herself or others. This clinical presentation does not warrant inpatient psychiatric hospitalization. If patient develops new concerning symptoms, including but not limited to depression and suicidal ideation, please recall the psychiatry team to reassess safety.     RECOMMENDATION:  1. Continue with Melatonin 5mg PO qhs to assist with sleep/wake cycle and insomnia   2. Start Mirtazapine 7.5mg PO qhs to assist with tx of anxiety, sleep, appetite.  3. Discontinue standing Klonopin, given risk of falls and worsening delirium and paradoxical agitation when using benzos in the elderly or those with dementia. Can keep Klonopin 0.25mg po PRN daily on board in case of withdrawal symptoms or severe panic over next few days but would not continue outpatient.  4. Patients with Lewy Body disease are exquisitely sensitive to antipsychotics. Also, pt currently has prolonged Qtc. Would monitor daily EKG and hold antipsychotics until Qtc <500. After normalization of Qtc, can consider using Seroquel 12.5mg po PRN agitation.  5. Strict delirium precautions such as regulating sleep wake cycle, constant reorientation by staff, opening blinds during the day, out of bed to chair as tolerated, avoiding medications that can worsen delirium such as anticholinergics, antihistamines, opioids.  6. Please update the daughter Ms. Segundo Funes ([phone]) about the final plan as she can assist with explaining about the new medications to the patient and help patient to be more receptive.

## 2023-10-06 NOTE — SWALLOW BEDSIDE ASSESSMENT ADULT - ORAL PREPARATORY PHASE
Very slight anterior loss with liquids, inconsistently/Reduced oral grading
Within functional limits
Required encouragement to accept PO trials

## 2023-10-06 NOTE — BH CONSULTATION LIAISON ASSESSMENT NOTE - NSBHCHARTREVIEWVS_PSY_A_CORE FT
Vital Signs Last 24 Hrs  T(C): 36.5 (06 Oct 2023 13:00), Max: 36.5 (06 Oct 2023 13:00)  T(F): 97.7 (06 Oct 2023 13:00), Max: 97.7 (06 Oct 2023 13:00)  HR: 62 (06 Oct 2023 13:00) (59 - 66)  BP: 132/61 (06 Oct 2023 13:00) (132/61 - 160/85)  BP(mean): 100 (05 Oct 2023 20:39) (90 - 100)  RR: 16 (06 Oct 2023 13:00) (16 - 18)  SpO2: 94% (06 Oct 2023 13:00) (92% - 94%)    Parameters below as of 06 Oct 2023 13:00  Patient On (Oxygen Delivery Method): room air

## 2023-10-06 NOTE — BH CONSULTATION LIAISON ASSESSMENT NOTE - CURRENT MEDICATION
MEDICATIONS  (STANDING):  amLODIPine   Tablet 5 milliGRAM(s) Oral daily  aspirin  chewable 81 milliGRAM(s) Oral daily  atorvastatin 40 milliGRAM(s) Oral at bedtime  azelastine 0.15% Nasal Spray 1 Spray(s) Both Nostrils two times a day  clonazePAM  Tablet 0.25 milliGRAM(s) Oral at bedtime  donepezil 5 milliGRAM(s) Oral every 24 hours  enoxaparin Injectable 30 milliGRAM(s) SubCutaneous every 24 hours  lidocaine 2% Jelly 5 milliLiter(s) IntraUrethral once  melatonin 5 milliGRAM(s) Oral at bedtime  metoprolol tartrate 50 milliGRAM(s) Oral two times a day  montelukast 10 milliGRAM(s) Oral daily  multivitamin  Chewable 1 Tablet(s) Oral daily  sodium chloride 7% Inhalation 4 milliLiter(s) Inhalation every 12 hours  thiamine 100 milliGRAM(s) Oral daily    MEDICATIONS  (PRN):  acetaminophen     Tablet .. 650 milliGRAM(s) Oral every 6 hours PRN Temp greater or equal to 38C (100.4F), Mild Pain (1 - 3), Moderate Pain (4 - 6), Severe Pain (7 - 10)

## 2023-10-06 NOTE — SWALLOW BEDSIDE ASSESSMENT ADULT - COMMENTS
Received awake & alert, seated upright in bed, feeding herself breakfast of scrambled eggs. Pt said, "This is the best breakfast I've ever had," then made multiple requests to advance food to "something chewable."
HHA reported she was "scared" while feeding patient meals due to "coughing and choking".  Dtr reports hx of difficulty with solids 2/2 globus sensation "related to mucous", however reports no difficulty with liquids.  (+) hx of recent PNA.   Pt known to this svc from previous admission.   MBSS completed 8/18/23 which revealed "mild pharyngeal dysphagia with primary impact on efficiency.". Recommended minced/moist and thin liquids due to pt preference.
Received awake, keeps eyes closed t/o assessment but can open them briefly when asked. Ox self, [facility]. Doesn't know year but knows Gregg is President.

## 2023-10-06 NOTE — PROGRESS NOTE ADULT - SUBJECTIVE AND OBJECTIVE BOX
INTERVAL HPI/OVERNIGHT EVENTS: steph o/n    SUBJECTIVE: Patient seen and examined at bedside.   Pt reports feeling more sleepy during the day after taking muscle relaxants. Reports she slept well.   No leg pain/cramps. Denies dyspnea though reports coughing intermittently. No fevers, chest discomfort. Eating wo N/V/Abd pain    OBJECTIVE:    VITAL SIGNS:  ICU Vital Signs Last 24 Hrs  T(C): 36.5 (06 Oct 2023 13:00), Max: 36.5 (06 Oct 2023 13:00)  T(F): 97.7 (06 Oct 2023 13:00), Max: 97.7 (06 Oct 2023 13:00)  HR: 62 (06 Oct 2023 13:00) (59 - 66)  BP: 132/61 (06 Oct 2023 13:00) (132/61 - 160/85)  BP(mean): 100 (05 Oct 2023 20:39) (90 - 100)  ABP: --  ABP(mean): --  RR: 16 (06 Oct 2023 13:00) (16 - 18)  SpO2: 94% (06 Oct 2023 13:00) (92% - 94%)    O2 Parameters below as of 06 Oct 2023 13:00  Patient On (Oxygen Delivery Method): room air              CAPILLARY BLOOD GLUCOSE          PHYSICAL EXAM:  GEN: thin female in NAD on RA  HEENT: NC/AT, MMM  CV: increased rate - irregularly irregular, nml S1S2, no murmurs  PULM: nml effort, dry rales on R mid and lower fields wo wheezing or rhonchi.   ABD: Soft, non-distended, NABS, non-tender  NEURO  A/O x self, hospital, month and year but not date. moving all extremities, Sensation intact  PSYCH: Appropriate    MEDICATIONS:  MEDICATIONS  (STANDING):  amLODIPine   Tablet 5 milliGRAM(s) Oral daily  aspirin  chewable 81 milliGRAM(s) Oral daily  atorvastatin 40 milliGRAM(s) Oral at bedtime  azelastine 0.15% Nasal Spray 1 Spray(s) Both Nostrils two times a day  clonazePAM  Tablet 0.25 milliGRAM(s) Oral at bedtime  donepezil 5 milliGRAM(s) Oral every 24 hours  enoxaparin Injectable 30 milliGRAM(s) SubCutaneous every 24 hours  lidocaine 2% Jelly 5 milliLiter(s) IntraUrethral once  melatonin 5 milliGRAM(s) Oral at bedtime  metoprolol tartrate 50 milliGRAM(s) Oral two times a day  montelukast 10 milliGRAM(s) Oral daily  multivitamin  Chewable 1 Tablet(s) Oral daily  sodium chloride 7% Inhalation 4 milliLiter(s) Inhalation every 12 hours  thiamine 100 milliGRAM(s) Oral daily    MEDICATIONS  (PRN):  acetaminophen     Tablet .. 650 milliGRAM(s) Oral every 6 hours PRN Temp greater or equal to 38C (100.4F), Mild Pain (1 - 3), Moderate Pain (4 - 6), Severe Pain (7 - 10)      ALLERGIES:  Allergies    Ceclor (Rash)  IV Contrast (Unknown)  Levaquin (Swelling)  Augmentin (Short breath; Rash)    Intolerances        LABS:                        10.5   5.29  )-----------( 287      ( 06 Oct 2023 05:30 )             31.6     10-06    135  |  102  |  18  ----------------------------<  104<H>  4.0   |  26  |  0.65    Ca    8.6      06 Oct 2023 05:30  Phos  3.1     10-06  Mg     1.7     10-06        Urinalysis Basic - ( 06 Oct 2023 05:30 )    Color: x / Appearance: x / SG: x / pH: x  Gluc: 104 mg/dL / Ketone: x  / Bili: x / Urobili: x   Blood: x / Protein: x / Nitrite: x   Leuk Esterase: x / RBC: x / WBC x   Sq Epi: x / Non Sq Epi: x / Bacteria: x        RADIOLOGY & ADDITIONAL TESTS: Reviewed.

## 2023-10-06 NOTE — PROGRESS NOTE ADULT - ASSESSMENT
Pulm: Dr. Foster  82F w HTN, AF not on AC d/t fall risk, hospitalized for PRES 05-06/2023, recent DC 8/2023 for acute hypoxemic respiratory failure - to home on 4L NC w follow-up w Dr. Foster for exacerbation of bronchiectasis, recently started course of IV cefepime outpatient by Dr. Foster 9/13, p/w diplopia, encephalopathy admitted for stroke work-up, cefepime continued inpatient, repeat head CT found small SAH, c/b hypoglycemia, encephalopathy - BG 30 and temp 93F during RRT on 9/21, transferred to MICU for persistent hypoglycemia needing D10W >> D10 NS, slow improvement w nml insulin and C peptide, stepped down to 7L and stroke service, MR brain showing L pontine infarction, PET brain w signal c/f Dementia w Lewy Bodies, for acute rehab    #Acute urinary retention - resolved and passed TOV 10/2    #CVA - L pontine infarction on MR 9/25 that also showed SAH diminishing.  #Encephalopathy - improving. There is signal on PET c/f Dementia w Lewy Bodies   - ASA 81 daily, Atorva 40   - Started on donepezil 5 HS    #Bronchiectasis - On IV Cefepime from 9/13 w PICC placed outpatient (PICC discontinued 9/21). Has been on IV Zosyn in MICU per pulmonary thru 9/29. Currently satting well on RA. On singulair 10  #AFib - on lopressor 50 bid. AC currently held d/t SAH  #HTN - on amlodipine 5    #Normocytic anemia - 10.5 in AM  #Hyponatremia - resolved    Plan  Can use lidocaine patch PRN for chest wall pain - pt is amenable.  Robaxin has been DCd - monitor for leg cramping  Can discuss if additional agent for anxiety e.g. buspar would be helpful w psychiatry.     Pt is DNR/DNI  DISPO: Acute rehab - baseline lives w daughter - awaiting bed

## 2023-10-06 NOTE — PROGRESS NOTE ADULT - SUBJECTIVE AND OBJECTIVE BOX
OVERNIGHT EVENTS: ALENA    SUBJECTIVE / INTERVAL HPI: Patient seen and examined at bedside. Reports she is feeling better today, currently no complaints. Denies episodes of diplopia, abd pain, fever, chills, n/v/d.     VITAL SIGNS:  Vital Signs Last 24 Hrs  T(C): 36.5 (06 Oct 2023 13:00), Max: 36.5 (06 Oct 2023 13:00)  T(F): 97.7 (06 Oct 2023 13:00), Max: 97.7 (06 Oct 2023 13:00)  HR: 62 (06 Oct 2023 13:00) (59 - 66)  BP: 132/61 (06 Oct 2023 13:00) (132/61 - 160/85)  BP(mean): 100 (05 Oct 2023 20:39) (90 - 100)  RR: 16 (06 Oct 2023 13:00) (16 - 18)  SpO2: 94% (06 Oct 2023 13:00) (92% - 94%)    Parameters below as of 06 Oct 2023 13:00  Patient On (Oxygen Delivery Method): room air        PHYSICAL EXAM:    General: NAD, cachectic female  HEENT: NC/AT; PERRL, anicteric sclera; MMM  Neck: supple w/o palpable nodularity  Cardiovascular: +S1/S2; +irregular rhythm  Respiratory: CTA B/L; no W/R/R  Gastrointestinal: soft, NT/ND; +BSx4  Neurological: AAOx3; no focal deficits    MEDICATIONS:  MEDICATIONS  (STANDING):  amLODIPine   Tablet 5 milliGRAM(s) Oral daily  aspirin  chewable 81 milliGRAM(s) Oral daily  atorvastatin 40 milliGRAM(s) Oral at bedtime  azelastine 0.15% Nasal Spray 1 Spray(s) Both Nostrils two times a day  clonazePAM  Tablet 0.25 milliGRAM(s) Oral at bedtime  donepezil 5 milliGRAM(s) Oral every 24 hours  enoxaparin Injectable 30 milliGRAM(s) SubCutaneous every 24 hours  lidocaine 2% Jelly 5 milliLiter(s) IntraUrethral once  melatonin 5 milliGRAM(s) Oral at bedtime  metoprolol tartrate 50 milliGRAM(s) Oral two times a day  montelukast 10 milliGRAM(s) Oral daily  multivitamin  Chewable 1 Tablet(s) Oral daily  sodium chloride 7% Inhalation 4 milliLiter(s) Inhalation every 12 hours  thiamine 100 milliGRAM(s) Oral daily    MEDICATIONS  (PRN):  acetaminophen     Tablet .. 650 milliGRAM(s) Oral every 6 hours PRN Temp greater or equal to 38C (100.4F), Mild Pain (1 - 3), Moderate Pain (4 - 6), Severe Pain (7 - 10)      ALLERGIES:  Allergies    Ceclor (Rash)  IV Contrast (Unknown)  Levaquin (Swelling)  Augmentin (Short breath; Rash)    Intolerances        LABS:                        10.5   5.29  )-----------( 287      ( 06 Oct 2023 05:30 )             31.6     10-06    135  |  102  |  18  ----------------------------<  104<H>  4.0   |  26  |  0.65    Ca    8.6      06 Oct 2023 05:30  Phos  3.1     10-06  Mg     1.7     10-06        Urinalysis Basic - ( 06 Oct 2023 05:30 )    Color: x / Appearance: x / SG: x / pH: x  Gluc: 104 mg/dL / Ketone: x  / Bili: x / Urobili: x   Blood: x / Protein: x / Nitrite: x   Leuk Esterase: x / RBC: x / WBC x   Sq Epi: x / Non Sq Epi: x / Bacteria: x      CAPILLARY BLOOD GLUCOSE          RADIOLOGY & ADDITIONAL TESTS: Reviewed.   OVERNIGHT EVENTS: ALENA    SUBJECTIVE / INTERVAL HPI: Patient seen and examined at bedside. Reports she is feeling better today, currently no complaints. Denies episodes of diplopia, abd pain, fever, chills, n/v/d.     VITAL SIGNS:  Vital Signs Last 24 Hrs  T(C): 36.5 (06 Oct 2023 13:00), Max: 36.5 (06 Oct 2023 13:00)  T(F): 97.7 (06 Oct 2023 13:00), Max: 97.7 (06 Oct 2023 13:00)  HR: 62 (06 Oct 2023 13:00) (59 - 66)  BP: 132/61 (06 Oct 2023 13:00) (132/61 - 160/85)  BP(mean): 100 (05 Oct 2023 20:39) (90 - 100)  RR: 16 (06 Oct 2023 13:00) (16 - 18)  SpO2: 94% (06 Oct 2023 13:00) (92% - 94%)    Parameters below as of 06 Oct 2023 13:00  Patient On (Oxygen Delivery Method): room air      PHYSICAL EXAM:    General: NAD, cachectic female  HEENT: NC/AT; PERRL, anicteric sclera; MMM  Neck: supple w/o palpable nodularity  Cardiovascular: +S1/S2; +irregular rhythm  Respiratory: CTA B/L; no W/R/R  Gastrointestinal: soft, NT/ND; +BSx4  Neurological: AAOx3; no focal deficits    MEDICATIONS:  MEDICATIONS  (STANDING):  amLODIPine   Tablet 5 milliGRAM(s) Oral daily  aspirin  chewable 81 milliGRAM(s) Oral daily  atorvastatin 40 milliGRAM(s) Oral at bedtime  azelastine 0.15% Nasal Spray 1 Spray(s) Both Nostrils two times a day  clonazePAM  Tablet 0.25 milliGRAM(s) Oral at bedtime  donepezil 5 milliGRAM(s) Oral every 24 hours  enoxaparin Injectable 30 milliGRAM(s) SubCutaneous every 24 hours  lidocaine 2% Jelly 5 milliLiter(s) IntraUrethral once  melatonin 5 milliGRAM(s) Oral at bedtime  metoprolol tartrate 50 milliGRAM(s) Oral two times a day  montelukast 10 milliGRAM(s) Oral daily  multivitamin  Chewable 1 Tablet(s) Oral daily  sodium chloride 7% Inhalation 4 milliLiter(s) Inhalation every 12 hours  thiamine 100 milliGRAM(s) Oral daily    MEDICATIONS  (PRN):  acetaminophen     Tablet .. 650 milliGRAM(s) Oral every 6 hours PRN Temp greater or equal to 38C (100.4F), Mild Pain (1 - 3), Moderate Pain (4 - 6), Severe Pain (7 - 10)      ALLERGIES:  Allergies    Ceclor (Rash)  IV Contrast (Unknown)  Levaquin (Swelling)  Augmentin (Short breath; Rash)    Intolerances        LABS:                        10.5   5.29  )-----------( 287      ( 06 Oct 2023 05:30 )             31.6     10-06    135  |  102  |  18  ----------------------------<  104<H>  4.0   |  26  |  0.65    Ca    8.6      06 Oct 2023 05:30  Phos  3.1     10-06  Mg     1.7     10-06        Urinalysis Basic - ( 06 Oct 2023 05:30 )    Color: x / Appearance: x / SG: x / pH: x  Gluc: 104 mg/dL / Ketone: x  / Bili: x / Urobili: x   Blood: x / Protein: x / Nitrite: x   Leuk Esterase: x / RBC: x / WBC x   Sq Epi: x / Non Sq Epi: x / Bacteria: x      CAPILLARY BLOOD GLUCOSE          RADIOLOGY & ADDITIONAL TESTS: Reviewed.

## 2023-10-06 NOTE — BH CONSULTATION LIAISON ASSESSMENT NOTE - DIFFERENTIAL
delirium, panic disorder, major neurocognitive disorder, mood disorder, primary psychotic disorder  Delirium  Lewy Body Disease

## 2023-10-06 NOTE — BH CONSULTATION LIAISON ASSESSMENT NOTE - DESCRIPTION
Pt used to live alone in a house in [place], [place], but a few months ago moved in with her daughter Seugndo.  Currently retired.

## 2023-10-06 NOTE — SWALLOW BEDSIDE ASSESSMENT ADULT - SLP PERTINENT HISTORY OF CURRENT PROBLEM
83 yo W recently d/c from [facility] in Aug 2023, known to this svc from that adm for MBS on 8/18 with rec for minced & moist/thin liq diet, now readm w new Rparietal & occipital SAH and new L occipital ischemic stroke.
82y Female with PMHx of HTN, afib (not on AC due to fall risk), recent hospitalization for PRES (5-6/2023), recently discharged from [facility] 8/2023 with acute exacerbation of severe bronchiectasis and respiratory failure presents with several days of AMS and new diplopia 9/18. Repeat CTH obtained for episode of worsening AMS on 09/20, found to have new R parietal and occipital SAH. Eliquis discontinued. Only tolerated noncontrast portion of MRI, found to have new L occipital ischemic stroke. 9/21-22 Rapid response called for persistent hypoglycemic events despite adequate treatment and hypoxia. Found to be hypothermic, concern for active infection. Transferred to MICU for further management. worsening AMS
81 yo W w multiple medical problems, including Lewy body dementia, known to this [facility] for FEES on 9/26 w rec for M&M d/t Pt mild swallow inefficiency & Pt stated preference for same

## 2023-10-06 NOTE — BH CONSULTATION LIAISON ASSESSMENT NOTE - NSBHMSERECMEM_PSY_A_CORE
MEDICATIONS  (STANDING):  calcium gluconate IVPB 2 Gram(s) IV Intermittent once  chlorhexidine 0.12% Liquid 15 milliLiter(s) Oral Mucosa every 12 hours  chlorhexidine 4% Liquid 1 Application(s) Topical <User Schedule>  cyproheptadine 4 milliGRAM(s) Oral every 6 hours  dextrose 5%. 1000 milliLiter(s) (50 mL/Hr) IV Continuous <Continuous>  enoxaparin Injectable 40 milliGRAM(s) SubCutaneous daily  famotidine Injectable 20 milliGRAM(s) IV Push every 12 hours  lactated ringers. 1000 milliLiter(s) (125 mL/Hr) IV Continuous <Continuous>  midazolam Infusion 0.02 mG/kG/Hr (1.04 mL/Hr) IV Continuous <Continuous>  polyethylene glycol/electrolyte Solution. 2000 milliLiter(s) Oral <User Schedule>  potassium chloride  20 mEq/100 mL IVPB 20 milliEquivalent(s) IV Intermittent every 2 hours  propofol Infusion 10 MICROgram(s)/kG/Min (2.99 mL/Hr) IV Continuous <Continuous>    MEDICATIONS  (PRN):  LORazepam   Injectable 2.5 milliGRAM(s) IV Push once PRN Seizure activity
Impaired

## 2023-10-06 NOTE — SWALLOW BEDSIDE ASSESSMENT ADULT - NS SPL SWALLOW CLINIC TRIAL FT
Pt completed FEES on 9/26 and had no airway invasion of PO trials. Diet rec was minced & moist and adv to SBS at Patient's preference.
+cough productive of mild amount of thick dry secretions. Oral care & hydration provided, but oral cavity remains dry and Pt c/o feeling dry phlegm in her throat. Suspect presence of thick, dry secretions impede swallow function & safety. RN and MD made aware.
Increased WOB post swallow across consistencies.

## 2023-10-06 NOTE — BH CONSULTATION LIAISON ASSESSMENT NOTE - RISK ASSESSMENT
Modifiable risk factors include psychosis, medical illness/pain.  Static risk factors include advanced age,  race.  Protective factors include social support/family connectiveness, parenthood, seeking out treatment.   Chronic risk factors for harm to self or others include history of neurocognitive disorder, advanced age. Acute risk is not elevated; pt denies prior psychiatric history including SI/HI/AVH.  Protective factors include social support/family connectiveness, parenthood, seeking out treatment.

## 2023-10-06 NOTE — SWALLOW BEDSIDE ASSESSMENT ADULT - SWALLOW EVAL: RECOMMENDED DIET
soft & bite-sized / thin liquid
Continue NPO. Consider short-term alt means of nutrition/hydration/medication. Allow ice-chips for oral hydration & comfort, and PO meds in applesauce
NPO, consider short term alt means of nutrition.

## 2023-10-06 NOTE — BH CONSULTATION LIAISON ASSESSMENT NOTE - NSBHCHARTREVIEWINVESTIGATE_PSY_A_CORE FT
Ventricular Rate 78 BPM    Atrial Rate 78 BPM    P-R Interval 164 ms    QRS Duration 66 ms    Q-T Interval 508 ms    QTC Calculation(Bazett) 579 ms    P Axis 88 degrees    R Axis 95 degrees    T Axis 80 degrees   Ventricular Rate 78 BPM    Atrial Rate 78 BPM    P-R Interval 164 ms    QRS Duration 66 ms    Q-T Interval 508 ms    QTC Calculation(Bazett) 579 ms ([place] 540)    P Axis 88 degrees    R Axis 95 degrees    T Axis 80 degrees

## 2023-10-06 NOTE — BH CONSULTATION LIAISON ASSESSMENT NOTE - HPI (INCLUDE ILLNESS QUALITY, SEVERITY, DURATION, TIMING, CONTEXT, MODIFYING FACTORS, ASSOCIATED SIGNS AND SYMPTOMS)
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 Psychiatry was consulted for agitation and anxiety during the hospital course.     Upon approach, patient is sitting on her bed, eating lunch, calm, cooperative, and pleasant. Of note, patient's MMSE was 25, particularly impaired in recall and some components in language. Patient states her memory is very poor.     She states she did not have any anxiety at home but developed panic episode in the hospital and was given medication three times a day, which has helped her. She describes the panic attack as having shortness of breath, and attributes the onset of episodes due to stressful environment in the hospital, such as beeping noise and frequently being awaken at night, having no control over her activities, and being in bed all day. She denies feeling sad at this time. She mentions she was sad 35 years ago when she lost her , but has never had SI/SA/NSSI in lifetime. Has seen a psychiatrist briefly for a group therapy in 1962 because it was something new and interesting at the time, but has not been diagnosed with any mental disorder or tried psychotropic drugs before. She states she likes to talk to people and going to [employer] club.     Concerning symptoms of auditory and visual hallucinations, she mentions she feels like she is watching a movie or a TV but turn out them being unreal. She also mentions seeing small animals like kittens and puppies since May when she broke her hip. She also mentions seeing textiles remind her of distant past when she saw a similar textile.     Concerning substance use, she states she used to drink socially, and the last alcohol was about 5 years ago during Thanksgiving. Denies any other substance use, such as cannabis, cocaine, or nicotine.     Collateral obtained from Ms. Segundo Funes, daughter | [phone]   Daughter reports that she has had couple episodes of panic attacks at home since only 2-3 months ago, in which she was gasping for air, shaky, and could not think. She called her primary care doctor, who suggested Xanax; however the daughter had Klonipin 0.25mg at the time, which was effective for the patient's panic attack. Daughter reports giving this only 2-3 times over the 2-3 months at home. Since coming to the hospital, patient has appeared more anxious, double checking every medication she is taking and tells her daughter that she can feel her heart pouncing. Even when she does not directly say she feels anxious, the daughter could see the signs and symptoms of anxiety. The patient would report that certain fabric remind her of some distant memories, and this would make the patient anxious. Daughter denies any paranoia at this time, although she had some paranoia in her previous admissions, which resolved.   Notably, daughter reports that patient has been tried on Ativan and Seroquel before (no known dosage), and patient was more aggressive and agitated when given these medications. With Klonipin, patient has been in better mood and behavioral control.  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 Psychiatry was consulted for agitation and anxiety during the hospital course.     Upon approach, patient is sitting on her bed, eating lunch, calm, cooperative, and pleasant. Of note, patient's MMSE was 25, particularly impaired in recall and some components in language. Patient states her memory is very poor.     She states she did not have any anxiety at home but developed panic episode in the hospital and was given medication three times a day, which has helped her. She describes the panic attack as having shortness of breath, and attributes the onset of episodes due to stressful environment in the hospital, such as beeping noise and frequently being awaken at night, having no control over her activities, and being in bed all day. She denies feeling sad at this time. She mentions she was sad 35 years ago when she lost her , but has never had SI/SA/NSSI in lifetime. Has seen a psychiatrist briefly for a group therapy in 1962 because it was something new and interesting at the time, but has not been diagnosed with any mental disorder or tried psychotropic drugs before. She states she likes to talk to people and going to [employer] club.     Concerning symptoms of auditory and visual hallucinations, she mentions she feels like she is watching a movie or a TV but turn out them being unreal. She also mentions seeing small animals like kittens and puppies since May when she broke her hip. She also mentions seeing textiles remind her of distant past when she saw a similar textile.     Concerning substance use, she states she used to drink socially, and the last alcohol was about 5 years ago during Thanksgiving. Denies any other substance use, such as cannabis, cocaine, or nicotine.     Collateral obtained from Ms. Segundo Funes, daughter | [phone]   Daughter reports that she has had couple episodes of panic attacks at home since only 2-3 months ago, in which she was gasping for air, shaky, and could not think. She called her primary care doctor, who suggested Xanax; however the daughter had Klonipin 0.25mg at the time, which was effective for the patient's panic attack. Daughter reports giving this only 2-3 times over the 2-3 months at home. Since coming to the hospital, patient has appeared more anxious, double checking every medication she is taking and tells her daughter that she can feel her heart pouncing. Even when she does not directly say she feels anxious, the daughter could see the signs and symptoms of anxiety. The patient would report that certain fabric remind her of some distant memories, and this would make the patient anxious. Daughter denies any paranoia at this time, although she had some paranoia in her previous admissions, which resolved.   Notably, daughter reports that patient has been tried on Ativan and Seroquel before (no known dosage), and patient was more aggressive and agitated when given these medications. With Klonopin, patient has been in better mood and behavioral control.

## 2023-10-07 LAB
ALBUMIN SERPL ELPH-MCNC: 3.2 G/DL — LOW (ref 3.3–5)
ALP SERPL-CCNC: 146 U/L — HIGH (ref 40–120)
ALT FLD-CCNC: 52 U/L — HIGH (ref 10–45)
ANION GAP SERPL CALC-SCNC: 7 MMOL/L — SIGNIFICANT CHANGE UP (ref 5–17)
AST SERPL-CCNC: 37 U/L — SIGNIFICANT CHANGE UP (ref 10–40)
BILIRUB SERPL-MCNC: 0.2 MG/DL — SIGNIFICANT CHANGE UP (ref 0.2–1.2)
BLD GP AB SCN SERPL QL: NEGATIVE — SIGNIFICANT CHANGE UP
BUN SERPL-MCNC: 17 MG/DL — SIGNIFICANT CHANGE UP (ref 7–23)
CALCIUM SERPL-MCNC: 8.8 MG/DL — SIGNIFICANT CHANGE UP (ref 8.4–10.5)
CHLORIDE SERPL-SCNC: 101 MMOL/L — SIGNIFICANT CHANGE UP (ref 96–108)
CO2 SERPL-SCNC: 28 MMOL/L — SIGNIFICANT CHANGE UP (ref 22–31)
CREAT SERPL-MCNC: 0.6 MG/DL — SIGNIFICANT CHANGE UP (ref 0.5–1.3)
EGFR: 91 ML/MIN/1.73M2 — SIGNIFICANT CHANGE UP
GLUCOSE SERPL-MCNC: 96 MG/DL — SIGNIFICANT CHANGE UP (ref 70–99)
HCT VFR BLD CALC: 31.5 % — LOW (ref 34.5–45)
HGB BLD-MCNC: 10.1 G/DL — LOW (ref 11.5–15.5)
MAGNESIUM SERPL-MCNC: 1.9 MG/DL — SIGNIFICANT CHANGE UP (ref 1.6–2.6)
MCHC RBC-ENTMCNC: 32.1 GM/DL — SIGNIFICANT CHANGE UP (ref 32–36)
MCHC RBC-ENTMCNC: 32.7 PG — SIGNIFICANT CHANGE UP (ref 27–34)
MCV RBC AUTO: 101.9 FL — HIGH (ref 80–100)
NRBC # BLD: 0 /100 WBCS — SIGNIFICANT CHANGE UP (ref 0–0)
PHOSPHATE SERPL-MCNC: 3.3 MG/DL — SIGNIFICANT CHANGE UP (ref 2.5–4.5)
PLATELET # BLD AUTO: 279 K/UL — SIGNIFICANT CHANGE UP (ref 150–400)
POTASSIUM SERPL-MCNC: 4.1 MMOL/L — SIGNIFICANT CHANGE UP (ref 3.5–5.3)
POTASSIUM SERPL-SCNC: 4.1 MMOL/L — SIGNIFICANT CHANGE UP (ref 3.5–5.3)
PROT SERPL-MCNC: 6.7 G/DL — SIGNIFICANT CHANGE UP (ref 6–8.3)
RBC # BLD: 3.09 M/UL — LOW (ref 3.8–5.2)
RBC # FLD: 13.4 % — SIGNIFICANT CHANGE UP (ref 10.3–14.5)
RH IG SCN BLD-IMP: POSITIVE — SIGNIFICANT CHANGE UP
SODIUM SERPL-SCNC: 136 MMOL/L — SIGNIFICANT CHANGE UP (ref 135–145)
WBC # BLD: 5.82 K/UL — SIGNIFICANT CHANGE UP (ref 3.8–10.5)
WBC # FLD AUTO: 5.82 K/UL — SIGNIFICANT CHANGE UP (ref 3.8–10.5)

## 2023-10-07 PROCEDURE — 99232 SBSQ HOSP IP/OBS MODERATE 35: CPT

## 2023-10-07 RX ADMIN — Medication 650 MILLIGRAM(S): at 23:12

## 2023-10-07 RX ADMIN — Medication 1 TABLET(S): at 11:11

## 2023-10-07 RX ADMIN — SODIUM CHLORIDE 4 MILLILITER(S): 9 INJECTION INTRAMUSCULAR; INTRAVENOUS; SUBCUTANEOUS at 08:47

## 2023-10-07 RX ADMIN — Medication 50 MILLIGRAM(S): at 05:18

## 2023-10-07 RX ADMIN — Medication 5 MILLIGRAM(S): at 21:46

## 2023-10-07 RX ADMIN — DONEPEZIL HYDROCHLORIDE 5 MILLIGRAM(S): 10 TABLET, FILM COATED ORAL at 19:15

## 2023-10-07 RX ADMIN — Medication 81 MILLIGRAM(S): at 11:11

## 2023-10-07 RX ADMIN — Medication 100 MILLIGRAM(S): at 11:11

## 2023-10-07 RX ADMIN — Medication 650 MILLIGRAM(S): at 16:50

## 2023-10-07 RX ADMIN — Medication 50 MILLIGRAM(S): at 17:11

## 2023-10-07 RX ADMIN — MONTELUKAST 10 MILLIGRAM(S): 4 TABLET, CHEWABLE ORAL at 11:12

## 2023-10-07 RX ADMIN — ATORVASTATIN CALCIUM 40 MILLIGRAM(S): 80 TABLET, FILM COATED ORAL at 21:26

## 2023-10-07 RX ADMIN — AMLODIPINE BESYLATE 5 MILLIGRAM(S): 2.5 TABLET ORAL at 05:18

## 2023-10-07 RX ADMIN — AZELASTINE 1 SPRAY(S): 137 SPRAY, METERED NASAL at 21:27

## 2023-10-07 RX ADMIN — AZELASTINE 1 SPRAY(S): 137 SPRAY, METERED NASAL at 05:18

## 2023-10-07 RX ADMIN — ENOXAPARIN SODIUM 30 MILLIGRAM(S): 100 INJECTION SUBCUTANEOUS at 17:11

## 2023-10-07 RX ADMIN — Medication 650 MILLIGRAM(S): at 17:47

## 2023-10-07 RX ADMIN — SODIUM CHLORIDE 4 MILLILITER(S): 9 INJECTION INTRAMUSCULAR; INTRAVENOUS; SUBCUTANEOUS at 21:26

## 2023-10-07 NOTE — PROGRESS NOTE ADULT - NUTRITIONAL ASSESSMENT
This patient has been assessed with a concern for Malnutrition and has been determined to have a diagnosis/diagnoses of Severe protein-calorie malnutrition and Underweight (BMI < 19).    This patient is being managed with:   Diet Soft and Bite Sized-  Entered: Oct  6 2023  7:36PM

## 2023-10-07 NOTE — PROGRESS NOTE ADULT - NSPROGADDITIONALINFOA_GEN_ALL_CORE
83 yo F w/ HTN, AF (not on AC due to fall risk and CAA), skin to bone, previously hospitalization for PRES (5-6/2023), dc from [facility] 8/2023 with acute exacerbation of severe bronchiectasis and respiratory failure, returned to [facility] with AMS and diplopia. NIHSS 1 on admission for bitemporal visual deficit. Course complicated by agitation and severe hypoglycemia, requiring ICU admission. Brain imaging significant for left pontine and left occipital infarct as well as SAH of the right parieto-occipital region. MRA head and Neck were negative for vascular malformation or significant stenosis. MRV was negative for thrombus. PET brain obtained, findings consistent with underlying neurodegenerative disease, pattern most suggestive of dementia. 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  Had urinary retention, now Richards independent. Had leg cramp, on Robaxin 500 TID, sxm improving. Afib rate previously 100-120, now rate controlled with metoprolol to 50-80. Today exam stable, greatly improved vs when I met her 2 weeks ago and w/ much improved appetites. Sit to chair daily, direct able, conversational with humor. On RA. On ASA 81mg qDay, Lipitor 40mg qDay    A1C 5.5  Dispo. Pt is medically stable to return to [place] with neurology f/u with Dr. Starkey for dementia    Wan Shi MD PHD  Vascular neurology fellow

## 2023-10-07 NOTE — PROGRESS NOTE ADULT - ASSESSMENT
Pulm: Dr. Foster  82F w HTN, AF not on AC d/t fall risk, hospitalized for PRES 05-06/2023, recent DC 8/2023 for acute hypoxemic respiratory failure - to home on 4L NC w follow-up w Dr. Foster for exacerbation of bronchiectasis, recently started course of IV cefepime outpatient by Dr. Foster 9/13, p/w diplopia, encephalopathy admitted for stroke work-up, cefepime continued inpatient, repeat head CT found small SAH, c/b hypoglycemia, encephalopathy - BG 30 and temp 93F during RRT on 9/21, transferred to MICU for persistent hypoglycemia needing D10W >> D10 NS, slow improvement w nml insulin and C peptide, stepped down to 7L and stroke service, MR brain showing L pontine infarction, PET brain w signal c/f Dementia w Lewy Bodies, for acute rehab    #Acute urinary retention - resolved and passed TOV 10/2    #CVA - L pontine infarction on MR 9/25 that also showed SAH diminishing.  #Encephalopathy - improving. There is signal on PET c/f Dementia w Lewy Bodies   - ASA 81 daily, Atorva 40   - Started on donepezil 5 HS    #Bronchiectasis - On IV Cefepime from 9/13 w PICC placed outpatient (PICC discontinued 9/21). Has been on IV Zosyn in MICU per pulmonary thru 9/29. Currently satting well on RA. On singulair 10  #AFib - on lopressor 50 bid. AC currently held d/t SAH  #HTN - on amlodipine 5    #Normocytic anemia - 10.1  #Hyponatremia - resolved    Plan  Can use lidocaine patch PRN for chest wall pain - pt is amenable.  Robaxin has been DCd - monitor for leg cramping  Can discuss if additional agent for anxiety e.g. buspar would be helpful w psychiatry.     Pt is DNR/DNI  DISPO: Acute rehab - baseline lives w daughter - awaiting bed

## 2023-10-07 NOTE — PROGRESS NOTE ADULT - SUBJECTIVE AND OBJECTIVE BOX
OVERNIGHT EVENTS:    SUBJECTIVE:  Patient seen and examined at bedside. Reports she is feeling better today, currently no complaints. Denies episodes of diplopia, abd pain, fever, chills, n/v/d.     Vital Signs Last 12 Hrs  T(F): 98 (10-07-23 @ 12:10), Max: 98.4 (10-07-23 @ 05:06)  HR: 87 (10-07-23 @ 12:10) (54 - 87)  BP: 144/81 (10-07-23 @ 12:10) (139/65 - 163/89)  BP(mean): --  RR: 17 (10-07-23 @ 12:10) (16 - 17)  SpO2: 95% (10-07-23 @ 12:10) (95% - 96%)  I&O's Summary      PHYSICAL EXAM:  General: NAD, cachectic female  HEENT: NC/AT; PERRL, anicteric sclera; MMM  Neck: supple w/o palpable nodularity  Cardiovascular: +S1/S2; +irregular rhythm  Respiratory: CTA B/L; no W/R/R  Gastrointestinal: soft, NT/ND; +BSx4  Neurological: AAOx3; no focal deficits        LABS:                        10.1   5.82  )-----------( 279      ( 07 Oct 2023 09:19 )             31.5     10-07    136  |  101  |  17  ----------------------------<  96  4.1   |  28  |  0.60    Ca    8.8      07 Oct 2023 09:19  Phos  3.3     10-07  Mg     1.9     10-07    TPro  6.7  /  Alb  3.2<L>  /  TBili  0.2  /  DBili  x   /  AST  37  /  ALT  52<H>  /  AlkPhos  146<H>  10-07      Urinalysis Basic - ( 07 Oct 2023 09:19 )    Color: x / Appearance: x / SG: x / pH: x  Gluc: 96 mg/dL / Ketone: x  / Bili: x / Urobili: x   Blood: x / Protein: x / Nitrite: x   Leuk Esterase: x / RBC: x / WBC x   Sq Epi: x / Non Sq Epi: x / Bacteria: x          RADIOLOGY & ADDITIONAL TESTS:    MEDICATIONS  (STANDING):  amLODIPine   Tablet 5 milliGRAM(s) Oral daily  aspirin  chewable 81 milliGRAM(s) Oral daily  atorvastatin 40 milliGRAM(s) Oral at bedtime  azelastine 0.15% Nasal Spray 1 Spray(s) Both Nostrils two times a day  donepezil 5 milliGRAM(s) Oral every 24 hours  enoxaparin Injectable 30 milliGRAM(s) SubCutaneous every 24 hours  lidocaine 2% Jelly 5 milliLiter(s) IntraUrethral once  melatonin 5 milliGRAM(s) Oral at bedtime  metoprolol tartrate 50 milliGRAM(s) Oral two times a day  mirtazapine 7.5 milliGRAM(s) Oral at bedtime  montelukast 10 milliGRAM(s) Oral daily  multivitamin  Chewable 1 Tablet(s) Oral daily  sodium chloride 7% Inhalation 4 milliLiter(s) Inhalation every 12 hours  thiamine 100 milliGRAM(s) Oral daily    MEDICATIONS  (PRN):  acetaminophen     Tablet .. 650 milliGRAM(s) Oral every 6 hours PRN Temp greater or equal to 38C (100.4F), Mild Pain (1 - 3), Moderate Pain (4 - 6), Severe Pain (7 - 10)

## 2023-10-07 NOTE — PROGRESS NOTE ADULT - SUBJECTIVE AND OBJECTIVE BOX
Neurology Stroke Progress Note    INTERVAL HPI/OVERNIGHT EVENTS: No acute overnight events. Patient seen and examined with stroke fellow. Patient doing well. Complaining of musculoskeletal pain to the left ribcage, unimproved with lidocaine patch. Not interested in trying oral pain medication.     MEDICATIONS  (STANDING):  amLODIPine   Tablet 5 milliGRAM(s) Oral daily  aspirin  chewable 81 milliGRAM(s) Oral daily  atorvastatin 40 milliGRAM(s) Oral at bedtime  azelastine 0.15% Nasal Spray 1 Spray(s) Both Nostrils two times a day  donepezil 5 milliGRAM(s) Oral every 24 hours  enoxaparin Injectable 30 milliGRAM(s) SubCutaneous every 24 hours  lidocaine 2% Jelly 5 milliLiter(s) IntraUrethral once  melatonin 5 milliGRAM(s) Oral at bedtime  metoprolol tartrate 50 milliGRAM(s) Oral two times a day  mirtazapine 7.5 milliGRAM(s) Oral at bedtime  montelukast 10 milliGRAM(s) Oral daily  multivitamin  Chewable 1 Tablet(s) Oral daily  sodium chloride 7% Inhalation 4 milliLiter(s) Inhalation every 12 hours  thiamine 100 milliGRAM(s) Oral daily    MEDICATIONS  (PRN):  acetaminophen     Tablet .. 650 milliGRAM(s) Oral every 6 hours PRN Temp greater or equal to 38C (100.4F), Mild Pain (1 - 3), Moderate Pain (4 - 6), Severe Pain (7 - 10)    Allergies  Ceclor (Rash)  IV Contrast (Unknown)  Levaquin (Swelling)  Augmentin (Short breath; Rash)    Vital Signs Last 24 Hrs  T(C): 36.7 (07 Oct 2023 12:10), Max: 36.9 (07 Oct 2023 05:06)  T(F): 98 (07 Oct 2023 12:10), Max: 98.4 (07 Oct 2023 05:06)  HR: 87 (07 Oct 2023 12:10) (54 - 87)  BP: 144/81 (07 Oct 2023 12:10) (136/72 - 163/89)  RR: 17 (07 Oct 2023 12:10) (16 - 17)  SpO2: 95% (07 Oct 2023 12:10) (91% - 96%)    Parameters below as of 07 Oct 2023 12:10  Patient On (Oxygen Delivery Method): room air    Physical exam:  General: No acute distress, awake and alert  Eyes: Anicteric sclerae, moist conjunctivae  Extremities: Radial and DP pulses +2, no edema    Neurologic:  -Mental status: Awake, alert, oriented to person, place, and time. Speech is fluent with intact naming, repetition, and comprehension, no dysarthria. Recent and remote memory intact. Follows commands. Attention/concentration intact. Fund of knowledge appropriate.  -Cranial nerves:   II:  bitemporally quadrantopia with BTT   III, IV, VI: Extraocular movements are intact without nystagmus. Pupils equally round and reactive to light  V:  Facial sensation V1-V3 equal and intact   VII: Face is symmetric with normal eye closure and smile  VIII: Hearing is bilaterally intact to finger rub  IX, X: Uvula is midline and soft palate rises symmetrically  XI: Head turning and shoulder shrug are intact.  XII: Tongue protrudes midline  Motor: Normal bulk and tone. No pronator drift. Strength bilateral upper extremity 5/5, bilateral lower extremities 5/5.  Sensation: Intact to light touch bilaterally  Coordination: No dysmetria on finger-to-nose     LABS:                        10.1   5.82  )-----------( 279      ( 07 Oct 2023 09:19 )             31.5     10-07    136  |  101  |  17  ----------------------------<  96  4.1   |  28  |  0.60    Ca    8.8      07 Oct 2023 09:19  Phos  3.3     10-07  Mg     1.9     10-07    TPro  6.7  /  Alb  3.2<L>  /  TBili  0.2  /  DBili  x   /  AST  37  /  ALT  52<H>  /  AlkPhos  146<H>  10-07      Urinalysis Basic - ( 07 Oct 2023 09:19 )    Color: x / Appearance: x / SG: x / pH: x  Gluc: 96 mg/dL / Ketone: x  / Bili: x / Urobili: x   Blood: x / Protein: x / Nitrite: x   Leuk Esterase: x / RBC: x / WBC x   Sq Epi: x / Non Sq Epi: x / Bacteria: x

## 2023-10-07 NOTE — PROGRESS NOTE ADULT - ASSESSMENT
Patient is an 82 year old woman with HTN, AF (not on AC due to fall risk), recent hospitalization for PRES (5-6/2023), recently discharged from [facility] 8/2023 with acute exacerbation of severe bronchiectasis and respiratory failure who presented to [facility] with AMS and diplopia. NIHSS 1 on admission for bitemporal visual deficit. Course complicated by agitation and severe hypoglycemia, requiring ICU admission. Brain imaging significant for left pontine and left occipital infarct as well as SAH of the right parieto-occipital region. Currently, cannot r/o inflammatory causes like ISMA/vasculitis/encephalitis. PET brain obtained, findings consistent with underlying neurodegenerative disease, pattern most suggestive of dementia. 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     Neuro  #CVA workup  - Q8H neuro checks   - Continue ASA 81mg daily  - Holding AC due to bleed risk in setting of CAA  - Continue atorvastatin 40mg daily  - MRI brain with and without contrast from 9/25: Left pontine punctate acute infarctions have developed since 9/20/2023. Subarachnoid hemorrhage has diminished since 9/20/2023. Ischemic white matter disease, atrophy typical for age and remote petechial hemorrhages are stable findings since 9/20/2023  - MRA brain 9/22: negative for steno-occlusive disease, aneurysm or high flow vascular malformation. No new/interval infarct on DWI since 9/20/23, though mild cerebral edema has developed. Query amyloid related disease.  - MRA neck: negative  - MRV brain: negative for thrombus  - PET brain - c/f dementia, maybe lewy body?  - holding off LP due to clinical improvement   - Stroke education    #Dementia  - Continue Aricept 5mg daily -retimed to 7pm  - Will need neurocognitive outpatient set up; reached out to Dr. Lomax and ISAEL henao for appt and for patient daughter education on LBD prognosis.     Cards  #HTN  - Goal -160  - Amlodipine 5mg daily, metoprolol tartrate 50mg BID  - TTE from 9/19: mildly dilated RV, dilated RA, severe pulmonary hypertension  - EKG 9/30: afib with rate 110-120s, restarted home medication metoprolol 50mg BID    #HLD  - high dose statin as above in CVA  - LDL results: 129    #Afib  - Structural heart consulted for watchman consult, case discussed with Dr. Rivas, will follow up outpatient   - CT heart for LA structure and LA thrombus performed, no thrombus noted     Pulm  - call provider if SPO2 < 94%    #bronchiectasis  - pulmonology consulted, appreciate recs   - Zosyn last day 9/29, no need to start cefepime or continue zosyn for 4 more days per ID and inpatient pulmonology  - chest PT twice daily  - AVOID duonebs and albuterol inhaler as patient becomes jittery and does not tolerate medication well - this was discussed with palliative and determined within her GOC. If pt has SOB or desaturated, trial of O2 can be given  - continue hypertonic 7% inhalation saline   - continue singular  - will need to follow up with Dr. Foster as outpatient    - Chest PT twice daily    GI  #Nutrition/Fluids/Electrolytes   - replete K<4 and Mg <2  - Diet: minced and moist diet; (ensure d/c due to pt did not like it)  - IVF: none    #malnutrition  - Continue Remeron 7.5mg  - Pt does not like ensure, d/c    #diarrhea  - Pt with 3 episodes of loose stools, likely antibiotic associated diarrhea, now resolved    Renal  - daily BMP    #urinary retention  - Saravia removed 9/27, failed TOV with 4 straight caths  - Saravia placed by urology 9/29 due to trauma at urethra  - saravia dc'ed 10/2, passed TOV    Infectious Disease  - Zosyn course finished on 9/29, no need to start cefepime or continue zosyn for 4 more days per ID and inpatient pulmonology  - Pt with 3 episodes of loose stool yesterday, likely antibiotic associated diarrhea, c.diff culture not needed at this time. If pt continues to have multiple loose stools without abx, culture can be sent    Endocrine  - A1C results: 5.5%  - TSH results: 3.870    MSK  #leg cramps - started on robaxin 500mg TID    Psych  #concern for SI - was placed on CO  - patient denies SI while alert and oriented, CO dc'ed    #anxiety  - Pt home medication of .25mg klonopin qhs PRN up to 1.5mg MDD     DVT Prophylaxis  - lovenox sq for DVT prophylaxis   - SCDs for DVT prophylaxis     Dispo: AR, patient able to tolerate 3 hours of intensive therapy        Patient seen and discussed with neurology fellow, Dr. Wan Shi

## 2023-10-07 NOTE — PROGRESS NOTE ADULT - SUBJECTIVE AND OBJECTIVE BOX
HOSP COMT PROGRESS NOTE    SUBJECTIVE: Patient seen and examined at bedside.     OBJECTIVE:    VITAL SIGNS:  ICU Vital Signs Last 24 Hrs  T(C): 36.5 (06 Oct 2023 13:00), Max: 36.5 (06 Oct 2023 13:00)  T(F): 97.7 (06 Oct 2023 13:00), Max: 97.7 (06 Oct 2023 13:00)  HR: 62 (06 Oct 2023 13:00) (59 - 66)  BP: 132/61 (06 Oct 2023 13:00) (132/61 - 160/85)  BP(mean): 100 (05 Oct 2023 20:39) (90 - 100)  ABP: --  ABP(mean): --  RR: 16 (06 Oct 2023 13:00) (16 - 18)  SpO2: 94% (06 Oct 2023 13:00) (92% - 94%)    O2 Parameters below as of 06 Oct 2023 13:00  Patient On (Oxygen Delivery Method): room air              CAPILLARY BLOOD GLUCOSE          PHYSICAL EXAM:  GEN: thin female in NAD on RA  HEENT: NC/AT, MMM  CV: increased rate - irregularly irregular, nml S1S2, no murmurs  PULM: nml effort, dry rales on R mid and lower fields wo wheezing or rhonchi.   ABD: Soft, non-distended, NABS, non-tender  NEURO  A/O x self, hospital, month and year but not date. moving all extremities, Sensation intact  PSYCH: Appropriate    MEDICATIONS:  MEDICATIONS  (STANDING):  amLODIPine   Tablet 5 milliGRAM(s) Oral daily  aspirin  chewable 81 milliGRAM(s) Oral daily  atorvastatin 40 milliGRAM(s) Oral at bedtime  azelastine 0.15% Nasal Spray 1 Spray(s) Both Nostrils two times a day  clonazePAM  Tablet 0.25 milliGRAM(s) Oral at bedtime  donepezil 5 milliGRAM(s) Oral every 24 hours  enoxaparin Injectable 30 milliGRAM(s) SubCutaneous every 24 hours  lidocaine 2% Jelly 5 milliLiter(s) IntraUrethral once  melatonin 5 milliGRAM(s) Oral at bedtime  metoprolol tartrate 50 milliGRAM(s) Oral two times a day  montelukast 10 milliGRAM(s) Oral daily  multivitamin  Chewable 1 Tablet(s) Oral daily  sodium chloride 7% Inhalation 4 milliLiter(s) Inhalation every 12 hours  thiamine 100 milliGRAM(s) Oral daily    MEDICATIONS  (PRN):  acetaminophen     Tablet .. 650 milliGRAM(s) Oral every 6 hours PRN Temp greater or equal to 38C (100.4F), Mild Pain (1 - 3), Moderate Pain (4 - 6), Severe Pain (7 - 10)      ALLERGIES:  Allergies    Ceclor (Rash)  IV Contrast (Unknown)  Levaquin (Swelling)  Augmentin (Short breath; Rash)    Intolerances        LABS:                        10.5   5.29  )-----------( 287      ( 06 Oct 2023 05:30 )             31.6     10-06    135  |  102  |  18  ----------------------------<  104<H>  4.0   |  26  |  0.65    Ca    8.6      06 Oct 2023 05:30  Phos  3.1     10-06  Mg     1.7     10-06        Urinalysis Basic - ( 06 Oct 2023 05:30 )    Color: x / Appearance: x / SG: x / pH: x  Gluc: 104 mg/dL / Ketone: x  / Bili: x / Urobili: x   Blood: x / Protein: x / Nitrite: x   Leuk Esterase: x / RBC: x / WBC x   Sq Epi: x / Non Sq Epi: x / Bacteria: x        RADIOLOGY & ADDITIONAL TESTS: Reviewed.

## 2023-10-08 LAB
ALBUMIN SERPL ELPH-MCNC: 3.1 G/DL — LOW (ref 3.3–5)
ALP SERPL-CCNC: 133 U/L — HIGH (ref 40–120)
ALT FLD-CCNC: 44 U/L — SIGNIFICANT CHANGE UP (ref 10–45)
ANION GAP SERPL CALC-SCNC: 7 MMOL/L — SIGNIFICANT CHANGE UP (ref 5–17)
AST SERPL-CCNC: 34 U/L — SIGNIFICANT CHANGE UP (ref 10–40)
BASOPHILS # BLD AUTO: 0.06 K/UL — SIGNIFICANT CHANGE UP (ref 0–0.2)
BASOPHILS NFR BLD AUTO: 1.1 % — SIGNIFICANT CHANGE UP (ref 0–2)
BILIRUB SERPL-MCNC: 0.3 MG/DL — SIGNIFICANT CHANGE UP (ref 0.2–1.2)
BUN SERPL-MCNC: 24 MG/DL — HIGH (ref 7–23)
CALCIUM SERPL-MCNC: 8.6 MG/DL — SIGNIFICANT CHANGE UP (ref 8.4–10.5)
CHLORIDE SERPL-SCNC: 103 MMOL/L — SIGNIFICANT CHANGE UP (ref 96–108)
CO2 SERPL-SCNC: 26 MMOL/L — SIGNIFICANT CHANGE UP (ref 22–31)
CREAT SERPL-MCNC: 0.68 MG/DL — SIGNIFICANT CHANGE UP (ref 0.5–1.3)
EGFR: 88 ML/MIN/1.73M2 — SIGNIFICANT CHANGE UP
EOSINOPHIL # BLD AUTO: 0.23 K/UL — SIGNIFICANT CHANGE UP (ref 0–0.5)
EOSINOPHIL NFR BLD AUTO: 4.4 % — SIGNIFICANT CHANGE UP (ref 0–6)
GLUCOSE SERPL-MCNC: 96 MG/DL — SIGNIFICANT CHANGE UP (ref 70–99)
HCT VFR BLD CALC: 30.9 % — LOW (ref 34.5–45)
HGB BLD-MCNC: 9.8 G/DL — LOW (ref 11.5–15.5)
IMM GRANULOCYTES NFR BLD AUTO: 0.4 % — SIGNIFICANT CHANGE UP (ref 0–0.9)
LYMPHOCYTES # BLD AUTO: 1.57 K/UL — SIGNIFICANT CHANGE UP (ref 1–3.3)
LYMPHOCYTES # BLD AUTO: 29.7 % — SIGNIFICANT CHANGE UP (ref 13–44)
MAGNESIUM SERPL-MCNC: 1.7 MG/DL — SIGNIFICANT CHANGE UP (ref 1.6–2.6)
MCHC RBC-ENTMCNC: 31.7 GM/DL — LOW (ref 32–36)
MCHC RBC-ENTMCNC: 32.7 PG — SIGNIFICANT CHANGE UP (ref 27–34)
MCV RBC AUTO: 103 FL — HIGH (ref 80–100)
MONOCYTES # BLD AUTO: 0.52 K/UL — SIGNIFICANT CHANGE UP (ref 0–0.9)
MONOCYTES NFR BLD AUTO: 9.8 % — SIGNIFICANT CHANGE UP (ref 2–14)
NEUTROPHILS # BLD AUTO: 2.88 K/UL — SIGNIFICANT CHANGE UP (ref 1.8–7.4)
NEUTROPHILS NFR BLD AUTO: 54.6 % — SIGNIFICANT CHANGE UP (ref 43–77)
NRBC # BLD: 0 /100 WBCS — SIGNIFICANT CHANGE UP (ref 0–0)
PHOSPHATE SERPL-MCNC: 3.9 MG/DL — SIGNIFICANT CHANGE UP (ref 2.5–4.5)
PLATELET # BLD AUTO: 262 K/UL — SIGNIFICANT CHANGE UP (ref 150–400)
POTASSIUM SERPL-MCNC: 4 MMOL/L — SIGNIFICANT CHANGE UP (ref 3.5–5.3)
POTASSIUM SERPL-SCNC: 4 MMOL/L — SIGNIFICANT CHANGE UP (ref 3.5–5.3)
PROT SERPL-MCNC: 6.1 G/DL — SIGNIFICANT CHANGE UP (ref 6–8.3)
RBC # BLD: 3 M/UL — LOW (ref 3.8–5.2)
RBC # FLD: 13.5 % — SIGNIFICANT CHANGE UP (ref 10.3–14.5)
SODIUM SERPL-SCNC: 136 MMOL/L — SIGNIFICANT CHANGE UP (ref 135–145)
WBC # BLD: 5.28 K/UL — SIGNIFICANT CHANGE UP (ref 3.8–10.5)
WBC # FLD AUTO: 5.28 K/UL — SIGNIFICANT CHANGE UP (ref 3.8–10.5)

## 2023-10-08 PROCEDURE — 99232 SBSQ HOSP IP/OBS MODERATE 35: CPT

## 2023-10-08 PROCEDURE — 99233 SBSQ HOSP IP/OBS HIGH 50: CPT

## 2023-10-08 RX ORDER — CLONAZEPAM 1 MG
0.25 TABLET ORAL ONCE
Refills: 0 | Status: DISCONTINUED | OUTPATIENT
Start: 2023-10-08 | End: 2023-10-08

## 2023-10-08 RX ORDER — ACETAMINOPHEN 500 MG
500 TABLET ORAL ONCE
Refills: 0 | Status: COMPLETED | OUTPATIENT
Start: 2023-10-08 | End: 2023-10-08

## 2023-10-08 RX ORDER — ACETAMINOPHEN 500 MG
650 TABLET ORAL ONCE
Refills: 0 | Status: COMPLETED | OUTPATIENT
Start: 2023-10-08 | End: 2023-10-08

## 2023-10-08 RX ORDER — LIDOCAINE 4 G/100G
1 CREAM TOPICAL ONCE
Refills: 0 | Status: COMPLETED | OUTPATIENT
Start: 2023-10-08 | End: 2023-10-08

## 2023-10-08 RX ADMIN — AZELASTINE 1 SPRAY(S): 137 SPRAY, METERED NASAL at 06:38

## 2023-10-08 RX ADMIN — Medication 0.25 MILLIGRAM(S): at 22:21

## 2023-10-08 RX ADMIN — SODIUM CHLORIDE 4 MILLILITER(S): 9 INJECTION INTRAMUSCULAR; INTRAVENOUS; SUBCUTANEOUS at 21:48

## 2023-10-08 RX ADMIN — Medication 650 MILLIGRAM(S): at 12:57

## 2023-10-08 RX ADMIN — Medication 81 MILLIGRAM(S): at 11:02

## 2023-10-08 RX ADMIN — ENOXAPARIN SODIUM 30 MILLIGRAM(S): 100 INJECTION SUBCUTANEOUS at 17:34

## 2023-10-08 RX ADMIN — Medication 1 TABLET(S): at 11:02

## 2023-10-08 RX ADMIN — MONTELUKAST 10 MILLIGRAM(S): 4 TABLET, CHEWABLE ORAL at 11:02

## 2023-10-08 RX ADMIN — AZELASTINE 1 SPRAY(S): 137 SPRAY, METERED NASAL at 18:19

## 2023-10-08 RX ADMIN — Medication 650 MILLIGRAM(S): at 00:42

## 2023-10-08 RX ADMIN — Medication 200 MILLIGRAM(S): at 18:19

## 2023-10-08 RX ADMIN — LIDOCAINE 1 PATCH: 4 CREAM TOPICAL at 13:06

## 2023-10-08 RX ADMIN — Medication 50 MILLIGRAM(S): at 17:34

## 2023-10-08 RX ADMIN — Medication 650 MILLIGRAM(S): at 13:57

## 2023-10-08 RX ADMIN — ATORVASTATIN CALCIUM 40 MILLIGRAM(S): 80 TABLET, FILM COATED ORAL at 21:48

## 2023-10-08 RX ADMIN — Medication 100 MILLIGRAM(S): at 11:02

## 2023-10-08 RX ADMIN — Medication 650 MILLIGRAM(S): at 00:12

## 2023-10-08 RX ADMIN — Medication 650 MILLIGRAM(S): at 01:42

## 2023-10-08 RX ADMIN — LIDOCAINE 1 PATCH: 4 CREAM TOPICAL at 07:04

## 2023-10-08 RX ADMIN — Medication 500 MILLIGRAM(S): at 19:19

## 2023-10-08 RX ADMIN — Medication 5 MILLIGRAM(S): at 21:48

## 2023-10-08 RX ADMIN — DONEPEZIL HYDROCHLORIDE 5 MILLIGRAM(S): 10 TABLET, FILM COATED ORAL at 18:19

## 2023-10-08 RX ADMIN — LIDOCAINE 1 PATCH: 4 CREAM TOPICAL at 00:42

## 2023-10-08 NOTE — PROGRESS NOTE ADULT - SUBJECTIVE AND OBJECTIVE BOX
HOSP COMT PROGRESS NOTE    SUBJECTIVE: Patient seen and examined at bedside. No complaints      VITAL SIGNS:  ICU Vital Signs Last 24 Hrs  T(C): 36.5 (06 Oct 2023 13:00), Max: 36.5 (06 Oct 2023 13:00)  T(F): 97.7 (06 Oct 2023 13:00), Max: 97.7 (06 Oct 2023 13:00)  HR: 62 (06 Oct 2023 13:00) (59 - 66)  BP: 132/61 (06 Oct 2023 13:00) (132/61 - 160/85)  BP(mean): 100 (05 Oct 2023 20:39) (90 - 100)  ABP: --  ABP(mean): --  RR: 16 (06 Oct 2023 13:00) (16 - 18)  SpO2: 94% (06 Oct 2023 13:00) (92% - 94%)    O2 Parameters below as of 06 Oct 2023 13:00  Patient On (Oxygen Delivery Method): room air              CAPILLARY BLOOD GLUCOSE          PHYSICAL EXAM:  GEN: thin female in NAD on RA  HEENT: NC/AT, MMM  CV: increased rate - irregularly irregular, nml S1S2, no murmurs  PULM: nml effort, dry rales on R mid and lower fields wo wheezing or rhonchi.   ABD: Soft, non-distended, NABS, non-tender  NEURO  A/O x self, hospital, month and year but not date. moving all extremities, Sensation intact  PSYCH: Appropriate    MEDICATIONS:  MEDICATIONS  (STANDING):  amLODIPine   Tablet 5 milliGRAM(s) Oral daily  aspirin  chewable 81 milliGRAM(s) Oral daily  atorvastatin 40 milliGRAM(s) Oral at bedtime  azelastine 0.15% Nasal Spray 1 Spray(s) Both Nostrils two times a day  clonazePAM  Tablet 0.25 milliGRAM(s) Oral at bedtime  donepezil 5 milliGRAM(s) Oral every 24 hours  enoxaparin Injectable 30 milliGRAM(s) SubCutaneous every 24 hours  lidocaine 2% Jelly 5 milliLiter(s) IntraUrethral once  melatonin 5 milliGRAM(s) Oral at bedtime  metoprolol tartrate 50 milliGRAM(s) Oral two times a day  montelukast 10 milliGRAM(s) Oral daily  multivitamin  Chewable 1 Tablet(s) Oral daily  sodium chloride 7% Inhalation 4 milliLiter(s) Inhalation every 12 hours  thiamine 100 milliGRAM(s) Oral daily    MEDICATIONS  (PRN):  acetaminophen     Tablet .. 650 milliGRAM(s) Oral every 6 hours PRN Temp greater or equal to 38C (100.4F), Mild Pain (1 - 3), Moderate Pain (4 - 6), Severe Pain (7 - 10)      ALLERGIES:  Allergies    Ceclor (Rash)  IV Contrast (Unknown)  Levaquin (Swelling)  Augmentin (Short breath; Rash)    Intolerances        LABS:                        10.5   5.29  )-----------( 287      ( 06 Oct 2023 05:30 )             31.6     10-06    135  |  102  |  18  ----------------------------<  104<H>  4.0   |  26  |  0.65    Ca    8.6      06 Oct 2023 05:30  Phos  3.1     10-06  Mg     1.7     10-06        Urinalysis Basic - ( 06 Oct 2023 05:30 )    Color: x / Appearance: x / SG: x / pH: x  Gluc: 104 mg/dL / Ketone: x  / Bili: x / Urobili: x   Blood: x / Protein: x / Nitrite: x   Leuk Esterase: x / RBC: x / WBC x   Sq Epi: x / Non Sq Epi: x / Bacteria: x        RADIOLOGY & ADDITIONAL TESTS: Reviewed.

## 2023-10-08 NOTE — PROGRESS NOTE ADULT - ASSESSMENT
Pulm: Dr. Foster  82F w HTN, AF not on AC d/t fall risk, hospitalized for PRES 05-06/2023, recent DC 8/2023 for acute hypoxemic respiratory failure - to home on 4L NC w follow-up w Dr. Foster for exacerbation of bronchiectasis, recently started course of IV cefepime outpatient by Dr. Foster 9/13, p/w diplopia, encephalopathy admitted for stroke work-up, cefepime continued inpatient, repeat head CT found small SAH, c/b hypoglycemia, encephalopathy - BG 30 and temp 93F during RRT on 9/21, transferred to MICU for persistent hypoglycemia needing D10W >> D10 NS, slow improvement w nml insulin and C peptide, stepped down to 7L and stroke service, MR brain showing L pontine infarction, PET brain w signal c/f Dementia w Lewy Bodies, for acute rehab    #Acute urinary retention - resolved and passed TOV 10/2    #CVA - L pontine infarction on MR 9/25 that also showed SAH diminishing.  #Encephalopathy - improving. There is signal on PET c/f Dementia w Lewy Bodies   - ASA 81 daily, Atorva 40   - Started on donepezil 5 HS    #Bronchiectasis - On IV Cefepime from 9/13 w PICC placed outpatient (PICC discontinued 9/21). Has been on IV Zosyn in MICU per pulmonary thru 9/29. Currently satting well on RA. On singulair 10  #AFib - on lopressor 50 bid. AC currently held d/t SAH  #HTN - on amlodipine 5    #macrocytic anemia - 9.8, B12/folate normal, outpt f/u  #Hyponatremia - resolved    Plan  Can use lidocaine patch PRN for chest wall pain - pt is amenable.  Robaxin has been DCd - monitor for leg cramping  Can discuss if additional agent for anxiety e.g. buspar would be helpful w psychiatry.     Pt is DNR/DNI  DISPO: Acute rehab - baseline lives w daughter - awaiting bed

## 2023-10-09 ENCOUNTER — TRANSCRIPTION ENCOUNTER (OUTPATIENT)
Age: 83
End: 2023-10-09

## 2023-10-09 ENCOUNTER — NON-APPOINTMENT (OUTPATIENT)
Age: 83
End: 2023-10-09

## 2023-10-09 LAB
RAPID RVP RESULT: SIGNIFICANT CHANGE UP
SARS-COV-2 RNA SPEC QL NAA+PROBE: SIGNIFICANT CHANGE UP

## 2023-10-09 PROCEDURE — 93010 ELECTROCARDIOGRAM REPORT: CPT

## 2023-10-09 PROCEDURE — 99232 SBSQ HOSP IP/OBS MODERATE 35: CPT

## 2023-10-09 PROCEDURE — 99233 SBSQ HOSP IP/OBS HIGH 50: CPT

## 2023-10-09 RX ORDER — LANOLIN ALCOHOL/MO/W.PET/CERES
5 CREAM (GRAM) TOPICAL AT BEDTIME
Refills: 0 | Status: DISCONTINUED | OUTPATIENT
Start: 2023-10-09 | End: 2023-10-10

## 2023-10-09 RX ORDER — ASPIRIN/CALCIUM CARB/MAGNESIUM 324 MG
1 TABLET ORAL
Qty: 0 | Refills: 0 | DISCHARGE
Start: 2023-10-09

## 2023-10-09 RX ORDER — THIAMINE MONONITRATE (VIT B1) 100 MG
1 TABLET ORAL
Qty: 0 | Refills: 0 | DISCHARGE
Start: 2023-10-09

## 2023-10-09 RX ORDER — MIRTAZAPINE 45 MG/1
1 TABLET, ORALLY DISINTEGRATING ORAL
Qty: 0 | Refills: 0 | DISCHARGE
Start: 2023-10-09

## 2023-10-09 RX ORDER — CEFEPIME 1 G/1
2 INJECTION, POWDER, FOR SOLUTION INTRAMUSCULAR; INTRAVENOUS
Refills: 0 | DISCHARGE

## 2023-10-09 RX ORDER — AZELASTINE 137 UG/1
2 SPRAY, METERED NASAL
Refills: 0 | Status: DISCONTINUED | OUTPATIENT
Start: 2023-10-09 | End: 2023-10-10

## 2023-10-09 RX ORDER — LANOLIN ALCOHOL/MO/W.PET/CERES
1 CREAM (GRAM) TOPICAL
Qty: 0 | Refills: 0 | DISCHARGE
Start: 2023-10-09

## 2023-10-09 RX ORDER — SERTRALINE 25 MG/1
0.5 TABLET, FILM COATED ORAL
Qty: 15 | Refills: 0
Start: 2023-10-09 | End: 2023-11-07

## 2023-10-09 RX ORDER — ATORVASTATIN CALCIUM 80 MG/1
1 TABLET, FILM COATED ORAL
Qty: 0 | Refills: 0 | DISCHARGE
Start: 2023-10-09

## 2023-10-09 RX ORDER — AMLODIPINE BESYLATE 2.5 MG/1
1 TABLET ORAL
Qty: 0 | Refills: 0 | DISCHARGE
Start: 2023-10-09

## 2023-10-09 RX ORDER — SERTRALINE 25 MG/1
12.5 TABLET, FILM COATED ORAL DAILY
Refills: 0 | Status: DISCONTINUED | OUTPATIENT
Start: 2023-10-09 | End: 2023-10-10

## 2023-10-09 RX ORDER — DONEPEZIL HYDROCHLORIDE 10 MG/1
1 TABLET, FILM COATED ORAL
Qty: 0 | Refills: 0 | DISCHARGE
Start: 2023-10-09

## 2023-10-09 RX ORDER — SERTRALINE 25 MG/1
12.5 TABLET, FILM COATED ORAL
Qty: 0 | Refills: 0 | DISCHARGE
Start: 2023-10-09

## 2023-10-09 RX ADMIN — Medication 100 MILLIGRAM(S): at 11:24

## 2023-10-09 RX ADMIN — Medication 650 MILLIGRAM(S): at 18:53

## 2023-10-09 RX ADMIN — Medication 81 MILLIGRAM(S): at 11:24

## 2023-10-09 RX ADMIN — MONTELUKAST 10 MILLIGRAM(S): 4 TABLET, CHEWABLE ORAL at 11:24

## 2023-10-09 RX ADMIN — Medication 50 MILLIGRAM(S): at 06:17

## 2023-10-09 RX ADMIN — Medication 1 TABLET(S): at 11:24

## 2023-10-09 RX ADMIN — ATORVASTATIN CALCIUM 40 MILLIGRAM(S): 80 TABLET, FILM COATED ORAL at 21:49

## 2023-10-09 RX ADMIN — AMLODIPINE BESYLATE 5 MILLIGRAM(S): 2.5 TABLET ORAL at 06:17

## 2023-10-09 RX ADMIN — SODIUM CHLORIDE 4 MILLILITER(S): 9 INJECTION INTRAMUSCULAR; INTRAVENOUS; SUBCUTANEOUS at 10:33

## 2023-10-09 RX ADMIN — AZELASTINE 2 SPRAY(S): 137 SPRAY, METERED NASAL at 18:51

## 2023-10-09 RX ADMIN — Medication 5 MILLIGRAM(S): at 21:50

## 2023-10-09 RX ADMIN — DONEPEZIL HYDROCHLORIDE 5 MILLIGRAM(S): 10 TABLET, FILM COATED ORAL at 18:50

## 2023-10-09 RX ADMIN — AZELASTINE 1 SPRAY(S): 137 SPRAY, METERED NASAL at 06:18

## 2023-10-09 NOTE — BH CONSULTATION LIAISON PROGRESS NOTE - NSBHASSESSMENTFT_PSY_ALL_CORE
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 Psychiatry was consulted for agitation and anxiety during the hospital course and initial recommendation was to continue with melatonin, start Rameron, and switch Klonipin from standing to PRN.     Upon evaluation, patient is alert, oriented, calm, cooperative, and in good behavioral control. Patient's MMSE score was 25, notable impairment in recall and some component in language. Patient's recent onset of anxiety and panic attack episode may be multifactorial: 1. Patient has had medical problems that may mimic panic attacks, such as hypoglycemic episode and exacerbation of SOB in the setting of bronchiectasis, causing shortness of breath and/or trembling, palpitation, and irritability  2. patient's medical diagnosis and subsequent treatment may be both physically and emotionally burdening for her, causing her to be anxious 3. May be in part manifestation of delirium in the setting of dementia 4. Patient's visual hallucinations and hypersensitivity to visual stimulation (small animals running around, textile patterns) causing her to be nervous and anxious. This clinical presentation and timing are more consistent with panic attacks and delirium, rather than mood disorders such as generalized anxiety disorder or major depressive disorder, or primary psychotic disorder, such as schizophrenia or brief psychotic disorder.     Considering that patient had paradoxical disinhibition from Ativan and Seroquel before (per daughter), we would recommend against using these agents in the future for anxiety or agitation. We would recommend ordering standing melatonin 5mg qhs and Mirtazapine 7.5mg qhs.    Of note, patient does not currently endorse depressed mood or SI/HI/paranoia, and is not an imminent danger to herself or others. This clinical presentation does not warrant inpatient psychiatric hospitalization. If patient develops new concerning symptoms, including but not limited to depression and suicidal ideation, please recall the psychiatry team to reassess safety.     RECOMMENDATION:  1. Continue with Melatonin 5mg PO qhs to assist with sleep/wake cycle and insomnia   2. Continue with Mirtazapine 7.5mg PO qhs to assist with tx of anxiety, sleep, appetite.  3. Continue with Klonopin 0.25mg po PRN daily on board in case of withdrawal symptoms or severe panic over next few days but would not continue outpatient.  4. Patients with Lewy Body disease are exquisitely sensitive to antipsychotics. Also, pt currently has prolonged Qtc. Would monitor daily EKG and hold antipsychotics until Qtc <500. After normalization of Qtc, can consider using Seroquel 12.5mg po PRN agitation.  5. Strict delirium precautions such as regulating sleep wake cycle, constant reorientation by staff, opening blinds during the day, out of bed to chair as tolerated, avoiding medications that can worsen delirium such as anticholinergics, antihistamines, opioids.  6. Please update the daughter Ms. Segundo Funes ([phone]) about the final plan as she can assist with explaining about the new medications to the patient and help patient to be more receptive.   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 Psychiatry was consulted for agitation and anxiety during the hospital course and initial recommendation was to continue with melatonin, start Rameron, and switch Klonipin from standing to PRN.     Upon evaluation, patient is alert, oriented, calm, cooperative, and in good behavioral control. Patient's MMSE score was 25, notable impairment in recall and some component in language. Patient's recent onset of anxiety and panic attack episode may be multifactorial: 1. Patient has had medical problems that may mimic panic attacks, such as hypoglycemic episode and exacerbation of SOB in the setting of bronchiectasis, causing shortness of breath and/or trembling, palpitation, and irritability  2. patient's medical diagnosis and subsequent treatment may be both physically and emotionally burdening for her, causing her to be anxious 3. May be in part manifestation of delirium in the setting of dementia 4. Patient's visual hallucinations and hypersensitivity to visual stimulation (small animals running around, textile patterns) causing her to be nervous and anxious. This clinical presentation and timing are more consistent with panic attacks and delirium, rather than mood disorders such as generalized anxiety disorder or major depressive disorder, or primary psychotic disorder, such as schizophrenia or brief psychotic disorder.     Considering that patient had paradoxical disinhibition from Ativan and Seroquel before (per daughter), we would recommend against using these agents in the future for anxiety or agitation. We would recommend ordering standing melatonin 5mg qhs and Mirtazapine 7.5mg qhs.    Of note, patient does not currently endorse depressed mood or SI/HI/paranoia, and is not an imminent danger to herself or others. This clinical presentation does not warrant inpatient psychiatric hospitalization. If patient develops new concerning symptoms, including but not limited to depression and suicidal ideation, please recall the psychiatry team to reassess safety.     RECOMMENDATION:  1. Continue with Melatonin 5mg PO qhs to assist with sleep/wake cycle and insomnia   2. Continue with Mirtazapine 7.5mg PO qhs to assist with tx of anxiety, sleep, appetite.  3. Continue with Klonopin 0.25mg po PRN daily on board in case of withdrawal symptoms or severe panic over next few days but would not continue outpatient.  4. Start Sertraline 12.5mg daily PO for anxiety   5. Patients with Lewy Body disease are exquisitely sensitive to antipsychotics. Also, pt currently has prolonged Qtc. Would monitor daily EKG and hold antipsychotics until Qtc <500. After normalization of Qtc, can consider using Seroquel 12.5mg po PRN agitation.  6. Strict delirium precautions such as regulating sleep wake cycle, constant reorientation by staff, opening blinds during the day, out of bed to chair as tolerated, avoiding medications that can worsen delirium such as anticholinergics, antihistamines, opioids.  7. Please update the daughter Ms. Segundo Funes ([phone]) about the final plan as she can assist with explaining about the new medications to the patient and help patient to be more receptive.  8. Please follow up with outpatient psychiatrist to follow up with use of psychotropics for anxiety and sleep.   General Mental Health Services    · [facility]  o Telehealth visits  o [address], [place] [address]  o [phone]  o Medicare, Medicaid, most private insurances    · [facility]  o [email or web address]  o [address] [place] [address]  [phone]  o Medicare, Medicaid, most private insurances    · [facility]  o Walk in services, Monday – Friday: 7:30am – 1pm  o [address]  [place] [address]  o [phone]  o Medicare, Medicaid, and all private insurances    · [facility] - *Virtual since Covid – Call first*  o [address]  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 Psychiatry was consulted for agitation and anxiety during the hospital course and initial recommendation was to continue with melatonin, start Remeron, and switch Klonopin from standing to PRN.     Upon evaluation, patient is alert, oriented, calm, cooperative, and in good behavioral control. Patient's MMSE score was 25, notable impairment in recall and some component in language. Patient's recent onset of anxiety and panic attack episode may be multifactorial: 1. Patient has had medical problems that may mimic panic attacks, such as hypoglycemic episode and exacerbation of SOB in the setting of bronchiectasis, causing shortness of breath and/or trembling, palpitation, and irritability  2. patient's medical diagnosis and subsequent treatment may be both physically and emotionally burdening for her, causing her to be anxious 3. May be in part manifestation of delirium in the setting of dementia 4. Patient's visual hallucinations and hypersensitivity to visual stimulation (small animals running around, textile patterns) causing her to be nervous and anxious. This clinical presentation and timing are more consistent with panic attacks and delirium, rather than mood disorders such as generalized anxiety disorder or major depressive disorder, or primary psychotic disorder, such as schizophrenia or brief psychotic disorder.     Considering that patient had paradoxical disinhibition from Ativan and Seroquel before (per daughter), we would recommend against using these agents in the future for anxiety or agitation. We would recommend ordering standing melatonin 5mg qhs and Mirtazapine 7.5mg qhs.    Of note, patient does not currently endorse depressed mood or SI/HI/paranoia, and is not an imminent danger to herself or others. This clinical presentation does not warrant inpatient psychiatric hospitalization. If patient develops new concerning symptoms, including but not limited to depression and suicidal ideation, please recall the psychiatry team to reassess safety.     RECOMMENDATION:  **Please obtain repeat EKG for QTc eval -- last Qtc in chart was >500.**  1. Continue with Melatonin 5mg PO qhs to assist with sleep/wake cycle and insomnia   2. Mirtazapine 7.5mg qhs is favored given stimulating effects on appetite and pt's currently BMI of 11.9, but given pt consistently refusing can discontinue and start Sertraline instead as below.  3. Continue with Klonopin 0.25mg po PRN daily on board in case of withdrawal symptoms or severe panic over next few days but would not continue outpatient.  4. Start Sertraline 12.5mg daily PO for anxiety AFTER new Qtc obtained (SSRIs can rarely have Qtc prolonging effects). Serotonergic agents can have some benefit for patients with anxiety directly related to dyspneic episodes, or for treatment of anxiety in setting of Lewy Body disease.  5. Patients with Lewy Body disease are exquisitely sensitive to antipsychotics; given no severe agitation, would hold antipsychotics.  6. Strict delirium precautions such as regulating sleep wake cycle, constant reorientation by staff, opening blinds during the day, out of bed to chair as tolerated, avoiding medications that can worsen delirium such as anticholinergics, antihistamines, opioids.  7. Please update the daughter Ms. Segundo Funes ([phone]) about the final plan as she can assist with explaining about the new medications to the patient and help patient to be more receptive.  8. Please follow up with PCP or outpatient psychiatrist to follow up with use of psychotropics for anxiety and sleep.   General Mental Health Services    · [facility]  o Telehealth visits  o [address], [place] [address]  o [phone]  o Medicare, Medicaid, most private insurances    · [facility]  o [email or web address]  o [address] [place] [address]  [phone]  o Medicare, Medicaid, most private insurances    · [facility]  o Walk in services, Monday – Friday: 7:30am – 1pm  o [address]  [place] [address]  o [phone]  o Medicare, Medicaid, and all private insurances    · [facility] - *Virtual since Covid – Call first*  o [address]  [facility], Ground Floor, [address]  [place] [address]  o [phone]    · [facility] o [email or web address]  o Three locations  § [address] [place] [address] Phone: [phone]  § [address] [facility] [address] [place] [address] Phone: [phone]  § [address] [facility] [address] [place] [address] Phone: [phone]  o Medicare, Medicaid, most private insurance and sliding scale

## 2023-10-09 NOTE — PROGRESS NOTE ADULT - PROBLEM SELECTOR PROBLEM 2
Acute exacerbation of bronchiectasis
Dysphagia
Acute encephalopathy
Acute encephalopathy
Dysphagia
Acute encephalopathy

## 2023-10-09 NOTE — PROGRESS NOTE ADULT - REASON FOR ADMISSION
diplopia

## 2023-10-09 NOTE — PROGRESS NOTE ADULT - PROBLEM SELECTOR PLAN 6
.  Patient is DNR/DNI, MOLST in chart  -family reaffirms DNR/DNI, accepting of trial of current level of care to see if patient will recover  -will readdress GOC pending clinical course
-Resolved and passed TOV 10/2
.  Patient is DNR/DNI, SANDY in chart  -family reaffirms DNR/DNI, accepting of trial of current level of care to see if patient will recover  -if patient is unable to recover neurologically, daughter will be open to discussing transition to symptom-directed care  -still pending further work-up before readdressing [facility]
-Resolved and passed TOV 10/2
.  Patient is DNR/DNI, SANDY in chart  -family reaffirms DNR/DNI, accepting of trial of current level of care to see if patient will recover  -if patient is unable to recover neurologically, daughter will be open to discussing transition to symptom-directed care but patient is currently trending positively  -will readdress GOC pending clinical course
.  Patient is DNR with Trial of Intubation, MOLST in chart  -advance directives clarified and new MOLST completed
-Resolved and passed TOV 10/2
.  Patient is DNR/DNI, MOLST in chart  -family reaffirms DNR/DNI, accepting of trial of current level of care to see if patient will recover  -will readdress GOC pending clinical course

## 2023-10-09 NOTE — PROGRESS NOTE ADULT - PROBLEM SELECTOR PLAN 4
-Structural heart consulted for watchman consult, case discussed with Dr. Rivas, will follow up outpatient   -CT heart for LA structure and LA thrombus performed, no thrombus noted     -On lopressor 50 bid. AC currently held d/t SAH
.  History of recurrent respiratory failure  -no acute exacerbation currently  -tolerating RA, Pulm following
-Structural heart consulted for watchman consult, case discussed with Dr. Rivas, will follow up outpatient   -CT heart for LA structure and LA thrombus performed, no thrombus noted     -On lopressor 50 bid. AC currently held d/t SAH
.  History of recurrent respiratory failure  -no acute exacerbation currently  -would not want intubation if indicated
.  History of recurrent respiratory failure  -no acute exacerbation currently  -would not want intubation if indicated  -tolerating RA, Pulm following
-Structural heart consulted for watchman consult, case discussed with Dr. Rivas, will follow up outpatient   -CT heart for LA structure and LA thrombus performed, no thrombus noted     -On lopressor 50 bid. AC currently held d/t SAH
.  History of recurrent respiratory failure  -no acute exacerbation currently  -DuoNebs discontinued due to albuterol side effects  -tolerating RA, Pulm following
.  History of recurrent respiratory failure  -no acute exacerbation currently  -tolerating RA, Pulm following

## 2023-10-09 NOTE — BH CONSULTATION LIAISON PROGRESS NOTE - NSBHCHARTREVIEWVS_PSY_A_CORE FT
Vital Signs Last 24 Hrs  T(C): 36.4 (09 Oct 2023 12:00), Max: 36.9 (08 Oct 2023 20:30)  T(F): 97.6 (09 Oct 2023 12:00), Max: 98.4 (08 Oct 2023 20:30)  HR: 73 (09 Oct 2023 12:00) (63 - 95)  BP: 138/77 (09 Oct 2023 12:00) (114/64 - 150/85)  BP(mean): --  RR: 18 (09 Oct 2023 12:00) (16 - 18)  SpO2: 94% (09 Oct 2023 12:00) (93% - 95%)    Parameters below as of 09 Oct 2023 12:00  Patient On (Oxygen Delivery Method): room air

## 2023-10-09 NOTE — PROGRESS NOTE ADULT - PROBLEM SELECTOR PROBLEM 6
Urinary retention
Urinary retention
Advanced care planning/counseling discussion
Urinary retention
Advanced care planning/counseling discussion
Advanced care planning/counseling discussion

## 2023-10-09 NOTE — PROGRESS NOTE ADULT - NS ATTEND AMEND GEN_ALL_CORE FT
The patient is an 82-year-old female with a history of hypertension, bronchiectasis with respiratory failure, atrial fibrillation (not on AC previously due falls), and outside diagnosis of PRES in May 2023 admitted with progressive altered mental status and visual complaints with MRI showing both punctate areas of ischemia and subarachnoid hemorrhage in the posterior circulation and findings otherwise consistent with CAA. Given improvement w/o intervention, inflammatory causes like ISMA/vasculitis felt less likely. Structural heart consulted given h/o AF in s/o CAA; outpatient f/u recommended; (No MITZI thrombus on CT). PET brain suggestive of DLB- will need OP f/u. Continue aspirin, statin.

## 2023-10-09 NOTE — BH CONSULTATION LIAISON PROGRESS NOTE - NSBHATTESTCOMMENTATTENDFT_PSY_A_CORE
82 year old female, pmhx significant for PRES, bronchiectasis, HTN, AF, who presented to the ED for diplopia, subsequently admitted for encephalopathy, stroke workup, and antibiotics for bronchiectasis; found to have small SAH, L pontine infarction, and PET brain signal concerning for dementia with lewy bodies. Psychiatry following for anxiety management. Pt formerly taking daughter's supply of klonopin at home for anxiety sparingly.  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  Given risk of exposing pt with Lewy body disease to benzos which can be sedating and disinhibiting, promoting delirium, would recommend not continuing klonopin at home. Treatment of anxiety associated with dyspnea in patients with COPD or other respiratory illnesses can sometimes be helped by serotonergic agents. Would consider trialing sertraline 12.5mg daily. Serotonergic agents are also occasionally used for anxiety in Lewy body disease, though overall evidence for efficacy is sparse and unclear. To limit any risks, would start at low dose, as above, and would repeat Qtc before administering any psychotropic medications.  Would have preferred pt starting mirtazapine giving its appetite stimulating effects; however pt appears adamant in her refusal of it at this time.  Overall, anxiety related to dyspnea can sometimes be helped by behavioral interventions, such as exercise therapy or physical therapy.  Psychiatry will continue to follow.

## 2023-10-09 NOTE — PROGRESS NOTE ADULT - PROBLEM SELECTOR PROBLEM 5
Bronchiectasis
CVA (cerebrovascular accident)

## 2023-10-09 NOTE — DISCHARGE NOTE NURSING/CASE MANAGEMENT/SOCIAL WORK - NSDCFUADDAPPT_GEN_ALL_CORE_FT
Please follow up with your provider Lilibeth Toribio scheduled for 10/23 at 9:45 am.     Please be sure to follow up with your Neurologist Dr. Lomax 1-2 weeks after discharge.  Located: [address] [place] [address], [address]th [place] [address]  Phone: [phone]  Fax: [phone]

## 2023-10-09 NOTE — PROGRESS NOTE ADULT - PROBLEM SELECTOR PROBLEM 4
Atrial fibrillation
Bronchiectasis
Atrial fibrillation
Bronchiectasis
Atrial fibrillation
Bronchiectasis

## 2023-10-09 NOTE — PROGRESS NOTE ADULT - SUBJECTIVE AND OBJECTIVE BOX
OVERNIGHT EVENTS: ALENA    SUBJECTIVE / INTERVAL HPI: Patient seen and examined at bedside.     VITAL SIGNS:  Vital Signs Last 24 Hrs  T(C): 36.4 (09 Oct 2023 12:00), Max: 36.9 (08 Oct 2023 20:30)  T(F): 97.6 (09 Oct 2023 12:00), Max: 98.4 (08 Oct 2023 20:30)  HR: 73 (09 Oct 2023 12:00) (63 - 95)  BP: 138/77 (09 Oct 2023 12:00) (114/64 - 138/77)  BP(mean): --  RR: 18 (09 Oct 2023 12:00) (18 - 18)  SpO2: 94% (09 Oct 2023 12:00) (93% - 95%)    Parameters below as of 09 Oct 2023 12:00  Patient On (Oxygen Delivery Method): room air        PHYSICAL EXAM:    GEN: thin female in NAD on RA  HEENT: NC/AT, MMM  CV: increased rate - irregularly irregular, nml S1S2, no murmurs  PULM: nml effort, dry rales on R mid and lower fields wo wheezing or rhonchi.   ABD: Soft, non-distended, NABS, non-tender  NEURO  A/O x self, hospital, month and year but not date. moving all extremities, Sensation intact  PSYCH: Appropriate    MEDICATIONS:  MEDICATIONS  (STANDING):  amLODIPine   Tablet 5 milliGRAM(s) Oral daily  aspirin  chewable 81 milliGRAM(s) Oral daily  atorvastatin 40 milliGRAM(s) Oral at bedtime  azelastine 0.15% Nasal Spray 1 Spray(s) Both Nostrils two times a day  donepezil 5 milliGRAM(s) Oral every 24 hours  enoxaparin Injectable 30 milliGRAM(s) SubCutaneous every 24 hours  lidocaine 2% Jelly 5 milliLiter(s) IntraUrethral once  melatonin 5 milliGRAM(s) Oral at bedtime  metoprolol tartrate 50 milliGRAM(s) Oral two times a day  mirtazapine 7.5 milliGRAM(s) Oral at bedtime  montelukast 10 milliGRAM(s) Oral daily  multivitamin  Chewable 1 Tablet(s) Oral daily  sodium chloride 7% Inhalation 4 milliLiter(s) Inhalation every 12 hours  thiamine 100 milliGRAM(s) Oral daily    MEDICATIONS  (PRN):  acetaminophen     Tablet .. 650 milliGRAM(s) Oral every 6 hours PRN Temp greater or equal to 38C (100.4F), Mild Pain (1 - 3), Moderate Pain (4 - 6), Severe Pain (7 - 10)      ALLERGIES:  Allergies    Ceclor (Rash)  IV Contrast (Unknown)  Levaquin (Swelling)  Augmentin (Short breath; Rash)    Intolerances        LABS:                        9.8    5.28  )-----------( 262      ( 08 Oct 2023 07:59 )             30.9     10-08    136  |  103  |  24<H>  ----------------------------<  96  4.0   |  26  |  0.68    Ca    8.6      08 Oct 2023 07:59  Phos  3.9     10-08  Mg     1.7     10-08    TPro  6.1  /  Alb  3.1<L>  /  TBili  0.3  /  DBili  x   /  AST  34  /  ALT  44  /  AlkPhos  133<H>  10-08      Urinalysis Basic - ( 08 Oct 2023 07:59 )    Color: x / Appearance: x / SG: x / pH: x  Gluc: 96 mg/dL / Ketone: x  / Bili: x / Urobili: x   Blood: x / Protein: x / Nitrite: x   Leuk Esterase: x / RBC: x / WBC x   Sq Epi: x / Non Sq Epi: x / Bacteria: x      CAPILLARY BLOOD GLUCOSE          RADIOLOGY & ADDITIONAL TESTS: Reviewed.

## 2023-10-09 NOTE — PROGRESS NOTE ADULT - SUBJECTIVE AND OBJECTIVE BOX
INTERVAL HPI/OVERNIGHT EVENTS: steph o/n    SUBJECTIVE: Patient seen and examined at bedside.   Has intermittent cough. Denies dyspnea. Eager to go to rehab.   Eating wo N/V/Abd pain. No fevers.     OBJECTIVE:    VITAL SIGNS:  ICU Vital Signs Last 24 Hrs  T(C): 36.4 (09 Oct 2023 12:00), Max: 36.9 (08 Oct 2023 20:30)  T(F): 97.6 (09 Oct 2023 12:00), Max: 98.4 (08 Oct 2023 20:30)  HR: 73 (09 Oct 2023 12:00) (63 - 74)  BP: 138/77 (09 Oct 2023 12:00) (129/65 - 138/77)  BP(mean): --  ABP: --  ABP(mean): --  RR: 18 (09 Oct 2023 12:00) (18 - 18)  SpO2: 94% (09 Oct 2023 12:00) (93% - 95%)    O2 Parameters below as of 09 Oct 2023 12:00  Patient On (Oxygen Delivery Method): room air              CAPILLARY BLOOD GLUCOSE          PHYSICAL EXAM:  GEN: thin female in NAD on RA  HEENT: NC/AT, MMM  CV: increased rate - irregularly irregular, nml S1S2, no murmurs  PULM: nml effort, decreased breath sounds in R mid and lower fields wo wheezing or rhonchi.   ABD: Soft, non-distended, NABS, non-tender  NEURO  A/O x self, hospital, month and year but not date. moving all extremities, Sensation intact  PSYCH: Appropriate    MEDICATIONS:  MEDICATIONS  (STANDING):  amLODIPine   Tablet 5 milliGRAM(s) Oral daily  aspirin  chewable 81 milliGRAM(s) Oral daily  atorvastatin 40 milliGRAM(s) Oral at bedtime  azelastine 0.15% Nasal Spray 2 Spray(s) Both Nostrils two times a day  donepezil 5 milliGRAM(s) Oral every 24 hours  enoxaparin Injectable 30 milliGRAM(s) SubCutaneous every 24 hours  lidocaine 2% Jelly 5 milliLiter(s) IntraUrethral once  melatonin 5 milliGRAM(s) Oral at bedtime  metoprolol tartrate 50 milliGRAM(s) Oral two times a day  mirtazapine 7.5 milliGRAM(s) Oral at bedtime  montelukast 10 milliGRAM(s) Oral daily  multivitamin  Chewable 1 Tablet(s) Oral daily  sertraline 12.5 milliGRAM(s) Oral daily  sodium chloride 7% Inhalation 4 milliLiter(s) Inhalation every 12 hours  thiamine 100 milliGRAM(s) Oral daily    MEDICATIONS  (PRN):  acetaminophen     Tablet .. 650 milliGRAM(s) Oral every 6 hours PRN Temp greater or equal to 38C (100.4F), Mild Pain (1 - 3), Moderate Pain (4 - 6), Severe Pain (7 - 10)      ALLERGIES:  Allergies    Ceclor (Rash)  IV Contrast (Unknown)  Levaquin (Swelling)  Augmentin (Short breath; Rash)    Intolerances        LABS:                        9.8    5.28  )-----------( 262      ( 08 Oct 2023 07:59 )             30.9     10-08    136  |  103  |  24<H>  ----------------------------<  96  4.0   |  26  |  0.68    Ca    8.6      08 Oct 2023 07:59  Phos  3.9     10-08  Mg     1.7     10-08    TPro  6.1  /  Alb  3.1<L>  /  TBili  0.3  /  DBili  x   /  AST  34  /  ALT  44  /  AlkPhos  133<H>  10-08      Urinalysis Basic - ( 08 Oct 2023 07:59 )    Color: x / Appearance: x / SG: x / pH: x  Gluc: 96 mg/dL / Ketone: x  / Bili: x / Urobili: x   Blood: x / Protein: x / Nitrite: x   Leuk Esterase: x / RBC: x / WBC x   Sq Epi: x / Non Sq Epi: x / Bacteria: x        RADIOLOGY & ADDITIONAL TESTS: Reviewed.

## 2023-10-09 NOTE — BH CONSULTATION LIAISON PROGRESS NOTE - NSBHCONSULTMEDSEVERE_PSY_A_CORE FT
Continue with Klonopin 0.25mg po PRN daily on board in case of withdrawal symptoms or severe panic over next few days but would not continue outpatient.

## 2023-10-09 NOTE — PROGRESS NOTE ADULT - PROBLEM SELECTOR PLAN 1
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 PET brain suggestive of lewy body dementia  - continue ASA 81mg daily ; holding AC i/s/o SAH and possible CAA  - continue atorvastatin 40mg daily
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 PET brain suggestive of lewy body dementia  - continue ASA 81mg daily ; holding AC i/s/o SAH and possible CAA  - continue atorvastatin 40mg daily
.  Likely multifactorial but improving  -frequent reorientation, minimize unnecessary interventions/interruptions  -attempt to avoid additional antipsychotic use unless patient is a harm to herself or others
.  Likely multifactorial but improving  -frequent reorientation, minimize unnecessary interventions/interruptions  -decrease overnight vitals if possible to promote proper sleep/wake cycle
.  Likely multifactorial but resolving  -frequent reorientation, minimize unnecessary interventions/interruptions
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 PET brain suggestive of lewy body dementia  - continue ASA 81mg daily ; holding AC i/s/o SAH and possible CAA  - continue atorvastatin 40mg daily
.  Likely multifactorial but improving  -frequent reorientation, minimize unnecessary interventions/interruptions  -decrease overnight vitals if possible to promote proper sleep/wake cycle
.  Likely multifactorial but resolving  -frequent reorientation, minimize unnecessary interventions/interruptions

## 2023-10-09 NOTE — PROGRESS NOTE ADULT - PROBLEM SELECTOR PLAN 7
.  Complex medical decision making / symptom management in the setting of critical illness.    Will continue to follow for ongoing GOC discussion / titration of palliative regimen. Emotional support provided, questions answered.  Active Psychosocial Referrals:  [x]Social Work/Case management [x]PT/OT [x]Chaplaincy []Hospice  [x]Patient/Family Support []Holistic RN []Massage Therapy []Music Therapy []Ethics  Coping: [] well [x] with difficulty [] poor coping [] unable to assess  Support system: [] strong [x] adequate [] inadequate    For new or uncontrolled symptoms, please call Palliative Care at [phone] The service is available 24/7 (including nights & weekends) to provide symptom management recommendations over the phone as appropriate
.  Complex medical decision making / symptom management in the setting of critical illness.    Will continue to follow for ongoing GOC discussion / titration of palliative regimen. Emotional support provided, questions answered.  Active Psychosocial Referrals:  [x]Social Work/Case management []PT/OT [x]Chaplaincy []Hospice  []Patient/Family Support []Holistic RN []Massage Therapy []Music Therapy []Ethics  Coping: [] well [] with difficulty [] poor coping [x] unable to assess  Support system: [] strong [x] adequate [] inadequate    For new or uncontrolled symptoms, please call Palliative Care at [phone] The service is available 24/7 (including nights & weekends) to provide symptom management recommendations over the phone as appropriate
.  Complex medical decision making / symptom management in the setting of critical illness.    Will continue to follow for ongoing GOC discussion / titration of palliative regimen. Emotional support provided, questions answered.  Active Psychosocial Referrals:  [x]Social Work/Case management [x]PT/OT [x]Chaplaincy []Hospice  [x]Patient/Family Support []Holistic RN []Massage Therapy []Music Therapy []Ethics  Coping: [] well [x] with difficulty [] poor coping [] unable to assess  Support system: [] strong [x] adequate [] inadequate    For new or uncontrolled symptoms, please call Palliative Care at [phone]). The service is available 24/7 (including nights & weekends) to provide symptom management recommendations over the phone as appropriate
N: Minced and moist   E: replete as needed  DVT: lovenox 30mg SQ QD  Code: DNR with Trial of Intubation, MOLST in chart  D:
.  Complex medical decision making / symptom management in the setting of critical illness.    Will continue to follow for ongoing GOC discussion / titration of palliative regimen. Emotional support provided, questions answered.  Active Psychosocial Referrals:  [x]Social Work/Case management [x]PT/OT [x]Chaplaincy []Hospice  [x]Patient/Family Support []Holistic RN []Massage Therapy []Music Therapy []Ethics  Coping: [] well [x] with difficulty [] poor coping [] unable to assess  Support system: [] strong [x] adequate [] inadequate    For new or uncontrolled symptoms, please call Palliative Care at [phone]). The service is available 24/7 (including nights & weekends) to provide symptom management recommendations over the phone as appropriate
N: Minced and moist   E: replete as needed  DVT: lovenox 30mg SQ QD  Code: DNR with Trial of Intubation, MOLST in chart  D:
.  Complex medical decision making / symptom management in the setting of critical illness.    Will continue to follow for ongoing GOC discussion / titration of palliative regimen. Emotional support provided, questions answered.  Active Psychosocial Referrals:  [x]Social Work/Case management [x]PT/OT [x]Chaplaincy []Hospice  [x]Patient/Family Support []Holistic RN []Massage Therapy []Music Therapy []Ethics  Coping: [] well [x] with difficulty [] poor coping [] unable to assess  Support system: [] strong [x] adequate [] inadequate    For new or uncontrolled symptoms, please call Palliative Care at [phone]). The service is available 24/7 (including nights & weekends) to provide symptom management recommendations over the phone as appropriate
N: Minced and moist   E: replete as needed  DVT: lovenox 30mg SQ QD  Code: DNR with Trial of Intubation, MOLST in chart  D:

## 2023-10-09 NOTE — DISCHARGE NOTE NURSING/CASE MANAGEMENT/SOCIAL WORK - NSDCCRNAME_GEN_ALL_CORE_FT
Rehabilitation Medicine at [facility]  [address], [place] [address]

## 2023-10-09 NOTE — PROGRESS NOTE ADULT - PROBLEM SELECTOR PLAN 2
Pt is improving. There is signal on PET c/f Dementia w Lewy Bodies  - Continue Aricept 5mg daily  - general neurology consulted for patient family education on prognosis, recommenced Dr. Lomax and ISAEL henao  - Will need neurocognitive outpatient set up; reached out to Dr. Lomax and ISAEL henao for appt and for patient daughter education on LBD prognosis.
.  In the setting of CVA and Encephalopathy  -s/p FEES and S&S re-penny, recommended for a diet  -assist with feeding and liberalize diet as tolerated
Pt is improving. There is signal on PET c/f Dementia w Lewy Bodies  - Continue Aricept 5mg daily  - general neurology consulted for patient family education on prognosis, recommenced Dr. Lomax and ISAEL henao  - Will need neurocognitive outpatient set up; reached out to Dr. Lomax and ISAEL henao for appt and for patient daughter education on LBD prognosis.
Pt is improving. There is signal on PET c/f Dementia w Lewy Bodies  - Continue Aricept 5mg daily  - general neurology consulted for patient family education on prognosis, recommenced Dr. Lomax and ISAEL henao  - Will need neurocognitive outpatient set up; reached out to Dr. Lomax and ISAEL henao for appt and for patient daughter education on LBD prognosis.
.  In the setting of CVA and Encephalopathy  -pending S&S re-eval  -temporary NGT may be within GOC but long-term feeding tube would not be if overall disease trajectory is not improving
.  In the setting of CVA and Encephalopathy  -s/p FEES and S&S re-penny, recommended for a diet  -assist with feeding and liberalize diet as tolerated

## 2023-10-09 NOTE — BH CONSULTATION LIAISON PROGRESS NOTE - CURRENT MEDICATION
MEDICATIONS  (STANDING):  amLODIPine   Tablet 5 milliGRAM(s) Oral daily  aspirin  chewable 81 milliGRAM(s) Oral daily  atorvastatin 40 milliGRAM(s) Oral at bedtime  azelastine 0.15% Nasal Spray 1 Spray(s) Both Nostrils two times a day  donepezil 5 milliGRAM(s) Oral every 24 hours  enoxaparin Injectable 30 milliGRAM(s) SubCutaneous every 24 hours  lidocaine 2% Jelly 5 milliLiter(s) IntraUrethral once  melatonin 5 milliGRAM(s) Oral at bedtime  metoprolol tartrate 50 milliGRAM(s) Oral two times a day  mirtazapine 7.5 milliGRAM(s) Oral at bedtime  montelukast 10 milliGRAM(s) Oral daily  multivitamin  Chewable 1 Tablet(s) Oral daily  sodium chloride 7% Inhalation 4 milliLiter(s) Inhalation every 12 hours  thiamine 100 milliGRAM(s) Oral daily    MEDICATIONS  (PRN):  acetaminophen     Tablet .. 650 milliGRAM(s) Oral every 6 hours PRN Temp greater or equal to 38C (100.4F), Mild Pain (1 - 3), Moderate Pain (4 - 6), Severe Pain (7 - 10)

## 2023-10-09 NOTE — PROGRESS NOTE ADULT - PROBLEM SELECTOR PLAN 3
- TTE from 9/19: mildly dilated RV, dilated RA, severe pulmonary hypertension  - EKG 9/30: afib with rate 110-120s, restarted home medication metoprolol 50mg BID  - Amlodipine 5mg daily, metoprolol tartrate 50mg BID
.  PPSV = 40%, requires assistance for most ADLs  -PT Eval recommending AR  -c/w supportive care, OOB, encourage movement
- TTE from 9/19: mildly dilated RV, dilated RA, severe pulmonary hypertension  - EKG 9/30: afib with rate 110-120s, restarted home medication metoprolol 50mg BID  - Amlodipine 5mg daily, metoprolol tartrate 50mg BID
.  PPSV = 40%, requires assistance for most ADLs  -PT Eval recommending AR  -c/w supportive care, OOB, encourage movement
- TTE from 9/19: mildly dilated RV, dilated RA, severe pulmonary hypertension  - EKG 9/30: afib with rate 110-120s, restarted home medication metoprolol 50mg BID  - Amlodipine 5mg daily, metoprolol tartrate 50mg BID
.  PPSV = 50%, requires assistance for most ADLs  -PT Eval recommending Acute Rehab  -c/w supportive care, OOB, encourage movement
.  PPSV = 40%, requires assistance for most ADLs  -PT Eval when appropriate  -c/w supportive care, OOB, encourage movement
.  PPSV = 40%, requires assistance for most ADLs  -PT Eval when appropriate  -c/w supportive care, OOB, encourage movement

## 2023-10-09 NOTE — BH CONSULTATION LIAISON PROGRESS NOTE - NSBHFUPINTERVALHXFT_PSY_A_CORE
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 Psychiatry was consulted for agitation and anxiety during the hospital course and initial recommendation was to continue with melatonin, start Rameron, and switch Klonipin from standing to PRN.     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 Psychiatry was consulted for agitation and anxiety during the hospital course and initial recommendation was to continue with melatonin, start Rameron, and switch Klonipin from standing to PRN.     On approach, patient is sitting on the bed with her breakfast tray next to her.  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 Psychiatry was consulted for agitation and anxiety during the hospital course and initial recommendation was to continue with melatonin, start Rameron, and switch Klonipin from standing to PRN.     On approach, patient is sitting on the bed with her breakfast tray next to her. Patient is A&Ox3, calm and cooperative. Patient states she has not been feeling too differently since the last time she met us and denies any new panic attacks. Patient notes that the prior panic attacks occurred after having difficulty breathing, attributing to her bronchiectasis and cystic fibrosis trait, usually at the end of the day. She notes she was able to sleep last night with the medication (of note, Klonopin 0.25mg PO once was given at 22:00 last night) She denies any visual hallucinations or mood changes.     Of note, patient has been refusing Mirtazepine after 1 dose, received Klonipin last night at 22:00 (0.25mg PO once), and has been receiving Melatonin every night since 9/29/23.  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 Psychiatry was consulted for agitation and anxiety during the hospital course and initial recommendation was to continue with melatonin, start Remeron, and switch Klonipin from standing to PRN.     On approach, patient is sitting on the bed with her breakfast tray next to her. Patient is A&Ox3, calm and cooperative. Patient states she has not been feeling too differently since the last time she met us and denies any new panic attacks. Patient notes that the prior panic attacks occurred after having difficulty breathing, attributing to her bronchiectasis and cystic fibrosis trait, usually at the end of the day. She notes she was able to sleep last night with the medication (of note, Klonopin 0.25mg PO once was given at 22:00 last night) She denies any visual hallucinations or mood changes.     Of note, patient has been refusing Mirtazepine after 1 dose, received Klonopin last night at 22:00 (0.25mg PO once), and has been receiving Melatonin every night since 9/29/23.   Continues to deny SI/HI/AVH.

## 2023-10-09 NOTE — PROGRESS NOTE ADULT - PROBLEM SELECTOR PROBLEM 7
Encounter for palliative care
Prophylactic measure
Encounter for palliative care
Prophylactic measure
Encounter for palliative care
Encounter for palliative care
Prophylactic measure
Encounter for palliative care

## 2023-10-09 NOTE — PROGRESS NOTE ADULT - PROVIDER SPECIALTY LIST ADULT
Endocrinology
Hospitalist
Hospitalist
Internal Medicine
MICU
Neurology
Rehab Medicine
Endocrinology
Hospitalist
MICU
Neurology
Palliative Care
Structural Heart
Endocrinology
Hospitalist
Internal Medicine
MICU
MICU
Neurology
Palliative Care
Pulmonology
Rehab Medicine
Structural Heart
Hospitalist
MICU
Neurology
Neurology
Palliative Care
Rehab Medicine
Structural Heart
MICU
Neurology
Pulmonology
Pulmonology
Internal Medicine
Internal Medicine

## 2023-10-09 NOTE — PROGRESS NOTE ADULT - PROBLEM SELECTOR PLAN 5
Pt was previously on IV Cefepime from 9/13 w PICC placed outpatient (PICC discontinued 9/21). Has been on IV Zosyn in MICU per pulmonary thru 9/29.   -Currently satting well on RA.   -c/w singulair 10 daily
.  Pending further work-up  -per daughter, patient previously recovered from a similar presentation  -pending further characterization with MRI, EEG, +/- LP or PET  -if patient remains encephalopathic and bedbound, then she will be eligible for hospice
.  Pending further work-up  -per daughter, patient previously recovered from a similar presentation but is concerned about staying stuck in a cycle  -pending further characterization and outpatient follow-up  -if patient remains bedbound, then home hospice referral could be considered but does not seem appropriate at this time given clinical improvement
.  Pending further work-up  -per daughter, patient previously recovered from a similar presentation but is concerned about staying stuck in a cycle  -pending further characterization and outpatient follow-up  -if patient remains bedbound, then home hospice referral could be considered but does not seem appropriate at this time given clinical improvement
Pt was previously on IV Cefepime from 9/13 w PICC placed outpatient (PICC discontinued 9/21). Has been on IV Zosyn in MICU per pulmonary thru 9/29.   -Currently satting well on RA.   -c/w singulair 10 daily
Pt was previously on IV Cefepime from 9/13 w PICC placed outpatient (PICC discontinued 9/21). Has been on IV Zosyn in MICU per pulmonary thru 9/29.   -Currently satting well on RA.   -c/w singulair 10 daily
.  Pending further work-up  -per daughter, patient previously recovered from a similar presentation but is concerned about staying stuck in a cycle  -pending further characterization and outpatient follow-up  -if patient remains bedbound, then home hospice referral could be considered but does not seem appropriate at this time given clinical improvement
.  -per daughter, patient previously recovered from a similar presentation but is concerned about staying stuck in a cycle  -work-up suggesting CAA and Lewy Body Dementia  -planned for outpatient follow-up  -does not meet hospice criteria given improving functional status

## 2023-10-09 NOTE — DISCHARGE NOTE NURSING/CASE MANAGEMENT/SOCIAL WORK - NSDCPEFALRISK_GEN_ALL_CORE
For information on Fall & Injury Prevention, visit: [email or web address] OR  [email or web address] OR  https://www.cdc.gov/steadi/patient.html

## 2023-10-09 NOTE — PROGRESS NOTE ADULT - TIME BILLING
Discontinuation of IV dextrose, continued review of data related to hypoglycemic event
Workup of markedly elevated insulin level
review of objective data, interview of patient, and discussion of assessment plan with patient/family/multidisciplinary team. I agree with ACP, Fellow documentation except where indicated above.
case complexity as above.
Emotional Support/Supportive Care and Clarification of Potential Disease Trajectory related to Neurologic Encephalopathy  Assessment of Symptom [place] and Palliative regimen  Exploration of GOC/Advanced Directives including HCP designation, code status, and hospice eligibility    Time inclusive of chart review, medication ordering, discussion with primary team, clinical documentation, and communication with family/caregiver
review of objective data, interview of patient, and discussion of assessment plan with patient/family/multidisciplinary team. I agree with ACP, Fellow documentation except where indicated above.
Emotional Support/Supportive Care and Clarification of Potential Disease Trajectory related to Neurologic Encephalopathy  Assessment of Symptom [place] and Palliative regimen  Exploration of GOC/Advanced Directives including HCP designation, code status, and hospice eligibility    Time inclusive of chart review, medication ordering, discussion with primary team, clinical documentation, and communication with family/caregiver

## 2023-10-09 NOTE — PROGRESS NOTE ADULT - ASSESSMENT
Pulm: Dr. Foster  82F w HTN, AF not on AC d/t fall risk, hospitalized for PRES 05-06/2023, recent DC 8/2023 for acute hypoxemic respiratory failure - to home on 4L NC w follow-up w Dr. Foster for exacerbation of bronchiectasis, recently started course of IV cefepime outpatient by Dr. Foster 9/13, p/w diplopia, encephalopathy admitted for stroke work-up, cefepime continued inpatient, repeat head CT found small SAH, c/b hypoglycemia, encephalopathy - BG 30 and temp 93F during RRT on 9/21, transferred to MICU for persistent hypoglycemia needing D10W >> D10 NS, slow improvement w nml insulin and C peptide, stepped down to 7L and stroke service, MR brain showing L pontine infarction, PET brain w signal c/f Dementia w Lewy Bodies, for acute rehab    #Acute urinary retention - resolved and passed TOV 10/2    #CVA - L pontine infarction on MR 9/25 that also showed SAH diminishing.  #Encephalopathy - improving. There is signal on PET c/f Dementia w Lewy Bodies   - ASA 81 daily, Atorva 40   - Started on donepezil 5 HS. Pt and daughter declined mirtazapine 7.5mg HS d/t vivid dreams.    #Bronchiectasis - On IV Cefepime from 9/13 w PICC placed outpatient (PICC discontinued 9/21). Has been on IV Zosyn in MICU per pulmonary thru 9/29. Currently satting well on RA.    - On singulair 10, hypertonic saline nebulizer BID, azelastine 2 puffs per nostril BID.  #AFib - on lopressor 50 bid. AC currently held d/t SAH  #HTN - on amlodipine 5    #Normocytic anemia - 9.8 in AM  #Hyponatremia - resolved    Plan  Can use lidocaine patch PRN for chest wall pain - pt is amenable.  Appreciate CL/Psych recs.   Counseled pt and daughter would use PRN klonopin only if necessary and attempt to titrate off. Noted large part of anxiety results from feeling short of breath after coughing.     Pt is DNR/DNI  DISPO: Acute rehab - baseline lives w daughter - awaiting bed

## 2023-10-09 NOTE — DISCHARGE NOTE NURSING/CASE MANAGEMENT/SOCIAL WORK - PATIENT PORTAL LINK FT
You can access the FollowMyHealth Patient Portal offered by  by registering at the following website: [email or web address]. By joining [employer]’s FollowMyHealth portal, you will also be able to view your health information using other applications (apps) compatible with our system.

## 2023-10-09 NOTE — PROGRESS NOTE ADULT - PROBLEM SELECTOR PROBLEM 1
Acute respiratory failure with hypoxia
CVA (cerebrovascular accident)
Acute encephalopathy
CVA (cerebrovascular accident)
CVA (cerebrovascular accident)
Acute encephalopathy

## 2023-10-10 VITALS
RESPIRATION RATE: 18 BRPM | HEART RATE: 66 BPM | DIASTOLIC BLOOD PRESSURE: 73 MMHG | TEMPERATURE: 98 F | SYSTOLIC BLOOD PRESSURE: 146 MMHG | OXYGEN SATURATION: 95 %

## 2023-10-10 PROCEDURE — 99239 HOSP IP/OBS DSCHRG MGMT >30: CPT

## 2023-10-10 RX ORDER — CLONAZEPAM 1 MG
0.25 TABLET ORAL ONCE
Refills: 0 | Status: DISCONTINUED | OUTPATIENT
Start: 2023-10-10 | End: 2023-10-10

## 2023-10-10 RX ORDER — ENOXAPARIN SODIUM 100 MG/ML
0 INJECTION SUBCUTANEOUS
Qty: 0 | Refills: 0 | DISCHARGE
Start: 2023-10-10

## 2023-10-10 RX ORDER — ENOXAPARIN SODIUM 100 MG/ML
0 INJECTION SUBCUTANEOUS
Qty: 0 | Refills: 0 | DISCHARGE
Start: 2023-10-10 | End: 2023-10-31

## 2023-10-10 RX ORDER — SODIUM CHLORIDE 9 MG/ML
4 INJECTION INTRAMUSCULAR; INTRAVENOUS; SUBCUTANEOUS
Qty: 0 | Refills: 0 | DISCHARGE
Start: 2023-10-10

## 2023-10-10 RX ADMIN — Medication 650 MILLIGRAM(S): at 00:23

## 2023-10-10 RX ADMIN — Medication 50 MILLIGRAM(S): at 06:11

## 2023-10-10 RX ADMIN — Medication 1 TABLET(S): at 11:40

## 2023-10-10 RX ADMIN — Medication 650 MILLIGRAM(S): at 01:23

## 2023-10-10 RX ADMIN — Medication 650 MILLIGRAM(S): at 16:10

## 2023-10-10 RX ADMIN — Medication 650 MILLIGRAM(S): at 07:06

## 2023-10-10 RX ADMIN — MONTELUKAST 10 MILLIGRAM(S): 4 TABLET, CHEWABLE ORAL at 11:41

## 2023-10-10 RX ADMIN — Medication 81 MILLIGRAM(S): at 11:41

## 2023-10-10 RX ADMIN — AMLODIPINE BESYLATE 5 MILLIGRAM(S): 2.5 TABLET ORAL at 06:11

## 2023-10-10 RX ADMIN — SERTRALINE 12.5 MILLIGRAM(S): 25 TABLET, FILM COATED ORAL at 11:41

## 2023-10-10 RX ADMIN — Medication 100 MILLIGRAM(S): at 11:40

## 2023-10-10 RX ADMIN — SODIUM CHLORIDE 4 MILLILITER(S): 9 INJECTION INTRAMUSCULAR; INTRAVENOUS; SUBCUTANEOUS at 08:58

## 2023-10-10 NOTE — CHART NOTE - NSCHARTNOTESELECT_GEN_ALL_CORE
Anti-infective approval/Event Note
Event Note
Rapid Response
Collateral/Event Note
Endocrinology/Event Note
Event Note
Event Note
Nutrition Services
Rapid Response

## 2023-10-10 NOTE — CHART NOTE - NSCHARTNOTEFT_GEN_A_CORE
Admitting Diagnosis:   Patient is a 82y old  Female who presents with a chief complaint of diplopia (09 Oct 2023 18:21)      PAST MEDICAL & SURGICAL HISTORY:  Bronchiectasis      History of Pseudomonas pneumonia      HTN (hypertension)      Posterior reversible encephalopathy syndrome      Atrial fibrillation      No significant past surgical history          Current Nutrition Order: Soft and Bite Sized        PO Intake: Good (%) [ x ]  Fair (50-75%) [   ] Poor (<25%) [   ]    GI Issues: endorses diarrhea, last BM 10/9 per EMR - consider Banatrol, see recs below    Skin Integrity: no PI or edema documented     Labs: BUN elevated (24), ALP elevated (133), glucose trending 96 x 24 hours       CAPILLARY BLOOD GLUCOSE      Medications:  MEDICATIONS  (STANDING):  amLODIPine   Tablet 5 milliGRAM(s) Oral daily  aspirin  chewable 81 milliGRAM(s) Oral daily  atorvastatin 40 milliGRAM(s) Oral at bedtime  azelastine 0.15% Nasal Spray 2 Spray(s) Both Nostrils two times a day  donepezil 5 milliGRAM(s) Oral every 24 hours  enoxaparin Injectable 30 milliGRAM(s) SubCutaneous every 24 hours  lidocaine 2% Jelly 5 milliLiter(s) IntraUrethral once  melatonin 5 milliGRAM(s) Oral at bedtime  metoprolol tartrate 50 milliGRAM(s) Oral two times a day  mirtazapine 7.5 milliGRAM(s) Oral at bedtime  montelukast 10 milliGRAM(s) Oral daily  multivitamin  Chewable 1 Tablet(s) Oral daily  sertraline 12.5 milliGRAM(s) Oral daily  sodium chloride 7% Inhalation 4 milliLiter(s) Inhalation every 12 hours  thiamine 100 milliGRAM(s) Oral daily    MEDICATIONS  (PRN):  acetaminophen     Tablet .. 650 milliGRAM(s) Oral every 6 hours PRN Temp greater or equal to 38C (100.4F), Mild Pain (1 - 3), Moderate Pain (4 - 6), Severe Pain (7 - 10)      Anthropometrics:   Height for BMI (FEET)	5 Feet  Height for BMI (INCHES)	5 Inch(s)  Height for BMI (CENTIMETERS)	165.1 Centimeter(s)  Weight for BMI (lbs)	71.7 lb  Weight for BMI (kg)	32.5 kg  Body Mass Index	11.9    Weight Change: No new wt since admit.     Nutrition Focused Physical Exam: See Dietitian Nutrition Risk Notification on 9/20.    Estimated energy needs: Needs updated 9/25 to reflect protein-calorie malnutrition using current body weight 32.5 kg.   EEN: 975-1137 calories (30-35 kcal/kg)  EPN: 39-65 gProt. (1.2-2.0 gProt./kg)  EFN: 975-1137 ml (30-35 ml/kg)    Subjective: 82y Female with PMHx of HTN, afib (not on AC due to fall risk), recent hospitalization for PRES (5-6/2023), recently discharged from [facility] 8/2023 with acute exacerbation of severe bronchiectasis and respiratory failure presents with several days of AMS and new diplopia 9/18. NIHSS 1 for bitemporal visual deficit. CTH with no significant findings. CTA and perfusion deferred due to contrast allergy. Admitted for further stroke w/u. 9/27: Passed FEES. 9/28: Started Remeron.    Patient seen at bedside for follow up assessment. On Soft and Bite Sized diet with magic cup BID. Endorses good PO intake, consumed 100% of breakfast which included scrambled eggs w/ cheese, oatmeal, french toast. Endorses dislike of magic cup- will d/c. Endorses diarrhea, last BM 10/9 per EMR - consider Banatrol, see recs below. No PI or edema documented. No new weights since admit. Labs: BUN elevated (24), ALP elevated (133), glucose trending 96 x 24 hours. Meds: mirtazapine (may have appetite stimulating effects), mvi, thiamin. RD to remain available for additional nutrition interventions as needed.     Previous Nutrition Diagnosis: Malnutrition... Severe inadequate PO intake Significant weight loss, NFPE findings     Active [ x ]  Resolved [   ]    Goal: Pt to meet at least 75% of nutritional needs consistently     Recommendations:  1. Continue current diet order with appropriate texture/consistency per team/SLP, recommend to d/c magic cup   2. Monitor and encourage PO intake  3. Monitor labs (electrolytes, CMP), wt trends (weekly), GI function, and skin integrity   >>consider Banatrol BID in view of diarrhea   4. Encourage pt to meet nutritional needs and honor food preferences as able   5. RD to remain available for additional nutrition interventions and diet edu as needed       Risk Level: High [ x ] Moderate [   ] Low [   ]

## 2023-10-19 DIAGNOSIS — I48.0 PAROXYSMAL ATRIAL FIBRILLATION: ICD-10-CM

## 2023-10-19 DIAGNOSIS — E16.2 HYPOGLYCEMIA, UNSPECIFIED: ICD-10-CM

## 2023-10-19 DIAGNOSIS — J96.01 ACUTE RESPIRATORY FAILURE WITH HYPOXIA: ICD-10-CM

## 2023-10-19 DIAGNOSIS — I63.89 OTHER CEREBRAL INFARCTION: ICD-10-CM

## 2023-10-19 DIAGNOSIS — R41.0 DISORIENTATION, UNSPECIFIED: ICD-10-CM

## 2023-10-19 DIAGNOSIS — K52.1 TOXIC GASTROENTERITIS AND COLITIS: ICD-10-CM

## 2023-10-19 DIAGNOSIS — T36.95XA ADVERSE EFFECT OF UNSPECIFIED SYSTEMIC ANTIBIOTIC, INITIAL ENCOUNTER: ICD-10-CM

## 2023-10-19 DIAGNOSIS — Z79.899 OTHER LONG TERM (CURRENT) DRUG THERAPY: ICD-10-CM

## 2023-10-19 DIAGNOSIS — J45.909 UNSPECIFIED ASTHMA, UNCOMPLICATED: ICD-10-CM

## 2023-10-19 DIAGNOSIS — E78.5 HYPERLIPIDEMIA, UNSPECIFIED: ICD-10-CM

## 2023-10-19 DIAGNOSIS — R29.701 NIHSS SCORE 1: ICD-10-CM

## 2023-10-19 DIAGNOSIS — E87.1 HYPO-OSMOLALITY AND HYPONATREMIA: ICD-10-CM

## 2023-10-19 DIAGNOSIS — E43 UNSPECIFIED SEVERE PROTEIN-CALORIE MALNUTRITION: ICD-10-CM

## 2023-10-19 DIAGNOSIS — I10 ESSENTIAL (PRIMARY) HYPERTENSION: ICD-10-CM

## 2023-10-19 DIAGNOSIS — J47.1 BRONCHIECTASIS WITH (ACUTE) EXACERBATION: ICD-10-CM

## 2023-10-19 DIAGNOSIS — I60.7 NONTRAUMATIC SUBARACHNOID HEMORRHAGE FROM UNSPECIFIED INTRACRANIAL ARTERY: ICD-10-CM

## 2023-10-19 DIAGNOSIS — Z91.041 RADIOGRAPHIC DYE ALLERGY STATUS: ICD-10-CM

## 2023-10-19 DIAGNOSIS — G93.41 METABOLIC ENCEPHALOPATHY: ICD-10-CM

## 2023-10-19 DIAGNOSIS — Z66 DO NOT RESUSCITATE: ICD-10-CM

## 2023-10-19 DIAGNOSIS — F02.80 DEMENTIA IN OTHER DISEASES CLASSIFIED ELSEWHERE, UNSPECIFIED SEVERITY, WITHOUT BEHAVIORAL DISTURBANCE, PSYCHOTIC DISTURBANCE, MOOD DISTURBANCE, AND ANXIETY: ICD-10-CM

## 2023-10-19 DIAGNOSIS — E83.42 HYPOMAGNESEMIA: ICD-10-CM

## 2023-10-19 DIAGNOSIS — R13.10 DYSPHAGIA, UNSPECIFIED: ICD-10-CM

## 2023-10-19 DIAGNOSIS — R45.851 SUICIDAL IDEATIONS: ICD-10-CM

## 2023-10-19 DIAGNOSIS — R33.9 RETENTION OF URINE, UNSPECIFIED: ICD-10-CM

## 2023-10-19 DIAGNOSIS — Z88.1 ALLERGY STATUS TO OTHER ANTIBIOTIC AGENTS STATUS: ICD-10-CM

## 2023-10-19 DIAGNOSIS — F41.9 ANXIETY DISORDER, UNSPECIFIED: ICD-10-CM

## 2023-10-19 DIAGNOSIS — G93.49 OTHER ENCEPHALOPATHY: ICD-10-CM

## 2023-10-19 DIAGNOSIS — Z91.81 HISTORY OF FALLING: ICD-10-CM

## 2023-10-19 DIAGNOSIS — G31.83 NEUROCOGNITIVE DISORDER WITH LEWY BODIES: ICD-10-CM

## 2023-10-19 DIAGNOSIS — D50.9 IRON DEFICIENCY ANEMIA, UNSPECIFIED: ICD-10-CM

## 2023-10-19 DIAGNOSIS — H53.2 DIPLOPIA: ICD-10-CM

## 2023-10-19 DIAGNOSIS — G93.6 CEREBRAL EDEMA: ICD-10-CM

## 2023-10-23 ENCOUNTER — EMERGENCY (EMERGENCY)
Facility: HOSPITAL | Age: 83
LOS: 1 days | Discharge: HOME | End: 2023-10-23
Attending: EMERGENCY MEDICINE | Admitting: EMERGENCY MEDICINE
Payer: COMMERCIAL

## 2023-10-23 ENCOUNTER — APPOINTMENT (OUTPATIENT)
Dept: NEUROLOGY | Facility: CLINIC | Age: 83
End: 2023-10-23
Payer: MEDICARE

## 2023-10-23 ENCOUNTER — NON-APPOINTMENT (OUTPATIENT)
Age: 83
End: 2023-10-23

## 2023-10-23 VITALS
HEART RATE: 85 BPM | TEMPERATURE: 98 F | OXYGEN SATURATION: 86 % | SYSTOLIC BLOOD PRESSURE: 119 MMHG | DIASTOLIC BLOOD PRESSURE: 63 MMHG | RESPIRATION RATE: 18 BRPM

## 2023-10-23 VITALS
SYSTOLIC BLOOD PRESSURE: 143 MMHG | TEMPERATURE: 98 F | DIASTOLIC BLOOD PRESSURE: 77 MMHG | HEART RATE: 65 BPM | BODY MASS INDEX: 13.34 KG/M2 | OXYGEN SATURATION: 93 % | HEIGHT: 66 IN | WEIGHT: 83 LBS

## 2023-10-23 VITALS
TEMPERATURE: 98 F | DIASTOLIC BLOOD PRESSURE: 73 MMHG | OXYGEN SATURATION: 86 % | HEART RATE: 64 BPM | RESPIRATION RATE: 18 BRPM | HEIGHT: 65 IN | SYSTOLIC BLOOD PRESSURE: 126 MMHG

## 2023-10-23 DIAGNOSIS — H53.9 UNSPECIFIED VISUAL DISTURBANCE: ICD-10-CM

## 2023-10-23 DIAGNOSIS — Z88.1 ALLERGY STATUS TO OTHER ANTIBIOTIC AGENTS STATUS: ICD-10-CM

## 2023-10-23 DIAGNOSIS — J47.9 BRONCHIECTASIS, UNCOMPLICATED: ICD-10-CM

## 2023-10-23 DIAGNOSIS — R06.02 SHORTNESS OF BREATH: ICD-10-CM

## 2023-10-23 DIAGNOSIS — Z91.041 RADIOGRAPHIC DYE ALLERGY STATUS: ICD-10-CM

## 2023-10-23 DIAGNOSIS — R07.81 PLEURODYNIA: ICD-10-CM

## 2023-10-23 DIAGNOSIS — Z20.822 CONTACT WITH AND (SUSPECTED) EXPOSURE TO COVID-19: ICD-10-CM

## 2023-10-23 DIAGNOSIS — G89.29 OTHER CHRONIC PAIN: ICD-10-CM

## 2023-10-23 DIAGNOSIS — Z86.69 PERSONAL HISTORY OF OTHER DISEASES OF THE NERVOUS SYSTEM AND SENSE ORGANS: ICD-10-CM

## 2023-10-23 DIAGNOSIS — I10 ESSENTIAL (PRIMARY) HYPERTENSION: ICD-10-CM

## 2023-10-23 DIAGNOSIS — J45.909 UNSPECIFIED ASTHMA, UNCOMPLICATED: ICD-10-CM

## 2023-10-23 DIAGNOSIS — Z88.0 ALLERGY STATUS TO PENICILLIN: ICD-10-CM

## 2023-10-23 DIAGNOSIS — Z79.82 LONG TERM (CURRENT) USE OF ASPIRIN: ICD-10-CM

## 2023-10-23 DIAGNOSIS — I48.91 UNSPECIFIED ATRIAL FIBRILLATION: ICD-10-CM

## 2023-10-23 LAB
ALBUMIN SERPL ELPH-MCNC: 3.3 G/DL — SIGNIFICANT CHANGE UP (ref 3.3–5)
ALBUMIN SERPL ELPH-MCNC: 3.3 G/DL — SIGNIFICANT CHANGE UP (ref 3.3–5)
ALP SERPL-CCNC: 147 U/L — HIGH (ref 40–120)
ALP SERPL-CCNC: 147 U/L — HIGH (ref 40–120)
ALT FLD-CCNC: 25 U/L — SIGNIFICANT CHANGE UP (ref 10–45)
ALT FLD-CCNC: 25 U/L — SIGNIFICANT CHANGE UP (ref 10–45)
ANION GAP SERPL CALC-SCNC: 6 MMOL/L — SIGNIFICANT CHANGE UP (ref 5–17)
ANION GAP SERPL CALC-SCNC: 6 MMOL/L — SIGNIFICANT CHANGE UP (ref 5–17)
AST SERPL-CCNC: 31 U/L — SIGNIFICANT CHANGE UP (ref 10–40)
AST SERPL-CCNC: 31 U/L — SIGNIFICANT CHANGE UP (ref 10–40)
BASE EXCESS BLDV CALC-SCNC: 6.7 MMOL/L — HIGH (ref -2–3)
BASE EXCESS BLDV CALC-SCNC: 6.7 MMOL/L — HIGH (ref -2–3)
BASOPHILS # BLD AUTO: 0.05 K/UL — SIGNIFICANT CHANGE UP (ref 0–0.2)
BASOPHILS # BLD AUTO: 0.05 K/UL — SIGNIFICANT CHANGE UP (ref 0–0.2)
BASOPHILS NFR BLD AUTO: 0.8 % — SIGNIFICANT CHANGE UP (ref 0–2)
BASOPHILS NFR BLD AUTO: 0.8 % — SIGNIFICANT CHANGE UP (ref 0–2)
BILIRUB SERPL-MCNC: 0.4 MG/DL — SIGNIFICANT CHANGE UP (ref 0.2–1.2)
BILIRUB SERPL-MCNC: 0.4 MG/DL — SIGNIFICANT CHANGE UP (ref 0.2–1.2)
BUN SERPL-MCNC: 13 MG/DL — SIGNIFICANT CHANGE UP (ref 7–23)
BUN SERPL-MCNC: 13 MG/DL — SIGNIFICANT CHANGE UP (ref 7–23)
CALCIUM SERPL-MCNC: 8.8 MG/DL — SIGNIFICANT CHANGE UP (ref 8.4–10.5)
CALCIUM SERPL-MCNC: 8.8 MG/DL — SIGNIFICANT CHANGE UP (ref 8.4–10.5)
CHLORIDE SERPL-SCNC: 98 MMOL/L — SIGNIFICANT CHANGE UP (ref 96–108)
CHLORIDE SERPL-SCNC: 98 MMOL/L — SIGNIFICANT CHANGE UP (ref 96–108)
CO2 SERPL-SCNC: 29 MMOL/L — SIGNIFICANT CHANGE UP (ref 22–31)
CO2 SERPL-SCNC: 29 MMOL/L — SIGNIFICANT CHANGE UP (ref 22–31)
CREAT SERPL-MCNC: 0.64 MG/DL — SIGNIFICANT CHANGE UP (ref 0.5–1.3)
CREAT SERPL-MCNC: 0.64 MG/DL — SIGNIFICANT CHANGE UP (ref 0.5–1.3)
EGFR: 89 ML/MIN/1.73M2 — SIGNIFICANT CHANGE UP
EGFR: 89 ML/MIN/1.73M2 — SIGNIFICANT CHANGE UP
EOSINOPHIL # BLD AUTO: 0.09 K/UL — SIGNIFICANT CHANGE UP (ref 0–0.5)
EOSINOPHIL # BLD AUTO: 0.09 K/UL — SIGNIFICANT CHANGE UP (ref 0–0.5)
EOSINOPHIL NFR BLD AUTO: 1.5 % — SIGNIFICANT CHANGE UP (ref 0–6)
EOSINOPHIL NFR BLD AUTO: 1.5 % — SIGNIFICANT CHANGE UP (ref 0–6)
FLUAV AG NPH QL: SIGNIFICANT CHANGE UP
FLUAV AG NPH QL: SIGNIFICANT CHANGE UP
FLUBV AG NPH QL: SIGNIFICANT CHANGE UP
FLUBV AG NPH QL: SIGNIFICANT CHANGE UP
GLUCOSE SERPL-MCNC: 89 MG/DL — SIGNIFICANT CHANGE UP (ref 70–99)
GLUCOSE SERPL-MCNC: 89 MG/DL — SIGNIFICANT CHANGE UP (ref 70–99)
HCO3 BLDV-SCNC: 33 MMOL/L — HIGH (ref 22–29)
HCO3 BLDV-SCNC: 33 MMOL/L — HIGH (ref 22–29)
HCT VFR BLD CALC: 33.8 % — LOW (ref 34.5–45)
HCT VFR BLD CALC: 33.8 % — LOW (ref 34.5–45)
HGB BLD-MCNC: 10.8 G/DL — LOW (ref 11.5–15.5)
HGB BLD-MCNC: 10.8 G/DL — LOW (ref 11.5–15.5)
IMM GRANULOCYTES NFR BLD AUTO: 0.2 % — SIGNIFICANT CHANGE UP (ref 0–0.9)
IMM GRANULOCYTES NFR BLD AUTO: 0.2 % — SIGNIFICANT CHANGE UP (ref 0–0.9)
LACTATE SERPL-SCNC: 0.7 MMOL/L — SIGNIFICANT CHANGE UP (ref 0.5–2)
LACTATE SERPL-SCNC: 0.7 MMOL/L — SIGNIFICANT CHANGE UP (ref 0.5–2)
LYMPHOCYTES # BLD AUTO: 1.64 K/UL — SIGNIFICANT CHANGE UP (ref 1–3.3)
LYMPHOCYTES # BLD AUTO: 1.64 K/UL — SIGNIFICANT CHANGE UP (ref 1–3.3)
LYMPHOCYTES # BLD AUTO: 26.8 % — SIGNIFICANT CHANGE UP (ref 13–44)
LYMPHOCYTES # BLD AUTO: 26.8 % — SIGNIFICANT CHANGE UP (ref 13–44)
MCHC RBC-ENTMCNC: 32 GM/DL — SIGNIFICANT CHANGE UP (ref 32–36)
MCHC RBC-ENTMCNC: 32 GM/DL — SIGNIFICANT CHANGE UP (ref 32–36)
MCHC RBC-ENTMCNC: 32.7 PG — SIGNIFICANT CHANGE UP (ref 27–34)
MCHC RBC-ENTMCNC: 32.7 PG — SIGNIFICANT CHANGE UP (ref 27–34)
MCV RBC AUTO: 102.4 FL — HIGH (ref 80–100)
MCV RBC AUTO: 102.4 FL — HIGH (ref 80–100)
MONOCYTES # BLD AUTO: 0.76 K/UL — SIGNIFICANT CHANGE UP (ref 0–0.9)
MONOCYTES # BLD AUTO: 0.76 K/UL — SIGNIFICANT CHANGE UP (ref 0–0.9)
MONOCYTES NFR BLD AUTO: 12.4 % — SIGNIFICANT CHANGE UP (ref 2–14)
MONOCYTES NFR BLD AUTO: 12.4 % — SIGNIFICANT CHANGE UP (ref 2–14)
NEUTROPHILS # BLD AUTO: 3.56 K/UL — SIGNIFICANT CHANGE UP (ref 1.8–7.4)
NEUTROPHILS # BLD AUTO: 3.56 K/UL — SIGNIFICANT CHANGE UP (ref 1.8–7.4)
NEUTROPHILS NFR BLD AUTO: 58.3 % — SIGNIFICANT CHANGE UP (ref 43–77)
NEUTROPHILS NFR BLD AUTO: 58.3 % — SIGNIFICANT CHANGE UP (ref 43–77)
NRBC # BLD: 0 /100 WBCS — SIGNIFICANT CHANGE UP (ref 0–0)
NRBC # BLD: 0 /100 WBCS — SIGNIFICANT CHANGE UP (ref 0–0)
PCO2 BLDV: 53 MMHG — HIGH (ref 39–42)
PCO2 BLDV: 53 MMHG — HIGH (ref 39–42)
PH BLDV: 7.4 — SIGNIFICANT CHANGE UP (ref 7.32–7.43)
PH BLDV: 7.4 — SIGNIFICANT CHANGE UP (ref 7.32–7.43)
PLATELET # BLD AUTO: 240 K/UL — SIGNIFICANT CHANGE UP (ref 150–400)
PLATELET # BLD AUTO: 240 K/UL — SIGNIFICANT CHANGE UP (ref 150–400)
PO2 BLDV: 33 MMHG — SIGNIFICANT CHANGE UP (ref 25–45)
PO2 BLDV: 33 MMHG — SIGNIFICANT CHANGE UP (ref 25–45)
POTASSIUM SERPL-MCNC: 4.3 MMOL/L — SIGNIFICANT CHANGE UP (ref 3.5–5.3)
POTASSIUM SERPL-MCNC: 4.3 MMOL/L — SIGNIFICANT CHANGE UP (ref 3.5–5.3)
POTASSIUM SERPL-SCNC: 4.3 MMOL/L — SIGNIFICANT CHANGE UP (ref 3.5–5.3)
POTASSIUM SERPL-SCNC: 4.3 MMOL/L — SIGNIFICANT CHANGE UP (ref 3.5–5.3)
PROT SERPL-MCNC: 7.2 G/DL — SIGNIFICANT CHANGE UP (ref 6–8.3)
PROT SERPL-MCNC: 7.2 G/DL — SIGNIFICANT CHANGE UP (ref 6–8.3)
RBC # BLD: 3.3 M/UL — LOW (ref 3.8–5.2)
RBC # BLD: 3.3 M/UL — LOW (ref 3.8–5.2)
RBC # FLD: 13.9 % — SIGNIFICANT CHANGE UP (ref 10.3–14.5)
RBC # FLD: 13.9 % — SIGNIFICANT CHANGE UP (ref 10.3–14.5)
RSV RNA NPH QL NAA+NON-PROBE: SIGNIFICANT CHANGE UP
RSV RNA NPH QL NAA+NON-PROBE: SIGNIFICANT CHANGE UP
SAO2 % BLDV: 41.3 % — LOW (ref 67–88)
SAO2 % BLDV: 41.3 % — LOW (ref 67–88)
SARS-COV-2 RNA SPEC QL NAA+PROBE: SIGNIFICANT CHANGE UP
SARS-COV-2 RNA SPEC QL NAA+PROBE: SIGNIFICANT CHANGE UP
SODIUM SERPL-SCNC: 133 MMOL/L — LOW (ref 135–145)
SODIUM SERPL-SCNC: 133 MMOL/L — LOW (ref 135–145)
WBC # BLD: 6.11 K/UL — SIGNIFICANT CHANGE UP (ref 3.8–10.5)
WBC # BLD: 6.11 K/UL — SIGNIFICANT CHANGE UP (ref 3.8–10.5)
WBC # FLD AUTO: 6.11 K/UL — SIGNIFICANT CHANGE UP (ref 3.8–10.5)
WBC # FLD AUTO: 6.11 K/UL — SIGNIFICANT CHANGE UP (ref 3.8–10.5)

## 2023-10-23 PROCEDURE — 85025 COMPLETE CBC W/AUTO DIFF WBC: CPT

## 2023-10-23 PROCEDURE — 87637 SARSCOV2&INF A&B&RSV AMP PRB: CPT

## 2023-10-23 PROCEDURE — 83605 ASSAY OF LACTIC ACID: CPT

## 2023-10-23 PROCEDURE — 93005 ELECTROCARDIOGRAM TRACING: CPT

## 2023-10-23 PROCEDURE — 99285 EMERGENCY DEPT VISIT HI MDM: CPT | Mod: 25

## 2023-10-23 PROCEDURE — 82962 GLUCOSE BLOOD TEST: CPT

## 2023-10-23 PROCEDURE — 99203 OFFICE O/P NEW LOW 30 MIN: CPT

## 2023-10-23 PROCEDURE — 80053 COMPREHEN METABOLIC PANEL: CPT

## 2023-10-23 PROCEDURE — 94640 AIRWAY INHALATION TREATMENT: CPT

## 2023-10-23 PROCEDURE — 82803 BLOOD GASES ANY COMBINATION: CPT

## 2023-10-23 PROCEDURE — 36415 COLL VENOUS BLD VENIPUNCTURE: CPT

## 2023-10-23 PROCEDURE — 93010 ELECTROCARDIOGRAM REPORT: CPT

## 2023-10-23 PROCEDURE — 96374 THER/PROPH/DIAG INJ IV PUSH: CPT

## 2023-10-23 PROCEDURE — 71045 X-RAY EXAM CHEST 1 VIEW: CPT | Mod: 26

## 2023-10-23 PROCEDURE — 99285 EMERGENCY DEPT VISIT HI MDM: CPT

## 2023-10-23 PROCEDURE — 71045 X-RAY EXAM CHEST 1 VIEW: CPT

## 2023-10-23 RX ORDER — TRAMADOL HYDROCHLORIDE 50 MG/1
25 TABLET ORAL ONCE
Refills: 0 | Status: DISCONTINUED | OUTPATIENT
Start: 2023-10-23 | End: 2023-10-23

## 2023-10-23 RX ORDER — LEVALBUTEROL 1.25 MG/.5ML
0.63 SOLUTION, CONCENTRATE RESPIRATORY (INHALATION) ONCE
Refills: 0 | Status: COMPLETED | OUTPATIENT
Start: 2023-10-23 | End: 2023-10-23

## 2023-10-23 RX ORDER — CIPROFLOXACIN HYDROCHLORIDE 500 MG/1
500 TABLET, FILM COATED ORAL
Qty: 20 | Refills: 1 | Status: DISCONTINUED | COMMUNITY
Start: 2023-08-09 | End: 2023-10-23

## 2023-10-23 RX ORDER — CLONAZEPAM 1 MG
1 TABLET ORAL
Qty: 14 | Refills: 0
Start: 2023-10-23 | End: 2023-10-29

## 2023-10-23 RX ORDER — LEVOFLOXACIN 500 MG/1
500 TABLET, FILM COATED ORAL
Qty: 14 | Refills: 1 | Status: DISCONTINUED | COMMUNITY
Start: 2021-11-30 | End: 2023-10-23

## 2023-10-23 RX ORDER — LEVOFLOXACIN 500 MG/1
500 TABLET, FILM COATED ORAL
Qty: 14 | Refills: 0 | Status: DISCONTINUED | COMMUNITY
Start: 2019-01-21 | End: 2023-10-23

## 2023-10-23 RX ORDER — LEVALBUTEROL 1.25 MG/.5ML
3 SOLUTION, CONCENTRATE RESPIRATORY (INHALATION)
Qty: 30 | Refills: 0
Start: 2023-10-23 | End: 2023-11-01

## 2023-10-23 RX ADMIN — LEVALBUTEROL 0.63 MILLIGRAM(S): 1.25 SOLUTION, CONCENTRATE RESPIRATORY (INHALATION) at 17:59

## 2023-10-23 RX ADMIN — TRAMADOL HYDROCHLORIDE 25 MILLIGRAM(S): 50 TABLET ORAL at 19:52

## 2023-10-23 RX ADMIN — LEVALBUTEROL 0.63 MILLIGRAM(S): 1.25 SOLUTION, CONCENTRATE RESPIRATORY (INHALATION) at 18:53

## 2023-10-23 RX ADMIN — Medication 60 MILLIGRAM(S): at 17:59

## 2023-10-23 NOTE — ED PROVIDER NOTE - NSFOLLOWUPINSTRUCTIONS_ED_ALL_ED_FT
pt is nonresponsive
Shortness of Breath, Adult  Shortness of breath means you have trouble breathing. Shortness of breath could be a sign of a medical problem.    Follow these instructions at home:  A sign showing that a person should not smoke.  Pollution    Do not smoke or use any products that contain nicotine or tobacco. If you need help quitting, ask your doctor.  Avoid things that can make it harder to breathe, such as:  Smoke of all kinds. This includes smoke from campfires or forest fires. Do not smoke or allow others to smoke in your home.  Mold.  Dust.  Air pollution.  Chemical smells.  Things that can give you an allergic reaction (allergens) if you have allergies.  Keep your living space clean. Use products that help remove mold and dust.  General instructions    Watch for any changes in your symptoms.  Take over-the-counter and prescription medicines only as told by your doctor. This includes oxygen therapy and inhaled medicines.  Rest as needed.  Return to your normal activities when your doctor says that it is safe.  Keep all follow-up visits.  Contact a doctor if:  Your condition does not get better as soon as expected.  You have a hard time doing your normal activities, even after you rest.  You have new symptoms.  You cannot walk up stairs.  You cannot exercise the way you normally do.  Get help right away if:  Your shortness of breath gets worse.  You have trouble breathing when you are resting.  You feel light-headed or you faint.  You have a cough that is not helped by medicines.  You cough up blood.  You have pain with breathing.  You have pain in your chest, arms, shoulders, or belly (abdomen).  You have a fever.  These symptoms may be an emergency. Get help right away. Call 911.  Do not wait to see if the symptoms will go away.  Do not drive yourself to the hospital.  Summary  Shortness of breath is when you have trouble breathing enough air. It can be a sign of a medical problem.  Avoid things that make it hard for you to breathe, such as smoking, pollution, mold, and dust.  Watch for any changes in your symptoms. Contact your doctor if you do not get better or you get worse.  This information is not intended to replace advice given to you by your health care provider. Make sure you discuss any questions you have with your health care provider.    Document Revised: 08/06/2022 Document Reviewed: 08/06/2022

## 2023-10-23 NOTE — ED PROVIDER NOTE - CONSTITUTIONAL, MLM
normal... very thin elderly female,, awake, alert, oriented to person, place, time/situation and in no apparent distress.

## 2023-10-23 NOTE — ED ADULT NURSE REASSESSMENT NOTE - NS ED NURSE REASSESS COMMENT FT1
Received report from day shift RN. Pt to trial ambulating sat off 02. Pt A&Ox4, NAD, speaking in complete/clear sentences. Denies SOB. Pending D/c

## 2023-10-23 NOTE — ED PROVIDER NOTE - CROS ED ROS STATEMENT
all other ROS negative except as per HPI Show Applicator Variable?: Yes Duration Of Freeze Thaw-Cycle (Seconds): 0 Post-Care Instructions: I reviewed with the patient in detail post-care instructions. Patient is to wear sunprotection, and avoid picking at any of the treated lesions. Pt may apply Vaseline to crusted or scabbing areas. Render Post-Care Instructions In Note?: no Detail Level: Detailed Consent: The patient's consent was obtained and the risks including but not limited to risks of crusting, scabbing, blistering, scarring, darker or lighter pigmentary change, recurrence, incomplete removal and infection were discussed prior to the procedure.

## 2023-10-23 NOTE — ED ADULT NURSE NOTE - NSFALLUNIVINTERV_ED_ALL_ED
Bed/Stretcher in lowest position, wheels locked, appropriate side rails in place/Call bell, personal items and telephone in reach/Instruct patient to call for assistance before getting out of bed/chair/stretcher/Non-slip footwear applied when patient is off stretcher/[place] to call system/Physically safe environment - no spills, clutter or unnecessary equipment/Purposeful proactive rounding/Room/bathroom lighting operational, light cord in reach

## 2023-10-23 NOTE — ED PROVIDER NOTE - CLINICAL SUMMARY MEDICAL DECISION MAKING FREE TEXT BOX
82 F w bronchiectasis asthma- co worsening sob cough rib pain 82 F w bronchiectasis asthma- co worsening sob cough rib pain - chronic cp- sx improved w klonopin- feels well know- breathing markedly improved after neb- d/w Dr Foster- agree w zopenex/steroids- he will see her as outpx

## 2023-10-23 NOTE — ED ADULT TRIAGE NOTE - CHIEF COMPLAINT QUOTE
Pt aaox3 c/o "lung pain" worsening for several days. Pt was using lidocaine patches and Tylenol without relief. Pt also c/o lower extremity swelling. Pt was recently admitted to rehab center s/p stroke. Pt has residual right hand numbness and blurred vision.

## 2023-10-23 NOTE — ED PROVIDER NOTE - OBJECTIVE STATEMENT
82F w HTN, AF co rib pain - Left sided for several weeks- ho rib fx due to coughing- always coughing a lot- not more than usual- increase in sob over the past few weeks- recently left rehab a few days ago and has not yet been able to get oxygen not on AC d/t fall risk, hospitalized for PRES 05-06/2023, recent DC 8/2023 for acute hypoxemic respiratory failure - to home on 4L NC w follow-up w Dr. Foster for exacerbation of bronchiectasis, recently started course of IV cefepime outpatient by Dr. Foster 9/13, p/w diplopia, encephalopathy admitted for stroke work-up, cefepime continued inpatient- also taking int nebulized tobramycin- does not usually take nebs secondary to anxiety from albuterol  mod severity- chronic hypoxia 82F w HTN, AF co rib pain - Left sided for several weeks- ho rib fx due to coughing- always coughing a lot- not more than usual- increase in sob over the past few weeks- recently left rehab a few days ago and has not yet been able to get oxygen not on AC d/t fall risk, hospitalized for PRES 05-06/2023, recent DC 8/2023 for acute hypoxemic respiratory failure - to home on 4L NC w follow-up w Dr. Foster for exacerbation of bronchiectasis, recently started course of IV cefepime outpatient by Dr. Foster 9/13, p/w diplopia, encephalopathy admitted for stroke work-up, cefepime continued inpatient- also taking int nebulized tobramycin- does not usually take nebs secondary to anxiety from albuterol  mod severity- chronic hypoxia  cp relieved by Klonopin

## 2023-10-23 NOTE — ED PROVIDER NOTE - PATIENT PORTAL LINK FT
You can access the FollowMyHealth Patient Portal offered by [facility] by registering at the following website: [email or web address]. By joining [employer]’s FollowMyHealth portal, you will also be able to view your health information using other applications (apps) compatible with our system.

## 2023-10-23 NOTE — ED ADULT NURSE NOTE - OBJECTIVE STATEMENT
82 y.o. Female A/Ox2-3 with periods of forgetfulness (hx of Dementia) c/o L sided rib pain with worsening SOB over past few weeks. Pt recently dc from rehab a few days ago. Low O2 sat on RA upon arrival 84%, placed on 2L O2 via NC O2 sat 97%. sees Dr. Foster and was prescribed IV cefepime 9/13 and pta took prescribed nebulized tobramycin this morning. Denies fevers/chills, abd pain nvd, dizziness, syncope, vision changes, headache.

## 2023-10-27 ENCOUNTER — LABORATORY RESULT (OUTPATIENT)
Age: 83
End: 2023-10-27

## 2023-10-27 ENCOUNTER — APPOINTMENT (OUTPATIENT)
Dept: PULMONOLOGY | Facility: CLINIC | Age: 83
End: 2023-10-27
Payer: MEDICARE

## 2023-10-27 VITALS
BODY MASS INDEX: 13.34 KG/M2 | OXYGEN SATURATION: 94 % | HEART RATE: 66 BPM | DIASTOLIC BLOOD PRESSURE: 78 MMHG | HEIGHT: 66 IN | TEMPERATURE: 98.9 F | WEIGHT: 83 LBS | SYSTOLIC BLOOD PRESSURE: 148 MMHG

## 2023-10-27 PROCEDURE — 99215 OFFICE O/P EST HI 40 MIN: CPT | Mod: 25

## 2023-10-27 PROCEDURE — 71046 X-RAY EXAM CHEST 2 VIEWS: CPT

## 2023-11-03 ENCOUNTER — INPATIENT (INPATIENT)
Facility: HOSPITAL | Age: 83
LOS: 3 days | Discharge: HOME HEALTH | DRG: 291 | End: 2023-11-07
Attending: INTERNAL MEDICINE | Admitting: INTERNAL MEDICINE
Payer: MEDICARE

## 2023-11-03 ENCOUNTER — APPOINTMENT (OUTPATIENT)
Dept: CARDIOTHORACIC SURGERY | Facility: CLINIC | Age: 83
End: 2023-11-03

## 2023-11-03 VITALS
DIASTOLIC BLOOD PRESSURE: 71 MMHG | TEMPERATURE: 98 F | HEIGHT: 65 IN | HEART RATE: 60 BPM | SYSTOLIC BLOOD PRESSURE: 144 MMHG | RESPIRATION RATE: 20 BRPM | OXYGEN SATURATION: 92 %

## 2023-11-03 DIAGNOSIS — E78.5 HYPERLIPIDEMIA, UNSPECIFIED: ICD-10-CM

## 2023-11-03 DIAGNOSIS — I48.91 UNSPECIFIED ATRIAL FIBRILLATION: ICD-10-CM

## 2023-11-03 DIAGNOSIS — I50.9 HEART FAILURE, UNSPECIFIED: ICD-10-CM

## 2023-11-03 DIAGNOSIS — I10 ESSENTIAL (PRIMARY) HYPERTENSION: ICD-10-CM

## 2023-11-03 LAB
ALBUMIN SERPL ELPH-MCNC: 4.2 G/DL — SIGNIFICANT CHANGE UP (ref 3.3–5)
ALBUMIN SERPL ELPH-MCNC: 4.2 G/DL — SIGNIFICANT CHANGE UP (ref 3.3–5)
ALP SERPL-CCNC: 159 U/L — HIGH (ref 40–120)
ALP SERPL-CCNC: 159 U/L — HIGH (ref 40–120)
ALT FLD-CCNC: 18 U/L — SIGNIFICANT CHANGE UP (ref 10–45)
ALT FLD-CCNC: 18 U/L — SIGNIFICANT CHANGE UP (ref 10–45)
ANION GAP SERPL CALC-SCNC: 7 MMOL/L — SIGNIFICANT CHANGE UP (ref 5–17)
ANION GAP SERPL CALC-SCNC: 7 MMOL/L — SIGNIFICANT CHANGE UP (ref 5–17)
AST SERPL-CCNC: 20 U/L — SIGNIFICANT CHANGE UP (ref 10–40)
AST SERPL-CCNC: 20 U/L — SIGNIFICANT CHANGE UP (ref 10–40)
BASOPHILS # BLD AUTO: 0.02 K/UL — SIGNIFICANT CHANGE UP (ref 0–0.2)
BASOPHILS # BLD AUTO: 0.02 K/UL — SIGNIFICANT CHANGE UP (ref 0–0.2)
BASOPHILS NFR BLD AUTO: 0.3 % — SIGNIFICANT CHANGE UP (ref 0–2)
BASOPHILS NFR BLD AUTO: 0.3 % — SIGNIFICANT CHANGE UP (ref 0–2)
BILIRUB SERPL-MCNC: 0.5 MG/DL — SIGNIFICANT CHANGE UP (ref 0.2–1.2)
BILIRUB SERPL-MCNC: 0.5 MG/DL — SIGNIFICANT CHANGE UP (ref 0.2–1.2)
BUN SERPL-MCNC: 28 MG/DL — HIGH (ref 7–23)
BUN SERPL-MCNC: 28 MG/DL — HIGH (ref 7–23)
CALCIUM SERPL-MCNC: 9.1 MG/DL — SIGNIFICANT CHANGE UP (ref 8.4–10.5)
CALCIUM SERPL-MCNC: 9.1 MG/DL — SIGNIFICANT CHANGE UP (ref 8.4–10.5)
CHLORIDE SERPL-SCNC: 100 MMOL/L — SIGNIFICANT CHANGE UP (ref 96–108)
CHLORIDE SERPL-SCNC: 100 MMOL/L — SIGNIFICANT CHANGE UP (ref 96–108)
CO2 SERPL-SCNC: 30 MMOL/L — SIGNIFICANT CHANGE UP (ref 22–31)
CO2 SERPL-SCNC: 30 MMOL/L — SIGNIFICANT CHANGE UP (ref 22–31)
CREAT SERPL-MCNC: 0.75 MG/DL — SIGNIFICANT CHANGE UP (ref 0.5–1.3)
CREAT SERPL-MCNC: 0.75 MG/DL — SIGNIFICANT CHANGE UP (ref 0.5–1.3)
D DIMER BLD IA.RAPID-MCNC: 311 NG/ML DDU — HIGH
D DIMER BLD IA.RAPID-MCNC: 311 NG/ML DDU — HIGH
EGFR: 80 ML/MIN/1.73M2 — SIGNIFICANT CHANGE UP
EGFR: 80 ML/MIN/1.73M2 — SIGNIFICANT CHANGE UP
EOSINOPHIL # BLD AUTO: 0.01 K/UL — SIGNIFICANT CHANGE UP (ref 0–0.5)
EOSINOPHIL # BLD AUTO: 0.01 K/UL — SIGNIFICANT CHANGE UP (ref 0–0.5)
EOSINOPHIL NFR BLD AUTO: 0.2 % — SIGNIFICANT CHANGE UP (ref 0–6)
EOSINOPHIL NFR BLD AUTO: 0.2 % — SIGNIFICANT CHANGE UP (ref 0–6)
FLUAV AG NPH QL: SIGNIFICANT CHANGE UP
FLUAV AG NPH QL: SIGNIFICANT CHANGE UP
FLUBV AG NPH QL: SIGNIFICANT CHANGE UP
FLUBV AG NPH QL: SIGNIFICANT CHANGE UP
GAS PNL BLDV: SIGNIFICANT CHANGE UP
GAS PNL BLDV: SIGNIFICANT CHANGE UP
GLUCOSE SERPL-MCNC: 101 MG/DL — HIGH (ref 70–99)
GLUCOSE SERPL-MCNC: 101 MG/DL — HIGH (ref 70–99)
HCT VFR BLD CALC: 36.4 % — SIGNIFICANT CHANGE UP (ref 34.5–45)
HCT VFR BLD CALC: 36.4 % — SIGNIFICANT CHANGE UP (ref 34.5–45)
HGB BLD-MCNC: 11.2 G/DL — LOW (ref 11.5–15.5)
HGB BLD-MCNC: 11.2 G/DL — LOW (ref 11.5–15.5)
IMM GRANULOCYTES NFR BLD AUTO: 0.5 % — SIGNIFICANT CHANGE UP (ref 0–0.9)
IMM GRANULOCYTES NFR BLD AUTO: 0.5 % — SIGNIFICANT CHANGE UP (ref 0–0.9)
LACTATE SERPL-SCNC: 1.2 MMOL/L — SIGNIFICANT CHANGE UP (ref 0.5–2)
LACTATE SERPL-SCNC: 1.2 MMOL/L — SIGNIFICANT CHANGE UP (ref 0.5–2)
LYMPHOCYTES # BLD AUTO: 0.69 K/UL — LOW (ref 1–3.3)
LYMPHOCYTES # BLD AUTO: 0.69 K/UL — LOW (ref 1–3.3)
LYMPHOCYTES # BLD AUTO: 12 % — LOW (ref 13–44)
LYMPHOCYTES # BLD AUTO: 12 % — LOW (ref 13–44)
MAGNESIUM SERPL-MCNC: 2 MG/DL — SIGNIFICANT CHANGE UP (ref 1.6–2.6)
MAGNESIUM SERPL-MCNC: 2 MG/DL — SIGNIFICANT CHANGE UP (ref 1.6–2.6)
MCHC RBC-ENTMCNC: 30.8 GM/DL — LOW (ref 32–36)
MCHC RBC-ENTMCNC: 30.8 GM/DL — LOW (ref 32–36)
MCHC RBC-ENTMCNC: 32 PG — SIGNIFICANT CHANGE UP (ref 27–34)
MCHC RBC-ENTMCNC: 32 PG — SIGNIFICANT CHANGE UP (ref 27–34)
MCV RBC AUTO: 104 FL — HIGH (ref 80–100)
MCV RBC AUTO: 104 FL — HIGH (ref 80–100)
MONOCYTES # BLD AUTO: 0.43 K/UL — SIGNIFICANT CHANGE UP (ref 0–0.9)
MONOCYTES # BLD AUTO: 0.43 K/UL — SIGNIFICANT CHANGE UP (ref 0–0.9)
MONOCYTES NFR BLD AUTO: 7.5 % — SIGNIFICANT CHANGE UP (ref 2–14)
MONOCYTES NFR BLD AUTO: 7.5 % — SIGNIFICANT CHANGE UP (ref 2–14)
NEUTROPHILS # BLD AUTO: 4.55 K/UL — SIGNIFICANT CHANGE UP (ref 1.8–7.4)
NEUTROPHILS # BLD AUTO: 4.55 K/UL — SIGNIFICANT CHANGE UP (ref 1.8–7.4)
NEUTROPHILS NFR BLD AUTO: 79.5 % — HIGH (ref 43–77)
NEUTROPHILS NFR BLD AUTO: 79.5 % — HIGH (ref 43–77)
NRBC # BLD: 0 /100 WBCS — SIGNIFICANT CHANGE UP (ref 0–0)
NRBC # BLD: 0 /100 WBCS — SIGNIFICANT CHANGE UP (ref 0–0)
NT-PROBNP SERPL-SCNC: 956 PG/ML — HIGH (ref 0–300)
NT-PROBNP SERPL-SCNC: 956 PG/ML — HIGH (ref 0–300)
PLATELET # BLD AUTO: 246 K/UL — SIGNIFICANT CHANGE UP (ref 150–400)
PLATELET # BLD AUTO: 246 K/UL — SIGNIFICANT CHANGE UP (ref 150–400)
POTASSIUM SERPL-MCNC: 3.8 MMOL/L — SIGNIFICANT CHANGE UP (ref 3.5–5.3)
POTASSIUM SERPL-MCNC: 3.8 MMOL/L — SIGNIFICANT CHANGE UP (ref 3.5–5.3)
POTASSIUM SERPL-SCNC: 3.8 MMOL/L — SIGNIFICANT CHANGE UP (ref 3.5–5.3)
POTASSIUM SERPL-SCNC: 3.8 MMOL/L — SIGNIFICANT CHANGE UP (ref 3.5–5.3)
PROT SERPL-MCNC: 7.8 G/DL — SIGNIFICANT CHANGE UP (ref 6–8.3)
PROT SERPL-MCNC: 7.8 G/DL — SIGNIFICANT CHANGE UP (ref 6–8.3)
RBC # BLD: 3.5 M/UL — LOW (ref 3.8–5.2)
RBC # BLD: 3.5 M/UL — LOW (ref 3.8–5.2)
RBC # FLD: 14.1 % — SIGNIFICANT CHANGE UP (ref 10.3–14.5)
RBC # FLD: 14.1 % — SIGNIFICANT CHANGE UP (ref 10.3–14.5)
RSV RNA NPH QL NAA+NON-PROBE: SIGNIFICANT CHANGE UP
RSV RNA NPH QL NAA+NON-PROBE: SIGNIFICANT CHANGE UP
SARS-COV-2 RNA SPEC QL NAA+PROBE: SIGNIFICANT CHANGE UP
SARS-COV-2 RNA SPEC QL NAA+PROBE: SIGNIFICANT CHANGE UP
SODIUM SERPL-SCNC: 137 MMOL/L — SIGNIFICANT CHANGE UP (ref 135–145)
SODIUM SERPL-SCNC: 137 MMOL/L — SIGNIFICANT CHANGE UP (ref 135–145)
TROPONIN T, HIGH SENSITIVITY RESULT: 16 NG/L — SIGNIFICANT CHANGE UP (ref 0–51)
TROPONIN T, HIGH SENSITIVITY RESULT: 16 NG/L — SIGNIFICANT CHANGE UP (ref 0–51)
TROPONIN T, HIGH SENSITIVITY RESULT: 18 NG/L — SIGNIFICANT CHANGE UP (ref 0–51)
TROPONIN T, HIGH SENSITIVITY RESULT: 18 NG/L — SIGNIFICANT CHANGE UP (ref 0–51)
WBC # BLD: 5.73 K/UL — SIGNIFICANT CHANGE UP (ref 3.8–10.5)
WBC # BLD: 5.73 K/UL — SIGNIFICANT CHANGE UP (ref 3.8–10.5)
WBC # FLD AUTO: 5.73 K/UL — SIGNIFICANT CHANGE UP (ref 3.8–10.5)
WBC # FLD AUTO: 5.73 K/UL — SIGNIFICANT CHANGE UP (ref 3.8–10.5)

## 2023-11-03 PROCEDURE — 93010 ELECTROCARDIOGRAM REPORT: CPT

## 2023-11-03 PROCEDURE — 71045 X-RAY EXAM CHEST 1 VIEW: CPT | Mod: 26

## 2023-11-03 PROCEDURE — 99285 EMERGENCY DEPT VISIT HI MDM: CPT

## 2023-11-03 PROCEDURE — 93970 EXTREMITY STUDY: CPT | Mod: 26

## 2023-11-03 RX ORDER — AZELASTINE 137 UG/1
2 SPRAY, METERED NASAL
Refills: 0 | DISCHARGE

## 2023-11-03 RX ORDER — DONEPEZIL HYDROCHLORIDE 10 MG/1
5 TABLET, FILM COATED ORAL AT BEDTIME
Refills: 0 | Status: DISCONTINUED | OUTPATIENT
Start: 2023-11-03 | End: 2023-11-07

## 2023-11-03 RX ORDER — POTASSIUM CHLORIDE 20 MEQ
20 PACKET (EA) ORAL ONCE
Refills: 0 | Status: COMPLETED | OUTPATIENT
Start: 2023-11-03 | End: 2023-11-03

## 2023-11-03 RX ORDER — ATORVASTATIN CALCIUM 80 MG/1
40 TABLET, FILM COATED ORAL AT BEDTIME
Refills: 0 | Status: DISCONTINUED | OUTPATIENT
Start: 2023-11-03 | End: 2023-11-07

## 2023-11-03 RX ORDER — AMLODIPINE BESYLATE 2.5 MG/1
5 TABLET ORAL DAILY
Refills: 0 | Status: DISCONTINUED | OUTPATIENT
Start: 2023-11-04 | End: 2023-11-07

## 2023-11-03 RX ORDER — TOBRAMYCIN 300 MG/5ML
300 SOLUTION RESPIRATORY (INHALATION)
Qty: 56 | Refills: 11 | Status: ACTIVE | COMMUNITY
Start: 2021-03-05 | End: 1900-01-01

## 2023-11-03 RX ORDER — MONTELUKAST 4 MG/1
0.5 TABLET, CHEWABLE ORAL
Refills: 0 | DISCHARGE

## 2023-11-03 RX ORDER — CLONAZEPAM 1 MG
0.5 TABLET ORAL
Refills: 0 | Status: DISCONTINUED | OUTPATIENT
Start: 2023-11-03 | End: 2023-11-04

## 2023-11-03 RX ORDER — ACETAMINOPHEN 500 MG
650 TABLET ORAL ONCE
Refills: 0 | Status: COMPLETED | OUTPATIENT
Start: 2023-11-03 | End: 2023-11-03

## 2023-11-03 RX ORDER — FUROSEMIDE 40 MG
20 TABLET ORAL ONCE
Refills: 0 | Status: COMPLETED | OUTPATIENT
Start: 2023-11-03 | End: 2023-11-03

## 2023-11-03 RX ORDER — METOPROLOL TARTRATE 50 MG
50 TABLET ORAL
Refills: 0 | Status: DISCONTINUED | OUTPATIENT
Start: 2023-11-03 | End: 2023-11-04

## 2023-11-03 RX ORDER — INFLUENZA VIRUS VACCINE 15; 15; 15; 15 UG/.5ML; UG/.5ML; UG/.5ML; UG/.5ML
0.7 SUSPENSION INTRAMUSCULAR ONCE
Refills: 0 | Status: DISCONTINUED | OUTPATIENT
Start: 2023-11-03 | End: 2023-11-07

## 2023-11-03 RX ORDER — ASPIRIN/CALCIUM CARB/MAGNESIUM 324 MG
81 TABLET ORAL DAILY
Refills: 0 | Status: DISCONTINUED | OUTPATIENT
Start: 2023-11-04 | End: 2023-11-07

## 2023-11-03 RX ADMIN — DONEPEZIL HYDROCHLORIDE 5 MILLIGRAM(S): 10 TABLET, FILM COATED ORAL at 22:03

## 2023-11-03 RX ADMIN — Medication 650 MILLIGRAM(S): at 18:14

## 2023-11-03 RX ADMIN — Medication 650 MILLIGRAM(S): at 17:44

## 2023-11-03 RX ADMIN — Medication 0.5 MILLIGRAM(S): at 22:05

## 2023-11-03 RX ADMIN — Medication 20 MILLIEQUIVALENT(S): at 19:11

## 2023-11-03 RX ADMIN — Medication 20 MILLIGRAM(S): at 22:06

## 2023-11-03 RX ADMIN — Medication 20 MILLIGRAM(S): at 17:44

## 2023-11-03 RX ADMIN — ATORVASTATIN CALCIUM 40 MILLIGRAM(S): 80 TABLET, FILM COATED ORAL at 22:06

## 2023-11-03 NOTE — H&P ADULT - ASSESSMENT
82 YOF with PMHX of AF (not on AC 2/2 high fall risk), HTN, Lewy body dementia (baseline a/o x______), posterior reversible encephalopathy syndrome, chronic bronchiectasis presented to [facility] 11/3/23 c/o SOB. EKG in ED: NSR 61 bpm without ischemic changes, Labs significant for: , Trop 18--->16, CXR showed bilateral effusions and cardiomegaly.  In the ED, patient given Lasix 20mg PO x1 and Tylenol 650mg PO x1. TTE from 9/2023: normal LVSF with EF of  55-60%,, mildly dilated RV, dilated RA, AS without significant stenosis, mild-mod AR, mild MR, mod-severe TR, severe pHTN with pulm artery systolic pressure 73mmHg. DVT ruled out in ER with negative D-Dimer and negative LE duplex. Patient is now admitted to cardiac tele service for further evaluation and management.          82 YOF with PMHX of AF (not on AC 2/2 high fall risk), HTN, Lewy body dementia (baseline a/o x3), posterior reversible encephalopathy syndrome, chronic bronchiectasis presented to [facility] 11/3/23 c/o SOB. EKG in ED: NSR 61 bpm without ischemic changes, Labs significant for: , Trop 18--->16, CXR showed bilateral effusions and cardiomegaly.  In the ED, patient given Lasix 20mg IV x1 and Tylenol 650mg PO x1. TTE from 9/2023: normal LVSF with EF of  55-60%,, mildly dilated RV, dilated RA, AS without significant stenosis, mild-mod AR, mild MR, mod-severe TR, severe pHTN with pulm artery systolic pressure 73mmHg. DVT ruled out in ER with negative D-Dimer and negative LE duplex. Patient is now admitted to cardiac tele service for further evaluation and management.

## 2023-11-03 NOTE — ED ADULT NURSE NOTE - NSFALLHARMRISKINTERV_ED_ALL_ED

## 2023-11-03 NOTE — H&P ADULT - RESPIRATORY
normal/clear to auscultation bilaterally/no wheezes/no rales/no rhonchi/no respiratory distress/airway patent/breath sounds equal/good air movement/rales

## 2023-11-03 NOTE — H&P ADULT - PROBLEM SELECTOR PLAN 3
SBPs wnl on admission   cont with home meds: ______________________ SBPs wnl on admission   -cont with home med regimen: AM MEDS (amlodipine 5mg, Lopressor 50mg) and PM MEDS (Donepezil 5mg and Lopressor 50mg)

## 2023-11-03 NOTE — ED PROVIDER NOTE - OBJECTIVE STATEMENT
HTN, AF not on AC d/t fall risk, hospitalized for PRES 05-06/2023, recent  DC 8/2023 for acute hypoxemic respiratory failure - to home on 4L NC w  follow-up w Dr. Foster for exacerbation of bronchiectasis, recently started  course of IV cefepime outpatient by Dr. Foster 9/13 history of HTN, AF not on AC d/t fall risk, hospitalized for PRES 05-06/2023, chronic bronchiectasis, lewy body dementia, here with shortness of breath. Reports she was coming from [facility] to see Dr. Dee and on the way here had episode where she felt indigestion/gas followed by hot/sweaty, near syncope, nausea. Lasted a couple of min and resolved. For past 2 weeks has had swelling of legs, more on left. Also having left sided chest pain that she thought was her ribs, taking tramadol prn past 1 week. Reports she had labs done outpatient and told BNP elevated. full code, history of HTN, AF not on AC d/t fall risk, hospitalized for PRES 05-06/2023, chronic bronchiectasis, lewy body dementia, here with shortness of breath. Reports she was coming from [facility] to see Dr. Dee and on the way here had episode where she felt indigestion/gas followed by hot/sweaty, near syncope, nausea. Lasted a couple of min and resolved. For past 2 weeks has had swelling of legs, more on left. Also having left sided chest pain that she thought was her ribs, taking tramadol prn past 1 week. Reports she had labs done outpatient and told BNP elevated.

## 2023-11-03 NOTE — H&P ADULT - PROBLEM SELECTOR PLAN 1
Curettage Text: The wound bed was treated with curettage after the biopsy was performed. Post-Care Instructions: I reviewed with the patient in detail post-care instructions. Patient is to keep the biopsy site dry overnight, and then apply twice daily until healed. Destruction After The Procedure: No Silver Nitrate Text: The wound bed was treated with silver nitrate after the biopsy was performed. Billing Type: Third-Party Bill Anesthesia Type: 1% lidocaine with epinephrine P/w 1 week of SOB and b/l LE swelling. Exam: +/- JVD, LE edema pitting/nonpitting 1+/2+, Lungs CTA/Crackles/Rales. +/- warm, HD stable    - Trop 18-->16, ,   - Satting high 90s on RA  - CXR: b/l pleural effusions, cardiomegaly.   - TTE 9/2023: normal LVSF with EF of  55-60%,, mildly dilated RV, dilated RA, AS without significant stenosis, mild-mod AR, mild MR, mod-severe TR, severe pHTN with pulm artery systolic pressure 73mmHg.   - S/p Lasix 20mg IVP x1 dose in ED  - Initiate GDMT pending TTE results   - TTE ordered   -Plan for ischemic workup pending TTE results   - Core measures, strict I&O's, daily weight, fluid restriction Wound Care: Vaseline Depth Of Biopsy: dermis Notification Instructions: Patient will be notified of biopsy results. However, patient instructed to call the office if not contacted within 2 weeks. X Size Of Lesion In Cm: 0 Cryotherapy Text: The wound bed was treated with cryotherapy after the biopsy was performed. Lab: [id number] Electrodesiccation Text: The wound bed was treated with electrodesiccation after the biopsy was performed. Render Post-Care Instructions In Note?: yes Detail Level: Detailed Type Of Destruction Used: Curettage Biopsy Type: H and E Consent: Written consent was obtained and risks were reviewed including but not limited to scarring, infection, bleeding, scabbing, incomplete removal, nerve damage and allergy to anesthesia. Anesthesia Volume In Cc (Will Not Render If 0): 1 Lab Facility: [id number] Hemostasis: Drysol Biopsy Method: Dermablade Dressing: pressure dressing with telfa Electrodesiccation And Curettage Text: The wound bed was treated with electrodesiccation and curettage after the biopsy was performed. P/w 1 week of SOB and b/l LE swelling. Exam: - JVD, +LE edema nonpitting 2+, Lungs CTA, +warm, HD stable    - Trop 18-->16, ,   - Satting low-mid 90s on RA-- placed patient on 2L NC with oxygen sat increase to high 90s.   - CXR: b/l pleural effusions, cardiomegaly.   - TTE 9/2023: normal LVSF with EF of  55-60%,, mildly dilated RV, dilated RA, AS without significant stenosis, mild-mod AR, mild MR, mod-severe TR, severe pHTN with pulm artery systolic pressure 73mmHg.   - S/p Lasix 20mg IVP x1 dose in ED  -Lasix 20mg IVP x1 dose now   - Initiate GDMT pending TTE results   - TTE ordered   -Plan for ischemic workup pending TTE results ??   - Core measures, strict I&O's, daily weight, fluid restriction

## 2023-11-03 NOTE — ED ADULT NURSE NOTE - OBJECTIVE STATEMENT
82y Female A&ox3 to ED c/o chest pain radiating to left flank associated with sob "for weeks" worse this am. Noted to have o2 in 80s on room air. Placed on 4 L nc. Endorses b/l lower ex edema x 2 weeks. Pt reports taking short term script of Lasix at home with no improvement. Noted wet cough with green non bloody phlegm. Pt endorses seeing pulmonologist Friday and they told me "I had a problem with my heart and fluid". Reports chills and subjective fever today. Hx of A-fib taking 81 mg asprin, Htn, Bronchiectasis.

## 2023-11-03 NOTE — H&P ADULT - PROBLEM SELECTOR PLAN 4
h/o HLD   -f/u am lipid panel   - cont with home atorva 40mg     #L SIDED RIB PAIN   -chronic L sided rib pain-- has been worked up with pulmonologist and r/o acute complications, no hx of trauma   -prescribed Klonopin 0.5 mg as needed for pain   -prescribed Tramadol 50mg-- patient reports hallucinations after intake       F: None  E: Replete if K<4 or Mag<2  N: DASH Diet  VTEppx: high bleed risk no AC (was scheduled for watchman with Dr. Rivas)   Dispo: pending clinical course

## 2023-11-03 NOTE — ED PROVIDER NOTE - NSICDXPASTMEDICALHX_GEN_ALL_CORE_FT
PAST MEDICAL HISTORY:  Atrial fibrillation     Bronchiectasis     History of Pseudomonas pneumonia     HTN (hypertension)     Lewy body dementia     Posterior reversible encephalopathy syndrome

## 2023-11-03 NOTE — ED PROVIDER NOTE - CLINICAL SUMMARY MEDICAL DECISION MAKING FREE TEXT BOX
here with sob/ leg swelling, intermittent chest pain, episode of near syncope/ nausea en route to doctors apt. labs/cxr/ekg ordered. ekg nsr non ischemic. trop indeterminate. repeat ordered. cxr with effusions/ congestion. duplex ordered to r/o dvt and neg. d dimer age adjusted normal making dvt/pe unlikely. bnp mildly elevated but w sob/congestion/edema concern for new onset chf. echo ordered but unable to obtain today. given lasix 20mg iv. discussed w cards. admit for tele, possible nuclear stress tomorrow.

## 2023-11-03 NOTE — PATIENT PROFILE ADULT - FALL HARM RISK - HARM RISK INTERVENTIONS
Assistance with ambulation/Assistance OOB with selected safe patient handling equipment/Communicate Risk of Fall with Harm to all staff/Discuss with provider need for PT consult/Monitor gait and stability/Provide patient with walking aids - walker, cane, crutches/Reinforce activity limits and safety measures with patient and family/Tailored Fall Risk Interventions/Toileting schedule using arm’s reach rule for commode and bathroom/Visual Cue: Yellow wristband and red socks/Bed in lowest position, wheels locked, appropriate side rails in place/Call bell, personal items and telephone in reach/Instruct patient to call for assistance before getting out of bed or chair/Non-slip footwear when patient is out of bed/[place] to call system/Physically safe environment - no spills, clutter or unnecessary equipment/Purposeful Proactive Rounding/Room/bathroom lighting operational, light cord in reach

## 2023-11-03 NOTE — H&P ADULT - PROBLEM SELECTOR PLAN 2
-h/o AFIB without AC 2/2 high fall risk   -EKG on admission NSR without ischemic changes   -Was going to have initial appointment with Dr. Rivas for Watchman placement prior to ED visit today -h/o AFIB without AC 2/2 high fall risk   -EKG on admission NSR without ischemic changes   -Was going to have initial appointment with Dr. Rivas for Watchman placement prior to ED visit today   -f/u with Dr. Rivas

## 2023-11-03 NOTE — H&P ADULT - HISTORY OF PRESENT ILLNESS
82 YOF with PMHX of AF (not on AC 2/2 high fall risk), HTN, lewy body dementia (baseline a/o x______), posterior reversible encephalopathy syndrome, chronic bronchiectasis presented to [facility] 11/3/23 c/o SOB. Patient reported she was driving to see Dr. Pearson and on the way had one episode of indigestion-like sx/gas followed by diaphoresis, near syncope, nausea. Episode reported to last one minute and self resolved. PT also endorses LE swelling (>L) and L sided chest pain and palpitations x1 week. PT has been taking Tramadol PRN for chest pain.  Patient denies_________. EKG in ED: NSR 61 bpm without ischemic changes, Labs significant for: , Trop 18--->16, CXR showed bilateral effusions and cardiomegaly. TTE from 9/  In the ED, patient given Lasix 20mg PO x1 and Tylenol 650mg PO x1. Patient is now admitted to cardiac tele service for further managemet.     82 YOF with PMHX of AF (not on AC 2/2 high fall risk), HTN, Lewy body dementia (baseline a/o x______), posterior reversible encephalopathy syndrome, chronic bronchiectasis presented to [facility] 11/3/23 c/o SOB. Patient reported she was driving to see Dr. Rivas and on the way had one episode of indigestion-like sx/gas followed by diaphoresis, near syncope, nausea. Episode reported to last one minute and self resolved. PT also endorses LE swelling (>L) and L sided chest pain and palpitations x1 week. PT has been taking Tramadol PRN for chest pain.  Patient denies_________. EKG in ED: NSR 61 bpm without ischemic changes, Labs significant for: , Trop 18--->16, CXR showed bilateral effusions and cardiomegaly.  In the ED, patient given Lasix 20mg PO x1 and Tylenol 650mg PO x1. TTE from 9/2023: normal LVSF with EF of  55-60%,, mildly dilated RV, dilated RA, AS without significant stenosis, mild-mod AR, mild MR, mod-severe TR, severe pHTN with pulm artery systolic pressure 73mmHg. DVT ruled out in ER with negative D-Dimer and negative LE duplex. Patient is now admitted to cardiac tele service for further evaluation and management.        82 YOF with PMHX of AF (not on AC 2/2 high bleed risk), HTN, HLD, hemorraghic stroke (admitted to [facility] 9/2023) Lewy body dementia (baseline a/o x3), posterior reversible encephalopathy syndrome, chronic bronchiectasis presented to [facility] ED 11/3/23 c/o SOB. Patient reported she was driving to see Dr. Rivas and on the way had one episode of indigestion-like sx/gas followed by diaphoresis, near syncope, nausea. Episode reported to last one minute and self resolved. PT also endorses LE swelling (>L) and L sided chest pain and palpitations x1 week. PT has been taking Tramadol PRN for chest pain.  Patient denies chest pain, SOB, REID, palpitations, dizziness, LOC, N/V/D, fever/chills/sick contact, diaphoresis, orthopnea/PND.  EKG in ED: NSR 61 bpm without ischemic changes, Labs significant for: , Trop 18--->16, CXR showed bilateral effusions and cardiomegaly.  In the ED, patient given Lasix 20mg IV x1 and Tylenol 650mg PO x1. TTE from 9/2023: normal LVSF with EF of  55-60%,, mildly dilated RV, dilated RA, AS without significant stenosis, mild-mod AR, mild MR, mod-severe TR, severe pHTN with pulm artery systolic pressure 73mmHg. DVT ruled out in ER with negative D-Dimer and negative LE duplex. Patient is now admitted to cardiac tele service for further evaluation and management.

## 2023-11-04 LAB
A1C WITH ESTIMATED AVERAGE GLUCOSE RESULT: 5.4 % — SIGNIFICANT CHANGE UP (ref 4–5.6)
A1C WITH ESTIMATED AVERAGE GLUCOSE RESULT: 5.4 % — SIGNIFICANT CHANGE UP (ref 4–5.6)
ANION GAP SERPL CALC-SCNC: 9 MMOL/L — SIGNIFICANT CHANGE UP (ref 5–17)
ANION GAP SERPL CALC-SCNC: 9 MMOL/L — SIGNIFICANT CHANGE UP (ref 5–17)
BUN SERPL-MCNC: 20 MG/DL — SIGNIFICANT CHANGE UP (ref 7–23)
BUN SERPL-MCNC: 20 MG/DL — SIGNIFICANT CHANGE UP (ref 7–23)
CALCIUM SERPL-MCNC: 8.3 MG/DL — LOW (ref 8.4–10.5)
CALCIUM SERPL-MCNC: 8.3 MG/DL — LOW (ref 8.4–10.5)
CHLORIDE SERPL-SCNC: 98 MMOL/L — SIGNIFICANT CHANGE UP (ref 96–108)
CHLORIDE SERPL-SCNC: 98 MMOL/L — SIGNIFICANT CHANGE UP (ref 96–108)
CHOLEST SERPL-MCNC: 123 MG/DL — SIGNIFICANT CHANGE UP
CHOLEST SERPL-MCNC: 123 MG/DL — SIGNIFICANT CHANGE UP
CO2 SERPL-SCNC: 32 MMOL/L — HIGH (ref 22–31)
CO2 SERPL-SCNC: 32 MMOL/L — HIGH (ref 22–31)
CREAT SERPL-MCNC: 0.8 MG/DL — SIGNIFICANT CHANGE UP (ref 0.5–1.3)
CREAT SERPL-MCNC: 0.8 MG/DL — SIGNIFICANT CHANGE UP (ref 0.5–1.3)
EGFR: 74 ML/MIN/1.73M2 — SIGNIFICANT CHANGE UP
EGFR: 74 ML/MIN/1.73M2 — SIGNIFICANT CHANGE UP
ESTIMATED AVERAGE GLUCOSE: 108 MG/DL — SIGNIFICANT CHANGE UP (ref 68–114)
ESTIMATED AVERAGE GLUCOSE: 108 MG/DL — SIGNIFICANT CHANGE UP (ref 68–114)
GLUCOSE SERPL-MCNC: 102 MG/DL — HIGH (ref 70–99)
GLUCOSE SERPL-MCNC: 102 MG/DL — HIGH (ref 70–99)
HCT VFR BLD CALC: 34.2 % — LOW (ref 34.5–45)
HCT VFR BLD CALC: 34.2 % — LOW (ref 34.5–45)
HDLC SERPL-MCNC: 57 MG/DL — SIGNIFICANT CHANGE UP
HDLC SERPL-MCNC: 57 MG/DL — SIGNIFICANT CHANGE UP
HGB BLD-MCNC: 10.6 G/DL — LOW (ref 11.5–15.5)
HGB BLD-MCNC: 10.6 G/DL — LOW (ref 11.5–15.5)
LIPID PNL WITH DIRECT LDL SERPL: 49 MG/DL — SIGNIFICANT CHANGE UP
LIPID PNL WITH DIRECT LDL SERPL: 49 MG/DL — SIGNIFICANT CHANGE UP
MAGNESIUM SERPL-MCNC: 1.7 MG/DL — SIGNIFICANT CHANGE UP (ref 1.6–2.6)
MAGNESIUM SERPL-MCNC: 1.7 MG/DL — SIGNIFICANT CHANGE UP (ref 1.6–2.6)
MCHC RBC-ENTMCNC: 31 GM/DL — LOW (ref 32–36)
MCHC RBC-ENTMCNC: 31 GM/DL — LOW (ref 32–36)
MCHC RBC-ENTMCNC: 32.2 PG — SIGNIFICANT CHANGE UP (ref 27–34)
MCHC RBC-ENTMCNC: 32.2 PG — SIGNIFICANT CHANGE UP (ref 27–34)
MCV RBC AUTO: 104 FL — HIGH (ref 80–100)
MCV RBC AUTO: 104 FL — HIGH (ref 80–100)
NON HDL CHOLESTEROL: 66 MG/DL — SIGNIFICANT CHANGE UP
NON HDL CHOLESTEROL: 66 MG/DL — SIGNIFICANT CHANGE UP
NRBC # BLD: 0 /100 WBCS — SIGNIFICANT CHANGE UP (ref 0–0)
NRBC # BLD: 0 /100 WBCS — SIGNIFICANT CHANGE UP (ref 0–0)
PLATELET # BLD AUTO: 217 K/UL — SIGNIFICANT CHANGE UP (ref 150–400)
PLATELET # BLD AUTO: 217 K/UL — SIGNIFICANT CHANGE UP (ref 150–400)
POTASSIUM SERPL-MCNC: 3.3 MMOL/L — LOW (ref 3.5–5.3)
POTASSIUM SERPL-MCNC: 3.3 MMOL/L — LOW (ref 3.5–5.3)
POTASSIUM SERPL-SCNC: 3.3 MMOL/L — LOW (ref 3.5–5.3)
POTASSIUM SERPL-SCNC: 3.3 MMOL/L — LOW (ref 3.5–5.3)
RBC # BLD: 3.29 M/UL — LOW (ref 3.8–5.2)
RBC # BLD: 3.29 M/UL — LOW (ref 3.8–5.2)
RBC # FLD: 13.8 % — SIGNIFICANT CHANGE UP (ref 10.3–14.5)
RBC # FLD: 13.8 % — SIGNIFICANT CHANGE UP (ref 10.3–14.5)
SODIUM SERPL-SCNC: 139 MMOL/L — SIGNIFICANT CHANGE UP (ref 135–145)
SODIUM SERPL-SCNC: 139 MMOL/L — SIGNIFICANT CHANGE UP (ref 135–145)
TRIGL SERPL-MCNC: 84 MG/DL — SIGNIFICANT CHANGE UP
TRIGL SERPL-MCNC: 84 MG/DL — SIGNIFICANT CHANGE UP
WBC # BLD: 7.42 K/UL — SIGNIFICANT CHANGE UP (ref 3.8–10.5)
WBC # BLD: 7.42 K/UL — SIGNIFICANT CHANGE UP (ref 3.8–10.5)
WBC # FLD AUTO: 7.42 K/UL — SIGNIFICANT CHANGE UP (ref 3.8–10.5)
WBC # FLD AUTO: 7.42 K/UL — SIGNIFICANT CHANGE UP (ref 3.8–10.5)

## 2023-11-04 PROCEDURE — 99221 1ST HOSP IP/OBS SF/LOW 40: CPT

## 2023-11-04 PROCEDURE — 99232 SBSQ HOSP IP/OBS MODERATE 35: CPT

## 2023-11-04 RX ORDER — FUROSEMIDE 40 MG
20 TABLET ORAL ONCE
Refills: 0 | Status: COMPLETED | OUTPATIENT
Start: 2023-11-04 | End: 2023-11-04

## 2023-11-04 RX ORDER — METOPROLOL TARTRATE 50 MG
50 TABLET ORAL EVERY 8 HOURS
Refills: 0 | Status: DISCONTINUED | OUTPATIENT
Start: 2023-11-04 | End: 2023-11-06

## 2023-11-04 RX ORDER — POTASSIUM CHLORIDE 20 MEQ
40 PACKET (EA) ORAL EVERY 4 HOURS
Refills: 0 | Status: DISCONTINUED | OUTPATIENT
Start: 2023-11-04 | End: 2023-11-04

## 2023-11-04 RX ORDER — ACETAMINOPHEN 500 MG
650 TABLET ORAL EVERY 6 HOURS
Refills: 0 | Status: DISCONTINUED | OUTPATIENT
Start: 2023-11-04 | End: 2023-11-07

## 2023-11-04 RX ORDER — MAGNESIUM SULFATE 500 MG/ML
1 VIAL (ML) INJECTION ONCE
Refills: 0 | Status: COMPLETED | OUTPATIENT
Start: 2023-11-04 | End: 2023-11-04

## 2023-11-04 RX ORDER — POTASSIUM CHLORIDE 20 MEQ
40 PACKET (EA) ORAL EVERY 4 HOURS
Refills: 0 | Status: COMPLETED | OUTPATIENT
Start: 2023-11-04 | End: 2023-11-04

## 2023-11-04 RX ORDER — ACETAMINOPHEN 500 MG
650 TABLET ORAL ONCE
Refills: 0 | Status: COMPLETED | OUTPATIENT
Start: 2023-11-04 | End: 2023-11-04

## 2023-11-04 RX ORDER — IPRATROPIUM BROMIDE 0.2 MG/ML
500 SOLUTION, NON-ORAL INHALATION EVERY 6 HOURS
Refills: 0 | Status: DISCONTINUED | OUTPATIENT
Start: 2023-11-04 | End: 2023-11-07

## 2023-11-04 RX ORDER — CLONAZEPAM 1 MG
0.25 TABLET ORAL DAILY
Refills: 0 | Status: DISCONTINUED | OUTPATIENT
Start: 2023-11-04 | End: 2023-11-05

## 2023-11-04 RX ORDER — METOPROLOL TARTRATE 50 MG
50 TABLET ORAL ONCE
Refills: 0 | Status: COMPLETED | OUTPATIENT
Start: 2023-11-04 | End: 2023-11-04

## 2023-11-04 RX ADMIN — Medication 50 MILLIGRAM(S): at 00:57

## 2023-11-04 RX ADMIN — DONEPEZIL HYDROCHLORIDE 5 MILLIGRAM(S): 10 TABLET, FILM COATED ORAL at 21:22

## 2023-11-04 RX ADMIN — Medication 20 MILLIGRAM(S): at 16:23

## 2023-11-04 RX ADMIN — AMLODIPINE BESYLATE 5 MILLIGRAM(S): 2.5 TABLET ORAL at 09:25

## 2023-11-04 RX ADMIN — ATORVASTATIN CALCIUM 40 MILLIGRAM(S): 80 TABLET, FILM COATED ORAL at 21:22

## 2023-11-04 RX ADMIN — Medication 40 MILLIEQUIVALENT(S): at 12:39

## 2023-11-04 RX ADMIN — Medication 650 MILLIGRAM(S): at 17:42

## 2023-11-04 RX ADMIN — Medication 81 MILLIGRAM(S): at 11:49

## 2023-11-04 RX ADMIN — Medication 0.25 MILLIGRAM(S): at 21:21

## 2023-11-04 RX ADMIN — Medication 50 MILLIGRAM(S): at 18:43

## 2023-11-04 RX ADMIN — Medication 40 MILLIEQUIVALENT(S): at 17:42

## 2023-11-04 RX ADMIN — Medication 50 MILLIGRAM(S): at 09:24

## 2023-11-04 RX ADMIN — Medication 500 MICROGRAM(S): at 03:41

## 2023-11-04 RX ADMIN — Medication 100 GRAM(S): at 09:47

## 2023-11-04 NOTE — PROGRESS NOTE ADULT - PROBLEM SELECTOR PLAN 1
Patient with one week SOB and b/l LE swelling, -JVD, satting high 90s on 2L O2 via NC   - Etiology: ?atrial fibrillation   - EKG per previous chart  - CXR (11/3/23): b/l pleural effusions  - TTE (9/2023): EF 55-60%, dilated RA, mild-mod AR, mod-severe TR, severe pHTN w/ PASP 73 mmHg  - Diuresis: Lasix 20 mg IVP QD  - Core measures, strict I&Os, daily weights, fluid restriction

## 2023-11-04 NOTE — PROGRESS NOTE ADULT - SUBJECTIVE AND OBJECTIVE BOX
Cardiology PA Progress Note    S: Pt seen and examined bedside.  Patient denies C/P, SOB, N/V, dizziness, palpitations, and diaphoresis.  Pt denies fever/chills, dysuria, abdominal pain, diarrhea, and cough  12 Point ROS otherwise negative except as per HPI/subjective.     O: Vital Signs Last 24 Hrs  T(C): 36.6 (2023 11:54), Max: 36.6 (2023 11:54)  T(F): 97.9 (2023 11:54), Max: 97.9 (2023 11:54)  HR: 104 (2023 11:54) (48 - 152)  BP: 111/58 (2023 11:54) (103/58 - 135/79)  BP(mean): 80 (2023 11:54) (80 - 98)  RR: 18 (2023 11:54) (15 - 18)  SpO2: 93% (2023 11:54) (92% - 99%)    Parameters below as of 2023 11:54  Patient On (Oxygen Delivery Method): nasal cannula  O2 Flow (L/min): 2      PHYSICAL EXAM:  GEN: NAD  HEENT: No JVD  PULM:  CTA B/L  CARD:  RRR, S1 and S2   ABD: +BS, NT, soft/ND	  EXT: No Edema B/L LE  NEURO: A+Ox3, no focal deficit  PSYCH: Mood Appropriate    LABS:                        10.6   7.42  )-----------( 217      ( 2023 05:28 )             34.2         139  |  98  |  20  ----------------------------<  102<H>  3.3<L>   |  32<H>  |  0.80    Ca    8.3<L>      2023 05:28  Mg     1.7         TPro  7.8  /  Alb  4.2  /  TBili  0.5  /  DBili  x   /  AST  20  /  ALT  18  /  AlkPhos  159<H>   @ 07:01  -   @ 07:00  --------------------------------------------------------  IN: 150 mL / OUT: 1250 mL / NET: -1100 mL      Daily Height in cm: 165.1 (2023 21:38)    Daily Weight in k.9 (2023 21:23)

## 2023-11-04 NOTE — CONSULT NOTE ADULT - SUBJECTIVE AND OBJECTIVE BOX
Surgeon:    Requesting Physician:    HISTORY OF PRESENT ILLNESS:  83y Female    PAST MEDICAL & SURGICAL HISTORY:  Bronchiectasis      History of Pseudomonas pneumonia      HTN (hypertension)      Posterior reversible encephalopathy syndrome      Atrial fibrillation      Lewy body dementia      No significant past surgical history          MEDICATIONS  (STANDING):  amLODIPine   Tablet 5 milliGRAM(s) Oral daily  aspirin  chewable 81 milliGRAM(s) Oral daily  atorvastatin 40 milliGRAM(s) Oral at bedtime  donepezil 5 milliGRAM(s) Oral at bedtime  influenza  Vaccine (HIGH DOSE) 0.7 milliLiter(s) IntraMuscular once  metoprolol tartrate 50 milliGRAM(s) Oral every 8 hours    MEDICATIONS  (PRN):  clonazePAM  Tablet 0.5 milliGRAM(s) Oral two times a day PRN anxiety  ipratropium    for Nebulization 500 MICROGram(s) Nebulizer every 6 hours PRN Shortness of Breath and/or Wheezing      Allergies    Levaquin (Swelling)  Ceclor (Rash)  IV Contrast (Unknown)  Augmentin (Short breath; Rash)    Intolerances        SOCIAL HISTORY:  Smoker:  YES / NO        PACK YEARS:                         WHEN QUIT?  ETOH use:  YES / NO               FREQUENCY / QUANTITY:  Ilicit Drug use:  YES / NO  Occupation:  Assisted device use (Cane / Walker):  Live with:    FAMILY HISTORY:      Review of Systems  CONSTITUTIONAL:  Fevers / chills, sweats, fatigue, weight loss, weight gain                                    NEGATIVE  NEURO:  parathesias, seizures, syncope, confusion                                                                               NEGATIVE  EYES:  Blurry vision, discharge, pain, loss of vision                                                                                  NEGATIVE  ENMT:  Difficulty hearing, vertigo, dysphagia, epistaxis, recent dental work                                     NEGATIVE  CV:  Chest pain, palpitations, REID, orthopnea                                                                                         NEGATIVE  RESPIRATORY:  Wheezing, SOB, cough / sputum, hemoptysis                                                              NEGATIVE  GI:  Nausea, vommiting, diarrhea, constipation, melena                                                                        NEGATIVE  : Hematuria, dysuria, urgency, incontinence                                                                                       NEGATIVE  MUSKULOSKELETAL:  arthritis, joint swelling, muscle weakness                                                           NEGATIVE  SKIN/BREAST:  rash, itching, edwin loss, masses                                                                                            NEGATIVE  PSYCH:  depresion, anxiety, suicidal ideation                                                                                             NEGATIVE  HEME/LYMPH:  bruises easily, enlarged lymph nodes, tender lymph nodes                                        NEGATIVE  ENDOCRINE:  cold intolerance, heat intolerance, polydipsia                                                                   NEGATIVE    PHYSICAL EXAM  Vital Signs Last 24 Hrs  T(C): 37.2 (04 Nov 2023 16:21), Max: 37.2 (04 Nov 2023 16:21)  T(F): 99 (04 Nov 2023 16:21), Max: 99 (04 Nov 2023 16:21)  HR: 110 (04 Nov 2023 16:21) (48 - 152)  BP: 125/60 (04 Nov 2023 16:21) (103/58 - 135/79)  BP(mean): 84 (04 Nov 2023 16:21) (80 - 98)  RR: 20 (04 Nov 2023 16:21) (15 - 20)  SpO2: 94% (04 Nov 2023 16:21) (92% - 99%)    Parameters below as of 04 Nov 2023 16:21  Patient On (Oxygen Delivery Method): nasal cannula  O2 Flow (L/min): 2      CONSTITUTIONAL:                                                                          WNL  NEURO:                                                                                             WNL                      EYES:                                                                                                  WNL  ENMT:                                                                                                WNL  CV:                                                                                                      WNL  RESPIRATORY:                                                                                  WNL  GI:                                                                                                       WNL  : DO + / -                                                                                 WNL  MUSKULOSKELETAL:                                                                       WNL  SKIN / BREAST:                                                                                 WNL                                                          LABS:                        10.6   7.42  )-----------( 217      ( 04 Nov 2023 05:28 )             34.2     11-04    139  |  98  |  20  ----------------------------<  102<H>  3.3<L>   |  32<H>  |  0.80    Ca    8.3<L>      04 Nov 2023 05:28  Mg     1.7     11-04    TPro  7.8  /  Alb  4.2  /  TBili  0.5  /  DBili  x   /  AST  20  /  ALT  18  /  AlkPhos  159<H>  11-03      Urinalysis Basic - ( 04 Nov 2023 05:28 )    Color: x / Appearance: x / SG: x / pH: x  Gluc: 102 mg/dL / Ketone: x  / Bili: x / Urobili: x   Blood: x / Protein: x / Nitrite: x   Leuk Esterase: x / RBC: x / WBC x   Sq Epi: x / Non Sq Epi: x / Bacteria: x              RADIOLOGY & ADDITIONAL STUDIES:  CAROTID U/S:    CXR:    CT Scan:    EKG:    TTE / VANNESSA:    Cardiac Cath: Surgeon: MD Evelyn    Requesting Physician: MD Willie    HISTORY OF PRESENT ILLNESS:  82 year old female with PMHX of AF (not on AC 2/2 high bleed risk), HTN, HLD, hemorraghic stroke (admitted to [facility] 9/2023) Lewy body dementia (baseline a/o x3), posterior reversible encephalopathy syndrome, chronic bronchiectasis presented to [facility] ED 11/3/23 c/o SOB. Patient reported she was driving to see Dr. Rivas and on the way had one episode of indigestion-like sx/gas followed by diaphoresis, near syncope, nausea. Episode reported to last one minute and self resolved. PT also endorses LE swelling (>L) and L sided chest pain and palpitations x1 week. PT has been taking Tramadol PRN for chest pain.  Patient denies chest pain, SOB, REID, palpitations, dizziness, LOC, N/V/D, fever/chills/sick contact, diaphoresis, orthopnea/PND.  EKG in ED: NSR 61 bpm without ischemic changes, Labs significant for: , Trop 18--->16, CXR showed bilateral effusions and cardiomegaly.  In the ED, patient given Lasix 20mg IV x1 and Tylenol 650mg PO x1. TTE from 9/2023: normal LVSF with EF of  55-60%,, mildly dilated RV, dilated RA, AS without significant stenosis, mild-mod AR, mild MR, mod-severe TR, severe pHTN with pulm artery systolic pressure 73mmHg. DVT ruled out in ER with negative D-Dimer and negative LE duplex. Patient is now admitted to cardiac tele service for further evaluation and management.    PAST MEDICAL & SURGICAL HISTORY:  Bronchiectasis  History of Pseudomonas pneumonia  HTN (hypertension)  Posterior reversible encephalopathy syndrome  Atrial fibrillation      Lewy body dementia      No significant past surgical history      MEDICATIONS  (STANDING):  amLODIPine   Tablet 5 milliGRAM(s) Oral daily  aspirin  chewable 81 milliGRAM(s) Oral daily  atorvastatin 40 milliGRAM(s) Oral at bedtime  donepezil 5 milliGRAM(s) Oral at bedtime  influenza  Vaccine (HIGH DOSE) 0.7 milliLiter(s) IntraMuscular once  metoprolol tartrate 50 milliGRAM(s) Oral every 8 hours    MEDICATIONS  (PRN):  clonazePAM  Tablet 0.5 milliGRAM(s) Oral two times a day PRN anxiety  ipratropium    for Nebulization 500 MICROGram(s) Nebulizer every 6 hours PRN Shortness of Breath and/or Wheezing      Allergies    Levaquin (Swelling)  Ceclor (Rash)  IV Contrast (Unknown)  Augmentin (Short breath; Rash)    Intolerances        SOCIAL HISTORY:  Smoker: no  ETOH use: no  Ilicit Drug use: no  Occupation: non contributory   Assisted device use (Cane / Walker): non contributory   Live with: family close by     FAMILY HISTORY:      Review of Systems  CONSTITUTIONAL:  Fevers / chills, sweats, fatigue, weight loss, weight gain                                      NEURO:  parathesias, seizures, syncope, confusion                                                                                 EYES:  Blurry vision, discharge, pain, loss of vision                                                                                 ENMT:  Difficulty hearing, vertigo, dysphagia, epistaxis, recent dental work                                       CV:  Chest pain, palpitations, REID, orthopnea                                                                                          RESPIRATORY:  Wheezing, SOB, cough / sputum, hemoptysis                                                                GI:  Nausea, vommiting, diarrhea, constipation, melena                                                                         : Hematuria, dysuria, urgency, incontinence                                                                                         MUSKULOSKELETAL:  arthritis, joint swelling, muscle weakness                                                             SKIN/BREAST:  rash, itching, edwin loss, masses                                                                                              PSYCH:  depresion, anxiety, suicidal ideation                                                                                              HEME/LYMPH:  bruises easily, enlarged lymph nodes, tender lymph nodes                                          ENDOCRINE:  cold intolerance, heat intolerance, polydipsia                                                                       PHYSICAL EXAM  Vital Signs Last 24 Hrs  T(C): 37.2 (04 Nov 2023 16:21), Max: 37.2 (04 Nov 2023 16:21)  T(F): 99 (04 Nov 2023 16:21), Max: 99 (04 Nov 2023 16:21)  HR: 110 (04 Nov 2023 16:21) (48 - 152)  BP: 125/60 (04 Nov 2023 16:21) (103/58 - 135/79)  BP(mean): 84 (04 Nov 2023 16:21) (80 - 98)  RR: 20 (04 Nov 2023 16:21) (15 - 20)  SpO2: 94% (04 Nov 2023 16:21) (92% - 99%)    Parameters below as of 04 Nov 2023 16:21  Patient On (Oxygen Delivery Method): nasal cannula  O2 Flow (L/min): 2      CONSTITUTIONAL:                                                                          NEURO:                                                                                                         EYES:                                                                                                   ENMT:                                                                                                  CV:                                                                                                     RESPIRATORY:                                                                                    GI:                                                                                                         : DO + / -                                                                                   MUSKULOSKELETAL:                                                                        SKIN / BREAST:                                                                                                                                          LABS:                        10.6   7.42  )-----------( 217      ( 04 Nov 2023 05:28 )             34.2     11-04    139  |  98  |  20  ----------------------------<  102<H>  3.3<L>   |  32<H>  |  0.80    Ca    8.3<L>      04 Nov 2023 05:28  Mg     1.7     11-04    TPro  7.8  /  Alb  4.2  /  TBili  0.5  /  DBili  x   /  AST  20  /  ALT  18  /  AlkPhos  159<H>  11-03      Urinalysis Basic - ( 04 Nov 2023 05:28 )    Color: x / Appearance: x / SG: x / pH: x  Gluc: 102 mg/dL / Ketone: x  / Bili: x / Urobili: x   Blood: x / Protein: x / Nitrite: x   Leuk Esterase: x / RBC: x / WBC x   Sq Epi: x / Non Sq Epi: x / Bacteria: x      RADIOLOGY & ADDITIONAL STUDIES:  EKG:  < from: 12 Lead ECG (10.23.23 @ 16:34) >  Ventricular Rate 66 BPM    Atrial Rate 66 BPM    P-R Interval 188 ms    QRS Duration 62 ms    Q-T Interval 424 ms    QTC Calculation(Bazett) 444 ms    P Axis 73 degrees    R Axis 112 degrees    T Axis 70 degrees    Diagnosis Line Normal sinus rhythm with sinus arrhythmia  Right axis deviation  Poor R Wave Progression  Nonspecific ST - T abnormalities    TTE / VANNESSA:  < from: Echocardiogram w/ Bubble and Doppler (09.19.23 @ 13:18) >   1. Technically difficult study.   2. Normal left ventricular size and systolic function.   3. Mildly dilated right ventricular size. Normal right ventricular   systolic function.   4. Dilated right atrium.   5. Aortic sclerosis without significant stenosis.   6. Mild-to-moderate aortic regurgitation.   7. Mild mitral regurgitation.   8. Moderate-to-severe tricuspid regurgitation.   9. Severe pulmonary hypertension present, pulmonary artery systolic   pressure is 73 mmHg.  10. No pericardial effusion.  11. No prior echo is available for comparison. Surgeon: MD Evelyn    Requesting Physician: MD Willie    HISTORY OF PRESENT ILLNESS:  82 year old female with PMHX of AF (not on AC 2/2 high bleed risk), HTN, HLD, hemorraghic stroke (admitted to [facility] 9/2023) Lewy body dementia (baseline a/o x3), posterior reversible encephalopathy syndrome, chronic bronchiectasis presented to [facility] ED 11/3/23 c/o SOB. Patient reported she was driving to see Dr. Rivas and on the way had one episode of indigestion-like sx/gas followed by diaphoresis, near syncope, nausea. Episode reported to last one minute and self resolved. PT also endorses LE swelling (>L) and L sided chest pain and palpitations x1 week. PT has been taking Tramadol PRN for chest pain.  Patient denies chest pain, SOB, REID, palpitations, dizziness, LOC, N/V/D, fever/chills/sick contact, diaphoresis, orthopnea/PND.  EKG in ED: NSR 61 bpm without ischemic changes, Labs significant for: , Trop 18--->16, CXR showed bilateral effusions and cardiomegaly.  In the ED, patient given Lasix 20mg IV x1 and Tylenol 650mg PO x1. TTE from 9/2023: normal LVSF with EF of  55-60%,, mildly dilated RV, dilated RA, AS without significant stenosis, mild-mod AR, mild MR, mod-severe TR, severe pHTN with pulm artery systolic pressure 73mmHg. DVT ruled out in ER with negative D-Dimer and negative LE duplex. Patient is now admitted to cardiac tele service for further evaluation and management.    PAST MEDICAL & SURGICAL HISTORY:  Bronchiectasis  History of Pseudomonas pneumonia  HTN (hypertension)  Posterior reversible encephalopathy syndrome  Atrial fibrillation      Lewy body dementia      No significant past surgical history      MEDICATIONS  (STANDING):  amLODIPine   Tablet 5 milliGRAM(s) Oral daily  aspirin  chewable 81 milliGRAM(s) Oral daily  atorvastatin 40 milliGRAM(s) Oral at bedtime  donepezil 5 milliGRAM(s) Oral at bedtime  influenza  Vaccine (HIGH DOSE) 0.7 milliLiter(s) IntraMuscular once  metoprolol tartrate 50 milliGRAM(s) Oral every 8 hours    MEDICATIONS  (PRN):  clonazePAM  Tablet 0.5 milliGRAM(s) Oral two times a day PRN anxiety  ipratropium    for Nebulization 500 MICROGram(s) Nebulizer every 6 hours PRN Shortness of Breath and/or Wheezing      Allergies    Levaquin (Swelling)  Ceclor (Rash)  IV Contrast (Unknown)  Augmentin (Short breath; Rash)    Intolerances        SOCIAL HISTORY:  Smoker: no  ETOH use: no  Ilicit Drug use: no  Occupation: non contributory   Assisted device use (Cane / Walker): non contributory   Live with: family close by     FAMILY HISTORY:      Review of Systems  CONSTITUTIONAL:  Fevers / chills, sweats, fatigue, weight loss, weight gain                                      NEURO:  parathesias, seizures, syncope, confusion                                                                                 EYES:  Blurry vision, discharge, pain, loss of vision                                                                                 ENMT:  Difficulty hearing, vertigo, dysphagia, epistaxis, recent dental work                                       CV:  Chest pain, palpitations, REID, orthopnea                                                                                          RESPIRATORY:  Wheezing, SOB, cough / sputum, hemoptysis                                                                GI:  Nausea, vommiting, diarrhea, constipation, melena                                                                         : Hematuria, dysuria, urgency, incontinence                                                                                         MUSKULOSKELETAL:  arthritis, joint swelling, muscle weakness                                                             SKIN/BREAST:  rash, itching, edwin loss, masses                                                                                              PSYCH:  depresion, anxiety, suicidal ideation                                                                                              HEME/LYMPH:  bruises easily, enlarged lymph nodes, tender lymph nodes                                          ENDOCRINE:  cold intolerance, heat intolerance, polydipsia                                                                       PHYSICAL EXAM  Vital Signs Last 24 Hrs  T(C): 37.2 (04 Nov 2023 16:21), Max: 37.2 (04 Nov 2023 16:21)  T(F): 99 (04 Nov 2023 16:21), Max: 99 (04 Nov 2023 16:21)  HR: 110 (04 Nov 2023 16:21) (48 - 152)  BP: 125/60 (04 Nov 2023 16:21) (103/58 - 135/79)  BP(mean): 84 (04 Nov 2023 16:21) (80 - 98)  RR: 20 (04 Nov 2023 16:21) (15 - 20)  SpO2: 94% (04 Nov 2023 16:21) (92% - 99%)    Parameters below as of 04 Nov 2023 16:21  Patient On (Oxygen Delivery Method): nasal cannula  O2 Flow (L/min): 2      CONSTITUTIONAL:                                                                          NEURO:                                                                                                         EYES:                                                                                                   ENMT:                                                                                                  CV:                                                                                                     RESPIRATORY:                                                                                    GI:                                                                                                         : DO + / -                                                                                   MUSKULOSKELETAL:                                                                        SKIN / BREAST:                                                                                                                                          LABS:                        10.6   7.42  )-----------( 217      ( 04 Nov 2023 05:28 )             34.2     11-04    139  |  98  |  20  ----------------------------<  102<H>  3.3<L>   |  32<H>  |  0.80    Ca    8.3<L>      04 Nov 2023 05:28  Mg     1.7     11-04    TPro  7.8  /  Alb  4.2  /  TBili  0.5  /  DBili  x   /  AST  20  /  ALT  18  /  AlkPhos  159<H>  11-03      Urinalysis Basic - ( 04 Nov 2023 05:28 )    Color: x / Appearance: x / SG: x / pH: x  Gluc: 102 mg/dL / Ketone: x  / Bili: x / Urobili: x   Blood: x / Protein: x / Nitrite: x   Leuk Esterase: x / RBC: x / WBC x   Sq Epi: x / Non Sq Epi: x / Bacteria: x      RADIOLOGY & ADDITIONAL STUDIES:  EKG:  < from: 12 Lead ECG (10.23.23 @ 16:34) >  Ventricular Rate 66 BPM    Atrial Rate 66 BPM    P-R Interval 188 ms    QRS Duration 62 ms    Q-T Interval 424 ms    QTC Calculation(Bazett) 444 ms    P Axis 73 degrees    R Axis 112 degrees    T Axis 70 degrees    Diagnosis Line Normal sinus rhythm with sinus arrhythmia  Right axis deviation  Poor R Wave Progression  Nonspecific ST - T abnormalities    TTE / VANNESSA:  < from: Echocardiogram w/ Bubble and Doppler (09.19.23 @ 13:18) >   1. Technically difficult study.   2. Normal left ventricular size and systolic function.   3. Mildly dilated right ventricular size. Normal right ventricular   systolic function.   4. Dilated right atrium.   5. Aortic sclerosis without significant stenosis.   6. Mild-to-moderate aortic regurgitation.   7. Mild mitral regurgitation.   8. Moderate-to-severe tricuspid regurgitation.   9. Severe pulmonary hypertension present, pulmonary artery systolic   pressure is 73 mmHg.  10. No pericardial effusion.  11. No prior echo is available for comparison. Surgeon: MD Evelyn    Requesting Physician: MD Willie    HISTORY OF PRESENT ILLNESS:  82 year old female with PMHX of AF (not on AC 2/2 high bleed risk), HTN, HLD, hemorraghic stroke (admitted to [facility] 9/2023) Lewy body dementia (baseline a/o x3), posterior reversible encephalopathy syndrome, chronic bronchiectasis presented to [facility] ED 11/3/23 c/o SOB. Patient reported she was driving to see Dr. Rivas and on the way had one episode of indigestion-like sx/gas followed by diaphoresis, near syncope, nausea. Episode reported to last one minute and self resolved. PT also endorses LE swelling (>L) and L sided chest pain and palpitations x1 week. PT has been taking Tramadol PRN for chest pain.  Patient denies chest pain, SOB, REID, palpitations, dizziness, LOC, N/V/D, fever/chills/sick contact, diaphoresis, orthopnea/PND.  EKG in ED: NSR 61 bpm without ischemic changes, Labs significant for: , Trop 18--->16, CXR showed bilateral effusions and cardiomegaly.  In the ED, patient given Lasix 20mg IV x1 and Tylenol 650mg PO x1. TTE from 9/2023: normal LVSF with EF of  55-60%,, mildly dilated RV, dilated RA, AS without significant stenosis, mild-mod AR, mild MR, mod-severe TR, severe pHTN with pulm artery systolic pressure 73mmHg. DVT ruled out in ER with negative D-Dimer and negative LE duplex. Patient is now admitted to cardiac tele service for further evaluation and management.    PAST MEDICAL & SURGICAL HISTORY:  Bronchiectasis  History of Pseudomonas pneumonia  HTN (hypertension)  Posterior reversible encephalopathy syndrome  Atrial fibrillation      Lewy body dementia      No significant past surgical history      MEDICATIONS  (STANDING):  amLODIPine   Tablet 5 milliGRAM(s) Oral daily  aspirin  chewable 81 milliGRAM(s) Oral daily  atorvastatin 40 milliGRAM(s) Oral at bedtime  donepezil 5 milliGRAM(s) Oral at bedtime  influenza  Vaccine (HIGH DOSE) 0.7 milliLiter(s) IntraMuscular once  metoprolol tartrate 50 milliGRAM(s) Oral every 8 hours    MEDICATIONS  (PRN):  clonazePAM  Tablet 0.5 milliGRAM(s) Oral two times a day PRN anxiety  ipratropium    for Nebulization 500 MICROGram(s) Nebulizer every 6 hours PRN Shortness of Breath and/or Wheezing      Allergies    Levaquin (Swelling)  Ceclor (Rash)  IV Contrast (Unknown)  Augmentin (Short breath; Rash)    Intolerances        SOCIAL HISTORY:  Smoker: no  ETOH use: no  Ilicit Drug use: no  Occupation: non contributory   Assisted device use (Cane / Walker): non contributory   Live with: family close by     FAMILY HISTORY:      Review of Systems  CONSTITUTIONAL:  Fevers / chills, sweats, fatigue, weight loss, weight gain                                      NEURO:  parathesias, seizures, syncope, confusion                                                                                 EYES:  Blurry vision, discharge, pain, loss of vision                                                                                 ENMT:  Difficulty hearing, vertigo, dysphagia, epistaxis, recent dental work                                       CV:  Chest pain, palpitations, REID, orthopnea                                                                                          RESPIRATORY:  Wheezing, SOB, cough / sputum, hemoptysis                                                                GI:  Nausea, vommiting, diarrhea, constipation, melena                                                                         : Hematuria, dysuria, urgency, incontinence                                                                                         MUSKULOSKELETAL:  arthritis, joint swelling, muscle weakness                                                             SKIN/BREAST:  rash, itching, edwin loss, masses                                                                                              PSYCH:  depresion, anxiety, suicidal ideation                                                                                              HEME/LYMPH:  bruises easily, enlarged lymph nodes, tender lymph nodes                                          ENDOCRINE:  cold intolerance, heat intolerance, polydipsia                                                                       PHYSICAL EXAM  Vital Signs Last 24 Hrs  T(C): 37.2 (04 Nov 2023 16:21), Max: 37.2 (04 Nov 2023 16:21)  T(F): 99 (04 Nov 2023 16:21), Max: 99 (04 Nov 2023 16:21)  HR: 110 (04 Nov 2023 16:21) (48 - 152)  BP: 125/60 (04 Nov 2023 16:21) (103/58 - 135/79)  BP(mean): 84 (04 Nov 2023 16:21) (80 - 98)  RR: 20 (04 Nov 2023 16:21) (15 - 20)  SpO2: 94% (04 Nov 2023 16:21) (92% - 99%)    Parameters below as of 04 Nov 2023 16:21  Patient On (Oxygen Delivery Method): nasal cannula  O2 Flow (L/min): 2      CONSTITUTIONAL:                                                                          NEURO:                                                                                                         EYES:                                                                                                   ENMT:                                                                                                  CV:                                                                                                     RESPIRATORY:                                                                                    GI:                                                                                                         : DO + / -                                                                                   MUSKULOSKELETAL:                                                                        SKIN / BREAST:                                                                                                                                          LABS:                        10.6   7.42  )-----------( 217      ( 04 Nov 2023 05:28 )             34.2     11-04    139  |  98  |  20  ----------------------------<  102<H>  3.3<L>   |  32<H>  |  0.80    Ca    8.3<L>      04 Nov 2023 05:28  Mg     1.7     11-04    TPro  7.8  /  Alb  4.2  /  TBili  0.5  /  DBili  x   /  AST  20  /  ALT  18  /  AlkPhos  159<H>  11-03      Urinalysis Basic - ( 04 Nov 2023 05:28 )    Color: x / Appearance: x / SG: x / pH: x  Gluc: 102 mg/dL / Ketone: x  / Bili: x / Urobili: x   Blood: x / Protein: x / Nitrite: x   Leuk Esterase: x / RBC: x / WBC x   Sq Epi: x / Non Sq Epi: x / Bacteria: x      RADIOLOGY & ADDITIONAL STUDIES:  EKG:  < from: 12 Lead ECG (10.23.23 @ 16:34) >  Ventricular Rate 66 BPM    Atrial Rate 66 BPM    P-R Interval 188 ms    QRS Duration 62 ms    Q-T Interval 424 ms    QTC Calculation(Bazett) 444 ms    P Axis 73 degrees    R Axis 112 degrees    T Axis 70 degrees    Diagnosis Line Normal sinus rhythm with sinus arrhythmia  Right axis deviation  Poor R Wave Progression  Nonspecific ST - T abnormalities    TTE / VANNESSA:  < from: Echocardiogram w/ Bubble and Doppler (09.19.23 @ 13:18) >   1. Technically difficult study.   2. Normal left ventricular size and systolic function.   3. Mildly dilated right ventricular size. Normal right ventricular   systolic function.   4. Dilated right atrium.   5. Aortic sclerosis without significant stenosis.   6. Mild-to-moderate aortic regurgitation.   7. Mild mitral regurgitation.   8. Moderate-to-severe tricuspid regurgitation.   9. Severe pulmonary hypertension present, pulmonary artery systolic   pressure is 73 mmHg.  10. No pericardial effusion.  11. No prior echo is available for comparison. Surgeon: MD Evelyn    Requesting Physician: MD Willie    HISTORY OF PRESENT ILLNESS:  82 year old female with PMHX of AF (not on AC 2/2 high bleed risk), HTN, HLD, hemorraghic stroke (admitted to [facility] 9/2023) Lewy body dementia (baseline a/o x3), posterior reversible encephalopathy syndrome, chronic bronchiectasis presented to [facility] ED 11/3/23 c/o SOB. Patient reported she was driving to see Dr. Rivas and on the way had one episode of indigestion-like sx/gas followed by diaphoresis, near syncope, nausea. Episode reported to last one minute and self resolved. PT also endorses LE swelling (>L) and L sided chest pain and palpitations x1 week. PT has been taking Tramadol PRN for chest pain.  Patient denies chest pain, SOB, REID, palpitations, dizziness, LOC, N/V/D, fever/chills/sick contact, diaphoresis, orthopnea/PND.  EKG in ED: NSR 61 bpm without ischemic changes, Labs significant for: , Trop 18--->16, CXR showed bilateral effusions and cardiomegaly.  In the ED, patient given Lasix 20mg IV x1 and Tylenol 650mg PO x1. TTE from 9/2023: normal LVSF with EF of  55-60%,, mildly dilated RV, dilated RA, AS without significant stenosis, mild-mod AR, mild MR, mod-severe TR, severe pHTN with pulm artery systolic pressure 73mmHg. DVT ruled out in ER with negative D-Dimer and negative LE duplex. Patient is now admitted to cardiac tele service for further evaluation and management.  during her hospitalization, patient is referred to structural heart team for watchman device placement to minimize her risk or needs for anticoagulation.  Patient is visited at bedside who is not in acute distress.         PAST MEDICAL & SURGICAL HISTORY:  Bronchiectasis  History of Pseudomonas pneumonia  HTN (hypertension)  Posterior reversible encephalopathy syndrome  Atrial fibrillation      Lewy body dementia      No significant past surgical history      MEDICATIONS  (STANDING):  amLODIPine   Tablet 5 milliGRAM(s) Oral daily  aspirin  chewable 81 milliGRAM(s) Oral daily  atorvastatin 40 milliGRAM(s) Oral at bedtime  donepezil 5 milliGRAM(s) Oral at bedtime  influenza  Vaccine (HIGH DOSE) 0.7 milliLiter(s) IntraMuscular once  metoprolol tartrate 50 milliGRAM(s) Oral every 8 hours    MEDICATIONS  (PRN):  clonazePAM  Tablet 0.5 milliGRAM(s) Oral two times a day PRN anxiety  ipratropium    for Nebulization 500 MICROGram(s) Nebulizer every 6 hours PRN Shortness of Breath and/or Wheezing      Allergies    Levaquin (Swelling)  Ceclor (Rash)  IV Contrast (Unknown)  Augmentin (Short breath; Rash)    Intolerances        SOCIAL HISTORY:  Smoker: no  ETOH use: no  Ilicit Drug use: no  Occupation: non contributory   Assisted device use (Cane / Walker): non contributory   Live with: family close by     FAMILY HISTORY:      Review of Systems  CONSTITUTIONAL:  Fevers / chills, sweats, fatigue, weight loss, weight gain                                      NEURO:  parathesias, seizures, syncope, confusion                                                                                 EYES:  Blurry vision, discharge, pain, loss of vision                                                                                 ENMT:  Difficulty hearing, vertigo, dysphagia, epistaxis, recent dental work                                       CV:  Chest pain, palpitations, REID, orthopnea                                                                                          RESPIRATORY:  Wheezing, SOB, cough / sputum, hemoptysis                                                                GI:  Nausea, vommiting, diarrhea, constipation, melena                                                                         : Hematuria, dysuria, urgency, incontinence                                                                                         MUSKULOSKELETAL:  arthritis, joint swelling, muscle weakness                                                             SKIN/BREAST:  rash, itching, edwin loss, masses                                                                                              PSYCH:  depresion, anxiety, suicidal ideation                                                                                              HEME/LYMPH:  bruises easily, enlarged lymph nodes, tender lymph nodes                                          ENDOCRINE:  cold intolerance, heat intolerance, polydipsia                                                                       PHYSICAL EXAM  Vital Signs Last 24 Hrs  T(C): 37.2 (04 Nov 2023 16:21), Max: 37.2 (04 Nov 2023 16:21)  T(F): 99 (04 Nov 2023 16:21), Max: 99 (04 Nov 2023 16:21)  HR: 110 (04 Nov 2023 16:21) (48 - 152)  BP: 125/60 (04 Nov 2023 16:21) (103/58 - 135/79)  BP(mean): 84 (04 Nov 2023 16:21) (80 - 98)  RR: 20 (04 Nov 2023 16:21) (15 - 20)  SpO2: 94% (04 Nov 2023 16:21) (92% - 99%)    Parameters below as of 04 Nov 2023 16:21  Patient On (Oxygen Delivery Method): nasal cannula  O2 Flow (L/min): 2      CONSTITUTIONAL:                                                                          NEURO:                                                                                                         EYES:                                                                                                   ENMT:                                                                                                  CV:                                                                                                     RESPIRATORY:                                                                                    GI:                                                                                                         : DO + / -                                                                                   MUSKULOSKELETAL:                                                                        SKIN / BREAST:                                                                                                                                          LABS:                        10.6   7.42  )-----------( 217      ( 04 Nov 2023 05:28 )             34.2     11-04    139  |  98  |  20  ----------------------------<  102<H>  3.3<L>   |  32<H>  |  0.80    Ca    8.3<L>      04 Nov 2023 05:28  Mg     1.7     11-04    TPro  7.8  /  Alb  4.2  /  TBili  0.5  /  DBili  x   /  AST  20  /  ALT  18  /  AlkPhos  159<H>  11-03      Urinalysis Basic - ( 04 Nov 2023 05:28 )    Color: x / Appearance: x / SG: x / pH: x  Gluc: 102 mg/dL / Ketone: x  / Bili: x / Urobili: x   Blood: x / Protein: x / Nitrite: x   Leuk Esterase: x / RBC: x / WBC x   Sq Epi: x / Non Sq Epi: x / Bacteria: x      RADIOLOGY & ADDITIONAL STUDIES:  EKG:  < from: 12 Lead ECG (10.23.23 @ 16:34) >  Ventricular Rate 66 BPM    Atrial Rate 66 BPM    P-R Interval 188 ms    QRS Duration 62 ms    Q-T Interval 424 ms    QTC Calculation(Bazett) 444 ms    P Axis 73 degrees    R Axis 112 degrees    T Axis 70 degrees    Diagnosis Line Normal sinus rhythm with sinus arrhythmia  Right axis deviation  Poor R Wave Progression  Nonspecific ST - T abnormalities    TTE / VANNESSA:  < from: Echocardiogram w/ Bubble and Doppler (09.19.23 @ 13:18) >   1. Technically difficult study.   2. Normal left ventricular size and systolic function.   3. Mildly dilated right ventricular size. Normal right ventricular   systolic function.   4. Dilated right atrium.   5. Aortic sclerosis without significant stenosis.   6. Mild-to-moderate aortic regurgitation.   7. Mild mitral regurgitation.   8. Moderate-to-severe tricuspid regurgitation.   9. Severe pulmonary hypertension present, pulmonary artery systolic   pressure is 73 mmHg.  10. No pericardial effusion.  11. No prior echo is available for comparison. Surgeon: MD Evelyn    Requesting Physician: MD Willie    HISTORY OF PRESENT ILLNESS:  82 year old female with PMHX of AF (not on AC 2/2 high bleed risk), HTN, HLD, hemorraghic stroke (admitted to [facility] 9/2023) Lewy body dementia (baseline a/o x3), posterior reversible encephalopathy syndrome, chronic bronchiectasis presented to [facility] ED 11/3/23 c/o SOB. Patient reported she was driving to see Dr. Rivas and on the way had one episode of indigestion-like sx/gas followed by diaphoresis, near syncope, nausea. Episode reported to last one minute and self resolved. PT also endorses LE swelling (>L) and L sided chest pain and palpitations x1 week. PT has been taking Tramadol PRN for chest pain.  Patient denies chest pain, SOB, REID, palpitations, dizziness, LOC, N/V/D, fever/chills/sick contact, diaphoresis, orthopnea/PND.  EKG in ED: NSR 61 bpm without ischemic changes, Labs significant for: , Trop 18--->16, CXR showed bilateral effusions and cardiomegaly.  In the ED, patient given Lasix 20mg IV x1 and Tylenol 650mg PO x1. TTE from 9/2023: normal LVSF with EF of  55-60%,, mildly dilated RV, dilated RA, AS without significant stenosis, mild-mod AR, mild MR, mod-severe TR, severe pHTN with pulm artery systolic pressure 73mmHg. DVT ruled out in ER with negative D-Dimer and negative LE duplex. Patient is now admitted to cardiac tele service for further evaluation and management.  during her hospitalization, patient is referred to structural heart team for watchman device placement to minimize her risk or needs for anticoagulation.  Patient is visited at bedside who is not in acute distress.         PAST MEDICAL & SURGICAL HISTORY:  Bronchiectasis  History of Pseudomonas pneumonia  HTN (hypertension)  Posterior reversible encephalopathy syndrome  Atrial fibrillation      Lewy body dementia      No significant past surgical history      MEDICATIONS  (STANDING):  amLODIPine   Tablet 5 milliGRAM(s) Oral daily  aspirin  chewable 81 milliGRAM(s) Oral daily  atorvastatin 40 milliGRAM(s) Oral at bedtime  donepezil 5 milliGRAM(s) Oral at bedtime  influenza  Vaccine (HIGH DOSE) 0.7 milliLiter(s) IntraMuscular once  metoprolol tartrate 50 milliGRAM(s) Oral every 8 hours    MEDICATIONS  (PRN):  clonazePAM  Tablet 0.5 milliGRAM(s) Oral two times a day PRN anxiety  ipratropium    for Nebulization 500 MICROGram(s) Nebulizer every 6 hours PRN Shortness of Breath and/or Wheezing      Allergies    Levaquin (Swelling)  Ceclor (Rash)  IV Contrast (Unknown)  Augmentin (Short breath; Rash)    Intolerances        SOCIAL HISTORY:  Smoker: no  ETOH use: no  Ilicit Drug use: no  Occupation: non contributory   Assisted device use (Cane / Walker): non contributory   Live with: family close by     FAMILY HISTORY:      Review of Systems  CONSTITUTIONAL:  Fevers / chills, sweats, fatigue, weight loss, weight gain                                      NEURO:  parathesias, seizures, syncope, confusion                                                                                 EYES:  Blurry vision, discharge, pain, loss of vision                                                                                 ENMT:  Difficulty hearing, vertigo, dysphagia, epistaxis, recent dental work                                       CV:  Chest pain, palpitations, REID, orthopnea                                                                                          RESPIRATORY:  Wheezing, SOB, cough / sputum, hemoptysis                                                                GI:  Nausea, vommiting, diarrhea, constipation, melena                                                                         : Hematuria, dysuria, urgency, incontinence                                                                                         MUSKULOSKELETAL:  arthritis, joint swelling, muscle weakness                                                             SKIN/BREAST:  rash, itching, edwin loss, masses                                                                                              PSYCH:  depresion, anxiety, suicidal ideation                                                                                              HEME/LYMPH:  bruises easily, enlarged lymph nodes, tender lymph nodes                                          ENDOCRINE:  cold intolerance, heat intolerance, polydipsia                                                                       PHYSICAL EXAM  Vital Signs Last 24 Hrs  T(C): 37.2 (04 Nov 2023 16:21), Max: 37.2 (04 Nov 2023 16:21)  T(F): 99 (04 Nov 2023 16:21), Max: 99 (04 Nov 2023 16:21)  HR: 110 (04 Nov 2023 16:21) (48 - 152)  BP: 125/60 (04 Nov 2023 16:21) (103/58 - 135/79)  BP(mean): 84 (04 Nov 2023 16:21) (80 - 98)  RR: 20 (04 Nov 2023 16:21) (15 - 20)  SpO2: 94% (04 Nov 2023 16:21) (92% - 99%)    Parameters below as of 04 Nov 2023 16:21  Patient On (Oxygen Delivery Method): nasal cannula  O2 Flow (L/min): 2      CONSTITUTIONAL:                                                                          NEURO:                                                                                                         EYES:                                                                                                   ENMT:                                                                                                  CV:                                                                                                     RESPIRATORY:                                                                                    GI:                                                                                                         : DO + / -                                                                                   MUSKULOSKELETAL:                                                                        SKIN / BREAST:                                                                                                                                          LABS:                        10.6   7.42  )-----------( 217      ( 04 Nov 2023 05:28 )             34.2     11-04    139  |  98  |  20  ----------------------------<  102<H>  3.3<L>   |  32<H>  |  0.80    Ca    8.3<L>      04 Nov 2023 05:28  Mg     1.7     11-04    TPro  7.8  /  Alb  4.2  /  TBili  0.5  /  DBili  x   /  AST  20  /  ALT  18  /  AlkPhos  159<H>  11-03      Urinalysis Basic - ( 04 Nov 2023 05:28 )    Color: x / Appearance: x / SG: x / pH: x  Gluc: 102 mg/dL / Ketone: x  / Bili: x / Urobili: x   Blood: x / Protein: x / Nitrite: x   Leuk Esterase: x / RBC: x / WBC x   Sq Epi: x / Non Sq Epi: x / Bacteria: x      RADIOLOGY & ADDITIONAL STUDIES:  EKG:  < from: 12 Lead ECG (10.23.23 @ 16:34) >  Ventricular Rate 66 BPM    Atrial Rate 66 BPM    P-R Interval 188 ms    QRS Duration 62 ms    Q-T Interval 424 ms    QTC Calculation(Bazett) 444 ms    P Axis 73 degrees    R Axis 112 degrees    T Axis 70 degrees    Diagnosis Line Normal sinus rhythm with sinus arrhythmia  Right axis deviation  Poor R Wave Progression  Nonspecific ST - T abnormalities    TTE / VANNESSA:  < from: Echocardiogram w/ Bubble and Doppler (09.19.23 @ 13:18) >   1. Technically difficult study.   2. Normal left ventricular size and systolic function.   3. Mildly dilated right ventricular size. Normal right ventricular   systolic function.   4. Dilated right atrium.   5. Aortic sclerosis without significant stenosis.   6. Mild-to-moderate aortic regurgitation.   7. Mild mitral regurgitation.   8. Moderate-to-severe tricuspid regurgitation.   9. Severe pulmonary hypertension present, pulmonary artery systolic   pressure is 73 mmHg.  10. No pericardial effusion.  11. No prior echo is available for comparison. Surgeon: MD Evelyn    Requesting Physician: MD Willie    HISTORY OF PRESENT ILLNESS:  82 year old female with PMHX of AF (not on AC 2/2 high bleed risk), HTN, HLD, hemorraghic stroke (admitted to [facility] 9/2023) Lewy body dementia (baseline a/o x3), posterior reversible encephalopathy syndrome, chronic bronchiectasis presented to [facility] ED 11/3/23 c/o SOB. Patient reported she was driving to see Dr. Rivas and on the way had one episode of indigestion-like sx/gas followed by diaphoresis, near syncope, nausea. Episode reported to last one minute and self resolved. PT also endorses LE swelling (>L) and L sided chest pain and palpitations x1 week. PT has been taking Tramadol PRN for chest pain.  Patient denies chest pain, SOB, REID, palpitations, dizziness, LOC, N/V/D, fever/chills/sick contact, diaphoresis, orthopnea/PND.  EKG in ED: NSR 61 bpm without ischemic changes, Labs significant for: , Trop 18--->16, CXR showed bilateral effusions and cardiomegaly.  In the ED, patient given Lasix 20mg IV x1 and Tylenol 650mg PO x1. TTE from 9/2023: normal LVSF with EF of  55-60%,, mildly dilated RV, dilated RA, AS without significant stenosis, mild-mod AR, mild MR, mod-severe TR, severe pHTN with pulm artery systolic pressure 73mmHg. DVT ruled out in ER with negative D-Dimer and negative LE duplex. Patient is now admitted to cardiac tele service for further evaluation and management.  during her hospitalization, patient is referred to structural heart team for watchman device placement to minimize her risk or needs for anticoagulation.  Patient is visited at bedside who is not in acute distress.         PAST MEDICAL & SURGICAL HISTORY:  Bronchiectasis  History of Pseudomonas pneumonia  HTN (hypertension)  Posterior reversible encephalopathy syndrome  Atrial fibrillation      Lewy body dementia      No significant past surgical history      MEDICATIONS  (STANDING):  amLODIPine   Tablet 5 milliGRAM(s) Oral daily  aspirin  chewable 81 milliGRAM(s) Oral daily  atorvastatin 40 milliGRAM(s) Oral at bedtime  donepezil 5 milliGRAM(s) Oral at bedtime  influenza  Vaccine (HIGH DOSE) 0.7 milliLiter(s) IntraMuscular once  metoprolol tartrate 50 milliGRAM(s) Oral every 8 hours    MEDICATIONS  (PRN):  clonazePAM  Tablet 0.5 milliGRAM(s) Oral two times a day PRN anxiety  ipratropium    for Nebulization 500 MICROGram(s) Nebulizer every 6 hours PRN Shortness of Breath and/or Wheezing      Allergies    Levaquin (Swelling)  Ceclor (Rash)  IV Contrast (Unknown)  Augmentin (Short breath; Rash)    Intolerances        SOCIAL HISTORY:  Smoker: no  ETOH use: no  Ilicit Drug use: no  Occupation: non contributory   Assisted device use (Cane / Walker): non contributory   Live with: family close by     FAMILY HISTORY:      Review of Systems  CONSTITUTIONAL:  Fevers / chills, sweats, fatigue, weight loss, weight gain                                      NEURO:  parathesias, seizures, syncope, confusion                                                                                 EYES:  Blurry vision, discharge, pain, loss of vision                                                                                 ENMT:  Difficulty hearing, vertigo, dysphagia, epistaxis, recent dental work                                       CV:  Chest pain, palpitations, REID, orthopnea                                                                                          RESPIRATORY:  Wheezing, SOB, cough / sputum, hemoptysis                                                                GI:  Nausea, vommiting, diarrhea, constipation, melena                                                                         : Hematuria, dysuria, urgency, incontinence                                                                                         MUSKULOSKELETAL:  arthritis, joint swelling, muscle weakness                                                             SKIN/BREAST:  rash, itching, edwin loss, masses                                                                                              PSYCH:  depresion, anxiety, suicidal ideation                                                                                              HEME/LYMPH:  bruises easily, enlarged lymph nodes, tender lymph nodes                                          ENDOCRINE:  cold intolerance, heat intolerance, polydipsia                                                                       PHYSICAL EXAM  Vital Signs Last 24 Hrs  T(C): 37.2 (04 Nov 2023 16:21), Max: 37.2 (04 Nov 2023 16:21)  T(F): 99 (04 Nov 2023 16:21), Max: 99 (04 Nov 2023 16:21)  HR: 110 (04 Nov 2023 16:21) (48 - 152)  BP: 125/60 (04 Nov 2023 16:21) (103/58 - 135/79)  BP(mean): 84 (04 Nov 2023 16:21) (80 - 98)  RR: 20 (04 Nov 2023 16:21) (15 - 20)  SpO2: 94% (04 Nov 2023 16:21) (92% - 99%)    Parameters below as of 04 Nov 2023 16:21  Patient On (Oxygen Delivery Method): nasal cannula  O2 Flow (L/min): 2      CONSTITUTIONAL:                                                                          NEURO:                                                                                                         EYES:                                                                                                   ENMT:                                                                                                  CV:                                                                                                     RESPIRATORY:                                                                                    GI:                                                                                                         : DO + / -                                                                                   MUSKULOSKELETAL:                                                                        SKIN / BREAST:                                                                                                                                          LABS:                        10.6   7.42  )-----------( 217      ( 04 Nov 2023 05:28 )             34.2     11-04    139  |  98  |  20  ----------------------------<  102<H>  3.3<L>   |  32<H>  |  0.80    Ca    8.3<L>      04 Nov 2023 05:28  Mg     1.7     11-04    TPro  7.8  /  Alb  4.2  /  TBili  0.5  /  DBili  x   /  AST  20  /  ALT  18  /  AlkPhos  159<H>  11-03      Urinalysis Basic - ( 04 Nov 2023 05:28 )    Color: x / Appearance: x / SG: x / pH: x  Gluc: 102 mg/dL / Ketone: x  / Bili: x / Urobili: x   Blood: x / Protein: x / Nitrite: x   Leuk Esterase: x / RBC: x / WBC x   Sq Epi: x / Non Sq Epi: x / Bacteria: x      RADIOLOGY & ADDITIONAL STUDIES:  EKG:  < from: 12 Lead ECG (10.23.23 @ 16:34) >  Ventricular Rate 66 BPM    Atrial Rate 66 BPM    P-R Interval 188 ms    QRS Duration 62 ms    Q-T Interval 424 ms    QTC Calculation(Bazett) 444 ms    P Axis 73 degrees    R Axis 112 degrees    T Axis 70 degrees    Diagnosis Line Normal sinus rhythm with sinus arrhythmia  Right axis deviation  Poor R Wave Progression  Nonspecific ST - T abnormalities    TTE / VANNESSA:  < from: Echocardiogram w/ Bubble and Doppler (09.19.23 @ 13:18) >   1. Technically difficult study.   2. Normal left ventricular size and systolic function.   3. Mildly dilated right ventricular size. Normal right ventricular   systolic function.   4. Dilated right atrium.   5. Aortic sclerosis without significant stenosis.   6. Mild-to-moderate aortic regurgitation.   7. Mild mitral regurgitation.   8. Moderate-to-severe tricuspid regurgitation.   9. Severe pulmonary hypertension present, pulmonary artery systolic   pressure is 73 mmHg.  10. No pericardial effusion.  11. No prior echo is available for comparison. 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 Patient reported she was driving to see Dr. Rivas and on the way had one episode of indigestion-like sx/gas followed by diaphoresis, near syncope, nausea. Episode reported to last one minute and self resolved. PT also endorses LE swelling (>L) and L sided chest pain and palpitations x1 week. PT has been taking Tramadol PRN for chest pain.  Patient denies chest pain, SOB, REID, palpitations, dizziness, LOC, N/V/D, fever/chills/sick contact, diaphoresis, orthopnea/PND.  EKG in ED: NSR 61 bpm without ischemic changes, Labs significant for: , Trop 18--->16, CXR showed bilateral effusions and cardiomegaly.  In the ED, patient given Lasix 20mg IV x1 and Tylenol 650mg PO x1. TTE from 9/2023: normal LVSF with EF of  55-60%,, mildly dilated RV, dilated RA, AS without significant stenosis, mild-mod AR, mild MR, mod-severe TR, severe pHTN with pulm artery systolic pressure 73mmHg. DVT ruled out in ER with negative D-Dimer and negative LE duplex. Patient is now admitted to cardiac tele service for further evaluation and management.  during her hospitalization, patient is referred to structural heart team for watchman device placement to minimize her risk or needs for anticoagulation.  Patient is visited at bedside who is not in acute distress.  She is sitting up for breakfast.  She is tachycardiac with a-fib however she does not appear to be distress.  She denies chest pain, syncopes, or LE edema.          PAST MEDICAL & SURGICAL HISTORY:  Bronchiectasis  History of Pseudomonas pneumonia  HTN (hypertension)  Posterior reversible encephalopathy syndrome  Atrial fibrillation      Lewy body dementia      No significant past surgical history      MEDICATIONS  (STANDING):  amLODIPine   Tablet 5 milliGRAM(s) Oral daily  aspirin  chewable 81 milliGRAM(s) Oral daily  atorvastatin 40 milliGRAM(s) Oral at bedtime  donepezil 5 milliGRAM(s) Oral at bedtime  influenza  Vaccine (HIGH DOSE) 0.7 milliLiter(s) IntraMuscular once  metoprolol tartrate 50 milliGRAM(s) Oral every 8 hours    MEDICATIONS  (PRN):  clonazePAM  Tablet 0.5 milliGRAM(s) Oral two times a day PRN anxiety  ipratropium    for Nebulization 500 MICROGram(s) Nebulizer every 6 hours PRN Shortness of Breath and/or Wheezing      Allergies    Levaquin (Swelling)  Ceclor (Rash)  IV Contrast (Unknown)  Augmentin (Short breath; Rash)    Intolerances        SOCIAL HISTORY:  Smoker: no  ETOH use: no  Ilicit Drug use: no  Occupation: non contributory   Assisted device use (Cane / Walker): non contributory   Live with: family close by     FAMILY HISTORY:      Review of Systems  CONSTITUTIONAL:  Fevers / chills, sweats, fatigue, weight loss, weight gain                                      NEURO:  parathesias, seizures, syncope, confusion                                                                                 EYES:  Blurry vision, discharge, pain, loss of vision                                                                                 ENMT:  Difficulty hearing, vertigo, dysphagia, epistaxis, recent dental work                                       CV:  Chest pain, palpitations, REID, orthopnea                                                                                          RESPIRATORY:  Wheezing, SOB, cough / sputum, hemoptysis                                                                GI:  Nausea, vommiting, diarrhea, constipation, melena                                                                         : Hematuria, dysuria, urgency, incontinence                                                                                         MUSKULOSKELETAL:  arthritis, joint swelling, muscle weakness                                                             SKIN/BREAST:  rash, itching, edwin loss, masses                                                                                              PSYCH:  depresion, anxiety, suicidal ideation                                                                                              HEME/LYMPH:  bruises easily, enlarged lymph nodes, tender lymph nodes                                          ENDOCRINE:  cold intolerance, heat intolerance, polydipsia                                                                       PHYSICAL EXAM  Vital Signs Last 24 Hrs  T(C): 37.2 (04 Nov 2023 16:21), Max: 37.2 (04 Nov 2023 16:21)  T(F): 99 (04 Nov 2023 16:21), Max: 99 (04 Nov 2023 16:21)  HR: 110 (04 Nov 2023 16:21) (48 - 152)  BP: 125/60 (04 Nov 2023 16:21) (103/58 - 135/79)  BP(mean): 84 (04 Nov 2023 16:21) (80 - 98)  RR: 20 (04 Nov 2023 16:21) (15 - 20)  SpO2: 94% (04 Nov 2023 16:21) (92% - 99%)    Parameters below as of 04 Nov 2023 16:21  Patient On (Oxygen Delivery Method): nasal cannula  O2 Flow (L/min): 2      CONSTITUTIONAL:                                                                          NEURO:                                                                                                         EYES:                                                                                                   ENMT:                                                                                                  CV:                                                                                                     RESPIRATORY:                                                                                    GI:                                                                                                         : DO + / -                                                                                   MUSKULOSKELETAL:                                                                        SKIN / BREAST:                                                                                                                                          LABS:                        10.6   7.42  )-----------( 217      ( 04 Nov 2023 05:28 )             34.2     11-04    139  |  98  |  20  ----------------------------<  102<H>  3.3<L>   |  32<H>  |  0.80    Ca    8.3<L>      04 Nov 2023 05:28  Mg     1.7     11-04    TPro  7.8  /  Alb  4.2  /  TBili  0.5  /  DBili  x   /  AST  20  /  ALT  18  /  AlkPhos  159<H>  11-03      Urinalysis Basic - ( 04 Nov 2023 05:28 )    Color: x / Appearance: x / SG: x / pH: x  Gluc: 102 mg/dL / Ketone: x  / Bili: x / Urobili: x   Blood: x / Protein: x / Nitrite: x   Leuk Esterase: x / RBC: x / WBC x   Sq Epi: x / Non Sq Epi: x / Bacteria: x      RADIOLOGY & ADDITIONAL STUDIES:  EKG:  < from: 12 Lead ECG (10.23.23 @ 16:34) >  Ventricular Rate 66 BPM    Atrial Rate 66 BPM    P-R Interval 188 ms    QRS Duration 62 ms    Q-T Interval 424 ms    QTC Calculation(Bazett) 444 ms    P Axis 73 degrees    R Axis 112 degrees    T Axis 70 degrees    Diagnosis Line Normal sinus rhythm with sinus arrhythmia  Right axis deviation  Poor R Wave Progression  Nonspecific ST - T abnormalities    TTE / VANNESSA:  < from: Echocardiogram w/ Bubble and Doppler (09.19.23 @ 13:18) >   1. Technically difficult study.   2. Normal left ventricular size and systolic function.   3. Mildly dilated right ventricular size. Normal right ventricular   systolic function.   4. Dilated right atrium.   5. Aortic sclerosis without significant stenosis.   6. Mild-to-moderate aortic regurgitation.   7. Mild mitral regurgitation.   8. Moderate-to-severe tricuspid regurgitation.   9. Severe pulmonary hypertension present, pulmonary artery systolic   pressure is 73 mmHg.  10. No pericardial effusion.  11. No prior echo is available for comparison. Surgeon: MD Evelyn    Requesting Physician: MD Willie    HISTORY OF PRESENT ILLNESS:  82 year old female with PMHX of AF (not on AC 2/2 high bleed risk), HTN, HLD, hemorraghic stroke (admitted to 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 ED 11/3/23 c/o SOB. Patient reported she was driving to see Dr. Rivas and on the way had one episode of indigestion-like sx/gas followed by diaphoresis, near syncope, nausea. Episode reported to last one minute and self resolved. PT also endorses LE swelling (>L) and L sided chest pain and palpitations x1 week. PT has been taking Tramadol PRN for chest pain.  Patient denies chest pain, SOB, REID, palpitations, dizziness, LOC, N/V/D, fever/chills/sick contact, diaphoresis, orthopnea/PND.  EKG in ED: NSR 61 bpm without ischemic changes, Labs significant for: , Trop 18--->16, CXR showed bilateral effusions and cardiomegaly.  In the ED, patient given Lasix 20mg IV x1 and Tylenol 650mg PO x1. TTE from 9/2023: normal LVSF with EF of  55-60%,, mildly dilated RV, dilated RA, AS without significant stenosis, mild-mod AR, mild MR, mod-severe TR, severe pHTN with pulm artery systolic pressure 73mmHg. DVT ruled out in ER with negative D-Dimer and negative LE duplex. Patient is now admitted to cardiac tele service for further evaluation and management.  during her hospitalization, patient is referred to structural heart team for watchman device placement to minimize her risk or needs for anticoagulation.  Patient is visited at bedside who is not in acute distress.  She is sitting up for breakfast.  She is tachycardiac with a-fib however she does not appear to be distress.  She denies chest pain, syncopes, or LE edema.          PAST MEDICAL & SURGICAL HISTORY:  Bronchiectasis  History of Pseudomonas pneumonia  HTN (hypertension)  Posterior reversible encephalopathy syndrome  Atrial fibrillation      Lewy body dementia      No significant past surgical history      MEDICATIONS  (STANDING):  amLODIPine   Tablet 5 milliGRAM(s) Oral daily  aspirin  chewable 81 milliGRAM(s) Oral daily  atorvastatin 40 milliGRAM(s) Oral at bedtime  donepezil 5 milliGRAM(s) Oral at bedtime  influenza  Vaccine (HIGH DOSE) 0.7 milliLiter(s) IntraMuscular once  metoprolol tartrate 50 milliGRAM(s) Oral every 8 hours    MEDICATIONS  (PRN):  clonazePAM  Tablet 0.5 milliGRAM(s) Oral two times a day PRN anxiety  ipratropium    for Nebulization 500 MICROGram(s) Nebulizer every 6 hours PRN Shortness of Breath and/or Wheezing      Allergies    Levaquin (Swelling)  Ceclor (Rash)  IV Contrast (Unknown)  Augmentin (Short breath; Rash)    Intolerances        SOCIAL HISTORY:  Smoker: no  ETOH use: no  Ilicit Drug use: no  Occupation: non contributory   Assisted device use (Cane / Walker): non contributory   Live with: family close by     FAMILY HISTORY:      Review of Systems  CONSTITUTIONAL:  Fevers / chills, sweats, fatigue, weight loss, weight gain                                      NEURO:  parathesias, seizures, syncope, confusion                                                                                 EYES:  Blurry vision, discharge, pain, loss of vision                                                                                 ENMT:  Difficulty hearing, vertigo, dysphagia, epistaxis, recent dental work                                       CV:  Chest pain, palpitations, REID, orthopnea                                                                                          RESPIRATORY:  Wheezing, SOB, cough / sputum, hemoptysis                                                                GI:  Nausea, vommiting, diarrhea, constipation, melena                                                                         : Hematuria, dysuria, urgency, incontinence                                                                                         MUSKULOSKELETAL:  arthritis, joint swelling, muscle weakness                                                             SKIN/BREAST:  rash, itching, edwin loss, masses                                                                                              PSYCH:  depresion, anxiety, suicidal ideation                                                                                              HEME/LYMPH:  bruises easily, enlarged lymph nodes, tender lymph nodes                                          ENDOCRINE:  cold intolerance, heat intolerance, polydipsia                                                                       PHYSICAL EXAM  Vital Signs Last 24 Hrs  T(C): 37.2 (04 Nov 2023 16:21), Max: 37.2 (04 Nov 2023 16:21)  T(F): 99 (04 Nov 2023 16:21), Max: 99 (04 Nov 2023 16:21)  HR: 110 (04 Nov 2023 16:21) (48 - 152)  BP: 125/60 (04 Nov 2023 16:21) (103/58 - 135/79)  BP(mean): 84 (04 Nov 2023 16:21) (80 - 98)  RR: 20 (04 Nov 2023 16:21) (15 - 20)  SpO2: 94% (04 Nov 2023 16:21) (92% - 99%)    Parameters below as of 04 Nov 2023 16:21  Patient On (Oxygen Delivery Method): nasal cannula  O2 Flow (L/min): 2      CONSTITUTIONAL:                                                                          NEURO:                                                                                                         EYES:                                                                                                   ENMT:                                                                                                  CV:                                                                                                     RESPIRATORY:                                                                                    GI:                                                                                                         : DO + / -                                                                                   MUSKULOSKELETAL:                                                                        SKIN / BREAST:                                                                                                                                          LABS:                        10.6   7.42  )-----------( 217      ( 04 Nov 2023 05:28 )             34.2     11-04    139  |  98  |  20  ----------------------------<  102<H>  3.3<L>   |  32<H>  |  0.80    Ca    8.3<L>      04 Nov 2023 05:28  Mg     1.7     11-04    TPro  7.8  /  Alb  4.2  /  TBili  0.5  /  DBili  x   /  AST  20  /  ALT  18  /  AlkPhos  159<H>  11-03      Urinalysis Basic - ( 04 Nov 2023 05:28 )    Color: x / Appearance: x / SG: x / pH: x  Gluc: 102 mg/dL / Ketone: x  / Bili: x / Urobili: x   Blood: x / Protein: x / Nitrite: x   Leuk Esterase: x / RBC: x / WBC x   Sq Epi: x / Non Sq Epi: x / Bacteria: x      RADIOLOGY & ADDITIONAL STUDIES:  EKG:  < from: 12 Lead ECG (10.23.23 @ 16:34) >  Ventricular Rate 66 BPM    Atrial Rate 66 BPM    P-R Interval 188 ms    QRS Duration 62 ms    Q-T Interval 424 ms    QTC Calculation(Bazett) 444 ms    P Axis 73 degrees    R Axis 112 degrees    T Axis 70 degrees    Diagnosis Line Normal sinus rhythm with sinus arrhythmia  Right axis deviation  Poor R Wave Progression  Nonspecific ST - T abnormalities    TTE / VANNESSA:  < from: Echocardiogram w/ Bubble and Doppler (09.19.23 @ 13:18) >   1. Technically difficult study.   2. Normal left ventricular size and systolic function.   3. Mildly dilated right ventricular size. Normal right ventricular   systolic function.   4. Dilated right atrium.   5. Aortic sclerosis without significant stenosis.   6. Mild-to-moderate aortic regurgitation.   7. Mild mitral regurgitation.   8. Moderate-to-severe tricuspid regurgitation.   9. Severe pulmonary hypertension present, pulmonary artery systolic   pressure is 73 mmHg.  10. No pericardial effusion.  11. No prior echo is available for comparison. 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 ED 11/3/23 c/o SOB. Patient reported she was driving to see Dr. Rivas and on the way had one episode of indigestion-like sx/gas followed by diaphoresis, near syncope, nausea. Episode reported to last one minute and self resolved. PT also endorses LE swelling (>L) and L sided chest pain and palpitations x1 week. PT has been taking Tramadol PRN for chest pain.  Patient denies chest pain, SOB, REID, palpitations, dizziness, LOC, N/V/D, fever/chills/sick contact, diaphoresis, orthopnea/PND.  EKG in ED: NSR 61 bpm without ischemic changes, Labs significant for: , Trop 18--->16, CXR showed bilateral effusions and cardiomegaly.  In the ED, patient given Lasix 20mg IV x1 and Tylenol 650mg PO x1. TTE from 9/2023: normal LVSF with EF of  55-60%,, mildly dilated RV, dilated RA, AS without significant stenosis, mild-mod AR, mild MR, mod-severe TR, severe pHTN with pulm artery systolic pressure 73mmHg. DVT ruled out in ER with negative D-Dimer and negative LE duplex. Patient is now admitted to cardiac tele service for further evaluation and management.  during her hospitalization, patient is referred to structural heart team for watchman device placement to minimize her risk or needs for anticoagulation.  Patient is visited at bedside who is not in acute distress.  She is sitting up for breakfast.  She is tachycardiac with a-fib however she does not appear to be distress.  She denies chest pain, syncopes, or LE edema.          PAST MEDICAL & SURGICAL HISTORY:  Bronchiectasis  History of Pseudomonas pneumonia  HTN (hypertension)  Posterior reversible encephalopathy syndrome  Atrial fibrillation      Lewy body dementia      No significant past surgical history      MEDICATIONS  (STANDING):  amLODIPine   Tablet 5 milliGRAM(s) Oral daily  aspirin  chewable 81 milliGRAM(s) Oral daily  atorvastatin 40 milliGRAM(s) Oral at bedtime  donepezil 5 milliGRAM(s) Oral at bedtime  influenza  Vaccine (HIGH DOSE) 0.7 milliLiter(s) IntraMuscular once  metoprolol tartrate 50 milliGRAM(s) Oral every 8 hours    MEDICATIONS  (PRN):  clonazePAM  Tablet 0.5 milliGRAM(s) Oral two times a day PRN anxiety  ipratropium    for Nebulization 500 MICROGram(s) Nebulizer every 6 hours PRN Shortness of Breath and/or Wheezing      Allergies    Levaquin (Swelling)  Ceclor (Rash)  IV Contrast (Unknown)  Augmentin (Short breath; Rash)    Intolerances        SOCIAL HISTORY:  Smoker: no  ETOH use: no  Ilicit Drug use: no  Occupation: non contributory   Assisted device use (Cane / Walker): non contributory   Live with: family close by     FAMILY HISTORY:      Review of Systems  CONSTITUTIONAL:  Fevers / chills, sweats, fatigue, weight loss, weight gain                                      NEURO:  parathesias, seizures, syncope, confusion                                                                                 EYES:  Blurry vision, discharge, pain, loss of vision                                                                                 ENMT:  Difficulty hearing, vertigo, dysphagia, epistaxis, recent dental work                                       CV:  Chest pain, palpitations, REID, orthopnea                                                                                          RESPIRATORY:  Wheezing, SOB, cough / sputum, hemoptysis                                                                GI:  Nausea, vommiting, diarrhea, constipation, melena                                                                         : Hematuria, dysuria, urgency, incontinence                                                                                         MUSKULOSKELETAL:  arthritis, joint swelling, muscle weakness                                                             SKIN/BREAST:  rash, itching, edwin loss, masses                                                                                              PSYCH:  depresion, anxiety, suicidal ideation                                                                                              HEME/LYMPH:  bruises easily, enlarged lymph nodes, tender lymph nodes                                          ENDOCRINE:  cold intolerance, heat intolerance, polydipsia                                                                       PHYSICAL EXAM  Vital Signs Last 24 Hrs  T(C): 37.2 (04 Nov 2023 16:21), Max: 37.2 (04 Nov 2023 16:21)  T(F): 99 (04 Nov 2023 16:21), Max: 99 (04 Nov 2023 16:21)  HR: 110 (04 Nov 2023 16:21) (48 - 152)  BP: 125/60 (04 Nov 2023 16:21) (103/58 - 135/79)  BP(mean): 84 (04 Nov 2023 16:21) (80 - 98)  RR: 20 (04 Nov 2023 16:21) (15 - 20)  SpO2: 94% (04 Nov 2023 16:21) (92% - 99%)    Parameters below as of 04 Nov 2023 16:21  Patient On (Oxygen Delivery Method): nasal cannula  O2 Flow (L/min): 2      CONSTITUTIONAL:                                                                          NEURO:                                                                                                         EYES:                                                                                                   ENMT:                                                                                                  CV:                                                                                                     RESPIRATORY:                                                                                    GI:                                                                                                         : DO + / -                                                                                   MUSKULOSKELETAL:                                                                        SKIN / BREAST:                                                                                                                                          LABS:                        10.6   7.42  )-----------( 217      ( 04 Nov 2023 05:28 )             34.2     11-04    139  |  98  |  20  ----------------------------<  102<H>  3.3<L>   |  32<H>  |  0.80    Ca    8.3<L>      04 Nov 2023 05:28  Mg     1.7     11-04    TPro  7.8  /  Alb  4.2  /  TBili  0.5  /  DBili  x   /  AST  20  /  ALT  18  /  AlkPhos  159<H>  11-03      Urinalysis Basic - ( 04 Nov 2023 05:28 )    Color: x / Appearance: x / SG: x / pH: x  Gluc: 102 mg/dL / Ketone: x  / Bili: x / Urobili: x   Blood: x / Protein: x / Nitrite: x   Leuk Esterase: x / RBC: x / WBC x   Sq Epi: x / Non Sq Epi: x / Bacteria: x      RADIOLOGY & ADDITIONAL STUDIES:  EKG:  < from: 12 Lead ECG (10.23.23 @ 16:34) >  Ventricular Rate 66 BPM    Atrial Rate 66 BPM    P-R Interval 188 ms    QRS Duration 62 ms    Q-T Interval 424 ms    QTC Calculation(Bazett) 444 ms    P Axis 73 degrees    R Axis 112 degrees    T Axis 70 degrees    Diagnosis Line Normal sinus rhythm with sinus arrhythmia  Right axis deviation  Poor R Wave Progression  Nonspecific ST - T abnormalities    TTE / VANNESSA:  < from: Echocardiogram w/ Bubble and Doppler (09.19.23 @ 13:18) >   1. Technically difficult study.   2. Normal left ventricular size and systolic function.   3. Mildly dilated right ventricular size. Normal right ventricular   systolic function.   4. Dilated right atrium.   5. Aortic sclerosis without significant stenosis.   6. Mild-to-moderate aortic regurgitation.   7. Mild mitral regurgitation.   8. Moderate-to-severe tricuspid regurgitation.   9. Severe pulmonary hypertension present, pulmonary artery systolic   pressure is 73 mmHg.  10. No pericardial effusion.  11. No prior echo is available for comparison.

## 2023-11-04 NOTE — PROGRESS NOTE ADULT - PROBLEM SELECTOR PLAN 4
Lipid panel: , TG 84, HDL 57, LDL 49  - Continue: atorvastatin 40 mg QHS     #Lewy Body Dementia   - Donepezil 5 mg QD     #L sided rib pain  - Chronic L sided rib pain that has been worked up for pulmonologist, no hx trauma  - Continue: Klonopin 0.5 mg PRN (for pain)    DVT ppx: SCDs (high bleed risk, currently being worked up for walter guzman/ Dr. Rivas)   F: None currently   E: Keep K > 4, Mg > 2  N: DASH/TLC     Code: Full  Dispo: pending clinical progression     Case discussed with: Dr. Tapia

## 2023-11-04 NOTE — CONSULT NOTE ADULT - ASSESSMENT
Plan:  Problem 1: afib no tolerance for AC  - consider watchman device placement; Structural team, Dr. Rivas is reviewing the case  - continue rate control and cardiac monitor for now    Problem 2: CHF exacerbation with LE edema and SOB  - plan per primary team    Problem 3: Valvular disease from prior echo   - consider to repeat echo when euvolemic  - treatment plan per primary team      Problem 4: SOB  - DVT rule ou  - worked up with pulmonologist and r/o acute complications, no hx of trauma   - management per primary team    I have reviewed clinical labs tests and reports, radiology tests and reports, as well as old patient medical records, and discussed with the refering physician.     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 Patient reported she was driving to see Dr. Rivas and on the way had one episode of indigestion-like sx/gas followed by diaphoresis, near syncope, nausea. Episode reported to last one minute and self resolved. PT also endorses LE swelling (>L) and L sided chest pain and palpitations x1 week. PT has been taking Tramadol PRN for chest pain.  Patient denies chest pain, SOB, REID, palpitations, dizziness, LOC, N/V/D, fever/chills/sick contact, diaphoresis, orthopnea/PND.  EKG in ED: NSR 61 bpm without ischemic changes, Labs significant for: , Trop 18--->16, CXR showed bilateral effusions and cardiomegaly.  In the ED, patient given Lasix 20mg IV x1 and Tylenol 650mg PO x1. TTE from 9/2023: normal LVSF with EF of  55-60%,, mildly dilated RV, dilated RA, AS without significant stenosis, mild-mod AR, mild MR, mod-severe TR, severe pHTN with pulm artery systolic pressure 73mmHg. DVT ruled out in ER with negative D-Dimer and negative LE duplex. Patient is now admitted to cardiac tele service for further evaluation and management.  during her hospitalization, patient is referred to structural heart team for watchman device placement to minimize her risk or needs for anticoagulation.  Patient is visited at bedside who is not in acute distress.  She is sitting up for breakfast.  She is tachycardiac with a-fib however she does not appear to be distress.  She denies chest pain, syncopes, or LE edema.     Plan:  Problem 1: tachycardiac afib no tolerance for AC.    - consider watchman device placement; Structural team, Dr. Rivas is reviewing the case  - continue current regimen  - cardiac monitor closely     Problem 2: CHF exacerbation with LE edema and SOB  - plan per primary team    Problem 3: Valvular disease from prior echo   - consider to repeat echo when euvolemic  - treatment plan per primary team      Problem 4: SOB  - DVT rule ou  - worked up with pulmonologist and r/o acute complications, no hx of trauma   - management per primary team    I have reviewed clinical labs tests and reports, radiology tests and reports, as well as old patient medical records, and discussed with the refering physician.     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 Patient reported she was driving to see Dr. Rivas and on the way had one episode of indigestion-like sx/gas followed by diaphoresis, near syncope, nausea. Episode reported to last one minute and self resolved. PT also endorses LE swelling (>L) and L sided chest pain and palpitations x1 week. PT has been taking Tramadol PRN for chest pain.  Patient denies chest pain, SOB, REID, palpitations, dizziness, LOC, N/V/D, fever/chills/sick contact, diaphoresis, orthopnea/PND.  EKG in ED: NSR 61 bpm without ischemic changes, Labs significant for: , Trop 18--->16, CXR showed bilateral effusions and cardiomegaly.  In the ED, patient given Lasix 20mg IV x1 and Tylenol 650mg PO x1. TTE from 9/2023: normal LVSF with EF of  55-60%,, mildly dilated RV, dilated RA, AS without significant stenosis, mild-mod AR, mild MR, mod-severe TR, severe pHTN with pulm artery systolic pressure 73mmHg. DVT ruled out in ER with negative D-Dimer and negative LE duplex. Patient is now admitted to cardiac tele service for further evaluation and management.  during her hospitalization, patient is referred to structural heart team for watchman device placement to minimize her risk or needs for anticoagulation.  Patient is visited at bedside who is not in acute distress.  She is sitting up for breakfast.  She is tachycardiac with a-fib however she does not appear to be distress.  She denies chest pain, syncopes, or LE edema.     Plan:  Problem 1: tachycardiac afib no tolerance for AC.    - consider watchman device placement; Structural team, Dr. Rivas is reviewing the case  - continue current regimen  - cardiac monitor closely     Problem 2: CHF exacerbation with LE edema and SOB  - plan per primary team    Problem 3: Valvular disease from prior echo   - consider to repeat echo when euvolemic  - treatment plan per primary team      Problem 4: SOB  - DVT rule ou  - worked up with pulmonologist and r/o acute complications, no hx of trauma   - management per primary team    I have reviewed clinical labs tests and reports, radiology tests and reports, as well as old patient medical records, and discussed with the refering physician.     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 ED 11/3/23 c/o SOB. Patient reported she was driving to see Dr. Rivas and on the way had one episode of indigestion-like sx/gas followed by diaphoresis, near syncope, nausea. Episode reported to last one minute and self resolved. PT also endorses LE swelling (>L) and L sided chest pain and palpitations x1 week. PT has been taking Tramadol PRN for chest pain.  Patient denies chest pain, SOB, REID, palpitations, dizziness, LOC, N/V/D, fever/chills/sick contact, diaphoresis, orthopnea/PND.  EKG in ED: NSR 61 bpm without ischemic changes, Labs significant for: , Trop 18--->16, CXR showed bilateral effusions and cardiomegaly.  In the ED, patient given Lasix 20mg IV x1 and Tylenol 650mg PO x1. TTE from 9/2023: normal LVSF with EF of  55-60%,, mildly dilated RV, dilated RA, AS without significant stenosis, mild-mod AR, mild MR, mod-severe TR, severe pHTN with pulm artery systolic pressure 73mmHg. DVT ruled out in ER with negative D-Dimer and negative LE duplex. Patient is now admitted to cardiac tele service for further evaluation and management.  during her hospitalization, patient is referred to structural heart team for watchman device placement to minimize her risk or needs for anticoagulation.  Patient is visited at bedside who is not in acute distress.  She is sitting up for breakfast.  She is tachycardiac with a-fib however she does not appear to be distress.  She denies chest pain, syncopes, or LE edema.     Plan:  Problem 1: tachycardiac afib no tolerance for AC.    - consider watchman device placement; Structural team, Dr. Rivas is reviewing the case  - continue current regimen  - cardiac monitor closely     Problem 2: CHF exacerbation with LE edema and SOB  - plan per primary team    Problem 3: Valvular disease from prior echo   - consider to repeat echo when euvolemic  - treatment plan per primary team      Problem 4: SOB  - DVT rule ou  - worked up with pulmonologist and r/o acute complications, no hx of trauma   - management per primary team    I have reviewed clinical labs tests and reports, radiology tests and reports, as well as old patient medical records, and discussed with the refering physician.

## 2023-11-04 NOTE — GOALS OF CARE CONVERSATION - ADVANCED CARE PLANNING - CONVERSATION DETAILS
LACE > 11, GOC discussed with patient (alert, orientedx3, attentive).  Discussion included but not limited to who is Health Care Proxy, Code status, MOLST, diagnosis, prognosis, treatment options, risks and benefits, comfort measures, pain management, Home Care, and palliative care referral reviewed as best as possible.  Per Patient’s wishes He/She &/Or HCP confirmed Patient to be FULL CODE. LACE > 11, GOC discussed with patient (alert, orientedx3, attentive) and HCP (Segundo Funes; HCP form in system).  Discussion included but not limited to who is Health Care Proxy, Code status, MOLST, diagnosis, prognosis, treatment options, risks and benefits, comfort measures, pain management, Home Care, and palliative care referral reviewed as best as possible.  Per Patient’s wishes she and HCP confirmed Patient to be FULL CODE.

## 2023-11-04 NOTE — PROGRESS NOTE ADULT - PROBLEM SELECTOR PLAN 2
Patient with history of Atrial fibrillation not on AC s/p SAH during recent hospitalization, scheduled to undergo outpatient workup for Watchman device; patient currently in Afib ~110s  - Anticoagulation: none (per above)   - Rate control: increased Lopressor 50 mg PO q8h for rate-control   - EKG per previous note  - Consulted: structural team, follow up recs

## 2023-11-04 NOTE — PROGRESS NOTE ADULT - ASSESSMENT
82-year-old female with PMHx of AF (not on AC due to SAH), HTN, Lewy body dementia (baseline a/o x3), posterior reversible encephalopathy syndrome, chronic bronchiectasis admitted to cardiac telemetry reporting SOB, found to have LE edema. Patient was scheduled to undergo Watchman placement in outpatient setting as ?not candidate for AC i/s/o SAH. Pending further diuresis, structural heart evaluation and clinical progression.

## 2023-11-05 DIAGNOSIS — D72.829 ELEVATED WHITE BLOOD CELL COUNT, UNSPECIFIED: ICD-10-CM

## 2023-11-05 DIAGNOSIS — J47.9 BRONCHIECTASIS, UNCOMPLICATED: ICD-10-CM

## 2023-11-05 DIAGNOSIS — R19.7 DIARRHEA, UNSPECIFIED: ICD-10-CM

## 2023-11-05 DIAGNOSIS — A04.72 ENTEROCOLITIS DUE TO CLOSTRIDIUM DIFFICILE, NOT SPECIFIED AS RECURRENT: ICD-10-CM

## 2023-11-05 LAB
ANION GAP SERPL CALC-SCNC: 8 MMOL/L — SIGNIFICANT CHANGE UP (ref 5–17)
ANION GAP SERPL CALC-SCNC: 8 MMOL/L — SIGNIFICANT CHANGE UP (ref 5–17)
BASOPHILS # BLD AUTO: 0.06 K/UL — SIGNIFICANT CHANGE UP (ref 0–0.2)
BASOPHILS # BLD AUTO: 0.06 K/UL — SIGNIFICANT CHANGE UP (ref 0–0.2)
BASOPHILS # BLD AUTO: 0.07 K/UL — SIGNIFICANT CHANGE UP (ref 0–0.2)
BASOPHILS # BLD AUTO: 0.07 K/UL — SIGNIFICANT CHANGE UP (ref 0–0.2)
BASOPHILS NFR BLD AUTO: 0.5 % — SIGNIFICANT CHANGE UP (ref 0–2)
BASOPHILS NFR BLD AUTO: 0.5 % — SIGNIFICANT CHANGE UP (ref 0–2)
BASOPHILS NFR BLD AUTO: 0.7 % — SIGNIFICANT CHANGE UP (ref 0–2)
BASOPHILS NFR BLD AUTO: 0.7 % — SIGNIFICANT CHANGE UP (ref 0–2)
BUN SERPL-MCNC: 22 MG/DL — SIGNIFICANT CHANGE UP (ref 7–23)
BUN SERPL-MCNC: 22 MG/DL — SIGNIFICANT CHANGE UP (ref 7–23)
C DIFF BY PCR RESULT: POSITIVE
C DIFF BY PCR RESULT: POSITIVE
C DIFF GDH STL QL: SIGNIFICANT CHANGE UP
CALCIUM SERPL-MCNC: 8.6 MG/DL — SIGNIFICANT CHANGE UP (ref 8.4–10.5)
CALCIUM SERPL-MCNC: 8.6 MG/DL — SIGNIFICANT CHANGE UP (ref 8.4–10.5)
CHLORIDE SERPL-SCNC: 101 MMOL/L — SIGNIFICANT CHANGE UP (ref 96–108)
CHLORIDE SERPL-SCNC: 101 MMOL/L — SIGNIFICANT CHANGE UP (ref 96–108)
CO2 SERPL-SCNC: 28 MMOL/L — SIGNIFICANT CHANGE UP (ref 22–31)
CO2 SERPL-SCNC: 28 MMOL/L — SIGNIFICANT CHANGE UP (ref 22–31)
CREAT SERPL-MCNC: 0.72 MG/DL — SIGNIFICANT CHANGE UP (ref 0.5–1.3)
CREAT SERPL-MCNC: 0.72 MG/DL — SIGNIFICANT CHANGE UP (ref 0.5–1.3)
EGFR: 84 ML/MIN/1.73M2 — SIGNIFICANT CHANGE UP
EGFR: 84 ML/MIN/1.73M2 — SIGNIFICANT CHANGE UP
EOSINOPHIL # BLD AUTO: 0.04 K/UL — SIGNIFICANT CHANGE UP (ref 0–0.5)
EOSINOPHIL # BLD AUTO: 0.04 K/UL — SIGNIFICANT CHANGE UP (ref 0–0.5)
EOSINOPHIL # BLD AUTO: 0.08 K/UL — SIGNIFICANT CHANGE UP (ref 0–0.5)
EOSINOPHIL # BLD AUTO: 0.08 K/UL — SIGNIFICANT CHANGE UP (ref 0–0.5)
EOSINOPHIL NFR BLD AUTO: 0.4 % — SIGNIFICANT CHANGE UP (ref 0–6)
EOSINOPHIL NFR BLD AUTO: 0.4 % — SIGNIFICANT CHANGE UP (ref 0–6)
EOSINOPHIL NFR BLD AUTO: 0.7 % — SIGNIFICANT CHANGE UP (ref 0–6)
EOSINOPHIL NFR BLD AUTO: 0.7 % — SIGNIFICANT CHANGE UP (ref 0–6)
GLUCOSE SERPL-MCNC: 91 MG/DL — SIGNIFICANT CHANGE UP (ref 70–99)
GLUCOSE SERPL-MCNC: 91 MG/DL — SIGNIFICANT CHANGE UP (ref 70–99)
HCT VFR BLD CALC: 36 % — SIGNIFICANT CHANGE UP (ref 34.5–45)
HCT VFR BLD CALC: 36 % — SIGNIFICANT CHANGE UP (ref 34.5–45)
HCT VFR BLD CALC: 38.8 % — SIGNIFICANT CHANGE UP (ref 34.5–45)
HCT VFR BLD CALC: 38.8 % — SIGNIFICANT CHANGE UP (ref 34.5–45)
HGB BLD-MCNC: 11.5 G/DL — SIGNIFICANT CHANGE UP (ref 11.5–15.5)
HGB BLD-MCNC: 11.5 G/DL — SIGNIFICANT CHANGE UP (ref 11.5–15.5)
HGB BLD-MCNC: 12.1 G/DL — SIGNIFICANT CHANGE UP (ref 11.5–15.5)
HGB BLD-MCNC: 12.1 G/DL — SIGNIFICANT CHANGE UP (ref 11.5–15.5)
IMM GRANULOCYTES NFR BLD AUTO: 0.3 % — SIGNIFICANT CHANGE UP (ref 0–0.9)
LACTATE SERPL-SCNC: 1.4 MMOL/L — SIGNIFICANT CHANGE UP (ref 0.5–2)
LACTATE SERPL-SCNC: 1.4 MMOL/L — SIGNIFICANT CHANGE UP (ref 0.5–2)
LYMPHOCYTES # BLD AUTO: 1.34 K/UL — SIGNIFICANT CHANGE UP (ref 1–3.3)
LYMPHOCYTES # BLD AUTO: 1.34 K/UL — SIGNIFICANT CHANGE UP (ref 1–3.3)
LYMPHOCYTES # BLD AUTO: 1.62 K/UL — SIGNIFICANT CHANGE UP (ref 1–3.3)
LYMPHOCYTES # BLD AUTO: 1.62 K/UL — SIGNIFICANT CHANGE UP (ref 1–3.3)
LYMPHOCYTES # BLD AUTO: 13.5 % — SIGNIFICANT CHANGE UP (ref 13–44)
LYMPHOCYTES # BLD AUTO: 13.5 % — SIGNIFICANT CHANGE UP (ref 13–44)
LYMPHOCYTES # BLD AUTO: 14.4 % — SIGNIFICANT CHANGE UP (ref 13–44)
LYMPHOCYTES # BLD AUTO: 14.4 % — SIGNIFICANT CHANGE UP (ref 13–44)
MAGNESIUM SERPL-MCNC: 1.8 MG/DL — SIGNIFICANT CHANGE UP (ref 1.6–2.6)
MAGNESIUM SERPL-MCNC: 1.8 MG/DL — SIGNIFICANT CHANGE UP (ref 1.6–2.6)
MCHC RBC-ENTMCNC: 31.2 GM/DL — LOW (ref 32–36)
MCHC RBC-ENTMCNC: 31.2 GM/DL — LOW (ref 32–36)
MCHC RBC-ENTMCNC: 31.9 GM/DL — LOW (ref 32–36)
MCHC RBC-ENTMCNC: 31.9 GM/DL — LOW (ref 32–36)
MCHC RBC-ENTMCNC: 31.9 PG — SIGNIFICANT CHANGE UP (ref 27–34)
MCHC RBC-ENTMCNC: 31.9 PG — SIGNIFICANT CHANGE UP (ref 27–34)
MCHC RBC-ENTMCNC: 32.7 PG — SIGNIFICANT CHANGE UP (ref 27–34)
MCHC RBC-ENTMCNC: 32.7 PG — SIGNIFICANT CHANGE UP (ref 27–34)
MCV RBC AUTO: 102.3 FL — HIGH (ref 80–100)
MCV RBC AUTO: 102.3 FL — HIGH (ref 80–100)
MCV RBC AUTO: 102.4 FL — HIGH (ref 80–100)
MCV RBC AUTO: 102.4 FL — HIGH (ref 80–100)
MONOCYTES # BLD AUTO: 0.62 K/UL — SIGNIFICANT CHANGE UP (ref 0–0.9)
MONOCYTES # BLD AUTO: 0.62 K/UL — SIGNIFICANT CHANGE UP (ref 0–0.9)
MONOCYTES # BLD AUTO: 0.82 K/UL — SIGNIFICANT CHANGE UP (ref 0–0.9)
MONOCYTES # BLD AUTO: 0.82 K/UL — SIGNIFICANT CHANGE UP (ref 0–0.9)
MONOCYTES NFR BLD AUTO: 6.3 % — SIGNIFICANT CHANGE UP (ref 2–14)
MONOCYTES NFR BLD AUTO: 6.3 % — SIGNIFICANT CHANGE UP (ref 2–14)
MONOCYTES NFR BLD AUTO: 7.3 % — SIGNIFICANT CHANGE UP (ref 2–14)
MONOCYTES NFR BLD AUTO: 7.3 % — SIGNIFICANT CHANGE UP (ref 2–14)
NEUTROPHILS # BLD AUTO: 7.81 K/UL — HIGH (ref 1.8–7.4)
NEUTROPHILS # BLD AUTO: 7.81 K/UL — HIGH (ref 1.8–7.4)
NEUTROPHILS # BLD AUTO: 8.63 K/UL — HIGH (ref 1.8–7.4)
NEUTROPHILS # BLD AUTO: 8.63 K/UL — HIGH (ref 1.8–7.4)
NEUTROPHILS NFR BLD AUTO: 76.8 % — SIGNIFICANT CHANGE UP (ref 43–77)
NEUTROPHILS NFR BLD AUTO: 76.8 % — SIGNIFICANT CHANGE UP (ref 43–77)
NEUTROPHILS NFR BLD AUTO: 78.8 % — HIGH (ref 43–77)
NEUTROPHILS NFR BLD AUTO: 78.8 % — HIGH (ref 43–77)
NRBC # BLD: 0 /100 WBCS — SIGNIFICANT CHANGE UP (ref 0–0)
PLATELET # BLD AUTO: 225 K/UL — SIGNIFICANT CHANGE UP (ref 150–400)
PLATELET # BLD AUTO: 225 K/UL — SIGNIFICANT CHANGE UP (ref 150–400)
PLATELET # BLD AUTO: 254 K/UL — SIGNIFICANT CHANGE UP (ref 150–400)
PLATELET # BLD AUTO: 254 K/UL — SIGNIFICANT CHANGE UP (ref 150–400)
POTASSIUM SERPL-MCNC: 4.2 MMOL/L — SIGNIFICANT CHANGE UP (ref 3.5–5.3)
POTASSIUM SERPL-MCNC: 4.2 MMOL/L — SIGNIFICANT CHANGE UP (ref 3.5–5.3)
POTASSIUM SERPL-SCNC: 4.2 MMOL/L — SIGNIFICANT CHANGE UP (ref 3.5–5.3)
POTASSIUM SERPL-SCNC: 4.2 MMOL/L — SIGNIFICANT CHANGE UP (ref 3.5–5.3)
RBC # BLD: 3.52 M/UL — LOW (ref 3.8–5.2)
RBC # BLD: 3.52 M/UL — LOW (ref 3.8–5.2)
RBC # BLD: 3.79 M/UL — LOW (ref 3.8–5.2)
RBC # BLD: 3.79 M/UL — LOW (ref 3.8–5.2)
RBC # FLD: 13.8 % — SIGNIFICANT CHANGE UP (ref 10.3–14.5)
RBC # FLD: 13.8 % — SIGNIFICANT CHANGE UP (ref 10.3–14.5)
RBC # FLD: 13.9 % — SIGNIFICANT CHANGE UP (ref 10.3–14.5)
RBC # FLD: 13.9 % — SIGNIFICANT CHANGE UP (ref 10.3–14.5)
SODIUM SERPL-SCNC: 137 MMOL/L — SIGNIFICANT CHANGE UP (ref 135–145)
SODIUM SERPL-SCNC: 137 MMOL/L — SIGNIFICANT CHANGE UP (ref 135–145)
WBC # BLD: 11.33 K/UL — HIGH (ref 3.8–10.5)
WBC # BLD: 11.33 K/UL — HIGH (ref 3.8–10.5)
WBC # BLD: 9.91 K/UL — SIGNIFICANT CHANGE UP (ref 3.8–10.5)
WBC # BLD: 9.91 K/UL — SIGNIFICANT CHANGE UP (ref 3.8–10.5)
WBC # FLD AUTO: 11.33 K/UL — HIGH (ref 3.8–10.5)
WBC # FLD AUTO: 11.33 K/UL — HIGH (ref 3.8–10.5)
WBC # FLD AUTO: 9.91 K/UL — SIGNIFICANT CHANGE UP (ref 3.8–10.5)
WBC # FLD AUTO: 9.91 K/UL — SIGNIFICANT CHANGE UP (ref 3.8–10.5)

## 2023-11-05 PROCEDURE — 99232 SBSQ HOSP IP/OBS MODERATE 35: CPT

## 2023-11-05 RX ORDER — MAGNESIUM OXIDE 400 MG ORAL TABLET 241.3 MG
800 TABLET ORAL ONCE
Refills: 0 | Status: COMPLETED | OUTPATIENT
Start: 2023-11-05 | End: 2023-11-05

## 2023-11-05 RX ORDER — VANCOMYCIN HCL 1 G
125 VIAL (EA) INTRAVENOUS EVERY 6 HOURS
Refills: 0 | Status: DISCONTINUED | OUTPATIENT
Start: 2023-11-05 | End: 2023-11-07

## 2023-11-05 RX ORDER — CLONAZEPAM 1 MG
0.25 TABLET ORAL
Refills: 0 | Status: DISCONTINUED | OUTPATIENT
Start: 2023-11-05 | End: 2023-11-07

## 2023-11-05 RX ORDER — FUROSEMIDE 40 MG
20 TABLET ORAL DAILY
Refills: 0 | Status: DISCONTINUED | OUTPATIENT
Start: 2023-11-05 | End: 2023-11-06

## 2023-11-05 RX ADMIN — Medication 650 MILLIGRAM(S): at 19:23

## 2023-11-05 RX ADMIN — Medication 650 MILLIGRAM(S): at 06:32

## 2023-11-05 RX ADMIN — Medication 50 MILLIGRAM(S): at 14:23

## 2023-11-05 RX ADMIN — Medication 0.25 MILLIGRAM(S): at 13:24

## 2023-11-05 RX ADMIN — Medication 50 MILLIGRAM(S): at 22:07

## 2023-11-05 RX ADMIN — Medication 50 MILLIGRAM(S): at 01:20

## 2023-11-05 RX ADMIN — DONEPEZIL HYDROCHLORIDE 5 MILLIGRAM(S): 10 TABLET, FILM COATED ORAL at 22:07

## 2023-11-05 RX ADMIN — Medication 81 MILLIGRAM(S): at 11:43

## 2023-11-05 RX ADMIN — Medication 125 MILLIGRAM(S): at 18:40

## 2023-11-05 RX ADMIN — ATORVASTATIN CALCIUM 40 MILLIGRAM(S): 80 TABLET, FILM COATED ORAL at 22:06

## 2023-11-05 RX ADMIN — AMLODIPINE BESYLATE 5 MILLIGRAM(S): 2.5 TABLET ORAL at 06:27

## 2023-11-05 RX ADMIN — Medication 20 MILLIGRAM(S): at 11:43

## 2023-11-05 RX ADMIN — Medication 50 MILLIGRAM(S): at 09:08

## 2023-11-05 RX ADMIN — MAGNESIUM OXIDE 400 MG ORAL TABLET 800 MILLIGRAM(S): 241.3 TABLET ORAL at 09:07

## 2023-11-05 RX ADMIN — Medication 0.25 MILLIGRAM(S): at 22:08

## 2023-11-05 RX ADMIN — Medication 650 MILLIGRAM(S): at 18:44

## 2023-11-05 RX ADMIN — Medication 650 MILLIGRAM(S): at 07:31

## 2023-11-05 NOTE — PROGRESS NOTE ADULT - ASSESSMENT
82-year-old female with PMHx of AF (not on AC due to SAH), HTN, Lewy body dementia (baseline a/o x3), posterior reversible encephalopathy syndrome, chronic bronchiectasis admitted to cardiac telemetry reporting SOB, found to have LE edema. Patient was scheduled to undergo Watchman placement in outpatient setting as ?not candidate for AC i/s/o SAH. Pending further diuresis, structural heart evaluation and clinical progression.  82-year-old female with PMHx of AF (not on AC due to SAH), HTN, Lewy body dementia (baseline a/o x3), posterior reversible encephalopathy syndrome, chronic bronchiectasis admitted to cardiac telemetry reporting SOB, found to have LE edema. Patient admitted to cardiac telemetry for further evaluation of ?HFpEF exacerbation, currently being diuresed. Structural following, will consult pulm in AM.

## 2023-11-05 NOTE — PROGRESS NOTE ADULT - SUBJECTIVE AND OBJECTIVE BOX
Cardiology PA Progress Note    S: Pt seen and examined bedside.  Patient denies C/P, SOB, N/V, dizziness, palpitations, and diaphoresis.  Pt denies fever/chills, dysuria, abdominal pain, diarrhea, and cough  12 Point ROS otherwise negative except as per HPI/subjective.     O: Vital Signs Last 24 Hrs  T(C): 37.1 (2023 06:20), Max: 37.2 (2023 16:21)  T(F): 98.8 (2023 06:20), Max: 99 (2023 16:21)  HR: 104 (2023 06:20) (104 - 122)  BP: 130/65 (2023 06:20) (111/58 - 133/69)  BP(mean): 90 (2023 20:51) (80 - 90)  RR: 18 (2023 06:20) (17 - 20)  SpO2: 95% (2023 06:20) (92% - 95%)    Parameters below as of 2023 06:20  Patient On (Oxygen Delivery Method): nasal cannula  O2 Flow (L/min): 2      PHYSICAL EXAM:  GEN: NAD  HEENT: No JVD  PULM:  CTA B/L  CARD:  RRR, S1 and S2   ABD: +BS, NT, soft/ND	  EXT: No Edema B/L LE  NEURO: A+Ox3, no focal deficit  PSYCH: Mood Appropriate    LABS:                        11.5   11.33 )-----------( 225      ( 2023 05:30 )             36.0         137  |  101  |  22  ----------------------------<  91  4.2   |  28  |  0.72    Ca    8.6      2023 05:30  Mg     1.8         TPro  7.8  /  Alb  4.2  /  TBili  0.5  /  DBili  x   /  AST  20  /  ALT  18  /  AlkPhos  159<H>   @ 08:01  -   @ 07:00  --------------------------------------------------------  IN: 100 mL / OUT: 51 mL / NET: 49 mL      Daily     Daily Weight in k.1 (2023 05:54)   Cardiology PA Progress Note    S: Pt seen and examined bedside.  Patient denies C/P, SOB, N/V, dizziness, palpitations, and diaphoresis.  Pt denies fever/chills, dysuria, abdominal pain, diarrhea, and cough  12 Point ROS otherwise negative except as per HPI/subjective.     O: Vital Signs Last 24 Hrs  T(C): 37.1 (2023 06:20), Max: 37.2 (2023 16:21)  T(F): 98.8 (2023 06:20), Max: 99 (2023 16:21)  HR: 104 (2023 06:20) (104 - 122)  BP: 130/65 (2023 06:20) (111/58 - 133/69)  BP(mean): 90 (2023 20:51) (80 - 90)  RR: 18 (2023 06:20) (17 - 20)  SpO2: 95% (2023 06:20) (92% - 95%)    Parameters below as of 2023 06:20  Patient On (Oxygen Delivery Method): nasal cannula  O2 Flow (L/min): 2      PHYSICAL EXAM:  GEN: NAD  HEENT: No JVD  PULM: ?coarse breath sounds b/l bases (poor inspiratory effort); lungs otherwise clear, no respiratory distress, no apparent wheezing   CARD:  irregularly irregular, tachycardic to 100s  ABD: +BS, NT, soft/ND	  EXT: No Edema B/L LE  NEURO: A+Ox3, no focal deficit (at baseline per daughter at bedside)   PSYCH: Mood Appropriate    LABS:                        11.5   11.33 )-----------( 225      ( 2023 05:30 )             36.0         137  |  101  |  22  ----------------------------<  91  4.2   |  28  |  0.72    Ca    8.6      2023 05:30  Mg     1.8         TPro  7.8  /  Alb  4.2  /  TBili  0.5  /  DBili  x   /  AST  20  /  ALT  18  /  AlkPhos  159<H>   @ 08:01  -   @ 07:00  --------------------------------------------------------  IN: 100 mL / OUT: 51 mL / NET: 49 mL      Daily     Daily Weight in k.1 (2023 05:54)

## 2023-11-05 NOTE — PROGRESS NOTE ADULT - PROBLEM SELECTOR PLAN 5
Lipid panel: , TG 84, HDL 57, LDL 49  - Continue: atorvastatin 40 mg QHS     #Lewy Body Dementia   - Donepezil 5 mg QHS    #L sided rib pain  - Chronic L sided rib pain that has been worked up for pulmonologist, no hx trauma  - Continue: Klonopin 0.5 mg PRN (for pain)    DVT ppx: SCDs (high bleed risk, currently being worked up for walter guzman/ Dr. Rivas)   F: None currently   E: Keep K > 4, Mg > 2  N: DASH/TLC     Code: Full  Dispo: pending clinical progression     Case discussed with: Dr. Tapia SBP 110s-130s  - Continue (home meds): amlodipine 5 mg Patient with watery diarrhea x4 episodes on 11/4   - Follow up: stool sample  - Continue: contact precautions

## 2023-11-05 NOTE — PROGRESS NOTE ADULT - PROBLEM SELECTOR PLAN 4
Lipid panel: , TG 84, HDL 57, LDL 49  - Continue: atorvastatin 40 mg QHS     #Lewy Body Dementia   - Donepezil 5 mg QD     #L sided rib pain  - Chronic L sided rib pain that has been worked up for pulmonologist, no hx trauma  - Continue: Klonopin 0.5 mg PRN (for pain)    DVT ppx: SCDs (high bleed risk, currently being worked up for walter guzman/ Dr. Rivas)   F: None currently   E: Keep K > 4, Mg > 2  N: DASH/TLC     Code: Full  Dispo: pending clinical progression     Case discussed with: Dr. Tapia SBP 110s-130s  - Continue (home meds): amlodipine 5 mg Patient with watery diarrhea x4 episodes on 11/4   - Follow up: stool sample  - Continue: contact precautions Patient with transient leukocytosis, 5.73>7.42>11.33>9.91, afebrile  - CXR as above   - lactate WNL   - Continue to trend CBC closely Patient with watery diarrhea x4 episodes on 11/4; also w/ transient leukocytosis, 5.73>7.42>11.33>9.91, afebrile  - lactate WNL   - Continue to trend CBC closely  - Stool sample: (+) C. diff   - Continue: contact precautions  - Initiate: vancomycin 125 mg PO QID x10 days (pt does not meet severe C. diff criteria of WBC >15 or SCr >1.5)

## 2023-11-05 NOTE — PROGRESS NOTE ADULT - PROBLEM SELECTOR PLAN 6
Lipid panel: , TG 84, HDL 57, LDL 49  - Continue: atorvastatin 40 mg QHS     #Lewy Body Dementia   - Donepezil 5 mg QHS    DVT ppx: SCDs (high bleed risk, currently being worked up for walter guzman/ Dr. Rivas)   F: None currently   E: Keep K > 4, Mg > 2  N: DASH/TLC     Code: Full  Dispo: pending clinical progression     Case discussed with: Dr. Tapia Lipid panel: , TG 84, HDL 57, LDL 49  - Continue: atorvastatin 40 mg QHS     #Lewy Body Dementia   - Donepezil 5 mg QHS    DVT ppx: SCDs (high bleed risk, currently being worked up for walter w/ Dr. Rivas)   F: None currently   E: Keep K > 4, Mg > 2  N: DASH/TLC w/ consistent carb, fluid restriction     Code: Full  Dispo: pending clinical progression     Case discussed with: Dr. Tapia SBP 110s-130s  - Continue (home meds): amlodipine 5 mg

## 2023-11-05 NOTE — PROGRESS NOTE ADULT - PROBLEM SELECTOR PLAN 1
Patient with one week SOB and b/l LE swelling, -JVD, satting high 90s on 2L O2 via NC   - Etiology: ?atrial fibrillation   - EKG per previous chart  - CXR (11/3/23): b/l pleural effusions  - TTE (9/2023): EF 55-60%, dilated RA, mild-mod AR, mod-severe TR, severe pHTN w/ PASP 73 mmHg  - Diuresis: Lasix 20 mg IVP QD  - Core measures, strict I&Os, daily weights, fluid restriction Patient with one week SOB and b/l LE swelling, -JVD, satting high 90s on 2L O2 via NC   - Etiology: ?atrial fibrillation   - EKG per previous chart  - CXR (11/3/23): b/l pleural effusions  - TTE (9/2023): EF 55-60%, dilated RA, mild-mod AR, mod-severe TR, severe pHTN w/ PASP 73 mmHg  - GDMT: consider farxiga/giuseppe on outpatient basis   - Diuresis: Lasix 20 mg IVP QD  - Core measures, strict I&Os, daily weights, fluid restriction  - Consider advanced HF consult in AM

## 2023-11-05 NOTE — PROGRESS NOTE ADULT - PROBLEM SELECTOR PLAN 2
Andrews or NP earlier.    Patient with history of Atrial fibrillation not on AC s/p SAH during recent hospitalization, scheduled to undergo outpatient workup for Watchman device; patient currently in Afib ~110s  - Anticoagulation: none (per above)   - Rate control: increased Lopressor 50 mg PO q8h for rate-control   - EKG per previous note  - Consulted: structural team, follow up recs

## 2023-11-06 DIAGNOSIS — J96.01 ACUTE RESPIRATORY FAILURE WITH HYPOXIA: ICD-10-CM

## 2023-11-06 LAB
ALBUMIN SERPL ELPH-MCNC: 3 G/DL — LOW (ref 3.3–5)
ALBUMIN SERPL ELPH-MCNC: 3 G/DL — LOW (ref 3.3–5)
ALP SERPL-CCNC: 120 U/L — SIGNIFICANT CHANGE UP (ref 40–120)
ALP SERPL-CCNC: 120 U/L — SIGNIFICANT CHANGE UP (ref 40–120)
ALT FLD-CCNC: 13 U/L — SIGNIFICANT CHANGE UP (ref 10–45)
ALT FLD-CCNC: 13 U/L — SIGNIFICANT CHANGE UP (ref 10–45)
ANION GAP SERPL CALC-SCNC: 6 MMOL/L — SIGNIFICANT CHANGE UP (ref 5–17)
ANION GAP SERPL CALC-SCNC: 6 MMOL/L — SIGNIFICANT CHANGE UP (ref 5–17)
AST SERPL-CCNC: 14 U/L — SIGNIFICANT CHANGE UP (ref 10–40)
AST SERPL-CCNC: 14 U/L — SIGNIFICANT CHANGE UP (ref 10–40)
BASOPHILS # BLD AUTO: 0.05 K/UL — SIGNIFICANT CHANGE UP (ref 0–0.2)
BASOPHILS # BLD AUTO: 0.05 K/UL — SIGNIFICANT CHANGE UP (ref 0–0.2)
BASOPHILS NFR BLD AUTO: 0.8 % — SIGNIFICANT CHANGE UP (ref 0–2)
BASOPHILS NFR BLD AUTO: 0.8 % — SIGNIFICANT CHANGE UP (ref 0–2)
BILIRUB SERPL-MCNC: 0.3 MG/DL — SIGNIFICANT CHANGE UP (ref 0.2–1.2)
BILIRUB SERPL-MCNC: 0.3 MG/DL — SIGNIFICANT CHANGE UP (ref 0.2–1.2)
BUN SERPL-MCNC: 21 MG/DL — SIGNIFICANT CHANGE UP (ref 7–23)
BUN SERPL-MCNC: 21 MG/DL — SIGNIFICANT CHANGE UP (ref 7–23)
CALCIUM SERPL-MCNC: 8.4 MG/DL — SIGNIFICANT CHANGE UP (ref 8.4–10.5)
CALCIUM SERPL-MCNC: 8.4 MG/DL — SIGNIFICANT CHANGE UP (ref 8.4–10.5)
CHLORIDE SERPL-SCNC: 105 MMOL/L — SIGNIFICANT CHANGE UP (ref 96–108)
CHLORIDE SERPL-SCNC: 105 MMOL/L — SIGNIFICANT CHANGE UP (ref 96–108)
CO2 SERPL-SCNC: 30 MMOL/L — SIGNIFICANT CHANGE UP (ref 22–31)
CO2 SERPL-SCNC: 30 MMOL/L — SIGNIFICANT CHANGE UP (ref 22–31)
CREAT SERPL-MCNC: 0.7 MG/DL — SIGNIFICANT CHANGE UP (ref 0.5–1.3)
CREAT SERPL-MCNC: 0.7 MG/DL — SIGNIFICANT CHANGE UP (ref 0.5–1.3)
EGFR: 87 ML/MIN/1.73M2 — SIGNIFICANT CHANGE UP
EGFR: 87 ML/MIN/1.73M2 — SIGNIFICANT CHANGE UP
EOSINOPHIL # BLD AUTO: 0.13 K/UL — SIGNIFICANT CHANGE UP (ref 0–0.5)
EOSINOPHIL # BLD AUTO: 0.13 K/UL — SIGNIFICANT CHANGE UP (ref 0–0.5)
EOSINOPHIL NFR BLD AUTO: 2 % — SIGNIFICANT CHANGE UP (ref 0–6)
EOSINOPHIL NFR BLD AUTO: 2 % — SIGNIFICANT CHANGE UP (ref 0–6)
GLUCOSE SERPL-MCNC: 108 MG/DL — HIGH (ref 70–99)
GLUCOSE SERPL-MCNC: 108 MG/DL — HIGH (ref 70–99)
HCT VFR BLD CALC: 32.6 % — LOW (ref 34.5–45)
HCT VFR BLD CALC: 32.6 % — LOW (ref 34.5–45)
HGB BLD-MCNC: 10 G/DL — LOW (ref 11.5–15.5)
HGB BLD-MCNC: 10 G/DL — LOW (ref 11.5–15.5)
IMM GRANULOCYTES NFR BLD AUTO: 0.3 % — SIGNIFICANT CHANGE UP (ref 0–0.9)
IMM GRANULOCYTES NFR BLD AUTO: 0.3 % — SIGNIFICANT CHANGE UP (ref 0–0.9)
LYMPHOCYTES # BLD AUTO: 1.18 K/UL — SIGNIFICANT CHANGE UP (ref 1–3.3)
LYMPHOCYTES # BLD AUTO: 1.18 K/UL — SIGNIFICANT CHANGE UP (ref 1–3.3)
LYMPHOCYTES # BLD AUTO: 18.4 % — SIGNIFICANT CHANGE UP (ref 13–44)
LYMPHOCYTES # BLD AUTO: 18.4 % — SIGNIFICANT CHANGE UP (ref 13–44)
MAGNESIUM SERPL-MCNC: 1.9 MG/DL — SIGNIFICANT CHANGE UP (ref 1.6–2.6)
MAGNESIUM SERPL-MCNC: 1.9 MG/DL — SIGNIFICANT CHANGE UP (ref 1.6–2.6)
MCHC RBC-ENTMCNC: 30.7 GM/DL — LOW (ref 32–36)
MCHC RBC-ENTMCNC: 30.7 GM/DL — LOW (ref 32–36)
MCHC RBC-ENTMCNC: 31.8 PG — SIGNIFICANT CHANGE UP (ref 27–34)
MCHC RBC-ENTMCNC: 31.8 PG — SIGNIFICANT CHANGE UP (ref 27–34)
MCV RBC AUTO: 103.8 FL — HIGH (ref 80–100)
MCV RBC AUTO: 103.8 FL — HIGH (ref 80–100)
MONOCYTES # BLD AUTO: 0.61 K/UL — SIGNIFICANT CHANGE UP (ref 0–0.9)
MONOCYTES # BLD AUTO: 0.61 K/UL — SIGNIFICANT CHANGE UP (ref 0–0.9)
MONOCYTES NFR BLD AUTO: 9.5 % — SIGNIFICANT CHANGE UP (ref 2–14)
MONOCYTES NFR BLD AUTO: 9.5 % — SIGNIFICANT CHANGE UP (ref 2–14)
NEUTROPHILS # BLD AUTO: 4.42 K/UL — SIGNIFICANT CHANGE UP (ref 1.8–7.4)
NEUTROPHILS # BLD AUTO: 4.42 K/UL — SIGNIFICANT CHANGE UP (ref 1.8–7.4)
NEUTROPHILS NFR BLD AUTO: 69 % — SIGNIFICANT CHANGE UP (ref 43–77)
NEUTROPHILS NFR BLD AUTO: 69 % — SIGNIFICANT CHANGE UP (ref 43–77)
NRBC # BLD: 0 /100 WBCS — SIGNIFICANT CHANGE UP (ref 0–0)
NRBC # BLD: 0 /100 WBCS — SIGNIFICANT CHANGE UP (ref 0–0)
PLATELET # BLD AUTO: 209 K/UL — SIGNIFICANT CHANGE UP (ref 150–400)
PLATELET # BLD AUTO: 209 K/UL — SIGNIFICANT CHANGE UP (ref 150–400)
POTASSIUM SERPL-MCNC: 4.3 MMOL/L — SIGNIFICANT CHANGE UP (ref 3.5–5.3)
POTASSIUM SERPL-MCNC: 4.3 MMOL/L — SIGNIFICANT CHANGE UP (ref 3.5–5.3)
POTASSIUM SERPL-SCNC: 4.3 MMOL/L — SIGNIFICANT CHANGE UP (ref 3.5–5.3)
POTASSIUM SERPL-SCNC: 4.3 MMOL/L — SIGNIFICANT CHANGE UP (ref 3.5–5.3)
PROT SERPL-MCNC: 6.4 G/DL — SIGNIFICANT CHANGE UP (ref 6–8.3)
PROT SERPL-MCNC: 6.4 G/DL — SIGNIFICANT CHANGE UP (ref 6–8.3)
RBC # BLD: 3.14 M/UL — LOW (ref 3.8–5.2)
RBC # BLD: 3.14 M/UL — LOW (ref 3.8–5.2)
RBC # FLD: 14 % — SIGNIFICANT CHANGE UP (ref 10.3–14.5)
RBC # FLD: 14 % — SIGNIFICANT CHANGE UP (ref 10.3–14.5)
SODIUM SERPL-SCNC: 141 MMOL/L — SIGNIFICANT CHANGE UP (ref 135–145)
SODIUM SERPL-SCNC: 141 MMOL/L — SIGNIFICANT CHANGE UP (ref 135–145)
WBC # BLD: 6.41 K/UL — SIGNIFICANT CHANGE UP (ref 3.8–10.5)
WBC # BLD: 6.41 K/UL — SIGNIFICANT CHANGE UP (ref 3.8–10.5)
WBC # FLD AUTO: 6.41 K/UL — SIGNIFICANT CHANGE UP (ref 3.8–10.5)
WBC # FLD AUTO: 6.41 K/UL — SIGNIFICANT CHANGE UP (ref 3.8–10.5)

## 2023-11-06 PROCEDURE — 99232 SBSQ HOSP IP/OBS MODERATE 35: CPT

## 2023-11-06 PROCEDURE — 99222 1ST HOSP IP/OBS MODERATE 55: CPT

## 2023-11-06 PROCEDURE — 99233 SBSQ HOSP IP/OBS HIGH 50: CPT

## 2023-11-06 PROCEDURE — 99232 SBSQ HOSP IP/OBS MODERATE 35: CPT | Mod: GC

## 2023-11-06 RX ORDER — FUROSEMIDE 40 MG
20 TABLET ORAL DAILY
Refills: 0 | Status: DISCONTINUED | OUTPATIENT
Start: 2023-11-07 | End: 2023-11-07

## 2023-11-06 RX ORDER — LEVALBUTEROL 1.25 MG/.5ML
1.25 SOLUTION, CONCENTRATE RESPIRATORY (INHALATION) EVERY 6 HOURS
Refills: 0 | Status: DISCONTINUED | OUTPATIENT
Start: 2023-11-06 | End: 2023-11-07

## 2023-11-06 RX ORDER — METOPROLOL TARTRATE 50 MG
50 TABLET ORAL EVERY 12 HOURS
Refills: 0 | Status: DISCONTINUED | OUTPATIENT
Start: 2023-11-06 | End: 2023-11-07

## 2023-11-06 RX ORDER — ENOXAPARIN SODIUM 100 MG/ML
40 INJECTION SUBCUTANEOUS EVERY 24 HOURS
Refills: 0 | Status: DISCONTINUED | OUTPATIENT
Start: 2023-11-06 | End: 2023-11-06

## 2023-11-06 RX ORDER — GABAPENTIN 400 MG/1
100 CAPSULE ORAL THREE TIMES A DAY
Refills: 0 | Status: DISCONTINUED | OUTPATIENT
Start: 2023-11-06 | End: 2023-11-07

## 2023-11-06 RX ORDER — MAGNESIUM OXIDE 400 MG ORAL TABLET 241.3 MG
800 TABLET ORAL ONCE
Refills: 0 | Status: COMPLETED | OUTPATIENT
Start: 2023-11-06 | End: 2023-11-06

## 2023-11-06 RX ADMIN — GABAPENTIN 100 MILLIGRAM(S): 400 CAPSULE ORAL at 16:33

## 2023-11-06 RX ADMIN — Medication 650 MILLIGRAM(S): at 16:17

## 2023-11-06 RX ADMIN — DONEPEZIL HYDROCHLORIDE 5 MILLIGRAM(S): 10 TABLET, FILM COATED ORAL at 21:20

## 2023-11-06 RX ADMIN — GABAPENTIN 100 MILLIGRAM(S): 400 CAPSULE ORAL at 21:20

## 2023-11-06 RX ADMIN — Medication 50 MILLIGRAM(S): at 17:56

## 2023-11-06 RX ADMIN — Medication 125 MILLIGRAM(S): at 06:52

## 2023-11-06 RX ADMIN — ATORVASTATIN CALCIUM 40 MILLIGRAM(S): 80 TABLET, FILM COATED ORAL at 21:20

## 2023-11-06 RX ADMIN — Medication 125 MILLIGRAM(S): at 13:47

## 2023-11-06 RX ADMIN — Medication 81 MILLIGRAM(S): at 11:00

## 2023-11-06 RX ADMIN — Medication 0.25 MILLIGRAM(S): at 06:52

## 2023-11-06 RX ADMIN — Medication 20 MILLIGRAM(S): at 11:00

## 2023-11-06 RX ADMIN — Medication 50 MILLIGRAM(S): at 06:51

## 2023-11-06 RX ADMIN — Medication 125 MILLIGRAM(S): at 00:19

## 2023-11-06 RX ADMIN — Medication 500 MICROGRAM(S): at 16:33

## 2023-11-06 RX ADMIN — LEVALBUTEROL 1.25 MILLIGRAM(S): 1.25 SOLUTION, CONCENTRATE RESPIRATORY (INHALATION) at 17:56

## 2023-11-06 RX ADMIN — Medication 0.25 MILLIGRAM(S): at 20:00

## 2023-11-06 RX ADMIN — AMLODIPINE BESYLATE 5 MILLIGRAM(S): 2.5 TABLET ORAL at 06:51

## 2023-11-06 RX ADMIN — Medication 125 MILLIGRAM(S): at 18:27

## 2023-11-06 RX ADMIN — Medication 650 MILLIGRAM(S): at 17:17

## 2023-11-06 RX ADMIN — Medication 650 MILLIGRAM(S): at 07:30

## 2023-11-06 RX ADMIN — Medication 650 MILLIGRAM(S): at 06:51

## 2023-11-06 NOTE — CONSULT NOTE ADULT - SUBJECTIVE AND OBJECTIVE BOX
HPI as per cardiology H&P: 82 YOF with PMHX of AF (not on AC 2/2 high bleed risk), HTN, HLD, hemorraghic stroke (admitted to [facility] 9/2023) Lewy body dementia (baseline a/o x3), posterior reversible encephalopathy syndrome, chronic bronchiectasis presented to [facility] ED 11/3/23 c/o SOB. Patient reported she was driving to see Dr. Rivas and on the way had one episode of indigestion-like sx/gas followed by diaphoresis, near syncope, nausea. Episode reported to last one minute and self resolved. PT also endorses LE swelling (>L) and L sided chest pain and palpitations x1 week. PT has been taking Tramadol PRN for chest pain.  Patient denies chest pain, SOB, REID, palpitations, dizziness, LOC, N/V/D, fever/chills/sick contact, diaphoresis, orthopnea/PND.  EKG in ED: NSR 61 bpm without ischemic changes, Labs significant for: , Trop 18--->16, CXR showed bilateral effusions and cardiomegaly.  In the ED, patient given Lasix 20mg IV x1 and Tylenol 650mg PO x1. TTE from 9/2023: normal LVSF with EF of  55-60%,, mildly dilated RV, dilated RA, AS without significant stenosis, mild-mod AR, mild MR, mod-severe TR, severe pHTN with pulm artery systolic pressure 73mmHg. DVT ruled out in ER with negative D-Dimer and negative LE duplex. Patient is now admitted to cardiac tele service for further evaluation and management.     Today, patient is doing ok - biggest complaint is the chronic left-sided rib pain, described as a band that progressively squeezes her throughout the day.  It is not positional and no clear pattern to pain.  Denies any recent trauma or skin changes.  Denies any associated back pain and UE/LE numbness or weakness.  Some relief with BZD but this makes her groggy.  Patient does not like opiates as they make her delirious and causes her to hallucinate.  Otherwise, no BM yet today.  Denies HA, CP, SOB, abdominal pain, nausea, vomiting, fever or chills.     PMHx/PSHx: as per HPI    FHx: non-contributory     SHx: denies alcohol, tobacco or illicit drug use    Allergies: as per EMR    Home Medications:   · KlonoPIN 0.5 mg oral tablet: Last Dose Taken: 02-Nov-2023 PM, 1 tab(s) orally 2 times a day MDD: 2   · Xopenex 1.25 mg/3 mL inhalation solution: Last Dose Taken: 02-Nov-2023 PM, 3 milliliter(s) by nebulizer 3 times a day   · donepezil 5 mg oral tablet: Last Dose Taken: 02-Nov-2023 PM, 1 tab(s) orally every 24 hours   · amLODIPine 5 mg oral tablet: Last Dose Taken: 03-Nov-2023 AM, 1 tab(s) orally once a day   · aspirin 81 mg oral tablet, chewable: Last Dose Taken: 03-Nov-2023 AM, 1 tab(s) orally once a day   · atorvastatin 40 mg oral tablet: Last Dose Taken: 02-Nov-2023 PM, 1 tab(s) orally once a day (at bedtime)   · Lopressor 50 mg oral tablet: Last Dose Taken: 03-Nov-2023 AM, 1 orally 2 times a day      Objective:  Vital Signs:  T(C): 36.7 (06 Nov 2023 10:57), Max: 36.9 (05 Nov 2023 17:05)  T(F): 98 (06 Nov 2023 10:57), Max: 98.4 (05 Nov 2023 17:05)  HR: 62 (06 Nov 2023 10:57) (55 - 86)  BP: 124/56 (06 Nov 2023 10:57) (109/53 - 135/61)  BP(mean): 80 (05 Nov 2023 21:23) (76 - 80)  RR: 18 (06 Nov 2023 10:57) (18 - 18)  SpO2: 97% (06 Nov 2023 10:57) (96% - 98%)    Parameters below as of 06 Nov 2023 10:57  Patient On (Oxygen Delivery Method): nasal cannula  O2 Flow (L/min): 2    Physical Exam:  -Gen: NAD, resting at side of bed  -HEENT: EOMI, PERRL, no scleral icterus   -CV: normal S1 and S2   -Lungs: CTABL, normal respiratory effort on NC  -Ab: soft, NT, ND, normal BS  -Ext: no LE edema   -Neuro: A&O x 3, no focal deficits     Labs:                        10.0   6.41  )-----------( 209      ( 06 Nov 2023 05:30 )             32.6   11-06    141  |  105  |  21  ----------------------------<  108<H>  4.3   |  30  |  0.70    Ca    8.4      06 Nov 2023 05:30  Mg     1.9     11-06    TPro  6.4  /  Alb  3.0<L>  /  TBili  0.3  /  DBili  x   /  AST  14  /  ALT  13  /  AlkPhos  120  11-06    Home Medications:  MEDICATIONS  (STANDING):  amLODIPine   Tablet 5 milliGRAM(s) Oral daily  aspirin  chewable 81 milliGRAM(s) Oral daily  atorvastatin 40 milliGRAM(s) Oral at bedtime  donepezil 5 milliGRAM(s) Oral at bedtime  influenza  Vaccine (HIGH DOSE) 0.7 milliLiter(s) IntraMuscular once  metoprolol succinate ER 50 milliGRAM(s) Oral every 12 hours  vancomycin    Solution 125 milliGRAM(s) Oral every 6 hours    MEDICATIONS  (PRN):  acetaminophen   Oral Liquid .. 650 milliGRAM(s) Oral every 6 hours PRN Moderate Pain (4 - 6)  clonazePAM  Tablet 0.25 milliGRAM(s) Oral two times a day PRN anxiety  ipratropium    for Nebulization 500 MICROGram(s) Nebulizer every 6 hours PRN Shortness of Breath and/or Wheezing

## 2023-11-06 NOTE — CONSULT NOTE ADULT - ASSESSMENT
83 YO F with PMHX of AF (not on AC 2/2 high bleed risk with recent SAH), HTN, HLD, SAH and ischemic stroke (admitted to [facility] 9/2023), Lewy body dementia (baseline a/o x3), hx of PRES in May 2023, CF carrier with chronic bronchiectasis presented to [facility] ED on 11/3 for LE swelling and SOB. She follows with Dr. Foster for her bronchiectasis. She is admitted for diastolic HF exacerbation now diuresed. Pulmonary is consulted for Chronic Bronchiectasis.    # Chronic Bronchiectasis  # Recurrent Pseudomonas history  - patient here for SOB, LE swelling, improved after diuresis  - she has no increase in sputum quantity or quality, no fevers, no leukocytosis. No need to treat for acute flare of bronchiectasis at this time.   - continue her home airway clearance methods: xopenex TID  - she can resume her inhaled tobramycin cycles as outpatient after discharge  - home o2 evaluation on discharge    S/D/E with Dr. Lo

## 2023-11-06 NOTE — PROGRESS NOTE ADULT - ASSESSMENT
82-year-old female with PMHx of AF (not on AC due to SAH), HTN, Lewy body dementia (baseline a/o x3), posterior reversible encephalopathy syndrome, chronic bronchiectasis who presented to [facility] ED 11/3/23 c/o SOB and LE edema admitted for ? Acute Diastolic CHF exacerbation s/p diuresis. Pulmonary consulted for bronchiectasis.      82-year-old female with PMHx of AF (not on AC due to SAH), HTN, Lewy body dementia (baseline a/o x3), posterior reversible encephalopathy syndrome, chronic bronchiectasis who presented to [facility] ED 11/3/23 c/o SOB and LE edema admitted for Acute Diastolic CHF exacerbation s/p diuresis. Pulmonary consulted for bronchiectasis.

## 2023-11-06 NOTE — PROGRESS NOTE ADULT - PROBLEM SELECTOR PLAN 6
Lipid panel: , TG 84, HDL 57, LDL 49  - Continue: atorvastatin 40 mg QHS     #Lewy Body Dementia   - Donepezil 5 mg QHS    DVT ppx: SCDs (high bleed risk, currently being worked up for walter w/ Dr. Rivas)   F: None currently   E: Keep K > 4, Mg > 2  N: DASH/TLC w/ consistent carb, fluid restriction     Code: Full  Dispo: pending clinical progression     Case discussed with: Dr. Tapia Lipid panel: , TG 84, HDL 57, LDL 49  - Continue: atorvastatin 40 mg QHS     #Lewy Body Dementia   - Donepezil 5 mg QHS    FULL CODE  DVT ppx: SCDs (high bleed risk, currently being worked up for walter w/ Dr. Rivas)   F: None currently   E: Keep K > 4, Mg > 2  N: DASH/TLC w/ consistent carb, fluid restriction     Code: Full  Dispo: pending clinical progression     Case discussed with: Dr. Tapia SBP 110s-130s  - Continue (home meds): amlodipine 5 mg Daily

## 2023-11-06 NOTE — PROGRESS NOTE ADULT - SUBJECTIVE AND OBJECTIVE BOX
Patient discussed with Dr. Rivas     SUBJECTIVE ASSESSMENT: Seen resting in bed accompanied by her daughter. Admits to generalized pain and fatigue.     VITAL SIGNS:  Vital Signs Last 24 Hrs  T(C): 36.7 (06 Nov 2023 10:57), Max: 36.9 (05 Nov 2023 17:05)  T(F): 98 (06 Nov 2023 10:57), Max: 98.4 (05 Nov 2023 17:05)  HR: 62 (06 Nov 2023 10:57) (55 - 86)  BP: 124/56 (06 Nov 2023 10:57) (109/53 - 135/61)  BP(mean): 80 (05 Nov 2023 21:23) (76 - 80)  RR: 18 (06 Nov 2023 10:57) (18 - 18)  SpO2: 97% (06 Nov 2023 10:57) (96% - 98%)    Parameters below as of 06 Nov 2023 10:57  Patient On (Oxygen Delivery Method): nasal cannula  O2 Flow (L/min): 2    I&O's Detail    05 Nov 2023 07:01  -  06 Nov 2023 07:00  --------------------------------------------------------  IN:    Oral Fluid: 580 mL  Total IN: 580 mL    OUT:    Stool (mL): 2 mL    Voided (mL): 400 mL  Total OUT: 402 mL    Total NET: 178 mL    06 Nov 2023 07:01  -  06 Nov 2023 14:58  --------------------------------------------------------  IN:    Oral Fluid: 220 mL  Total IN: 220 mL    OUT:  Total OUT: 0 mL    Total NET: 220 mL    CHEST TUBE:n    ARTIS DRAIN:n    EPICARDIAL WIRES: n  STITCHES:n  STAPLES:n    PHYSICAL EXAM:  General: NAD, frail appearing   Neurological: alert and oriented, UE and LE strength equal b/l, facial symmetry present  Cardiovascular: irregularly irregular, Clear S1 and S2, no murmurs appreciated  Respiratory: chest expansion symmetrical, CTA b/l, no wheezing noted  Gastrointestinal: +BS, soft, NT, ND  Extremities: moving spontaneously, no calf tenderness or edema.  Vascular: warm, well perfused. DP/PT pulses palpable b/l.  Incisions: none     LABS:                        10.0   6.41  )-----------( 209      ( 06 Nov 2023 05:30 )             32.6       11-06    141  |  105  |  21  ----------------------------<  108<H>  4.3   |  30  |  0.70    Ca    8.4      06 Nov 2023 05:30  Mg     1.9     11-06    TPro  6.4  /  Alb  3.0<L>  /  TBili  0.3  /  DBili  x   /  AST  14  /  ALT  13  /  AlkPhos  120  11-06    Urinalysis Basic - ( 06 Nov 2023 05:30 )    Color: x / Appearance: x / SG: x / pH: x  Gluc: 108 mg/dL / Ketone: x  / Bili: x / Urobili: x   Blood: x / Protein: x / Nitrite: x   Leuk Esterase: x / RBC: x / WBC x   Sq Epi: x / Non Sq Epi: x / Bacteria: x    MEDICATIONS  (STANDING):  amLODIPine   Tablet 5 milliGRAM(s) Oral daily  aspirin  chewable 81 milliGRAM(s) Oral daily  atorvastatin 40 milliGRAM(s) Oral at bedtime  donepezil 5 milliGRAM(s) Oral at bedtime  influenza  Vaccine (HIGH DOSE) 0.7 milliLiter(s) IntraMuscular once  metoprolol succinate ER 50 milliGRAM(s) Oral every 12 hours  vancomycin    Solution 125 milliGRAM(s) Oral every 6 hours    MEDICATIONS  (PRN):  acetaminophen   Oral Liquid .. 650 milliGRAM(s) Oral every 6 hours PRN Moderate Pain (4 - 6)  clonazePAM  Tablet 0.25 milliGRAM(s) Oral two times a day PRN anxiety  ipratropium    for Nebulization 500 MICROGram(s) Nebulizer every 6 hours PRN Shortness of Breath and/or Wheezing  levalbuterol Inhalation 1.25 milliGRAM(s) Inhalation every 6 hours PRN SOB or wheezing    RADIOLOGY & ADDITIONAL TESTS:

## 2023-11-06 NOTE — PROGRESS NOTE ADULT - PROBLEM SELECTOR PLAN 7
Lipid panel: , TG 84, HDL 57, LDL 49  - Continue: atorvastatin 40 mg QHS     #Lewy Body Dementia   - Donepezil 5 mg QHS    FULL CODE  DVT ppx: SCDs (high bleed risk, currently being worked up for walter w/ Dr. Rivas)   F: None currently   E: Keep K > 4, Mg > 2  N: DASH/TLC w/ consistent carb, fluid restriction     Dispo: pending clinical progression . PT recommends home PT
Lipid panel: , TG 84, HDL 57, LDL 49  - Continue: atorvastatin 40 mg QHS     #Lewy Body Dementia   - Donepezil 5 mg QHS    DVT ppx: SCDs (high bleed risk, currently being worked up for walter w/ Dr. Rivas)   F: None currently   E: Keep K > 4, Mg > 2  N: DASH/TLC w/ consistent carb, fluid restriction     Code: Full  Dispo: pending clinical progression     Case discussed with: Dr. Tapia

## 2023-11-06 NOTE — PROGRESS NOTE ADULT - PROBLEM SELECTOR PLAN 1
Patient with one week SOB and b/l LE swelling, -JVD, satting high 90s on 2L O2 via NC   - Etiology: ?atrial fibrillation   - EKG per previous chart  - CXR (11/3/23): b/l pleural effusions  - TTE (9/2023): EF 55-60%, dilated RA, mild-mod AR, mod-severe TR, severe pHTN w/ PASP 73 mmHg  - GDMT: consider farxiga/giuseppe on outpatient basis   - Diuresis: Lasix 20 mg IVP QD  - Core measures, strict I&Os, daily weights, fluid restriction  - Consider advanced HF consult in AM Patient presented with SOB and b/l LE edema x 1 week -JVD, satting high 90s on 2L O2 via NC . Now euvolemic on exam.    - Etiology: possibly secondary to Afib with RVR prior to admission   - CXR (11/3/23): b/l small pleural effusions. .   - TTE (9/2023): EF 55-60%, dilated RA, mild-mod AR, mod-severe TR, severe pHTN w/ PASP 73 mmHg   - Diuretic : Transitioned from Lasix 20 mg IV Daily to Lasix 20 mg PO Daily    - Core measures, strict I&Os, daily weights, fluid restriction    #moderate to severe TR  -Repeat Echocardiogram ordered to re-evaluate severity of TR s/p diuresis. Follow-up results

## 2023-11-06 NOTE — PROGRESS NOTE ADULT - PROBLEM SELECTOR PLAN 3
Patient satting mid-high 90s on 2L O2 via NC  - RVP (11/3/23) negative   - CT (9/19/23): diffuse b/l bronchiectasis with multiple areas of distal mucoid impaction and scattered tree-in-bud opacities, findings suggest nonspecific small airways disease, small L pleural effusion   - Patient s/p tobramycin nebs at home, completed 9/14 days of course   - Evaluate with pulmonology regarding restarting tobramycin nebs  - Continue: incentive spirometry    #L sided rib pain  Patient is chronic L sided rib pain; per pulmonology note 10/28/23 patient was referred to pain management for further evaluation. Patient and daughter deny hx falls/trauma to area   - Continue: Klonopin 0.5 mg PRN (for pain) Patient satting mid-high 90s on 2L O2 via NC   - RVP (11/3/23) negative    - CT (9/19/23): diffuse b/l bronchiectasis with multiple areas of distal mucoid impaction and scattered tree-in-bud opacities, findings suggest nonspecific small airways disease, small L pleural effusion    - Patient s/p tobramycin nebs at home, completed 9/14 days of course    - Pulm consulted Follow up recs. - Continue: incentive spirometry     #L sided rib pain   Patient has chronic L sided rib pain; per pulmonology note 10/28/23 patient was referred to pain management for further evaluation. Patient and daughter deny hx falls/trauma to area    - Continue: Klonopin 0.25 mg BID PRN (for pain).   -Gabapentin 100 mg PO TID initiated per Medicine recs.

## 2023-11-06 NOTE — PHYSICAL THERAPY INITIAL EVALUATION ADULT - PERTINENT HX OF CURRENT PROBLEM, REHAB EVAL
82 YOF with PMHX of AF (not on AC 2/2 high bleed risk), HTN, HLD, hemorraghic stroke (admitted to [facility] 9/2023) Lewy body dementia (baseline a/o x3), posterior reversible encephalopathy syndrome, chronic bronchiectasis presented to [facility] ED 11/3/23 c/o SOB. Patient reported she was driving to see Dr. Rivas and on the way had one episode of indigestion-like sx/gas followed by diaphoresis, near syncope, nausea. Episode reported to last one minute and self resolved. PT also endorses LE swelling (>L) and L sided chest pain and palpitations x1 week. PT has been taking Tramadol PRN for chest pain.  Patient denies chest pain, SOB, REID, palpitations, dizziness, LOC, N/V/D, fever/chills/sick contact, diaphoresis, orthopnea/PND.  EKG in ED: NSR 61 bpm without ischemic changes, Labs significant for: , Trop 18--->16, CXR showed bilateral effusions and cardiomegaly.  In the ED, patient given Lasix 20mg IV x1 and Tylenol 650mg PO x1. TTE from 9/2023: normal LVSF with EF of  55-60%,, mildly dilated RV, dilated RA, AS without significant stenosis, mild-mod AR, mild MR, mod-severe TR, severe pHTN with pulm artery systolic pressure 73mmHg. DVT ruled out in ER with negative D-Dimer and negative LE duplex. Patient is now admitted to cardiac tele service for further evaluation and management.

## 2023-11-06 NOTE — CONSULT NOTE ADULT - SUBJECTIVE AND OBJECTIVE BOX
PULMONARY SERVICE INITIAL CONSULT NOTE    HPI:  82 YOF with PMHX of AF (not on AC 2/2 high bleed risk with recent SAH), HTN, HLD, SAH and ischemic stroke (admitted to [facility] 9/2023), Lewy body dementia (baseline a/o x3), hx of PRES in May 2023, CF carrier with chronic bronchiectasis presented to [facility] ED on 11/3 for LE swelling and SOB. She follows with Dr. Foster for her bronchiectasis and had been seen by him on Oct 27 where her proBNP was elevated to 1800 and he was c/f diastolic HF in the setting of Afib. She was reportedly on her way to see Cardiology Dr. Rivas for watchmann device evaluation when she had episode of diaphoresis, near syncope and nausea that self resolved which prompted ED evaluation.     In the ED, patient was afeibrle, on 2L via NC, BP WNL. proBNP 956, Trop 18--->16, CXR showed some hyperinflation and chronic intersitial changes but no infiltrates. In the ED, patient given Lasix 20mg IV x1 and admitted to the cardiology tele service for further diuresis in the setting of afib, possible diastolic hf exacerbation. TTE from 9/2023 showed normal LVSF with EF of  55-60%,, mildly dilated RV, dilated RA, elevated PASP of 73. Repeat echo this admission. Dupplex venous US negative for DVT bilaterally.      She has had improvement in SOB and LE swelling with diuresis. She was also started on beta blockers for afib and will be evaluated for watchmann on discharge as she is not a candidate for AC.     Pulmonary is consulted for chronic bronchiectasis history. Follows with Dr. Foster outpatient for Pulmonary. For airway clearance, she uses xopenex TID (less than 1 vial through the day) and clears her own cough w/o any chest pt or vest or airway clearance devices. She reports her current sputum production is stable from her chronic production - thick, tan in color. She denies fever, chills. She has not received xopenex while being here. She also gets inhaled tobramycin q2 weeks chronically for her bronchiectasis and hx of pseudomonas infections.    REVIEW OF SYSTEMS:  10 point ROS negative aside from what is mentioned in HPI    PAST MEDICAL & SURGICAL HISTORY:  Bronchiectasis  History of Pseudomonas pneumonia  HTN (hypertension)  Posterior reversible encephalopathy syndrome  Atrial fibrillation  Lewy body dementia  No significant past surgical history    FAMILY HISTORY:      SOCIAL HISTORY:  Smoking Status: [ ] Current, [ ] Former, [ x] Never  Pack Years:    MEDICATIONS:  Pulmonary:  ipratropium    for Nebulization 500 MICROGram(s) Nebulizer every 6 hours PRN  levalbuterol Inhalation 1.25 milliGRAM(s) Inhalation every 6 hours PRN    Antimicrobials:  vancomycin    Solution 125 milliGRAM(s) Oral every 6 hours    Anticoagulants:  aspirin  chewable 81 milliGRAM(s) Oral daily    Onc:    GI/:    Endocrine:  atorvastatin 40 milliGRAM(s) Oral at bedtime    Cardiac:  amLODIPine   Tablet 5 milliGRAM(s) Oral daily  metoprolol succinate ER 50 milliGRAM(s) Oral every 12 hours    Other Medications:  acetaminophen   Oral Liquid .. 650 milliGRAM(s) Oral every 6 hours PRN  clonazePAM  Tablet 0.25 milliGRAM(s) Oral two times a day PRN  donepezil 5 milliGRAM(s) Oral at bedtime  gabapentin 100 milliGRAM(s) Oral three times a day  influenza  Vaccine (HIGH DOSE) 0.7 milliLiter(s) IntraMuscular once      Allergies    Levaquin (Swelling)  Ceclor (Rash)  IV Contrast (Unknown)  Augmentin (Short breath; Rash)    Intolerances        Vital Signs Last 24 Hrs  T(C): 36.7 (06 Nov 2023 10:57), Max: 36.9 (05 Nov 2023 17:05)  T(F): 98 (06 Nov 2023 10:57), Max: 98.4 (05 Nov 2023 17:05)  HR: 62 (06 Nov 2023 10:57) (55 - 86)  BP: 124/56 (06 Nov 2023 10:57) (109/53 - 135/61)  BP(mean): 80 (05 Nov 2023 21:23) (76 - 80)  RR: 18 (06 Nov 2023 10:57) (18 - 18)  SpO2: 97% (06 Nov 2023 10:57) (96% - 98%)    Parameters below as of 06 Nov 2023 10:57  Patient On (Oxygen Delivery Method): nasal cannula  O2 Flow (L/min): 2      11-05 @ 07:01  -  11-06 @ 07:00  --------------------------------------------------------  IN: 580 mL / OUT: 402 mL / NET: 178 mL    11-06 @ 07:01  -  11-06 @ 16:42  --------------------------------------------------------  IN: 220 mL / OUT: 0 mL / NET: 220 mL            PHYSICAL EXAM:  Constitutional: well-appearing, in no apparent distress. NC in place.   Head: NC/AT  EENT: PERRL, anicteric sclera; oropharynx clear with moist mucous membranes  Neck: supple, ROM intact, no appreciable JVD or LAD  Respiratory: Lungs clear to auscultation bilaterally with some rhonchous breath sounds in the anterior bilateal chest. pain to palpation on the left side posterior rib  Cardiovascular: +S1/S2 intact, regular rhythm and rate. No murmurs appreciated  Gastrointestinal: soft, non-tender, non distended, bowel sounds normoactive   Extremities: no edema, clubbing or cyanosis  Vascular: 2+ radial pulses B/L  Neurological: awake and alert; oriented with no appreciable focal neuro defecits    LABS:      CBC Full  -  ( 06 Nov 2023 05:30 )  WBC Count : 6.41 K/uL  RBC Count : 3.14 M/uL  Hemoglobin : 10.0 g/dL  Hematocrit : 32.6 %  Platelet Count - Automated : 209 K/uL  Mean Cell Volume : 103.8 fl  Mean Cell Hemoglobin : 31.8 pg  Mean Cell Hemoglobin Concentration : 30.7 gm/dL  Auto Neutrophil # : 4.42 K/uL  Auto Lymphocyte # : 1.18 K/uL  Auto Monocyte # : 0.61 K/uL  Auto Eosinophil # : 0.13 K/uL  Auto Basophil # : 0.05 K/uL  Auto Neutrophil % : 69.0 %  Auto Lymphocyte % : 18.4 %  Auto Monocyte % : 9.5 %  Auto Eosinophil % : 2.0 %  Auto Basophil % : 0.8 %    11-06    141  |  105  |  21  ----------------------------<  108<H>  4.3   |  30  |  0.70    Ca    8.4      06 Nov 2023 05:30  Mg     1.9     11-06    TPro  6.4  /  Alb  3.0<L>  /  TBili  0.3  /  DBili  x   /  AST  14  /  ALT  13  /  AlkPhos  120  11-06        RADIOLOGY & ADDITIONAL STUDIES:    reviewed cxr

## 2023-11-06 NOTE — PROGRESS NOTE ADULT - SUBJECTIVE AND OBJECTIVE BOX
Interventional Cardiology PA Adult Progress Note    CC: Acute Diastolic CHF vs. Bronchiectasis   Subjective Assessment: Patient seen and examined at bedside. Patient denies C/P, SOB, palpitations, dizziness, diaphoresis, N/V, fever, chills, abdominal pain, diarrhea, dysuria. She reports chronic cough.    ROS otherwise negative unless otherwise stated except per HPI and Subjective    MEDICATIONS:  amLODIPine   Tablet 5 milliGRAM(s) Oral daily  metoprolol succinate ER 50 milliGRAM(s) Oral every 12 hours  vancomycin    Solution 125 milliGRAM(s) Oral every 6 hours  ipratropium    for Nebulization 500 MICROGram(s) Nebulizer every 6 hours PRN  acetaminophen   Oral Liquid .. 650 milliGRAM(s) Oral every 6 hours PRN  clonazePAM  Tablet 0.25 milliGRAM(s) Oral two times a day PRN  donepezil 5 milliGRAM(s) Oral at bedtime  atorvastatin 40 milliGRAM(s) Oral at bedtime  aspirin  chewable 81 milliGRAM(s) Oral daily  enoxaparin Injectable 40 milliGRAM(s) SubCutaneous every 24 hours  influenza  Vaccine (HIGH DOSE) 0.7 milliLiter(s) IntraMuscular once    [PHYSICAL EXAM:  TELEMETRY:  T(C): 36.7 (11-06-23 @ 10:57), Max: 36.9 (11-05-23 @ 17:05)  HR: 62 (11-06-23 @ 10:57) (55 - 103)  BP: 124/56 (11-06-23 @ 10:57) (109/53 - 135/61)  RR: 18 (11-06-23 @ 10:57) (18 - 18)  SpO2: 97% (11-06-23 @ 10:57) (93% - 98%)  Wt(kg): --  I&O's Summary    05 Nov 2023 07:01  -  06 Nov 2023 07:00  --------------------------------------------------------  IN: 580 mL / OUT: 402 mL / NET: 178 mL    06 Nov 2023 07:01  -  06 Nov 2023 13:35  --------------------------------------------------------  IN: 120 mL / OUT: 0 mL / NET: 120 mL    Richards: No  Central/PICC/Mid Line:  No                                       Appearance: Elderly female in NAD.	  HEENT:   Normal oral mucosa, PERRL, EOMI	  Neck: Supple. No JVD  Cardiovascular: + S1 S2. RRR. No murmurs.  Respiratory: Coarse BS. Bibasilar rales. No rhonchi or wheezes.   Gastrointestinal:  BS x 4. Soft NT/NS  Skin: No rashes, No ecchymoses, No cyanosis  Extremities: Normal range of motion, No clubbing, cyanosis or edema BLE. No calf tenderness BLE  Vascular: Peripheral pulses palpable 2+ bilaterally  Neurologic: Non-focal  Psychiatry: A & O x 3, Mood & affect appropriate      	    ECG:  	  RADIOLOGY:   DIAGNOSTIC TESTING:  [ ] Echocardiogram:  [ ]  Catheterization:  [ ] Stress Test:    [ ] VANNESSA  OTHER: 	    LABS:	 	  CARDIAC MARKERS:                     10.0   6.41  )-----------( 209      ( 06 Nov 2023 05:30 )             32.6     11-06    141  |  105  |  21  ----------------------------<  108<H>  4.3   |  30  |  0.70    Ca    8.4      06 Nov 2023 05:30  Mg     1.9     11-06    TPro  6.4  /  Alb  3.0<L>  /  TBili  0.3  /  DBili  x   /  AST  14  /  ALT  13  /  AlkPhos  120  11-06  ASSESSMENT/PLAN: 	           Interventional Cardiology PA Adult Progress Note    CC: Acute Diastolic CHF vs. Bronchiectasis   Subjective Assessment: Patient seen and examined at bedside. Patient denies C/P, SOB, palpitations, dizziness, diaphoresis, N/V, fever, chills, abdominal pain, diarrhea, dysuria. She reports chronic cough.    ROS otherwise negative unless otherwise stated except per HPI and Subjective    MEDICATIONS:  amLODIPine   Tablet 5 milliGRAM(s) Oral daily  metoprolol succinate ER 50 milliGRAM(s) Oral every 12 hours  vancomycin    Solution 125 milliGRAM(s) Oral every 6 hours  ipratropium    for Nebulization 500 MICROGram(s) Nebulizer every 6 hours PRN  acetaminophen   Oral Liquid .. 650 milliGRAM(s) Oral every 6 hours PRN  clonazePAM  Tablet 0.25 milliGRAM(s) Oral two times a day PRN  donepezil 5 milliGRAM(s) Oral at bedtime  atorvastatin 40 milliGRAM(s) Oral at bedtime  aspirin  chewable 81 milliGRAM(s) Oral daily  enoxaparin Injectable 40 milliGRAM(s) SubCutaneous every 24 hours  influenza  Vaccine (HIGH DOSE) 0.7 milliLiter(s) IntraMuscular once    [PHYSICAL EXAM:  TELEMETRY:  T(C): 36.7 (11-06-23 @ 10:57), Max: 36.9 (11-05-23 @ 17:05)  HR: 62 (11-06-23 @ 10:57) (55 - 103)  BP: 124/56 (11-06-23 @ 10:57) (109/53 - 135/61)  RR: 18 (11-06-23 @ 10:57) (18 - 18)  SpO2: 97% (11-06-23 @ 10:57) (93% - 98%)  Wt(kg): --  I&O's Summary    05 Nov 2023 07:01  -  06 Nov 2023 07:00  --------------------------------------------------------  IN: 580 mL / OUT: 402 mL / NET: 178 mL    06 Nov 2023 07:01  -  06 Nov 2023 13:35  --------------------------------------------------------  IN: 120 mL / OUT: 0 mL / NET: 120 mL    Richards: No  Central/PICC/Mid Line:  No                                       Appearance: Elderly female in NAD.	  HEENT:   Normal oral mucosa, PERRL, EOMI	  Neck: Supple. No JVD  Cardiovascular: + S1 S2. RRR. No murmurs.  Respiratory: Coarse BS. Bibasilar rales. No rhonchi or wheezes.   Gastrointestinal:  BS x 4. Soft NT/NS  Skin: No rashes, No ecchymoses, No cyanosis  Extremities: Normal range of motion, No clubbing, cyanosis or edema BLE. No calf tenderness BLE  Vascular: Peripheral pulses palpable 2+ bilaterally  Neurologic: Non-focal  Psychiatry: A & O x 3, Mood & affect appropriate    LABS:	 	  CARDIAC MARKERS:                     10.0   6.41  )-----------( 209      ( 06 Nov 2023 05:30 )             32.6     11-06    141  |  105  |  21  ----------------------------<  108<H>  4.3   |  30  |  0.70    Ca    8.4      06 Nov 2023 05:30  Mg     1.9     11-06    TPro  6.4  /  Alb  3.0<L>  /  TBili  0.3  /  DBili  x   /  AST  14  /  ALT  13  /  AlkPhos  120  11-06  ASSESSMENT/PLAN:

## 2023-11-06 NOTE — PROGRESS NOTE ADULT - PROBLEM SELECTOR PLAN 4
Patient with watery diarrhea x4 episodes on 11/4; also w/ transient leukocytosis, 5.73>7.42>11.33>9.91, afebrile  - lactate WNL   - Continue to trend CBC closely  - Stool sample: (+) C. diff   - Continue: contact precautions  - Initiate: vancomycin 125 mg PO QID x10 days (pt does not meet severe C. diff criteria of WBC >15 or SCr >1.5) Patient with watery diarrhea x4 episodes on 11/4; also w/ transient leukocytosis, 5.73>7.42>11.33>9.91, afebrile   - lactate WNL. - Continue to trend CBC closely   - Stool sample: (+) C. diff 11/5/23- Continue: contact precautions   Vancomycin 125 mg PO 6 hrs x 10 days (11/5-11/15)  (pt does not meet severe C. diff criteria of WBC >15 or SCr >1.5). O2 sat 80% RA. Likely multifactorial-Bronchiectasis and CHF.  -Will likely need Home Oxygen on discharge. O2 sat 93-98% 2L NC

## 2023-11-06 NOTE — CONSULT NOTE ADULT - ASSESSMENT
82-year-old female with a PMHx of Afib (not on AC), HTN, HLD, hemorrhagic stroke, lewy body dementia, PRESS and chronic bronchiectasis who presented with SOB and LE edema, found to have new onset heart faillure.     #Acute Heart Failure   -further management as per cardiology   -currently on PO Lasix 20mg daily and metoprolol succinate 50mg BID  -consider palliative care consult     #Afib   -continue with metoprolol succinate 50mg BID   -structural heart consulted, follow up recommendations about possible Watchman      #Bronchiectasis    -pulmonology consulted, follow up recommendations      #Cdiff   -continue with PO Vancomycin to complete 10-day course      #Chronic Left Rib Pain   -unclear etiology, has been referred to pain management as outpatient   -consider starting low-dose gabapentin or lyrica to see if this helps    #HTN/HLD   -continue with amlodipine 5mg daily and atorvastatin 40mg daily      #CVA  -continue with ASA 81mg daily and atorvastatin 40mg daily     #Lewy Body Dementia    -continue with Donepezil 5mg daily     DVT PPx: SCDs    Dispo: pending clinical improvement

## 2023-11-06 NOTE — PROGRESS NOTE ADULT - ASSESSMENT
Assesment:    82 year old female with PMHX of AF (no OAC 2/2 high bleed risk), HTN, HLD, hemorraghic stroke (admitted to [facility] 9/2023) Lewy body dementia (baseline a/o x3), posterior reversible encephalopathy syndrome and chronic bronchiectasis.  Presented to [facility] ED 11/3/23 with complaints of SOB and one episode of non-specific rib pain accompanied by nausea and diaphoresis while driving to see Dr. Rivas for Watchman eval. In ED patient noted to be volume up and was ultimately admitted to cardiac tele service for further evaluation and management. Of note, during previous hospitalization (9/2023), patient was referred to structural heart team for watchman device. Scans completed during that time.     Plan:  Problem 1:  AF, no OAC 2/2 fall risk/hx of SAH  - undergoing eval for Watchman, scans completed during previous admission, will f/up outpatient w/ Dr. Rivas    Problem 2:  CHF   - echo with LVEF 55-60%, moderate to severe TR  - diuresis and GDMT per primary team     Problem 3:  C Diff, contact precautions   - continue antibiotic therapy per primary team     Problem 4:  L sided rib pain, unclear etiology   - pain mgmt per primary team     I have reviewed clinical labs tests and reports, radiology tests and reports, as well as old patient medical records, and discussed with the referring physician.

## 2023-11-06 NOTE — PROGRESS NOTE ADULT - PROBLEM SELECTOR PLAN 2
MEDICATION/TREATMENT NON-COMPLIANCE MEDICATION/TREATMENT NON-COMPLIANCE MEDICATION/TREATMENT NON-COMPLIANCE MEDICATION/TREATMENT NON-COMPLIANCE MEDICATION/TREATMENT NON-COMPLIANCE MEDICATION/TREATMENT NON-COMPLIANCE Patient with history of Atrial fibrillation not on AC s/p SAH during recent hospitalization, scheduled to undergo outpatient workup for Watchman device; patient currently in Afib ~110s  - Anticoagulation: none (per above)   - Rate control: increased Lopressor 50 mg PO q8h for rate-control   - EKG per previous note  - Consulted: structural team, follow up recs Patient with history of Atrial fibrillation not on AC s/p SAH during recent hospitalization, scheduled to undergo outpatient workup for Watchman device; patient currently in NSR   - Anticoagulation: none (per above)    - Rate control: Toprol XL 50 mg PO q 12 hrs    - EKG per previous note   - Structural consulted. Follow-up outpatient. MEDICATION/TREATMENT NON-COMPLIANCE MEDICATION/TREATMENT NON-COMPLIANCE MEDICATION/TREATMENT NON-COMPLIANCE

## 2023-11-06 NOTE — PROGRESS NOTE ADULT - PROBLEM SELECTOR PLAN 5
SBP 110s-130s  - Continue (home meds): amlodipine 5 mg SBP 110s-130s  - Continue (home meds): amlodipine 5 mg Daily Patient with watery diarrhea x4 episodes on 11/4; also w/ transient leukocytosis, 5.73>7.42>11.33>9.91, afebrile   - lactate WNL. - Continue to trend CBC closely   - Stool sample: (+) C. diff 11/5/23- Continue: contact precautions   Vancomycin 125 mg PO 6 hrs x 10 days (11/5-11/15)  (pt does not meet severe C. diff criteria of WBC >15 or SCr >1.5).

## 2023-11-07 ENCOUNTER — NON-APPOINTMENT (OUTPATIENT)
Age: 83
End: 2023-11-07

## 2023-11-07 ENCOUNTER — TRANSCRIPTION ENCOUNTER (OUTPATIENT)
Age: 83
End: 2023-11-07

## 2023-11-07 VITALS
OXYGEN SATURATION: 96 % | SYSTOLIC BLOOD PRESSURE: 114 MMHG | DIASTOLIC BLOOD PRESSURE: 62 MMHG | TEMPERATURE: 99 F | HEART RATE: 79 BPM | RESPIRATION RATE: 18 BRPM

## 2023-11-07 PROBLEM — G31.83 NEUROCOGNITIVE DISORDER WITH LEWY BODIES: Chronic | Status: ACTIVE | Noted: 2023-11-03

## 2023-11-07 LAB
ALBUMIN SERPL ELPH-MCNC: 3.1 G/DL — LOW (ref 3.3–5)
ALBUMIN SERPL ELPH-MCNC: 3.1 G/DL — LOW (ref 3.3–5)
ALP SERPL-CCNC: 126 U/L — HIGH (ref 40–120)
ALP SERPL-CCNC: 126 U/L — HIGH (ref 40–120)
ALT FLD-CCNC: 12 U/L — SIGNIFICANT CHANGE UP (ref 10–45)
ALT FLD-CCNC: 12 U/L — SIGNIFICANT CHANGE UP (ref 10–45)
ANION GAP SERPL CALC-SCNC: 8 MMOL/L — SIGNIFICANT CHANGE UP (ref 5–17)
ANION GAP SERPL CALC-SCNC: 8 MMOL/L — SIGNIFICANT CHANGE UP (ref 5–17)
AST SERPL-CCNC: 15 U/L — SIGNIFICANT CHANGE UP (ref 10–40)
AST SERPL-CCNC: 15 U/L — SIGNIFICANT CHANGE UP (ref 10–40)
BILIRUB SERPL-MCNC: 0.3 MG/DL — SIGNIFICANT CHANGE UP (ref 0.2–1.2)
BILIRUB SERPL-MCNC: 0.3 MG/DL — SIGNIFICANT CHANGE UP (ref 0.2–1.2)
BUN SERPL-MCNC: 23 MG/DL — SIGNIFICANT CHANGE UP (ref 7–23)
BUN SERPL-MCNC: 23 MG/DL — SIGNIFICANT CHANGE UP (ref 7–23)
CALCIUM SERPL-MCNC: 8.4 MG/DL — SIGNIFICANT CHANGE UP (ref 8.4–10.5)
CALCIUM SERPL-MCNC: 8.4 MG/DL — SIGNIFICANT CHANGE UP (ref 8.4–10.5)
CHLORIDE SERPL-SCNC: 98 MMOL/L — SIGNIFICANT CHANGE UP (ref 96–108)
CHLORIDE SERPL-SCNC: 98 MMOL/L — SIGNIFICANT CHANGE UP (ref 96–108)
CO2 SERPL-SCNC: 31 MMOL/L — SIGNIFICANT CHANGE UP (ref 22–31)
CO2 SERPL-SCNC: 31 MMOL/L — SIGNIFICANT CHANGE UP (ref 22–31)
CREAT SERPL-MCNC: 0.72 MG/DL — SIGNIFICANT CHANGE UP (ref 0.5–1.3)
CREAT SERPL-MCNC: 0.72 MG/DL — SIGNIFICANT CHANGE UP (ref 0.5–1.3)
EGFR: 84 ML/MIN/1.73M2 — SIGNIFICANT CHANGE UP
EGFR: 84 ML/MIN/1.73M2 — SIGNIFICANT CHANGE UP
GLUCOSE SERPL-MCNC: 107 MG/DL — HIGH (ref 70–99)
GLUCOSE SERPL-MCNC: 107 MG/DL — HIGH (ref 70–99)
HCT VFR BLD CALC: 33.6 % — LOW (ref 34.5–45)
HCT VFR BLD CALC: 33.6 % — LOW (ref 34.5–45)
HGB BLD-MCNC: 10.4 G/DL — LOW (ref 11.5–15.5)
HGB BLD-MCNC: 10.4 G/DL — LOW (ref 11.5–15.5)
MAGNESIUM SERPL-MCNC: 1.7 MG/DL — SIGNIFICANT CHANGE UP (ref 1.6–2.6)
MAGNESIUM SERPL-MCNC: 1.7 MG/DL — SIGNIFICANT CHANGE UP (ref 1.6–2.6)
MCHC RBC-ENTMCNC: 31 GM/DL — LOW (ref 32–36)
MCHC RBC-ENTMCNC: 31 GM/DL — LOW (ref 32–36)
MCHC RBC-ENTMCNC: 32.2 PG — SIGNIFICANT CHANGE UP (ref 27–34)
MCHC RBC-ENTMCNC: 32.2 PG — SIGNIFICANT CHANGE UP (ref 27–34)
MCV RBC AUTO: 104 FL — HIGH (ref 80–100)
MCV RBC AUTO: 104 FL — HIGH (ref 80–100)
NRBC # BLD: 0 /100 WBCS — SIGNIFICANT CHANGE UP (ref 0–0)
NRBC # BLD: 0 /100 WBCS — SIGNIFICANT CHANGE UP (ref 0–0)
PLATELET # BLD AUTO: 210 K/UL — SIGNIFICANT CHANGE UP (ref 150–400)
PLATELET # BLD AUTO: 210 K/UL — SIGNIFICANT CHANGE UP (ref 150–400)
POTASSIUM SERPL-MCNC: 3.8 MMOL/L — SIGNIFICANT CHANGE UP (ref 3.5–5.3)
POTASSIUM SERPL-MCNC: 3.8 MMOL/L — SIGNIFICANT CHANGE UP (ref 3.5–5.3)
POTASSIUM SERPL-SCNC: 3.8 MMOL/L — SIGNIFICANT CHANGE UP (ref 3.5–5.3)
POTASSIUM SERPL-SCNC: 3.8 MMOL/L — SIGNIFICANT CHANGE UP (ref 3.5–5.3)
PROT SERPL-MCNC: 6.6 G/DL — SIGNIFICANT CHANGE UP (ref 6–8.3)
PROT SERPL-MCNC: 6.6 G/DL — SIGNIFICANT CHANGE UP (ref 6–8.3)
RBC # BLD: 3.23 M/UL — LOW (ref 3.8–5.2)
RBC # BLD: 3.23 M/UL — LOW (ref 3.8–5.2)
RBC # FLD: 13.9 % — SIGNIFICANT CHANGE UP (ref 10.3–14.5)
RBC # FLD: 13.9 % — SIGNIFICANT CHANGE UP (ref 10.3–14.5)
SODIUM SERPL-SCNC: 137 MMOL/L — SIGNIFICANT CHANGE UP (ref 135–145)
SODIUM SERPL-SCNC: 137 MMOL/L — SIGNIFICANT CHANGE UP (ref 135–145)
WBC # BLD: 5.92 K/UL — SIGNIFICANT CHANGE UP (ref 3.8–10.5)
WBC # BLD: 5.92 K/UL — SIGNIFICANT CHANGE UP (ref 3.8–10.5)
WBC # FLD AUTO: 5.92 K/UL — SIGNIFICANT CHANGE UP (ref 3.8–10.5)
WBC # FLD AUTO: 5.92 K/UL — SIGNIFICANT CHANGE UP (ref 3.8–10.5)

## 2023-11-07 PROCEDURE — 99232 SBSQ HOSP IP/OBS MODERATE 35: CPT

## 2023-11-07 PROCEDURE — 93306 TTE W/DOPPLER COMPLETE: CPT | Mod: 26

## 2023-11-07 PROCEDURE — 99239 HOSP IP/OBS DSCHRG MGMT >30: CPT

## 2023-11-07 PROCEDURE — 99233 SBSQ HOSP IP/OBS HIGH 50: CPT

## 2023-11-07 RX ORDER — VANCOMYCIN HCL 1 G
1 VIAL (EA) INTRAVENOUS
Qty: 32 | Refills: 0
Start: 2023-11-07 | End: 2023-11-14

## 2023-11-07 RX ORDER — VANCOMYCIN HCL 1 G
5 VIAL (EA) INTRAVENOUS
Qty: 1 | Refills: 0
Start: 2023-11-07 | End: 2023-11-14

## 2023-11-07 RX ORDER — POTASSIUM CHLORIDE 20 MEQ
20 PACKET (EA) ORAL ONCE
Refills: 0 | Status: COMPLETED | OUTPATIENT
Start: 2023-11-07 | End: 2023-11-07

## 2023-11-07 RX ORDER — GABAPENTIN 400 MG/1
1 CAPSULE ORAL
Qty: 60 | Refills: 0
Start: 2023-11-07 | End: 2023-12-06

## 2023-11-07 RX ORDER — GABAPENTIN 400 MG/1
100 CAPSULE ORAL
Refills: 0 | Status: DISCONTINUED | OUTPATIENT
Start: 2023-11-08 | End: 2023-11-07

## 2023-11-07 RX ORDER — METOPROLOL TARTRATE 50 MG
1 TABLET ORAL
Refills: 0 | DISCHARGE

## 2023-11-07 RX ORDER — METOPROLOL TARTRATE 50 MG
1 TABLET ORAL
Qty: 0 | Refills: 0 | DISCHARGE
Start: 2023-11-07

## 2023-11-07 RX ORDER — FUROSEMIDE 40 MG
1 TABLET ORAL
Qty: 0 | Refills: 0 | DISCHARGE
Start: 2023-11-07

## 2023-11-07 RX ORDER — FUROSEMIDE 40 MG
1 TABLET ORAL
Qty: 30 | Refills: 0
Start: 2023-11-07 | End: 2023-12-06

## 2023-11-07 RX ORDER — METOPROLOL TARTRATE 50 MG
1 TABLET ORAL
Qty: 60 | Refills: 0
Start: 2023-11-07 | End: 2023-12-06

## 2023-11-07 RX ORDER — GABAPENTIN 400 MG/1
100 CAPSULE ORAL ONCE
Refills: 0 | Status: COMPLETED | OUTPATIENT
Start: 2023-11-07 | End: 2023-11-07

## 2023-11-07 RX ORDER — VANCOMYCIN HCL 1 G
5 VIAL (EA) INTRAVENOUS
Qty: 0 | Refills: 0 | DISCHARGE
Start: 2023-11-07

## 2023-11-07 RX ORDER — MAGNESIUM SULFATE 500 MG/ML
2 VIAL (ML) INJECTION ONCE
Refills: 0 | Status: COMPLETED | OUTPATIENT
Start: 2023-11-07 | End: 2023-11-07

## 2023-11-07 RX ADMIN — GABAPENTIN 100 MILLIGRAM(S): 400 CAPSULE ORAL at 13:50

## 2023-11-07 RX ADMIN — Medication 20 MILLIGRAM(S): at 06:34

## 2023-11-07 RX ADMIN — Medication 25 GRAM(S): at 11:22

## 2023-11-07 RX ADMIN — Medication 50 MILLIGRAM(S): at 06:27

## 2023-11-07 RX ADMIN — Medication 20 MILLIEQUIVALENT(S): at 11:23

## 2023-11-07 RX ADMIN — Medication 125 MILLIGRAM(S): at 06:27

## 2023-11-07 RX ADMIN — Medication 125 MILLIGRAM(S): at 00:08

## 2023-11-07 RX ADMIN — Medication 125 MILLIGRAM(S): at 11:22

## 2023-11-07 RX ADMIN — Medication 81 MILLIGRAM(S): at 11:22

## 2023-11-07 RX ADMIN — AMLODIPINE BESYLATE 5 MILLIGRAM(S): 2.5 TABLET ORAL at 06:27

## 2023-11-07 RX ADMIN — LEVALBUTEROL 1.25 MILLIGRAM(S): 1.25 SOLUTION, CONCENTRATE RESPIRATORY (INHALATION) at 13:50

## 2023-11-07 RX ADMIN — GABAPENTIN 100 MILLIGRAM(S): 400 CAPSULE ORAL at 18:32

## 2023-11-07 RX ADMIN — GABAPENTIN 100 MILLIGRAM(S): 400 CAPSULE ORAL at 06:27

## 2023-11-07 NOTE — CHART NOTE - NSCHARTNOTEFT_GEN_A_CORE
During oxygen testing, the patient is de-sating due to Diastolic CHF and will require Oxygen for home use. The patient is mobile in the home and will require a portable system.

## 2023-11-07 NOTE — DISCHARGE NOTE NURSING/CASE MANAGEMENT/SOCIAL WORK - NSDCPEFALRISK_GEN_ALL_CORE
For information on Fall & Injury Prevention, visit: [email or web address] OR  [email or web address] OR  https://www.cdc.gov/steadi/patient.html

## 2023-11-07 NOTE — PROGRESS NOTE ADULT - ASSESSMENT
Assesment:    82 year old female with PMHX of AF (no OAC 2/2 high bleed risk), HTN, HLD, hemorraghic stroke (admitted to [facility] 9/2023) Lewy body dementia (baseline a/o x3), posterior reversible encephalopathy syndrome and chronic bronchiectasis.  Presented to [facility] ED 11/3/23 with complaints of SOB and one episode of non-specific rib pain accompanied by nausea and diaphoresis while driving to see Dr. Rivas for Watchman eval. In ED patient noted to be volume up and was ultimately admitted to cardiac tele service for further evaluation and management. Of note, during previous hospitalization (9/2023), patient was referred to structural heart team for watchman device. Scans completed during that time.     Plan:  Problem 1:  AF, no OAC 2/2 fall risk/hx of SAH  - undergoing eval for Watchman, scans completed during previous admission, will f/up outpatient w/ Dr. Rivas    Problem 2:  CHF   - echo with LVEF 55-60%, moderate to severe TR  - diuresis and GDMT per primary team     Problem 3:  C Diff, contact precautions   - continue antibiotic therapy per primary team     Problem 4:  L sided rib pain, unclear etiology   - pain mgmt per primary team     I have reviewed clinical labs tests and reports, radiology tests and reports, as well as old patient medical records, and discussed with the referring physician.       Assesment:    82 year old female with PMHX of AF (no OAC 2/2 high bleed risk), HTN, HLD, hemorraghic stroke (admitted to [facility] 9/2023) Lewy body dementia (baseline a/o x3), posterior reversible encephalopathy syndrome and chronic bronchiectasis.Presented to [facility] ED 11/3/23 with complaints of SOB and one episode of non-specific rib pain accompanied by nausea and diaphoresis while driving to see Dr. Rivas for Watchman eval. In ED patient noted to be volume up and was ultimately admitted to cardiac tele service for further evaluation and management. Of note, during previous hospitalization (9/2023), patient was referred to structural heart team for watchman device. Scans completed during that time.     Plan:  Problem 1:  AF, no OAC 2/2 fall risk/hx of SAH  - undergoing eval for Watchman, scans completed during previous admission, will f/up outpatient w/ Dr. Rivas    Problem 2:  CHF   - echo with LVEF 55-60%, moderate to severe TR  - diuresis and GDMT per primary team     Problem 3:  C Diff, contact precautions   - continue antibiotic therapy per primary team     Problem 4:  L sided rib pain, unclear etiology   - pain mgmt per primary team     I have reviewed clinical labs tests and reports, radiology tests and reports, as well as old patient medical records, and discussed with the referring physician.

## 2023-11-07 NOTE — DISCHARGE NOTE PROVIDER - NSDCMRMEDTOKEN_GEN_ALL_CORE_FT
amLODIPine 5 mg oral tablet: 1 tab(s) orally once a day  aspirin 81 mg oral tablet, chewable: 1 tab(s) orally once a day  atorvastatin 40 mg oral tablet: 1 tab(s) orally once a day (at bedtime)  donepezil 5 mg oral tablet: 1 tab(s) orally every 24 hours  KlonoPIN 0.5 mg oral tablet: 1 tab(s) orally 2 times a day MDD: 2  Lopressor 50 mg oral tablet: 1 orally 2 times a day  Xopenex 1.25 mg/3 mL inhalation solution: 3 milliliter(s) by nebulizer 3 times a day   amLODIPine 5 mg oral tablet: 1 tab(s) orally once a day  aspirin 81 mg oral tablet, chewable: 1 tab(s) orally once a day  atorvastatin 40 mg oral tablet: 1 tab(s) orally once a day (at bedtime)  donepezil 5 mg oral tablet: 1 tab(s) orally every 24 hours  furosemide 20 mg oral tablet: 1 tab(s) orally once a day  gabapentin 100 mg oral capsule: 1 cap(s) orally 2 times a day Please take at 12 PM and 6 PM  KlonoPIN 0.5 mg oral tablet: 1 tab(s) orally 2 times a day MDD: 2  KlonoPIN 0.5 mg oral tablet: 0.5 tab(s) orally 2 times a day as needed for For Pain/For Anxiety  metoprolol succinate 50 mg oral tablet, extended release: 1 tab(s) orally every 12 hours  vancomycin 25 mg/mL oral liquid: 5 milliliter(s) orally every 6 hours Please stop after 8 days  Xopenex 1.25 mg/3 mL inhalation solution: 3 milliliter(s) by nebulizer 3 times a day   amLODIPine 5 mg oral tablet: 1 tab(s) orally once a day  aspirin 81 mg oral tablet, chewable: 1 tab(s) orally once a day  atorvastatin 40 mg oral tablet: 1 tab(s) orally once a day (at bedtime)  donepezil 5 mg oral tablet: 1 tab(s) orally every 24 hours  furosemide 20 mg oral tablet: 1 tab(s) orally once a day  gabapentin 100 mg oral capsule: 1 cap(s) orally 2 times a day Please take at 12 PM and 6 PM  KlonoPIN 0.5 mg oral tablet: 0.5 tab(s) orally 2 times a day as needed for For Pain/For Anxiety  metoprolol succinate 50 mg oral tablet, extended release: 1 tab(s) orally every 12 hours  Metoprolol Succinate ER 50 mg oral tablet, extended release: 1 tab(s) orally every 12 hours  vancomycin 125 mg oral capsule: 1 cap(s) orally every 6 hours Stop after 8 days  vancomycin 25 mg/mL oral liquid: 5 milliliter(s) orally every 6 hours Please stop after 8 days  Xopenex 1.25 mg/3 mL inhalation solution: 3 milliliter(s) by nebulizer 3 times a day

## 2023-11-07 NOTE — DISCHARGE NOTE NURSING/CASE MANAGEMENT/SOCIAL WORK - PATIENT PORTAL LINK FT
You can access the FollowMyHealth Patient Portal offered by [facility] by registering at the following website: [email or web address]. By joining [facility]’s FollowMyHealth portal, you will also be able to view your health information using other applications (apps) compatible with our system.

## 2023-11-07 NOTE — DISCHARGE NOTE PROVIDER - NSDCFUSCHEDAPPT_GEN_ALL_CORE_FT
Demetrio Phillips  [facility] Physician [facility]  INTMED [address]  Scheduled Appointment: 11/10/2023    Albin Fernández  [facility] Physician [facility]  OPHTHALM [address]  Scheduled Appointment: 11/10/2023    Artur Rivas  [facility]  CTSURG [address]  Scheduled Appointment: 11/20/2023    Corrine Nelson  [facility]  NEUROLOGY [address]  Scheduled Appointment: 11/28/2023     Artur Rivas  [facility]  CTSURG [address]  Scheduled Appointment: 11/20/2023    Corrine Nelson  [facility]  NEUROLOGY [address]  Scheduled Appointment: 11/28/2023    Demetrio Phillips  [facility]  INTMED [address]  Scheduled Appointment: 12/01/2023

## 2023-11-07 NOTE — DISCHARGE NOTE PROVIDER - NSDCCPCAREPLAN_GEN_ALL_CORE_FT
PRINCIPAL DISCHARGE DIAGNOSIS  Diagnosis: Acute diastolic congestive heart failure  Assessment and Plan of Treatment: Heart failure is a condition in which the heart does not pump or fill with blood well. As a result, the heart lags behind in its job of moving blood throughout the body. This can lead to symptoms such as swelling, trouble breathing, and feeling tired. Your Ejection Fraction (EF) is normal however your heart muscle has difficulty relaxing which makes it difficult to fill.   -Please continue Lasix 20 mg PO Daily to prevent fluid build up in the body.  -Avoid drinking more than 1.5L of fluid daily and maintain a low salt diet (max 2grams daily).  -Please weigh yourself daily, for any significant increases in daily weight of 2lbs/day or 5lbs/week with associated swelling in the legs or abdomen and/or shortness of breath, please call your doctor or go to the emergency room.  -Follow up with Dr. Rivas on 11/20/23 at 9:15 AM as scheduled.         SECONDARY DISCHARGE DIAGNOSES  Diagnosis: Atrial fibrillation  Assessment and Plan of Treatment:     Diagnosis: Bronchiectasis  Assessment and Plan of Treatment: You have chronic bronchiectasis. Please continue Xoponex Nebulizer Three Times Daily and outpatient Tobramycin. Follow-up with Dr. Foster in 1 week.    Diagnosis: C. difficile diarrhea  Assessment and Plan of Treatment: You developed diarrhea while you were admitted to the hospital likely secondary to recent antibiotic use and recent hospitalization. Please take Vancomycin 125 mg (5 cc) by mouth every 6 hours (Four times daily) x 8 more days to complete 10 day course.     PRINCIPAL DISCHARGE DIAGNOSIS  Diagnosis: Acute diastolic congestive heart failure  Assessment and Plan of Treatment: Heart failure is a condition in which the heart does not pump or fill with blood well. As a result, the heart lags behind in its job of moving blood throughout the body. This can lead to symptoms such as swelling, trouble breathing, and feeling tired. Your Ejection Fraction (EF) is normal however your heart muscle has difficulty relaxing which makes it difficult to fill.   -Please continue Lasix 20 mg PO Daily to prevent fluid build up in the body.  -Avoid drinking more than 1.5L of fluid daily and maintain a low salt diet (max 2grams daily).  -Please weigh yourself daily, for any significant increases in daily weight of 2lbs/day or 5lbs/week with associated swelling in the legs or abdomen and/or shortness of breath, please call your doctor or go to the emergency room.  -Follow up with Dr. Rivas on 11/20/23 at 9:15 AM as scheduled.         SECONDARY DISCHARGE DIAGNOSES  Diagnosis: Atrial fibrillation  Assessment and Plan of Treatment: You have an abnormal heart rhythm (arrhythmia) called atrial fibrillation. With this condition, the hearts 2 upper chambers (the atria) quiver rather than squeeze the blood out in a normal pattern. This leads to an irregular and sometimes rapid heartbeat. Atrial fibrillation is serious condition as it affects the heart’s ability to fill with blood as it should and blood clots may form, which increases the risk for stroke.  -Please CONTINUE METOPROLOL XL 50 MG BY MOUTH TWICE DAILY TO CONTROL HEART RATE.      Diagnosis: Bronchiectasis  Assessment and Plan of Treatment: You have chronic bronchiectasis. Please continue Xoponex Nebulizer Three Times Daily and outpatient Tobramycin. Follow-up with Dr. Foster in 1 week.    Diagnosis: C. difficile diarrhea  Assessment and Plan of Treatment: You developed diarrhea while you were admitted to the hospital likely secondary to recent antibiotic use and recent hospitalization. Please take Vancomycin 125 mg (5 cc) by mouth every 6 hours (Four times daily) x 8 more days to complete 10 day course.

## 2023-11-07 NOTE — DISCHARGE NOTE PROVIDER - CARE PROVIDERS DIRECT ADDRESSES
[email or web address]

## 2023-11-07 NOTE — PROGRESS NOTE ADULT - SUBJECTIVE AND OBJECTIVE BOX
Subjective:  No acute events overnight.  Patient's pain seems to be improved today but states she feels groggy from the medication.  Denies HA, CP, SOB, abdominal pain, nausea, vomiting, fever, chills or diarrhea.     Objective:   Vital Signs:  T(C): 37.1 (07 Nov 2023 11:21), Max: 37.1 (07 Nov 2023 11:21)  T(F): 98.8 (07 Nov 2023 11:21), Max: 98.8 (07 Nov 2023 11:21)  HR: 79 (07 Nov 2023 11:21) (69 - 110)  BP: 114/62 (07 Nov 2023 11:21) (111/51 - 140/63)  BP(mean): --  RR: 18 (07 Nov 2023 11:21) (16 - 18)  SpO2: 96% (07 Nov 2023 11:21) (88% - 96%)    Parameters below as of 07 Nov 2023 11:21  Patient On (Oxygen Delivery Method): nasal cannula  O2 Flow (L/min): 2    Physical Exam:   -Gen: NAD, resting at side of bed  -HEENT: EOMI, PERRL, no scleral icterus   -CV: normal S1 and S2   -Lungs: CTABL, normal respiratory effort on NC  -Ab: soft, NT, ND, normal BS  -Ext: no LE edema   -Neuro: A&O x 3, no focal deficits     Labs:                        10.4   5.92  )-----------( 210      ( 07 Nov 2023 08:51 )             33.6       11-07    137  |  98  |  23  ----------------------------<  107<H>  3.8   |  31  |  0.72    Ca    8.4      07 Nov 2023 08:51  Mg     1.7     11-07    TPro  6.6  /  Alb  3.1<L>  /  TBili  0.3  /  DBili  x   /  AST  15  /  ALT  12  /  AlkPhos  126<H>  11-07    Microbiology:     Culture - Blood (collected 11-03-23 @ 12:19)  Source: .Blood Blood-Peripheral  Preliminary Report (11-06-23 @ 15:00):    No growth at 3 days.    Culture - Blood (collected 11-03-23 @ 12:19)  Source: .Blood Blood-Peripheral  Preliminary Report (11-06-23 @ 15:00):    No growth at 3 days.    Medications:  MEDICATIONS  (STANDING):  amLODIPine   Tablet 5 milliGRAM(s) Oral daily  aspirin  chewable 81 milliGRAM(s) Oral daily  atorvastatin 40 milliGRAM(s) Oral at bedtime  donepezil 5 milliGRAM(s) Oral at bedtime  furosemide    Tablet 20 milliGRAM(s) Oral daily  gabapentin 100 milliGRAM(s) Oral once  influenza  Vaccine (HIGH DOSE) 0.7 milliLiter(s) IntraMuscular once  metoprolol succinate ER 50 milliGRAM(s) Oral every 12 hours  vancomycin    Solution 125 milliGRAM(s) Oral every 6 hours    MEDICATIONS  (PRN):  acetaminophen   Oral Liquid .. 650 milliGRAM(s) Oral every 6 hours PRN Moderate Pain (4 - 6)  clonazePAM  Tablet 0.25 milliGRAM(s) Oral two times a day PRN anxiety  ipratropium    for Nebulization 500 MICROGram(s) Nebulizer every 6 hours PRN Shortness of Breath and/or Wheezing  levalbuterol Inhalation 1.25 milliGRAM(s) Inhalation every 6 hours PRN SOB or wheezing

## 2023-11-07 NOTE — DISCHARGE NOTE PROVIDER - PROVIDER TOKENS
PROVIDER:[TOKEN:[[id number]],SCHEDULEDAPPT:[11/20/2023],SCHEDULEDAPPTTIME:[09:15 AM],ESTABLISHEDPATIENT:[T]],PROVIDER:[TOKEN:[[id number]],FOLLOWUP:[1 week],ESTABLISHEDPATIENT:[T]] PROVIDER:[TOKEN:[[id number]],SCHEDULEDAPPT:[11/20/2023],SCHEDULEDAPPTTIME:[09:15 AM],ESTABLISHEDPATIENT:[T]],PROVIDER:[TOKEN:[[id number]],FOLLOWUP:[1 week],ESTABLISHEDPATIENT:[T]],PROVIDER:[TOKEN:[[id number]],SCHEDULEDAPPT:[11/10/2023],SCHEDULEDAPPTTIME:[11:40 AM]]

## 2023-11-07 NOTE — DISCHARGE NOTE PROVIDER - HOSPITAL COURSE
82-year-old female with PMHx of AF (not on AC due to SAH), HTN, Lewy body dementia (baseline a/o x3), posterior reversible encephalopathy syndrome, chronic bronchiectasis who presented to [facility] ED 11/3/23 c/o SOB and LE edema admitted for Acute Diastolic CHF exacerbation s/p diuresis transitioned to Lasix 20 mg PO Daily. Pulmonary consulted for bronchiectasis and recommended follow-up outpatient and continue Xoponex TID, outpatient Tobramycin. Patient qualified for Home Oxygen which was set up and tank delivered to the bedside. Afib HR 110s on admission treated with Metoprolol 50 mg PO q 8 hrs. Patient converted to NSR and transitioned to Toprol XL 50 mg PO q 12 hrs. Hospital course complicated by watery diarrhea x 4 episodes C.Diff + on 11/5/23. Patient placed on contact isolation and Vanco 125 mg PO q 6 hrs x 10 days initiated. Patient has remained afebrile and stools are now formed. Leukocyosis resolved. Repeat echocardiogram performed to evaluate degree of TR post diuresis. Echocardiogram 11/7/23 revealed normal LV and RV size and function, mild AR, mild to moderate TR, Pulmonary HTN PASP 51 mm Hg, no pericardial effusion.     Pt seen and examined at bedside this AM without any complaints or events overnight, VSS, labs and telemetry reviewed and pt stable for discharge as discussed with Dr. Peres. Pt has received appropriate discharge instructions, including medication regimen, access site management and follow up with Dr. Rivas in 1-2 weeks.    Discharge medications: ASA 81 mg PO Daily, Atorvastatin 40 mg PO QHS, Metoprolol Succinate 50 mg PO Q 12 hrs, Furosemide 20 mg PO Daily, Amlodipine 5 mg PO Daily,     Cardiac Rehab (Post PCI): Education on benefits of Cardiac Rehab provided to patient: No patient for home PT

## 2023-11-07 NOTE — PROGRESS NOTE ADULT - SUBJECTIVE AND OBJECTIVE BOX
Patient discussed on morning rounds with Dr. Elias      SUBJECTIVE ASSESSMENT: Seen sitting bedside. Groggy but alert. No significant events reported overnight.     VITAL SIGNS:  Vital Signs Last 24 Hrs  T(C): 37.1 (07 Nov 2023 11:21), Max: 37.1 (07 Nov 2023 11:21)  T(F): 98.8 (07 Nov 2023 11:21), Max: 98.8 (07 Nov 2023 11:21)  HR: 79 (07 Nov 2023 11:21) (69 - 110)  BP: 114/62 (07 Nov 2023 11:21) (111/51 - 140/63)  BP(mean): --  RR: 18 (07 Nov 2023 11:21) (16 - 18)  SpO2: 96% (07 Nov 2023 11:21) (88% - 96%)    Parameters below as of 07 Nov 2023 11:21  Patient On (Oxygen Delivery Method): nasal cannula  O2 Flow (L/min): 2    I&O's Detail    06 Nov 2023 07:01  -  07 Nov 2023 07:00  --------------------------------------------------------  IN:    Oral Fluid: 490 mL  Total IN: 490 mL    OUT:    Voided (mL): 400 mL  Total OUT: 400 mL    Total NET: 90 mL    07 Nov 2023 07:01  -  07 Nov 2023 12:14  --------------------------------------------------------  IN:  Total IN: 0 mL    OUT:    Oral Fluid: 0 mL  Total OUT: 0 mL    Total NET: 0 mL    CHEST TUBE:n    ARTIS DRAIN:n    EPICARDIAL WIRES: n  STITCHES:n  STAPLES:n    PHYSICAL EXAM:  General: NAD, frail appearing   Neurological: alert and oriented, UE and LE strength equal b/l, facial symmetry present  Cardiovascular: irregularly irregular, Clear S1 and S2, no murmurs appreciated  Respiratory: chest expansion symmetrical, CTA b/l, no wheezing noted  Gastrointestinal: +BS, soft, NT, ND  Extremities: moving spontaneously, no calf tenderness or edema.  Vascular: warm, well perfused. DP/PT pulses palpable b/l.  Incisions: none     LABS:                        10.4   5.92  )-----------( 210      ( 07 Nov 2023 08:51 )             33.6     11-07    137  |  98  |  23  ----------------------------<  107<H>  3.8   |  31  |  0.72    Ca    8.4      07 Nov 2023 08:51  Mg     1.7     11-07    TPro  6.6  /  Alb  3.1<L>  /  TBili  0.3  /  DBili  x   /  AST  15  /  ALT  12  /  AlkPhos  126<H>  11-07    Urinalysis Basic - ( 07 Nov 2023 08:51 )    Color: x / Appearance: x / SG: x / pH: x  Gluc: 107 mg/dL / Ketone: x  / Bili: x / Urobili: x   Blood: x / Protein: x / Nitrite: x   Leuk Esterase: x / RBC: x / WBC x   Sq Epi: x / Non Sq Epi: x / Bacteria: x    MEDICATIONS  (STANDING):  amLODIPine   Tablet 5 milliGRAM(s) Oral daily  aspirin  chewable 81 milliGRAM(s) Oral daily  atorvastatin 40 milliGRAM(s) Oral at bedtime  donepezil 5 milliGRAM(s) Oral at bedtime  furosemide    Tablet 20 milliGRAM(s) Oral daily  gabapentin 100 milliGRAM(s) Oral three times a day  influenza  Vaccine (HIGH DOSE) 0.7 milliLiter(s) IntraMuscular once  metoprolol succinate ER 50 milliGRAM(s) Oral every 12 hours  vancomycin    Solution 125 milliGRAM(s) Oral every 6 hours    MEDICATIONS  (PRN):  acetaminophen   Oral Liquid .. 650 milliGRAM(s) Oral every 6 hours PRN Moderate Pain (4 - 6)  clonazePAM  Tablet 0.25 milliGRAM(s) Oral two times a day PRN anxiety  ipratropium    for Nebulization 500 MICROGram(s) Nebulizer every 6 hours PRN Shortness of Breath and/or Wheezing  levalbuterol Inhalation 1.25 milliGRAM(s) Inhalation every 6 hours PRN SOB or wheezing    RADIOLOGY & ADDITIONAL TESTS:    CT chest 10/5/2023  Cardiac:  1. There are 4 pulmonary veins; 2 on the left and 3 on the right.  2. Left atrial appendage measures 23.3 x 20.5 mm at the ostium  3. Left atrial appendage measures 21 mm in depth from the ostium.  4. No left atrial appendage or left atrial thrombus.    Non cardiac:  Lungs: Cystic bronchiectasis with bronchial wall thickening. Most severe in the right upper lobe but present in all lobes with areas of mucus plugging, indicating a chronic inflammatory process. Bilateral apical scarring.

## 2023-11-07 NOTE — DISCHARGE NOTE PROVIDER - CARE PROVIDER_API CALL
Artur Rivas  Interventional Cardiology  [address]  [place] [address]  Phone: [phone]  Fax: [phone]  Established Patient  Scheduled Appointment: 11/20/2023 09:15 AM    Ramón Foster  Pulmonary Disease  [address]  [place] [address]  Phone: [phone]  Fax: [phone]  Established Patient  Follow Up Time: 1 week   Artur Rivas  Interventional Cardiology  [address]  [place] [address]  Phone: [phone]  Fax: [phone]  Established Patient  Scheduled Appointment: 11/20/2023 09:15 AM    Ramón Foster  Pulmonary Disease  [address]  [place] [address]  Phone: [phone]  Fax: [phone]  Established Patient  Follow Up Time: 1 week    Demetrio Phillips  Internal Medicine  [address] [place] [address], [address]  [place] [address]  Phone: [phone]  Fax: [phone]  Scheduled Appointment: 11/10/2023 11:40 AM

## 2023-11-07 NOTE — PROGRESS NOTE ADULT - ASSESSMENT
82-year-old female with a PMHx of Afib (not on AC), HTN, HLD, hemorrhagic stroke, lewy body dementia, PRESS and chronic bronchiectasis who presented with SOB and LE edema, found to have new onset heart faillure.      #Acute Heart Failure    -further management as per cardiology    -currently on PO Lasix 20mg daily and metoprolol succinate 50mg BID   -follow up TTE      #Afib    -continue with metoprolol succinate 50mg BID    -structural heart consulted, outpatient follow up with Dr. Rivas for consideration of Watchman      #Bronchiectasis     -pulmonology consulted, continue with Xopenex TID and resume inhaled tobramycin upon discharge     #Acute Hypoxic Respiratory Failure    -likely multifactorial from ADHF and bronchiectasis, see above for plans    -wean oxygen as tolerated, obtain ambulatory pulse ox prior to discharge      #Cdiff    -continue with PO Vancomycin to complete 10-day course         #Chronic Left Rib Pain    -unclear etiology, has been referred to pain management as outpatient    -some improvement with gabapentin, can decrease frequency to 2x/day (afternoon and evening) as patient states the pain worsens throughout the day and feels groggy in the AM    #HTN/HLD    -continue with amlodipine 5mg daily and atorvastatin 40mg daily       #CVA   -continue with ASA 81mg daily and atorvastatin 40mg daily      #Lewy Body Dementia   -continue with Donepezil 5mg daily     DVT PPx: none    Dispo: home PT

## 2023-11-08 LAB
CULTURE RESULTS: SIGNIFICANT CHANGE UP
SPECIMEN SOURCE: SIGNIFICANT CHANGE UP

## 2023-11-09 DIAGNOSIS — R07.81 PLEURODYNIA: ICD-10-CM

## 2023-11-09 DIAGNOSIS — J47.9 BRONCHIECTASIS, UNCOMPLICATED: ICD-10-CM

## 2023-11-09 DIAGNOSIS — I27.20 PULMONARY HYPERTENSION, UNSPECIFIED: ICD-10-CM

## 2023-11-09 DIAGNOSIS — F02.80 DEMENTIA IN OTHER DISEASES CLASSIFIED ELSEWHERE, UNSPECIFIED SEVERITY, WITHOUT BEHAVIORAL DISTURBANCE, PSYCHOTIC DISTURBANCE, MOOD DISTURBANCE, AND ANXIETY: ICD-10-CM

## 2023-11-09 DIAGNOSIS — E83.42 HYPOMAGNESEMIA: ICD-10-CM

## 2023-11-09 DIAGNOSIS — A04.72 ENTEROCOLITIS DUE TO CLOSTRIDIUM DIFFICILE, NOT SPECIFIED AS RECURRENT: ICD-10-CM

## 2023-11-09 DIAGNOSIS — Z79.82 LONG TERM (CURRENT) USE OF ASPIRIN: ICD-10-CM

## 2023-11-09 DIAGNOSIS — Z91.041 RADIOGRAPHIC DYE ALLERGY STATUS: ICD-10-CM

## 2023-11-09 DIAGNOSIS — I11.0 HYPERTENSIVE HEART DISEASE WITH HEART FAILURE: ICD-10-CM

## 2023-11-09 DIAGNOSIS — J96.01 ACUTE RESPIRATORY FAILURE WITH HYPOXIA: ICD-10-CM

## 2023-11-09 DIAGNOSIS — I48.91 UNSPECIFIED ATRIAL FIBRILLATION: ICD-10-CM

## 2023-11-09 DIAGNOSIS — Z88.1 ALLERGY STATUS TO OTHER ANTIBIOTIC AGENTS STATUS: ICD-10-CM

## 2023-11-09 DIAGNOSIS — G31.83 NEUROCOGNITIVE DISORDER WITH LEWY BODIES: ICD-10-CM

## 2023-11-09 DIAGNOSIS — I50.31 ACUTE DIASTOLIC (CONGESTIVE) HEART FAILURE: ICD-10-CM

## 2023-11-09 DIAGNOSIS — E78.5 HYPERLIPIDEMIA, UNSPECIFIED: ICD-10-CM

## 2023-11-09 DIAGNOSIS — Z86.73 PERSONAL HISTORY OF TRANSIENT ISCHEMIC ATTACK (TIA), AND CEREBRAL INFARCTION WITHOUT RESIDUAL DEFICITS: ICD-10-CM

## 2023-11-09 DIAGNOSIS — Z91.81 HISTORY OF FALLING: ICD-10-CM

## 2023-11-09 DIAGNOSIS — I08.3 COMBINED RHEUMATIC DISORDERS OF MITRAL, AORTIC AND TRICUSPID VALVES: ICD-10-CM

## 2023-11-10 ENCOUNTER — APPOINTMENT (OUTPATIENT)
Dept: INTERNAL MEDICINE | Facility: CLINIC | Age: 83
End: 2023-11-10
Payer: MEDICARE

## 2023-11-10 ENCOUNTER — TRANSCRIPTION ENCOUNTER (OUTPATIENT)
Age: 83
End: 2023-11-10

## 2023-11-10 ENCOUNTER — APPOINTMENT (OUTPATIENT)
Dept: OPHTHALMOLOGY | Facility: CLINIC | Age: 83
End: 2023-11-10

## 2023-11-10 VITALS
OXYGEN SATURATION: 90 % | SYSTOLIC BLOOD PRESSURE: 102 MMHG | WEIGHT: 89.6 LBS | HEIGHT: 65 IN | BODY MASS INDEX: 14.93 KG/M2 | HEART RATE: 55 BPM | DIASTOLIC BLOOD PRESSURE: 58 MMHG

## 2023-11-10 PROCEDURE — 99496 TRANSJ CARE MGMT HIGH F2F 7D: CPT

## 2023-11-10 RX ORDER — TRAMADOL HYDROCHLORIDE 50 MG/1
50 TABLET, COATED ORAL
Qty: 30 | Refills: 1 | Status: COMPLETED | COMMUNITY
Start: 2023-10-25 | End: 2023-11-10

## 2023-11-10 RX ORDER — LEVOFLOXACIN 500 MG/1
500 TABLET, FILM COATED ORAL
Qty: 14 | Refills: 0 | Status: COMPLETED | COMMUNITY
Start: 2023-08-04 | End: 2023-11-10

## 2023-11-13 ENCOUNTER — TRANSCRIPTION ENCOUNTER (OUTPATIENT)
Age: 83
End: 2023-11-13

## 2023-11-13 LAB
ANION GAP SERPL CALC-SCNC: 12 MMOL/L
BASOPHILS # BLD AUTO: 0.05 K/UL
BASOPHILS NFR BLD AUTO: 0.8 %
BUN SERPL-MCNC: 23 MG/DL
CALCIUM SERPL-MCNC: 9 MG/DL
CHLORIDE SERPL-SCNC: 97 MMOL/L
CO2 SERPL-SCNC: 30 MMOL/L
CREAT SERPL-MCNC: 0.84 MG/DL
EGFR: 70 ML/MIN/1.73M2
EOSINOPHIL # BLD AUTO: 0.05 K/UL
EOSINOPHIL NFR BLD AUTO: 0.8 %
FOLATE SERPL-MCNC: 15.7 NG/ML
GLUCOSE SERPL-MCNC: 87 MG/DL
HCT VFR BLD CALC: 35.8 %
HGB BLD-MCNC: 11.1 G/DL
IMM GRANULOCYTES NFR BLD AUTO: 0.2 %
LYMPHOCYTES # BLD AUTO: 1.23 K/UL
LYMPHOCYTES NFR BLD AUTO: 20.7 %
MAN DIFF?: NORMAL
MCHC RBC-ENTMCNC: 31 GM/DL
MCHC RBC-ENTMCNC: 32.4 PG
MCV RBC AUTO: 104.4 FL
MONOCYTES # BLD AUTO: 0.58 K/UL
MONOCYTES NFR BLD AUTO: 9.8 %
NEUTROPHILS # BLD AUTO: 4.01 K/UL
NEUTROPHILS NFR BLD AUTO: 67.7 %
PLATELET # BLD AUTO: 226 K/UL
POTASSIUM SERPL-SCNC: 4.6 MMOL/L
RBC # BLD: 3.43 M/UL
RBC # FLD: 13.5 %
SODIUM SERPL-SCNC: 139 MMOL/L
TSH SERPL-ACNC: 2.02 UIU/ML
VIT B12 SERPL-MCNC: 772 PG/ML
WBC # FLD AUTO: 5.93 K/UL

## 2023-11-13 PROCEDURE — 71045 X-RAY EXAM CHEST 1 VIEW: CPT

## 2023-11-13 PROCEDURE — 83036 HEMOGLOBIN GLYCOSYLATED A1C: CPT

## 2023-11-13 PROCEDURE — 97161 PT EVAL LOW COMPLEX 20 MIN: CPT

## 2023-11-13 PROCEDURE — 97165 OT EVAL LOW COMPLEX 30 MIN: CPT

## 2023-11-13 PROCEDURE — 80048 BASIC METABOLIC PNL TOTAL CA: CPT

## 2023-11-13 PROCEDURE — 83605 ASSAY OF LACTIC ACID: CPT

## 2023-11-13 PROCEDURE — 74230 X-RAY XM SWLNG FUNCJ C+: CPT

## 2023-11-13 PROCEDURE — 87449 NOS EACH ORGANISM AG IA: CPT

## 2023-11-13 PROCEDURE — 99285 EMERGENCY DEPT VISIT HI MDM: CPT

## 2023-11-13 PROCEDURE — 82746 ASSAY OF FOLIC ACID SERUM: CPT

## 2023-11-13 PROCEDURE — 92610 EVALUATE SWALLOWING FUNCTION: CPT

## 2023-11-13 PROCEDURE — 82803 BLOOD GASES ANY COMBINATION: CPT

## 2023-11-13 PROCEDURE — 80053 COMPREHEN METABOLIC PANEL: CPT

## 2023-11-13 PROCEDURE — 84295 ASSAY OF SERUM SODIUM: CPT

## 2023-11-13 PROCEDURE — 99285 EMERGENCY DEPT VISIT HI MDM: CPT | Mod: 25

## 2023-11-13 PROCEDURE — 83880 ASSAY OF NATRIURETIC PEPTIDE: CPT

## 2023-11-13 PROCEDURE — 87040 BLOOD CULTURE FOR BACTERIA: CPT

## 2023-11-13 PROCEDURE — 85730 THROMBOPLASTIN TIME PARTIAL: CPT

## 2023-11-13 PROCEDURE — 82607 VITAMIN B-12: CPT

## 2023-11-13 PROCEDURE — 87637 SARSCOV2&INF A&B&RSV AMP PRB: CPT

## 2023-11-13 PROCEDURE — 85025 COMPLETE CBC W/AUTO DIFF WBC: CPT

## 2023-11-13 PROCEDURE — 85610 PROTHROMBIN TIME: CPT

## 2023-11-13 PROCEDURE — 96374 THER/PROPH/DIAG INJ IV PUSH: CPT

## 2023-11-13 PROCEDURE — 87493 C DIFF AMPLIFIED PROBE: CPT

## 2023-11-13 PROCEDURE — 84132 ASSAY OF SERUM POTASSIUM: CPT

## 2023-11-13 PROCEDURE — 82962 GLUCOSE BLOOD TEST: CPT

## 2023-11-13 PROCEDURE — 80061 LIPID PANEL: CPT

## 2023-11-13 PROCEDURE — 92611 MOTION FLUOROSCOPY/SWALLOW: CPT

## 2023-11-13 PROCEDURE — 36415 COLL VENOUS BLD VENIPUNCTURE: CPT

## 2023-11-13 PROCEDURE — 85027 COMPLETE CBC AUTOMATED: CPT

## 2023-11-13 PROCEDURE — 84100 ASSAY OF PHOSPHORUS: CPT

## 2023-11-13 PROCEDURE — 94640 AIRWAY INHALATION TREATMENT: CPT

## 2023-11-13 PROCEDURE — 93005 ELECTROCARDIOGRAM TRACING: CPT

## 2023-11-13 PROCEDURE — 93306 TTE W/DOPPLER COMPLETE: CPT

## 2023-11-13 PROCEDURE — 83735 ASSAY OF MAGNESIUM: CPT

## 2023-11-13 PROCEDURE — 87324 CLOSTRIDIUM AG IA: CPT

## 2023-11-13 PROCEDURE — 82330 ASSAY OF CALCIUM: CPT

## 2023-11-13 PROCEDURE — 93970 EXTREMITY STUDY: CPT

## 2023-11-13 PROCEDURE — 84484 ASSAY OF TROPONIN QUANT: CPT

## 2023-11-13 PROCEDURE — 85379 FIBRIN DEGRADATION QUANT: CPT

## 2023-11-15 DIAGNOSIS — F41.9 ANXIETY DISORDER, UNSPECIFIED: ICD-10-CM

## 2023-11-17 RX ORDER — AMLODIPINE BESYLATE 2.5 MG/1
1 TABLET ORAL
Refills: 1 | DISCHARGE
Start: 2023-11-17

## 2023-11-20 ENCOUNTER — APPOINTMENT (OUTPATIENT)
Dept: CARDIOTHORACIC SURGERY | Facility: CLINIC | Age: 83
End: 2023-11-20

## 2023-11-24 LAB
GLUCOSE BLDC GLUCOMTR-MCNC: 98 MG/DL — SIGNIFICANT CHANGE UP (ref 70–99)
GLUCOSE BLDC GLUCOMTR-MCNC: 98 MG/DL — SIGNIFICANT CHANGE UP (ref 70–99)

## 2023-11-27 ENCOUNTER — TRANSCRIPTION ENCOUNTER (OUTPATIENT)
Age: 83
End: 2023-11-27

## 2023-11-28 ENCOUNTER — NON-APPOINTMENT (OUTPATIENT)
Age: 83
End: 2023-11-28

## 2023-11-28 ENCOUNTER — APPOINTMENT (OUTPATIENT)
Dept: NEUROLOGY | Facility: CLINIC | Age: 83
End: 2023-11-28
Payer: MEDICARE

## 2023-11-28 DIAGNOSIS — M53.82 OTHER SPECIFIED DORSOPATHIES, CERVICAL REGION: ICD-10-CM

## 2023-11-28 PROCEDURE — 99215 OFFICE O/P EST HI 40 MIN: CPT | Mod: 95

## 2023-11-29 ENCOUNTER — APPOINTMENT (OUTPATIENT)
Dept: INTERNAL MEDICINE | Facility: CLINIC | Age: 83
End: 2023-11-29
Payer: MEDICARE

## 2023-11-29 VITALS
BODY MASS INDEX: 17.47 KG/M2 | DIASTOLIC BLOOD PRESSURE: 74 MMHG | SYSTOLIC BLOOD PRESSURE: 122 MMHG | HEART RATE: 75 BPM | HEIGHT: 60 IN | OXYGEN SATURATION: 96 % | WEIGHT: 89 LBS

## 2023-11-29 PROCEDURE — 99214 OFFICE O/P EST MOD 30 MIN: CPT

## 2023-12-01 ENCOUNTER — APPOINTMENT (OUTPATIENT)
Dept: PAIN MANAGEMENT | Facility: CLINIC | Age: 83
End: 2023-12-01
Payer: MEDICARE

## 2023-12-01 VITALS
BODY MASS INDEX: 17.47 KG/M2 | HEIGHT: 60 IN | HEART RATE: 64 BPM | WEIGHT: 89 LBS | DIASTOLIC BLOOD PRESSURE: 60 MMHG | SYSTOLIC BLOOD PRESSURE: 108 MMHG | OXYGEN SATURATION: 91 %

## 2023-12-01 PROCEDURE — 99204 OFFICE O/P NEW MOD 45 MIN: CPT

## 2023-12-06 ENCOUNTER — TRANSCRIPTION ENCOUNTER (OUTPATIENT)
Age: 83
End: 2023-12-06

## 2023-12-07 RX ORDER — METOPROLOL TARTRATE 50 MG
1 TABLET ORAL
Qty: 60 | Refills: 1
Start: 2023-12-07 | End: 2024-02-04

## 2023-12-07 RX ORDER — FUROSEMIDE 40 MG
1 TABLET ORAL
Qty: 30 | Refills: 1
Start: 2023-12-07 | End: 2024-02-04

## 2023-12-12 ENCOUNTER — APPOINTMENT (OUTPATIENT)
Dept: PAIN MANAGEMENT | Facility: CLINIC | Age: 83
End: 2023-12-12
Payer: MEDICARE

## 2023-12-12 VITALS
WEIGHT: 89 LBS | HEIGHT: 60 IN | SYSTOLIC BLOOD PRESSURE: 139 MMHG | OXYGEN SATURATION: 90 % | BODY MASS INDEX: 17.47 KG/M2 | HEART RATE: 65 BPM | DIASTOLIC BLOOD PRESSURE: 65 MMHG

## 2023-12-12 PROCEDURE — 99214 OFFICE O/P EST MOD 30 MIN: CPT

## 2023-12-14 ENCOUNTER — INPATIENT (INPATIENT)
Facility: HOSPITAL | Age: 83
LOS: 4 days | Discharge: HOME HEALTH | DRG: 190 | End: 2023-12-19
Attending: STUDENT IN AN ORGANIZED HEALTH CARE EDUCATION/TRAINING PROGRAM | Admitting: STUDENT IN AN ORGANIZED HEALTH CARE EDUCATION/TRAINING PROGRAM
Payer: MEDICARE

## 2023-12-14 ENCOUNTER — APPOINTMENT (OUTPATIENT)
Dept: PAIN MANAGEMENT | Facility: HOSPITAL | Age: 83
End: 2023-12-14

## 2023-12-14 VITALS
DIASTOLIC BLOOD PRESSURE: 85 MMHG | HEIGHT: 65 IN | TEMPERATURE: 98 F | RESPIRATION RATE: 20 BRPM | HEART RATE: 87 BPM | WEIGHT: 87.96 LBS | OXYGEN SATURATION: 83 % | SYSTOLIC BLOOD PRESSURE: 133 MMHG

## 2023-12-14 LAB
ALBUMIN SERPL ELPH-MCNC: 3.6 G/DL — SIGNIFICANT CHANGE UP (ref 3.3–5)
ALBUMIN SERPL ELPH-MCNC: 3.6 G/DL — SIGNIFICANT CHANGE UP (ref 3.3–5)
ALP SERPL-CCNC: 121 U/L — HIGH (ref 40–120)
ALP SERPL-CCNC: 121 U/L — HIGH (ref 40–120)
ALT FLD-CCNC: 9 U/L — LOW (ref 10–45)
ALT FLD-CCNC: 9 U/L — LOW (ref 10–45)
ANION GAP SERPL CALC-SCNC: 11 MMOL/L — SIGNIFICANT CHANGE UP (ref 5–17)
ANION GAP SERPL CALC-SCNC: 11 MMOL/L — SIGNIFICANT CHANGE UP (ref 5–17)
APPEARANCE UR: CLEAR — SIGNIFICANT CHANGE UP
APPEARANCE UR: CLEAR — SIGNIFICANT CHANGE UP
APTT BLD: 27.9 SEC — SIGNIFICANT CHANGE UP (ref 24.5–35.6)
APTT BLD: 27.9 SEC — SIGNIFICANT CHANGE UP (ref 24.5–35.6)
AST SERPL-CCNC: 13 U/L — SIGNIFICANT CHANGE UP (ref 10–40)
AST SERPL-CCNC: 13 U/L — SIGNIFICANT CHANGE UP (ref 10–40)
BACTERIA # UR AUTO: NEGATIVE /HPF — SIGNIFICANT CHANGE UP
BACTERIA # UR AUTO: NEGATIVE /HPF — SIGNIFICANT CHANGE UP
BASOPHILS # BLD AUTO: 0.05 K/UL — SIGNIFICANT CHANGE UP (ref 0–0.2)
BASOPHILS # BLD AUTO: 0.05 K/UL — SIGNIFICANT CHANGE UP (ref 0–0.2)
BASOPHILS NFR BLD AUTO: 0.5 % — SIGNIFICANT CHANGE UP (ref 0–2)
BASOPHILS NFR BLD AUTO: 0.5 % — SIGNIFICANT CHANGE UP (ref 0–2)
BILIRUB SERPL-MCNC: 0.8 MG/DL — SIGNIFICANT CHANGE UP (ref 0.2–1.2)
BILIRUB SERPL-MCNC: 0.8 MG/DL — SIGNIFICANT CHANGE UP (ref 0.2–1.2)
BILIRUB UR-MCNC: NEGATIVE — SIGNIFICANT CHANGE UP
BILIRUB UR-MCNC: NEGATIVE — SIGNIFICANT CHANGE UP
BUN SERPL-MCNC: 25 MG/DL — HIGH (ref 7–23)
BUN SERPL-MCNC: 25 MG/DL — HIGH (ref 7–23)
CALCIUM SERPL-MCNC: 9.3 MG/DL — SIGNIFICANT CHANGE UP (ref 8.4–10.5)
CALCIUM SERPL-MCNC: 9.3 MG/DL — SIGNIFICANT CHANGE UP (ref 8.4–10.5)
CAST: 1 /LPF — SIGNIFICANT CHANGE UP (ref 0–4)
CAST: 1 /LPF — SIGNIFICANT CHANGE UP (ref 0–4)
CHLORIDE SERPL-SCNC: 102 MMOL/L — SIGNIFICANT CHANGE UP (ref 96–108)
CHLORIDE SERPL-SCNC: 102 MMOL/L — SIGNIFICANT CHANGE UP (ref 96–108)
CO2 SERPL-SCNC: 26 MMOL/L — SIGNIFICANT CHANGE UP (ref 22–31)
CO2 SERPL-SCNC: 26 MMOL/L — SIGNIFICANT CHANGE UP (ref 22–31)
COLOR SPEC: SIGNIFICANT CHANGE UP
COLOR SPEC: SIGNIFICANT CHANGE UP
CREAT SERPL-MCNC: 0.77 MG/DL — SIGNIFICANT CHANGE UP (ref 0.5–1.3)
CREAT SERPL-MCNC: 0.77 MG/DL — SIGNIFICANT CHANGE UP (ref 0.5–1.3)
DIFF PNL FLD: NEGATIVE — SIGNIFICANT CHANGE UP
DIFF PNL FLD: NEGATIVE — SIGNIFICANT CHANGE UP
EGFR: 77 ML/MIN/1.73M2 — SIGNIFICANT CHANGE UP
EGFR: 77 ML/MIN/1.73M2 — SIGNIFICANT CHANGE UP
EOSINOPHIL # BLD AUTO: 0.01 K/UL — SIGNIFICANT CHANGE UP (ref 0–0.5)
EOSINOPHIL # BLD AUTO: 0.01 K/UL — SIGNIFICANT CHANGE UP (ref 0–0.5)
EOSINOPHIL NFR BLD AUTO: 0.1 % — SIGNIFICANT CHANGE UP (ref 0–6)
EOSINOPHIL NFR BLD AUTO: 0.1 % — SIGNIFICANT CHANGE UP (ref 0–6)
GLUCOSE SERPL-MCNC: 95 MG/DL — SIGNIFICANT CHANGE UP (ref 70–99)
GLUCOSE SERPL-MCNC: 95 MG/DL — SIGNIFICANT CHANGE UP (ref 70–99)
GLUCOSE UR QL: NEGATIVE MG/DL — SIGNIFICANT CHANGE UP
GLUCOSE UR QL: NEGATIVE MG/DL — SIGNIFICANT CHANGE UP
HCT VFR BLD CALC: 38.7 % — SIGNIFICANT CHANGE UP (ref 34.5–45)
HCT VFR BLD CALC: 38.7 % — SIGNIFICANT CHANGE UP (ref 34.5–45)
HGB BLD-MCNC: 12.5 G/DL — SIGNIFICANT CHANGE UP (ref 11.5–15.5)
HGB BLD-MCNC: 12.5 G/DL — SIGNIFICANT CHANGE UP (ref 11.5–15.5)
IMM GRANULOCYTES NFR BLD AUTO: 0.4 % — SIGNIFICANT CHANGE UP (ref 0–0.9)
IMM GRANULOCYTES NFR BLD AUTO: 0.4 % — SIGNIFICANT CHANGE UP (ref 0–0.9)
INR BLD: 1.05 — SIGNIFICANT CHANGE UP (ref 0.85–1.18)
INR BLD: 1.05 — SIGNIFICANT CHANGE UP (ref 0.85–1.18)
KETONES UR-MCNC: 15 MG/DL
KETONES UR-MCNC: 15 MG/DL
LEUKOCYTE ESTERASE UR-ACNC: ABNORMAL
LEUKOCYTE ESTERASE UR-ACNC: ABNORMAL
LYMPHOCYTES # BLD AUTO: 1.54 K/UL — SIGNIFICANT CHANGE UP (ref 1–3.3)
LYMPHOCYTES # BLD AUTO: 1.54 K/UL — SIGNIFICANT CHANGE UP (ref 1–3.3)
LYMPHOCYTES # BLD AUTO: 14.6 % — SIGNIFICANT CHANGE UP (ref 13–44)
LYMPHOCYTES # BLD AUTO: 14.6 % — SIGNIFICANT CHANGE UP (ref 13–44)
MCHC RBC-ENTMCNC: 31.6 PG — SIGNIFICANT CHANGE UP (ref 27–34)
MCHC RBC-ENTMCNC: 31.6 PG — SIGNIFICANT CHANGE UP (ref 27–34)
MCHC RBC-ENTMCNC: 32.3 GM/DL — SIGNIFICANT CHANGE UP (ref 32–36)
MCHC RBC-ENTMCNC: 32.3 GM/DL — SIGNIFICANT CHANGE UP (ref 32–36)
MCV RBC AUTO: 97.7 FL — SIGNIFICANT CHANGE UP (ref 80–100)
MCV RBC AUTO: 97.7 FL — SIGNIFICANT CHANGE UP (ref 80–100)
MONOCYTES # BLD AUTO: 0.77 K/UL — SIGNIFICANT CHANGE UP (ref 0–0.9)
MONOCYTES # BLD AUTO: 0.77 K/UL — SIGNIFICANT CHANGE UP (ref 0–0.9)
MONOCYTES NFR BLD AUTO: 7.3 % — SIGNIFICANT CHANGE UP (ref 2–14)
MONOCYTES NFR BLD AUTO: 7.3 % — SIGNIFICANT CHANGE UP (ref 2–14)
NEUTROPHILS # BLD AUTO: 8.14 K/UL — HIGH (ref 1.8–7.4)
NEUTROPHILS # BLD AUTO: 8.14 K/UL — HIGH (ref 1.8–7.4)
NEUTROPHILS NFR BLD AUTO: 77.1 % — HIGH (ref 43–77)
NEUTROPHILS NFR BLD AUTO: 77.1 % — HIGH (ref 43–77)
NITRITE UR-MCNC: NEGATIVE — SIGNIFICANT CHANGE UP
NITRITE UR-MCNC: NEGATIVE — SIGNIFICANT CHANGE UP
NRBC # BLD: 0 /100 WBCS — SIGNIFICANT CHANGE UP (ref 0–0)
NRBC # BLD: 0 /100 WBCS — SIGNIFICANT CHANGE UP (ref 0–0)
PH UR: 6 — SIGNIFICANT CHANGE UP (ref 5–8)
PH UR: 6 — SIGNIFICANT CHANGE UP (ref 5–8)
PLATELET # BLD AUTO: 199 K/UL — SIGNIFICANT CHANGE UP (ref 150–400)
PLATELET # BLD AUTO: 199 K/UL — SIGNIFICANT CHANGE UP (ref 150–400)
POTASSIUM SERPL-MCNC: 4.2 MMOL/L — SIGNIFICANT CHANGE UP (ref 3.5–5.3)
POTASSIUM SERPL-MCNC: 4.2 MMOL/L — SIGNIFICANT CHANGE UP (ref 3.5–5.3)
POTASSIUM SERPL-SCNC: 4.2 MMOL/L — SIGNIFICANT CHANGE UP (ref 3.5–5.3)
POTASSIUM SERPL-SCNC: 4.2 MMOL/L — SIGNIFICANT CHANGE UP (ref 3.5–5.3)
PROT SERPL-MCNC: 8 G/DL — SIGNIFICANT CHANGE UP (ref 6–8.3)
PROT SERPL-MCNC: 8 G/DL — SIGNIFICANT CHANGE UP (ref 6–8.3)
PROT UR-MCNC: 100 MG/DL
PROT UR-MCNC: 100 MG/DL
PROTHROM AB SERPL-ACNC: 11.9 SEC — SIGNIFICANT CHANGE UP (ref 9.5–13)
PROTHROM AB SERPL-ACNC: 11.9 SEC — SIGNIFICANT CHANGE UP (ref 9.5–13)
RBC # BLD: 3.96 M/UL — SIGNIFICANT CHANGE UP (ref 3.8–5.2)
RBC # BLD: 3.96 M/UL — SIGNIFICANT CHANGE UP (ref 3.8–5.2)
RBC # FLD: 12.7 % — SIGNIFICANT CHANGE UP (ref 10.3–14.5)
RBC # FLD: 12.7 % — SIGNIFICANT CHANGE UP (ref 10.3–14.5)
RBC CASTS # UR COMP ASSIST: 1 /HPF — SIGNIFICANT CHANGE UP (ref 0–4)
RBC CASTS # UR COMP ASSIST: 1 /HPF — SIGNIFICANT CHANGE UP (ref 0–4)
SODIUM SERPL-SCNC: 139 MMOL/L — SIGNIFICANT CHANGE UP (ref 135–145)
SODIUM SERPL-SCNC: 139 MMOL/L — SIGNIFICANT CHANGE UP (ref 135–145)
SP GR SPEC: 1.02 — SIGNIFICANT CHANGE UP (ref 1–1.03)
SP GR SPEC: 1.02 — SIGNIFICANT CHANGE UP (ref 1–1.03)
SQUAMOUS # UR AUTO: 1 /HPF — SIGNIFICANT CHANGE UP (ref 0–5)
SQUAMOUS # UR AUTO: 1 /HPF — SIGNIFICANT CHANGE UP (ref 0–5)
TROPONIN T, HIGH SENSITIVITY RESULT: 19 NG/L — SIGNIFICANT CHANGE UP (ref 0–51)
TROPONIN T, HIGH SENSITIVITY RESULT: 19 NG/L — SIGNIFICANT CHANGE UP (ref 0–51)
UROBILINOGEN FLD QL: 0.2 MG/DL — SIGNIFICANT CHANGE UP (ref 0.2–1)
UROBILINOGEN FLD QL: 0.2 MG/DL — SIGNIFICANT CHANGE UP (ref 0.2–1)
WBC # BLD: 10.55 K/UL — HIGH (ref 3.8–10.5)
WBC # BLD: 10.55 K/UL — HIGH (ref 3.8–10.5)
WBC # FLD AUTO: 10.55 K/UL — HIGH (ref 3.8–10.5)
WBC # FLD AUTO: 10.55 K/UL — HIGH (ref 3.8–10.5)
WBC UR QL: 1 /HPF — SIGNIFICANT CHANGE UP (ref 0–5)
WBC UR QL: 1 /HPF — SIGNIFICANT CHANGE UP (ref 0–5)

## 2023-12-14 PROCEDURE — 71275 CT ANGIOGRAPHY CHEST: CPT | Mod: 26,MA

## 2023-12-14 PROCEDURE — 71045 X-RAY EXAM CHEST 1 VIEW: CPT | Mod: 26

## 2023-12-14 PROCEDURE — 70450 CT HEAD/BRAIN W/O DYE: CPT | Mod: 26,MA

## 2023-12-14 PROCEDURE — 93010 ELECTROCARDIOGRAM REPORT: CPT

## 2023-12-14 PROCEDURE — 99223 1ST HOSP IP/OBS HIGH 75: CPT

## 2023-12-14 PROCEDURE — 99285 EMERGENCY DEPT VISIT HI MDM: CPT

## 2023-12-14 RX ORDER — DIPHENHYDRAMINE HCL 50 MG
50 CAPSULE ORAL ONCE
Refills: 0 | Status: COMPLETED | OUTPATIENT
Start: 2023-12-14 | End: 2023-12-14

## 2023-12-14 RX ORDER — CEFEPIME 1 G/1
2000 INJECTION, POWDER, FOR SOLUTION INTRAMUSCULAR; INTRAVENOUS ONCE
Refills: 0 | Status: DISCONTINUED | OUTPATIENT
Start: 2023-12-14 | End: 2023-12-14

## 2023-12-14 RX ORDER — LEVALBUTEROL 1.25 MG/.5ML
0.63 SOLUTION, CONCENTRATE RESPIRATORY (INHALATION) ONCE
Refills: 0 | Status: COMPLETED | OUTPATIENT
Start: 2023-12-14 | End: 2023-12-14

## 2023-12-14 RX ORDER — SODIUM CHLORIDE 9 MG/ML
500 INJECTION INTRAMUSCULAR; INTRAVENOUS; SUBCUTANEOUS ONCE
Refills: 0 | Status: COMPLETED | OUTPATIENT
Start: 2023-12-14 | End: 2023-12-14

## 2023-12-14 RX ORDER — CEFEPIME 1 G/1
INJECTION, POWDER, FOR SOLUTION INTRAMUSCULAR; INTRAVENOUS
Refills: 0 | Status: DISCONTINUED | OUTPATIENT
Start: 2023-12-14 | End: 2023-12-14

## 2023-12-14 RX ORDER — CLONAZEPAM 1 MG
0.25 TABLET ORAL ONCE
Refills: 0 | Status: DISCONTINUED | OUTPATIENT
Start: 2023-12-14 | End: 2023-12-14

## 2023-12-14 RX ORDER — CEFEPIME 1 G/1
2000 INJECTION, POWDER, FOR SOLUTION INTRAMUSCULAR; INTRAVENOUS EVERY 12 HOURS
Refills: 0 | Status: DISCONTINUED | OUTPATIENT
Start: 2023-12-14 | End: 2023-12-15

## 2023-12-14 RX ORDER — IPRATROPIUM/ALBUTEROL SULFATE 18-103MCG
3 AEROSOL WITH ADAPTER (GRAM) INHALATION ONCE
Refills: 0 | Status: DISCONTINUED | OUTPATIENT
Start: 2023-12-14 | End: 2023-12-14

## 2023-12-14 RX ORDER — HYDROCORTISONE 20 MG
200 TABLET ORAL ONCE
Refills: 0 | Status: COMPLETED | OUTPATIENT
Start: 2023-12-14 | End: 2023-12-14

## 2023-12-14 RX ADMIN — Medication 0.25 MILLIGRAM(S): at 20:29

## 2023-12-14 RX ADMIN — LEVALBUTEROL 0.63 MILLIGRAM(S): 1.25 SOLUTION, CONCENTRATE RESPIRATORY (INHALATION) at 18:54

## 2023-12-14 RX ADMIN — CEFEPIME 100 MILLIGRAM(S): 1 INJECTION, POWDER, FOR SOLUTION INTRAMUSCULAR; INTRAVENOUS at 23:42

## 2023-12-14 RX ADMIN — Medication 200 MILLIGRAM(S): at 16:52

## 2023-12-14 RX ADMIN — SODIUM CHLORIDE 500 MILLILITER(S): 9 INJECTION INTRAMUSCULAR; INTRAVENOUS; SUBCUTANEOUS at 16:30

## 2023-12-14 RX ADMIN — Medication 50 MILLIGRAM(S): at 19:35

## 2023-12-14 NOTE — CONSULT NOTE ADULT - SUBJECTIVE AND OBJECTIVE BOX
PULMONARY SERVICE INITIAL CONSULT NOTE    HPI: 82yo f, h/o AF (not on AC due to SAH), HTN, Lewy body dementia (baseline a/o x3), posterior reversible encephalopathy syndrome, chronic bronchiectasis on home O2 prn when SaO2 is < 88%, dCHF, CJo-Ann diff, who was bib daughter for weakness, difficulty getting up from ground, garbled speech noted around 3:30a today. Pt was also hypoxic at home this morning which did not improve with her home O2 (high 70s on room air, 85% on 2L). Pt has no prior sputum culture on file. Pulmonology consulted for acute hypoxic respiratory failure.      REVIEW OF SYSTEMS:  Constitutional: No fever, weight loss or fatigue  Eyes: No eye pain, visual disturbances, or discharge  ENMT:  No difficulty hearing, tinnitus, vertigo; No sinus or throat pain  Neck: No pain, stiffness or neck swelling  Respiratory: see HPI  Cardiovascular: No chest pain, palpitations, dizziness or leg swelling  Gastrointestinal: No abdominal or epigastric pain. No nausea, vomiting or hematemesis; No diarrhea or constipation. No melena or hematochezia.  Genitourinary: No dysuria, frequency, hematuria or incontinence  Neurological: No headaches, memory loss, loss of strength, numbness or tremors  Skin: No itching, burning, rashes or lesions   Lymph Nodes: No enlarged glands  Endocrine: No heat or cold intolerance; No hair loss  Musculoskeletal: No joint pain or swelling; No muscle, back or extremity pain  Psychiatric: No depression, anxiety, mood swings or difficulty sleeping  Heme/Lymph: No easy bruising or bleeding gums  Allergy and Immunologic: No hives or eczema    PAST MEDICAL & SURGICAL HISTORY:  Bronchiectasis      History of Pseudomonas pneumonia      HTN (hypertension)      Posterior reversible encephalopathy syndrome      Atrial fibrillation      Lewy body dementia      No significant past surgical history          FAMILY HISTORY:      SOCIAL HISTORY:  Smoking Status: [ ] Current, [ ] Former, [ ] Never  Pack Years:    MEDICATIONS:  Pulmonary:    Antimicrobials:    Anticoagulants:    Onc:    GI/:    Endocrine:    Cardiac:    Other Medications:      Allergies    IV Contrast (Unknown)  Levaquin (Swelling)  Augmentin (Short breath; Rash)  Ceclor (Rash)    Intolerances        Vital Signs Last 24 Hrs  T(C): 36.6 (14 Dec 2023 16:28), Max: 36.9 (14 Dec 2023 14:27)  T(F): 97.8 (14 Dec 2023 16:28), Max: 98.4 (14 Dec 2023 14:27)  HR: 75 (14 Dec 2023 16:28) (75 - 87)  BP: 135/78 (14 Dec 2023 16:28) (133/85 - 135/78)  BP(mean): --  RR: 18 (14 Dec 2023 16:28) (18 - 20)  SpO2: 94% (14 Dec 2023 16:28) (83% - 94%)    Parameters below as of 14 Dec 2023 16:28  Patient On (Oxygen Delivery Method): nasal cannula  O2 Flow (L/min): 4          PHYSICAL EXAM:  Constitutional: well-appearing  Head: NC/AT  EENT: PERRL, anicteric sclera; oropharynx clear, MMM  Neck: supple, no appreciable JVD  Respiratory: CTA B/L; no W/R/R  Cardiovascular: +S1/S2, RRR  Gastrointestinal: soft, NT/ND  Extremities: WWP; no edema, clubbing or cyanosis  Vascular: 2+ radial pulses B/L  Neurological: awake and alert; KAHN    LABS:      CBC Full  -  ( 14 Dec 2023 14:50 )  WBC Count : 10.55 K/uL  RBC Count : 3.96 M/uL  Hemoglobin : 12.5 g/dL  Hematocrit : 38.7 %  Platelet Count - Automated : 199 K/uL  Mean Cell Volume : 97.7 fl  Mean Cell Hemoglobin : 31.6 pg  Mean Cell Hemoglobin Concentration : 32.3 gm/dL  Auto Neutrophil # : 8.14 K/uL  Auto Lymphocyte # : 1.54 K/uL  Auto Monocyte # : 0.77 K/uL  Auto Eosinophil # : 0.01 K/uL  Auto Basophil # : 0.05 K/uL  Auto Neutrophil % : 77.1 %  Auto Lymphocyte % : 14.6 %  Auto Monocyte % : 7.3 %  Auto Eosinophil % : 0.1 %  Auto Basophil % : 0.5 %        139  |  102  |  25<H>  ----------------------------<  95  4.2   |  26  |  0.77    Ca    9.3      14 Dec 2023 14:50    TPro  8.0  /  Alb  3.6  /  TBili  0.8  /  DBili  x   /  AST  13  /  ALT  9<L>  /  AlkPhos  121<H>  12-14    PT/INR - ( 14 Dec 2023 14:50 )   PT: 11.9 sec;   INR: 1.05          PTT - ( 14 Dec 2023 14:50 )  PTT:27.9 sec      Urinalysis Basic - ( 14 Dec 2023 15:34 )    Color: Dark Yellow / Appearance: Clear / S.023 / pH: x  Gluc: x / Ketone: 15 mg/dL  / Bili: Negative / Urobili: 0.2 mg/dL   Blood: x / Protein: 100 mg/dL / Nitrite: Negative   Leuk Esterase: Trace / RBC: 1 /HPF / WBC 1 /HPF   Sq Epi: x / Non Sq Epi: 1 /HPF / Bacteria: Negative /HPF                RADIOLOGY & ADDITIONAL STUDIES:

## 2023-12-14 NOTE — H&P ADULT - ASSESSMENT
83F PMHx AF (not on AC due to SAH), HTN, Lewy body dementia (baseline a/o x3), posterior reversible encephalopathy syndrome, chronic bronchiectasis on home O2 prn, dCHF, C. diff, BIBEMS for stroke rule out and hypoxia; no acute stroke on CTH. Pulmonary consulted for AHRF, possibly 2/2 viral vs bacterial PNA given h/o chronic bronchiectasis. Pt admitted for AHRF being treated for possible PNA, pending further w/u.

## 2023-12-14 NOTE — CONSULT NOTE ADULT - SUBJECTIVE AND OBJECTIVE BOX
**STROKE CODE CONSULT NOTE**    Last known well time/Time of onset of symptoms: 12/14 1:30AM    HPI: 83y Female with PMHx of afib (not on AC due to SAH), HTN, Lewy body dementia, posterior reversible encephalopathy syndrome, chronic bronchiectasis on home O2, dCHF presents after sliding out of bed when trying to ambulate to the bathroom in the middle of the night and AMS. Per daughter at bedside, patient went to bed around 1:30 am and around 3:30am when daughter went to check in on patient, found her curled up on the floor. Per daughter, today patient is not as energetic nor interactive as usual. She has been complaining of chronic pain for months and was scheduled for a spinal epidural today. /85. NIHSS ***. No           T(C): 36.9 (12-14-23 @ 14:27), Max: 36.9 (12-14-23 @ 14:27)  HR: 87 (12-14-23 @ 14:27) (87 - 87)  BP: 133/85 (12-14-23 @ 14:27) (133/85 - 133/85)  RR: 20 (12-14-23 @ 14:27) (20 - 20)  SpO2: 83% (12-14-23 @ 14:27) (83% - 83%)    PAST MEDICAL & SURGICAL HISTORY:  Bronchiectasis      History of Pseudomonas pneumonia      HTN (hypertension)      Posterior reversible encephalopathy syndrome      Atrial fibrillation      Lewy body dementia      No significant past surgical history          FAMILY HISTORY:      SOCIAL HISTORY:   Patient lives with *** at ***.   Smoking status:  Drinking:  Drug Use:     ROS: ***  Constitutional: No fever, weight loss or fatigue  Eyes: No eye pain, visual disturbances, or discharge  ENMT:  No difficulty hearing, tinnitus; No sinus or throat pain  Neck: No pain or stiffness  Respiratory: No cough, wheezing, chills or hemoptysis  Cardiovascular: No chest pain, palpitations, shortness of breath, or leg swelling  Gastrointestinal: No abdominal pain. No nausea, vomiting or hematemesis; No diarrhea or constipation. Nohematochezia.  Genitourinary: No dysuria, frequency, hematuria or incontinence  Neurological: As per HPI  Skin: No itching, burning, rashes or lesions   Endocrine: No heat or cold intolerance; No hair loss  Musculoskeletal: No joint pain or swelling; No muscle, back or extremity pain  Heme/Lymph: No easy bruising or bleeding gums    MEDICATIONS  (STANDING):    MEDICATIONS  (PRN):    Allergies    IV Contrast (Unknown)  Levaquin (Swelling)  Augmentin (Short breath; Rash)  Ceclor (Rash)    Intolerances      Vital Signs Last 24 Hrs  T(C): 36.9 (14 Dec 2023 14:27), Max: 36.9 (14 Dec 2023 14:27)  T(F): 98.4 (14 Dec 2023 14:27), Max: 98.4 (14 Dec 2023 14:27)  HR: 87 (14 Dec 2023 14:27) (87 - 87)  BP: 133/85 (14 Dec 2023 14:27) (133/85 - 133/85)  BP(mean): --  RR: 20 (14 Dec 2023 14:27) (20 - 20)  SpO2: 83% (14 Dec 2023 14:27) (83% - 83%)    Parameters below as of 14 Dec 2023 14:27  Patient On (Oxygen Delivery Method): room air        Physical exam:  Constitutional: No acute distress, conversant  Eyes: Anicteric sclerae, moist conjunctivae, see below for CNs  Neck: trachea midline, FROM, supple, no thyromegaly or lymphadenopathy  Cardiovascular: Regular rate and rhythm, no murmurs, rubs, or gallops. No carotid bruits.   Pulmonary: Anterior breath sounds clear bilaterally, no crackles or wheezing. No use of accessory muscles  GI: Abdomen soft, non-distended, non-tender  Extremities: Radial and DP pulses +2, no edema    Neurologic:  -Mental status: Awake, alert, oriented to person, place, and time. Speech is fluent with intact naming, repetition, and comprehension, no dysarthria. Recent and remote memory intact. Follows commands. Attention/concentration intact. Fund of knowledge appropriate.  -Cranial nerves:   II: Visual fields are full to confrontation.  III, IV, VI: Extraocular movements are intact without nystagmus. Pupils equally round and reactive to light  V:  Facial sensation V1-V3 equal and intact   VII: Face is symmetric with normal eye closure and smile  VIII: Hearing is bilaterally intact to finger rub  IX, X: Uvula is midline and soft palate rises symmetrically  XI: Head turning and shoulder shrug are intact.  XII: Tongue protrudes midline  Motor: Normal bulk and tone. No pronator drift. Strength bilateral upper extremity 5/5, bilateral lower extremities 5/5.  Rapid alternating movements intact and symmetric  Sensation: Intact to light touch bilaterally. No neglect or extinction on double simultaneous testing.  Coordination: No dysmetria on finger-to-nose and heel-to-shin bilaterally  Reflexes: Downgoing toes bilaterally   Gait: Narrow gait and steady    NIHSS: **** ASPECT Score: ***** ICH Score: ****** (GCS)    Fingerstick Blood Glucose: CAPILLARY BLOOD GLUCOSE      POCT Blood Glucose.: 93 mg/dL (14 Dec 2023 14:22)    LABS:                        12.5   10.55 )-----------( 199      ( 14 Dec 2023 14:50 )             38.7     12-14    139  |  102  |  25<H>  ----------------------------<  95  4.2   |  26  |  0.77    Ca    9.3      14 Dec 2023 14:50    TPro  8.0  /  Alb  3.6  /  TBili  0.8  /  DBili  x   /  AST  13  /  ALT  9<L>  /  AlkPhos  121<H>  12-14    PT/INR - ( 14 Dec 2023 14:50 )   PT: 11.9 sec;   INR: 1.05          PTT - ( 14 Dec 2023 14:50 )  PTT:27.9 sec      Urinalysis Basic - ( 14 Dec 2023 14:50 )    Color: x / Appearance: x / SG: x / pH: x  Gluc: 95 mg/dL / Ketone: x  / Bili: x / Urobili: x   Blood: x / Protein: x / Nitrite: x   Leuk Esterase: x / RBC: x / WBC x   Sq Epi: x / Non Sq Epi: x / Bacteria: x        RADIOLOGY & ADDITIONAL STUDIES:      -----------------------------------------------------------------------------------------------------------------  IV-tenectaplase (Y/N):    ***                              Bolus time:    Tenectaplase Dose Verification w/ RN:  Reason IV-tenectaplase not given: ***    **STROKE CODE CONSULT NOTE**    Last known well time/Time of onset of symptoms: 12/14 1:30AM    HPI: 83y Female with PMHx of afib (not on AC due to SAH), HTN, Lewy body dementia, posterior reversible encephalopathy syndrome, chronic bronchiectasis on home O2, dCHF presents after sliding out of bed when trying to ambulate to the bathroom in the middle of the night and AMS. Per daughter at bedside, patient went to bed around 1:30 am and around 3:30am when daughter went to check in on patient, found her curled up on the floor, with garbled speech and not as responsive. Patient was also desating to 83%, baseline is 88%. Per daughter, today patient is not as energetic nor interactive as usual. She has been complaining of chronic pain for months and was scheduled for a spinal epidural today. /85. NIHSS 0. CTH/CTA/CTP with no significant findings; chronic infarcts seen on prior scans.          T(C): 36.9 (12-14-23 @ 14:27), Max: 36.9 (12-14-23 @ 14:27)  HR: 87 (12-14-23 @ 14:27) (87 - 87)  BP: 133/85 (12-14-23 @ 14:27) (133/85 - 133/85)  RR: 20 (12-14-23 @ 14:27) (20 - 20)  SpO2: 83% (12-14-23 @ 14:27) (83% - 83%)    PAST MEDICAL & SURGICAL HISTORY:  Bronchiectasis      History of Pseudomonas pneumonia      HTN (hypertension)      Posterior reversible encephalopathy syndrome      Atrial fibrillation      Lewy body dementia      No significant past surgical history          FAMILY HISTORY:      SOCIAL HISTORY:       ROS:   Constitutional: No fever, weight loss or fatigue  Eyes: No eye pain, visual disturbances, or discharge  ENMT:  No difficulty hearing, tinnitus; No sinus or throat pain  Neck: No pain or stiffness  Respiratory: +cough  Cardiovascular: No chest pain, palpitations, shortness of breath, or leg swelling  Gastrointestinal: No abdominal pain. No nausea, vomiting or hematemesis; No diarrhea or constipation. No hematochezia.  Genitourinary: No dysuria, frequency, hematuria or incontinence  Neurological: As per HPI      MEDICATIONS  (STANDING):    MEDICATIONS  (PRN):    Allergies    IV Contrast (Unknown)  Levaquin (Swelling)  Augmentin (Short breath; Rash)  Ceclor (Rash)    Intolerances      Vital Signs Last 24 Hrs  T(C): 36.9 (14 Dec 2023 14:27), Max: 36.9 (14 Dec 2023 14:27)  T(F): 98.4 (14 Dec 2023 14:27), Max: 98.4 (14 Dec 2023 14:27)  HR: 87 (14 Dec 2023 14:27) (87 - 87)  BP: 133/85 (14 Dec 2023 14:27) (133/85 - 133/85)  BP(mean): --  RR: 20 (14 Dec 2023 14:27) (20 - 20)  SpO2: 83% (14 Dec 2023 14:27) (83% - 83%)    Parameters below as of 14 Dec 2023 14:27  Patient On (Oxygen Delivery Method): room air        Physical exam:  Constitutional: No acute distress, conversant  Eyes: Anicteric sclerae, moist conjunctivae, see below for CNs  Neck: trachea midline, FROM, supple, no thyromegaly or lymphadenopathy  Cardiovascular: Regular rate and rhythm, no murmurs, rubs, or gallops. No carotid bruits.   Pulmonary: Anterior breath sounds clear bilaterally, no crackles or wheezing. No use of accessory muscles  GI: Abdomen soft, non-distended, non-tender  Extremities: Radial and DP pulses +2, no edema    Neurologic:  -Mental status: Awake, alert, oriented to person, place, and month (not  year). Speech is fluent with intact naming, repetition, and comprehension, no dysarthria. Follows simple commands. Attention/concentration intact.   -Cranial nerves:   II: Visual fields are full to confrontation.  III, IV, VI: Extraocular movements are intact without nystagmus. Pupils equally round and reactive to light  V:  Facial sensation V1-V3 equal and intact   VII: Face is symmetric with normal eye closure and smile  VIII: Hearing is bilaterally intact   Motor: No pronator drift. Strength bilateral upper extremity at least 3/5, bilateral lower extremities at least 3/5.  Sensation: Intact to light touch bilaterally. No neglect or extinction on double simultaneous testing.  Coordination: No dysmetria on finger-to-nose bilaterally    NIHSS: 0    Fingerstick Blood Glucose: CAPILLARY BLOOD GLUCOSE      POCT Blood Glucose.: 93 mg/dL (14 Dec 2023 14:22)    LABS:                        12.5   10.55 )-----------( 199      ( 14 Dec 2023 14:50 )             38.7     12-14    139  |  102  |  25<H>  ----------------------------<  95  4.2   |  26  |  0.77    Ca    9.3      14 Dec 2023 14:50    TPro  8.0  /  Alb  3.6  /  TBili  0.8  /  DBili  x   /  AST  13  /  ALT  9<L>  /  AlkPhos  121<H>  12-14    PT/INR - ( 14 Dec 2023 14:50 )   PT: 11.9 sec;   INR: 1.05          PTT - ( 14 Dec 2023 14:50 )  PTT:27.9 sec      Urinalysis Basic - ( 14 Dec 2023 14:50 )    Color: x / Appearance: x / SG: x / pH: x  Gluc: 95 mg/dL / Ketone: x  / Bili: x / Urobili: x   Blood: x / Protein: x / Nitrite: x   Leuk Esterase: x / RBC: x / WBC x   Sq Epi: x / Non Sq Epi: x / Bacteria: x        RADIOLOGY & ADDITIONAL STUDIES:  < from: CT Brain Stroke Protocol (12.14.23 @ 14:35) >  Impression: No acute intracranial injury. Moderate microvascular disease.   Tiny chronic bilateral cerebellar infarcts. Chronic lacunar infarcts.    < end of copied text >      -----------------------------------------------------------------------------------------------------------------  IV-tenectaplase (Y/N):   no                             Bolus time:    Tenectaplase Dose Verification w/ RN:  Reason IV-tenectaplase not given: out of window    **STROKE CODE CONSULT NOTE**    Last known well time/Time of onset of symptoms: 12/14 1:30AM    HPI: 83y Female with PMHx of afib (not on AC due to SAH), HTN, Lewy body dementia, posterior reversible encephalopathy syndrome, chronic bronchiectasis on home O2, dCHF presents after sliding out of bed when trying to ambulate to the bathroom in the middle of the night and AMS. Per daughter at bedside, patient went to bed around 1:30 am and around 3:30am when daughter went to check in on patient, found her curled up on the floor, with garbled speech and not as responsive. Patient was also desating to 83%, baseline is 88%. Per daughter, today patient is not as energetic nor interactive as usual. She has been complaining of chronic pain for months and was scheduled for a spinal epidural today. /85. NIHSS 0. CTH/CTA/CTP with no significant findings; chronic infarcts seen on prior scans.      T(C): 36.9 (12-14-23 @ 14:27), Max: 36.9 (12-14-23 @ 14:27)  HR: 87 (12-14-23 @ 14:27) (87 - 87)  BP: 133/85 (12-14-23 @ 14:27) (133/85 - 133/85)  RR: 20 (12-14-23 @ 14:27) (20 - 20)  SpO2: 83% (12-14-23 @ 14:27) (83% - 83%)    PAST MEDICAL & SURGICAL HISTORY:  Bronchiectasis      History of Pseudomonas pneumonia      HTN (hypertension)      Posterior reversible encephalopathy syndrome      Atrial fibrillation      Lewy body dementia      No significant past surgical history          FAMILY HISTORY:      SOCIAL HISTORY:       ROS:   Constitutional: No fever, weight loss or fatigue  Eyes: No eye pain, visual disturbances, or discharge  ENMT:  No difficulty hearing, tinnitus; No sinus or throat pain  Neck: No pain or stiffness  Respiratory: +cough  Cardiovascular: No chest pain, palpitations, shortness of breath, or leg swelling  Gastrointestinal: No abdominal pain. No nausea, vomiting or hematemesis; No diarrhea or constipation. No hematochezia.  Genitourinary: No dysuria, frequency, hematuria or incontinence  Neurological: As per HPI      MEDICATIONS  (STANDING):    MEDICATIONS  (PRN):    Allergies    IV Contrast (Unknown)  Levaquin (Swelling)  Augmentin (Short breath; Rash)  Ceclor (Rash)    Intolerances      Vital Signs Last 24 Hrs  T(C): 36.9 (14 Dec 2023 14:27), Max: 36.9 (14 Dec 2023 14:27)  T(F): 98.4 (14 Dec 2023 14:27), Max: 98.4 (14 Dec 2023 14:27)  HR: 87 (14 Dec 2023 14:27) (87 - 87)  BP: 133/85 (14 Dec 2023 14:27) (133/85 - 133/85)  BP(mean): --  RR: 20 (14 Dec 2023 14:27) (20 - 20)  SpO2: 83% (14 Dec 2023 14:27) (83% - 83%)    Parameters below as of 14 Dec 2023 14:27  Patient On (Oxygen Delivery Method): room air        Physical exam:  Constitutional: No acute distress, conversant  Eyes: Anicteric sclerae, moist conjunctivae, see below for CNs  Neck: trachea midline, FROM, supple, no thyromegaly or lymphadenopathy  Cardiovascular: Regular rate and rhythm, no murmurs, rubs, or gallops. No carotid bruits.   Pulmonary: Anterior breath sounds clear bilaterally, no crackles or wheezing. No use of accessory muscles  GI: Abdomen soft, non-distended, non-tender  Extremities: Radial and DP pulses +2, no edema    Neurologic:  -Mental status: Awake, alert, oriented to person, place, and month (not  year). Speech is fluent with intact naming, repetition, and comprehension, no dysarthria. Follows simple commands. Attention/concentration intact.   -Cranial nerves:   II: Visual fields are full to confrontation.  III, IV, VI: Extraocular movements are intact without nystagmus. Pupils equally round and reactive to light  V:  Facial sensation V1-V3 equal and intact   VII: Face is symmetric with normal eye closure and smile  VIII: Hearing is bilaterally intact   Motor: No pronator drift. Strength bilateral upper extremity at least 3/5, bilateral lower extremities at least 3/5.  Sensation: Intact to light touch bilaterally. No neglect or extinction on double simultaneous testing.  Coordination: No dysmetria on finger-to-nose bilaterally    NIHSS: 0    Fingerstick Blood Glucose: CAPILLARY BLOOD GLUCOSE      POCT Blood Glucose.: 93 mg/dL (14 Dec 2023 14:22)    LABS:                        12.5   10.55 )-----------( 199      ( 14 Dec 2023 14:50 )             38.7     12-14    139  |  102  |  25<H>  ----------------------------<  95  4.2   |  26  |  0.77    Ca    9.3      14 Dec 2023 14:50    TPro  8.0  /  Alb  3.6  /  TBili  0.8  /  DBili  x   /  AST  13  /  ALT  9<L>  /  AlkPhos  121<H>  12-14    PT/INR - ( 14 Dec 2023 14:50 )   PT: 11.9 sec;   INR: 1.05          PTT - ( 14 Dec 2023 14:50 )  PTT:27.9 sec      Urinalysis Basic - ( 14 Dec 2023 14:50 )    Color: x / Appearance: x / SG: x / pH: x  Gluc: 95 mg/dL / Ketone: x  / Bili: x / Urobili: x   Blood: x / Protein: x / Nitrite: x   Leuk Esterase: x / RBC: x / WBC x   Sq Epi: x / Non Sq Epi: x / Bacteria: x        RADIOLOGY & ADDITIONAL STUDIES:  < from: CT Brain Stroke Protocol (12.14.23 @ 14:35) >  Impression: No acute intracranial injury. Moderate microvascular disease.   Tiny chronic bilateral cerebellar infarcts. Chronic lacunar infarcts.    < end of copied text >      -----------------------------------------------------------------------------------------------------------------  IV-tenectaplase (Y/N):   no                             Bolus time:    Tenectaplase Dose Verification w/ RN:  Reason IV-tenectaplase not given: out of window

## 2023-12-14 NOTE — H&P ADULT - PROBLEM SELECTOR PLAN 5
Diffuse LBP since May. Tried muscle relaxants (ineffective) and tramadol (caused hallucinations). Currently being managed with gabapentin 300 TID. See pain management OP; unclear etio but suspect shingles.     - start with gabapentin 100 TID and uptitrate as needed  - avoid opioids and muscle relaxants Normotensive for age on arrival, 133/85. Takes amlodipine 5 qd     - c/w amlodipine 5 qd

## 2023-12-14 NOTE — H&P ADULT - ATTENDING COMMENTS
82 yo F with PMHx AFib (off AC i/s/o SAH), HTN, Lewy body dementia, PRES, chronic bronchiectasis (home O2 PRN), recent hospitalization for diastolic CHF exacerbation c/b C. diff colitis (11/3-11/7) BIBEMS from home accompanied by daughter after being found down, minimally responsive at home back to baseline at time of ED presentation found to have acute hypoxic respiratory failure 2/2 suspected bronchiectasis exacerbation admitted for further management.      #Acute hypoxic respiratory failure 2/2 bronchiectasis – O2 sat mid 80s on room air, improved on 3-4L NC in ED. Remainder of VSS. CTA Chest in ED showing no PE but does demonstrate bilaterally increased mucous plugging, particularly in RUL and new consolidative opacities in RUL>ALCON. Pulmonary consulted in ED given hx of bronchiectasis on supplemental PRN. Recommending Duonebs with Aeronika for airway clearance, chest PT. Continue Vancomycin/Zosyn. F/U MRSA PCR, RVP/COVID PCR, sputum cx.      #Altered mental status – Brief period of altered mental status, minimally responsive at home however resolved and back to baseline by time of ER arrival. Stroke code called however CT brain stroke showing no acute findings. Re-demonstrating known moderate microvascular disease, chronic bilateral cerebellar and lacunar infarcts. No further recommendations from stroke-neurology. Troponin T negative x1. No prodromal sx or cardiac complaints. Low suspicion for cardiac event or seizure. Differential for episode includes possibly in setting of hypoxia. Neuro checks. Repeat CTH for acute change in mental status observed while admitted.      Agree with remainder of resident plan as above. 84 yo F with PMHx AFib (off AC i/s/o SAH), HTN, Lewy body dementia, PRES, chronic bronchiectasis (home O2 PRN), recent hospitalization for diastolic CHF exacerbation c/b C. diff colitis (11/3-11/7) BIBEMS from home accompanied by daughter after being found down, minimally responsive at home back to baseline at time of ED presentation found to have acute hypoxic respiratory failure 2/2 suspected bronchiectasis exacerbation admitted for further management.      #Acute hypoxic respiratory failure 2/2 bronchiectasis – O2 sat mid 80s on room air, improved on 3-4L NC in ED. Remainder of VSS. CTA Chest in ED showing no PE but does demonstrate bilaterally increased mucous plugging, particularly in RUL and new consolidative opacities in RUL>ALCON. Pulmonary consulted in ED given hx of bronchiectasis on supplemental PRN. Recommending Duonebs with Aeronika for airway clearance, chest PT. Continue Vancomycin/Zosyn. F/U MRSA PCR, RVP/COVID PCR, sputum cx.      #Altered mental status – Brief period of altered mental status, minimally responsive at home however resolved and back to baseline by time of ER arrival. Stroke code called however CT brain stroke showing no acute findings. Re-demonstrating known moderate microvascular disease, chronic bilateral cerebellar and lacunar infarcts. No further recommendations from stroke-neurology. Troponin T negative x1. No prodromal sx or cardiac complaints. Low suspicion for cardiac event or seizure. Differential for episode includes possibly in setting of hypoxia. Neuro checks. Repeat CTH for acute change in mental status observed while admitted.      Agree with remainder of resident plan as above.

## 2023-12-14 NOTE — H&P ADULT - PROBLEM SELECTOR PLAN 3
H/o bronchiectasis likely predisposing pt to mucus plugging and infection    - see plan above   - c/w Singular 10mg qd H/o Afib, not on Eliquis d/t SAH. Takes lopressor 50 BID. EKG showing NSR.    - c/w lopressor 50 BID

## 2023-12-14 NOTE — H&P ADULT - PROBLEM SELECTOR PLAN 4
Normotensive for age on arrival, 133/85. Takes amlodipine 5 qd     - c/w amlodipine 5 qd H/o bronchiectasis likely predisposing pt to mucus plugging and infection    - see plan above   - c/w Singular 10mg qd

## 2023-12-14 NOTE — H&P ADULT - NSHPLABSRESULTS_GEN_ALL_CORE
LABS:                        12.5   10.55 )-----------( 199      ( 14 Dec 2023 14:50 )             38.7     12-14    139  |  102  |  25<H>  ----------------------------<  95  4.2   |  26  |  0.77    Ca    9.3      14 Dec 2023 14:50    TPro  8.0  /  Alb  3.6  /  TBili  0.8  /  DBili  x   /  AST  13  /  ALT  9<L>  /  AlkPhos  121<H>  12-14    PT/INR - ( 14 Dec 2023 14:50 )   PT: 11.9 sec;   INR: 1.05          PTT - ( 14 Dec 2023 14:50 )  PTT:27.9 sec  Fingerstick  glucose: POCT Blood Glucose.: 93 mg/dL (14 Dec 2023 14:22)        RADIOLOGY & ADDITIONAL TESTS: Reviewed.

## 2023-12-14 NOTE — ED PROVIDER NOTE - PHYSICAL EXAMINATION
VITAL SIGNS: I have reviewed nursing notes and confirm.  CONSTITUTIONAL: Well appearing, in no acute distress.   SKIN:  warm and dry, no acute rash.   HEAD:  normocephalic, atraumatic.  EYES: EOM intact; conjunctiva and sclera clear.  ENT: No nasal discharge; airway clear.   NECK: Supple.  CARD: S1, S2, Regular rate and rhythm.   RESP:  + rales b/l.   ABD: Normal bowel sounds; soft; non-distended; non-tender; no guarding/ rebound.  EXT: Normal ROM. No peripheral edema. Pulses intact and equal b/l.  NEURO: Alert, oriented, at baseline mental status as per daughter. CN grossly intact, no apparent droop. Speech clear. Motor/ sensation intact and equal b/l. Neg pronator drift.

## 2023-12-14 NOTE — ED ADULT TRIAGE NOTE - HEIGHT IN CM
Nicholas Givens 1957     Office/Outpatient Visit    Visit Date: Wed, Sep 26, 2018 11:12 am    Provider: Buck Aparicio MD (Assistant: Araceli Interiano MA)    Location: [facility]        Electronically signed by Buck Aparicio MD on  09/26/2018 12:49:19 PM                             SUBJECTIVE:        PMH/FMH/SH:     Last Reviewed on 9/20/2018 11:43 AM by Buck Aparicio    Past Medical History:             PAST MEDICAL HISTORY         Type 2 Diabetes    Hyperlipidemia    Hypertension    Hypothyroidism     Laryngeal Cancer         PREVENTIVE HEALTH MAINTENANCE             COLORECTAL CANCER SCREENING: Up to date (colonoscopy q10y; sigmoidoscopy q5y; Cologuard q3y) was last done 11/2009, Results are in chart; colonoscopy with normal results; hemorrhoids     EYE EXAM: Diabetic Eye Exam during this calendar year and results are in chart was last done 08/2018         Family History:         Positive for Coronary Artery Disease ( brother ), Hypertension ( father; mother ) and Myocardial Infarction ( father ).      Positive for Asthma ( sister ).      Positive for Type 2 Diabetes ( mother ).          Social History:     Occupation: a construction     Marital Status: Single         Tobacco/Alcohol/Supplements:     Last Reviewed on 9/20/2018 11:43 AM by Buck Aparicio    Tobacco: He has a past history of cigarette smoking; quit date:  10/06.      Caffeine:  He admits to consuming caffeine via tea ( 2 servings per day ).          Substance Abuse History:     Last Reviewed on 9/20/2018 11:43 AM by Buck Aparicio        Marijuana: Current use.          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         Current Medications:     Last Reviewed on 9/20/2018 11:43 AM by Buck Aparicio    Lisinopril/Hydrochlorothiazide 10mg/12.5mg Tablet Take 1 tablet(s) by mouth daily     Simvastatin 80mg Tablet Take 1 table by mouth at bedtime     Levemir FlexTouch 100units/1ml Injection Inject 40 units qhs E11.9     Glimepiride 4mg Tablet One po bid     Lancet   Lancet Check blood once daily as directed for ONE TOUCH ULTRA  DIAG E11.9     Levothyroxine Sodium 50mcg Capsules 1 p.o. daily     Metformin HCl 1,000mg Tablets, Extended Release Take one PO BID     Victoza 3-Hubert 18mg/3ml Injection Inject 1.2 mg daily     Aspirin (ASA) 325mg Caplet One a day             165.1

## 2023-12-14 NOTE — STROKE CODE NOTE - NS SC NIH INTUBATED_GEN_A_CORE
GOAL: Patient will demonstrate a 1/3 increase in strength where deficient within 4 weeks to assist with performing functional mobility and ADLs. No

## 2023-12-14 NOTE — H&P ADULT - HISTORY OF PRESENT ILLNESS
83F PMHx AF (not on AC due to SAH), HTN, Lewy body dementia (baseline a/o x3), posterior reversible encephalopathy syndrome, chronic bronchiectasis on home O2 prn when SaO2 is < 88%, dCHF, C. diff, who was bib daughter for weakness, difficulty getting up from ground, garbled speech noted around 3:30a today. Last known normal when she went to bed at 1:30a. 83F PMHx AF (not on AC due to SAH), HTN, Lewy body dementia (baseline a/o x3), posterior reversible encephalopathy syndrome, chronic bronchiectasis on home O2 prn when SaO2 is < 88%, dCHF, ERIC diff, who was bib daughter for weakness, difficulty getting up from ground, garbled speech noted around 3:30a today. Last known normal when she went to bed at 1:30a.    In the ED:  Initial vital signs: T: 98.4F, HR: 87, BP: 133/85, R: 20, SpO2: 83% on RA -> 94% on 4L NC  Labs: significant for WBC 10.55, pro , UA negative. Lytes wnl.   Imaging:  CXR: increased vascular markings, similar to prior 11/3/23  CTH: No acute intracranial injury. Moderate microvascular disease. Tiny chronic bilateral cerebellar infarcts. Chronic lacunar infarcts.  CTA chest: pending read  EKG: NSR  Medications:   Consults: none  83F PMHx AF (not on AC due to SAH), HTN, Lewy body dementia (baseline a/o x3), posterior reversible encephalopathy syndrome, chronic bronchiectasis on home O2 prn when SaO2 is < 88%, dCHF, ERIC diff, who was bib daughter for weakness, difficulty getting up from ground, garbled speech noted around 3:30a today. Last known normal when she went to bed at 1:30a.    In the ED:  Initial vital signs: T: 98.4F, HR: 87, BP: 133/85, R: 20, SpO2: 83% on RA -> 94% on 4L NC  Labs: significant for WBC 10.55, pro , UA negative. Lytes wnl.   Imaging:  CXR: increased vascular markings, similar to prior 11/3/23  CTH: No acute intracranial injury. Moderate microvascular disease. Tiny chronic bilateral cerebellar infarcts. Chronic lacunar infarcts.  CTA chest: pending read  EKG: NSR  Medications: cefepime 2g IVPB x1, clonazepam 0.25mg x1, benadryl 50mg IVP x1, hydrocortisone 200mg IVP x1, levalbuterol inh x1, NS 500cc x1  Consults: Pulmonary, Neurology  83F PMHx AF (not on AC due to SAH), HTN, Lewy body dementia (baseline a/o x3), posterior reversible encephalopathy syndrome, chronic bronchiectasis on home O2 prn when SaO2 is < 88%, dCHF, C. diff, BIBEMS daughter for weakness, difficulty getting up from ground, garbled speech noted around 3:30a today. Last known normal when she went to bed at 1:30a. Per daughter, patient was more fatigued than usual on 12/13, sleeping most of the day with poor PO intake. On 12/14, patient continued to be lethargic. Did not check O2 level at home until 1pm this afternoon, where it was in 70's. Daughter (Patience) states no other sxs as pt became more lethargic; denies cough, fever, GI upset, dyspnea, CP.     In the ED:  Initial vital signs: T: 98.4F, HR: 87, BP: 133/85, R: 20, SpO2: 83% on RA -> 94% on 4L NC  Labs: significant for WBC 10.55, pro , UA negative. Lytes wnl.   Imaging:  CXR: increased vascular markings, similar to prior 11/3/23  CTH: No acute intracranial injury. Moderate microvascular disease. Tiny chronic bilateral cerebellar infarcts. Chronic lacunar infarcts.  CTA chest:  Increased mucus plugging bilaterally, more pronounced in the right upper lobe. Findings suggest infectious/inflammatory processes, including ABPA. New consolidative opacities in the right greater than left upper lobes. Small bilateral pleural effusions, new on the right and increased on the left.  EKG: NSR  Medications: cefepime 2g IVPB x1, clonazepam 0.25mg x1, benadryl 50mg IVP x1, hydrocortisone 200mg IVP x1, levalbuterol inh x1, NS 500cc x1  Consults: Pulmonary, Neurology

## 2023-12-14 NOTE — H&P ADULT - PROBLEM SELECTOR PLAN 1
On arrival O2 sat 84% on RA, improved to 94% on 4L NC. Reportedly hypoxic to 70's at home PTA. Uses O2 at home PRN when <88%. Has been more lethargic x2 days, possibly d/t hypoxia which is likely 2/2 mucus plugging iso possible PNA vs ABPA. Pulmonary consulted in ED.     - f/u Pulm recs  - duonebs BID with aerobika for airway clearance after each duoneb  -chest PT  -send sputum culture  -RVP  -f/u CTA PE - if no infiltrates seen can manage with airway clearance; if infiltrates seen would cover pseudomonas given that pt was in hospital receiving abx in past 90 days On arrival O2 sat 84% on RA, improved to 94% on 4L NC. Reportedly hypoxic to 70's at home PTA. Uses O2 at home PRN when <88%. Has been more lethargic x2 days, possibly d/t hypoxia which is likely 2/2 mucus plugging iso possible PNA vs ABPA. Pulmonary consulted in ED.     - f/u Pulm recs  - start pseudomonal Zosyn + Vanc given that pt was in hospital receiving abx in past 90 days  - f/u MRSA swab; if negative, d/c Vanc  - duonebs BID with aerobika for airway clearance after each duoneb  - chest PT  - f/u sputum culture, RVP

## 2023-12-14 NOTE — H&P ADULT - PROBLEM SELECTOR PLAN 8
F: s/p NS 500cc x1  E: replete as needed  N: NPO until MS improves   GI: none   DVT: none  Code: Full with trial of intubation   Dispo: [facility] F: s/p NS 500cc x1  E: replete as needed  N: NPO until MS improves   GI: none   DVT: none  Code: Full with trial of intubation   Dispo: [facility]

## 2023-12-14 NOTE — ED PROVIDER NOTE - OBJECTIVE STATEMENT
Pt is an 84yo f, h/o AF (not on AC due to SAH), HTN, Lewy body dementia (baseline a/o x3), posterior reversible encephalopathy syndrome, chronic bronchiectasis on home O2 prn when SaO2 is < 88%, dCHF, C. diff, who was bib daughter for weakness, difficulty getting up from ground, garbled speech noted around 3:30a today. Last known normal when she went to bed at 1:30a. Pt called out to daughter at 3:30a at which time she found pt on the ground and was having difficulty getting up. Pt denied any head trauma/ loc but had accidentally slid out of bed. SaO2 noted to be low as per daughter. Pt has chronic back pain 2/2 spinal stenosis and was scheduled for epidural injection today. + chronic cough prod green sputum which is unchanged. No f/c. + chronic sob and chest pressure 2/2 bronchiectasis, also unchanged. No abd pain, vomiting, diarrhea, urinary sx's, leg swelling... No focal numbness, tingling, weakness. Pt is on gabapentin 300mg tid for chronic back pain. Pt is an 82yo f, h/o AF (not on AC due to SAH), HTN, Lewy body dementia (baseline a/o x3), posterior reversible encephalopathy syndrome, chronic bronchiectasis on home O2 prn when SaO2 is < 88%, dCHF, C. diff, who was bib daughter for weakness, difficulty getting up from ground, garbled speech noted around 3:30a today. Last known normal when she went to bed at 1:30a. Pt called out to daughter at 3:30a at which time she found pt on the ground and was having difficulty getting up. Pt denied any head trauma/ loc but had accidentally slid out of bed. SaO2 noted to be low as per daughter. Pt has chronic back pain 2/2 spinal stenosis and was scheduled for epidural injection today. + chronic cough prod green sputum which is unchanged. No f/c. + chronic sob and chest pressure 2/2 bronchiectasis, also unchanged. No abd pain, vomiting, diarrhea, urinary sx's, leg swelling... No focal numbness, tingling, weakness. Pt is on gabapentin 300mg tid for chronic back pain.

## 2023-12-14 NOTE — ED ADULT NURSE NOTE - OBJECTIVE STATEMENT
Pt arrived to ED c/o AMS. pt hx of dementia. As per daughter, pt went to sleep at 130am baseline mental status and woke up at 330am AMS, slumped over, slurred speech and hypoxic. Pt daughter stated, "she was down on the floor, minimally responsive, and with garbled speech. Her oxygen is low too, usually shes 88%." FS 93. Stroke code activated.

## 2023-12-14 NOTE — H&P ADULT - PROBLEM SELECTOR PLAN 2
H/o Afib, not on Eliquis d/t SAH. Takes lopressor 50 BID. EKG showing NSR.    - c/w lopressor 50 BID Increased lethargy since 12/13. Likely 2/2 hypoxia iso possible PNA. Takes Klonopin 0.25mg PO QID and gabapentin 300mg PO TID at home. No evidence of acute stroke.     - treat underlying infection   - monitor off klonopin for now  - NPO until MS improves

## 2023-12-14 NOTE — ED ADULT NURSE NOTE - NSFALLRISKINTERV_ED_ALL_ED
Assistance OOB with selected safe patient handling equipment if applicable/Assistance with ambulation/Communicate fall risk and risk factors to all staff, patient, and family/Monitor gait and stability/Monitor for mental status changes and reorient to person, place, and time, as needed/Provide patient with walking aids/Provide visual cue: yellow wristband, yellow gown, etc/Reinforce activity limits and safety measures with patient and family/Toileting schedule using arm’s reach rule for commode and bathroom/Use of alarms - bed, stretcher, chair and/or video monitoring/Call bell, personal items and telephone in reach/Instruct patient to call for assistance before getting out of bed/chair/stretcher/Non-slip footwear applied when patient is off stretcher/[place] to call system/Physically safe environment - no spills, clutter or unnecessary equipment/Purposeful Proactive Rounding/Room/bathroom lighting operational, light cord in reach Assistance OOB with selected safe patient handling equipment if applicable/Assistance with ambulation/Communicate fall risk and risk factors to all staff, patient, and family/Monitor gait and stability/Monitor for mental status changes and reorient to person, place, and time, as needed/Provide patient with walking aids/Provide visual cue: yellow wristband, yellow gown, etc/Reinforce activity limits and safety measures with patient and family/Toileting schedule using arm’s reach rule for commode and bathroom/Use of alarms - bed, stretcher, chair and/or video monitoring/Call bell, personal items and telephone in reach/Instruct patient to call for assistance before getting out of bed/chair/stretcher/Non-slip footwear applied when patient is off stretcher/[place] to call system/Physically safe environment - no spills, clutter or unnecessary equipment/Purposeful Proactive Rounding/Room/bathroom lighting operational, light cord in reach

## 2023-12-14 NOTE — H&P ADULT - NSHPREVIEWOFSYSTEMS_GEN_ALL_CORE
REVIEW OF SYSTEMS:  CONSTITUTIONAL: No weakness, fevers, chills, changes in weight  EYES/ENT: No visual changes;  No vertigo or throat pain   NECK: No pain or stiffness  RESPIRATORY: No cough, wheezing, hemoptysis; No shortness of breath  CARDIOVASCULAR: no chest pain or palpitations  GASTROINTESTINAL: No abdominal or epigastric pain. No nausea, vomiting, or hematemesis; No diarrhea or constipation. No melena or hematochezia.  GENITOURINARY: No dysuria, frequency or hematuria  NEUROLOGICAL: No numbness or weakness  SKIN: No itching, rashes REVIEW OF SYSTEMS:  CONSTITUTIONAL: Generalized weakness, faigue. No fevers, chills, changes in weight  EYES/ENT: No visual changes;  No vertigo or throat pain   NECK: No pain or stiffness  RESPIRATORY: No cough, wheezing, hemoptysis; No shortness of breath  CARDIOVASCULAR: No chest pain or palpitations  GASTROINTESTINAL: No abdominal or epigastric pain. No nausea, vomiting, or hematemesis; No diarrhea or constipation. No melena or hematochezia.  GENITOURINARY: No dysuria, frequency or hematuria  NEUROLOGICAL: No numbness or weakness  SKIN: No itching, rashes

## 2023-12-14 NOTE — H&P ADULT - NSHPPHYSICALEXAM_GEN_ALL_CORE
VITAL SIGNS:  T(C): 36.6 (12-14-23 @ 21:20), Max: 36.9 (12-14-23 @ 14:27)  T(F): 97.9 (12-14-23 @ 21:20), Max: 98.4 (12-14-23 @ 14:27)  HR: 65 (12-15-23 @ 00:00) (65 - 87)  BP: 113/71 (12-15-23 @ 00:00) (113/71 - 137/84)  BP(mean): --  RR: 19 (12-15-23 @ 00:00) (18 - 20)  SpO2: 95% (12-15-23 @ 00:00) (83% - 97%)  Wt(kg): --    PHYSICAL EXAM:  Constitutional: WDWN resting comfortably in bed; NAD  Head: NC/AT  Eyes: PERRL, EOMI, anicteric sclera  ENT: no nasal discharge; uvula midline, no oropharyngeal erythema or exudates; MMM  Neck: supple; no JVD or thyromegaly  Respiratory: CTA B/L; no W/R/R, no retractions  Cardiac: +S1/S2; RRR; no M/R/G; PMI non-displaced  Gastrointestinal: abdomen soft, NT/ND; no rebound or guarding  Back: spine midline, no bony tenderness or step-offs; no CVAT B/L  Extremities: WWP, no clubbing or cyanosis; no peripheral edema  Musculoskeletal: NROM x4; no joint swelling, tenderness or erythema  Vascular: 2+ radial, DP pulses B/L  Dermatologic: skin warm, dry and intact; no rashes, wounds, or scars  Neurologic: AAOx3; CNII-XII grossly intact; no focal deficits  Psychiatric: affect and characteristics of appearance, verbalizations, behaviors are appropriate VITAL SIGNS:  T(C): 36.6 (12-14-23 @ 21:20), Max: 36.9 (12-14-23 @ 14:27)  T(F): 97.9 (12-14-23 @ 21:20), Max: 98.4 (12-14-23 @ 14:27)  HR: 65 (12-15-23 @ 00:00) (65 - 87)  BP: 113/71 (12-15-23 @ 00:00) (113/71 - 137/84)  BP(mean): --  RR: 19 (12-15-23 @ 00:00) (18 - 20)  SpO2: 95% (12-15-23 @ 00:00) (83% - 97%)  Wt(kg): --    PHYSICAL EXAM:  Constitutional: somnolent, NAD   Head: NC/AT  Eyes: not opening eyes on command  ENT: no nasal discharge  Neck: supple  Respiratory: CTA B/L; no W/R/R  Cardiac: +S1/S2; RRR; no M/R/G  Gastrointestinal: abdomen soft, NT/ND; no rebound or guarding  Extremities: WWP, no clubbing or cyanosis; no peripheral edema  Musculoskeletal: NROM x4; no joint swelling, tenderness or erythema  Dermatologic: skin warm, dry and intact; no rashes, wounds, or scars  Neurologic: AAOx0; unable to follow commands

## 2023-12-14 NOTE — ED PROVIDER NOTE - CLINICAL SUMMARY MEDICAL DECISION MAKING FREE TEXT BOX
Impression:  84yo f, h/o AF (not on AC due to SAH), HTN, Lewy body dementia (baseline a/o x3), posterior reversible encephalopathy syndrome, chronic bronchiectasis on home O2 prn when SaO2 is < 88%, dCHF, C. diff, who was bib daughter for weakness, difficulty getting up from ground, garbled speech noted around 3:30a today. Last known normal when she went to bed at 1:30a. Pt called out to daughter at 3:30a at which time she found pt on the ground and was having difficulty getting up. Pt denied any head trauma/ loc but had accidentally slid out of bed. SaO2 noted to be low as per daughter. Pt has chronic back pain 2/2 spinal stenosis and was scheduled for epidural injection today. + chronic cough prod green sputum which is unchanged. No f/c. + chronic sob and chest pressure 2/2 bronchiectasis, also unchanged. No abd pain, vomiting, diarrhea, urinary sx's, leg swelling... No focal numbness, tingling, weakness. Pt is on gabapentin 300mg tid for chronic back pain.     Stroke code activated at triage for given complaints. Pt seen by stroke PA who felt sx's unlikely to be ischemic in origin. CTH neg for acute findings. CTA H/N, CTP not performed 2/2 iv contrast allergy and low suspicion for infarct. TNk not given as low suspicion for cva and pt is out of window for thrombolytic. ? sx's related to lewy body dementia, toxic-metabolic cause. Will check labs, ua, cxr and re-assess. Impression:  82yo f, h/o AF (not on AC due to SAH), HTN, Lewy body dementia (baseline a/o x3), posterior reversible encephalopathy syndrome, chronic bronchiectasis on home O2 prn when SaO2 is < 88%, dCHF, C. diff, who was bib daughter for weakness, difficulty getting up from ground, garbled speech noted around 3:30a today. Last known normal when she went to bed at 1:30a. Pt called out to daughter at 3:30a at which time she found pt on the ground and was having difficulty getting up. Pt denied any head trauma/ loc but had accidentally slid out of bed. SaO2 noted to be low as per daughter. Pt has chronic back pain 2/2 spinal stenosis and was scheduled for epidural injection today. + chronic cough prod green sputum which is unchanged. No f/c. + chronic sob and chest pressure 2/2 bronchiectasis, also unchanged. No abd pain, vomiting, diarrhea, urinary sx's, leg swelling... No focal numbness, tingling, weakness. Pt is on gabapentin 300mg tid for chronic back pain.     Stroke code activated at triage for given complaints. Pt seen by stroke PA who felt sx's unlikely to be ischemic in origin. CTH neg for acute findings. CTA H/N, CTP not performed 2/2 iv contrast allergy and low suspicion for infarct. TNk not given as low suspicion for cva and pt is out of window for thrombolytic. ? sx's related to lewy body dementia, toxic-metabolic cause. Will check labs, ua, cxr and re-assess. Impression:  82yo f, h/o AF (not on AC due to SAH), HTN, Lewy body dementia (baseline a/o x3), posterior reversible encephalopathy syndrome, chronic bronchiectasis on home O2 prn when SaO2 is < 88%, dCHF, C. diff, who was bib daughter for weakness, difficulty getting up from ground, garbled speech noted around 3:30a today. Last known normal when she went to bed at 1:30a. Pt called out to daughter at 3:30a at which time she found pt on the ground and was having difficulty getting up. Pt denied any head trauma/ loc but had accidentally slid out of bed. SaO2 noted to be low as per daughter. Pt has chronic back pain 2/2 spinal stenosis and was scheduled for epidural injection today. + chronic cough prod green sputum which is unchanged. No f/c. + chronic sob and chest pressure 2/2 bronchiectasis, also unchanged. No abd pain, vomiting, diarrhea, urinary sx's, leg swelling... No focal numbness, tingling, weakness. Pt is on gabapentin 300mg tid for chronic back pain.     Stroke code activated at triage for given complaints. Pt seen by stroke PA who felt sx's unlikely to be ischemic in origin. CTH neg for acute findings. CTA H/N, CTP not performed 2/2 iv contrast allergy and low suspicion for infarct. TNk not given as low suspicion for cva and pt is out of window for thrombolytic. ? sx's related to lewy body dementia, toxic-metabolic cause, hypoxia. Will check labs, ua, cxr and re-assess.    Wbc, electrolytes, renal function wnl. Trop 19. . + ketones to urine, otherwise neg for infection. EKG non-ischemic. CXR improved from prior w/ trace left pleural effusion. + persistent hypoxia noted w/ SaO2 at 80% on ra, improved w/ nc. Pt ordered pre-medication for cta chest to r/o p/e. Case d/w Dr. Skaggs, covering for Dr. Foster. Pt to be evaluated by pulm fellow. Pt s/o to Dr. Zavala. Impression:  84yo f, h/o AF (not on AC due to SAH), HTN, Lewy body dementia (baseline a/o x3), posterior reversible encephalopathy syndrome, chronic bronchiectasis on home O2 prn when SaO2 is < 88%, dCHF, C. diff, who was bib daughter for weakness, difficulty getting up from ground, garbled speech noted around 3:30a today. Last known normal when she went to bed at 1:30a. Pt called out to daughter at 3:30a at which time she found pt on the ground and was having difficulty getting up. Pt denied any head trauma/ loc but had accidentally slid out of bed. SaO2 noted to be low as per daughter. Pt has chronic back pain 2/2 spinal stenosis and was scheduled for epidural injection today. + chronic cough prod green sputum which is unchanged. No f/c. + chronic sob and chest pressure 2/2 bronchiectasis, also unchanged. No abd pain, vomiting, diarrhea, urinary sx's, leg swelling... No focal numbness, tingling, weakness. Pt is on gabapentin 300mg tid for chronic back pain.     Stroke code activated at triage for given complaints. Pt seen by stroke PA who felt sx's unlikely to be ischemic in origin. CTH neg for acute findings. CTA H/N, CTP not performed 2/2 iv contrast allergy and low suspicion for infarct. TNk not given as low suspicion for cva and pt is out of window for thrombolytic. ? sx's related to lewy body dementia, toxic-metabolic cause, hypoxia. Will check labs, ua, cxr and re-assess.    Wbc, electrolytes, renal function wnl. Trop 19. . + ketones to urine, otherwise neg for infection. EKG non-ischemic. CXR improved from prior w/ trace left pleural effusion. + persistent hypoxia noted w/ SaO2 at 80% on ra, improved w/ nc. Pt ordered pre-medication for cta chest to r/o p/e. Case d/w Dr. Skaggs, covering for Dr. Foster. Pt to be evaluated by pulm fellow. Pt s/o to Dr. Zavala.

## 2023-12-14 NOTE — ED ADULT TRIAGE NOTE - CHIEF COMPLAINT QUOTE
Per family member, "I checked on her at 01:30am and she was normal. When I went in at 03:30am, she was down on the floor, minimally responsive, and with garbled speech. Her oxygen is low too, usually shes 88%." FS 93. Stroke code activated. Pt upgraded to MD Oden and SIVAKUMAR Solomon.

## 2023-12-14 NOTE — CONSULT NOTE ADULT - ASSESSMENT
82yo f, h/o AF (not on AC due to SAH), HTN, Lewy body dementia (baseline a/o x3), posterior reversible encephalopathy syndrome, chronic bronchiectasis on home O2 prn when SaO2 is < 88%, dCHF, C. diff, who was bib daughter for weakness, difficulty getting up from ground, garbled speech noted around 3:30a today. Pulmonology consulted for acute hypoxic respiratory failure with a history of bronchiectasis.    #Acute hypoxic respiratory failure  #Bronchiectasis exacerbation  #Pulmonary insufficiency    Pt is saturating well on 4 L NC. WBC 10.55, afebrile. Pt has cough with sputum production but unclear if it is changed from baseline. Got levalbuterol x 1. Pending CTA to evaluate for PE and evaluate lung parencyhma for infiltrates. CT head stroke protocol showed no acute infarcts. Given lethargy and weakness over past few days with hypoxia today and negative CT head, suspect that she has toxic metabolic encephalopathy secondary to infectious process, either viral or bacterial PNA suspected.     Recommendations:  -duonebs BID with aerobika for airway clearance after each duoneb  -chest PT  -send sputum culture  -RVP  -f/u CTA PE - if no infiltrates seen can manage with airway clearance; if infiltrates seen would cover pseudomonas given that pt was in hospital receiving abx in past 90 days    Case to be discussed with Dr. Patricio   84yo f, h/o AF (not on AC due to SAH), HTN, Lewy body dementia (baseline a/o x3), posterior reversible encephalopathy syndrome, chronic bronchiectasis on home O2 prn when SaO2 is < 88%, dCHF, C. diff, who was bib daughter for weakness, difficulty getting up from ground, garbled speech noted around 3:30a today. Pulmonology consulted for acute hypoxic respiratory failure with a history of bronchiectasis.    #Acute hypoxic respiratory failure  #Bronchiectasis exacerbation  #Pulmonary insufficiency    Pt is saturating well on 4 L NC. WBC 10.55, afebrile. Pt has cough with sputum production but unclear if it is changed from baseline. Got levalbuterol x 1. Pending CTA to evaluate for PE and evaluate lung parencyhma for infiltrates. CT head stroke protocol showed no acute infarcts. Given lethargy and weakness over past few days with hypoxia today and negative CT head, suspect that she has toxic metabolic encephalopathy secondary to infectious process, either viral or bacterial PNA suspected.     Recommendations:  -duonebs BID with aerobika for airway clearance after each duoneb  -chest PT  -send sputum culture  -RVP      Case to be discussed with Dr. Patricio   84yo f, h/o AF (not on AC due to SAH), HTN, Lewy body dementia (baseline a/o x3), posterior reversible encephalopathy syndrome, chronic bronchiectasis on home O2 prn when SaO2 is < 88%, dCHF, C. diff, who was bib daughter for weakness, difficulty getting up from ground, garbled speech noted around 3:30a today. Pulmonology consulted for acute hypoxic respiratory failure with a history of bronchiectasis.    #Acute hypoxic respiratory failure  #Bronchiectasis exacerbation  #Pulmonary insufficiency    Pt is saturating well on 4 L NC. WBC 10.55, afebrile. Pt has cough with sputum production but unclear if it is changed from baseline. Got levalbuterol x 1. CTA shows no PE but has some mucoid impaction and new consolidations in RUL. CT head stroke protocol showed no acute infarcts. Given lethargy and weakness over past few days with hypoxia today and negative CT head, suspect that she has toxic metabolic encephalopathy secondary to infectious process, either viral or bacterial PNA suspected. RVP negative. Pt has one sputum culture from 2019 growing pseudomonas, but otherwise has no sputum cultures in the system. She has been admitted for multiple "exacerbations" but it is unclear if these were actual exacerbations with worsening sputum production. She had completed a course of Zosyn, cipro, and bactrim in August 2023 and then had IV cefepime at home via a PICC line for two weeks in September 2023. She takes inhaled tobramycin two weeks on/two weeks off for unclear reasons (normally 4 weeks on, 4 weeks off). Suspect that this is poorly managed bronchiectasis rather than continuing exacerbations. Unclear if pt will follow a more aggressive regimen, but recommendations below will reflect an appropriate regimen.    Recommendations:  -Symbicort BID  -c/w Xopenex PRN  -Aerobika BID  -chest PT  -send sputum culture  -would de-escalate abx to Cipro (culture in 2019 showed sensitivity to Levaquin and Cipro but pt is allergic to levaquin)  -pt should be on inhaled Tobramycin 4 weeks on, 4 weeks off    Case s/e/d with Dr. Patricio 82yo f, h/o AF (not on AC due to SAH), HTN, Lewy body dementia (baseline a/o x3), posterior reversible encephalopathy syndrome, chronic bronchiectasis on home O2 prn when SaO2 is < 88%, dCHF, C. diff, who was bib daughter for weakness, difficulty getting up from ground, garbled speech noted around 3:30a today. Pulmonology consulted for acute hypoxic respiratory failure with a history of bronchiectasis.    #Acute hypoxic respiratory failure  #Bronchiectasis exacerbation  #Pulmonary insufficiency    Pt is saturating well on 4 L NC. WBC 10.55, afebrile. Pt has cough with sputum production but unclear if it is changed from baseline. Got levalbuterol x 1. CTA shows no PE but has some mucoid impaction and new consolidations in RUL. CT head stroke protocol showed no acute infarcts. Given lethargy and weakness over past few days with hypoxia today and negative CT head, suspect that she has toxic metabolic encephalopathy secondary to infectious process, either viral or bacterial PNA suspected. RVP negative. Pt has one sputum culture from 2019 growing pseudomonas, but otherwise has no sputum cultures in the system. She has been admitted for multiple "exacerbations" but it is unclear if these were actual exacerbations with worsening sputum production. She had completed a course of Zosyn, cipro, and bactrim in August 2023 and then had IV cefepime at home via a PICC line for two weeks in September 2023. She takes inhaled tobramycin two weeks on/two weeks off for unclear reasons (normally 4 weeks on, 4 weeks off). Suspect that this is poorly managed bronchiectasis rather than continuing exacerbations. Unclear if pt will follow a more aggressive regimen, but recommendations below will reflect an appropriate regimen.    Recommendations:  -Symbicort BID  -c/w Xopenex PRN  -Aerobika BID  -chest PT  -send sputum culture  -would de-escalate abx to Cipro (culture in 2019 showed sensitivity to Levaquin and Cipro but pt is allergic to levaquin)  -pt should be on inhaled Tobramycin 4 weeks on, 4 weeks off    Case s/e/d with Dr. Patricio

## 2023-12-14 NOTE — H&P ADULT - PROBLEM SELECTOR PLAN 7
F: s/p NS 500cc x1  E: replete as needed  N: NPO until MS improves   GI: none   DVT: lovenox   Code: Full with trial of intubation   Dispo: [facility] F: s/p NS 500cc x1  E: replete as needed  N: NPO until MS improves   GI: none   DVT: lovenox   Code: Full with trial of intubation   Dispo: [facility] - c/w donepezil 5 qd

## 2023-12-14 NOTE — CONSULT NOTE ADULT - ASSESSMENT
83y Female with PMHx of afib (not on AC due to SAH), HTN, Lewy body dementia, posterior reversible encephalopathy syndrome, chronic bronchiectasis on home O2, dCHF presents after sliding out of bed when trying to ambulate to the bathroom in the middle of the night and AMS with garbled speech and unresponsiveness in the setting of hypoxia. /85. NIHSS 0. CTH/CTA/CTP with no significant findings; chronic infarcts seen on prior scans. 83y Female with PMHx of afib (not on AC due to SAH), HTN, Lewy body dementia, posterior reversible encephalopathy syndrome, chronic bronchiectasis on home O2, dCHF presents after sliding out of bed when trying to ambulate to the bathroom in the middle of the night and AMS with garbled speech and unresponsiveness in the setting of hypoxia. /85. NIHSS 0. CTH/CTA/CTP with no significant findings; chronic infarcts seen on prior scans.    Patient back to baseline. Symptoms may be from hypoxic episode last night. No further stroke w/u needed.     Case discussed with Dr Hansen

## 2023-12-14 NOTE — ED PROVIDER NOTE - CARE PLAN
Principal Discharge DX:	Weakness   1 Principal Discharge DX:	Weakness  Secondary Diagnosis:	Hypoxia  Secondary Diagnosis:	Bronchiectasis

## 2023-12-14 NOTE — H&P ADULT - PROBLEM SELECTOR PLAN 6
- c/w donepezil 5 qd Diffuse LBP since May. Tried muscle relaxants (ineffective) and tramadol (caused hallucinations). Currently being managed with gabapentin 300 TID. See pain management OP; unclear etio but suspect shingles.     - start with gabapentin 100 TID and uptitrate as needed  - lidocaine patch to lower back  - hold off on Klonopin iso AMS; okay to resume when more awake (takes 0.25mg QID at home)  - avoid opioids and muscle relaxants

## 2023-12-14 NOTE — CONSULT NOTE ADULT - ATTENDING COMMENTS
Bronchiectasis, unclear if this is exacerbation.  Most important part of regimen is: duoneb+airway clearance BID   start ICS/LABA such as symbicort BID  No visible sputum seen, please collect some after airway clearance as abx regimen is difficult without knowing what are we treating. There has been no sputum culture for last 4 years.

## 2023-12-15 ENCOUNTER — TRANSCRIPTION ENCOUNTER (OUTPATIENT)
Age: 83
End: 2023-12-15

## 2023-12-15 ENCOUNTER — APPOINTMENT (OUTPATIENT)
Dept: CARDIOTHORACIC SURGERY | Facility: CLINIC | Age: 83
End: 2023-12-15

## 2023-12-15 DIAGNOSIS — Z29.9 ENCOUNTER FOR PROPHYLACTIC MEASURES, UNSPECIFIED: ICD-10-CM

## 2023-12-15 DIAGNOSIS — G93.41 METABOLIC ENCEPHALOPATHY: ICD-10-CM

## 2023-12-15 DIAGNOSIS — M54.50 LOW BACK PAIN, UNSPECIFIED: ICD-10-CM

## 2023-12-15 DIAGNOSIS — Z87.09 PERSONAL HISTORY OF OTHER DISEASES OF THE RESPIRATORY SYSTEM: ICD-10-CM

## 2023-12-15 DIAGNOSIS — G31.83 NEUROCOGNITIVE DISORDER WITH LEWY BODIES: ICD-10-CM

## 2023-12-15 DIAGNOSIS — A41.9 SEPSIS, UNSPECIFIED ORGANISM: ICD-10-CM

## 2023-12-15 DIAGNOSIS — I48.20 CHRONIC ATRIAL FIBRILLATION, UNSPECIFIED: ICD-10-CM

## 2023-12-15 DIAGNOSIS — J96.01 ACUTE RESPIRATORY FAILURE WITH HYPOXIA: ICD-10-CM

## 2023-12-15 DIAGNOSIS — I10 ESSENTIAL (PRIMARY) HYPERTENSION: ICD-10-CM

## 2023-12-15 DIAGNOSIS — Z86.19 PERSONAL HISTORY OF OTHER INFECTIOUS AND PARASITIC DISEASES: ICD-10-CM

## 2023-12-15 LAB
ALBUMIN SERPL ELPH-MCNC: 2.9 G/DL — LOW (ref 3.3–5)
ALBUMIN SERPL ELPH-MCNC: 2.9 G/DL — LOW (ref 3.3–5)
ALP SERPL-CCNC: 105 U/L — SIGNIFICANT CHANGE UP (ref 40–120)
ALP SERPL-CCNC: 105 U/L — SIGNIFICANT CHANGE UP (ref 40–120)
ALT FLD-CCNC: 7 U/L — LOW (ref 10–45)
ALT FLD-CCNC: 7 U/L — LOW (ref 10–45)
ANION GAP SERPL CALC-SCNC: 14 MMOL/L — SIGNIFICANT CHANGE UP (ref 5–17)
ANION GAP SERPL CALC-SCNC: 14 MMOL/L — SIGNIFICANT CHANGE UP (ref 5–17)
AST SERPL-CCNC: 12 U/L — SIGNIFICANT CHANGE UP (ref 10–40)
AST SERPL-CCNC: 12 U/L — SIGNIFICANT CHANGE UP (ref 10–40)
BASOPHILS # BLD AUTO: 0.02 K/UL — SIGNIFICANT CHANGE UP (ref 0–0.2)
BASOPHILS # BLD AUTO: 0.02 K/UL — SIGNIFICANT CHANGE UP (ref 0–0.2)
BASOPHILS NFR BLD AUTO: 0.2 % — SIGNIFICANT CHANGE UP (ref 0–2)
BASOPHILS NFR BLD AUTO: 0.2 % — SIGNIFICANT CHANGE UP (ref 0–2)
BILIRUB SERPL-MCNC: 0.4 MG/DL — SIGNIFICANT CHANGE UP (ref 0.2–1.2)
BILIRUB SERPL-MCNC: 0.4 MG/DL — SIGNIFICANT CHANGE UP (ref 0.2–1.2)
BUN SERPL-MCNC: 28 MG/DL — HIGH (ref 7–23)
BUN SERPL-MCNC: 28 MG/DL — HIGH (ref 7–23)
CALCIUM SERPL-MCNC: 8.9 MG/DL — SIGNIFICANT CHANGE UP (ref 8.4–10.5)
CALCIUM SERPL-MCNC: 8.9 MG/DL — SIGNIFICANT CHANGE UP (ref 8.4–10.5)
CHLORIDE SERPL-SCNC: 107 MMOL/L — SIGNIFICANT CHANGE UP (ref 96–108)
CHLORIDE SERPL-SCNC: 107 MMOL/L — SIGNIFICANT CHANGE UP (ref 96–108)
CO2 SERPL-SCNC: 19 MMOL/L — LOW (ref 22–31)
CO2 SERPL-SCNC: 19 MMOL/L — LOW (ref 22–31)
CREAT SERPL-MCNC: 0.79 MG/DL — SIGNIFICANT CHANGE UP (ref 0.5–1.3)
CREAT SERPL-MCNC: 0.79 MG/DL — SIGNIFICANT CHANGE UP (ref 0.5–1.3)
EGFR: 75 ML/MIN/1.73M2 — SIGNIFICANT CHANGE UP
EGFR: 75 ML/MIN/1.73M2 — SIGNIFICANT CHANGE UP
EOSINOPHIL # BLD AUTO: 0 K/UL — SIGNIFICANT CHANGE UP (ref 0–0.5)
EOSINOPHIL # BLD AUTO: 0 K/UL — SIGNIFICANT CHANGE UP (ref 0–0.5)
EOSINOPHIL NFR BLD AUTO: 0 % — SIGNIFICANT CHANGE UP (ref 0–6)
EOSINOPHIL NFR BLD AUTO: 0 % — SIGNIFICANT CHANGE UP (ref 0–6)
GLUCOSE SERPL-MCNC: 115 MG/DL — HIGH (ref 70–99)
GLUCOSE SERPL-MCNC: 115 MG/DL — HIGH (ref 70–99)
HCT VFR BLD CALC: 34.7 % — SIGNIFICANT CHANGE UP (ref 34.5–45)
HCT VFR BLD CALC: 34.7 % — SIGNIFICANT CHANGE UP (ref 34.5–45)
HGB BLD-MCNC: 10.6 G/DL — LOW (ref 11.5–15.5)
HGB BLD-MCNC: 10.6 G/DL — LOW (ref 11.5–15.5)
IMM GRANULOCYTES NFR BLD AUTO: 0.4 % — SIGNIFICANT CHANGE UP (ref 0–0.9)
IMM GRANULOCYTES NFR BLD AUTO: 0.4 % — SIGNIFICANT CHANGE UP (ref 0–0.9)
LYMPHOCYTES # BLD AUTO: 0.7 K/UL — LOW (ref 1–3.3)
LYMPHOCYTES # BLD AUTO: 0.7 K/UL — LOW (ref 1–3.3)
LYMPHOCYTES # BLD AUTO: 8.5 % — LOW (ref 13–44)
LYMPHOCYTES # BLD AUTO: 8.5 % — LOW (ref 13–44)
MAGNESIUM SERPL-MCNC: 2.1 MG/DL — SIGNIFICANT CHANGE UP (ref 1.6–2.6)
MAGNESIUM SERPL-MCNC: 2.1 MG/DL — SIGNIFICANT CHANGE UP (ref 1.6–2.6)
MCHC RBC-ENTMCNC: 30.5 GM/DL — LOW (ref 32–36)
MCHC RBC-ENTMCNC: 30.5 GM/DL — LOW (ref 32–36)
MCHC RBC-ENTMCNC: 31.5 PG — SIGNIFICANT CHANGE UP (ref 27–34)
MCHC RBC-ENTMCNC: 31.5 PG — SIGNIFICANT CHANGE UP (ref 27–34)
MCV RBC AUTO: 103 FL — HIGH (ref 80–100)
MCV RBC AUTO: 103 FL — HIGH (ref 80–100)
MONOCYTES # BLD AUTO: 0.32 K/UL — SIGNIFICANT CHANGE UP (ref 0–0.9)
MONOCYTES # BLD AUTO: 0.32 K/UL — SIGNIFICANT CHANGE UP (ref 0–0.9)
MONOCYTES NFR BLD AUTO: 3.9 % — SIGNIFICANT CHANGE UP (ref 2–14)
MONOCYTES NFR BLD AUTO: 3.9 % — SIGNIFICANT CHANGE UP (ref 2–14)
MRSA PCR RESULT.: NEGATIVE — SIGNIFICANT CHANGE UP
MRSA PCR RESULT.: NEGATIVE — SIGNIFICANT CHANGE UP
NEUTROPHILS # BLD AUTO: 7.2 K/UL — SIGNIFICANT CHANGE UP (ref 1.8–7.4)
NEUTROPHILS # BLD AUTO: 7.2 K/UL — SIGNIFICANT CHANGE UP (ref 1.8–7.4)
NEUTROPHILS NFR BLD AUTO: 87 % — HIGH (ref 43–77)
NEUTROPHILS NFR BLD AUTO: 87 % — HIGH (ref 43–77)
NRBC # BLD: 0 /100 WBCS — SIGNIFICANT CHANGE UP (ref 0–0)
NRBC # BLD: 0 /100 WBCS — SIGNIFICANT CHANGE UP (ref 0–0)
PHOSPHATE SERPL-MCNC: 4.1 MG/DL — SIGNIFICANT CHANGE UP (ref 2.5–4.5)
PHOSPHATE SERPL-MCNC: 4.1 MG/DL — SIGNIFICANT CHANGE UP (ref 2.5–4.5)
PLATELET # BLD AUTO: 192 K/UL — SIGNIFICANT CHANGE UP (ref 150–400)
PLATELET # BLD AUTO: 192 K/UL — SIGNIFICANT CHANGE UP (ref 150–400)
POTASSIUM SERPL-MCNC: 4.6 MMOL/L — SIGNIFICANT CHANGE UP (ref 3.5–5.3)
POTASSIUM SERPL-MCNC: 4.6 MMOL/L — SIGNIFICANT CHANGE UP (ref 3.5–5.3)
POTASSIUM SERPL-SCNC: 4.6 MMOL/L — SIGNIFICANT CHANGE UP (ref 3.5–5.3)
POTASSIUM SERPL-SCNC: 4.6 MMOL/L — SIGNIFICANT CHANGE UP (ref 3.5–5.3)
PROT SERPL-MCNC: 7.2 G/DL — SIGNIFICANT CHANGE UP (ref 6–8.3)
PROT SERPL-MCNC: 7.2 G/DL — SIGNIFICANT CHANGE UP (ref 6–8.3)
RAPID RVP RESULT: SIGNIFICANT CHANGE UP
RAPID RVP RESULT: SIGNIFICANT CHANGE UP
RBC # BLD: 3.37 M/UL — LOW (ref 3.8–5.2)
RBC # BLD: 3.37 M/UL — LOW (ref 3.8–5.2)
RBC # FLD: 12.4 % — SIGNIFICANT CHANGE UP (ref 10.3–14.5)
RBC # FLD: 12.4 % — SIGNIFICANT CHANGE UP (ref 10.3–14.5)
S AUREUS DNA NOSE QL NAA+PROBE: NEGATIVE — SIGNIFICANT CHANGE UP
S AUREUS DNA NOSE QL NAA+PROBE: NEGATIVE — SIGNIFICANT CHANGE UP
SARS-COV-2 RNA SPEC QL NAA+PROBE: SIGNIFICANT CHANGE UP
SARS-COV-2 RNA SPEC QL NAA+PROBE: SIGNIFICANT CHANGE UP
SODIUM SERPL-SCNC: 140 MMOL/L — SIGNIFICANT CHANGE UP (ref 135–145)
SODIUM SERPL-SCNC: 140 MMOL/L — SIGNIFICANT CHANGE UP (ref 135–145)
WBC # BLD: 8.27 K/UL — SIGNIFICANT CHANGE UP (ref 3.8–10.5)
WBC # BLD: 8.27 K/UL — SIGNIFICANT CHANGE UP (ref 3.8–10.5)
WBC # FLD AUTO: 8.27 K/UL — SIGNIFICANT CHANGE UP (ref 3.8–10.5)
WBC # FLD AUTO: 8.27 K/UL — SIGNIFICANT CHANGE UP (ref 3.8–10.5)

## 2023-12-15 PROCEDURE — 99233 SBSQ HOSP IP/OBS HIGH 50: CPT | Mod: GC

## 2023-12-15 PROCEDURE — 99223 1ST HOSP IP/OBS HIGH 75: CPT | Mod: GC

## 2023-12-15 RX ORDER — BUDESONIDE AND FORMOTEROL FUMARATE DIHYDRATE 160; 4.5 UG/1; UG/1
2 AEROSOL RESPIRATORY (INHALATION)
Refills: 0 | Status: DISCONTINUED | OUTPATIENT
Start: 2023-12-15 | End: 2023-12-19

## 2023-12-15 RX ORDER — METOPROLOL TARTRATE 50 MG
25 TABLET ORAL
Refills: 0 | Status: DISCONTINUED | OUTPATIENT
Start: 2023-12-15 | End: 2023-12-19

## 2023-12-15 RX ORDER — DONEPEZIL HYDROCHLORIDE 10 MG/1
5 TABLET, FILM COATED ORAL AT BEDTIME
Refills: 0 | Status: DISCONTINUED | OUTPATIENT
Start: 2023-12-15 | End: 2023-12-19

## 2023-12-15 RX ORDER — IPRATROPIUM/ALBUTEROL SULFATE 18-103MCG
3 AEROSOL WITH ADAPTER (GRAM) INHALATION EVERY 12 HOURS
Refills: 0 | Status: DISCONTINUED | OUTPATIENT
Start: 2023-12-15 | End: 2023-12-19

## 2023-12-15 RX ORDER — MONTELUKAST 4 MG/1
10 TABLET, CHEWABLE ORAL AT BEDTIME
Refills: 0 | Status: DISCONTINUED | OUTPATIENT
Start: 2023-12-15 | End: 2023-12-19

## 2023-12-15 RX ORDER — METOPROLOL TARTRATE 50 MG
50 TABLET ORAL
Refills: 0 | Status: DISCONTINUED | OUTPATIENT
Start: 2023-12-15 | End: 2023-12-15

## 2023-12-15 RX ORDER — VANCOMYCIN HCL 1 G
750 VIAL (EA) INTRAVENOUS EVERY 24 HOURS
Refills: 0 | Status: DISCONTINUED | OUTPATIENT
Start: 2023-12-15 | End: 2023-12-15

## 2023-12-15 RX ORDER — PIPERACILLIN AND TAZOBACTAM 4; .5 G/20ML; G/20ML
4.5 INJECTION, POWDER, LYOPHILIZED, FOR SOLUTION INTRAVENOUS ONCE
Refills: 0 | Status: DISCONTINUED | OUTPATIENT
Start: 2023-12-15 | End: 2023-12-15

## 2023-12-15 RX ORDER — VANCOMYCIN HCL 1 G
125 VIAL (EA) INTRAVENOUS EVERY 24 HOURS
Refills: 0 | Status: DISCONTINUED | OUTPATIENT
Start: 2023-12-15 | End: 2023-12-19

## 2023-12-15 RX ORDER — PIPERACILLIN AND TAZOBACTAM 4; .5 G/20ML; G/20ML
4.5 INJECTION, POWDER, LYOPHILIZED, FOR SOLUTION INTRAVENOUS EVERY 8 HOURS
Refills: 0 | Status: DISCONTINUED | OUTPATIENT
Start: 2023-12-15 | End: 2023-12-19

## 2023-12-15 RX ORDER — AMLODIPINE BESYLATE 2.5 MG/1
5 TABLET ORAL DAILY
Refills: 0 | Status: DISCONTINUED | OUTPATIENT
Start: 2023-12-15 | End: 2023-12-19

## 2023-12-15 RX ORDER — GABAPENTIN 400 MG/1
100 CAPSULE ORAL EVERY 8 HOURS
Refills: 0 | Status: DISCONTINUED | OUTPATIENT
Start: 2023-12-15 | End: 2023-12-16

## 2023-12-15 RX ORDER — INFLUENZA VIRUS VACCINE 15; 15; 15; 15 UG/.5ML; UG/.5ML; UG/.5ML; UG/.5ML
0.7 SUSPENSION INTRAMUSCULAR ONCE
Refills: 0 | Status: DISCONTINUED | OUTPATIENT
Start: 2023-12-15 | End: 2023-12-19

## 2023-12-15 RX ORDER — PIPERACILLIN AND TAZOBACTAM 4; .5 G/20ML; G/20ML
4.5 INJECTION, POWDER, LYOPHILIZED, FOR SOLUTION INTRAVENOUS ONCE
Refills: 0 | Status: COMPLETED | OUTPATIENT
Start: 2023-12-15 | End: 2023-12-15

## 2023-12-15 RX ORDER — LIDOCAINE 4 G/100G
1 CREAM TOPICAL EVERY 24 HOURS
Refills: 0 | Status: DISCONTINUED | OUTPATIENT
Start: 2023-12-15 | End: 2023-12-19

## 2023-12-15 RX ADMIN — PIPERACILLIN AND TAZOBACTAM 25 GRAM(S): 4; .5 INJECTION, POWDER, LYOPHILIZED, FOR SOLUTION INTRAVENOUS at 13:18

## 2023-12-15 RX ADMIN — DONEPEZIL HYDROCHLORIDE 5 MILLIGRAM(S): 10 TABLET, FILM COATED ORAL at 21:19

## 2023-12-15 RX ADMIN — GABAPENTIN 100 MILLIGRAM(S): 400 CAPSULE ORAL at 21:19

## 2023-12-15 RX ADMIN — Medication 3 MILLILITER(S): at 17:14

## 2023-12-15 RX ADMIN — Medication 125 MILLIGRAM(S): at 12:46

## 2023-12-15 RX ADMIN — PIPERACILLIN AND TAZOBACTAM 200 GRAM(S): 4; .5 INJECTION, POWDER, LYOPHILIZED, FOR SOLUTION INTRAVENOUS at 07:12

## 2023-12-15 RX ADMIN — PIPERACILLIN AND TAZOBACTAM 25 GRAM(S): 4; .5 INJECTION, POWDER, LYOPHILIZED, FOR SOLUTION INTRAVENOUS at 21:19

## 2023-12-15 RX ADMIN — LIDOCAINE 1 PATCH: 4 CREAM TOPICAL at 21:21

## 2023-12-15 RX ADMIN — GABAPENTIN 100 MILLIGRAM(S): 400 CAPSULE ORAL at 13:18

## 2023-12-15 RX ADMIN — BUDESONIDE AND FORMOTEROL FUMARATE DIHYDRATE 2 PUFF(S): 160; 4.5 AEROSOL RESPIRATORY (INHALATION) at 19:50

## 2023-12-15 RX ADMIN — PIPERACILLIN AND TAZOBACTAM 25 GRAM(S): 4; .5 INJECTION, POWDER, LYOPHILIZED, FOR SOLUTION INTRAVENOUS at 08:08

## 2023-12-15 RX ADMIN — MONTELUKAST 10 MILLIGRAM(S): 4 TABLET, CHEWABLE ORAL at 21:19

## 2023-12-15 NOTE — DISCHARGE NOTE NURSING/CASE MANAGEMENT/SOCIAL WORK - PATIENT PORTAL LINK FT
You can access the FollowMyHealth Patient Portal offered by [facility] by registering at the following website: [email or web address]. By joining [employer]’s FollowMyHealth portal, you will also be able to view your health information using other applications (apps) compatible with our system. You can access the FollowMyHealth Patient Portal offered by [facility] by registering at the following website: [email or web address]. By joining [employer]’s FollowMyHealth portal, you will also be able to view your health information using other applications (apps) compatible with our system.

## 2023-12-15 NOTE — PROGRESS NOTE ADULT - PROBLEM SELECTOR PLAN 9
F: s/p NS 500cc x1  E: replete as needed  N: regular diet  GI: none   DVT: SCD  Code: Full with trial of intubation   Dispo: [facility] F: s/p NS 500cc x1  E: replete as needed  N: regular diet  GI: none   DVT: SCD  Code: Full with trial of intubation   Dispo: [facility]

## 2023-12-15 NOTE — DISCHARGE NOTE PROVIDER - CARE PROVIDERS DIRECT ADDRESSES
[email or web address]

## 2023-12-15 NOTE — DISCHARGE NOTE PROVIDER - NSDCMRMEDTOKEN_GEN_ALL_CORE_FT
amLODIPine 5 mg oral tablet: 1 tab(s) orally once a day  atorvastatin 40 mg oral tablet: 1 tab(s) orally once a day (at bedtime)  donepezil 5 mg oral tablet: 1 tab(s) orally every 24 hours  furosemide 20 mg oral tablet: 1 tab(s) orally once a day  gabapentin 100 mg oral capsule: 1 cap(s) orally 2 times a day Please take at 12 PM and 6 PM  KlonoPIN 0.5 mg oral tablet: 0.5 tab(s) orally 2 times a day as needed for For Pain/For Anxiety  Metoprolol Succinate ER 50 mg oral tablet, extended release: 1 tab(s) orally every 12 hours   amLODIPine 5 mg oral tablet: 1 tab(s) orally once a day  atorvastatin 40 mg oral tablet: 1 tab(s) orally once a day (at bedtime)  donepezil 5 mg oral tablet: 1 tab(s) orally every 24 hours  gabapentin 100 mg oral capsule: 1 cap(s) orally 2 times a day Please take at 12 PM and 6 PM  ipratropium-albuterol 0.5 mg-2.5 mg/3 mL inhalation solution: 3 milliliter(s) inhaled every 4 hours  KlonoPIN 0.5 mg oral tablet: 0.5 tab(s) orally 2 times a day as needed for For Pain/For Anxiety  Metoprolol Succinate ER 50 mg oral tablet, extended release: 1 tab(s) orally every 12 hours  sodium chloride 7% inhalation solution: 4 milliliter(s) inhaled every 4 hours  vancomycin 125 mg oral capsule: 1 cap(s) orally once a day

## 2023-12-15 NOTE — OCCUPATIONAL THERAPY INITIAL EVALUATION ADULT - LEVEL OF INDEPENDENCE: DRESS LOWER BODY, OT EVAL
don/doff robe while sitting EOB, requiring CGAx1 2/2 impaired dynamic balance and functional reach./contact guard

## 2023-12-15 NOTE — DISCHARGE NOTE PROVIDER - ATTENDING DISCHARGE PHYSICAL EXAMINATION:
-Gen: NAD, resting at side of bed  -HEENT: EOMI, PERRL, no scleral icterus  -CV: normal S1 and S2  -Lungs: CTABL, normal respiratory effort on NC  -Ab: soft, NT, ND, normal BS  -Ext: no LE edema   -Neuro: A&O x 3, no focal deficits

## 2023-12-15 NOTE — DISCHARGE NOTE NURSING/CASE MANAGEMENT/SOCIAL WORK - NSDCPEFALRISK_GEN_ALL_CORE
For information on Fall & Injury Prevention, visit: [email or web address] OR  [email or web address] OR  https://www.cdc.gov/steadi/patient.html For information on Fall & Injury Prevention, visit: [email or web address] OR  [email or web address] OR  https://www.cdc.gov/steadi/patient.html

## 2023-12-15 NOTE — PROGRESS NOTE ADULT - PROBLEM SELECTOR PLAN 8
F: s/p NS 500cc x1  E: replete as needed  N: NPO until MS improves   GI: none   DVT: none  Code: Full with trial of intubation   Dispo: [facility] F: s/p NS 500cc x1  E: replete as needed  N: NPO until MS improves   GI: none   DVT: none  Code: Full with trial of intubation   Dispo: [facility] F: s/p NS 500cc x1  E: replete as needed  N: regular diet  GI: none   DVT: SCD  Code: Full with trial of intubation   Dispo: [facility] F: s/p NS 500cc x1  E: replete as needed  N: regular diet  GI: none   DVT: SCD  Code: Full with trial of intubation   Dispo: [facility] - c/w donepezil 5 qd

## 2023-12-15 NOTE — OCCUPATIONAL THERAPY INITIAL EVALUATION ADULT - SIT-TO-STAND BALANCE
[FreeTextEntry1] : \par \par On  5/10/23  a uroflow was done:\par The flow curve had a staccato pattern\par Qmax -  25.3   ml/sec\par Q ave -  12.6   ml/sec\par Flow time -  12.1   sec\par Pre void volume - 175   cc\par PVR -    5   cc\par \par Rectal diameter -  4.1   cm\par \par \par On 5/24/23   a uroflow was done:\par The flow curve had a prolonged shape, with some staccato pattern\par Qmax - 22.1    ml/sec\par Q ave - 14    ml/sec\par Flow time -  12.8   sec\par Pre void volume -  213  cc\par PVR -  31     cc\par \par Rectal diameter -  2.4   cm\par \par \par I had a discussion with the patient and his mother. His symptoms are getting worse. He has pain/discomfort at the tip of his penis. 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 There is a risk as he gets older of developing, or worsening of his urinary symptoms to include hematuria, incontinence, and pain with voiding. The alternatives are urethral dilation at home, meatotomy in the clinic under local anesthetic, and operative meatoplasty/meatotomy in the OR. The risks of the procedure include bleeding, burning/stinging of urination, infection, recurrence,  injury to adjacent organs and other unforeseen issues\par \par We discussed the fact that the meatus will be opened ventrally\par \par Following the office visit I spoke with the father over the phone. I explained the findings and the different options.\par They would like to think about it and would call the office in the next couple of days, to let us know if the father would like to come to the office, advance with surgery/clinical follow up \par \par The mother would like to schedule the surgery at [facility] (meatoplasty, possible cystoscopy)\par We discussed the fact that if his symptoms will continue following the procedure there might be a need to extend the evaluation\par \par The patient and the mother (and the father was over the phone) were given the opportunity to ask questions which were answered to the best of my ability and to their apparent satisfaction. They agree with the performance of the proposed plan and voiced understanding of this, and all of their questions were answered.\par \par \par The total length of this visit was 25 minutes, with more than 15 minutes dedicated for chart/imaging review, examination, education, discussion and counseling, as above.\par  good balance

## 2023-12-15 NOTE — DIETITIAN INITIAL EVALUATION ADULT - OTHER INFO
83F PMHx AF (not on AC due to SAH), HTN, Lewy body dementia (baseline a/o x3), posterior reversible encephalopathy syndrome, chronic bronchiectasis on home O2 prn when SaO2 is < 88%, dCHF, C. diff, BIBEMS daughter for weakness, difficulty getting up from ground, garbled speech noted around 3:30a today. Last known normal when she went to bed at 1:30a. Per daughter, patient was more fatigued than usual on 12/13, sleeping most of the day with poor PO intake. On 12/14, patient continued to be lethargic. Did not check O2 level at home until 1pm this afternoon, where it was in 70's. Daughter (Patience) states no other sxs as pt became more lethargic; denies cough, fever, GI upset, dyspnea, CP.     Patient seen at bedside for nutrition assessment, patient with some confusion during interview. Current diet order: NPO pending improvement in mental status/bedside dysphagia screen. Confirms NKFA. No difficulty chewing/swallowing reported. Reports "ok" appetite PTA, states she was eating cheese and chicken, however unable to elaborate further. Dosing weight: 100#, BMI 17.1, reports UBW of 78-79# and endorses recent weight gain, however unable to specify timeframe. Patient here in September 2023 with dosing weight 72#, suggests 28# weight gain x 3 months. NFPE notable for moderate muscle/fat wasting to temples, shoulders, and triceps. Based on ASPEN guidelines, patient meets criteria for moderate protein-calorie malnutrition. Encouraged continued PO intake in-house once cleared for PO. Patient amenable to add Ensure Clear, endorses dislike of Ensure Enlive. No pressure injuries or edema documented. Fecal incontinent, last BM 12/14 per EMR. Labs: BUN 28, ALT 7, glucose trending  x 24 hours. Meds: abx. Observed with no overt signs and symptoms of muscle or fat wasting. See nutrition recommendations below.  83F PMHx AF (not on AC due to SAH), HTN, Lewy body dementia (baseline a/o x3), posterior reversible encephalopathy syndrome, chronic bronchiectasis on home O2 prn when SaO2 is < 88%, dCHF, C. diff, BIBEMS daughter for weakness, difficulty getting up from ground, garbled speech noted around 3:30a today. Last known normal when she went to bed at 1:30a. Per daughter, patient was more fatigued than usual on 12/13, sleeping most of the day with poor PO intake. On 12/14, patient continued to be lethargic. Did not check O2 level at home until 1pm this afternoon, where it was in 70's. Daughter (Patience) states no other sxs as pt became more lethargic; denies cough, fever, GI upset, dyspnea, CP.     Patient seen at bedside for nutrition assessment, patient with some confusion during interview. Current diet order: NPO pending improvement in mental status/bedside dysphagia screen. Confirms NKFA. Reports "ok" appetite PTA, states she was eating cheese and chicken, however unable to elaborate further. Dosing weight: 100#, BMI 17.1, reports UBW of 78-79# and endorses recent weight gain, however unable to specify timeframe. Patient here in September 2023 with dosing weight 72#, suggests 28# weight gain x 3 months. NFPE notable for moderate muscle/fat wasting to temples, shoulders, and triceps. Based on ASPEN guidelines, patient meets criteria for moderate protein-calorie malnutrition. Encouraged continued PO intake in-house once cleared for PO. Patient amenable to add Ensure Clear, endorses dislike of Ensure Enlive. No pressure injuries or edema documented. Fecal incontinent, last BM 12/14 per EMR. Labs: BUN 28, ALT 7, glucose trending  x 24 hours. Meds: abx. Observed with no overt signs and symptoms of muscle or fat wasting. See nutrition recommendations below.  83F PMHx AF (not on AC due to SAH), HTN, Lewy body dementia (baseline a/o x3), posterior reversible encephalopathy syndrome, chronic bronchiectasis on home O2 prn when SaO2 is < 88%, dCHF, C. diff, BIBEMS daughter for weakness, difficulty getting up from ground, garbled speech noted around 3:30a today. Last known normal when she went to bed at 1:30a. Per daughter, patient was more fatigued than usual on 12/13, sleeping most of the day with poor PO intake. On 12/14, patient continued to be lethargic. Did not check O2 level at home until 1pm this afternoon, where it was in 70's. Daughter (Patience) states no other sxs as pt became more lethargic; denies cough, fever, GI upset, dyspnea, CP.     Patient seen at bedside for nutrition assessment, patient with some confusion during interview. Current diet order: NPO pending improvement in mental status/bedside dysphagia screen. Confirms NKFA. Reports "ok" appetite PTA, states she was eating cheese and chicken, however unable to elaborate further. Dosing weight: 100#, BMI 17.1, reports UBW of 78-79# and endorses recent weight gain, however unable to specify timeframe. Patient here in September 2023 with dosing weight 72#, suggests 28# weight gain x 3 months. NFPE notable for moderate muscle/fat wasting to temples, shoulders, and triceps. Based on ASPEN guidelines, patient meets criteria for moderate protein-calorie malnutrition. Encouraged continued PO intake in-house once cleared for PO. Patient amenable to add Ensure Clear, endorses dislike of Ensure Enlive. No pressure injuries or edema documented. Fecal incontinent, last BM 12/14 per EMR. Labs: BUN 28, ALT 7, glucose trending  x 24 hours. Meds: abx. See nutrition recommendations below.

## 2023-12-15 NOTE — DIETITIAN NUTRITION RISK NOTIFICATION - ADDITIONAL COMMENTS/DIETITIAN RECOMMENDATIONS
1. Recommend Regular diet with Ensure Clear 3x day (240 kcal, 8g protein per serving). Texture/consistency per team/SLP   2. Encourage and monitor PO intake, honor preferences as able   >> Consistently meet >75% of estimated needs during admission   3. Monitor labs, wt trends, GI function, and skin integrity   4. Pain and bowel regimen per team   5. RD to remain available for additional nutrition interventions and diet edu as needed  patient instructed; verbal instruction; adequate PO intake for repletion, supplements between meals

## 2023-12-15 NOTE — DIETITIAN INITIAL EVALUATION ADULT - PROBLEM SELECTOR PLAN 8
F: s/p NS 500cc x1  E: replete as needed  N: NPO until MS improves   GI: none   DVT: none  Code: Full with trial of intubation   Dispo: [facility] F: s/p NS 500cc x1  E: replete as needed  N: NPO until MS improves   GI: none   DVT: none  Code: Full with trial of intubation   Dispo: [facility]

## 2023-12-15 NOTE — PHYSICAL THERAPY INITIAL EVALUATION ADULT - PERTINENT HX OF CURRENT PROBLEM, REHAB EVAL
83F PMHx AF (not on AC due to SAH), HTN, Lewy body dementia (baseline a/o x3), posterior reversible encephalopathy syndrome, chronic bronchiectasis on home O2 prn when SaO2 is < 88%, dCHF, C. diff, BIBEMS daughter for weakness, difficulty getting up from ground, garbled speech noted around 3:30a today. Last known normal when she went to bed at 1:30a. Per daughter, patient was more fatigued than usual on 12/13, sleeping most of the day with poor PO intake. On 12/14, patient continued to be lethargic. Did not check O2 level at home until 1pm this afternoon, where it was in 70's. Daughter (Patience) states no other sxs as pt became more lethargic; denies cough, fever, GI upset, dyspnea, CP.

## 2023-12-15 NOTE — DISCHARGE NOTE PROVIDER - HOSPITAL COURSE
#Discharge: do not delete    83F PMHx AF (not on AC due to SAH), HTN, Lewy body dementia (baseline a/o x3), posterior reversible encephalopathy syndrome, chronic bronchiectasis on home O2 prn, dCHF, C. diff, BIBEMS for stroke rule out and hypoxia; no acute stroke on CTH. Pulmonary consulted for AHRF, possibly 2/2 viral vs bacterial PNA given h/o chronic bronchiectasis. Pt admitted for AHRF being treated for possible PNA, pending further w/u. Patient received zosyn 4.5 q8 for pneumonia, and vancomycin 125mg oral for c.diff prophylaxis. Pulmonology consulted, recommended symbicort bid, xopenex prn, chest pt, inhaled tobramycin 4 weeks on and 4 weeks off.      Problem List/Main Diagnoses:   #Acute hypoxic respiratory failure.   ·  Plan: On arrival O2 sat 84% on RA, improved to 94% on 4L NC. Reportedly hypoxic to 70's at home PTA. Uses O2 at home PRN when <88%. Has been more lethargic x2 days, possibly d/t hypoxia which is likely 2/2 mucus plugging iso possible PNA vs ABPA. Pulmonary consulted in ED. MRSA negative. RVP negative. Pulmonology consulted. Patient received zosyn 4.5g, symbicort, duonebs, aerobkia, and chest pt. Patient will follow up with pulmonology outpatient.  - f/u pulmonology outpatient    #Metabolic encephalopathy.   ·  Plan: RESOLVED  Increased lethargy since 12/13. Likely 2/2 hypoxia iso possible PNA. Takes Klonopin 0.25mg PO QID and gabapentin 300mg PO TID at home. No evidence of acute stroke.     #Atrial fibrillation, chronic.   ·  Plan: H/o Afib, not on Eliquis d/t SAH. Takes lopressor 50 BID. EKG showing NSR. Received lopressor 25m bid    #History of bronchiectasis.   ·  Plan: H/o bronchiectasis likely predisposing pt to mucus plugging and infection,    #H/O Clostridium difficile infection.  ·  Plan: Hx of clostridium difficile last admission. Received vancomycin 125mg po qD for prophylaxis.    - f/u with pcp outpatient    #HTN (hypertension).  ·  Plan: Normotensive for age on arrival, 133/85. Received amlodipine 5mg qD.    #Chronic lower back pain.  ·  Plan: Diffuse LBP since May. Tried muscle relaxants (ineffective) and tramadol (caused hallucinations). Currently being managed with gabapentin 300 TID. See pain management OP; unclear etio but suspect shingles. Received gabapenting 100TID during admission     #Lewy body dementia.  Received home med donepezil 5 qd.    New medications/therapies:   New lines/hardware: none  Labs to be followed outpatient: none  Exam to be followed outpatient: none    Discharge plan: discharge to home    Physical Exam Upon Discharge:  General: in no acute distress, cachectic  Eyes: EOMI intact bilaterally  HENT: dry mucous membranes  Neck: Trachea midline  Lungs: crackles billaterally  Cardiovascular: irregular rhythm  Abdomen: Soft, non-tender non-distended  Extremities: WWP  Neurological: AO3, neurologically intact   #Discharge: do not delete    83F PMHx AF (not on AC due to SAH), HTN, Lewy body dementia (baseline a/o x3), posterior reversible encephalopathy syndrome, chronic bronchiectasis on home O2 prn, dCHF, C. diff, BIBEMS for stroke rule out and hypoxia; no acute stroke on CTH. Pulmonary consulted for AHRF, possibly 2/2 viral vs bacterial PNA given h/o chronic bronchiectasis. Pt admitted for AHRF being treated for possible PNA, pending further w/u. Patient received zosyn 4.5 q8 for pneumonia, and vancomycin 125mg oral for c.diff prophylaxis. Pulmonology consulted, recommended symbicort bid, xopenex prn, chest pt, inhaled tobramycin 4 weeks on and 4 weeks off.      Problem List/Main Diagnoses:   #Acute hypoxic respiratory failure.   ·  Plan: On arrival O2 sat 84% on RA, improved to 94% on 4L NC. Reportedly hypoxic to 70's at home PTA. Uses O2 at home PRN when <88%. Has been more lethargic x2 days, possibly d/t hypoxia which is likely 2/2 mucus plugging iso possible PNA vs ABPA. Pulmonary consulted in ED. MRSA negative. RVP negative. Pulmonology consulted. Patient received zosyn 4.5g, symbicort, duonebs, aerobkia, and chest pt. Patient will follow up with pulmonology outpatient.    - f/u pulmonology outpatient    #Metabolic encephalopathy.   ·  Plan: RESOLVED  Increased lethargy since 12/13. Likely 2/2 hypoxia iso possible PNA. Takes Klonopin 0.25mg PO QID and gabapentin 300mg PO TID at home. No evidence of acute stroke.     #Atrial fibrillation, chronic.   ·  Plan: H/o Afib, not on Eliquis d/t SAH. Takes lopressor 50 BID. EKG showing NSR. Received lopressor 25m bid. Patient will follow up with pcp outpatient.    #History of bronchiectasis.   ·  Plan: H/o bronchiectasis likely predisposing pt to mucus plugging and infection. Pulmonology was consulted. Patient received symbicort BID, xopenex PRN, aerobika bid, chest pt. Patient should be on inhaled tobramycin 4 weeks on, 4 weeks off.    - f/u with pulmonology outpatient    #H/O Clostridium difficile infection.  ·  Plan: Hx of clostridium difficile last admission. Received vancomycin 125mg po qD for prophylaxis.    - f/u with pcp outpatient    #HTN (hypertension).  ·  Plan: Normotensive for age on arrival, 133/85. Received amlodipine 5mg qD.    #Chronic lower back pain.  ·  Plan: Diffuse LBP since May. Tried muscle relaxants (ineffective) and tramadol (caused hallucinations). Currently being managed with gabapentin 300 TID. See pain management OP; unclear etio but suspect shingles. Received gabapenting 100TID during admission     #Lewy body dementia.  Received home med donepezil 5 qd.    New medications/therapies:   New lines/hardware: none  Labs to be followed outpatient: none  Exam to be followed outpatient: none    Discharge plan: discharge to home    Physical Exam Upon Discharge:  General: in no acute distress, cachectic  Eyes: EOMI intact bilaterally  HENT: dry mucous membranes  Neck: Trachea midline  Lungs: crackles billaterally  Cardiovascular: irregular rhythm  Abdomen: Soft, non-tender non-distended  Extremities: WWP  Neurological: AO3, neurologically intact   #Discharge: do not delete    83F PMHx AF (not on AC due to SAH), HTN, Lewy body dementia (baseline a/o x3), posterior reversible encephalopathy syndrome, chronic bronchiectasis on home O2 prn, dCHF, C. diff, BIBEMS for stroke rule out and hypoxia; no acute stroke on CTH. Pulmonary consulted for AHRF, possibly 2/2 viral vs bacterial PNA given h/o chronic bronchiectasis. Pt admitted for AHRF being treated for possible PNA, pending further w/u. Patient received zosyn 4.5 q8 for pneumonia, and vancomycin 125mg oral for c.diff prophylaxis. Pulmonology consulted, recommended duonebs q4, hypertonic saline, and aerobika. Patient will follow up with Dr. Foster outpatient      Problem List/Main Diagnoses:   #Acute hypoxic respiratory failure.   ·  Plan: On arrival O2 sat 84% on RA, improved to 94% on 4L NC. Reportedly hypoxic to 70's at home PTA. Uses O2 at home PRN when <88%. Has been more lethargic x2 days, possibly d/t hypoxia which is likely 2/2 mucus plugging iso possible PNA vs ABPA. Pulmonary consulted in ED. MRSA negative. RVP negative. Pulmonology consulted. Patient received zosyn 4.5g, symbicort, duonebs, aerobkia, and chest pt. Recommended to avoid gabapentin and clonazepam. Patient gabapentin dosage dcreased. Patient will follow up with pulmonology outpatient.    - f/u pulmonology outpatient    #Metabolic encephalopathy.   ·  Plan: RESOLVED  Increased lethargy since 12/13. Likely 2/2 hypoxia iso possible PNA. Takes Klonopin 0.25mg PO QID and gabapentin 300mg PO TID at home. No evidence of acute stroke.     #Atrial fibrillation, chronic.   ·  Plan: H/o Afib, not on Eliquis d/t SAH. Takes lopressor 50 BID. EKG showing NSR. Received lopressor 25m bid. Patient will follow up with pcp outpatient.    #History of bronchiectasis.   ·  Plan: H/o bronchiectasis likely predisposing pt to mucus plugging and infection. Pulmonology was consulted. Patient received symbicort BID, xopenex PRN, aerobika bid, chest pt. Patient should be on inhaled tobramycin 4 weeks on, 4 weeks off.    - f/u with pulmonology outpatient    #H/O Clostridium difficile infection.  ·  Plan: Hx of clostridium difficile last admission. Received vancomycin 125mg po qD for prophylaxis.    - f/u with pcp outpatient    #HTN (hypertension).  ·  Plan: Normotensive for age on arrival, 133/85. Received amlodipine 5mg qD.    #Chronic lower back pain.  ·  Plan: Diffuse LBP since May. Tried muscle relaxants (ineffective) and tramadol (caused hallucinations). Currently being managed with gabapentin 300 TID. See pain management OP; unclear etio but suspect shingles. Received gabapenting 100TID during admission. Decreased gabapentin to 100mg TID     #Lewy body dementia.  Received home med donepezil 5 qd.    New medications/therapies: continue duonebs q4 followed by hypersal followed by aerobika, vancomycin 125mg qD for 7 days  New lines/hardware: none  Labs to be followed outpatient: sputum culture  Exam to be followed outpatient: none    Discharge plan: discharge to home    Physical Exam Upon Discharge:  General: in no acute distress, cachectic  Eyes: EOMI intact bilaterally  HENT: dry mucous membranes  Neck: Trachea midline  Lungs: crackles billaterally  Cardiovascular: irregular rhythm  Abdomen: Soft, non-tender non-distended  Extremities: WWP  Neurological: AO3, neurologically intact

## 2023-12-15 NOTE — DISCHARGE NOTE PROVIDER - NSDCCPCAREPLAN_GEN_ALL_CORE_FT
PRINCIPAL DISCHARGE DIAGNOSIS  Diagnosis: Acute hypoxic respiratory failure  Assessment and Plan of Treatment: Acute respiratory failure is when one or both of these things happen:  Oxygen cannot pass from the lungs into the blood, causing the blood oxygen level to drop. Loss of blood oxygen means tissues and organs may not work well.  A gas called carbon dioxide cannot pass from the blood into the lungs so the body can get rid of it. The buildup of carbon dioxide can damage the tissues and organs in the body.  Acute respiratory failure happens fast. It is an emergency and needs to be treated right away.  You received antibiotics during your time here. You also received symbicort and aerobika.  Please follow up with your pulmonologist outpatient. Please return to the emergency department immediately if you have shorntess of breathin or fevers.      SECONDARY DISCHARGE DIAGNOSES  Diagnosis: Hypoxia  Assessment and Plan of Treatment:     Diagnosis: Bronchiectasis  Assessment and Plan of Treatment:      PRINCIPAL DISCHARGE DIAGNOSIS  Diagnosis: Acute hypoxic respiratory failure  Assessment and Plan of Treatment: Acute respiratory failure is when one or both of these things happen:  Oxygen cannot pass from the lungs into the blood, causing the blood oxygen level to drop. Loss of blood oxygen means tissues and organs may not work well. A gas called carbon dioxide cannot pass from the blood into the lungs so the body can get rid of it. The buildup of carbon dioxide can damage the tissues and organs in the body. Acute respiratory failure happens fast. It is an emergency and needs to be treated right away.  You received antibiotics during your time here. You also received symbicort and aerobika for your bronchiectasis exacerbation. Medications you will need to continue are:  Medications:  Please follow up with your pulmonologist outpatient within 1 week. Please return to the emergency department immediately if you have shorntess of breath or persistent fevers.      SECONDARY DISCHARGE DIAGNOSES  Diagnosis: Hypoxia  Assessment and Plan of Treatment:     Diagnosis: Bronchiectasis  Assessment and Plan of Treatment:      PRINCIPAL DISCHARGE DIAGNOSIS  Diagnosis: Acute hypoxic respiratory failure  Assessment and Plan of Treatment: Acute respiratory failure is when one or both of these things happen:  Oxygen cannot pass from the lungs into the blood, causing the blood oxygen level to drop. Loss of blood oxygen means tissues and organs may not work well. A gas called carbon dioxide cannot pass from the blood into the lungs so the body can get rid of it. The buildup of carbon dioxide can damage the tissues and organs in the body. Acute respiratory failure happens fast. It is an emergency and needs to be treated right away. Your hypoxic respiratory failure was due to an exacerbation of your bronchiectasis  You received antibiotics during your time here for acute on chronic bronchietasis. You also received symbicort and aerobika for your bronchiectasis exacerbation. Medications you will need to continue are:  Duo-nebulizers every four hours, followed by hypertonic saline followed by aerobika  Because you received antibiotics during your admission, you will need to receive prophylactic antibiotics for clostridium difficile. Here are the instructions below.  Medications:  Vancomycin 125mg once a day by mouth for 7 days  Please follow up with your pulmonologist outpatient within 1 week for your sputum culture. Please return to the emergency department immediately if you have shorntess of breath or persistent fevers.      SECONDARY DISCHARGE DIAGNOSES  Diagnosis: Hypoxia  Assessment and Plan of Treatment:     Diagnosis: Bronchiectasis  Assessment and Plan of Treatment:

## 2023-12-15 NOTE — DIETITIAN INITIAL EVALUATION ADULT - ADD RECOMMEND
1. Recommend Regular diet with Ensure Clear 3x day (240 kcal, 8g protein per serving). Texture/consistency per team/SLP   2. Encourage and monitor PO intake, honor preferences as able   >> Consistently meet >75% of estimated needs during admission   3. Monitor labs, wt trends, GI function, and skin integrity   4. Pain and bowel regimen per team   5. RD to remain available for additional nutrition interventions and diet edu as needed

## 2023-12-15 NOTE — PATIENT PROFILE ADULT - FALL HARM RISK - HARM RISK INTERVENTIONS
Assistance with ambulation/Assistance OOB with selected safe patient handling equipment/Communicate Risk of Fall with Harm to all staff/Discuss with provider need for PT consult/Monitor gait and stability/Provide patient with walking aids - walker, cane, crutches/Reinforce activity limits and safety measures with patient and family/Tailored Fall Risk Interventions/Toileting schedule using arm’s reach rule for commode and bathroom/Visual Cue: Yellow wristband and red socks/Bed in lowest position, wheels locked, appropriate side rails in place/Call bell, personal items and telephone in reach/Instruct patient to call for assistance before getting out of bed or chair/Non-slip footwear when patient is out of bed/[place] to call system/Physically safe environment - no spills, clutter or unnecessary equipment/Purposeful Proactive Rounding/Room/bathroom lighting operational, light cord in reach Assistance with ambulation/Assistance OOB with selected safe patient handling equipment/Communicate Risk of Fall with Harm to all staff/Discuss with provider need for PT consult/Monitor gait and stability/Provide patient with walking aids - walker, cane, crutches/Reinforce activity limits and safety measures with patient and family/Tailored Fall Risk Interventions/Toileting schedule using arm’s reach rule for commode and bathroom/Visual Cue: Yellow wristband and red socks/Bed in lowest position, wheels locked, appropriate side rails in place/Call bell, personal items and telephone in reach/Instruct patient to call for assistance before getting out of bed or chair/Non-slip footwear when patient is out of bed/[place] to call system/Physically safe environment - no spills, clutter or unnecessary equipment/Purposeful Proactive Rounding/Room/bathroom lighting operational, light cord in reach

## 2023-12-15 NOTE — DIETITIAN INITIAL EVALUATION ADULT - PERTINENT MEDS FT
MEDICATIONS  (STANDING):  albuterol/ipratropium for Nebulization 3 milliLiter(s) Nebulizer every 12 hours  amLODIPine   Tablet 5 milliGRAM(s) Oral daily  donepezil 5 milliGRAM(s) Oral at bedtime  gabapentin 100 milliGRAM(s) Oral every 8 hours  influenza  Vaccine (HIGH DOSE) 0.7 milliLiter(s) IntraMuscular once  lidocaine   4% Patch 1 Patch Transdermal every 24 hours  metoprolol tartrate 50 milliGRAM(s) Oral two times a day  montelukast 10 milliGRAM(s) Oral at bedtime  piperacillin/tazobactam IVPB.. 4.5 Gram(s) IV Intermittent every 8 hours  vancomycin    Solution 125 milliGRAM(s) Oral every 24 hours    MEDICATIONS  (PRN):

## 2023-12-15 NOTE — DIETITIAN INITIAL EVALUATION ADULT - PROBLEM SELECTOR PLAN 4
H/o bronchiectasis likely predisposing pt to mucus plugging and infection    - see plan above   - c/w Singular 10mg qd

## 2023-12-15 NOTE — PROGRESS NOTE ADULT - PROBLEM SELECTOR PLAN 7
- c/w donepezil 5 qd Diffuse LBP since May. Tried muscle relaxants (ineffective) and tramadol (caused hallucinations). Currently being managed with gabapentin 300 TID. See pain management OP; unclear etio but suspect shingles.     - c/w gabapentin 100 TID  - lidocaine patch to lower back  - hold off on Klonopin iso AMS; okay to resume when more awake (takes 0.25mg QID at home)  - avoid opioids and muscle relaxants

## 2023-12-15 NOTE — DIETITIAN INITIAL EVALUATION ADULT - PROBLEM SELECTOR PLAN 2
Increased lethargy since 12/13. Likely 2/2 hypoxia iso possible PNA. Takes Klonopin 0.25mg PO QID and gabapentin 300mg PO TID at home. No evidence of acute stroke.     - treat underlying infection   - monitor off klonopin for now  - NPO until MS improves

## 2023-12-15 NOTE — OCCUPATIONAL THERAPY INITIAL EVALUATION ADULT - GENERAL OBSERVATIONS, REHAB EVAL
OT IE completed. Pt admitted to [facility] 2/2 respiratory failure. Orders received, chart reviewed, pt cleared for OT by SIVAKUMAR Gomez. Pt received semi supine in bed, NAD, +heplock, +O2 2L NC. Pt A&Ox3 (disoriented to time), agreeable to OT, and tolerated session well. OT IE completed. Pt admitted to [facility] 2/2 respiratory failure. Orders received, chart reviewed, pt cleared for OT by SIVAKUMAR Gomez. Pt received semi supine in bed, NAD, +heplock, +O2 2L NC. Pt A&Ox3 (disoriented to time), agreeable to OT, and tolerated session well.

## 2023-12-15 NOTE — DIETITIAN INITIAL EVALUATION ADULT - NUTRITIONGOAL OUTCOME1
Pt to meet at least 75% of nutritional needs consistently, Reduce/resolve signs and symptoms of protein-calorie malnutrition

## 2023-12-15 NOTE — DIETITIAN INITIAL EVALUATION ADULT - OTHER CALCULATIONS
Based on Standards of Care patient within % IBW (83%) thus actual body weight used for all calculations (100#). Needs adjusted for advanced age and malnutrition.

## 2023-12-15 NOTE — PROGRESS NOTE ADULT - PROBLEM SELECTOR PLAN 3
H/o Afib, not on Eliquis d/t SAH. Takes lopressor 50 BID. EKG showing NSR.    - c/w lopressor 50 BID H/o Afib, not on Eliquis d/t SAH. Takes lopressor 50 BID. EKG showing NSR.    - c/w lopressor 25 BID

## 2023-12-15 NOTE — PROGRESS NOTE ADULT - SUBJECTIVE AND OBJECTIVE BOX
*****INCOMPLETE NOTE*****    INTERVAL HPI/OVERNIGHT EVENTS:    SUBJECTIVE: Patient seen and examined at bedside, resting comfortably in bed, and does not appear to be in any acute distress. Patient states  Patient denies any recent fevers, chills, nausea, vomiting, headache, acute sob, chest pain, abdominal pain, genitourinary sx, extremity pain or swelling.     ANTIBIOTICS/RELEVANT:    MEDICATIONS  (STANDING):  albuterol/ipratropium for Nebulization 3 milliLiter(s) Nebulizer every 12 hours  amLODIPine   Tablet 5 milliGRAM(s) Oral daily  donepezil 5 milliGRAM(s) Oral at bedtime  gabapentin 100 milliGRAM(s) Oral every 8 hours  influenza  Vaccine (HIGH DOSE) 0.7 milliLiter(s) IntraMuscular once  lidocaine   4% Patch 1 Patch Transdermal every 24 hours  metoprolol tartrate 50 milliGRAM(s) Oral two times a day  montelukast 10 milliGRAM(s) Oral at bedtime  piperacillin/tazobactam IVPB. 4.5 Gram(s) IV Intermittent once  piperacillin/tazobactam IVPB.- 4.5 Gram(s) IV Intermittent once  piperacillin/tazobactam IVPB.. 4.5 Gram(s) IV Intermittent every 8 hours  vancomycin  IVPB 750 milliGRAM(s) IV Intermittent every 24 hours    MEDICATIONS  (PRN):      Vital Signs Last 24 Hrs  T(C): 36.3 (15 Dec 2023 05:54), Max: 36.9 (14 Dec 2023 14:27)  T(F): 97.4 (15 Dec 2023 05:54), Max: 98.4 (14 Dec 2023 14:27)  HR: 58 (15 Dec 2023 05:54) (58 - 87)  BP: 119/50 (15 Dec 2023 05:54) (113/71 - 137/84)  BP(mean): --  RR: 18 (15 Dec 2023 05:54) (17 - 20)  SpO2: 100% (15 Dec 2023 05:54) (83% - 100%)    Parameters below as of 15 Dec 2023 01:21  Patient On (Oxygen Delivery Method): nasal cannula  O2 Flow (L/min): 2      PHYSICAL EXAM:  General: in no acute distress  Eyes: EOMI intact bilaterally. Anicteric sclerae, moist conjunctivae  HENT: Moist mucous membranes  Neck: Trachea midline, supple  Lungs: CTA B/L. No wheezes, rales, or rhonchi  Cardiovascular: RRR. No murmurs, rubs, or gallops  Abdomen: Soft, non-tender non-distended; No rebound or guarding  Extremities: WWP, No clubbing, cyanosis or edema  Neurological: Alert and oriented  Skin: Warm and dry. No obvious rash     LABS:                        12.5   10.55 )-----------( 199      ( 14 Dec 2023 14:50 )             38.7     12    139  |  102  |  25<H>  ----------------------------<  95  4.2   |  26  |  0.77    Ca    9.3      14 Dec 2023 14:50    TPro  8.0  /  Alb  3.6  /  TBili  0.8  /  DBili  x   /  AST  13  /  ALT  9<L>  /  AlkPhos  121<H>  12    PT/INR - ( 14 Dec 2023 14:50 )   PT: 11.9 sec;   INR: 1.05          PTT - ( 14 Dec 2023 14:50 )  PTT:27.9 sec  Urinalysis Basic - ( 14 Dec 2023 15:34 )    Color: Dark Yellow / Appearance: Clear / S.023 / pH: x  Gluc: x / Ketone: 15 mg/dL  / Bili: Negative / Urobili: 0.2 mg/dL   Blood: x / Protein: 100 mg/dL / Nitrite: Negative   Leuk Esterase: Trace / RBC: 1 /HPF / WBC 1 /HPF   Sq Epi: x / Non Sq Epi: 1 /HPF / Bacteria: Negative /HPF        MICROBIOLOGY:    RADIOLOGY & ADDITIONAL STUDIES: INTERVAL HPI/OVERNIGHT EVENTS:    SUBJECTIVE: Patient seen and examined at bedside, resting comfortably in bed, and does not appear to be in any acute distress. Patient states  Patient denies any recent fevers, chills, nausea, vomiting, headache, acute sob, chest pain, abdominal pain, genitourinary sx, extremity pain or swelling.     ANTIBIOTICS/RELEVANT:    MEDICATIONS  (STANDING):  albuterol/ipratropium for Nebulization 3 milliLiter(s) Nebulizer every 12 hours  amLODIPine   Tablet 5 milliGRAM(s) Oral daily  donepezil 5 milliGRAM(s) Oral at bedtime  gabapentin 100 milliGRAM(s) Oral every 8 hours  influenza  Vaccine (HIGH DOSE) 0.7 milliLiter(s) IntraMuscular once  lidocaine   4% Patch 1 Patch Transdermal every 24 hours  metoprolol tartrate 50 milliGRAM(s) Oral two times a day  montelukast 10 milliGRAM(s) Oral at bedtime  piperacillin/tazobactam IVPB. 4.5 Gram(s) IV Intermittent once  piperacillin/tazobactam IVPB.- 4.5 Gram(s) IV Intermittent once  piperacillin/tazobactam IVPB.. 4.5 Gram(s) IV Intermittent every 8 hours  vancomycin  IVPB 750 milliGRAM(s) IV Intermittent every 24 hours    MEDICATIONS  (PRN):      Vital Signs Last 24 Hrs  T(C): 36.3 (15 Dec 2023 05:54), Max: 36.9 (14 Dec 2023 14:27)  T(F): 97.4 (15 Dec 2023 05:54), Max: 98.4 (14 Dec 2023 14:27)  HR: 58 (15 Dec 2023 05:54) (58 - 87)  BP: 119/50 (15 Dec 2023 05:54) (113/71 - 137/84)  BP(mean): --  RR: 18 (15 Dec 2023 05:54) (17 - 20)  SpO2: 100% (15 Dec 2023 05:54) (83% - 100%)    Parameters below as of 15 Dec 2023 01:21  Patient On (Oxygen Delivery Method): nasal cannula  O2 Flow (L/min): 2      PHYSICAL EXAM:  General: in no acute distress  Eyes: EOMI intact bilaterally. Anicteric sclerae, moist conjunctivae  HENT: Moist mucous membranes  Neck: Trachea midline, supple  Lungs: CTA B/L. No wheezes, rales, or rhonchi  Cardiovascular: RRR. No murmurs, rubs, or gallops  Abdomen: Soft, non-tender non-distended; No rebound or guarding  Extremities: WWP, No clubbing, cyanosis or edema  Neurological: Alert and oriented  Skin: Warm and dry. No obvious rash     LABS:                        12.5   10.55 )-----------( 199      ( 14 Dec 2023 14:50 )             38.7     12-14    139  |  102  |  25<H>  ----------------------------<  95  4.2   |  26  |  0.77    Ca    9.3      14 Dec 2023 14:50    TPro  8.0  /  Alb  3.6  /  TBili  0.8  /  DBili  x   /  AST  13  /  ALT  9<L>  /  AlkPhos  121<H>  12-14    PT/INR - ( 14 Dec 2023 14:50 )   PT: 11.9 sec;   INR: 1.05          PTT - ( 14 Dec 2023 14:50 )  PTT:27.9 sec  Urinalysis Basic - ( 14 Dec 2023 15:34 )    Color: Dark Yellow / Appearance: Clear / S.023 / pH: x  Gluc: x / Ketone: 15 mg/dL  / Bili: Negative / Urobili: 0.2 mg/dL   Blood: x / Protein: 100 mg/dL / Nitrite: Negative   Leuk Esterase: Trace / RBC: 1 /HPF / WBC 1 /HPF   Sq Epi: x / Non Sq Epi: 1 /HPF / Bacteria: Negative /HPF        MICROBIOLOGY:    RADIOLOGY & ADDITIONAL STUDIES: INTERVAL HPI/OVERNIGHT EVENTS: steph    SUBJECTIVE: Patient seen and examined at bedside, resting comfortably in bed, and does not appear to be in any acute distress. Patient tired during exam. Patient states that she has shortness of breath. Denies any chest pain or abdominal pain.    MEDICATIONS  (STANDING):  albuterol/ipratropium for Nebulization 3 milliLiter(s) Nebulizer every 12 hours  amLODIPine   Tablet 5 milliGRAM(s) Oral daily  donepezil 5 milliGRAM(s) Oral at bedtime  gabapentin 100 milliGRAM(s) Oral every 8 hours  influenza  Vaccine (HIGH DOSE) 0.7 milliLiter(s) IntraMuscular once  lidocaine   4% Patch 1 Patch Transdermal every 24 hours  metoprolol tartrate 50 milliGRAM(s) Oral two times a day  montelukast 10 milliGRAM(s) Oral at bedtime  piperacillin/tazobactam IVPB. 4.5 Gram(s) IV Intermittent once  piperacillin/tazobactam IVPB.- 4.5 Gram(s) IV Intermittent once  piperacillin/tazobactam IVPB.. 4.5 Gram(s) IV Intermittent every 8 hours  vancomycin  IVPB 750 milliGRAM(s) IV Intermittent every 24 hours    Vital Signs Last 24 Hrs  T(C): 36.3 (15 Dec 2023 05:54), Max: 36.9 (14 Dec 2023 14:27)  T(F): 97.4 (15 Dec 2023 05:54), Max: 98.4 (14 Dec 2023 14:27)  HR: 58 (15 Dec 2023 05:54) (58 - 87)  BP: 119/50 (15 Dec 2023 05:54) (113/71 - 137/84)  BP(mean): --  RR: 18 (15 Dec 2023 05:54) (17 - 20)  SpO2: 100% (15 Dec 2023 05:54) (83% - 100%)    Parameters below as of 15 Dec 2023 01:21  Patient On (Oxygen Delivery Method): nasal cannula  O2 Flow (L/min): 2    PHYSICAL EXAM:  General: in no acute distress, cachectic  Eyes: EOMI intact bilaterally  HENT: dry mucous membranes  Neck: Trachea midline  Lungs: crackles billaterally  Cardiovascular: irregular rhythm  Abdomen: Soft, non-tender non-distended  Extremities: WWP  Neurological: AO3, neurologically intact  Skin: Warm and dry, no pitting edema, skin tenting    LABS:                        12.5   10.55 )-----------( 199      ( 14 Dec 2023 14:50 )             38.7     12-14    139  |  102  |  25<H>  ----------------------------<  95  4.2   |  26  |  0.77    Ca    9.3      14 Dec 2023 14:50    TPro  8.0  /  Alb  3.6  /  TBili  0.8  /  DBili  x   /  AST  13  /  ALT  9<L>  /  AlkPhos  121<H>  12-14    PT/INR - ( 14 Dec 2023 14:50 )   PT: 11.9 sec;   INR: 1.05          PTT - ( 14 Dec 2023 14:50 )  PTT:27.9 sec  Urinalysis Basic - ( 14 Dec 2023 15:34 )

## 2023-12-15 NOTE — CONSULT NOTE ADULT - ASSESSMENT
{\rtf1\zubdhu77894\ansi\mvocaic4991\ftnbj\uc1\deff0  {\fonttbl{\f0 \fnil Segoe UI;}{\f1 \fnil \fcharset0 Segoe UI;}{\f2 \fnil Times New Stepan;}}  {\colortbl ;\rmi100\bulcx646\pqnv422 ;\red0\green0\blue0 ;\red0\green0\bsff115 ;\red0\green0\blue0 ;}  {\stylesheet{\f0\fs20 Normal;}{\cs1 Default Paragraph Font;}{\cs2\f0\fs16 Line Number;}{\cs3\f2\fs24\ul\cf3 Hyperlink;}}  {\*\revtbl{Unknown;}}  \eadrlj37824\wzcrcd20037\dvuil8589\pinwf4051\nszje1112\kunls8159\heatues128\mfxhqjt764\nogrowautofit\ldcraz273\formshade\nofeaturethrottle1\dntblnsbdb\fet4\aendnotes\aftnnrlc\pgbrdrhead\pgbrdrfoot  \sectd\gqzcpi70058\epihqh71989\guttersxn0\fngrjupt3015\uzrzwtmc1966\gyuagmds1200\pgijdoek9211\jxebiqe459\vxjcetj765\sbkpage\pgncont\pgndec  \plain\plain\f0\fs24\ql\plain\f0\fs24\plain\f0\fs20\qluw0829\hich\f0\dbch\f0\loch\f0\fs20\par  I M\par  \par  83 y o F PMHx AF (not on AC due to SAH), HTN, Lewy body dementia (baseline a/o x3), posterior reversible encephalopathy syndrome, chronic bronchiectasis on home O2 prn, dCHF, C. diff, BIBEMS for stroke rule out and hypoxia; no acute stroke on CTH. Pulmonary   consulted for AHRF, possibly 2/2 viral vs bacterial PNA given h/o chronic bronchiectasis. Pt admitted for AHRF being treated for possible PNA, pending further w/u. \par  \par  \plain\f1\fs20\wwsx0567\hich\f1\dbch\f1\loch\f1\cf2\fs20\ul{\field{\*\fldinst HYPERLINK [id number] }{\fldrslt Problem/Plan - 1:}}\plain\f0\fs20\ltyn8311\hich\f0\dbch\f0\loch\f0\fs20\ql\par  \'b7  {\*\bkmkstart gn53884445169}{\*\bkmkend kp39769683542}Problem: {\*\bkmkstart ly66863249014}{\*\bkmkend ns80664603981}Acute hypoxic respiratory failure. \par  \'b7  {\*\bkmkstart dl47118494432}{\*\bkmkend ei14524856666}Plan: {\*\bkmkstart rt69992822103}{\*\bkmkend yt03918691836}On arrival O2 sat 84% on RA, improved to 94% on 4L NC. Reportedly hypoxic to 70's at home PTA. Uses O2 at home PRN when <88%. Has been   more lethargic x2 days, possibly d/t hypoxia which is likely 2/2 mucus plugging iso possible PNA vs ABPA. Pulmonary consulted in ED. \par  \par  - f/u Pulm recs\par  - start pseudomonal Zosyn + Vanc given that pt was in hospital receiving abx in past 90 days\par  - f/u MRSA swab; if negative, d/c Vanc\par  - duonebs BID with aerobika for airway clearance after each duoneb\par  - chest PT\par  - f/u sputum culture, RVP.\plain\f1\fs20\xnkc8793\hich\f1\dbch\f1\loch\f1\cf2\fs20\strike\plain\f0\fs20\kxjw0697\hich\f0\dbch\f0\loch\f0\fs20\par  \par  \plain\f1\fs20\qibp0330\hich\f1\dbch\f1\loch\f1\cf2\fs20\ul{\field{\*\fldinst HYPERLINK [id number] }{\fldrslt Problem/Plan - 2:}}\plain\f0\fs20\zyzk3723\hich\f0\dbch\f0\loch\f0\fs20\ql\par  \'b7  {\*\bkmkstart sa97130020756}{\*\bkmkend je01106390484}Problem: {\*\bkmkstart jt87316766863}{\*\bkmkend wh77740470425}Metabolic encephalopathy.\plain\f1\fs20\ktaf2052\hich\f1\dbch\f1\loch\f1\cf2\fs20\strike\plain\f0\fs20\ygop9525\hich\f0\dbch\f0\loch\f0\fs20\par  \'b7  {\*\bkmkstart pp99362550526}{\*\bkmkend kx58877179010}Plan: {\*\bkmkstart bx66062039602}{\*\bkmkend dw54609211191}Increased lethargy since 12/13. Likely 2/2 hypoxia iso possible PNA. Takes Klonopin 0.25mg PO QID and gabapentin 300mg PO TID at home.   No evidence of acute stroke. \par  \par  - treat underlying infection \par  - monitor off klonopin for now\par  - NPO until MS improves.\plain\f1\fs20\zukh5749\hich\f1\dbch\f1\loch\f1\cf2\fs20\strike\plain\f0\fs20\jyhv5889\hich\f0\dbch\f0\loch\f0\fs20\par  \par  \plain\f1\fs20\louh9692\hich\f1\dbch\f1\loch\f1\cf2\fs20\ul{\field{\*\fldinst HYPERLINK [id number] }{\fldrslt Problem/Plan - 3:}}\plain\f0\fs20\bmcm9327\hich\f0\dbch\f0\loch\f0\fs20\ql\par  \'b7  {\*\bkmkstart nk47407541883}{\*\bkmkend fw07604856960}Problem: {\*\bkmkstart cr11300457386}{\*\bkmkend wz57925486173}Atrial fibrillation, chronic.\plain\f1\fs20\plsr2865\hich\f1\dbch\f1\loch\f1\cf2\fs20\strike\plain\f0\fs20\vmfp7531\hich\f0\dbch\f0\loch\f0\fs20\par  \'b7  {\*\bkmkstart df29109412878}{\*\bkmkend um43678308561}Plan: {\*\bkmkstart wn31715548528}{\*\bkmkend ug32756491968}H/o Afib, not on Eliquis d/t SAH. Takes lopressor 50 BID. EKG showing NSR.\par  \par  - c/w lopressor 50 BID.\plain\f1\fs20\gpfp4328\hich\f1\dbch\f1\loch\f1\cf2\fs20\strike\plain\f0\fs20\drat6943\hich\f0\dbch\f0\loch\f0\fs20\par  \par  \plain\f1\fs20\psta8542\hich\f1\dbch\f1\loch\f1\cf2\fs20\ul{\field{\*\fldinst HYPERLINK [id number] }{\fldrslt Problem/Plan - 4:}}\plain\f0\fs20\ouam6462\hich\f0\dbch\f0\loch\f0\fs20\ql\par  \'b7  {\*\bkmkstart ge02654467077}{\*\bkmkend jr65214578136}Problem: {\*\bkmkstart fj11964003725}{\*\bkmkend bx61038935310}History of bronchiectasis.\plain\f1\fs20\wpuv1269\hich\f1\dbch\f1\loch\f1\cf2\fs20\strike\plain\f0\fs20\bjos6597\hich\f0\dbch\f0\loch\f0\fs20\par  \'b7  {\*\bkmkstart mg37266381722}{\*\bkmkend ne14848770025}Plan: {\*\bkmkstart ws10262280294}{\*\bkmkend fr22984928687}H/o bronchiectasis likely predisposing pt to mucus plugging and infection\par  \par  - see plan above \par  - c/w Singular 10mg qd.\plain\f1\fs20\oook9097\hich\f1\dbch\f1\loch\f1\cf2\fs20\strike\plain\f0\fs20\gqzn9245\hich\f0\dbch\f0\loch\f0\fs20\par  \par  \plain\f1\fs20\aaky3868\hich\f1\dbch\f1\loch\f1\cf2\fs20\ul{\field{\*\fldinst HYPERLINK [id number] }{\fldrslt Problem/Plan - 5:}}\plain\f0\fs20\zolo9929\hich\f0\dbch\f0\loch\f0\fs20\ql\par  \'b7  {\*\bkmkstart fe10482960068}{\*\bkmkend mg88290813132}Problem: {\*\bkmkstart sb15394049115}{\*\bkmkend uu80551408336}HTN (hypertension).\plain\f1\fs20\nsfm0585\hich\f1\dbch\f1\loch\f1\cf2\fs20\strike\plain\f0\fs20\vycb4206\hich\f0\dbch\f0\loch\f0\fs20\par  \'b7  {\*\bkmkstart jo04987694419}{\*\bkmkend sz78633354629}Plan: {\*\bkmkstart sc74977714149}{\*\bkmkend hm75096593085}Normotensive for age on arrival, 133/85. Takes amlodipine 5 qd \par  \par  - c/w amlodipine 5 qd.\plain\f1\fs20\vtwm9174\hich\f1\dbch\f1\loch\f1\cf2\fs20\strike\plain\f0\fs20\pfij5898\hich\f0\dbch\f0\loch\f0\fs20\par  \par  \plain\f1\fs20\rrfq3123\hich\f1\dbch\f1\loch\f1\cf2\fs20\ul{\field{\*\fldinst HYPERLINK [id number] }{\fldrslt Problem/Plan - 6:}}\plain\f0\fs20\lyaf0096\hich\f0\dbch\f0\loch\f0\fs20\ql\par  \'b7  {\*\bkmkstart xf14792950675}{\*\bkmkend so76079955563}Problem: {\*\bkmkstart gg09255584125}{\*\bkmkend gj94647124001}Chronic lower back pain.\plain\f1\fs20\ldmw0269\hich\f1\dbch\f1\loch\f1\cf2\fs20\strike\plain\f0\fs20\kufp1236\hich\f0\dbch\f0\loch\f0\fs20\par  \'b7  {\*\bkmkstart ut04983818761}{\*\bkmkend oa31826228055}Plan: {\*\bkmkstart wh57646472645}{\*\bkmkend bk79991764804}Diffuse LBP since May. Tried muscle relaxants (ineffective) and tramadol (caused hallucinations). Currently being managed with gabapentin   300 TID. See pain management OP; unclear etio but suspect shingles. \par  \par  - start with gabapentin 100 TID and uptitrate as needed\par  - lidocaine patch to lower back\par  - hold off on Klonopin iso AMS; okay to resume when more awake (takes 0.25mg QID at home)\par  - avoid opioids and muscle relaxants.\plain\f1\fs20\mcyw4518\hich\f1\dbch\f1\loch\f1\cf2\fs20\strike\plain\f0\fs20\zqgq7843\hich\f0\dbch\f0\loch\f0\fs20\par  \par  \plain\f1\fs20\vnmo7649\hich\f1\dbch\f1\loch\f1\cf2\fs20\ul{\field{\*\fldinst HYPERLINK [id number] }{\fldrslt Problem/Plan - 7:}}\plain\f0\fs20\adgj6692\hich\f0\dbch\f0\loch\f0\fs20\ql\par  \'b7  {\*\bkmkstart sm96805777629}{\*\bkmkend wc78060177231}Problem: {\*\bkmkstart pd24837933672}{\*\bkmkend qk26076889922}Lewy body dementia.\plain\f1\fs20\lkny3412\hich\f1\dbch\f1\loch\f1\cf2\fs20\strike\plain\f0\fs20\tghn7419\hich\f0\dbch\f0\loch\f0\fs20\par  \'b7  {\*\bkmkstart ov87204120247}{\*\bkmkend mc04082225501}Plan: {\*\bkmkstart bv17378172300}{\*\bkmkend xf34031072590}- c/w donepezil 5 qd.\plain\f1\fs20\lbxt9441\hich\f1\dbch\f1\loch\f1\cf2\fs20\strike\plain\f0\fs20\qmao0079\hich\f0\dbch\f0\loch\f0\fs20\par  \par  \plain\f1\fs20\pkoj5672\hich\f1\dbch\f1\loch\f1\cf2\fs20\ul{\field{\*\fldinst HYPERLINK [id number] }{\fldrslt Problem/Plan - 8:}}\plain\f0\fs20\lklw1305\hich\f0\dbch\f0\loch\f0\fs20\ql\par  \'b7  {\*\bkmkstart wy24731632068}{\*\bkmkend on58512076086}Problem: {\*\bkmkstart sz02007174601}{\*\bkmkend hc09244934015}Prophylactic measure. \par  \'b7  {\*\bkmkstart pn67297155979}{\*\bkmkend mz13803909410}Plan: {\*\bkmkstart mt33896598496}{\*\bkmkend bs48779625428}F: s/p NS 500cc x1\par  E: replete as needed\par  N: NPO until MS improves \par  GI: none \par  DVT: none\par  Code: Full with trial of intubation \par  Dispo: RMF{\*\bkmkstart bkcommentCR}{\*\bkmkend bkcommentCR}.\par  \par  \par  }   {\rtf1\mxegij54033\ansi\dhpfcpa9699\ftnbj\uc1\deff0  {\fonttbl{\f0 \fnil Segoe UI;}{\f1 \fnil \fcharset0 Segoe UI;}{\f2 \fnil Times New Stepan;}}  {\colortbl ;\lvv862\eizcu621\ejli272 ;\red0\green0\blue0 ;\red0\green0\udrd423 ;\red0\green0\blue0 ;}  {\stylesheet{\f0\fs20 Normal;}{\cs1 Default Paragraph Font;}{\cs2\f0\fs16 Line Number;}{\cs3\f2\fs24\ul\cf3 Hyperlink;}}  {\*\revtbl{Unknown;}}  \uobkcd23344\koylui29822\zekoj8045\uhvgd9186\inixg8428\ogytu4053\zwcdsak974\kadjwak616\nogrowautofit\stddii472\formshade\nofeaturethrottle1\dntblnsbdb\fet4\aendnotes\aftnnrlc\pgbrdrhead\pgbrdrfoot  \sectd\gznjec98397\vvswry16578\guttersxn0\qknhwzrb1281\lbxyqwye9543\fdvysyre6294\hhlhvvmf6158\ejsoxyi759\chbjjqq489\sbkpage\pgncont\pgndec  \plain\plain\f0\fs24\ql\plain\f0\fs24\plain\f0\fs20\mcut6222\hich\f0\dbch\f0\loch\f0\fs20\par  I M\par  \par  83 y o F PMHx AF (not on AC due to SAH), HTN, Lewy body dementia (baseline a/o x3), posterior reversible encephalopathy syndrome, chronic bronchiectasis on home O2 prn, dCHF, C. diff, BIBEMS for stroke rule out and hypoxia; no acute stroke on CTH. Pulmonary   consulted for AHRF, possibly 2/2 viral vs bacterial PNA given h/o chronic bronchiectasis. Pt admitted for AHRF being treated for possible PNA, pending further w/u. \par  \par  \plain\f1\fs20\gkex7428\hich\f1\dbch\f1\loch\f1\cf2\fs20\ul{\field{\*\fldinst HYPERLINK [id number] }{\fldrslt Problem/Plan - 1:}}\plain\f0\fs20\nniv0349\hich\f0\dbch\f0\loch\f0\fs20\ql\par  \'b7  {\*\bkmkstart ji20594823814}{\*\bkmkend ku65014165333}Problem: {\*\bkmkstart pz16811267081}{\*\bkmkend hz82515388070}Acute hypoxic respiratory failure. \par  \'b7  {\*\bkmkstart zd07995552059}{\*\bkmkend nf47518812183}Plan: {\*\bkmkstart dh05794407705}{\*\bkmkend xi71056425418}On arrival O2 sat 84% on RA, improved to 94% on 4L NC. Reportedly hypoxic to 70's at home PTA. Uses O2 at home PRN when <88%. Has been   more lethargic x2 days, possibly d/t hypoxia which is likely 2/2 mucus plugging iso possible PNA vs ABPA. Pulmonary consulted in ED. \par  \par  - f/u Pulm recs\par  - start pseudomonal Zosyn + Vanc given that pt was in hospital receiving abx in past 90 days\par  - f/u MRSA swab; if negative, d/c Vanc\par  - duonebs BID with aerobika for airway clearance after each duoneb\par  - chest PT\par  - f/u sputum culture, RVP.\plain\f1\fs20\hpcv2551\hich\f1\dbch\f1\loch\f1\cf2\fs20\strike\plain\f0\fs20\zfls7374\hich\f0\dbch\f0\loch\f0\fs20\par  \par  \plain\f1\fs20\ujvq5827\hich\f1\dbch\f1\loch\f1\cf2\fs20\ul{\field{\*\fldinst HYPERLINK [id number] }{\fldrslt Problem/Plan - 2:}}\plain\f0\fs20\bdcp3817\hich\f0\dbch\f0\loch\f0\fs20\ql\par  \'b7  {\*\bkmkstart it93548335639}{\*\bkmkend vd27259801010}Problem: {\*\bkmkstart ms66978538007}{\*\bkmkend gn36851263307}Metabolic encephalopathy.\plain\f1\fs20\mpyx7254\hich\f1\dbch\f1\loch\f1\cf2\fs20\strike\plain\f0\fs20\dsxx4782\hich\f0\dbch\f0\loch\f0\fs20\par  \'b7  {\*\bkmkstart oz49838994883}{\*\bkmkend fu67790422453}Plan: {\*\bkmkstart yr40887994197}{\*\bkmkend ok76271560277}Increased lethargy since 12/13. Likely 2/2 hypoxia iso possible PNA. Takes Klonopin 0.25mg PO QID and gabapentin 300mg PO TID at home.   No evidence of acute stroke. \par  \par  - treat underlying infection \par  - monitor off klonopin for now\par  - NPO until MS improves.\plain\f1\fs20\rzud7129\hich\f1\dbch\f1\loch\f1\cf2\fs20\strike\plain\f0\fs20\oibr3708\hich\f0\dbch\f0\loch\f0\fs20\par  \par  \plain\f1\fs20\qyyo8803\hich\f1\dbch\f1\loch\f1\cf2\fs20\ul{\field{\*\fldinst HYPERLINK [id number] }{\fldrslt Problem/Plan - 3:}}\plain\f0\fs20\xvhf0176\hich\f0\dbch\f0\loch\f0\fs20\ql\par  \'b7  {\*\bkmkstart hz44742576956}{\*\bkmkend ev52521834824}Problem: {\*\bkmkstart at61103402339}{\*\bkmkend ja85645334923}Atrial fibrillation, chronic.\plain\f1\fs20\ugno0715\hich\f1\dbch\f1\loch\f1\cf2\fs20\strike\plain\f0\fs20\qooy0204\hich\f0\dbch\f0\loch\f0\fs20\par  \'b7  {\*\bkmkstart ii10049140256}{\*\bkmkend ts96558665779}Plan: {\*\bkmkstart ve71715159544}{\*\bkmkend vq95553175886}H/o Afib, not on Eliquis d/t SAH. Takes lopressor 50 BID. EKG showing NSR.\par  \par  - c/w lopressor 50 BID.\plain\f1\fs20\vhet2728\hich\f1\dbch\f1\loch\f1\cf2\fs20\strike\plain\f0\fs20\ltju5818\hich\f0\dbch\f0\loch\f0\fs20\par  \par  \plain\f1\fs20\ahet6858\hich\f1\dbch\f1\loch\f1\cf2\fs20\ul{\field{\*\fldinst HYPERLINK [id number],[id number] }{\fldrslt Problem/Plan - 4:}}\plain\f0\fs20\wfmt5302\hich\f0\dbch\f0\loch\f0\fs20\ql\par  \'b7  {\*\bkmkstart ke47227654259}{\*\bkmkend ew09437413856}Problem: {\*\bkmkstart er33488674659}{\*\bkmkend rb75922532553}History of bronchiectasis.\plain\f1\fs20\okew5190\hich\f1\dbch\f1\loch\f1\cf2\fs20\strike\plain\f0\fs20\ckxq5144\hich\f0\dbch\f0\loch\f0\fs20\par  \'b7  {\*\bkmkstart pw25286468158}{\*\bkmkend nd15695351439}Plan: {\*\bkmkstart rl28849987067}{\*\bkmkend ia53628825053}H/o bronchiectasis likely predisposing pt to mucus plugging and infection\par  \par  - see plan above \par  - c/w Singular 10mg qd.\plain\f1\fs20\nwrj3156\hich\f1\dbch\f1\loch\f1\cf2\fs20\strike\plain\f0\fs20\krwn5318\hich\f0\dbch\f0\loch\f0\fs20\par  \par  \plain\f1\fs20\oevy3731\hich\f1\dbch\f1\loch\f1\cf2\fs20\ul{\field{\*\fldinst HYPERLINK [id number] }{\fldrslt Problem/Plan - 5:}}\plain\f0\fs20\ovob0253\hich\f0\dbch\f0\loch\f0\fs20\ql\par  \'b7  {\*\bkmkstart zy77071099130}{\*\bkmkend lb08978914978}Problem: {\*\bkmkstart or68042411252}{\*\bkmkend rt13091800663}HTN (hypertension).\plain\f1\fs20\rewh2319\hich\f1\dbch\f1\loch\f1\cf2\fs20\strike\plain\f0\fs20\jwrq9006\hich\f0\dbch\f0\loch\f0\fs20\par  \'b7  {\*\bkmkstart rx47360170868}{\*\bkmkend zo68597882490}Plan: {\*\bkmkstart mc86871065613}{\*\bkmkend fz06962358389}Normotensive for age on arrival, 133/85. Takes amlodipine 5 qd \par  \par  - c/w amlodipine 5 qd.\plain\f1\fs20\igjl3267\hich\f1\dbch\f1\loch\f1\cf2\fs20\strike\plain\f0\fs20\skyi6139\hich\f0\dbch\f0\loch\f0\fs20\par  \par  \plain\f1\fs20\ezwk4938\hich\f1\dbch\f1\loch\f1\cf2\fs20\ul{\field{\*\fldinst HYPERLINK [id number] }{\fldrslt Problem/Plan - 6:}}\plain\f0\fs20\zmgm6515\hich\f0\dbch\f0\loch\f0\fs20\ql\par  \'b7  {\*\bkmkstart nb22110648973}{\*\bkmkend rd11103486344}Problem: {\*\bkmkstart nw48969687058}{\*\bkmkend xa50678560339}Chronic lower back pain.\plain\f1\fs20\dsmm3300\hich\f1\dbch\f1\loch\f1\cf2\fs20\strike\plain\f0\fs20\iahd3708\hich\f0\dbch\f0\loch\f0\fs20\par  \'b7  {\*\bkmkstart pi66041230827}{\*\bkmkend hh62681941519}Plan: {\*\bkmkstart ez38855640751}{\*\bkmkend vb98404573228}Diffuse LBP since May. Tried muscle relaxants (ineffective) and tramadol (caused hallucinations). Currently being managed with gabapentin   300 TID. See pain management OP; unclear etio but suspect shingles. \par  \par  - start with gabapentin 100 TID and uptitrate as needed\par  - lidocaine patch to lower back\par  - hold off on Klonopin iso AMS; okay to resume when more awake (takes 0.25mg QID at home)\par  - avoid opioids and muscle relaxants.\plain\f1\fs20\insu3245\hich\f1\dbch\f1\loch\f1\cf2\fs20\strike\plain\f0\fs20\pyng5007\hich\f0\dbch\f0\loch\f0\fs20\par  \par  \plain\f1\fs20\cncx6899\hich\f1\dbch\f1\loch\f1\cf2\fs20\ul{\field{\*\fldinst HYPERLINK [id number] }{\fldrslt Problem/Plan - 7:}}\plain\f0\fs20\wozu6232\hich\f0\dbch\f0\loch\f0\fs20\ql\par  \'b7  {\*\bkmkstart dz71823974923}{\*\bkmkend vr93079501516}Problem: {\*\bkmkstart em27507462245}{\*\bkmkend cj77348103706}Lewy body dementia.\plain\f1\fs20\owkd4470\hich\f1\dbch\f1\loch\f1\cf2\fs20\strike\plain\f0\fs20\hdkc4177\hich\f0\dbch\f0\loch\f0\fs20\par  \'b7  {\*\bkmkstart xa67117489092}{\*\bkmkend bz78875883271}Plan: {\*\bkmkstart hy71051799909}{\*\bkmkend hj00724691355}- c/w donepezil 5 qd.\plain\f1\fs20\ogdu7797\hich\f1\dbch\f1\loch\f1\cf2\fs20\strike\plain\f0\fs20\qrwu6477\hich\f0\dbch\f0\loch\f0\fs20\par  \par  \plain\f1\fs20\erfq6546\hich\f1\dbch\f1\loch\f1\cf2\fs20\ul{\field{\*\fldinst HYPERLINK [id number] }{\fldrslt Problem/Plan - 8:}}\plain\f0\fs20\wlff1243\hich\f0\dbch\f0\loch\f0\fs20\ql\par  \'b7  {\*\bkmkstart zc34330960709}{\*\bkmkend mv06221954221}Problem: {\*\bkmkstart [id number]}{\*\bkmkend [id number]}Prophylactic measure. \par  \'b7  {\*\bkmkstart [id number]}{\*\bkmkend [id number]}Plan: {\*\bkmkstart [id number]}{\*\bkmkend [id number]}F: s/p NS 500cc x1\par  E: replete as needed\par  N: NPO until MS improves \par  GI: none \par  DVT: none\par  Code: Full with trial of intubation \par  Dispo: [facility]{\*\bkmkstart bkcommentCR}{\*\bkmkend bkcommentCR}.\par  \par  \par  }

## 2023-12-15 NOTE — PROGRESS NOTE ADULT - ATTENDING COMMENTS
82yo F with Hx AF (not on AC due to SAH), HTN, Lewy body dementia (baseline a/o x3), posterior reversible encephalopathy syndrome, chronic bronchiectasis on home O2 prn, dCHF, C. diff, who presented due to metabolic encephalopathy likely due to acute hypoxic respiratory failure due to bronchiectasis exacerbation.     #Acute hypoxic respiratory failure  #Bronchiectasis exacerbation  - wean O2 as tolerated, sat 80s on EMS arrival, goal > 88%  - would continue with Zosyn, only past cx is Pseudomonas in 2019 with Zosyn, fluoroquinolone sensitivity (in HIE) however patient reports severe swelling (unclear where) to Levaquin  - c/w Xopenex PRN, started Symbicort  - Aerobika for airway clearance and chest PT  - attempt to obtain sputum cx after airway clearance    #Metabolic encephalopathy  - was found altered at home and in ED, likely due to hypoxia to 80s on EMS arrival  - mental status returned to baseline  - stroke code negative    #Hx of Cdiff  - start on PO Vanc ppx while on abx with 125mg daily    #Afib, chronic  - c/w Metoprolol for rate control  - no AC due to hx of SAH    Remainder as above  Dispo: home when medically ready   DVT ppx: SCDs 84yo F with Hx AF (not on AC due to SAH), HTN, Lewy body dementia (baseline a/o x3), posterior reversible encephalopathy syndrome, chronic bronchiectasis on home O2 prn, dCHF, C. diff, who presented due to metabolic encephalopathy likely due to acute hypoxic respiratory failure due to bronchiectasis exacerbation.     #Acute hypoxic respiratory failure  #Bronchiectasis exacerbation  - wean O2 as tolerated, sat 80s on EMS arrival, goal > 88%  - would continue with Zosyn, only past cx is Pseudomonas in 2019 with Zosyn, fluoroquinolone sensitivity (in HIE) however patient reports severe swelling (unclear where) to Levaquin  - c/w Xopenex PRN, started Symbicort  - Aerobika for airway clearance and chest PT  - attempt to obtain sputum cx after airway clearance    #Metabolic encephalopathy  - was found altered at home and in ED, likely due to hypoxia to 80s on EMS arrival  - mental status returned to baseline  - stroke code negative    #Hx of Cdiff  - start on PO Vanc ppx while on abx with 125mg daily    #Afib, chronic  - c/w Metoprolol for rate control  - no AC due to hx of SAH, f/u with Dr Rivas for consideration of watchman    Remainder as above  Dispo: home when medically ready   DVT ppx: SCDs

## 2023-12-15 NOTE — PROGRESS NOTE ADULT - PROBLEM SELECTOR PLAN 5
Normotensive for age on arrival, 133/85. Takes amlodipine 5 qd     - c/w amlodipine 5 qd Hx of clostridium difficile last admission    - started on vancomycin 125 po qD
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
## 2023-12-15 NOTE — PROGRESS NOTE ADULT - PROBLEM SELECTOR PLAN 6
Diffuse LBP since May. Tried muscle relaxants (ineffective) and tramadol (caused hallucinations). Currently being managed with gabapentin 300 TID. See pain management OP; unclear etio but suspect shingles.     - start with gabapentin 100 TID and uptitrate as needed  - lidocaine patch to lower back  - hold off on Klonopin iso AMS; okay to resume when more awake (takes 0.25mg QID at home)  - avoid opioids and muscle relaxants Diffuse LBP since May. Tried muscle relaxants (ineffective) and tramadol (caused hallucinations). Currently being managed with gabapentin 300 TID. See pain management OP; unclear etio but suspect shingles.     - c/w gabapentin 100 TID  - lidocaine patch to lower back  - hold off on Klonopin iso AMS; okay to resume when more awake (takes 0.25mg QID at home)  - avoid opioids and muscle relaxants Normotensive for age on arrival, 133/85. Takes amlodipine 5 qd     - c/w amlodipine 5 qd

## 2023-12-15 NOTE — CONSULT NOTE ADULT - SUBJECTIVE AND OBJECTIVE BOX
Patient is a 83y old  Female who presents with a chief complaint of hypoxia (15 Dec 2023 06:21)      HPI:  83F PMHx AF (not on AC due to SAH), HTN, Lewy body dementia (baseline a/o x3), posterior reversible encephalopathy syndrome, chronic bronchiectasis on home O2 prn when SaO2 is < 88%, dCHF, C. diff, BIBEMS daughter for weakness, difficulty getting up from ground, garbled speech noted around 3:30a today. Last known normal when she went to bed at 1:30a. Per daughter, patient was more fatigued than usual on , sleeping most of the day with poor PO intake. On , patient continued to be lethargic. Did not check O2 level at home until 1pm this afternoon, where it was in 70's. Daughter (Patience) states no other sxs as pt became more lethargic; denies cough, fever, GI upset, dyspnea, CP.     In the ED:  Initial vital signs: T: 98.4F, HR: 87, BP: 133/85, R: 20, SpO2: 83% on RA -> 94% on 4L NC  Labs: significant for WBC 10.55, pro , UA negative. Lytes wnl.   Imaging:  CXR: increased vascular markings, similar to prior 11/3/23  CTH: No acute intracranial injury. Moderate microvascular disease. Tiny chronic bilateral cerebellar infarcts. Chronic lacunar infarcts.  CTA chest:  Increased mucus plugging bilaterally, more pronounced in the right upper lobe. Findings suggest infectious/inflammatory processes, including ABPA. New consolidative opacities in the right greater than left upper lobes. Small bilateral pleural effusions, new on the right and increased on the left.  EKG: NSR  Medications: cefepime 2g IVPB x1, clonazepam 0.25mg x1, benadryl 50mg IVP x1, hydrocortisone 200mg IVP x1, levalbuterol inh x1, NS 500cc x1  Consults: Pulmonary, Neurology  (14 Dec 2023 23:37)    PAST MEDICAL & SURGICAL HISTORY:  Bronchiectasis      History of Pseudomonas pneumonia      HTN (hypertension)      Posterior reversible encephalopathy syndrome      Atrial fibrillation      Lewy body dementia      No significant past surgical history        MEDICATIONS  (STANDING):  albuterol/ipratropium for Nebulization 3 milliLiter(s) Nebulizer every 12 hours  amLODIPine   Tablet 5 milliGRAM(s) Oral daily  donepezil 5 milliGRAM(s) Oral at bedtime  gabapentin 100 milliGRAM(s) Oral every 8 hours  influenza  Vaccine (HIGH DOSE) 0.7 milliLiter(s) IntraMuscular once  lidocaine   4% Patch 1 Patch Transdermal every 24 hours  metoprolol tartrate 50 milliGRAM(s) Oral two times a day  montelukast 10 milliGRAM(s) Oral at bedtime  piperacillin/tazobactam IVPB.. 4.5 Gram(s) IV Intermittent every 8 hours  vancomycin  IVPB 750 milliGRAM(s) IV Intermittent every 24 hours    MEDICATIONS  (PRN):              FAMILY HISTORY:      CBC Full  -  ( 15 Dec 2023 05:30 )  WBC Count : 8.27 K/uL  RBC Count : 3.37 M/uL  Hemoglobin : 10.6 g/dL  Hematocrit : 34.7 %  Platelet Count - Automated : 192 K/uL  Mean Cell Volume : 103.0 fl  Mean Cell Hemoglobin : 31.5 pg  Mean Cell Hemoglobin Concentration : 30.5 gm/dL  Auto Neutrophil # : 7.20 K/uL  Auto Lymphocyte # : 0.70 K/uL  Auto Monocyte # : 0.32 K/uL  Auto Eosinophil # : 0.00 K/uL  Auto Basophil # : 0.02 K/uL  Auto Neutrophil % : 87.0 %  Auto Lymphocyte % : 8.5 %  Auto Monocyte % : 3.9 %  Auto Eosinophil % : 0.0 %  Auto Basophil % : 0.2 %          139  |  102  |  25<H>  ----------------------------<  95  4.2   |  26  |  0.77    Ca    9.3      14 Dec 2023 14:50    TPro  8.0  /  Alb  3.6  /  TBili  0.8  /  DBili  x   /  AST  13  /  ALT  9<L>  /  AlkPhos  121<H>        Urinalysis Basic - ( 14 Dec 2023 15:34 )    Color: Dark Yellow / Appearance: Clear / S.023 / pH: x  Gluc: x / Ketone: 15 mg/dL  / Bili: Negative / Urobili: 0.2 mg/dL   Blood: x / Protein: 100 mg/dL / Nitrite: Negative   Leuk Esterase: Trace / RBC: 1 /HPF / WBC 1 /HPF   Sq Epi: x / Non Sq Epi: 1 /HPF / Bacteria: Negative /HPF        Radiology :     < from: Xray Chest 1 View AP/PA (23 @ 15:33) >  ACC: [id number] EXAM:  XR CHEST AP OR PA 1V   ORDERED BY:  REE     PROCEDURE DATE:  2023          INTERPRETATION:  Clinical history reason for exam: Chronic cough.    Frontal chest.    COMPARISON: November 3, 2023.    Findings/  impression: Hyperinflation. Bibasilar opacities/pleural effusions,   decreased. Bilateral interstitial lung disease/nodules. Stable   cardiomegaly, thoracic aortic calcification. Stable bony structures      < from: CT Brain Stroke Protocol (23 @ 14:35) >  ACC: [id number] EXAM:  CT BRAIN STROKE PROTOCOL   ORDERED BY:  REE     PROCEDURE DATE:  2023          INTERPRETATION:  Stroke code.    Technique: CT head was performed utilizing axial images from the base of   the skull through the vertexwithout the administration of intravenous   contrast. Images were reviewed in the bone, brain and subdural windows.    Findings: Comparison is made to a prior CT of the brain performed on   10/3/2023.    There is no evidence of acute intracranial hemorrhage or acute   transcortical infarct. There are tiny bilateral cerebellar chronic   infarctions. There are patchy areas of low density within the   periventricular white matter consistent with microvascular disease,   unchanged. There are tiny bilateral basal ganglia, internal capsule,   external capsule chronic lacunar infarctions. The ventricles are normal   in size and caliber.  There is no evidence of mass-effect or midline shift. There is no   evidence of an intra or extra-axial fluid collection.    Visualized paranasal sinuses and bilateral mastoid air cells are clear.    Impression: No acute intracranial injury. Moderate microvascular disease.   Tiny chronic bilateral cerebellar infarcts. Chronic lacunar infarcts.      < from: CT Angio Chest PE Protocol w/ IV Cont (23 @ 20:52) >  ACC: [id number] EXAM:  CT ANGIO CHEST PULM ART WAWIC   ORDERED BY:    REE     PROCEDURE DATE:  2023          INTERPRETATION:  CLINICAL INFORMATION: Hypoxia. History of bronchiectasis.    COMPARISON: Cardiac CT from 10/3/2023.    CONTRAST/COMPLICATIONS:  IV Contrast: Isovue 370  70 cc administered   30 cc discarded  Oral Contrast: None  Complications: None reported at time of study completion    PROCEDURE:  CT of the Chest was performed.  Sagittal and coronal reformats were performed.    FINDINGS:    LUNGS AND AIRWAYS: Redemonstration of bilateral bronchiectasis, again   greatest in the right upper lobe. Increased areas of mucus plugging   bilaterally, more pronounced in the right upper lobe. New consolidative   opacities in the medial right upper lobe and, to lesser degree,   anterolateral left upper lobe. No pulmonary mass. Biapical scarring. Mild   bilateral lower lobe atelectasis adjacent to the pleural effusions.  PLEURA: New small right pleural effusion and increased small left pleural   effusion. No pneumothorax.  MEDIASTINUM AND YNUIEL: No lymphadenopathy.  VESSELS: Limited evaluation of the subsegmental pulmonary arteries within   the lower lungs due to respiratory motion artifact. Within this   limitation, no pulmonary embolism is visualized. Upper normal caliber   pulmonary trunk. No thoracic aortic aneurysm or dissection. Mild aortic   calcifications. Prominent splenic artery calcifications.  HEART: Right greater than left atrial dilatation, unchanged. Coronary   calcifications. No pericardial effusion.  CHEST WALL AND LOWER NECK: Within normal limits.  VISUALIZED UPPER ABDOMEN: Within normal limits.  BONES: Degenerative changes. Scoliosis.    IMPRESSION:  Since 10/3/2023,  1.  Limited evaluation of the subsegmental pulmonary arteries within the   lower lungs due to respiratory motion artifact. Within this limitation,   no pulmonary embolism is visualized.  2.  Increased mucus plugging bilaterally, more pronounced in the right   upper lobe. Findings suggest infectious/inflammatory processes, including   ABPA.  3.  New consolidative opacities in the right greater than left upper   lobes.  4.  Small bilateral pleural effusions, new on the right and increased on   the left.             Review of Systems : per HPI         Vital Signs Last 24 Hrs  T(C): 36.3 (15 Dec 2023 05:54), Max: 36.9 (14 Dec 2023 14:27)  T(F): 97.4 (15 Dec 2023 05:54), Max: 98.4 (14 Dec 2023 14:27)  HR: 58 (15 Dec 2023 05:54) (58 - 87)  BP: 119/50 (15 Dec 2023 05:54) (113/71 - 137/84)  BP(mean): --  RR: 18 (15 Dec 2023 05:54) (17 - 20)  SpO2: 100% (15 Dec 2023 05:54) (83% - 100%)    Parameters below as of 15 Dec 2023 01:21  Patient On (Oxygen Delivery Method): nasal cannula  O2 Flow (L/min): 2          Physical Exam :  frail 83 y o woman lying comfortably in semi Parra's position , somnolent , NAD     Head : normocephalic , atraumatic    Eyes : PERRLA , EOMI , no nystagmus , sclera anicteric    ENT / FACE : neg nasal discharge , uvula midline , no oropharyngeal erythema / exudate    Neck : supple , negative JVD , negative carotid bruits , no thyromegaly    Chest : coarse bs     Cardiovascular : regular rate and rhythm , neg murmurs / rubs / gallops    Abdomen : soft , non distended , non tender to palpation in all 4 quadrants ,  normal bowel sounds     Extremities : WWP , neg cyanosis /clubbing / edema     Neurologic Exam :     somnolent, does not follow commands, A&O x 0       Motor Exam:       limited by AMS       Sensation :         intact to pinch x 4 extremities                               DTR :           biceps/brachioradialis : equal                            patella/ankle : equal           neg Babinski       Gait :  not tested          PM&R Impression : admitted for AHRF being treated for possible PNA    - deconditioned    - no focal weakness        Recommendations / Plan :       1) Physical / Occupational therapy focusing on therapeutic exercises , equipment evaluation , bed mobility/transfer out of bed evaluation , progressive ambulation with assistive devices prn .    2) Current disposition plan recommendation  :    pending functional progress , probable RORO          Patient is a 83y old  Female who presents with a chief complaint of hypoxia (15 Dec 2023 06:21)      HPI:  83F PMHx AF (not on AC due to SAH), HTN, Lewy body dementia (baseline a/o x3), posterior reversible encephalopathy syndrome, chronic bronchiectasis on home O2 prn when SaO2 is < 88%, dCHF, C. diff, BIBEMS daughter for weakness, difficulty getting up from ground, garbled speech noted around 3:30a today. Last known normal when she went to bed at 1:30a. Per daughter, patient was more fatigued than usual on , sleeping most of the day with poor PO intake. On , patient continued to be lethargic. Did not check O2 level at home until 1pm this afternoon, where it was in 70's. Daughter (Patience) states no other sxs as pt became more lethargic; denies cough, fever, GI upset, dyspnea, CP.     In the ED:  Initial vital signs: T: 98.4F, HR: 87, BP: 133/85, R: 20, SpO2: 83% on RA -> 94% on 4L NC  Labs: significant for WBC 10.55, pro , UA negative. Lytes wnl.   Imaging:  CXR: increased vascular markings, similar to prior 11/3/23  CTH: No acute intracranial injury. Moderate microvascular disease. Tiny chronic bilateral cerebellar infarcts. Chronic lacunar infarcts.  CTA chest:  Increased mucus plugging bilaterally, more pronounced in the right upper lobe. Findings suggest infectious/inflammatory processes, including ABPA. New consolidative opacities in the right greater than left upper lobes. Small bilateral pleural effusions, new on the right and increased on the left.  EKG: NSR  Medications: cefepime 2g IVPB x1, clonazepam 0.25mg x1, benadryl 50mg IVP x1, hydrocortisone 200mg IVP x1, levalbuterol inh x1, NS 500cc x1  Consults: Pulmonary, Neurology  (14 Dec 2023 23:37)    PAST MEDICAL & SURGICAL HISTORY:  Bronchiectasis      History of Pseudomonas pneumonia      HTN (hypertension)      Posterior reversible encephalopathy syndrome      Atrial fibrillation      Lewy body dementia      No significant past surgical history        MEDICATIONS  (STANDING):  albuterol/ipratropium for Nebulization 3 milliLiter(s) Nebulizer every 12 hours  amLODIPine   Tablet 5 milliGRAM(s) Oral daily  donepezil 5 milliGRAM(s) Oral at bedtime  gabapentin 100 milliGRAM(s) Oral every 8 hours  influenza  Vaccine (HIGH DOSE) 0.7 milliLiter(s) IntraMuscular once  lidocaine   4% Patch 1 Patch Transdermal every 24 hours  metoprolol tartrate 50 milliGRAM(s) Oral two times a day  montelukast 10 milliGRAM(s) Oral at bedtime  piperacillin/tazobactam IVPB.. 4.5 Gram(s) IV Intermittent every 8 hours  vancomycin  IVPB 750 milliGRAM(s) IV Intermittent every 24 hours    MEDICATIONS  (PRN):              FAMILY HISTORY:      CBC Full  -  ( 15 Dec 2023 05:30 )  WBC Count : 8.27 K/uL  RBC Count : 3.37 M/uL  Hemoglobin : 10.6 g/dL  Hematocrit : 34.7 %  Platelet Count - Automated : 192 K/uL  Mean Cell Volume : 103.0 fl  Mean Cell Hemoglobin : 31.5 pg  Mean Cell Hemoglobin Concentration : 30.5 gm/dL  Auto Neutrophil # : 7.20 K/uL  Auto Lymphocyte # : 0.70 K/uL  Auto Monocyte # : 0.32 K/uL  Auto Eosinophil # : 0.00 K/uL  Auto Basophil # : 0.02 K/uL  Auto Neutrophil % : 87.0 %  Auto Lymphocyte % : 8.5 %  Auto Monocyte % : 3.9 %  Auto Eosinophil % : 0.0 %  Auto Basophil % : 0.2 %          139  |  102  |  25<H>  ----------------------------<  95  4.2   |  26  |  0.77    Ca    9.3      14 Dec 2023 14:50    TPro  8.0  /  Alb  3.6  /  TBili  0.8  /  DBili  x   /  AST  13  /  ALT  9<L>  /  AlkPhos  121<H>        Urinalysis Basic - ( 14 Dec 2023 15:34 )    Color: Dark Yellow / Appearance: Clear / S.023 / pH: x  Gluc: x / Ketone: 15 mg/dL  / Bili: Negative / Urobili: 0.2 mg/dL   Blood: x / Protein: 100 mg/dL / Nitrite: Negative   Leuk Esterase: Trace / RBC: 1 /HPF / WBC 1 /HPF   Sq Epi: x / Non Sq Epi: 1 /HPF / Bacteria: Negative /HPF        Radiology :     < from: Xray Chest 1 View AP/PA (23 @ 15:33) >  ACC: [id number] EXAM:  XR CHEST AP OR PA 1V   ORDERED BY:  REE     PROCEDURE DATE:  2023          INTERPRETATION:  Clinical history reason for exam: Chronic cough.    Frontal chest.    COMPARISON: November 3, 2023.    Findings/  impression: Hyperinflation. Bibasilar opacities/pleural effusions,   decreased. Bilateral interstitial lung disease/nodules. Stable   cardiomegaly, thoracic aortic calcification. Stable bony structures      < from: CT Brain Stroke Protocol (23 @ 14:35) >  ACC: [id number] EXAM:  CT BRAIN STROKE PROTOCOL   ORDERED BY:  REE     PROCEDURE DATE:  2023          INTERPRETATION:  Stroke code.    Technique: CT head was performed utilizing axial images from the base of   the skull through the vertexwithout the administration of intravenous   contrast. Images were reviewed in the bone, brain and subdural windows.    Findings: Comparison is made to a prior CT of the brain performed on   10/3/2023.    There is no evidence of acute intracranial hemorrhage or acute   transcortical infarct. There are tiny bilateral cerebellar chronic   infarctions. There are patchy areas of low density within the   periventricular white matter consistent with microvascular disease,   unchanged. There are tiny bilateral basal ganglia, internal capsule,   external capsule chronic lacunar infarctions. The ventricles are normal   in size and caliber.  There is no evidence of mass-effect or midline shift. There is no   evidence of an intra or extra-axial fluid collection.    Visualized paranasal sinuses and bilateral mastoid air cells are clear.    Impression: No acute intracranial injury. Moderate microvascular disease.   Tiny chronic bilateral cerebellar infarcts. Chronic lacunar infarcts.      < from: CT Angio Chest PE Protocol w/ IV Cont (23 @ 20:52) >  ACC: [id number] EXAM:  CT ANGIO CHEST PULM ART WAWIC   ORDERED BY:    REE     PROCEDURE DATE:  2023          INTERPRETATION:  CLINICAL INFORMATION: Hypoxia. History of bronchiectasis.    COMPARISON: Cardiac CT from 10/3/2023.    CONTRAST/COMPLICATIONS:  IV Contrast: Isovue 370  70 cc administered   30 cc discarded  Oral Contrast: None  Complications: None reported at time of study completion    PROCEDURE:  CT of the Chest was performed.  Sagittal and coronal reformats were performed.    FINDINGS:    LUNGS AND AIRWAYS: Redemonstration of bilateral bronchiectasis, again   greatest in the right upper lobe. Increased areas of mucus plugging   bilaterally, more pronounced in the right upper lobe. New consolidative   opacities in the medial right upper lobe and, to lesser degree,   anterolateral left upper lobe. No pulmonary mass. Biapical scarring. Mild   bilateral lower lobe atelectasis adjacent to the pleural effusions.  PLEURA: New small right pleural effusion and increased small left pleural   effusion. No pneumothorax.  MEDIASTINUM AND YUNIEL: No lymphadenopathy.  VESSELS: Limited evaluation of the subsegmental pulmonary arteries within   the lower lungs due to respiratory motion artifact. Within this   limitation, no pulmonary embolism is visualized. Upper normal caliber   pulmonary trunk. No thoracic aortic aneurysm or dissection. Mild aortic   calcifications. Prominent splenic artery calcifications.  HEART: Right greater than left atrial dilatation, unchanged. Coronary   calcifications. No pericardial effusion.  CHEST WALL AND LOWER NECK: Within normal limits.  VISUALIZED UPPER ABDOMEN: Within normal limits.  BONES: Degenerative changes. Scoliosis.    IMPRESSION:  Since 10/3/2023,  1.  Limited evaluation of the subsegmental pulmonary arteries within the   lower lungs due to respiratory motion artifact. Within this limitation,   no pulmonary embolism is visualized.  2.  Increased mucus plugging bilaterally, more pronounced in the right   upper lobe. Findings suggest infectious/inflammatory processes, including   ABPA.  3.  New consolidative opacities in the right greater than left upper   lobes.  4.  Small bilateral pleural effusions, new on the right and increased on   the left.             Review of Systems : per HPI         Vital Signs Last 24 Hrs  T(C): 36.3 (15 Dec 2023 05:54), Max: 36.9 (14 Dec 2023 14:27)  T(F): 97.4 (15 Dec 2023 05:54), Max: 98.4 (14 Dec 2023 14:27)  HR: 58 (15 Dec 2023 05:54) (58 - 87)  BP: 119/50 (15 Dec 2023 05:54) (113/71 - 137/84)  BP(mean): --  RR: 18 (15 Dec 2023 05:54) (17 - 20)  SpO2: 100% (15 Dec 2023 05:54) (83% - 100%)    Parameters below as of 15 Dec 2023 01:21  Patient On (Oxygen Delivery Method): nasal cannula  O2 Flow (L/min): 2          Physical Exam :  frail 83 y o woman lying comfortably in semi Parra's position , somnolent , NAD     Head : normocephalic , atraumatic    Eyes : PERRLA , EOMI , no nystagmus , sclera anicteric    ENT / FACE : neg nasal discharge , uvula midline , no oropharyngeal erythema / exudate    Neck : supple , negative JVD , negative carotid bruits , no thyromegaly    Chest : coarse bs     Cardiovascular : regular rate and rhythm , neg murmurs / rubs / gallops    Abdomen : soft , non distended , non tender to palpation in all 4 quadrants ,  normal bowel sounds     Extremities : WWP , neg cyanosis /clubbing / edema     Neurologic Exam :     somnolent, does not follow commands, A&O x 0       Motor Exam:       limited by AMS       Sensation :         intact to pinch x 4 extremities                               DTR :           biceps/brachioradialis : equal                            patella/ankle : equal           neg Babinski       Gait :  not tested          PM&R Impression : admitted for AHRF being treated for possible PNA    - deconditioned    - no focal weakness        Recommendations / Plan :       1) Physical / Occupational therapy focusing on therapeutic exercises , equipment evaluation , bed mobility/transfer out of bed evaluation , progressive ambulation with assistive devices prn .    2) Current disposition plan recommendation  :    pending functional progress , probable RORO

## 2023-12-15 NOTE — DIETITIAN INITIAL EVALUATION ADULT - PROBLEM SELECTOR PLAN 1
On arrival O2 sat 84% on RA, improved to 94% on 4L NC. Reportedly hypoxic to 70's at home PTA. Uses O2 at home PRN when <88%. Has been more lethargic x2 days, possibly d/t hypoxia which is likely 2/2 mucus plugging iso possible PNA vs ABPA. Pulmonary consulted in ED.     - f/u Pulm recs  - start pseudomonal Zosyn + Vanc given that pt was in hospital receiving abx in past 90 days  - f/u MRSA swab; if negative, d/c Vanc  - duonebs BID with aerobika for airway clearance after each duoneb  - chest PT  - f/u sputum culture, RVP

## 2023-12-15 NOTE — DIETITIAN INITIAL EVALUATION ADULT - PROBLEM SELECTOR PLAN 3
H/o Afib, not on Eliquis d/t SAH. Takes lopressor 50 BID. EKG showing NSR.    - c/w lopressor 50 BID

## 2023-12-15 NOTE — DISCHARGE NOTE PROVIDER - DETAILS OF MALNUTRITION DIAGNOSIS/DIAGNOSES
This patient has been assessed with a concern for Malnutrition and was treated during this hospitalization for the following Nutrition diagnosis/diagnoses:     -  12/15/2023: Moderate protein-calorie malnutrition   -  12/15/2023: Underweight (BMI < 19)

## 2023-12-15 NOTE — PROGRESS NOTE ADULT - PROBLEM SELECTOR PLAN 2
Increased lethargy since 12/13. Likely 2/2 hypoxia iso possible PNA. Takes Klonopin 0.25mg PO QID and gabapentin 300mg PO TID at home. No evidence of acute stroke.     - treat underlying infection   - monitor off klonopin for now  - NPO until MS improves RESOLVED  Increased lethargy since 12/13. Likely 2/2 hypoxia iso possible PNA. Takes Klonopin 0.25mg PO QID and gabapentin 300mg PO TID at home. No evidence of acute stroke.     - treat underlying infection   - monitor off klonopin for now  - NPO until MS improves RESOLVED  Increased lethargy since 12/13. Likely 2/2 hypoxia iso possible PNA. Takes Klonopin 0.25mg PO QID and gabapentin 300mg PO TID at home. No evidence of acute stroke.     - treat underlying infection   - monitor off klonopin for now

## 2023-12-15 NOTE — DIETITIAN INITIAL EVALUATION ADULT - PROBLEM SELECTOR PLAN 6
Diffuse LBP since May. Tried muscle relaxants (ineffective) and tramadol (caused hallucinations). Currently being managed with gabapentin 300 TID. See pain management OP; unclear etio but suspect shingles.     - start with gabapentin 100 TID and uptitrate as needed  - lidocaine patch to lower back  - hold off on Klonopin iso AMS; okay to resume when more awake (takes 0.25mg QID at home)  - avoid opioids and muscle relaxants

## 2023-12-15 NOTE — DIETITIAN INITIAL EVALUATION ADULT - PERTINENT LABORATORY DATA
12-15    140  |  107  |  28<H>  ----------------------------<  115<H>  4.6   |  19<L>  |  0.79    Ca    8.9      15 Dec 2023 05:30  Phos  4.1     12-15  Mg     2.1     12-15    TPro  7.2  /  Alb  2.9<L>  /  TBili  0.4  /  DBili  x   /  AST  12  /  ALT  7<L>  /  AlkPhos  105  12-15  POCT Blood Glucose.: 93 mg/dL (12-14-23 @ 14:22)  A1C with Estimated Average Glucose Result: 5.4 % (11-04-23 @ 05:28)  A1C with Estimated Average Glucose Result: 5.5 % (09-20-23 @ 06:09)

## 2023-12-15 NOTE — DISCHARGE NOTE PROVIDER - NSDCFUSCHEDAPPT_GEN_ALL_CORE_FT
Jensen Conti  [facility]  [address]  Scheduled Appointment: 12/20/2023     Jensen Conti  [facility]  [address]  Scheduled Appointment: 12/20/2023     Jensen Conti  [facility]  [address]  Scheduled Appointment: 12/20/2023    Ramón Foster  [facility]  [address] [facility] [address]  Scheduled Appointment: 12/29/2023     Jensen Conti  [facility]  [address]  Scheduled Appointment: 12/20/2023    Ramón Foster  [facility]  [address] [facility] [address]  Scheduled Appointment: 12/29/2023

## 2023-12-15 NOTE — OCCUPATIONAL THERAPY INITIAL EVALUATION ADULT - ADDITIONAL COMMENTS
Prior to admission, pt reporting that she was able to perform all mobility/ADLs independently. Pt reporting that she has wheelchair for long distance ambulation, and uses a shower chair inside walk-in shower. Pt has family assist at home for mobility/ADLs PRN.

## 2023-12-15 NOTE — PROGRESS NOTE ADULT - PROBLEM SELECTOR PLAN 1
On arrival O2 sat 84% on RA, improved to 94% on 4L NC. Reportedly hypoxic to 70's at home PTA. Uses O2 at home PRN when <88%. Has been more lethargic x2 days, possibly d/t hypoxia which is likely 2/2 mucus plugging iso possible PNA vs ABPA. Pulmonary consulted in ED.     - f/u Pulm recs  - start pseudomonal Zosyn + Vanc given that pt was in hospital receiving abx in past 90 days  - f/u MRSA swab; if negative, d/c Vanc  - duonebs BID with aerobika for airway clearance after each duoneb  - chest PT  - f/u sputum culture, RVP On arrival O2 sat 84% on RA, improved to 94% on 4L NC. Reportedly hypoxic to 70's at home PTA. Uses O2 at home PRN when <88%. Has been more lethargic x2 days, possibly d/t hypoxia which is likely 2/2 mucus plugging iso possible PNA vs ABPA. Pulmonary consulted in ED.   MRSA negative    - f/u Pulm recs  - c/w pseudomonal Zosyn  - d/brigette vanc  - f/u MRSA swab; if negative, d/c Vanc  - duonebs BID with aerobika for airway clearance after each duoneb  - chest PT  - f/u sputum culture, RVP On arrival O2 sat 84% on RA, improved to 94% on 4L NC. Reportedly hypoxic to 70's at home PTA. Uses O2 at home PRN when <88%. Has been more lethargic x2 days, possibly d/t hypoxia which is likely 2/2 mucus plugging iso possible PNA vs ABPA. Pulmonary consulted in ED.   MRSA negative  RVP negative  - f/u Pulm recs  - c/w pseudomonal Zosyn  - d/brigette vanc  - symbicort 80/4.5  - duonebs BID with aerobika for airway clearance after each duoneb  - chest PT  - f/u sputum culture

## 2023-12-15 NOTE — OCCUPATIONAL THERAPY INITIAL EVALUATION ADULT - DIAGNOSIS, OT EVAL
Pt p/w impaired strength, postural control, balance, and functional activity tolerance, impacting ability to perform functional mobility/ADLs.

## 2023-12-16 LAB
ANION GAP SERPL CALC-SCNC: 8 MMOL/L — SIGNIFICANT CHANGE UP (ref 5–17)
ANION GAP SERPL CALC-SCNC: 8 MMOL/L — SIGNIFICANT CHANGE UP (ref 5–17)
BASOPHILS # BLD AUTO: 0.05 K/UL — SIGNIFICANT CHANGE UP (ref 0–0.2)
BASOPHILS # BLD AUTO: 0.05 K/UL — SIGNIFICANT CHANGE UP (ref 0–0.2)
BASOPHILS NFR BLD AUTO: 0.7 % — SIGNIFICANT CHANGE UP (ref 0–2)
BASOPHILS NFR BLD AUTO: 0.7 % — SIGNIFICANT CHANGE UP (ref 0–2)
BUN SERPL-MCNC: 28 MG/DL — HIGH (ref 7–23)
BUN SERPL-MCNC: 28 MG/DL — HIGH (ref 7–23)
CALCIUM SERPL-MCNC: 8.6 MG/DL — SIGNIFICANT CHANGE UP (ref 8.4–10.5)
CALCIUM SERPL-MCNC: 8.6 MG/DL — SIGNIFICANT CHANGE UP (ref 8.4–10.5)
CHLORIDE SERPL-SCNC: 102 MMOL/L — SIGNIFICANT CHANGE UP (ref 96–108)
CHLORIDE SERPL-SCNC: 102 MMOL/L — SIGNIFICANT CHANGE UP (ref 96–108)
CO2 SERPL-SCNC: 28 MMOL/L — SIGNIFICANT CHANGE UP (ref 22–31)
CO2 SERPL-SCNC: 28 MMOL/L — SIGNIFICANT CHANGE UP (ref 22–31)
CREAT SERPL-MCNC: 0.74 MG/DL — SIGNIFICANT CHANGE UP (ref 0.5–1.3)
CREAT SERPL-MCNC: 0.74 MG/DL — SIGNIFICANT CHANGE UP (ref 0.5–1.3)
EGFR: 81 ML/MIN/1.73M2 — SIGNIFICANT CHANGE UP
EGFR: 81 ML/MIN/1.73M2 — SIGNIFICANT CHANGE UP
EOSINOPHIL # BLD AUTO: 0.1 K/UL — SIGNIFICANT CHANGE UP (ref 0–0.5)
EOSINOPHIL # BLD AUTO: 0.1 K/UL — SIGNIFICANT CHANGE UP (ref 0–0.5)
EOSINOPHIL NFR BLD AUTO: 1.4 % — SIGNIFICANT CHANGE UP (ref 0–6)
EOSINOPHIL NFR BLD AUTO: 1.4 % — SIGNIFICANT CHANGE UP (ref 0–6)
GLUCOSE SERPL-MCNC: 102 MG/DL — HIGH (ref 70–99)
GLUCOSE SERPL-MCNC: 102 MG/DL — HIGH (ref 70–99)
HCT VFR BLD CALC: 32.6 % — LOW (ref 34.5–45)
HCT VFR BLD CALC: 32.6 % — LOW (ref 34.5–45)
HGB BLD-MCNC: 10.4 G/DL — LOW (ref 11.5–15.5)
HGB BLD-MCNC: 10.4 G/DL — LOW (ref 11.5–15.5)
IMM GRANULOCYTES NFR BLD AUTO: 0.4 % — SIGNIFICANT CHANGE UP (ref 0–0.9)
IMM GRANULOCYTES NFR BLD AUTO: 0.4 % — SIGNIFICANT CHANGE UP (ref 0–0.9)
LYMPHOCYTES # BLD AUTO: 1.06 K/UL — SIGNIFICANT CHANGE UP (ref 1–3.3)
LYMPHOCYTES # BLD AUTO: 1.06 K/UL — SIGNIFICANT CHANGE UP (ref 1–3.3)
LYMPHOCYTES # BLD AUTO: 14.6 % — SIGNIFICANT CHANGE UP (ref 13–44)
LYMPHOCYTES # BLD AUTO: 14.6 % — SIGNIFICANT CHANGE UP (ref 13–44)
MAGNESIUM SERPL-MCNC: 1.8 MG/DL — SIGNIFICANT CHANGE UP (ref 1.6–2.6)
MAGNESIUM SERPL-MCNC: 1.8 MG/DL — SIGNIFICANT CHANGE UP (ref 1.6–2.6)
MCHC RBC-ENTMCNC: 31.9 GM/DL — LOW (ref 32–36)
MCHC RBC-ENTMCNC: 31.9 GM/DL — LOW (ref 32–36)
MCHC RBC-ENTMCNC: 32 PG — SIGNIFICANT CHANGE UP (ref 27–34)
MCHC RBC-ENTMCNC: 32 PG — SIGNIFICANT CHANGE UP (ref 27–34)
MCV RBC AUTO: 100.3 FL — HIGH (ref 80–100)
MCV RBC AUTO: 100.3 FL — HIGH (ref 80–100)
MONOCYTES # BLD AUTO: 0.57 K/UL — SIGNIFICANT CHANGE UP (ref 0–0.9)
MONOCYTES # BLD AUTO: 0.57 K/UL — SIGNIFICANT CHANGE UP (ref 0–0.9)
MONOCYTES NFR BLD AUTO: 7.9 % — SIGNIFICANT CHANGE UP (ref 2–14)
MONOCYTES NFR BLD AUTO: 7.9 % — SIGNIFICANT CHANGE UP (ref 2–14)
NEUTROPHILS # BLD AUTO: 5.43 K/UL — SIGNIFICANT CHANGE UP (ref 1.8–7.4)
NEUTROPHILS # BLD AUTO: 5.43 K/UL — SIGNIFICANT CHANGE UP (ref 1.8–7.4)
NEUTROPHILS NFR BLD AUTO: 75 % — SIGNIFICANT CHANGE UP (ref 43–77)
NEUTROPHILS NFR BLD AUTO: 75 % — SIGNIFICANT CHANGE UP (ref 43–77)
NRBC # BLD: 0 /100 WBCS — SIGNIFICANT CHANGE UP (ref 0–0)
NRBC # BLD: 0 /100 WBCS — SIGNIFICANT CHANGE UP (ref 0–0)
PHOSPHATE SERPL-MCNC: 2.5 MG/DL — SIGNIFICANT CHANGE UP (ref 2.5–4.5)
PHOSPHATE SERPL-MCNC: 2.5 MG/DL — SIGNIFICANT CHANGE UP (ref 2.5–4.5)
PLATELET # BLD AUTO: 193 K/UL — SIGNIFICANT CHANGE UP (ref 150–400)
PLATELET # BLD AUTO: 193 K/UL — SIGNIFICANT CHANGE UP (ref 150–400)
POTASSIUM SERPL-MCNC: 3.5 MMOL/L — SIGNIFICANT CHANGE UP (ref 3.5–5.3)
POTASSIUM SERPL-MCNC: 3.5 MMOL/L — SIGNIFICANT CHANGE UP (ref 3.5–5.3)
POTASSIUM SERPL-SCNC: 3.5 MMOL/L — SIGNIFICANT CHANGE UP (ref 3.5–5.3)
POTASSIUM SERPL-SCNC: 3.5 MMOL/L — SIGNIFICANT CHANGE UP (ref 3.5–5.3)
RBC # BLD: 3.25 M/UL — LOW (ref 3.8–5.2)
RBC # BLD: 3.25 M/UL — LOW (ref 3.8–5.2)
RBC # FLD: 12.7 % — SIGNIFICANT CHANGE UP (ref 10.3–14.5)
RBC # FLD: 12.7 % — SIGNIFICANT CHANGE UP (ref 10.3–14.5)
SODIUM SERPL-SCNC: 138 MMOL/L — SIGNIFICANT CHANGE UP (ref 135–145)
SODIUM SERPL-SCNC: 138 MMOL/L — SIGNIFICANT CHANGE UP (ref 135–145)
WBC # BLD: 7.24 K/UL — SIGNIFICANT CHANGE UP (ref 3.8–10.5)
WBC # BLD: 7.24 K/UL — SIGNIFICANT CHANGE UP (ref 3.8–10.5)
WBC # FLD AUTO: 7.24 K/UL — SIGNIFICANT CHANGE UP (ref 3.8–10.5)
WBC # FLD AUTO: 7.24 K/UL — SIGNIFICANT CHANGE UP (ref 3.8–10.5)

## 2023-12-16 PROCEDURE — 99233 SBSQ HOSP IP/OBS HIGH 50: CPT

## 2023-12-16 RX ORDER — CLONAZEPAM 1 MG
0.25 TABLET ORAL EVERY 24 HOURS
Refills: 0 | Status: DISCONTINUED | OUTPATIENT
Start: 2023-12-16 | End: 2023-12-19

## 2023-12-16 RX ORDER — BENZOCAINE AND MENTHOL 5; 1 G/100ML; G/100ML
1 LIQUID ORAL ONCE
Refills: 0 | Status: COMPLETED | OUTPATIENT
Start: 2023-12-16 | End: 2023-12-16

## 2023-12-16 RX ORDER — POTASSIUM CHLORIDE 20 MEQ
20 PACKET (EA) ORAL ONCE
Refills: 0 | Status: DISCONTINUED | OUTPATIENT
Start: 2023-12-16 | End: 2023-12-19

## 2023-12-16 RX ORDER — GABAPENTIN 400 MG/1
200 CAPSULE ORAL EVERY 8 HOURS
Refills: 0 | Status: DISCONTINUED | OUTPATIENT
Start: 2023-12-16 | End: 2023-12-19

## 2023-12-16 RX ORDER — LANOLIN ALCOHOL/MO/W.PET/CERES
10 CREAM (GRAM) TOPICAL AT BEDTIME
Refills: 0 | Status: DISCONTINUED | OUTPATIENT
Start: 2023-12-16 | End: 2023-12-16

## 2023-12-16 RX ORDER — POTASSIUM PHOSPHATE, MONOBASIC POTASSIUM PHOSPHATE, DIBASIC 236; 224 MG/ML; MG/ML
30 INJECTION, SOLUTION INTRAVENOUS ONCE
Refills: 0 | Status: COMPLETED | OUTPATIENT
Start: 2023-12-16 | End: 2023-12-17

## 2023-12-16 RX ADMIN — GABAPENTIN 100 MILLIGRAM(S): 400 CAPSULE ORAL at 15:03

## 2023-12-16 RX ADMIN — LIDOCAINE 1 PATCH: 4 CREAM TOPICAL at 07:13

## 2023-12-16 RX ADMIN — BENZOCAINE AND MENTHOL 1 LOZENGE: 5; 1 LIQUID ORAL at 18:55

## 2023-12-16 RX ADMIN — Medication 125 MILLIGRAM(S): at 10:32

## 2023-12-16 RX ADMIN — DONEPEZIL HYDROCHLORIDE 5 MILLIGRAM(S): 10 TABLET, FILM COATED ORAL at 21:21

## 2023-12-16 RX ADMIN — Medication 3 MILLILITER(S): at 07:03

## 2023-12-16 RX ADMIN — BUDESONIDE AND FORMOTEROL FUMARATE DIHYDRATE 2 PUFF(S): 160; 4.5 AEROSOL RESPIRATORY (INHALATION) at 19:00

## 2023-12-16 RX ADMIN — LIDOCAINE 1 PATCH: 4 CREAM TOPICAL at 21:21

## 2023-12-16 RX ADMIN — MONTELUKAST 10 MILLIGRAM(S): 4 TABLET, CHEWABLE ORAL at 21:22

## 2023-12-16 RX ADMIN — PIPERACILLIN AND TAZOBACTAM 25 GRAM(S): 4; .5 INJECTION, POWDER, LYOPHILIZED, FOR SOLUTION INTRAVENOUS at 15:04

## 2023-12-16 RX ADMIN — BUDESONIDE AND FORMOTEROL FUMARATE DIHYDRATE 2 PUFF(S): 160; 4.5 AEROSOL RESPIRATORY (INHALATION) at 06:45

## 2023-12-16 RX ADMIN — AMLODIPINE BESYLATE 5 MILLIGRAM(S): 2.5 TABLET ORAL at 06:44

## 2023-12-16 RX ADMIN — Medication 25 MILLIGRAM(S): at 18:53

## 2023-12-16 RX ADMIN — GABAPENTIN 200 MILLIGRAM(S): 400 CAPSULE ORAL at 21:22

## 2023-12-16 RX ADMIN — GABAPENTIN 100 MILLIGRAM(S): 400 CAPSULE ORAL at 06:45

## 2023-12-16 RX ADMIN — PIPERACILLIN AND TAZOBACTAM 25 GRAM(S): 4; .5 INJECTION, POWDER, LYOPHILIZED, FOR SOLUTION INTRAVENOUS at 23:19

## 2023-12-16 RX ADMIN — Medication 3 MILLILITER(S): at 21:22

## 2023-12-16 RX ADMIN — PIPERACILLIN AND TAZOBACTAM 25 GRAM(S): 4; .5 INJECTION, POWDER, LYOPHILIZED, FOR SOLUTION INTRAVENOUS at 06:44

## 2023-12-16 RX ADMIN — Medication 25 MILLIGRAM(S): at 06:45

## 2023-12-16 RX ADMIN — LIDOCAINE 1 PATCH: 4 CREAM TOPICAL at 09:39

## 2023-12-16 NOTE — PROGRESS NOTE ADULT - SUBJECTIVE AND OBJECTIVE BOX
Physical Medicine and Rehabilitation Progress Note :       Patient is a 83y old  Female who presents with a chief complaint of hypoxia (16 Dec 2023 12:21)      HPI:  83F PMHx AF (not on AC due to SAH), HTN, Lewy body dementia (baseline a/o x3), posterior reversible encephalopathy syndrome, chronic bronchiectasis on home O2 prn when SaO2 is < 88%, dCHF, C. diff, BIBEMS daughter for weakness, difficulty getting up from ground, garbled speech noted around 3:30a today. Last known normal when she went to bed at 1:30a. Per daughter, patient was more fatigued than usual on 12/13, sleeping most of the day with poor PO intake. On 12/14, patient continued to be lethargic. Did not check O2 level at home until 1pm this afternoon, where it was in 70's. Daughter (Patience) states no other sxs as pt became more lethargic; denies cough, fever, GI upset, dyspnea, CP.     In the ED:  Initial vital signs: T: 98.4F, HR: 87, BP: 133/85, R: 20, SpO2: 83% on RA -> 94% on 4L NC  Labs: significant for WBC 10.55, pro , UA negative. Lytes wnl.   Imaging:  CXR: increased vascular markings, similar to prior 11/3/23  CTH: No acute intracranial injury. Moderate microvascular disease. Tiny chronic bilateral cerebellar infarcts. Chronic lacunar infarcts.  CTA chest:  Increased mucus plugging bilaterally, more pronounced in the right upper lobe. Findings suggest infectious/inflammatory processes, including ABPA. New consolidative opacities in the right greater than left upper lobes. Small bilateral pleural effusions, new on the right and increased on the left.  EKG: NSR  Medications: cefepime 2g IVPB x1, clonazepam 0.25mg x1, benadryl 50mg IVP x1, hydrocortisone 200mg IVP x1, levalbuterol inh x1, NS 500cc x1  Consults: Pulmonary, Neurology  (14 Dec 2023 23:37)                            10.4   7.24  )-----------( 193      ( 16 Dec 2023 07:42 )             32.6       12-16    138  |  102  |  28<H>  ----------------------------<  102<H>  3.5   |  28  |  0.74    Ca    8.6      16 Dec 2023 07:42  Phos  2.5     12-16  Mg     1.8     12-16    TPro  7.2  /  Alb  2.9<L>  /  TBili  0.4  /  DBili  x   /  AST  12  /  ALT  7<L>  /  AlkPhos  105  12-15    Vital Signs Last 24 Hrs  T(C): 36.7 (16 Dec 2023 06:34), Max: 36.9 (15 Dec 2023 20:40)  T(F): 98 (16 Dec 2023 06:34), Max: 98.5 (15 Dec 2023 20:40)  HR: 83 (16 Dec 2023 06:34) (72 - 83)  BP: 134/78 (16 Dec 2023 06:34) (96/55 - 134/78)  BP(mean): --  RR: 18 (16 Dec 2023 06:34) (17 - 18)  SpO2: 93% (16 Dec 2023 06:34) (93% - 97%)    Parameters below as of 16 Dec 2023 06:34  Patient On (Oxygen Delivery Method): nasal cannula        MEDICATIONS  (STANDING):  albuterol/ipratropium for Nebulization 3 milliLiter(s) Nebulizer every 12 hours  amLODIPine   Tablet 5 milliGRAM(s) Oral daily  budesonide  80 MICROgram(s)/formoterol 4.5 MICROgram(s) Inhaler 2 Puff(s) Inhalation two times a day  donepezil 5 milliGRAM(s) Oral at bedtime  gabapentin 100 milliGRAM(s) Oral every 8 hours  influenza  Vaccine (HIGH DOSE) 0.7 milliLiter(s) IntraMuscular once  lidocaine   4% Patch 1 Patch Transdermal every 24 hours  melatonin 10 milliGRAM(s) Oral at bedtime  metoprolol tartrate 25 milliGRAM(s) Oral two times a day  montelukast 10 milliGRAM(s) Oral at bedtime  piperacillin/tazobactam IVPB.. 4.5 Gram(s) IV Intermittent every 8 hours  potassium chloride    Tablet ER 20 milliEquivalent(s) Oral once  potassium phosphate IVPB 30 milliMole(s) IV Intermittent once  vancomycin    Solution 125 milliGRAM(s) Oral every 24 hours    MEDICATIONS  (PRN):          Initial Functional Status Assessment :       Previous Level of Function:     · Bed Mobility/Transfers	independent  · Bathing	independent  · Upper Body Dressing	independent  · Lower Body Dressing	independent  · Grooming	independent  · Toileting	independent  · Eating	independent  · Home Management Skills	independent  · Additional Comments	Prior to admission, pt reporting that she was able to perform all mobility/ADLs independently. Pt reporting that she has wheelchair for long distance ambulation, and uses a shower chair inside walk-in shower. Pt has family assist at home for mobility/ADLs PRN.    · Ambulatory Devices Needed	yes  wheelchair  · Adaptive Equipment Needed	yes  shower chair    Cognitive Status Examination:   · Level of Consciousness	alert  · Orientation	person; place; situation  · Follow Commands/Answers Questions	100% of the time; able to follow single-step instructions  · Personal Safety and Judgment	intact    Range of Motion Exam:   · Range of Motion Examination, Upper Extremity	bilateral UE Active ROM was WFL  (within functional limits)  · Range of Motion Examination, Lower Extremity	bilateral LE Active ROM was WFL  (within functional limits)    Manual Muscle Testing:   · Manual Muscle Testing Results	Grossly assessed during functional mobility against gravity with pt presenting with 3+/5 or greater BUE/BLE strength    Bed Mobility: Rolling/Turning:     · Level of [place]	supervision    Bed Mobility: Scooting/Bridging:     · Level of [place]	supervision    Bed Mobility: Sit to Supine:     · Level of [place]	supervision    Bed Mobility: Supine to Sit:     · Level of [place]	supervision    Bed Mobility Analysis:     · Impairments Contributing to Impaired Bed Mobility	decreased strength; impaired postural control; decreased flexibility; impaired balance    Transfer: Sit to Stand:     · Level of [place]	contact guard  · Physical Assist/Nonphysical Assist	1 person assist  · Assistive Device	hand held assist    Transfer: Stand to Sit:     · Level of [place]	contact guard  · Physical Assist/Nonphysical Assist	1 person assist  · Assistive Device	hand held assist    Sit/Stand Transfer Safety Analysis:     · Transfer Safety Concerns Noted	decreased weight-shifting ability  · Impairments Contributing to Impaired Transfers	decreased strength; impaired postural control; decreased flexibility; impaired balance    Balance Skills Assessment:     · Sitting Balance: Static	good balance  · Sitting Balance: Dynamic	good balance  · Sit-to-Stand Balance	good balance  · Standing Balance: Static	good minus  · Standing Balance: Dynamic	good minus  · Systems Impairment Contributing to Balance Disturbance	musculoskeletal  · Identified Impairments Contributing to Balance Disturbance	decreased strength; impaired postural control    Sensory Examination:     Grossly Intact:   · Gross Sensory Examination	Grossly Intact      Fine Motor Coordination:     Grossly Intact:   · Grossly Intact	grossly intact      Upper Body Dressing Training:     · Level of [place]	supervision; don/doff B socks while long sitting in bed    Lower Body Dressing Training:     · Level of [place]	contact guard; don/doff robe while sitting EOB, requiring CGAx1 2/2 impaired dynamic balance and functional reach.    Treatment Locations:   · Comments	Pt able to ambulate in room with CGAx1 hand held assist, demo unsteadiness however no acute LOB.        PM&R Impression : as above    Current disposition plan recommendation :  d/c home with home physical and occupational therapy           Physical Medicine and Rehabilitation Progress Note :       Patient is a 83y old  Female who presents with a chief complaint of hypoxia (16 Dec 2023 12:21)      HPI:  83F PMHx AF (not on AC due to SAH), HTN, Lewy body dementia (baseline a/o x3), posterior reversible encephalopathy syndrome, chronic bronchiectasis on home O2 prn when SaO2 is < 88%, dCHF, C. diff, BIBEMS daughter for weakness, difficulty getting up from ground, garbled speech noted around 3:30a today. Last known normal when she went to bed at 1:30a. Per daughter, patient was more fatigued than usual on 12/13, sleeping most of the day with poor PO intake. On 12/14, patient continued to be lethargic. Did not check O2 level at home until 1pm this afternoon, where it was in 70's. Daughter (Patience) states no other sxs as pt became more lethargic; denies cough, fever, GI upset, dyspnea, CP.     In the ED:  Initial vital signs: T: 98.4F, HR: 87, BP: 133/85, R: 20, SpO2: 83% on RA -> 94% on 4L NC  Labs: significant for WBC 10.55, pro , UA negative. Lytes wnl.   Imaging:  CXR: increased vascular markings, similar to prior 11/3/23  CTH: No acute intracranial injury. Moderate microvascular disease. Tiny chronic bilateral cerebellar infarcts. Chronic lacunar infarcts.  CTA chest:  Increased mucus plugging bilaterally, more pronounced in the right upper lobe. Findings suggest infectious/inflammatory processes, including ABPA. New consolidative opacities in the right greater than left upper lobes. Small bilateral pleural effusions, new on the right and increased on the left.  EKG: NSR  Medications: cefepime 2g IVPB x1, clonazepam 0.25mg x1, benadryl 50mg IVP x1, hydrocortisone 200mg IVP x1, levalbuterol inh x1, NS 500cc x1  Consults: Pulmonary, Neurology  (14 Dec 2023 23:37)                            10.4   7.24  )-----------( 193      ( 16 Dec 2023 07:42 )             32.6       12-16    138  |  102  |  28<H>  ----------------------------<  102<H>  3.5   |  28  |  0.74    Ca    8.6      16 Dec 2023 07:42  Phos  2.5     12-16  Mg     1.8     12-16    TPro  7.2  /  Alb  2.9<L>  /  TBili  0.4  /  DBili  x   /  AST  12  /  ALT  7<L>  /  AlkPhos  105  12-15    Vital Signs Last 24 Hrs  T(C): 36.7 (16 Dec 2023 06:34), Max: 36.9 (15 Dec 2023 20:40)  T(F): 98 (16 Dec 2023 06:34), Max: 98.5 (15 Dec 2023 20:40)  HR: 83 (16 Dec 2023 06:34) (72 - 83)  BP: 134/78 (16 Dec 2023 06:34) (96/55 - 134/78)  BP(mean): --  RR: 18 (16 Dec 2023 06:34) (17 - 18)  SpO2: 93% (16 Dec 2023 06:34) (93% - 97%)    Parameters below as of 16 Dec 2023 06:34  Patient On (Oxygen Delivery Method): nasal cannula        MEDICATIONS  (STANDING):  albuterol/ipratropium for Nebulization 3 milliLiter(s) Nebulizer every 12 hours  amLODIPine   Tablet 5 milliGRAM(s) Oral daily  budesonide  80 MICROgram(s)/formoterol 4.5 MICROgram(s) Inhaler 2 Puff(s) Inhalation two times a day  donepezil 5 milliGRAM(s) Oral at bedtime  gabapentin 100 milliGRAM(s) Oral every 8 hours  influenza  Vaccine (HIGH DOSE) 0.7 milliLiter(s) IntraMuscular once  lidocaine   4% Patch 1 Patch Transdermal every 24 hours  melatonin 10 milliGRAM(s) Oral at bedtime  metoprolol tartrate 25 milliGRAM(s) Oral two times a day  montelukast 10 milliGRAM(s) Oral at bedtime  piperacillin/tazobactam IVPB.. 4.5 Gram(s) IV Intermittent every 8 hours  potassium chloride    Tablet ER 20 milliEquivalent(s) Oral once  potassium phosphate IVPB 30 milliMole(s) IV Intermittent once  vancomycin    Solution 125 milliGRAM(s) Oral every 24 hours    MEDICATIONS  (PRN):          Initial Functional Status Assessment :       Previous Level of Function:     · Bed Mobility/Transfers	independent  · Bathing	independent  · Upper Body Dressing	independent  · Lower Body Dressing	independent  · Grooming	independent  · Toileting	independent  · Eating	independent  · Home Management Skills	independent  · Additional Comments	Prior to admission, pt reporting that she was able to perform all mobility/ADLs independently. Pt reporting that she has wheelchair for long distance ambulation, and uses a shower chair inside walk-in shower. Pt has family assist at home for mobility/ADLs PRN.    · Ambulatory Devices Needed	yes  wheelchair  · Adaptive Equipment Needed	yes  shower chair    Cognitive Status Examination:   · Level of Consciousness	alert  · Orientation	person; place; situation  · Follow Commands/Answers Questions	100% of the time; able to follow single-step instructions  · Personal Safety and Judgment	intact    Range of Motion Exam:   · Range of Motion Examination, Upper Extremity	bilateral UE Active ROM was WFL  (within functional limits)  · Range of Motion Examination, Lower Extremity	bilateral LE Active ROM was WFL  (within functional limits)    Manual Muscle Testing:   · Manual Muscle Testing Results	Grossly assessed during functional mobility against gravity with pt presenting with 3+/5 or greater BUE/BLE strength    Bed Mobility: Rolling/Turning:     · Level of [place]	supervision    Bed Mobility: Scooting/Bridging:     · Level of [place]	supervision    Bed Mobility: Sit to Supine:     · Level of [place]	supervision    Bed Mobility: Supine to Sit:     · Level of [place]	supervision    Bed Mobility Analysis:     · Impairments Contributing to Impaired Bed Mobility	decreased strength; impaired postural control; decreased flexibility; impaired balance    Transfer: Sit to Stand:     · Level of [place]	contact guard  · Physical Assist/Nonphysical Assist	1 person assist  · Assistive Device	hand held assist    Transfer: Stand to Sit:     · Level of [place]	contact guard  · Physical Assist/Nonphysical Assist	1 person assist  · Assistive Device	hand held assist    Sit/Stand Transfer Safety Analysis:     · Transfer Safety Concerns Noted	decreased weight-shifting ability  · Impairments Contributing to Impaired Transfers	decreased strength; impaired postural control; decreased flexibility; impaired balance    Balance Skills Assessment:     · Sitting Balance: Static	good balance  · Sitting Balance: Dynamic	good balance  · Sit-to-Stand Balance	good balance  · Standing Balance: Static	good minus  · Standing Balance: Dynamic	good minus  · Systems Impairment Contributing to Balance Disturbance	musculoskeletal  · Identified Impairments Contributing to Balance Disturbance	decreased strength; impaired postural control    Sensory Examination:     Grossly Intact:   · Gross Sensory Examination	Grossly Intact      Fine Motor Coordination:     Grossly Intact:   · Grossly Intact	grossly intact      Upper Body Dressing Training:     · Level of [place]	supervision; don/doff B socks while long sitting in bed    Lower Body Dressing Training:     · Level of [place]	contact guard; don/doff robe while sitting EOB, requiring CGAx1 2/2 impaired dynamic balance and functional reach.    Treatment Locations:   · Comments	Pt able to ambulate in room with CGAx1 hand held assist, demo unsteadiness however no acute LOB.        PM&R Impression : as above    Current disposition plan recommendation :  d/c home with home physical and occupational therapy

## 2023-12-16 NOTE — PROGRESS NOTE ADULT - ASSESSMENT
{\rtf1\yyaiop73570\ansi\gvowlsp1173\ftnbj\uc1\deff0  {\fonttbl{\f0 \fnil Segoe UI;}{\f1 \fnil \fcharset0 Segoe UI;}{\f2 \fnil Times New Stepan;}}  {\colortbl ;\nop680\vxvjq906\piyj411 ;\red0\green0\blue0 ;\red0\green0\hjkv601 ;\red0\green0\blue0 ;}  {\stylesheet{\f0\fs20 Normal;}{\cs1 Default Paragraph Font;}{\cs2\f0\fs16 Line Number;}{\cs3\f2\fs24\ul\cf3 Hyperlink;}}  {\*\revtbl{Unknown;}}  \nimkcm89576\djihgm61189\vdxmi4716\suzts6827\gzlsj7619\jdbxl4177\jugqhun798\dxhudiq971\nogrowautofit\ludmmf002\formshade\nofeaturethrottle1\dntblnsbdb\fet4\aendnotes\aftnnrlc\pgbrdrhead\pgbrdrfoot  \sectd\fgcprn39732\motgfz79855\guttersxn0\mctzeseg4530\ggegrpqv6252\skwwwdko7797\diqungts9108\sycgcpz559\joocklz425\sbkpage\pgncont\pgndec  \plain\plain\f0\fs24\ql\plain\f0\fs24\plain\f0\fs20\seit5470\hich\f0\dbch\f0\loch\f0\fs20\par  I M\par  \par  83 y o F PMHx AF (not on AC due to SAH), HTN, Lewy body dementia (baseline a/o x3), posterior reversible encephalopathy syndrome, chronic bronchiectasis on home O2 prn, dCHF, C. diff, BIBEMS for stroke rule out and hypoxia; no acute stroke on CTH. Pulmonary   consulted for AHRF, possibly 2/2 viral vs bacterial PNA given h/o chronic bronchiectasis. Pt admitted for AHRF being treated for possible PNA, pending further w/u. \par  \par  \plain\f1\fs20\fjng4454\hich\f1\dbch\f1\loch\f1\cf2\fs20\ul{\field{\*\fldinst HYPERLINK [id number] }{\fldrslt Problem/Plan - 1:}}\plain\f0\fs20\xwzv5528\hich\f0\dbch\f0\loch\f0\fs20\ql\par  \'b7  {\*\bkmkstart fj83014027871}{\*\bkmkend go79359488761}Problem: {\*\bkmkstart kg01015799002}{\*\bkmkend pv51288112652}Acute hypoxic respiratory failure. \par  \'b7  {\*\bkmkstart dv65700676589}{\*\bkmkend lg08941846863}Plan: {\*\bkmkstart yu94881393373}{\*\bkmkend ct96517509874}On arrival O2 sat 84% on RA, improved to 94% on 4L NC. Reportedly hypoxic to 70's at home PTA. Uses O2 at home PRN when <88%. Has been   more lethargic x2 days, possibly d/t hypoxia which is likely 2/2 mucus plugging iso possible PNA vs ABPA. Pulmonary consulted in ED. \par  MRSA negative\par  RVP negative\par  - f/u Pulm recs\par  - c/w pseudomonal Zosyn\par  - d/brigette vanc\par  - symbicort 80/4.5\par  - duonebs BID with aerobika for airway clearance after each duoneb\par  - chest PT\par  - f/u sputum culture.\plain\f1\fs20\zgag8543\hich\f1\dbch\f1\loch\f1\cf2\fs20\strike\plain\f0\fs20\surf3487\hich\f0\dbch\f0\loch\f0\fs20\par  \par  \plain\f1\fs20\bmvw7231\hich\f1\dbch\f1\loch\f1\cf2\fs20\ul{\field{\*\fldinst HYPERLINK [id number] }{\fldrslt Problem/Plan - 2:}}\plain\f0\fs20\wlmj1737\hich\f0\dbch\f0\loch\f0\fs20\ql\par  \'b7  {\*\bkmkstart ox58378795505}{\*\bkmkend na09060677187}Problem: {\*\bkmkstart ec39693148746}{\*\bkmkend sv05260818345}Metabolic encephalopathy. \par  \'b7  {\*\bkmkstart cb32113632189}{\*\bkmkend tz44649975885}Plan: {\*\bkmkstart wt33077847672}{\*\bkmkend yz85191042226}RESOLVED\par  Increased lethargy since 12/13. Likely 2/2 hypoxia iso possible PNA. Takes Klonopin 0.25mg PO QID and gabapentin 300mg PO TID at home. No evidence of acute stroke. \par  \par  - treat underlying infection \par  - monitor off klonopin for now.\plain\f1\fs20\mavm8971\hich\f1\dbch\f1\loch\f1\cf2\fs20\strike\plain\f0\fs20\pgnd3059\hich\f0\dbch\f0\loch\f0\fs20\par  \par  \plain\f1\fs20\troh9182\hich\f1\dbch\f1\loch\f1\cf2\fs20\ul{\field{\*\fldinst HYPERLINK [id number] }{\fldrslt Problem/Plan - 3:}}\plain\f0\fs20\jgld3010\hich\f0\dbch\f0\loch\f0\fs20\ql\par  \'b7  {\*\bkmkstart ne63357073483}{\*\bkmkend vg16391469110}Problem: {\*\bkmkstart xs29420057067}{\*\bkmkend pa02880546743}Atrial fibrillation, chronic. \par  \'b7  {\*\bkmkstart hv21802572841}{\*\bkmkend ou03144453912}Plan: {\*\bkmkstart up97930392043}{\*\bkmkend pg00345790542}H/o Afib, not on Eliquis d/t SAH. Takes lopressor 50 BID. EKG showing NSR.\par  \par  - c/w lopressor 25 BID.\plain\f1\fs20\ixty5591\hich\f1\dbch\f1\loch\f1\cf2\fs20\strike\plain\f0\fs20\trcc3276\hich\f0\dbch\f0\loch\f0\fs20\par  \par  \plain\f1\fs20\qomo0727\hich\f1\dbch\f1\loch\f1\cf2\fs20\ul{\field{\*\fldinst HYPERLINK [id number] }{\fldrslt Problem/Plan - 4:}}\plain\f0\fs20\mike0071\hich\f0\dbch\f0\loch\f0\fs20\ql\par  \'b7  {\*\bkmkstart hf76939429727}{\*\bkmkend sv25549913151}Problem: {\*\bkmkstart nt29866824218}{\*\bkmkend br84889440179}History of bronchiectasis. \par  \'b7  {\*\bkmkstart bz53850407291}{\*\bkmkend bw91428055647}Plan: {\*\bkmkstart ju25260373226}{\*\bkmkend qx58816601745}H/o bronchiectasis likely predisposing pt to mucus plugging and infection\par  \par  - see plan above \par  - c/w Singular 10mg qd.\par  \par  \plain\f1\fs20\thgw0350\hich\f1\dbch\f1\loch\f1\cf2\fs20\ul{\field{\*\fldinst HYPERLINK [id number] }{\fldrslt Problem/Plan - 5:}}\plain\f0\fs20\yopk6236\hich\f0\dbch\f0\loch\f0\fs20\ql\par  \'b7  {\*\bkmkstart yv82149611962}{\*\bkmkend xq68061627397}Problem: {\*\bkmkstart sp04222699993}{\*\bkmkend ll52798731004}H/O Clostridium difficile infection.\plain\f1\fs20\qgql0454\hich\f1\dbch\f1\loch\f1\cf2\fs20\strike\plain\f0\fs20\zchf2319\hich\f0\dbch\f0\loch\f0\fs20\par  \'b7  {\*\bkmkstart xy67808620388}{\*\bkmkend bz92444771692}Plan: {\*\bkmkstart ah83750185383}{\*\bkmkend po70552281985}Hx of clostridium difficile last admission\par  \par  - started on vancomycin 125 po qD.\plain\f1\fs20\bpai1188\hich\f1\dbch\f1\loch\f1\cf2\fs20\strike\plain\f0\fs20\nyyt7170\hich\f0\dbch\f0\loch\f0\fs20\par  \par  \plain\f1\fs20\twmp7474\hich\f1\dbch\f1\loch\f1\cf2\fs20\ul{\field{\*\fldinst HYPERLINK [id number] }{\fldrslt Problem/Plan - 6:}}\plain\f0\fs20\alew9965\hich\f0\dbch\f0\loch\f0\fs20\ql\par  \'b7  {\*\bkmkstart uj96459518886}{\*\bkmkend qs16503617669}Problem: {\*\bkmkstart pg67416934763}{\*\bkmkend uo32500976784}HTN (hypertension).\plain\f1\fs20\snwi8409\hich\f1\dbch\f1\loch\f1\cf2\fs20\strike\plain\f0\fs20\omry4334\hich\f0\dbch\f0\loch\f0\fs20\par  \'b7  {\*\bkmkstart zm54359010766}{\*\bkmkend dz82713080015}Plan: {\*\bkmkstart sv86390428509}{\*\bkmkend ux05379021373}Normotensive for age on arrival, 133/85. Takes amlodipine 5 qd \par  \par  - c/w amlodipine 5 qd.\plain\f1\fs20\nvcw2085\hich\f1\dbch\f1\loch\f1\cf2\fs20\strike\plain\f0\fs20\cuvs4424\hich\f0\dbch\f0\loch\f0\fs20\par  \par  \plain\f1\fs20\nypf9771\hich\f1\dbch\f1\loch\f1\cf2\fs20\ul{\field{\*\fldinst HYPERLINK [id number],[id number] }{\fldrslt Problem/Plan - 7:}}\plain\f0\fs20\lwdd7457\hich\f0\dbch\f0\loch\f0\fs20\ql\par  \'b7  {\*\bkmkstart lv18960462336}{\*\bkmkend ej12747004492}Problem: {\*\bkmkstart xc97035700672}{\*\bkmkend zg24937106430}Chronic lower back pain.\plain\f1\fs20\kxsg0506\hich\f1\dbch\f1\loch\f1\cf2\fs20\strike\plain\f0\fs20\lxvs3359\hich\f0\dbch\f0\loch\f0\fs20\par  \'b7  {\*\bkmkstart rx45397733469}{\*\bkmkend cu72184023323}Plan: {\*\bkmkstart wf09404920105}{\*\bkmkend bm88647749285}Diffuse LBP since May. Tried muscle relaxants (ineffective) and tramadol (caused hallucinations). Currently being managed with gabapentin   300 TID. See pain management OP; unclear etio but suspect shingles. \par  \par  - c/w gabapentin 100 TID\par  - lidocaine patch to lower back\par  - hold off on Klonopin iso AMS; okay to resume when more awake (takes 0.25mg QID at home)\par  - avoid opioids and muscle relaxants.\plain\f1\fs20\fwbn5806\hich\f1\dbch\f1\loch\f1\cf2\fs20\strike\plain\f0\fs20\tsol6315\hich\f0\dbch\f0\loch\f0\fs20\par  \par  \plain\f1\fs20\xqcp3868\hich\f1\dbch\f1\loch\f1\cf2\fs20\ul{\field{\*\fldinst HYPERLINK [id number] }{\fldrslt Problem/Plan - 8:}}\plain\f0\fs20\wtvh4252\hich\f0\dbch\f0\loch\f0\fs20\ql\par  \'b7  {\*\bkmkstart ic63097103085}{\*\bkmkend sl50019055939}Problem: {\*\bkmkstart la41215217170}{\*\bkmkend ez03040584519}Lewy body dementia.\plain\f1\fs20\bmno9055\hich\f1\dbch\f1\loch\f1\cf2\fs20\strike\plain\f0\fs20\cyyi5551\hich\f0\dbch\f0\loch\f0\fs20\par  \'b7  {\*\bkmkstart wg26631936116}{\*\bkmkend pf99375455949}Plan: {\*\bkmkstart gw19761682399}{\*\bkmkend ba85530083978}- c/w donepezil 5 qd.\plain\f1\fs20\kaqm7985\hich\f1\dbch\f1\loch\f1\cf2\fs20\strike\plain\f0\fs20\gonj9123\hich\f0\dbch\f0\loch\f0\fs20\par  \par  \plain\f1\fs20\yocu2947\hich\f1\dbch\f1\loch\f1\cf2\fs20\ul{\field{\*\fldinst HYPERLINK [id number] }{\fldrslt Problem/Plan - 9:}}\plain\f0\fs20\pjmt9731\hich\f0\dbch\f0\loch\f0\fs20\ql\par  \'b7  {\*\bkmkstart ek65664200039}{\*\bkmkend qe86049431605}Problem: {\*\bkmkstart en60977510388}{\*\bkmkend rr46956161741}Prophylactic measure. \par  \'b7  {\*\bkmkstart db92299279365}{\*\bkmkend kr43621172335}Plan: {\*\bkmkstart nb00596644986}{\*\bkmkend kv70435675145}F: s/p NS 500cc x1\par  E: replete as needed\par  N: regular diet\par  GI: none \par  DVT: SCD\par  Code: Full with trial of intubation \par  Dispo: RMF{\*\bkmkstart bkcommentCR}{\*\bkmkend bkcommentCR}.\par  \par  \par  \par  }   {\rtf1\mnleby33940\ansi\dutemey2821\ftnbj\uc1\deff0  {\fonttbl{\f0 \fnil Segoe UI;}{\f1 \fnil \fcharset0 Segoe UI;}{\f2 \fnil Times New Stepan;}}  {\colortbl ;\pbo515\htqro520\pseu358 ;\red0\green0\blue0 ;\red0\green0\yzfv923 ;\red0\green0\blue0 ;}  {\stylesheet{\f0\fs20 Normal;}{\cs1 Default Paragraph Font;}{\cs2\f0\fs16 Line Number;}{\cs3\f2\fs24\ul\cf3 Hyperlink;}}  {\*\revtbl{Unknown;}}  \wchxsi79226\mpfawv97693\oexpq0241\ngsjw8834\lhmtk7117\mkclh5639\ogbwbzf026\nnvaico895\nogrowautofit\tlvang556\formshade\nofeaturethrottle1\dntblnsbdb\fet4\aendnotes\aftnnrlc\pgbrdrhead\pgbrdrfoot  \sectd\rvfoes50141\ynluta48745\guttersxn0\ppckmdfr2004\bqchotel9684\qappzjuu4174\lxzsojkp9322\bhelwpc444\uedwadu123\sbkpage\pgncont\pgndec  \plain\plain\f0\fs24\ql\plain\f0\fs24\plain\f0\fs20\urfh2838\hich\f0\dbch\f0\loch\f0\fs20\par  I M\par  \par  83 y o F PMHx AF (not on AC due to SAH), HTN, Lewy body dementia (baseline a/o x3), posterior reversible encephalopathy syndrome, chronic bronchiectasis on home O2 prn, dCHF, C. diff, BIBEMS for stroke rule out and hypoxia; no acute stroke on CTH. Pulmonary   consulted for AHRF, possibly 2/2 viral vs bacterial PNA given h/o chronic bronchiectasis. Pt admitted for AHRF being treated for possible PNA, pending further w/u. \par  \par  \plain\f1\fs20\qfnk0649\hich\f1\dbch\f1\loch\f1\cf2\fs20\ul{\field{\*\fldinst HYPERLINK [id number] }{\fldrslt Problem/Plan - 1:}}\plain\f0\fs20\hprm7672\hich\f0\dbch\f0\loch\f0\fs20\ql\par  \'b7  {\*\bkmkstart db40749024214}{\*\bkmkend hw43852897555}Problem: {\*\bkmkstart ob93965087161}{\*\bkmkend ct14253494029}Acute hypoxic respiratory failure. \par  \'b7  {\*\bkmkstart ca99851061345}{\*\bkmkend op75057778290}Plan: {\*\bkmkstart jf69751512497}{\*\bkmkend lj68749436232}On arrival O2 sat 84% on RA, improved to 94% on 4L NC. Reportedly hypoxic to 70's at home PTA. Uses O2 at home PRN when <88%. Has been   more lethargic x2 days, possibly d/t hypoxia which is likely 2/2 mucus plugging iso possible PNA vs ABPA. Pulmonary consulted in ED. \par  MRSA negative\par  RVP negative\par  - f/u Pulm recs\par  - c/w pseudomonal Zosyn\par  - d/brigette vanc\par  - symbicort 80/4.5\par  - duonebs BID with aerobika for airway clearance after each duoneb\par  - chest PT\par  - f/u sputum culture.\plain\f1\fs20\efvv3894\hich\f1\dbch\f1\loch\f1\cf2\fs20\strike\plain\f0\fs20\rcqs0661\hich\f0\dbch\f0\loch\f0\fs20\par  \par  \plain\f1\fs20\qiqd4462\hich\f1\dbch\f1\loch\f1\cf2\fs20\ul{\field{\*\fldinst HYPERLINK [id number] }{\fldrslt Problem/Plan - 2:}}\plain\f0\fs20\vknc1696\hich\f0\dbch\f0\loch\f0\fs20\ql\par  \'b7  {\*\bkmkstart ee42855494593}{\*\bkmkend mt13017538291}Problem: {\*\bkmkstart lq15731482655}{\*\bkmkend yv29873251798}Metabolic encephalopathy. \par  \'b7  {\*\bkmkstart vm54620496885}{\*\bkmkend ok64913261319}Plan: {\*\bkmkstart be68929997891}{\*\bkmkend co53925124962}RESOLVED\par  Increased lethargy since 12/13. Likely 2/2 hypoxia iso possible PNA. Takes Klonopin 0.25mg PO QID and gabapentin 300mg PO TID at home. No evidence of acute stroke. \par  \par  - treat underlying infection \par  - monitor off klonopin for now.\plain\f1\fs20\ybuw2988\hich\f1\dbch\f1\loch\f1\cf2\fs20\strike\plain\f0\fs20\hapf5902\hich\f0\dbch\f0\loch\f0\fs20\par  \par  \plain\f1\fs20\sllh6679\hich\f1\dbch\f1\loch\f1\cf2\fs20\ul{\field{\*\fldinst HYPERLINK [id number] }{\fldrslt Problem/Plan - 3:}}\plain\f0\fs20\sbyq6955\hich\f0\dbch\f0\loch\f0\fs20\ql\par  \'b7  {\*\bkmkstart vv47307377232}{\*\bkmkend pj38212601948}Problem: {\*\bkmkstart nv36260568212}{\*\bkmkend iz17953406569}Atrial fibrillation, chronic. \par  \'b7  {\*\bkmkstart vy67595815498}{\*\bkmkend zu40150900969}Plan: {\*\bkmkstart xc73877223259}{\*\bkmkend gy69000802071}H/o Afib, not on Eliquis d/t SAH. Takes lopressor 50 BID. EKG showing NSR.\par  \par  - c/w lopressor 25 BID.\plain\f1\fs20\hgmt0395\hich\f1\dbch\f1\loch\f1\cf2\fs20\strike\plain\f0\fs20\lxqr0300\hich\f0\dbch\f0\loch\f0\fs20\par  \par  \plain\f1\fs20\tgou0016\hich\f1\dbch\f1\loch\f1\cf2\fs20\ul{\field{\*\fldinst HYPERLINK [id number] }{\fldrslt Problem/Plan - 4:}}\plain\f0\fs20\sevt1909\hich\f0\dbch\f0\loch\f0\fs20\ql\par  \'b7  {\*\bkmkstart ly45434769932}{\*\bkmkend al44400439039}Problem: {\*\bkmkstart us38422690234}{\*\bkmkend ec43202637476}History of bronchiectasis. \par  \'b7  {\*\bkmkstart zh47195290097}{\*\bkmkend cb63325717627}Plan: {\*\bkmkstart fe67010328259}{\*\bkmkend gd64067773491}H/o bronchiectasis likely predisposing pt to mucus plugging and infection\par  \par  - see plan above \par  - c/w Singular 10mg qd.\par  \par  \plain\f1\fs20\rcfb2673\hich\f1\dbch\f1\loch\f1\cf2\fs20\ul{\field{\*\fldinst HYPERLINK [id number] }{\fldrslt Problem/Plan - 5:}}\plain\f0\fs20\dhja7441\hich\f0\dbch\f0\loch\f0\fs20\ql\par  \'b7  {\*\bkmkstart lf75367954705}{\*\bkmkend ea05422136285}Problem: {\*\bkmkstart fi57046432024}{\*\bkmkend rl87809791540}H/O Clostridium difficile infection.\plain\f1\fs20\arli9356\hich\f1\dbch\f1\loch\f1\cf2\fs20\strike\plain\f0\fs20\qyfg5765\hich\f0\dbch\f0\loch\f0\fs20\par  \'b7  {\*\bkmkstart gr71307118234}{\*\bkmkend ae40880256128}Plan: {\*\bkmkstart io26639007330}{\*\bkmkend vy36948372649}Hx of clostridium difficile last admission\par  \par  - started on vancomycin 125 po qD.\plain\f1\fs20\yczc5924\hich\f1\dbch\f1\loch\f1\cf2\fs20\strike\plain\f0\fs20\clwy7567\hich\f0\dbch\f0\loch\f0\fs20\par  \par  \plain\f1\fs20\vvhx0998\hich\f1\dbch\f1\loch\f1\cf2\fs20\ul{\field{\*\fldinst HYPERLINK [id number] }{\fldrslt Problem/Plan - 6:}}\plain\f0\fs20\lonp2255\hich\f0\dbch\f0\loch\f0\fs20\ql\par  \'b7  {\*\bkmkstart gt32177197847}{\*\bkmkend td65095414040}Problem: {\*\bkmkstart zd04873747444}{\*\bkmkend pk23626958683}HTN (hypertension).\plain\f1\fs20\fahe6381\hich\f1\dbch\f1\loch\f1\cf2\fs20\strike\plain\f0\fs20\stax2860\hich\f0\dbch\f0\loch\f0\fs20\par  \'b7  {\*\bkmkstart ai14096212210}{\*\bkmkend wt52237913245}Plan: {\*\bkmkstart cj00596790230}{\*\bkmkend kh84824535348}Normotensive for age on arrival, 133/85. Takes amlodipine 5 qd \par  \par  - c/w amlodipine 5 qd.\plain\f1\fs20\txnz0509\hich\f1\dbch\f1\loch\f1\cf2\fs20\strike\plain\f0\fs20\fjtu8763\hich\f0\dbch\f0\loch\f0\fs20\par  \par  \plain\f1\fs20\axwy0011\hich\f1\dbch\f1\loch\f1\cf2\fs20\ul{\field{\*\fldinst HYPERLINK [id number] }{\fldrslt Problem/Plan - 7:}}\plain\f0\fs20\egsa0664\hich\f0\dbch\f0\loch\f0\fs20\ql\par  \'b7  {\*\bkmkstart tq96096954539}{\*\bkmkend zv75266225868}Problem: {\*\bkmkstart cl13721270427}{\*\bkmkend sv55206482243}Chronic lower back pain.\plain\f1\fs20\uqos2613\hich\f1\dbch\f1\loch\f1\cf2\fs20\strike\plain\f0\fs20\ycaq9471\hich\f0\dbch\f0\loch\f0\fs20\par  \'b7  {\*\bkmkstart ba53565014845}{\*\bkmkend rx99118814996}Plan: {\*\bkmkstart js65696096521}{\*\bkmkend bh52669640858}Diffuse LBP since May. Tried muscle relaxants (ineffective) and tramadol (caused hallucinations). Currently being managed with gabapentin   300 TID. See pain management OP; unclear etio but suspect shingles. \par  \par  - c/w gabapentin 100 TID\par  - lidocaine patch to lower back\par  - hold off on Klonopin iso AMS; okay to resume when more awake (takes 0.25mg QID at home)\par  - avoid opioids and muscle relaxants.\plain\f1\fs20\clab3495\hich\f1\dbch\f1\loch\f1\cf2\fs20\strike\plain\f0\fs20\ewtq7294\hich\f0\dbch\f0\loch\f0\fs20\par  \par  \plain\f1\fs20\avdr9546\hich\f1\dbch\f1\loch\f1\cf2\fs20\ul{\field{\*\fldinst HYPERLINK [id number] }{\fldrslt Problem/Plan - 8:}}\plain\f0\fs20\btwb3704\hich\f0\dbch\f0\loch\f0\fs20\ql\par  \'b7  {\*\bkmkstart ou07898075181}{\*\bkmkend gg98862465078}Problem: {\*\bkmkstart cg46026995381}{\*\bkmkend ku55176446391}Lewy body dementia.\plain\f1\fs20\vnnf2108\hich\f1\dbch\f1\loch\f1\cf2\fs20\strike\plain\f0\fs20\sstw8874\hich\f0\dbch\f0\loch\f0\fs20\par  \'b7  {\*\bkmkstart jt39269533897}{\*\bkmkend ez12959087761}Plan: {\*\bkmkstart gt98235974649}{\*\bkmkend ta49900600888}- c/w donepezil 5 qd.\plain\f1\fs20\qviz5820\hich\f1\dbch\f1\loch\f1\cf2\fs20\strike\plain\f0\fs20\qlng8927\hich\f0\dbch\f0\loch\f0\fs20\par  \par  \plain\f1\fs20\ackb1348\hich\f1\dbch\f1\loch\f1\cf2\fs20\ul{\field{\*\fldinst HYPERLINK [id number] }{\fldrslt Problem/Plan - 9:}}\plain\f0\fs20\zmlx4775\hich\f0\dbch\f0\loch\f0\fs20\ql\par  \'b7  {\*\bkmkstart [id number]}{\*\bkmkend [id number]}Problem: {\*\bkmkstart [id number]}{\*\bkmkend [id number]}Prophylactic measure. \par  \'b7  {\*\bkmkstart [id number]}{\*\bkmkend [id number]}Plan: {\*\bkmkstart [id number]}{\*\bkmkend [id number]}F: s/p NS 500cc x1\par  E: replete as needed\par  N: regular diet\par  GI: none \par  DVT: SCD\par  Code: Full with trial of intubation \par  Dispo: [facility]{\*\bkmkstart bkcommentCR}{\*\bkmkend bkcommentCR}.\par  \par  \par  \par  }

## 2023-12-16 NOTE — PROGRESS NOTE ADULT - PROBLEM SELECTOR PLAN 3
H/o Afib, not on Eliquis d/t [facility]. Takes lopressor 50 BID. EKG showing NSR.    - c/w lopressor 25 BID

## 2023-12-16 NOTE — PROGRESS NOTE ADULT - PROBLEM SELECTOR PLAN 2
RESOLVED  Increased lethargy since 12/13. Likely 2/2 hypoxia iso possible PNA. Takes Klonopin 0.25mg PO QID and gabapentin 300mg PO TID at home. No evidence of acute stroke.     - treat underlying infection   - monitor off klonopin for now

## 2023-12-16 NOTE — PROGRESS NOTE ADULT - SUBJECTIVE AND OBJECTIVE BOX
Patient is a 83y old  Female who presents with a chief complaint of hypoxia (15 Dec 2023 14:47)      SUBJECTIVE / OVERNIGHT EVENTS:  Still coughing.  Reports shortness of breath has improved.   Bringing up sputum.    MEDICATIONS  (STANDING):  albuterol/ipratropium for Nebulization 3 milliLiter(s) Nebulizer every 12 hours  amLODIPine   Tablet 5 milliGRAM(s) Oral daily  budesonide  80 MICROgram(s)/formoterol 4.5 MICROgram(s) Inhaler 2 Puff(s) Inhalation two times a day  donepezil 5 milliGRAM(s) Oral at bedtime  gabapentin 100 milliGRAM(s) Oral every 8 hours  influenza  Vaccine (HIGH DOSE) 0.7 milliLiter(s) IntraMuscular once  lidocaine   4% Patch 1 Patch Transdermal every 24 hours  melatonin 10 milliGRAM(s) Oral at bedtime  metoprolol tartrate 25 milliGRAM(s) Oral two times a day  montelukast 10 milliGRAM(s) Oral at bedtime  piperacillin/tazobactam IVPB.. 4.5 Gram(s) IV Intermittent every 8 hours  potassium chloride    Tablet ER 20 milliEquivalent(s) Oral once  potassium phosphate IVPB 30 milliMole(s) IV Intermittent once  vancomycin    Solution 125 milliGRAM(s) Oral every 24 hours    MEDICATIONS  (PRN):      CAPILLARY BLOOD GLUCOSE        I&O's Summary      PHYSICAL EXAM:  Vital Signs Last 24 Hrs  T(C): 36.7 (16 Dec 2023 06:34), Max: 36.9 (15 Dec 2023 20:40)  T(F): 98 (16 Dec 2023 06:34), Max: 98.5 (15 Dec 2023 20:40)  HR: 83 (16 Dec 2023 06:34) (72 - 83)  BP: 134/78 (16 Dec 2023 06:34) (96/55 - 134/78)  BP(mean): --  RR: 18 (16 Dec 2023 06:34) (17 - 18)  SpO2: 93% (16 Dec 2023 06:34) (93% - 97%)    Parameters below as of 16 Dec 2023 06:34  Patient On (Oxygen Delivery Method): nasal cannula      GENERAL: NAD, frail  HEAD:  Atraumatic, Normocephalic  EYES: EOMI, PERRLA, conjunctiva and sclera clear  NECK: Supple, No JVD  CHEST/LUNG: Rhonchi bilaterally; No wheeze  HEART: Regular rate and rhythm; No murmurs, rubs, or gallops  ABDOMEN: Soft, Nontender, Nondistended; Bowel sounds present  EXTREMITIES:  2+ Peripheral Pulses, No clubbing, cyanosis, or edema  PSYCH: AAOx3  NEUROLOGY: non-focal  SKIN: No rashes or lesions    LABS:                        10.4   7.24  )-----------( 193      ( 16 Dec 2023 07:42 )             32.6     12-16    138  |  102  |  28<H>  ----------------------------<  102<H>  3.5   |  28  |  0.74    Ca    8.6      16 Dec 2023 07:42  Phos  2.5     12-16  Mg     1.8     12-16    TPro  7.2  /  Alb  2.9<L>  /  TBili  0.4  /  DBili  x   /  AST  12  /  ALT  7<L>  /  AlkPhos  105  12-15    PT/INR - ( 14 Dec 2023 14:50 )   PT: 11.9 sec;   INR: 1.05          PTT - ( 14 Dec 2023 14:50 )  PTT:27.9 sec      Urinalysis Basic - ( 16 Dec 2023 07:42 )    Color: x / Appearance: x / SG: x / pH: x  Gluc: 102 mg/dL / Ketone: x  / Bili: x / Urobili: x   Blood: x / Protein: x / Nitrite: x   Leuk Esterase: x / RBC: x / WBC x   Sq Epi: x / Non Sq Epi: x / Bacteria: x        RADIOLOGY & ADDITIONAL TESTS:    Imaging Personally Reviewed:    Consultant(s) Notes Reviewed:      Care Discussed with Consultants/Other Providers:

## 2023-12-16 NOTE — PROGRESS NOTE ADULT - PROBLEM SELECTOR PLAN 1
On arrival O2 sat 84% on RA, improved to 94% on 4L NC. Reportedly hypoxic to 70's at home PTA. Uses O2 at home PRN when <88%. Has been more lethargic x2 days, possibly d/t hypoxia which is likely 2/2 mucus plugging iso possible PNA vs ABPA. Pulmonary consulted in ED.   MRSA negative  RVP negative  - Pulmonary  to re-evaluate today.  - c/w pseudomonal Zosyn  - d/brigette vanc  - symbicort 80/4.5  - duonebs BID with aerobika for airway clearance after each duoneb  - chest PT  - f/u sputum culture

## 2023-12-16 NOTE — PROGRESS NOTE ADULT - PROBLEM SELECTOR PLAN 9
F: s/p NS 500cc x1  E: replete as needed  N: regular diet  GI: none   DVT: SCD  Code: Full with trial of intubation   Dispo: [facility] F: s/p NS 500cc x1  E: replete as needed  N: regular diet  GI: none   DVT: SCD  Code: Full with trial of intubation   Dispo: [facility]

## 2023-12-16 NOTE — PROGRESS NOTE ADULT - PROBLEM SELECTOR PLAN 7
Diffuse LBP since May. Tried muscle relaxants (ineffective) and tramadol (caused hallucinations). Currently being managed with gabapentin 300 TID. See pain management OP; unclear etio but suspect shingles.     - c/w gabapentin 100 TID  - lidocaine patch to lower back  - hold off on Klonopin iso AMS; okay to resume when more awake (takes 0.25mg QID at home)  - avoid opioids and muscle relaxants

## 2023-12-17 LAB
ALBUMIN SERPL ELPH-MCNC: 3.1 G/DL — LOW (ref 3.3–5)
ALBUMIN SERPL ELPH-MCNC: 3.1 G/DL — LOW (ref 3.3–5)
ALP SERPL-CCNC: 104 U/L — SIGNIFICANT CHANGE UP (ref 40–120)
ALP SERPL-CCNC: 104 U/L — SIGNIFICANT CHANGE UP (ref 40–120)
ALT FLD-CCNC: 17 U/L — SIGNIFICANT CHANGE UP (ref 10–45)
ALT FLD-CCNC: 17 U/L — SIGNIFICANT CHANGE UP (ref 10–45)
ANION GAP SERPL CALC-SCNC: 7 MMOL/L — SIGNIFICANT CHANGE UP (ref 5–17)
ANION GAP SERPL CALC-SCNC: 7 MMOL/L — SIGNIFICANT CHANGE UP (ref 5–17)
AST SERPL-CCNC: 19 U/L — SIGNIFICANT CHANGE UP (ref 10–40)
AST SERPL-CCNC: 19 U/L — SIGNIFICANT CHANGE UP (ref 10–40)
BASOPHILS # BLD AUTO: 0.04 K/UL — SIGNIFICANT CHANGE UP (ref 0–0.2)
BASOPHILS # BLD AUTO: 0.04 K/UL — SIGNIFICANT CHANGE UP (ref 0–0.2)
BASOPHILS NFR BLD AUTO: 0.7 % — SIGNIFICANT CHANGE UP (ref 0–2)
BASOPHILS NFR BLD AUTO: 0.7 % — SIGNIFICANT CHANGE UP (ref 0–2)
BILIRUB SERPL-MCNC: 0.6 MG/DL — SIGNIFICANT CHANGE UP (ref 0.2–1.2)
BILIRUB SERPL-MCNC: 0.6 MG/DL — SIGNIFICANT CHANGE UP (ref 0.2–1.2)
BUN SERPL-MCNC: 19 MG/DL — SIGNIFICANT CHANGE UP (ref 7–23)
BUN SERPL-MCNC: 19 MG/DL — SIGNIFICANT CHANGE UP (ref 7–23)
CALCIUM SERPL-MCNC: 8.4 MG/DL — SIGNIFICANT CHANGE UP (ref 8.4–10.5)
CALCIUM SERPL-MCNC: 8.4 MG/DL — SIGNIFICANT CHANGE UP (ref 8.4–10.5)
CHLORIDE SERPL-SCNC: 99 MMOL/L — SIGNIFICANT CHANGE UP (ref 96–108)
CHLORIDE SERPL-SCNC: 99 MMOL/L — SIGNIFICANT CHANGE UP (ref 96–108)
CO2 SERPL-SCNC: 27 MMOL/L — SIGNIFICANT CHANGE UP (ref 22–31)
CO2 SERPL-SCNC: 27 MMOL/L — SIGNIFICANT CHANGE UP (ref 22–31)
CREAT SERPL-MCNC: 0.77 MG/DL — SIGNIFICANT CHANGE UP (ref 0.5–1.3)
CREAT SERPL-MCNC: 0.77 MG/DL — SIGNIFICANT CHANGE UP (ref 0.5–1.3)
EGFR: 77 ML/MIN/1.73M2 — SIGNIFICANT CHANGE UP
EGFR: 77 ML/MIN/1.73M2 — SIGNIFICANT CHANGE UP
EOSINOPHIL # BLD AUTO: 0.03 K/UL — SIGNIFICANT CHANGE UP (ref 0–0.5)
EOSINOPHIL # BLD AUTO: 0.03 K/UL — SIGNIFICANT CHANGE UP (ref 0–0.5)
EOSINOPHIL NFR BLD AUTO: 0.5 % — SIGNIFICANT CHANGE UP (ref 0–6)
EOSINOPHIL NFR BLD AUTO: 0.5 % — SIGNIFICANT CHANGE UP (ref 0–6)
GLUCOSE SERPL-MCNC: 100 MG/DL — HIGH (ref 70–99)
GLUCOSE SERPL-MCNC: 100 MG/DL — HIGH (ref 70–99)
HCT VFR BLD CALC: 30 % — LOW (ref 34.5–45)
HCT VFR BLD CALC: 30 % — LOW (ref 34.5–45)
HGB BLD-MCNC: 9.7 G/DL — LOW (ref 11.5–15.5)
HGB BLD-MCNC: 9.7 G/DL — LOW (ref 11.5–15.5)
IMM GRANULOCYTES NFR BLD AUTO: 0.4 % — SIGNIFICANT CHANGE UP (ref 0–0.9)
IMM GRANULOCYTES NFR BLD AUTO: 0.4 % — SIGNIFICANT CHANGE UP (ref 0–0.9)
LYMPHOCYTES # BLD AUTO: 0.93 K/UL — LOW (ref 1–3.3)
LYMPHOCYTES # BLD AUTO: 0.93 K/UL — LOW (ref 1–3.3)
LYMPHOCYTES # BLD AUTO: 16.3 % — SIGNIFICANT CHANGE UP (ref 13–44)
LYMPHOCYTES # BLD AUTO: 16.3 % — SIGNIFICANT CHANGE UP (ref 13–44)
MAGNESIUM SERPL-MCNC: 1.7 MG/DL — SIGNIFICANT CHANGE UP (ref 1.6–2.6)
MAGNESIUM SERPL-MCNC: 1.7 MG/DL — SIGNIFICANT CHANGE UP (ref 1.6–2.6)
MCHC RBC-ENTMCNC: 32 PG — SIGNIFICANT CHANGE UP (ref 27–34)
MCHC RBC-ENTMCNC: 32 PG — SIGNIFICANT CHANGE UP (ref 27–34)
MCHC RBC-ENTMCNC: 32.3 GM/DL — SIGNIFICANT CHANGE UP (ref 32–36)
MCHC RBC-ENTMCNC: 32.3 GM/DL — SIGNIFICANT CHANGE UP (ref 32–36)
MCV RBC AUTO: 99 FL — SIGNIFICANT CHANGE UP (ref 80–100)
MCV RBC AUTO: 99 FL — SIGNIFICANT CHANGE UP (ref 80–100)
MONOCYTES # BLD AUTO: 0.66 K/UL — SIGNIFICANT CHANGE UP (ref 0–0.9)
MONOCYTES # BLD AUTO: 0.66 K/UL — SIGNIFICANT CHANGE UP (ref 0–0.9)
MONOCYTES NFR BLD AUTO: 11.6 % — SIGNIFICANT CHANGE UP (ref 2–14)
MONOCYTES NFR BLD AUTO: 11.6 % — SIGNIFICANT CHANGE UP (ref 2–14)
NEUTROPHILS # BLD AUTO: 4.02 K/UL — SIGNIFICANT CHANGE UP (ref 1.8–7.4)
NEUTROPHILS # BLD AUTO: 4.02 K/UL — SIGNIFICANT CHANGE UP (ref 1.8–7.4)
NEUTROPHILS NFR BLD AUTO: 70.5 % — SIGNIFICANT CHANGE UP (ref 43–77)
NEUTROPHILS NFR BLD AUTO: 70.5 % — SIGNIFICANT CHANGE UP (ref 43–77)
NRBC # BLD: 0 /100 WBCS — SIGNIFICANT CHANGE UP (ref 0–0)
NRBC # BLD: 0 /100 WBCS — SIGNIFICANT CHANGE UP (ref 0–0)
PHOSPHATE SERPL-MCNC: 4 MG/DL — SIGNIFICANT CHANGE UP (ref 2.5–4.5)
PHOSPHATE SERPL-MCNC: 4 MG/DL — SIGNIFICANT CHANGE UP (ref 2.5–4.5)
PLATELET # BLD AUTO: 181 K/UL — SIGNIFICANT CHANGE UP (ref 150–400)
PLATELET # BLD AUTO: 181 K/UL — SIGNIFICANT CHANGE UP (ref 150–400)
POTASSIUM SERPL-MCNC: 3.9 MMOL/L — SIGNIFICANT CHANGE UP (ref 3.5–5.3)
POTASSIUM SERPL-MCNC: 3.9 MMOL/L — SIGNIFICANT CHANGE UP (ref 3.5–5.3)
POTASSIUM SERPL-SCNC: 3.9 MMOL/L — SIGNIFICANT CHANGE UP (ref 3.5–5.3)
POTASSIUM SERPL-SCNC: 3.9 MMOL/L — SIGNIFICANT CHANGE UP (ref 3.5–5.3)
PROT SERPL-MCNC: 6.5 G/DL — SIGNIFICANT CHANGE UP (ref 6–8.3)
PROT SERPL-MCNC: 6.5 G/DL — SIGNIFICANT CHANGE UP (ref 6–8.3)
RBC # BLD: 3.03 M/UL — LOW (ref 3.8–5.2)
RBC # BLD: 3.03 M/UL — LOW (ref 3.8–5.2)
RBC # FLD: 13.1 % — SIGNIFICANT CHANGE UP (ref 10.3–14.5)
RBC # FLD: 13.1 % — SIGNIFICANT CHANGE UP (ref 10.3–14.5)
SODIUM SERPL-SCNC: 133 MMOL/L — LOW (ref 135–145)
SODIUM SERPL-SCNC: 133 MMOL/L — LOW (ref 135–145)
WBC # BLD: 5.7 K/UL — SIGNIFICANT CHANGE UP (ref 3.8–10.5)
WBC # BLD: 5.7 K/UL — SIGNIFICANT CHANGE UP (ref 3.8–10.5)
WBC # FLD AUTO: 5.7 K/UL — SIGNIFICANT CHANGE UP (ref 3.8–10.5)
WBC # FLD AUTO: 5.7 K/UL — SIGNIFICANT CHANGE UP (ref 3.8–10.5)

## 2023-12-17 PROCEDURE — 99232 SBSQ HOSP IP/OBS MODERATE 35: CPT

## 2023-12-17 RX ORDER — SODIUM CHLORIDE 9 MG/ML
3 INJECTION INTRAMUSCULAR; INTRAVENOUS; SUBCUTANEOUS DAILY
Refills: 0 | Status: DISCONTINUED | OUTPATIENT
Start: 2023-12-17 | End: 2023-12-18

## 2023-12-17 RX ORDER — LANOLIN ALCOHOL/MO/W.PET/CERES
1 CREAM (GRAM) TOPICAL ONCE
Refills: 0 | Status: DISCONTINUED | OUTPATIENT
Start: 2023-12-17 | End: 2023-12-17

## 2023-12-17 RX ORDER — MAGNESIUM SULFATE 500 MG/ML
2 VIAL (ML) INJECTION ONCE
Refills: 0 | Status: COMPLETED | OUTPATIENT
Start: 2023-12-17 | End: 2023-12-17

## 2023-12-17 RX ORDER — LANOLIN ALCOHOL/MO/W.PET/CERES
2 CREAM (GRAM) TOPICAL ONCE
Refills: 0 | Status: COMPLETED | OUTPATIENT
Start: 2023-12-17 | End: 2023-12-17

## 2023-12-17 RX ADMIN — PIPERACILLIN AND TAZOBACTAM 25 GRAM(S): 4; .5 INJECTION, POWDER, LYOPHILIZED, FOR SOLUTION INTRAVENOUS at 11:21

## 2023-12-17 RX ADMIN — DONEPEZIL HYDROCHLORIDE 5 MILLIGRAM(S): 10 TABLET, FILM COATED ORAL at 21:47

## 2023-12-17 RX ADMIN — BUDESONIDE AND FORMOTEROL FUMARATE DIHYDRATE 2 PUFF(S): 160; 4.5 AEROSOL RESPIRATORY (INHALATION) at 18:09

## 2023-12-17 RX ADMIN — AMLODIPINE BESYLATE 5 MILLIGRAM(S): 2.5 TABLET ORAL at 06:59

## 2023-12-17 RX ADMIN — GABAPENTIN 200 MILLIGRAM(S): 400 CAPSULE ORAL at 06:59

## 2023-12-17 RX ADMIN — Medication 3 MILLILITER(S): at 07:50

## 2023-12-17 RX ADMIN — BUDESONIDE AND FORMOTEROL FUMARATE DIHYDRATE 2 PUFF(S): 160; 4.5 AEROSOL RESPIRATORY (INHALATION) at 06:59

## 2023-12-17 RX ADMIN — GABAPENTIN 200 MILLIGRAM(S): 400 CAPSULE ORAL at 21:47

## 2023-12-17 RX ADMIN — Medication 25 MILLIGRAM(S): at 06:59

## 2023-12-17 RX ADMIN — LIDOCAINE 1 PATCH: 4 CREAM TOPICAL at 10:13

## 2023-12-17 RX ADMIN — PIPERACILLIN AND TAZOBACTAM 25 GRAM(S): 4; .5 INJECTION, POWDER, LYOPHILIZED, FOR SOLUTION INTRAVENOUS at 17:12

## 2023-12-17 RX ADMIN — Medication 0.25 MILLIGRAM(S): at 11:51

## 2023-12-17 RX ADMIN — LIDOCAINE 1 PATCH: 4 CREAM TOPICAL at 07:50

## 2023-12-17 RX ADMIN — Medication 3 MILLILITER(S): at 19:01

## 2023-12-17 RX ADMIN — GABAPENTIN 200 MILLIGRAM(S): 400 CAPSULE ORAL at 13:00

## 2023-12-17 RX ADMIN — POTASSIUM PHOSPHATE, MONOBASIC POTASSIUM PHOSPHATE, DIBASIC 83.33 MILLIMOLE(S): 236; 224 INJECTION, SOLUTION INTRAVENOUS at 03:58

## 2023-12-17 RX ADMIN — Medication 25 GRAM(S): at 13:00

## 2023-12-17 RX ADMIN — Medication 25 MILLIGRAM(S): at 17:11

## 2023-12-17 RX ADMIN — MONTELUKAST 10 MILLIGRAM(S): 4 TABLET, CHEWABLE ORAL at 21:47

## 2023-12-17 RX ADMIN — Medication 125 MILLIGRAM(S): at 10:06

## 2023-12-17 RX ADMIN — SODIUM CHLORIDE 3 MILLILITER(S): 9 INJECTION INTRAMUSCULAR; INTRAVENOUS; SUBCUTANEOUS at 15:55

## 2023-12-17 NOTE — PROGRESS NOTE ADULT - PROBLEM SELECTOR PLAN 5
Hx of clostridium difficile last admission    - started on vancomycin 125 po qD Hx of clostridium difficile last admission    - c/w vancomycin 125 po qD

## 2023-12-17 NOTE — PROGRESS NOTE ADULT - PROBLEM SELECTOR PLAN 7
Diffuse LBP since May. Tried muscle relaxants (ineffective) and tramadol (caused hallucinations). Currently being managed with gabapentin 300 TID. See pain management OP; unclear etio but suspect shingles.     - c/w gabapentin 100 TID  - lidocaine patch to lower back  - hold off on Klonopin iso AMS; okay to resume when more awake (takes 0.25mg QID at home)  - avoid opioids and muscle relaxants Diffuse LBP since May. Tried muscle relaxants (ineffective) and tramadol (caused hallucinations). Currently being managed with gabapentin 300 TID. See pain management OP; unclear etio but suspect shingles.     - c/w gabapentin 200 TID  - lidocaine patch to lower back  - hold off on Klonopin iso AMS; okay to resume when more awake (takes 0.25mg QID at home)  - avoid opioids and muscle relaxants

## 2023-12-17 NOTE — PROGRESS NOTE ADULT - PROBLEM SELECTOR PLAN 1
On arrival O2 sat 84% on RA, improved to 94% on 4L NC. Reportedly hypoxic to 70's at home PTA. Uses O2 at home PRN when <88%. Has been more lethargic x2 days, possibly d/t hypoxia which is likely 2/2 mucus plugging iso possible PNA vs ABPA. Pulmonary consulted in ED.   MRSA negative  RVP negative  - Pulmonary  to re-evaluate today.  - c/w pseudomonal Zosyn  - d/brigette vanc  - symbicort 80/4.5  - duonebs BID with aerobika for airway clearance after each duoneb  - chest PT  - f/u sputum culture On arrival O2 sat 84% on RA, improved to 94% on 4L NC. Reportedly hypoxic to 70's at home PTA. Uses O2 at home PRN when <88%. Has been more lethargic x2 days, possibly d/t hypoxia which is likely 2/2 mucus plugging iso possible PNA vs ABPA. Pulmonary consulted in ED.   MRSA negative  RVP negative  - Pulmonary consulted, f/u recs  - c/w pseudomonal Zosyn  - symbicort 80/4.5  - duonebs BID with aerobika for airway clearance after each duoneb at bedside  - chest PT  - inhaled ns  - f/u sputum culture

## 2023-12-17 NOTE — PROGRESS NOTE ADULT - SUBJECTIVE AND OBJECTIVE BOX
*****INCOMPLETE NOTE*****    INTERVAL HPI/OVERNIGHT EVENTS:    SUBJECTIVE: Patient seen and examined at bedside, resting comfortably in bed, and does not appear to be in any acute distress. Patient states  Patient denies any recent fevers, chills, nausea, vomiting, headache, acute sob, chest pain, abdominal pain, genitourinary sx, extremity pain or swelling.     ANTIBIOTICS/RELEVANT:    MEDICATIONS  (STANDING):  albuterol/ipratropium for Nebulization 3 milliLiter(s) Nebulizer every 12 hours  amLODIPine   Tablet 5 milliGRAM(s) Oral daily  budesonide  80 MICROgram(s)/formoterol 4.5 MICROgram(s) Inhaler 2 Puff(s) Inhalation two times a day  clonazePAM Oral Disintegrating Tablet 0.25 milliGRAM(s) Oral every 24 hours  donepezil 5 milliGRAM(s) Oral at bedtime  gabapentin 200 milliGRAM(s) Oral every 8 hours  influenza  Vaccine (HIGH DOSE) 0.7 milliLiter(s) IntraMuscular once  lidocaine   4% Patch 1 Patch Transdermal every 24 hours  metoprolol tartrate 25 milliGRAM(s) Oral two times a day  montelukast 10 milliGRAM(s) Oral at bedtime  piperacillin/tazobactam IVPB.. 4.5 Gram(s) IV Intermittent every 8 hours  potassium chloride    Tablet ER 20 milliEquivalent(s) Oral once  vancomycin    Solution 125 milliGRAM(s) Oral every 24 hours    MEDICATIONS  (PRN):      Vital Signs Last 24 Hrs  T(C): 36.7 (16 Dec 2023 06:34), Max: 36.7 (16 Dec 2023 06:34)  T(F): 98 (16 Dec 2023 06:34), Max: 98 (16 Dec 2023 06:34)  HR: 84 (16 Dec 2023 21:05) (83 - 84)  BP: 148/88 (16 Dec 2023 21:05) (134/78 - 148/88)  BP(mean): --  RR: 18 (16 Dec 2023 21:05) (18 - 18)  SpO2: 94% (16 Dec 2023 21:05) (93% - 94%)    Parameters below as of 16 Dec 2023 21:05  Patient On (Oxygen Delivery Method): room air        PHYSICAL EXAM:  General: in no acute distress  Eyes: EOMI intact bilaterally. Anicteric sclerae, moist conjunctivae  HENT: Moist mucous membranes  Neck: Trachea midline, supple  Lungs: CTA B/L. No wheezes, rales, or rhonchi  Cardiovascular: RRR. No murmurs, rubs, or gallops  Abdomen: Soft, non-tender non-distended; No rebound or guarding  Extremities: WWP, No clubbing, cyanosis or edema  Neurological: Alert and oriented  Skin: Warm and dry. No obvious rash     LABS:                        10.4   7.24  )-----------( 193      ( 16 Dec 2023 07:42 )             32.6     12-16    138  |  102  |  28<H>  ----------------------------<  102<H>  3.5   |  28  |  0.74    Ca    8.6      16 Dec 2023 07:42  Phos  2.5     12-16  Mg     1.8     12-16        Urinalysis Basic - ( 16 Dec 2023 07:42 )    Color: x / Appearance: x / SG: x / pH: x  Gluc: 102 mg/dL / Ketone: x  / Bili: x / Urobili: x   Blood: x / Protein: x / Nitrite: x   Leuk Esterase: x / RBC: x / WBC x   Sq Epi: x / Non Sq Epi: x / Bacteria: x        MICROBIOLOGY:    RADIOLOGY & ADDITIONAL STUDIES: INTERVAL HPI/OVERNIGHT EVENTS: steph    SUBJECTIVE: Patient seen and examined at bedside, resting comfortably in bed, and does not appear to be in any acute distress. Patient states that she is tired. Denies any shortness of breath or chest pain.    MEDICATIONS  (STANDING):  albuterol/ipratropium for Nebulization 3 milliLiter(s) Nebulizer every 12 hours  amLODIPine   Tablet 5 milliGRAM(s) Oral daily  budesonide  80 MICROgram(s)/formoterol 4.5 MICROgram(s) Inhaler 2 Puff(s) Inhalation two times a day  clonazePAM Oral Disintegrating Tablet 0.25 milliGRAM(s) Oral every 24 hours  donepezil 5 milliGRAM(s) Oral at bedtime  gabapentin 200 milliGRAM(s) Oral every 8 hours  influenza  Vaccine (HIGH DOSE) 0.7 milliLiter(s) IntraMuscular once  lidocaine   4% Patch 1 Patch Transdermal every 24 hours  metoprolol tartrate 25 milliGRAM(s) Oral two times a day  montelukast 10 milliGRAM(s) Oral at bedtime  piperacillin/tazobactam IVPB.. 4.5 Gram(s) IV Intermittent every 8 hours  potassium chloride    Tablet ER 20 milliEquivalent(s) Oral once  vancomycin    Solution 125 milliGRAM(s) Oral every 24 hours    Vital Signs Last 24 Hrs  T(C): 36.7 (16 Dec 2023 06:34), Max: 36.7 (16 Dec 2023 06:34)  T(F): 98 (16 Dec 2023 06:34), Max: 98 (16 Dec 2023 06:34)  HR: 84 (16 Dec 2023 21:05) (83 - 84)  BP: 148/88 (16 Dec 2023 21:05) (134/78 - 148/88)  BP(mean): --  RR: 18 (16 Dec 2023 21:05) (18 - 18)  SpO2: 94% (16 Dec 2023 21:05) (93% - 94%)    Parameters below as of 16 Dec 2023 21:05  Patient On (Oxygen Delivery Method): room air    PHYSICAL EXAM:  General: in no acute distress, cachectic  Eyes: EOMI intact bilaterally  HENT: moist mucous membranes  Neck: Trachea midline  Lungs: crackles bilaterally  Cardiovascular: irregular rhythm  Abdomen: Soft, non-tender non-distended  Extremities: WWP  Neurological: AO3, neurologically intact  Skin: Warm and dry, no pitting edema    LABS:                        10.4   7.24  )-----------( 193      ( 16 Dec 2023 07:42 )             32.6     12-16    138  |  102  |  28<H>  ----------------------------<  102<H>  3.5   |  28  |  0.74    Ca    8.6      16 Dec 2023 07:42  Phos  2.5     12-16  Mg     1.8     12-16        Urinalysis Basic - ( 16 Dec 2023 07:42 )    Color: x / Appearance: x / SG: x / pH: x  Gluc: 102 mg/dL / Ketone: x  / Bili: x / Urobili: x   Blood: x / Protein: x / Nitrite: x   Leuk Esterase: x / RBC: x / WBC x   Sq Epi: x / Non Sq Epi: x / Bacteria: x

## 2023-12-17 NOTE — PROGRESS NOTE ADULT - PROBLEM SELECTOR PLAN 9
F: s/p NS 500cc x1  E: replete as needed  N: regular diet  GI: none   DVT: SCD  Code: Full with trial of intubation   Dispo: [facility] F: s/p NS 500cc x1  E: replete as needed  N: regular diet  GI: none   DVT: SCD  Code: Full with trial of intubation   Dispo: [facility]

## 2023-12-17 NOTE — PROGRESS NOTE ADULT - ATTENDING COMMENTS
Patient seen and examined.  Agree with resident note as above.  Meds, labs and vitals all reviewed.  Patient with chronic bronchiectasis, respiratory failure.   Currently being treated with broad spectrum antibiotics and C. Dif prophylaxis with PO Vanco.  Awaiting pulmonary re-evaluation to assist with de-escalation of regimen, prior to discharge.  Patient ambulating, intermittently needing O2.  No skilled needs as per PT.

## 2023-12-18 LAB
ANION GAP SERPL CALC-SCNC: 6 MMOL/L — SIGNIFICANT CHANGE UP (ref 5–17)
ANION GAP SERPL CALC-SCNC: 6 MMOL/L — SIGNIFICANT CHANGE UP (ref 5–17)
BASE EXCESS BLDA CALC-SCNC: 2.5 MMOL/L — SIGNIFICANT CHANGE UP (ref -2–3)
BASE EXCESS BLDA CALC-SCNC: 2.5 MMOL/L — SIGNIFICANT CHANGE UP (ref -2–3)
BASOPHILS # BLD AUTO: 0.04 K/UL — SIGNIFICANT CHANGE UP (ref 0–0.2)
BASOPHILS # BLD AUTO: 0.04 K/UL — SIGNIFICANT CHANGE UP (ref 0–0.2)
BASOPHILS NFR BLD AUTO: 0.8 % — SIGNIFICANT CHANGE UP (ref 0–2)
BASOPHILS NFR BLD AUTO: 0.8 % — SIGNIFICANT CHANGE UP (ref 0–2)
BUN SERPL-MCNC: 17 MG/DL — SIGNIFICANT CHANGE UP (ref 7–23)
BUN SERPL-MCNC: 17 MG/DL — SIGNIFICANT CHANGE UP (ref 7–23)
CALCIUM SERPL-MCNC: 8.3 MG/DL — LOW (ref 8.4–10.5)
CALCIUM SERPL-MCNC: 8.3 MG/DL — LOW (ref 8.4–10.5)
CHLORIDE SERPL-SCNC: 102 MMOL/L — SIGNIFICANT CHANGE UP (ref 96–108)
CHLORIDE SERPL-SCNC: 102 MMOL/L — SIGNIFICANT CHANGE UP (ref 96–108)
CO2 BLDA-SCNC: 29 MMOL/L — HIGH (ref 19–24)
CO2 BLDA-SCNC: 29 MMOL/L — HIGH (ref 19–24)
CO2 SERPL-SCNC: 26 MMOL/L — SIGNIFICANT CHANGE UP (ref 22–31)
CO2 SERPL-SCNC: 26 MMOL/L — SIGNIFICANT CHANGE UP (ref 22–31)
CREAT SERPL-MCNC: 0.77 MG/DL — SIGNIFICANT CHANGE UP (ref 0.5–1.3)
CREAT SERPL-MCNC: 0.77 MG/DL — SIGNIFICANT CHANGE UP (ref 0.5–1.3)
EGFR: 77 ML/MIN/1.73M2 — SIGNIFICANT CHANGE UP
EGFR: 77 ML/MIN/1.73M2 — SIGNIFICANT CHANGE UP
EOSINOPHIL # BLD AUTO: 0.1 K/UL — SIGNIFICANT CHANGE UP (ref 0–0.5)
EOSINOPHIL # BLD AUTO: 0.1 K/UL — SIGNIFICANT CHANGE UP (ref 0–0.5)
EOSINOPHIL NFR BLD AUTO: 1.9 % — SIGNIFICANT CHANGE UP (ref 0–6)
EOSINOPHIL NFR BLD AUTO: 1.9 % — SIGNIFICANT CHANGE UP (ref 0–6)
GLUCOSE SERPL-MCNC: 109 MG/DL — HIGH (ref 70–99)
GLUCOSE SERPL-MCNC: 109 MG/DL — HIGH (ref 70–99)
HCO3 BLDA-SCNC: 28 MMOL/L — SIGNIFICANT CHANGE UP (ref 21–28)
HCO3 BLDA-SCNC: 28 MMOL/L — SIGNIFICANT CHANGE UP (ref 21–28)
HCT VFR BLD CALC: 30.9 % — LOW (ref 34.5–45)
HCT VFR BLD CALC: 30.9 % — LOW (ref 34.5–45)
HGB BLD-MCNC: 9.7 G/DL — LOW (ref 11.5–15.5)
HGB BLD-MCNC: 9.7 G/DL — LOW (ref 11.5–15.5)
IMM GRANULOCYTES NFR BLD AUTO: 0.8 % — SIGNIFICANT CHANGE UP (ref 0–0.9)
IMM GRANULOCYTES NFR BLD AUTO: 0.8 % — SIGNIFICANT CHANGE UP (ref 0–0.9)
LYMPHOCYTES # BLD AUTO: 1.4 K/UL — SIGNIFICANT CHANGE UP (ref 1–3.3)
LYMPHOCYTES # BLD AUTO: 1.4 K/UL — SIGNIFICANT CHANGE UP (ref 1–3.3)
LYMPHOCYTES # BLD AUTO: 26.3 % — SIGNIFICANT CHANGE UP (ref 13–44)
LYMPHOCYTES # BLD AUTO: 26.3 % — SIGNIFICANT CHANGE UP (ref 13–44)
MAGNESIUM SERPL-MCNC: 2.2 MG/DL — SIGNIFICANT CHANGE UP (ref 1.6–2.6)
MAGNESIUM SERPL-MCNC: 2.2 MG/DL — SIGNIFICANT CHANGE UP (ref 1.6–2.6)
MCHC RBC-ENTMCNC: 31.4 GM/DL — LOW (ref 32–36)
MCHC RBC-ENTMCNC: 31.4 GM/DL — LOW (ref 32–36)
MCHC RBC-ENTMCNC: 31.5 PG — SIGNIFICANT CHANGE UP (ref 27–34)
MCHC RBC-ENTMCNC: 31.5 PG — SIGNIFICANT CHANGE UP (ref 27–34)
MCV RBC AUTO: 100.3 FL — HIGH (ref 80–100)
MCV RBC AUTO: 100.3 FL — HIGH (ref 80–100)
MONOCYTES # BLD AUTO: 0.69 K/UL — SIGNIFICANT CHANGE UP (ref 0–0.9)
MONOCYTES # BLD AUTO: 0.69 K/UL — SIGNIFICANT CHANGE UP (ref 0–0.9)
MONOCYTES NFR BLD AUTO: 13 % — SIGNIFICANT CHANGE UP (ref 2–14)
MONOCYTES NFR BLD AUTO: 13 % — SIGNIFICANT CHANGE UP (ref 2–14)
NEUTROPHILS # BLD AUTO: 3.05 K/UL — SIGNIFICANT CHANGE UP (ref 1.8–7.4)
NEUTROPHILS # BLD AUTO: 3.05 K/UL — SIGNIFICANT CHANGE UP (ref 1.8–7.4)
NEUTROPHILS NFR BLD AUTO: 57.2 % — SIGNIFICANT CHANGE UP (ref 43–77)
NEUTROPHILS NFR BLD AUTO: 57.2 % — SIGNIFICANT CHANGE UP (ref 43–77)
NRBC # BLD: 0 /100 WBCS — SIGNIFICANT CHANGE UP (ref 0–0)
NRBC # BLD: 0 /100 WBCS — SIGNIFICANT CHANGE UP (ref 0–0)
PCO2 BLDA: 45 MMHG — SIGNIFICANT CHANGE UP (ref 32–45)
PCO2 BLDA: 45 MMHG — SIGNIFICANT CHANGE UP (ref 32–45)
PH BLDA: 7.4 — SIGNIFICANT CHANGE UP (ref 7.35–7.45)
PH BLDA: 7.4 — SIGNIFICANT CHANGE UP (ref 7.35–7.45)
PHOSPHATE SERPL-MCNC: 3.1 MG/DL — SIGNIFICANT CHANGE UP (ref 2.5–4.5)
PHOSPHATE SERPL-MCNC: 3.1 MG/DL — SIGNIFICANT CHANGE UP (ref 2.5–4.5)
PLATELET # BLD AUTO: 192 K/UL — SIGNIFICANT CHANGE UP (ref 150–400)
PLATELET # BLD AUTO: 192 K/UL — SIGNIFICANT CHANGE UP (ref 150–400)
PO2 BLDA: 94 MMHG — SIGNIFICANT CHANGE UP (ref 83–108)
PO2 BLDA: 94 MMHG — SIGNIFICANT CHANGE UP (ref 83–108)
POTASSIUM SERPL-MCNC: 3.9 MMOL/L — SIGNIFICANT CHANGE UP (ref 3.5–5.3)
POTASSIUM SERPL-MCNC: 3.9 MMOL/L — SIGNIFICANT CHANGE UP (ref 3.5–5.3)
POTASSIUM SERPL-SCNC: 3.9 MMOL/L — SIGNIFICANT CHANGE UP (ref 3.5–5.3)
POTASSIUM SERPL-SCNC: 3.9 MMOL/L — SIGNIFICANT CHANGE UP (ref 3.5–5.3)
RBC # BLD: 3.08 M/UL — LOW (ref 3.8–5.2)
RBC # BLD: 3.08 M/UL — LOW (ref 3.8–5.2)
RBC # FLD: 12.6 % — SIGNIFICANT CHANGE UP (ref 10.3–14.5)
RBC # FLD: 12.6 % — SIGNIFICANT CHANGE UP (ref 10.3–14.5)
SAO2 % BLDA: 98.2 % — HIGH (ref 94–98)
SAO2 % BLDA: 98.2 % — HIGH (ref 94–98)
SODIUM SERPL-SCNC: 134 MMOL/L — LOW (ref 135–145)
SODIUM SERPL-SCNC: 134 MMOL/L — LOW (ref 135–145)
WBC # BLD: 5.32 K/UL — SIGNIFICANT CHANGE UP (ref 3.8–10.5)
WBC # BLD: 5.32 K/UL — SIGNIFICANT CHANGE UP (ref 3.8–10.5)
WBC # FLD AUTO: 5.32 K/UL — SIGNIFICANT CHANGE UP (ref 3.8–10.5)
WBC # FLD AUTO: 5.32 K/UL — SIGNIFICANT CHANGE UP (ref 3.8–10.5)

## 2023-12-18 PROCEDURE — 99233 SBSQ HOSP IP/OBS HIGH 50: CPT | Mod: GC

## 2023-12-18 PROCEDURE — 99223 1ST HOSP IP/OBS HIGH 75: CPT | Mod: GC

## 2023-12-18 RX ORDER — SODIUM CHLORIDE 9 MG/ML
4 INJECTION INTRAMUSCULAR; INTRAVENOUS; SUBCUTANEOUS EVERY 4 HOURS
Refills: 0 | Status: DISCONTINUED | OUTPATIENT
Start: 2023-12-18 | End: 2023-12-19

## 2023-12-18 RX ORDER — LANOLIN ALCOHOL/MO/W.PET/CERES
1 CREAM (GRAM) TOPICAL ONCE
Refills: 0 | Status: DISCONTINUED | OUTPATIENT
Start: 2023-12-18 | End: 2023-12-18

## 2023-12-18 RX ORDER — SODIUM CHLORIDE 9 MG/ML
4 INJECTION INTRAMUSCULAR; INTRAVENOUS; SUBCUTANEOUS EVERY 4 HOURS
Refills: 0 | Status: DISCONTINUED | OUTPATIENT
Start: 2023-12-18 | End: 2023-12-18

## 2023-12-18 RX ORDER — LANOLIN ALCOHOL/MO/W.PET/CERES
2 CREAM (GRAM) TOPICAL ONCE
Refills: 0 | Status: COMPLETED | OUTPATIENT
Start: 2023-12-18 | End: 2023-12-18

## 2023-12-18 RX ADMIN — LIDOCAINE 1 PATCH: 4 CREAM TOPICAL at 07:01

## 2023-12-18 RX ADMIN — SODIUM CHLORIDE 4 MILLILITER(S): 9 INJECTION INTRAMUSCULAR; INTRAVENOUS; SUBCUTANEOUS at 16:36

## 2023-12-18 RX ADMIN — Medication 0.25 MILLIGRAM(S): at 11:22

## 2023-12-18 RX ADMIN — DONEPEZIL HYDROCHLORIDE 5 MILLIGRAM(S): 10 TABLET, FILM COATED ORAL at 22:07

## 2023-12-18 RX ADMIN — Medication 2 MILLIGRAM(S): at 23:31

## 2023-12-18 RX ADMIN — LIDOCAINE 1 PATCH: 4 CREAM TOPICAL at 00:06

## 2023-12-18 RX ADMIN — GABAPENTIN 200 MILLIGRAM(S): 400 CAPSULE ORAL at 06:49

## 2023-12-18 RX ADMIN — PIPERACILLIN AND TAZOBACTAM 25 GRAM(S): 4; .5 INJECTION, POWDER, LYOPHILIZED, FOR SOLUTION INTRAVENOUS at 02:07

## 2023-12-18 RX ADMIN — GABAPENTIN 200 MILLIGRAM(S): 400 CAPSULE ORAL at 22:08

## 2023-12-18 RX ADMIN — MONTELUKAST 10 MILLIGRAM(S): 4 TABLET, CHEWABLE ORAL at 22:07

## 2023-12-18 RX ADMIN — Medication 2 MILLIGRAM(S): at 00:09

## 2023-12-18 RX ADMIN — Medication 25 MILLIGRAM(S): at 17:45

## 2023-12-18 RX ADMIN — GABAPENTIN 200 MILLIGRAM(S): 400 CAPSULE ORAL at 13:09

## 2023-12-18 RX ADMIN — SODIUM CHLORIDE 3 MILLILITER(S): 9 INJECTION INTRAMUSCULAR; INTRAVENOUS; SUBCUTANEOUS at 11:22

## 2023-12-18 RX ADMIN — PIPERACILLIN AND TAZOBACTAM 25 GRAM(S): 4; .5 INJECTION, POWDER, LYOPHILIZED, FOR SOLUTION INTRAVENOUS at 17:46

## 2023-12-18 RX ADMIN — AMLODIPINE BESYLATE 5 MILLIGRAM(S): 2.5 TABLET ORAL at 06:49

## 2023-12-18 RX ADMIN — Medication 25 MILLIGRAM(S): at 06:48

## 2023-12-18 RX ADMIN — PIPERACILLIN AND TAZOBACTAM 25 GRAM(S): 4; .5 INJECTION, POWDER, LYOPHILIZED, FOR SOLUTION INTRAVENOUS at 09:17

## 2023-12-18 RX ADMIN — Medication 125 MILLIGRAM(S): at 09:18

## 2023-12-18 NOTE — PROGRESS NOTE ADULT - ASSESSMENT
84yo f, h/o AF (not on AC due to SAH), HTN, Lewy body dementia (baseline a/o x3), posterior reversible encephalopathy syndrome, chronic bronchiectasis on home O2 prn when SaO2 is < 88%, dCHF, C. diff, who was bib daughter for weakness, difficulty getting up from ground, garbled speech noted around 3:30a today. Pulmonology consulted for acute hypoxic respiratory failure with a history of bronchiectasis.    #Acute hypoxic respiratory failure  #Bronchiectasis exacerbation  #Pulmonary insufficiency    Pt is saturating well on 2 L NC. WBC 10.55, afebrile. Pt has cough with sputum production but unclear if it is changed from baseline. Got levalbuterol x 1. CTA shows no PE but has some mucoid impaction and new consolidations in RUL. CT head stroke protocol showed no acute infarcts. Given lethargy and weakness over past few days with hypoxia today and negative CT head, suspect that she has toxic metabolic encephalopathy secondary to infectious process, either viral or bacterial PNA suspected. RVP negative. Pt has one sputum culture from 2019 that grew pseudomonas, but otherwise has no sputum cultures in the system. She has been admitted for multiple "exacerbations" but it is unclear if these were actual exacerbations with worsening sputum production. She had completed a course of Zosyn, cipro, and bactrim in August 2023 and then had IV cefepime at home via a PICC line for two weeks in September 2023. She takes inhaled tobramycin two weeks on/two weeks off for unclear reasons (normally 4 weeks on, 4 weeks off). Suspect that this is poorly managed bronchiectasis rather than continuing exacerbations. Unclear if pt will follow a more aggressive regimen, but recommendations below will reflect an appropriate regimen.    Recommendations:  -c/w Symbicort BID  -c/w Xopenex PRN  -Aerobika BID  -chest PT  -send sputum culture  -would de-escalate abx to Cipro (culture in 2019 showed sensitivity to Levaquin and Cipro but pt is allergic to levaquin)  -pt should be on inhaled Tobramycin 4 weeks on, 4 weeks off  NOTE INCOMPLETE UNTIL DISCUSSED WITH ATTENDING 82yo f, h/o AF (not on AC due to SAH), HTN, Lewy body dementia (baseline a/o x3), posterior reversible encephalopathy syndrome, chronic bronchiectasis on home O2 prn when SaO2 is < 88%, dCHF, C. diff, who was bib daughter for weakness, difficulty getting up from ground, garbled speech noted around 3:30a today. Pulmonology consulted for acute hypoxic respiratory failure with a history of bronchiectasis.    #Acute hypoxic respiratory failure  #Bronchiectasis exacerbation  #Pulmonary insufficiency    Pt is saturating well on 2 L NC. WBC 10.55, afebrile. Pt has cough with sputum production but unclear if it is changed from baseline. Got levalbuterol x 1. CTA shows no PE but has some mucoid impaction and new consolidations in RUL. CT head stroke protocol showed no acute infarcts. Given lethargy and weakness over past few days with hypoxia today and negative CT head, suspect that she has toxic metabolic encephalopathy secondary to infectious process, either viral or bacterial PNA suspected. RVP negative. Pt has one sputum culture from 2019 that grew pseudomonas, but otherwise has no sputum cultures in the system. She has been admitted for multiple "exacerbations" but it is unclear if these were actual exacerbations with worsening sputum production. She had completed a course of Zosyn, cipro, and bactrim in August 2023 and then had IV cefepime at home via a PICC line for two weeks in September 2023. She takes inhaled tobramycin two weeks on/two weeks off for unclear reasons (normally 4 weeks on, 4 weeks off). Suspect that this is poorly managed bronchiectasis rather than continuing exacerbations. Unclear if pt will follow a more aggressive regimen, but recommendations below will reflect an appropriate regimen.    Recommendations:  -c/w Symbicort BID  -c/w Xopenex PRN  -Aerobika BID  -chest PT  -send sputum culture  -would de-escalate abx to Cipro (culture in 2019 showed sensitivity to Levaquin and Cipro but pt is allergic to levaquin)  -pt should be on inhaled Tobramycin 4 weeks on, 4 weeks off  NOTE INCOMPLETE UNTIL DISCUSSED WITH ATTENDING 82yo f, h/o AF (not on AC due to SAH), HTN, Lewy body dementia (baseline a/o x3), posterior reversible encephalopathy syndrome, chronic bronchiectasis on home O2 prn when SaO2 is < 88%, dCHF, C. diff, who was bib daughter for weakness, difficulty getting up from ground, garbled speech noted around 3:30a today. Pulmonology consulted for acute hypoxic respiratory failure with a history of bronchiectasis.    #Acute hypoxic respiratory failure  #Bronchiectasis exacerbation  #Pulmonary insufficiency    Pt is saturating well on 2 L NC. WBC 10.55, afebrile. Pt has cough with sputum production but unclear if it is changed from baseline. Got levalbuterol x 1. CTA shows no PE but has some mucoid impaction and new consolidations in RUL. CT head stroke protocol showed no acute infarcts. Given lethargy and weakness over past few days with hypoxia today and negative CT head, suspect that she has toxic metabolic encephalopathy secondary to infectious process, either viral or bacterial PNA suspected. RVP negative. Pt has one sputum culture from 2019 that grew pseudomonas, but otherwise has no sputum cultures in the system. She has been admitted for multiple "exacerbations" but it is unclear if these were actual exacerbations with worsening sputum production. She had completed a course of Zosyn, cipro, and bactrim in August 2023 and then had IV cefepime at home via a PICC line for two weeks in September 2023. She takes inhaled tobramycin two weeks on/two weeks off for unclear reasons (normally 4 weeks on, 4 weeks off). Suspect that this is poorly managed bronchiectasis rather than continuing exacerbations. Unclear if pt will follow a more aggressive regimen, but recommendations below will reflect an appropriate regimen.    Recommendations:  -c/w Symbicort BID  -c/w Xopenex PRN  -Aerobika BID  -chest PT  -send sputum culture  -would de-escalate abx to Cipro (culture in 2019 showed sensitivity to Levaquin and Cipro but pt is allergic to levaquin)     84yo f, h/o AF (not on AC due to SAH), HTN, Lewy body dementia (baseline a/o x3), posterior reversible encephalopathy syndrome, chronic bronchiectasis on home O2 prn when SaO2 is < 88%, dCHF, C. diff, who was bib daughter for weakness, difficulty getting up from ground, garbled speech noted around 3:30a today. Pulmonology consulted for acute hypoxic respiratory failure with a history of bronchiectasis.    #Acute hypoxic respiratory failure  #Bronchiectasis exacerbation  #Pulmonary insufficiency    Pt is saturating well on 2 L NC. WBC 10.55, afebrile. Pt has cough with sputum production but unclear if it is changed from baseline. Got levalbuterol x 1. CTA shows no PE but has some mucoid impaction and new consolidations in RUL. CT head stroke protocol showed no acute infarcts. Given lethargy and weakness over past few days with hypoxia today and negative CT head, suspect that she has toxic metabolic encephalopathy secondary to infectious process, either viral or bacterial PNA suspected. RVP negative. Pt has one sputum culture from 2019 that grew pseudomonas, but otherwise has no sputum cultures in the system. She has been admitted for multiple "exacerbations" but it is unclear if these were actual exacerbations with worsening sputum production. She had completed a course of Zosyn, cipro, and bactrim in August 2023 and then had IV cefepime at home via a PICC line for two weeks in September 2023. She takes inhaled tobramycin two weeks on/two weeks off for unclear reasons (normally 4 weeks on, 4 weeks off). Suspect that this is poorly managed bronchiectasis rather than continuing exacerbations. Unclear if pt will follow a more aggressive regimen, but recommendations below will reflect an appropriate regimen.    Recommendations:  -would stop singulair, there is no role for singulair in the treatment of bronchiectasis  -Airway clearance to induce sputum: Duonebs followed by hypertonic saline (3% or 7%, whichever she can tolerate), followed by Aerobika q4h  -chest PT  -send sputum culture, difficult to know what is being treated without any recent cultures  -would de-escalate abx to Cipro (culture in 2019 showed sensitivity to Levaquin and Cipro but pt is allergic to levaquin)  -room air ABG shows a pCO2 of 45 and pH of 7.4, indicating chronic hypoventilation - encourage deep breaths and would stay away from sedating agents/polypharmacy (pt is on gabapentin and clonazepam)  -Inhaled tobramycin to be managed by outpatient pulmonologist    Case s/e/d with Dr. Guaman 84yo f, h/o AF (not on AC due to SAH), HTN, Lewy body dementia (baseline a/o x3), posterior reversible encephalopathy syndrome, chronic bronchiectasis on home O2 prn when SaO2 is < 88%, dCHF, C. diff, who was bib daughter for weakness, difficulty getting up from ground, garbled speech noted around 3:30a today. Pulmonology consulted for acute hypoxic respiratory failure with a history of bronchiectasis.    #Acute hypoxic respiratory failure  #Bronchiectasis exacerbation  #Pulmonary insufficiency    Pt is saturating well on 2 L NC. WBC 10.55, afebrile. Pt has cough with sputum production but unclear if it is changed from baseline. Got levalbuterol x 1. CTA shows no PE but has some mucoid impaction and new consolidations in RUL. CT head stroke protocol showed no acute infarcts. Given lethargy and weakness over past few days with hypoxia today and negative CT head, suspect that she has toxic metabolic encephalopathy secondary to infectious process, either viral or bacterial PNA suspected. RVP negative. Pt has one sputum culture from 2019 that grew pseudomonas, but otherwise has no sputum cultures in the system. She has been admitted for multiple "exacerbations" but it is unclear if these were actual exacerbations with worsening sputum production. She had completed a course of Zosyn, cipro, and bactrim in August 2023 and then had IV cefepime at home via a PICC line for two weeks in September 2023. She takes inhaled tobramycin two weeks on/two weeks off for unclear reasons (normally 4 weeks on, 4 weeks off). Suspect that this is poorly managed bronchiectasis rather than continuing exacerbations. Unclear if pt will follow a more aggressive regimen, but recommendations below will reflect an appropriate regimen.    Recommendations:  -would stop singulair, there is no role for singulair in the treatment of bronchiectasis  -Airway clearance to induce sputum: Duonebs followed by hypertonic saline (3% or 7%, whichever she can tolerate), followed by Aerobika q4h  -chest PT  -send sputum culture, difficult to know what is being treated without any recent cultures  -room air ABG shows a pCO2 of 45 and pH of 7.4, indicating chronic hypoventilation - encourage deep breaths and would stay away from sedating agents/polypharmacy (pt is on gabapentin and clonazepam)  -Inhaled tobramycin to be managed by outpatient pulmonologist    Case s/e/d with Dr. Guaman

## 2023-12-18 NOTE — PROGRESS NOTE ADULT - SUBJECTIVE AND OBJECTIVE BOX
*****INCOMPLETE NOTE*****    INTERVAL HPI/OVERNIGHT EVENTS:    SUBJECTIVE: Patient seen and examined at bedside, resting comfortably in bed, and does not appear to be in any acute distress. Patient states  Patient denies any recent fevers, chills, nausea, vomiting, headache, acute sob, chest pain, abdominal pain, genitourinary sx, extremity pain or swelling.     ANTIBIOTICS/RELEVANT:    MEDICATIONS  (STANDING):  albuterol/ipratropium for Nebulization 3 milliLiter(s) Nebulizer every 12 hours  amLODIPine   Tablet 5 milliGRAM(s) Oral daily  budesonide  80 MICROgram(s)/formoterol 4.5 MICROgram(s) Inhaler 2 Puff(s) Inhalation two times a day  clonazePAM Oral Disintegrating Tablet 0.25 milliGRAM(s) Oral every 24 hours  donepezil 5 milliGRAM(s) Oral at bedtime  gabapentin 200 milliGRAM(s) Oral every 8 hours  influenza  Vaccine (HIGH DOSE) 0.7 milliLiter(s) IntraMuscular once  lidocaine   4% Patch 1 Patch Transdermal every 24 hours  metoprolol tartrate 25 milliGRAM(s) Oral two times a day  montelukast 10 milliGRAM(s) Oral at bedtime  piperacillin/tazobactam IVPB.. 4.5 Gram(s) IV Intermittent every 8 hours  potassium chloride    Tablet ER 20 milliEquivalent(s) Oral once  sodium chloride 0.9% for Nebulization 3 milliLiter(s) Nebulizer daily  vancomycin    Solution 125 milliGRAM(s) Oral every 24 hours    MEDICATIONS  (PRN):      Vital Signs Last 24 Hrs  T(C): 36.9 (18 Dec 2023 06:03), Max: 37.7 (17 Dec 2023 12:02)  T(F): 98.5 (18 Dec 2023 06:03), Max: 99.8 (17 Dec 2023 12:02)  HR: 86 (18 Dec 2023 06:03) (74 - 86)  BP: 128/69 (18 Dec 2023 06:03) (108/62 - 148/74)  BP(mean): --  RR: 17 (18 Dec 2023 06:03) (16 - 18)  SpO2: 98% (18 Dec 2023 06:03) (94% - 98%)    Parameters below as of 18 Dec 2023 06:03  Patient On (Oxygen Delivery Method): nasal cannula        PHYSICAL EXAM:  General: in no acute distress  Eyes: EOMI intact bilaterally. Anicteric sclerae, moist conjunctivae  HENT: Moist mucous membranes  Neck: Trachea midline, supple  Lungs: CTA B/L. No wheezes, rales, or rhonchi  Cardiovascular: RRR. No murmurs, rubs, or gallops  Abdomen: Soft, non-tender non-distended; No rebound or guarding  Extremities: WWP, No clubbing, cyanosis or edema  Neurological: Alert and oriented  Skin: Warm and dry. No obvious rash     LABS:                        9.7    5.70  )-----------( 181      ( 17 Dec 2023 05:30 )             30.0     12-17    133<L>  |  99  |  19  ----------------------------<  100<H>  3.9   |  27  |  0.77    Ca    8.4      17 Dec 2023 05:30  Phos  4.0     12-17  Mg     1.7     12-17    TPro  6.5  /  Alb  3.1<L>  /  TBili  0.6  /  DBili  x   /  AST  19  /  ALT  17  /  AlkPhos  104  12-17      Urinalysis Basic - ( 17 Dec 2023 05:30 )    Color: x / Appearance: x / SG: x / pH: x  Gluc: 100 mg/dL / Ketone: x  / Bili: x / Urobili: x   Blood: x / Protein: x / Nitrite: x   Leuk Esterase: x / RBC: x / WBC x   Sq Epi: x / Non Sq Epi: x / Bacteria: x        MICROBIOLOGY:    RADIOLOGY & ADDITIONAL STUDIES: INTERVAL HPI/OVERNIGHT EVENTS: steph    SUBJECTIVE: Patient seen and examined at bedside, resting comfortably in bed, and does not appear to be in any acute distress. Patient denies any shortness of breath, or chest pain.    MEDICATIONS  (STANDING):  albuterol/ipratropium for Nebulization 3 milliLiter(s) Nebulizer every 12 hours  amLODIPine   Tablet 5 milliGRAM(s) Oral daily  budesonide  80 MICROgram(s)/formoterol 4.5 MICROgram(s) Inhaler 2 Puff(s) Inhalation two times a day  clonazePAM Oral Disintegrating Tablet 0.25 milliGRAM(s) Oral every 24 hours  donepezil 5 milliGRAM(s) Oral at bedtime  gabapentin 200 milliGRAM(s) Oral every 8 hours  influenza  Vaccine (HIGH DOSE) 0.7 milliLiter(s) IntraMuscular once  lidocaine   4% Patch 1 Patch Transdermal every 24 hours  metoprolol tartrate 25 milliGRAM(s) Oral two times a day  montelukast 10 milliGRAM(s) Oral at bedtime  piperacillin/tazobactam IVPB.. 4.5 Gram(s) IV Intermittent every 8 hours  potassium chloride    Tablet ER 20 milliEquivalent(s) Oral once  sodium chloride 0.9% for Nebulization 3 milliLiter(s) Nebulizer daily  vancomycin    Solution 125 milliGRAM(s) Oral every 24 hours    MEDICATIONS  (PRN):      Vital Signs Last 24 Hrs  T(C): 36.9 (18 Dec 2023 06:03), Max: 37.7 (17 Dec 2023 12:02)  T(F): 98.5 (18 Dec 2023 06:03), Max: 99.8 (17 Dec 2023 12:02)  HR: 86 (18 Dec 2023 06:03) (74 - 86)  BP: 128/69 (18 Dec 2023 06:03) (108/62 - 148/74)  BP(mean): --  RR: 17 (18 Dec 2023 06:03) (16 - 18)  SpO2: 98% (18 Dec 2023 06:03) (94% - 98%)    Parameters below as of 18 Dec 2023 06:03  Patient On (Oxygen Delivery Method): nasal cannula    PHYSICAL EXAM:  General: in no acute distress, cachectic  Eyes: EOMI intact bilaterally  HENT: Moist mucous membranes  Neck: Trachea midline  Lungs: bilateral crackles with egophony of the right lung  Cardiovascular: irregular  Abdomen: Soft, non-tender non-distended  Extremities: WWP  Neurological: Alert and oriented  Skin: Warm and dry    LABS:                        9.7    5.70  )-----------( 181      ( 17 Dec 2023 05:30 )             30.0     12-17    133<L>  |  99  |  19  ----------------------------<  100<H>  3.9   |  27  |  0.77    Ca    8.4      17 Dec 2023 05:30  Phos  4.0     12-17  Mg     1.7     12-17    TPro  6.5  /  Alb  3.1<L>  /  TBili  0.6  /  DBili  x   /  AST  19  /  ALT  17  /  AlkPhos  104  12-17      Urinalysis Basic - ( 17 Dec 2023 05:30 )    Color: x / Appearance: x / SG: x / pH: x  Gluc: 100 mg/dL / Ketone: x  / Bili: x / Urobili: x   Blood: x / Protein: x / Nitrite: x   Leuk Esterase: x / RBC: x / WBC x   Sq Epi: x / Non Sq Epi: x / Bacteria: x

## 2023-12-18 NOTE — PROGRESS NOTE ADULT - ATTENDING COMMENTS
Unclear if this is truly a bronchiectasis exacerbation. Patient is not on pulmonary clearance at home and this is most likely increased mucus production due to lack of pulmonary toilet. Should be on hypertonic saline, Aerobika, and percussion therapy. Doubt there is utility for antibiotics as no clear signs of an exacerbation. Should obtain sputum culture. ICS/LABA may be exacerbating cough; utility of BDs in bronchiectasis is unclear; monitor off. Would recommend avoiding polypharmacy with gabapentin and benzos given encephalopathic type episodes. Unclear if this is truly a bronchiectasis exacerbation. Patient is not on pulmonary clearance at home and this is most likely increased mucus production due to lack of pulmonary toilet. Should be on hypertonic saline, Aerobika, and percussion therapy. Doubt there is utility for antibiotics as no clear signs of an exacerbation. Should obtain sputum culture. ICS/LABA may be exacerbating cough; utility of BDs in bronchiectasis is unclear; monitor off. Would recommend avoiding polypharmacy with gabapentin and benzos given encephalopathic type episodes which may be hypoxemia driven. This is supported by a lack of A-a gradient on room air ABG pointing to central hypoventilation which can be worsened by agents that lead to respiratory depression.

## 2023-12-18 NOTE — PROGRESS NOTE ADULT - SUBJECTIVE AND OBJECTIVE BOX
PULMONARY CONSULT SERVICE FOLLOW-UP NOTE    INTERVAL HPI:  Reviewed chart and overnight events; patient seen and examined at bedside. Pt resting comfortably, saturating 98% on 2 L NC. Continues on zosyn, still pending sputum culture.    MEDICATIONS:  Pulmonary:  albuterol/ipratropium for Nebulization 3 milliLiter(s) Nebulizer every 12 hours  budesonide  80 MICROgram(s)/formoterol 4.5 MICROgram(s) Inhaler 2 Puff(s) Inhalation two times a day  montelukast 10 milliGRAM(s) Oral at bedtime  sodium chloride 0.9% for Nebulization 3 milliLiter(s) Nebulizer daily    Antimicrobials:  piperacillin/tazobactam IVPB.. 4.5 Gram(s) IV Intermittent every 8 hours  vancomycin    Solution 125 milliGRAM(s) Oral every 24 hours    Anticoagulants:    Cardiac:  amLODIPine   Tablet 5 milliGRAM(s) Oral daily  metoprolol tartrate 25 milliGRAM(s) Oral two times a day      Allergies    IV Contrast (Unknown)  Levaquin (Swelling)  Augmentin (Short breath; Rash)  Ceclor (Rash)    Intolerances        Vital Signs Last 24 Hrs  T(C): 36.9 (18 Dec 2023 06:03), Max: 37.7 (17 Dec 2023 12:02)  T(F): 98.5 (18 Dec 2023 06:03), Max: 99.8 (17 Dec 2023 12:02)  HR: 86 (18 Dec 2023 06:03) (74 - 86)  BP: 128/69 (18 Dec 2023 06:03) (108/62 - 148/74)  BP(mean): --  RR: 17 (18 Dec 2023 06:03) (17 - 18)  SpO2: 98% (18 Dec 2023 06:03) (94% - 98%)    Parameters below as of 18 Dec 2023 06:03  Patient On (Oxygen Delivery Method): nasal cannula            PHYSICAL EXAM:  Constitutional: well-appearing  HEENT: NC/AT; PERRL, anicteric sclera; MMM  Neck: supple  Cardiovascular: +S1/S2, RRR  Respiratory: CTA B/L; no W/R/R  Gastrointestinal: soft, NT/ND  Extremities: WWP; no edema, clubbing or cyanosis  Vascular: 2+ radial pulses B/L  Neurological: awake and alert; KAHN    LABS:      CBC Full  -  ( 18 Dec 2023 05:30 )  WBC Count : 5.32 K/uL  RBC Count : 3.08 M/uL  Hemoglobin : 9.7 g/dL  Hematocrit : 30.9 %  Platelet Count - Automated : 192 K/uL  Mean Cell Volume : 100.3 fl  Mean Cell Hemoglobin : 31.5 pg  Mean Cell Hemoglobin Concentration : 31.4 gm/dL  Auto Neutrophil # : 3.05 K/uL  Auto Lymphocyte # : 1.40 K/uL  Auto Monocyte # : 0.69 K/uL  Auto Eosinophil # : 0.10 K/uL  Auto Basophil # : 0.04 K/uL  Auto Neutrophil % : 57.2 %  Auto Lymphocyte % : 26.3 %  Auto Monocyte % : 13.0 %  Auto Eosinophil % : 1.9 %  Auto Basophil % : 0.8 %    12-18    134<L>  |  102  |  17  ----------------------------<  109<H>  3.9   |  26  |  0.77    Ca    8.3<L>      18 Dec 2023 05:30  Phos  3.1     12-18  Mg     2.2     12-18    TPro  6.5  /  Alb  3.1<L>  /  TBili  0.6  /  DBili  x   /  AST  19  /  ALT  17  /  AlkPhos  104  12-17          Urinalysis Basic - ( 18 Dec 2023 05:30 )    Color: x / Appearance: x / SG: x / pH: x  Gluc: 109 mg/dL / Ketone: x  / Bili: x / Urobili: x   Blood: x / Protein: x / Nitrite: x   Leuk Esterase: x / RBC: x / WBC x   Sq Epi: x / Non Sq Epi: x / Bacteria: x                RADIOLOGY & ADDITIONAL STUDIES:

## 2023-12-18 NOTE — PROGRESS NOTE ADULT - PROBLEM SELECTOR PLAN 7
Diffuse LBP since May. Tried muscle relaxants (ineffective) and tramadol (caused hallucinations). Currently being managed with gabapentin 300 TID. See pain management OP; unclear etio but suspect shingles.     - c/w gabapentin 200 TID  - lidocaine patch to lower back  - hold off on Klonopin iso AMS; okay to resume when more awake (takes 0.25mg QID at home)  - avoid opioids and muscle relaxants Diffuse LBP since May. Tried muscle relaxants (ineffective) and tramadol (caused hallucinations). Currently being managed with gabapentin 300 TID. See pain management OP; unclear etio but suspect shingles.     - c/w gabapentin 200 TID  - lidocaine patch to lower back  - c/w Klonopin  0.25mg qD  - avoid opioids and muscle relaxants

## 2023-12-18 NOTE — PROGRESS NOTE ADULT - PROBLEM SELECTOR PLAN 9
F: s/p NS 500cc x1  E: replete as needed  N: regular diet  GI: none   DVT: SCD  Code: Full with trial of intubation   Dispo: [facility] F: s/p NS 500cc x1  E: replete as needed  N: regular diet  GI: none   DVT: SCD  Code: Full with trial of intubation   Dispo: [facility] F: s/p NS 500cc x1  E: replete as needed  N: regular diet  GI: none   DVT: SCD  Code: Full with trial of intubation   Dispo: home pt/ot

## 2023-12-18 NOTE — PROGRESS NOTE ADULT - ATTENDING COMMENTS
83-year-old female with a PMHx of Afib (not on AC 2/2 SAH), HTN, lewy-body dementia, PRES, chronic bronchiectasis (on home 02 PRN), HFpEF and Cdiff who presented with acute on chronic hypoxic respiratory failure.      #Acute on Chronic Hypoxic Respiratory Failure   #Chronic Bronchiectasis    -pulmonology consulted, appreciate recommendations – follow up additional recommendations and need for continued ABx upon discharge   -sputum Cx not collected, BCx NGTD   -continue with Symbicort and PRN Duonebs   -continue with Zosyn, not transitioned to FQ given questionable allergy history  -continue with PO Vancomycin as CDiff PPx for duration of ABx therapy plus an additional 1-week     DVT PPx: SCDs    Dispo: home

## 2023-12-18 NOTE — PROGRESS NOTE ADULT - PROBLEM SELECTOR PLAN 1
On arrival O2 sat 84% on RA, improved to 94% on 4L NC. Reportedly hypoxic to 70's at home PTA. Uses O2 at home PRN when <88%. Has been more lethargic x2 days, possibly d/t hypoxia which is likely 2/2 mucus plugging iso possible PNA vs ABPA. Pulmonary consulted in ED.   MRSA negative  RVP negative  - Pulmonary consulted, f/u recs  - c/w pseudomonal Zosyn  - symbicort 80/4.5  - duonebs BID with aerobika for airway clearance after each duoneb at bedside  - chest PT  - inhaled ns  - f/u sputum culture On arrival O2 sat 84% on RA, improved to 94% on 4L NC. Reportedly hypoxic to 70's at home PTA. Uses O2 at home PRN when <88%. Has been more lethargic x2 days, possibly d/t hypoxia which is likely 2/2 mucus plugging iso possible PNA vs ABPA. Pulmonary consulted in ED.   MRSA negative  RVP negative  - Pulmonary consulted, f/u recs  - c/w pseudomonal Zosyn  - hypersal -> duonebs ->chest pt-> aerobika  - symbicort 80/4.5  - duonebs BID with aerobika for airway clearance after each duoneb at bedside  - chest PT  - inhaled ns  - f/u sputum culture On arrival O2 sat 84% on RA, improved to 94% on 4L NC. Reportedly hypoxic to 70's at home PTA. Uses O2 at home PRN when <88%. Has been more lethargic x2 days, possibly d/t hypoxia which is likely 2/2 mucus plugging iso possible PNA vs ABPA. Pulmonary consulted in ED.   MRSA negative  RVP negative  - Pulmonary consulted, f/u recs  - c/w pseudomonal Zosyn  - duoneb-> hypersal->chest pt-> aerobika  - symbicort 80/4.5  - duonebs BID with aerobika for airway clearance after each duoneb at bedside  - chest PT  - inhaled ns  - f/u sputum culture

## 2023-12-19 VITALS
DIASTOLIC BLOOD PRESSURE: 70 MMHG | TEMPERATURE: 98 F | SYSTOLIC BLOOD PRESSURE: 108 MMHG | HEART RATE: 97 BPM | RESPIRATION RATE: 18 BRPM | OXYGEN SATURATION: 92 %

## 2023-12-19 LAB
ANION GAP SERPL CALC-SCNC: 8 MMOL/L — SIGNIFICANT CHANGE UP (ref 5–17)
ANION GAP SERPL CALC-SCNC: 8 MMOL/L — SIGNIFICANT CHANGE UP (ref 5–17)
BASOPHILS # BLD AUTO: 0.04 K/UL — SIGNIFICANT CHANGE UP (ref 0–0.2)
BASOPHILS # BLD AUTO: 0.04 K/UL — SIGNIFICANT CHANGE UP (ref 0–0.2)
BASOPHILS NFR BLD AUTO: 0.7 % — SIGNIFICANT CHANGE UP (ref 0–2)
BASOPHILS NFR BLD AUTO: 0.7 % — SIGNIFICANT CHANGE UP (ref 0–2)
BUN SERPL-MCNC: 15 MG/DL — SIGNIFICANT CHANGE UP (ref 7–23)
BUN SERPL-MCNC: 15 MG/DL — SIGNIFICANT CHANGE UP (ref 7–23)
CALCIUM SERPL-MCNC: 8.2 MG/DL — LOW (ref 8.4–10.5)
CALCIUM SERPL-MCNC: 8.2 MG/DL — LOW (ref 8.4–10.5)
CHLORIDE SERPL-SCNC: 104 MMOL/L — SIGNIFICANT CHANGE UP (ref 96–108)
CHLORIDE SERPL-SCNC: 104 MMOL/L — SIGNIFICANT CHANGE UP (ref 96–108)
CO2 SERPL-SCNC: 27 MMOL/L — SIGNIFICANT CHANGE UP (ref 22–31)
CO2 SERPL-SCNC: 27 MMOL/L — SIGNIFICANT CHANGE UP (ref 22–31)
CREAT SERPL-MCNC: 0.65 MG/DL — SIGNIFICANT CHANGE UP (ref 0.5–1.3)
CREAT SERPL-MCNC: 0.65 MG/DL — SIGNIFICANT CHANGE UP (ref 0.5–1.3)
CULTURE RESULTS: ABNORMAL
CULTURE RESULTS: ABNORMAL
EGFR: 88 ML/MIN/1.73M2 — SIGNIFICANT CHANGE UP
EGFR: 88 ML/MIN/1.73M2 — SIGNIFICANT CHANGE UP
EOSINOPHIL # BLD AUTO: 0.16 K/UL — SIGNIFICANT CHANGE UP (ref 0–0.5)
EOSINOPHIL # BLD AUTO: 0.16 K/UL — SIGNIFICANT CHANGE UP (ref 0–0.5)
EOSINOPHIL NFR BLD AUTO: 2.7 % — SIGNIFICANT CHANGE UP (ref 0–6)
EOSINOPHIL NFR BLD AUTO: 2.7 % — SIGNIFICANT CHANGE UP (ref 0–6)
GLUCOSE SERPL-MCNC: 118 MG/DL — HIGH (ref 70–99)
GLUCOSE SERPL-MCNC: 118 MG/DL — HIGH (ref 70–99)
GRAM STN FLD: ABNORMAL
GRAM STN FLD: ABNORMAL
HCT VFR BLD CALC: 31.9 % — LOW (ref 34.5–45)
HCT VFR BLD CALC: 31.9 % — LOW (ref 34.5–45)
HGB BLD-MCNC: 10.2 G/DL — LOW (ref 11.5–15.5)
HGB BLD-MCNC: 10.2 G/DL — LOW (ref 11.5–15.5)
IMM GRANULOCYTES NFR BLD AUTO: 0.2 % — SIGNIFICANT CHANGE UP (ref 0–0.9)
IMM GRANULOCYTES NFR BLD AUTO: 0.2 % — SIGNIFICANT CHANGE UP (ref 0–0.9)
LYMPHOCYTES # BLD AUTO: 1.42 K/UL — SIGNIFICANT CHANGE UP (ref 1–3.3)
LYMPHOCYTES # BLD AUTO: 1.42 K/UL — SIGNIFICANT CHANGE UP (ref 1–3.3)
LYMPHOCYTES # BLD AUTO: 24.4 % — SIGNIFICANT CHANGE UP (ref 13–44)
LYMPHOCYTES # BLD AUTO: 24.4 % — SIGNIFICANT CHANGE UP (ref 13–44)
MAGNESIUM SERPL-MCNC: 1.9 MG/DL — SIGNIFICANT CHANGE UP (ref 1.6–2.6)
MAGNESIUM SERPL-MCNC: 1.9 MG/DL — SIGNIFICANT CHANGE UP (ref 1.6–2.6)
MCHC RBC-ENTMCNC: 31.5 PG — SIGNIFICANT CHANGE UP (ref 27–34)
MCHC RBC-ENTMCNC: 31.5 PG — SIGNIFICANT CHANGE UP (ref 27–34)
MCHC RBC-ENTMCNC: 32 GM/DL — SIGNIFICANT CHANGE UP (ref 32–36)
MCHC RBC-ENTMCNC: 32 GM/DL — SIGNIFICANT CHANGE UP (ref 32–36)
MCV RBC AUTO: 98.5 FL — SIGNIFICANT CHANGE UP (ref 80–100)
MCV RBC AUTO: 98.5 FL — SIGNIFICANT CHANGE UP (ref 80–100)
MONOCYTES # BLD AUTO: 0.62 K/UL — SIGNIFICANT CHANGE UP (ref 0–0.9)
MONOCYTES # BLD AUTO: 0.62 K/UL — SIGNIFICANT CHANGE UP (ref 0–0.9)
MONOCYTES NFR BLD AUTO: 10.7 % — SIGNIFICANT CHANGE UP (ref 2–14)
MONOCYTES NFR BLD AUTO: 10.7 % — SIGNIFICANT CHANGE UP (ref 2–14)
NEUTROPHILS # BLD AUTO: 3.57 K/UL — SIGNIFICANT CHANGE UP (ref 1.8–7.4)
NEUTROPHILS # BLD AUTO: 3.57 K/UL — SIGNIFICANT CHANGE UP (ref 1.8–7.4)
NEUTROPHILS NFR BLD AUTO: 61.3 % — SIGNIFICANT CHANGE UP (ref 43–77)
NEUTROPHILS NFR BLD AUTO: 61.3 % — SIGNIFICANT CHANGE UP (ref 43–77)
NRBC # BLD: 0 /100 WBCS — SIGNIFICANT CHANGE UP (ref 0–0)
NRBC # BLD: 0 /100 WBCS — SIGNIFICANT CHANGE UP (ref 0–0)
PHOSPHATE SERPL-MCNC: 3.2 MG/DL — SIGNIFICANT CHANGE UP (ref 2.5–4.5)
PHOSPHATE SERPL-MCNC: 3.2 MG/DL — SIGNIFICANT CHANGE UP (ref 2.5–4.5)
PLATELET # BLD AUTO: 201 K/UL — SIGNIFICANT CHANGE UP (ref 150–400)
PLATELET # BLD AUTO: 201 K/UL — SIGNIFICANT CHANGE UP (ref 150–400)
POTASSIUM SERPL-MCNC: 3.8 MMOL/L — SIGNIFICANT CHANGE UP (ref 3.5–5.3)
POTASSIUM SERPL-MCNC: 3.8 MMOL/L — SIGNIFICANT CHANGE UP (ref 3.5–5.3)
POTASSIUM SERPL-SCNC: 3.8 MMOL/L — SIGNIFICANT CHANGE UP (ref 3.5–5.3)
POTASSIUM SERPL-SCNC: 3.8 MMOL/L — SIGNIFICANT CHANGE UP (ref 3.5–5.3)
RBC # BLD: 3.24 M/UL — LOW (ref 3.8–5.2)
RBC # BLD: 3.24 M/UL — LOW (ref 3.8–5.2)
RBC # FLD: 12.5 % — SIGNIFICANT CHANGE UP (ref 10.3–14.5)
RBC # FLD: 12.5 % — SIGNIFICANT CHANGE UP (ref 10.3–14.5)
SODIUM SERPL-SCNC: 139 MMOL/L — SIGNIFICANT CHANGE UP (ref 135–145)
SODIUM SERPL-SCNC: 139 MMOL/L — SIGNIFICANT CHANGE UP (ref 135–145)
SPECIMEN SOURCE: SIGNIFICANT CHANGE UP
SPECIMEN SOURCE: SIGNIFICANT CHANGE UP
WBC # BLD: 5.82 K/UL — SIGNIFICANT CHANGE UP (ref 3.8–10.5)
WBC # BLD: 5.82 K/UL — SIGNIFICANT CHANGE UP (ref 3.8–10.5)
WBC # FLD AUTO: 5.82 K/UL — SIGNIFICANT CHANGE UP (ref 3.8–10.5)
WBC # FLD AUTO: 5.82 K/UL — SIGNIFICANT CHANGE UP (ref 3.8–10.5)

## 2023-12-19 PROCEDURE — 99233 SBSQ HOSP IP/OBS HIGH 50: CPT | Mod: GC

## 2023-12-19 PROCEDURE — 84484 ASSAY OF TROPONIN QUANT: CPT

## 2023-12-19 PROCEDURE — 87070 CULTURE OTHR SPECIMN AEROBIC: CPT

## 2023-12-19 PROCEDURE — 85730 THROMBOPLASTIN TIME PARTIAL: CPT

## 2023-12-19 PROCEDURE — 70450 CT HEAD/BRAIN W/O DYE: CPT | Mod: MA

## 2023-12-19 PROCEDURE — 84100 ASSAY OF PHOSPHORUS: CPT

## 2023-12-19 PROCEDURE — 87641 MR-STAPH DNA AMP PROBE: CPT

## 2023-12-19 PROCEDURE — 0225U NFCT DS DNA&RNA 21 SARSCOV2: CPT

## 2023-12-19 PROCEDURE — 99285 EMERGENCY DEPT VISIT HI MDM: CPT | Mod: 25

## 2023-12-19 PROCEDURE — 93005 ELECTROCARDIOGRAM TRACING: CPT

## 2023-12-19 PROCEDURE — 82962 GLUCOSE BLOOD TEST: CPT

## 2023-12-19 PROCEDURE — 87640 STAPH A DNA AMP PROBE: CPT

## 2023-12-19 PROCEDURE — 80048 BASIC METABOLIC PNL TOTAL CA: CPT

## 2023-12-19 PROCEDURE — 96374 THER/PROPH/DIAG INJ IV PUSH: CPT

## 2023-12-19 PROCEDURE — 87040 BLOOD CULTURE FOR BACTERIA: CPT

## 2023-12-19 PROCEDURE — 83880 ASSAY OF NATRIURETIC PEPTIDE: CPT

## 2023-12-19 PROCEDURE — 94640 AIRWAY INHALATION TREATMENT: CPT

## 2023-12-19 PROCEDURE — 81001 URINALYSIS AUTO W/SCOPE: CPT

## 2023-12-19 PROCEDURE — 80053 COMPREHEN METABOLIC PANEL: CPT

## 2023-12-19 PROCEDURE — 83735 ASSAY OF MAGNESIUM: CPT

## 2023-12-19 PROCEDURE — 71275 CT ANGIOGRAPHY CHEST: CPT | Mod: MA

## 2023-12-19 PROCEDURE — 85610 PROTHROMBIN TIME: CPT

## 2023-12-19 PROCEDURE — 99239 HOSP IP/OBS DSCHRG MGMT >30: CPT | Mod: GC

## 2023-12-19 PROCEDURE — 97165 OT EVAL LOW COMPLEX 30 MIN: CPT

## 2023-12-19 PROCEDURE — 36415 COLL VENOUS BLD VENIPUNCTURE: CPT

## 2023-12-19 PROCEDURE — 97161 PT EVAL LOW COMPLEX 20 MIN: CPT

## 2023-12-19 PROCEDURE — 85025 COMPLETE CBC W/AUTO DIFF WBC: CPT

## 2023-12-19 PROCEDURE — 71045 X-RAY EXAM CHEST 1 VIEW: CPT

## 2023-12-19 PROCEDURE — 82803 BLOOD GASES ANY COMBINATION: CPT

## 2023-12-19 RX ORDER — POTASSIUM CHLORIDE 20 MEQ
20 PACKET (EA) ORAL ONCE
Refills: 0 | Status: COMPLETED | OUTPATIENT
Start: 2023-12-19 | End: 2023-12-19

## 2023-12-19 RX ORDER — IPRATROPIUM/ALBUTEROL SULFATE 18-103MCG
3 AEROSOL WITH ADAPTER (GRAM) INHALATION EVERY 4 HOURS
Refills: 0 | Status: DISCONTINUED | OUTPATIENT
Start: 2023-12-19 | End: 2023-12-19

## 2023-12-19 RX ORDER — IPRATROPIUM/ALBUTEROL SULFATE 18-103MCG
3 AEROSOL WITH ADAPTER (GRAM) INHALATION
Qty: 0 | Refills: 0 | DISCHARGE
Start: 2023-12-19

## 2023-12-19 RX ORDER — SODIUM CHLORIDE 9 MG/ML
4 INJECTION INTRAMUSCULAR; INTRAVENOUS; SUBCUTANEOUS
Qty: 0 | Refills: 0 | DISCHARGE
Start: 2023-12-19

## 2023-12-19 RX ORDER — VANCOMYCIN HCL 1 G
1 VIAL (EA) INTRAVENOUS
Qty: 7 | Refills: 0
Start: 2023-12-19 | End: 2023-12-25

## 2023-12-19 RX ADMIN — GABAPENTIN 200 MILLIGRAM(S): 400 CAPSULE ORAL at 14:13

## 2023-12-19 RX ADMIN — SODIUM CHLORIDE 4 MILLILITER(S): 9 INJECTION INTRAMUSCULAR; INTRAVENOUS; SUBCUTANEOUS at 09:00

## 2023-12-19 RX ADMIN — Medication 20 MILLIEQUIVALENT(S): at 08:59

## 2023-12-19 RX ADMIN — Medication 3 MILLILITER(S): at 09:06

## 2023-12-19 RX ADMIN — GABAPENTIN 200 MILLIGRAM(S): 400 CAPSULE ORAL at 06:05

## 2023-12-19 RX ADMIN — AMLODIPINE BESYLATE 5 MILLIGRAM(S): 2.5 TABLET ORAL at 06:05

## 2023-12-19 RX ADMIN — Medication 125 MILLIGRAM(S): at 09:50

## 2023-12-19 RX ADMIN — Medication 25 MILLIGRAM(S): at 06:05

## 2023-12-19 RX ADMIN — LIDOCAINE 1 PATCH: 4 CREAM TOPICAL at 07:00

## 2023-12-19 RX ADMIN — PIPERACILLIN AND TAZOBACTAM 25 GRAM(S): 4; .5 INJECTION, POWDER, LYOPHILIZED, FOR SOLUTION INTRAVENOUS at 01:24

## 2023-12-19 NOTE — PROGRESS NOTE ADULT - PROBLEM SELECTOR PLAN 5
Hx of clostridium difficile last admission. Patient will require vancomycin 125mg po qD for 5-7 days for c. diff prophylaxis after completion of abx    - c/w vancomycin 125 po qD

## 2023-12-19 NOTE — PROGRESS NOTE ADULT - SUBJECTIVE AND OBJECTIVE BOX
*****INCOMPLETE NOTE*****    INTERVAL HPI/OVERNIGHT EVENTS:    SUBJECTIVE: Patient seen and examined at bedside, resting comfortably in bed, and does not appear to be in any acute distress. Patient states  Patient denies any recent fevers, chills, nausea, vomiting, headache, acute sob, chest pain, abdominal pain, genitourinary sx, extremity pain or swelling.     ANTIBIOTICS/RELEVANT:    MEDICATIONS  (STANDING):  albuterol/ipratropium for Nebulization 3 milliLiter(s) Nebulizer every 12 hours  amLODIPine   Tablet 5 milliGRAM(s) Oral daily  budesonide  80 MICROgram(s)/formoterol 4.5 MICROgram(s) Inhaler 2 Puff(s) Inhalation two times a day  clonazePAM Oral Disintegrating Tablet 0.25 milliGRAM(s) Oral every 24 hours  donepezil 5 milliGRAM(s) Oral at bedtime  gabapentin 200 milliGRAM(s) Oral every 8 hours  influenza  Vaccine (HIGH DOSE) 0.7 milliLiter(s) IntraMuscular once  lidocaine   4% Patch 1 Patch Transdermal every 24 hours  metoprolol tartrate 25 milliGRAM(s) Oral two times a day  montelukast 10 milliGRAM(s) Oral at bedtime  piperacillin/tazobactam IVPB.. 4.5 Gram(s) IV Intermittent every 8 hours  potassium chloride    Tablet ER 20 milliEquivalent(s) Oral once  sodium chloride 7% Inhalation 4 milliLiter(s) Inhalation every 4 hours  vancomycin    Solution 125 milliGRAM(s) Oral every 24 hours    MEDICATIONS  (PRN):      Vital Signs Last 24 Hrs  T(C): 37 (18 Dec 2023 20:50), Max: 37 (18 Dec 2023 20:50)  T(F): 98.6 (18 Dec 2023 20:50), Max: 98.6 (18 Dec 2023 20:50)  HR: 33 (18 Dec 2023 20:50) (33 - 94)  BP: 129/71 (19 Dec 2023 06:03) (98/50 - 129/71)  BP(mean): 91 (19 Dec 2023 06:03) (91 - 91)  RR: 18 (18 Dec 2023 20:50) (18 - 18)  SpO2: 97% (18 Dec 2023 20:50) (87% - 97%)    Parameters below as of 18 Dec 2023 20:50  Patient On (Oxygen Delivery Method): nasal cannula  O2 Flow (L/min): 2      PHYSICAL EXAM:  General: in no acute distress  Eyes: EOMI intact bilaterally. Anicteric sclerae, moist conjunctivae  HENT: Moist mucous membranes  Neck: Trachea midline, supple  Lungs: CTA B/L. No wheezes, rales, or rhonchi  Cardiovascular: RRR. No murmurs, rubs, or gallops  Abdomen: Soft, non-tender non-distended; No rebound or guarding  Extremities: WWP, No clubbing, cyanosis or edema  Neurological: Alert and oriented  Skin: Warm and dry. No obvious rash     LABS:                        9.7    5.32  )-----------( 192      ( 18 Dec 2023 05:30 )             30.9     12-18    134<L>  |  102  |  17  ----------------------------<  109<H>  3.9   |  26  |  0.77    Ca    8.3<L>      18 Dec 2023 05:30  Phos  3.1     12-18  Mg     2.2     12-18        Urinalysis Basic - ( 18 Dec 2023 05:30 )    Color: x / Appearance: x / SG: x / pH: x  Gluc: 109 mg/dL / Ketone: x  / Bili: x / Urobili: x   Blood: x / Protein: x / Nitrite: x   Leuk Esterase: x / RBC: x / WBC x   Sq Epi: x / Non Sq Epi: x / Bacteria: x        MICROBIOLOGY:    RADIOLOGY & ADDITIONAL STUDIES: INTERVAL HPI/OVERNIGHT EVENTS: steph    SUBJECTIVE: Patient seen and examined at bedside, resting comfortably in bed, and does not appear to be in any acute distress. Patient states that she feels great today.    MEDICATIONS  (STANDING):  albuterol/ipratropium for Nebulization 3 milliLiter(s) Nebulizer every 12 hours  amLODIPine   Tablet 5 milliGRAM(s) Oral daily  budesonide  80 MICROgram(s)/formoterol 4.5 MICROgram(s) Inhaler 2 Puff(s) Inhalation two times a day  clonazePAM Oral Disintegrating Tablet 0.25 milliGRAM(s) Oral every 24 hours  donepezil 5 milliGRAM(s) Oral at bedtime  gabapentin 200 milliGRAM(s) Oral every 8 hours  influenza  Vaccine (HIGH DOSE) 0.7 milliLiter(s) IntraMuscular once  lidocaine   4% Patch 1 Patch Transdermal every 24 hours  metoprolol tartrate 25 milliGRAM(s) Oral two times a day  montelukast 10 milliGRAM(s) Oral at bedtime  piperacillin/tazobactam IVPB.. 4.5 Gram(s) IV Intermittent every 8 hours  potassium chloride    Tablet ER 20 milliEquivalent(s) Oral once  sodium chloride 7% Inhalation 4 milliLiter(s) Inhalation every 4 hours  vancomycin    Solution 125 milliGRAM(s) Oral every 24 hours    MEDICATIONS  (PRN):    Vital Signs Last 24 Hrs  T(C): 37 (18 Dec 2023 20:50), Max: 37 (18 Dec 2023 20:50)  T(F): 98.6 (18 Dec 2023 20:50), Max: 98.6 (18 Dec 2023 20:50)  HR: 33 (18 Dec 2023 20:50) (33 - 94)  BP: 129/71 (19 Dec 2023 06:03) (98/50 - 129/71)  BP(mean): 91 (19 Dec 2023 06:03) (91 - 91)  RR: 18 (18 Dec 2023 20:50) (18 - 18)  SpO2: 97% (18 Dec 2023 20:50) (87% - 97%)    Parameters below as of 18 Dec 2023 20:50  Patient On (Oxygen Delivery Method): nasal cannula  O2 Flow (L/min): 2      PHYSICAL EXAM:  General: in no acute distress, cachectic  Eyes: EOMI intact bilaterally  HENT: Moist mucous membranes  Neck: Trachea midline  Lungs: bilateral crackles with egophony of the right lung  Cardiovascular: irregular  Abdomen: Soft, non-tender non-distended  Extremities: WWP  Neurological: Alert and oriented  Skin: Warm and dry    LABS:                        9.7    5.32  )-----------( 192      ( 18 Dec 2023 05:30 )             30.9     12-18    134<L>  |  102  |  17  ----------------------------<  109<H>  3.9   |  26  |  0.77    Ca    8.3<L>      18 Dec 2023 05:30  Phos  3.1     12-18  Mg     2.2     12-18        Urinalysis Basic - ( 18 Dec 2023 05:30 )    Color: x / Appearance: x / SG: x / pH: x  Gluc: 109 mg/dL / Ketone: x  / Bili: x / Urobili: x   Blood: x / Protein: x / Nitrite: x   Leuk Esterase: x / RBC: x / WBC x   Sq Epi: x / Non Sq Epi: x / Bacteria: x

## 2023-12-19 NOTE — PROGRESS NOTE ADULT - PROBLEM SELECTOR PROBLEM 4
History of bronchiectasis

## 2023-12-19 NOTE — PROGRESS NOTE ADULT - PROBLEM SELECTOR PLAN 1
On arrival O2 sat 84% on RA, improved to 94% on 4L NC. Reportedly hypoxic to 70's at home PTA. Uses O2 at home PRN when <88%. Has been more lethargic x2 days, possibly d/t hypoxia which is likely 2/2 mucus plugging iso possible PNA vs ABPA. Pulmonary consulted in ED.   MRSA negative  RVP negative  - Pulmonary consulted, f/u recs  - c/w pseudomonal Zosyn  - duoneb-> hypersal->chest pt-> aerobika  - symbicort 80/4.5  - duonebs BID with aerobika for airway clearance after each duoneb at bedside  - chest PT  - inhaled ns  - f/u sputum culture On arrival O2 sat 84% on RA, improved to 94% on 4L NC. Reportedly hypoxic to 70's at home PTA. Uses O2 at home PRN when <88%. Has been more lethargic x2 days, possibly d/t hypoxia which is likely 2/2 mucus plugging iso possible PNA vs ABPA. Pulmonary consulted in ED.   MRSA negative  RVP negative  - Pulmonary consulted, f/u recs  - d/brigette pseudomonal Zosyn  - duoneb-> hypersal->chest pt-> aerobika  - d/brigette symbicort 80/4.5  - duonebs BID with aerobika for airway clearance after each duoneb at bedside  - chest PT  - inhaled ns  - f/u sputum culture

## 2023-12-19 NOTE — PROGRESS NOTE ADULT - ATTENDING COMMENTS
Unclear if this is truly a bronchiectasis exacerbation. Patient is not on pulmonary clearance at home and this is most likely increased mucus production due to lack of pulmonary toilet. Should be on hypertonic saline, Aerobika, and percussion therapy. Doubt there is utility for antibiotics as no clear signs of an exacerbation. Should obtain sputum culture. ICS/LABA may be exacerbating cough; utility of BDs in bronchiectasis is unclear; monitor off. Would recommend avoiding polypharmacy with gabapentin and benzos given encephalopathic type episodes which may be hypoxemia driven. This is supported by a lack of A-a gradient on room air ABG pointing to central hypoventilation which can be worsened by agents that lead to respiratory depression. Please have patient follow up with Dr. Foster as outpatient.

## 2023-12-19 NOTE — PROGRESS NOTE ADULT - PROBLEM SELECTOR PLAN 7
Diffuse LBP since May. Tried muscle relaxants (ineffective) and tramadol (caused hallucinations). Currently being managed with gabapentin 300 TID. See pain management OP; unclear etio but suspect shingles.     - c/w gabapentin 200 TID  - lidocaine patch to lower back  - c/w Klonopin  0.25mg qD  - avoid opioids and muscle relaxants

## 2023-12-19 NOTE — PROGRESS NOTE ADULT - PROBLEM SELECTOR PROBLEM 3
Atrial fibrillation, chronic

## 2023-12-19 NOTE — PROGRESS NOTE ADULT - ATTENDING COMMENTS
83-year-old female with a PMHx of Afib (not on AC 2/2 SAH), HTN, lewy-body dementia, PRES, chronic bronchiectasis (on home 02 PRN), HFpEF and Cdiff who presented with acute on chronic hypoxic respiratory failure.       #Acute on Chronic Hypoxic Respiratory Failure    #Chronic Bronchiectasis     -pulmonology consulted, appreciate recommendations    -sputum sample collection, specimen received and pending results   -discharge with duonebs q4h followed by hypertonic saline follow by aerobika as per pulmonology   -close outpatient follow up with Dr. Foster   -discontinue Zosyn, Singulair and Symbicort   -continue with PO Vancomycin as CDiff PPx x 1 week post-discharge     DVT PPx: SCDs    Dispo: home

## 2023-12-19 NOTE — PROGRESS NOTE ADULT - NUTRITIONAL ASSESSMENT
This patient has been assessed with a concern for Malnutrition and has been determined to have a diagnosis/diagnoses of Moderate protein-calorie malnutrition and Underweight (BMI < 19).    This patient is being managed with:   Diet Regular-  Entered: Dec 15 2023 12:33PM  

## 2023-12-19 NOTE — PROGRESS NOTE ADULT - SUBJECTIVE AND OBJECTIVE BOX
PULMONARY CONSULT SERVICE FOLLOW-UP NOTE    INTERVAL HPI:  Reviewed chart and overnight events; patient seen and examined at bedside. Pt feels much better, produced a small amount of sputum, and is hoping to be discharged today.    MEDICATIONS:  Pulmonary:  albuterol/ipratropium for Nebulization 3 milliLiter(s) Nebulizer every 4 hours  sodium chloride 7% Inhalation 4 milliLiter(s) Inhalation every 4 hours    Antimicrobials:  vancomycin    Solution 125 milliGRAM(s) Oral every 24 hours    Anticoagulants:    Cardiac:  amLODIPine   Tablet 5 milliGRAM(s) Oral daily  metoprolol tartrate 25 milliGRAM(s) Oral two times a day      Allergies    IV Contrast (Unknown)  Levaquin (Swelling)  Augmentin (Short breath; Rash)  Ceclor (Rash)    Intolerances        Vital Signs Last 24 Hrs  T(C): 36.3 (19 Dec 2023 06:17), Max: 37 (18 Dec 2023 20:50)  T(F): 97.4 (19 Dec 2023 06:17), Max: 98.6 (18 Dec 2023 20:50)  HR: 99 (19 Dec 2023 06:17) (33 - 99)  BP: 112/71 (19 Dec 2023 06:17) (98/50 - 129/71)  BP(mean): 91 (19 Dec 2023 06:03) (91 - 91)  RR: 18 (19 Dec 2023 06:17) (18 - 18)  SpO2: 99% (19 Dec 2023 06:17) (87% - 99%)    Parameters below as of 19 Dec 2023 06:17  Patient On (Oxygen Delivery Method): nasal cannula  O2 Flow (L/min): 2          PHYSICAL EXAM:  Constitutional: well-appearing  HEENT: NC/AT; PERRL, anicteric sclera; MMM  Neck: supple  Cardiovascular: +S1/S2, RRR  Respiratory: CTA B/L; no W/R/R  Gastrointestinal: soft, NT/ND  Extremities: WWP; no edema, clubbing or cyanosis  Vascular: 2+ radial pulses B/L  Neurological: awake and alert; KAHN    LABS:  ABG - ( 18 Dec 2023 16:26 )  pH, Arterial: 7.40  pH, Blood: x     /  pCO2: 45    /  pO2: 94    / HCO3: 28    / Base Excess: 2.5   /  SaO2: 98.2                CBC Full  -  ( 19 Dec 2023 05:30 )  WBC Count : 5.82 K/uL  RBC Count : 3.24 M/uL  Hemoglobin : 10.2 g/dL  Hematocrit : 31.9 %  Platelet Count - Automated : 201 K/uL  Mean Cell Volume : 98.5 fl  Mean Cell Hemoglobin : 31.5 pg  Mean Cell Hemoglobin Concentration : 32.0 gm/dL  Auto Neutrophil # : 3.57 K/uL  Auto Lymphocyte # : 1.42 K/uL  Auto Monocyte # : 0.62 K/uL  Auto Eosinophil # : 0.16 K/uL  Auto Basophil # : 0.04 K/uL  Auto Neutrophil % : 61.3 %  Auto Lymphocyte % : 24.4 %  Auto Monocyte % : 10.7 %  Auto Eosinophil % : 2.7 %  Auto Basophil % : 0.7 %    12-19    139  |  104  |  15  ----------------------------<  118<H>  3.8   |  27  |  0.65    Ca    8.2<L>      19 Dec 2023 05:30  Phos  3.2     12-19  Mg     1.9     12-19            Urinalysis Basic - ( 19 Dec 2023 05:30 )    Color: x / Appearance: x / SG: x / pH: x  Gluc: 118 mg/dL / Ketone: x  / Bili: x / Urobili: x   Blood: x / Protein: x / Nitrite: x   Leuk Esterase: x / RBC: x / WBC x   Sq Epi: x / Non Sq Epi: x / Bacteria: x                RADIOLOGY & ADDITIONAL STUDIES:

## 2023-12-19 NOTE — PROGRESS NOTE ADULT - PROBLEM SELECTOR PLAN 9
F: s/p NS 500cc x1  E: replete as needed  N: regular diet  GI: none   DVT: SCD  Code: Full with trial of intubation   Dispo: home pt/ot

## 2023-12-19 NOTE — PROGRESS NOTE ADULT - PROVIDER SPECIALTY LIST ADULT
Hospitalist
Rehab Medicine
Pulmonology
Pulmonology
Internal Medicine

## 2023-12-19 NOTE — PROGRESS NOTE ADULT - ASSESSMENT
82yo f, h/o AF (not on AC due to SAH), HTN, Lewy body dementia (baseline a/o x3), posterior reversible encephalopathy syndrome, chronic bronchiectasis on home O2 prn when SaO2 is < 88%, dCHF, C. diff, who was bib daughter for weakness, difficulty getting up from ground, garbled speech noted around 3:30a today. Pulmonology consulted for acute hypoxic respiratory failure with a history of bronchiectasis.    #Acute hypoxic respiratory failure  #Bronchiectasis exacerbation  #Pulmonary insufficiency    Pt is saturating well on 2 L NC. WBC 10.55, afebrile. Pt has cough with sputum production but unclear if it is changed from baseline. Got levalbuterol x 1. CTA shows no PE but has some mucoid impaction and new consolidations in RUL. CT head stroke protocol showed no acute infarcts. Given lethargy and weakness over past few days with hypoxia today and negative CT head, suspect that she has toxic metabolic encephalopathy secondary to infectious process, either viral or bacterial PNA suspected. RVP negative. Pt has one sputum culture from 2019 that grew pseudomonas, but otherwise has no sputum cultures in the system. She has been admitted for multiple "exacerbations" but it is unclear if these were actual exacerbations with worsening sputum production. She had completed a course of Zosyn, cipro, and bactrim in August 2023 and then had IV cefepime at home via a PICC line for two weeks in September 2023. She takes inhaled tobramycin two weeks on/two weeks off for unclear reasons (normally 4 weeks on, 4 weeks off). Suspect that this is poorly managed bronchiectasis rather than continuing exacerbations. Unclear if pt will follow a more aggressive regimen, but recommendations below will reflect an appropriate regimen.    Recommendations:  -would stop singulair and symbicort, there is no role for singulair in the treatment of bronchiectasis  -Airway clearance q4h to induce sputum: Duonebs followed by hypertonic saline (3% or 7%, whichever she can tolerate), followed by Aerobika (all q4h)  -chest PT  -send sputum culture, difficult to know what is being treated without any recent cultures - would hold off abx until sputum culture results  -room air ABG shows a pCO2 of 45 and pH of 7.4, indicating chronic hypoventilation - encourage deep breaths and would stay away from sedating agents/polypharmacy (pt is on gabapentin and clonazepam)  -Inhaled tobramycin to be managed by outpatient pulmonologist  -If pt is being discharged today, please send sputum culture first and d/c with same airway clearance regimen (duonebs q4h followed by hypertonic saline follow by aerobika) and close follow up with Dr. Foster to follow sputum culture and regimen  NOTE INCOMPLETE UNTIL DISCUSSED WITH ATTENDING 82yo f, h/o AF (not on AC due to SAH), HTN, Lewy body dementia (baseline a/o x3), posterior reversible encephalopathy syndrome, chronic bronchiectasis on home O2 prn when SaO2 is < 88%, dCHF, C. diff, who was bib daughter for weakness, difficulty getting up from ground, garbled speech noted around 3:30a today. Pulmonology consulted for acute hypoxic respiratory failure with a history of bronchiectasis.    #Acute hypoxic respiratory failure  #Bronchiectasis exacerbation  #Pulmonary insufficiency    Pt is saturating well on 2 L NC. WBC 10.55, afebrile. Pt has cough with sputum production but unclear if it is changed from baseline. Got levalbuterol x 1. CTA shows no PE but has some mucoid impaction and new consolidations in RUL. CT head stroke protocol showed no acute infarcts. Given lethargy and weakness over past few days with hypoxia today and negative CT head, suspect that she has toxic metabolic encephalopathy secondary to infectious process, either viral or bacterial PNA suspected. RVP negative. Pt has one sputum culture from 2019 that grew pseudomonas, but otherwise has no sputum cultures in the system. She has been admitted for multiple "exacerbations" but it is unclear if these were actual exacerbations with worsening sputum production. She had completed a course of Zosyn, cipro, and bactrim in August 2023 and then had IV cefepime at home via a PICC line for two weeks in September 2023. She takes inhaled tobramycin two weeks on/two weeks off for unclear reasons (normally 4 weeks on, 4 weeks off). Suspect that this is poorly managed bronchiectasis rather than continuing exacerbations. Unclear if pt will follow a more aggressive regimen, but recommendations below will reflect an appropriate regimen.    Recommendations:  -would stop singulair and symbicort, there is no role for singulair in the treatment of bronchiectasis  -Airway clearance q4h to induce sputum: Duonebs followed by hypertonic saline (3% or 7%, whichever she can tolerate), followed by Aerobika (all q4h)  -chest PT  -send sputum culture, difficult to know what is being treated without any recent cultures - would hold off abx until sputum culture results  -room air ABG shows a pCO2 of 45 and pH of 7.4, indicating chronic hypoventilation - encourage deep breaths and would stay away from sedating agents/polypharmacy (pt is on gabapentin and clonazepam)  -Inhaled tobramycin to be managed by outpatient pulmonologist  -If pt is being discharged today, please send sputum culture first and d/c with same airway clearance regimen (duonebs q4h followed by hypertonic saline follow by aerobika) and close follow up with Dr. Foster to follow sputum culture and regimen    Case s/e/d with Dr. Guaman

## 2023-12-20 ENCOUNTER — APPOINTMENT (OUTPATIENT)
Dept: PAIN MANAGEMENT | Facility: HOSPITAL | Age: 83
End: 2023-12-20

## 2023-12-20 ENCOUNTER — TRANSCRIPTION ENCOUNTER (OUTPATIENT)
Age: 83
End: 2023-12-20

## 2023-12-20 ENCOUNTER — NON-APPOINTMENT (OUTPATIENT)
Age: 83
End: 2023-12-20

## 2023-12-20 LAB
CULTURE RESULTS: SIGNIFICANT CHANGE UP
SPECIMEN SOURCE: SIGNIFICANT CHANGE UP

## 2023-12-22 DIAGNOSIS — R53.1 WEAKNESS: ICD-10-CM

## 2023-12-22 DIAGNOSIS — Z88.1 ALLERGY STATUS TO OTHER ANTIBIOTIC AGENTS STATUS: ICD-10-CM

## 2023-12-22 DIAGNOSIS — E44.0 MODERATE PROTEIN-CALORIE MALNUTRITION: ICD-10-CM

## 2023-12-22 DIAGNOSIS — Z91.041 RADIOGRAPHIC DYE ALLERGY STATUS: ICD-10-CM

## 2023-12-22 DIAGNOSIS — G31.83 NEUROCOGNITIVE DISORDER WITH LEWY BODIES: ICD-10-CM

## 2023-12-22 DIAGNOSIS — Z79.82 LONG TERM (CURRENT) USE OF ASPIRIN: ICD-10-CM

## 2023-12-22 DIAGNOSIS — G93.41 METABOLIC ENCEPHALOPATHY: ICD-10-CM

## 2023-12-22 DIAGNOSIS — J96.01 ACUTE RESPIRATORY FAILURE WITH HYPOXIA: ICD-10-CM

## 2023-12-22 DIAGNOSIS — Z88.0 ALLERGY STATUS TO PENICILLIN: ICD-10-CM

## 2023-12-22 DIAGNOSIS — I10 ESSENTIAL (PRIMARY) HYPERTENSION: ICD-10-CM

## 2023-12-22 DIAGNOSIS — M54.50 LOW BACK PAIN, UNSPECIFIED: ICD-10-CM

## 2023-12-22 DIAGNOSIS — J47.9 BRONCHIECTASIS, UNCOMPLICATED: ICD-10-CM

## 2023-12-22 DIAGNOSIS — I48.20 CHRONIC ATRIAL FIBRILLATION, UNSPECIFIED: ICD-10-CM

## 2023-12-22 DIAGNOSIS — F02.80 DEMENTIA IN OTHER DISEASES CLASSIFIED ELSEWHERE, UNSPECIFIED SEVERITY, WITHOUT BEHAVIORAL DISTURBANCE, PSYCHOTIC DISTURBANCE, MOOD DISTURBANCE, AND ANXIETY: ICD-10-CM

## 2023-12-22 DIAGNOSIS — Z99.81 DEPENDENCE ON SUPPLEMENTAL OXYGEN: ICD-10-CM

## 2023-12-27 ENCOUNTER — APPOINTMENT (OUTPATIENT)
Dept: INTERNAL MEDICINE | Facility: CLINIC | Age: 83
End: 2023-12-27
Payer: MEDICARE

## 2023-12-27 VITALS — BODY MASS INDEX: 14.51 KG/M2 | HEIGHT: 64.17 IN | WEIGHT: 85 LBS

## 2023-12-27 VITALS
DIASTOLIC BLOOD PRESSURE: 74 MMHG | BODY MASS INDEX: 16.69 KG/M2 | HEIGHT: 60 IN | OXYGEN SATURATION: 92 % | WEIGHT: 85 LBS | SYSTOLIC BLOOD PRESSURE: 130 MMHG | HEART RATE: 59 BPM

## 2023-12-27 DIAGNOSIS — L89.122: ICD-10-CM

## 2023-12-27 DIAGNOSIS — R54 AGE-RELATED PHYSICAL DEBILITY: ICD-10-CM

## 2023-12-27 DIAGNOSIS — Z09 ENCOUNTER FOR FOLLOW-UP EXAMINATION AFTER COMPLETED TREATMENT FOR CONDITIONS OTHER THAN MALIGNANT NEOPLASM: ICD-10-CM

## 2023-12-27 PROCEDURE — 99495 TRANSJ CARE MGMT MOD F2F 14D: CPT

## 2023-12-27 RX ORDER — ATORVASTATIN CALCIUM 40 MG/1
40 TABLET, FILM COATED ORAL
Refills: 0 | Status: DISCONTINUED | COMMUNITY
End: 2023-12-27

## 2023-12-27 RX ORDER — METRONIDAZOLE 500 MG/1
500 TABLET ORAL 3 TIMES DAILY
Qty: 21 | Refills: 0 | Status: DISCONTINUED | COMMUNITY
Start: 2023-09-05 | End: 2023-12-27

## 2023-12-27 RX ORDER — LEVALBUTEROL HYDROCHLORIDE 1.25 MG/3ML
1.25 SOLUTION RESPIRATORY (INHALATION) EVERY 4 HOURS
Qty: 5 | Refills: 1 | Status: ACTIVE | COMMUNITY
Start: 1900-01-01 | End: 1900-01-01

## 2023-12-27 RX ORDER — DULOXETINE HYDROCHLORIDE 30 MG/1
30 CAPSULE, DELAYED RELEASE PELLETS ORAL
Qty: 30 | Refills: 1 | Status: DISCONTINUED | COMMUNITY
Start: 2023-11-29 | End: 2023-12-27

## 2023-12-27 RX ORDER — AMLODIPINE BESYLATE 5 MG/1
TABLET ORAL
Refills: 0 | Status: DISCONTINUED | COMMUNITY
End: 2023-12-27

## 2023-12-27 RX ORDER — TIZANIDINE 2 MG/1
2 TABLET ORAL
Qty: 30 | Refills: 0 | Status: DISCONTINUED | COMMUNITY
Start: 2023-12-01 | End: 2023-12-27

## 2023-12-27 RX ORDER — DONEPEZIL HYDROCHLORIDE 5 MG/1
5 TABLET ORAL
Refills: 0 | Status: DISCONTINUED | COMMUNITY
End: 2023-12-27

## 2023-12-27 RX ORDER — GABAPENTIN 100 MG/1
100 CAPSULE ORAL 3 TIMES DAILY
Qty: 180 | Refills: 1 | Status: COMPLETED | COMMUNITY
Start: 2023-11-10 | End: 2023-12-27

## 2023-12-27 NOTE — HISTORY OF PRESENT ILLNESS
[Post-hospitalization from ___ Hospital] : Post-hospitalization from [unfilled] Hospital [Admitted on: ___] : The patient was admitted on [unfilled] [Discharged on ___] : discharged on [unfilled] [FreeTextEntry2] : Aditted to [facility] for AMS and hypoxemia. Treated initaly for pneumonia with zosyn. She was seen by pulm who felt that hypoxemia may be from mucus plugging. DC'd to home on 12/19. She is feeling much better, no dyspnea. Lasis was stopped while inpatient, patient resumed taking lasix a few days after discharge because she developed swelling in her legs. Swelling has resolved.  Pulm recommended xopenex, hypertonic saline and aerobica every 4 hours. The xopenex makes it hard for her to sleep, she does not use the full dose. Not using aerobika. She was seen by pain management for steroid injection, pain is much better, but she is having trouble sleeping.  Would like to resume cognitive therapy.

## 2023-12-27 NOTE — PHYSICAL EXAM
[No JVD] : no jugular venous distention [No Respiratory Distress] : no respiratory distress  [No Accessory Muscle Use] : no accessory muscle use [Normal] : normal rate, regular rhythm, normal S1 and S2 and no murmur heard [No Edema] : there was no peripheral edema [No Extremity Clubbing/Cyanosis] : no extremity clubbing/cyanosis [Normal Affect] : the affect was normal [Alert and Oriented x3] : oriented to person, place, and time [Normal Insight/Judgement] : insight and judgment were intact [de-identified] : Crackles R upper and lower lobes

## 2023-12-29 ENCOUNTER — APPOINTMENT (OUTPATIENT)
Dept: PULMONOLOGY | Facility: CLINIC | Age: 83
End: 2023-12-29
Payer: MEDICARE

## 2023-12-29 VITALS
DIASTOLIC BLOOD PRESSURE: 78 MMHG | HEART RATE: 61 BPM | BODY MASS INDEX: 14.51 KG/M2 | HEIGHT: 64 IN | TEMPERATURE: 97.9 F | SYSTOLIC BLOOD PRESSURE: 120 MMHG | OXYGEN SATURATION: 94 % | WEIGHT: 85 LBS

## 2023-12-29 PROCEDURE — 99215 OFFICE O/P EST HI 40 MIN: CPT

## 2024-01-01 NOTE — REASON FOR VISIT
[Follow-Up] : a follow-up visit [TextBox_44] : patient being seen post discharge from hospital once again

## 2024-01-01 NOTE — REVIEW OF SYSTEMS
[Cough] : cough [Sputum] : no sputum [Dyspnea] : dyspnea [Frequency] : frequency [Urgency] : urgency [Memory Loss] : memory loss [Anxiety] : anxiety [Negative] : Endocrine [TextBox_3] : thin [TextBox_30] : [facility] [TextBox_44] : afib [TextBox_94] : wheel chair bound, reason general weekness [TextBox_122] : generalized weakness and balance

## 2024-01-01 NOTE — HISTORY OF PRESENT ILLNESS
[TextBox_4] : patient admitted wt hypercapnia.  but pulm team noted normal A-a gradient there is a severe neurolgic degenerative disease was taking small amoun ot klonipin which is the only thing that helps her dyspnea severe copd, but th4ere is little evidence that there was an exacerbatio of copd i think most of her problem is vacillating neurologic dysfx with some changes in resp drive

## 2024-01-01 NOTE — PHYSICAL EXAM
[Normal Oropharynx] : normal oropharynx [Normal Appearance] : normal appearance [Normal Rate/Rhythm] : normal rate/rhythm [1+ Pitting] : 1+ pitting [TextBox_2] : emaiated [TextBox_68] : rhonchi congested is lavinia  [TextBox_99] : wheelchair boung [TextBox_105] : edmia bilaterally [TextBox_132] : generalize infirmity [TextBox_140] : judgement and memory is poor

## 2024-01-05 ENCOUNTER — APPOINTMENT (OUTPATIENT)
Dept: PAIN MANAGEMENT | Facility: CLINIC | Age: 84
End: 2024-01-05
Payer: MEDICARE

## 2024-01-05 VITALS
BODY MASS INDEX: 14.51 KG/M2 | SYSTOLIC BLOOD PRESSURE: 117 MMHG | DIASTOLIC BLOOD PRESSURE: 63 MMHG | HEART RATE: 58 BPM | HEIGHT: 64 IN | OXYGEN SATURATION: 95 % | WEIGHT: 85 LBS

## 2024-01-05 PROCEDURE — 99213 OFFICE O/P EST LOW 20 MIN: CPT

## 2024-01-05 NOTE — HISTORY OF PRESENT ILLNESS
[FreeTextEntry1] : Interval History: Patient returns for follow-up today. She did extremely well with her intercostal nerve block with Dr. Conti. Only minor pain left. Did have benign side effects w/ steroids.   HPI: 83 yof presents w/ severe mid back and left rib pain. Also with low back pain. Quality of life is impaired. There has been a severe exacerbation of the patient's chronic pain. She has been hospitalized for numerous health issues recently. Major side effects with multiple medications including opioids and gabapentin with no relief.

## 2024-01-05 NOTE — DATA REVIEWED
[FreeTextEntry1] : MRI LUMBAR SPINE WITHOUT CONTRAST  For purposes of this dictation, the last well-formed disc space will be labeled L5-S1.  OSSEOUS STRUCTURES: There is a mild compression fracture deformity involving the superior endplate of L3 with marrow edema most consistent with a subacute to acute compression fracture deformity. There is mild retropulsion of the superior endplate without spinal stenosis. This is unchanged from prior x-ray. No marrow edema or destructive marrow infiltrative process.   ALIGNMENT: Normal lumbar lordosis is preserved. There is a levoscoliosis of lumbar spine. There is a grade 1 anterolisthesis of L4 on L5. No spondylolysis within the limitations of MRI.  SPINAL CORD AND CONUS MEDULLARIS: Normal signal.  PARASPINAL AND INTRA-ABDOMINAL SOFT TISSUES: Unremarkable.  INCLUDED THORACIC SPINE AND SACRUM: Unremarkable.  DISCS: There is disc desiccation change throughout the lumbar spine with mild intervertebral disc space narrowing at L4-5.  The following axial levels are imaged and detailed below:  L1-L2: Posterior disc bulging without spinal stenosis or neuroforaminal narrowing.  L2-L3: There is retropulsion superior endplate of L3, bilateral hypertrophic facet joint change and ligament flavum hypertrophy without spinal stenosis or neuroforaminal narrowing.  L3-L4: Posterior disc bulge, bilateral hypertrophic facet joint change and ligament flavum hypertrophy results in mild right neuroforaminal narrowing.  L4-L5: Broad-based posterior disc herniation, anterolisthesis, bilateral hypertrophic facet joint change and ligament flavum hypertrophy results in moderate spinal stenosis and encroachment on both neuroforamen.  L5-S1: Posterior disc bulge, bilateral hypertrophic facet joint change results in mild left neuroforaminal narrowing.  IMPRESSION:  MRI of the lumbar spine demonstrates:  1.  Mild acute subacute compression fracture deformity of L3 unchanged from prior x-ray of the lumbar spine dated 11/20/2023. 2.  Mild right neuroforaminal narrowing at L3-4. 3.  Moderate spinal stenosis and encroachment on both neuroforamen at L4-5. 4.  Mild left neuroforaminal narrowing at L5-S1. 5.  Grade 1 anterolisthesis of L4 and L5.   MRI THORACIC SPINE WITHOUT CONTRAST  OSSEOUS STRUCTURES: No acute fracture. Vertebral body heights are preserved. No areas of bone destruction or abnormal marrow replacement.  ALIGNMENT: Expected thoracic kyphosis is preserved. There is a grade 1 anterolisthesis of C7 on T1. There is a levoscoliosis of the thoracic spine.  SPINAL CORD: Normal signal.  PARASPINAL SOFT TISSUES: Unremarkable (within limits of nondedicated technique). There is bronchiectasis of the right lung with several nodules. Similar findings are identified in prior chest CT dated 2/26/2013   INCLUDED CERVICAL AND LUMBAR SPINE: Unremarkable.  DISCS: There is intervertebral disc space narrowing throughout the thoracic spine with disc desiccation change..  T1-2 through T12-L1:  T1-2 and T2-3: Posterior disc bulge without spinal stenosis or neuroforaminal narrowing. New  T10-11: Posterior disc bulge without spinal stenosis or neuroforaminal narrowing.  T11-12: Posterior disc bulge without significant spinal canal stenosis or neuroforaminal narrowing.  IMPRESSION:  MRI of the thoracic spine demonstrates:  1.  Posterior disc bulges at T1-2, T2-3, T10-11 and T11-12 without spinal stenosis or neuroforaminal narrowing. 2.  Bronchiectasis and pulmonary nodules in the right lung. Similar findings are identified on prior chest CT dated 2/26/2013. Clinical correlation is suggested and further evaluation can be performed with a PA and lateral view of the chest or a CT scan of the chest as clinically warranted.

## 2024-01-05 NOTE — ASSESSMENT
[FreeTextEntry1] : 83 yof w/ severe mid back and left rib pain, most consistent with postherpetic neuralgia.  I have personally reviewed the patient's MRI in detail and discussed it with them which is significant for disc bulges in the thoracic spine.  As she is doing well continue conservative care.   Physical therapy prescribed - goal will be to increase ROM, strengthening, postural training, other modalities ad homa which may include massage and stim. Goals of therapy discussed with the patient in detail and will be discussed with physical therapist. Patient will follow-up following course of physical therapy to monitor progress and adjust therapy as needed.  Acetaminophen 1,000 mg q8h prn for moderate pain. Risks, benefits, and alternatives of acetaminophen discussed with patient.   Diet and nutritional strategies discussed which may improve patients pain and will improve overall health.  Continue tizanidine 2 mg prn  Topical ointment.  Repeat intercostal nerve block if pain returns.

## 2024-01-05 NOTE — PHYSICAL EXAM
[de-identified] : Constitutional: Well-developed, in no acute distress  Musculoskeletal: Lumbar Spine:   Gait: Antalgic 		Inspection: Normal curvature, no abnormal kyphosis or scoliosis 		Facet loading: pain bilaterally 		Palpation: 			Lumbar and paraspinal muscles: pain bilaterally 			Sacroiliac joint: no pain 			Greater trochanter: no pain 		Muscle Strength: 		Iliopsoas: 5/5 bilaterally 		Quadriceps: 5/5 bilaterally 		Hamstrings: 5/5 bilaterally 		Tibialis anterior: 5/5 bilaterally 		Extensor hallucis longus: 5/5 bilaterally  		Sensation: normal and equal in bilateral lower extremities  Extremity: no edema noted Neurological: Memory normal, AAO x 3, Cranial nerves II - XII grossly normal Psychiatric: Appropriate mood and affect, oriented to time, place, person, and situation

## 2024-01-07 LAB
ALBUMIN SERPL ELPH-MCNC: 3.7 G/DL
ALBUMIN SERPL ELPH-MCNC: 4.1 G/DL
ALP BLD-CCNC: 149 U/L
ALP BLD-CCNC: 88 U/L
ALT SERPL-CCNC: 19 U/L
ALT SERPL-CCNC: 19 U/L
ANA SER IF-ACNC: NEGATIVE
ANION GAP SERPL CALC-SCNC: 11 MMOL/L
ANION GAP SERPL CALC-SCNC: 8 MMOL/L
AST SERPL-CCNC: 16 U/L
AST SERPL-CCNC: 24 U/L
BASOPHILS # BLD AUTO: 0.05 K/UL
BASOPHILS # BLD AUTO: 0.21 K/UL
BASOPHILS NFR BLD AUTO: 1.1 %
BASOPHILS NFR BLD AUTO: 2.6 %
BILIRUB SERPL-MCNC: 0.2 MG/DL
BILIRUB SERPL-MCNC: 0.3 MG/DL
BUN SERPL-MCNC: 18 MG/DL
BUN SERPL-MCNC: 27 MG/DL
CALCIUM SERPL-MCNC: 8.5 MG/DL
CALCIUM SERPL-MCNC: 9.3 MG/DL
CHLORIDE SERPL-SCNC: 102 MMOL/L
CHLORIDE SERPL-SCNC: 99 MMOL/L
CO2 SERPL-SCNC: 26 MMOL/L
CO2 SERPL-SCNC: 28 MMOL/L
CREAT SERPL-MCNC: 0.79 MG/DL
CREAT SERPL-MCNC: 0.81 MG/DL
EGFR: 73 ML/MIN/1.73M2
EGFR: 76 ML/MIN/1.73M2
EOSINOPHIL # BLD AUTO: 0.07 K/UL
EOSINOPHIL # BLD AUTO: 0.16 K/UL
EOSINOPHIL NFR BLD AUTO: 0.9 %
EOSINOPHIL NFR BLD AUTO: 3.5 %
GLUCOSE SERPL-MCNC: 105 MG/DL
GLUCOSE SERPL-MCNC: 89 MG/DL
HCT VFR BLD CALC: 38.7 %
HCT VFR BLD CALC: 39.5 %
HGB BLD-MCNC: 11.5 G/DL
HGB BLD-MCNC: 11.7 G/DL
IMM GRANULOCYTES NFR BLD AUTO: 0.4 %
LYMPHOCYTES # BLD AUTO: 1.77 K/UL
LYMPHOCYTES # BLD AUTO: 1.97 K/UL
LYMPHOCYTES NFR BLD AUTO: 24.8 %
LYMPHOCYTES NFR BLD AUTO: 38.6 %
MAN DIFF?: NORMAL
MAN DIFF?: NORMAL
MCHC RBC-ENTMCNC: 29.1 GM/DL
MCHC RBC-ENTMCNC: 30.2 GM/DL
MCHC RBC-ENTMCNC: 32.6 PG
MCHC RBC-ENTMCNC: 33.4 PG
MCV RBC AUTO: 107.8 FL
MCV RBC AUTO: 114.8 FL
MONOCYTES # BLD AUTO: 0.46 K/UL
MONOCYTES # BLD AUTO: 0.49 K/UL
MONOCYTES NFR BLD AUTO: 10 %
MONOCYTES NFR BLD AUTO: 6.2 %
NEUTROPHILS # BLD AUTO: 2.12 K/UL
NEUTROPHILS # BLD AUTO: 5.14 K/UL
NEUTROPHILS NFR BLD AUTO: 46.4 %
NEUTROPHILS NFR BLD AUTO: 64.6 %
NT-PROBNP SERPL-MCNC: 1831 PG/ML
PLATELET # BLD AUTO: 172 K/UL
PLATELET # BLD AUTO: 303 K/UL
POTASSIUM SERPL-SCNC: 4 MMOL/L
POTASSIUM SERPL-SCNC: 4.9 MMOL/L
PROT SERPL-MCNC: 6.3 G/DL
PROT SERPL-MCNC: 7.2 G/DL
RBC # BLD: 3.44 M/UL
RBC # BLD: 3.59 M/UL
RBC # FLD: 12.5 %
RBC # FLD: 14.5 %
RHEUMATOID FACT SER QL: 60 IU/ML
SODIUM SERPL-SCNC: 136 MMOL/L
SODIUM SERPL-SCNC: 138 MMOL/L
WBC # FLD AUTO: 4.58 K/UL
WBC # FLD AUTO: 7.95 K/UL

## 2024-01-08 ENCOUNTER — APPOINTMENT (OUTPATIENT)
Dept: NEUROLOGY | Facility: CLINIC | Age: 84
End: 2024-01-08
Payer: MEDICARE

## 2024-01-08 DIAGNOSIS — F03.90 UNSPECIFIED DEMENTIA W/OUT BEHAVIORAL DISTURBANCE: ICD-10-CM

## 2024-01-08 PROCEDURE — 99215 OFFICE O/P EST HI 40 MIN: CPT

## 2024-01-08 NOTE — ASSESSMENT
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 Most recent MRI with new stroke, likely in the setting of AF not on AC. Therefore, encouraged attending already scheduled appointment with Dr. Rivas to discuss Watchman procedure. Explained that patient is at continued risk for new strokes since she is not able to be anticoagulated.   Plan: -Continue Aspirin and Atorvastatin for secondary stroke prevention; can consider new MRA H/N although imaging in 9/2023 was unremarkable -Continue Donepezil 5 mg daily for memory loss -F/u with Dr. Rivas for possible Watchman procedure -F/u with Cardiology for heart fluttering and SOB; new TTE already completed while inpatient in 12/2023 -Counselled on signs of stroke BEFAST and to call 911 with any new or worsening neurological symptoms -RTO in 3 months

## 2024-01-08 NOTE — HISTORY OF PRESENT ILLNESS
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  Since last visit, patient completed a MRI w/wo on 1/3/24 which showed: 1. 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 Small old infarct right cerebellar hemisphere. 2. Small subacute infarct right parietal lobe. 3. Localized area of superficial siderosis is present in the right parietal lobe. A few small old microhemorrhages are present in both parietal lobes. These findings can be seen in patients with cerebral amyloid angiopathy. 4. Mild cerebral atrophy. Images are not available for my review.   Since last visit, patient no new stroke-like symptoms. She is taking Aspirin and Atorvastatin without bleeding, bruising or myalgias. BP has not been elevated, /63 on 1/5/23. She has been experiencing episodes of shortness of breath and fluttering in her chest. She was recently hospitalized with a HF exacerbation and bronchiectasis. She is scheduled to see Cardiology and Dr. Rivas in the next two weeks. TTE from inpatient stay on 12/14/23 with 1. Normal left and right ventricular size and systolic function. 2. Aortic sclerosis without significant stenosis. 3. Mild aortic regurgitation. 4. Mild-to-moderate tricupsid regurgitation. 5. Pulmonary hypertension is present, pulmonary artery systolic pressure is 51 mmHg. 6. No pericardial effusion. 7. Left pleural effusion.   She reports no changes in her memory. She continues to take Donepezil 5 mg daily.

## 2024-01-08 NOTE — REASON FOR VISIT
[Home] : at home, [unfilled] , at the time of the visit. [Medical Office: ([facility])___] : at the medical office located in  [Patient] : the patient [Self] : self [Follow-Up: _____] : a [unfilled] follow-up visit [Other:____] : [unfilled]

## 2024-01-08 NOTE — CONSULT NOTE ADULT - ASSESSMENT
PULM: Dr. Foster  82F w HTN, AF not on AC d/t fall risk, hospitalized for PRES 05-06/2023, recent DC 8/2023 for acute hypoxemic respiratory failure - to home on 4L NC w follow-up w Dr. Foster for exacerbation of bronchiectasis, recently started on 2wk course of IV cefepime outpatient by Dr. Foster, p/w diplopia, encephalopathy    #Encephalopathy  #CVA evaluation   - CT Head non con wo acute process    #AFib - lopressor 50 BID  #Asthma - on singular 5mg BID  #Bronchiectasis - seen by pulm. Recently dc w PO bactrim and cipro 8/2023. Satting well on room air at this time.    - CT Chest 9/19 - b/l bronchiectasis wo consolidation. Small effusion.    Plan  Agree w pulmonary consultation regarding antibiotic treatment for bronchiectasis  MRI head    DISPO: TBD. Pt lives at home w daughter Patience       Yes

## 2024-01-08 NOTE — PHYSICAL EXAM
[FreeTextEntry1] : Exam is limited due to the nature of the telehealth visit. The patient is alert and oriented to conversation. Speech and language are intact. Cranial nerves are grossly intact. She is able to move all extremities.

## 2024-01-10 ENCOUNTER — NON-APPOINTMENT (OUTPATIENT)
Age: 84
End: 2024-01-10

## 2024-01-11 ENCOUNTER — APPOINTMENT (OUTPATIENT)
Dept: CARDIOLOGY | Facility: CLINIC | Age: 84
End: 2024-01-11
Payer: MEDICARE

## 2024-01-11 VITALS
BODY MASS INDEX: 14.51 KG/M2 | WEIGHT: 85 LBS | RESPIRATION RATE: 14 BRPM | SYSTOLIC BLOOD PRESSURE: 126 MMHG | DIASTOLIC BLOOD PRESSURE: 64 MMHG | OXYGEN SATURATION: 98 % | HEIGHT: 64 IN | HEART RATE: 56 BPM

## 2024-01-11 PROCEDURE — 93000 ELECTROCARDIOGRAM COMPLETE: CPT

## 2024-01-11 PROCEDURE — 99214 OFFICE O/P EST MOD 30 MIN: CPT

## 2024-01-11 NOTE — PHYSICAL EXAM
[Well Developed] : well developed [Well Nourished] : well nourished [No Acute Distress] : no acute distress [Normal Conjunctiva] : normal conjunctiva [Normal Venous Pressure] : normal venous pressure [No Carotid Bruit] : no carotid bruit [Normal S1, S2] : normal S1, S2 [No Murmur] : no murmur [No Rub] : no rub [No Gallop] : no gallop [No Respiratory Distress] : no respiratory distress  [No Edema] : no edema [No Cyanosis] : no cyanosis [No Clubbing] : no clubbing [Moves all extremities] : moves all extremities [No Focal Deficits] : no focal deficits [Normal Speech] : normal speech [Alert and Oriented] : alert and oriented [Normal memory] : normal memory [Rales / Crackles Bilateral] : crackles were heard diffusely over both lungs [Decreased Breath Sounds Unilaterally Left Lung Base] : breath sounds were diminished over the left base

## 2024-01-11 NOTE — ASSESSMENT
[FreeTextEntry1] : 84 yo female with hypertension, HFpEF, atrial fibrillation (diagnosed 5/2023, not on anticoagulation due to fall risk and subarachnoid hemorrhage; currently pending evaluation for Watchman device), prior CVAs, chronic bronchiectasis (not requiring home oxygen), pulmonary hypertension (attributed to COPD and HFpEF), and CAD (coronary calcifications incidentally noted on 12/14/23 chest CTA).  ECG today demonstrated sinus bradycardia, rate 59, PACs, poor R wave progression, cannot rule out anterior infarct.  Given worsening exertional dyspnea and history of HFpEF, will increase furosemide back to 20 mg po daily given lung exam today.  Given incidentally noted coronary artery calcifications on 12/14/23 chest CTA, will order Lexiscan nuclear stress test as her symptoms of exertional SOB may also be due to ischemic heart disease in addition to known lung disease. Will continue aspirin 81 mg po daily and atorvastatin 40 mg po daily.  Will continue metoprolol tartrate 50 mg po bid.  Patient with paroxysmal atrial fibrillation and prior CVAs. Patient is scheduled to see Dr. Rivas on 1/19/24 for evaluation for Watchman device as patient is not a candidate for chronic anticoagulation given fall risk and history of subarachnoid hemorrhage.   BP is currently normotensive. Will continue metoprolol tartrate 50 mg po bid.

## 2024-01-11 NOTE — REVIEW OF SYSTEMS
[Dyspnea on exertion] : dyspnea during exertion [Negative] : Psychiatric [Chest Discomfort] : no chest discomfort

## 2024-01-11 NOTE — HISTORY OF PRESENT ILLNESS
[FreeTextEntry1] : 84 yo female with hypertension, HFpEF, atrial fibrillation (diagnosed 5/2023, not on anticoagulation due to fall risk and subarachnoid hemorrhage; currently pending evaluation for Watchman device), prior CVAs, chronic bronchiectasis (not requiring home oxygen), pulmonary hypertension (attributed to COPD and HFpEF), and CAD (coronary calcifications incidentally noted on 12/14/23 chest CTA). Patient presents today to establish care with cardiologist. She does report worsening exertional dyspnea, but denies chest pain, palpitations, syncope, edema, melena, hematochezia, or hematemesis. Patient reports that she is currently only taking her furosemide 20 mg po every other day as per PMD on 12/29/23.

## 2024-01-11 NOTE — CARDIOLOGY SUMMARY
[de-identified] : 1/11/24 ECG: Sinus bradycardia, rate 59, PACs, poor R wave progression, cannot rule out anterior infarct [de-identified] : 11/7/23 Echo (at [facility]): Normal LV size and systolic/diastolic function, LVEF 55%. Normal RV size and systolic function. Mild AR. Mild-mod TR. Pulm HTN with PASP 51 mmHg. Left pleural effusion. 9/19/23 Echo (at [facility]): TDS. Normal LV size and systolic function. Mildly dilated RV with normal systolic function. Dilated RA. AV sclerosis without stenosis. Mild-mod AR. Mild MR. Mod-severe TR. Severe pulm HTN with PASP 73 mmHg.  [de-identified] : 10/3/23 Cardiac CT: Dilated LA and RA. MITZI measures 23.3 x 20.5 mm at ostium and 21 mm in depth from ostium. No LA or MITZI thrombus. Two pulmonary veins on left and 3 on right.

## 2024-01-12 ENCOUNTER — NON-APPOINTMENT (OUTPATIENT)
Age: 84
End: 2024-01-12

## 2024-01-23 NOTE — DIETITIAN INITIAL EVALUATION ADULT - NS FNS DIET ORDER
1/23/2024      RE: Linda Dodson  [address] [id number]     Dear Colleague,    Thank you for the opportunity to participate in the care of your patient, Linda Dodson, at the [facility] at [facility]. Please see a copy of my visit note below.        Pediatric Gastroenterology/Transplant Hepatology Initial Consultation Note    Outpatient initial consultation  Consultation requested by: Eleuterio Loera, for:   Chief Complaint   Patient presents with    Consult     Consult- gallbladder       Dear Calli Bull and Dr. Eleuterio Loera,    Thank you for referring Linda Dodson for an initial consultation at the [facility]. She was seen in Pediatric Gastroenterology Clinic for consultation on 01/25/2024 regarding vomiting and cholecystitis s/p cholecystectomy, found to have large number of cholesterol gallstones. She receives primary care from Calli Abreu. This consultation was recommended by Eleuterio Loera.   Medical records were reviewed prior to this visit. Linda was accompanied today by her father and mother.    Chief Complaint: Patient presents with:  Consult: Consult- gallbladder      HPI    Linda is a 7 year old female with medical history significant for vomiting and cholelithiasis, now s/p cholecystectomy with resolution of symptoms. She was found to have a large number of cholesterol gallstones which prompted this consultation. 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    She has had 2 years of vomiting, episodic in nature.  Vomit was nonbloody, nonbilious.  There were no clear associations with eating, or with specific foods.  Family tried dietary modifications without improvement.  She underwent a broad workup including consultation with neurology and [facility].  She has been cleared from a neurologic perspective.  From a GI  perspective, she was found to have cholelithiasis and had a cholecystectomy on October 4, 2023.  Per maternal report, there were a large number of cholesterol gallstones found within the gallbladder.  Unfortunately we do not have access to the pathology report at this moment.  Since removal of her gallbladder, she has not had any vomiting.  Symptoms have resolved.  She has had significantly improved activity levels since having surgery.  She is able to do her normal level of activity.    No family history of cholestasis, cholelithiasis, or cholecystitis.  No family history of cirrhosis or other liver disease.  No family history of hypercholesterolemia  No family history of early myocardial infarction or cardiac related death.  There is a family history of hereditary spastic paraplegia.  No known other genetic conditions.      She currently endorses stiff muscles that are not painful.  Stiffness has been ongoing for about 1 month.      Current diet: Picky eater, eats lots of fruit, macaroni and cheese, does not like deep fried foods, occasional vegetables, good protein intake (chicken, beef), yogurt, drinks milk and water, occasional soda    Prior pertinent encounters:   10/4/2023: Cholecystectomy with Dr. Aparicio at [facility]    Prior interventions:   10/4/2023: Cholecystectomy with Dr. Aparicio at [facility]    Growth: There is no parental concern for weight gain or growth.      Other:  Abdominal pain: none  Vomiting: none  Nausea: none  Hematemesis: none  Diarrhea: none  Constipation: large stools, but going daily  Blood in stool: none  Dysphagia: none  Odynophagia: none  Abdominal bloating: none  Heartburn: none  Weight loss: none  NSAID usage: none    Review of Systems:  A 10pt ROS was completed and otherwise negative except as noted above or below.         Allergies:   Linda has No Known Allergies.    Medications:   No current outpatient medications on file.     "    Immunizations:  Immunization History   Administered Date(s) Administered    COVID-19 Monovalent peds 6M-4Yrs (Pfizer) 07/01/2022, 08/10/2022, 10/14/2022        Past Medical History:  I have reviewed this patient's past medical history today and updated it as appropriate.  No past medical history on file.    Past Surgical History: I have reviewed this patient's past surgical history today and updated it as appropriate.  Past Surgical History:   Procedure Laterality Date    CHOLECYSTECTOMY, LAPOROSCOPIC  10/04/2023    PE TUBES Bilateral 2020        Family History:  I have reviewed this patient's family history today and updated it as appropriate.  Family History   Problem Relation Age of Onset    No Known Problems Mother     No Known Problems Father     No Known Problems Sister     No Known Problems Sister     Osteoarthritis Maternal Grandmother     Other - See Comments Paternal Grandfather         Hereditary Spastic Paraplegia       Social History:  Social History     Social History Narrative    2022/11: lives in [place] with Mom, Dad, a 10 y.o. & 2 y.o. sister. She goes to an early childhood center. He does figure skating and spends lots of time outdoors.             Physical Examination:    BP (!) 103/39 (BP Location: Left arm, Patient Position: Sitting, Cuff Size: Child)   Pulse 108   Ht 1.233 m (4' 0.54\")   Wt 22.7 kg (50 lb 0.7 oz)   BMI 14.93 kg/m     Weight for age: 82 %ile (Z= 0.93) based on CDC (Girls, 2-20 Years) weight-for-age data using vitals from 1/23/2024.  Height for age: 98 %ile (Z= 2.01) based on CDC (Girls, 2-20 Years) Stature-for-age data based on Stature recorded on 1/23/2024.  BMI for age: 43 %ile (Z= -0.18) based on CDC (Girls, 2-20 Years) BMI-for-age based on BMI available as of 1/23/2024.  Weight for length: Normalized weight-for-recumbent length data not available for patients older than 36 months.      General: alert, cooperative with exam, no acute distress  HEENT: normocephalic, " atraumatic; pupils equal and reactive to light, no eye discharge or injection; nares clear without congestion or rhinorrhea; moist mucous membranes, no lesions of oropharynx  Neck: supple, no significant cervical lymphadenopathy  CV: regular rate and rhythm, no murmurs, brisk cap refill  Resp: lungs clear to auscultation bilaterally, normal respiratory effort on room air  Abd: soft, non-tender, non-distended, normoactive bowel sounds, no masses or hepatosplenomegaly  Neuro: alert and oriented, cranial nerves grossly intact, no clonus  MSK: moves all extremities equally with full range of motion, normal strength and tone  Skin: no significant rashes or lesions, warm and well-perfused    Review of outside/previous results:  I personally reviewed results of laboratory evaluation, imaging studies and past medical records that were available during this outpatient visit.    Summarized: Surgical pathology results from 10/4/23 cholecystectomy are not available via care everywhere. Will reach out to pediatric surgical specialists to obtain full report. Per mother, she had cholesterol gallstones.    3/23/23: ALT 13, AST 28, Alk phos 296, Total Bilirubin 0.5, TTG IgA <1.2,     No results found for this or any previous visit (from the past 200 hour(s)).    No results found for any visits on 01/23/24.      Assessment:    Linda is a 7 year old female with hx of vomiting in the setting of cholesterol gallstones, now s/p cholecystectomy. Reassuringly, she has had full resolution of symptoms since her surgery in 10/2023.  It is interesting that Linda developed cholelithiasis, specifically cholesterol gallstones, as an otherwise healthy 7-year-old.  She is not obese, is not on any medications known to cause cholelithiasis, has no known hemolysis, and does not have sickle cell or spherocytosis.  Although she does have a somewhat restricted diet due to being a picky eater, it is unlikely that this caused the formation of cholesterol  gallstones in the absence of other predisposing factors. We did not find a lipid panel in her chart and we will inquire regarding the same.     Despite a lack of family history of cholelithiasis or cholestasis, we suspect that a genetic etiology is likely given her clinical presentation.  More specifically, mutations in the ATP-binding cassette B4 (ABCB4) transporter have been implicated in the pathogenesis of idiopathic gallstones during childhood (1). Mutations in the ABCB4 gene present as three known phenotypes: low-phospholipid associated cholelithiasis (LPAC), progressive familial intrahepatic cholestasis (PFIC) type 3, and intrahepatic cholestasis of pregnancy (2). ABCB4 is essential in the transportation of phospholipids into the bile, thus a defect in ABCB4 results in an elevated cholesterol to phospholipid concentration ratio, favoring the formation of cholesterol stones (2). In Linda's case, a defect in ABCB4 could explain the formation of cholesterol stones at a young age.     Depending on the mutation variant, the severity of disease could range from early onset of cholesterol gallstones to late onset hepatitis and cirrhosis (2). Without a family history of cirrhosis, I suspect it is likely that Linda has a more minor variant. However, genetic testing is needed to guide further testing and follow up. See plan below:      Mehdi K, Sowmya J, George MATAMOROS, Sowmya HENDRIX. The Etiology of Cholelithiasis in Children and Adolescents--A Literature Review. International Journal of Molecular Sciences. 2022; 23(21):02803. https://doi.org/10.3390/engh308262662  Violetta SS, Rodney RJ. The Multiple Facets of ABCB4 (MDR3) Deficiency. Curr Treat Options Gastroenterol. 2007;10(6):495-503. doi:10.1007/s25820-882-4971-2    1. Personal history of gallstones          Plan:  Will reach out to [facility] for official pathology results.  Referral to pediatric genetics to test for PFIC3 and related ABCB4  mutations.  If positive, will need continued follow up with gastroenterology for continued monitoring  If negative, will refer to hematology to assess for a hemolytic cause of cholelithiasis, though hemolysis typically results in pigmented stones rather than cholesterol stones.  Will need lipid panel.   Follow to be determined pending genetic results.    Orders today--  No orders of the defined types were placed in this encounter.      Follow up: No follow-ups on file.   Please call or return sooner should Linda become symptomatic.      Patient Instructions   If you have any questions during regular office hours, please contact the nurse line at [phone]  If acute urgent concerns arise after hours, you can call [phone] and ask to speak to the pediatric gastroenterologist on call.  If you have clinic scheduling needs, please call the Call Center at [phone].  If you need to schedule Radiology tests, call [phone].  Outside lab and imaging results should be faxed to [phone]. If you go to a lab outside of [facility] we will not automatically get those results. You will need to ask them to send them to us.  My Chart messages are for routine communication and questions and are usually answered within 48-72 hours. If you have an urgent concern or require sooner response, please call us.  Main  Services:  [phone]  Hmong/Joseph/Ethiopian: [phone]  Uzbek: [phone]  Rwandan: [phone]      I have reviewed this patient with the attending physician, Edilma Crisostomo MD.    Nohemi Jo DO, PhD  Pediatrics, PGY-2  [facility]    Physician Attestation  I, Edilma Crisostomo MD, saw this patient and agree with the findings and plan of care as documented in the note.      Items personally reviewed/procedural attestation: vitals, labs, and imaging and agree with the interpretation documented in the note.    I spent a total of [25] minutes face-to-face with Linda Dodson during today s office  visit. Over 50% of this time was spent counseling the patient and/or coordinating care in the following way: I discussed the plan of care with Linda and her parents during today's office visit. We discussed: symptoms, differential diagnosis, diagnostic work up, treatment, potential side effects and complications, and follow up plan regarding h/o cholesterol gallstones.  Questions were answered and contact information provided.    Edilma NORRIS MPH    Pediatric Gastroenterology, Hepatology, and Nutrition,  [facility], [facility].      CC  Patient Care Team:  Calli Abreu as PCP - General (Pediatrics)  Eleuterio Loera MD (Neurology with Spec Qualification in Child Neurology)  Edilma Crisostomo MD as MD (Pediatric Gastroenterology)       Diet, Regular (08-16-23 @ 19:06)

## 2024-01-29 ENCOUNTER — OUTPATIENT (OUTPATIENT)
Dept: OUTPATIENT SERVICES | Facility: HOSPITAL | Age: 84
LOS: 1 days | End: 2024-01-29

## 2024-01-29 ENCOUNTER — APPOINTMENT (OUTPATIENT)
Dept: CARDIOTHORACIC SURGERY | Facility: CLINIC | Age: 84
End: 2024-01-29
Payer: MEDICARE

## 2024-01-29 VITALS
TEMPERATURE: 97.7 F | HEIGHT: 64 IN | SYSTOLIC BLOOD PRESSURE: 129 MMHG | RESPIRATION RATE: 14 BRPM | OXYGEN SATURATION: 90 % | DIASTOLIC BLOOD PRESSURE: 62 MMHG | HEART RATE: 58 BPM

## 2024-01-29 DIAGNOSIS — I63.9 CEREBRAL INFARCTION, UNSPECIFIED: ICD-10-CM

## 2024-01-29 LAB
ALBUMIN SERPL ELPH-MCNC: 4 G/DL — SIGNIFICANT CHANGE UP (ref 3.3–5)
ALP SERPL-CCNC: 102 U/L — SIGNIFICANT CHANGE UP (ref 40–120)
ALT FLD-CCNC: 17 U/L — SIGNIFICANT CHANGE UP (ref 10–45)
ANION GAP SERPL CALC-SCNC: 9 MMOL/L — SIGNIFICANT CHANGE UP (ref 5–17)
APTT BLD: 28.4 SEC — SIGNIFICANT CHANGE UP (ref 24.5–35.6)
AST SERPL-CCNC: 20 U/L — SIGNIFICANT CHANGE UP (ref 10–40)
BASOPHILS # BLD AUTO: 0.06 K/UL — SIGNIFICANT CHANGE UP (ref 0–0.2)
BASOPHILS NFR BLD AUTO: 1.1 % — SIGNIFICANT CHANGE UP (ref 0–2)
BILIRUB SERPL-MCNC: 0.5 MG/DL — SIGNIFICANT CHANGE UP (ref 0.2–1.2)
BUN SERPL-MCNC: 17 MG/DL — SIGNIFICANT CHANGE UP (ref 7–23)
CALCIUM SERPL-MCNC: 9.1 MG/DL — SIGNIFICANT CHANGE UP (ref 8.4–10.5)
CHLORIDE SERPL-SCNC: 101 MMOL/L — SIGNIFICANT CHANGE UP (ref 96–108)
CO2 SERPL-SCNC: 29 MMOL/L — SIGNIFICANT CHANGE UP (ref 22–31)
CREAT SERPL-MCNC: 0.76 MG/DL — SIGNIFICANT CHANGE UP (ref 0.5–1.3)
EGFR: 79 ML/MIN/1.73M2 — SIGNIFICANT CHANGE UP
EOSINOPHIL # BLD AUTO: 0.07 K/UL — SIGNIFICANT CHANGE UP (ref 0–0.5)
EOSINOPHIL NFR BLD AUTO: 1.3 % — SIGNIFICANT CHANGE UP (ref 0–6)
GLUCOSE SERPL-MCNC: 127 MG/DL — HIGH (ref 70–99)
HCT VFR BLD CALC: 36.2 % — SIGNIFICANT CHANGE UP (ref 34.5–45)
HGB BLD-MCNC: 11.7 G/DL — SIGNIFICANT CHANGE UP (ref 11.5–15.5)
IMM GRANULOCYTES NFR BLD AUTO: 0.2 % — SIGNIFICANT CHANGE UP (ref 0–0.9)
INR BLD: 0.99 — SIGNIFICANT CHANGE UP (ref 0.85–1.18)
LYMPHOCYTES # BLD AUTO: 1.93 K/UL — SIGNIFICANT CHANGE UP (ref 1–3.3)
LYMPHOCYTES # BLD AUTO: 34.8 % — SIGNIFICANT CHANGE UP (ref 13–44)
MCHC RBC-ENTMCNC: 31.1 PG — SIGNIFICANT CHANGE UP (ref 27–34)
MCHC RBC-ENTMCNC: 32.3 GM/DL — SIGNIFICANT CHANGE UP (ref 32–36)
MCV RBC AUTO: 96.3 FL — SIGNIFICANT CHANGE UP (ref 80–100)
MONOCYTES # BLD AUTO: 0.45 K/UL — SIGNIFICANT CHANGE UP (ref 0–0.9)
MONOCYTES NFR BLD AUTO: 8.1 % — SIGNIFICANT CHANGE UP (ref 2–14)
NEUTROPHILS # BLD AUTO: 3.02 K/UL — SIGNIFICANT CHANGE UP (ref 1.8–7.4)
NEUTROPHILS NFR BLD AUTO: 54.5 % — SIGNIFICANT CHANGE UP (ref 43–77)
NRBC # BLD: 0 /100 WBCS — SIGNIFICANT CHANGE UP (ref 0–0)
PLATELET # BLD AUTO: 202 K/UL — SIGNIFICANT CHANGE UP (ref 150–400)
POTASSIUM SERPL-MCNC: 3.8 MMOL/L — SIGNIFICANT CHANGE UP (ref 3.5–5.3)
POTASSIUM SERPL-SCNC: 3.8 MMOL/L — SIGNIFICANT CHANGE UP (ref 3.5–5.3)
PROT SERPL-MCNC: 7 G/DL — SIGNIFICANT CHANGE UP (ref 6–8.3)
PROTHROM AB SERPL-ACNC: 11.3 SEC — SIGNIFICANT CHANGE UP (ref 9.5–13)
RBC # BLD: 3.76 M/UL — LOW (ref 3.8–5.2)
RBC # FLD: 13.8 % — SIGNIFICANT CHANGE UP (ref 10.3–14.5)
SODIUM SERPL-SCNC: 139 MMOL/L — SIGNIFICANT CHANGE UP (ref 135–145)
WBC # BLD: 5.54 K/UL — SIGNIFICANT CHANGE UP (ref 3.8–10.5)
WBC # FLD AUTO: 5.54 K/UL — SIGNIFICANT CHANGE UP (ref 3.8–10.5)

## 2024-01-29 PROCEDURE — 84484 ASSAY OF TROPONIN QUANT: CPT

## 2024-01-29 PROCEDURE — 92612 ENDOSCOPY SWALLOW (FEES) VID: CPT

## 2024-01-29 PROCEDURE — 83525 ASSAY OF INSULIN: CPT

## 2024-01-29 PROCEDURE — 92526 ORAL FUNCTION THERAPY: CPT

## 2024-01-29 PROCEDURE — 82607 VITAMIN B-12: CPT

## 2024-01-29 PROCEDURE — 84206 ASSAY OF PROINSULIN: CPT

## 2024-01-29 PROCEDURE — 97530 THERAPEUTIC ACTIVITIES: CPT

## 2024-01-29 PROCEDURE — 86901 BLOOD TYPING SEROLOGIC RH(D): CPT

## 2024-01-29 PROCEDURE — 70450 CT HEAD/BRAIN W/O DYE: CPT

## 2024-01-29 PROCEDURE — 83690 ASSAY OF LIPASE: CPT

## 2024-01-29 PROCEDURE — 84681 ASSAY OF C-PEPTIDE: CPT

## 2024-01-29 PROCEDURE — 87040 BLOOD CULTURE FOR BACTERIA: CPT

## 2024-01-29 PROCEDURE — 97165 OT EVAL LOW COMPLEX 30 MIN: CPT

## 2024-01-29 PROCEDURE — 97110 THERAPEUTIC EXERCISES: CPT

## 2024-01-29 PROCEDURE — A9585: CPT

## 2024-01-29 PROCEDURE — 85025 COMPLETE CBC W/AUTO DIFF WBC: CPT

## 2024-01-29 PROCEDURE — 93005 ELECTROCARDIOGRAM TRACING: CPT

## 2024-01-29 PROCEDURE — 86900 BLOOD TYPING SEROLOGIC ABO: CPT

## 2024-01-29 PROCEDURE — 97161 PT EVAL LOW COMPLEX 20 MIN: CPT

## 2024-01-29 PROCEDURE — 97164 PT RE-EVAL EST PLAN CARE: CPT

## 2024-01-29 PROCEDURE — 70551 MRI BRAIN STEM W/O DYE: CPT

## 2024-01-29 PROCEDURE — 80053 COMPREHEN METABOLIC PANEL: CPT

## 2024-01-29 PROCEDURE — 94640 AIRWAY INHALATION TREATMENT: CPT

## 2024-01-29 PROCEDURE — 87899 AGENT NOS ASSAY W/OPTIC: CPT

## 2024-01-29 PROCEDURE — 84443 ASSAY THYROID STIM HORMONE: CPT

## 2024-01-29 PROCEDURE — 83605 ASSAY OF LACTIC ACID: CPT

## 2024-01-29 PROCEDURE — 85610 PROTHROMBIN TIME: CPT

## 2024-01-29 PROCEDURE — 78608 BRAIN IMAGING (PET): CPT

## 2024-01-29 PROCEDURE — 82010 KETONE BODYS QUAN: CPT

## 2024-01-29 PROCEDURE — 87449 NOS EACH ORGANISM AG IA: CPT

## 2024-01-29 PROCEDURE — 92610 EVALUATE SWALLOWING FUNCTION: CPT

## 2024-01-29 PROCEDURE — 82803 BLOOD GASES ANY COMBINATION: CPT

## 2024-01-29 PROCEDURE — 84436 ASSAY OF TOTAL THYROXINE: CPT

## 2024-01-29 PROCEDURE — 81001 URINALYSIS AUTO W/SCOPE: CPT

## 2024-01-29 PROCEDURE — A9552: CPT

## 2024-01-29 PROCEDURE — 99291 CRITICAL CARE FIRST HOUR: CPT

## 2024-01-29 PROCEDURE — 80377 DRUG/SUBSTANCE NOS 7/MORE: CPT

## 2024-01-29 PROCEDURE — 70553 MRI BRAIN STEM W/O & W/DYE: CPT

## 2024-01-29 PROCEDURE — 97535 SELF CARE MNGMENT TRAINING: CPT

## 2024-01-29 PROCEDURE — 86850 RBC ANTIBODY SCREEN: CPT

## 2024-01-29 PROCEDURE — 80061 LIPID PANEL: CPT

## 2024-01-29 PROCEDURE — 83735 ASSAY OF MAGNESIUM: CPT

## 2024-01-29 PROCEDURE — 71045 X-RAY EXAM CHEST 1 VIEW: CPT

## 2024-01-29 PROCEDURE — 84132 ASSAY OF SERUM POTASSIUM: CPT

## 2024-01-29 PROCEDURE — 84145 PROCALCITONIN (PCT): CPT

## 2024-01-29 PROCEDURE — 78999 UNLISTED MISC PX DX NUC MED: CPT

## 2024-01-29 PROCEDURE — 84100 ASSAY OF PHOSPHORUS: CPT

## 2024-01-29 PROCEDURE — 84439 ASSAY OF FREE THYROXINE: CPT

## 2024-01-29 PROCEDURE — 82962 GLUCOSE BLOOD TEST: CPT

## 2024-01-29 PROCEDURE — 84295 ASSAY OF SERUM SODIUM: CPT

## 2024-01-29 PROCEDURE — 85730 THROMBOPLASTIN TIME PARTIAL: CPT

## 2024-01-29 PROCEDURE — 97168 OT RE-EVAL EST PLAN CARE: CPT

## 2024-01-29 PROCEDURE — 82746 ASSAY OF FOLIC ACID SERUM: CPT

## 2024-01-29 PROCEDURE — 82533 TOTAL CORTISOL: CPT

## 2024-01-29 PROCEDURE — 71250 CT THORAX DX C-: CPT | Mod: MA

## 2024-01-29 PROCEDURE — 84425 ASSAY OF VITAMIN B-1: CPT

## 2024-01-29 PROCEDURE — 36415 COLL VENOUS BLD VENIPUNCTURE: CPT

## 2024-01-29 PROCEDURE — 93306 TTE W/DOPPLER COMPLETE: CPT

## 2024-01-29 PROCEDURE — 87640 STAPH A DNA AMP PROBE: CPT

## 2024-01-29 PROCEDURE — 75572 CT HRT W/3D IMAGE: CPT

## 2024-01-29 PROCEDURE — 82150 ASSAY OF AMYLASE: CPT

## 2024-01-29 PROCEDURE — 87641 MR-STAPH DNA AMP PROBE: CPT

## 2024-01-29 PROCEDURE — 82330 ASSAY OF CALCIUM: CPT

## 2024-01-29 PROCEDURE — 99214 OFFICE O/P EST MOD 30 MIN: CPT

## 2024-01-29 PROCEDURE — 70547 MR ANGIOGRAPHY NECK W/O DYE: CPT

## 2024-01-29 PROCEDURE — 93970 EXTREMITY STUDY: CPT

## 2024-01-29 PROCEDURE — 0225U NFCT DS DNA&RNA 21 SARSCOV2: CPT

## 2024-01-29 PROCEDURE — 70544 MR ANGIOGRAPHY HEAD W/O DYE: CPT

## 2024-01-29 PROCEDURE — 86337 INSULIN ANTIBODIES: CPT

## 2024-01-29 PROCEDURE — 95705 EEG W/O VID 2-12 HR UNMNTR: CPT

## 2024-01-29 PROCEDURE — 83036 HEMOGLOBIN GLYCOSYLATED A1C: CPT

## 2024-01-29 PROCEDURE — 84480 ASSAY TRIIODOTHYRONINE (T3): CPT

## 2024-01-29 PROCEDURE — 80048 BASIC METABOLIC PNL TOTAL CA: CPT

## 2024-01-29 PROCEDURE — 70545 MR ANGIOGRAPHY HEAD W/DYE: CPT

## 2024-01-29 PROCEDURE — 95700 EEG CONT REC W/VID EEG TECH: CPT

## 2024-01-29 PROCEDURE — 85027 COMPLETE CBC AUTOMATED: CPT

## 2024-01-29 PROCEDURE — 97116 GAIT TRAINING THERAPY: CPT

## 2024-01-30 PROBLEM — I63.9 ISCHEMIC STROKE: Status: ACTIVE | Noted: 2023-10-22

## 2024-02-01 ENCOUNTER — APPOINTMENT (OUTPATIENT)
Dept: PULMONOLOGY | Facility: CLINIC | Age: 84
End: 2024-02-01

## 2024-02-04 NOTE — PLAN
[TextEntry] : (1) Schedule patient for CTA Watchman and follow up with Dr. Rivas to discuss eligibility  (2) Dr. Rivas to discuss with neurology for DPAT (3) Continue cardiology and medication management with Dr. Kami HATHAWAY, Dr. Artur Rivas, personally performed the evaluation and management (E/M) services for this new patient. That E/M includes conducting the clinically appropriate initial history &/or exam, assessing all conditions, and establishing the plan of care. Today, my JARRET, noted herewith, was here to observe my evaluation and management service for this patient & follow plan of care established by me going forward.

## 2024-02-04 NOTE — PHYSICAL EXAM
[Well Developed] : well developed [No Acute Distress] : no acute distress [Normal S1, S2] : normal S1, S2 [Clear Lung Fields] : clear lung fields [No Respiratory Distress] : no respiratory distress  [Soft] : abdomen soft [Non Tender] : non-tender [No Edema] : no edema [No Rash] : no rash [Moves all extremities] : moves all extremities [Normal Speech] : normal speech [Alert and Oriented] : alert and oriented [de-identified] : walks with a walker

## 2024-02-04 NOTE — HISTORY OF PRESENT ILLNESS
[FreeTextEntry1] : 83 year female with PMH of h hypertension, HFpEF, atrial fibrillation (diagnosed 5/2023, not on anticoagulation due to fall risk and subarachnoid hemorrhage), prior CVAs, chronic bronchiectasis (not requiring home oxygen), pulmonary hypertension (attributed to COPD and HFpEF), and CAD (coronary calcifications incidentally noted on 12/14/23 chest CTA), recent left pontine and left occipital infarct as well as SAH of the right parieto-occipital region, chronic diastolic heart failure with mild to moderate tricuspid regurgitation, who was referred for further evaluation of her valvular disease as well as possible left atrial appendage closure evaluation.   Patient was admitted to the hospital on 9/19/23 presenting with diplopia, encephalopathy admitted for stroke work-up. Repeat head CT found small SAH complicated by hypoglycemia and encephalopathy. Transferred to MICU for persistent hypoglycemia. Brain imaging significant for left pontine and left occipital infarct as well as SAH of the right parieto-occipital region.  MRA head and Neck were negative for vascular malformation or significant stenosis. MRV was negative for thrombus. PET brain obtained, findings consistent with underlying neurodegenerative disease, pattern most suggestive of dementia. 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  Patient was discharged to acute rehab on Aspirin 81mg, Atorvastatin 40mg, and Donepezil 5mg at bedtime.  Patient represented to [facility] ED on 11/3/2023 complaining of worsening SOB and LE edema.  Patient was admitted for acute diastolic CHF requiring IV Lasix transitioned to Lasix 20 mg PO.  Pulmonary consulted for bronchietasis and was instructed to continue Xopenox and home oxygen.  Hospital course complicated by Cdiff on 11/5/2023 started on Vanco 125 mg PO 12 hours.   The patient was brought in by EMS on 12/14/2023 for stroke rule out and hypoxia.  The patient arrived with an O2 Sat of 84% which improved to 94% on 4 liters of supplemental nasal cannula.  Patient received zosyn 4.5g, symbicort, duonebs, aerobkia, and chest pt.  The patient followed up with Dr. Foster outpatient who believes her problem is vacillating neurologic dysfx with some changes in resp drive.  The patient was discharged home on 12/19/2023.    At the time, there was no evidence of an acute stroke.  The patient had an MRI on 1/3/2024 which showed a new stroke while taking Aspirin.  Neurology is recommending Watchman.  Will need to further discuss ability to take DAPT after procedure.  The patient saw Dr. Mcclure to establish cardiology care.  Dr. Mcclure recommended and ordered a nuclear stress test based on coronary calcifications incidentally noted on 12/14/23 chest CTA and increased shortness of breath with exertion.  This is scheduled for 2/26/2024.    The patient states she has shortness of breath with exertion.  She denies chest pain, dizziness, syncope, orthopnea, PND, or LE edema.  She has had no bleeding episodes since the subarachnoid hemorrhage in September.

## 2024-02-04 NOTE — ASSESSMENT
[FreeTextEntry1] : 83 year female with PMH of h hypertension, HFpEF, atrial fibrillation (diagnosed 5/2023, not on anticoagulation due to fall risk and subarachnoid hemorrhage), prior CVAs, chronic bronchiectasis (not requiring home oxygen), pulmonary hypertension (attributed to COPD and HFpEF), and CAD (coronary calcifications incidentally noted on 12/14/23 chest CTA), recent left pontine and left occipital infarct as well as SAH of the right parieto-occipital region, chronic diastolic heart failure with mild to moderate tricuspid regurgitation, who was referred for further evaluation of her valvular disease as well as possible left atrial appendage closure evaluation.  The patient is clinically stable.  Dr. Rivas recommends the patient have a CTA Watchman protocol to determine if the patient is a candidate for the left atrial appendage closure procedure.  The procedure was discussed in detail with the patient and her daughter.  Dr. Rivas needs to also further discuss with Dr. Toribio and her neurology team to determine if it is safe for the patient to be on DPAT for at least 45 days after the procedure.  This was discussed with the patient and her daughter and they agree to proceed with testing and further discussion.  All questions answered.   30 minutes spent with the patient

## 2024-02-04 NOTE — RESULTS/DATA
[TextEntry] : Echocardiogram 11/7/23 revealed normal LV and RV size and function, mild AR, mild to moderate TR, Pulmonary HTN PASP 51 mm Hg, no pericardial effusion.  ECHO on 09/19/2023- 1. Technically difficult study. 2. Normal left ventricular size and systolic function. 3. Mildly dilated right ventricular size. Normal right ventricular systolic function. 4. Dilated right atrium. 5. Aortic sclerosis without significant stenosis. 6. Mild-to-moderate aortic regurgitation. 7. Mild mitral regurgitation.8. Moderate-to-severe tricuspid regurgitation. 9. Severe pulmonary hypertension present, pulmonary artery systolic pressure is 73 mmHg.10. No pericardial effusion.11. No prior echo is available for comparison.  CT heart on 10/5/2023- Cardiac:1.  There are 4 pulmonary veins; 2 on the left and 3 on the right.2.  Left atrial appendage measures 23.3 x 20.5 mm at the ostium3.  Left atrial appendage measures 21 mm in depth from the ostium.4.  No left atrial appendage or left atrial thrombus.

## 2024-02-15 ENCOUNTER — RESULT REVIEW (OUTPATIENT)
Age: 84
End: 2024-02-15

## 2024-02-16 ENCOUNTER — APPOINTMENT (OUTPATIENT)
Dept: PAIN MANAGEMENT | Facility: CLINIC | Age: 84
End: 2024-02-16
Payer: MEDICARE

## 2024-02-16 ENCOUNTER — NON-APPOINTMENT (OUTPATIENT)
Age: 84
End: 2024-02-16

## 2024-02-16 DIAGNOSIS — R07.89 OTHER CHEST PAIN: ICD-10-CM

## 2024-02-16 DIAGNOSIS — G89.29 DORSALGIA, UNSPECIFIED: ICD-10-CM

## 2024-02-16 DIAGNOSIS — M54.9 DORSALGIA, UNSPECIFIED: ICD-10-CM

## 2024-02-16 PROCEDURE — 99214 OFFICE O/P EST MOD 30 MIN: CPT

## 2024-02-16 PROCEDURE — G2211 COMPLEX E/M VISIT ADD ON: CPT

## 2024-02-16 RX ORDER — LIDOCAINE AND PRILOCAINE 25; 25 MG/G; MG/G
2.5-2.5 CREAM TOPICAL EVERY 4 HOURS
Qty: 1 | Refills: 1 | Status: ACTIVE | COMMUNITY
Start: 2024-02-16 | End: 1900-01-01

## 2024-02-16 RX ORDER — CAPSAICIN 0.07 G/100G
0.07 CREAM TOPICAL 4 TIMES DAILY
Qty: 1 | Refills: 0 | Status: ACTIVE | COMMUNITY
Start: 2024-02-16 | End: 1900-01-01

## 2024-02-20 ENCOUNTER — NON-APPOINTMENT (OUTPATIENT)
Age: 84
End: 2024-02-20

## 2024-02-22 NOTE — PHYSICAL EXAM
[de-identified] :  Constitutional: Normal, well developed, no acute distress on audio/video examination Eyes: Symmetric, External structures on video examination ENT: Lips, mucosa and tongue normal on video examination Oropharynx: Lips normal, symmetric, no external lesions appreciated appreciated on video examination Respiratory: Non-labored breathing, no audible wheezes appreciated on audio/video examination Vascular: No cyanosis appreciated or edema appreciated on video examination GI:  no jaundice appreciated on video examination Neurovascular: CN grossly intact on video/audio examination, alert MSK: Normal muscle bulk on video examination

## 2024-02-22 NOTE — ASSESSMENT
[FreeTextEntry1] : 83 yof w/ severe mid back and left rib pain, most consistent with postherpetic neuralgia.  I have personally reviewed the patient's MRI in detail and discussed it with them which is significant for disc bulges in the thoracic spine.  As she is doing well continue conservative care.   Physical therapy prescribed - goal will be to increase ROM, strengthening, postural training, other modalities ad homa which may include massage and stim. Goals of therapy discussed with the patient in detail and will be discussed with physical therapist. Patient will follow-up following course of physical therapy to monitor progress and adjust therapy as needed.  Acetaminophen 1,000 mg q8h prn for moderate pain. Risks, benefits, and alternatives of acetaminophen discussed with patient.   Diet and nutritional strategies discussed which may improve patients pain and will improve overall health.  Continue tizanidine 2 mg prn  trial EMLA and capsaicin  Thoracic pain secondary to neuropathic pain secondary to postherpetic neuralgia, will schedule repeat intercostal nerve block with Dr. Oakley r/b/a discussed   may consider thoracic chuy   > Longitudinal management of Complex Painful condition   The patient is being managed for a complex condition that requires ongoing management.  The nature of this condition demands nuanced approach to treatment.  The seriousness of the condition necessitates an in-depth and focused approach to management and coordination with other healthcare professionals.    This visit involves intricate evaluation and management of the patient's condition.  The complexity of the visit was due to the need for detailed assessment of the current state, consideration of potential complications and a careful balancing of treatment options to management the chronic condition effectively.   As detailed above, the patient has a chronic significant painful condition that requires regular and detailed management.  The condition's impact on the patient's quality of life and health is substantial and necessitates a comprehensive and tailored approach   >> Conclusion   There were no barriers to communication. Informed patient that I would be available for any additional questions. Patient was instructed to call with any worsening symptoms including severe pain, new numbness/weakness, or changes in the bowel/bladder function. Discussed role of nsaids in pain management and all relevant risks, if patient is continuing to require after 4 weeks the patient should f/u for alternative treatment. Instructed patient to maintain pain diary to monitor pain level, mobility, and function.  I explained to patient benefits and limitation of TeleMedicine visits  Patient understands that limitations include inability to perform comprehensive physical exam, which may lead to potential diagnostic inconsistencies.    Any scheduled procedures are based on history, imaging and limited physical exam performed on TeleHealth visit.  If necessary, additional focal physical exam will be performed on date of procedure  Patient understands that diagnosis and treatment may be limited by these inconsistencies and patient agrees to proceed with care plan

## 2024-02-22 NOTE — HISTORY OF PRESENT ILLNESS
[FreeTextEntry1] : Interval History: Patient returns now with worsening left rib and thoracic pain.  Previously much improved after intercostal nerve block. But now having severe pain.  Patient reports that she is unable to tolerate any systemic medications including opioids or gabapentin.  HPI: 83 yof presents w/ severe mid back and left rib pain. Also with low back pain. Quality of life is impaired. There has been a severe exacerbation of the patient's chronic pain. She has been hospitalized for numerous health issues recently. Major side effects with multiple medications including opioids and gabapentin with no relief.  [FreeTextEntry7] : thoracic (4) walks frequently

## 2024-02-25 RX ORDER — ATORVASTATIN CALCIUM 40 MG/1
40 TABLET, FILM COATED ORAL
Qty: 90 | Refills: 1 | Status: ACTIVE | COMMUNITY
Start: 2023-11-17 | End: 1900-01-01

## 2024-02-25 RX ORDER — DONEPEZIL HYDROCHLORIDE 5 MG/1
5 TABLET ORAL DAILY
Qty: 90 | Refills: 1 | Status: ACTIVE | COMMUNITY
Start: 2023-11-17 | End: 1900-01-01

## 2024-02-26 ENCOUNTER — APPOINTMENT (OUTPATIENT)
Dept: CARDIOTHORACIC SURGERY | Facility: CLINIC | Age: 84
End: 2024-02-26
Payer: MEDICARE

## 2024-02-26 PROCEDURE — 99214 OFFICE O/P EST MOD 30 MIN: CPT

## 2024-02-26 NOTE — END OF VISIT
[FreeTextEntry3] : I, Jenni Leo, am scribing for Dr. Artur Rivas the following sections: HISTORY OF PRESENT ILLNESS, PAST MEDICAL/FAMILY/SOCIAL HISTORY, REVIEW OF SYSTEMS, VITAL SIGNS, PHYSICAL EXAM AND DISPOSITION.   I personally performed the services described in the documentation and reviewed the documented recorded by the scribe in my presence; it accurately and completely records my words and actions.

## 2024-02-26 NOTE — ASSESSMENT
[FreeTextEntry1] : 83 year female with PMH of h hypertension, HFpEF, atrial fibrillation (diagnosed 5/2023, not on anticoagulation due to fall risk and subarachnoid hemorrhage), prior CVAs, chronic bronchiectasis (not requiring home oxygen), pulmonary hypertension (attributed to COPD and HFpEF), and CAD (coronary calcifications incidentally noted on 12/14/23 chest CTA), recent left pontine and left occipital infarct as well as SAH of the right parieto-occipital region, chronic diastolic heart failure with mild to moderate tricuspid regurgitation, who presents for follow up after testing.  The patient is clinically stable.  The CT Cardiac was independently reviewed by Dr. Rivas which showed no thrombus in the appendage and the patient is a candidate for a left atrial appendage closure procedure.  Th procedure and its risks including bleeding, stroke, the device movement, etc. were discussed in detail with the patient and her daughter.  Dr. Rivas would like to model the patient's CT to determine which device is best suitable for the patient; Watchman vs. Amulet.  This was discussed with the patient and her daughter.  We will call the patient to schedule the procedure at their convenience.  All questions answered.

## 2024-02-26 NOTE — PLAN
[TextEntry] :  (1) Schedule Left Atrial Appendage Closure procedure (2) Continue cardiology care and medication regimen with Dr. Mcclure

## 2024-02-26 NOTE — REASON FOR VISIT
[Home] : at home, [unfilled] , at the time of the visit. [Medical Office: ([facility])___] : at the medical office located in  [Family Member] : family member

## 2024-02-26 NOTE — HISTORY OF PRESENT ILLNESS
[FreeTextEntry1] : 83 year female with PMH of h hypertension, HFpEF, atrial fibrillation (diagnosed 5/2023, not on anticoagulation due to fall risk and subarachnoid hemorrhage), prior CVAs, chronic bronchiectasis (not requiring home oxygen), pulmonary hypertension (attributed to COPD and HFpEF), and CAD (coronary calcifications incidentally noted on 12/14/23 chest CTA), recent left pontine and left occipital infarct as well as SAH of the right parieto-occipital region, chronic diastolic heart failure with mild to moderate tricuspid regurgitation, who presents for follow up.    Patient was admitted to the hospital on 9/19/23 presenting with diplopia, encephalopathy admitted for stroke work-up. Repeat head CT found small SAH complicated by hypoglycemia and encephalopathy. Transferred to MICU for persistent hypoglycemia. Brain imaging significant for left pontine and left occipital infarct as well as SAH of the right parieto-occipital region.  MRA head and Neck were negative for vascular malformation or significant stenosis. MRV was negative for thrombus. PET brain obtained, findings consistent with underlying neurodegenerative disease, pattern most suggestive of dementia. 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  Patient was discharged to acute rehab on Aspirin 81mg, Atorvastatin 40mg, and Donepezil 5mg at bedtime.  Patient represented to [facility] ED on 11/3/2023 complaining of worsening SOB and LE edema.  Patient was admitted for acute diastolic CHF requiring IV Lasix transitioned to Lasix 20 mg PO.  Pulmonary consulted for bronchietasis and was instructed to continue Xopenox and home oxygen.  Hospital course complicated by Cdiff on 11/5/2023 started on Vanco 125 mg PO 12 hours.   The patient was brought in by EMS on 12/14/2023 for stroke rule out and hypoxia.  The patient arrived with an O2 Sat of 84% which improved to 94% on 4 liters of supplemental nasal cannula.  Patient received zosyn 4.5g, symbicort, duonebs, aerobkia, and chest pt.  The patient followed up with Dr. Foster outpatient who believes her problem is vacillating neurologic dysfx with some changes in resp drive.  The patient was discharged home on 12/19/2023.    At the time, there was no evidence of an acute stroke.  The patient had an MRI on 1/3/2024 which showed a new stroke while taking Aspirin.  Neurology is recommending Watchman.  Will need to further discuss ability to take DAPT after procedure.  The patient saw Dr. Mcclure to establish cardiology care.  Dr. Mcclure recommended and ordered a nuclear stress test based on coronary calcifications incidentally noted on 12/14/23 chest CTA and increased shortness of breath with exertion.  This is scheduled for 2/26/2024.    Dr. Rivas and Dr. Hansen discussed the patient and determined it was safe for the patient to be on DPAT for at least 45 days after left atrial appendage closure procedure.    The patient had a CT Cardiac on 2/15/24 which showed:  1.  There are 4 pulmonary veins; 2 on the left and 2 on the right.  2.  Left atrial appendage 24 mm x 20.4 mm at the ostium with a depth of 23.7 mm.  3.  No left atrial appendage or left atrial thrombus. Non cardiac:  No significant change in the bilateral bronchiectasis, endobronchial plugging, scattered small lung nodules and tree-in-bud nodules. Findings are consistent with nonspecific infectious or inflammatory etiologies, including but not limited to YASMIN infection or fungal infection such as allergic bronchopulmonary aspergillosis in the appropriate clinical setting. Recommend clinical correlation.  Since her last visit, the patient continues to complain of shortness of breath with exertion.  She uses supplemental oxygen a few times a day.  She complains of palpitations at times and LE edema.  She has balance issues in which she feels dizzy at times.  She denies chest pain, shortness of breath at rest, syncope, orthopnea, or PND.  She denies any bleeding episodes.

## 2024-02-26 NOTE — RESULTS/DATA
[TextEntry] : The patient had a CT Cardiac on 2/15/24 which showed:  1.  There are 4 pulmonary veins; 2 on the left and 2 on the right.  2.  Left atrial appendage 24 mm x 20.4 mm at the ostium with a depth of 23.7 mm.  3.  No left atrial appendage or left atrial thrombus. Non cardiac:  No significant change in the bilateral bronchiectasis, endobronchial plugging, scattered small lung nodules and tree-in-bud nodules. Findings are consistent with nonspecific infectious or inflammatory etiologies, including but not limited to YASMIN infection or fungal infection such as allergic bronchopulmonary aspergillosis in the appropriate clinical setting. Recommend clinical correlation.  Echocardiogram 11/7/23 revealed normal LV and RV size and function, mild AR, mild to moderate TR, Pulmonary HTN PASP 51 mm Hg, no pericardial effusion.  ECHO on 09/19/2023- 1. Technically difficult study. 2. Normal left ventricular size and systolic function. 3. Mildly dilated right ventricular size. Normal right ventricular systolic function. 4. Dilated right atrium. 5. Aortic sclerosis without significant stenosis. 6. Mild-to-moderate aortic regurgitation. 7. Mild mitral regurgitation.8. Moderate-to-severe tricuspid regurgitation. 9. Severe pulmonary hypertension present, pulmonary artery systolic pressure is 73 mmHg.10. No pericardial effusion.11. No prior echo is available for comparison.  CT heart on 10/5/2023- Cardiac:1.  There are 4 pulmonary veins; 2 on the left and 3 on the right.2.  Left atrial appendage measures 23.3 x 20.5 mm at the ostium3.  Left atrial appendage measures 21 mm in depth from the ostium.4.  No left atrial appendage or left atrial thrombus.

## 2024-02-27 ENCOUNTER — APPOINTMENT (OUTPATIENT)
Dept: PAIN MANAGEMENT | Facility: CLINIC | Age: 84
End: 2024-02-27
Payer: MEDICARE

## 2024-02-27 VITALS
WEIGHT: 85 LBS | DIASTOLIC BLOOD PRESSURE: 68 MMHG | SYSTOLIC BLOOD PRESSURE: 118 MMHG | HEIGHT: 64 IN | BODY MASS INDEX: 14.51 KG/M2

## 2024-02-27 PROCEDURE — 64421 NJX AA&/STRD NTRCOST NRV EA: CPT | Mod: LT

## 2024-02-27 PROCEDURE — 64420 NJX AA&/STRD NTRCOST NRV 1: CPT | Mod: LT

## 2024-02-27 PROCEDURE — 76942 ECHO GUIDE FOR BIOPSY: CPT

## 2024-02-27 RX ADMIN — BUPIVACAINE HYDROCHLORIDE 0 %: 7.5 INJECTION, SOLUTION EPIDURAL; RETROBULBAR at 00:00

## 2024-02-27 RX ADMIN — LIDOCAINE HYDROCHLORIDE %: 10 INJECTION, SOLUTION INFILTRATION; PERINEURAL at 00:00

## 2024-02-27 RX ADMIN — TRIAMCINOLONE ACETONIDE 0 MG/ML: 10 INJECTION, SUSPENSION INTRA-ARTICULAR; INTRALESIONAL at 00:00

## 2024-03-01 ENCOUNTER — APPOINTMENT (OUTPATIENT)
Dept: OPHTHALMOLOGY | Facility: CLINIC | Age: 84
End: 2024-03-01

## 2024-03-01 RX ORDER — FUROSEMIDE 20 MG/1
20 TABLET ORAL
Qty: 90 | Refills: 2 | Status: ACTIVE | COMMUNITY
Start: 2023-10-27 | End: 1900-01-01

## 2024-03-05 RX ORDER — SODIUM CHLORIDE FOR INHALATION 0.9 %
0.9 VIAL, NEBULIZER (ML) INHALATION
Qty: 60 | Refills: 6 | Status: ACTIVE | COMMUNITY
Start: 2024-03-05 | End: 1900-01-01

## 2024-03-13 ENCOUNTER — APPOINTMENT (OUTPATIENT)
Dept: PAIN MANAGEMENT | Facility: CLINIC | Age: 84
End: 2024-03-13

## 2024-03-13 DIAGNOSIS — Z91.041 RADIOGRAPHIC DYE ALLERGY STATUS: ICD-10-CM

## 2024-03-13 RX ORDER — PREDNISONE 50 MG/1
50 TABLET ORAL EVERY 6 HOURS
Qty: 3 | Refills: 0 | Status: ACTIVE | COMMUNITY
Start: 2024-03-13 | End: 1900-01-01

## 2024-03-14 ENCOUNTER — APPOINTMENT (OUTPATIENT)
Dept: CARDIOLOGY | Facility: CLINIC | Age: 84
End: 2024-03-14
Payer: MEDICARE

## 2024-03-14 VITALS
HEART RATE: 57 BPM | SYSTOLIC BLOOD PRESSURE: 144 MMHG | WEIGHT: 85 LBS | BODY MASS INDEX: 14.51 KG/M2 | RESPIRATION RATE: 14 BRPM | OXYGEN SATURATION: 96 % | DIASTOLIC BLOOD PRESSURE: 60 MMHG | HEIGHT: 64 IN

## 2024-03-14 PROCEDURE — 99214 OFFICE O/P EST MOD 30 MIN: CPT

## 2024-03-14 NOTE — PROCEDURE
[FreeTextEntry1] : LEFT Intercostal Nerve Block under Ultrasound Guidance:  Time-out was taken to identify the correct patient, procedure and side prior to starting the procedure. Lying in a prone position, the patient was prepped and draped in the usual sterile fashion using Chlora Prep and surgical towels. The target rib, [8  ]  was identified under ultrasound and the skin was superficially anesthetized with 1% lidocaine using a 30g 0.5" needle. Next, a 25g 1.5 inch needle was used to advance to the inferior aspect of the rib. After a negative aspiration, [1  ] ml of a mix of bupivacaine 0.75%] and [5mg kenalog] was then given at this level. The procedure was repeated at [  9, 10, [ 11 ].  The procedure was completed without complications and was tolerated well. The patient was monitored after the procedure

## 2024-03-14 NOTE — HISTORY OF PRESENT ILLNESS
[FreeTextEntry1] : 84 yo female with hypertension, HFpEF, atrial fibrillation (diagnosed 5/2023, not on anticoagulation due to fall risk and subarachnoid hemorrhage; currently pending MITZI closure device on 3/19/24 with Dr. Rivas), prior CVAs, chronic bronchiectasis (not requiring home oxygen), pulmonary hypertension (attributed to COPD and HFpEF), and CAD (coronary calcifications incidentally noted on 12/14/23 chest CTA). Patient presents today for follow-up. She reports significant fatigue with HR in 50s but she also reports transient episodes of rapid palpitations which fortunately only last for several minutes before resolving. She is inquiring if her metoprolol dose can be decreased given her reported symptoms. She denies chest pain, syncope, edema, melena, hematochezia, or hematemesis.

## 2024-03-14 NOTE — PHYSICAL EXAM
[Well Developed] : well developed [No Acute Distress] : no acute distress [Well Nourished] : well nourished [Normal Venous Pressure] : normal venous pressure [Normal Conjunctiva] : normal conjunctiva [No Carotid Bruit] : no carotid bruit [Normal S1, S2] : normal S1, S2 [No Murmur] : no murmur [No Rub] : no rub [No Gallop] : no gallop [No Respiratory Distress] : no respiratory distress  [Rales / Crackles Bilateral] : crackles were heard diffusely over both lungs [Decreased Breath Sounds Unilaterally Left Lung Base] : breath sounds were diminished over the left base [No Edema] : no edema [No Cyanosis] : no cyanosis [No Clubbing] : no clubbing [Moves all extremities] : moves all extremities [No Focal Deficits] : no focal deficits [Normal Speech] : normal speech [Alert and Oriented] : alert and oriented [Normal memory] : normal memory

## 2024-03-14 NOTE — CARDIOLOGY SUMMARY
[de-identified] : 1/11/24 ECG: Sinus bradycardia, rate 59, PACs, poor R wave progression, cannot rule out anterior infarct [de-identified] : 10/3/23 Cardiac CT: Dilated LA and RA. MITZI measures 23.3 x 20.5 mm at ostium and 21 mm in depth from ostium. No LA or MITZI thrombus. Two pulmonary veins on left and 3 on right.  [de-identified] : 11/7/23 Echo (at [facility]): Normal LV size and systolic/diastolic function, LVEF 55%. Normal RV size and systolic function. Mild AR. Mild-mod TR. Pulm HTN with PASP 51 mmHg. Left pleural effusion. 9/19/23 Echo (at [facility]): TDS. Normal LV size and systolic function. Mildly dilated RV with normal systolic function. Dilated RA. AV sclerosis without stenosis. Mild-mod AR. Mild MR. Mod-severe TR. Severe pulm HTN with PASP 73 mmHg.

## 2024-03-14 NOTE — ASSESSMENT
[FreeTextEntry1] : 82 yo female with hypertension, HFpEF, atrial fibrillation (diagnosed 5/2023, not on anticoagulation due to fall risk and subarachnoid hemorrhage; currently pending evaluation for Watchman device), prior CVAs, chronic bronchiectasis (not requiring home oxygen), pulmonary hypertension (attributed to COPD and HFpEF), and CAD (coronary calcifications incidentally noted on 12/14/23 chest CTA).  ECG from   Patient with paroxysmal atrial fibrillation and prior CVAs. Patient is currently pending MITZI closure device with Dr. Rivas on 3/19/24.  Given reported fatigue due to bradycardia while in sinus, it was decided to decrease metoprolol tartrate from 50 mg po bid to 25 mg in AM and 50 mg in PM and monitor response. If no significant improvement in HR/symptoms, will reduce metoprolol dose further. However, it was also explained to patient that when she has her episodes of atrial fibrillation her rate may go higher due to reduction in beta-blocker dose. Given current intolerance to beta-blockers and symptomatic episodes of paroxysmal afib, patient was given contact information for EP for further evaluation/management of atrial fibrillation.   Patient is euvolemic/stable from HFpEF standpoint.  Will continue furosemide 20 mg po daily. Given incidentally noted coronary artery calcifications on 12/14/23 chest CTA, patient was to undergo Lexiscan nuclear stress test for further evaluation, but has yet to reschedule this test. Will continue aspirin 81 mg po daily and atorvastatin 40 mg po daily.  Will reduce metoprolol tartrate as above.   BP is currently normotensive. Will continue amlodipine 5 mg po daily, metoprolol (as above), and furosemide.

## 2024-03-18 ENCOUNTER — NON-APPOINTMENT (OUTPATIENT)
Age: 84
End: 2024-03-18

## 2024-03-18 VITALS
HEIGHT: 64 IN | TEMPERATURE: 98 F | HEART RATE: 56 BPM | OXYGEN SATURATION: 95 % | WEIGHT: 87.08 LBS | RESPIRATION RATE: 18 BRPM | DIASTOLIC BLOOD PRESSURE: 63 MMHG | SYSTOLIC BLOOD PRESSURE: 135 MMHG

## 2024-03-18 NOTE — PATIENT PROFILE ADULT - FALL HARM RISK - HARM RISK INTERVENTIONS
Assistance with ambulation/Assistance OOB with selected safe patient handling equipment/Communicate Risk of Fall with Harm to all staff/Discuss with provider need for PT consult/Monitor gait and stability/Orthostatic vital signs/Provide patient with walking aids - walker, cane, crutches/Reinforce activity limits and safety measures with patient and family/Review medications for side effects contributing to fall risk/Sit up slowly, dangle for a short time, stand at bedside before walking/Tailored Fall Risk Interventions/Toileting schedule using arm’s reach rule for commode and bathroom/Use of alarms - bed, chair and/or voice tab/Visual Cue: Yellow wristband and red socks/Bed in lowest position, wheels locked, appropriate side rails in place/Call bell, personal items and telephone in reach/Instruct patient to call for assistance before getting out of bed or chair/Non-slip footwear when patient is out of bed/[place] to call system/Physically safe environment - no spills, clutter or unnecessary equipment/Purposeful Proactive Rounding/Room/bathroom lighting operational, light cord in reach

## 2024-03-19 ENCOUNTER — RESULT REVIEW (OUTPATIENT)
Age: 84
End: 2024-03-19

## 2024-03-19 ENCOUNTER — INPATIENT (INPATIENT)
Facility: HOSPITAL | Age: 84
LOS: 0 days | Discharge: HOME | DRG: 273 | End: 2024-03-20
Attending: INTERNAL MEDICINE | Admitting: INTERNAL MEDICINE
Payer: MEDICARE

## 2024-03-19 ENCOUNTER — APPOINTMENT (OUTPATIENT)
Dept: CARDIOTHORACIC SURGERY | Facility: HOSPITAL | Age: 84
End: 2024-03-19

## 2024-03-19 DIAGNOSIS — Z00.6 ENCOUNTER FOR EXAMINATION FOR NORMAL COMPARISON AND CONTROL IN CLINICAL RESEARCH PROGRAM: ICD-10-CM

## 2024-03-19 DIAGNOSIS — Z87.81 PERSONAL HISTORY OF (HEALED) TRAUMATIC FRACTURE: Chronic | ICD-10-CM

## 2024-03-19 LAB
ALBUMIN SERPL ELPH-MCNC: 3.4 G/DL — SIGNIFICANT CHANGE UP (ref 3.3–5)
ALBUMIN SERPL ELPH-MCNC: 3.6 G/DL — SIGNIFICANT CHANGE UP (ref 3.3–5)
ALP SERPL-CCNC: 78 U/L — SIGNIFICANT CHANGE UP (ref 40–120)
ALP SERPL-CCNC: 82 U/L — SIGNIFICANT CHANGE UP (ref 40–120)
ALT FLD-CCNC: 18 U/L — SIGNIFICANT CHANGE UP (ref 10–45)
ALT FLD-CCNC: 19 U/L — SIGNIFICANT CHANGE UP (ref 10–45)
ANION GAP SERPL CALC-SCNC: 7 MMOL/L — SIGNIFICANT CHANGE UP (ref 5–17)
ANION GAP SERPL CALC-SCNC: 8 MMOL/L — SIGNIFICANT CHANGE UP (ref 5–17)
ANION GAP SERPL CALC-SCNC: 9 MMOL/L — SIGNIFICANT CHANGE UP (ref 5–17)
APTT BLD: 23.5 SEC — LOW (ref 24.5–35.6)
APTT BLD: >200 SEC — CRITICAL HIGH (ref 24.5–35.6)
APTT BLD: >200 SEC — CRITICAL HIGH (ref 24.5–35.6)
AST SERPL-CCNC: 19 U/L — SIGNIFICANT CHANGE UP (ref 10–40)
AST SERPL-CCNC: 21 U/L — SIGNIFICANT CHANGE UP (ref 10–40)
BASOPHILS # BLD AUTO: 0.01 K/UL — SIGNIFICANT CHANGE UP (ref 0–0.2)
BASOPHILS # BLD AUTO: 0.01 K/UL — SIGNIFICANT CHANGE UP (ref 0–0.2)
BASOPHILS NFR BLD AUTO: 0.1 % — SIGNIFICANT CHANGE UP (ref 0–2)
BASOPHILS NFR BLD AUTO: 0.1 % — SIGNIFICANT CHANGE UP (ref 0–2)
BILIRUB SERPL-MCNC: 0.4 MG/DL — SIGNIFICANT CHANGE UP (ref 0.2–1.2)
BILIRUB SERPL-MCNC: 0.4 MG/DL — SIGNIFICANT CHANGE UP (ref 0.2–1.2)
BLD GP AB SCN SERPL QL: NEGATIVE — SIGNIFICANT CHANGE UP
BUN SERPL-MCNC: 22 MG/DL — SIGNIFICANT CHANGE UP (ref 7–23)
BUN SERPL-MCNC: 22 MG/DL — SIGNIFICANT CHANGE UP (ref 7–23)
BUN SERPL-MCNC: 23 MG/DL — SIGNIFICANT CHANGE UP (ref 7–23)
CALCIUM SERPL-MCNC: 8.7 MG/DL — SIGNIFICANT CHANGE UP (ref 8.4–10.5)
CALCIUM SERPL-MCNC: 9.2 MG/DL — SIGNIFICANT CHANGE UP (ref 8.4–10.5)
CALCIUM SERPL-MCNC: 9.3 MG/DL — SIGNIFICANT CHANGE UP (ref 8.4–10.5)
CHLORIDE SERPL-SCNC: 100 MMOL/L — SIGNIFICANT CHANGE UP (ref 96–108)
CHLORIDE SERPL-SCNC: 105 MMOL/L — SIGNIFICANT CHANGE UP (ref 96–108)
CHLORIDE SERPL-SCNC: 98 MMOL/L — SIGNIFICANT CHANGE UP (ref 96–108)
CO2 SERPL-SCNC: 25 MMOL/L — SIGNIFICANT CHANGE UP (ref 22–31)
CO2 SERPL-SCNC: 26 MMOL/L — SIGNIFICANT CHANGE UP (ref 22–31)
CO2 SERPL-SCNC: 28 MMOL/L — SIGNIFICANT CHANGE UP (ref 22–31)
CREAT SERPL-MCNC: 0.78 MG/DL — SIGNIFICANT CHANGE UP (ref 0.5–1.3)
CREAT SERPL-MCNC: 0.83 MG/DL — SIGNIFICANT CHANGE UP (ref 0.5–1.3)
CREAT SERPL-MCNC: 0.84 MG/DL — SIGNIFICANT CHANGE UP (ref 0.5–1.3)
EGFR: 70 ML/MIN/1.73M2 — SIGNIFICANT CHANGE UP
EGFR: 71 ML/MIN/1.73M2 — SIGNIFICANT CHANGE UP
EGFR: 76 ML/MIN/1.73M2 — SIGNIFICANT CHANGE UP
EOSINOPHIL # BLD AUTO: 0 K/UL — SIGNIFICANT CHANGE UP (ref 0–0.5)
EOSINOPHIL # BLD AUTO: 0 K/UL — SIGNIFICANT CHANGE UP (ref 0–0.5)
EOSINOPHIL NFR BLD AUTO: 0 % — SIGNIFICANT CHANGE UP (ref 0–6)
EOSINOPHIL NFR BLD AUTO: 0 % — SIGNIFICANT CHANGE UP (ref 0–6)
GAS PNL BLDA: SIGNIFICANT CHANGE UP
GAS PNL BLDA: SIGNIFICANT CHANGE UP
GLUCOSE SERPL-MCNC: 125 MG/DL — HIGH (ref 70–99)
GLUCOSE SERPL-MCNC: 128 MG/DL — HIGH (ref 70–99)
GLUCOSE SERPL-MCNC: 143 MG/DL — HIGH (ref 70–99)
HCT VFR BLD CALC: 31.7 % — LOW (ref 34.5–45)
HCT VFR BLD CALC: 34.3 % — LOW (ref 34.5–45)
HCT VFR BLD CALC: 41.5 % — SIGNIFICANT CHANGE UP (ref 34.5–45)
HGB BLD-MCNC: 10.7 G/DL — LOW (ref 11.5–15.5)
HGB BLD-MCNC: 11.4 G/DL — LOW (ref 11.5–15.5)
HGB BLD-MCNC: 13.9 G/DL — SIGNIFICANT CHANGE UP (ref 11.5–15.5)
IMM GRANULOCYTES NFR BLD AUTO: 0.5 % — SIGNIFICANT CHANGE UP (ref 0–0.9)
IMM GRANULOCYTES NFR BLD AUTO: 0.5 % — SIGNIFICANT CHANGE UP (ref 0–0.9)
INR BLD: 0.91 — SIGNIFICANT CHANGE UP (ref 0.85–1.18)
INR BLD: 1.17 — SIGNIFICANT CHANGE UP (ref 0.85–1.18)
INR BLD: 1.47 — HIGH (ref 0.85–1.18)
LYMPHOCYTES # BLD AUTO: 0.69 K/UL — LOW (ref 1–3.3)
LYMPHOCYTES # BLD AUTO: 0.8 K/UL — LOW (ref 1–3.3)
LYMPHOCYTES # BLD AUTO: 6 % — LOW (ref 13–44)
LYMPHOCYTES # BLD AUTO: 9.7 % — LOW (ref 13–44)
MAGNESIUM SERPL-MCNC: 1.8 MG/DL — SIGNIFICANT CHANGE UP (ref 1.6–2.6)
MAGNESIUM SERPL-MCNC: 1.9 MG/DL — SIGNIFICANT CHANGE UP (ref 1.6–2.6)
MCHC RBC-ENTMCNC: 32.2 PG — SIGNIFICANT CHANGE UP (ref 27–34)
MCHC RBC-ENTMCNC: 32.5 PG — SIGNIFICANT CHANGE UP (ref 27–34)
MCHC RBC-ENTMCNC: 32.6 PG — SIGNIFICANT CHANGE UP (ref 27–34)
MCHC RBC-ENTMCNC: 33.2 GM/DL — SIGNIFICANT CHANGE UP (ref 32–36)
MCHC RBC-ENTMCNC: 33.5 GM/DL — SIGNIFICANT CHANGE UP (ref 32–36)
MCHC RBC-ENTMCNC: 33.8 GM/DL — SIGNIFICANT CHANGE UP (ref 32–36)
MCV RBC AUTO: 95.5 FL — SIGNIFICANT CHANGE UP (ref 80–100)
MCV RBC AUTO: 97.2 FL — SIGNIFICANT CHANGE UP (ref 80–100)
MCV RBC AUTO: 97.7 FL — SIGNIFICANT CHANGE UP (ref 80–100)
MONOCYTES # BLD AUTO: 0.2 K/UL — SIGNIFICANT CHANGE UP (ref 0–0.9)
MONOCYTES # BLD AUTO: 0.39 K/UL — SIGNIFICANT CHANGE UP (ref 0–0.9)
MONOCYTES NFR BLD AUTO: 2.4 % — SIGNIFICANT CHANGE UP (ref 2–14)
MONOCYTES NFR BLD AUTO: 3.4 % — SIGNIFICANT CHANGE UP (ref 2–14)
NEUTROPHILS # BLD AUTO: 10.43 K/UL — HIGH (ref 1.8–7.4)
NEUTROPHILS # BLD AUTO: 7.18 K/UL — SIGNIFICANT CHANGE UP (ref 1.8–7.4)
NEUTROPHILS NFR BLD AUTO: 87.3 % — HIGH (ref 43–77)
NEUTROPHILS NFR BLD AUTO: 90 % — HIGH (ref 43–77)
NRBC # BLD: 0 /100 WBCS — SIGNIFICANT CHANGE UP (ref 0–0)
PHOSPHATE SERPL-MCNC: 3.5 MG/DL — SIGNIFICANT CHANGE UP (ref 2.5–4.5)
PHOSPHATE SERPL-MCNC: 4.4 MG/DL — SIGNIFICANT CHANGE UP (ref 2.5–4.5)
PLATELET # BLD AUTO: 162 K/UL — SIGNIFICANT CHANGE UP (ref 150–400)
PLATELET # BLD AUTO: 165 K/UL — SIGNIFICANT CHANGE UP (ref 150–400)
PLATELET # BLD AUTO: 216 K/UL — SIGNIFICANT CHANGE UP (ref 150–400)
POTASSIUM SERPL-MCNC: 4 MMOL/L — SIGNIFICANT CHANGE UP (ref 3.5–5.3)
POTASSIUM SERPL-MCNC: 4 MMOL/L — SIGNIFICANT CHANGE UP (ref 3.5–5.3)
POTASSIUM SERPL-MCNC: SIGNIFICANT CHANGE UP (ref 3.5–5.3)
POTASSIUM SERPL-SCNC: 4 MMOL/L — SIGNIFICANT CHANGE UP (ref 3.5–5.3)
POTASSIUM SERPL-SCNC: 4 MMOL/L — SIGNIFICANT CHANGE UP (ref 3.5–5.3)
POTASSIUM SERPL-SCNC: SIGNIFICANT CHANGE UP (ref 3.5–5.3)
PROT SERPL-MCNC: 5.9 G/DL — LOW (ref 6–8.3)
PROT SERPL-MCNC: 6.3 G/DL — SIGNIFICANT CHANGE UP (ref 6–8.3)
PROTHROM AB SERPL-ACNC: 10.4 SEC — SIGNIFICANT CHANGE UP (ref 9.5–13)
PROTHROM AB SERPL-ACNC: 13.3 SEC — HIGH (ref 9.5–13)
PROTHROM AB SERPL-ACNC: 16.6 SEC — HIGH (ref 9.5–13)
RBC # BLD: 3.32 M/UL — LOW (ref 3.8–5.2)
RBC # BLD: 3.51 M/UL — LOW (ref 3.8–5.2)
RBC # BLD: 4.27 M/UL — SIGNIFICANT CHANGE UP (ref 3.8–5.2)
RBC # FLD: 13.2 % — SIGNIFICANT CHANGE UP (ref 10.3–14.5)
RBC # FLD: 13.3 % — SIGNIFICANT CHANGE UP (ref 10.3–14.5)
RBC # FLD: 13.4 % — SIGNIFICANT CHANGE UP (ref 10.3–14.5)
RH IG SCN BLD-IMP: POSITIVE — SIGNIFICANT CHANGE UP
SODIUM SERPL-SCNC: 134 MMOL/L — LOW (ref 135–145)
SODIUM SERPL-SCNC: 135 MMOL/L — SIGNIFICANT CHANGE UP (ref 135–145)
SODIUM SERPL-SCNC: 137 MMOL/L — SIGNIFICANT CHANGE UP (ref 135–145)
WBC # BLD: 11.58 K/UL — HIGH (ref 3.8–10.5)
WBC # BLD: 7.75 K/UL — SIGNIFICANT CHANGE UP (ref 3.8–10.5)
WBC # BLD: 8.23 K/UL — SIGNIFICANT CHANGE UP (ref 3.8–10.5)
WBC # FLD AUTO: 11.58 K/UL — HIGH (ref 3.8–10.5)
WBC # FLD AUTO: 7.75 K/UL — SIGNIFICANT CHANGE UP (ref 3.8–10.5)
WBC # FLD AUTO: 8.23 K/UL — SIGNIFICANT CHANGE UP (ref 3.8–10.5)

## 2024-03-19 PROCEDURE — 33340 PERQ CLSR TCAT L ATR APNDGE: CPT | Mod: Q0

## 2024-03-19 PROCEDURE — 93355 ECHO TRANSESOPHAGEAL (TEE): CPT | Mod: 26

## 2024-03-19 PROCEDURE — 71045 X-RAY EXAM CHEST 1 VIEW: CPT | Mod: 26

## 2024-03-19 DEVICE — IMPLANTABLE DEVICE: Type: IMPLANTABLE DEVICE | Status: FUNCTIONAL

## 2024-03-19 DEVICE — KIT VERSACROSS CONNECT LAAC ACCESS 230-P RF WIRE 85CM: Type: IMPLANTABLE DEVICE | Status: FUNCTIONAL

## 2024-03-19 DEVICE — SHEATH INTRODUCER TERUMO PINNACLE CORONARY 8FR X 10CM X 0.038" MINI WIRE: Type: IMPLANTABLE DEVICE | Status: FUNCTIONAL

## 2024-03-19 DEVICE — INTRO MICROPUNC 4FRX10CM SS: Type: IMPLANTABLE DEVICE | Status: FUNCTIONAL

## 2024-03-19 DEVICE — SET INTRO MICROPUNCT STIFF ECHOTIP 4FX10CM: Type: IMPLANTABLE DEVICE | Status: FUNCTIONAL

## 2024-03-19 DEVICE — SHEATH VERSACROSS 8.5F 71CM: Type: IMPLANTABLE DEVICE | Status: FUNCTIONAL

## 2024-03-19 DEVICE — GUIDEWIRE TERUMO GLIDEWIRE STIFF STRAGHT .035" X 180CM: Type: IMPLANTABLE DEVICE | Status: FUNCTIONAL

## 2024-03-19 DEVICE — GWIRE GUID  0.035INX150CM: Type: IMPLANTABLE DEVICE | Status: FUNCTIONAL

## 2024-03-19 DEVICE — CATH DX PIG 145 INFIN 5FRX110CM: Type: IMPLANTABLE DEVICE | Status: FUNCTIONAL

## 2024-03-19 RX ORDER — CHLORHEXIDINE GLUCONATE 213 G/1000ML
1 SOLUTION TOPICAL
Refills: 0 | Status: DISCONTINUED | OUTPATIENT
Start: 2024-03-19 | End: 2024-03-20

## 2024-03-19 RX ORDER — CLOPIDOGREL BISULFATE 75 MG/1
75 TABLET, FILM COATED ORAL DAILY
Refills: 0 | Status: DISCONTINUED | OUTPATIENT
Start: 2024-03-20 | End: 2024-03-20

## 2024-03-19 RX ORDER — IPRATROPIUM/ALBUTEROL SULFATE 18-103MCG
3 AEROSOL WITH ADAPTER (GRAM) INHALATION EVERY 6 HOURS
Refills: 0 | Status: DISCONTINUED | OUTPATIENT
Start: 2024-03-19 | End: 2024-03-20

## 2024-03-19 RX ORDER — DONEPEZIL HYDROCHLORIDE 10 MG/1
5 TABLET, FILM COATED ORAL AT BEDTIME
Refills: 0 | Status: DISCONTINUED | OUTPATIENT
Start: 2024-03-19 | End: 2024-03-20

## 2024-03-19 RX ORDER — HEPARIN SODIUM 5000 [USP'U]/ML
5000 INJECTION INTRAVENOUS; SUBCUTANEOUS EVERY 12 HOURS
Refills: 0 | Status: DISCONTINUED | OUTPATIENT
Start: 2024-03-19 | End: 2024-03-20

## 2024-03-19 RX ORDER — ACETYLCYSTEINE 200 MG/ML
4 VIAL (ML) MISCELLANEOUS
Refills: 0 | Status: DISCONTINUED | OUTPATIENT
Start: 2024-03-19 | End: 2024-03-20

## 2024-03-19 RX ORDER — ASPIRIN/CALCIUM CARB/MAGNESIUM 324 MG
81 TABLET ORAL DAILY
Refills: 0 | Status: DISCONTINUED | OUTPATIENT
Start: 2024-03-20 | End: 2024-03-20

## 2024-03-19 RX ORDER — SODIUM CHLORIDE 9 MG/ML
3 INJECTION INTRAMUSCULAR; INTRAVENOUS; SUBCUTANEOUS
Refills: 0 | Status: DISCONTINUED | OUTPATIENT
Start: 2024-03-19 | End: 2024-03-19

## 2024-03-19 RX ORDER — ATORVASTATIN CALCIUM 80 MG/1
40 TABLET, FILM COATED ORAL AT BEDTIME
Refills: 0 | Status: DISCONTINUED | OUTPATIENT
Start: 2024-03-19 | End: 2024-03-20

## 2024-03-19 RX ORDER — ACETAMINOPHEN 500 MG
600 TABLET ORAL ONCE
Refills: 0 | Status: COMPLETED | OUTPATIENT
Start: 2024-03-19 | End: 2024-03-19

## 2024-03-19 RX ORDER — PANTOPRAZOLE SODIUM 20 MG/1
40 TABLET, DELAYED RELEASE ORAL
Refills: 0 | Status: DISCONTINUED | OUTPATIENT
Start: 2024-03-19 | End: 2024-03-20

## 2024-03-19 RX ORDER — SODIUM CHLORIDE 9 MG/ML
5 INJECTION INTRAMUSCULAR; INTRAVENOUS; SUBCUTANEOUS EVERY 4 HOURS
Refills: 0 | Status: DISCONTINUED | OUTPATIENT
Start: 2024-03-19 | End: 2024-03-20

## 2024-03-19 RX ORDER — MONTELUKAST 4 MG/1
10 TABLET, CHEWABLE ORAL DAILY
Refills: 0 | Status: DISCONTINUED | OUTPATIENT
Start: 2024-03-19 | End: 2024-03-20

## 2024-03-19 RX ORDER — VANCOMYCIN HCL 1 G
500 VIAL (EA) INTRAVENOUS ONCE
Refills: 0 | Status: COMPLETED | OUTPATIENT
Start: 2024-03-20 | End: 2024-03-20

## 2024-03-19 RX ORDER — CLONAZEPAM 1 MG
0.5 TABLET ORAL AT BEDTIME
Refills: 0 | Status: DISCONTINUED | OUTPATIENT
Start: 2024-03-19 | End: 2024-03-20

## 2024-03-19 RX ADMIN — Medication 600 MILLIGRAM(S): at 21:40

## 2024-03-19 RX ADMIN — SODIUM CHLORIDE 3 MILLILITER(S): 9 INJECTION INTRAMUSCULAR; INTRAVENOUS; SUBCUTANEOUS at 16:32

## 2024-03-19 RX ADMIN — Medication 4 MILLILITER(S): at 17:06

## 2024-03-19 RX ADMIN — Medication 240 MILLIGRAM(S): at 21:10

## 2024-03-19 RX ADMIN — DONEPEZIL HYDROCHLORIDE 5 MILLIGRAM(S): 10 TABLET, FILM COATED ORAL at 22:41

## 2024-03-19 RX ADMIN — MONTELUKAST 10 MILLIGRAM(S): 4 TABLET, CHEWABLE ORAL at 22:42

## 2024-03-19 RX ADMIN — SODIUM CHLORIDE 5 MILLILITER(S): 9 INJECTION INTRAMUSCULAR; INTRAVENOUS; SUBCUTANEOUS at 22:02

## 2024-03-19 RX ADMIN — ATORVASTATIN CALCIUM 40 MILLIGRAM(S): 80 TABLET, FILM COATED ORAL at 22:42

## 2024-03-19 NOTE — PRE-ANESTHESIA EVALUATION ADULT - NSANTHPMHFT_GEN_ALL_CORE
83F PMHx AF (not on AC due to SAH), HTN, Lewy body dementia (baseline a/o x3), posterior reversible encephalopathy syndrome, chronic bronchiectasis on home O2 prn, dCHF, C. diff, presents to [facility] today for left atrial appendage occlusion with Dr. Rivas. Pt denies any CP, SOB, NVD, dizziness today.

## 2024-03-19 NOTE — H&P ADULT - NSHPPHYSICALEXAM_GEN_ALL_CORE
PHYSICAL EXAM:  General: resting comfortably in bed in NAD  Neurological: AOx3. Motor skills grossly intact  Cardiovascular: Normal S1/S2. Regular rate/rhythm. No murmurs  Respiratory: Lungs CTA bilaterally. No wheezing or rales  Gastrointestinal: +BS in all 4 quadrants. Non-distended. Soft. Non-tender  Extremities: Strength 5/5 b/l upper/lower extremities. Sensation grossly intact upper/lower extremities. No edema. No calf tenderness.  Vascular: Radial 2+bilaterally, DP 2+ b/l  Incision Sites: NA

## 2024-03-19 NOTE — H&P ADULT - ASSESSMENT
83F PMHx AF (not on AC due to SAH), HTN, Lewy body dementia (baseline a/o x3), posterior reversible encephalopathy syndrome, chronic bronchiectasis on home O2 prn, dCHF, C. diff, presents to [facility] today for left atrial appendage occlusion with Dr. Rivas. Pt denies any CP, SOB, NVD, dizziness today.    Plan:    LAAO with Dr. Rivas today.    Admit under Dr. Rivas  via same day surgery. Consent signed, placed on chart.  Risks/benefits reviewed, patient understands and agrees. T&S ordered and blood products placed on hold for OR.  To 9  post-op.

## 2024-03-19 NOTE — PROGRESS NOTE ADULT - SUBJECTIVE AND OBJECTIVE BOX
Attending: Evelyn RUIZ  Procedure: MITZI occlusion w/ 22mm Amulet      Access:  RCFV 8F  TVP: N/A  TR Band: N/A       VS:   General: Patient lying comfortably in bed, no acute distress     Neurological: Alert and oriented. No focal neurological deficits     Cardiovascular: S1S2, RRR, no murmurs appreciated on exam     Respiratory: Clear to ausculation bilaterally, no wheeze/rhonchi/rales    Gastrointestinal: + BS, soft, non tender, non distended     Extremities: Warm and well perfused. No edema, no calf tenderness     Vascular: 2+ Peripheral pulses b/l     Incision Sites: Groin sites without evidence of hematoma, dry dressing applied       Bed rest: 4 hours  Anti-platelet: Aspirin/Plavix     TTE/EKG ordered:     Dispo:

## 2024-03-19 NOTE — BRIEF OPERATIVE NOTE - NSICDXBRIEFPROCEDURE_GEN_ALL_CORE_FT
PROCEDURES:  Occlusion, atrial appendage 19-Mar-2024 15:45:28 MITZI occlusion with 22mm Aby Medina

## 2024-03-19 NOTE — H&P ADULT - HISTORY OF PRESENT ILLNESS
83F PMHx AF (not on AC due to SAH), HTN, Lewy body dementia (baseline a/o x3), posterior reversible encephalopathy syndrome, chronic bronchiectasis on home O2 prn, dCHF, C. diff, presents to [facility] today for left atrial appendage occlusion with Dr. Rivas.  83F PMHx AF (not on AC due to SAH), HTN, Lewy body dementia (baseline a/o x3), posterior reversible encephalopathy syndrome, chronic bronchiectasis on home O2 prn, dCHF, C. diff, presents to [facility] today for left atrial appendage occlusion with Dr. Rivas. Pt denies any CP, SOB, NVD, dizziness today.    Patient seen in same day holding area; Reports no changes to PMHx or medications since last seen by our team. Denies acute or current SOB, chest pain, palpitation, N/V/D, fever/chills, recent illness, or any other concerning symptoms.

## 2024-03-19 NOTE — PRE-ANESTHESIA EVALUATION ADULT - NSRADCARDRESULTSFT_GEN_ALL_CORE
TTE 11/07/23   1. Normal left and right ventricular size and systolic function.   2. Aortic sclerosis without significant stenosis.   3. Mild aortic regurgitation.   4. Mild-to-moderate tricuspid regurgitation.   5. Pulmonary hypertension present , pulmonary artery systolic pressure   is 51 mmHg.   6. No pericardial effusion.   7. Left pleural effusion.    ECG: NSR

## 2024-03-19 NOTE — H&P ADULT - NSHPREVIEWOFSYSTEMS_GEN_ALL_CORE
Review of Systems  CONSTITUTIONAL:  Denies Fevers / chills, sweats, fatigue, weight loss, weight gain                                      NEURO:  Denies changes in sensation, seizures, syncope, confusion                                                                            EYES:  Denies Blurry vision, discharge, pain, loss of vision                                                                                    ENMT:  Denies Difficulty hearing, vertigo, dysphagia, epistaxis, recent dental work                                       CV:  Denies Chest pain, palpitations, REID, orthopnea                                                                                          RESPIRATORY:  Denies Wheezing, SOB, cough / sputum, hemoptysis                                                                GI:  Denies Nausea, vomiting, diarrhea, constipation, melena, difficulty swallowing                                               : Denies Hematuria, dysuria, urgency, incontinence                                                                                         MUSKULOSKELETAL:  Denies arthritis, joint swelling, muscle weakness                                                             SKIN/BREAST:  Denies rash, itching, hair loss, masses                                                                                            PSYCH:  Denies depression, anxiety, suicidal ideation                                                                                               HEME/LYMPH:  Denies bruises easily, enlarged lymph nodes, tender lymph nodes                                        ENDOCRINE:  Denies cold intolerance, heat intolerance, polydipsia

## 2024-03-20 ENCOUNTER — NON-APPOINTMENT (OUTPATIENT)
Age: 84
End: 2024-03-20

## 2024-03-20 ENCOUNTER — TRANSCRIPTION ENCOUNTER (OUTPATIENT)
Age: 84
End: 2024-03-20

## 2024-03-20 ENCOUNTER — RESULT REVIEW (OUTPATIENT)
Age: 84
End: 2024-03-20

## 2024-03-20 VITALS
HEART RATE: 82 BPM | DIASTOLIC BLOOD PRESSURE: 55 MMHG | SYSTOLIC BLOOD PRESSURE: 119 MMHG | OXYGEN SATURATION: 96 % | RESPIRATION RATE: 15 BRPM

## 2024-03-20 LAB
ALBUMIN SERPL ELPH-MCNC: 3.3 G/DL — SIGNIFICANT CHANGE UP (ref 3.3–5)
ALP SERPL-CCNC: 71 U/L — SIGNIFICANT CHANGE UP (ref 40–120)
ALT FLD-CCNC: 16 U/L — SIGNIFICANT CHANGE UP (ref 10–45)
ANION GAP SERPL CALC-SCNC: 6 MMOL/L — SIGNIFICANT CHANGE UP (ref 5–17)
APTT BLD: 25.2 SEC — SIGNIFICANT CHANGE UP (ref 24.5–35.6)
APTT BLD: 50.6 SEC — HIGH (ref 24.5–35.6)
AST SERPL-CCNC: 18 U/L — SIGNIFICANT CHANGE UP (ref 10–40)
BASOPHILS # BLD AUTO: 0.01 K/UL — SIGNIFICANT CHANGE UP (ref 0–0.2)
BASOPHILS # BLD AUTO: 0.01 K/UL — SIGNIFICANT CHANGE UP (ref 0–0.2)
BASOPHILS NFR BLD AUTO: 0.1 % — SIGNIFICANT CHANGE UP (ref 0–2)
BASOPHILS NFR BLD AUTO: 0.1 % — SIGNIFICANT CHANGE UP (ref 0–2)
BILIRUB SERPL-MCNC: 0.4 MG/DL — SIGNIFICANT CHANGE UP (ref 0.2–1.2)
BUN SERPL-MCNC: 25 MG/DL — HIGH (ref 7–23)
CALCIUM SERPL-MCNC: 8.8 MG/DL — SIGNIFICANT CHANGE UP (ref 8.4–10.5)
CHLORIDE SERPL-SCNC: 105 MMOL/L — SIGNIFICANT CHANGE UP (ref 96–108)
CO2 SERPL-SCNC: 28 MMOL/L — SIGNIFICANT CHANGE UP (ref 22–31)
CREAT SERPL-MCNC: 0.91 MG/DL — SIGNIFICANT CHANGE UP (ref 0.5–1.3)
EGFR: 63 ML/MIN/1.73M2 — SIGNIFICANT CHANGE UP
EOSINOPHIL # BLD AUTO: 0 K/UL — SIGNIFICANT CHANGE UP (ref 0–0.5)
EOSINOPHIL # BLD AUTO: 0 K/UL — SIGNIFICANT CHANGE UP (ref 0–0.5)
EOSINOPHIL NFR BLD AUTO: 0 % — SIGNIFICANT CHANGE UP (ref 0–6)
EOSINOPHIL NFR BLD AUTO: 0 % — SIGNIFICANT CHANGE UP (ref 0–6)
GAS PNL BLDA: SIGNIFICANT CHANGE UP
GLUCOSE SERPL-MCNC: 144 MG/DL — HIGH (ref 70–99)
HCT VFR BLD CALC: 30.1 % — LOW (ref 34.5–45)
HCT VFR BLD CALC: 30.6 % — LOW (ref 34.5–45)
HGB BLD-MCNC: 10.2 G/DL — LOW (ref 11.5–15.5)
HGB BLD-MCNC: 9.9 G/DL — LOW (ref 11.5–15.5)
IMM GRANULOCYTES NFR BLD AUTO: 0.3 % — SIGNIFICANT CHANGE UP (ref 0–0.9)
IMM GRANULOCYTES NFR BLD AUTO: 0.4 % — SIGNIFICANT CHANGE UP (ref 0–0.9)
INR BLD: 1.01 — SIGNIFICANT CHANGE UP (ref 0.85–1.18)
INR BLD: 1.06 — SIGNIFICANT CHANGE UP (ref 0.85–1.18)
LYMPHOCYTES # BLD AUTO: 0.77 K/UL — LOW (ref 1–3.3)
LYMPHOCYTES # BLD AUTO: 0.84 K/UL — LOW (ref 1–3.3)
LYMPHOCYTES # BLD AUTO: 6.7 % — LOW (ref 13–44)
LYMPHOCYTES # BLD AUTO: 8 % — LOW (ref 13–44)
MAGNESIUM SERPL-MCNC: 2 MG/DL — SIGNIFICANT CHANGE UP (ref 1.6–2.6)
MCHC RBC-ENTMCNC: 32.2 PG — SIGNIFICANT CHANGE UP (ref 27–34)
MCHC RBC-ENTMCNC: 32.7 PG — SIGNIFICANT CHANGE UP (ref 27–34)
MCHC RBC-ENTMCNC: 32.9 GM/DL — SIGNIFICANT CHANGE UP (ref 32–36)
MCHC RBC-ENTMCNC: 33.3 GM/DL — SIGNIFICANT CHANGE UP (ref 32–36)
MCV RBC AUTO: 98 FL — SIGNIFICANT CHANGE UP (ref 80–100)
MCV RBC AUTO: 98.1 FL — SIGNIFICANT CHANGE UP (ref 80–100)
MONOCYTES # BLD AUTO: 0.57 K/UL — SIGNIFICANT CHANGE UP (ref 0–0.9)
MONOCYTES # BLD AUTO: 0.63 K/UL — SIGNIFICANT CHANGE UP (ref 0–0.9)
MONOCYTES NFR BLD AUTO: 5.4 % — SIGNIFICANT CHANGE UP (ref 2–14)
MONOCYTES NFR BLD AUTO: 5.5 % — SIGNIFICANT CHANGE UP (ref 2–14)
NEUTROPHILS # BLD AUTO: 10.08 K/UL — HIGH (ref 1.8–7.4)
NEUTROPHILS # BLD AUTO: 9.01 K/UL — HIGH (ref 1.8–7.4)
NEUTROPHILS NFR BLD AUTO: 86.1 % — HIGH (ref 43–77)
NEUTROPHILS NFR BLD AUTO: 87.4 % — HIGH (ref 43–77)
NRBC # BLD: 0 /100 WBCS — SIGNIFICANT CHANGE UP (ref 0–0)
NRBC # BLD: 0 /100 WBCS — SIGNIFICANT CHANGE UP (ref 0–0)
PHOSPHATE SERPL-MCNC: 4 MG/DL — SIGNIFICANT CHANGE UP (ref 2.5–4.5)
PLATELET # BLD AUTO: 145 K/UL — LOW (ref 150–400)
PLATELET # BLD AUTO: 148 K/UL — LOW (ref 150–400)
POTASSIUM SERPL-MCNC: 4.2 MMOL/L — SIGNIFICANT CHANGE UP (ref 3.5–5.3)
POTASSIUM SERPL-SCNC: 4.2 MMOL/L — SIGNIFICANT CHANGE UP (ref 3.5–5.3)
PROT SERPL-MCNC: 5.7 G/DL — LOW (ref 6–8.3)
PROTHROM AB SERPL-ACNC: 11.5 SEC — SIGNIFICANT CHANGE UP (ref 9.5–13)
PROTHROM AB SERPL-ACNC: 12.1 SEC — SIGNIFICANT CHANGE UP (ref 9.5–13)
RBC # BLD: 3.07 M/UL — LOW (ref 3.8–5.2)
RBC # BLD: 3.12 M/UL — LOW (ref 3.8–5.2)
RBC # FLD: 13.4 % — SIGNIFICANT CHANGE UP (ref 10.3–14.5)
RBC # FLD: 13.4 % — SIGNIFICANT CHANGE UP (ref 10.3–14.5)
SODIUM SERPL-SCNC: 139 MMOL/L — SIGNIFICANT CHANGE UP (ref 135–145)
WBC # BLD: 10.47 K/UL — SIGNIFICANT CHANGE UP (ref 3.8–10.5)
WBC # BLD: 11.53 K/UL — HIGH (ref 3.8–10.5)
WBC # FLD AUTO: 10.47 K/UL — SIGNIFICANT CHANGE UP (ref 3.8–10.5)
WBC # FLD AUTO: 11.53 K/UL — HIGH (ref 3.8–10.5)

## 2024-03-20 PROCEDURE — 83735 ASSAY OF MAGNESIUM: CPT

## 2024-03-20 PROCEDURE — C1887: CPT

## 2024-03-20 PROCEDURE — C1889: CPT

## 2024-03-20 PROCEDURE — 82330 ASSAY OF CALCIUM: CPT

## 2024-03-20 PROCEDURE — 85025 COMPLETE CBC W/AUTO DIFF WBC: CPT

## 2024-03-20 PROCEDURE — 80048 BASIC METABOLIC PNL TOTAL CA: CPT

## 2024-03-20 PROCEDURE — 82803 BLOOD GASES ANY COMBINATION: CPT

## 2024-03-20 PROCEDURE — C1894: CPT

## 2024-03-20 PROCEDURE — 86901 BLOOD TYPING SEROLOGIC RH(D): CPT

## 2024-03-20 PROCEDURE — 71045 X-RAY EXAM CHEST 1 VIEW: CPT

## 2024-03-20 PROCEDURE — 71045 X-RAY EXAM CHEST 1 VIEW: CPT | Mod: 26

## 2024-03-20 PROCEDURE — 85610 PROTHROMBIN TIME: CPT

## 2024-03-20 PROCEDURE — 85027 COMPLETE CBC AUTOMATED: CPT

## 2024-03-20 PROCEDURE — 86900 BLOOD TYPING SEROLOGIC ABO: CPT

## 2024-03-20 PROCEDURE — 93321 DOPPLER ECHO F-UP/LMTD STD: CPT

## 2024-03-20 PROCEDURE — C1766: CPT

## 2024-03-20 PROCEDURE — 93312 ECHO TRANSESOPHAGEAL: CPT

## 2024-03-20 PROCEDURE — 84295 ASSAY OF SERUM SODIUM: CPT

## 2024-03-20 PROCEDURE — C1893: CPT

## 2024-03-20 PROCEDURE — C1769: CPT

## 2024-03-20 PROCEDURE — 80053 COMPREHEN METABOLIC PANEL: CPT

## 2024-03-20 PROCEDURE — 85730 THROMBOPLASTIN TIME PARTIAL: CPT

## 2024-03-20 PROCEDURE — 94640 AIRWAY INHALATION TREATMENT: CPT

## 2024-03-20 PROCEDURE — 84132 ASSAY OF SERUM POTASSIUM: CPT

## 2024-03-20 PROCEDURE — 86923 COMPATIBILITY TEST ELECTRIC: CPT

## 2024-03-20 PROCEDURE — 86850 RBC ANTIBODY SCREEN: CPT

## 2024-03-20 PROCEDURE — 93308 TTE F-UP OR LMTD: CPT | Mod: 26

## 2024-03-20 PROCEDURE — 36415 COLL VENOUS BLD VENIPUNCTURE: CPT

## 2024-03-20 PROCEDURE — 84100 ASSAY OF PHOSPHORUS: CPT

## 2024-03-20 RX ORDER — CLOPIDOGREL BISULFATE 75 MG/1
1 TABLET, FILM COATED ORAL
Qty: 30 | Refills: 0
Start: 2024-03-20 | End: 2024-04-18

## 2024-03-20 RX ORDER — ASPIRIN/CALCIUM CARB/MAGNESIUM 324 MG
1 TABLET ORAL
Refills: 0 | DISCHARGE

## 2024-03-20 RX ORDER — PANTOPRAZOLE SODIUM 20 MG/1
1 TABLET, DELAYED RELEASE ORAL
Qty: 30 | Refills: 0
Start: 2024-03-20 | End: 2024-04-18

## 2024-03-20 RX ORDER — METOPROLOL TARTRATE 50 MG
25 TABLET ORAL EVERY 12 HOURS
Refills: 0 | Status: DISCONTINUED | OUTPATIENT
Start: 2024-03-20 | End: 2024-03-20

## 2024-03-20 RX ORDER — ASPIRIN/CALCIUM CARB/MAGNESIUM 324 MG
1 TABLET ORAL
Qty: 30 | Refills: 0
Start: 2024-03-20 | End: 2024-04-18

## 2024-03-20 RX ORDER — METOPROLOL TARTRATE 50 MG
1 TABLET ORAL
Qty: 60 | Refills: 0
Start: 2024-03-20 | End: 2024-04-18

## 2024-03-20 RX ADMIN — Medication 81 MILLIGRAM(S): at 12:07

## 2024-03-20 RX ADMIN — Medication 25 MILLIGRAM(S): at 08:22

## 2024-03-20 RX ADMIN — Medication 100 MILLIGRAM(S): at 02:21

## 2024-03-20 RX ADMIN — CHLORHEXIDINE GLUCONATE 1 APPLICATION(S): 213 SOLUTION TOPICAL at 07:15

## 2024-03-20 RX ADMIN — CLOPIDOGREL BISULFATE 75 MILLIGRAM(S): 75 TABLET, FILM COATED ORAL at 12:07

## 2024-03-20 RX ADMIN — MONTELUKAST 10 MILLIGRAM(S): 4 TABLET, CHEWABLE ORAL at 12:08

## 2024-03-20 RX ADMIN — SODIUM CHLORIDE 5 MILLILITER(S): 9 INJECTION INTRAMUSCULAR; INTRAVENOUS; SUBCUTANEOUS at 07:14

## 2024-03-20 NOTE — DISCHARGE NOTE PROVIDER - PROVIDER TOKENS
PROVIDER:[TOKEN:[[id number]],FOLLOWUP:[1 week]],PROVIDER:[TOKEN:[[id number]],FOLLOWUP:[2 weeks]] PROVIDER:[TOKEN:[[id number]],SCHEDULEDAPPT:[03/29/2024],SCHEDULEDAPPTTIME:[10:00 AM]],PROVIDER:[TOKEN:[[id number]],FOLLOWUP:[2 weeks]]

## 2024-03-20 NOTE — DISCHARGE NOTE PROVIDER - NSDCFUSCHEDAPPT_GEN_ALL_CORE_FT
Herbie Parson  [facility]  CTSURG [address]  Scheduled Appointment: 03/29/2024     Herbie Parson  [facility]  CTSURG [address]  Scheduled Appointment: 03/29/2024    Sorcates Mcclure  [facility] Physician [facility]  CARDIOLOGY [address]  Scheduled Appointment: 04/03/2024

## 2024-03-20 NOTE — DISCHARGE NOTE PROVIDER - CARE PROVIDERS DIRECT ADDRESSES
[email or web address]

## 2024-03-20 NOTE — DISCHARGE NOTE PROVIDER - NSDCMRMEDTOKEN_GEN_ALL_CORE_FT
aspirin 81 mg oral delayed release tablet: 1 tab(s) orally once a day  Astepro Allergy 205.5 mcg/inh (0.15%) nasal spray: 2 spray(s) intranasally once a day  atorvastatin 40 mg oral tablet: 1 tab(s) orally once a day (at bedtime)  clopidogrel 75 mg oral tablet: 1 tab(s) orally once a day  donepezil 5 mg oral tablet: 1 tab(s) orally every 24 hours  furosemide 20 mg oral tablet: 1 tab(s) orally once a day  KlonoPIN 0.5 mg oral tablet: 0.5 tab(s) orally 2 times a day as needed for For Pain/For Anxiety  metoprolol succinate 25 mg oral tablet, extended release: 1 tab(s) orally every 12 hours  montelukast 10 mg oral tablet: 1 tab(s) orally once a day  pantoprazole 40 mg oral delayed release tablet: 1 tab(s) orally once a day (before a meal)

## 2024-03-20 NOTE — DISCHARGE NOTE NURSING/CASE MANAGEMENT/SOCIAL WORK - PATIENT PORTAL LINK FT
You can access the FollowMyHealth Patient Portal offered by [facility] by registering at the following website: [email or web address]. By joining [facility]’s FollowMyHealth portal, you will also be able to view your health information using other applications (apps) compatible with our system.

## 2024-03-20 NOTE — DISCHARGE NOTE PROVIDER - NSDCCPTREATMENT_GEN_ALL_CORE_FT
PRINCIPAL PROCEDURE  Procedure: Occlusion, atrial appendage  Findings and Treatment: MITZI occlusion with 22mm Amulet

## 2024-03-20 NOTE — DISCHARGE NOTE PROVIDER - NSDCFUADDINST_GEN_ALL_CORE_FT
-Walk daily as tolerated and use your incentive spirometer 10 times every hour while you are awake.     -Please weigh yourself daily. If you notice over a 3 pound weight gain in 3 days, this is a sign you are likely retaining too much fluid. It is imperative you call our right away with unexplained rapid weight gain.      -Please continue to wear the compression stockings given to you in the hospital at home. This is a way to prevent fluid from building up in your legs.     -No driving or strenuous activity/exercise until cleared by your surgeon.    -Gently clean your incisions with unscented/antibacterial soap and water, pat dry.  You may leave them open to air.    -Call your doctor if you have shortness of breath, chest pain not relieved by pain medication, dizziness, fever >101.5, or increased redness or drainage from incisions.  -Take your blood pressure in the morning. If the top number is above 140, call our office and we may increase one of your medications.     -Walk daily as tolerated and use your incentive spirometer 10 times every hour while you are awake.     -Please weigh yourself daily. If you notice over a 3 pound weight gain in 3 days, this is a sign you are likely retaining too much fluid. It is imperative you call our right away with unexplained rapid weight gain.      -Please continue to wear the compression stockings given to you in the hospital at home. This is a way to prevent fluid from building up in your legs.     -No driving or strenuous activity/exercise until cleared by your surgeon.    -Gently clean your incisions with unscented/antibacterial soap and water, pat dry.  You may leave them open to air.    -Call your doctor if you have shortness of breath, chest pain not relieved by pain medication, dizziness, fever >101.5, or increased redness or drainage from incisions.

## 2024-03-20 NOTE — DISCHARGE NOTE PROVIDER - NSDCCPCAREPLAN_GEN_ALL_CORE_FT
PRINCIPAL DISCHARGE DIAGNOSIS  Diagnosis: Chronic atrial fibrillation  Assessment and Plan of Treatment:

## 2024-03-20 NOTE — DISCHARGE NOTE PROVIDER - CARE PROVIDER_API CALL
Artur Rivas  Interventional Cardiology  [address], [address]  [place] [address]  Phone: [phone]  Fax: [phone]  Follow Up Time: 1 week    Socrates Mcclure  Cardiovascular Disease  [address] [facility], Floor [address]  [place] [address]  Phone: [phone]  Fax: [phone]  Follow Up Time: 2 weeks   Artur Rivas  Interventional Cardiology  [address], [address]  [place] [address]  Phone: [phone]  Fax: [phone]  Scheduled Appointment: 03/29/2024 10:00 AM    Socrates Mcclure  Cardiovascular Disease  [address] [facility], [address] 1  [place] [address]  Phone: [phone]  Fax: [phone]  Follow Up Time: 2 weeks

## 2024-03-20 NOTE — DISCHARGE NOTE PROVIDER - HOSPITAL COURSE
Patient discussed on morning rounds with Dr. Elias    Operation Date: 3/19 MITZI occlusion w/ 22mm Amulet    Primary Surgeon/Attending MD: Dr. Rivas    Referring Physician: Dr. Socrates Mcclure  _ _ _ _ _ _ _ _ _ _ _ _  HOSPITAL COURSE:  83F PMHx AF (not on AC due to SAH), HTN, Lewy body dementia (baseline a/o x3), posterior reversible encephalopathy syndrome, chronic bronchiectasis (home O2 prn), dCHF, C. diff, presents to [facility] for surgical intervention. On 3/19/24 she underwent an uncomplicated MITZI occlusion w/ 22mm Amulet with Dr. Rivas. She arrived to the stepdown unit as a ICU care. POD1 low dose BB restarted. TTE with trivial pericardial effusion. Tolerating PO diet, satting well on room air, ambulating independently. Per Dr. Elias she is medically stable for discharge home today, 3/20/24.   _ _ _ _ _ _ _ _ _ _ _ _  DISCHARGE PHYSICAL EXAM:  General:  Neuro:  Cardio:  Pulm:  GI/:  Vascular:  Extremities:  Incisions:  _ _ _ _ _ _ _ _ _ _ _ _  REMOVAL CHECKLIST:        [na ] Epicardial wires          [ na] Stitches/tie downs,   If no, why? ______          [na ] PICC/Midline,   If no, why? ________  _ _ _ _ _ _ _ _ _ _ _ _   MEDICATION DISCHARGE CHECKLIST  MITZI occluder        [ x] Aspirin, [  ] Contraindicated, Reason_______________________________        [ x] Plavix, [ ] Contraindicated, Reason _______________________________    _ _ _ _ _ _ _ _ _ _ _ _  RELEVANT LABS/IMAGING:  < from: TTE Limited Echo w/o Cont (03.20.24 @ 08:36) >    CONCLUSIONS:     1. Limited study obtained for evaluation ofpericardial effusion.   2. Normal left ventricular systolic function.   3. Normal right ventricular size and systolic function.   4. Mild tricuspid regurgitation.   5. Pulmonary hypertension present, pulmonary artery systolic pressure is   39 mmHg.  6. Trivial pericardial effusion.    < end of copied text >    _ _ _ _ _ _ _ _ _ _ _ _  Over 35 minutes was spent with the patient reviewing the discharge material including medications, follow up appointments, recovery, concerning symptoms, and how to contact their health care providers if they have questions. Patient discussed on morning rounds with Dr. Elias    Operation Date: 3/19 MITZI occlusion w/ 22mm Amulet    Primary Surgeon/Attending MD: Dr. Rivas    Referring Physician: Dr. Socrates Mcclure  _ _ _ _ _ _ _ _ _ _ _ _  HOSPITAL COURSE:  83F PMHx AF (not on AC due to SAH), HTN, Lewy body dementia (baseline a/o x3), posterior reversible encephalopathy syndrome, chronic bronchiectasis (home O2 prn), dCHF, C. diff, presents to [facility] for surgical intervention. On 3/19/24 she underwent an uncomplicated MITZI occlusion w/ 22mm Amulet with Dr. Rivas. She arrived to the stepdown unit as a ICU care. POD1 low dose BB restarted. TTE with trivial pericardial effusion. Tolerating PO diet, satting well on room air, ambulating independently. Per Dr. Elias she is medically stable for discharge home today, 3/20/24.   _ _ _ _ _ _ _ _ _ _ _ _  DISCHARGE PHYSICAL EXAM:  General: NAD, sitting comfortably in chair, conversing appropriately  Neurological: alert and oriented, UE and LE strength equal b/l, facial symmetry present  Cardiovascular: RRR, Clear S1 and S2, no murmurs appreciated  Respiratory: chest expansion symmetrical, CTA b/l, no wheezing noted  Gastrointestinal: +BS, soft, NT, ND  Extremities: moving spontaneously, no calf tenderness or edema.  Vascular: warm, well perfused.   Incisions: R groin soft, no hematoma, c/d/i no drainage or purulence  _ _ _  _ _ _ _ _ _ _ _ _  REMOVAL CHECKLIST:        [na ] Epicardial wires          [ na] Stitches/tie downs,   If no, why? ______          [na ] PICC/Midline,   If no, why? ________  _ _ _ _ _ _ _ _ _ _ _ _   MEDICATION DISCHARGE CHECKLIST  MITZI occluder        [ x] Aspirin, [  ] Contraindicated, Reason_______________________________        [ x] Plavix, [ ] Contraindicated, Reason _______________________________    _ _ _ _ _ _ _ _ _ _ _ _  RELEVANT LABS/IMAGING:  < from: TTE Limited Echo w/o Cont (03.20.24 @ 08:36) >    CONCLUSIONS:     1. Limited study obtained for evaluation ofpericardial effusion.   2. Normal left ventricular systolic function.   3. Normal right ventricular size and systolic function.   4. Mild tricuspid regurgitation.   5. Pulmonary hypertension present, pulmonary artery systolic pressure is   39 mmHg.  6. Trivial pericardial effusion.    < end of copied text >    _ _ _ _ _ _ _ _ _ _ _ _  Over 35 minutes was spent with the patient reviewing the discharge material including medications, follow up appointments, recovery, concerning symptoms, and how to contact their health care providers if they have questions.

## 2024-03-20 NOTE — DISCHARGE NOTE NURSING/CASE MANAGEMENT/SOCIAL WORK - NSDCFUADDAPPT_GEN_ALL_CORE_FT
-Your appointment with Dr. Rivas will be TELEHEALTH.    -Our office will call you with your other follow up appointment. If you do not hear from them by Friday, call them at [phone].

## 2024-03-20 NOTE — DISCHARGE NOTE PROVIDER - NSDCFUADDAPPT_GEN_ALL_CORE_FT
-Our office will call you with your follow up appointment. If you do not hear from them by Friday, call them at [phone]. -Your appointment with Dr. Rivas will be TELEHEALTH.    -Our office will call you with your other follow up appointment. If you do not hear from them by Friday, call them at [phone].

## 2024-03-21 ENCOUNTER — NON-APPOINTMENT (OUTPATIENT)
Age: 84
End: 2024-03-21

## 2024-03-21 RX ORDER — ACETYLCYSTEINE 200 MG/ML
20 SOLUTION ORAL; RESPIRATORY (INHALATION)
Qty: 30 | Refills: 0 | Status: ACTIVE | COMMUNITY
Start: 2024-03-21 | End: 1900-01-01

## 2024-03-21 NOTE — ED ADULT NURSE NOTE - NEURO SENSATION
sensory intact Render In Strict Bullet Format?: No Continue Regimen: Apply Tretinoin cream 0.025% to entire face. Avoid applying to eyelids and neck Detail Level: Zone

## 2024-03-22 ENCOUNTER — NON-APPOINTMENT (OUTPATIENT)
Age: 84
End: 2024-03-22

## 2024-03-27 ENCOUNTER — NON-APPOINTMENT (OUTPATIENT)
Age: 84
End: 2024-03-27

## 2024-03-28 ENCOUNTER — INPATIENT (INPATIENT)
Facility: HOSPITAL | Age: 84
LOS: 3 days | Discharge: HOME | DRG: 178 | End: 2024-04-01
Attending: STUDENT IN AN ORGANIZED HEALTH CARE EDUCATION/TRAINING PROGRAM | Admitting: STUDENT IN AN ORGANIZED HEALTH CARE EDUCATION/TRAINING PROGRAM
Payer: MEDICARE

## 2024-03-28 ENCOUNTER — APPOINTMENT (OUTPATIENT)
Dept: PULMONOLOGY | Facility: CLINIC | Age: 84
End: 2024-03-28
Payer: MEDICARE

## 2024-03-28 VITALS
SYSTOLIC BLOOD PRESSURE: 120 MMHG | HEIGHT: 64 IN | HEART RATE: 70 BPM | BODY MASS INDEX: 14.51 KG/M2 | TEMPERATURE: 98.1 F | OXYGEN SATURATION: 93 % | WEIGHT: 85 LBS | DIASTOLIC BLOOD PRESSURE: 80 MMHG

## 2024-03-28 VITALS
WEIGHT: 76.94 LBS | OXYGEN SATURATION: 95 % | HEART RATE: 65 BPM | TEMPERATURE: 98 F | HEIGHT: 64 IN | SYSTOLIC BLOOD PRESSURE: 147 MMHG | RESPIRATION RATE: 18 BRPM | DIASTOLIC BLOOD PRESSURE: 82 MMHG

## 2024-03-28 DIAGNOSIS — J47.1 BRONCHIECTASIS WITH (ACUTE) EXACERBATION: ICD-10-CM

## 2024-03-28 DIAGNOSIS — I50.30 UNSPECIFIED DIASTOLIC (CONGESTIVE) HEART FAILURE: ICD-10-CM

## 2024-03-28 DIAGNOSIS — G31.83 NEUROCOGNITIVE DISORDER WITH LEWY BODIES: ICD-10-CM

## 2024-03-28 DIAGNOSIS — I48.91 UNSPECIFIED ATRIAL FIBRILLATION: ICD-10-CM

## 2024-03-28 DIAGNOSIS — Z00.00 ENCOUNTER FOR GENERAL ADULT MEDICAL EXAMINATION WITHOUT ABNORMAL FINDINGS: ICD-10-CM

## 2024-03-28 DIAGNOSIS — E78.5 HYPERLIPIDEMIA, UNSPECIFIED: ICD-10-CM

## 2024-03-28 DIAGNOSIS — Z87.81 PERSONAL HISTORY OF (HEALED) TRAUMATIC FRACTURE: Chronic | ICD-10-CM

## 2024-03-28 LAB
ALBUMIN SERPL ELPH-MCNC: 4.3 G/DL — SIGNIFICANT CHANGE UP (ref 3.3–5)
ALP SERPL-CCNC: 130 U/L — HIGH (ref 40–120)
ALT FLD-CCNC: 30 U/L — SIGNIFICANT CHANGE UP (ref 10–45)
ANION GAP SERPL CALC-SCNC: 9 MMOL/L — SIGNIFICANT CHANGE UP (ref 5–17)
APTT BLD: 27.1 SEC — SIGNIFICANT CHANGE UP (ref 24.5–35.6)
AST SERPL-CCNC: 27 U/L — SIGNIFICANT CHANGE UP (ref 10–40)
BASE EXCESS BLDV CALC-SCNC: 3.4 MMOL/L — HIGH (ref -2–3)
BASOPHILS # BLD AUTO: 0.03 K/UL — SIGNIFICANT CHANGE UP (ref 0–0.2)
BASOPHILS NFR BLD AUTO: 0.6 % — SIGNIFICANT CHANGE UP (ref 0–2)
BILIRUB SERPL-MCNC: 0.5 MG/DL — SIGNIFICANT CHANGE UP (ref 0.2–1.2)
BUN SERPL-MCNC: 12 MG/DL — SIGNIFICANT CHANGE UP (ref 7–23)
CA-I SERPL-SCNC: 1.13 MMOL/L — LOW (ref 1.15–1.33)
CALCIUM SERPL-MCNC: 9.9 MG/DL — SIGNIFICANT CHANGE UP (ref 8.4–10.5)
CHLORIDE SERPL-SCNC: 98 MMOL/L — SIGNIFICANT CHANGE UP (ref 96–108)
CO2 BLDV-SCNC: 32.1 MMOL/L — HIGH (ref 22–26)
CO2 SERPL-SCNC: 31 MMOL/L — SIGNIFICANT CHANGE UP (ref 22–31)
CREAT SERPL-MCNC: 0.91 MG/DL — SIGNIFICANT CHANGE UP (ref 0.5–1.3)
EGFR: 63 ML/MIN/1.73M2 — SIGNIFICANT CHANGE UP
EOSINOPHIL # BLD AUTO: 0.04 K/UL — SIGNIFICANT CHANGE UP (ref 0–0.5)
EOSINOPHIL NFR BLD AUTO: 0.7 % — SIGNIFICANT CHANGE UP (ref 0–6)
GAS PNL BLDV: 130 MMOL/L — LOW (ref 136–145)
GAS PNL BLDV: SIGNIFICANT CHANGE UP
GLUCOSE SERPL-MCNC: 99 MG/DL — SIGNIFICANT CHANGE UP (ref 70–99)
HCO3 BLDV-SCNC: 30 MMOL/L — HIGH (ref 22–29)
HCT VFR BLD CALC: 36.9 % — SIGNIFICANT CHANGE UP (ref 34.5–45)
HGB BLD-MCNC: 12.1 G/DL — SIGNIFICANT CHANGE UP (ref 11.5–15.5)
IMM GRANULOCYTES NFR BLD AUTO: 0.2 % — SIGNIFICANT CHANGE UP (ref 0–0.9)
INR BLD: 0.98 — SIGNIFICANT CHANGE UP (ref 0.85–1.18)
LACTATE SERPL-SCNC: 0.9 MMOL/L — SIGNIFICANT CHANGE UP (ref 0.5–2)
LYMPHOCYTES # BLD AUTO: 1.34 K/UL — SIGNIFICANT CHANGE UP (ref 1–3.3)
LYMPHOCYTES # BLD AUTO: 24.6 % — SIGNIFICANT CHANGE UP (ref 13–44)
MCHC RBC-ENTMCNC: 32.2 PG — SIGNIFICANT CHANGE UP (ref 27–34)
MCHC RBC-ENTMCNC: 32.8 GM/DL — SIGNIFICANT CHANGE UP (ref 32–36)
MCV RBC AUTO: 98.1 FL — SIGNIFICANT CHANGE UP (ref 80–100)
MONOCYTES # BLD AUTO: 0.52 K/UL — SIGNIFICANT CHANGE UP (ref 0–0.9)
MONOCYTES NFR BLD AUTO: 9.6 % — SIGNIFICANT CHANGE UP (ref 2–14)
NEUTROPHILS # BLD AUTO: 3.5 K/UL — SIGNIFICANT CHANGE UP (ref 1.8–7.4)
NEUTROPHILS NFR BLD AUTO: 64.3 % — SIGNIFICANT CHANGE UP (ref 43–77)
NRBC # BLD: 0 /100 WBCS — SIGNIFICANT CHANGE UP (ref 0–0)
NT-PROBNP SERPL-SCNC: 717 PG/ML — HIGH (ref 0–300)
PCO2 BLDV: 55 MMHG — HIGH (ref 39–42)
PH BLDV: 7.35 — SIGNIFICANT CHANGE UP (ref 7.32–7.43)
PLATELET # BLD AUTO: 231 K/UL — SIGNIFICANT CHANGE UP (ref 150–400)
PO2 BLDV: <33 MMHG — SIGNIFICANT CHANGE UP (ref 25–45)
POTASSIUM BLDV-SCNC: 3.7 MMOL/L — SIGNIFICANT CHANGE UP (ref 3.5–5.1)
POTASSIUM SERPL-MCNC: 4.1 MMOL/L — SIGNIFICANT CHANGE UP (ref 3.5–5.3)
POTASSIUM SERPL-SCNC: 4.1 MMOL/L — SIGNIFICANT CHANGE UP (ref 3.5–5.3)
PROT SERPL-MCNC: 7.8 G/DL — SIGNIFICANT CHANGE UP (ref 6–8.3)
PROTHROM AB SERPL-ACNC: 11.2 SEC — SIGNIFICANT CHANGE UP (ref 9.5–13)
RAPID RVP RESULT: SIGNIFICANT CHANGE UP
RBC # BLD: 3.76 M/UL — LOW (ref 3.8–5.2)
RBC # FLD: 13.2 % — SIGNIFICANT CHANGE UP (ref 10.3–14.5)
SAO2 % BLDV: 43.7 % — LOW (ref 67–88)
SARS-COV-2 RNA SPEC QL NAA+PROBE: SIGNIFICANT CHANGE UP
SODIUM SERPL-SCNC: 138 MMOL/L — SIGNIFICANT CHANGE UP (ref 135–145)
TROPONIN T, HIGH SENSITIVITY RESULT: 22 NG/L — SIGNIFICANT CHANGE UP (ref 0–51)
WBC # BLD: 5.44 K/UL — SIGNIFICANT CHANGE UP (ref 3.8–10.5)
WBC # FLD AUTO: 5.44 K/UL — SIGNIFICANT CHANGE UP (ref 3.8–10.5)

## 2024-03-28 PROCEDURE — 99223 1ST HOSP IP/OBS HIGH 75: CPT | Mod: GC

## 2024-03-28 PROCEDURE — 99215 OFFICE O/P EST HI 40 MIN: CPT | Mod: 25

## 2024-03-28 PROCEDURE — 99291 CRITICAL CARE FIRST HOUR: CPT

## 2024-03-28 PROCEDURE — 71250 CT THORAX DX C-: CPT | Mod: 26

## 2024-03-28 PROCEDURE — 71045 X-RAY EXAM CHEST 1 VIEW: CPT | Mod: 26

## 2024-03-28 RX ORDER — SODIUM CHLORIDE 9 MG/ML
4 INJECTION INTRAMUSCULAR; INTRAVENOUS; SUBCUTANEOUS EVERY 12 HOURS
Refills: 0 | Status: DISCONTINUED | OUTPATIENT
Start: 2024-03-28 | End: 2024-03-31

## 2024-03-28 RX ORDER — CEFEPIME 1 G/1
2000 INJECTION, POWDER, FOR SOLUTION INTRAMUSCULAR; INTRAVENOUS ONCE
Refills: 0 | Status: COMPLETED | OUTPATIENT
Start: 2024-03-28 | End: 2024-03-28

## 2024-03-28 RX ORDER — PANTOPRAZOLE SODIUM 20 MG/1
40 TABLET, DELAYED RELEASE ORAL
Refills: 0 | Status: DISCONTINUED | OUTPATIENT
Start: 2024-03-28 | End: 2024-04-01

## 2024-03-28 RX ORDER — ACETAMINOPHEN 500 MG
350 TABLET ORAL EVERY 6 HOURS
Refills: 0 | Status: DISCONTINUED | OUTPATIENT
Start: 2024-03-28 | End: 2024-04-01

## 2024-03-28 RX ORDER — LEVALBUTEROL 1.25 MG/.5ML
0.63 SOLUTION, CONCENTRATE RESPIRATORY (INHALATION) EVERY 4 HOURS
Refills: 0 | Status: DISCONTINUED | OUTPATIENT
Start: 2024-03-28 | End: 2024-03-31

## 2024-03-28 RX ORDER — METOPROLOL TARTRATE 50 MG
25 TABLET ORAL DAILY
Refills: 0 | Status: DISCONTINUED | OUTPATIENT
Start: 2024-03-28 | End: 2024-03-28

## 2024-03-28 RX ORDER — CLOPIDOGREL BISULFATE 75 MG/1
75 TABLET, FILM COATED ORAL DAILY
Refills: 0 | Status: DISCONTINUED | OUTPATIENT
Start: 2024-03-28 | End: 2024-04-01

## 2024-03-28 RX ORDER — MONTELUKAST 4 MG/1
10 TABLET, CHEWABLE ORAL DAILY
Refills: 0 | Status: DISCONTINUED | OUTPATIENT
Start: 2024-03-28 | End: 2024-04-01

## 2024-03-28 RX ORDER — DONEPEZIL HYDROCHLORIDE 10 MG/1
5 TABLET, FILM COATED ORAL AT BEDTIME
Refills: 0 | Status: DISCONTINUED | OUTPATIENT
Start: 2024-03-28 | End: 2024-04-01

## 2024-03-28 RX ORDER — ASPIRIN/CALCIUM CARB/MAGNESIUM 324 MG
81 TABLET ORAL DAILY
Refills: 0 | Status: DISCONTINUED | OUTPATIENT
Start: 2024-03-28 | End: 2024-04-01

## 2024-03-28 RX ORDER — CEFEPIME 1 G/1
2000 INJECTION, POWDER, FOR SOLUTION INTRAMUSCULAR; INTRAVENOUS EVERY 12 HOURS
Refills: 0 | Status: DISCONTINUED | OUTPATIENT
Start: 2024-03-28 | End: 2024-03-29

## 2024-03-28 RX ORDER — CLONAZEPAM 1 MG
0.5 TABLET ORAL
Refills: 0 | Status: DISCONTINUED | OUTPATIENT
Start: 2024-03-28 | End: 2024-04-01

## 2024-03-28 RX ORDER — CEFEPIME 1 G/1
2000 INJECTION, POWDER, FOR SOLUTION INTRAMUSCULAR; INTRAVENOUS EVERY 12 HOURS
Refills: 0 | Status: COMPLETED | OUTPATIENT
Start: 2024-03-28 | End: 2024-03-29

## 2024-03-28 RX ORDER — ENOXAPARIN SODIUM 100 MG/ML
30 INJECTION SUBCUTANEOUS EVERY 24 HOURS
Refills: 0 | Status: DISCONTINUED | OUTPATIENT
Start: 2024-03-28 | End: 2024-03-30

## 2024-03-28 RX ORDER — FUROSEMIDE 40 MG
20 TABLET ORAL
Refills: 0 | Status: DISCONTINUED | OUTPATIENT
Start: 2024-03-28 | End: 2024-04-01

## 2024-03-28 RX ORDER — ATORVASTATIN CALCIUM 80 MG/1
40 TABLET, FILM COATED ORAL AT BEDTIME
Refills: 0 | Status: DISCONTINUED | OUTPATIENT
Start: 2024-03-28 | End: 2024-04-01

## 2024-03-28 RX ORDER — METOPROLOL TARTRATE 50 MG
50 TABLET ORAL EVERY 12 HOURS
Refills: 0 | Status: DISCONTINUED | OUTPATIENT
Start: 2024-03-28 | End: 2024-04-01

## 2024-03-28 RX ADMIN — Medication 350 MILLIGRAM(S): at 21:30

## 2024-03-28 RX ADMIN — Medication 0.5 MILLIGRAM(S): at 23:42

## 2024-03-28 RX ADMIN — ATORVASTATIN CALCIUM 40 MILLIGRAM(S): 80 TABLET, FILM COATED ORAL at 22:48

## 2024-03-28 RX ADMIN — LEVALBUTEROL 0.63 MILLIGRAM(S): 1.25 SOLUTION, CONCENTRATE RESPIRATORY (INHALATION) at 17:44

## 2024-03-28 RX ADMIN — DONEPEZIL HYDROCHLORIDE 5 MILLIGRAM(S): 10 TABLET, FILM COATED ORAL at 22:48

## 2024-03-28 RX ADMIN — SODIUM CHLORIDE 4 MILLILITER(S): 9 INJECTION INTRAMUSCULAR; INTRAVENOUS; SUBCUTANEOUS at 22:48

## 2024-03-28 RX ADMIN — CEFEPIME 100 MILLIGRAM(S): 1 INJECTION, POWDER, FOR SOLUTION INTRAMUSCULAR; INTRAVENOUS at 16:31

## 2024-03-28 RX ADMIN — CEFEPIME 100 MILLIGRAM(S): 1 INJECTION, POWDER, FOR SOLUTION INTRAMUSCULAR; INTRAVENOUS at 22:48

## 2024-03-28 RX ADMIN — CEFEPIME 2000 MILLIGRAM(S): 1 INJECTION, POWDER, FOR SOLUTION INTRAMUSCULAR; INTRAVENOUS at 17:44

## 2024-03-28 NOTE — CONSULT NOTE ADULT - ASSESSMENT
84 YO Female w/ AF (not on AC due to SAH and fall risk), HTN, Lewy body dementia (baseline a/o x3),CVA, SAH,  posterior reversible encephalopathy syndrome PRESS, chronic bronchiectasis with hx of (pseudomonas and stenotrophomonas infections, home O2 prn, as well as 2 weeks each month with inhaled Tobramycin per Dr. Foster, last episode 6 month ago), diastolic CHF, who recently underwent an uncomplicated MITZI occlusion w/ 22mm Amulet with Dr. Rivas on 3/19/24. She presents w/ SOB and increased sputum production concerning for bronchiectasis exacerbation.    Pulmonary is consulted for bronchiectasis exacerbation.     # Non-CF Bronchiectasis exacerbation  # history of pseudomonas  - presents w/ sob, sputum production x1 week, green in color  - CT scan w/o obvious infiltrate  - sputum at outpatient clinic + pseudomonas and has history of pseudomonas infections in the past (allergy to levaquin so has been on iv cefepime, iv zosyn, and cipro w/o known issue)  - has not tolerated inhalers or nebulizers before given anxiety and jitteriness    Recommendations  - obtain sputum culture, guide abx to therapy  - continue airway clearance that she does at home: can do 3% saline nebz followed by aerobika  - c/w IV cefepime Will likely need a PICC line placed for outpatient completion of course given her allergies    S/D/D with Dr. Patricio

## 2024-03-28 NOTE — H&P ADULT - PROBLEM SELECTOR PLAN 2
Not on AC due to SAH and risk of falls. s/p MITZI occlusion on 3/19.  - Can continue with home Toprol 25mg q12h.   - Continue Aspirin 81 mg PO daily and Plavix 75 mg PO daily for post MITZI prophylaxis.

## 2024-03-28 NOTE — ASSESSMENT
[FreeTextEntry1] : Data reviewed:  CTA chest [facility] 12/2023 personally reviewed : broncheictasis, mucus plugging, some mosaicism  [place] 8/2023: very severe obstruction, FEV1 21%  SpCx 3/26/2024: few Pseudomonas  Impression: Bronchiectasis exacerbation  Plan: Tough problem on Thursday with a frail elderly woman who lives [facility], who is questionably allergic to Levaquin and historically seems to have failed PO cipro anyway. Best course in my opinion is admission. Alternatively go home and arrange for PICC, but who knows how long that would take, or go home on cipro and hope for best. After some extensive discussion the patient agreed to be admitted. Daughter will drive her to ED now. Would preliminarily favor IV cefepime and home w PICC to complete IV Abx. Discussed w ED notification, discussed w inpatient pulmonary team.

## 2024-03-28 NOTE — PATIENT PROFILE ADULT - DO YOU EVER NEED HELP READING HOSPITAL MATERIALS?
Lay Garnett usually provides samples of Toujeo for this pt, however,  she is in [place] [place] and is uanble to provide this,  Pt is almost out,  Would dr Erna Rick be willing to provide the samples for this pt.  Please advise   Parmjit Garcia from [facility] 72M PMH HTN, DM2, p/w 12 days of intermittent fevers, assoc with dry cough and for the past 3 days progressive shortness of breath, a/f hypoxic respiratory failure likely 2/2 COVID. s/p trach and peg 5/22    Neurologic: Agitation when awake  - Actively weaning precedex  - Klonopin 1mg BID  - c/w methadone PO 20mg TID  - c/w Seroquel 50mg BID  - Thiamine, cyanocobalamin, folic acid  - c/w dilaudid.5 q3h PRN pain    Respiratory:  - S/p tracheostomy on 5/22  - Patient failing pressure support trials secondary to agitation (tachycardia and tachypnea)  - A/C 380 / 28 / 5 / 30%  - Continue CPAP as tolerated, will need RCU    Cardiovascular:   - Hypotension, c/w midodrine 10mg q8h  - Goal MAP > 65  - Holding ASA. Resume if Hb continues to be stable    Gastrointestinal/Nutrition:   - PEG placed 5/22  - c/w tube feeds  - c/w Bowel regimen- Miralax and Senna    Renal/Genitourinary:   - maintain net even to negative, negative without aide of diuretics  - monitor I/Os  - Hematuria resolved   - replete electrolytes as needed    Hematologic:   - Hematuria resolved. Neg DVT study  - trend H/H; s/p 1 PRBC 5/27  - c/w Lovenox 40 mg BID (PPx dose)  - Holding ASA. Resume if Hb continues to be stable    Infectious Disease:   - COVID + s/p Hydroxychloroquine, Solumedrol, Anakinra, approved for plasma 4/28  - Start Levaquin based on ELENO  - s/p meropenem (5/7-5/11)  - 5/12, 5/17 5/25 Sputum Cx growing carbapenem resistant pseudomonas, s/p zosyn 5/14- 5/23 (10-day course) s/p Cefempime (end 5/27)  - 5/17 sputum Cx also with yeast-like organisms, was on caspofungin. D/c'ed per ID recs.  - ID following      Endocrine:   - Endo Following  - mod ISS, monitor FG  - c/w  Synthroid 52mcg QD 72M PMH HTN, DM2, p/w 12 days of intermittent fevers, assoc with dry cough and for the past 3 days progressive shortness of breath, a/f hypoxic respiratory failure likely 2/2 COVID. s/p trach and peg 5/22    Neurologic: Agitation when awake  - Actively weaning precedex  - Klonopin 1mg BID  - c/w methadone PO 20mg TID  - c/w Seroquel 50mg BID  - Thiamine, cyanocobalamin, folic acid  - c/w dilaudid.5 q3h PRN pain    Respiratory:  - S/p tracheostomy on 5/22  - Patient failing pressure support trials secondary to agitation (tachycardia and tachypnea)  - A/C 380 / 28 / 5 / 30%  - Continue CPAP as tolerated, will need RCU    Cardiovascular:   - Hypotension, c/w midodrine 10mg q8h- DC'd this am   Episodic HYPERtension  Labetolol 10mg IV x 1 and observe   - Goal MAP > 65  - Holding ASA. Resume if Hb continues to be stable    Gastrointestinal/Nutrition:   - PEG placed 5/22  - c/w tube feeds  - c/w Bowel regimen- Miralax and Senna    Renal/Genitourinary:   - maintain net even to negative, negative without aide of diuretics  - monitor I/Os  - Hematuria resolved   - replete electrolytes as needed    Hematologic:   - Hematuria resolved. Neg DVT study  - trend H/H; s/p 1 PRBC 5/27  - c/w Lovenox 40 mg BID (PPx dose)  - Holding ASA. Resume if Hb continues to be stable    Infectious Disease:   - COVID + s/p Hydroxychloroquine, Solumedrol, Anakinra, approved for plasma 4/28  - Start Levaquin based on ELENO  - s/p meropenem (5/7-5/11)  - 5/12, 5/17 5/25 Sputum Cx growing carbapenem resistant pseudomonas, s/p zosyn 5/14- 5/23 (10-day course) s/p Cefempime (end 5/27)  - 5/17 sputum Cx also with yeast-like organisms, was on caspofungin. D/c'ed per ID recs.  - ID following      Endocrine:   - Endo Following  - mod ISS, monitor FG  - c/w  Synthroid 52mcg QD no

## 2024-03-28 NOTE — H&P ADULT - REASON FOR ADMISSION
Extreme shortness of breath, flare up of broncheictasis Extreme shortness of breath, flare up of bronchiectasis

## 2024-03-28 NOTE — CONSULT NOTE ADULT - ASSESSMENT
83F PMHx Chronic bronchiectasis (follows Dr Foster, on home O2), dCHF, AF (not on AC due to SAH) s/p uncomplicated MITZI occlusion w/ 22mm Amulet with Dr. Rivas on 3/19/24, HTN, Lewy body dementia (baseline a/o x3), posterior reversible encephalopathy syndrome, C. diff. Since her MITZI occlusion on 3/19 she at first had difficulty producing mucus. Now she is producing copious mucus and she remains very dyspneic more than her usual. Her daily regimen is Astepro nasal spray, nebulized saline, Aerobika (many times a day), LARRY (off now, next cycle starts 4/1), montelukast, no inhalers because cannot tolerate. No fever. Just generally feels unwell. Brought into pulm clinic by daughter (Patience) for x1d severe dyspnea, describes that she is "in agony". Saw Dr. Ramos, advised to go to [facility] for admission to receive IV cefepime (allergic to Levaquin and historically seems to have failed PO cipro) and discharge home w PICC to compete IV abx course.  Ms. Funes is an 83 year old with a past medical history of severe chronic bronchiectasis on intermittent home oxygen therapy, Atrial fibrillation not on AC due to history of SAH, HTN, Lewy body dementia, posterior reversible encephalopathy syndrome, dCHF, C. diff who recently underwent uncomplicated MITZI occlusion with Dr. Rivas on 3/19/24 who was sent in from Dr. Bhardwaj office presents with increased dyspnea post procedure, diagnosed with bronchiectasis exacerbation with planned inpatient IV antibiotics. ICU consulted for possible telemetry needs given dyspnea, on assessment patient speaking in full sentences, on 2L O2, without accessory muscle use and HDS stable. At this time no current needs for telemetry, though plans as below.

## 2024-03-28 NOTE — ED ADULT NURSE NOTE - NSFALLUNIVINTERV_ED_ALL_ED
Bed/Stretcher in lowest position, wheels locked, appropriate side rails in place/Call bell, personal items and telephone in reach/Instruct patient to call for assistance before getting out of bed/chair/stretcher/Non-slip footwear applied when patient is off stretcher/[place] to call system/Physically safe environment - no spills, clutter or unnecessary equipment/Purposeful proactive rounding/Room/bathroom lighting operational, light cord in reach

## 2024-03-28 NOTE — CONSULT NOTE ADULT - PROBLEM SELECTOR RECOMMENDATION 3
- c/w home Toprol 25mg q12h, Lasix 20mg Qd BNP noted to be in 700s, no significant edema on examination, no JVD, no significant congestion on CXR.   - c/w home Toprol 25mg q12h, Lasix 20mg every other day.

## 2024-03-28 NOTE — ED PROVIDER NOTE - CRITICAL CARE ATTENDING CONTRIBUTION TO CARE
Upon my evaluation, this patient had a high probability of imminent or life-threatening deterioration due to shortness of breath and bronchiectasis exacerbation  which required my direct attention, intervention, and personal management.    I have personally provided the above minutes of critical care time exclusive of time spent on separately billable procedures. Time includes review of laboratory data, radiology results, discussion with consultants, and monitoring for potential decompensation. Interventions were performed as documented above.

## 2024-03-28 NOTE — CONSULT NOTE ADULT - PROBLEM SELECTOR RECOMMENDATION 2
Not on AC due to SAH. s/p MITZI occlusion on 3/19.  - c/w home Toprol 25mg q12h, Aspirin 81mg & Plavix 75mg Qd Not on AC due to SAH. s/p MITZI occlusion on 3/19.  - Can continue with home Toprol 25mg q12h.   - Contineu Aspirin 81 mg PO daily and Plavix 75 mg PO daily for post MITZI prophylaxis. Not on AC due to SAH. s/p MITZI occlusion on 3/19.  - Can continue with home Toprol 25mg q12h.   - Continue Aspirin 81 mg PO daily and Plavix 75 mg PO daily for post MITZI prophylaxis.

## 2024-03-28 NOTE — H&P ADULT - PROBLEM SELECTOR PLAN 1
History of chronic bronchiectasis with previous exacerbations from Pseudomonas and Stenotrophomonas. Follows Dr Foster, on home O2 intermittently with exertion. Patient is post intubation for MITZI occlusion on 3/19, initially had difficulty producing mucus but now has copious mucus and very dyspneic. Presents for treatment of bronchiectasis with IV cefepime as patient was noted to be positive for pseudomonas on outpatient sputum culture from Dr. Foster. Per patient history of allergy to Levaquin and previously received PO Cipro course. Patient last treated for bronchiectasis exacerbation in September.   - Continue IV Cefepime per pulmonology, will discuss course with pulmonology.  - Obtain abx approval from ID, may need Midline if improving to complete course.   - Send RVP & SClx  - F/u BClx  - Follow up Pulmonology recommendations.   - Recommend initation 3% saline nebulizer BID with Aerobika to assist with mucociliary clearance.   - Consider Chest PT if continued difficulty clearing sputum.   - Continue home Montelukast 10mg Qd, Astepro nasal spray.  - Discuss Xopenex at reduced dosing per patient preference. History of chronic bronchiectasis with previous exacerbations from Pseudomonas and Stenotrophomonas. Follows Dr Foster, on home O2 intermittently with exertion. Patient is post intubation for MITZI occlusion on 3/19, initially had difficulty producing mucus but now has copious mucus and very dyspneic. Presents for treatment of bronchiectasis with IV cefepime as patient was noted to be positive for pseudomonas on outpatient sputum culture from Dr. Foster. Per patient history of allergy to Levaquin and previously received PO Cipro course. Patient last treated for bronchiectasis exacerbation in September. 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 .   - Continue IV Cefepime per pulmonology, will discuss course with pulmonology.  - Obtain abx approval from ID, may need Midline if improving to complete course.   - Send RVP & SClx  - F/u BClx  - Follow up Pulmonology recommendations.   - Recommend initation 3% saline nebulizer BID with Aerobika to assist with mucociliary clearance.   - Consider Chest PT if continued difficulty clearing sputum.   - Continue home Montelukast 10mg Qd, Astepro nasal spray.  - Discuss Xopenex at reduced dosing per patient preference.

## 2024-03-28 NOTE — HISTORY OF PRESENT ILLNESS
[TextBox_4] : 03/28/2024: Acute visit of this patient of Dr Foster's last seen by him 12/2023 with severe COPD and neurologic disease, with hypercapnea. She was admitted to [facility] for MITZI closure device earlier this month and discharged a week ago. Daughter called yesterday reporting pt w severe dyspnea and we advised local ED evaluation. They did not do that and she comes here today w her daughter Patience. Describes that she is in agony. Since leaving hospital she at first had difficulty producing mucus. Now she is producing copious mucus and she remains very dyspneic more than her usual. Right now her daily regimen is Astepro nasal spray, nebulized saline, Aerobika (many times a day), LARRY (off now, next cycle starts 4/1), montelukast, no inhalers because cannot tolerate. No fever. Just generally feels unwell. Historically may have had an allergic rxn to Levaquin - this is unclear - but has apparently tolerated cipro. Never smoker.

## 2024-03-28 NOTE — H&P ADULT - PROBLEM SELECTOR PLAN 3
BNP noted to be in 700s, no significant edema on examination, no JVD, no significant congestion on CXR.   - c/w home Toprol 25mg q12h, Lasix 20mg every other day.

## 2024-03-28 NOTE — CONSULT NOTE ADULT - PROBLEM SELECTOR RECOMMENDATION 6
F: None   E: Replete as necessary K>4 Mg>2  N: __diet     DVT Prophylaxis: __  GI prophylaxis: None   CODE STATUS: FULL F: None   E: Replete as necessary K>4 Mg>2  N: Full diet     DVT Prophylaxis: Lovenox 30 mg PO Qd  GI prophylaxis: Protonix  CODE STATUS: FULL

## 2024-03-28 NOTE — ED PROVIDER NOTE - OBJECTIVE STATEMENT
83F PMHx AF (not on AC due to SAH), HTN, Lewy body dementia (baseline a/o x3), posterior reversible encephalopathy syndrome, chronic bronchiectasis (home O2 prn), dCHF, C. diff s/p uncomplicated MITZI occlusion w/ 22mm Amulet with Dr. Rivas on 3/19/24 who p/w worsening SOB 83F PMHx AF (not on AC due to SAH), HTN, Lewy body dementia (baseline a/o x3), posterior reversible encephalopathy syndrome, chronic bronchiectasis (home O2 prn), dCHF, C. diff s/p uncomplicated MITZI occlusion w/ 22mm Amulet with Dr. Rivas on 3/19/24 who p/w worsening SOB since her procedure 10 days ago. She was seen by Dr. Ramos and sent to ER for admission. Pt has been using tobramycin nebs and xopenex nebs at home, reports recent sputum cx +for pseudomonas. C/o intermittent SOB, feels like mucus plugging. Denies f/c, cp, syncope, n/v, or abdominal pain. Here with daughter.

## 2024-03-28 NOTE — PHYSICAL EXAM
[TextBox_2] : cachectic elderly woman who is not in respiratory distress but anxious and in some emotional distress [TextBox_68] : francisco j

## 2024-03-28 NOTE — PATIENT PROFILE ADULT - FALL HARM RISK - HARM RISK INTERVENTIONS
Pt arrives with c/o bloody diarrhea and abd pain that began 6 days ago.    Assistance with ambulation/Assistance OOB with selected safe patient handling equipment/Communicate Risk of Fall with Harm to all staff/Discuss with provider need for PT consult/Monitor gait and stability/Provide patient with walking aids - walker, cane, crutches/Reinforce activity limits and safety measures with patient and family/Tailored Fall Risk Interventions/Visual Cue: Yellow wristband and red socks/Bed in lowest position, wheels locked, appropriate side rails in place/Call bell, personal items and telephone in reach/Instruct patient to call for assistance before getting out of bed or chair/Non-slip footwear when patient is out of bed/[place] to call system/Physically safe environment - no spills, clutter or unnecessary equipment/Purposeful Proactive Rounding/Room/bathroom lighting operational, light cord in reach

## 2024-03-28 NOTE — CONSULT NOTE ADULT - PROBLEM SELECTOR RECOMMENDATION 9
Hx chronic bronchiectasis. Follows Dr Foster, on home O2. 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 Now producing copious mucus and very dyspneic. Sent in by Dr. Ramos for IV cefepime. Allergic to Levaquin and historically ?failed PO Cipro  - Start IV Cefepime__, obtain abx approval from ID, needs PICC line.  - Send RVP & SClx  - f/u BClx  - c/w home Montelukast 10mg Qd, Astepro nasal spray, Nebulized saline, Aerobika (many times a day), ?Tobramycin History of chronic bronchiectasis with previous exacerbations from Pseudomonas and Stenotrophomonas. Follows Dr Foster, on home O2 intermittently with exertion. Patient is post intubation for MITZI occlusion on 3/19, initially had difficulty producing mucus but now has copious mucus and very dyspneic. Presents for treatment of bronchiectasis with IV cefepime as patient was noted to be positive for pseudomonas on outpatient sputum culture from Dr. Foster. Per patient history of allergy to Levaquin and previously received PO Cipro course. Patient last treated for bronchiectasis exacerbation in September.   - Continue IV Cefepime per pulmonology, will discuss course with pulmonology.  - Obtain abx approval from ID, may need Midline if improving to complete course.   - Send RVP & SClx  - F/u BClx  - Follow up Pulmonology recommendations.   - Recommend initation 3% saline nebulizer BID with Aerobika to assist with mucocilliary clearance.   - Consider Chest PT if continued difficulty clearing sputum.   - Continue home Montelukast 10mg Qd, Astepro nasal spray. History of chronic bronchiectasis with previous exacerbations from Pseudomonas and Stenotrophomonas. Follows Dr Foster, on home O2 intermittently with exertion. Patient is post intubation for MITZI occlusion on 3/19, initially had difficulty producing mucus but now has copious mucus and very dyspneic. Presents for treatment of bronchiectasis with IV cefepime as patient was noted to be positive for pseudomonas on outpatient sputum culture from Dr. Foster. Per patient history of allergy to Levaquin and previously received PO Cipro course. Patient last treated for bronchiectasis exacerbation in September.   - Continue IV Cefepime per pulmonology, will discuss course with pulmonology.  - Obtain abx approval from ID, may need Midline if improving to complete course.   - Send RVP & SClx  - F/u BClx  - Follow up Pulmonology recommendations.   - Recommend initation 3% saline nebulizer BID with Aerobika to assist with mucociliary clearance.   - Consider Chest PT if continued difficulty clearing sputum.   - Continue home Montelukast 10mg Qd, Astepro nasal spray.  - Discuss Xopenex at reduced dosing per patient preference.

## 2024-03-28 NOTE — CONSULT NOTE ADULT - SUBJECTIVE AND OBJECTIVE BOX
PULMONARY SERVICE INITIAL CONSULT NOTE    HPI:  84 YO Female w/ PMHx of AF (not on AC due to SAH and fall risk), HTN, Lewy body dementia (baseline a/o x3),CVA, SAH,  posterior reversible encephalopathy syndrome PRESS, chronic bronchiectasis with multiple admissions for Pseudomonal infections (home O2 prn, as well as 2 weeks each month with inhaled Tobramycin per Dr. Foster, last episode 6 month ago), diastolic CHF, and C. diff, who recently underwent an uncomplicated MITZI occlusion w/ 22mm Amulet with Dr. Rivas on 3/19/24. Post-op course uncomplicated. who was sent in from Dr. Bhardwaj office presents with increased dyspnea post procedure. Patient reports that she was intubated during the procedure but post extubation she has developed worsening of her chronic dyspnea, secondary to difficulty passing sputum. Patient reports that post procedure she had a significant amount of inflammation in her throat that made coughing painful so secretions started to pile up, since that time she has had three episodes where she was unable to cough for a time leading to severe dyspnea. Then when the inflammation subsided then she was not able to cough up the secretions. Today she reports one episode of similar, though notes it passed quickly and it was nowhere near as severe. She takes inhaled Tobramycin 2 weeks per month as a prophylaxis from Pseudomonal Pneumonia as per Dr. Foster. She reports her daily regimen of treatment consists of Astepro nasal spray, nebulized 0.9 % saline, Aerobika, Montelukast, and LARRY, has not been taking Xopinex secondary to tachycardia symptoms. She has reproducible tenderness to the 7/8th ribs bilaterally.     She otherwise reports generalized malaise relating it to illness but denies fevers, chills, sick contacts, syncope, bloody cough, muscle pain or weakness. No dysuria. Of nnote, the patient is allergic to Levaquin (causes facial swelling), allergic to all kind of contrast (causes anaphylaxis from prior notes) and her infections are resistant to Tobramycin.     In the ED:  Initial vital signs: T: 97.6 F, HR: 65 , BP: 147/82 , R: 18 , SpO2: 95 % on RA (now o 2 L NC)  ED course:   Labs: significant for Pro , PCO2 venous 55, HCO3 30   Imaging:  CXR: hyperinflated lungs,   Chest CT : diffuse air bronchograms   EKG: heart block   Medications: 2g Cefepime    (28 Mar 2024 18:53)      Pulmonary is consulted for    Follows with  __ outpatient for Pulmonary.     REVIEW OF SYSTEMS:  10 point ROS negative aside from what is mentioned in HPI    PAST MEDICAL & SURGICAL HISTORY:  Bronchiectasis      History of Pseudomonas pneumonia      HTN (hypertension)      Posterior reversible encephalopathy syndrome      Atrial fibrillation      Lewy body dementia      H/O fracture of right hip          FAMILY HISTORY:      SOCIAL HISTORY:  Smoking Status: [ ] Current, [ ] Former, [ ] Never  Pack Years:    MEDICATIONS:  Pulmonary:  levalbuterol Inhalation 0.63 milliGRAM(s) Inhalation every 4 hours  montelukast 10 milliGRAM(s) Oral daily    Antimicrobials:    Anticoagulants:  aspirin enteric coated 81 milliGRAM(s) Oral daily  clopidogrel Tablet 75 milliGRAM(s) Oral daily    Onc:    GI/:  pantoprazole    Tablet 40 milliGRAM(s) Oral before breakfast    Endocrine:  atorvastatin 40 milliGRAM(s) Oral at bedtime    Cardiac:  furosemide    Tablet 20 milliGRAM(s) Oral <User Schedule>  metoprolol succinate ER 25 milliGRAM(s) Oral daily    Other Medications:  clonazePAM  Tablet 0.5 milliGRAM(s) Oral two times a day PRN  donepezil 5 milliGRAM(s) Oral at bedtime      Allergies    Levaquin (Swelling)  Augmentin (Short breath; Rash)  Ceclor (Rash)  IV Contrast (Unknown)    Intolerances        Vital Signs Last 24 Hrs  T(C): 36.4 (28 Mar 2024 15:07), Max: 36.4 (28 Mar 2024 15:07)  T(F): 97.6 (28 Mar 2024 15:07), Max: 97.6 (28 Mar 2024 15:07)  HR: 100 (28 Mar 2024 20:05) (65 - 100)  BP: 132/66 (28 Mar 2024 20:05) (132/66 - 147/82)  BP(mean): --  RR: 16 (28 Mar 2024 20:05) (16 - 22)  SpO2: 100% (28 Mar 2024 20:05) (94% - 100%)    Parameters below as of 28 Mar 2024 20:05  Patient On (Oxygen Delivery Method): room air              PHYSICAL EXAM:  Constitutional: well-appearing, in no apparent distress  Head: NC/AT  EENT: PERRL, anicteric sclera; oropharynx clear with moist mucous membranes  Neck: supple, ROM intact, no appreciable JVD or LAD  Respiratory: Lungs clear to auscultation bilaterally, no wheezes, rhonchi or rales  Cardiovascular: +S1/S2 intact, regular rhythm and rate. No murmurs appreciated  Gastrointestinal: soft, non-tender, non distended, bowel sounds normoactive   Extremities: no edema, clubbing or cyanosis  Vascular: 2+ radial pulses B/L  Neurological: awake and alert; oriented with no appreciable focal neuro defecits    LABS:      CBC Full  -  ( 28 Mar 2024 16:04 )  WBC Count : 5.44 K/uL  RBC Count : 3.76 M/uL  Hemoglobin : 12.1 g/dL  Hematocrit : 36.9 %  Platelet Count - Automated : 231 K/uL  Mean Cell Volume : 98.1 fl  Mean Cell Hemoglobin : 32.2 pg  Mean Cell Hemoglobin Concentration : 32.8 gm/dL  Auto Neutrophil # : 3.50 K/uL  Auto Lymphocyte # : 1.34 K/uL  Auto Monocyte # : 0.52 K/uL  Auto Eosinophil # : 0.04 K/uL  Auto Basophil # : 0.03 K/uL  Auto Neutrophil % : 64.3 %  Auto Lymphocyte % : 24.6 %  Auto Monocyte % : 9.6 %  Auto Eosinophil % : 0.7 %  Auto Basophil % : 0.6 %    03-28    138  |  98  |  12  ----------------------------<  99  4.1   |  31  |  0.91    Ca    9.9      28 Mar 2024 16:04    TPro  7.8  /  Alb  4.3  /  TBili  0.5  /  DBili  x   /  AST  27  /  ALT  30  /  AlkPhos  130<H>  03-28    PT/INR - ( 28 Mar 2024 16:04 )   PT: 11.2 sec;   INR: 0.98          PTT - ( 28 Mar 2024 16:04 )  PTT:27.1 sec                  RADIOLOGY & ADDITIONAL STUDIES:    reviewed PULMONARY SERVICE INITIAL CONSULT NOTE    HPI:  82 YO Female w/ AF (not on AC due to SAH and fall risk), HTN, Lewy body dementia (baseline a/o x3),CVA, SAH,  posterior reversible encephalopathy syndrome PRESS, chronic bronchiectasis with multiple admissions for Pseudomonal infections (home O2 prn, as well as 2 weeks each month with inhaled Tobramycin per Dr. Foster, last episode 6 month ago), diastolic CHF, and C. diff, who recently underwent an uncomplicated MITZI occlusion w/ 22mm Amulet with Dr. Rivas on 3/19/24. Post-op course uncomplicated. who was sent in from Dr. Bhardwaj office presents with increased dyspnea post procedure. Patient reports that she was intubated during the procedure but post extubation she has developed worsening of her chronic dyspnea, secondary to difficulty passing sputum. Patient reports that post procedure she had a significant amount of inflammation in her throat that made coughing painful so secretions started to pile up, since that time she has had three episodes where she was unable to cough for a time leading to severe dyspnea. Then when the inflammation subsided then she was not able to cough up the secretions. Today she reports one episode of similar, though notes it passed quickly and it was nowhere near as severe. She takes inhaled Tobramycin 2 weeks per month as a prophylaxis from Pseudomonal Pneumonia as per Dr. Foster. She reports her daily regimen of treatment consists of Astepro nasal spray, nebulized 0.9 % saline, Aerobika, Montelukast, and LARRY, has not been taking Xopenex secondary to tachycardia symptoms.    In the ED, afebrile saturating well on room air. proBNP 717, no leukocytosis on CBC. CT scan obtained, chronic changes of bronchiectasis, mucous plugging but no focal infiltrate. Admitted to medicine for further care of bronchiectasis exacerbation.    Pulmonary is consulted for bronchiectasis exacerbation. Follows with Dr. Foster outpatient for Pulmonary. Edgard has had 3 admissions in the last 5 months, last CT scan in 12/2023 for her last admission. She has history of pseudomonas in sputum (2023), and Stenotrophomonas in 2019. She had sputum collected at Dr. Foster's office +pseudomonas. Given her allergies to levaquin, she was sent in for treatment w/ iv abx and consider PICC line arrangements for completion of abx. She reports SOB x1 week, difficulty clearing mucous. Sputum described as green diff from her usual. Denies fevers.     REVIEW OF SYSTEMS:  10 point ROS negative aside from what is mentioned in HPI    PAST MEDICAL & SURGICAL HISTORY:  Bronchiectasis  History of Pseudomonas pneumonia  HTN (hypertension)  Posterior reversible encephalopathy syndrome  Atrial fibrillation  Lewy body dementia  H/O fracture of right hip      FAMILY HISTORY:      SOCIAL HISTORY:  Smoking Status: [ ] Current, [ ] Former, [ ] Never  Pack Years:    MEDICATIONS:  Pulmonary:  levalbuterol Inhalation 0.63 milliGRAM(s) Inhalation every 4 hours  montelukast 10 milliGRAM(s) Oral daily    Antimicrobials:    Anticoagulants:  aspirin enteric coated 81 milliGRAM(s) Oral daily  clopidogrel Tablet 75 milliGRAM(s) Oral daily    Onc:    GI/:  pantoprazole    Tablet 40 milliGRAM(s) Oral before breakfast    Endocrine:  atorvastatin 40 milliGRAM(s) Oral at bedtime    Cardiac:  furosemide    Tablet 20 milliGRAM(s) Oral <User Schedule>  metoprolol succinate ER 25 milliGRAM(s) Oral daily    Other Medications:  clonazePAM  Tablet 0.5 milliGRAM(s) Oral two times a day PRN  donepezil 5 milliGRAM(s) Oral at bedtime      Allergies    Levaquin (Swelling)  Augmentin (Short breath; Rash)  Ceclor (Rash)  IV Contrast (Unknown)    Intolerances        Vital Signs Last 24 Hrs  T(C): 36.4 (28 Mar 2024 15:07), Max: 36.4 (28 Mar 2024 15:07)  T(F): 97.6 (28 Mar 2024 15:07), Max: 97.6 (28 Mar 2024 15:07)  HR: 100 (28 Mar 2024 20:05) (65 - 100)  BP: 132/66 (28 Mar 2024 20:05) (132/66 - 147/82)  BP(mean): --  RR: 16 (28 Mar 2024 20:05) (16 - 22)  SpO2: 100% (28 Mar 2024 20:05) (94% - 100%)    Parameters below as of 28 Mar 2024 20:05  Patient On (Oxygen Delivery Method): room air              PHYSICAL EXAM:  Constitutional: well-appearing, in no apparent distress  Head: NC/AT  EENT: PERRL, anicteric sclera; oropharynx clear with moist mucous membranes  Neck: supple, ROM intact, no appreciable JVD or LAD  Respiratory: distant breath sounds bilaterally, rhonchi in left base. otherwise no wheezes or rales  Cardiovascular: +S1/S2 intact, regular rhythm and rate. No murmurs appreciated  Gastrointestinal: soft, non-tender, non distended, bowel sounds normoactive   Extremities: no edema, clubbing or cyanosis  Vascular: 2+ radial pulses B/L  Neurological: awake and alert; oriented with no appreciable focal neuro defecits    LABS:      CBC Full  -  ( 28 Mar 2024 16:04 )  WBC Count : 5.44 K/uL  RBC Count : 3.76 M/uL  Hemoglobin : 12.1 g/dL  Hematocrit : 36.9 %  Platelet Count - Automated : 231 K/uL  Mean Cell Volume : 98.1 fl  Mean Cell Hemoglobin : 32.2 pg  Mean Cell Hemoglobin Concentration : 32.8 gm/dL  Auto Neutrophil # : 3.50 K/uL  Auto Lymphocyte # : 1.34 K/uL  Auto Monocyte # : 0.52 K/uL  Auto Eosinophil # : 0.04 K/uL  Auto Basophil # : 0.03 K/uL  Auto Neutrophil % : 64.3 %  Auto Lymphocyte % : 24.6 %  Auto Monocyte % : 9.6 %  Auto Eosinophil % : 0.7 %  Auto Basophil % : 0.6 %    03-28    138  |  98  |  12  ----------------------------<  99  4.1   |  31  |  0.91    Ca    9.9      28 Mar 2024 16:04    TPro  7.8  /  Alb  4.3  /  TBili  0.5  /  DBili  x   /  AST  27  /  ALT  30  /  AlkPhos  130<H>  03-28    PT/INR - ( 28 Mar 2024 16:04 )   PT: 11.2 sec;   INR: 0.98          PTT - ( 28 Mar 2024 16:04 )  PTT:27.1 sec                  RADIOLOGY & ADDITIONAL STUDIES:    reviewed CT scan - b/l bronchiectatic changes and mucous plugging. no focal infiltrate

## 2024-03-28 NOTE — CONSULT NOTE ADULT - ATTENDING SUPERVISION STATEMENT
[facility]  Report of Consultation    Jayant Delgado Patient Status:  No patient class for patient encounter    1967 MRN [id number]   Location [address] [place] [address] PCP No primary care provider on file.      Date of Consult:  20    JOSÉ MIGUEL (SEE COMMENTS)    Comment:Blister  Ultram [Tramadol]       ITCHING    Medications:   Current Outpatient Medications:   •  Galcanezumab-gnlm (EMGALITY) 120 MG/ML Subcutaneous Solution Auto-injector, , Disp: , Rfl:   •  gabapentin 300 MG Oral Cap, 900 mg twi Conjunctiva normal.    Neck supple without thyromegaly, no lymphadenopathy. Lungs are clear to auscultation and percussion. Heart: normal sinus rhythm without murmur. Abdomen: soft, nontender, no masses, no organomegaly.     Extremities without any your methotrexate 25 mg/week, folic acid 1 mg/day,  Azathioprine 75 mg/day and Plaquenil 200 mg twice a day. While on methotrexate and azathioprine you need routine labs CBC and chemistry profile every 3 months.   While on Plaquenil you need a yearly eye e Fellow

## 2024-03-28 NOTE — H&P ADULT - NSHPLABSRESULTS_GEN_ALL_CORE
LABS:                         12.1   5.44  )-----------( 231      ( 28 Mar 2024 16:04 )             36.9     03-28    138  |  98  |  12  ----------------------------<  99  4.1   |  31  |  0.91    Ca    9.9      28 Mar 2024 16:04    TPro  7.8  /  Alb  4.3  /  TBili  0.5  /  DBili  x   /  AST  27  /  ALT  30  /  AlkPhos  130<H>  03-28    PT/INR - ( 28 Mar 2024 16:04 )   PT: 11.2 sec;   INR: 0.98          PTT - ( 28 Mar 2024 16:04 )  PTT:27.1 sec  Urinalysis Basic - ( 28 Mar 2024 16:04 )    Color: x / Appearance: x / SG: x / pH: x  Gluc: 99 mg/dL / Ketone: x  / Bili: x / Urobili: x   Blood: x / Protein: x / Nitrite: x   Leuk Esterase: x / RBC: x / WBC x   Sq Epi: x / Non Sq Epi: x / Bacteria: x        Lactate, Blood: 0.9 mmol/L (03-28 @ 16:04)      RADIOLOGY, EKG & ADDITIONAL TESTS: Reviewed.

## 2024-03-28 NOTE — H&P ADULT - PROBLEM SELECTOR PLAN 6
F: None   E: Replete as necessary K>4 Mg>2  N: Full diet     DVT Prophylaxis: Lovenox 30 mg PO Qd  GI prophylaxis: Protonix  CODE STATUS: FULL.

## 2024-03-28 NOTE — CONSULT NOTE ADULT - SUBJECTIVE AND OBJECTIVE BOX
CRITICAL CARE INITIAL CONSULT NOTE    HPI:83F PMHx AF (not on AC due to SAH), HTN, Lewy body dementia (baseline a/o x3), posterior reversible encephalopathy syndrome, chronic bronchiectasis (home O2 prn), dCHF, C. diff s/p uncomplicated MITZI occlusion w/ 22mm Amulet with Dr. Rivas on 3/19/24 who p/w worsening SOB    ADDITIONAL MEDICINE HPI:    REVIEW OF SYSTEMS:   Otherwise negative except as specified in HPI    PAST MEDICAL HISTORY:     PAST SURGICAL HISTORY:    FAMILY HISTORY:    SOCIAL HISTORY:  Tobacco use:  EtOH use:  Illicit drug use:    MEDICATIONS:  MEDICATIONS  (STANDING):    MEDICATIONS  (PRN):      ALLERGIES:  Allergies    Levaquin (Swelling)  Augmentin (Short breath; Rash)  Ceclor (Rash)  IV Contrast (Unknown)    Intolerances    VITAL SIGNS:  Vital Signs Last 24 Hrs  T(C): 36.4 (28 Mar 2024 15:07), Max: 36.4 (28 Mar 2024 15:07)  T(F): 97.6 (28 Mar 2024 15:07), Max: 97.6 (28 Mar 2024 15:07)  HR: 65 (28 Mar 2024 15:07) (65 - 65)  BP: 147/82 (28 Mar 2024 15:07) (147/82 - 147/82)  BP(mean): --  RR: 18 (28 Mar 2024 15:07) (18 - 18)  SpO2: 95% (28 Mar 2024 15:07) (95% - 95%)    Parameters below as of 28 Mar 2024 15:07  Patient On (Oxygen Delivery Method): room air    PHYSICAL EXAM:  Constitutional: WDWN resting comfortably in bed; NAD  Head: NC/AT  Eyes: PERRL, EOMI, anicteric sclera  ENT: no nasal discharge; uvula midline, no oropharyngeal erythema or exudates; MMM  Neck: supple; no JVD or thyromegaly  Respiratory: CTA B/L; no W/R/R, no retractions  Cardiac: +S1/S2; RRR; no M/R/G; PMI non-displaced  Gastrointestinal: abdomen soft, NT/ND; no rebound or guarding; +BSx4  Genitourinary: normal external genitalia  Back: spine midline, no bony tenderness or step-offs; no CVAT B/L  Extremities: WWP, no clubbing or cyanosis; no peripheral edema  Musculoskeletal: NROM x4; no joint swelling, tenderness or erythema  Vascular: 2+ radial, femoral, DP/PT pulses B/L  Dermatologic: skin warm, dry and intact; no rashes, wounds, or scars  Lymphatic: no submandibular or cervical LAD  Neurologic: AAOx3; CNII-XII grossly intact; no focal deficits  Psychiatric: affect and characteristics of appearance, verbalizations, behaviors are appropriate    LABS:                        12.1   5.44  )-----------( 231      ( 28 Mar 2024 16:04 )             36.9     03-28    138  |  98  |  12  ----------------------------<  99  4.1   |  31  |  0.91    Ca    9.9      28 Mar 2024 16:04    TPro  7.8  /  Alb  4.3  /  TBili  0.5  /  DBili  x   /  AST  27  /  ALT  30  /  AlkPhos  130<H>  03-28    PT/INR - ( 28 Mar 2024 16:04 )   PT: 11.2 sec;   INR: 0.98       PTT - ( 28 Mar 2024 16:04 )  PTT:27.1 sec  Urinalysis Basic - ( 28 Mar 2024 16:04 )    Color: x / Appearance: x / SG: x / pH: x  Gluc: 99 mg/dL / Ketone: x  / Bili: x / Urobili: x   Blood: x / Protein: x / Nitrite: x   Leuk Esterase: x / RBC: x / WBC x   Sq Epi: x / Non Sq Epi: x / Bacteria: x    CAPILLARY BLOOD GLUCOSE    RADIOLOGY & ADDITIONAL TESTS: Reviewed. CRITICAL CARE INITIAL CONSULT NOTE    HPI: Ms. Funes is an 83 year old with a past medical history of severe chronic bronchiectasis on intermittent home oxygen therapy, Atrial fibrillation not on AC due to history of SAH, HTN, Lewy body dementia (baseline a/o x3), posterior reversible encephalopathy syndrome, dCHF, C. diff who recently underwent uncomplicated MITZI occlusion with Dr. Rivas on 3/19/24 who was sent in from Dr. Bhardwaj office presents with increased dyspnea post procedure. Patient reports that she was intubated during the procedure but post extubation she has developed worsening of her chronic dyspnea, secondary to difficulty passing sputum. Patient reports that post procedure she had a significant amount of inflammation in her throat that made coughing painful, since that time she has had three episodes where she was unable to cough for a time leading to severe dyspnea. Today she reports one episode of similar, though notes it passed quickly and it was nowhere near as severe. She reports her daily regimen of treatment consists of Astepro nasal spray, nebulized 0.9 % saline, Aerobika, Montelukast, and LARRY, has not been taking Zopinex secondary to tachycardia symptoms. She has reproducible tenderness to the 7/8th ribs bilaterally.     She otherwise reports generalized malaise relating it to illness but denies fevers, chills, sick contacts, syncope, bloody cough, muscle pain or weakness. No dysuria.     PAST MEDICAL HISTORY:   Atrial fibrillation  Bronchiectasis  Diastolic CHF  Lewy Body Dementia  HTN    PAST SURGICAL HISTORY:    FAMILY HISTORY:    MEDICATIONS:  MEDICATIONS  (STANDING):    MEDICATIONS  (PRN):      ALLERGIES:  Allergies    Levaquin (Swelling)  Augmentin (Short breath; Rash)  Ceclor (Rash)  IV Contrast (Unknown)    Intolerances    .  VITAL SIGNS:  T(C): 36.4 (03-28-24 @ 15:07), Max: 36.4 (03-28-24 @ 15:07)  T(F): 97.6 (03-28-24 @ 15:07), Max: 97.6 (03-28-24 @ 15:07)  HR: 70 (03-28-24 @ 16:59) (65 - 70)  BP: 147/82 (03-28-24 @ 15:07) (147/82 - 147/82)  BP(mean): --  RR: 22 (03-28-24 @ 16:59) (18 - 22)  SpO2: 94% (03-28-24 @ 18:18) (94% - 100%)  Wt(kg): --    PHYSICAL EXAM:  Constitutional: Patient is in no acute distress, resting comfortably in bed, on 2L NC without respiratory distress.   HEENT: Atraumatic/normocephalic. PERRL, no nasal discharge; mucous membranes moist.   Neck: supple; no JVD.  Respiratory: Crackles noted bilaterally in upper and lower lung zones, appropriate airflow without accessory muscle use.   Cardiac: Regular rate and rhythm, S1/S2; no Murmur. Reproducible chest pain to ribs overlying 7/8.   Gastrointestinal: abdomen soft, non-tender, mild lower abdomen distention.   Extremities: Warm and Well perfused, no clubbing or cyanosis; no peripheral edema; Cap refill intact.   Dermatologic: skin warm, dry and intact; no rashes, wounds, or scars  Neurologic: AAOx3;    LABS:                        12.1   5.44  )-----------( 231      ( 28 Mar 2024 16:04 )             36.9     03-28    138  |  98  |  12  ----------------------------<  99  4.1   |  31  |  0.91    Ca    9.9      28 Mar 2024 16:04    TPro  7.8  /  Alb  4.3  /  TBili  0.5  /  DBili  x   /  AST  27  /  ALT  30  /  AlkPhos  130<H>  03-28    PT/INR - ( 28 Mar 2024 16:04 )   PT: 11.2 sec;   INR: 0.98       PTT - ( 28 Mar 2024 16:04 )  PTT:27.1 sec  Urinalysis Basic - ( 28 Mar 2024 16:04 )    Color: x / Appearance: x / SG: x / pH: x  Gluc: 99 mg/dL / Ketone: x  / Bili: x / Urobili: x   Blood: x / Protein: x / Nitrite: x   Leuk Esterase: x / RBC: x / WBC x   Sq Epi: x / Non Sq Epi: x / Bacteria: x    CAPILLARY BLOOD GLUCOSE    RADIOLOGY & ADDITIONAL TESTS: Reviewed. CRITICAL CARE INITIAL CONSULT NOTE    HPI: Ms. Funes is an 83 year old with a past medical history of severe chronic bronchiectasis on intermittent home oxygen therapy, Atrial fibrillation not on AC due to history of SAH, HTN, Lewy body dementia (baseline a/o x3), posterior reversible encephalopathy syndrome, dCHF, C. diff who recently underwent uncomplicated MITZI occlusion with Dr. Rivas on 3/19/24 who was sent in from Dr. Bhardwaj office presents with increased dyspnea post procedure. Patient reports that she was intubated during the procedure but post extubation she has developed worsening of her chronic dyspnea, secondary to difficulty passing sputum. Patient reports that post procedure she had a significant amount of inflammation in her throat that made coughing painful, since that time she has had three episodes where she was unable to cough for a time leading to severe dyspnea. Today she reports one episode of similar, though notes it passed quickly and it was nowhere near as severe. She reports her daily regimen of treatment consists of Astepro nasal spray, nebulized 0.9 % saline, Aerobika, Montelukast, and LARRY, has not been taking Xopinex secondary to tachycardia symptoms. She has reproducible tenderness to the 7/8th ribs bilaterally.     She otherwise reports generalized malaise relating it to illness but denies fevers, chills, sick contacts, syncope, bloody cough, muscle pain or weakness. No dysuria.     PAST MEDICAL HISTORY:   Atrial fibrillation  Bronchiectasis  Diastolic CHF  Lewy Body Dementia  HTN    PAST SURGICAL HISTORY:    FAMILY HISTORY:    MEDICATIONS:  MEDICATIONS  (STANDING):    MEDICATIONS  (PRN):      ALLERGIES:  Allergies    Levaquin (Swelling)  Augmentin (Short breath; Rash)  Ceclor (Rash)  IV Contrast (Unknown)    Intolerances    .  VITAL SIGNS:  T(C): 36.4 (03-28-24 @ 15:07), Max: 36.4 (03-28-24 @ 15:07)  T(F): 97.6 (03-28-24 @ 15:07), Max: 97.6 (03-28-24 @ 15:07)  HR: 70 (03-28-24 @ 16:59) (65 - 70)  BP: 147/82 (03-28-24 @ 15:07) (147/82 - 147/82)  BP(mean): --  RR: 22 (03-28-24 @ 16:59) (18 - 22)  SpO2: 94% (03-28-24 @ 18:18) (94% - 100%)  Wt(kg): --    PHYSICAL EXAM:  Constitutional: Patient is in no acute distress, resting comfortably in bed, on 2L NC without respiratory distress.   HEENT: Atraumatic/normocephalic. PERRL, no nasal discharge; mucous membranes moist.   Neck: supple; no JVD.  Respiratory: Crackles noted bilaterally in upper and lower lung zones, appropriate airflow without accessory muscle use.   Cardiac: Regular rate and rhythm, S1/S2; no Murmur. Reproducible chest pain to ribs overlying 7/8.   Gastrointestinal: abdomen soft, non-tender, mild lower abdomen distention.   Extremities: Warm and Well perfused, no clubbing or cyanosis; no peripheral edema; Cap refill intact.   Dermatologic: skin warm, dry and intact; no rashes, wounds, or scars  Neurologic: AAOx3;    LABS:                        12.1   5.44  )-----------( 231      ( 28 Mar 2024 16:04 )             36.9     03-28    138  |  98  |  12  ----------------------------<  99  4.1   |  31  |  0.91    Ca    9.9      28 Mar 2024 16:04    TPro  7.8  /  Alb  4.3  /  TBili  0.5  /  DBili  x   /  AST  27  /  ALT  30  /  AlkPhos  130<H>  03-28    PT/INR - ( 28 Mar 2024 16:04 )   PT: 11.2 sec;   INR: 0.98       PTT - ( 28 Mar 2024 16:04 )  PTT:27.1 sec  Urinalysis Basic - ( 28 Mar 2024 16:04 )    Color: x / Appearance: x / SG: x / pH: x  Gluc: 99 mg/dL / Ketone: x  / Bili: x / Urobili: x   Blood: x / Protein: x / Nitrite: x   Leuk Esterase: x / RBC: x / WBC x   Sq Epi: x / Non Sq Epi: x / Bacteria: x    CAPILLARY BLOOD GLUCOSE    RADIOLOGY & ADDITIONAL TESTS: Reviewed.

## 2024-03-28 NOTE — H&P ADULT - NSHPPHYSICALEXAM_GEN_ALL_CORE
Constitutional: Patient is in no acute distress, resting comfortably in bed, on 2L NC without respiratory distress.   HEENT: Atraumatic/normocephalic. PERRL, no nasal discharge; mucous membranes moist.   Neck: supple; no JVD.  Respiratory: Crackles noted bilaterally in upper and lower lung zones, appropriate airflow without accessory muscle use.   Cardiac: Regular rate and rhythm, S1/S2; no Murmur. Reproducible chest pain to ribs overlying 7/8.   Gastrointestinal: abdomen soft, non-tender, mild lower abdomen distention.   Extremities: Warm and Well perfused, no clubbing or cyanosis; no peripheral edema; Cap refill intact.   Dermatologic: skin warm, dry and intact; no rashes, wounds, or scars  Neurologic: AAOx3; Constitutional: Patient is in no acute distress, resting comfortably in bed, on 2L NC without respiratory distress.   HEENT: Atraumatic/normocephalic. PERRL, no nasal discharge; mucous membranes moist.   Neck: supple; no JVD.  Respiratory: clear to auscultation bilaterally, no appreciated crackles or wheezes, appropriate airflow without accessory muscle use.   Cardiac: Regular rate and rhythm, S1/S2; no Murmur. Reproducible chest pain to ribs overlying 7/8.   Gastrointestinal: abdomen soft, non-tender, mild lower abdomen distention.   Extremities: Warm and Well perfused, no clubbing or cyanosis; no peripheral edema; Cap refill intact.   Dermatologic: skin warm, dry and intact; no rashes, wounds, or scars  Neurologic: AAOx3;

## 2024-03-28 NOTE — H&P ADULT - ASSESSMENT
84 YO Female w/ PMHx of AF (not on AC due to SAH and fall risk), HTN, Lewy body dementia (baseline a/o x3),CVA, SAH,  posterior reversible encephalopathy syndrome PRESS, chronic bronchiectasis with multiple admissions for Pseudomonal infections (home O2 prn), diastolic CHF, and C. diff, who recently underwent an uncomplicated MITZI occlusion w/ 22mm Amulet with Dr. Rivas on 3/19/24. Post-op course uncomplicated. who was sent in from Dr. Bhardwaj office presents with increased dyspnea post procedure that has been worsening in the past few days with difficulty coughing due to mucous plugging, admitted for treatment of bronchiectasis flare.  82 YO Female w/ PMHx of AF (not on AC due to SAH and fall risk), HTN, Lewy body dementia (baseline a/o x3),CVA, SAH,  posterior reversible encephalopathy syndrome PRESS, CF carrier, adult onset asthma, chronic bronchiectasis with multiple admissions for Pseudomonal infections (home O2 prn), diastolic CHF, and C. diff, who recently underwent an uncomplicated MITZI occlusion w/ 22mm Amulet with Dr. Rivas on 3/19/24. Post-op course uncomplicated. who was sent in from Dr. Bhardwaj office presents with increased dyspnea post procedure that has been worsening in the past few days with difficulty coughing due to mucous plugging, admitted for treatment of bronchiectasis flare.

## 2024-03-28 NOTE — ED ADULT NURSE NOTE - OBJECTIVE STATEMENT
83yF pmhx bronchiectasis, a-fib, HTN on home oxygen as needed presents to the ER complaining of SOB. Pt endorses cough/ chest tightness "I feel like there is a chain around my ribs", pt screaming "I cant breathe". Pt state she has a chronic productive cough with increase in thick sputum. Pt upgraded to MD Arroyo. Denies F/C.

## 2024-03-28 NOTE — CONSULT NOTE ADULT - ATTENDING COMMENTS
NonCF bronchiectasis admitted with exacerbation.  Plan: IV abx, PICC, airway clearance with hypersal and aerobika,  sputum cx

## 2024-03-28 NOTE — H&P ADULT - HISTORY OF PRESENT ILLNESS
84 YO Female w/ PMHx of AF (not on AC due to SAH and fall risk), HTN, Lewy body dementia (baseline a/o x3),CVA, SAH,  posterior reversible encephalopathy syndrome PRESS, chronic bronchiectasis with multiple admissions for Pseudomonal infections (home O2 prn), CHF, and C. diff, who recently underwent an uncomplicated MITZI occlusion w/ 22mm Amulet with Dr. Rivas on 3/19/24. Post-op course uncomplicated. presents today with the complain for extreme SOB, feels mucous stuck in her throat and cant cough it out.    84 YO Female w/ PMHx of AF (not on AC due to SAH and fall risk), HTN, Lewy body dementia (baseline a/o x3),CVA, SAH,  posterior reversible encephalopathy syndrome PRESS, chronic bronchiectasis with multiple admissions for Pseudomonal infections (home O2 prn), diastolic CHF, and C. diff, who recently underwent an uncomplicated MITZI occlusion w/ 22mm Amulet with Dr. Rivas on 3/19/24. Post-op course uncomplicated. who was sent in from Dr. Bhardwaj office presents with increased dyspnea post procedure. Patient reports that she was intubated during the procedure but post extubation she has developed worsening of her chronic dyspnea, secondary to difficulty passing sputum. Patient reports that post procedure she had a significant amount of inflammation in her throat that made coughing painful, since that time she has had three episodes where she was unable to cough for a time leading to severe dyspnea. Today she reports one episode of similar, though notes it passed quickly and it was nowhere near as severe. She reports her daily regimen of treatment consists of Astepro nasal spray, nebulized 0.9 % saline, Aerobika, Montelukast, and LARRY, has not been taking Xopinex secondary to tachycardia symptoms. She has reproducible tenderness to the 7/8th ribs bilaterally.     She otherwise reports generalized malaise relating it to illness but denies fevers, chills, sick contacts, syncope, bloody cough, muscle pain or weakness. No dysuria. Of nnote, the patient is allergic to Levaquin (causes facial swelling), allergic to all kind of contrast (causes anaphylaxis from prior notes) and her infections are resistant to Tobramycin.     In the ED:  Initial vital signs: T: 97.6 F, HR: 65 , BP: 147/82 , R: 18 , SpO2: 95 % on RA (now o 2 L NC)  ED course:   Labs: significant for Pro , PCO2 venous 55, HCO3 30   Imaging:  CXR: hyperinflated lungs,   Chest CT : diffuse air bronchograms   EKG: heart block   Medications: 2g Cefepime    84 YO Female w/ PMHx of AF (not on AC due to SAH and fall risk), HTN, Lewy body dementia (baseline a/o x3),CVA, SAH,  posterior reversible encephalopathy syndrome PRESS, chronic bronchiectasis with multiple admissions for Pseudomonal infections (home O2 prn, as well as 2 weeks each month with inhaled Tobramycin per Dr. Foster, last episode 6 month ago), diastolic CHF, and C. diff, who recently underwent an uncomplicated MITZI occlusion w/ 22mm Amulet with Dr. Rivas on 3/19/24. Post-op course uncomplicated. who was sent in from Dr. Bhardwaj office presents with increased dyspnea post procedure. Patient reports that she was intubated during the procedure but post extubation she has developed worsening of her chronic dyspnea, secondary to difficulty passing sputum. Patient reports that post procedure she had a significant amount of inflammation in her throat that made coughing painful, since that time she has had three episodes where she was unable to cough for a time leading to severe dyspnea. Today she reports one episode of similar, though notes it passed quickly and it was nowhere near as severe. She reports her daily regimen of treatment consists of Astepro nasal spray, nebulized 0.9 % saline, Aerobika, Montelukast, and LARRY, has not been taking Xopinex secondary to tachycardia symptoms. She has reproducible tenderness to the 7/8th ribs bilaterally.     She otherwise reports generalized malaise relating it to illness but denies fevers, chills, sick contacts, syncope, bloody cough, muscle pain or weakness. No dysuria. Of nnote, the patient is allergic to Levaquin (causes facial swelling), allergic to all kind of contrast (causes anaphylaxis from prior notes) and her infections are resistant to Tobramycin.     In the ED:  Initial vital signs: T: 97.6 F, HR: 65 , BP: 147/82 , R: 18 , SpO2: 95 % on RA (now o 2 L NC)  ED course:   Labs: significant for Pro , PCO2 venous 55, HCO3 30   Imaging:  CXR: hyperinflated lungs,   Chest CT : diffuse air bronchograms   EKG: heart block   Medications: 2g Cefepime    82 YO Female w/ PMHx of AF (not on AC due to SAH and fall risk), HTN, Lewy body dementia (baseline a/o x3),CVA, SAH,  posterior reversible encephalopathy syndrome PRESS, chronic bronchiectasis with multiple admissions for Pseudomonal infections (home O2 prn, as well as 2 weeks each month with inhaled Tobramycin per Dr. Foster, last episode 6 month ago), diastolic CHF, and C. diff, who recently underwent an uncomplicated MITZI occlusion w/ 22mm Amulet with Dr. Rivas on 3/19/24. Post-op course uncomplicated. who was sent in from Dr. Bhardwaj office presents with increased dyspnea post procedure. Patient reports that she was intubated during the procedure but post extubation she has developed worsening of her chronic dyspnea, secondary to difficulty passing sputum. Patient reports that post procedure she had a significant amount of inflammation in her throat that made coughing painful so secretions started to pile up, since that time she has had three episodes where she was unable to cough for a time leading to severe dyspnea. Then when the inflammation subsided then she was not able to cough up the secretions. Today she reports one episode of similar, though notes it passed quickly and it was nowhere near as severe. She takes inhaled Tobramycin 2 weeks per month as a prophylaxis from Pseudomonal Pneumonia as per Dr. Foster. She reports her daily regimen of treatment consists of Astepro nasal spray, nebulized 0.9 % saline, Aerobika, Montelukast, and LARRY, has not been taking Xopinex secondary to tachycardia symptoms. She has reproducible tenderness to the 7/8th ribs bilaterally.     She otherwise reports generalized malaise relating it to illness but denies fevers, chills, sick contacts, syncope, bloody cough, muscle pain or weakness. No dysuria. Of nnote, the patient is allergic to Levaquin (causes facial swelling), allergic to all kind of contrast (causes anaphylaxis from prior notes) and her infections are resistant to Tobramycin.     In the ED:  Initial vital signs: T: 97.6 F, HR: 65 , BP: 147/82 , R: 18 , SpO2: 95 % on RA (now o 2 L NC)  ED course:   Labs: significant for Pro , PCO2 venous 55, HCO3 30   Imaging:  CXR: hyperinflated lungs,   Chest CT : diffuse air bronchograms   EKG: heart block   Medications: 2g Cefepime    82 YO Female w/ PMHx of AF (not on AC due to SAH and fall risk), HTN, Lewy body dementia (baseline a/o x3),CVA, SAH,  posterior reversible encephalopathy syndrome PRESS, chronic bronchiectasis with multiple admissions for Pseudomonal infections (home O2 prn, as well as 2 weeks each month with inhaled Tobramycin per Dr. Foster, last episode 6 month ago), diastolic CHF, and C. diff, who recently underwent an uncomplicated MITZI occlusion w/ 22mm Amulet with Dr. Rivas on 3/19/24. Post-op course uncomplicated. who was sent in from Dr. Bhardwaj office presents with increased dyspnea post procedure. Patient reports that she was intubated during the procedure but post extubation she has developed worsening of her chronic dyspnea, secondary to difficulty passing sputum. Patient reports that post procedure she had a significant amount of inflammation in her throat that made coughing painful so secretions started to pile up, since that time she has had three episodes where she was unable to cough for a time leading to severe dyspnea. Then when the inflammation subsided then she was not able to cough up the secretions. Today she reports one episode of similar, though notes it passed quickly and it was nowhere near as severe. She takes inhaled Tobramycin 2 weeks per month as a prophylaxis from Pseudomonal Pneumonia as per Dr. Foster. She reports her daily regimen of treatment consists of Astepro nasal spray, nebulized 0.9 % saline, Aerobika, Montelukast, and LARRY, has not been taking Xopinex secondary to tachycardia symptoms. She has reproducible tenderness to the 7/8th ribs bilaterally.     She otherwise reports generalized malaise relating it to illness but denies fevers, chills, sick contacts, syncope, bloody cough, muscle pain or weakness. No dysuria. Of nnote, the patient is allergic to Levaquin (causes facial swelling), allergic to all kind of contrast (causes anaphylaxis from prior notes) and her infections are resistant to Tobramycin.     In the ED:  Initial vital signs: T: 97.6 F, HR: 65 , BP: 147/82 , R: 18 , SpO2: 95 % on RA (now o 2 L NC)  ED course:   Labs: significant for Pro , PCO2 venous 55, HCO3 30   Imaging:  CXR: hyperinflated lungs,   Chest CT : diffuse air bronchograms   EKG: frequent PACs   Medications: 2g Cefepime    84 YO Female w/ PMHx of AF (not on AC due to SAH and fall risk), HTN, Lewy body dementia (baseline a/o x3),CVA, SAH,  posterior reversible encephalopathy syndrome PRESS, CF carrier, adult onset asthma, chronic bronchiectasis with multiple admissions for Pseudomonal infections (home O2 prn, as well as 2 weeks each month with inhaled Tobramycin per Dr. Foster, last episode 6 month ago), diastolic CHF, and C. diff, who recently underwent an uncomplicated MITZI occlusion w/ 22mm Amulet with Dr. Rivas on 3/19/24. Post-op course uncomplicated. who was sent in from Dr. Bhardwaj office presents with increased dyspnea post procedure. Patient reports that she was intubated during the procedure but post extubation she has developed worsening of her chronic dyspnea, secondary to difficulty passing sputum. Patient reports that post procedure she had a significant amount of inflammation in her throat that made coughing painful so secretions started to pile up, since that time she has had three episodes where she was unable to cough for a time leading to severe dyspnea. Then when the inflammation subsided then she was not able to cough up the secretions. Today she reports one episode of similar, though notes it passed quickly and it was nowhere near as severe. She takes inhaled Tobramycin 2 weeks per month as a prophylaxis from Pseudomonal Pneumonia as per Dr. Foster. She reports her daily regimen of treatment consists of Astepro nasal spray, nebulized 0.9 % saline, Aerobika, Montelukast, and LARRY, has not been taking Xopinex secondary to tachycardia symptoms. She has reproducible tenderness to the 7/8th ribs bilaterally.     She otherwise reports generalized malaise relating it to illness but denies fevers, chills, sick contacts, syncope, bloody cough, muscle pain or weakness. No dysuria. Of nnote, the patient is allergic to Levaquin (causes facial swelling), allergic to all kind of contrast (causes anaphylaxis from prior notes) and her infections are resistant to Tobramycin.     In the ED:  Initial vital signs: T: 97.6 F, HR: 65 , BP: 147/82 , R: 18 , SpO2: 95 % on RA (now o 2 L NC)  ED course:   Labs: significant for Pro , PCO2 venous 55, HCO3 30   Imaging:  CXR: hyperinflated lungs,   Chest CT : diffuse air bronchograms   EKG: frequent PACs   Medications: 2g Cefepime

## 2024-03-28 NOTE — ED PROVIDER NOTE - PHYSICAL EXAMINATION
GEN: Elderly, thin, frail, chronically ill-appearing, awake, alert, oriented to person, place, time/situation and in no apparent distress. NTAF  ENT: Airway patent, Nasal mucosa clear. Mouth with normal mucosa.  EYES: Clear bilaterally. PERRL, EOMI  RESPIRATORY: Increased WOB, Diminished BS bilaterally, hypoxia improved with NC 2L, intermittent cough  CARDIAC: Irregularly irregular rate and rhythm   ABDOMEN: Soft, nontender, +bowel sounds, no rebound, rigidity, or guarding.  MSK: Range of motion is not limited, no deformities noted.  NEURO: Alert and oriented, no focal deficits.  SKIN: Skin normal color for race, warm, dry and intact. No evidence of rash.  PSYCH: Alert and oriented to person, place, time/situation. anxious mood and affect. no apparent risk to self or others.

## 2024-03-28 NOTE — ED PROVIDER NOTE - CLINICAL SUMMARY MEDICAL DECISION MAKING FREE TEXT BOX
83F PMHx AF (not on AC due to SAH), HTN, Lewy body dementia (baseline a/o x3), posterior reversible encephalopathy syndrome, chronic bronchiectasis (home O2 prn), dCHF, C. diff s/p uncomplicated MITZI occlusion w/ 22mm Amulet with Dr. Rivas on 3/19/24 who p/w worsening SOB since her procedure 10 days ago. She was seen by Dr. Ramos and sent to ER for admission. Pt has been using tobramycin nebs and xopenex nebs at home, reports recent sputum cx +for pseudomonas. C/o intermittent SOB, feels like mucus plugging. Denies f/c, cp, syncope, n/v, or abdominal pain. Here with daughter.  VSS, pt with difficulty breathing on arrival prompting upgrade, improved with 2L NC. Afebrile, diminished BS bilaterally.   Plan for labs including infectious w/u, xopenex neb, cxr, pulm consulted and requests CT chest and IV cefepime.  ICU consulted and saw pt with pulm fellow and recommend admission to [facility].

## 2024-03-29 ENCOUNTER — APPOINTMENT (OUTPATIENT)
Dept: CARDIOTHORACIC SURGERY | Facility: CLINIC | Age: 84
End: 2024-03-29

## 2024-03-29 ENCOUNTER — TRANSCRIPTION ENCOUNTER (OUTPATIENT)
Age: 84
End: 2024-03-29

## 2024-03-29 LAB
ALBUMIN SERPL ELPH-MCNC: 3.8 G/DL — SIGNIFICANT CHANGE UP (ref 3.3–5)
ALP SERPL-CCNC: 103 U/L — SIGNIFICANT CHANGE UP (ref 40–120)
ALT FLD-CCNC: 20 U/L — SIGNIFICANT CHANGE UP (ref 10–45)
ANION GAP SERPL CALC-SCNC: 6 MMOL/L — SIGNIFICANT CHANGE UP (ref 5–17)
AST SERPL-CCNC: 19 U/L — SIGNIFICANT CHANGE UP (ref 10–40)
BACTERIA SPT CULT: ABNORMAL
BASOPHILS # BLD AUTO: 0.02 K/UL — SIGNIFICANT CHANGE UP (ref 0–0.2)
BASOPHILS NFR BLD AUTO: 0.4 % — SIGNIFICANT CHANGE UP (ref 0–2)
BILIRUB SERPL-MCNC: 0.4 MG/DL — SIGNIFICANT CHANGE UP (ref 0.2–1.2)
BUN SERPL-MCNC: 14 MG/DL — SIGNIFICANT CHANGE UP (ref 7–23)
CALCIUM SERPL-MCNC: 9 MG/DL — SIGNIFICANT CHANGE UP (ref 8.4–10.5)
CHLORIDE SERPL-SCNC: 96 MMOL/L — SIGNIFICANT CHANGE UP (ref 96–108)
CO2 SERPL-SCNC: 33 MMOL/L — HIGH (ref 22–31)
CREAT SERPL-MCNC: 0.95 MG/DL — SIGNIFICANT CHANGE UP (ref 0.5–1.3)
EGFR: 60 ML/MIN/1.73M2 — SIGNIFICANT CHANGE UP
EOSINOPHIL # BLD AUTO: 0.05 K/UL — SIGNIFICANT CHANGE UP (ref 0–0.5)
EOSINOPHIL NFR BLD AUTO: 1 % — SIGNIFICANT CHANGE UP (ref 0–6)
GLUCOSE SERPL-MCNC: 140 MG/DL — HIGH (ref 70–99)
HCT VFR BLD CALC: 34.6 % — SIGNIFICANT CHANGE UP (ref 34.5–45)
HGB BLD-MCNC: 11.5 G/DL — SIGNIFICANT CHANGE UP (ref 11.5–15.5)
IMM GRANULOCYTES NFR BLD AUTO: 0.4 % — SIGNIFICANT CHANGE UP (ref 0–0.9)
LYMPHOCYTES # BLD AUTO: 0.95 K/UL — LOW (ref 1–3.3)
LYMPHOCYTES # BLD AUTO: 19.5 % — SIGNIFICANT CHANGE UP (ref 13–44)
MAGNESIUM SERPL-MCNC: 1.6 MG/DL — SIGNIFICANT CHANGE UP (ref 1.6–2.6)
MCHC RBC-ENTMCNC: 32.6 PG — SIGNIFICANT CHANGE UP (ref 27–34)
MCHC RBC-ENTMCNC: 33.2 GM/DL — SIGNIFICANT CHANGE UP (ref 32–36)
MCV RBC AUTO: 98 FL — SIGNIFICANT CHANGE UP (ref 80–100)
MONOCYTES # BLD AUTO: 0.47 K/UL — SIGNIFICANT CHANGE UP (ref 0–0.9)
MONOCYTES NFR BLD AUTO: 9.6 % — SIGNIFICANT CHANGE UP (ref 2–14)
NEUTROPHILS # BLD AUTO: 3.37 K/UL — SIGNIFICANT CHANGE UP (ref 1.8–7.4)
NEUTROPHILS NFR BLD AUTO: 69.1 % — SIGNIFICANT CHANGE UP (ref 43–77)
NRBC # BLD: 0 /100 WBCS — SIGNIFICANT CHANGE UP (ref 0–0)
PHOSPHATE SERPL-MCNC: 3.5 MG/DL — SIGNIFICANT CHANGE UP (ref 2.5–4.5)
PLATELET # BLD AUTO: 225 K/UL — SIGNIFICANT CHANGE UP (ref 150–400)
POTASSIUM SERPL-MCNC: 3.2 MMOL/L — LOW (ref 3.5–5.3)
POTASSIUM SERPL-SCNC: 3.2 MMOL/L — LOW (ref 3.5–5.3)
PROT SERPL-MCNC: 6.9 G/DL — SIGNIFICANT CHANGE UP (ref 6–8.3)
RBC # BLD: 3.53 M/UL — LOW (ref 3.8–5.2)
RBC # FLD: 13.1 % — SIGNIFICANT CHANGE UP (ref 10.3–14.5)
SODIUM SERPL-SCNC: 135 MMOL/L — SIGNIFICANT CHANGE UP (ref 135–145)
WBC # BLD: 4.88 K/UL — SIGNIFICANT CHANGE UP (ref 3.8–10.5)
WBC # FLD AUTO: 4.88 K/UL — SIGNIFICANT CHANGE UP (ref 3.8–10.5)

## 2024-03-29 PROCEDURE — 99232 SBSQ HOSP IP/OBS MODERATE 35: CPT | Mod: GC

## 2024-03-29 PROCEDURE — 99233 SBSQ HOSP IP/OBS HIGH 50: CPT | Mod: GC

## 2024-03-29 PROCEDURE — 99222 1ST HOSP IP/OBS MODERATE 55: CPT

## 2024-03-29 RX ORDER — VANCOMYCIN HCL 1 G
1250 VIAL (EA) INTRAVENOUS ONCE
Refills: 0 | Status: DISCONTINUED | OUTPATIENT
Start: 2024-03-29 | End: 2024-03-29

## 2024-03-29 RX ORDER — POTASSIUM CHLORIDE 20 MEQ
40 PACKET (EA) ORAL EVERY 4 HOURS
Refills: 0 | Status: COMPLETED | OUTPATIENT
Start: 2024-03-29 | End: 2024-03-29

## 2024-03-29 RX ORDER — VANCOMYCIN HCL 1 G
125 VIAL (EA) INTRAVENOUS EVERY 12 HOURS
Refills: 0 | Status: DISCONTINUED | OUTPATIENT
Start: 2024-03-29 | End: 2024-04-01

## 2024-03-29 RX ORDER — VANCOMYCIN HCL 1 G
125 VIAL (EA) INTRAVENOUS EVERY 12 HOURS
Refills: 0 | Status: DISCONTINUED | OUTPATIENT
Start: 2024-03-29 | End: 2024-03-29

## 2024-03-29 RX ORDER — ACETYLCYSTEINE 200 MG/ML
4 VIAL (ML) MISCELLANEOUS
Refills: 0 | Status: DISCONTINUED | OUTPATIENT
Start: 2024-03-29 | End: 2024-03-30

## 2024-03-29 RX ORDER — MEROPENEM 1 G/30ML
1000 INJECTION INTRAVENOUS EVERY 12 HOURS
Refills: 0 | Status: DISCONTINUED | OUTPATIENT
Start: 2024-03-29 | End: 2024-04-01

## 2024-03-29 RX ORDER — MAGNESIUM SULFATE 500 MG/ML
2 VIAL (ML) INJECTION ONCE
Refills: 0 | Status: COMPLETED | OUTPATIENT
Start: 2024-03-29 | End: 2024-03-29

## 2024-03-29 RX ADMIN — PANTOPRAZOLE SODIUM 40 MILLIGRAM(S): 20 TABLET, DELAYED RELEASE ORAL at 06:08

## 2024-03-29 RX ADMIN — DONEPEZIL HYDROCHLORIDE 5 MILLIGRAM(S): 10 TABLET, FILM COATED ORAL at 23:22

## 2024-03-29 RX ADMIN — Medication 81 MILLIGRAM(S): at 11:40

## 2024-03-29 RX ADMIN — Medication 4 MILLILITER(S): at 23:39

## 2024-03-29 RX ADMIN — ATORVASTATIN CALCIUM 40 MILLIGRAM(S): 80 TABLET, FILM COATED ORAL at 23:22

## 2024-03-29 RX ADMIN — SODIUM CHLORIDE 4 MILLILITER(S): 9 INJECTION INTRAMUSCULAR; INTRAVENOUS; SUBCUTANEOUS at 23:39

## 2024-03-29 RX ADMIN — CEFEPIME 100 MILLIGRAM(S): 1 INJECTION, POWDER, FOR SOLUTION INTRAMUSCULAR; INTRAVENOUS at 09:22

## 2024-03-29 RX ADMIN — CLOPIDOGREL BISULFATE 75 MILLIGRAM(S): 75 TABLET, FILM COATED ORAL at 11:40

## 2024-03-29 RX ADMIN — Medication 50 MILLIGRAM(S): at 00:22

## 2024-03-29 RX ADMIN — MEROPENEM 100 MILLIGRAM(S): 1 INJECTION INTRAVENOUS at 18:53

## 2024-03-29 RX ADMIN — Medication 20 MILLIGRAM(S): at 07:48

## 2024-03-29 RX ADMIN — Medication 40 MILLIEQUIVALENT(S): at 17:26

## 2024-03-29 RX ADMIN — Medication 50 MILLIGRAM(S): at 06:07

## 2024-03-29 RX ADMIN — Medication 4 MILLILITER(S): at 11:41

## 2024-03-29 RX ADMIN — Medication 25 GRAM(S): at 12:55

## 2024-03-29 RX ADMIN — Medication 40 MILLIEQUIVALENT(S): at 12:55

## 2024-03-29 RX ADMIN — MONTELUKAST 10 MILLIGRAM(S): 4 TABLET, CHEWABLE ORAL at 11:40

## 2024-03-29 RX ADMIN — Medication 4 MILLILITER(S): at 17:27

## 2024-03-29 RX ADMIN — Medication 125 MILLIGRAM(S): at 23:23

## 2024-03-29 RX ADMIN — Medication 0.5 MILLIGRAM(S): at 23:22

## 2024-03-29 RX ADMIN — SODIUM CHLORIDE 4 MILLILITER(S): 9 INJECTION INTRAMUSCULAR; INTRAVENOUS; SUBCUTANEOUS at 09:21

## 2024-03-29 RX ADMIN — Medication 50 MILLIGRAM(S): at 17:27

## 2024-03-29 NOTE — DISCHARGE NOTE PROVIDER - NSDCFUADDAPPT_GEN_ALL_CORE_FT
Please follow up your appointment with Dr. Foster on April 4th    Please follow up your appointment with Dr. Mcclure on April 3rd

## 2024-03-29 NOTE — DISCHARGE NOTE PROVIDER - CARE PROVIDER_API CALL
Ramón Foster  Pulmonary Disease  [address]  [place] [address]  Phone: [phone]  Fax: [phone]  Follow Up Time:     Socrates Mcclure  Cardiovascular Disease  [address] [facility], [address]  [place] [address]  Phone: [phone]  Fax: [phone]  Follow Up Time:

## 2024-03-29 NOTE — CONSULT NOTE ADULT - REASON FOR ADMISSION
Extreme shortness of breath, flare up of bronchiectasis
Extreme shortness of breath, flare up of bronchiectasis
chronic bronchiectasis exacerbation

## 2024-03-29 NOTE — PROGRESS NOTE ADULT - ASSESSMENT
84 YO Female w/ AF (not on AC due to SAH and fall risk), HTN, Lewy body dementia (baseline a/o x3),CVA, SAH,  posterior reversible encephalopathy syndrome PRESS, chronic bronchiectasis with hx of (pseudomonas and stenotrophomonas infections, home O2 prn, as well as 2 weeks each month with inhaled Tobramycin per Dr. Foster, last episode 6 month ago), diastolic CHF, who recently underwent an uncomplicated MITZI occlusion w/ 22mm Amulet with Dr. Rivas on 3/19/24. She presents w/ SOB and increased sputum production concerning for bronchiectasis exacerbation.    Pulmonary is consulted for bronchiectasis exacerbation.     # Non-CF Bronchiectasis exacerbation  # history of pseudomonas  - presents w/ sob, sputum production x1 week, green in color  - CT scan w/o obvious infiltrate  - sputum at outpatient clinic 3/26+pseudomonas and has history of pseudomonas infections in the past (allergy to levaquin so has been on iv cefepime, iv zosyn, and cipro w/o known issue)  - has not tolerated inhalers or nebulizers before given anxiety and jitteriness    Recommendations  - obtain sputum culture, guide abx to therapy  - follow outpatient culture from 3/26 growing pseudomonas for sensitivities to provide proper coverage  - continue airway clearance that she does at home: can do 3% saline nebz followed by aerobika. would avoid mucomyst as this can cause bronchospasm  - c/w IV cefepime. Please obtain PICC line today and arrange for outpatient antibiotics for outpatient completion of course given her allergies to PO alternatives that cover pseudomonas    S/D/D with Dr. Patricio

## 2024-03-29 NOTE — DISCHARGE NOTE PROVIDER - ATTENDING DISCHARGE PHYSICAL EXAMINATION:
General: NAD  HEENT: MMM  Neck: supple  Cardiovascular: +S1/S2; RRR  Respiratory: new coarse crackles on exam today compared to prior   Gastrointestinal: soft, NT/ND  Extremities: WWP; no edema, clubbing or cyanosis  Vascular: 2+ radial, DP/PT pulses B/L  Neurological: AAOx3; no focal deficits

## 2024-03-29 NOTE — DISCHARGE NOTE PROVIDER - NSDCFUSCHEDAPPT_GEN_ALL_CORE_FT
Socrates Mcclure  API Healthcare Physician [facility]  CARDIOLOGY [address] [facility]  Scheduled Appointment: 04/03/2024    Ramón Foster  API Healthcare Physician [facility]  PULMMED [address] [facility] [address]  Scheduled Appointment: 04/04/2024

## 2024-03-29 NOTE — DISCHARGE NOTE PROVIDER - HOSPITAL COURSE
#Discharge: do not delete    82 YO Female w/ PMHx of AF (not on AC due to SAH and fall risk), HTN, Lewy body dementia (baseline a/o x3),CVA, SAH,  posterior reversible encephalopathy syndrome PRESS, CF carrier, adult onset asthma, chronic bronchiectasis with multiple admissions for Pseudomonal infections (home O2 prn), diastolic CHF, and C. diff, who recently underwent an uncomplicated MITZI occlusion w/ 22mm Amulet with Dr. Rivas on 3/19/24. Post-op course uncomplicated. who was sent in from Dr. Bhardwaj office presents with increased dyspnea post procedure that has been worsening in the past few days with difficulty coughing due to mucous plugging, admitted for treatment of bronchiectasis flare.     Hospital course (by problem):   Problem/Plan - 1:  ·  Problem: Acute exacerbation of bronchiectasis.   ·  Plan: History of chronic bronchiectasis with previous exacerbations from Pseudomonas and Stenotrophomonas. Follows Dr Foster, on home O2 intermittently with exertion. Patient is post intubation for MITZI occlusion on 3/19, initially had difficulty producing mucus but now has copious mucus and very dyspneic. Presents for treatment of bronchiectasis with IV cefepime as patient was noted to be positive for pseudomonas on outpatient sputum culture from Dr. Foster. Per patient history of allergy to Levaquin and previously received PO Cipro course. Patient last treated for bronchiectasis exacerbation in September. 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 .     - Obtain abx approval from ID, may need [facility] if improving to complete course.   -  RVP & SClx negative  - CW Aerobika to assist with mucociliary clearance.   - Continue home Montelukast 10mg Qd, Astepro nasal spray.   - c/w IV cefepime. Please obtain PICC line today and arrange for outpatient antibiotics for outpatient completion of course given her allergies to PO alternatives that cover pseudomonas.     Problem/Plan - 2:  ·  Problem: Atrial fibrillation.   ·  Plan: Not on AC due to SAH and risk of falls. s/p MITZI occlusion on 3/19.  - Can continue with home Toprol 25mg q12h.   - Continue Aspirin 81 mg PO daily and Plavix 75 mg PO daily for post MITZI prophylaxis.     Problem/Plan - 3:  ·  Problem: Diastolic heart failure.   ·  Plan: BNP noted to be in 700s, no significant edema on examination, no JVD, no significant congestion on CXR.   - c/w home Toprol 25mg q12h, Lasix 20mg every other day.     Problem/Plan - 4:  ·  Problem: HLD (hyperlipidemia).   ·  Plan: Pt has prior Hx of CVA.     c/w home Atorvastatin 40mg Qd.     Problem/Plan - 5:  ·  Problem: Lewy body dementia.   ·  Plan: c/w home Donepezil 5mg q24h & PRN Klonopin 0.5mg BID for anxiety.        Patient was discharged to: home    New medications:   Changes to old medications:      Items to follow up as outpatient: Bronchiectasis          #Discharge: do not delete    82 YO Female w/ PMHx of AF (not on AC due to SAH and fall risk), HTN, Lewy body dementia (baseline a/o x3),CVA, SAH,  posterior reversible encephalopathy syndrome PRESS, CF carrier, adult onset asthma, chronic bronchiectasis with multiple admissions for Pseudomonal infections (home O2 prn), diastolic CHF, and C. diff, who recently underwent an uncomplicated MITZI occlusion w/ 22mm Amulet with Dr. Rivas on 3/19/24. Post-op course uncomplicated. who was sent in from Dr. Bhardwaj office presents with increased dyspnea post procedure that has been worsening in the past few days with difficulty coughing due to mucous plugging, admitted for treatment of bronchiectasis flare.     Hospital course (by problem):   Problem/Plan - 1:  ·  Problem: Acute exacerbation of bronchiectasis.   ·  Plan: History of chronic bronchiectasis with previous exacerbations from Pseudomonas and Stenotrophomonas. Follows Dr Foster, on home O2 intermittently with exertion. Patient is post intubation for MITZI occlusion on 3/19, initially had difficulty producing mucus but now has copious mucus and very dyspneic. Presents for treatment of bronchiectasis with IV cefepime as patient was noted to be positive for pseudomonas on outpatient sputum culture from Dr. Foster. Per patient history of allergy to Levaquin and previously received PO Cipro course. Patient last treated for bronchiectasis exacerbation in September. 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 .     - meropenem IV x 2 weeks through [facility], w/ PO vancomycin c diff ppx  -  RVP & SClx negative  - CW Aerobika to assist with mucociliary clearance.   - Continue home Montelukast 10mg Qd, Astepro nasal spray.       Problem/Plan - 2:  ·  Problem: Atrial fibrillation.   ·  Plan: Not on AC due to SAH and risk of falls. s/p MITZI occlusion on 3/19.  - Can continue with home Toprol 25mg q12h.   - Continue Aspirin 81 mg PO daily and Plavix 75 mg PO daily for post MITZI prophylaxis.     Problem/Plan - 3:  ·  Problem: Diastolic heart failure.   ·  Plan: BNP noted to be in 700s, no significant edema on examination, no JVD, no significant congestion on CXR.   - c/w home Toprol 25mg q12h, Lasix 20mg every other day.     Problem/Plan - 4:  ·  Problem: HLD (hyperlipidemia).   ·  Plan: Pt has prior Hx of CVA.     c/w home Atorvastatin 40mg Qd.     Problem/Plan - 5:  ·  Problem: Lewy body dementia.   ·  Plan: c/w home Donepezil 5mg q24h & PRN Klonopin 0.5mg BID for anxiety.        Patient was discharged to: home    New medications: Meropenem         Items to follow up as outpatient: Bronchiectasis

## 2024-03-29 NOTE — CONSULT NOTE ADULT - SUBJECTIVE AND OBJECTIVE BOX
INFECTIOUS DISEASES INITIAL CONSULT NOTE    HPI: 83F h/o chronic bronchiectasis with PsA colonization (home O2 prn, inh Tobra every 2 weeks per Dr. 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  Patient was admitted from 3/19-3/20/24 for the elective procedure above and during that time she was intubated.  After discharge, she started to have worsening productive cough and REID.  At baseline she intermittently use O2 as needed but now she is bringing more green phlem and unable to speak full sentences on RA.  On 3/26 sputum culture was obtained by Dr. Foster - growing PsA. Thus she came to the hospital on 3/28.  Denied fever/chills, n/v/d, abdominal pain.   patient has allergy to fluoroquinolone and will need IV abx and thus ID was consulted for abx management of PsA PSA and bronchiectasis exacerbation.   Patient was started on cefepime since admission .        PAST MEDICAL & SURGICAL HISTORY:  Bronchiectasis      History of Pseudomonas pneumonia      HTN (hypertension)      Posterior reversible encephalopathy syndrome      Atrial fibrillation      Lewy body dementia      H/O fracture of right hip            Review of Systems:   Constitutional, eyes, ENT, cardiovascular, respiratory, gastrointestinal, genitourinary, integumentary, neurological, psychiatric and heme/lymph are otherwise negative other than noted above       ANTIBIOTICS:  MEDICATIONS  (STANDING):  acetylcysteine 10%  Inhalation 4 milliLiter(s) Inhalation four times a day  aspirin enteric coated 81 milliGRAM(s) Oral daily  atorvastatin 40 milliGRAM(s) Oral at bedtime  cefepime   IVPB 2000 milliGRAM(s) IV Intermittent every 12 hours  clopidogrel Tablet 75 milliGRAM(s) Oral daily  donepezil 5 milliGRAM(s) Oral at bedtime  enoxaparin Injectable 30 milliGRAM(s) SubCutaneous every 24 hours  furosemide    Tablet 20 milliGRAM(s) Oral <User Schedule>  levalbuterol Inhalation 0.63 milliGRAM(s) Inhalation every 4 hours  metoprolol succinate ER 50 milliGRAM(s) Oral every 12 hours  montelukast 10 milliGRAM(s) Oral daily  pantoprazole    Tablet 40 milliGRAM(s) Oral before breakfast  potassium chloride    Tablet ER 40 milliEquivalent(s) Oral every 4 hours  sodium chloride 3%  Inhalation 4 milliLiter(s) Inhalation every 12 hours    MEDICATIONS  (PRN):  acetaminophen   Oral Liquid .. 350 milliGRAM(s) Oral every 6 hours PRN Moderate Pain (4 - 6)  clonazePAM  Tablet 0.5 milliGRAM(s) Oral two times a day PRN For Pain/For Anxiety      Allergies    Levaquin (Swelling)  Augmentin (Short breath; Rash)  Ceclor (Rash)  IV Contrast (Unknown)    Intolerances        SOCIAL HISTORY: Denied toxic habits.  lives with daughter.    FAMILY HISTORY:   no FH leading to current infection    Vital Signs Last 24 Hrs  T(C): 36.4 (29 Mar 2024 12:18), Max: 36.7 (28 Mar 2024 22:34)  T(F): 97.6 (29 Mar 2024 12:18), Max: 98 (28 Mar 2024 22:34)  HR: 83 (29 Mar 2024 12:18) (73 - 100)  BP: 101/69 (29 Mar 2024 12:18) (101/69 - 135/79)  BP(mean): 88 (29 Mar 2024 06:03) (88 - 88)  RR: 18 (29 Mar 2024 12:18) (16 - 18)  SpO2: 100% (29 Mar 2024 12:18) (93% - 100%)    Parameters below as of 29 Mar 2024 12:18  Patient On (Oxygen Delivery Method): pt receiving inhaler treatment.          PHYSICAL EXAM:  Constitutional: alert, NAD  Eyes: the sclera and conjunctiva were normal.   ENT: the ears and nose were normal in appearance.   Neck: the appearance of the neck was normal and the neck was supple.   Pulmonary: rhonchi throughout the lung, SOB while talking   Heart: heart rate was normal and rhythm regular, normal S1 and S2  Vascular:. there was no peripheral edema  Abdomen: normal bowel sounds, soft, non-tender        LABS:                        11.5   4.88  )-----------( 225      ( 29 Mar 2024 10:00 )             34.6     03-29    135  |  96  |  14  ----------------------------<  140<H>  3.2<L>   |  33<H>  |  0.95    Ca    9.0      29 Mar 2024 10:00  Phos  3.5     03-29  Mg     1.6     03-29    TPro  6.9  /  Alb  3.8  /  TBili  0.4  /  DBili  x   /  AST  19  /  ALT  20  /  AlkPhos  103  03-29    PT/INR - ( 28 Mar 2024 16:04 )   PT: 11.2 sec;   INR: 0.98          PTT - ( 28 Mar 2024 16:04 )  PTT:27.1 sec  Urinalysis Basic - ( 29 Mar 2024 10:00 )    Color: x / Appearance: x / SG: x / pH: x  Gluc: 140 mg/dL / Ketone: x  / Bili: x / Urobili: x   Blood: x / Protein: x / Nitrite: x   Leuk Esterase: x / RBC: x / WBC x   Sq Epi: x / Non Sq Epi: x / Bacteria: x        MICROBIOLOGY:  3/28/24 BCx ngtd x 2  3/28/24 RVP neg   3/26/24 Sputum culture: PsA (S to aztreo, imi, faith <1, zosyn 16, I to cefe, cipro, levo)    RADIOLOGY & ADDITIONAL STUDIES:  CT chest 3/28/24  IMPRESSION:  Bilateral bronchiectasis, mucoid impaction, and tree-in-bud nodules with   upper lobe predominance, likely suggestive of atypical infection such as   YASMIN.    CXR 3/28/24  Findings/  impression: Hyperinflation. Bilateral opacities, unchanged. Cardiac   magnification/cardiomegaly, thoracic aortic calcification, watchman   device. Stable bony structures.

## 2024-03-29 NOTE — DISCHARGE NOTE PROVIDER - NSDCMRMEDTOKEN_GEN_ALL_CORE_FT
aspirin 81 mg oral delayed release tablet: 1 tab(s) orally once a day  Astepro Allergy 205.5 mcg/inh (0.15%) nasal spray: 2 spray(s) intranasally once a day  atorvastatin 40 mg oral tablet: 1 tab(s) orally once a day (at bedtime)  clopidogrel 75 mg oral tablet: 1 tab(s) orally once a day  donepezil 5 mg oral tablet: 1 tab(s) orally every 24 hours  furosemide 20 mg oral tablet: 1 tab(s) orally once a day  KlonoPIN 0.5 mg oral tablet: 0.5 tab(s) orally 2 times a day as needed for For Pain/For Anxiety  metoprolol succinate 25 mg oral tablet, extended release: 1 tab(s) orally every 12 hours  montelukast 10 mg oral tablet: 1 tab(s) orally once a day  pantoprazole 40 mg oral delayed release tablet: 1 tab(s) orally once a day (before a meal)   aspirin 81 mg oral delayed release tablet: 1 tab(s) orally once a day  Astepro Allergy 205.5 mcg/inh (0.15%) nasal spray: 2 spray(s) intranasally once a day  atorvastatin 40 mg oral tablet: 1 tab(s) orally once a day (at bedtime)  clopidogrel 75 mg oral tablet: 1 tab(s) orally once a day  donepezil 5 mg oral tablet: 1 tab(s) orally every 24 hours  furosemide 20 mg oral tablet: 1 tab(s) orally once a day  KlonoPIN 0.5 mg oral tablet: 0.5 tab(s) orally 2 times a day as needed for For Pain/For Anxiety  meropenem 1000 mg intravenous injection: 1,000 milligram(s) intravenously every 12 hours To follow with Dr. Greer [address] [place] [address], [phone] fax: [phone]  metoprolol succinate 25 mg oral tablet, extended release: 1 tab(s) orally every 12 hours  montelukast 10 mg oral tablet: 1 tab(s) orally once a day  pantoprazole 40 mg oral delayed release tablet: 1 tab(s) orally once a day (before a meal)  sodium chloride 0.9% injectable solution: 5 milliliter(s) injectable 4 times a day To use pre/post infusion of Meropenem   aspirin 81 mg oral delayed release tablet: 1 tab(s) orally once a day  Astepro Allergy 205.5 mcg/inh (0.15%) nasal spray: 2 spray(s) intranasally once a day  atorvastatin 40 mg oral tablet: 1 tab(s) orally once a day (at bedtime)  clopidogrel 75 mg oral tablet: 1 tab(s) orally once a day  donepezil 5 mg oral tablet: 1 tab(s) orally every 24 hours  Firvanq 25 mg/mL oral liquid: 125 milligram(s) orally 2 times a day  furosemide 20 mg oral tablet: 1 tab(s) orally once a day  KlonoPIN 0.5 mg oral tablet: 0.5 tab(s) orally 2 times a day as needed for For Pain/For Anxiety  meropenem 1000 mg intravenous injection: 1,000 milligram(s) intravenously every 12 hours To follow with Dr. Greer [address] [place] [address], [phone] fax: [phone]  metoprolol succinate 25 mg oral tablet, extended release: 1 tab(s) orally every 12 hours  montelukast 10 mg oral tablet: 1 tab(s) orally once a day  pantoprazole 40 mg oral delayed release tablet: 1 tab(s) orally once a day (before a meal)  sodium chloride 0.9% injectable solution: 5 milliliter(s) injectable 4 times a day To use pre/post infusion of Meropenem

## 2024-03-29 NOTE — PROGRESS NOTE ADULT - ATTENDING COMMENTS
84 YO Female w/ PMHx of AF (not on AC due to SAH and fall risk), HTN, Lewy body dementia (baseline a/o x3),CVA, SAH,  posterior reversible encephalopathy syndrome PRESS, CF carrier, adult onset asthma, chronic bronchiectasis with multiple admissions for Pseudomonal infections (home O2 prn), diastolic CHF, and C. diff, who recently underwent an uncomplicated MITZI occlusion w/ 22mm Amulet with Dr. Rivas on 3/19/24. Post-op course uncomplicated. who was sent in from Dr. Bhardwaj office presents with increased dyspnea post procedure that has been worsening in the past few days with difficulty coughing due to mucous plugging, admitted for treatment of bronchiectasis flare.     Labs and imaging reviewed    Problem List:  #Bronchiectasis exacerbation - Cont Cefepime, consult ID for abx duration/course, aim for Midline today/Monday depending ID clearance, airway clearance with aerobika and saline     Rest as above

## 2024-03-29 NOTE — PROGRESS NOTE ADULT - ASSESSMENT
82 YO Female w/ PMHx of AF (not on AC due to SAH and fall risk), HTN, Lewy body dementia (baseline a/o x3),CVA, SAH,  posterior reversible encephalopathy syndrome PRESS, CF carrier, adult onset asthma, chronic bronchiectasis with multiple admissions for Pseudomonal infections (home O2 prn), diastolic CHF, and C. diff, who recently underwent an uncomplicated MITZI occlusion w/ 22mm Amulet with Dr. Rivas on 3/19/24. Post-op course uncomplicated. who was sent in from Dr. Bhardwaj office presents with increased dyspnea post procedure that has been worsening in the past few days with difficulty coughing due to mucous plugging, admitted for treatment of bronchiectasis flare.  84 YO Female w/ PMHx of AF (not on AC due to SAH and fall risk), HTN, Lewy body dementia (baseline a/o x3),CVA, SAH,  posterior reversible encephalopathy syndrome PRESS, CF carrier, adult onset asthma, chronic bronchiectasis with multiple admissions for Pseudomonal infections (home O2 prn), diastolic CHF, and C. diff, who recently underwent an uncomplicated MITZI occlusion w/ 22mm Amulet with Dr. Rivas on 3/19/24. Post-op course uncomplicated. who was sent in from Dr. Bhardwaj office presents with increased dyspnea post procedure that has been worsening in the past few days with difficulty coughing due to mucous plugging, admitted for treatment of bronchiectasis flare.

## 2024-03-29 NOTE — DISCHARGE NOTE PROVIDER - NSDCCPCAREPLAN_GEN_ALL_CORE_FT
PRINCIPAL DISCHARGE DIAGNOSIS  Diagnosis: Bronchiectasis  Assessment and Plan of Treatment: Bronchiectasis is a condition in which the airways in the lungs (bronchi) are damaged and widened. The condition makes it hard for the lungs to get rid of mucus, and it causes mucus to gather in the bronchi. This condition often leads to lung infections, which can make the condition worse.  What are the causes?  You can be born with this condition, or you can develop it later in life. Common causes of this condition include:  Cystic fibrosis.  Repeated lung infections, such as pneumonia or tuberculosis.  An object or other blockage in the lungs.  Breathing in fluid, food, or other objects (aspiration).  A problem with the body's defense system (immune system) and lung structure that is present at birth (congenital).  Sometimes the cause is not known.  What are the signs or symptoms?  Common symptoms of this condition include:  A daily cough that brings up mucus and lasts for more than 3 weeks.  Lung infections that happen often.  Shortness of breath and wheezing.  Weakness and feeling tired (fatigue).  How is this diagnosed?  This condition is diagnosed with tests, such as:  Chest X-rays or CT scans. These are done to check for changes in the lungs.  Breathing tests. These are done to check how well your lungs are working.  A test of a sample of your saliva (sputum culture). This test is done to check for infection.  Blood tests and other tests. These are done to check for related diseases or causes.  How is this treated?  Treatment for this condition depends on the severity of the illness and its cause. Treatment may include:  Medicines that loosen mucus so it can be coughed up (mucolytics).  Medicines that relax the muscles of the bronchi (bronchodilators).  Antibiotic medicines to prevent or treat infection.  Physical therapy to help clear mucus from the lungs. Techniques may include:  Postural drainage. This is when you sit or lie in certain positions so that mucus can drain by gravity.  Chest percussion. This involves tapping the chest or back with a cupped hand.

## 2024-03-29 NOTE — PROGRESS NOTE ADULT - PROBLEM SELECTOR PLAN 1
History of chronic bronchiectasis with previous exacerbations from Pseudomonas and Stenotrophomonas. Follows Dr Foster, on home O2 intermittently with exertion. Patient is post intubation for MITZI occlusion on 3/19, initially had difficulty producing mucus but now has copious mucus and very dyspneic. Presents for treatment of bronchiectasis with IV cefepime as patient was noted to be positive for pseudomonas on outpatient sputum culture from Dr. Foster. Per patient history of allergy to Levaquin and previously received PO Cipro course. Patient last treated for bronchiectasis exacerbation in September. 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 .     - Obtain abx approval from ID, may need Midline if improving to complete course.   -  RVP & SClx negative  - F/u BClx  - initation 3% saline nebulizer BID with Aerobika to assist with mucociliary clearance.   - Chest PT  - Mucomyst   - Continue home Montelukast 10mg Qd, Astepro nasal spray.   - c/w IV cefepime. Please obtain PICC line today and arrange for outpatient antibiotics for outpatient completion of course given her allergies to PO alternatives that cover pseudomonas History of chronic bronchiectasis with previous exacerbations from Pseudomonas and Stenotrophomonas. Follows Dr Foster, on home O2 intermittently with exertion. Patient is post intubation for MITZI occlusion on 3/19, initially had difficulty producing mucus but now has copious mucus and very dyspneic. Presents for treatment of bronchiectasis with IV cefepime as patient was noted to be positive for pseudomonas on outpatient sputum culture from Dr. Foster. Per patient history of allergy to Levaquin and previously received PO Cipro course. Patient last treated for bronchiectasis exacerbation in September. 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 .     - Obtain abx approval from ID, may need Midline if improving to complete course.   -  RVP & SClx negative  - F/u BClx  - initation 3% saline nebulizer BID with Aerobika to assist with mucociliary clearance.   - Chest PT  - Mucomyst   - Continue home Montelukast 10mg Qd, Astepro nasal spray.   - c/w IV cefepime. Please obtain PICC line today and arrange for outpatient antibiotics for outpatient completion of course given her allergies to PO alternatives that cover pseudomonas  - Okay to proceed with PICC line placement prior to 48 hours of NGTD on blood cultures

## 2024-03-29 NOTE — CONSULT NOTE ADULT - ASSESSMENT
83F h/o chronic bronchiectasis with PsA colonization (home O2 prn, inh Tobra every 2 weeks per Dr. Parra, last exacerbation 9/2023), CDI (11/2023), Afib, Lewy body dementia, CVA, SAH, PRESS, CF carrier, HFpEF, recent MITZI occlusion on 3/19/24 p/w PsA pneumonia and bronchiectasis exacerbation.   Since psA was I to cefepime and patient is allergic to Augmentin, will switch to meropenem.  Also given h/o CDI in 9/2023, will also need CDI ppx.    - stop cefepime  - start meropenem 1g IV q12h  - start vancomycin 125mg PO q12h as CDI ppx (until 4/19)  - Place single lumen midline   - Duration of antibiotics is 2 weeks ( 3/29 - 4/12 )  - Weekly labs: CMP, CBC faxed to ID office at [phone]  - Patient to follow up with Dr. greer in 2 weeks ([address] [place] [address], [phone]), ID office will call patient to schedule       Team 1 will follow you.  Case d/w primary team.    Linda Greer MD, MS  Infectious Disease attending  office phone [phone]  For any questions during evening/weekend/holiday, please page ID on call

## 2024-03-29 NOTE — DISCHARGE NOTE PROVIDER - CARE PROVIDERS DIRECT ADDRESSES
[email or web address]

## 2024-03-30 LAB
ANION GAP SERPL CALC-SCNC: 7 MMOL/L — SIGNIFICANT CHANGE UP (ref 5–17)
BASOPHILS # BLD AUTO: 0.03 K/UL — SIGNIFICANT CHANGE UP (ref 0–0.2)
BASOPHILS NFR BLD AUTO: 0.4 % — SIGNIFICANT CHANGE UP (ref 0–2)
BUN SERPL-MCNC: 23 MG/DL — SIGNIFICANT CHANGE UP (ref 7–23)
CALCIUM SERPL-MCNC: 8.3 MG/DL — LOW (ref 8.4–10.5)
CHLORIDE SERPL-SCNC: 100 MMOL/L — SIGNIFICANT CHANGE UP (ref 96–108)
CO2 SERPL-SCNC: 27 MMOL/L — SIGNIFICANT CHANGE UP (ref 22–31)
CREAT SERPL-MCNC: 0.83 MG/DL — SIGNIFICANT CHANGE UP (ref 0.5–1.3)
EGFR: 71 ML/MIN/1.73M2 — SIGNIFICANT CHANGE UP
EOSINOPHIL # BLD AUTO: 0.06 K/UL — SIGNIFICANT CHANGE UP (ref 0–0.5)
EOSINOPHIL NFR BLD AUTO: 0.9 % — SIGNIFICANT CHANGE UP (ref 0–6)
GLUCOSE SERPL-MCNC: 112 MG/DL — HIGH (ref 70–99)
HCT VFR BLD CALC: 30 % — LOW (ref 34.5–45)
HGB BLD-MCNC: 9.8 G/DL — LOW (ref 11.5–15.5)
IMM GRANULOCYTES NFR BLD AUTO: 0.1 % — SIGNIFICANT CHANGE UP (ref 0–0.9)
LYMPHOCYTES # BLD AUTO: 1.22 K/UL — SIGNIFICANT CHANGE UP (ref 1–3.3)
LYMPHOCYTES # BLD AUTO: 18.2 % — SIGNIFICANT CHANGE UP (ref 13–44)
MAGNESIUM SERPL-MCNC: 2 MG/DL — SIGNIFICANT CHANGE UP (ref 1.6–2.6)
MCHC RBC-ENTMCNC: 32.6 PG — SIGNIFICANT CHANGE UP (ref 27–34)
MCHC RBC-ENTMCNC: 32.7 GM/DL — SIGNIFICANT CHANGE UP (ref 32–36)
MCV RBC AUTO: 99.7 FL — SIGNIFICANT CHANGE UP (ref 80–100)
MONOCYTES # BLD AUTO: 0.48 K/UL — SIGNIFICANT CHANGE UP (ref 0–0.9)
MONOCYTES NFR BLD AUTO: 7.2 % — SIGNIFICANT CHANGE UP (ref 2–14)
NEUTROPHILS # BLD AUTO: 4.9 K/UL — SIGNIFICANT CHANGE UP (ref 1.8–7.4)
NEUTROPHILS NFR BLD AUTO: 73.2 % — SIGNIFICANT CHANGE UP (ref 43–77)
NRBC # BLD: 0 /100 WBCS — SIGNIFICANT CHANGE UP (ref 0–0)
PHOSPHATE SERPL-MCNC: 2.9 MG/DL — SIGNIFICANT CHANGE UP (ref 2.5–4.5)
PLATELET # BLD AUTO: 192 K/UL — SIGNIFICANT CHANGE UP (ref 150–400)
POTASSIUM SERPL-MCNC: 4.4 MMOL/L — SIGNIFICANT CHANGE UP (ref 3.5–5.3)
POTASSIUM SERPL-SCNC: 4.4 MMOL/L — SIGNIFICANT CHANGE UP (ref 3.5–5.3)
RBC # BLD: 3.01 M/UL — LOW (ref 3.8–5.2)
RBC # FLD: 13.2 % — SIGNIFICANT CHANGE UP (ref 10.3–14.5)
SODIUM SERPL-SCNC: 134 MMOL/L — LOW (ref 135–145)
WBC # BLD: 6.7 K/UL — SIGNIFICANT CHANGE UP (ref 3.8–10.5)
WBC # FLD AUTO: 6.7 K/UL — SIGNIFICANT CHANGE UP (ref 3.8–10.5)

## 2024-03-30 PROCEDURE — 99232 SBSQ HOSP IP/OBS MODERATE 35: CPT

## 2024-03-30 PROCEDURE — 99233 SBSQ HOSP IP/OBS HIGH 50: CPT | Mod: GC

## 2024-03-30 RX ADMIN — ATORVASTATIN CALCIUM 40 MILLIGRAM(S): 80 TABLET, FILM COATED ORAL at 22:21

## 2024-03-30 RX ADMIN — SODIUM CHLORIDE 4 MILLILITER(S): 9 INJECTION INTRAMUSCULAR; INTRAVENOUS; SUBCUTANEOUS at 09:45

## 2024-03-30 RX ADMIN — MEROPENEM 100 MILLIGRAM(S): 1 INJECTION INTRAVENOUS at 17:45

## 2024-03-30 RX ADMIN — Medication 50 MILLIGRAM(S): at 17:45

## 2024-03-30 RX ADMIN — Medication 4 MILLILITER(S): at 11:37

## 2024-03-30 RX ADMIN — CLOPIDOGREL BISULFATE 75 MILLIGRAM(S): 75 TABLET, FILM COATED ORAL at 11:37

## 2024-03-30 RX ADMIN — DONEPEZIL HYDROCHLORIDE 5 MILLIGRAM(S): 10 TABLET, FILM COATED ORAL at 22:21

## 2024-03-30 RX ADMIN — Medication 50 MILLIGRAM(S): at 07:48

## 2024-03-30 RX ADMIN — Medication 81 MILLIGRAM(S): at 11:37

## 2024-03-30 RX ADMIN — SODIUM CHLORIDE 4 MILLILITER(S): 9 INJECTION INTRAMUSCULAR; INTRAVENOUS; SUBCUTANEOUS at 22:21

## 2024-03-30 RX ADMIN — Medication 0.5 MILLIGRAM(S): at 22:21

## 2024-03-30 RX ADMIN — MONTELUKAST 10 MILLIGRAM(S): 4 TABLET, CHEWABLE ORAL at 11:37

## 2024-03-30 RX ADMIN — Medication 125 MILLIGRAM(S): at 07:47

## 2024-03-30 RX ADMIN — MEROPENEM 100 MILLIGRAM(S): 1 INJECTION INTRAVENOUS at 07:48

## 2024-03-30 RX ADMIN — Medication 125 MILLIGRAM(S): at 17:45

## 2024-03-30 RX ADMIN — PANTOPRAZOLE SODIUM 40 MILLIGRAM(S): 20 TABLET, DELAYED RELEASE ORAL at 07:48

## 2024-03-30 RX ADMIN — Medication 4 MILLILITER(S): at 07:48

## 2024-03-30 NOTE — PROGRESS NOTE ADULT - ASSESSMENT
82 YO Female w/ AF (not on AC due to SAH and fall risk), HTN, Lewy body dementia (baseline a/o x3),CVA, SAH,  posterior reversible encephalopathy syndrome PRESS, chronic bronchiectasis with hx of (pseudomonas and stenotrophomonas infections, home O2 prn, as well as 2 weeks each month with inhaled Tobramycin per Dr. Foster, last episode 6 month ago), diastolic CHF, who recently underwent an uncomplicated MITZI occlusion w/ 22mm Amulet with Dr. Rivas on 3/19/24. She presents w/ SOB and increased sputum production concerning for bronchiectasis exacerbation.    Pulmonary is consulted for bronchiectasis exacerbation.     # Non-CF Bronchiectasis exacerbation  # history of pseudomonas  - presents w/ sob, sputum production x1 week, green in color  - CT scan w/o obvious infiltrate  - sputum at outpatient clinic 3/26+pseudomonas and has history of pseudomonas infections in the past (allergy to levaquin so has been on iv cefepime, iv zosyn, and cipro w/o known issue)  - has not tolerated inhalers or nebulizers before given anxiety and jitteriness    Recommendations  - follow sputum cultures from 3/26 outpatient that grow pseudomonas to provide proper coverage  - defer to ID regarding abx, recommending meropenem IV x 2 weeks w/ PO vancomycin c diff ppx  - continue airway clearance that she does at home: can do 3% saline nebz followed by aerobika. would avoid mucomyst as this can cause bronchospasm  - please obtain picc or midline prior to discharge and arrange for outpatient antibiotics for outpatient completion of course given her allergies to PO alternatives that cover pseudomonas    Patient to be S/D/E with Dr. Márquez

## 2024-03-30 NOTE — PROGRESS NOTE ADULT - ATTENDING COMMENTS
HX bronchiectasis with exacerbation, colonized w Pseudomonas, multiple antibiotic allergies. Plan course of IV meropenem per ID.  Pulmonary toilet important, but would stop aerosolized Mucomyst, patient felt worse with it, can cause bronchospasm.

## 2024-03-30 NOTE — PROGRESS NOTE ADULT - ATTENDING COMMENTS
82 YO Female w/ PMHx of AF (not on AC due to SAH and fall risk), HTN, Lewy body dementia (baseline a/o x3),CVA, SAH,  posterior reversible encephalopathy syndrome PRESS, CF carrier, adult onset asthma, chronic bronchiectasis with multiple admissions for Pseudomonal infections (home O2 prn), diastolic CHF, and C. diff, who recently underwent an uncomplicated MITZI occlusion w/ 22mm Amulet with Dr. Rivas on 3/19/24. Post-op course uncomplicated. who was sent in from Dr. Bhardwaj office presents with increased dyspnea post procedure that has been worsening in the past few days with difficulty coughing due to mucous plugging, admitted for treatment of bronchiectasis flare.     Labs and imaging reviewed    #Bronchiectasis exacerbation/PSA pneumonia - Cont Cefepime, consult ID for abx duration/course, aim for Midline today/Monday depending ID clearance, airway clearance with aerobika and saline     Rest as above . 84 YO Female w/ PMHx of AF (not on AC due to SAH and fall risk), HTN, Lewy body dementia (baseline a/o x3),CVA, SAH,  posterior reversible encephalopathy syndrome PRESS, CF carrier, adult onset asthma, chronic bronchiectasis with multiple admissions for Pseudomonal infections (home O2 prn), diastolic CHF, and C. diff, who recently underwent an uncomplicated MITZI occlusion w/ 22mm Amulet with Dr. Rivas on 3/19/24. Post-op course uncomplicated. who was sent in from Dr. Bhardwaj office presents with increased dyspnea post procedure that has been worsening in the past few days with difficulty coughing due to mucous plugging, admitted for treatment of bronchiectasis flare.     Labs and imaging reviewed    #Bronchiectasis exacerbation/PSA pneumonia - Cont IV Meropenem (3/29-4/12), plan for midline on Monday, continue aerobika/mucomyst/inhaled hypertonic saline, appreciate pulm and ID reccs     Dispo: symptom control over the weekend, plan for midline on Monday with home IV abx infusions

## 2024-03-30 NOTE — PROGRESS NOTE ADULT - ASSESSMENT
84 YO Female w/ PMHx of AF (not on AC due to SAH and fall risk), HTN, Lewy body dementia (baseline a/o x3),CVA, SAH,  posterior reversible encephalopathy syndrome PRESS, CF carrier, adult onset asthma, chronic bronchiectasis with multiple admissions for Pseudomonal infections (home O2 prn), diastolic CHF, and C. diff, who recently underwent an uncomplicated MITZI occlusion w/ 22mm Amulet with Dr. Rivas on 3/19/24. Post-op course uncomplicated. who was sent in from Dr. Bhardwaj office presents with increased dyspnea post procedure that has been worsening in the past few days with difficulty coughing due to mucous plugging, admitted for treatment of bronchiectasis flare.

## 2024-03-30 NOTE — PROGRESS NOTE ADULT - PROBLEM SELECTOR PLAN 1
History of chronic bronchiectasis with previous exacerbations from Pseudomonas and Stenotrophomonas. Follows Dr Foster, on home O2 intermittently with exertion. Patient is post intubation for MITZI occlusion on 3/19, initially had difficulty producing mucus but now has copious mucus and very dyspneic. Presents for treatment of bronchiectasis with IV cefepime as patient was noted to be positive for pseudomonas on outpatient sputum culture from Dr. Foster. Per patient history of allergy to Levaquin and previously received PO Cipro course. Patient last treated for bronchiectasis exacerbation in September. 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 .     - Obtain abx approval from ID, may need Midline if improving to complete course.   -  RVP & SClx negative  - F/u BClx  - initation 3% saline nebulizer BID with Aerobika to assist with mucociliary clearance.   - Chest PT  - Mucomyst   - Continue home Montelukast 10mg Qd, Astepro nasal spray.   - c/w IV cefepime. Please obtain PICC line today and arrange for outpatient antibiotics for outpatient completion of course given her allergies to PO alternatives that cover pseudomonas  - Okay to proceed with PICC line placement prior to 48 hours of NGTD on blood cultures

## 2024-03-31 LAB
ALBUMIN SERPL ELPH-MCNC: 3.2 G/DL — LOW (ref 3.3–5)
ALP SERPL-CCNC: 88 U/L — SIGNIFICANT CHANGE UP (ref 40–120)
ALT FLD-CCNC: 16 U/L — SIGNIFICANT CHANGE UP (ref 10–45)
ANION GAP SERPL CALC-SCNC: 5 MMOL/L — SIGNIFICANT CHANGE UP (ref 5–17)
AST SERPL-CCNC: 18 U/L — SIGNIFICANT CHANGE UP (ref 10–40)
BASOPHILS # BLD AUTO: 0.02 K/UL — SIGNIFICANT CHANGE UP (ref 0–0.2)
BASOPHILS NFR BLD AUTO: 0.3 % — SIGNIFICANT CHANGE UP (ref 0–2)
BILIRUB SERPL-MCNC: 0.3 MG/DL — SIGNIFICANT CHANGE UP (ref 0.2–1.2)
BLD GP AB SCN SERPL QL: NEGATIVE — SIGNIFICANT CHANGE UP
BUN SERPL-MCNC: 18 MG/DL — SIGNIFICANT CHANGE UP (ref 7–23)
CALCIUM SERPL-MCNC: 8 MG/DL — LOW (ref 8.4–10.5)
CHLORIDE SERPL-SCNC: 100 MMOL/L — SIGNIFICANT CHANGE UP (ref 96–108)
CO2 SERPL-SCNC: 30 MMOL/L — SIGNIFICANT CHANGE UP (ref 22–31)
CREAT SERPL-MCNC: 0.77 MG/DL — SIGNIFICANT CHANGE UP (ref 0.5–1.3)
EGFR: 77 ML/MIN/1.73M2 — SIGNIFICANT CHANGE UP
EOSINOPHIL # BLD AUTO: 0.08 K/UL — SIGNIFICANT CHANGE UP (ref 0–0.5)
EOSINOPHIL NFR BLD AUTO: 1.3 % — SIGNIFICANT CHANGE UP (ref 0–6)
GLUCOSE SERPL-MCNC: 95 MG/DL — SIGNIFICANT CHANGE UP (ref 70–99)
HCT VFR BLD CALC: 27 % — LOW (ref 34.5–45)
HGB BLD-MCNC: 9 G/DL — LOW (ref 11.5–15.5)
IMM GRANULOCYTES NFR BLD AUTO: 0.3 % — SIGNIFICANT CHANGE UP (ref 0–0.9)
LYMPHOCYTES # BLD AUTO: 1.19 K/UL — SIGNIFICANT CHANGE UP (ref 1–3.3)
LYMPHOCYTES # BLD AUTO: 18.8 % — SIGNIFICANT CHANGE UP (ref 13–44)
MAGNESIUM SERPL-MCNC: 2 MG/DL — SIGNIFICANT CHANGE UP (ref 1.6–2.6)
MCHC RBC-ENTMCNC: 32.6 PG — SIGNIFICANT CHANGE UP (ref 27–34)
MCHC RBC-ENTMCNC: 33.3 GM/DL — SIGNIFICANT CHANGE UP (ref 32–36)
MCV RBC AUTO: 97.8 FL — SIGNIFICANT CHANGE UP (ref 80–100)
MONOCYTES # BLD AUTO: 0.48 K/UL — SIGNIFICANT CHANGE UP (ref 0–0.9)
MONOCYTES NFR BLD AUTO: 7.6 % — SIGNIFICANT CHANGE UP (ref 2–14)
NEUTROPHILS # BLD AUTO: 4.54 K/UL — SIGNIFICANT CHANGE UP (ref 1.8–7.4)
NEUTROPHILS NFR BLD AUTO: 71.7 % — SIGNIFICANT CHANGE UP (ref 43–77)
NRBC # BLD: 0 /100 WBCS — SIGNIFICANT CHANGE UP (ref 0–0)
OSMOLALITY SERPL: 284 MOSM/KG — SIGNIFICANT CHANGE UP (ref 280–301)
PHOSPHATE SERPL-MCNC: 2.5 MG/DL — SIGNIFICANT CHANGE UP (ref 2.5–4.5)
PLATELET # BLD AUTO: 180 K/UL — SIGNIFICANT CHANGE UP (ref 150–400)
POTASSIUM SERPL-MCNC: 4 MMOL/L — SIGNIFICANT CHANGE UP (ref 3.5–5.3)
POTASSIUM SERPL-SCNC: 4 MMOL/L — SIGNIFICANT CHANGE UP (ref 3.5–5.3)
PROT SERPL-MCNC: 5.7 G/DL — LOW (ref 6–8.3)
RBC # BLD: 2.76 M/UL — LOW (ref 3.8–5.2)
RBC # FLD: 13.5 % — SIGNIFICANT CHANGE UP (ref 10.3–14.5)
RH IG SCN BLD-IMP: POSITIVE — SIGNIFICANT CHANGE UP
SODIUM SERPL-SCNC: 135 MMOL/L — SIGNIFICANT CHANGE UP (ref 135–145)
WBC # BLD: 6.33 K/UL — SIGNIFICANT CHANGE UP (ref 3.8–10.5)
WBC # FLD AUTO: 6.33 K/UL — SIGNIFICANT CHANGE UP (ref 3.8–10.5)

## 2024-03-31 PROCEDURE — 93970 EXTREMITY STUDY: CPT | Mod: 26

## 2024-03-31 PROCEDURE — 99233 SBSQ HOSP IP/OBS HIGH 50: CPT | Mod: GC

## 2024-03-31 PROCEDURE — 99232 SBSQ HOSP IP/OBS MODERATE 35: CPT

## 2024-03-31 PROCEDURE — 99232 SBSQ HOSP IP/OBS MODERATE 35: CPT | Mod: GC

## 2024-03-31 RX ORDER — SODIUM CHLORIDE 9 MG/ML
4 INJECTION INTRAMUSCULAR; INTRAVENOUS; SUBCUTANEOUS EVERY 8 HOURS
Refills: 0 | Status: DISCONTINUED | OUTPATIENT
Start: 2024-03-31 | End: 2024-04-01

## 2024-03-31 RX ADMIN — SODIUM CHLORIDE 4 MILLILITER(S): 9 INJECTION INTRAMUSCULAR; INTRAVENOUS; SUBCUTANEOUS at 23:04

## 2024-03-31 RX ADMIN — MEROPENEM 100 MILLIGRAM(S): 1 INJECTION INTRAVENOUS at 07:51

## 2024-03-31 RX ADMIN — Medication 20 MILLIGRAM(S): at 07:53

## 2024-03-31 RX ADMIN — Medication 125 MILLIGRAM(S): at 07:52

## 2024-03-31 RX ADMIN — Medication 125 MILLIGRAM(S): at 17:47

## 2024-03-31 RX ADMIN — SODIUM CHLORIDE 4 MILLILITER(S): 9 INJECTION INTRAMUSCULAR; INTRAVENOUS; SUBCUTANEOUS at 16:07

## 2024-03-31 RX ADMIN — PANTOPRAZOLE SODIUM 40 MILLIGRAM(S): 20 TABLET, DELAYED RELEASE ORAL at 07:51

## 2024-03-31 RX ADMIN — Medication 50 MILLIGRAM(S): at 17:46

## 2024-03-31 RX ADMIN — MONTELUKAST 10 MILLIGRAM(S): 4 TABLET, CHEWABLE ORAL at 12:02

## 2024-03-31 RX ADMIN — Medication 81 MILLIGRAM(S): at 12:02

## 2024-03-31 RX ADMIN — SODIUM CHLORIDE 4 MILLILITER(S): 9 INJECTION INTRAMUSCULAR; INTRAVENOUS; SUBCUTANEOUS at 09:09

## 2024-03-31 RX ADMIN — DONEPEZIL HYDROCHLORIDE 5 MILLIGRAM(S): 10 TABLET, FILM COATED ORAL at 22:50

## 2024-03-31 RX ADMIN — CLOPIDOGREL BISULFATE 75 MILLIGRAM(S): 75 TABLET, FILM COATED ORAL at 12:02

## 2024-03-31 RX ADMIN — Medication 0.5 MILLIGRAM(S): at 23:04

## 2024-03-31 RX ADMIN — MEROPENEM 100 MILLIGRAM(S): 1 INJECTION INTRAVENOUS at 17:47

## 2024-03-31 RX ADMIN — ATORVASTATIN CALCIUM 40 MILLIGRAM(S): 80 TABLET, FILM COATED ORAL at 22:50

## 2024-03-31 RX ADMIN — Medication 50 MILLIGRAM(S): at 07:51

## 2024-03-31 RX ADMIN — Medication 62.5 MILLIMOLE(S): at 16:07

## 2024-03-31 NOTE — PROGRESS NOTE ADULT - ASSESSMENT
# Bronchiectasis exacerbation  Please increase patient's airway clearance to TID rather than BID w/ hypertonic saline and aerobika to improve airway clearance   continue meropenem per ID w/ PO vancomycin      84 YO Female w/ AF (not on AC due to SAH and fall risk), HTN, Lewy body dementia (baseline a/o x3),CVA, SAH,  posterior reversible encephalopathy syndrome PRESS, chronic bronchiectasis with hx of (pseudomonas and stenotrophomonas infections, home O2 prn, as well as 2 weeks each month with inhaled Tobramycin per Dr. Foster, last episode 6 month ago), diastolic CHF, who recently underwent an uncomplicated MITZI occlusion w/ 22mm Amulet with Dr. Rivas on 3/19/24. She presents w/ SOB and increased sputum production concerning for bronchiectasis exacerbation.    Pulmonary is consulted for bronchiectasis exacerbation.     # Non-CF Bronchiectasis exacerbation  # history of pseudomonas  - presents w/ sob, sputum production x1 week, green in color  - CT scan w/o obvious infiltrate  - sputum at outpatient clinic 3/26+pseudomonas and has history of pseudomonas infections in the past (allergy to levaquin so has been on iv cefepime, iv zosyn, and cipro w/o known issue)  - has not tolerated inhalers or nebulizers before given anxiety and jitteriness    Recommendations  - follow sputum cultures from 3/26 outpatient that grow pseudomonas to provide proper coverage  - defer to ID regarding abx, recommending meropenem IV x 2 weeks w/ PO vancomycin c diff ppx  - Please increase patient's airway clearance to TID rather than BID w/ hypertonic saline and aerobika to improve airway clearance    - please obtain picc or midline prior to discharge and arrange for outpatient antibiotics for outpatient completion of course given her allergies to PO alternatives that cover pseudomonas    Patient to be S/D/E with Dr. Márquez

## 2024-03-31 NOTE — PROGRESS NOTE ADULT - PROBLEM SELECTOR PLAN 1
History of chronic bronchiectasis with previous exacerbations from Pseudomonas and Stenotrophomonas. Follows Dr Foster, on home O2 intermittently with exertion. Patient is post intubation for MITZI occlusion on 3/19, initially had difficulty producing mucus but now has copious mucus and very dyspneic. Presents for treatment of bronchiectasis with IV cefepime as patient was noted to be positive for pseudomonas on outpatient sputum culture from Dr. Foster. Per patient history of allergy to Levaquin and previously received PO Cipro course. Patient last treated for bronchiectasis exacerbation in September. 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 .     - stopped cefepime  - started meropenem 1g IV q12h  - started vancomycin 125mg PO q12h as CDI ppx (until 4/19)  - Place single lumen midline (by tomorrow)  - Duration of antibiotics is 2 weeks ( 3/29 - 4/12 )  - Weekly labs: CMP, CBC faxed to ID office at [phone]  - Patient to follow up with Dr. jackson in 2 weeks ([address] [place] [address], [phone]), ID office will call patient to schedule   - Continue home Montelukast 10mg Qd, Astepro nasal spray.  - CW Aerobika for airway clearance   - CW 3% NS inhalation

## 2024-03-31 NOTE — PROGRESS NOTE ADULT - ASSESSMENT
82 YO Female w/ PMHx of AF (not on AC due to SAH and fall risk), HTN, Lewy body dementia (baseline a/o x3),CVA, SAH,  posterior reversible encephalopathy syndrome PRESS, CF carrier, adult onset asthma, chronic bronchiectasis with multiple admissions for Pseudomonal infections (home O2 prn), diastolic CHF, and C. diff, who recently underwent an uncomplicated MITZI occlusion w/ 22mm Amulet with Dr. Rivas on 3/19/24. Post-op course uncomplicated. who was sent in from Dr. Bhardwaj office presents with increased dyspnea post procedure that has been worsening in the past few days with difficulty coughing due to mucous plugging, admitted for treatment of bronchiectasis flare.

## 2024-03-31 NOTE — PROGRESS NOTE ADULT - ASSESSMENT
83F h/o chronic bronchiectasis with PsA colonization (home O2 prn, inh Tobra every 2 weeks per Dr. Parra, last exacerbation 9/2023), CDI (11/2023), Afib, Lewy body dementia, CVA, SAH, PRESS, CF carrier, HFpEF, recent MITZI occlusion on 3/19/24 p/w PsA pneumonia and bronchiectasis exacerbation.  Improving on meropenem.      - cont meropenem 1g IV q12h  - cont  vancomycin 125mg PO q12h as CDI ppx (until 4/19)  - Place single lumen midline   - Duration of antibiotics is 2 weeks ( 3/29 - 4/12 )  - Weekly labs: CMP, CBC faxed to ID office at [phone]  - Patient to follow up with Dr. greer in 2 weeks ([address] [place] [address], [phone]), ID office will call patient to schedule     Thank you for your consult.  Please re-consult us or call us with questions.  Case d/w primary team.    Linda Greer MD, MS  Infectious Disease attending  office phone [phone]  For any questions during evening/weekend/holiday, please page ID on call

## 2024-03-31 NOTE — PROGRESS NOTE ADULT - ATTENDING COMMENTS
84 YO Female w/ PMHx of AF (not on AC due to SAH and fall risk), HTN, Lewy body dementia (baseline a/o x3),CVA, SAH,  posterior reversible encephalopathy syndrome PRESS, CF carrier, adult onset asthma, chronic bronchiectasis with multiple admissions for Pseudomonal infections (home O2 prn), diastolic CHF, and C. diff, who recently underwent an uncomplicated MITZI occlusion w/ 22mm Amulet with Dr. Rivas on 3/19/24. Post-op course uncomplicated. who was sent in from Dr. Bhardwaj office presents with increased dyspnea post procedure that has been worsening in the past few days with difficulty coughing due to mucous plugging, admitted for treatment of bronchiectasis flare.     Labs and imaging reviewed    #Bronchiectasis exacerbation/PSA pneumonia - Cont IV Meropenem (3/29-4/12), plan for midline on Monday, continue aerobika/mucomyst/inhaled hypertonic saline, appreciate pulm and ID reccs   #Cdiff ppx - continue PO vanco per ID reccs    Dispo: symptom control over the weekend, plan for midline on Monday with home IV abx infusions 84 YO Female w/ PMHx of AF (not on AC due to SAH and fall risk), HTN, Lewy body dementia (baseline a/o x3),CVA, SAH,  posterior reversible encephalopathy syndrome PRESS, CF carrier, adult onset asthma, chronic bronchiectasis with multiple admissions for Pseudomonal infections (home O2 prn), diastolic CHF, and C. diff, who recently underwent an uncomplicated MITZI occlusion w/ 22mm Amulet with Dr. Rivas on 3/19/24. Post-op course uncomplicated. who was sent in from Dr. Bhardwaj office presents with increased dyspnea post procedure that has been worsening in the past few days with difficulty coughing due to mucous plugging, admitted for treatment of bronchiectasis flare.     Labs and imaging reviewed    #Bronchiectasis exacerbation/PSA pneumonia - Cont IV Meropenem (3/29-4/12), plan for midline on Monday, continue aerobika and inhaled hypertonic saline, appreciate pulm and ID reccs   #Cdiff ppx - continue PO vanco per ID reccs    Dispo: symptom control over the weekend, plan for midline on Monday with home IV abx infusions

## 2024-04-01 ENCOUNTER — TRANSCRIPTION ENCOUNTER (OUTPATIENT)
Age: 84
End: 2024-04-01

## 2024-04-01 VITALS — HEART RATE: 76 BPM | SYSTOLIC BLOOD PRESSURE: 116 MMHG | DIASTOLIC BLOOD PRESSURE: 76 MMHG

## 2024-04-01 DIAGNOSIS — Z79.82 LONG TERM (CURRENT) USE OF ASPIRIN: ICD-10-CM

## 2024-04-01 DIAGNOSIS — Z88.8 ALLERGY STATUS TO OTHER DRUGS, MEDICAMENTS AND BIOLOGICAL SUBSTANCES: ICD-10-CM

## 2024-04-01 DIAGNOSIS — J47.9 BRONCHIECTASIS, UNCOMPLICATED: ICD-10-CM

## 2024-04-01 DIAGNOSIS — I48.0 PAROXYSMAL ATRIAL FIBRILLATION: ICD-10-CM

## 2024-04-01 DIAGNOSIS — G31.83 NEUROCOGNITIVE DISORDER WITH LEWY BODIES: ICD-10-CM

## 2024-04-01 DIAGNOSIS — F02.80 DEMENTIA IN OTHER DISEASES CLASSIFIED ELSEWHERE, UNSPECIFIED SEVERITY, WITHOUT BEHAVIORAL DISTURBANCE, PSYCHOTIC DISTURBANCE, MOOD DISTURBANCE, AND ANXIETY: ICD-10-CM

## 2024-04-01 DIAGNOSIS — I10 ESSENTIAL (PRIMARY) HYPERTENSION: ICD-10-CM

## 2024-04-01 DIAGNOSIS — Z88.1 ALLERGY STATUS TO OTHER ANTIBIOTIC AGENTS STATUS: ICD-10-CM

## 2024-04-01 DIAGNOSIS — I67.83 POSTERIOR REVERSIBLE ENCEPHALOPATHY SYNDROME: ICD-10-CM

## 2024-04-01 DIAGNOSIS — Z99.81 DEPENDENCE ON SUPPLEMENTAL OXYGEN: ICD-10-CM

## 2024-04-01 DIAGNOSIS — Z91.041 RADIOGRAPHIC DYE ALLERGY STATUS: ICD-10-CM

## 2024-04-01 LAB
ALBUMIN SERPL ELPH-MCNC: 3.1 G/DL — LOW (ref 3.3–5)
ALP SERPL-CCNC: 90 U/L — SIGNIFICANT CHANGE UP (ref 40–120)
ALT FLD-CCNC: 15 U/L — SIGNIFICANT CHANGE UP (ref 10–45)
ANION GAP SERPL CALC-SCNC: 8 MMOL/L — SIGNIFICANT CHANGE UP (ref 5–17)
AST SERPL-CCNC: 18 U/L — SIGNIFICANT CHANGE UP (ref 10–40)
BASOPHILS # BLD AUTO: 0.03 K/UL — SIGNIFICANT CHANGE UP (ref 0–0.2)
BASOPHILS NFR BLD AUTO: 0.5 % — SIGNIFICANT CHANGE UP (ref 0–2)
BILIRUB SERPL-MCNC: 0.2 MG/DL — SIGNIFICANT CHANGE UP (ref 0.2–1.2)
BUN SERPL-MCNC: 16 MG/DL — SIGNIFICANT CHANGE UP (ref 7–23)
CALCIUM SERPL-MCNC: 8.1 MG/DL — LOW (ref 8.4–10.5)
CHLORIDE SERPL-SCNC: 98 MMOL/L — SIGNIFICANT CHANGE UP (ref 96–108)
CO2 SERPL-SCNC: 29 MMOL/L — SIGNIFICANT CHANGE UP (ref 22–31)
CREAT SERPL-MCNC: 0.72 MG/DL — SIGNIFICANT CHANGE UP (ref 0.5–1.3)
EGFR: 84 ML/MIN/1.73M2 — SIGNIFICANT CHANGE UP
EOSINOPHIL # BLD AUTO: 0.08 K/UL — SIGNIFICANT CHANGE UP (ref 0–0.5)
EOSINOPHIL NFR BLD AUTO: 1.4 % — SIGNIFICANT CHANGE UP (ref 0–6)
GLUCOSE SERPL-MCNC: 101 MG/DL — HIGH (ref 70–99)
GRAM STN FLD: ABNORMAL
HCT VFR BLD CALC: 30 % — LOW (ref 34.5–45)
HGB BLD-MCNC: 9.7 G/DL — LOW (ref 11.5–15.5)
IMM GRANULOCYTES NFR BLD AUTO: 0.3 % — SIGNIFICANT CHANGE UP (ref 0–0.9)
LYMPHOCYTES # BLD AUTO: 1.34 K/UL — SIGNIFICANT CHANGE UP (ref 1–3.3)
LYMPHOCYTES # BLD AUTO: 22.8 % — SIGNIFICANT CHANGE UP (ref 13–44)
MAGNESIUM SERPL-MCNC: 1.8 MG/DL — SIGNIFICANT CHANGE UP (ref 1.6–2.6)
MCHC RBC-ENTMCNC: 31.8 PG — SIGNIFICANT CHANGE UP (ref 27–34)
MCHC RBC-ENTMCNC: 32.3 GM/DL — SIGNIFICANT CHANGE UP (ref 32–36)
MCV RBC AUTO: 98.4 FL — SIGNIFICANT CHANGE UP (ref 80–100)
MONOCYTES # BLD AUTO: 0.53 K/UL — SIGNIFICANT CHANGE UP (ref 0–0.9)
MONOCYTES NFR BLD AUTO: 9 % — SIGNIFICANT CHANGE UP (ref 2–14)
NEUTROPHILS # BLD AUTO: 3.89 K/UL — SIGNIFICANT CHANGE UP (ref 1.8–7.4)
NEUTROPHILS NFR BLD AUTO: 66 % — SIGNIFICANT CHANGE UP (ref 43–77)
NRBC # BLD: 0 /100 WBCS — SIGNIFICANT CHANGE UP (ref 0–0)
PHOSPHATE SERPL-MCNC: 2.4 MG/DL — LOW (ref 2.5–4.5)
PLATELET # BLD AUTO: 202 K/UL — SIGNIFICANT CHANGE UP (ref 150–400)
POTASSIUM SERPL-MCNC: 3.7 MMOL/L — SIGNIFICANT CHANGE UP (ref 3.5–5.3)
POTASSIUM SERPL-SCNC: 3.7 MMOL/L — SIGNIFICANT CHANGE UP (ref 3.5–5.3)
PROT SERPL-MCNC: 5.9 G/DL — LOW (ref 6–8.3)
RBC # BLD: 3.05 M/UL — LOW (ref 3.8–5.2)
RBC # FLD: 13.1 % — SIGNIFICANT CHANGE UP (ref 10.3–14.5)
SODIUM SERPL-SCNC: 135 MMOL/L — SIGNIFICANT CHANGE UP (ref 135–145)
SPECIMEN SOURCE: SIGNIFICANT CHANGE UP
WBC # BLD: 5.89 K/UL — SIGNIFICANT CHANGE UP (ref 3.8–10.5)
WBC # FLD AUTO: 5.89 K/UL — SIGNIFICANT CHANGE UP (ref 3.8–10.5)

## 2024-04-01 PROCEDURE — 87040 BLOOD CULTURE FOR BACTERIA: CPT

## 2024-04-01 PROCEDURE — 87070 CULTURE OTHR SPECIMN AEROBIC: CPT

## 2024-04-01 PROCEDURE — 83605 ASSAY OF LACTIC ACID: CPT

## 2024-04-01 PROCEDURE — 80048 BASIC METABOLIC PNL TOTAL CA: CPT

## 2024-04-01 PROCEDURE — 85610 PROTHROMBIN TIME: CPT

## 2024-04-01 PROCEDURE — 87206 SMEAR FLUORESCENT/ACID STAI: CPT

## 2024-04-01 PROCEDURE — 0225U NFCT DS DNA&RNA 21 SARSCOV2: CPT

## 2024-04-01 PROCEDURE — 83735 ASSAY OF MAGNESIUM: CPT

## 2024-04-01 PROCEDURE — 84132 ASSAY OF SERUM POTASSIUM: CPT

## 2024-04-01 PROCEDURE — 82330 ASSAY OF CALCIUM: CPT

## 2024-04-01 PROCEDURE — 80053 COMPREHEN METABOLIC PANEL: CPT

## 2024-04-01 PROCEDURE — 87186 SC STD MICRODIL/AGAR DIL: CPT

## 2024-04-01 PROCEDURE — 96365 THER/PROPH/DIAG IV INF INIT: CPT

## 2024-04-01 PROCEDURE — 83880 ASSAY OF NATRIURETIC PEPTIDE: CPT

## 2024-04-01 PROCEDURE — 85025 COMPLETE CBC W/AUTO DIFF WBC: CPT

## 2024-04-01 PROCEDURE — 76937 US GUIDE VASCULAR ACCESS: CPT | Mod: 26

## 2024-04-01 PROCEDURE — 86850 RBC ANTIBODY SCREEN: CPT

## 2024-04-01 PROCEDURE — 71045 X-RAY EXAM CHEST 1 VIEW: CPT | Mod: 26

## 2024-04-01 PROCEDURE — 85730 THROMBOPLASTIN TIME PARTIAL: CPT

## 2024-04-01 PROCEDURE — 87102 FUNGUS ISOLATION CULTURE: CPT

## 2024-04-01 PROCEDURE — 71045 X-RAY EXAM CHEST 1 VIEW: CPT

## 2024-04-01 PROCEDURE — 82803 BLOOD GASES ANY COMBINATION: CPT

## 2024-04-01 PROCEDURE — 87015 SPECIMEN INFECT AGNT CONCNTJ: CPT

## 2024-04-01 PROCEDURE — 84484 ASSAY OF TROPONIN QUANT: CPT

## 2024-04-01 PROCEDURE — 93970 EXTREMITY STUDY: CPT

## 2024-04-01 PROCEDURE — 71250 CT THORAX DX C-: CPT | Mod: MC

## 2024-04-01 PROCEDURE — 36415 COLL VENOUS BLD VENIPUNCTURE: CPT

## 2024-04-01 PROCEDURE — 99239 HOSP IP/OBS DSCHRG MGMT >30: CPT

## 2024-04-01 PROCEDURE — 87184 SC STD DISK METHOD PER PLATE: CPT

## 2024-04-01 PROCEDURE — 84295 ASSAY OF SERUM SODIUM: CPT

## 2024-04-01 PROCEDURE — 86900 BLOOD TYPING SEROLOGIC ABO: CPT

## 2024-04-01 PROCEDURE — 36573 INSJ PICC RS&I 5 YR+: CPT

## 2024-04-01 PROCEDURE — 86901 BLOOD TYPING SEROLOGIC RH(D): CPT

## 2024-04-01 PROCEDURE — 36556 INSERT NON-TUNNEL CV CATH: CPT

## 2024-04-01 PROCEDURE — 99232 SBSQ HOSP IP/OBS MODERATE 35: CPT | Mod: GC

## 2024-04-01 PROCEDURE — 94640 AIRWAY INHALATION TREATMENT: CPT

## 2024-04-01 PROCEDURE — 83930 ASSAY OF BLOOD OSMOLALITY: CPT

## 2024-04-01 PROCEDURE — 84100 ASSAY OF PHOSPHORUS: CPT

## 2024-04-01 PROCEDURE — 99291 CRITICAL CARE FIRST HOUR: CPT

## 2024-04-01 PROCEDURE — 87116 MYCOBACTERIA CULTURE: CPT

## 2024-04-01 RX ORDER — SODIUM CHLORIDE 9 MG/ML
10 INJECTION INTRAMUSCULAR; INTRAVENOUS; SUBCUTANEOUS
Refills: 0 | Status: DISCONTINUED | OUTPATIENT
Start: 2024-04-01 | End: 2024-04-01

## 2024-04-01 RX ORDER — CHLORHEXIDINE GLUCONATE 213 G/1000ML
1 SOLUTION TOPICAL
Refills: 0 | Status: DISCONTINUED | OUTPATIENT
Start: 2024-04-01 | End: 2024-04-01

## 2024-04-01 RX ORDER — VANCOMYCIN HCL 1 G
5 VIAL (EA) INTRAVENOUS
Qty: 1 | Refills: 0
Start: 2024-04-01 | End: 2024-04-10

## 2024-04-01 RX ORDER — MEROPENEM 1 G/30ML
1000 INJECTION INTRAVENOUS
Qty: 23 | Refills: 0
Start: 2024-04-01 | End: 2024-04-11

## 2024-04-01 RX ORDER — POTASSIUM CHLORIDE 20 MEQ
40 PACKET (EA) ORAL ONCE
Refills: 0 | Status: COMPLETED | OUTPATIENT
Start: 2024-04-01 | End: 2024-04-01

## 2024-04-01 RX ORDER — VANCOMYCIN HCL 1 G
5 VIAL (EA) INTRAVENOUS
Refills: 0
Start: 2024-04-01

## 2024-04-01 RX ORDER — SODIUM CHLORIDE 9 MG/ML
5 INJECTION INTRAMUSCULAR; INTRAVENOUS; SUBCUTANEOUS
Qty: 220 | Refills: 0
Start: 2024-04-01 | End: 2024-04-11

## 2024-04-01 RX ORDER — VANCOMYCIN HCL 1 G
125 VIAL (EA) INTRAVENOUS
Qty: 1000 | Refills: 0
Start: 2024-04-01 | End: 2024-04-10

## 2024-04-01 RX ORDER — METOPROLOL TARTRATE 50 MG/1
50 TABLET, FILM COATED ORAL TWICE DAILY
Qty: 180 | Refills: 1 | Status: DISCONTINUED | COMMUNITY
Start: 1900-01-01 | End: 2024-04-01

## 2024-04-01 RX ADMIN — Medication 125 MILLIGRAM(S): at 06:57

## 2024-04-01 RX ADMIN — Medication 40 MILLIEQUIVALENT(S): at 08:54

## 2024-04-01 RX ADMIN — Medication 81 MILLIGRAM(S): at 12:13

## 2024-04-01 RX ADMIN — MEROPENEM 100 MILLIGRAM(S): 1 INJECTION INTRAVENOUS at 17:15

## 2024-04-01 RX ADMIN — CLOPIDOGREL BISULFATE 75 MILLIGRAM(S): 75 TABLET, FILM COATED ORAL at 12:13

## 2024-04-01 RX ADMIN — SODIUM CHLORIDE 4 MILLILITER(S): 9 INJECTION INTRAMUSCULAR; INTRAVENOUS; SUBCUTANEOUS at 07:04

## 2024-04-01 RX ADMIN — MONTELUKAST 10 MILLIGRAM(S): 4 TABLET, CHEWABLE ORAL at 12:13

## 2024-04-01 RX ADMIN — Medication 50 MILLIGRAM(S): at 17:26

## 2024-04-01 RX ADMIN — MEROPENEM 100 MILLIGRAM(S): 1 INJECTION INTRAVENOUS at 06:57

## 2024-04-01 RX ADMIN — PANTOPRAZOLE SODIUM 40 MILLIGRAM(S): 20 TABLET, DELAYED RELEASE ORAL at 06:56

## 2024-04-01 RX ADMIN — SODIUM CHLORIDE 4 MILLILITER(S): 9 INJECTION INTRAMUSCULAR; INTRAVENOUS; SUBCUTANEOUS at 14:39

## 2024-04-01 RX ADMIN — Medication 62.5 MILLIMOLE(S): at 12:12

## 2024-04-01 NOTE — PROGRESS NOTE ADULT - REASON FOR ADMISSION
Extreme shortness of breath, flare up of bronchiectasis

## 2024-04-01 NOTE — PROGRESS NOTE ADULT - PROVIDER SPECIALTY LIST ADULT
Infectious Disease
Pulmonology
Internal Medicine

## 2024-04-01 NOTE — PROGRESS NOTE ADULT - ASSESSMENT
84 YO Female w/ AF (not on AC due to SAH and fall risk), HTN, Lewy body dementia (baseline a/o x3),CVA, SAH,  posterior reversible encephalopathy syndrome PRESS, chronic bronchiectasis with hx of (pseudomonas and stenotrophomonas infections, home O2 prn, as well as 2 weeks each month with inhaled Tobramycin per Dr. Foster, last episode 6 month ago), diastolic CHF, who recently underwent an uncomplicated MITZI occlusion w/ 22mm Amulet with Dr. Rivas on 3/19/24. She presents w/ SOB and increased sputum production concerning for bronchiectasis exacerbation.      # Non-CF Bronchiectasis exacerbation  # history of pseudomonas    Patient with worsening shortness of breath on presentation which she states that she was not using her airway clearance or able to clear mucous well from her throat pain post procedure. She now feels that she is back to base line. Agree that she had bronchectasis exacerbation and will plan to continue treatment for 14 days in total last day being 4/11. She normally does tobramycin 14 days at a time as she was not able to tolerate the full cycle and she is supposed to restart today which we will plan for. Please give albuterol nebulizer before to help reduce with airway irritation that may occur. Will need appointment with Dr. Foster in 2 weeks for follow up.     Recommendations  - follow sputum cultures from 3/26 outpatient that grow pseudomonas to provide proper coverage  - defer to ID regarding abx, recommending meropenem IV x 2 weeks w/ PO vancomycin c diff ppx  - Continue airway clearance TID rather w/ hypertonic saline and aerobika to improve airway clearance    - please obtain picc or midline prior to discharge and arrange for outpatient antibiotics for outpatient completion of course given her allergies to PO alternatives that cover pseudomonas  - Restart home tobramycin with albuterol neb before    Patient to be S/D/E with Dr. Patricio

## 2024-04-01 NOTE — PROGRESS NOTE ADULT - PROBLEM SELECTOR PLAN 5
c/w home Donepezil 5mg q24h & PRN Klonopin 0.5mg BID for anxiety.

## 2024-04-01 NOTE — PROGRESS NOTE ADULT - PROBLEM SELECTOR PLAN 4
Pt has prior Hx of CVA.     c/w home Atorvastatin 40mg Qd.

## 2024-04-01 NOTE — DIETITIAN INITIAL EVALUATION ADULT - PROBLEM SELECTOR PLAN 3
Hypoglycemia is sooo much better after changes we made ~ 2 weeks ago.  Set new basal rate at 11pm = 0.675 and reduce 12a basal to 0.675   Bolus 10-15 minutes BEFORE meals.  Bolus before eating at the least.    Patient with new onset afib during recent hospitalization. No longer in afib on presentation. On rate control but no AC; due to fall risk?   - collateral in am   - Continue lopressor 25 mg BID

## 2024-04-01 NOTE — PROGRESS NOTE ADULT - SUBJECTIVE AND OBJECTIVE BOX
PULMONARY CONSULT SERVICE FOLLOW-UP NOTE    INTERVAL HPI:  Reviewed chart and overnight events; patient seen and examined at bedside. Patient states that she feels she is back to her normal and was up walking labs around the unit yesterday. Sputum is green but color and volume is her baseline.     MEDICATIONS:  Pulmonary:  montelukast 10 milliGRAM(s) Oral daily  sodium chloride 3%  Inhalation 4 milliLiter(s) Inhalation every 8 hours    Antimicrobials:  meropenem  IVPB 1000 milliGRAM(s) IV Intermittent every 12 hours  vancomycin    Solution 125 milliGRAM(s) Oral every 12 hours    Anticoagulants:  aspirin enteric coated 81 milliGRAM(s) Oral daily  clopidogrel Tablet 75 milliGRAM(s) Oral daily    Cardiac:  furosemide    Tablet 20 milliGRAM(s) Oral <User Schedule>  metoprolol succinate ER 50 milliGRAM(s) Oral every 12 hours      Allergies    Levaquin (Swelling)  Augmentin (Short breath; Rash)  Ceclor (Rash)  IV Contrast (Unknown)    Intolerances        Vital Signs Last 24 Hrs  T(C): 36.4 (01 Apr 2024 05:42), Max: 37.1 (31 Mar 2024 15:39)  T(F): 97.6 (01 Apr 2024 05:42), Max: 98.7 (31 Mar 2024 15:39)  HR: 99 (01 Apr 2024 05:42) (62 - 99)  BP: 90/58 (01 Apr 2024 05:42) (90/58 - 119/71)  BP(mean): --  RR: 18 (01 Apr 2024 05:42) (18 - 18)  SpO2: 94% (01 Apr 2024 05:42) (94% - 98%)    Parameters below as of 01 Apr 2024 05:42  Patient On (Oxygen Delivery Method): nasal cannula            PHYSICAL EXAM:    HEENT: NC/AT; PERRL, anicteric sclera; MMM  Neck: supple  Cardiovascular: +S1/S2, RRR  Respiratory: CTA B/L; no W/R/R  Gastrointestinal: soft, NT/ND  Extremities: WWP; no edema, clubbing or cyanosis  Vascular: 2+ radial pulses B/L  Neurological: awake and alert; KAHN    LABS:      CBC Full  -  ( 01 Apr 2024 05:30 )  WBC Count : 5.89 K/uL  RBC Count : 3.05 M/uL  Hemoglobin : 9.7 g/dL  Hematocrit : 30.0 %  Platelet Count - Automated : 202 K/uL  Mean Cell Volume : 98.4 fl  Mean Cell Hemoglobin : 31.8 pg  Mean Cell Hemoglobin Concentration : 32.3 gm/dL  Auto Neutrophil # : 3.89 K/uL  Auto Lymphocyte # : 1.34 K/uL  Auto Monocyte # : 0.53 K/uL  Auto Eosinophil # : 0.08 K/uL  Auto Basophil # : 0.03 K/uL  Auto Neutrophil % : 66.0 %  Auto Lymphocyte % : 22.8 %  Auto Monocyte % : 9.0 %  Auto Eosinophil % : 1.4 %  Auto Basophil % : 0.5 %    04-01    135  |  98  |  16  ----------------------------<  101<H>  3.7   |  29  |  0.72    Ca    8.1<L>      01 Apr 2024 05:30  Phos  2.4     04-01  Mg     1.8     04-01    TPro  5.9<L>  /  Alb  3.1<L>  /  TBili  0.2  /  DBili  x   /  AST  18  /  ALT  15  /  AlkPhos  90  04-01          Urinalysis Basic - ( 01 Apr 2024 05:30 )    Color: x / Appearance: x / SG: x / pH: x  Gluc: 101 mg/dL / Ketone: x  / Bili: x / Urobili: x   Blood: x / Protein: x / Nitrite: x   Leuk Esterase: x / RBC: x / WBC x   Sq Epi: x / Non Sq Epi: x / Bacteria: x                RADIOLOGY & ADDITIONAL STUDIES:
PULMONARY CONSULT SERVICE FOLLOW-UP NOTE    INTERVAL HPI:  Reviewed chart and overnight events; patient seen and examined. She is feeling about the same regarding her SOB. She denies fever,chills. Dry cough +. used hypersal last night x1 and does not have aerobika at bedside.    MEDICATIONS:  Pulmonary:  acetylcysteine 10%  Inhalation 4 milliLiter(s) Inhalation four times a day  levalbuterol Inhalation 0.63 milliGRAM(s) Inhalation every 4 hours  montelukast 10 milliGRAM(s) Oral daily  sodium chloride 3%  Inhalation 4 milliLiter(s) Inhalation every 12 hours    Antimicrobials:  cefepime   IVPB 2000 milliGRAM(s) IV Intermittent every 12 hours  cefepime   IVPB 2000 milliGRAM(s) IV Intermittent every 8 hours    Anticoagulants:  aspirin enteric coated 81 milliGRAM(s) Oral daily  clopidogrel Tablet 75 milliGRAM(s) Oral daily  enoxaparin Injectable 30 milliGRAM(s) SubCutaneous every 24 hours    Cardiac:  furosemide    Tablet 20 milliGRAM(s) Oral <User Schedule>  metoprolol succinate ER 50 milliGRAM(s) Oral every 12 hours      Allergies    Levaquin (Swelling)  Augmentin (Short breath; Rash)  Ceclor (Rash)  IV Contrast (Unknown)    Intolerances        Vital Signs Last 24 Hrs  T(C): 36.4 (29 Mar 2024 06:03), Max: 36.7 (28 Mar 2024 22:34)  T(F): 97.6 (29 Mar 2024 06:03), Max: 98 (28 Mar 2024 22:34)  HR: 84 (29 Mar 2024 07:43) (65 - 100)  BP: 109/63 (29 Mar 2024 07:43) (109/63 - 147/82)  BP(mean): 88 (29 Mar 2024 06:03) (88 - 88)  RR: 17 (29 Mar 2024 06:03) (16 - 22)  SpO2: 93% (29 Mar 2024 06:03) (93% - 100%)    Parameters below as of 29 Mar 2024 06:03  Patient On (Oxygen Delivery Method): room air            PHYSICAL EXAM:  Constitutional: well-appearing, in no apparent respiratory distress  HEENT: NC/AT; PERRL, anicteric sclera; moist mucous membranes  Neck: supple, no JVD or LAD appreciated  Cardiovascular: +S1/S2, Regular rate and rhythm, no murmurs appreciated   Respiratory: Entirely diminihsed breathsounds diiffusely. no rhonchi or wheezes  Gastrointestinal: soft, non-tender, non-distended. Normoactive bowel sounds.   Extremities: no edema, clubbing or cyanosis  Vascular: 2+ radial pulses B/L  Neurological: awake and alert; oriented x3    LABS:      CBC Full  -  ( 28 Mar 2024 16:04 )  WBC Count : 5.44 K/uL  RBC Count : 3.76 M/uL  Hemoglobin : 12.1 g/dL  Hematocrit : 36.9 %  Platelet Count - Automated : 231 K/uL  Mean Cell Volume : 98.1 fl  Mean Cell Hemoglobin : 32.2 pg  Mean Cell Hemoglobin Concentration : 32.8 gm/dL  Auto Neutrophil # : 3.50 K/uL  Auto Lymphocyte # : 1.34 K/uL  Auto Monocyte # : 0.52 K/uL  Auto Eosinophil # : 0.04 K/uL  Auto Basophil # : 0.03 K/uL  Auto Neutrophil % : 64.3 %  Auto Lymphocyte % : 24.6 %  Auto Monocyte % : 9.6 %  Auto Eosinophil % : 0.7 %  Auto Basophil % : 0.6 %    03-28    138  |  98  |  12  ----------------------------<  99  4.1   |  31  |  0.91    Ca    9.9      28 Mar 2024 16:04    TPro  7.8  /  Alb  4.3  /  TBili  0.5  /  DBili  x   /  AST  27  /  ALT  30  /  AlkPhos  130<H>  03-28    PT/INR - ( 28 Mar 2024 16:04 )   PT: 11.2 sec;   INR: 0.98          PTT - ( 28 Mar 2024 16:04 )  PTT:27.1 sec        RADIOLOGY & ADDITIONAL STUDIES:    CT chest - bronchietasis and mucous plugging, overall stable from her prior imaging w/ no focal conoslidation
INFECTIOUS DISEASES CONSULT FOLLOW-UP NOTE    INTERVAL HPI/OVERNIGHT EVENTS:  No event overnight  patient now able to talk full sentenses on RA  SOB improving    ROS:   Constitutional, eyes, ENT, cardiovascular, respiratory, gastrointestinal, genitourinary, integumentary, neurological, psychiatric and heme/lymph are otherwise negative other than noted above       ANTIBIOTICS/RELEVANT:    MEDICATIONS  (STANDING):  aspirin enteric coated 81 milliGRAM(s) Oral daily  atorvastatin 40 milliGRAM(s) Oral at bedtime  clopidogrel Tablet 75 milliGRAM(s) Oral daily  donepezil 5 milliGRAM(s) Oral at bedtime  furosemide    Tablet 20 milliGRAM(s) Oral <User Schedule>  meropenem  IVPB 1000 milliGRAM(s) IV Intermittent every 12 hours  metoprolol succinate ER 50 milliGRAM(s) Oral every 12 hours  montelukast 10 milliGRAM(s) Oral daily  pantoprazole    Tablet 40 milliGRAM(s) Oral before breakfast  sodium chloride 3%  Inhalation 4 milliLiter(s) Inhalation every 12 hours  sodium phosphate 15 milliMole(s)/250 mL IVPB 15 milliMole(s) IV Intermittent once  vancomycin    Solution 125 milliGRAM(s) Oral every 12 hours    MEDICATIONS  (PRN):  acetaminophen   Oral Liquid .. 350 milliGRAM(s) Oral every 6 hours PRN Moderate Pain (4 - 6)  clonazePAM  Tablet 0.5 milliGRAM(s) Oral two times a day PRN For Pain/For Anxiety        Vital Signs Last 24 Hrs  T(C): 36.4 (31 Mar 2024 09:04), Max: 37.1 (30 Mar 2024 21:01)  T(F): 97.6 (31 Mar 2024 09:04), Max: 98.7 (30 Mar 2024 21:01)  HR: 63 (31 Mar 2024 09:04) (59 - 72)  BP: 128/70 (31 Mar 2024 09:04) (103/61 - 128/70)  BP(mean): --  RR: 18 (31 Mar 2024 09:04) (18 - 18)  SpO2: 95% (31 Mar 2024 09:04) (93% - 99%)    Parameters below as of 31 Mar 2024 09:04  Patient On (Oxygen Delivery Method): nasal cannula  O2 Flow (L/min): 2      PHYSICAL EXAM:  Constitutional: alert, NAD  Eyes: the sclera and conjunctiva were normal.   ENT: the ears and nose were normal in appearance.   Neck: the appearance of the neck was normal and the neck was supple.   Pulmonary: no respiratory distress, rhonchi throughout the lung  Heart: heart rate was normal and rhythm regular, normal S1 and S2  Vascular:. there was no peripheral edema  Abdomen: normal bowel sounds, soft, non-tender        LABS:                        9.0    6.33  )-----------( 180      ( 31 Mar 2024 05:30 )             27.0     03-31    135  |  100  |  18  ----------------------------<  95  4.0   |  30  |  0.77    Ca    8.0<L>      31 Mar 2024 05:30  Phos  2.5     03-31  Mg     2.0     03-31    TPro  5.7<L>  /  Alb  3.2<L>  /  TBili  0.3  /  DBili  x   /  AST  18  /  ALT  16  /  AlkPhos  88  03-31      Urinalysis Basic - ( 31 Mar 2024 05:30 )    Color: x / Appearance: x / SG: x / pH: x  Gluc: 95 mg/dL / Ketone: x  / Bili: x / Urobili: x   Blood: x / Protein: x / Nitrite: x   Leuk Esterase: x / RBC: x / WBC x   Sq Epi: x / Non Sq Epi: x / Bacteria: x        MICROBIOLOGY:    3/28/24 BCx ngtd x 2  3/28/24 RVP neg   3/26/24 Sputum culture: PsA (S to aztreo, imi, faith <1, zosyn 16, I to cefe, cipro, levo)    RADIOLOGY & ADDITIONAL STUDIES:  CT chest 3/28/24  IMPRESSION:  Bilateral bronchiectasis, mucoid impaction, and tree-in-bud nodules with   upper lobe predominance, likely suggestive of atypical infection such as   YASMIN.    CXR 3/28/24  Findings/  impression: Hyperinflation. Bilateral opacities, unchanged. Cardiac   magnification/cardiomegaly, thoracic aortic calcification, watchman   device. Stable bony structures.  
OVERNIGHT EVENTS: pt complain of right leg pain     SUBJECTIVE / INTERVAL HPI: Patient seen and examined at bedside.     VITAL SIGNS:  Vital Signs Last 24 Hrs  T(C): 36.4 (29 Mar 2024 06:03), Max: 36.7 (28 Mar 2024 22:34)  T(F): 97.6 (29 Mar 2024 06:03), Max: 98 (28 Mar 2024 22:34)  HR: 84 (29 Mar 2024 07:43) (65 - 100)  BP: 109/63 (29 Mar 2024 07:43) (109/63 - 147/82)  BP(mean): 88 (29 Mar 2024 06:03) (88 - 88)  RR: 17 (29 Mar 2024 06:03) (16 - 22)  SpO2: 93% (29 Mar 2024 06:03) (93% - 100%)    Parameters below as of 29 Mar 2024 06:03  Patient On (Oxygen Delivery Method): room air        PHYSICAL EXAM:    General: alert, in no acute distress  HEENT: NC/AT; PERRL, anicteric sclera; MMM  Neck: supple  Cardiovascular: +S1/S2, RRR  Respiratory: CTA B/L; no W/R/R  Gastrointestinal: soft, NT/ND; +BSx4  Extremities: WWP; trace edema of the right LE, pain with ankle dorsiflexion   Vascular: 2+ radial, DP/PT pulses B/L  Neurological: AAOx3; no focal deficits    MEDICATIONS:  MEDICATIONS  (STANDING):  acetylcysteine 10%  Inhalation 4 milliLiter(s) Inhalation four times a day  aspirin enteric coated 81 milliGRAM(s) Oral daily  atorvastatin 40 milliGRAM(s) Oral at bedtime  cefepime   IVPB 2000 milliGRAM(s) IV Intermittent every 8 hours  clopidogrel Tablet 75 milliGRAM(s) Oral daily  donepezil 5 milliGRAM(s) Oral at bedtime  enoxaparin Injectable 30 milliGRAM(s) SubCutaneous every 24 hours  furosemide    Tablet 20 milliGRAM(s) Oral <User Schedule>  levalbuterol Inhalation 0.63 milliGRAM(s) Inhalation every 4 hours  metoprolol succinate ER 50 milliGRAM(s) Oral every 12 hours  montelukast 10 milliGRAM(s) Oral daily  pantoprazole    Tablet 40 milliGRAM(s) Oral before breakfast  sodium chloride 3%  Inhalation 4 milliLiter(s) Inhalation every 12 hours    MEDICATIONS  (PRN):  acetaminophen   Oral Liquid .. 350 milliGRAM(s) Oral every 6 hours PRN Moderate Pain (4 - 6)  clonazePAM  Tablet 0.5 milliGRAM(s) Oral two times a day PRN For Pain/For Anxiety      ALLERGIES:  Allergies    Levaquin (Swelling)  Augmentin (Short breath; Rash)  Ceclor (Rash)  IV Contrast (Unknown)    Intolerances        LABS:                        12.1   5.44  )-----------( 231      ( 28 Mar 2024 16:04 )             36.9     03-28    138  |  98  |  12  ----------------------------<  99  4.1   |  31  |  0.91    Ca    9.9      28 Mar 2024 16:04    TPro  7.8  /  Alb  4.3  /  TBili  0.5  /  DBili  x   /  AST  27  /  ALT  30  /  AlkPhos  130<H>  03-28    PT/INR - ( 28 Mar 2024 16:04 )   PT: 11.2 sec;   INR: 0.98          PTT - ( 28 Mar 2024 16:04 )  PTT:27.1 sec  Urinalysis Basic - ( 28 Mar 2024 16:04 )    Color: x / Appearance: x / SG: x / pH: x  Gluc: 99 mg/dL / Ketone: x  / Bili: x / Urobili: x   Blood: x / Protein: x / Nitrite: x   Leuk Esterase: x / RBC: x / WBC x   Sq Epi: x / Non Sq Epi: x / Bacteria: x      CAPILLARY BLOOD GLUCOSE          RADIOLOGY & ADDITIONAL TESTS: Reviewed.
PULMONARY CONSULT SERVICE FOLLOW-UP NOTE    INTERVAL HPI:  Reviewed chart and overnight events; patient seen and examined. report sob is better and was able to walk around hallways w/o issue but not 100 % back to normal.    MEDICATIONS:  Pulmonary:  acetylcysteine 10%  Inhalation 4 milliLiter(s) Inhalation four times a day  levalbuterol Inhalation 0.63 milliGRAM(s) Inhalation every 4 hours  montelukast 10 milliGRAM(s) Oral daily  sodium chloride 3%  Inhalation 4 milliLiter(s) Inhalation every 12 hours    Antimicrobials:  meropenem  IVPB 1000 milliGRAM(s) IV Intermittent every 12 hours  vancomycin    Solution 125 milliGRAM(s) Oral every 12 hours    Anticoagulants:  aspirin enteric coated 81 milliGRAM(s) Oral daily  clopidogrel Tablet 75 milliGRAM(s) Oral daily  enoxaparin Injectable 30 milliGRAM(s) SubCutaneous every 24 hours    Cardiac:  furosemide    Tablet 20 milliGRAM(s) Oral <User Schedule>  metoprolol succinate ER 50 milliGRAM(s) Oral every 12 hours      Allergies    Levaquin (Swelling)  Augmentin (Short breath; Rash)  Ceclor (Rash)  IV Contrast (Unknown)    Intolerances        Vital Signs Last 24 Hrs  T(C): 37.1 (29 Mar 2024 21:00), Max: 37.1 (29 Mar 2024 21:00)  T(F): 98.7 (29 Mar 2024 21:00), Max: 98.7 (29 Mar 2024 21:00)  HR: 72 (30 Mar 2024 07:44) (72 - 99)  BP: 102/67 (30 Mar 2024 07:44) (101/69 - 112/73)  BP(mean): 81 (29 Mar 2024 21:00) (81 - 81)  RR: 18 (29 Mar 2024 21:00) (18 - 18)  SpO2: 98% (29 Mar 2024 21:00) (98% - 100%)    Parameters below as of 29 Mar 2024 21:00  Patient On (Oxygen Delivery Method): room air            PHYSICAL EXAM:  Constitutional: well-appearing, in no apparent respiratory distress  HEENT: NC/AT; PERRL, anicteric sclera; moist mucous membranes  Neck: supple, no JVD or LAD appreciated  Cardiovascular: +S1/S2, Regular rate and rhythm, no murmurs appreciated   Respiratory: Clear breath sounds bilaterally improved air movement compared to prior days. no wheezes, rhonchi or rales   Gastrointestinal: soft, non-tender, non-distended. Normoactive bowel sounds.   Extremities: no edema, clubbing or cyanosis  Vascular: 2+ radial pulses B/L  Neurological: awake and alert; oriented x3    LABS:      CBC Full  -  ( 29 Mar 2024 10:00 )  WBC Count : 4.88 K/uL  RBC Count : 3.53 M/uL  Hemoglobin : 11.5 g/dL  Hematocrit : 34.6 %  Platelet Count - Automated : 225 K/uL  Mean Cell Volume : 98.0 fl  Mean Cell Hemoglobin : 32.6 pg  Mean Cell Hemoglobin Concentration : 33.2 gm/dL  Auto Neutrophil # : 3.37 K/uL  Auto Lymphocyte # : 0.95 K/uL  Auto Monocyte # : 0.47 K/uL  Auto Eosinophil # : 0.05 K/uL  Auto Basophil # : 0.02 K/uL  Auto Neutrophil % : 69.1 %  Auto Lymphocyte % : 19.5 %  Auto Monocyte % : 9.6 %  Auto Eosinophil % : 1.0 %  Auto Basophil % : 0.4 %    03-29    135  |  96  |  14  ----------------------------<  140<H>  3.2<L>   |  33<H>  |  0.95    Ca    9.0      29 Mar 2024 10:00  Phos  3.5     03-29  Mg     1.6     03-29    TPro  6.9  /  Alb  3.8  /  TBili  0.4  /  DBili  x   /  AST  19  /  ALT  20  /  AlkPhos  103  03-29    PT/INR - ( 28 Mar 2024 16:04 )   PT: 11.2 sec;   INR: 0.98          PTT - ( 28 Mar 2024 16:04 )  PTT:27.1 sec          RADIOLOGY & ADDITIONAL STUDIES:
CC: Patient is a 83y old  Female who presents with a chief complaint of Extreme shortness of breath, flare up of bronchiectasis    INTERVAL EVENTS: ALENA  SUBJECTIVE / INTERVAL HPI: Patient seen and examined at bedside.   ROS: negative unless otherwise stated above.    VITAL SIGNS:  Vital Signs Last 24 Hrs  T(C): 37.1 (29 Mar 2024 21:00), Max: 37.1 (29 Mar 2024 21:00)  T(F): 98.7 (29 Mar 2024 21:00), Max: 98.7 (29 Mar 2024 21:00)  HR: 99 (29 Mar 2024 21:00) (83 - 99)  BP: 107/68 (29 Mar 2024 21:00) (101/69 - 112/73)  BP(mean): 81 (29 Mar 2024 21:00) (81 - 81)  RR: 18 (29 Mar 2024 21:00) (18 - 18)  SpO2: 98% (29 Mar 2024 21:00) (98% - 100%)    Parameters below as of 29 Mar 2024 21:00  Patient On (Oxygen Delivery Method): room air            PHYSICAL EXAM:  General: NAD  HEENT: MMM  Neck: supple  Cardiovascular: +S1/S2; RRR  Respiratory: CTA B/L; no W/R/R  Gastrointestinal: soft, NT/ND  Extremities: WWP; no edema, clubbing or cyanosis  Vascular: 2+ radial, DP/PT pulses B/L  Neurological: AAOx3; no focal deficits    MEDICATIONS:  MEDICATIONS  (STANDING):  acetylcysteine 10%  Inhalation 4 milliLiter(s) Inhalation four times a day  aspirin enteric coated 81 milliGRAM(s) Oral daily  atorvastatin 40 milliGRAM(s) Oral at bedtime  clopidogrel Tablet 75 milliGRAM(s) Oral daily  donepezil 5 milliGRAM(s) Oral at bedtime  enoxaparin Injectable 30 milliGRAM(s) SubCutaneous every 24 hours  furosemide    Tablet 20 milliGRAM(s) Oral <User Schedule>  levalbuterol Inhalation 0.63 milliGRAM(s) Inhalation every 4 hours  meropenem  IVPB 1000 milliGRAM(s) IV Intermittent every 12 hours  metoprolol succinate ER 50 milliGRAM(s) Oral every 12 hours  montelukast 10 milliGRAM(s) Oral daily  pantoprazole    Tablet 40 milliGRAM(s) Oral before breakfast  sodium chloride 3%  Inhalation 4 milliLiter(s) Inhalation every 12 hours  vancomycin    Solution 125 milliGRAM(s) Oral every 12 hours    MEDICATIONS  (PRN):  acetaminophen   Oral Liquid .. 350 milliGRAM(s) Oral every 6 hours PRN Moderate Pain (4 - 6)  clonazePAM  Tablet 0.5 milliGRAM(s) Oral two times a day PRN For Pain/For Anxiety      ALLERGIES:  Allergies    Levaquin (Swelling)  Augmentin (Short breath; Rash)  Ceclor (Rash)  IV Contrast (Unknown)    Intolerances        LABS:                        11.5   4.88  )-----------( 225      ( 29 Mar 2024 10:00 )             34.6     03-29    135  |  96  |  14  ----------------------------<  140<H>  3.2<L>   |  33<H>  |  0.95    Ca    9.0      29 Mar 2024 10:00  Phos  3.5     03-29  Mg     1.6     03-29    TPro  6.9  /  Alb  3.8  /  TBili  0.4  /  DBili  x   /  AST  19  /  ALT  20  /  AlkPhos  103  03-29    PT/INR - ( 28 Mar 2024 16:04 )   PT: 11.2 sec;   INR: 0.98          PTT - ( 28 Mar 2024 16:04 )  PTT:27.1 sec  Urinalysis Basic - ( 29 Mar 2024 10:00 )    Color: x / Appearance: x / SG: x / pH: x  Gluc: 140 mg/dL / Ketone: x  / Bili: x / Urobili: x   Blood: x / Protein: x / Nitrite: x   Leuk Esterase: x / RBC: x / WBC x   Sq Epi: x / Non Sq Epi: x / Bacteria: x      CAPILLARY BLOOD GLUCOSE          RADIOLOGY & ADDITIONAL TESTS: Reviewed.
PULMONARY CONSULT SERVICE FOLLOW-UP NOTE    INTERVAL HPI:  Reviewed chart and overnight events; patient seen and examined. reports sob persisting but overall better than when she first got here.     MEDICATIONS:  Pulmonary:  montelukast 10 milliGRAM(s) Oral daily  sodium chloride 3%  Inhalation 4 milliLiter(s) Inhalation every 8 hours    Antimicrobials:  meropenem  IVPB 1000 milliGRAM(s) IV Intermittent every 12 hours  vancomycin    Solution 125 milliGRAM(s) Oral every 12 hours    Anticoagulants:  aspirin enteric coated 81 milliGRAM(s) Oral daily  clopidogrel Tablet 75 milliGRAM(s) Oral daily    Cardiac:  furosemide    Tablet 20 milliGRAM(s) Oral <User Schedule>  metoprolol succinate ER 50 milliGRAM(s) Oral every 12 hours      Allergies    Levaquin (Swelling)  Augmentin (Short breath; Rash)  Ceclor (Rash)  IV Contrast (Unknown)    Intolerances        Vital Signs Last 24 Hrs  T(C): 37.1 (31 Mar 2024 15:39), Max: 37.1 (30 Mar 2024 21:01)  T(F): 98.7 (31 Mar 2024 15:39), Max: 98.7 (30 Mar 2024 21:01)  HR: 71 (31 Mar 2024 17:40) (59 - 72)  BP: 119/71 (31 Mar 2024 17:40) (103/61 - 128/70)  BP(mean): --  RR: 18 (31 Mar 2024 17:40) (18 - 18)  SpO2: 96% (31 Mar 2024 17:40) (93% - 99%)    Parameters below as of 31 Mar 2024 17:40  Patient On (Oxygen Delivery Method): nasal cannula  O2 Flow (L/min): 2          PHYSICAL EXAM:  Constitutional: well-appearing, in no apparent respiratory distress  HEENT: NC/AT; PERRL, anicteric sclera; moist mucous membranes  Neck: supple, no JVD or LAD appreciated  Cardiovascular: +S1/S2, Regular rate and rhythm, no murmurs appreciated   Respiratory: Clear breath sounds bilaterally. No wheezes, rhonchi or rales   Gastrointestinal: soft, non-tender, non-distended. Normoactive bowel sounds.   Extremities: no edema, clubbing or cyanosis  Vascular: 2+ radial pulses B/L  Neurological: awake and alert; oriented x3    LABS:      CBC Full  -  ( 31 Mar 2024 05:30 )  WBC Count : 6.33 K/uL  RBC Count : 2.76 M/uL  Hemoglobin : 9.0 g/dL  Hematocrit : 27.0 %  Platelet Count - Automated : 180 K/uL  Mean Cell Volume : 97.8 fl  Mean Cell Hemoglobin : 32.6 pg  Mean Cell Hemoglobin Concentration : 33.3 gm/dL  Auto Neutrophil # : 4.54 K/uL  Auto Lymphocyte # : 1.19 K/uL  Auto Monocyte # : 0.48 K/uL  Auto Eosinophil # : 0.08 K/uL  Auto Basophil # : 0.02 K/uL  Auto Neutrophil % : 71.7 %  Auto Lymphocyte % : 18.8 %  Auto Monocyte % : 7.6 %  Auto Eosinophil % : 1.3 %  Auto Basophil % : 0.3 %    03-31    135  |  100  |  18  ----------------------------<  95  4.0   |  30  |  0.77    Ca    8.0<L>      31 Mar 2024 05:30  Phos  2.5     03-31  Mg     2.0     03-31    TPro  5.7<L>  /  Alb  3.2<L>  /  TBili  0.3  /  DBili  x   /  AST  18  /  ALT  16  /  AlkPhos  88  03-31                    RADIOLOGY & ADDITIONAL STUDIES:    no new studies.
OVERNIGHT EVENTS: no events     SUBJECTIVE / INTERVAL HPI: Patient seen and examined at bedside.     VITAL SIGNS:  Vital Signs Last 24 Hrs  T(C): 36.4 (31 Mar 2024 09:04), Max: 37.1 (30 Mar 2024 21:01)  T(F): 97.6 (31 Mar 2024 09:04), Max: 98.7 (30 Mar 2024 21:01)  HR: 63 (31 Mar 2024 09:04) (59 - 72)  BP: 128/70 (31 Mar 2024 09:04) (103/61 - 128/70)  BP(mean): --  RR: 18 (31 Mar 2024 09:04) (18 - 18)  SpO2: 95% (31 Mar 2024 09:04) (93% - 99%)    Parameters below as of 31 Mar 2024 09:04  Patient On (Oxygen Delivery Method): nasal cannula  O2 Flow (L/min): 2      PHYSICAL EXAM:    General: NAD  HEENT: MMM  Neck: supple  Cardiovascular: +S1/S2; RRR  Respiratory: CTA B/L; no W/R/R  Gastrointestinal: soft, NT/ND  Extremities: WWP; no edema, clubbing or cyanosis  Vascular: 2+ radial, DP/PT pulses B/L  Neurological: AAOx3; no focal deficits    MEDICATIONS:  MEDICATIONS  (STANDING):  aspirin enteric coated 81 milliGRAM(s) Oral daily  atorvastatin 40 milliGRAM(s) Oral at bedtime  clopidogrel Tablet 75 milliGRAM(s) Oral daily  donepezil 5 milliGRAM(s) Oral at bedtime  furosemide    Tablet 20 milliGRAM(s) Oral <User Schedule>  meropenem  IVPB 1000 milliGRAM(s) IV Intermittent every 12 hours  metoprolol succinate ER 50 milliGRAM(s) Oral every 12 hours  montelukast 10 milliGRAM(s) Oral daily  pantoprazole    Tablet 40 milliGRAM(s) Oral before breakfast  sodium chloride 3%  Inhalation 4 milliLiter(s) Inhalation every 12 hours  sodium phosphate 15 milliMole(s)/250 mL IVPB 15 milliMole(s) IV Intermittent once  vancomycin    Solution 125 milliGRAM(s) Oral every 12 hours    MEDICATIONS  (PRN):  acetaminophen   Oral Liquid .. 350 milliGRAM(s) Oral every 6 hours PRN Moderate Pain (4 - 6)  clonazePAM  Tablet 0.5 milliGRAM(s) Oral two times a day PRN For Pain/For Anxiety      ALLERGIES:  Allergies    Levaquin (Swelling)  Augmentin (Short breath; Rash)  Ceclor (Rash)  IV Contrast (Unknown)    Intolerances        LABS:                        9.0    6.33  )-----------( 180      ( 31 Mar 2024 05:30 )             27.0     03-31    135  |  100  |  18  ----------------------------<  95  4.0   |  30  |  0.77    Ca    8.0<L>      31 Mar 2024 05:30  Phos  2.5     03-31  Mg     2.0     03-31    TPro  5.7<L>  /  Alb  3.2<L>  /  TBili  0.3  /  DBili  x   /  AST  18  /  ALT  16  /  AlkPhos  88  03-31      Urinalysis Basic - ( 31 Mar 2024 05:30 )    Color: x / Appearance: x / SG: x / pH: x  Gluc: 95 mg/dL / Ketone: x  / Bili: x / Urobili: x   Blood: x / Protein: x / Nitrite: x   Leuk Esterase: x / RBC: x / WBC x   Sq Epi: x / Non Sq Epi: x / Bacteria: x      CAPILLARY BLOOD GLUCOSE          RADIOLOGY & ADDITIONAL TESTS: Reviewed.
OVERNIGHT EVENTS: no events     SUBJECTIVE / INTERVAL HPI: Patient seen and examined at bedside.     VITAL SIGNS:  Vital Signs Last 24 Hrs  T(C): 36.4 (01 Apr 2024 05:42), Max: 37.1 (31 Mar 2024 15:39)  T(F): 97.6 (01 Apr 2024 05:42), Max: 98.7 (31 Mar 2024 15:39)  HR: 99 (01 Apr 2024 05:42) (62 - 99)  BP: 90/58 (01 Apr 2024 05:42) (90/58 - 119/71)  BP(mean): --  RR: 18 (01 Apr 2024 05:42) (18 - 18)  SpO2: 94% (01 Apr 2024 05:42) (94% - 98%)    Parameters below as of 01 Apr 2024 05:42  Patient On (Oxygen Delivery Method): nasal cannula        PHYSICAL EXAM:    General: NAD  HEENT: MMM  Neck: supple  Cardiovascular: +S1/S2; RRR  Respiratory: new coarse crackles on exam today compared to prior   Gastrointestinal: soft, NT/ND  Extremities: WWP; no edema, clubbing or cyanosis  Vascular: 2+ radial, DP/PT pulses B/L  Neurological: AAOx3; no focal deficits    MEDICATIONS:  MEDICATIONS  (STANDING):  aspirin enteric coated 81 milliGRAM(s) Oral daily  atorvastatin 40 milliGRAM(s) Oral at bedtime  clopidogrel Tablet 75 milliGRAM(s) Oral daily  donepezil 5 milliGRAM(s) Oral at bedtime  furosemide    Tablet 20 milliGRAM(s) Oral <User Schedule>  meropenem  IVPB 1000 milliGRAM(s) IV Intermittent every 12 hours  metoprolol succinate ER 50 milliGRAM(s) Oral every 12 hours  montelukast 10 milliGRAM(s) Oral daily  pantoprazole    Tablet 40 milliGRAM(s) Oral before breakfast  sodium chloride 3%  Inhalation 4 milliLiter(s) Inhalation every 8 hours  sodium phosphate 15 milliMole(s)/250 mL IVPB 15 milliMole(s) IV Intermittent once  vancomycin    Solution 125 milliGRAM(s) Oral every 12 hours    MEDICATIONS  (PRN):  acetaminophen   Oral Liquid .. 350 milliGRAM(s) Oral every 6 hours PRN Moderate Pain (4 - 6)  clonazePAM  Tablet 0.5 milliGRAM(s) Oral two times a day PRN For Pain/For Anxiety      ALLERGIES:  Allergies    Levaquin (Swelling)  Augmentin (Short breath; Rash)  Ceclor (Rash)  IV Contrast (Unknown)    Intolerances        LABS:                        9.7    5.89  )-----------( 202      ( 01 Apr 2024 05:30 )             30.0     04-01    135  |  98  |  16  ----------------------------<  101<H>  3.7   |  29  |  0.72    Ca    8.1<L>      01 Apr 2024 05:30  Phos  2.4     04-01  Mg     1.8     04-01    TPro  5.9<L>  /  Alb  3.1<L>  /  TBili  0.2  /  DBili  x   /  AST  18  /  ALT  15  /  AlkPhos  90  04-01      Urinalysis Basic - ( 01 Apr 2024 05:30 )    Color: x / Appearance: x / SG: x / pH: x  Gluc: 101 mg/dL / Ketone: x  / Bili: x / Urobili: x   Blood: x / Protein: x / Nitrite: x   Leuk Esterase: x / RBC: x / WBC x   Sq Epi: x / Non Sq Epi: x / Bacteria: x      CAPILLARY BLOOD GLUCOSE          RADIOLOGY & ADDITIONAL TESTS: Reviewed.

## 2024-04-01 NOTE — PROGRESS NOTE ADULT - PROBLEM SELECTOR PROBLEM 1
Acute exacerbation of bronchiectasis

## 2024-04-01 NOTE — PROGRESS NOTE ADULT - PROBLEM SELECTOR PLAN 1
History of chronic bronchiectasis with previous exacerbations from Pseudomonas and Stenotrophomonas. Follows Dr Foster, on home O2 intermittently with exertion. Patient is post intubation for MITZI occlusion on 3/19, initially had difficulty producing mucus but now has copious mucus and very dyspneic. Presents for treatment of bronchiectasis with IV cefepime as patient was noted to be positive for pseudomonas on outpatient sputum culture from Dr. Foster. Per patient history of allergy to Levaquin and previously received PO Cipro course. Patient last treated for bronchiectasis exacerbation in September. 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 .     - stopped cefepime  - started meropenem 1g IV q12h  - started vancomycin 125mg PO q12h as CDI ppx (until 4/19)  - Place single lumen midline (by tomorrow)  - Duration of antibiotics is 2 weeks ( 3/29 - 4/12 )  - Weekly labs: CMP, CBC faxed to ID office at [phone]  - Patient to follow up with Dr. jackson in 2 weeks ([address] [place] [address], [phone]), ID office will call patient to schedule   - Continue home Montelukast 10mg Qd, Astepro nasal spray.  - CW Aerobika for airway clearance   - CW 3% NS inhalation

## 2024-04-01 NOTE — PROGRESS NOTE ADULT - ATTENDING COMMENTS
Midline and 2 weeks of abx course  patient to follow with ID and Dr Foster outpatient.  would order sputum culture as CF culture to avoid cancelation of specimen by lab.

## 2024-04-01 NOTE — DISCHARGE NOTE NURSING/CASE MANAGEMENT/SOCIAL WORK - PATIENT PORTAL LINK FT
You can access the FollowMyHealth Patient Portal offered by [facility] by registering at the following website: [email or web address]. By joining [facility]’s FollowMyHealth portal, you will also be able to view your health information using other applications (apps) compatible with our system.

## 2024-04-02 LAB
CULTURE RESULTS: SIGNIFICANT CHANGE UP
CULTURE RESULTS: SIGNIFICANT CHANGE UP
NIGHT BLUE STAIN TISS: SIGNIFICANT CHANGE UP
SPECIMEN SOURCE: SIGNIFICANT CHANGE UP

## 2024-04-03 ENCOUNTER — APPOINTMENT (OUTPATIENT)
Dept: CARDIOLOGY | Facility: CLINIC | Age: 84
End: 2024-04-03

## 2024-04-04 LAB
-  AMIKACIN: SIGNIFICANT CHANGE UP
-  AMIKACIN: SIGNIFICANT CHANGE UP
-  AZTREONAM: SIGNIFICANT CHANGE UP
-  CEFEPIME: SIGNIFICANT CHANGE UP
-  CEFEPIME: SIGNIFICANT CHANGE UP
-  CIPROFLOXACIN: SIGNIFICANT CHANGE UP
-  LEVOFLOXACIN: SIGNIFICANT CHANGE UP
-  PIPERACILLIN/TAZOBACTAM: SIGNIFICANT CHANGE UP
-  PIPERACILLIN/TAZOBACTAM: SIGNIFICANT CHANGE UP
-  TOBRAMYCIN: SIGNIFICANT CHANGE UP
CULTURE RESULTS: ABNORMAL
METHOD TYPE: SIGNIFICANT CHANGE UP
METHOD TYPE: SIGNIFICANT CHANGE UP
ORGANISM # SPEC MICROSCOPIC CNT: ABNORMAL
ORGANISM # SPEC MICROSCOPIC CNT: ABNORMAL
ORGANISM # SPEC MICROSCOPIC CNT: SIGNIFICANT CHANGE UP
SPECIMEN SOURCE: SIGNIFICANT CHANGE UP

## 2024-04-08 DIAGNOSIS — F02.80 DEMENTIA IN OTHER DISEASES CLASSIFIED ELSEWHERE, UNSPECIFIED SEVERITY, WITHOUT BEHAVIORAL DISTURBANCE, PSYCHOTIC DISTURBANCE, MOOD DISTURBANCE, AND ANXIETY: ICD-10-CM

## 2024-04-08 DIAGNOSIS — I50.30 UNSPECIFIED DIASTOLIC (CONGESTIVE) HEART FAILURE: ICD-10-CM

## 2024-04-08 DIAGNOSIS — J18.9 PNEUMONIA, UNSPECIFIED ORGANISM: ICD-10-CM

## 2024-04-08 DIAGNOSIS — Z88.1 ALLERGY STATUS TO OTHER ANTIBIOTIC AGENTS STATUS: ICD-10-CM

## 2024-04-08 DIAGNOSIS — I48.91 UNSPECIFIED ATRIAL FIBRILLATION: ICD-10-CM

## 2024-04-08 DIAGNOSIS — Z79.02 LONG TERM (CURRENT) USE OF ANTITHROMBOTICS/ANTIPLATELETS: ICD-10-CM

## 2024-04-08 DIAGNOSIS — G31.83 NEUROCOGNITIVE DISORDER WITH LEWY BODIES: ICD-10-CM

## 2024-04-08 DIAGNOSIS — Z86.73 PERSONAL HISTORY OF TRANSIENT ISCHEMIC ATTACK (TIA), AND CEREBRAL INFARCTION WITHOUT RESIDUAL DEFICITS: ICD-10-CM

## 2024-04-08 DIAGNOSIS — Z79.82 LONG TERM (CURRENT) USE OF ASPIRIN: ICD-10-CM

## 2024-04-08 DIAGNOSIS — E78.5 HYPERLIPIDEMIA, UNSPECIFIED: ICD-10-CM

## 2024-04-08 DIAGNOSIS — Z95.820 PERIPHERAL VASCULAR ANGIOPLASTY STATUS WITH IMPLANTS AND GRAFTS: ICD-10-CM

## 2024-04-08 DIAGNOSIS — Z91.041 RADIOGRAPHIC DYE ALLERGY STATUS: ICD-10-CM

## 2024-04-08 DIAGNOSIS — J47.1 BRONCHIECTASIS WITH (ACUTE) EXACERBATION: ICD-10-CM

## 2024-04-08 DIAGNOSIS — I11.0 HYPERTENSIVE HEART DISEASE WITH HEART FAILURE: ICD-10-CM

## 2024-04-08 DIAGNOSIS — J15.1 PNEUMONIA DUE TO PSEUDOMONAS: ICD-10-CM

## 2024-04-09 ENCOUNTER — TRANSCRIPTION ENCOUNTER (OUTPATIENT)
Age: 84
End: 2024-04-09

## 2024-04-09 ENCOUNTER — APPOINTMENT (OUTPATIENT)
Dept: PULMONOLOGY | Facility: CLINIC | Age: 84
End: 2024-04-09
Payer: MEDICARE

## 2024-04-09 VITALS
DIASTOLIC BLOOD PRESSURE: 82 MMHG | WEIGHT: 85 LBS | HEIGHT: 64 IN | TEMPERATURE: 97.1 F | OXYGEN SATURATION: 89 % | SYSTOLIC BLOOD PRESSURE: 140 MMHG | HEART RATE: 88 BPM | BODY MASS INDEX: 14.51 KG/M2

## 2024-04-09 PROCEDURE — 71046 X-RAY EXAM CHEST 2 VIEWS: CPT

## 2024-04-09 PROCEDURE — 94010 BREATHING CAPACITY TEST: CPT

## 2024-04-09 PROCEDURE — 99215 OFFICE O/P EST HI 40 MIN: CPT | Mod: 25

## 2024-04-10 ENCOUNTER — NON-APPOINTMENT (OUTPATIENT)
Age: 84
End: 2024-04-10

## 2024-04-10 ENCOUNTER — TRANSCRIPTION ENCOUNTER (OUTPATIENT)
Age: 84
End: 2024-04-10

## 2024-04-11 ENCOUNTER — TRANSCRIPTION ENCOUNTER (OUTPATIENT)
Age: 84
End: 2024-04-11

## 2024-04-12 ENCOUNTER — APPOINTMENT (OUTPATIENT)
Dept: PAIN MANAGEMENT | Facility: CLINIC | Age: 84
End: 2024-04-12
Payer: MEDICARE

## 2024-04-12 DIAGNOSIS — M47.817 SPONDYLOSIS W/OUT MYELOPATHY OR RADICULOPATHY, LUMBOSACRAL REGION: ICD-10-CM

## 2024-04-12 PROCEDURE — G2211 COMPLEX E/M VISIT ADD ON: CPT

## 2024-04-12 PROCEDURE — 99214 OFFICE O/P EST MOD 30 MIN: CPT

## 2024-04-12 NOTE — ASSESSMENT
[FreeTextEntry1] : 83 yof w/ severe mid back and left rib pain, most consistent with postherpetic neuralgia.  I have personally reviewed the patient's MRI in detail and discussed it with them which is significant for disc bulges in the thoracic spine.  As she is doing well continue conservative care.   Physical therapy prescribed - goal will be to increase ROM, strengthening, postural training, other modalities ad homa which may include massage and stim. Goals of therapy discussed with the patient in detail and will be discussed with physical therapist. Patient will follow-up following course of physical therapy to monitor progress and adjust therapy as needed.  Acetaminophen 1,000 mg q8h prn for moderate pain. Risks, benefits, and alternatives of acetaminophen discussed with patient.   Diet and nutritional strategies discussed which may improve patients pain and will improve overall health.  Continue tizanidine 2 mg prn  trial EMLA and capsaicin  Thoracic pain secondary to neuropathic pain secondary to postherpetic neuralgia, will schedule repeat intercostal nerve block r/b/a discussed   Of note patient on plavix, cannot be held because of recent placement of watchman device.  I advised that the patient schedule the procedure when she is able to hold it, however she reports that she cannot tolerate the pain anymore.   I described risk of performing nerve block including risk of bleeding and hemothorax - patient will consult with her pulmonologist  Regarding peripheral nerve block (including intercostal nerve block), anticoagulation may not need to be held after discussion and risk stratification.  This risk was discussed with the patient and I would proceed with procedure should patient, family and consultants agree  may consider thoracic chuy   > Longitudinal management of Complex Painful condition   The patient is being managed for a complex condition that requires ongoing management.  The nature of this condition demands nuanced approach to treatment.  The seriousness of the condition necessitates an in-depth and focused approach to management and coordination with other healthcare professionals.    This visit involves intricate evaluation and management of the patient's condition.  The complexity of the visit was due to the need for detailed assessment of the current state, consideration of potential complications and a careful balancing of treatment options to management the chronic condition effectively.   As detailed above, the patient has a chronic significant painful condition that requires regular and detailed management.  The condition's impact on the patient's quality of life and health is substantial and necessitates a comprehensive and tailored approach   >> Conclusion   There were no barriers to communication. Informed patient that I would be available for any additional questions. Patient was instructed to call with any worsening symptoms including severe pain, new numbness/weakness, or changes in the bowel/bladder function. Discussed role of nsaids in pain management and all relevant risks, if patient is continuing to require after 4 weeks the patient should f/u for alternative treatment. Instructed patient to maintain pain diary to monitor pain level, mobility, and function.  I explained to patient benefits and limitation of TeleMedicine visits  Patient understands that limitations include inability to perform comprehensive physical exam, which may lead to potential diagnostic inconsistencies.    Any scheduled procedures are based on history, imaging and limited physical exam performed on TeleHealth visit.  If necessary, additional focal physical exam will be performed on date of procedure  Patient understands that diagnosis and treatment may be limited by these inconsistencies and patient agrees to proceed with care plan

## 2024-04-12 NOTE — PHYSICAL EXAM
[de-identified] :  Constitutional: Normal, well developed, no acute distress on audio/video examination Eyes: Symmetric, External structures on video examination ENT: Lips, mucosa and tongue normal on video examination Oropharynx: Lips normal, symmetric, no external lesions appreciated appreciated on video examination Respiratory: Non-labored breathing, no audible wheezes appreciated on audio/video examination Vascular: No cyanosis appreciated or edema appreciated on video examination GI:  no jaundice appreciated on video examination Neurovascular: CN grossly intact on video/audio examination, alert MSK: Normal muscle bulk on video examination

## 2024-04-12 NOTE — HISTORY OF PRESENT ILLNESS
[Home] : at home, [unfilled] , at the time of the visit. [Medical Office: ([facility])___] : at the medical office located in  [Verbal consent obtained from patient] : the patient, [unfilled] [10] : a maximum pain level of 10/10 [Throbbing] : throbbing [Burning] : burning [Stabbing] : stabbing [FreeTextEntry1] : Interval History: Patient returns now with worsening left rib and thoracic pain.  Previously much improved after intercostal nerve block. But now having severe pain.  Patient reports that she is unable to tolerate any systemic medications including opioids or gabapentin. Patient reports that she is in extreme agony.  She is unable to "live like this".  Of note she had Watchmen inserted, and is attributes return of pain to placement of the device.  HPI: 83 yof presents w/ severe mid back and left rib pain. Also with low back pain. Quality of life is impaired. There has been a severe exacerbation of the patient's chronic pain. She has been hospitalized for numerous health issues recently. Major side effects with multiple medications including opioids and gabapentin with no relief.  [FreeTextEntry7] : thoracic

## 2024-04-16 ENCOUNTER — APPOINTMENT (OUTPATIENT)
Dept: OTOLARYNGOLOGY | Facility: CLINIC | Age: 84
End: 2024-04-16
Payer: MEDICARE

## 2024-04-16 VITALS
HEART RATE: 50 BPM | OXYGEN SATURATION: 93 % | HEIGHT: 64 IN | SYSTOLIC BLOOD PRESSURE: 143 MMHG | DIASTOLIC BLOOD PRESSURE: 74 MMHG | TEMPERATURE: 97.4 F | WEIGHT: 85 LBS | BODY MASS INDEX: 14.51 KG/M2

## 2024-04-16 DIAGNOSIS — H61.23 IMPACTED CERUMEN, BILATERAL: ICD-10-CM

## 2024-04-16 PROCEDURE — 69210 REMOVE IMPACTED EAR WAX UNI: CPT

## 2024-04-16 PROCEDURE — 99205 OFFICE O/P NEW HI 60 MIN: CPT | Mod: 25

## 2024-04-16 PROCEDURE — 31525 DX LARYNGOSCOPY EXCL NB: CPT

## 2024-04-16 PROCEDURE — G2211 COMPLEX E/M VISIT ADD ON: CPT

## 2024-04-16 NOTE — PROCEDURE
[FreeTextEntry3] :  Ear cleaning: Cerumen Removal/Ear Cleaning for Otitis Externa Pre-operative Diagnosis: Bilateral Cerumen Impaction Post-operative Diagnosis: Same Procedure: Binocular microscopy with cerumen removal- 27809 Procedure Details: The patient was placed in the supine position. The operating microscope was positioned. I then placed the ear speculum in the EAC. Cerumen was then removed using a mixture of otologic curettes, and suction. The TM was noted to be intact. I then performed the procedure of the opposite ear in similar fashion. The patient tolerated procedure well. Findings: Bilateral Ear Canal - normal Bilateral Tympanic Membrane - normal Recommendations: Debrox Complications: None [de-identified] : -  Pre-operative Diagnosis: Dyspnea Post-operative Diagnosis: Same Anesthesia: Topical - 1 % Lidocaine/Phenylephrine Procedure: Flexible Laryngoscopy with Tracheoscopy - CPT 21583   Procedure Details: The patient was placed in the sitting position. After decongestant and anesthesia were applied the laryngoscope was passed. The nasal cavities, nasopharynx, oropharynx, hypopharynx, and larynx were all examined. Vocal folds were examined during respiration and phonation. Scope was then passed through the glottis and into the trachea. The following findings were noted:   Findings: Nose: Septum is midline, turbinates are normal, nasal airways patent, mucosa normal Nasopharynx: Adenoids normal, no masses, eustachian tube normal Oropharynx: Pharyngeal walls symmetric and without lesion. Tonsils/fossae symmetric Hypopharynx: Hypopharynx and pyriform sinuses without lesion. No masses or asymmetry. No pooling of secretions. Larynx: Epiglottis and aryepiglottic folds were sharp and crisp bilaterally. Bilateral false and true vocal folds normal appearance. Bilateral vocal folds fully mobile and symmetric. Airway was widely patent. Tracheoscopy: Airway was clear to roxana, and without evidence of tumors, masses or lesions.   Condition: Stable. Patient tolerated procedure well.   Complications: None.

## 2024-04-16 NOTE — ASSESSMENT
[FreeTextEntry1] : - 4/16/24: 82 y/o F with history of severe COPD and bronchiectasis presents with shortness of breath and sensation of something stuck in her trachea, referred by Dr. Foster. Laryngoscopy/ tracheoscopy were normal and patient was reassured. I recommended she continue to follow up with pulmonology and follow up with me as needed.  On exam there is evidence of bilateral cerumen impaction. Right cerumen was cleaned without issue, left removal was attempted but patient could not tolerate this. I recommended she use Debrox ear drops. I also recommended audio/ tymps, but she prefers to hold off on this.   -Continue to follow up with pulm  -Followup with me as needed

## 2024-04-16 NOTE — HISTORY OF PRESENT ILLNESS
[de-identified] : 4/16/24: 84 y/o F with history of severe COPD and bronchiectasis presents with shortness of breath and sensation of something stuck in her trachea, referred by Dr. Foster. She had "Watchman procedure" during recent hospital admission and was intubated, and there was apparent blood through the tube during the procedure and after. She denies any changes in voice. No issues eating, chewing, or swallowing.   Of note she has history of congenital hearing loss and her daughter has history of otosclerosis. She denies issues with her hearing and did not wish to address this.

## 2024-04-16 NOTE — REASON FOR VISIT
[Initial Consultation] : an initial consultation for [FreeTextEntry2] : concern for tracheal stenosis

## 2024-04-16 NOTE — PHYSICAL EXAM
[Normal] : mucosa is normal [Midline] : trachea located in midline position [FreeTextEntry1] : crisp cough, good projection  [de-identified] : right cerumen impaction- cleaned away with suction/curette with no issues.

## 2024-04-17 ENCOUNTER — APPOINTMENT (OUTPATIENT)
Dept: CARDIOLOGY | Facility: CLINIC | Age: 84
End: 2024-04-17
Payer: MEDICARE

## 2024-04-17 ENCOUNTER — NON-APPOINTMENT (OUTPATIENT)
Age: 84
End: 2024-04-17

## 2024-04-17 VITALS
DIASTOLIC BLOOD PRESSURE: 52 MMHG | OXYGEN SATURATION: 96 % | SYSTOLIC BLOOD PRESSURE: 128 MMHG | BODY MASS INDEX: 14.51 KG/M2 | HEART RATE: 69 BPM | HEIGHT: 64 IN | RESPIRATION RATE: 16 BRPM | WEIGHT: 85 LBS

## 2024-04-17 DIAGNOSIS — I10 ESSENTIAL (PRIMARY) HYPERTENSION: ICD-10-CM

## 2024-04-17 DIAGNOSIS — I25.10 ATHEROSCLEROTIC HEART DISEASE OF NATIVE CORONARY ARTERY W/OUT ANGINA PECTORIS: ICD-10-CM

## 2024-04-17 DIAGNOSIS — I50.30 UNSPECIFIED DIASTOLIC (CONGESTIVE) HEART FAILURE: ICD-10-CM

## 2024-04-17 PROCEDURE — 93000 ELECTROCARDIOGRAM COMPLETE: CPT

## 2024-04-17 PROCEDURE — 99214 OFFICE O/P EST MOD 30 MIN: CPT

## 2024-04-17 RX ORDER — METOPROLOL SUCCINATE 50 MG/1
50 TABLET, EXTENDED RELEASE ORAL AT BEDTIME
Qty: 180 | Refills: 3 | Status: ACTIVE | COMMUNITY
Start: 2024-04-01

## 2024-04-17 NOTE — PHYSICAL EXAM
[Well Developed] : well developed [Well Nourished] : well nourished [No Acute Distress] : no acute distress [Normal Conjunctiva] : normal conjunctiva [Normal Venous Pressure] : normal venous pressure [No Carotid Bruit] : no carotid bruit [Normal S1, S2] : normal S1, S2 [No Murmur] : no murmur [No Rub] : no rub [No Gallop] : no gallop [No Respiratory Distress] : no respiratory distress  [Rales / Crackles Bilateral] : crackles were heard diffusely over both lungs [Decreased Breath Sounds Unilaterally Left Lung Base] : breath sounds were diminished over the left base [No Edema] : no edema [No Cyanosis] : no cyanosis [No Clubbing] : no clubbing [Moves all extremities] : moves all extremities [No Focal Deficits] : no focal deficits [Normal Speech] : normal speech [Alert and Oriented] : alert and oriented [Normal memory] : normal memory

## 2024-04-17 NOTE — ASSESSMENT
[FreeTextEntry1] : 84 yo female with hypertension, HFpEF, atrial fibrillation (diagnosed 5/2023, not on anticoagulation due to fall risk and subarachnoid hemorrhage) -> Amulet MITZI closure device 3/19/24 at [facility], prior CVAs, chronic bronchiectasis, pulmonary hypertension (attributed to COPD and HFpEF), and CAD (coronary calcifications incidentally noted on 12/14/23 chest CTA). ECG today demonstrated sinus bradycardia, rate 50 bpm, 1st degree AV block, poor R wave progression, cannot rule out anterior infarct.  Given CAD and persistent SOB (at rest and with exertion), will refer patient for right and left cardiac catheterization to help determine if obstructive CAD is contributing to her current symptoms and better assess pulmonary artery pressures given her known lung disease. Patient with reported contrast allergy, patient will need to be premedicated. Will continue aspirin 81 mg po daily and atorvastatin 40 mg po daily.   Patient with paroxysmal atrial fibrillation and prior CVAs. Patient underwent Amulet MITZI closure device 3/19/24 at [facility]. Will continue metoprolol succinate but change from 50 mg po bid to 100 mg po qhs and see if patient's reported fatigue improves with this change. If patient continues to report intolerance to beta-blockers and has symptomatic episodes of paroxysmal afib, patient will need EP referral for further evaluation/management of atrial fibrillation.   Patient is euvolemic/stable from HFpEF standpoint.  Will continue furosemide 20 mg po daily.  BP is currently normotensive. Will continue metoprolol succinate (as above) and furosemide.

## 2024-04-17 NOTE — HISTORY OF PRESENT ILLNESS
[FreeTextEntry1] : 82 yo female with hypertension, HFpEF, atrial fibrillation (diagnosed 5/2023, not on anticoagulation due to fall risk and subarachnoid hemorrhage) -> Amulet MITZI closure device 3/19/24 at [facility], prior CVAs, chronic bronchiectasis, pulmonary hypertension (attributed to COPD and HFpEF), and CAD (coronary calcifications incidentally noted on 12/14/23 chest CTA). Patient presents today for cardiac evaluation of persistent SOB at rest and with exertion following her procedure at [facility]. She was admitted for inpatient treatment of bronchiectasis/COPD exacerbation. Since her discharge, she continued to report SOB and was evaluated by ENT on 4/16/24 who performed laryngoscopy/tracheoscopy, which was unremarkable. Given her continued symptoms, patient was advised by pulmonologist to follow-up cardiologist to help determine if her symptoms are partially due to cardiac etiology. Patient also reports fatigue with metoprolol succinate 50 mg po bid.

## 2024-04-17 NOTE — CARDIOLOGY SUMMARY
[de-identified] : 4/17/24 ECG: Sinus bradycardia, rate 50 bpm, 1st degree AV block, poor R wave progression, cannot rule out anterior infarct 1/11/24 ECG: Sinus bradycardia, rate 59, PACs, poor R wave progression, cannot rule out anterior infarct [de-identified] : 3/20/24 Limited echo (at [facility]): Limited study obtained for evaluation of pericardial effusion. Normal left ventricular systolic function. Normal right ventricular size and systolic function. Mild tricuspid regurgitation. Pulmonary hypertension present, pulmonary artery systolic pressure is 39 mmHg. Trivial pericardial effusion. 3/19/24 VANNESSA (at [facility]): Normal left ventricular systolic function. Normal right ventricular systolic function. Dilated LA. No thrombus seen in LA/MITZI. Dilated RA. No thrombus seen in RA or RAA. No interatrial shunt. Fibrocalcific tricuspid AV without significant stenosis. Mild AR. Trace MR/TR. Moderate plaque seen in the visualized portion of the descending aorta and visualized portion of the aortic arch. No pericardial effusion. 11/7/23 Echo (at [facility]): Normal LV size and systolic/diastolic function, LVEF 55%. Normal RV size and systolic function. Mild AR. Mild-mod TR. Pulm HTN with PASP 51 mmHg. Left pleural effusion. 9/19/23 Echo (at [facility]): TDS. Normal LV size and systolic function. Mildly dilated RV with normal systolic function. Dilated RA. AV sclerosis without stenosis. Mild-mod AR. Mild MR. Mod-severe TR. Severe pulm HTN with PASP 73 mmHg.  [de-identified] : 10/3/23 Cardiac CT: Dilated LA and RA. MITZI measures 23.3 x 20.5 mm at ostium and 21 mm in depth from ostium. No LA or MITZI thrombus. Two pulmonary veins on left and 3 on right.

## 2024-04-18 NOTE — HISTORY OF PRESENT ILLNESS
[TextBox_4] : severe copd in this woman with bronchiectasis was admitted for a purulent exacerbation  but she is here bitterly complaining of not being able to breath and feeling like something is stuck in her trachea her imaging and her sp;irometry is no worse than it usually poor baseline  DESPITE USE OF AEROBIKA AND HYPERTONIC SALINE HER CT SCAN CONTINUES TO SHOW EXTENSIVE MUCOUS PLUGGING,  SHE HAS BEEN HOSPITALIZED THREE TIMES IN THE PAST SIX MONTHS FOR iv ANTIBITOICS, AND IS ON TOBRAMYCIN INHALER  of note is that she had a "watchman procedure" in which she was intubated and there was apparently blood thru the tube during the procedure and after. could she had tracheal damage?  i could find no other explanation for her breathing becoming much worse.  will have her see laryngology

## 2024-04-18 NOTE — REVIEW OF SYSTEMS
[Cough] : cough [Dyspnea] : dyspnea [Frequency] : frequency [Urgency] : urgency [Memory Loss] : memory loss [Anxiety] : anxiety [Negative] : Endocrine [Sputum] : no sputum [TextBox_3] : thin [TextBox_44] : afib [TextBox_30] : bittery complains of dyspnea [TextBox_94] : wheel chair bound, reason general weekness [TextBox_122] : generalized weakness and balance

## 2024-04-18 NOTE — REASON FOR VISIT
[Follow-Up] : a follow-up visit [TextBox_44] : recent hospitalization for difficulty breathing  patient with bronchiectasis and severe copd

## 2024-04-18 NOTE — PROCEDURE
[FreeTextEntry1] : in hospital the imaging showed no new infiltrate just bronchiectasis spiorometry with severe obstruction, no worse

## 2024-04-18 NOTE — DISCUSSION/SUMMARY
[FreeTextEntry1] : we discussed this problem and i see nothing reversible here unless there is treacheal damage, stenosis from the intubation  will have her see nan harris  ADDENDUM  SAW NAN HARRIS WHO FOUND NO TRACHEAL LESIONS  IT IS MEDICALLY NECESSARY FOR HER TO GET A SMAR T VEST  SHE HAS BEEN USING AEROBIKA AND HYPERTONIC SALINE WITH INITIAL SPUTUM PRODUCTION BUT CANNOT FULL EXPECTORATE SPUTUM IN HER AIRWAYS AS SHOWN ON CT SCAN. HAS BEEN ON iv ANTIBIOTICS THREE TIMES IN THE PAST SIX MONTHS

## 2024-04-19 ENCOUNTER — APPOINTMENT (OUTPATIENT)
Dept: PAIN MANAGEMENT | Facility: HOSPITAL | Age: 84
End: 2024-04-19

## 2024-04-19 ENCOUNTER — TRANSCRIPTION ENCOUNTER (OUTPATIENT)
Age: 84
End: 2024-04-19

## 2024-04-19 ENCOUNTER — NON-APPOINTMENT (OUTPATIENT)
Age: 84
End: 2024-04-19

## 2024-04-22 ENCOUNTER — NON-APPOINTMENT (OUTPATIENT)
Age: 84
End: 2024-04-22

## 2024-04-22 RX ORDER — TRAMADOL HYDROCHLORIDE 50 MG/1
50 TABLET, COATED ORAL DAILY
Qty: 7 | Refills: 0 | Status: ACTIVE | COMMUNITY
Start: 2024-04-22 | End: 1900-01-01

## 2024-04-23 ENCOUNTER — TRANSCRIPTION ENCOUNTER (OUTPATIENT)
Age: 84
End: 2024-04-23

## 2024-04-23 ENCOUNTER — APPOINTMENT (OUTPATIENT)
Dept: INFECTIOUS DISEASE | Facility: CLINIC | Age: 84
End: 2024-04-23
Payer: MEDICARE

## 2024-04-23 DIAGNOSIS — Z91.041 RADIOGRAPHIC DYE ALLERGY STATUS: ICD-10-CM

## 2024-04-23 DIAGNOSIS — R06.02 SHORTNESS OF BREATH: ICD-10-CM

## 2024-04-23 DIAGNOSIS — Z95.818 PRESENCE OF OTHER CARDIAC IMPLANTS AND GRAFTS: ICD-10-CM

## 2024-04-23 DIAGNOSIS — J15.1 PNEUMONIA DUE TO PSEUDOMONAS: ICD-10-CM

## 2024-04-23 LAB — NIGHT BLUE STAIN TISS: ABNORMAL

## 2024-04-23 PROCEDURE — 99214 OFFICE O/P EST MOD 30 MIN: CPT

## 2024-04-23 RX ORDER — CLOPIDOGREL BISULFATE 75 MG/1
75 TABLET, FILM COATED ORAL DAILY
Qty: 30 | Refills: 1 | Status: ACTIVE | COMMUNITY
Start: 2024-04-23 | End: 1900-01-01

## 2024-04-23 RX ORDER — PANTOPRAZOLE 40 MG/1
40 TABLET, DELAYED RELEASE ORAL
Qty: 30 | Refills: 3 | Status: ACTIVE | COMMUNITY
Start: 2024-04-23 | End: 1900-01-01

## 2024-04-24 ENCOUNTER — APPOINTMENT (OUTPATIENT)
Dept: PAIN MANAGEMENT | Facility: CLINIC | Age: 84
End: 2024-04-24
Payer: MEDICARE

## 2024-04-24 DIAGNOSIS — G89.4 CHRONIC PAIN SYNDROME: ICD-10-CM

## 2024-04-24 PROBLEM — R06.02 EXERTIONAL SHORTNESS OF BREATH: Status: ACTIVE | Noted: 2024-01-11

## 2024-04-24 PROBLEM — J15.1 PNEUMONIA DUE TO PSEUDOMONAS: Status: ACTIVE | Noted: 2024-04-24

## 2024-04-24 PROCEDURE — 99214 OFFICE O/P EST MOD 30 MIN: CPT

## 2024-04-24 NOTE — HISTORY OF PRESENT ILLNESS
PT REQUESTED FOR STAFF TO CALL PTS FAMILY TO SEE WHEN THEY WERE COMING TO SEE
HER. SPOKE WITH PTS FAMILY WHO STATE THEY ARE ON THEIR WAY FROM [place]. NOTIFIED
PTS FAMILY THAT IF PULMONOLOGY IS OKAY WITH PT TO GO HOME THEN PT WILL BE
DISCHARGED TODAY. WILL NOTIFY PTS FAMILY IF RECIEVES A DISCHARGE ORDER. VSS.
PT UPDATED ON THIS CONVERSATION. WILL CONTINUE PLAN OF CARE. 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 Parra, last exacerbation 9/2023) with recent admission for PsA PNA and bronchiectasis exacerbation s/p IV [facility] x 2 wks (3/29-4/12/24), CDI (11/2023), Afib, Lewy body dementia, CVA, SAH, PRESS, CF carrier, HFpEF, recent MITZI occlusion on 3/19/24 p/w PsA pneumonia and bronchiectasis exacerbation, as well as diarrhea.  Patient was admitted from 3/28-4/1/24 for worsening cough and SOB since the elective procedure (MITZI occlusion on 3/19/24 by Dr. Buenrostro).  She was found to have bronchiectasis exacerbation and PsA PNA (S to faith, elevated YOLANDE to zosyn, I to cefe/cipro).  She was put on meropenem with improvement.  She was sent home with 2 weeks of IV meropenem (3/29-4/12/24).  She was also put on prophylaxisn vancomycin 125mg PO q12h to be continued until 4/19.  Since discharge, she completed meropenem on 4/12 as scheduled.  However PICC was not removed on 4/13 as requested by me.  Daughter called on 4/22 and I confirmed that [facility] did have my order to remove PICC prior to 4/13, and pharmacy was to contact RN for removal.  Today patient reports not feeling well.  She remains SOB since discharge and didn't think meropenem did much after all.  She started to have diarrhea after discharge.  She says stool is soft, not watery, 10 times/day, and stool comes out whenever she urinates in the bathroom.  She said this is how it started when she had CDI in 11/2023.  PO vanco ppx was discontinued on 4/12 instead of 4/19.  Her PMD ordered CDI testing and daughter collected stool yesterday but the specimen is left at [facility] and hasn't been submitted.

## 2024-04-24 NOTE — PHYSICAL EXAM
[de-identified] :  Constitutional: Normal, well developed, no acute distress on audio/video examination Eyes: Symmetric, External structures on video examination ENT: Lips, mucosa and tongue normal on video examination Oropharynx: Lips normal, symmetric, no external lesions appreciated appreciated on video examination Respiratory: Non-labored breathing, no audible wheezes appreciated on audio/video examination Vascular: No cyanosis appreciated or edema appreciated on video examination GI:  no jaundice appreciated on video examination Neurovascular: CN grossly intact on video/audio examination, alert MSK: Normal muscle bulk on video examination

## 2024-04-24 NOTE — REASON FOR VISIT
[Follow-Up: _____] : a [unfilled] follow-up visit [Home] : at home, [unfilled] , at the time of the visit. [Medical Office: ([facility])___] : at the medical office located in  [Family Member] : family member [Patient] : the patient

## 2024-04-24 NOTE — DATA REVIEWED
[FreeTextEntry1] : 3/28/24 BCx ngtd x 2 3/28/24 RVP neg  3/26/24 Sputum culture: PsA (S to aztreo, imi, faith <1, zosyn 16, I to cefe, cipro, levo)  RADIOLOGY & ADDITIONAL STUDIES: CT chest 3/28/24 IMPRESSION: Bilateral bronchiectasis, mucoid impaction, and tree-in-bud nodules with  upper lobe predominance, likely suggestive of atypical infection such as  YASMIN.  CXR 3/28/24 Findings/ impression: Hyperinflation. Bilateral opacities, unchanged. Cardiac  magnification/cardiomegaly, thoracic aortic calcification, watchman  device. Stable bony structures.

## 2024-04-24 NOTE — ASSESSMENT
[FreeTextEntry1] : 83 yof w/ severe mid back and left rib pain, most consistent with postherpetic neuralgia.  I have personally reviewed the patient's MRI in detail and discussed it with them which is significant for disc bulges in the thoracic spine.  F/u w/ Dr. Conti next week.  Physical therapy prescribed - goal will be to increase ROM, strengthening, postural training, other modalities ad homa which may include massage and stim. Goals of therapy discussed with the patient in detail and will be discussed with physical therapist. Patient will follow-up following course of physical therapy to monitor progress and adjust therapy as needed.  Acetaminophen 1,000 mg q8h prn for moderate pain. Risks, benefits, and alternatives of acetaminophen discussed with patient.  Diet and nutritional strategies discussed which may improve patients pain and will improve overall health.  Continue tizanidine 2 mg prn  trial EMLA and capsaicin  Thoracic pain secondary to neuropathic pain secondary to postherpetic neuralgia, will schedule repeat intercostal nerve block r/b/a discussed   Of note patient on plavix, cannot be held because of recent placement of watchman device.  I advised that the patient schedule the procedure when she is able to hold it, however she reports that she cannot tolerate the pain anymore.   I described risk of performing nerve block including risk of bleeding and hemothorax - patient will consult with her pulmonologist  Regarding peripheral nerve block (including intercostal nerve block), anticoagulation may not need to be held after discussion and risk stratification.  This risk was discussed with the patient and I would proceed with procedure should patient, family and consultants agree  may consider thoracic chuy   > Longitudinal management of Complex Painful condition   The patient is being managed for a complex condition that requires ongoing management.  The nature of this condition demands nuanced approach to treatment.  The seriousness of the condition necessitates an in-depth and focused approach to management and coordination with other healthcare professionals.    This visit involves intricate evaluation and management of the patient's condition.  The complexity of the visit was due to the need for detailed assessment of the current state, consideration of potential complications and a careful balancing of treatment options to management the chronic condition effectively.   As detailed above, the patient has a chronic significant painful condition that requires regular and detailed management.  The condition's impact on the patient's quality of life and health is substantial and necessitates a comprehensive and tailored approach   >> Conclusion   There were no barriers to communication. Informed patient that I would be available for any additional questions. Patient was instructed to call with any worsening symptoms including severe pain, new numbness/weakness, or changes in the bowel/bladder function. Discussed role of nsaids in pain management and all relevant risks, if patient is continuing to require after 4 weeks the patient should f/u for alternative treatment. Instructed patient to maintain pain diary to monitor pain level, mobility, and function.  I explained to patient benefits and limitation of TeleMedicine visits  Patient understands that limitations include inability to perform comprehensive physical exam, which may lead to potential diagnostic inconsistencies.    Any scheduled procedures are based on history, imaging and limited physical exam performed on TeleHealth visit.  If necessary, additional focal physical exam will be performed on date of procedure  Patient understands that diagnosis and treatment may be limited by these inconsistencies and patient agrees to proceed with care plan

## 2024-04-24 NOTE — ASSESSMENT
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 Parra, last exacerbation 9/2023) with recent admission for PsA PNA and bronchiectasis exacerbation s/p IV faith x 2 wks (3/29-4/12/24), CDI (11/2023), Afib, Lewy body dementia, CVA, SAH, PRESS, CF carrier, HFpEF, recent MITZI occlusion on 3/19/24 p/w PsA pneumonia and bronchiectasis exacerbation, as well as diarrhea.  Patient reported SOB improvement on IV abx while in-house however didn't think meropenem helped after discharge.   Suspect current REID is not due to PsA PNA, but rather due to bronchiectasis exacerbation.   I think it is best that she is evaluated by pulm Dr. Foster.  In terms of diarrhea, stool is not watery and soft stool - not sure if this is CDI or not.  Will test it first.  Stool needs to be submitted within 2h of collection or needs to be refrigerated; otherwise test may come back false negative.  Daughter to collect specimen again with a new kit.   - monitor off abx - C.diff test (daughter to send me the test result if test is done outside of [facility]) - [facility] to remove PICC on 4/25 (confirmed by pharmacist Joselyn) - f/u Dr. Foster (pulm) - RTC after CDI test

## 2024-04-24 NOTE — HISTORY OF PRESENT ILLNESS
[Home] : at home, [unfilled] , at the time of the visit. [Medical Office: ([facility])___] : at the medical office located in  [Verbal consent obtained from patient] : the patient, [unfilled] [FreeTextEntry1] : Verbal consent was given by/on: Sandra Shantel on 04/24/24  Patient location: Home  Physician location: Office  Reason for Telehealth visit: Back and rib pain  Patient reports only minimal relief from recent block. There are areas of numbness and areas of sharp feeling. Has questions about procedure. She was just discharged from the hospital. She gets chronic lung infections.   This service took place using a two way audio and visual platform. The patient and Dr. Oakley were both able to see each other and communicate through video. There were no barriers to communication. Greater then 50% of the time spent in the encounter involved counseling and coordination of care.   Time spent on visit: 30 minutes  HPI: 83 yof presents w/ severe mid back and left rib pain. Also with low back pain. Quality of life is impaired. There has been a severe exacerbation of the patient's chronic pain. She has been hospitalized for numerous health issues recently. Major side effects with multiple medications including opioids and gabapentin with no relief.   Patient returns now with worsening left rib and thoracic pain.  Previously much improved after intercostal nerve block. But now having severe pain.  Patient reports that she is unable to tolerate any systemic medications including opioids or gabapentin. Patient reports that she is in extreme agony.  She is unable to "live like this".  Of note she had Watchmen inserted, and is attributes return of pain to placement of the device.  Interventions: Intercostal nerve block (04/19/24): [10] : a maximum pain level of 10/10 [Throbbing] : throbbing [Burning] : burning [Stabbing] : stabbing [FreeTextEntry7] : thoracic

## 2024-04-24 NOTE — PHYSICAL EXAM
[General Appearance - Alert] : alert [General Appearance - In No Acute Distress] : in no acute distress [Sclera] : the sclera and conjunctiva were normal [Outer Ear] : the ears and nose were normal in appearance [Neck Appearance] : the appearance of the neck was normal [] : no respiratory distress

## 2024-05-01 LAB
CULTURE RESULTS: ABNORMAL
SPECIMEN SOURCE: SIGNIFICANT CHANGE UP

## 2024-05-02 ENCOUNTER — RESULT REVIEW (OUTPATIENT)
Age: 84
End: 2024-05-02

## 2024-05-03 ENCOUNTER — NON-APPOINTMENT (OUTPATIENT)
Age: 84
End: 2024-05-03

## 2024-05-06 ENCOUNTER — APPOINTMENT (OUTPATIENT)
Dept: CARDIOTHORACIC SURGERY | Facility: CLINIC | Age: 84
End: 2024-05-06
Payer: MEDICARE

## 2024-05-06 PROCEDURE — 99214 OFFICE O/P EST MOD 30 MIN: CPT

## 2024-05-07 RX ORDER — ASPIRIN 81 MG
81 TABLET, DELAYED RELEASE (ENTERIC COATED) ORAL DAILY
Refills: 0 | Status: DISCONTINUED | COMMUNITY
End: 2024-05-07

## 2024-05-07 RX ORDER — PREDNISONE 50 MG/1
50 TABLET ORAL
Qty: 3 | Refills: 0 | Status: DISCONTINUED | COMMUNITY
Start: 2024-04-25 | End: 2024-05-07

## 2024-05-07 RX ORDER — PREDNISONE 50 MG/1
50 TABLET ORAL
Qty: 5 | Refills: 0 | Status: DISCONTINUED | COMMUNITY
Start: 2024-04-23 | End: 2024-05-07

## 2024-05-07 NOTE — REASON FOR VISIT
[Home] : at home, [unfilled] , at the time of the visit. [Medical Office: ([facility])___] : at the medical office located in  [Family Member] : family member [Patient] : the patient

## 2024-05-08 ENCOUNTER — NON-APPOINTMENT (OUTPATIENT)
Age: 84
End: 2024-05-08

## 2024-05-09 NOTE — RESULTS/DATA
[TextEntry] :  CT Cardiac on 5/3/2024 which showed:  Cardiac:    1.  There are 4 pulmonary veins; 2 on the left and 2 on the right.    2.  Patient is s/p left atrial appendage occlusion with an Amulet device. The device is well-seated. Contrast is noted in the left atrial appendage, posterior to the Amulet, suggestive of brent-device leak. Entry point of the leak is not appreciated on this study.    3.  No left atrial thrombus.    Non cardiac:   Markedly emphysematous. Bilateral bronchiectasis. Unchanged tubular and nodular densities compatible with bronchiectasis. Calcified right upper lobe nodules compatible with old granulomas.  Unchanged minimal amount of left pleural fluid.   Mildly ectatic ascending aorta 3.7 cm AP.     ECHO on 09/19/2023- 1. Technically difficult study. 2. Normal left ventricular size and systolic function. 3. Mildly dilated right ventricular size. Normal right ventricular systolic function. 4. Dilated right atrium. 5. Aortic sclerosis without significant stenosis. 6. Mild-to-moderate aortic regurgitation. 7. Mild mitral regurgitation. 8. Moderate-to-severe tricuspid regurgitation. 9. Severe pulmonary hypertension present, pulmonary artery systolic pressure is 73 mmHg.10. No pericardial effusion.11. No prior echo is available for comparison.  CT heart on 10/5/2023- Cardiac:1.  There are 4 pulmonary veins; 2 on the left and 3 on the right.2.  Left atrial appendage measures 23.3 x 20.5 mm at the ostium3.  Left atrial appendage measures 21 mm in depth from the ostium.4.  No left atrial appendage or left atrial thrombus.  Echocardiogram 11/7/23 revealed normal LV and RV size and function, mild AR, mild to moderate TR, Pulmonary HTN PASP 51 mm Hg, no pericardial effusion.  The patient had a CT Cardiac on 2/15/24 which showed:  1.  There are 4 pulmonary veins; 2 on the left and 2 on the right.  2.  Left atrial appendage 24 mm x 20.4 mm at the ostium with a depth of 23.7 mm.  3.  No left atrial appendage or left atrial thrombus. Non cardiac:  No significant change in the bilateral bronchiectasis, endobronchial plugging, scattered small lung nodules and tree-in-bud nodules. Findings are consistent with nonspecific infectious or inflammatory etiologies, including but not limited to YASMIN infection or fungal infection such as allergic bronchopulmonary aspergillosis in the appropriate clinical setting. Recommend clinical correlation.  VANNESSA-GUIDED LEFT ATRIAL APPENDAGE OCCLUSION WITH AMULET DEVICE (3/19/24) PRE-PROCEDURAL FINDINGS - The left atrium is dilated in size. - No thrombus seen in the left atrium or left atrial appendage pre-procedurally. At 0 degrees, the MITZI landing zone was 22 mm. At 45 degrees, the MITZI landing zone was 21 mm. At 90 degrees, the MITZI landing zone was 17 mm. At 135 degrees, the MITZI landing zone was 15 mm. SEE BELOW FOR REMAINDER OF PRE-PROCEDURAL FINDINGS INTRA-PROCEDURAL FINDINGS Inferior mid atrial septal puncture performed under VANNESSA guidance. - Guide catheter and Amulet device was steered into the left atrial appendage under VANNESSA and fluoroscopic guidance. - Guide was replaced with stearable guide and adjustments were made in positioning Amulet device under VANNESSA guidance. - A 22 mm Amulet device was successfully deployed in the left atrial appendage under VANNESSA guidance. - The Amulet device appeared well seated within the MITZI and was stable with tug test. - Amulet lobe was visually estimated to be at least 2/3 distal to left circumflex artery at 0, 45, 90 and 135 degrees. - Adequate lobe compression was noted on fluoroscopy and VANNESSA. There was no paradevice flow noted oncolor Doppler. POST-PROCEDURAL CONCLUSIONS: - 22 mm Amulet device visualized in stable position in the left atrial appendage with no evidence of paradevice leak. - An iatrogenic interatrial shunt noted with predominantly left-to-right flow. - Left and right ventricular function was unchanged. - No change in valvular function. - No pericardial effusion.  TTE 3/20/24 CONCLUSIONS: 1. Limited study obtained for evaluation of pericardial effusion. 2. Normal left ventricular systolic function. 3. Normal right ventricular size and systolic function. 4. Mild tricuspid regurgitation. 5. Pulmonary hypertension present, pulmonary artery systolic pressure is 39 mmHg. 6. Trivial pericardial effusion.

## 2024-05-09 NOTE — HISTORY OF PRESENT ILLNESS
[FreeTextEntry1] : 83F with PMH of hypertension, HFpEF, mild to moderate TR, atrial fibrillation (dx 5/2023, not on AC 2/2 fall risk and SAH), Lewy body dementia (baseline AAOx3), posterior reversible encephalopathy syndrome, prior CVAs, chronic bronchiectasis (on home O2), pHTN (attributed to COPD and HFpEF), CAD (coronary calcifications incidentally noted on 12/14/23 chest CTA), left pontine and left occipital infarct and SAH of the right parieto-occipital region (9/2023). In light of high bleeding risk and high CHADS2 Vasc Score, patient was evaluated for LAAO and was deemed a suitable candidate, now s/p successful LAAO (22 mm Amulet) on 3/19/24 who present for follow up.   On 3/19/24, patient underwent successful LAAO using a 22mm Amulet MITZI occluder device via the R-CFV. Post procedure course was uneventful. POD1 TTE showed normal BiV function with trivial pericardial effusion. Patient was discharged home on half dose of her home dose metoprolol (25mg BID, was on 50mg BID) and ASA/Plavix.   Since hospital discharge, patient had called with difficulty swallowing, advised to go the ED to be evaluated. Had tachycardia in the 100s, patient's cardiologist (Dr. Mcclure) increased metoprolol to 100 mg daily on 3/22/24. Had severe dyspnea and cough, reached out to Dr. Ramos on 3/24/24 and was recommended to present to the local ED to be evaluated but did not go. Saw Dr. Ramos on 3/28/24, patient was feeling unwell with copious secretions and dyspneic, referred for inpatient admission for management of her bronchiectasis exacerbation for IV abx and possible PICC line placement given her allergy to Levaquin.   The patient was admitted on 3/28/2024 with treatment of bronchiectasis with IV cefepime as patient was noted to be positive for pseudomonas on outpatient sputum culture from Dr. oFster.  The patient was discharged home on 4/1/2024 on meropenem IV x 2 weeks through [facility], w/ PO vancomycin with c diff prophylactics.  The antibiotics finished on 4/19/2024.  [facility] removed on 4/25/2024.  The patient developed diarrhea and tested for C. diff which was inconclusive.    After discharge, the patient followed up with Dr. Foster who referred to Dr. Cloud. The patient was evaluated by ENT on 4/16/24 who performed laryngoscopy/tracheoscopy, which was unremarkable.  The patient followed up with Dr. Mcclure who stated "Given CAD and persistent SOB (at rest and with exertion), will refer patient for right and left cardiac catheterization to help determine if obstructive CAD is contributing to her current symptoms and better assess pulmonary artery pressures given her known lung disease"   Since her last visit, the patient states that she is in a lot of pain in her back.  She is followed by a pain management team that would like to stop her Plavix to give her an epidural.  She is having shortness of breath with exertion and is using her supplemental oxygen more frequently.  She denies chest pain, shortness of breath at rest, dizziness, syncope, orthopnea, PND, or LE edema.    The patient had her 45 day repeat CT Cardiac scan to evaluate the Amulet device on 5/3/2024 which showed:  Cardiac:    1.  There are 4 pulmonary veins; 2 on the left and 2 on the right.    2.  Patient is s/p left atrial appendage occlusion with an Amulet device. The device is well-seated. Contrast is noted in the left atrial appendage, posterior to the Amulet, suggestive of brent-device leak. Entry point of the leak is not appreciated on this study.    3.  No left atrial thrombus.    Non cardiac:   Markedly emphysematous. Bilateral bronchiectasis. Unchanged tubular and nodular densities compatible with bronchiectasis. Calcified right upper lobe nodules compatible with old granulomas.  Unchanged minimal amount of left pleural fluid.   Mildly ectatic ascending aorta 3.7 cm AP.

## 2024-05-09 NOTE — ASSESSMENT
[FreeTextEntry1] : 83F with PMH of hypertension, HFpEF, mild to moderate TR, atrial fibrillation (dx 5/2023, not on AC 2/2 fall risk and SAH), Lewy body dementia (baseline AAOx3), posterior reversible encephalopathy syndrome, prior CVAs, chronic bronchiectasis (on home O2), pHTN (attributed to COPD and HFpEF), CAD (coronary calcifications incidentally noted on 12/14/23 chest CTA), left pontine and left occipital infarct and SAH of the right parieto-occipital region (9/2023). In light of high bleeding risk and high CHADS2 Vasc Score, patient was evaluated for LAAO and was deemed a suitable candidate, now s/p successful LAAO (22 mm Amulet) on 3/19/24 who present for follow up.  The patient is clinically stable.  The CT was independently reviewed by Dr. Rivas which showed the device is well-seated. Contrast is noted in the left atrial appendage, posterior to the Amulet, however there is NO peridevice leak. The disc of the Amulet is covering the MITZI and contrast flow remains through the device pending endothelialization. The results were discussed with the patient and her daughter.  Dr. Mcclure recommended the patient have a cardiac Catheterization to invasively measure her lung pressures and her coronary arteries.  At this time, the patient would like to focus on her back pain and receive the epidural from the pain management team.  The pain management team would like the patient to stop the Plavix for a week prior to the injection.  Based on the CT scan, the patient is at low risk for a stroke since her appendage is capped by the device while not fully incorporated into the heart at this time.  While Dr. Rivas would like the patient to continue to take the Plavix daily, Dr. Rivas is ok for the patient to hold the Plavix for the injection since her back pain is affecting her quality of life.  The patient will return to [facility] with a repeat CTA Cardiac to further evaluate the device in three months.  The plan was discussed with the patient and her daughter and they verbalized understanding.  All questions answered.

## 2024-05-09 NOTE — PLAN
[TextEntry] : (1) Continue DAPT and return to [facility] in 3 months with repeat CTA Cardiac (2) Reasonable to hold plavix prior to epidural; resume when able  (3) Continue cardiology care and recommendations with Dr. Mcclure (4) Continue pulmonology care and medication management with Dr. Daily HATHAWAY, Dr. Artur Rivas, personally performed the evaluation and management (E/M) services for this established patient who presents today with (a) new problem(s)/exacerbation of (an) existing condition(s). That E/M includes conducting the clinically appropriate interval history &/or exam, assessing all new/exacerbated conditions, and establishing a new plan of care. Today, my JARRET, noted herewith, was here to observe my evaluation and management service for this new problem/exacerbated condition and follow the plan of care established by me going forward.

## 2024-05-17 ENCOUNTER — RX RENEWAL (OUTPATIENT)
Age: 84
End: 2024-05-17

## 2024-05-17 ENCOUNTER — APPOINTMENT (OUTPATIENT)
Dept: PAIN MANAGEMENT | Facility: CLINIC | Age: 84
End: 2024-05-17
Payer: MEDICARE

## 2024-05-17 VITALS
BODY MASS INDEX: 14.51 KG/M2 | HEIGHT: 64 IN | DIASTOLIC BLOOD PRESSURE: 54 MMHG | WEIGHT: 85 LBS | OXYGEN SATURATION: 98 % | SYSTOLIC BLOOD PRESSURE: 146 MMHG | HEART RATE: 51 BPM

## 2024-05-17 DIAGNOSIS — M47.24 OTHER SPONDYLOSIS WITH RADICULOPATHY, THORACIC REGION: ICD-10-CM

## 2024-05-17 DIAGNOSIS — M79.2 NEURALGIA AND NEURITIS, UNSPECIFIED: ICD-10-CM

## 2024-05-17 DIAGNOSIS — B02.29 OTHER POSTHERPETIC NERVOUS SYSTEM INVOLVEMENT: ICD-10-CM

## 2024-05-17 PROCEDURE — 99214 OFFICE O/P EST MOD 30 MIN: CPT

## 2024-05-17 RX ORDER — AMLODIPINE BESYLATE 5 MG/1
5 TABLET ORAL DAILY
Qty: 30 | Refills: 0 | Status: ACTIVE | COMMUNITY
Start: 2024-04-22 | End: 1900-01-01

## 2024-05-17 RX ORDER — CYCLOBENZAPRINE HYDROCHLORIDE 5 MG/1
5 TABLET, FILM COATED ORAL 3 TIMES DAILY
Qty: 60 | Refills: 0 | Status: ACTIVE | COMMUNITY
Start: 2024-05-17 | End: 1900-01-01

## 2024-05-20 PROBLEM — M79.2 NEUROPATHIC PAIN: Status: ACTIVE | Noted: 2024-04-12

## 2024-05-20 PROBLEM — B02.29 POSTHERPETIC NEURALGIA: Status: ACTIVE | Noted: 2023-12-12

## 2024-05-20 PROBLEM — M47.24 THORACIC SPONDYLOSIS WITH RADICULOPATHY: Status: ACTIVE | Noted: 2023-12-01

## 2024-05-20 RX ORDER — CLONAZEPAM 0.5 MG/1
0.5 TABLET ORAL
Qty: 60 | Refills: 0 | Status: ACTIVE | COMMUNITY
Start: 2023-11-15 | End: 1900-01-01

## 2024-05-20 NOTE — HISTORY OF PRESENT ILLNESS
[Home] : at home, [unfilled] , at the time of the visit. [Medical Office: ([facility])___] : at the medical office located in  [Verbal consent obtained from patient] : the patient, [unfilled] [Throbbing] : throbbing [Burning] : burning [Stabbing] : stabbing [FreeTextEntry1] : Interval History: Patient returns for follow-up. She has had three intercostal nerve blocks with decreasing efficacy. She has had a cardiac procedure with Dr. Rivas. She is on plavix. He has advised that she may discontinue her plavix for seven days prior to intervention, however, this is not without risk. Patient reports that she cannot live with this pain any longer. Quality of life is impaired. There has been a severe exacerbation of the patient's chronic pain.  HPI: 83 yof presents w/ severe mid back and left rib pain. Also with low back pain. Quality of life is impaired. There has been a severe exacerbation of the patient's chronic pain. She has been hospitalized for numerous health issues recently. Major side effects with multiple medications including opioids and gabapentin with no relief.   Patient returns now with worsening left rib and thoracic pain.  Previously much improved after intercostal nerve block. But now having severe pain.  Patient reports that she is unable to tolerate any systemic medications including opioids or gabapentin. Patient reports that she is in extreme agony.  She is unable to "live like this".  Of note she had Watchmen inserted, and is attributes return of pain to placement of the device.  Interventions: Intercostal nerve block (04/19/24): [Constant] : constant [10] : an average pain level of 10/10 [9] : a minimum pain level of 9/10 [FreeTextEntry7] : thoracic

## 2024-05-20 NOTE — ASSESSMENT
[FreeTextEntry1] : 83 yof w/ severe mid back and left rib pain, most consistent with postherpetic neuralgia, now with three intercostal nerve blocks with decreasing efficacy.   I have personally reviewed the patient's MRI in detail and discussed it with them which is significant for disc bulges in the thoracic spine.  We have discussed the risk of an epidural steroid injection in detail. Specifically, the patient would need to discontinue Plavix for seven days, which places her at a significant risk of stroke or MI. She understands and states that she would rather die then live with pain this bad. She has discussed this with Dr. Rivas. Given this, we will proceed.  The patient has failed to have relief with medication management. The patient has failed to have relief with more than six weeks of physical therapy within the last three months. Given the patients failure to improve with all other conservative measures, recommend T4-T5 interlaminar epidural steroid injection under fluoroscopic guidance. The patient will follow-up with me in my office two weeks following intervention.  I have discussed in detail with the patient that an interventional spine procedure is associated with potential risks. The procedure may include an injection of steroid and potentially other medications (local anesthetic and normal saline) into the epidural space or surrounding tissue of the spine. There are significant risks of this procedure which include and are not limited to infection, bleeding, worsening pain, dural puncture leading to post-dural puncture headache, nerve damage, spinal cord injury, paralysis, stroke, and death. There is a chance that the procedure does not improve their pain. There are risks associated with the steroid being absorbed into the body systemically. These include dysphoria, difficulty sleeping, mood swings, and personality changes. Pre-menopausal women may notice a regularity his in her menstrual cycle for 2-3 months following the injection. Steroids can specifically affect patients with hypertension, diabetes, and peptic ulcers. The procedure may cause a temporary increase in blood pressure and blood glucose, and may adversely affect a peptic ulcer. Other, more rare complications, including avascular necrosis of the joints, glaucoma, and osteoporosis. I have discussed the risks of the procedure at length with the patient, and the potential benefits of pain relief. I have offered alternatives to the procedure. All questions were answered. The patient expressed understanding and wishes to proceed with the procedure.  Physical therapy prescribed - goal will be to increase ROM, strengthening, postural training, other modalities ad homa which may include massage and stim. Goals of therapy discussed with the patient in detail and will be discussed with physical therapist. Patient will follow-up following course of physical therapy to monitor progress and adjust therapy as needed.  Acetaminophen 1,000 mg q8h prn for moderate pain. Risks, benefits, and alternatives of acetaminophen discussed with patient.  Diet and nutritional strategies discussed which may improve patients pain and will improve overall health.  Continue tizanidine 2 mg prn  Start cymbalta for nerve pain.  trial EMLA and capsaicin  Physical therapy prescribed - goal will be to increase ROM, strengthening, postural training, other modalities ad homa which may include massage and stim.

## 2024-05-20 NOTE — PHYSICAL EXAM
[de-identified] :  Constitutional: Normal, well developed, no acute distress on audio/video examination Eyes: Symmetric, External structures on video examination ENT: Lips, mucosa and tongue normal on video examination Oropharynx: Lips normal, symmetric, no external lesions appreciated appreciated on video examination Respiratory: Non-labored breathing, no audible wheezes appreciated on audio/video examination Vascular: No cyanosis appreciated or edema appreciated on video examination GI:  no jaundice appreciated on video examination Neurovascular: CN grossly intact on video/audio examination, alert MSK: Normal muscle bulk on video examination

## 2024-05-21 ENCOUNTER — APPOINTMENT (OUTPATIENT)
Dept: INFECTIOUS DISEASE | Facility: CLINIC | Age: 84
End: 2024-05-21
Payer: MEDICARE

## 2024-05-21 PROCEDURE — 99214 OFFICE O/P EST MOD 30 MIN: CPT

## 2024-05-21 NOTE — ASSESSMENT
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  Given lack of response, I am not sure if her symptom is CDI.  Strange to have chronic bacterial or viral gastroenteritis.  I think patient should be seen by GI.  Will do infectious work-up while patient gets referred to GI by Dr. Phillips.  As for chronic SOB, it is likely due to bronchiectasis.  No additional abx indicated at this time.  - C diff test and GI PCR, to be done at [facility] lab  - f/u Dr. Phillips (medicine)  - f/u Dr. Foster (pul) - will ask Dr. Phillips to refer patient to GI

## 2024-05-21 NOTE — REASON FOR VISIT
[Follow-Up: _____] : a [unfilled] follow-up visit [Home] : at home, [unfilled] , at the time of the visit. [Medical Office: ([facility])___] : at the medical office located in  [Family Member] : family member [Patient] : the patient

## 2024-05-21 NOTE — HISTORY OF PRESENT ILLNESS
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 Buenrostro).  She was found to have bronchiectasis exacerbation and PsA PNA (S to faith, elevated YOLANDE to zosyn, I to cefe/cipro).  She was put on meropenem with improvement.  She was sent home with 2 weeks of IV meropenem (3/29-4/12/24).  She was also put on prophylaxisn vancomycin 125mg PO q12h to be continued until 4/19.  Since discharge, she completed meropenem on 4/12 as scheduled.  Duing the follow up on 4/23, patient reported meropenem didn't do anything for her and she remains SOB.  Per Dr. Foster, this is due to progression of the disease.  Patient also reported diarrhea.  Unfortunately stool specimen submitted to [facility] was lost.  Patient proceeded with empiric PO vanco treatment since diarrhea was getting worse.    Today patient reports feeling unwell.  Her diarrhea got worse on PO vancomycin which she will complete 14 day course in a few days.  Now her BM is liquid stool.  Currently she has diarrhea 4-5 times/day, larger amount.  Denied fever/chills.  She was eating ok until a few days ago then she has no appetite now. She feels very tired.  She often requires oxygen and her breathing remains bad.

## 2024-05-22 LAB
CULTURE RESULTS: ABNORMAL
SPECIMEN SOURCE: SIGNIFICANT CHANGE UP

## 2024-05-22 NOTE — PROGRESS NOTE ADULT - ASSESSMENT
83F PMHx AF (not on AC due to SAH), HTN, Lewy body dementia (baseline a/o x3), posterior reversible encephalopathy syndrome, chronic bronchiectasis on home O2 prn, dCHF, C. diff, BIBEMS for stroke rule out and hypoxia; no acute stroke on CTH. Pulmonary consulted for AHRF, possibly 2/2 viral vs bacterial PNA given h/o chronic bronchiectasis. Pt admitted for AHRF being treated for possible PNA, pending further w/u.  [Other:_____] : [unfilled] [TextBox_50] : left inguinal orchiopexy 4/29/24

## 2024-05-24 ENCOUNTER — APPOINTMENT (OUTPATIENT)
Dept: INTERNAL MEDICINE | Facility: CLINIC | Age: 84
End: 2024-05-24
Payer: MEDICARE

## 2024-05-24 VITALS — SYSTOLIC BLOOD PRESSURE: 122 MMHG | DIASTOLIC BLOOD PRESSURE: 74 MMHG

## 2024-05-24 VITALS — SYSTOLIC BLOOD PRESSURE: 118 MMHG | DIASTOLIC BLOOD PRESSURE: 62 MMHG

## 2024-05-24 VITALS
SYSTOLIC BLOOD PRESSURE: 126 MMHG | OXYGEN SATURATION: 89 % | HEIGHT: 64 IN | BODY MASS INDEX: 15.19 KG/M2 | HEART RATE: 58 BPM | DIASTOLIC BLOOD PRESSURE: 69 MMHG | WEIGHT: 89 LBS

## 2024-05-24 DIAGNOSIS — R09.81 NASAL CONGESTION: ICD-10-CM

## 2024-05-24 PROCEDURE — 36415 COLL VENOUS BLD VENIPUNCTURE: CPT

## 2024-05-24 PROCEDURE — 99215 OFFICE O/P EST HI 40 MIN: CPT

## 2024-05-24 PROCEDURE — G2211 COMPLEX E/M VISIT ADD ON: CPT

## 2024-05-24 RX ORDER — OLOPATADINE HYDROCHLORIDE 665 UG/1
0.6 SPRAY, METERED NASAL TWICE DAILY
Qty: 1 | Refills: 2 | Status: ACTIVE | COMMUNITY
Start: 2024-05-24 | End: 1900-01-01

## 2024-05-24 NOTE — PHYSICAL EXAM
[Ill-Appearing] : ill-appearing [Cachectic] : cachexia was observed [No Respiratory Distress] : no respiratory distress  [No Accessory Muscle Use] : no accessory muscle use [Normal Rate] : normal rate  [Normal S1, S2] : normal S1 and S2 [No Murmur] : no murmur heard [No Edema] : there was no peripheral edema [de-identified] : diffuse rhonci, poor air entry

## 2024-05-24 NOTE — REVIEW OF SYSTEMS
[Shortness Of Breath] : shortness of breath [Cough] : cough [Diarrhea] : diarrhea [Negative] : Cardiovascular

## 2024-05-24 NOTE — HISTORY OF PRESENT ILLNESS
[de-identified] : F/U for Diarrhea. Patient was hospitalized for exacerbation of bronchiectasis about one month ago. Treated with IV meropenem given Hx of pseudomonas colonization. No significant improvement in dyspnea. She now requires O2 continuously. Sat drops to low 80 without O2.  She developed diarrhea during treatment. She was treated empirically for CDI with 2 weeks of vancomycin. No improvement with vancomycin. She was recently seen by ID who stopped vancomycin and recommended retesting.  Reports 5+ BMs daily. Soft, no hematochezia. + bloating, no pain. Poor appetite, no vomiting.  She has been feeling tired, weak and dizzy.  She is congested. Azelastine is not working would like to try Patanase.

## 2024-05-28 DIAGNOSIS — D64.9 ANEMIA, UNSPECIFIED: ICD-10-CM

## 2024-05-29 ENCOUNTER — NON-APPOINTMENT (OUTPATIENT)
Age: 84
End: 2024-05-29

## 2024-05-30 LAB
ALBUMIN SERPL ELPH-MCNC: 3.9 G/DL
ALP BLD-CCNC: 100 U/L
ALT SERPL-CCNC: 12 U/L
ANION GAP SERPL CALC-SCNC: 10 MMOL/L
AST SERPL-CCNC: 25 U/L
BASOPHILS # BLD AUTO: 0.04 K/UL
BASOPHILS NFR BLD AUTO: 0.9 %
BILIRUB SERPL-MCNC: 0.4 MG/DL
BUN SERPL-MCNC: 11 MG/DL
CALCIUM SERPL-MCNC: 8.8 MG/DL
CHLORIDE SERPL-SCNC: 90 MMOL/L
CO2 SERPL-SCNC: 30 MMOL/L
CREAT SERPL-MCNC: 0.86 MG/DL
EGFR: 68 ML/MIN/1.73M2
EOSINOPHIL # BLD AUTO: 0.01 K/UL
EOSINOPHIL NFR BLD AUTO: 0.2 %
FERRITIN SERPL-MCNC: 39 NG/ML
FOLATE SERPL-MCNC: 10.5 NG/ML
GLUCOSE SERPL-MCNC: 98 MG/DL
HCT VFR BLD CALC: 32.4 %
HGB BLD-MCNC: 9.7 G/DL
IMM GRANULOCYTES NFR BLD AUTO: 0.2 %
IRON SATN MFR SERPL: 6 %
IRON SERPL-MCNC: 25 UG/DL
LYMPHOCYTES # BLD AUTO: 1.1 K/UL
LYMPHOCYTES NFR BLD AUTO: 23.8 %
MAGNESIUM SERPL-MCNC: 1.8 MG/DL
MAN DIFF?: NORMAL
MCHC RBC-ENTMCNC: 29.9 GM/DL
MCHC RBC-ENTMCNC: 30.7 PG
MCV RBC AUTO: 102.5 FL
MONOCYTES # BLD AUTO: 0.58 K/UL
MONOCYTES NFR BLD AUTO: 12.5 %
NEUTROPHILS # BLD AUTO: 2.89 K/UL
NEUTROPHILS NFR BLD AUTO: 62.4 %
PHOSPHATE SERPL-MCNC: 3.6 MG/DL
PLATELET # BLD AUTO: 206 K/UL
POTASSIUM SERPL-SCNC: 4 MMOL/L
PROT SERPL-MCNC: 6.9 G/DL
RBC # BLD: 3.16 M/UL
RBC # FLD: 16.2 %
SODIUM SERPL-SCNC: 130 MMOL/L
TIBC SERPL-MCNC: 401 UG/DL
UIBC SERPL-MCNC: 375 UG/DL
VIT B12 SERPL-MCNC: 721 PG/ML
WBC # FLD AUTO: 4.63 K/UL

## 2024-05-31 ENCOUNTER — APPOINTMENT (OUTPATIENT)
Dept: INTERNAL MEDICINE | Facility: CLINIC | Age: 84
End: 2024-05-31
Payer: MEDICARE

## 2024-05-31 ENCOUNTER — APPOINTMENT (OUTPATIENT)
Dept: PULMONOLOGY | Facility: CLINIC | Age: 84
End: 2024-05-31

## 2024-05-31 ENCOUNTER — LABORATORY RESULT (OUTPATIENT)
Age: 84
End: 2024-05-31

## 2024-05-31 VITALS
DIASTOLIC BLOOD PRESSURE: 74 MMHG | WEIGHT: 89 LBS | HEART RATE: 68 BPM | SYSTOLIC BLOOD PRESSURE: 133 MMHG | BODY MASS INDEX: 15.19 KG/M2 | HEIGHT: 64 IN | OXYGEN SATURATION: 84 %

## 2024-05-31 VITALS — OXYGEN SATURATION: 95 % | HEART RATE: 52 BPM | SYSTOLIC BLOOD PRESSURE: 120 MMHG | DIASTOLIC BLOOD PRESSURE: 74 MMHG

## 2024-05-31 VITALS — SYSTOLIC BLOOD PRESSURE: 120 MMHG | OXYGEN SATURATION: 95 % | HEART RATE: 59 BPM | DIASTOLIC BLOOD PRESSURE: 69 MMHG

## 2024-05-31 DIAGNOSIS — R06.00 DYSPNEA, UNSPECIFIED: ICD-10-CM

## 2024-05-31 DIAGNOSIS — R53.81 OTHER MALAISE: ICD-10-CM

## 2024-05-31 DIAGNOSIS — E87.1 HYPO-OSMOLALITY AND HYPONATREMIA: ICD-10-CM

## 2024-05-31 DIAGNOSIS — D53.9 NUTRITIONAL ANEMIA, UNSPECIFIED: ICD-10-CM

## 2024-05-31 DIAGNOSIS — R19.7 DIARRHEA, UNSPECIFIED: ICD-10-CM

## 2024-05-31 PROCEDURE — 36415 COLL VENOUS BLD VENIPUNCTURE: CPT

## 2024-05-31 PROCEDURE — G2211 COMPLEX E/M VISIT ADD ON: CPT

## 2024-05-31 PROCEDURE — 99214 OFFICE O/P EST MOD 30 MIN: CPT

## 2024-05-31 RX ORDER — DULOXETINE HYDROCHLORIDE 60 MG/1
60 CAPSULE, DELAYED RELEASE PELLETS ORAL
Qty: 90 | Refills: 1 | Status: DISCONTINUED | COMMUNITY
Start: 2024-05-17 | End: 2024-05-31

## 2024-06-03 PROBLEM — D53.9 MACROCYTIC ANEMIA: Status: ACTIVE | Noted: 2023-11-10

## 2024-06-03 NOTE — HISTORY OF PRESENT ILLNESS
[de-identified] : Continues to have fatigue, dyspnea and dizziness with standing.  Continues to have diarrhea, eating and drinking well. [facility]  She decreased the lasix to 10mg every other day and started drinking an electrolytes drink for low sodium, but her ankles started swelling. She took an extra 10 yesterday and 10 today.  She is using her supplemental O2 only intermittently. Sat drops to low 80s when she is off O2 and she feels short of breath.

## 2024-06-03 NOTE — PLAN
[FreeTextEntry1] : Labs drawn in clinic.   Patient will call on Monday with the results of the stool test.

## 2024-06-03 NOTE — PHYSICAL EXAM
[No Acute Distress] : no acute distress [Cachectic] : cachexia was observed [No JVD] : no jugular venous distention [No Respiratory Distress] : no respiratory distress  [No Accessory Muscle Use] : no accessory muscle use [Soft] : abdomen soft [Non Tender] : non-tender [Non-distended] : non-distended [Normal Bowel Sounds] : normal bowel sounds [de-identified] : Diffuse rhonci.  [de-identified] : CN 2-12 intact, 5/5 strength BL proximal and distal upper and lower extremities, no dysmetria no dysarthria, no pronator drift, no Romberg.

## 2024-06-04 ENCOUNTER — APPOINTMENT (OUTPATIENT)
Dept: PAIN MANAGEMENT | Facility: HOSPITAL | Age: 84
End: 2024-06-04

## 2024-06-04 ENCOUNTER — TRANSCRIPTION ENCOUNTER (OUTPATIENT)
Age: 84
End: 2024-06-04

## 2024-06-05 ENCOUNTER — NON-APPOINTMENT (OUTPATIENT)
Age: 84
End: 2024-06-05

## 2024-06-07 ENCOUNTER — NON-APPOINTMENT (OUTPATIENT)
Age: 84
End: 2024-06-07

## 2024-06-07 ENCOUNTER — INPATIENT (INPATIENT)
Facility: HOSPITAL | Age: 84
LOS: 2 days | Discharge: HOME HEALTH | End: 2024-06-10
Attending: STUDENT IN AN ORGANIZED HEALTH CARE EDUCATION/TRAINING PROGRAM | Admitting: INTERNAL MEDICINE
Payer: MEDICARE

## 2024-06-07 VITALS
RESPIRATION RATE: 18 BRPM | DIASTOLIC BLOOD PRESSURE: 74 MMHG | OXYGEN SATURATION: 96 % | SYSTOLIC BLOOD PRESSURE: 129 MMHG | WEIGHT: 89.07 LBS | TEMPERATURE: 98 F | HEART RATE: 59 BPM | HEIGHT: 65 IN

## 2024-06-07 DIAGNOSIS — Z86.19 PERSONAL HISTORY OF OTHER INFECTIOUS AND PARASITIC DISEASES: ICD-10-CM

## 2024-06-07 DIAGNOSIS — Z86.73 PERSONAL HISTORY OF TRANSIENT ISCHEMIC ATTACK (TIA), AND CEREBRAL INFARCTION WITHOUT RESIDUAL DEFICITS: ICD-10-CM

## 2024-06-07 DIAGNOSIS — J47.1 BRONCHIECTASIS WITH (ACUTE) EXACERBATION: ICD-10-CM

## 2024-06-07 DIAGNOSIS — Z29.9 ENCOUNTER FOR PROPHYLACTIC MEASURES, UNSPECIFIED: ICD-10-CM

## 2024-06-07 DIAGNOSIS — I48.91 UNSPECIFIED ATRIAL FIBRILLATION: ICD-10-CM

## 2024-06-07 DIAGNOSIS — Z87.81 PERSONAL HISTORY OF (HEALED) TRAUMATIC FRACTURE: Chronic | ICD-10-CM

## 2024-06-07 DIAGNOSIS — I10 ESSENTIAL (PRIMARY) HYPERTENSION: ICD-10-CM

## 2024-06-07 DIAGNOSIS — I50.30 UNSPECIFIED DIASTOLIC (CONGESTIVE) HEART FAILURE: ICD-10-CM

## 2024-06-07 DIAGNOSIS — G31.83 NEUROCOGNITIVE DISORDER WITH LEWY BODIES: ICD-10-CM

## 2024-06-07 LAB
ADD ON TEST-SPECIMEN IN LAB: SIGNIFICANT CHANGE UP
ALBUMIN SERPL ELPH-MCNC: 4.1 G/DL
ALBUMIN SERPL ELPH-MCNC: 4.3 G/DL — SIGNIFICANT CHANGE UP (ref 3.3–5)
ALP BLD-CCNC: 113 U/L
ALP SERPL-CCNC: 109 U/L — SIGNIFICANT CHANGE UP (ref 40–120)
ALT FLD-CCNC: 17 U/L — SIGNIFICANT CHANGE UP (ref 10–45)
ALT SERPL-CCNC: 25 U/L
ANION GAP SERPL CALC-SCNC: 10 MMOL/L — SIGNIFICANT CHANGE UP (ref 5–17)
ANION GAP SERPL CALC-SCNC: 9 MMOL/L
APPEARANCE: CLEAR
AST SERPL-CCNC: 23 U/L — SIGNIFICANT CHANGE UP (ref 10–40)
AST SERPL-CCNC: 32 U/L
BASOPHILS # BLD AUTO: 0.06 K/UL
BASOPHILS # BLD AUTO: 0.06 K/UL — SIGNIFICANT CHANGE UP (ref 0–0.2)
BASOPHILS NFR BLD AUTO: 0.8 % — SIGNIFICANT CHANGE UP (ref 0–2)
BASOPHILS NFR BLD AUTO: 1.4 %
BILIRUB SERPL-MCNC: 0.3 MG/DL — SIGNIFICANT CHANGE UP (ref 0.2–1.2)
BILIRUB SERPL-MCNC: 0.5 MG/DL
BILIRUBIN URINE: NEGATIVE
BLOOD URINE: NEGATIVE
BUN SERPL-MCNC: 14 MG/DL
BUN SERPL-MCNC: 18 MG/DL — SIGNIFICANT CHANGE UP (ref 7–23)
CALCIUM SERPL-MCNC: 9.1 MG/DL
CALCIUM SERPL-MCNC: 9.2 MG/DL — SIGNIFICANT CHANGE UP (ref 8.4–10.5)
CHLORIDE ?TM UR-SCNC: 67 MMOL/L
CHLORIDE SERPL-SCNC: 93 MMOL/L
CHLORIDE SERPL-SCNC: 97 MMOL/L — SIGNIFICANT CHANGE UP (ref 96–108)
CO2 SERPL-SCNC: 29 MMOL/L — SIGNIFICANT CHANGE UP (ref 22–31)
CO2 SERPL-SCNC: 32 MMOL/L
COLOR: YELLOW
CREAT SERPL-MCNC: 0.74 MG/DL — SIGNIFICANT CHANGE UP (ref 0.5–1.3)
CREAT SERPL-MCNC: 0.91 MG/DL
EGFR: 63 ML/MIN/1.73M2
EGFR: 81 ML/MIN/1.73M2 — SIGNIFICANT CHANGE UP
EOSINOPHIL # BLD AUTO: 0.03 K/UL
EOSINOPHIL # BLD AUTO: 0.03 K/UL — SIGNIFICANT CHANGE UP (ref 0–0.5)
EOSINOPHIL NFR BLD AUTO: 0.4 % — SIGNIFICANT CHANGE UP (ref 0–6)
EOSINOPHIL NFR BLD AUTO: 0.7 %
GLUCOSE QUALITATIVE U: NEGATIVE MG/DL
GLUCOSE SERPL-MCNC: 95 MG/DL
GLUCOSE SERPL-MCNC: 96 MG/DL — SIGNIFICANT CHANGE UP (ref 70–99)
HCT VFR BLD CALC: 32.5 %
HCT VFR BLD CALC: 33.6 % — LOW (ref 34.5–45)
HGB BLD-MCNC: 10.5 G/DL — LOW (ref 11.5–15.5)
HGB BLD-MCNC: 9.7 G/DL
IMM GRANULOCYTES NFR BLD AUTO: 0.2 %
IMM GRANULOCYTES NFR BLD AUTO: 0.3 % — SIGNIFICANT CHANGE UP (ref 0–0.9)
KETONES URINE: NEGATIVE MG/DL
LEUKOCYTE ESTERASE URINE: NEGATIVE
LYMPHOCYTES # BLD AUTO: 1.09 K/UL
LYMPHOCYTES # BLD AUTO: 1.45 K/UL — SIGNIFICANT CHANGE UP (ref 1–3.3)
LYMPHOCYTES # BLD AUTO: 20.4 % — SIGNIFICANT CHANGE UP (ref 13–44)
LYMPHOCYTES NFR BLD AUTO: 25.1 %
MAN DIFF?: NORMAL
MCHC RBC-ENTMCNC: 29.8 GM/DL
MCHC RBC-ENTMCNC: 30.3 PG
MCHC RBC-ENTMCNC: 30.4 PG — SIGNIFICANT CHANGE UP (ref 27–34)
MCHC RBC-ENTMCNC: 31.3 GM/DL — LOW (ref 32–36)
MCV RBC AUTO: 101.6 FL
MCV RBC AUTO: 97.4 FL — SIGNIFICANT CHANGE UP (ref 80–100)
MONOCYTES # BLD AUTO: 0.5 K/UL
MONOCYTES # BLD AUTO: 0.79 K/UL — SIGNIFICANT CHANGE UP (ref 0–0.9)
MONOCYTES NFR BLD AUTO: 11.1 % — SIGNIFICANT CHANGE UP (ref 2–14)
MONOCYTES NFR BLD AUTO: 11.5 %
NEUTROPHILS # BLD AUTO: 2.65 K/UL
NEUTROPHILS # BLD AUTO: 4.75 K/UL — SIGNIFICANT CHANGE UP (ref 1.8–7.4)
NEUTROPHILS NFR BLD AUTO: 61.1 %
NEUTROPHILS NFR BLD AUTO: 67 % — SIGNIFICANT CHANGE UP (ref 43–77)
NITRITE URINE: NEGATIVE
NRBC # BLD: 0 /100 WBCS — SIGNIFICANT CHANGE UP (ref 0–0)
NT-PROBNP SERPL-SCNC: 656 PG/ML — HIGH (ref 0–300)
OSMOLALITY SERPL: 289 MOSMOL/KG
OSMOLALITY UR: 284 MOSM/KG
PH URINE: 6
PLATELET # BLD AUTO: 227 K/UL — SIGNIFICANT CHANGE UP (ref 150–400)
PLATELET # BLD AUTO: 236 K/UL
POTASSIUM SERPL-MCNC: 4.2 MMOL/L — SIGNIFICANT CHANGE UP (ref 3.5–5.3)
POTASSIUM SERPL-SCNC: 4.2 MMOL/L — SIGNIFICANT CHANGE UP (ref 3.5–5.3)
POTASSIUM SERPL-SCNC: 4.4 MMOL/L
PROT SERPL-MCNC: 7.2 G/DL
PROT SERPL-MCNC: 7.9 G/DL — SIGNIFICANT CHANGE UP (ref 6–8.3)
PROTEIN URINE: 30 MG/DL
RAPID RVP RESULT: SIGNIFICANT CHANGE UP
RBC # BLD: 3.2 M/UL
RBC # BLD: 3.24 M/UL
RBC # BLD: 3.45 M/UL — LOW (ref 3.8–5.2)
RBC # FLD: 15.4 % — HIGH (ref 10.3–14.5)
RBC # FLD: 15.7 %
RETICS # AUTO: 1.2 %
RETICS AGGREG/RBC NFR: 37.6 K/UL
SARS-COV-2 RNA SPEC QL NAA+PROBE: SIGNIFICANT CHANGE UP
SODIUM ?TM SUB UR QN: 39 MMOL/L
SODIUM SERPL-SCNC: 134 MMOL/L
SODIUM SERPL-SCNC: 136 MMOL/L — SIGNIFICANT CHANGE UP (ref 135–145)
SPECIFIC GRAVITY URINE: 1.01
TSH SERPL-ACNC: 2.07 UIU/ML
UROBILINOGEN URINE: 0.2 MG/DL
WBC # BLD: 7.1 K/UL — SIGNIFICANT CHANGE UP (ref 3.8–10.5)
WBC # FLD AUTO: 4.34 K/UL
WBC # FLD AUTO: 7.1 K/UL — SIGNIFICANT CHANGE UP (ref 3.8–10.5)

## 2024-06-07 PROCEDURE — 99222 1ST HOSP IP/OBS MODERATE 55: CPT | Mod: GC

## 2024-06-07 PROCEDURE — 99285 EMERGENCY DEPT VISIT HI MDM: CPT | Mod: FS

## 2024-06-07 PROCEDURE — 93010 ELECTROCARDIOGRAM REPORT: CPT

## 2024-06-07 PROCEDURE — 71250 CT THORAX DX C-: CPT | Mod: 26

## 2024-06-07 PROCEDURE — 99222 1ST HOSP IP/OBS MODERATE 55: CPT

## 2024-06-07 PROCEDURE — 71045 X-RAY EXAM CHEST 1 VIEW: CPT | Mod: 26

## 2024-06-07 RX ORDER — CLOPIDOGREL BISULFATE 75 MG/1
75 TABLET, FILM COATED ORAL DAILY
Refills: 0 | Status: DISCONTINUED | OUTPATIENT
Start: 2024-06-07 | End: 2024-06-10

## 2024-06-07 RX ORDER — AMLODIPINE BESYLATE 2.5 MG/1
5 TABLET ORAL EVERY 24 HOURS
Refills: 0 | Status: DISCONTINUED | OUTPATIENT
Start: 2024-06-07 | End: 2024-06-10

## 2024-06-07 RX ORDER — FUROSEMIDE 40 MG
20 TABLET ORAL DAILY
Refills: 0 | Status: DISCONTINUED | OUTPATIENT
Start: 2024-06-07 | End: 2024-06-10

## 2024-06-07 RX ORDER — FUROSEMIDE 40 MG
1 TABLET ORAL
Refills: 0 | DISCHARGE

## 2024-06-07 RX ORDER — AZELASTINE 137 UG/1
2 SPRAY, METERED NASAL
Refills: 0 | DISCHARGE

## 2024-06-07 RX ORDER — MONTELUKAST 4 MG/1
10 TABLET, CHEWABLE ORAL DAILY
Refills: 0 | Status: DISCONTINUED | OUTPATIENT
Start: 2024-06-07 | End: 2024-06-10

## 2024-06-07 RX ORDER — LANOLIN ALCOHOL/MO/W.PET/CERES
3 CREAM (GRAM) TOPICAL AT BEDTIME
Refills: 0 | Status: DISCONTINUED | OUTPATIENT
Start: 2024-06-07 | End: 2024-06-10

## 2024-06-07 RX ORDER — ATORVASTATIN CALCIUM 80 MG/1
40 TABLET, FILM COATED ORAL AT BEDTIME
Refills: 0 | Status: DISCONTINUED | OUTPATIENT
Start: 2024-06-07 | End: 2024-06-10

## 2024-06-07 RX ORDER — ACETAMINOPHEN 500 MG
650 TABLET ORAL EVERY 6 HOURS
Refills: 0 | Status: DISCONTINUED | OUTPATIENT
Start: 2024-06-07 | End: 2024-06-10

## 2024-06-07 RX ORDER — PANTOPRAZOLE SODIUM 20 MG/1
40 TABLET, DELAYED RELEASE ORAL
Refills: 0 | Status: DISCONTINUED | OUTPATIENT
Start: 2024-06-07 | End: 2024-06-10

## 2024-06-07 RX ORDER — MONTELUKAST 4 MG/1
1 TABLET, CHEWABLE ORAL
Refills: 0 | DISCHARGE

## 2024-06-07 RX ORDER — DONEPEZIL HYDROCHLORIDE 10 MG/1
5 TABLET, FILM COATED ORAL AT BEDTIME
Refills: 0 | Status: DISCONTINUED | OUTPATIENT
Start: 2024-06-07 | End: 2024-06-10

## 2024-06-07 RX ORDER — ACETAMINOPHEN 500 MG
650 TABLET ORAL ONCE
Refills: 0 | Status: COMPLETED | OUTPATIENT
Start: 2024-06-07 | End: 2024-06-07

## 2024-06-07 RX ORDER — ENOXAPARIN SODIUM 100 MG/ML
30 INJECTION SUBCUTANEOUS EVERY 24 HOURS
Refills: 0 | Status: DISCONTINUED | OUTPATIENT
Start: 2024-06-08 | End: 2024-06-10

## 2024-06-07 RX ORDER — MEROPENEM 1 G/30ML
1000 INJECTION INTRAVENOUS ONCE
Refills: 0 | Status: COMPLETED | OUTPATIENT
Start: 2024-06-07 | End: 2024-06-07

## 2024-06-07 RX ORDER — METOPROLOL TARTRATE 50 MG
50 TABLET ORAL EVERY 12 HOURS
Refills: 0 | Status: DISCONTINUED | OUTPATIENT
Start: 2024-06-07 | End: 2024-06-10

## 2024-06-07 RX ORDER — CLONAZEPAM 1 MG
0.5 TABLET ORAL EVERY 12 HOURS
Refills: 0 | Status: DISCONTINUED | OUTPATIENT
Start: 2024-06-07 | End: 2024-06-08

## 2024-06-07 RX ORDER — SODIUM CHLORIDE 9 MG/ML
3 INJECTION INTRAMUSCULAR; INTRAVENOUS; SUBCUTANEOUS ONCE
Refills: 0 | Status: COMPLETED | OUTPATIENT
Start: 2024-06-07 | End: 2024-06-07

## 2024-06-07 RX ORDER — SODIUM CHLORIDE 9 MG/ML
3 INJECTION INTRAMUSCULAR; INTRAVENOUS; SUBCUTANEOUS EVERY 12 HOURS
Refills: 0 | Status: DISCONTINUED | OUTPATIENT
Start: 2024-06-07 | End: 2024-06-09

## 2024-06-07 RX ORDER — ASPIRIN/CALCIUM CARB/MAGNESIUM 324 MG
81 TABLET ORAL DAILY
Refills: 0 | Status: DISCONTINUED | OUTPATIENT
Start: 2024-06-07 | End: 2024-06-10

## 2024-06-07 RX ORDER — VANCOMYCIN HCL 1 G
125 VIAL (EA) INTRAVENOUS EVERY 24 HOURS
Refills: 0 | Status: DISCONTINUED | OUTPATIENT
Start: 2024-06-07 | End: 2024-06-07

## 2024-06-07 RX ORDER — CLONAZEPAM 1 MG
0.5 TABLET ORAL
Qty: 0 | Refills: 0 | DISCHARGE

## 2024-06-07 RX ORDER — VANCOMYCIN HCL 1 G
125 VIAL (EA) INTRAVENOUS EVERY 12 HOURS
Refills: 0 | Status: DISCONTINUED | OUTPATIENT
Start: 2024-06-07 | End: 2024-06-10

## 2024-06-07 RX ORDER — IPRATROPIUM/ALBUTEROL SULFATE 18-103MCG
3 AEROSOL WITH ADAPTER (GRAM) INHALATION EVERY 12 HOURS
Refills: 0 | Status: DISCONTINUED | OUTPATIENT
Start: 2024-06-07 | End: 2024-06-09

## 2024-06-07 RX ADMIN — DONEPEZIL HYDROCHLORIDE 5 MILLIGRAM(S): 10 TABLET, FILM COATED ORAL at 22:50

## 2024-06-07 RX ADMIN — ATORVASTATIN CALCIUM 40 MILLIGRAM(S): 80 TABLET, FILM COATED ORAL at 22:50

## 2024-06-07 RX ADMIN — SODIUM CHLORIDE 3 MILLILITER(S): 9 INJECTION INTRAMUSCULAR; INTRAVENOUS; SUBCUTANEOUS at 22:51

## 2024-06-07 RX ADMIN — Medication 650 MILLIGRAM(S): at 19:36

## 2024-06-07 RX ADMIN — Medication 3 MILLILITER(S): at 22:51

## 2024-06-07 RX ADMIN — SODIUM CHLORIDE 3 MILLILITER(S): 9 INJECTION INTRAMUSCULAR; INTRAVENOUS; SUBCUTANEOUS at 18:55

## 2024-06-07 RX ADMIN — Medication 50 MILLIGRAM(S): at 22:50

## 2024-06-07 RX ADMIN — Medication 650 MILLIGRAM(S): at 20:14

## 2024-06-07 RX ADMIN — Medication 0.5 MILLIGRAM(S): at 22:51

## 2024-06-07 RX ADMIN — AMLODIPINE BESYLATE 5 MILLIGRAM(S): 2.5 TABLET ORAL at 22:50

## 2024-06-07 RX ADMIN — MEROPENEM 100 MILLIGRAM(S): 1 INJECTION INTRAVENOUS at 20:30

## 2024-06-07 NOTE — ED PROVIDER NOTE - PROGRESS NOTE DETAILS
Demetrio Newman MD: Spoke with pulm fellow--recommending blood/sputum cx, CT chest, and admission for IV abx--faith dose for now. Endorsed to THER[facility].

## 2024-06-07 NOTE — H&P ADULT - PROBLEM SELECTOR PLAN 7
Has hx of stroke x3 as well as SAH  Home meds: lipitor 40mg qhs, aspirin 81mg, plavix 75mg.    - C/w home meds

## 2024-06-07 NOTE — H&P ADULT - PROBLEM SELECTOR PLAN 4
Follows with Dr. Socrates Mcclure  Last TTE in 3/2024 showing normal LV and RV systolic function, dilated LA/RA, no significant valvular disease  Home meds: Toprol 50mg BID, atoravastatin 40mg qd, lasix 20mg qd  BNP not elevated past baseline    - C/w home meds

## 2024-06-07 NOTE — H&P ADULT - PROBLEM SELECTOR PLAN 6
Hx of CD infections requiring treatment while on antibiotics in the past, requires cdiff ppx with IV abx    - C/w vanc 125mg PO q12hrs while on IV abx

## 2024-06-07 NOTE — H&P ADULT - NSHPPHYSICALEXAM_GEN_ALL_CORE
.  VITAL SIGNS:  T(C): 36.7 (06-07-24 @ 15:04), Max: 36.7 (06-07-24 @ 15:04)  T(F): 98.1 (06-07-24 @ 15:04), Max: 98.1 (06-07-24 @ 15:04)  HR: 61 (06-07-24 @ 19:49) (59 - 61)  BP: 140/87 (06-07-24 @ 19:49) (129/74 - 140/87)  BP(mean): --  RR: 18 (06-07-24 @ 19:49) (18 - 18)  SpO2: 93% (06-07-24 @ 19:49) (93% - 96%)  Wt(kg): --    PHYSICAL EXAM:    Constitutional: resting comfortably in bed; NAD, frail, elderly appearing  Head: NC/AT  Eyes: PERRL, EOMI, anicteric sclera  ENT: no nasal discharge; uvula midline, no oropharyngeal erythema or exudates; MMM  Neck: supple; no JVD or thyromegaly  Respiratory: decreased breath sounds bilaterally, mild rhonchi in lung bases  Cardiac: +S1/S2; irregular rhythm, normal rate, no M/R/G  Gastrointestinal: soft, NT/ND; no rebound or guarding; +BSx4  Back: spine midline, no bony tenderness or step-offs; no CVAT B/L  Extremities: WWP, no clubbing or cyanosis; mild pitting ankle edema  Musculoskeletal: NROM x4; no joint swelling, tenderness or erythema  Vascular: 2+ radial, femoral, DP/PT pulses B/L  Dermatologic: skin warm, dry and intact; no rashes, wounds, or scars  Neurologic: AAOx3; no focal deficits  Psychiatric: affect and characteristics of appearance, verbalizations, behaviors are appropriate

## 2024-06-07 NOTE — ED PROVIDER NOTE - OBJECTIVE STATEMENT
83 F pmh AF (not on AC due to SAH and fall risk), HTN, Lewy body dementia (baseline a/o x3), CVA, SAH,  posterior reversible encephalopathy syndrome PRESS, chronic bronchiectasis (hx of pseudomonas and stenotrophomonas infections, home O2 prn, as well as 2 weeks each month with inhaled Tobramycin per Dr. Foster, last episode 4/2024), diastolic CHF on lasix, MITZI occlusion s/p surgery w/ Dr. Rivas on 3/19/24 p/w worsening sob x 5 days.  pt reports progressively worsening sob, using her oxygen continuously w/o relief.  reports persistent yellow/green sputum productive, no sig change but harder to bring it up causing chest discomfort from forceful coughing.  also reports occasional swelling in ankles, taking lasix daily rather than every other day and helps.  + generalized fatigue.  denies f/c, HA, fainting, palpitations, abd pain, nvd, leg pain, sick contacts, travel, fall/trauma

## 2024-06-07 NOTE — H&P ADULT - ASSESSMENT
83F h/o chronic bronchiectasis with PsA colonization (home O2 prn, inh Tobra every 2 weeks per Dr. Parra), multiple hospitalizations for exacerbations treated with IV faith, CDI (11/2023), Afib (no AC, MITZI occlusion on 3/19/24), HFpEF, Lewy body dementia, hx of CVA, SAH, PRESS, CF carrier, admitted for acute on chronic dyspnea on exertion, concern for bronchiectasis exacerbation.

## 2024-06-07 NOTE — ED ADULT NURSE NOTE - NSFALLRISKINTERV_ED_ALL_ED

## 2024-06-07 NOTE — ED ADULT NURSE NOTE - OBJECTIVE STATEMENT
pt received into spot 20 A&Ox4 but forgetful asks repetitive questions and is poor historian r/t HPI. hx chronic lung disease with cough uses nebulized abx 2 times weekly and does saline nebs at home. Presents complaining of increased sob chest congestion and sputum production x few days and LE swelling mostly at night. pt denies chest pain when at rest has cp when coughing. respirations unlabored speaks clear full sentences sating 93% on 4 L NC 12 lead ekg done IV placed labs sent

## 2024-06-07 NOTE — ED PROVIDER NOTE - PHYSICAL EXAMINATION
Vitals reviewed  Gen: frail appearing elderly F, intermittent coughing w/ thick yellow sputum, no hypoxia, on 2L NC   Skin: wwp, no rash/lesions  HEENT: ncat, eomi, mmm, airway patent   CV: rrr, no audible m/r/g  Resp: symmetrical expansion, diffusely diminished breath sounds, no focal rales or wheezing   Abd: nondistended, soft/nt  Ext: FROM throughout, no peripheral edema or calf ttp, no muscle or joint ttp, distal pulses 2+  Neuro: alert/oriented, no focal deficits, steady gait

## 2024-06-07 NOTE — H&P ADULT - ATTENDING COMMENTS
#Bronchiectasis exacerbation: unclear etiology, CT chest prelim w/o infiltrate. Possibly 2/2 URI. Hx of psa pna s/p 2 wks of meropenem. Based on sensitivities, c/w meropenem per pulm. F/up repeat sputum cx. ID consult in am. c/w airway clearance as above    #HTN: cont home meds. Monitor BP

## 2024-06-07 NOTE — ED PROVIDER NOTE - ATTENDING APP SHARED VISIT CONTRIBUTION OF CARE
I have reviewed and agree with all pertinent clinical information, including the history, physical exam, and medical decision making as documented by the PA/NP.

## 2024-06-07 NOTE — H&P ADULT - NSHPLABSRESULTS_GEN_ALL_CORE
.  LABS:                         10.5   7.10  )-----------( 227      ( 07 Jun 2024 15:58 )             33.6     06-07    136  |  97  |  18  ----------------------------<  96  4.2   |  29  |  0.74    Ca    9.2      07 Jun 2024 15:58    TPro  7.9  /  Alb  4.3  /  TBili  0.3  /  DBili  x   /  AST  23  /  ALT  17  /  AlkPhos  109  06-07      Urinalysis Basic - ( 07 Jun 2024 15:58 )    Color: x / Appearance: x / SG: x / pH: x  Gluc: 96 mg/dL / Ketone: x  / Bili: x / Urobili: x   Blood: x / Protein: x / Nitrite: x   Leuk Esterase: x / RBC: x / WBC x   Sq Epi: x / Non Sq Epi: x / Bacteria: x                RADIOLOGY, EKG & ADDITIONAL TESTS: Reviewed.

## 2024-06-07 NOTE — H&P ADULT - NSHPREVIEWOFSYSTEMS_GEN_ALL_CORE
CONSTITUTIONAL: + weakness, no fevers or chills  EYES/ENT: No visual changes;  No vertigo or throat pain   NECK: No pain or stiffness  RESPIRATORY: + cough, yellow/green sputum, dyspnea with exertion. No wheezing, hemoptysis  CARDIOVASCULAR: No chest pain or palpitations  GASTROINTESTINAL: No abdominal or epigastric pain. No nausea, vomiting, or hematemesis; No diarrhea or constipation. No melena or hematochezia.  GENITOURINARY: No dysuria, frequency or hematuria  NEUROLOGICAL: No numbness or weakness  SKIN: No itching, burning, rashes, or lesions   All other review of systems is negative unless indicated above.

## 2024-06-07 NOTE — ED PROVIDER NOTE - CLINICAL SUMMARY MEDICAL DECISION MAKING FREE TEXT BOX
83 F pmh AF (not on AC due to SAH and fall risk), HTN, Lewy body dementia (baseline a/o x3), CVA, SAH,  posterior reversible encephalopathy syndrome PRESS, chronic bronchiectasis (hx of pseudomonas and stenotrophomonas infections, home O2 prn, as well as 2 weeks each month with inhaled Tobramycin per Dr. Foster, last episode 4/2024), diastolic CHF on lasix, MITZI occlusion s/p surgery w/ Dr. Rivas on 3/19/24 p/w worsening sob x 5 days.  on exam vss, afebrile, no hypoxia, on home 2L NC, coughing up thick yellow sputum, diffusely diminished breath sounds, no focal rales or wheezing; no LE edema.  suspect acute bronchiectasis exacerbation vs viral syndrome vs pneumonia vs chf vs less likely acs.  will obtain labs, ekg, cxr and give saline neb as has helped in past

## 2024-06-07 NOTE — H&P ADULT - PROBLEM SELECTOR PLAN 2
At home, BPs ranging from 130s-180s systolic.   Home meds: metoprolol 50mg BID, amlodipine 5mg qd    - C/w home meds

## 2024-06-07 NOTE — H&P ADULT - PROBLEM SELECTOR PLAN 3
Not on AC despite elevated CHADsVASc given hx of falls and SAH  S/p MITZI occlusion in March 2024  Home meds: toprol 50mg BID    - C/w home meds

## 2024-06-07 NOTE — CONSULT NOTE ADULT - SUBJECTIVE AND OBJECTIVE BOX
PULMONARY SERVICE INITIAL CONSULT NOTE    HPI:  84 YO Female w/ AF (not on AC due to SAH and fall risk), HTN, Lewy body dementia (baseline a/o x3),CVA, SAH,  posterior reversible encephalopathy syndrome PRESS, chronic bronchiectasis with multiple admissions for Pseudomonal infections (home O2 prn, as well as 2 weeks each month with inhaled Tobramycin per Dr. Foster, last episode 2 months ago), diastolic CHF, and C. diff, who recently underwent an uncomplicated MITZI occlusion w/ 22mm Amulet with Dr. Rivas on 3/19/24. She was admitted in 4/2024 for bronchiectasis exacerbation and received PICC line with IV faith x 2 weeks. Pt reports today she has had worsening SOB for the past five days. She denies increased sputum production or change in sputum quality, but reports her sputum production has been constant since last admission. She does not feel that she has improved and is SOB with walking only a few steps and with speaking full sentences. Pt has been using PRN O2 more frequently at home. She uses 0.9% saline nebs and aerobika at home for airway clearance. She notes mild LE edema. Pt denies fever, chills, chest pain.    REVIEW OF SYSTEMS:  Constitutional: No fever, weight loss or fatigue  Eyes: No eye pain, visual disturbances, or discharge  ENMT:  No difficulty hearing, tinnitus, vertigo; No sinus or throat pain  Neck: No pain, stiffness or neck swelling  Respiratory: see HPI  Cardiovascular: No chest pain, palpitations, dizziness. Endorses leg swelling  Gastrointestinal: No abdominal or epigastric pain. No nausea, vomiting or hematemesis; No diarrhea or constipation. No melena or hematochezia.  Genitourinary: No dysuria, frequency, hematuria or incontinence  Neurological: No headaches, memory loss, loss of strength, numbness or tremors  Skin: No itching, burning, rashes or lesions   Lymph Nodes: No enlarged glands  Endocrine: No heat or cold intolerance; No hair loss  Musculoskeletal: No joint pain or swelling; No muscle, back or extremity pain  Psychiatric: No depression, anxiety, mood swings or difficulty sleeping  Heme/Lymph: No easy bruising or bleeding gums  Allergy and Immunologic: No hives or eczema    PAST MEDICAL & SURGICAL HISTORY:  Bronchiectasis      History of Pseudomonas pneumonia      HTN (hypertension)      Posterior reversible encephalopathy syndrome      Atrial fibrillation      Lewy body dementia      H/O fracture of right hip          FAMILY HISTORY:      SOCIAL HISTORY:  Smoking Status: [ ] Current, [ ] Former, [ ] Never  Pack Years:    MEDICATIONS:  Pulmonary:    Antimicrobials:  vancomycin    Solution 125 milliGRAM(s) Oral every 12 hours    Anticoagulants:    Onc:    GI/:    Endocrine:    Cardiac:    Other Medications:  acetaminophen     Tablet .. 650 milliGRAM(s) Oral every 6 hours PRN  melatonin 3 milliGRAM(s) Oral at bedtime PRN      Allergies    Augmentin (Short breath; Rash)  Ceclor (Rash)  IV Contrast (Unknown)  Levaquin (Swelling)    Intolerances        Vital Signs Last 24 Hrs  T(C): 36.7 (07 Jun 2024 15:04), Max: 36.7 (07 Jun 2024 15:04)  T(F): 98.1 (07 Jun 2024 15:04), Max: 98.1 (07 Jun 2024 15:04)  HR: 61 (07 Jun 2024 19:49) (59 - 61)  BP: 140/87 (07 Jun 2024 19:49) (129/74 - 140/87)  BP(mean): --  RR: 18 (07 Jun 2024 19:49) (18 - 18)  SpO2: 93% (07 Jun 2024 19:49) (93% - 96%)    Parameters below as of 07 Jun 2024 19:49  Patient On (Oxygen Delivery Method): nasal cannula  O2 Flow (L/min): 4          PHYSICAL EXAM:  Constitutional: well-appearing  Head: NC/AT  EENT: PERRL, anicteric sclera; oropharynx clear, MMM  Neck: supple, no appreciable JVD  Respiratory: mild crackles  Cardiovascular: +S1/S2, RRR  Gastrointestinal: soft, NT/ND  Extremities: WWP; no edema, clubbing or cyanosis  Vascular: 2+ radial pulses B/L  Neurological: awake and alert; KAHN    LABS:      CBC Full  -  ( 07 Jun 2024 15:58 )  WBC Count : 7.10 K/uL  RBC Count : 3.45 M/uL  Hemoglobin : 10.5 g/dL  Hematocrit : 33.6 %  Platelet Count - Automated : 227 K/uL  Mean Cell Volume : 97.4 fl  Mean Cell Hemoglobin : 30.4 pg  Mean Cell Hemoglobin Concentration : 31.3 gm/dL  Auto Neutrophil # : 4.75 K/uL  Auto Lymphocyte # : 1.45 K/uL  Auto Monocyte # : 0.79 K/uL  Auto Eosinophil # : 0.03 K/uL  Auto Basophil # : 0.06 K/uL  Auto Neutrophil % : 67.0 %  Auto Lymphocyte % : 20.4 %  Auto Monocyte % : 11.1 %  Auto Eosinophil % : 0.4 %  Auto Basophil % : 0.8 %    06-07    136  |  97  |  18  ----------------------------<  96  4.2   |  29  |  0.74    Ca    9.2      07 Jun 2024 15:58    TPro  7.9  /  Alb  4.3  /  TBili  0.3  /  DBili  x   /  AST  23  /  ALT  17  /  AlkPhos  109  06-07          Urinalysis Basic - ( 07 Jun 2024 15:58 )    Color: x / Appearance: x / SG: x / pH: x  Gluc: 96 mg/dL / Ketone: x  / Bili: x / Urobili: x   Blood: x / Protein: x / Nitrite: x   Leuk Esterase: x / RBC: x / WBC x   Sq Epi: x / Non Sq Epi: x / Bacteria: x                RADIOLOGY & ADDITIONAL STUDIES:

## 2024-06-07 NOTE — H&P ADULT - PROBLEM SELECTOR PLAN 1
Presenting with subjective dyspnea on exertion, cough, sputum production, subacute in chronicity since last hospitalization back in April 2024  Hx of repeated hospitalizations for bronchiectasis exacerbations  VSS, no increased WOB, on home 2L O2, no leukocytosis    - Pulm consulted, f/u recs  - C/w IV meropenem for now, repeat sputum cx pending  - F/u CT chest  - C/w montelukast  - Airway clearance with duonebs, hypertonic saline nebs, aerobika. Discuss with RT regarding chest vest  - ID consult in AM    #Anxiety  Related to dyspnea and bronchiectasis exacerbations  Home meds: klonipin 0.5mg BID    - C/w home meds Presenting with subjective dyspnea on exertion, cough, sputum production, subacute in chronicity since last hospitalization back in April 2024  Hx of repeated hospitalizations for bronchiectasis exacerbations, colonized with PsA. Last hospitalization in March/April of 2024, discharged with 2 week course of meropenem  VSS, no increased WOB, on home 2L O2, no leukocytosis    - Pulm consulted, f/u recs  - C/w IV meropenem for now, repeat sputum cx pending  - F/u CT chest  - C/w montelukast  - Airway clearance with duonebs, hypertonic saline nebs, aerobika. Discuss with RT regarding chest vest  - ID consult in AM    #Anxiety  Related to dyspnea and bronchiectasis exacerbations  Home meds: klonipin 0.5mg BID    - C/w home meds

## 2024-06-07 NOTE — ED ADULT NURSE NOTE - CHIEF COMPLAINT QUOTE
Pt presents to ED accompanied by daughter here for worsening SOB, pt is awake and alert and conversive in full sentences and uses O2 at home at 2lpm via NC. Per daughter pt had been hypertensive at home to 180's. Denies CP at present. History of HF and lung disease.

## 2024-06-07 NOTE — H&P ADULT - HISTORY OF PRESENT ILLNESS
83F h/o chronic bronchiectasis with PsA colonization (home O2 prn, inh Tobra every 2 weeks per  Poster), multiple hospitalizations for exacerbations treated with IV faith, CDI (11/2023), Afib (no AC, MITZI occlusion on 3/19/24), HFpEF, Lewy body dementia, hx of CVA, SAH, PRESS, CF carrier, sent to ED by cardiologist for dyspnea on exertion and labile blood pressures. Per patient and patient's daughter, patient has had continued dyspnea on exertion, cough, sputum production (yellow/green) since discharge from last hospitalization in April despite 2 week treatment of IV meropenem. Was started on a regimen of inhaled tobra on 6/1. Patient noticed some progressively worsening dyspnea past few days, and along with labile blood pressures, discussed with outpatient cardiologist who recommended ED. Otherwise, patient mentions some increased edema of ankles, but denies fevers, chills, headaches, nausea, vomiting, recent falls.     In the ED:  Vitals: T 98.1, HR 59, /74, RR 18, SpO2 96% on 2L NC-> 93% on 4LNC  Labs: Hgb 10.5 (baseline 10-11). CMP unremarkable. Trop negative. . RVP negative.   Imaging: CXR showing hyperinflated lungs, bronchiectasis, small L pleural effusion.  CT chest done, pending read.  EKG: Sinus rhythm with PVCs  Interventions: Tylenol 650mg x1, hypertonic saline neb x1, meropenem 1g x1   83F h/o chronic bronchiectasis with PsA colonization (home O2 prn, inh Tobra every 2 weeks per  Poster), multiple hospitalizations for exacerbations treated with IV faith, CDI (11/2023), Afib (no AC, MITZI occlusion on 3/19/24), HFpEF, Lewy body dementia, hx of CVA, SAH, PRESS, CF carrier, sent to ED by cardiologist for dyspnea on exertion and labile blood pressures. Per patient and patient's daughter, patient has had continued dyspnea on exertion, cough, sputum production (yellow/green) since discharge from last hospitalization in April despite 2 week treatment of IV meropenem. Was started on a regimen of inhaled tobra on 6/1. Patient noticed some progressively worsening dyspnea past few days, and along with labile blood pressures, discussed with outpatient cardiologist who recommended ED. Otherwise, patient mentions some increased edema of ankles, but denies fevers, chills, headaches, nausea, vomiting, recent falls.     In the ED:  Vitals: T 98.1, HR 59, /74, RR 18, SpO2 96% on 2L NC-> 93% on 4LNC  Labs: Hgb 10.5 (baseline 10-11). CMP unremarkable. Trop negative. . RVP negative.   Imaging: CXR showing hyperinflated lungs, bronchiectasis, small L pleural effusion.  CT chest done, pending read, however appears stable from prior chest CT 1 month prior  EKG: Sinus rhythm with PVCs  Interventions: Tylenol 650mg x1, hypertonic saline neb x1, meropenem 1g x1

## 2024-06-07 NOTE — ED ADULT TRIAGE NOTE - CHIEF COMPLAINT QUOTE
Pt presents to ED accompanied by daughter here for worsening SOB, pt is awake and alert and conversive in full sentences and uses O2 at home at 2lpm via NC. Per daughter pt had been hypertensive at home to 180's. Denies CP at present. Pt presents to ED accompanied by daughter here for worsening SOB, pt is awake and alert and conversive in full sentences and uses O2 at home at 2lpm via NC. Per daughter pt had been hypertensive at home to 180's. Denies CP at present. History of HF and lung disease.

## 2024-06-08 DIAGNOSIS — I48.20 CHRONIC ATRIAL FIBRILLATION, UNSPECIFIED: ICD-10-CM

## 2024-06-08 LAB
ALBUMIN SERPL ELPH-MCNC: 3.6 G/DL — SIGNIFICANT CHANGE UP (ref 3.3–5)
ALP SERPL-CCNC: 91 U/L — SIGNIFICANT CHANGE UP (ref 40–120)
ALT FLD-CCNC: 13 U/L — SIGNIFICANT CHANGE UP (ref 10–45)
ANION GAP SERPL CALC-SCNC: 7 MMOL/L — SIGNIFICANT CHANGE UP (ref 5–17)
AST SERPL-CCNC: 16 U/L — SIGNIFICANT CHANGE UP (ref 10–40)
BASOPHILS # BLD AUTO: 0.03 K/UL — SIGNIFICANT CHANGE UP (ref 0–0.2)
BASOPHILS NFR BLD AUTO: 0.6 % — SIGNIFICANT CHANGE UP (ref 0–2)
BILIRUB SERPL-MCNC: 0.2 MG/DL — SIGNIFICANT CHANGE UP (ref 0.2–1.2)
BLD GP AB SCN SERPL QL: NEGATIVE — SIGNIFICANT CHANGE UP
BUN SERPL-MCNC: 22 MG/DL — SIGNIFICANT CHANGE UP (ref 7–23)
CALCIUM SERPL-MCNC: 8.5 MG/DL — SIGNIFICANT CHANGE UP (ref 8.4–10.5)
CHLORIDE SERPL-SCNC: 101 MMOL/L — SIGNIFICANT CHANGE UP (ref 96–108)
CO2 SERPL-SCNC: 31 MMOL/L — SIGNIFICANT CHANGE UP (ref 22–31)
CREAT SERPL-MCNC: 0.77 MG/DL — SIGNIFICANT CHANGE UP (ref 0.5–1.3)
EGFR: 77 ML/MIN/1.73M2 — SIGNIFICANT CHANGE UP
EOSINOPHIL # BLD AUTO: 0.02 K/UL — SIGNIFICANT CHANGE UP (ref 0–0.5)
EOSINOPHIL NFR BLD AUTO: 0.4 % — SIGNIFICANT CHANGE UP (ref 0–6)
GLUCOSE BLDC GLUCOMTR-MCNC: 111 MG/DL — HIGH (ref 70–99)
GLUCOSE SERPL-MCNC: 109 MG/DL — HIGH (ref 70–99)
HCT VFR BLD CALC: 29.6 % — LOW (ref 34.5–45)
HGB BLD-MCNC: 8.8 G/DL — LOW (ref 11.5–15.5)
IMM GRANULOCYTES NFR BLD AUTO: 0.2 % — SIGNIFICANT CHANGE UP (ref 0–0.9)
LYMPHOCYTES # BLD AUTO: 1.1 K/UL — SIGNIFICANT CHANGE UP (ref 1–3.3)
LYMPHOCYTES # BLD AUTO: 20.7 % — SIGNIFICANT CHANGE UP (ref 13–44)
MAGNESIUM SERPL-MCNC: 1.8 MG/DL — SIGNIFICANT CHANGE UP (ref 1.6–2.6)
MCHC RBC-ENTMCNC: 29.7 GM/DL — LOW (ref 32–36)
MCHC RBC-ENTMCNC: 29.8 PG — SIGNIFICANT CHANGE UP (ref 27–34)
MCV RBC AUTO: 100.3 FL — HIGH (ref 80–100)
MONOCYTES # BLD AUTO: 0.64 K/UL — SIGNIFICANT CHANGE UP (ref 0–0.9)
MONOCYTES NFR BLD AUTO: 12.1 % — SIGNIFICANT CHANGE UP (ref 2–14)
NEUTROPHILS # BLD AUTO: 3.51 K/UL — SIGNIFICANT CHANGE UP (ref 1.8–7.4)
NEUTROPHILS NFR BLD AUTO: 66 % — SIGNIFICANT CHANGE UP (ref 43–77)
NRBC # BLD: 0 /100 WBCS — SIGNIFICANT CHANGE UP (ref 0–0)
PHOSPHATE SERPL-MCNC: 4.3 MG/DL — SIGNIFICANT CHANGE UP (ref 2.5–4.5)
PLATELET # BLD AUTO: 186 K/UL — SIGNIFICANT CHANGE UP (ref 150–400)
POTASSIUM SERPL-MCNC: 4.1 MMOL/L — SIGNIFICANT CHANGE UP (ref 3.5–5.3)
POTASSIUM SERPL-SCNC: 4.1 MMOL/L — SIGNIFICANT CHANGE UP (ref 3.5–5.3)
PROT SERPL-MCNC: 6.5 G/DL — SIGNIFICANT CHANGE UP (ref 6–8.3)
RBC # BLD: 2.95 M/UL — LOW (ref 3.8–5.2)
RBC # FLD: 15.2 % — HIGH (ref 10.3–14.5)
RH IG SCN BLD-IMP: POSITIVE — SIGNIFICANT CHANGE UP
SODIUM SERPL-SCNC: 139 MMOL/L — SIGNIFICANT CHANGE UP (ref 135–145)
WBC # BLD: 5.31 K/UL — SIGNIFICANT CHANGE UP (ref 3.8–10.5)
WBC # FLD AUTO: 5.31 K/UL — SIGNIFICANT CHANGE UP (ref 3.8–10.5)

## 2024-06-08 PROCEDURE — 99233 SBSQ HOSP IP/OBS HIGH 50: CPT | Mod: GC

## 2024-06-08 PROCEDURE — 99222 1ST HOSP IP/OBS MODERATE 55: CPT

## 2024-06-08 RX ORDER — MAGNESIUM SULFATE 500 MG/ML
2 VIAL (ML) INJECTION ONCE
Refills: 0 | Status: COMPLETED | OUTPATIENT
Start: 2024-06-08 | End: 2024-06-08

## 2024-06-08 RX ORDER — CLONAZEPAM 1 MG
0.5 TABLET ORAL EVERY 12 HOURS
Refills: 0 | Status: DISCONTINUED | OUTPATIENT
Start: 2024-06-08 | End: 2024-06-10

## 2024-06-08 RX ORDER — MEROPENEM 1 G/30ML
1000 INJECTION INTRAVENOUS EVERY 12 HOURS
Refills: 0 | Status: DISCONTINUED | OUTPATIENT
Start: 2024-06-08 | End: 2024-06-09

## 2024-06-08 RX ADMIN — Medication 125 MILLIGRAM(S): at 23:42

## 2024-06-08 RX ADMIN — MONTELUKAST 10 MILLIGRAM(S): 4 TABLET, CHEWABLE ORAL at 12:34

## 2024-06-08 RX ADMIN — AMLODIPINE BESYLATE 5 MILLIGRAM(S): 2.5 TABLET ORAL at 21:49

## 2024-06-08 RX ADMIN — PANTOPRAZOLE SODIUM 40 MILLIGRAM(S): 20 TABLET, DELAYED RELEASE ORAL at 06:50

## 2024-06-08 RX ADMIN — MEROPENEM 100 MILLIGRAM(S): 1 INJECTION INTRAVENOUS at 19:00

## 2024-06-08 RX ADMIN — Medication 650 MILLIGRAM(S): at 08:06

## 2024-06-08 RX ADMIN — SODIUM CHLORIDE 3 MILLILITER(S): 9 INJECTION INTRAMUSCULAR; INTRAVENOUS; SUBCUTANEOUS at 22:30

## 2024-06-08 RX ADMIN — Medication 20 MILLIGRAM(S): at 06:50

## 2024-06-08 RX ADMIN — Medication 125 MILLIGRAM(S): at 00:03

## 2024-06-08 RX ADMIN — Medication 650 MILLIGRAM(S): at 16:44

## 2024-06-08 RX ADMIN — Medication 650 MILLIGRAM(S): at 15:44

## 2024-06-08 RX ADMIN — SODIUM CHLORIDE 3 MILLILITER(S): 9 INJECTION INTRAMUSCULAR; INTRAVENOUS; SUBCUTANEOUS at 11:15

## 2024-06-08 RX ADMIN — ATORVASTATIN CALCIUM 40 MILLIGRAM(S): 80 TABLET, FILM COATED ORAL at 21:49

## 2024-06-08 RX ADMIN — Medication 50 MILLIGRAM(S): at 21:48

## 2024-06-08 RX ADMIN — CLOPIDOGREL BISULFATE 75 MILLIGRAM(S): 75 TABLET, FILM COATED ORAL at 12:34

## 2024-06-08 RX ADMIN — Medication 50 MILLIGRAM(S): at 12:34

## 2024-06-08 RX ADMIN — Medication 25 GRAM(S): at 07:36

## 2024-06-08 RX ADMIN — Medication 650 MILLIGRAM(S): at 07:36

## 2024-06-08 RX ADMIN — Medication 0.5 MILLIGRAM(S): at 23:42

## 2024-06-08 RX ADMIN — DONEPEZIL HYDROCHLORIDE 5 MILLIGRAM(S): 10 TABLET, FILM COATED ORAL at 21:49

## 2024-06-08 RX ADMIN — Medication 81 MILLIGRAM(S): at 12:34

## 2024-06-08 NOTE — PHYSICAL THERAPY INITIAL EVALUATION ADULT - PERTINENT HX OF CURRENT PROBLEM, REHAB EVAL
83F h/o chronic bronchiectasis with PsA colonization (home O2 prn, inh Tobra every 2 weeks per  Poster), multiple hospitalizations for exacerbations treated with IV faith, CDI (11/2023), Afib (no AC, MITZI occlusion on 3/19/24), HFpEF, Lewy body dementia, hx of CVA, SAH, PRESS, CF carrier, sent to ED by cardiologist for dyspnea on exertion and labile blood pressures. Per patient and patient's daughter, patient has had continued dyspnea on exertion, cough, sputum production (yellow/green) since discharge from last hospitalization in April despite 2 week treatment of IV meropenem. Was started on a regimen of inhaled tobra on 6/1. Patient noticed some progressively worsening dyspnea past few days, and along with labile blood pressures, discussed with outpatient cardiologist who recommended ED. Otherwise, patient mentions some increased edema of ankles, but denies fevers, chills, headaches, nausea, vomiting, recent falls.

## 2024-06-08 NOTE — PROGRESS NOTE ADULT - PROBLEM SELECTOR PLAN 1
Presenting with subjective dyspnea on exertion, cough, sputum production, subacute in chronicity since last hospitalization back in April 2024  Hx of repeated hospitalizations for bronchiectasis exacerbations, colonized with PsA. Last hospitalization in March/April of 2024, discharged with 2 week course of meropenem  VSS, no increased WOB, on home 2L O2, no leukocytosis    - Pulm consulted, f/u recs  - C/w IV meropenem for now, repeat sputum cx pending  - F/u CT chest  - C/w montelukast  - Airway clearance with duonebs, hypertonic saline nebs, aerobika. Discuss with RT regarding chest vest  - ID consult in AM    #Anxiety  Related to dyspnea and bronchiectasis exacerbations  Home meds: klonipin 0.5mg BID    - C/w home meds

## 2024-06-08 NOTE — PROGRESS NOTE ADULT - SUBJECTIVE AND OBJECTIVE BOX
Patient is a 83y old  Female who presents with a chief complaint of dyspnea, labile blood pressures (08 Jun 2024 09:29)    INTERVAL EVENTS: ALENA    SUBJECTIVE:  Patient was seen and examined at bedside. Patient reports that without supplemental oxygen she is extremely dyspneic. Has worsened cough.     Review of systems: Patient denies: fever, chills, dizziness, HA, Changes in vision, CP, dyspnea, nausea or vomiting, dysuria, changes in bowel movements, LE edema. Rest of 12 point Review of systems negative unless otherwise documented elsewhere in note.     Diet, Regular (06-07-24 @ 20:43) [Active]      MEDICATIONS:  MEDICATIONS  (STANDING):  albuterol/ipratropium for Nebulization 3 milliLiter(s) Nebulizer every 12 hours  amLODIPine   Tablet 5 milliGRAM(s) Oral every 24 hours  aspirin enteric coated 81 milliGRAM(s) Oral daily  atorvastatin 40 milliGRAM(s) Oral at bedtime  clopidogrel Tablet 75 milliGRAM(s) Oral daily  donepezil 5 milliGRAM(s) Oral at bedtime  enoxaparin Injectable 30 milliGRAM(s) SubCutaneous every 24 hours  furosemide    Tablet 20 milliGRAM(s) Oral daily  meropenem  IVPB 1000 milliGRAM(s) IV Intermittent every 12 hours  metoprolol succinate ER 50 milliGRAM(s) Oral every 12 hours  montelukast 10 milliGRAM(s) Oral daily  pantoprazole    Tablet 40 milliGRAM(s) Oral before breakfast  sodium chloride 0.9% for Nebulization 3 milliLiter(s) Nebulizer every 12 hours  vancomycin    Solution 125 milliGRAM(s) Oral every 12 hours    MEDICATIONS  (PRN):  acetaminophen     Tablet .. 650 milliGRAM(s) Oral every 6 hours PRN Temp greater or equal to 38C (100.4F), Mild Pain (1 - 3)  clonazePAM  Tablet 0.5 milliGRAM(s) Oral every 12 hours PRN For Pain/For Anxiety  melatonin 3 milliGRAM(s) Oral at bedtime PRN Insomnia      Allergies    Augmentin (Short breath; Rash)  Ceclor (Rash)  IV Contrast (Unknown)  Levaquin (Swelling)    Intolerances        OBJECTIVE:  Vital Signs Last 24 Hrs  T(C): 36.4 (08 Jun 2024 06:26), Max: 36.7 (07 Jun 2024 15:04)  T(F): 97.6 (08 Jun 2024 06:26), Max: 98.1 (07 Jun 2024 15:04)  HR: 52 (08 Jun 2024 06:26) (52 - 65)  BP: 132/59 (08 Jun 2024 06:26) (129/74 - 188/75)  BP(mean): --  RR: 18 (08 Jun 2024 06:26) (18 - 18)  SpO2: 100% (08 Jun 2024 06:26) (93% - 100%)    Parameters below as of 08 Jun 2024 06:26  Patient On (Oxygen Delivery Method): room air      I&O's Summary      PHYSICAL EXAM:  Gen: Reclining in bed at time of exam, appears stated age  HEENT: NCAT, MMM, clear OP  Neck: supple, trachea at midline  CV: RRR, +S1/S2  Pulm: adequate respiratory effort, no increase in work of breathing  Abd: soft, NTND  Skin: warm and dry, no new rashes vs prior report  Ext: WWP, no LE edema  Neuro: AOx3, no gross focal neurological deficits  Psych: affect and behavior appropriate, pleasant at time of interview    LABS:                        8.8    5.31  )-----------( 186      ( 08 Jun 2024 06:06 )             29.6     06-08    139  |  101  |  22  ----------------------------<  109<H>  4.1   |  31  |  0.77    Ca    8.5      08 Jun 2024 06:06  Phos  4.3     06-08  Mg     1.8     06-08    TPro  6.5  /  Alb  3.6  /  TBili  0.2  /  DBili  x   /  AST  16  /  ALT  13  /  AlkPhos  91  06-08    LIVER FUNCTIONS - ( 08 Jun 2024 06:06 )  Alb: 3.6 g/dL / Pro: 6.5 g/dL / ALK PHOS: 91 U/L / ALT: 13 U/L / AST: 16 U/L / GGT: x             CAPILLARY BLOOD GLUCOSE        Urinalysis Basic - ( 08 Jun 2024 06:06 )    Color: x / Appearance: x / SG: x / pH: x  Gluc: 109 mg/dL / Ketone: x  / Bili: x / Urobili: x   Blood: x / Protein: x / Nitrite: x   Leuk Esterase: x / RBC: x / WBC x   Sq Epi: x / Non Sq Epi: x / Bacteria: x        MICRODATA:      RADIOLOGY/OTHER STUDIES:

## 2024-06-08 NOTE — PHYSICAL THERAPY INITIAL EVALUATION ADULT - CRITERIA FOR SKILLED THERAPEUTIC INTERVENTIONS
impairments found/functional limitations in following categories/rehab potential/therapy frequency/anticipated equipment needs at discharge

## 2024-06-08 NOTE — CONSULT NOTE ADULT - SUBJECTIVE AND OBJECTIVE BOX
HPI:  82 yo F with HTN, Afib, s/p MITZI occlusion (3/19/24), HFpEF, chronic bronchiectasis with PsA colonization (home O2 prn, LARRY q2 weeks), CF carrier, Lewy body dementia, h/o CVA, SAH, PRESS, h/o C diff (9/2023), admitted 6/7 with worsening dyspnea and labile BPs.  ID consult regarding need for antibiotics.  She has had multiple admissions for dyspnea, treated for bronchiectasis exacerbation and/or HF.  She underwent MITZI occlusion on 3/19, then had increasing dyspnea post procedure with difficulty coughing due to mucous plugging.  She was admitted from 3/28-4/1 for treatment of bronchiectasis flare.  She had had no fever or leukocytosis.  CT chest showed bilateral bronchiectasis, mucoid impaction and TIB nodules with upper lobe predominance, likely suggestive of atypical infection such as YASMIN.  Sputum culture grew Pseudomonas aeruginosa S pip-tazo and cefepime by KB, I tobra, R amikacin, S aztreonam (YOLANDE 8).  Sputum AFB culture grew M. gordonae.  She was treated with a 2-week course of meropenem.  Per Ms. Funes, at the time she was admitted, she was able to ambulate well, later during that admission, she began developing progressive worsening of REID.  She feels that she derived no benefit from meropenem and has continued to worsen since that time so that she is now dyspneic when speaking.  She uses home O2 more frequently.  She has been using hypertonic saline nebs and Aerobika – brings up ~1/2 cup green sputum per day without blood.  She has chest tightness from coughing.  No fever, chills, sweats, has gained weight as a medication she is on stimulates her appetite.  She last saw Pulmonary (Dr. Foster) on 4/9, who postulated that intubation for Watchman procedure injured her trachea.  She was evaluated by ENT who found no tracheal lesions.  She spoke with her Cardiologist, who referred her to the ED, where she was afebrile, HR59, .74, SpO2 96% on 2L.  Labs with WBC 7.1 with nl diff, CMP WNL, rRVP ND.  CT chest showed unchanged bronchiectasis and mucoid impacted airways with a new ALCON elongated part GG nodule, probably inflammatory. She was seen by Pulmonary - recommended treatment for bacterial exacerbation.  She was started on meropenem.        PAST MEDICAL & SURGICAL HISTORY:  Bronchiectasis      History of Pseudomonas pneumonia      HTN (hypertension)      Posterior reversible encephalopathy syndrome      Atrial fibrillation      Lewy body dementia      H/O fracture of right hip              MEDICATIONS  (STANDING):  albuterol/ipratropium for Nebulization 3 milliLiter(s) Nebulizer every 12 hours  amLODIPine   Tablet 5 milliGRAM(s) Oral every 24 hours  aspirin enteric coated 81 milliGRAM(s) Oral daily  atorvastatin 40 milliGRAM(s) Oral at bedtime  clopidogrel Tablet 75 milliGRAM(s) Oral daily  donepezil 5 milliGRAM(s) Oral at bedtime  enoxaparin Injectable 30 milliGRAM(s) SubCutaneous every 24 hours  furosemide    Tablet 20 milliGRAM(s) Oral daily  meropenem  IVPB 1000 milliGRAM(s) IV Intermittent every 12 hours  metoprolol succinate ER 50 milliGRAM(s) Oral every 12 hours  montelukast 10 milliGRAM(s) Oral daily  pantoprazole    Tablet 40 milliGRAM(s) Oral before breakfast  sodium chloride 0.9% for Nebulization 3 milliLiter(s) Nebulizer every 12 hours  vancomycin    Solution 125 milliGRAM(s) Oral every 12 hours    MEDICATIONS  (PRN):  acetaminophen     Tablet .. 650 milliGRAM(s) Oral every 6 hours PRN Temp greater or equal to 38C (100.4F), Mild Pain (1 - 3)  clonazePAM  Tablet 0.5 milliGRAM(s) Oral every 12 hours PRN For Pain/For Anxiety  melatonin 3 milliGRAM(s) Oral at bedtime PRN Insomnia      Allergies    Augmentin (Short breath; Rash)  Ceclor (Rash)  IV Contrast (Unknown)  Levaquin (Swelling)    Intolerances        SOCIAL HISTORY:  Born in [facility] in [place] since 1958.  Has been , , .  Lives in [place] at her daughter's home, where there are a hypoallergenic dog and 2 hypoallergenic cats.  Had worked in [employer].  No tobacco, rare alcohol.    FAMILY HISTORY:  No pertinent family history in first degree relatives.       ROS:  She had had severe diarrhea while on meropenem, was treated empirically for C diff, diarrhea has resolved.  No HA, photophobia, neck stiffness, rhinorrhea, sore throat, N, V, abd pain, dysuria, hematuria, frequency, muscle/joint symptoms, bruising/bleeding     Vital Signs Last 24 Hrs  T(C): 36.9 (08 Jun 2024 12:30), Max: 36.9 (08 Jun 2024 12:30)  T(F): 98.4 (08 Jun 2024 12:30), Max: 98.4 (08 Jun 2024 12:30)  HR: 68 (08 Jun 2024 12:30) (52 - 68)  BP: 133/65 (08 Jun 2024 12:30) (132/59 - 188/75)  BP(mean): --  RR: 19 (08 Jun 2024 12:30) (18 - 19)  SpO2: 93% (08 Jun 2024 12:30) (93% - 100%)    Parameters below as of 08 Jun 2024 12:30  Patient On (Oxygen Delivery Method): nasal cannula  O2 Flow (L/min): 4      PE:  Alert, speaking in full sentences, thin, appears weak  HEENT:  NC, PERRL, sclerae anicteric, conjunctivae clear.  Sinuses nontender, no nasal exudate.  No buccal or pharyngeal lesions, erythema or exudate  Neck:  Supple, no adenopathy  Lungs: Decreased BS bilaterally, rhonchi associated with cough.  Cor:  RRR, S1, S2, no murmur appreciated  Abd:  Symmetric, normoactive BS.  Soft, nontender, no masses, guarding or rebound.  Liver and spleen not enlarged  Extrem:  No cyanosis or edema  Skin:  No rashes.    LABS:                        8.8    5.31  )-----------( 186      ( 08 Jun 2024 06:06 )             29.6     06-08    139  |  101  |  22  ----------------------------<  109<H>  4.1   |  31  |  0.77    Ca    8.5      08 Jun 2024 06:06  Phos  4.3     06-08  Mg     1.8     06-08    TPro  6.5  /  Alb  3.6  /  TBili  0.2  /  DBili  x   /  AST  16  /  ALT  13  /  AlkPhos  91  06-08    Urinalysis Basic - ( 08 Jun 2024 06:06 )    Color: x / Appearance: x / SG: x / pH: x  Gluc: 109 mg/dL / Ketone: x  / Bili: x / Urobili: x   Blood: x / Protein: x / Nitrite: x   Leuk Esterase: x / RBC: x / WBC x   Sq Epi: x / Non Sq Epi: x / Bacteria: x        MICROBIOLOGY:    Blood cultures 6/7 X 2 - in lab    RADIOLOGY & ADDITIONAL STUDIES:    < from: CT Chest No Cont (06.07.24 @ 21:16) >  FINDINGS:    LUNGS/AIRWAYS/PLEURA: No significant change and a multilobar   bronchiectasis and mucoid impacted distal airways. New elongated 1.5 cm   part groundglass nodule in the left upper lobe (4-20). Multiple calcified   granulomas. Small left pleural effusion, unchanged since 5/2/2024.    LYMPH NODES/MEDIASTINUM: No lymphadenopathy.    HEART/VASCULATURE: Enlarged heart. Left atrial appendage occlusion   device. No pericardial effusion. Coronary artery calcifications. No   aortic aneurysm.    UPPER ABDOMEN: Unremarkable.    BONES/SOFT TISSUES: No acute fracture or bone lesion.      IMPRESSION:    Unchanged bronchiectasis and mucoid impacted airways.    New left upper lobe elongated part groundglass nodule, probably   inflammatory. Recommend three-month follow-up to assess for clearance.    --- End of Report ---    < end of copied text >

## 2024-06-08 NOTE — PHYSICAL THERAPY INITIAL EVALUATION ADULT - ADDITIONAL COMMENTS
Pt reports to live in a private home and with no CHRIS to enter. Pt reports to have home O2 supply and has been mostly indpt with all ADLs and IADLs prior to admission.

## 2024-06-08 NOTE — CHART NOTE - NSCHARTNOTEFT_GEN_A_CORE
Infectious Diseases Anti-infective Approval Note    Medication:  Meropenem  Dose:  1 g  Route:  IV  Frequency:  q12h  Duration**:  3 d  Purpose:  (check one)       Empiric pending cultures: X       Empiric, no culture data:       Final duration:    Dose may be adjusted as needed for alterations in renal function.    *THIS IS NOT AN INFECTIOUS DISEASES CONSULTATION*    **Indicates duration of approval, not necessarily duration of treatment

## 2024-06-08 NOTE — CONSULT NOTE ADULT - SUBJECTIVE AND OBJECTIVE BOX
Patient is a 83y old  Female who presents with a chief complaint of dyspnea, labile blood pressures (07 Jun 2024 20:41)      HPI:  83F h/o chronic bronchiectasis with PsA colonization (home O2 prn, inh Tobra every 2 weeks per Dr. Parra), multiple hospitalizations for exacerbations treated with IV faith, CDI (11/2023), Afib (no AC, MITZI occlusion on 3/19/24), HFpEF, Lewy body dementia, hx of CVA, SAH, PRESS, CF carrier, sent to ED by cardiologist for dyspnea on exertion and labile blood pressures. Per patient and patient's daughter, patient has had continued dyspnea on exertion, cough, sputum production (yellow/green) since discharge from last hospitalization in April despite 2 week treatment of IV meropenem. Was started on a regimen of inhaled tobra on 6/1. Patient noticed some progressively worsening dyspnea past few days, and along with labile blood pressures, discussed with outpatient cardiologist who recommended ED. Otherwise, patient mentions some increased edema of ankles, but denies fevers, chills, headaches, nausea, vomiting, recent falls.     In the ED:  Vitals: T 98.1, HR 59, /74, RR 18, SpO2 96% on 2L NC-> 93% on 4LNC  Labs: Hgb 10.5 (baseline 10-11). CMP unremarkable. Trop negative. . RVP negative.   Imaging: CXR showing hyperinflated lungs, bronchiectasis, small L pleural effusion.  CT chest done, pending read, however appears stable from prior chest CT 1 month prior  EKG: Sinus rhythm with PVCs  Interventions: Tylenol 650mg x1, hypertonic saline neb x1, meropenem 1g x1   (07 Jun 2024 20:41)    PAST MEDICAL & SURGICAL HISTORY:  Bronchiectasis      History of Pseudomonas pneumonia      HTN (hypertension)      Posterior reversible encephalopathy syndrome      Atrial fibrillation      Lewy body dementia      H/O fracture of right hip        MEDICATIONS  (STANDING):  albuterol/ipratropium for Nebulization 3 milliLiter(s) Nebulizer every 12 hours  amLODIPine   Tablet 5 milliGRAM(s) Oral every 24 hours  aspirin enteric coated 81 milliGRAM(s) Oral daily  atorvastatin 40 milliGRAM(s) Oral at bedtime  clopidogrel Tablet 75 milliGRAM(s) Oral daily  donepezil 5 milliGRAM(s) Oral at bedtime  enoxaparin Injectable 30 milliGRAM(s) SubCutaneous every 24 hours  furosemide    Tablet 20 milliGRAM(s) Oral daily  metoprolol succinate ER 50 milliGRAM(s) Oral every 12 hours  montelukast 10 milliGRAM(s) Oral daily  pantoprazole    Tablet 40 milliGRAM(s) Oral before breakfast  sodium chloride 0.9% for Nebulization 3 milliLiter(s) Nebulizer every 12 hours  vancomycin    Solution 125 milliGRAM(s) Oral every 12 hours    MEDICATIONS  (PRN):  acetaminophen     Tablet .. 650 milliGRAM(s) Oral every 6 hours PRN Temp greater or equal to 38C (100.4F), Mild Pain (1 - 3)  clonazePAM  Tablet 0.5 milliGRAM(s) Oral every 12 hours PRN For Pain/For Anxiety  melatonin 3 milliGRAM(s) Oral at bedtime PRN Insomnia          FAMILY HISTORY:      CBC Full  -  ( 08 Jun 2024 06:06 )  WBC Count : 5.31 K/uL  RBC Count : 2.95 M/uL  Hemoglobin : 8.8 g/dL  Hematocrit : 29.6 %  Platelet Count - Automated : 186 K/uL  Mean Cell Volume : 100.3 fl  Mean Cell Hemoglobin : 29.8 pg  Mean Cell Hemoglobin Concentration : 29.7 gm/dL  Auto Neutrophil # : 3.51 K/uL  Auto Lymphocyte # : 1.10 K/uL  Auto Monocyte # : 0.64 K/uL  Auto Eosinophil # : 0.02 K/uL  Auto Basophil # : 0.03 K/uL  Auto Neutrophil % : 66.0 %  Auto Lymphocyte % : 20.7 %  Auto Monocyte % : 12.1 %  Auto Eosinophil % : 0.4 %  Auto Basophil % : 0.6 %      06-08    139  |  101  |  22  ----------------------------<  109<H>  4.1   |  31  |  0.77    Ca    8.5      08 Jun 2024 06:06  Phos  4.3     06-08  Mg     1.8     06-08    TPro  6.5  /  Alb  3.6  /  TBili  0.2  /  DBili  x   /  AST  16  /  ALT  13  /  AlkPhos  91  06-08      Urinalysis Basic - ( 08 Jun 2024 06:06 )    Color: x / Appearance: x / SG: x / pH: x  Gluc: 109 mg/dL / Ketone: x  / Bili: x / Urobili: x   Blood: x / Protein: x / Nitrite: x   Leuk Esterase: x / RBC: x / WBC x   Sq Epi: x / Non Sq Epi: x / Bacteria: x        Radiology :     < from: Xray Chest 1 View- PORTABLE-Urgent (Xray Chest 1 View- PORTABLE-Urgent .) (06.07.24 @ 17:20) >  ACC: [id number] EXAM:  XR CHEST PORTABLE URGENT 1V   ORDERED BY: BERTO VORA     PROCEDURE DATE:  06/07/2024          INTERPRETATION:  Portable Chest Radiograph    History: Bony atelectasis with worsening shortness of breath.    Comparison: Chest radiograph from 4/1/2024 and chest CT from 6/7/2024 and   3/20/2024..    Findings: Hyperinflation of the lungs. Diffuse bronchiectasis. There are   scattered opacities over the bilateral lungs. Small left and no right   pleural effusion. No pneumothorax. The cardiac mediastinum is   unremarkable. Left atrial appendage occlusion device.    Impression: Hyperinflation of the lungs with bronchiectasis and small   left pleural effusion. Few small patchy opacities over the bilateral   lungs.             Review of Systems : per HPI         Vital Signs Last 24 Hrs  T(C): 36.4 (08 Jun 2024 06:26), Max: 36.7 (07 Jun 2024 15:04)  T(F): 97.6 (08 Jun 2024 06:26), Max: 98.1 (07 Jun 2024 15:04)  HR: 52 (08 Jun 2024 06:26) (52 - 65)  BP: 132/59 (08 Jun 2024 06:26) (129/74 - 188/75)  BP(mean): --  RR: 18 (08 Jun 2024 06:26) (18 - 18)  SpO2: 100% (08 Jun 2024 06:26) (93% - 100%)    Parameters below as of 08 Jun 2024 06:26  Patient On (Oxygen Delivery Method): room air            Physical Exam:  frail 83 y o woman lying comfortably in semi Parra's position , awake , alert , no acute complaints      Head: normocephalic , atraumatic    Eyes: PERRLA , EOMI , no nystagmus , sclera anicteric    ENT / FACE: neg nasal discharge , uvula midline , no oropharyngeal erythema / exudate    Neck: supple , negative JVD , negative carotid bruits , no thyromegaly    Chest: finecrackles R>L base    Cardiovascular: regular rate and rhythm , neg murmurs / rubs / gallops    Abdomen: soft , non distended , no tenderness to palpation in all 4 quadrants ,  normal bowel sounds     Extremities: WWP , neg cyanosis /clubbing / edema     Neurologic Exam:     Alert and oriented to person , place     Motor Exam:        > 3+/5 x 4 extremities     Sensation:         intact to light touch x 4 extremities     DTR:           biceps/brachioradialis: equal                            patella/ankle: equal       Gait:  not tested         PM&R Impression: admitted for acute on chronic dyspnea on exertion, concern for bronchiectasis exacerbation     - deconditioned       Recommendations / Plan:       1) Physical / Occupational therapy focusing on therapeutic exercises , equipment evaluation , bed mobility/transfer out of bed evaluation , progressive ambulation with assistive devices prn .    2) Current disposition plan recommendation:    pending functional progress

## 2024-06-09 DIAGNOSIS — J96.11 CHRONIC RESPIRATORY FAILURE WITH HYPOXIA: ICD-10-CM

## 2024-06-09 LAB
ALBUMIN SERPL ELPH-MCNC: 3.6 G/DL — SIGNIFICANT CHANGE UP (ref 3.3–5)
ALP SERPL-CCNC: 91 U/L — SIGNIFICANT CHANGE UP (ref 40–120)
ALT FLD-CCNC: 14 U/L — SIGNIFICANT CHANGE UP (ref 10–45)
ANION GAP SERPL CALC-SCNC: 7 MMOL/L — SIGNIFICANT CHANGE UP (ref 5–17)
AST SERPL-CCNC: 16 U/L — SIGNIFICANT CHANGE UP (ref 10–40)
BASOPHILS # BLD AUTO: 0.04 K/UL — SIGNIFICANT CHANGE UP (ref 0–0.2)
BASOPHILS NFR BLD AUTO: 0.7 % — SIGNIFICANT CHANGE UP (ref 0–2)
BILIRUB SERPL-MCNC: 0.2 MG/DL — SIGNIFICANT CHANGE UP (ref 0.2–1.2)
BUN SERPL-MCNC: 29 MG/DL — HIGH (ref 7–23)
CALCIUM SERPL-MCNC: 8.6 MG/DL — SIGNIFICANT CHANGE UP (ref 8.4–10.5)
CHLORIDE SERPL-SCNC: 100 MMOL/L — SIGNIFICANT CHANGE UP (ref 96–108)
CO2 SERPL-SCNC: 30 MMOL/L — SIGNIFICANT CHANGE UP (ref 22–31)
CREAT SERPL-MCNC: 0.79 MG/DL — SIGNIFICANT CHANGE UP (ref 0.5–1.3)
EGFR: 75 ML/MIN/1.73M2 — SIGNIFICANT CHANGE UP
EOSINOPHIL # BLD AUTO: 0.04 K/UL — SIGNIFICANT CHANGE UP (ref 0–0.5)
EOSINOPHIL NFR BLD AUTO: 0.7 % — SIGNIFICANT CHANGE UP (ref 0–6)
GLUCOSE SERPL-MCNC: 110 MG/DL — HIGH (ref 70–99)
HCT VFR BLD CALC: 28.4 % — LOW (ref 34.5–45)
HGB BLD-MCNC: 8.7 G/DL — LOW (ref 11.5–15.5)
IMM GRANULOCYTES NFR BLD AUTO: 0.2 % — SIGNIFICANT CHANGE UP (ref 0–0.9)
LYMPHOCYTES # BLD AUTO: 1.22 K/UL — SIGNIFICANT CHANGE UP (ref 1–3.3)
LYMPHOCYTES # BLD AUTO: 20.4 % — SIGNIFICANT CHANGE UP (ref 13–44)
MAGNESIUM SERPL-MCNC: 2 MG/DL — SIGNIFICANT CHANGE UP (ref 1.6–2.6)
MCHC RBC-ENTMCNC: 30.3 PG — SIGNIFICANT CHANGE UP (ref 27–34)
MCHC RBC-ENTMCNC: 30.6 GM/DL — LOW (ref 32–36)
MCV RBC AUTO: 99 FL — SIGNIFICANT CHANGE UP (ref 80–100)
MONOCYTES # BLD AUTO: 0.68 K/UL — SIGNIFICANT CHANGE UP (ref 0–0.9)
MONOCYTES NFR BLD AUTO: 11.4 % — SIGNIFICANT CHANGE UP (ref 2–14)
NEUTROPHILS # BLD AUTO: 4 K/UL — SIGNIFICANT CHANGE UP (ref 1.8–7.4)
NEUTROPHILS NFR BLD AUTO: 66.6 % — SIGNIFICANT CHANGE UP (ref 43–77)
NRBC # BLD: 0 /100 WBCS — SIGNIFICANT CHANGE UP (ref 0–0)
PHOSPHATE SERPL-MCNC: 3.5 MG/DL — SIGNIFICANT CHANGE UP (ref 2.5–4.5)
PLATELET # BLD AUTO: 179 K/UL — SIGNIFICANT CHANGE UP (ref 150–400)
POTASSIUM SERPL-MCNC: 3.8 MMOL/L — SIGNIFICANT CHANGE UP (ref 3.5–5.3)
POTASSIUM SERPL-SCNC: 3.8 MMOL/L — SIGNIFICANT CHANGE UP (ref 3.5–5.3)
PROT SERPL-MCNC: 6.5 G/DL — SIGNIFICANT CHANGE UP (ref 6–8.3)
RBC # BLD: 2.87 M/UL — LOW (ref 3.8–5.2)
RBC # FLD: 15.4 % — HIGH (ref 10.3–14.5)
SODIUM SERPL-SCNC: 137 MMOL/L — SIGNIFICANT CHANGE UP (ref 135–145)
WBC # BLD: 5.99 K/UL — SIGNIFICANT CHANGE UP (ref 3.8–10.5)
WBC # FLD AUTO: 5.99 K/UL — SIGNIFICANT CHANGE UP (ref 3.8–10.5)

## 2024-06-09 PROCEDURE — 99233 SBSQ HOSP IP/OBS HIGH 50: CPT

## 2024-06-09 PROCEDURE — 99233 SBSQ HOSP IP/OBS HIGH 50: CPT | Mod: GC

## 2024-06-09 RX ORDER — SODIUM CHLORIDE 9 MG/ML
3 INJECTION INTRAMUSCULAR; INTRAVENOUS; SUBCUTANEOUS EVERY 6 HOURS
Refills: 0 | Status: DISCONTINUED | OUTPATIENT
Start: 2024-06-09 | End: 2024-06-10

## 2024-06-09 RX ADMIN — Medication 650 MILLIGRAM(S): at 10:15

## 2024-06-09 RX ADMIN — Medication 50 MILLIGRAM(S): at 21:25

## 2024-06-09 RX ADMIN — Medication 650 MILLIGRAM(S): at 17:15

## 2024-06-09 RX ADMIN — ATORVASTATIN CALCIUM 40 MILLIGRAM(S): 80 TABLET, FILM COATED ORAL at 21:25

## 2024-06-09 RX ADMIN — DONEPEZIL HYDROCHLORIDE 5 MILLIGRAM(S): 10 TABLET, FILM COATED ORAL at 21:25

## 2024-06-09 RX ADMIN — CLOPIDOGREL BISULFATE 75 MILLIGRAM(S): 75 TABLET, FILM COATED ORAL at 11:45

## 2024-06-09 RX ADMIN — MONTELUKAST 10 MILLIGRAM(S): 4 TABLET, CHEWABLE ORAL at 11:45

## 2024-06-09 RX ADMIN — Medication 650 MILLIGRAM(S): at 16:15

## 2024-06-09 RX ADMIN — Medication 650 MILLIGRAM(S): at 09:15

## 2024-06-09 RX ADMIN — Medication 650 MILLIGRAM(S): at 01:28

## 2024-06-09 RX ADMIN — Medication 81 MILLIGRAM(S): at 11:45

## 2024-06-09 RX ADMIN — AMLODIPINE BESYLATE 5 MILLIGRAM(S): 2.5 TABLET ORAL at 22:40

## 2024-06-09 RX ADMIN — SODIUM CHLORIDE 3 MILLILITER(S): 9 INJECTION INTRAMUSCULAR; INTRAVENOUS; SUBCUTANEOUS at 11:45

## 2024-06-09 RX ADMIN — Medication 50 MILLIGRAM(S): at 09:12

## 2024-06-09 RX ADMIN — Medication 650 MILLIGRAM(S): at 00:28

## 2024-06-09 RX ADMIN — PANTOPRAZOLE SODIUM 40 MILLIGRAM(S): 20 TABLET, DELAYED RELEASE ORAL at 09:12

## 2024-06-09 RX ADMIN — Medication 0.5 MILLIGRAM(S): at 22:40

## 2024-06-09 RX ADMIN — SODIUM CHLORIDE 3 MILLILITER(S): 9 INJECTION INTRAMUSCULAR; INTRAVENOUS; SUBCUTANEOUS at 19:40

## 2024-06-09 NOTE — PROGRESS NOTE ADULT - ASSESSMENT
I M    83 y o F h/o chronic bronchiectasis with PsA colonization (home O2 prn, inh Tobra every 2 weeks per Dr. Parra), multiple hospitalizations for exacerbations treated with IV faith, CDI (11/2023), Afib (no AC, MITZI occlusion on 3/19/24), HFpEF, Lewy body dementia, hx of CVA, SAH, PRESS, CF carrier, admitted for acute on chronic dyspnea on exertion, concern for bronchiectasis exacerbation.    Problem/Plan - 1:  ·  Problem: Acute exacerbation of bronchiectasis.   ·  Plan: Presenting with subjective dyspnea on exertion, cough, sputum production, subacute in chronicity since last hospitalization back in April 2024  Hx of repeated hospitalizations for bronchiectasis exacerbations, colonized with PsA. Last hospitalization in March/April of 2024, discharged with 2 week course of meropenem  VSS, no increased WOB, on home 2L O2, no leukocytosis    - Pulm consulted, f/u recs  - C/w IV meropenem for now, repeat sputum cx pending  - F/u CT chest  - C/w montelukast  - Airway clearance with duonebs, hypertonic saline nebs, aerobika. Discuss with RT regarding chest vest  - ID consult in AM    #Anxiety  Related to dyspnea and bronchiectasis exacerbations  Home meds: klonipin 0.5mg BID    - C/w home meds.    Problem/Plan - 2:  ·  Problem: HTN (hypertension).   ·  Plan: At home, BPs ranging from 130s-180s systolic.   Home meds: metoprolol 50mg BID, amlodipine 5mg qd    - C/w home meds.    Problem/Plan - 3:  ·  Problem: Atrial fibrillation.   ·  Plan: Not on AC despite elevated CHADsVASc given hx of falls and SAH  S/p MITZI occlusion in March 2024  Home meds: toprol 50mg BID    - C/w home meds.    Problem/Plan - 4:  ·  Problem: (HFpEF) heart failure with preserved ejection fraction.   ·  Plan: Follows with Dr. Socrates Mcclure  Last TTE in 3/2024 showing normal LV and RV systolic function, dilated LA/RA, no significant valvular disease  Home meds: Toprol 50mg BID, atoravastatin 40mg qd, lasix 20mg qd  BNP not elevated past baseline    - C/w home meds.    Problem/Plan - 5:  ·  Problem: Lewy body dementia.   ·  Plan: Currently A&Ox3 without any neuro deficits  Home meds: donepezil 5mg qhs    - C/w home meds.    Problem/Plan - 6:  ·  Problem: History of Clostridium difficile infection.   ·  Plan: Hx of CD infections requiring treatment while on antibiotics in the past, requires cdiff ppx with IV abx    - C/w vanc 125mg PO q12hrs while on IV abx.    Problem/Plan - 7:  ·  Problem: History of stroke.   ·  Plan: Has hx of stroke x3 as well as SAH  Home meds: lipitor 40mg qhs, aspirin 81mg, plavix 75mg.    - C/w home meds.    Problem/Plan - 8:  ·  Problem: Prophylactic measure.   ·  Plan: F: None  E: Replete as needed  N: Regular diet  Dvt ppx: Lovenox  Dispo: F.

## 2024-06-09 NOTE — PROGRESS NOTE ADULT - SUBJECTIVE AND OBJECTIVE BOX
*Incomplete Patient is a 83y old  Female who presents with a chief complaint of dyspnea, labile blood pressures (09 Jun 2024 10:00)    INTERVAL HPI/OVERNIGHT EVENTS:   No overnight events   Afebrile, hemodynamically stable     Subjective: Patient seen and examined at bedside. Pt is frustrated with lack of progress. Continuing to have shortness of breath but has decreased productive sputum due to dry air per patient    Physical Exam:  General; Alert, speaking in full sentences, thin, appears weak  HEENT:  NC, PERRL, sclerae anicteric, conjunctivae clear.  Sinuses nontender, no nasal exudate.  No buccal or pharyngeal lesions, erythema or exudate  Lungs: Decreased BS bilaterally, rhonchi associated with cough.  Cardiac:  RRR, S1, S2, no murmur appreciated  Abd:  Symmetric, normoactive BS.  Soft, nontender, no masses  Skin: No rashes.      Vital Signs Last 24 Hrs  T(C): 36.7 (09 Jun 2024 05:46), Max: 36.9 (08 Jun 2024 12:30)  T(F): 98 (09 Jun 2024 05:46), Max: 98.4 (08 Jun 2024 12:30)  HR: 90 (09 Jun 2024 09:08) (50 - 90)  BP: 145/90 (09 Jun 2024 09:08) (127/62 - 145/90)    RR: 18 (09 Jun 2024 05:46) (18 - 19)  SpO2: 96% (09 Jun 2024 05:46) (90% - 97%)    O2 Parameters below as of 09 Jun 2024 05:46  Patient On (Oxygen Delivery Method): room air      LABS:                        8.7    5.99  )-----------( 179      ( 09 Jun 2024 05:30 )             28.4     06-09    137  |  100  |  29<H>  ----------------------------<  110<H>  3.8   |  30  |  0.79    Ca    8.6      09 Jun 2024 05:30  Phos  3.5     06-09  Mg     2.0     06-09    TPro  6.5  /  Alb  3.6  /  TBili  0.2  /  DBili  x   /  AST  16  /  ALT  14  /  AlkPhos  91  06-09      Urinalysis Basic - ( 09 Jun 2024 05:30 )    Color: x / Appearance: x / SG: x / pH: x  Gluc: 110 mg/dL / Ketone: x  / Bili: x / Urobili: x   Blood: x / Protein: x / Nitrite: x   Leuk Esterase: x / RBC: x / WBC x   Sq Epi: x / Non Sq Epi: x / Bacteria: x      CAPILLARY BLOOD GLUCOSE      POCT Blood Glucose.: 111 mg/dL (08 Jun 2024 17:31)        Consultant(s) Notes Reviewed:  [x ] YES  [ ] NO    MEDICATIONS  (STANDING):  albuterol/ipratropium for Nebulization 3 milliLiter(s) Nebulizer every 12 hours  amLODIPine   Tablet 5 milliGRAM(s) Oral every 24 hours  aspirin enteric coated 81 milliGRAM(s) Oral daily  atorvastatin 40 milliGRAM(s) Oral at bedtime  clopidogrel Tablet 75 milliGRAM(s) Oral daily  donepezil 5 milliGRAM(s) Oral at bedtime  enoxaparin Injectable 30 milliGRAM(s) SubCutaneous every 24 hours  furosemide    Tablet 20 milliGRAM(s) Oral daily  metoprolol succinate ER 50 milliGRAM(s) Oral every 12 hours  montelukast 10 milliGRAM(s) Oral daily  pantoprazole    Tablet 40 milliGRAM(s) Oral before breakfast  sodium chloride 0.9% for Nebulization 3 milliLiter(s) Nebulizer every 12 hours  vancomycin    Solution 125 milliGRAM(s) Oral every 12 hours    MEDICATIONS  (PRN):  acetaminophen     Tablet .. 650 milliGRAM(s) Oral every 6 hours PRN Temp greater or equal to 38C (100.4F), Mild Pain (1 - 3)  clonazePAM  Tablet 0.5 milliGRAM(s) Oral every 12 hours PRN For Pain/For Anxiety  melatonin 3 milliGRAM(s) Oral at bedtime PRN Insomnia      Care Discussed with Consultants/Other Providers [ x] YES  [ ] NO    RADIOLOGY & ADDITIONAL TESTS:

## 2024-06-09 NOTE — PROGRESS NOTE ADULT - PROBLEM SELECTOR PLAN 1
Presenting with subjective dyspnea on exertion, cough, sputum production, subacute in chronicity since last hospitalization back in April 2024  Hx of repeated hospitalizations for bronchiectasis exacerbations, colonized with PsA. Last hospitalization in March/April of 2024, discharged with 2 week course of meropenem  VSS, no increased WOB, on home 2L O2, no leukocytosis    - Pulm consulted, f/u recs  - C/w IV meropenem for now, repeat sputum cx pending  - F/u CT chest  - C/w montelukast  - Airway clearance with duonebs, hypertonic saline nebs, aerobika. Discuss with RT regarding chest vest  - ID consult in AM    #Anxiety  Related to dyspnea and bronchiectasis exacerbations  Home meds: klonipin 0.5mg BID    - C/w home meds Presenting with subjective dyspnea on exertion, cough, sputum production, subacute in chronicity since last hospitalization back in April 2024  Hx of repeated hospitalizations for bronchiectasis exacerbations, colonized with PsA. Last hospitalization in March/April of 2024, discharged with 2 week course of meropenem    CT chest shows similar changes as prior admission w/ left upper lobe elongated part groundglass nodule, probably inflammatory    - f/u ID recs  - f/u Pulm recs  - d/c IV meropenem for now, repeat sputum cx pending  - C/w montelukast  - Airway clearance with duonebs, hypertonic saline nebs, aerobika. Discuss with RT regarding chest vest      #Anxiety  Related to dyspnea and bronchiectasis exacerbations  Home meds: klonipin 0.5mg BID    - C/w home meds Presenting with subjective dyspnea on exertion, cough, sputum production, subacute in chronicity since last hospitalization back in April 2024  Hx of repeated hospitalizations for bronchiectasis exacerbations, colonized with PsA. Last hospitalization in March/April of 2024, discharged with 2 week course of meropenem    CT chest shows similar changes as prior admission w/ left upper lobe elongated part groundglass nodule, probably inflammatory    - f/u ID recs  - f/u Pulm recs  - d/c IV meropenem for now, pending identification of specific bacteria  - C/w montelukast  - Airway clearance with duonebs, hypertonic saline nebs, aerobika. Discuss with RT regarding chest vest      #Anxiety  Related to dyspnea and bronchiectasis exacerbations  Home meds: klonipin 0.5mg BID    - C/w home meds

## 2024-06-09 NOTE — PROGRESS NOTE ADULT - ASSESSMENT
82 YO Female w/ AF (not on AC due to SAH and fall risk), HTN, Lewy body dementia (baseline a/o x3),CVA, SAH,  posterior reversible encephalopathy syndrome PRESS, chronic bronchiectasis with multiple admissions for Pseudomonal infections (home O2 prn, as well as 2 weeks each month with inhaled Tobramycin per Dr. Foster, last episode 2 months ago), diastolic CHF, and C. diff, who recently underwent an uncomplicated MITZI occlusion w/ 22mm Amulet with Dr. Rivas on 3/19/24. She was admitted in 4/2024 for bronchiectasis exacerbation and received PICC line with IV faith x 2 weeks. Presents for worsening SOB. Pulmonology consulted for bronchiectasis exacerbation.    #Bronchiectasis    Pt is saturating 93-96% on 2-4 L. She is afebrile and WBC remains normal. She is normotensive. Her BNP is 656, previously was >700. Her most recent sputum cultures in April grew mycobacterium gordonae and pseudomonas, for which she was put on meropenem for the pseudomonas. RVP was negative, CXR showed no infiltrates. Feel this is less likely a bronchiectasis exacerbation and more likely she is symptomatic from her baseline chronic bronchiectasis/deconditioning from her prior hospitalization. CT chest looks largely unchanged from two months prior. She is doing saline nebs and aerobika for airway clearance and not bringing up any more sputum than usual. There could additionally be a cardiac component to her dyspnea.    Recommendations:  -resend sputum culture   -agree with holding off abx for now  -pt does not like duonebs and can't currently tolerate chest vest  - airway clearance with 0.9% saline nebs, followed by aerobika and chest PT in areas that don't hurt her back  -would obtain TTE in AM to rule out cardiac component to her dyspnea  -this is likely not something that is going to improve during this admission and she has home O2, saline nebs, aerobika, and is getting a home chest vest to continue her airway clearance  -in the long term, she likely would benefit from outpatient pulmonary rehab (she lives in [place] and [facility] has a pulmonary rehab program)  -needs f/u with Dr. Foster on discharge    Case s/e/d with Dr. Jimenez

## 2024-06-09 NOTE — PROGRESS NOTE ADULT - SUBJECTIVE AND OBJECTIVE BOX
Physical Medicine and Rehabilitation Progress Note :       Patient is a 83y old  Female who presents with a chief complaint of dyspnea, labile blood pressures (09 Jun 2024 06:45)      HPI:  83F h/o chronic bronchiectasis with PsA colonization (home O2 prn, inh Tobra every 2 weeks per  Poster), multiple hospitalizations for exacerbations treated with IV faith, CDI (11/2023), Afib (no AC, MITZI occlusion on 3/19/24), HFpEF, Lewy body dementia, hx of CVA, SAH, PRESS, CF carrier, sent to ED by cardiologist for dyspnea on exertion and labile blood pressures. Per patient and patient's daughter, patient has had continued dyspnea on exertion, cough, sputum production (yellow/green) since discharge from last hospitalization in April despite 2 week treatment of IV meropenem. Was started on a regimen of inhaled tobra on 6/1. Patient noticed some progressively worsening dyspnea past few days, and along with labile blood pressures, discussed with outpatient cardiologist who recommended ED. Otherwise, patient mentions some increased edema of ankles, but denies fevers, chills, headaches, nausea, vomiting, recent falls.     In the ED:  Vitals: T 98.1, HR 59, /74, RR 18, SpO2 96% on 2L NC-> 93% on 4LNC  Labs: Hgb 10.5 (baseline 10-11). CMP unremarkable. Trop negative. . RVP negative.   Imaging: CXR showing hyperinflated lungs, bronchiectasis, small L pleural effusion.  CT chest done, pending read, however appears stable from prior chest CT 1 month prior  EKG: Sinus rhythm with PVCs  Interventions: Tylenol 650mg x1, hypertonic saline neb x1, meropenem 1g x1   (07 Jun 2024 20:41)                            8.7    5.99  )-----------( 179      ( 09 Jun 2024 05:30 )             28.4       06-09    137  |  100  |  29<H>  ----------------------------<  110<H>  3.8   |  30  |  0.79    Ca    8.6      09 Jun 2024 05:30  Phos  3.5     06-09  Mg     2.0     06-09    TPro  6.5  /  Alb  3.6  /  TBili  0.2  /  DBili  x   /  AST  16  /  ALT  14  /  AlkPhos  91  06-09    Vital Signs Last 24 Hrs  T(C): 36.7 (09 Jun 2024 05:46), Max: 36.9 (08 Jun 2024 12:30)  T(F): 98 (09 Jun 2024 05:46), Max: 98.4 (08 Jun 2024 12:30)  HR: 90 (09 Jun 2024 09:08) (50 - 90)  BP: 145/90 (09 Jun 2024 09:08) (127/62 - 145/90)  BP(mean): 83 (09 Jun 2024 05:46) (83 - 83)  RR: 18 (09 Jun 2024 05:46) (18 - 19)  SpO2: 96% (09 Jun 2024 05:46) (90% - 97%)    Parameters below as of 09 Jun 2024 05:46  Patient On (Oxygen Delivery Method): room air        MEDICATIONS  (STANDING):  albuterol/ipratropium for Nebulization 3 milliLiter(s) Nebulizer every 12 hours  amLODIPine   Tablet 5 milliGRAM(s) Oral every 24 hours  aspirin enteric coated 81 milliGRAM(s) Oral daily  atorvastatin 40 milliGRAM(s) Oral at bedtime  clopidogrel Tablet 75 milliGRAM(s) Oral daily  donepezil 5 milliGRAM(s) Oral at bedtime  enoxaparin Injectable 30 milliGRAM(s) SubCutaneous every 24 hours  furosemide    Tablet 20 milliGRAM(s) Oral daily  metoprolol succinate ER 50 milliGRAM(s) Oral every 12 hours  montelukast 10 milliGRAM(s) Oral daily  pantoprazole    Tablet 40 milliGRAM(s) Oral before breakfast  sodium chloride 0.9% for Nebulization 3 milliLiter(s) Nebulizer every 12 hours  vancomycin    Solution 125 milliGRAM(s) Oral every 12 hours    MEDICATIONS  (PRN):  acetaminophen     Tablet .. 650 milliGRAM(s) Oral every 6 hours PRN Temp greater or equal to 38C (100.4F), Mild Pain (1 - 3)  clonazePAM  Tablet 0.5 milliGRAM(s) Oral every 12 hours PRN For Pain/For Anxiety  melatonin 3 milliGRAM(s) Oral at bedtime PRN Insomnia      T(C): 36.7 (06-09-24 @ 05:46), Max: 36.9 (06-08-24 @ 12:30)  HR: 90 (06-09-24 @ 09:08) (50 - 90)  BP: 145/90 (06-09-24 @ 09:08) (127/62 - 145/90)  RR: 18 (06-09-24 @ 05:46) (18 - 19)  SpO2: 96% (06-09-24 @ 05:46) (90% - 97%)        Physical Exam:    frail 83 y o woman lying comfortably in semi Parra's position , awake , alert , no acute complaints      Head: normocephalic , atraumatic    Eyes: PERRLA , EOMI , no nystagmus , sclera anicteric    ENT / FACE: neg nasal discharge , uvula midline , no oropharyngeal erythema / exudate    Neck: supple , negative JVD , negative carotid bruits , no thyromegaly    Chest: finecrackles R>L base    Cardiovascular: regular rate and rhythm , neg murmurs / rubs / gallops    Abdomen: soft , non distended , no tenderness to palpation in all 4 quadrants ,  normal bowel sounds     Extremities: WWP , neg cyanosis /clubbing / edema     Neurologic Exam:     Alert and oriented to person , place     Motor Exam:        > 3+/5 x 4 extremities     Sensation:         intact to light touch x 4 extremities     DTR:           biceps/brachioradialis: equal                            patella/ankle: equal           Initial Functional Status Assessment :       Previous Level of Function:     · Ambulation Skills	independent  · Transfer Skills	independent  · ADL Skills	independent  · Work/Leisure Activity	independent  · Additional Comments	Pt reports to live in a private home and with no CHRIS to enter. Pt reports to have home O2 supply and has been mostly indpt with all ADLs and IADLs prior to admission.    Cognitive Status Examination:   · Orientation	oriented to person, place, time and situation  · Level of Consciousness	alert  · Follows Commands and Answers Questions	100% of the time  · Personal Safety and Judgment	intact  · Short Term Memory	intact  · Long Term Memory	intact    Range of Motion Exam:   · Range of Motion Examination	bilateral lower extremity ROM was WFL (within functional limits)    Manual Muscle Testing:   · Manual Muscle Testing Results	grossly assessed due to  at least 3/5 BL UE & LE based on antigravity mobility.    Bed Mobility: Rolling/Turning:     · Level of [place]	independent    Bed Mobility: Scooting/Bridging:     · Level of [place]	independent    Bed Mobility: Sit to Supine:     · Level of [place]	independent    Bed Mobility: Supine to Sit:     · Level of [place]	independent    Transfer: Sit to Stand:     · Level of [place]	independent    Transfer: Stand to Sit:     · Level of [place]	independent    Gait Skills:     · Level of [place]	supervision; stand-by assist  · Physical Assist/Nonphysical Assist	verbal cues  · Weight-Bearing Restrictions	weight-bearing as tolerated  · Gait Distance	50 feet    Gait Analysis:     · Gait Pattern Used	2-point gait  · Gait Deviations Noted	decreased karla    Balance Skills Assessment:     · Sitting Balance: Static	good balance  · Sitting Balance: Dynamic	good balance  · Sit-to-Stand Balance	good minus  · Standing Balance: Static	good minus  · Standing Balance: Dynamic	good minus  · Systems Impairment Contributing to Balance Disturbance	musculoskeletal  · Identified Impairments Contributing to Balance Disturbance	decreased strength    Clinical Impressions:   · Criteria for Skilled Therapeutic Interventions	impairments found; rehab potential; anticipated equipment needs at discharge; functional limitations in following categories; therapy frequency  · Impairments Found (describe specific impairments)	aerobic capacity/endurance; arousal, attention, and cognition; gait, locomotion, and balance; muscle strength; posture  · Functional Limitations in Following Categories (describe specific limitations)	self-care; home management; community/leisure            PM&R Impression : as above    Current disposition plan recommendation :    d/c home with home physical therapy

## 2024-06-09 NOTE — PROGRESS NOTE ADULT - SUBJECTIVE AND OBJECTIVE BOX
PULMONARY CONSULT SERVICE FOLLOW-UP NOTE    INTERVAL HPI:  Reviewed chart and overnight events; patient seen and examined at bedside. Pt feels unchanged from admission. Refused chest vest because of back pain. Feels albuterol makes her worse.     MEDICATIONS:  Pulmonary:  montelukast 10 milliGRAM(s) Oral daily  sodium chloride 0.9% for Nebulization 3 milliLiter(s) Nebulizer every 6 hours    Antimicrobials:  vancomycin    Solution 125 milliGRAM(s) Oral every 12 hours    Anticoagulants:  aspirin enteric coated 81 milliGRAM(s) Oral daily  clopidogrel Tablet 75 milliGRAM(s) Oral daily  enoxaparin Injectable 30 milliGRAM(s) SubCutaneous every 24 hours    Cardiac:  amLODIPine   Tablet 5 milliGRAM(s) Oral every 24 hours  furosemide    Tablet 20 milliGRAM(s) Oral daily  metoprolol succinate ER 50 milliGRAM(s) Oral every 12 hours      Allergies    Augmentin (Short breath; Rash)  Ceclor (Rash)  IV Contrast (Unknown)  Levaquin (Swelling)    Intolerances        Vital Signs Last 24 Hrs  T(C): 36.6 (09 Jun 2024 20:38), Max: 36.7 (09 Jun 2024 05:46)  T(F): 97.9 (09 Jun 2024 20:38), Max: 98 (09 Jun 2024 05:46)  HR: 98 (09 Jun 2024 20:38) (50 - 98)  BP: 127/75 (09 Jun 2024 20:38) (113/73 - 145/90)  BP(mean): 83 (09 Jun 2024 05:46) (83 - 83)  RR: 18 (09 Jun 2024 20:38) (18 - 19)  SpO2: 98% (09 Jun 2024 20:38) (91% - 98%)    Parameters below as of 09 Jun 2024 20:38  Patient On (Oxygen Delivery Method): nasal cannula  O2 Flow (L/min): 2          PHYSICAL EXAM:  Constitutional: well-appearing  HEENT: NC/AT; PERRL, anicteric sclera; MMM  Neck: supple  Cardiovascular: +S1/S2, RRR  Respiratory: CTA B/L; no W/R/R  Gastrointestinal: soft, NT/ND  Extremities: WWP; no edema, clubbing or cyanosis  Vascular: 2+ radial pulses B/L  Neurological: awake and alert; KAHN    LABS:      CBC Full  -  ( 09 Jun 2024 05:30 )  WBC Count : 5.99 K/uL  RBC Count : 2.87 M/uL  Hemoglobin : 8.7 g/dL  Hematocrit : 28.4 %  Platelet Count - Automated : 179 K/uL  Mean Cell Volume : 99.0 fl  Mean Cell Hemoglobin : 30.3 pg  Mean Cell Hemoglobin Concentration : 30.6 gm/dL  Auto Neutrophil # : 4.00 K/uL  Auto Lymphocyte # : 1.22 K/uL  Auto Monocyte # : 0.68 K/uL  Auto Eosinophil # : 0.04 K/uL  Auto Basophil # : 0.04 K/uL  Auto Neutrophil % : 66.6 %  Auto Lymphocyte % : 20.4 %  Auto Monocyte % : 11.4 %  Auto Eosinophil % : 0.7 %  Auto Basophil % : 0.7 %    06-09    137  |  100  |  29<H>  ----------------------------<  110<H>  3.8   |  30  |  0.79    Ca    8.6      09 Jun 2024 05:30  Phos  3.5     06-09  Mg     2.0     06-09    TPro  6.5  /  Alb  3.6  /  TBili  0.2  /  DBili  x   /  AST  16  /  ALT  14  /  AlkPhos  91  06-09          Urinalysis Basic - ( 09 Jun 2024 05:30 )    Color: x / Appearance: x / SG: x / pH: x  Gluc: 110 mg/dL / Ketone: x  / Bili: x / Urobili: x   Blood: x / Protein: x / Nitrite: x   Leuk Esterase: x / RBC: x / WBC x   Sq Epi: x / Non Sq Epi: x / Bacteria: x                RADIOLOGY & ADDITIONAL STUDIES:

## 2024-06-10 ENCOUNTER — TRANSCRIPTION ENCOUNTER (OUTPATIENT)
Age: 84
End: 2024-06-10

## 2024-06-10 VITALS
TEMPERATURE: 98 F | SYSTOLIC BLOOD PRESSURE: 142 MMHG | RESPIRATION RATE: 18 BRPM | HEART RATE: 79 BPM | DIASTOLIC BLOOD PRESSURE: 91 MMHG

## 2024-06-10 DIAGNOSIS — R53.81 OTHER MALAISE: ICD-10-CM

## 2024-06-10 DIAGNOSIS — Z51.5 ENCOUNTER FOR PALLIATIVE CARE: ICD-10-CM

## 2024-06-10 DIAGNOSIS — B02.29 OTHER POSTHERPETIC NERVOUS SYSTEM INVOLVEMENT: ICD-10-CM

## 2024-06-10 LAB
ANION GAP SERPL CALC-SCNC: 5 MMOL/L — SIGNIFICANT CHANGE UP (ref 5–17)
BUN SERPL-MCNC: 24 MG/DL — HIGH (ref 7–23)
CALCIUM SERPL-MCNC: 8.6 MG/DL — SIGNIFICANT CHANGE UP (ref 8.4–10.5)
CHLORIDE SERPL-SCNC: 99 MMOL/L — SIGNIFICANT CHANGE UP (ref 96–108)
CO2 SERPL-SCNC: 32 MMOL/L — HIGH (ref 22–31)
CREAT SERPL-MCNC: 0.72 MG/DL — SIGNIFICANT CHANGE UP (ref 0.5–1.3)
EGFR: 84 ML/MIN/1.73M2 — SIGNIFICANT CHANGE UP
GLUCOSE SERPL-MCNC: 99 MG/DL — SIGNIFICANT CHANGE UP (ref 70–99)
HCT VFR BLD CALC: 29.8 % — LOW (ref 34.5–45)
HGB BLD-MCNC: 9.1 G/DL — LOW (ref 11.5–15.5)
MAGNESIUM SERPL-MCNC: 1.8 MG/DL — SIGNIFICANT CHANGE UP (ref 1.6–2.6)
MCHC RBC-ENTMCNC: 30.3 PG — SIGNIFICANT CHANGE UP (ref 27–34)
MCHC RBC-ENTMCNC: 30.5 GM/DL — LOW (ref 32–36)
MCV RBC AUTO: 99.3 FL — SIGNIFICANT CHANGE UP (ref 80–100)
NRBC # BLD: 0 /100 WBCS — SIGNIFICANT CHANGE UP (ref 0–0)
PHOSPHATE SERPL-MCNC: 3.3 MG/DL — SIGNIFICANT CHANGE UP (ref 2.5–4.5)
PLATELET # BLD AUTO: 196 K/UL — SIGNIFICANT CHANGE UP (ref 150–400)
POTASSIUM SERPL-MCNC: 4.4 MMOL/L — SIGNIFICANT CHANGE UP (ref 3.5–5.3)
POTASSIUM SERPL-SCNC: 4.4 MMOL/L — SIGNIFICANT CHANGE UP (ref 3.5–5.3)
RBC # BLD: 3 M/UL — LOW (ref 3.8–5.2)
RBC # FLD: 15.3 % — HIGH (ref 10.3–14.5)
SODIUM SERPL-SCNC: 136 MMOL/L — SIGNIFICANT CHANGE UP (ref 135–145)
WBC # BLD: 5.32 K/UL — SIGNIFICANT CHANGE UP (ref 3.8–10.5)
WBC # FLD AUTO: 5.32 K/UL — SIGNIFICANT CHANGE UP (ref 3.8–10.5)

## 2024-06-10 PROCEDURE — 99233 SBSQ HOSP IP/OBS HIGH 50: CPT | Mod: GC

## 2024-06-10 PROCEDURE — 85025 COMPLETE CBC W/AUTO DIFF WBC: CPT

## 2024-06-10 PROCEDURE — 94640 AIRWAY INHALATION TREATMENT: CPT

## 2024-06-10 PROCEDURE — 80048 BASIC METABOLIC PNL TOTAL CA: CPT

## 2024-06-10 PROCEDURE — 80053 COMPREHEN METABOLIC PANEL: CPT

## 2024-06-10 PROCEDURE — 86850 RBC ANTIBODY SCREEN: CPT

## 2024-06-10 PROCEDURE — 83880 ASSAY OF NATRIURETIC PEPTIDE: CPT

## 2024-06-10 PROCEDURE — 86900 BLOOD TYPING SEROLOGIC ABO: CPT

## 2024-06-10 PROCEDURE — 87040 BLOOD CULTURE FOR BACTERIA: CPT

## 2024-06-10 PROCEDURE — 97161 PT EVAL LOW COMPLEX 20 MIN: CPT

## 2024-06-10 PROCEDURE — 87070 CULTURE OTHR SPECIMN AEROBIC: CPT

## 2024-06-10 PROCEDURE — 84100 ASSAY OF PHOSPHORUS: CPT

## 2024-06-10 PROCEDURE — 85027 COMPLETE CBC AUTOMATED: CPT

## 2024-06-10 PROCEDURE — 86901 BLOOD TYPING SEROLOGIC RH(D): CPT

## 2024-06-10 PROCEDURE — 0225U NFCT DS DNA&RNA 21 SARSCOV2: CPT

## 2024-06-10 PROCEDURE — 93005 ELECTROCARDIOGRAM TRACING: CPT

## 2024-06-10 PROCEDURE — 99223 1ST HOSP IP/OBS HIGH 75: CPT

## 2024-06-10 PROCEDURE — 84484 ASSAY OF TROPONIN QUANT: CPT

## 2024-06-10 PROCEDURE — 83735 ASSAY OF MAGNESIUM: CPT

## 2024-06-10 PROCEDURE — 82962 GLUCOSE BLOOD TEST: CPT

## 2024-06-10 PROCEDURE — 99285 EMERGENCY DEPT VISIT HI MDM: CPT

## 2024-06-10 PROCEDURE — 71250 CT THORAX DX C-: CPT | Mod: MC

## 2024-06-10 PROCEDURE — 71045 X-RAY EXAM CHEST 1 VIEW: CPT

## 2024-06-10 PROCEDURE — 99232 SBSQ HOSP IP/OBS MODERATE 35: CPT | Mod: GC

## 2024-06-10 PROCEDURE — 36415 COLL VENOUS BLD VENIPUNCTURE: CPT

## 2024-06-10 RX ORDER — MAGNESIUM OXIDE 400 MG ORAL TABLET 241.3 MG
400 TABLET ORAL ONCE
Refills: 0 | Status: COMPLETED | OUTPATIENT
Start: 2024-06-10 | End: 2024-06-10

## 2024-06-10 RX ORDER — TOBRAMYCIN SULFATE 40 MG/ML
300 VIAL (ML) INJECTION EVERY 12 HOURS
Refills: 0 | Status: DISCONTINUED | OUTPATIENT
Start: 2024-06-10 | End: 2024-06-10

## 2024-06-10 RX ORDER — METOPROLOL TARTRATE 50 MG
1 TABLET ORAL
Qty: 0 | Refills: 0 | DISCHARGE
Start: 2024-06-10

## 2024-06-10 RX ORDER — LIDOCAINE 4 G/100G
2 CREAM TOPICAL EVERY 24 HOURS
Refills: 0 | Status: DISCONTINUED | OUTPATIENT
Start: 2024-06-10 | End: 2024-06-10

## 2024-06-10 RX ORDER — SODIUM CHLORIDE 9 MG/ML
3 INJECTION INTRAMUSCULAR; INTRAVENOUS; SUBCUTANEOUS
Qty: 0 | Refills: 0 | DISCHARGE
Start: 2024-06-10

## 2024-06-10 RX ORDER — GABAPENTIN 400 MG/1
1 CAPSULE ORAL
Qty: 90 | Refills: 0
Start: 2024-06-10 | End: 2024-07-09

## 2024-06-10 RX ORDER — IPRATROPIUM BROMIDE 0.2 MG/ML
500 SOLUTION, NON-ORAL INHALATION ONCE
Refills: 0 | Status: COMPLETED | OUTPATIENT
Start: 2024-06-10 | End: 2024-06-10

## 2024-06-10 RX ORDER — LORATADINE 10 MG/1
1 TABLET ORAL
Qty: 30 | Refills: 0
Start: 2024-06-10 | End: 2024-07-09

## 2024-06-10 RX ORDER — TOBRAMYCIN SULFATE 40 MG/ML
300 VIAL (ML) INJECTION EVERY 12 HOURS
Refills: 0 | Status: DISCONTINUED | OUTPATIENT
Start: 2024-06-11 | End: 2024-06-10

## 2024-06-10 RX ORDER — TOBRAMYCIN SULFATE 40 MG/ML
4 VIAL (ML) INJECTION
Qty: 0 | Refills: 0 | DISCHARGE
Start: 2024-06-10

## 2024-06-10 RX ORDER — GABAPENTIN 400 MG/1
100 CAPSULE ORAL EVERY 8 HOURS
Refills: 0 | Status: DISCONTINUED | OUTPATIENT
Start: 2024-06-10 | End: 2024-06-10

## 2024-06-10 RX ORDER — METOPROLOL TARTRATE 50 MG
1 TABLET ORAL
Refills: 0 | DISCHARGE

## 2024-06-10 RX ORDER — IPRATROPIUM BROMIDE 0.2 MG/ML
2.5 SOLUTION, NON-ORAL INHALATION
Qty: 75 | Refills: 0
Start: 2024-06-10 | End: 2024-07-09

## 2024-06-10 RX ORDER — LORATADINE 10 MG/1
10 TABLET ORAL DAILY
Refills: 0 | Status: DISCONTINUED | OUTPATIENT
Start: 2024-06-10 | End: 2024-06-10

## 2024-06-10 RX ADMIN — Medication 300 MILLIGRAM(S): at 15:19

## 2024-06-10 RX ADMIN — MAGNESIUM OXIDE 400 MG ORAL TABLET 400 MILLIGRAM(S): 241.3 TABLET ORAL at 09:18

## 2024-06-10 RX ADMIN — CLOPIDOGREL BISULFATE 75 MILLIGRAM(S): 75 TABLET, FILM COATED ORAL at 13:02

## 2024-06-10 RX ADMIN — GABAPENTIN 100 MILLIGRAM(S): 400 CAPSULE ORAL at 13:02

## 2024-06-10 RX ADMIN — SODIUM CHLORIDE 3 MILLILITER(S): 9 INJECTION INTRAMUSCULAR; INTRAVENOUS; SUBCUTANEOUS at 15:31

## 2024-06-10 RX ADMIN — LIDOCAINE 2 PATCH: 4 CREAM TOPICAL at 13:01

## 2024-06-10 RX ADMIN — SODIUM CHLORIDE 3 MILLILITER(S): 9 INJECTION INTRAMUSCULAR; INTRAVENOUS; SUBCUTANEOUS at 04:11

## 2024-06-10 RX ADMIN — Medication 81 MILLIGRAM(S): at 13:01

## 2024-06-10 RX ADMIN — Medication 650 MILLIGRAM(S): at 09:20

## 2024-06-10 RX ADMIN — SODIUM CHLORIDE 3 MILLILITER(S): 9 INJECTION INTRAMUSCULAR; INTRAVENOUS; SUBCUTANEOUS at 09:20

## 2024-06-10 RX ADMIN — Medication 50 MILLIGRAM(S): at 09:19

## 2024-06-10 RX ADMIN — Medication 500 MICROGRAM(S): at 15:31

## 2024-06-10 RX ADMIN — LORATADINE 10 MILLIGRAM(S): 10 TABLET ORAL at 15:19

## 2024-06-10 RX ADMIN — MONTELUKAST 10 MILLIGRAM(S): 4 TABLET, CHEWABLE ORAL at 13:02

## 2024-06-10 RX ADMIN — PANTOPRAZOLE SODIUM 40 MILLIGRAM(S): 20 TABLET, DELAYED RELEASE ORAL at 09:19

## 2024-06-10 NOTE — CONSULT NOTE ADULT - CONVERSATION DETAILS
Discussed role of palliative care and symptom management for pain and dyspnea due to progressive bronchiectasis. Pt states that pulmonologist Dr. Foster told her "she will die from this" which she acknowledges. States that since last hospitalization when she was d/c on meropenem, she did not improve back to baseline. Independent at baseline and able to ambulate without assistance, but now limited by dyspnea. Last summer, she was digging up plants using a garden hoe and now she is short of breath while walking. Discussed progressive nature of bronchiectasis and how she may not get better. Pt states when she was last here for stroke "everyone had given up on me".     Pt states it is important to not be a burden on her daughter, who she felt has given up everything to take care of her while having a family of her own.

## 2024-06-10 NOTE — PROGRESS NOTE ADULT - ATTENDING COMMENTS
Dyspnea is unchanged from time of admission.  Antibiotics are unlikely to be of benefit.  She could have NTBM - would send additional sputum specimens for AFB and fungi.  Can give LARRY as per outpatient regimen but note that her most recent Pseudomonas isolate was only intermediately susceptible.  She is for discharge per primary team.  Please recall if further ID input is desired - team 1.
82 yo F w/ known bronchiectasis, colonized w/ pseudomonas and mycobacterium, multiple hospitalizations in the last year, dysphagia after CVA and dilated esophagus on CT suggesting chronic aspiration, chronic sinus congestion and post nasal drip that exacerbates cough, difficulty with expectoration but intolerant at times to airway clearance which makes overall control of symptoms difficult.    Reviewed CT from admission compared to prior - no new infiltrate to suggest infection, chronic mucus plugging, air trapping, and tree-in-bud opacities. Relatively unchanged from prior.     Continues to have difficulty expectorating, has chronic nasal drip and globus sensation. Reviewed out pt records - ENT evaluation without tracheal stenosis.    Clarified that patient has never felt symptomatic improvement since admission in April, feels her quality of life has significantly suffered and "this is no way to live". Asking if there is "anything that can be done" to improve her sputum.     Overall this is likely her chronic bronchiectasis, awaiting TTE to r/o HF component but suspicion is quite low. She intermittently cannot tolerate the therapy that would most improve her sx (vest therapy, albuterol, hypertonic nebs). This will exacerbate symptoms and make it difficult to catch up. Recommend taking a symptomatic approach rather than treatment based approach given her chronic and irreversible condition    Recommend  - Obtain sputum cx, please resume her home tobramycin inh which she is on for 2 weeks every month  - Will trial nasal spray combination - retry flonase WITH antihistamine (has Patanase from home with her, can give her home nasal spray)  - Trial ipratropium nebulizer if she cannot tolerate albuterol, continue saline nebulizer  - Awaiting smart vest for home, continue aerobika here and try chest vest when agreeable  - Recommend also daily systemic antihistamine such as zyrtec  - She likely has chronic aspiration, can review prior S&S eval and adjust diet consistency, aspiration precautions, cont PPI  - Discussed that continued decrease airway clearance is progression of disease. 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  - There are limited therapeutic options that remain and palliative care consultation for symptom burden would be beneficial.     Reviewed medications, laboratory results, and imaging. Decision making of high complexity.
Patient was seen and examined at bedside on 6/9/2024 at 9 am. Patient reports that she feels dyspneic. Denies abdominal pain, CP, N/V. ROS is otherwise negative. Vitals, labwork and pertinent imaging reviewed. Exam - NAD, AAO x 4, PERRLA, EOMI, MMM, supple neck, chest - CTA b/l, CV - rrr, s1s2, no m/r/g, abd - soft, NTND, + BS, ext - wwp, skin - no rash    Plan:  -ID consulted, rec sputum culture, AFB; stop abx  -Pulm consulted, pt is known to Dr. Foster   -Nutrition consult  -Anticipate that if minimal change will dc patient home tomorrow

## 2024-06-10 NOTE — CONSULT NOTE ADULT - PROBLEM SELECTOR RECOMMENDATION 5
would benefit from longterm symptom management outpatient  - f/u with palliative care Dr. Doherty outpatient Patient is Full Code  - see GOC note

## 2024-06-10 NOTE — DIETITIAN INITIAL EVALUATION ADULT - OTHER CALCULATIONS
Estimated needs based on IBW as dosing wt 89lb/40.4kg <80% IBW (71%). Needs adjusted for age, BMI, malnutrition, HF, and clinical status.   Defer fluids to team.

## 2024-06-10 NOTE — DIETITIAN INITIAL EVALUATION ADULT - EDUCATION DIETARY MODIFICATIONS
Discussed energy/protein dense foods and strategies to promote PO intake. Reviewed oral nutrition supplements available and energy dense liquids, pt declined oral nutrition supplements at this time. Pt receptive and expressed understanding, reports previously working with RDs outpatient. Pt aware RD remains available for additional questions/concerns./teach back/(2) meets goals/outcomes/verbalization

## 2024-06-10 NOTE — DIETITIAN INITIAL EVALUATION ADULT - PERTINENT LABORATORY DATA
06-10    136  |  99  |  24<H>  ----------------------------<  99  4.4   |  32<H>  |  0.72    Ca    8.6      10 Moreno 2024 05:30  Phos  3.3     06-10  Mg     1.8     06-10    TPro  6.5  /  Alb  3.6  /  TBili  0.2  /  DBili  x   /  AST  16  /  ALT  14  /  AlkPhos  91  06-09  A1C with Estimated Average Glucose Result: 5.4 % (11-04-23 @ 05:28)  A1C with Estimated Average Glucose Result: 5.5 % (09-20-23 @ 06:09)

## 2024-06-10 NOTE — PROGRESS NOTE ADULT - PROVIDER SPECIALTY LIST ADULT
Infectious Disease
Pulmonology
Rehab Medicine
Pulmonology
Internal Medicine
Hospitalist
Internal Medicine

## 2024-06-10 NOTE — DIETITIAN INITIAL EVALUATION ADULT - PERTINENT MEDS FT
MEDICATIONS  (STANDING):  amLODIPine   Tablet 5 milliGRAM(s) Oral every 24 hours  aspirin enteric coated 81 milliGRAM(s) Oral daily  atorvastatin 40 milliGRAM(s) Oral at bedtime  clopidogrel Tablet 75 milliGRAM(s) Oral daily  donepezil 5 milliGRAM(s) Oral at bedtime  enoxaparin Injectable 30 milliGRAM(s) SubCutaneous every 24 hours  furosemide    Tablet 20 milliGRAM(s) Oral daily  metoprolol succinate ER 50 milliGRAM(s) Oral every 12 hours  montelukast 10 milliGRAM(s) Oral daily  pantoprazole    Tablet 40 milliGRAM(s) Oral before breakfast  sodium chloride 0.9% for Nebulization 3 milliLiter(s) Nebulizer every 6 hours    MEDICATIONS  (PRN):  acetaminophen     Tablet .. 650 milliGRAM(s) Oral every 6 hours PRN Temp greater or equal to 38C (100.4F), Mild Pain (1 - 3)  clonazePAM  Tablet 0.5 milliGRAM(s) Oral every 12 hours PRN For Pain/For Anxiety  melatonin 3 milliGRAM(s) Oral at bedtime PRN Insomnia

## 2024-06-10 NOTE — PROGRESS NOTE ADULT - ASSESSMENT
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    CT had previously been read as more c/c atypical infection, eg YASMIN.   6/7 blood cx NGTD  Plan  - dc vanco as no longer rec abx  - please collect sputum cx AFB on 3 sep days w fungal and bacterial cx  - f/up blood cx  - intermied susceptibility to tobra, however can continue inpatient    Primary team updated  Team 1 will continue to follow

## 2024-06-10 NOTE — CONSULT NOTE ADULT - PROBLEM SELECTOR RECOMMENDATION 3
Had shingles (not tx with antivirals) several months ago, now with burning pain L upper back radiating in dermatomal fashion to chest; not on medication  - start gabapentin 100mg tid, uptitrate outpatient as tolerated states she did not like how pain medication made her feel in the past, unclear what rx were taken   - home O2 as needed  - ongoing GOC (prior was DNR/trial of intubation when admitted for stroke, now pending discussions)  - management of dyspnea as above

## 2024-06-10 NOTE — PROGRESS NOTE ADULT - PROBLEM SELECTOR PLAN 1
Presenting with subjective dyspnea on exertion, cough, sputum production, subacute in chronicity since last hospitalization back in April 2024  Hx of repeated hospitalizations for bronchiectasis exacerbations, colonized with PsA. Last hospitalization in March/April of 2024, discharged with 2 week course of meropenem    CT chest shows similar changes as prior admission w/ left upper lobe elongated part groundglass nodule, probably inflammatory    - f/u ID recs  - f/u Pulm recs  - d/c IV meropenem for now, pending identification of specific bacteria  - C/w montelukast  - Airway clearance with duonebs, hypertonic saline nebs, aerobika. Discuss with RT regarding chest vest      #Anxiety  Related to dyspnea and bronchiectasis exacerbations  Home meds: klonipin 0.5mg BID    - C/w home meds Presenting with subjective dyspnea on exertion, cough, sputum production, subacute in chronicity since last hospitalization back in April 2024  Hx of repeated hospitalizations for bronchiectasis exacerbations, colonized with PsA. Last hospitalization in March/April of 2024, discharged with 2 week course of meropenem    CT chest shows similar changes as prior admission w/ left upper lobe elongated part groundglass nodule, probably inflammatory    - f/u ID recs: pending sputum cx, AFB cx, and fungal cx  - f/u Pulm recs:   - Resume home Tobramycin 300mg BID and home Patanase (Flonase + antihistamine)  - c/w Ipratropium nebulizer  - c/w Aerobika and saline nebs for sputum mobilization  - Start systemic antihistamine   - Airway clearance with duonebs, hypertonic saline nebs, aerobika. Discuss with RT regarding chest vest      #Anxiety  Related to dyspnea and bronchiectasis exacerbations  Home meds: klonipin 0.5mg BID    - C/w home meds

## 2024-06-10 NOTE — DISCHARGE NOTE NURSING/CASE MANAGEMENT/SOCIAL WORK - PATIENT PORTAL LINK FT
You can access the FollowMyHealth Patient Portal offered by [facility] by registering at the following website: [email or web address]. By joining [employer]’s FollowMyHealth portal, you will also be able to view your health information using other applications (apps) compatible with our system.

## 2024-06-10 NOTE — CONSULT NOTE ADULT - SUBJECTIVE AND OBJECTIVE BOX
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 Per patient and patient's daughter, patient has had continued dyspnea on exertion, cough, sputum production (yellow/green) since discharge from last hospitalization in April despite 2 week treatment of IV meropenem. Was started on a regimen of inhaled tobra on 6/1. Patient noticed some progressively worsening dyspnea past few days, and along with labile blood pressures, discussed with outpatient cardiologist who recommended ED. Otherwise, patient mentions some increased edema of ankles, but denies fevers, chills, headaches, nausea, vomiting, recent falls.     In the ED:  Vitals: T 98.1, HR 59, /74, RR 18, SpO2 96% on 2L NC-> 93% on 4LNC  Labs: Hgb 10.5 (baseline 10-11). CMP unremarkable. Trop negative. . RVP negative.   Imaging: CXR showing hyperinflated lungs, bronchiectasis, small L pleural effusion.  CT chest done, pending read, however appears stable from prior chest CT 1 month prior  EKG: Sinus rhythm with PVCs  Interventions: Tylenol 650mg x1, hypertonic saline neb x1, meropenem 1g x1   (07 Jun 2024 20:41)    Palliative care consulted for symptom management. Pt experiencing significant upper L back pain with dermatomal radiation wrapping around chest wall after having shingles earlier in the year. Feels like burning pain, often always present and very bothersome. Has not taken any medications for postherpetic neuralgia. Also experiencing bilateral rib pain when coughing (states has fractured ribs from coughing), and pain most significant in inferior rib cage. C/o skin sensitivity that she has to wear shirts inside-out because the seams bother her. Feeling around inferior chest isn't always pain, but a "pressure" and feeling that there is a "belt wrapping around chest" and it is "impossible to take a deep breath". Is not on home O2 but states she is afraid she will need to be now. Pt states she prior would be able to bounce back to baseline after hospitalizations but after last hospitalization (d/c on meropenem), did not improve and now spends more time sitting down on heating pad to make pain feel better. Still ambulates independently, but not able to tend to garden which is what matters most to her.     PERTINENT PM/SXH:   Bronchiectasis  History of Pseudomonas pneumonia  HTN (hypertension)  Posterior reversible encephalopathy syndrome  Atrial fibrillation  Lewy body dementia  No significant past surgical history  H/O fracture of right hip      FAMILY HISTORY:  ITEMS NOT CHECKED ARE NOT PRESENT    SOCIAL HISTORY:   Significant other/partner:  []  Children:  [x]  Catholic/Spirituality:  Substance hx:  []   Tobacco hx:  []   Alcohol hx: []   Home Opioid hx:  [] I-Stop Reference No: x  - no active Rx's / see chart note  Living Situation: [x]Home  []Long term care  []Rehab []Other    ADVANCE DIRECTIVES:    []MOLST  []Living Will  DECISION MAKER(s):  [] Health Care Proxy(s)  [x] Surrogate(s)  [] Guardian           Name(s)/Phone Number(s): Patience Funes ([phone])    BASELINE (I)ADLs (prior to admission):  [place]: []Total  [x] Moderate []Dependent    ALLERGIES:  Augmentin (Short breath; Rash)  Ceclor (Rash)  IV Contrast (Unknown)  Levaquin (Swelling)    MEDICATIONS  (STANDING):  amLODIPine   Tablet 5 milliGRAM(s) Oral every 24 hours  aspirin enteric coated 81 milliGRAM(s) Oral daily  atorvastatin 40 milliGRAM(s) Oral at bedtime  clopidogrel Tablet 75 milliGRAM(s) Oral daily  donepezil 5 milliGRAM(s) Oral at bedtime  enoxaparin Injectable 30 milliGRAM(s) SubCutaneous every 24 hours  furosemide    Tablet 20 milliGRAM(s) Oral daily  gabapentin 100 milliGRAM(s) Oral every 8 hours  lidocaine   4% Patch 2 Patch Transdermal every 24 hours  metoprolol succinate ER 50 milliGRAM(s) Oral every 12 hours  montelukast 10 milliGRAM(s) Oral daily  pantoprazole    Tablet 40 milliGRAM(s) Oral before breakfast  sodium chloride 0.9% for Nebulization 3 milliLiter(s) Nebulizer every 6 hours  tobramycin for Nebulization 300 milliGRAM(s) Inhalation every 12 hours    MEDICATIONS  (PRN):  acetaminophen     Tablet .. 650 milliGRAM(s) Oral every 6 hours PRN Temp greater or equal to 38C (100.4F), Mild Pain (1 - 3)  clonazePAM  Tablet 0.5 milliGRAM(s) Oral every 12 hours PRN For Pain/For Anxiety  melatonin 3 milliGRAM(s) Oral at bedtime PRN Insomnia    PRESENT SYMPTOMS: []Unable to obtain due to poor mentation/encephalopathy  Source if other than patient:  []Family   []Team     Pain: [x] yes [ ] no  QOL impact - significant  Location - L upper back wrapping around chest 2/2 postherpetic neuralgia, inferior rib pain 2/2 coughing                   Aggravating Factors - cough  Quality - neuralgia: burning, bilateral inferior rib cage dull/feeling like "belt tied around chest"  Radiation - neuralgia: radiates in dermatome  Timing -  Severity (0-10 scale) -   Minimal Acceptable Level (0-10 scale) -    PAIN AD Score:  [email or web address] (press ctrl +  left click to view)    Dyspnea:                           []Mild  [x]Moderate []Severe  Anxiety:                             [x]Mild []Moderate []Severe  Fatigue:                             [x]Mild []Moderate []Severe  Nausea:                             []Mild []Moderate []Severe  Loss of Appetite:              []Mild []Moderate []Severe  Constipation:                    []Mild []Moderate []Severe    Other Symptoms:  []All other review of systems negative     Palliative Performance Status Version 2:  %    http://npcrc.org/files/news/palliative_performance_scale_ppsv2.pdf    PHYSICAL EXAM:  GENERAL:  [x]Alert  [x]Oriented x   []Lethargic  []Cachexia  []Unarousable  [x]Verbal  []Non-Verbal  Behavioral:   []Anxiety  []Delirium []Agitation [x]Cooperative  HEENT:  [x]Normal   []Dry mouth   []ET Tube/Trach  []Oral lesions  PULMONARY:   []Clear []Tachypnea  []Audible excessive secretions   []Rhonchi        []Right []Left []Bilateral  [x]Crackles        []Right []Left [x]Bilateral  []Wheezing     []Right []Left []Bilateral  CARDIOVASCULAR:    []Regular [x]Irregular []Tachy  []Hemanth []Murmur []Other  GASTROINTESTINAL:  [x]Soft  []Distended   []+BS  []Non tender []Tender  []PEG []OGT/ NGT  Last BM:   GENITOURINARY:  [x]Normal [] Incontinent   []Oliguria/Anuria   []Richards  MUSCULOSKELETAL:   [x]Normal   []Weakness  []Bed/Wheelchair bound []Edema  NEUROLOGIC:   [x]No focal deficits  []Cognitive impairment  []Dysphagia []Dysarthria []Paresis []Encephalopathic   SKIN:   [x]Normal   []Pressure ulcer(s)  []Rash    CRITICAL CARE:  [ ]Shock Present  [ ]Septic [ ]Cardiogenic [ ]Neurologic [ ]Hypovolemic  [ ]Vasopressors [ ]Inotropes   [ ]Respiratory failure present [ ]Mechanical Ventilation [ ]Non-invasive ventilatory support [ ]High-Flow  [ ]Acute  [ ]Chronic [ ]Hypoxic  [ ]Hypercarbic  [ ]Other organ failure    Vital Signs Last 24 Hrs  T(C): 36.4 (10 Moreno 2024 06:31), Max: 36.6 (09 Jun 2024 20:38)  T(F): 97.5 (10 Moreno 2024 06:31), Max: 97.9 (09 Jun 2024 20:38)  HR: 73 (10 Moreno 2024 06:31) (73 - 98)  BP: 103/66 (10 Moreno 2024 06:31) (103/66 - 127/75)  BP(mean): --  RR: 18 (10 Moreno 2024 06:31) (18 - 18)  SpO2: 96% (10 Moreno 2024 06:31) (96% - 98%)    Parameters below as of 10 Moreno 2024 06:31  Patient On (Oxygen Delivery Method): room air         LABS: Personally reviewed and interpreted                        9.1    5.32  )-----------( 196      ( 10 Moreno 2024 05:30 )             29.8   06-10    136  |  99  |  24<H>  ----------------------------<  99  4.4   |  32<H>  |  0.72    Ca    8.6      10 Moreno 2024 05:30  Phos  3.3     06-10  Mg     1.8     06-10    TPro  6.5  /  Alb  3.6  /  TBili  0.2  /  DBili  x   /  AST  16  /  ALT  14  /  AlkPhos  91  06-09    Urinalysis Basic - ( 10 Moreno 2024 05:30 )    Color: x / Appearance: x / SG: x / pH: x  Gluc: 99 mg/dL / Ketone: x  / Bili: x / Urobili: x   Blood: x / Protein: x / Nitrite: x   Leuk Esterase: x / RBC: x / WBC x   Sq Epi: x / Non Sq Epi: x / Bacteria: x        RADIOLOGY & ADDITIONAL STUDIES: Personally reviewed and interpreted [facility] Geriatrics and Palliative Care  Contact Info: Call [phone] (including Nights/Weekend)    HPI:  83F h/o chronic bronchiectasis with PsA colonization (home O2 prn, inh Tobra every 2 weeks per Dr. 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 Per patient and patient's daughter, patient has had continued dyspnea on exertion, cough, sputum production (yellow/green) since discharge from last hospitalization in April despite 2 week treatment of IV meropenem. Was started on a regimen of inhaled tobra on 6/1. Patient noticed some progressively worsening dyspnea past few days, and along with labile blood pressures, discussed with outpatient cardiologist who recommended ED. Otherwise, patient mentions some increased edema of ankles, but denies fevers, chills, headaches, nausea, vomiting, recent falls.     In the ED:  Vitals: T 98.1, HR 59, /74, RR 18, SpO2 96% on 2L NC-> 93% on 4LNC  Labs: Hgb 10.5 (baseline 10-11). CMP unremarkable. Trop negative. . RVP negative.   Imaging: CXR showing hyperinflated lungs, bronchiectasis, small L pleural effusion.  CT chest done, pending read, however appears stable from prior chest CT 1 month prior  EKG: Sinus rhythm with PVCs  Interventions: Tylenol 650mg x1, hypertonic saline neb x1, meropenem 1g x1   (07 Jun 2024 20:41)    Palliative care consulted for symptom management. Pt experiencing significant upper L back pain with dermatomal radiation wrapping around chest wall after having shingles earlier in the year. Feels like burning pain, often always present and very bothersome. Has not taken any medications for postherpetic neuralgia. Also experiencing bilateral rib pain when coughing (states has subacute rib fractures from coughing), and pain most significant in inferior rib cage. C/o skin sensitivity that she has to wear shirts inside-out because the seams bother her. Feeling around inferior chest isn't always pain, but a "pressure" and feeling that there is a "belt wrapping around chest" and it is "impossible to take a deep breath". Is not on home O2 but states she is afraid she will need to be now. Pt states she prior would be able to bounce back to baseline after hospitalizations but after last hospitalization (d/c on meropenem), did not improve and now spends more time sitting down on heating pad to make pain feel better. Still ambulates independently, but not able to tend to garden which is what matters most to her. Pt also experiences having a "bad mood" because of the burden she feels she is on her daughter, who has "given up her last over the past year" to take care of her. Prior was DNR/DNI -> DNR/trial of intubation in fall 2023 when admitted for stroke, but improved from there and now feels that she would like to be "given a chance" if she were to go into respiratory failure or have cardiac arrest, even with discussion that it may be difficult then take her off a ventilator. Patient has fear of the idea that palliative care means "hospice", feels that it means "dying" and leads to treatment with morphine. Was very open to the idea of symptom management and helping with her pain and shortness of breath.     PERTINENT PM/SXH:   Bronchiectasis  History of Pseudomonas pneumonia  HTN (hypertension)  Posterior reversible encephalopathy syndrome  Atrial fibrillation  Lewy body dementia  No significant past surgical history  H/O fracture of right hip      FAMILY HISTORY:  ITEMS NOT CHECKED ARE NOT PRESENT    SOCIAL HISTORY:   Significant other/partner:  []  Children:  [x]  Episcopal/Spirituality:  Substance hx:  []   Tobacco hx:  []   Alcohol hx: []   Home Opioid hx:  [] I-Stop Reference No: x  - no active Rx's / see chart note  Living Situation: [x]Home  []Long term care  []Rehab []Other    ADVANCE DIRECTIVES:    []MOLST  []Living Will  DECISION MAKER(s):  [] Health Care Proxy(s)  [x] Surrogate(s)  [] Guardian           Name(s)/Phone Number(s): Patience Funes ([phone])    BASELINE (I)ADLs (prior to admission):  [place]: []Total  [x] Moderate []Dependent    ALLERGIES:  Augmentin (Short breath; Rash)  Ceclor (Rash)  IV Contrast (Unknown)  Levaquin (Swelling)    MEDICATIONS  (STANDING):  amLODIPine   Tablet 5 milliGRAM(s) Oral every 24 hours  aspirin enteric coated 81 milliGRAM(s) Oral daily  atorvastatin 40 milliGRAM(s) Oral at bedtime  clopidogrel Tablet 75 milliGRAM(s) Oral daily  donepezil 5 milliGRAM(s) Oral at bedtime  enoxaparin Injectable 30 milliGRAM(s) SubCutaneous every 24 hours  furosemide    Tablet 20 milliGRAM(s) Oral daily  gabapentin 100 milliGRAM(s) Oral every 8 hours  lidocaine   4% Patch 2 Patch Transdermal every 24 hours  metoprolol succinate ER 50 milliGRAM(s) Oral every 12 hours  montelukast 10 milliGRAM(s) Oral daily  pantoprazole    Tablet 40 milliGRAM(s) Oral before breakfast  sodium chloride 0.9% for Nebulization 3 milliLiter(s) Nebulizer every 6 hours  tobramycin for Nebulization 300 milliGRAM(s) Inhalation every 12 hours    MEDICATIONS  (PRN):  acetaminophen     Tablet .. 650 milliGRAM(s) Oral every 6 hours PRN Temp greater or equal to 38C (100.4F), Mild Pain (1 - 3)  clonazePAM  Tablet 0.5 milliGRAM(s) Oral every 12 hours PRN For Pain/For Anxiety  melatonin 3 milliGRAM(s) Oral at bedtime PRN Insomnia    PRESENT SYMPTOMS: []Unable to obtain due to poor mentation/encephalopathy  Source if other than patient:  []Family   []Team     Pain: [x] yes [ ] no  QOL impact - significant  Location - L upper back wrapping around chest 2/2 postherpetic neuralgia, inferior rib pain 2/2 coughing                   Aggravating Factors - cough  Quality - neuralgia: burning, bilateral inferior rib cage dull/feeling like "belt tied around chest"  Radiation - neuralgia: radiates in dermatome  Timing -  Severity (0-10 scale) -   Minimal Acceptable Level (0-10 scale) -    PAIN AD Score:  [email or web address] (press ctrl +  left click to view)    Dyspnea:                           []Mild  [x]Moderate []Severe  Anxiety:                             [x]Mild []Moderate []Severe  Fatigue:                             [x]Mild []Moderate []Severe  Nausea:                             []Mild []Moderate []Severe  Loss of Appetite:              []Mild []Moderate []Severe  Constipation:                    []Mild []Moderate []Severe    Other Symptoms:  []All other review of systems negative     Palliative Performance Status Version 2:  %    http://[facility].org/files/news/palliative_performance_scale_[email or web address].pdf    PHYSICAL EXAM:  GENERAL:  [x]Alert  [x]Oriented x   []Lethargic  []Cachexia  []Unarousable  [x]Verbal  []Non-Verbal  Behavioral:   []Anxiety  []Delirium []Agitation [x]Cooperative  HEENT:  [x]Normal   []Dry mouth   []ET Tube/Trach  []Oral lesions  PULMONARY:   []Clear []Tachypnea  []Audible excessive secretions   []Rhonchi        []Right []Left []Bilateral  [x]Crackles        []Right []Left [x]Bilateral  []Wheezing     []Right []Left []Bilateral  CARDIOVASCULAR:    []Regular [x]Irregular []Tachy  []Hemanth []Murmur []Other  GASTROINTESTINAL:  [x]Soft  []Distended   []+BS  []Non tender []Tender  []PEG []OGT/ NGT  Last BM:   GENITOURINARY:  [x]Normal [] Incontinent   []Oliguria/Anuria   []Richards  MUSCULOSKELETAL:   [x]Normal   []Weakness  []Bed/Wheelchair bound []Edema  NEUROLOGIC:   [x]No focal deficits  []Cognitive impairment  []Dysphagia []Dysarthria []Paresis []Encephalopathic   SKIN:   [x]Normal   []Pressure ulcer(s)  []Rash    CRITICAL CARE:  [ ]Shock Present  [ ]Septic [ ]Cardiogenic [ ]Neurologic [ ]Hypovolemic  [ ]Vasopressors [ ]Inotropes   [ ]Respiratory failure present [ ]Mechanical Ventilation [ ]Non-invasive ventilatory support [ ]High-Flow  [ ]Acute  [ ]Chronic [ ]Hypoxic  [ ]Hypercarbic  [ ]Other organ failure    Vital Signs Last 24 Hrs  T(C): 36.4 (10 Moreno 2024 06:31), Max: 36.6 (09 Jun 2024 20:38)  T(F): 97.5 (10 Moreno 2024 06:31), Max: 97.9 (09 Jun 2024 20:38)  HR: 73 (10 Moreno 2024 06:31) (73 - 98)  BP: 103/66 (10 Moreno 2024 06:31) (103/66 - 127/75)  BP(mean): --  RR: 18 (10 Moreno 2024 06:31) (18 - 18)  SpO2: 96% (10 Moreno 2024 06:31) (96% - 98%)    Parameters below as of 10 Moreno 2024 06:31  Patient On (Oxygen Delivery Method): room air         LABS: Personally reviewed and interpreted                        9.1    5.32  )-----------( 196      ( 10 Moreno 2024 05:30 )             29.8   06-10    136  |  99  |  24<H>  ----------------------------<  99  4.4   |  32<H>  |  0.72    Ca    8.6      10 Moreno 2024 05:30  Phos  3.3     06-10  Mg     1.8     06-10    TPro  6.5  /  Alb  3.6  /  TBili  0.2  /  DBili  x   /  AST  16  /  ALT  14  /  AlkPhos  91  06-09    Urinalysis Basic - ( 10 Moreno 2024 05:30 )    Color: x / Appearance: x / SG: x / pH: x  Gluc: 99 mg/dL / Ketone: x  / Bili: x / Urobili: x   Blood: x / Protein: x / Nitrite: x   Leuk Esterase: x / RBC: x / WBC x   Sq Epi: x / Non Sq Epi: x / Bacteria: x        RADIOLOGY & ADDITIONAL STUDIES: Personally reviewed and interpreted [facility] Geriatrics and Palliative Care  Contact Info: Call [phone] (including Nights/Weekend)    HPI:  83F h/o chronic bronchiectasis with PsA colonization (home O2 prn, inh Tobra every 2 weeks per Dr. 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 Per patient and patient's daughter, patient has had continued dyspnea on exertion, cough, sputum production (yellow/green) since discharge from last hospitalization in April despite 2 week treatment of IV meropenem. Was started on a regimen of inhaled tobra on 6/1. Patient noticed some progressively worsening dyspnea past few days, and along with labile blood pressures, discussed with outpatient cardiologist who recommended ED. Otherwise, patient mentions some increased edema of ankles, but denies fevers, chills, headaches, nausea, vomiting, recent falls.     Palliative care consulted for symptom management. Pt experiencing significant upper L back pain with dermatomal radiation wrapping around chest wall after having shingles earlier in the year. Feels like burning pain, often always present and very bothersome. Has not taken any medications for postherpetic neuralgia. Also experiencing bilateral rib pain when coughing (states has subacute rib fractures from coughing), and pain most significant in inferior rib cage. C/o skin sensitivity that she has to wear shirts inside-out because the seams bother her. Feeling around inferior chest isn't always pain, but a "pressure" and feeling that there is a "belt wrapping around chest" and it is "impossible to take a deep breath". Is not on home O2 but states she is afraid she will need to be now. Pt states she prior would be able to bounce back to baseline after hospitalizations but after last hospitalization (d/c on meropenem), did not improve and now spends more time sitting down on heating pad to make pain feel better. Still ambulates independently, but not able to tend to garden which is what matters most to her. Pt also experiences having a "bad mood" because of the burden she feels she is on her daughter, who has "given up her last over the past year" to take care of her. Prior was DNR/DNI -> DNR/trial of intubation in fall 2023 when admitted for stroke, but improved from there and now feels that she would like to be "given a chance" if she were to go into respiratory failure or have cardiac arrest, even with discussion that it may be difficult then take her off a ventilator. Patient has fear of the idea that palliative care means "hospice", feels that it means "dying" and leads to treatment with morphine. Was very open to the idea of symptom management and helping with her pain and shortness of breath.     PERTINENT PM/SXH:   Bronchiectasis  History of Pseudomonas pneumonia  HTN (hypertension)  Posterior reversible encephalopathy syndrome  Atrial fibrillation  Lewy body dementia  No significant past surgical history  H/O fracture of right hip      FAMILY HISTORY:  ITEMS NOT CHECKED ARE NOT PRESENT    SOCIAL HISTORY:   Significant other/partner:  []  Children:  [x]  Buddhist/Spirituality:  Substance hx:  []   Tobacco hx:  []   Alcohol hx: []   Home Opioid hx:  [] I-Stop Reference No: x  - no active Rx's  Living Situation: [x]Home  []Long term care  []Rehab []Other    ADVANCE DIRECTIVES:    []MOLST  []Living Will  DECISION MAKER(s):  [] Health Care Proxy(s)  [x] Surrogate(s)  [] Guardian           Name(s)/Phone Number(s): Patience Funes ([phone])    BASELINE (I)ADLs (prior to admission):  [place]: []Total  [x] Moderate []Dependent    ALLERGIES:  Augmentin (Short breath; Rash)  Ceclor (Rash)  IV Contrast (Unknown)  Levaquin (Swelling)    MEDICATIONS  (STANDING):  amLODIPine   Tablet 5 milliGRAM(s) Oral every 24 hours  aspirin enteric coated 81 milliGRAM(s) Oral daily  atorvastatin 40 milliGRAM(s) Oral at bedtime  clopidogrel Tablet 75 milliGRAM(s) Oral daily  donepezil 5 milliGRAM(s) Oral at bedtime  enoxaparin Injectable 30 milliGRAM(s) SubCutaneous every 24 hours  furosemide    Tablet 20 milliGRAM(s) Oral daily  gabapentin 100 milliGRAM(s) Oral every 8 hours  lidocaine   4% Patch 2 Patch Transdermal every 24 hours  metoprolol succinate ER 50 milliGRAM(s) Oral every 12 hours  montelukast 10 milliGRAM(s) Oral daily  pantoprazole    Tablet 40 milliGRAM(s) Oral before breakfast  sodium chloride 0.9% for Nebulization 3 milliLiter(s) Nebulizer every 6 hours  tobramycin for Nebulization 300 milliGRAM(s) Inhalation every 12 hours    MEDICATIONS  (PRN):  acetaminophen     Tablet .. 650 milliGRAM(s) Oral every 6 hours PRN Temp greater or equal to 38C (100.4F), Mild Pain (1 - 3)  clonazePAM  Tablet 0.5 milliGRAM(s) Oral every 12 hours PRN For Pain/For Anxiety  melatonin 3 milliGRAM(s) Oral at bedtime PRN Insomnia    PRESENT SYMPTOMS: []Unable to obtain due to poor mentation/encephalopathy  Source if other than patient:  []Family   []Team     Pain: [x] yes [ ] no  QOL impact - significant  Location - L upper back wrapping around chest 2/2 postherpetic neuralgia, inferior rib pain 2/2 coughing                   Aggravating Factors - cough  Quality - neuralgia: burning, bilateral inferior rib cage dull/feeling like "belt tied around chest"  Radiation - neuralgia: radiates in dermatome  Timing -  Severity (0-10 scale) -   Minimal Acceptable Level (0-10 scale) -    Dyspnea:                           []Mild  [x]Moderate []Severe  Anxiety:                             [x]Mild []Moderate []Severe  Fatigue:                             [x]Mild []Moderate []Severe  Nausea:                             []Mild []Moderate []Severe  Loss of Appetite:              []Mild []Moderate []Severe  Constipation:                    []Mild []Moderate []Severe    Other Symptoms:  []All other review of systems negative     Palliative Performance Status Version 2:  70%    http://npcrc.org/files/news/palliative_performance_scale_ppsv2.pdf    PHYSICAL EXAM:  GENERAL:  [x]Alert  [x]Oriented x3   []Lethargic  []Cachexia  []Unarousable  [x]Verbal  []Non-Verbal  Behavioral:   []Anxiety  []Delirium []Agitation [x]Cooperative  HEENT:  [x]Normal   []Dry mouth   []ET Tube/Trach  []Oral lesions  PULMONARY:   []Clear []Tachypnea  []Audible excessive secretions   []Rhonchi        []Right []Left []Bilateral  [x]Crackles        []Right []Left [x]Bilateral  []Wheezing     []Right []Left []Bilateral  CARDIOVASCULAR:    []Regular [x]Irregular []Tachy  []Hemanth []Murmur []Other  GASTROINTESTINAL:  [x]Soft  []Distended   []+BS  []Non tender []Tender  []PEG []OGT/ NGT  Last BM:   GENITOURINARY:  [x]Normal [] Incontinent   []Oliguria/Anuria   [][place]ards  MUSCULOSKELETAL:   [x]Normal   []Weakness  []Bed/Wheelchair bound []Edema  NEUROLOGIC:   [x]No focal deficits  []Cognitive impairment  []Dysphagia []Dysarthria []Paresis []Encephalopathic   SKIN:   [x]Normal   []Pressure ulcer(s)  []Rash    Vital Signs Last 24 Hrs  T(C): 36.4 (10 Moreno 2024 06:31), Max: 36.6 (09 Jun 2024 20:38)  T(F): 97.5 (10 Moreno 2024 06:31), Max: 97.9 (09 Jun 2024 20:38)  HR: 73 (10 Moreno 2024 06:31) (73 - 98)  BP: 103/66 (10 Moreno 2024 06:31) (103/66 - 127/75)  BP(mean): --  RR: 18 (10 Moreno 2024 06:31) (18 - 18)  SpO2: 96% (10 Moreno 2024 06:31) (96% - 98%)    Parameters below as of 10 Moreno 2024 06:31  Patient On (Oxygen Delivery Method): room air    LABS: Personally reviewed and interpreted                     9.1    5.32  )-----------( 196      ( 10 Moreno 2024 05:30 )             29.8   06-10    136  |  99  |  24<H>  ----------------------------<  99  4.4   |  32<H>  |  0.72    Ca    8.6      10 Moreno 2024 05:30  Phos  3.3     06-10  Mg     1.8     06-10  TPro  6.5  /  Alb  3.6  /  TBili  0.2  /  DBili  x   /  AST  16  /  ALT  14  /  AlkPhos  91  06-09    Urinalysis Basic - ( 10 Moreno 2024 05:30 )  Color: x / Appearance: x / SG: x / pH: x  Gluc: 99 mg/dL / Ketone: x  / Bili: x / Urobili: x   Blood: x / Protein: x / Nitrite: x   Leuk Esterase: x / RBC: x / WBC x   Sq Epi: x / Non Sq Epi: x / Bacteria: x    RADIOLOGY & ADDITIONAL STUDIES: Personally reviewed and interpreted  < from: CT Chest No Cont (06.07.24 @ 21:16) >  LUNGS/AIRWAYS/PLEURA: No significant change and a multilobar   bronchiectasis and mucoid impacted distal airways. New elongated 1.5 cm   part groundglass nodule in the left upper lobe (4-20). Multiple calcified   granulomas. Small left pleural effusion, unchanged since 5/2/2024.  LYMPH NODES/MEDIASTINUM: No lymphadenopathy.  HEART/VASCULATURE: Enlarged heart. Left atrial appendage occlusion   device. No pericardial effusion. Coronary artery calcifications. No   aortic aneurysm.  IMPRESSION: Unchanged bronchiectasis and mucoid impacted airways.  New left upper lobe elongated part groundglass nodule, probably inflammatory. Recommend three-month follow-up to assess for clearance.

## 2024-06-10 NOTE — CONSULT NOTE ADULT - CONSULT REASON
Bronchiectasis exacerbation
Need for antibiotics?
symptom management (pain, dyspnea) and GOC given worsening respiratory status 2/2 bronchiectasis
PM&R evaluation

## 2024-06-10 NOTE — CONSULT NOTE ADULT - ATTENDING COMMENTS
Complex medical decision making / symptom management in the setting of progressive lung disease.    82yo F with Bronchiectasis, AFIb, HFpEF, and CVA p/w SOB. Patient reports being fairly functional at baseline but does not endorse symptom relief despite medical management. Patient was told her disease is progressive and incurable. She endorsed stress of being burdened and being a burden. Advance directives have fluctuated from prior hospitalizations. Currently patient is expressing a desire to remain Full Code.    Patient would benefit from outpatient Palliative/Supportive Oncology follow-up on discharge with Dr. Tawana Doherty at [facility], include her name and contact info ([phone]) in Discharge Summary    Will continue to follow for ongoing GOC discussion / titration of palliative regimen. Emotional support provided, questions answered.  Active Psychosocial Referrals:  [x]Social Work/Case management []PT/OT []Chaplaincy []Hospice  []Patient/Family Support []Holistic RN []Massage Therapy []Music Therapy []Ethics  Coping: [] well [] with difficulty [] poor coping [] unable to assess  Support system: [] strong [] adequate [] inadequate    For new or uncontrolled symptoms, please call Palliative Care at [phone] ([phone]). The service is available 24/7 (including nights & weekends) to provide symptom management recommendations over the phone as appropriate Complex medical decision making / symptom management in the setting of progressive lung disease.    82yo F with Bronchiectasis, AFIb, HFpEF, and CVA p/w SOB. Patient reports being fairly functional at baseline but does not endorse symptom relief despite medical management. Patient was told her disease is progressive and incurable. She endorsed stress of being burdened and being a burden. Advance directives have fluctuated from prior hospitalizations, currently patient is expressing a desire to remain Full Code. Patient is not overtly septic, not O2 dependent prior to this admission, has normal albumin, and no other end-organ damage. Cannot say that patient has a life expectancy of less than 6 months at this time.    Patient would benefit from outpatient Palliative/Supportive Oncology follow-up on discharge with Dr. Tawana Doherty at [facility], include her name and contact info ([phone]) in Discharge Summary    Will continue to follow for ongoing GOC discussion / titration of palliative regimen. Emotional support provided, questions answered.  Active Psychosocial Referrals:  [x]Social Work/Case management []PT/OT []Chaplaincy []Hospice  []Patient/Family Support []Holistic RN []Massage Therapy []Music Therapy []Ethics  Coping: [] well [] with difficulty [] poor coping [] unable to assess  Support system: [] strong [] adequate [] inadequate    For new or uncontrolled symptoms, please call Palliative Care at [phone] ([phone]). The service is available 24/7 (including nights & weekends) to provide symptom management recommendations over the phone as appropriate

## 2024-06-10 NOTE — PROGRESS NOTE ADULT - PROBLEM SELECTOR PLAN 8
F: None  E: Replete as needed  N: Regular diet  Dvt ppx: Lovenox  Dispo: [facility]

## 2024-06-10 NOTE — CONSULT NOTE ADULT - PROBLEM SELECTOR RECOMMENDATION 2
states she did not like how pain medication made her feel in the past, unclear what rx were taken   - home O2 as needed  - ongoing GOC (prior was DNR/trial of intubation when admitted for stroke, now pending discussions)  - management of dyspnea as above more frequent exacerbations requiring hospitalization  feels that shortness of breath is now limiting at home, unable to engage in normal activities (gardening) due to sxs  follows with pulm Dr. Foster who has explained that the disease is progressive  Has pain/pressure bilateral inferior rib cage due to coughing, also sensation that she cannot breathe/take deep breath    - tx of bronchiectasis per primary team  - Klonopin 0.5mg bid for dyspnea  - lidocaine patch bilateral inferior rib cage  - outpatient f/u with palliative care Dr. Doherty for sx management and support due to progressive disease

## 2024-06-10 NOTE — DISCHARGE NOTE PROVIDER - PROVIDER TOKENS
PROVIDER:[TOKEN:[[id number]],FOLLOWUP:[2 weeks]] PROVIDER:[TOKEN:[[id number]],FOLLOWUP:[2 weeks]],PROVIDER:[TOKEN:[[id number]],SCHEDULEDAPPT:[06/24/2024],SCHEDULEDAPPTTIME:[02:00 PM],ESTABLISHEDPATIENT:[T]]

## 2024-06-10 NOTE — PROGRESS NOTE ADULT - REASON FOR ADMISSION
dyspnea, labile blood pressures

## 2024-06-10 NOTE — DIETITIAN NUTRITION RISK NOTIFICATION - TREATMENT: THE FOLLOWING DIET HAS BEEN RECOMMENDED
Continue liberalized diet. Defer consistency to team/SLP.   >>Encourage & monitor PO intake. Finley dietary preferences as able.   >>RD to provide additional nourishments.     Pt declined oral nutrition supplements at this time.

## 2024-06-10 NOTE — DIETITIAN NUTRITION RISK NOTIFICATION - ADDITIONAL COMMENTS/DIETITIAN RECOMMENDATIONS
Per prior admission RD note (12/15/23) pt wt 100lb; current admission wt 89lb indicates 11lb/11% wt loss in 6 months - clinically significant. Nutrition-focused physical examination notable for moderate-severe muscle and fat wasting. Per ASPEN guidelines, pt meets criteria for severe chronic malnutrition.

## 2024-06-10 NOTE — CONSULT NOTE ADULT - PROBLEM SELECTOR RECOMMENDATION 4
prior PPSV when inpatient for stroke: PPSV = 50%, requires assistance for most ADLs  now, PPSV = 60% - pt improved from functional status s/p stroke, however has declined since prior hospitalization in 3/24 for bronchiectasis exacerbation   - not hospice eligible at this time

## 2024-06-10 NOTE — DISCHARGE NOTE PROVIDER - NSDCCPCAREPLAN_GEN_ALL_CORE_FT
PRINCIPAL DISCHARGE DIAGNOSIS  Diagnosis: Bronchiectasis  Assessment and Plan of Treatment: Your shortness of breath is likely secondary to chronic progression of your bronchiectasis. This condition will likely continue to worsen over time. It is important to continue on your existing therapies and follow up closely with your Pulmonoligst Dr. Foster to optomize your Bronchiectasis treatment. Please continue to use your Oxygen at home consistently to prevent further worsening of your condition. Please bring in a sample of your sputum to your Pulmonolgist office for testing

## 2024-06-10 NOTE — DIETITIAN INITIAL EVALUATION ADULT - PROBLEM SELECTOR PLAN 7
Detail Level: Detailed Has hx of stroke x3 as well as SAH  Home meds: lipitor 40mg qhs, aspirin 81mg, plavix 75mg.    - C/w home meds

## 2024-06-10 NOTE — PROGRESS NOTE ADULT - PROBLEM SELECTOR PLAN 6
Hx of CD infections requiring treatment while on antibiotics in the past, requires cdiff ppx with IV abx    - C/w vanc 125mg PO q12hrs while on IV abx Hx of CD infections requiring treatment while on antibiotics in the past, requires cdiff ppx with IV abx    - d/c vanc 125mg PO q12hrs while on IV abx

## 2024-06-10 NOTE — DIETITIAN INITIAL EVALUATION ADULT - ADD RECOMMEND
1. Continue liberalized diet. Defer consistency to team/SLP.   >>Encourage & monitor PO intake. Wray dietary preferences as able.   >>RD to provide additional nourishments.   2. Monitor GI tolerance, weight trends, labs, & skin integrity.  3. Defer bowel and pain regimens to team.   4. RD to remain available for diet education/intervention prn.

## 2024-06-10 NOTE — CONSULT NOTE ADULT - ASSESSMENT
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  She reports progression while on meropenem as treatment for a bacterial exacerbation, and beyond.  She is afebrile, has nl WBC, no change in quality of sputum.  CT had previously been read as more c/c atypical infection, eg YASMIN.  I believe that she is unlikely to benefit from another course of meropenem and she had prolonged diarrhea associated with the last one that she reports responded to vancomycin.  She had had only one recent sputum specimen for AFB - grew M. gordonae which is frequently a colonizer.  Most recent prior cultures were in 2019 and were negative.  I suspect she would benefit most from enhanced sputum mobilization - defer to Pulmonary, who has been working toward that end.  Suggest:  - F/U blood cultures from 6/7  - Sputum for AFB on 3 separate days, fungal culture, as well as bacterial culture.  - Would d/c meropenem.  Recommendations discussed with primary team - will follow with you - team 1.   
84 YO Female w/ AF (not on AC due to SAH and fall risk), HTN, Lewy body dementia (baseline a/o x3),CVA, SAH,  posterior reversible encephalopathy syndrome PRESS, chronic bronchiectasis with multiple admissions for Pseudomonal infections (home O2 prn, as well as 2 weeks each month with inhaled Tobramycin per Dr. Foster, last episode 2 months ago), diastolic CHF, and C. diff, who recently underwent an uncomplicated MITZI occlusion w/ 22mm Amulet with Dr. Rivas on 3/19/24. She was admitted in 4/2024 for bronchiectasis exacerbation and received PICC line with IV faith x 2 weeks. Presents for worsening SOB. Pulmonology consulted for bronchiectasis exacerbation.    #Bronchiectasis    Pt is saturating 93-96% on 2-4 L. She is afebrile and WBC 7.1. She is normotensive. Her BNP is 656, previously was >700. Her most recent sputum cultures in April grew mycobacterium gordonae and pseudomonas, for which she was put on meropenem for the pseudomonas. RVP was negative, CXR showed no infiltrates. This is potentially a repeat bronchiectasis exacerbation, and should be managed as such for now. However, without change in sputum, fever, or infiltrates on imaging, the suspicion is lower. There could be a cardiac component to this and she could be deconditioned from prior infection and hospitalization.    Recommendations:  -obtain CT chest  -repeat sputum culture first  -then would start meropenem for now and consult ID in AM  -airway clearance with duonebs, followed by 0.9% saline nebs, followed by aerobika and chest PT  -ask RT about ability to get pt chest vest    Case s/e/d with Dr. Jimenez
  I M    83 y o F h/o chronic bronchiectasis with PsA colonization (home O2 prn, inh Tobra every 2 weeks per Dr. Parra), multiple hospitalizations for exacerbations treated with IV faith, CDI (11/2023), Afib (no AC, MITZI occlusion on 3/19/24), HFpEF, Lewy body dementia, hx of CVA, SAH, PRESS, CF carrier, admitted for acute on chronic dyspnea on exertion, concern for bronchiectasis exacerbation.    Problem/Plan - 1:  ·  Problem: Acute exacerbation of bronchiectasis.   ·  Plan: Presenting with subjective dyspnea on exertion, cough, sputum production, subacute in chronicity since last hospitalization back in April 2024  Hx of repeated hospitalizations for bronchiectasis exacerbations, colonized with PsA. Last hospitalization in March/April of 2024, discharged with 2 week course of meropenem  VSS, no increased WOB, on home 2L O2, no leukocytosis    - Pulm consulted, f/u recs  - C/w IV meropenem for now, repeat sputum cx pending  - F/u CT chest  - C/w montelukast  - Airway clearance with duonebs, hypertonic saline nebs, aerobika. Discuss with RT regarding chest vest  - ID consult in AM    #Anxiety  Related to dyspnea and bronchiectasis exacerbations  Home meds: klonipin 0.5mg BID    - C/w home meds.    Problem/Plan - 2:  ·  Problem: HTN (hypertension).   ·  Plan: At home, BPs ranging from 130s-180s systolic.   Home meds: metoprolol 50mg BID, amlodipine 5mg qd    - C/w home meds.    Problem/Plan - 3:  ·  Problem: Atrial fibrillation.   ·  Plan: Not on AC despite elevated CHADsVASc given hx of falls and SAH  S/p MIZTI occlusion in March 2024  Home meds: toprol 50mg BID    - C/w home meds.    Problem/Plan - 4:  ·  Problem: (HFpEF) heart failure with preserved ejection fraction.   ·  Plan: Follows with Dr. Socrates Mcclure  Last TTE in 3/2024 showing normal LV and RV systolic function, dilated LA/RA, no significant valvular disease  Home meds: Toprol 50mg BID, atoravastatin 40mg qd, lasix 20mg qd  BNP not elevated past baseline    - C/w home meds.    Problem/Plan - 5:  ·  Problem: Lewy body dementia.   ·  Plan: Currently A&Ox3 without any neuro deficits  Home meds: donepezil 5mg qhs    - C/w home meds.    Problem/Plan - 6:  ·  Problem: History of Clostridium difficile infection.   ·  Plan: Hx of CD infections requiring treatment while on antibiotics in the past, requires cdiff ppx with IV abx    - C/w vanc 125mg PO q12hrs while on IV abx.    Problem/Plan - 7:  ·  Problem: History of stroke.   ·  Plan: Has hx of stroke x3 as well as SAH  Home meds: lipitor 40mg qhs, aspirin 81mg, plavix 75mg.    - C/w home meds.    Problem/Plan - 8:  ·  Problem: Prophylactic measure.   ·  Plan: F: None  E: Replete as needed  N: Regular diet  Dvt ppx: Lovenox  Dispo: F.    
83F h/o chronic bronchiectasis with PsA colonization (home O2 prn, inh Tobra every 2 weeks per Dr. Foster), multiple hospitalizations for exacerbations treated with IV faith, CDI (11/2023), Afib (no AC, MITZI occlusion on 3/19/24), HFpEF, Lewy body dementia, hx of CVA, SAH, PRESS, CF carrier, sent to ED by cardiologist for dyspnea on exertion and labile blood pressures, being treated for progression of bronchiectasis. Palliative care consulted for support, symptom management of pain and dyspnea, and ongoing GOC i/s/o progressive bronchiectasis with more frequent exacerbations.

## 2024-06-10 NOTE — DISCHARGE NOTE PROVIDER - NSDCMRMEDTOKEN_GEN_ALL_CORE_FT
amLODIPine Besylate 5 MG Oral Tablet: 1 tab(s) orally once a day once daily  aspirin 81 mg oral delayed release tablet: 1 tab(s) orally once a day  Astepro Allergy 205.5 mcg/inh (0.15%) nasal spray: 2 spray(s) intranasally once a day  atorvastatin 40 mg oral tablet: 1 tab(s) orally once a day (at bedtime)  clopidogrel 75 mg oral tablet: 1 tab(s) orally once a day  donepezil 5 mg oral tablet: 1 tab(s) orally every 24 hours  furosemide 20 mg oral tablet: 1 tab(s) orally once a day  KlonoPIN 0.5 mg oral tablet: 0.5 tab(s) orally 2 times a day as needed for For Pain/For Anxiety  metoprolol succinate 50 mg oral capsule, extended release: 1 cap(s) orally 2 times a day  montelukast 10 mg oral tablet: 1 tab(s) orally once a day  pantoprazole 40 mg oral delayed release tablet: 1 tab(s) orally once a day (before a meal)  vancomycin 25 mg/mL oral liquid: 5 milliliter(s) orally 2 times a day   amLODIPine Besylate 5 MG Oral Tablet: 1 tab(s) orally once a day once daily  aspirin 81 mg oral delayed release tablet: 1 tab(s) orally once a day  Astepro Allergy 205.5 mcg/inh (0.15%) nasal spray: 2 spray(s) intranasally once a day  atorvastatin 40 mg oral tablet: 1 tab(s) orally once a day (at bedtime)  clopidogrel 75 mg oral tablet: 1 tab(s) orally once a day  donepezil 5 mg oral tablet: 1 tab(s) orally every 24 hours  furosemide 20 mg oral tablet: 1 tab(s) orally once a day  gabapentin 100 mg oral capsule: 1 cap(s) orally every 8 hours  ipratropium 500 mcg/2.5 mL inhalation solution: 2.5 milliliter(s) inhaled once a day  KlonoPIN 0.5 mg oral tablet: 0.5 tab(s) orally 2 times a day as needed for For Pain/For Anxiety  loratadine 10 mg oral tablet: 1 tab(s) orally once a day  metoprolol succinate 50 mg oral tablet, extended release: 1 tab(s) orally every 12 hours  montelukast 10 mg oral tablet: 1 tab(s) orally once a day  pantoprazole 40 mg oral delayed release tablet: 1 tab(s) orally once a day (before a meal)  sodium chloride 0.9% inhalation solution: 3 milliliter(s) inhaled every 6 hours  tobramycin 75 mg/mL inhalation solution: 4 milliliter(s) inhaled every 12 hours

## 2024-06-10 NOTE — PROGRESS NOTE ADULT - PROBLEM SELECTOR PROBLEM 6
History of Clostridium difficile infection

## 2024-06-10 NOTE — PROGRESS NOTE ADULT - SUBJECTIVE AND OBJECTIVE BOX
Patient is a 83y old  Female who presents with a chief complaint of dyspnea, labile blood pressures (09 Jun 2024 10:00)    *Incomplete  INTERVAL HPI/OVERNIGHT EVENTS:   No overnight events   Afebrile, hemodynamically stable     Subjective: Patient seen and examined at bedside. Pt is frustrated with lack of progress. Continuing to have shortness of breath but has decreased productive sputum due to dry air per patient    Physical Exam:  General; Alert, speaking in full sentences, thin, appears weak  HEENT:  NC, PERRL, sclerae anicteric, conjunctivae clear.  Sinuses nontender, no nasal exudate.  No buccal or pharyngeal lesions, erythema or exudate  Lungs: Decreased BS bilaterally, rhonchi associated with cough.  Cardiac:  RRR, S1, S2, no murmur appreciated  Abd:  Symmetric, normoactive BS.  Soft, nontender, no masses  Skin: No rashes.      Vital Signs Last 24 Hrs  T(C): 36.7 (09 Jun 2024 05:46), Max: 36.9 (08 Jun 2024 12:30)  T(F): 98 (09 Jun 2024 05:46), Max: 98.4 (08 Jun 2024 12:30)  HR: 90 (09 Jun 2024 09:08) (50 - 90)  BP: 145/90 (09 Jun 2024 09:08) (127/62 - 145/90)    RR: 18 (09 Jun 2024 05:46) (18 - 19)  SpO2: 96% (09 Jun 2024 05:46) (90% - 97%)    O2 Parameters below as of 09 Jun 2024 05:46  Patient On (Oxygen Delivery Method): room air      LABS:                        8.7    5.99  )-----------( 179      ( 09 Jun 2024 05:30 )             28.4     06-09    137  |  100  |  29<H>  ----------------------------<  110<H>  3.8   |  30  |  0.79    Ca    8.6      09 Jun 2024 05:30  Phos  3.5     06-09  Mg     2.0     06-09    TPro  6.5  /  Alb  3.6  /  TBili  0.2  /  DBili  x   /  AST  16  /  ALT  14  /  AlkPhos  91  06-09      Urinalysis Basic - ( 09 Jun 2024 05:30 )    Color: x / Appearance: x / SG: x / pH: x  Gluc: 110 mg/dL / Ketone: x  / Bili: x / Urobili: x   Blood: x / Protein: x / Nitrite: x   Leuk Esterase: x / RBC: x / WBC x   Sq Epi: x / Non Sq Epi: x / Bacteria: x      CAPILLARY BLOOD GLUCOSE      POCT Blood Glucose.: 111 mg/dL (08 Jun 2024 17:31)        Consultant(s) Notes Reviewed:  [x ] YES  [ ] NO    MEDICATIONS  (STANDING):  albuterol/ipratropium for Nebulization 3 milliLiter(s) Nebulizer every 12 hours  amLODIPine   Tablet 5 milliGRAM(s) Oral every 24 hours  aspirin enteric coated 81 milliGRAM(s) Oral daily  atorvastatin 40 milliGRAM(s) Oral at bedtime  clopidogrel Tablet 75 milliGRAM(s) Oral daily  donepezil 5 milliGRAM(s) Oral at bedtime  enoxaparin Injectable 30 milliGRAM(s) SubCutaneous every 24 hours  furosemide    Tablet 20 milliGRAM(s) Oral daily  metoprolol succinate ER 50 milliGRAM(s) Oral every 12 hours  montelukast 10 milliGRAM(s) Oral daily  pantoprazole    Tablet 40 milliGRAM(s) Oral before breakfast  sodium chloride 0.9% for Nebulization 3 milliLiter(s) Nebulizer every 12 hours  vancomycin    Solution 125 milliGRAM(s) Oral every 12 hours    MEDICATIONS  (PRN):  acetaminophen     Tablet .. 650 milliGRAM(s) Oral every 6 hours PRN Temp greater or equal to 38C (100.4F), Mild Pain (1 - 3)  clonazePAM  Tablet 0.5 milliGRAM(s) Oral every 12 hours PRN For Pain/For Anxiety  melatonin 3 milliGRAM(s) Oral at bedtime PRN Insomnia      Care Discussed with Consultants/Other Providers [ x] YES  [ ] NO    RADIOLOGY & ADDITIONAL TESTS: Patient is a 83y old  Female who presents with a chief complaint of dyspnea, labile blood pressures (09 Jun 2024 10:00)    INTERVAL HPI/OVERNIGHT EVENTS:   No overnight events   Afebrile, hemodynamically stable     Subjective: Patient seen and examined at bedside. Pt is frustrated with lack of progress. Continuing to have shortness of breath but has decreased productive sputum due to dry air per patient    Physical Exam:  General; Alert, speaking in full sentences, thin, appears weak  HEENT:  NC, PERRL, sclerae anicteric, conjunctivae clear.  Sinuses nontender, no nasal exudate.  No buccal or pharyngeal lesions, erythema or exudate  Lungs: Decreased BS bilaterally, rhonchi associated with cough.  Cardiac:  RRR, S1, S2, no murmur appreciated  Abd:  Symmetric, normoactive BS.  Soft, nontender, no masses  Skin: No rashes.    LABS:                         9.1    5.32  )-----------( 196      ( 10 Moreno 2024 05:30 )             29.8     06-10    136  |  99  |  24<H>  ----------------------------<  99  4.4   |  32<H>  |  0.72    Ca    8.6      10 Moreno 2024 05:30  Phos  3.3     06-10  Mg     1.8     06-10    TPro  6.5  /  Alb  3.6  /  TBili  0.2  /  DBili  x   /  AST  16  /  ALT  14  /  AlkPhos  91  06-09      Urinalysis Basic - ( 10 Moreno 2024 05:30 )    Color: x / Appearance: x / SG: x / pH: x  Gluc: 99 mg/dL / Ketone: x  / Bili: x / Urobili: x   Blood: x / Protein: x / Nitrite: x   Leuk Esterase: x / RBC: x / WBC x   Sq Epi: x / Non Sq Epi: x / Bacteria: x                RADIOLOGY, EKG & ADDITIONAL TESTS: Reviewed.

## 2024-06-10 NOTE — DISCHARGE NOTE PROVIDER - HOSPITAL COURSE
83F h/o chronic bronchiectasis with PsA colonization (home O2 prn, inh Tobra every 2 weeks per Dr. Parra), multiple hospitalizations for exacerbations treated with IV faith, CDI (11/2023), Afib (no AC, MITZI occlusion on 3/19/24), HFpEF, Lewy body dementia, hx of CVA, SAH, PRESS, CF carrier, admitted for acute on chronic dyspnea on exertion, concern for bronchiectasis exacerbation.       Problem/Plan - 1:  ·  Problem: Chronic respiratory failure with hypoxia.   ·  Plan: Presenting with subjective dyspnea on exertion, cough, sputum production, subacute in chronicity since last hospitalization back in April 2024  Hx of repeated hospitalizations for bronchiectasis exacerbations, colonized with PsA. Last hospitalization in March/April of 2024, discharged with 2 week course of meropenem    CT chest shows similar changes as prior admission w/ left upper lobe elongated part groundglass nodule, probably inflammatory    - f/u ID recs  - f/u Pulm recs  - d/c IV meropenem for now, pending identification of specific bacteria  - C/w montelukast  - Airway clearance with duonebs, hypertonic saline nebs, aerobika. Discuss with RT regarding chest vest      #Anxiety  Related to dyspnea and bronchiectasis exacerbations  Home meds: klonipin 0.5mg BID    - C/w home meds.     Problem/Plan - 2:  ·  Problem: HTN (hypertension).   ·  Plan: At home, BPs ranging from 130s-180s systolic.   Home meds: metoprolol 50mg BID, amlodipine 5mg qd    - C/w home meds.     Problem/Plan - 3:  ·  Problem: Chronic atrial fibrillation.   ·  Plan: Not on AC despite elevated CHADsVASc given hx of falls and SAH  S/p MITZI occlusion in March 2024  Home meds: toprol 50mg BID    - C/w home meds.     Problem/Plan - 4:  ·  Problem: (HFpEF) heart failure with preserved ejection fraction.   ·  Plan: Follows with Dr. Socrates Mcclure  Last TTE in 3/2024 showing normal LV and RV systolic function, dilated LA/RA, no significant valvular disease  Home meds: Toprol 50mg BID, atoravastatin 40mg qd, lasix 20mg qd  BNP not elevated past baseline    - C/w home meds.     Problem/Plan - 5:  ·  Problem: Lewy body dementia.   ·  Plan: Currently A&Ox3 without any neuro deficits  Home meds: donepezil 5mg qhs    - C/w home meds.     Problem/Plan - 6:  ·  Problem: History of Clostridium difficile infection.   ·  Plan: Hx of CD infections requiring treatment while on antibiotics in the past, requires cdiff ppx with IV abx    - C/w vanc 125mg PO q12hrs while on IV abx.     Problem/Plan - 7:  ·  Problem: History of stroke.   ·  Plan: Has hx of stroke x3 as well as SAH  Home meds: lipitor 40mg qhs, aspirin 81mg, plavix 75mg.    - C/w home meds.   83F h/o chronic bronchiectasis with PsA colonization (home O2 prn, inh Tobra every 2 weeks per Dr. Parra), multiple hospitalizations for exacerbations treated with IV faith, CDI (11/2023), Afib (no AC, MITZI occlusion on 3/19/24), HFpEF, Lewy body dementia, hx of CVA, SAH, PRESS, CF carrier, admitted for acute on chronic dyspnea on exertion, concern for progression of bronchiectasis.      1: Chronic respiratory failure with hypoxia.   Presenting with subjective dyspnea on exertion, cough, sputum production, subacute in chronicity since last hospitalization back in April 2024  Hx of repeated hospitalizations for bronchiectasis exacerbations, colonized with PsA. Last hospitalization in March/April of 2024, discharged with 2 week course of meropenem    CT chest shows similar changes as prior admission w/ left upper lobe elongated part groundglass nodule, probably inflammatory    - f/u ID recs  - f/u Pulm recs  - d/c IV meropenem for now, pending identification of specific bacteria  - C/w montelukast  - Airway clearance with duonebs, hypertonic saline nebs, aerobika. Discuss with RT regarding chest vest    #Anxiety  Related to dyspnea and bronchiectasis exacerbations  Home meds: klonipin 0.5mg BID    - C/w home meds.     2: HTN (hypertension).   At home, BPs ranging from 130s-180s systolic.   Home meds: metoprolol 50mg BID, amlodipine 5mg qd    - C/w home meds.    3: Chronic atrial fibrillation.   Not on AC despite elevated CHADsVASc given hx of falls and SAH  S/p MITZI occlusion in March 2024  Home meds: toprol 50mg BID    - C/w home meds.    4: (HFpEF) heart failure with preserved ejection fraction.   Follows with Dr. Socrates Mcclure  Last TTE in 3/2024 showing normal LV and RV systolic function, dilated LA/RA, no significant valvular disease  Home meds: Toprol 50mg BID, atoravastatin 40mg qd, lasix 20mg qd  BNP not elevated past baseline    - C/w home meds.    5: Lewy body dementia.   Currently A&Ox3 without any neuro deficits  Home meds: donepezil 5mg qhs    - C/w home meds.    6: History of Clostridium difficile infection.   Hx of CD infections requiring treatment while on antibiotics in the past, requires cdiff ppx with IV abx    - d/c vanc 125mg PO q12hrs as pt was taken off of IV Abx     7: History of stroke.   ·  Plan: Has hx of stroke x3 as well as SAH  Home meds: lipitor 40mg qhs, aspirin 81mg, plavix 75mg.    - C/w home meds.    New Meds: Gabapentin 100mg TID and Loratidine 10mg qd  Changes to old meds: None  Discontinuation of old meds: None    Physical Exam:  General; Alert, speaking in full sentences, thin, appears weak  HEENT:  NC, PERRL, sclerae anicteric, conjunctivae clear.  Sinuses nontender, no nasal exudate.  No buccal or pharyngeal lesions, erythema or exudate  Lungs: Decreased BS bilaterally, rhonchi associated with cough.  Cardiac:  RRR, S1, S2, no murmur appreciated  Abd:  Symmetric, normoactive BS.  Soft, nontender, no masses  Skin: No rashes.

## 2024-06-10 NOTE — PROGRESS NOTE ADULT - ASSESSMENT
84 YO Female w/ AF (not on AC due to SAH and fall risk), recently underwent an uncomplicated MITZI occlusion w/ 22mm Amulet with Dr. Rivas on 3/19/24, HTN, Lewy body dementia (baseline a/o x3),CVA c/w by dysphagia risk, SAH,  posterior reversible encephalopathy syndrome PRESS, chronic bronchiectasis, CF carrier, with multiple admissions for Pseudomonal infections,  last episode 2 months ago where she also grew mycobacterium gordonae, completed IV faith x 2 weeks via PICC line, on chronic home O2 2Lprn, as well as inhaled Tobramycin, HFpEF, C-diff,    Presents for worsening SOB. Pulmonology consulted for bronchiectasis exacerbation.    #Bronchiectasis    Pt is saturating 93-96% on 2-4 L. She is afebrile and WBC 7.1. She is normotensive. Her BNP is 656, previously was >700. Her most recent sputum cultures in April grew mycobacterium gordonae and pseudomonas, for which she was put on meropenem for the pseudomonas. RVP was negative, CXR showed no infiltrates. This is potentially a repeat bronchiectasis exacerbation, and should be managed as such for now. However, without change in sputum, fever, or infiltrates on imaging, the suspicion is lower. There could be a cardiac component to this and she could be deconditioned from prior infection and hospitalization.    Recommendations:  -obtain CT chest  -repeat sputum culture first  -then would start meropenem for now and consult ID in AM  -airway clearance with duonebs, followed by 0.9% saline nebs, followed by aerobika and chest PT  -ask RT about ability to get pt chest vest    Case s/e/d with Dr. Jimenez 84 YO Female w/ AF (not on AC due to SAH and fall risk), recently underwent an uncomplicated MITZI occlusion w/ 22mm Amulet with Dr. Rivas on 3/19/24, HTN, Lewy body dementia (baseline a/o x3),CVA c/w by dysphagia risk, SAH,  posterior reversible encephalopathy syndrome PRESS, chronic bronchiectasis, CF carrier, with multiple admissions for Pseudomonal infections,  last episode 2 months ago where she also grew mycobacterium gordonae, completed IV faith x 2 weeks via PICC line, on chronic home O2 2Lprn, as well as inhaled Tobramycin, HFpEF, C-diff,    Presents for worsening SOB. Pulmonology consulted for bronchiectasis exacerbation.    #Bronchiectasis    Pt is saturating 93-96% on 2-4 L. She is afebrile and WBC 7.1. She is normotensive. Her BNP is 656, previously was >700. Her most recent sputum cultures in April grew mycobacterium gordonae and pseudomonas, for which she was put on meropenem for the pseudomonas. RVP was negative, CXR showed no infiltrates. This is potentially a repeat bronchiectasis exacerbation, and should be managed as such for now. However, without change in sputum, fever, or infiltrates on imaging, the suspicion is lower. There could be a cardiac component to this and she could be deconditioned from prior infection and hospitalization.    Recommendations:  -obtain CT chest  -repeat sputum culture first  -needs to resume home tobramycin  -consider abx pending sputum culture or change in clinical status  -airway clearance with duonebs, followed by 0.9% saline nebs, followed by aerobika and chest PT  -ask RT about ability to get pt chest vest    Case s/e/d with Dr. Jimenez 82 YO Female w/ AF (not on AC due to SAH and fall risk), recently underwent an uncomplicated MITZI occlusion w/ 22mm Amulet with Dr. Rivas on 3/19/24, HTN, Lewy body dementia (baseline a/o x3),CVA c/w by dysphagia risk, SAH,  posterior reversible encephalopathy syndrome PRESS, chronic bronchiectasis, CF carrier, with multiple admissions for Pseudomonal infections,  last episode 2 months ago where she also grew mycobacterium gordonae, completed IV faith x 2 weeks via PICC line, on chronic home O2 2Lprn, as well as inhaled Tobramycin, HFpEF, C-diff,    Presents for worsening SOB. Pulmonology consulted for bronchiectasis exacerbation.    #Bronchiectasis    Pt is saturating 93-96% on 2-4 L. She is afebrile and WBC 7.1. She is normotensive. Her BNP is 656, previously was >700. Her most recent sputum cultures in April grew mycobacterium gordonae and pseudomonas, for which she was put on meropenem for the pseudomonas. RVP was negative, CXR showed no infiltrates. CT obtained shows no significant interval change from prior study. This is potentially a repeat bronchiectasis exacerbation, and should be managed as such for now. However, without change in sputum, fever, or infiltrates on imaging, the suspicion is lower. There could be a cardiac component to this and she could be deconditioned from prior infection and hospitalization.    Recommendations:  -repeat sputum culture first  -needs to resume home tobramycin  -consider abx pending sputum culture or change in clinical status  -airway clearance: PT is intolerant to albuterol, is receptive to xopenex, and can try ipratropium inhaler, followed by 0.9% saline nebs, followed by aerobika and chest PT  -Azelastine, Flonase, Astepro for nasopharyngeal clearance   -for dysphagia risk, continue asp precaution with minced on moist Diet as per previous s/s eval, PPI,   -f/u S/s  -c/w cardiac work up       82 YO Female w/ AF (not on AC due to SAH and fall risk), recently underwent an uncomplicated MITZI occlusion w/ 22mm Amulet with Dr. Rivas on 3/19/24, HTN, Lewy body dementia (baseline a/o x3),CVA c/w by dysphagia risk, SAH,  posterior reversible encephalopathy syndrome PRESS, chronic bronchiectasis, CF carrier, with multiple admissions for Pseudomonal infections,  last episode 2 months ago where she also grew mycobacterium gordonae, completed IV faith x 2 weeks via PICC line, on chronic home O2 2Lprn, as well as inhaled Tobramycin, HFpEF, C-diff,    Presents for worsening SOB. Pulmonology consulted for bronchiectasis exacerbation.    #Bronchiectasis    Pt is saturating 93-96% on 2-4 L. She is afebrile and WBC 7.1. She is normotensive. Her BNP is 656, previously was >700. Her most recent sputum cultures in April grew mycobacterium gordonae and pseudomonas, for which she was put on meropenem for the pseudomonas. RVP was negative, CXR showed no infiltrates. CT obtained shows no significant interval change from prior study. This is potentially a repeat bronchiectasis exacerbation, and should be managed as such for now. However, without change in sputum, fever, or infiltrates on imaging, the suspicion is lower. There could be a cardiac component to this and she could be deconditioned from prior infection and hospitalization.    Recommendations:  -repeat sputum culture first  -needs to resume home tobramycin  -consider abx pending sputum culture or change in clinical status  -airway clearance: PT is intolerant to albuterol, is receptive to xopenex, and can try ipratropium inhaler, followed by 0.9% saline nebs, followed by aerobika and chest PT  -Azelastine, Flonase, Astepro for nasopharyngeal clearance   -for dysphagia risk, continue asp precaution with minced on moist Diet as per previous s/s eval, PPI,   -f/u S/s  -c/w cardiac work up     Discharge plan:  - Follow up with Dr. Foster  - In process of obtaining SMART vest for airway clearance   - resumed home nebulizers with hypertonic saline and tobramycin  - recommend starting azelastine and flonase 2 puffs bd daily to help with post-nasal drip    Pulmonary will sign off. Please call/page with any new questions.  Juventino Shankar P[facility] PGY 4

## 2024-06-10 NOTE — PROGRESS NOTE ADULT - SUBJECTIVE AND OBJECTIVE BOX
[facility]  83y  Female    Patient is a 83y old  Female who presents with a chief complaint of dyspnea, labile blood pressures (10 Moreno 2024 13:32)      INTERVAL HPI/OVERNIGHT EVENTS:  As per night team, ALENA  Patient seen and examined at bedside. Patient reports still w SOB. Otherwise neg ROS    T(C): 36.4 (06-10-24 @ 06:31), Max: 36.6 (06-09-24 @ 20:38)  HR: 73 (06-10-24 @ 06:31) (73 - 98)  BP: 103/66 (06-10-24 @ 06:31) (103/66 - 127/75)  RR: 18 (06-10-24 @ 06:31) (18 - 18)  SpO2: 96% (06-10-24 @ 06:31) (96% - 98%)  Wt(kg): --Vital Signs Last 24 Hrs  T(C): 36.4 (10 Moreno 2024 06:31), Max: 36.6 (09 Jun 2024 20:38)  T(F): 97.5 (10 Moreno 2024 06:31), Max: 97.9 (09 Jun 2024 20:38)  HR: 73 (10 Moreno 2024 06:31) (73 - 98)  BP: 103/66 (10 Moreno 2024 06:31) (103/66 - 127/75)  BP(mean): --  RR: 18 (10 Moreno 2024 06:31) (18 - 18)  SpO2: 96% (10 Moreno 2024 06:31) (96% - 98%)    Parameters below as of 10 Moreno 2024 06:31  Patient On (Oxygen Delivery Method): room air        PHYSICAL EXAM:  GENERAL: NAD; thin appearing; good concentration   Neuro: AAOx3; CN2-12 grossly intact; speech clear  HEENT: NC/AT; MMM; neck supple; no nystagmus; no scleral icterus; nasal passages clear  Heart: RRR; +s1 and s2; no MRG  Lungs: inspiratory rhonchi most prom at bases; on RA  GI: nondistended; nontender; normal bowel sounds [id number]  Extremities: +2 pulses in UE and LE b/l; no clubbing, cyanosis, or edema b/l, capillary refill <2 sec b/l  Skin: skin dry and warm; no skin tenting; no rashes or lesions      Consultant(s) Notes Reviewed:  [x ] YES  [ ] NO  Care Discussed with Consultants/Other Providers [ x] YES  [ ] NO    LABS:                        9.1    5.32  )-----------( 196      ( 10 Moreno 2024 05:30 )             29.8     06-10    136  |  99  |  24<H>  ----------------------------<  99  4.4   |  32<H>  |  0.72    Ca    8.6      10 Moreno 2024 05:30  Phos  3.3     06-10  Mg     1.8     06-10    TPro  6.5  /  Alb  3.6  /  TBili  0.2  /  DBili  x   /  AST  16  /  ALT  14  /  AlkPhos  91  06-09        Urinalysis Basic - ( 10 Moreno 2024 05:30 )    Color: x / Appearance: x / SG: x / pH: x  Gluc: 99 mg/dL / Ketone: x  / Bili: x / Urobili: x   Blood: x / Protein: x / Nitrite: x   Leuk Esterase: x / RBC: x / WBC x   Sq Epi: x / Non Sq Epi: x / Bacteria: x      CAPILLARY BLOOD GLUCOSE            Urinalysis Basic - ( 10 Moreno 2024 05:30 )    Color: x / Appearance: x / SG: x / pH: x  Gluc: 99 mg/dL / Ketone: x  / Bili: x / Urobili: x   Blood: x / Protein: x / Nitrite: x   Leuk Esterase: x / RBC: x / WBC x   Sq Epi: x / Non Sq Epi: x / Bacteria: x        MEDICATIONS  (STANDING):  amLODIPine   Tablet 5 milliGRAM(s) Oral every 24 hours  aspirin enteric coated 81 milliGRAM(s) Oral daily  atorvastatin 40 milliGRAM(s) Oral at bedtime  clopidogrel Tablet 75 milliGRAM(s) Oral daily  donepezil 5 milliGRAM(s) Oral at bedtime  enoxaparin Injectable 30 milliGRAM(s) SubCutaneous every 24 hours  furosemide    Tablet 20 milliGRAM(s) Oral daily  gabapentin 100 milliGRAM(s) Oral every 8 hours  lidocaine   4% Patch 2 Patch Transdermal every 24 hours  loratadine 10 milliGRAM(s) Oral daily  metoprolol succinate ER 50 milliGRAM(s) Oral every 12 hours  montelukast 10 milliGRAM(s) Oral daily  pantoprazole    Tablet 40 milliGRAM(s) Oral before breakfast  Patanase 665 mcg/Spray 2 Spray(s)   Both Nostrils every 12 hours  sodium chloride 0.9% for Nebulization 3 milliLiter(s) Nebulizer every 6 hours  tobramycin for Nebulization 300 milliGRAM(s) Inhalation every 12 hours    MEDICATIONS  (PRN):  acetaminophen     Tablet .. 650 milliGRAM(s) Oral every 6 hours PRN Temp greater or equal to 38C (100.4F), Mild Pain (1 - 3)  clonazePAM  Tablet 0.5 milliGRAM(s) Oral every 12 hours PRN For Pain/For Anxiety  melatonin 3 milliGRAM(s) Oral at bedtime PRN Insomnia      RADIOLOGY & ADDITIONAL TESTS:    Imaging Personally Reviewed:  [ ] YES  [ ] NO

## 2024-06-10 NOTE — DISCHARGE NOTE PROVIDER - DETAILS OF MALNUTRITION DIAGNOSIS/DIAGNOSES
This patient has been assessed with a concern for Malnutrition and was treated during this hospitalization for the following Nutrition diagnosis/diagnoses:     -  06/10/2024: Severe protein-calorie malnutrition   -  06/10/2024: Underweight (BMI < 19)

## 2024-06-10 NOTE — CONSULT NOTE ADULT - WHAT MATTERS MOST
being able to be home and garden, wants to be able to clean up garden one more time this summer so that "someone can take over its care" after this summer; does not want to be a burden on her daughter Patience

## 2024-06-10 NOTE — DISCHARGE NOTE PROVIDER - CARE PROVIDER_API CALL
Tawana Doherty  Hospice/Palliative Medicine  [address] [address]  Phone: [phone]  Fax: [phone]  Follow Up Time: 2 weeks   Tawana Doherty  Hospice/Palliative Medicine  [address] [address]  Phone: [phone]  Fax: [phone]  Follow Up Time: 2 weeks    Ramón Foster  Pulmonary Disease  [address]  [place] [address]  Phone: [phone]  Fax: [phone]  Established Patient  Scheduled Appointment: 06/24/2024 02:00 PM

## 2024-06-10 NOTE — DISCHARGE NOTE PROVIDER - CARE PROVIDERS DIRECT ADDRESSES
,DirectAddress_Unknown ,DirectAddress_Unknown,[email or web address]

## 2024-06-10 NOTE — DIETITIAN INITIAL EVALUATION ADULT - OTHER INFO
83F with PMH of chronic bronchiectasis, afib, HFpEF, lewy body dementia, CVA, SAH, CF carrier, and C. diff infection (11/2023) who presented with dyspnea on exertion and labile blood pressures, admitted for bronchiectasis exacerbation.     Pt seen on 7UR for assessment. Labs and medication orders reviewed. Ordered for lasix. Electrolytes WNL. On Regular diet. Pt reports good appetite and intake, endorses efforts to increase energy density of meals PTA to promote wt gain. Endorses wt loss is hospital admissions/chronic illness, notes bronchiectasis makes it difficult to gain and maintain wt; per prior admission RD note (12/15/23) pt wt 100lb; current admission wt 89lb indicates 11lb/11% wt loss in 6 months - clinically significant. Nutrition-focused physical examination notable for severe muscle and fat wasting. Per ASPEN guidelines, pt meets criteria for severe chronic malnutrition - see nutrition risk notification. Pt denies difficulty chewing/swallowing, reports  83F with PMH of chronic bronchiectasis, afib, HFpEF, lewy body dementia, CVA, SAH, CF carrier, and C. diff infection (11/2023) who presented with dyspnea on exertion and labile blood pressures, admitted for bronchiectasis exacerbation.     Pt seen on 7UR for assessment. Labs and medication orders reviewed. Ordered for lasix. Electrolytes WNL. On Regular diet. Pt reports good appetite and intake, endorses efforts to increase energy density of meals PTA to promote wt gain. Endorses wt loss is hospital admissions/chronic illness, notes bronchiectasis makes it difficult to gain and maintain wt; per prior admission RD note (12/15/23) pt wt 100lb; current admission wt 89lb indicates 11lb/11% wt loss in 6 months - clinically significant. Nutrition-focused physical examination notable for severe muscle and fat wasting. Per ASPEN guidelines, pt meets criteria for severe chronic malnutrition - see nutrition risk notification. Pt denies difficulty chewing/swallowing, reports prior swallow evals with recommendation for regular textures; per prior admission SLP evaluation (10/6/23) recommended 83F with PMH of chronic bronchiectasis, afib, HFpEF, lewy body dementia, CVA, SAH, CF carrier, and C. diff infection (11/2023) who presented with dyspnea on exertion and labile blood pressures, admitted for bronchiectasis exacerbation.     Pt seen on 7UR for assessment. Labs and medication orders reviewed. Ordered for lasix. Electrolytes WNL. On Regular diet. Pt reports good appetite and intake, endorses efforts to promote wt gain PTA in setting of wt loss secondary to hospital admissions/chronic illness, notes bronchiectasis makes it difficult to gain and maintain wt; per prior admission RD note (12/15/23) pt wt 100lb; current admission wt 89lb indicates 11lb/11% wt loss in 6 months - clinically significant. Nutrition-focused physical examination notable for moderate-severe muscle and fat wasting. Per ASPEN guidelines, pt meets criteria for severe chronic malnutrition - see nutrition risk notification. Pt denies difficulty chewing/swallowing, reports prior swallow evals with recommendation for regular textures; per prior admission SLP evaluation (10/6/23) recommended soft and bite sized with thin liquids. Pt confirms no known food allergies. No Mu-ism/ethnic/cultural food preferences noted. No pressure injuries or edema documented, Nasim score 19. RD reviewed formulary, pt declined all oral nutrition supplements at this time. See nutrition recommendations. RD to remain available.

## 2024-06-10 NOTE — DISCHARGE NOTE PROVIDER - ATTENDING DISCHARGE PHYSICAL EXAMINATION:
General; Alert, speaking in full sentences, thin, appears weak  HEENT:  NC, PERRL, sclerae anicteric, conjunctivae clear.  Sinuses nontender, no nasal exudate.  No buccal or pharyngeal lesions, erythema or exudate  Lungs: Decreased BS bilaterally, mild wheezing bilaterally   Cardiac:  RRR, S1, S2, no murmur appreciated  Abd:  Symmetric, normoactive BS.  Soft, nontender, no masses  Skin: No rashes.    Spoke with Dr. Foster, patient is at her new baseline and unfortunately has worsening bronchiectasis and non compliant with O2 and inhalers. Will start ipratropium as she is unable to tolerate albuterol. Resume home nasal spray and po antihistamine daily

## 2024-06-10 NOTE — CONSULT NOTE ADULT - PROBLEM SELECTOR RECOMMENDATION 6
would benefit from longterm symptom management outpatient  - f/u with palliative care Dr. Doherty outpatient

## 2024-06-10 NOTE — PROGRESS NOTE ADULT - PROBLEM SELECTOR PLAN 5
Currently A&Ox3 without any neuro deficits  Home meds: donepezil 5mg qhs    - C/w home meds

## 2024-06-10 NOTE — DISCHARGE NOTE PROVIDER - NSDCFUSCHEDAPPT_GEN_ALL_CORE_FT
Sneha Sharma  [facility]  [facility] [address]  Scheduled Appointment: 08/16/2024     Ramón Foster  [facility]  PULMMED [address]  Scheduled Appointment: 06/24/2024    Sneha Sharma  [facility]  CTSURG [address] [facility]  Scheduled Appointment: 08/16/2024

## 2024-06-10 NOTE — CONSULT NOTE ADULT - PROBLEM SELECTOR RECOMMENDATION 9
more frequent exacerbations requiring hospitalization  feels that shortness of breath is now limiting at home, unable to engage in normal activities (gardening) due to sxs  follows with pulm Dr. Foster who has explained that the disease is progressive  Has pain/pressure bilateral inferior rib cage due to coughing, also sensation that she cannot breathe/take deep breath    - tx of bronchiectasis per primary team  - Klonopin 0.5mg bid for dyspnea  - lidocaine patch bilateral inferior rib cage  - outpatient f/u with palliative care Dr. Doherty for sx management and support due to progressive disease Had shingles (not tx with antivirals) several months ago, now with burning pain L upper back radiating in dermatomal fashion to chest; not on medication  - start gabapentin 100mg tid, uptitrate outpatient as tolerated

## 2024-06-10 NOTE — DISCHARGE NOTE NURSING/CASE MANAGEMENT/SOCIAL WORK - NSDCPEPTCAREGIVEDUMATLIST _GEN_ALL_CORE
Patient Specific Counseling (Will Not Stick From Patient To Patient): -apply Duobrii 0.01 %-0.045 % lotion apply to affected areas of the feet nightly, PAR discussed\\n-start excimer laser TIW, PAR discussed\\n-referral to Dr. Casper for evaluation of joint pain involving toes, the patient will notify our office in 2 weeks if he has not been contacted to schedule his referral appointment \\n-follow up in 3 months
Detail Level: Detailed
Heart Failure/Stroke

## 2024-06-10 NOTE — PROGRESS NOTE ADULT - SUBJECTIVE AND OBJECTIVE BOX
PULMONARY CONSULT SERVICE FOLLOW-UP NOTE    INTERVAL HPI:  Reviewed chart and overnight events; patient seen and examined at bedside.    MEDICATIONS:  Pulmonary:  montelukast 10 milliGRAM(s) Oral daily  sodium chloride 0.9% for Nebulization 3 milliLiter(s) Nebulizer every 6 hours    Antimicrobials:  vancomycin    Solution 125 milliGRAM(s) Oral every 12 hours    Anticoagulants:  aspirin enteric coated 81 milliGRAM(s) Oral daily  clopidogrel Tablet 75 milliGRAM(s) Oral daily  enoxaparin Injectable 30 milliGRAM(s) SubCutaneous every 24 hours    Cardiac:  amLODIPine   Tablet 5 milliGRAM(s) Oral every 24 hours  furosemide    Tablet 20 milliGRAM(s) Oral daily  metoprolol succinate ER 50 milliGRAM(s) Oral every 12 hours      Allergies    Augmentin (Short breath; Rash)  Ceclor (Rash)  IV Contrast (Unknown)  Levaquin (Swelling)    Intolerances        Vital Signs Last 24 Hrs  T(C): 36.4 (10 Moreno 2024 06:31), Max: 36.6 (09 Jun 2024 20:38)  T(F): 97.5 (10 Moreno 2024 06:31), Max: 97.9 (09 Jun 2024 20:38)  HR: 73 (10 Moreno 2024 06:31) (73 - 98)  BP: 103/66 (10 Moreno 2024 06:31) (103/66 - 145/90)  BP(mean): --  RR: 18 (10 Moreno 2024 06:31) (18 - 19)  SpO2: 96% (10 Moreno 2024 06:31) (91% - 98%)    Parameters below as of 10 Moreno 2024 06:31  Patient On (Oxygen Delivery Method): room air            PHYSICAL EXAM:  Constitutional: NAD  HEENT: NC/AT; PERRL, anicteric sclera; MMM  Neck: supple  Cardiovascular: +S1/S2, RRR  Respiratory: CTA B/L; no W/R/R  Gastrointestinal: soft, NT/ND  Extremities: WWP; no edema, clubbing or cyanosis  Vascular: 2+ radial pulses B/L  Neurological: awake and alert; KAHN    LABS:      CBC Full  -  ( 10 Moreno 2024 05:30 )  WBC Count : 5.32 K/uL  RBC Count : 3.00 M/uL  Hemoglobin : 9.1 g/dL  Hematocrit : 29.8 %  Platelet Count - Automated : 196 K/uL  Mean Cell Volume : 99.3 fl  Mean Cell Hemoglobin : 30.3 pg  Mean Cell Hemoglobin Concentration : 30.5 gm/dL  Auto Neutrophil # : x  Auto Lymphocyte # : x  Auto Monocyte # : x  Auto Eosinophil # : x  Auto Basophil # : x  Auto Neutrophil % : x  Auto Lymphocyte % : x  Auto Monocyte % : x  Auto Eosinophil % : x  Auto Basophil % : x    06-10    136  |  99  |  24<H>  ----------------------------<  99  4.4   |  32<H>  |  0.72    Ca    8.6      10 Moreno 2024 05:30  Phos  3.3     06-10  Mg     1.8     06-10    TPro  6.5  /  Alb  3.6  /  TBili  0.2  /  DBili  x   /  AST  16  /  ALT  14  /  AlkPhos  91  06-09          Urinalysis Basic - ( 10 Moreno 2024 05:30 )    Color: x / Appearance: x / SG: x / pH: x  Gluc: 99 mg/dL / Ketone: x  / Bili: x / Urobili: x   Blood: x / Protein: x / Nitrite: x   Leuk Esterase: x / RBC: x / WBC x   Sq Epi: x / Non Sq Epi: x / Bacteria: x                RADIOLOGY & ADDITIONAL STUDIES: PULMONARY CONSULT SERVICE FOLLOW-UP NOTE    INTERVAL HPI:  Reviewed chart and overnight events; patient seen and examined at bedside.  She complained of being short of breath transiently after a horrific episode of chest pressure with tooth pain. The Nurse was notified who then notified the primary team.  EKG being obtained, pt states she feels relief within a few minutes already.    MEDICATIONS:  Pulmonary:  montelukast 10 milliGRAM(s) Oral daily  sodium chloride 0.9% for Nebulization 3 milliLiter(s) Nebulizer every 6 hours    Antimicrobials:  vancomycin    Solution 125 milliGRAM(s) Oral every 12 hours    Anticoagulants:  aspirin enteric coated 81 milliGRAM(s) Oral daily  clopidogrel Tablet 75 milliGRAM(s) Oral daily  enoxaparin Injectable 30 milliGRAM(s) SubCutaneous every 24 hours    Cardiac:  amLODIPine   Tablet 5 milliGRAM(s) Oral every 24 hours  furosemide    Tablet 20 milliGRAM(s) Oral daily  metoprolol succinate ER 50 milliGRAM(s) Oral every 12 hours      Allergies    Augmentin (Short breath; Rash)  Ceclor (Rash)  IV Contrast (Unknown)  Levaquin (Swelling)    Intolerances        Vital Signs Last 24 Hrs  T(C): 36.4 (10 Moreno 2024 06:31), Max: 36.6 (09 Jun 2024 20:38)  T(F): 97.5 (10 Moreno 2024 06:31), Max: 97.9 (09 Jun 2024 20:38)  HR: 73 (10 Moreno 2024 06:31) (73 - 98)  BP: 103/66 (10 Moreno 2024 06:31) (103/66 - 145/90)  BP(mean): --  RR: 18 (10 Moreno 2024 06:31) (18 - 19)  SpO2: 96% (10 Moreno 2024 06:31) (91% - 98%)    Parameters below as of 10 Moreno 2024 06:31  Patient On (Oxygen Delivery Method): room air            PHYSICAL EXAM:  Constitutional: NAD  HEENT: NC/AT; PERRL, anicteric sclera; MMM  Neck: supple  Cardiovascular: +S1/S2, RRR  Respiratory: CTA B/L; + wheezing   Gastrointestinal: soft, NT/ND  Extremities: WWP; no edema, clubbing or cyanosis  Vascular: 2+ radial pulses B/L  Neurological: awake and alert; KAHN    LABS:      CBC Full  -  ( 10 Moreno 2024 05:30 )  WBC Count : 5.32 K/uL  RBC Count : 3.00 M/uL  Hemoglobin : 9.1 g/dL  Hematocrit : 29.8 %  Platelet Count - Automated : 196 K/uL  Mean Cell Volume : 99.3 fl  Mean Cell Hemoglobin : 30.3 pg  Mean Cell Hemoglobin Concentration : 30.5 gm/dL  Auto Neutrophil # : x  Auto Lymphocyte # : x  Auto Monocyte # : x  Auto Eosinophil # : x  Auto Basophil # : x  Auto Neutrophil % : x  Auto Lymphocyte % : x  Auto Monocyte % : x  Auto Eosinophil % : x  Auto Basophil % : x    06-10    136  |  99  |  24<H>  ----------------------------<  99  4.4   |  32<H>  |  0.72    Ca    8.6      10 Moreno 2024 05:30  Phos  3.3     06-10  Mg     1.8     06-10    TPro  6.5  /  Alb  3.6  /  TBili  0.2  /  DBili  x   /  AST  16  /  ALT  14  /  AlkPhos  91  06-09          Urinalysis Basic - ( 10 Moreno 2024 05:30 )    Color: x / Appearance: x / SG: x / pH: x  Gluc: 99 mg/dL / Ketone: x  / Bili: x / Urobili: x   Blood: x / Protein: x / Nitrite: x   Leuk Esterase: x / RBC: x / WBC x   Sq Epi: x / Non Sq Epi: x / Bacteria: x                RADIOLOGY & ADDITIONAL STUDIES:    CT Chest non con 6/10/2024  Unchanged bronchiectasis and mucoid impacted airways.    New left upper lobe elongated part groundglass nodule, probably   inflammatory. Recommend three-month follow-up to assess for clearance.

## 2024-06-11 ENCOUNTER — TRANSCRIPTION ENCOUNTER (OUTPATIENT)
Age: 84
End: 2024-06-11

## 2024-06-11 DIAGNOSIS — Z71.89 OTHER SPECIFIED COUNSELING: ICD-10-CM

## 2024-06-12 ENCOUNTER — NON-APPOINTMENT (OUTPATIENT)
Age: 84
End: 2024-06-12

## 2024-06-14 ENCOUNTER — TRANSCRIPTION ENCOUNTER (OUTPATIENT)
Age: 84
End: 2024-06-14

## 2024-06-19 ENCOUNTER — NON-APPOINTMENT (OUTPATIENT)
Age: 84
End: 2024-06-19

## 2024-06-19 DIAGNOSIS — Z86.19 PERSONAL HISTORY OF OTHER INFECTIOUS AND PARASITIC DISEASES: ICD-10-CM

## 2024-06-19 DIAGNOSIS — F41.9 ANXIETY DISORDER, UNSPECIFIED: ICD-10-CM

## 2024-06-19 DIAGNOSIS — I11.0 HYPERTENSIVE HEART DISEASE WITH HEART FAILURE: ICD-10-CM

## 2024-06-19 DIAGNOSIS — E43 UNSPECIFIED SEVERE PROTEIN-CALORIE MALNUTRITION: ICD-10-CM

## 2024-06-19 DIAGNOSIS — Z91.041 RADIOGRAPHIC DYE ALLERGY STATUS: ICD-10-CM

## 2024-06-19 DIAGNOSIS — I48.20 CHRONIC ATRIAL FIBRILLATION, UNSPECIFIED: ICD-10-CM

## 2024-06-19 DIAGNOSIS — Z22.8 CARRIER OF OTHER INFECTIOUS DISEASES: ICD-10-CM

## 2024-06-19 DIAGNOSIS — F02.84 DEMENTIA IN OTHER DISEASES CLASSIFIED ELSEWHERE, UNSPECIFIED SEVERITY, WITH ANXIETY: ICD-10-CM

## 2024-06-19 DIAGNOSIS — Z41.8 ENCOUNTER FOR OTHER PROCEDURES FOR PURPOSES OTHER THAN REMEDYING HEALTH STATE: ICD-10-CM

## 2024-06-19 DIAGNOSIS — B02.29 OTHER POSTHERPETIC NERVOUS SYSTEM INVOLVEMENT: ICD-10-CM

## 2024-06-19 DIAGNOSIS — I50.30 UNSPECIFIED DIASTOLIC (CONGESTIVE) HEART FAILURE: ICD-10-CM

## 2024-06-19 DIAGNOSIS — Z79.02 LONG TERM (CURRENT) USE OF ANTITHROMBOTICS/ANTIPLATELETS: ICD-10-CM

## 2024-06-19 DIAGNOSIS — G31.83 NEUROCOGNITIVE DISORDER WITH LEWY BODIES: ICD-10-CM

## 2024-06-19 DIAGNOSIS — J96.11 CHRONIC RESPIRATORY FAILURE WITH HYPOXIA: ICD-10-CM

## 2024-06-19 DIAGNOSIS — Z88.1 ALLERGY STATUS TO OTHER ANTIBIOTIC AGENTS STATUS: ICD-10-CM

## 2024-06-19 DIAGNOSIS — J47.9 BRONCHIECTASIS, UNCOMPLICATED: ICD-10-CM

## 2024-06-20 RX ORDER — AZITHROMYCIN 250 MG/1
250 TABLET, FILM COATED ORAL
Qty: 30 | Refills: 11 | Status: ACTIVE | COMMUNITY
Start: 2024-06-20 | End: 1900-01-01

## 2024-06-21 ENCOUNTER — NON-APPOINTMENT (OUTPATIENT)
Age: 84
End: 2024-06-21

## 2024-06-21 ENCOUNTER — TRANSCRIPTION ENCOUNTER (OUTPATIENT)
Age: 84
End: 2024-06-21

## 2024-06-24 ENCOUNTER — APPOINTMENT (OUTPATIENT)
Dept: PULMONOLOGY | Facility: CLINIC | Age: 84
End: 2024-06-24

## 2024-06-25 ENCOUNTER — NON-APPOINTMENT (OUTPATIENT)
Age: 84
End: 2024-06-25

## 2024-06-25 ENCOUNTER — APPOINTMENT (OUTPATIENT)
Dept: FAMILY MEDICINE | Facility: CLINIC | Age: 84
End: 2024-06-25
Payer: MEDICARE

## 2024-06-25 VITALS — HEART RATE: 108 BPM | TEMPERATURE: 98.7 F

## 2024-06-25 VITALS
OXYGEN SATURATION: 96 % | SYSTOLIC BLOOD PRESSURE: 130 MMHG | DIASTOLIC BLOOD PRESSURE: 74 MMHG | HEIGHT: 64 IN | BODY MASS INDEX: 15.36 KG/M2 | WEIGHT: 90 LBS

## 2024-06-25 DIAGNOSIS — L03.115 CELLULITIS OF RIGHT LOWER LIMB: ICD-10-CM

## 2024-06-25 DIAGNOSIS — J47.9 BRONCHIECTASIS, UNCOMPLICATED: ICD-10-CM

## 2024-06-25 DIAGNOSIS — I48.0 PAROXYSMAL ATRIAL FIBRILLATION: ICD-10-CM

## 2024-06-25 PROCEDURE — 93000 ELECTROCARDIOGRAM COMPLETE: CPT

## 2024-06-25 PROCEDURE — 99214 OFFICE O/P EST MOD 30 MIN: CPT

## 2024-06-25 PROCEDURE — G2211 COMPLEX E/M VISIT ADD ON: CPT

## 2024-06-25 RX ORDER — DOXYCYCLINE 100 MG/1
100 CAPSULE ORAL TWICE DAILY
Qty: 14 | Refills: 0 | Status: ACTIVE | COMMUNITY
Start: 2024-06-25 | End: 1900-01-01

## 2024-06-26 PROBLEM — I48.0 PAROXYSMAL ATRIAL FIBRILLATION: Status: ACTIVE | Noted: 2024-01-11

## 2024-06-26 PROBLEM — L03.115 CELLULITIS OF RIGHT LOWER EXTREMITY: Status: ACTIVE | Noted: 2024-06-25

## 2024-06-26 LAB — BACTERIA SPT CULT: ABNORMAL

## 2024-06-27 ENCOUNTER — NON-APPOINTMENT (OUTPATIENT)
Age: 84
End: 2024-06-27

## 2024-07-01 ENCOUNTER — TRANSCRIPTION ENCOUNTER (OUTPATIENT)
Age: 84
End: 2024-07-01

## 2024-07-09 ENCOUNTER — NON-APPOINTMENT (OUTPATIENT)
Age: 84
End: 2024-07-09

## 2024-07-09 ENCOUNTER — APPOINTMENT (OUTPATIENT)
Dept: CARDIOLOGY | Facility: CLINIC | Age: 84
End: 2024-07-09
Payer: MEDICARE

## 2024-07-09 ENCOUNTER — APPOINTMENT (OUTPATIENT)
Dept: GERIATRICS | Facility: CLINIC | Age: 84
End: 2024-07-09
Payer: MEDICARE

## 2024-07-09 DIAGNOSIS — R06.02 SHORTNESS OF BREATH: ICD-10-CM

## 2024-07-09 DIAGNOSIS — I25.10 ATHEROSCLEROTIC HEART DISEASE OF NATIVE CORONARY ARTERY W/OUT ANGINA PECTORIS: ICD-10-CM

## 2024-07-09 DIAGNOSIS — F03.90 UNSPECIFIED DEMENTIA W/OUT BEHAVIORAL DISTURBANCE: ICD-10-CM

## 2024-07-09 DIAGNOSIS — J47.9 BRONCHIECTASIS, UNCOMPLICATED: ICD-10-CM

## 2024-07-09 DIAGNOSIS — I50.30 UNSPECIFIED DIASTOLIC (CONGESTIVE) HEART FAILURE: ICD-10-CM

## 2024-07-09 DIAGNOSIS — M54.9 DORSALGIA, UNSPECIFIED: ICD-10-CM

## 2024-07-09 DIAGNOSIS — R54 AGE-RELATED PHYSICAL DEBILITY: ICD-10-CM

## 2024-07-09 DIAGNOSIS — F41.9 ANXIETY DISORDER, UNSPECIFIED: ICD-10-CM

## 2024-07-09 DIAGNOSIS — I48.91 UNSPECIFIED ATRIAL FIBRILLATION: ICD-10-CM

## 2024-07-09 DIAGNOSIS — I10 ESSENTIAL (PRIMARY) HYPERTENSION: ICD-10-CM

## 2024-07-09 DIAGNOSIS — R06.00 DYSPNEA, UNSPECIFIED: ICD-10-CM

## 2024-07-09 DIAGNOSIS — B02.29 OTHER POSTHERPETIC NERVOUS SYSTEM INVOLVEMENT: ICD-10-CM

## 2024-07-09 DIAGNOSIS — G89.29 DORSALGIA, UNSPECIFIED: ICD-10-CM

## 2024-07-09 PROCEDURE — 93000 ELECTROCARDIOGRAM COMPLETE: CPT

## 2024-07-09 PROCEDURE — 99215 OFFICE O/P EST HI 40 MIN: CPT

## 2024-07-09 PROCEDURE — 99205 OFFICE O/P NEW HI 60 MIN: CPT

## 2024-07-09 RX ORDER — ATENOLOL 25 MG/1
25 TABLET ORAL
Qty: 180 | Refills: 3 | Status: ACTIVE | COMMUNITY
Start: 2024-07-09 | End: 1900-01-01

## 2024-07-10 ENCOUNTER — NON-APPOINTMENT (OUTPATIENT)
Age: 84
End: 2024-07-10

## 2024-07-10 ENCOUNTER — APPOINTMENT (OUTPATIENT)
Dept: PAIN MANAGEMENT | Facility: CLINIC | Age: 84
End: 2024-07-10
Payer: MEDICARE

## 2024-07-10 DIAGNOSIS — G89.4 CHRONIC PAIN SYNDROME: ICD-10-CM

## 2024-07-10 DIAGNOSIS — M47.817 SPONDYLOSIS W/OUT MYELOPATHY OR RADICULOPATHY, LUMBOSACRAL REGION: ICD-10-CM

## 2024-07-10 PROBLEM — R06.02 SHORTNESS OF BREATH: Status: ACTIVE | Noted: 2024-07-10

## 2024-07-10 PROBLEM — I48.91 ATRIAL FIBRILLATION: Status: ACTIVE | Noted: 2024-07-09

## 2024-07-10 PROBLEM — I25.10 CORONARY ARTERY DISEASE: Status: ACTIVE | Noted: 2024-07-10

## 2024-07-10 PROCEDURE — 99213 OFFICE O/P EST LOW 20 MIN: CPT

## 2024-07-10 RX ORDER — METOPROLOL SUCCINATE 200 MG/1
TABLET, EXTENDED RELEASE ORAL
Refills: 0 | Status: ACTIVE | COMMUNITY

## 2024-07-11 ENCOUNTER — APPOINTMENT (OUTPATIENT)
Dept: CARDIOLOGY | Facility: CLINIC | Age: 84
End: 2024-07-11

## 2024-07-11 RX ORDER — MORPHINE SULFATE 10 MG/5ML
10 SOLUTION ORAL DAILY
Qty: 30 | Refills: 0 | Status: ACTIVE | COMMUNITY
Start: 2024-07-09 | End: 1900-01-01

## 2024-08-06 ENCOUNTER — APPOINTMENT (OUTPATIENT)
Dept: GERIATRICS | Facility: CLINIC | Age: 84
End: 2024-08-06

## 2024-08-16 ENCOUNTER — APPOINTMENT (OUTPATIENT)
Dept: CARDIOTHORACIC SURGERY | Facility: CLINIC | Age: 84
End: 2024-08-16

## 2024-10-22 ENCOUNTER — RX RENEWAL (OUTPATIENT)
Age: 84
End: 2024-10-22

## 2025-01-13 NOTE — PATIENT PROFILE ADULT - FUNCTIONAL ASSESSMENT - BASIC MOBILITY 6.
I would recommend OTC imodium and bland diet. She follows closely with GI- also forwarding to Latisha her gastroenterologist for further recommendations 3 = A little assistance

## 2025-01-26 NOTE — DISCHARGE NOTE PROVIDER - REASON FOR ADMISSION
diplopia
No
I personally performed the service described in the documentation recorded by the scribe in my presence, and it accurately and completely records my words and actions.

## 2025-02-06 NOTE — BH CONSULTATION LIAISON ASSESSMENT NOTE - NSBHMSEATTEN_PSY_A_CORE
Next appt: 4/16/2025  Last appt: 10/2/2024    Refill request for: sertraline; last refilled 4/2/2024. 90 tablets with 3 refills    Normal

## 2025-02-28 NOTE — BH CONSULTATION LIAISON PROGRESS NOTE - PRN MEDICATIONS SINCE LAST EVAL
CASE MANAGEMENT..... Discharge order on chart. Met with patient at the bedside. States that she lives in a 2 story home with her 10 yo and 16 yo children who can assist her when needed. She has another son that is supportive. She is ambulatory and has no dme, but states sometimes she needs help around the house. Has insulin pump. Follows with Catalina ORTIZ. Confirmed plan is home with no needs. She will have transport home.    yes

## 2025-03-26 NOTE — PROGRESS NOTE ADULT - NS ATTEND BILL GEN_ALL_CORE
Called patient back and recommended conservative treatment with anti-inflammatories, Tylenol, heat/ice and supportive undergarments. Instructed patient to call back if symptoms worsen. Patient agreeable to plan of care and no further questions at this time.  
Attending to bill

## 2025-05-01 NOTE — BH CONSULTATION LIAISON PROGRESS NOTE - NSBHMSEAFFCONG_PSY_A_CORE
Ambulatory Care Coordination Note     5/1/2025 11:44 AM     Care Summary Note:   Outreach for High Risk Case Management - spoke with patient  ED visit Saturday 4/27 - found to have COVID-19  Reports today she is feeling alright   - getting up, moving around the house   - discussed hydration   - appetite is good   - slight cough   - no fever  Granddaughter has been coming in to help patient  Agreeable to follow up   Will discuss need for follow up visit after next call     ACM: Sophia Mendez RN     Method of communication with provider: chart routing.    Utilization: Has the patient been seen in the ED since your last call? Yes,   Discharge Date: 4/27/25   Discharge Facility: [facility]  Reason for ED Visit: COVID -19   Review of Discharge Instructions:   [x] AVS discharge instructions  [] Right Care, Right Place, Right Time document  [] Medication changes  [x] Follow up appointments      PCP/Specialist follow up:   Future Appointments         Provider Specialty Dept Phone    5/15/2025 2:00 PM April Alvarado APRN - CNP Family Medicine [phone]    6/2/2025 1:30 PM Viola Rico PA-C Gastroenterology [phone]    7/28/2025 9:35 AM Rodrigo Aguilar MD Orthopedic Surgery [phone]                
Congruent

## 2025-06-05 NOTE — BH CONSULTATION LIAISON ASSESSMENT NOTE - [employer]IVE FACTORS:
What Type Of Note Output Would You Prefer (Optional)?: Standard Output What Is The Reason For Today's Visit?: Full Body Skin Examination with No Concerns What Is The Reason For Today's Visit? (Being Monitored For X): concerning skin lesions on an annual basis Residential stability/Relationship stability/Sobriety

## (undated) DEVICE — NDL TRANSEPTAL BRK-1 98CM

## (undated) DEVICE — ELCTR ZOLL DEFIBRILLATOR PAD NO REPLACEMENT

## (undated) DEVICE — SUT VICRYL 2-0 27" CT-1

## (undated) DEVICE — NDL TRANSSEPTAL CRV 18GX71CM

## (undated) DEVICE — ELCTR BOVIE PENCIL HANDPIECE ROCKER SWITCH 15FT

## (undated) DEVICE — DRAPE ULTRASOUND PROBE COVER CATH FAMILY CONNECTOR

## (undated) DEVICE — PACK CATH CARDIAC KIT DSG SCSR FRCP

## (undated) DEVICE — VASCULAR DILATOR KIT 8,12,16,20, 24FR

## (undated) DEVICE — SPIKE LRG BORE SHRT SPIKE W/2IN 1-WAY STOPCOCK ON

## (undated) DEVICE — CUFF FINGER CLEARSIGHT SM

## (undated) DEVICE — WARMING BLANKET LOWER ADULT

## (undated) DEVICE — DRAPE PROBE COVER 5" X 96"

## (undated) DEVICE — NDL TRANSSEPTAL BRK 18GAX98CM

## (undated) DEVICE — PACK COR LT HEART

## (undated) DEVICE — PACK HYBRID LHH

## (undated) DEVICE — CUFF FINGER CLEARSIGHT LG

## (undated) DEVICE — VENODYNE/SCD SLEEVE CALF MEDIUM

## (undated) DEVICE — TUBING EXTENSION HI PRESSURE FLEX 48"

## (undated) DEVICE — DRAPE ULTRASOUND TRANSDUCER COVER

## (undated) DEVICE — ELCTR REM POLYHESIVE ADULT PT RETURN 15FT